# Patient Record
Sex: MALE | Race: WHITE | NOT HISPANIC OR LATINO | Employment: OTHER | URBAN - METROPOLITAN AREA
[De-identification: names, ages, dates, MRNs, and addresses within clinical notes are randomized per-mention and may not be internally consistent; named-entity substitution may affect disease eponyms.]

---

## 2017-03-13 ENCOUNTER — ANESTHESIA EVENT (OUTPATIENT)
Dept: SURGERY | Facility: CLINIC | Age: 67
End: 2017-03-13
Payer: COMMERCIAL

## 2017-03-14 ENCOUNTER — HOSPITAL ENCOUNTER (OUTPATIENT)
Facility: CLINIC | Age: 67
Discharge: HOME | End: 2017-03-14
Attending: THORACIC SURGERY (CARDIOTHORACIC VASCULAR SURGERY) | Admitting: THORACIC SURGERY (CARDIOTHORACIC VASCULAR SURGERY)
Payer: COMMERCIAL

## 2017-03-14 ENCOUNTER — ANESTHESIA (OUTPATIENT)
Dept: SURGERY | Facility: CLINIC | Age: 67
End: 2017-03-14
Payer: COMMERCIAL

## 2017-03-14 VITALS
DIASTOLIC BLOOD PRESSURE: 63 MMHG | WEIGHT: 251 LBS | TEMPERATURE: 98.2 F | RESPIRATION RATE: 16 BRPM | OXYGEN SATURATION: 96 % | HEIGHT: 68 IN | BODY MASS INDEX: 38.04 KG/M2 | SYSTOLIC BLOOD PRESSURE: 121 MMHG

## 2017-03-14 DIAGNOSIS — C85.80 OTHER SPECIFIED TYPE OF NON-HODGKIN LYMPHOMA: Primary | ICD-10-CM

## 2017-03-14 LAB — GLUCOSE BLDC GLUCOMTR-MCNC: 99 MG/DL (ref 70–99)

## 2017-03-14 PROCEDURE — 82962 GLUCOSE BLOOD TEST: CPT

## 2017-03-14 PROCEDURE — 25000125 ZZHC RX 250: Performed by: NURSE ANESTHETIST, CERTIFIED REGISTERED

## 2017-03-14 PROCEDURE — 25000128 H RX IP 250 OP 636: Performed by: NURSE ANESTHETIST, CERTIFIED REGISTERED

## 2017-03-14 PROCEDURE — 36000050 ZZH SURGERY LEVEL 2 1ST 30 MIN: Performed by: THORACIC SURGERY (CARDIOTHORACIC VASCULAR SURGERY)

## 2017-03-14 PROCEDURE — 25800025 ZZH RX 258: Performed by: NURSE ANESTHETIST, CERTIFIED REGISTERED

## 2017-03-14 PROCEDURE — 71000012 ZZH RECOVERY PHASE 1 LEVEL 1 FIRST HR: Performed by: THORACIC SURGERY (CARDIOTHORACIC VASCULAR SURGERY)

## 2017-03-14 PROCEDURE — 27210794 ZZH OR GENERAL SUPPLY STERILE: Performed by: THORACIC SURGERY (CARDIOTHORACIC VASCULAR SURGERY)

## 2017-03-14 PROCEDURE — 27210995 ZZH RX 272: Performed by: THORACIC SURGERY (CARDIOTHORACIC VASCULAR SURGERY)

## 2017-03-14 PROCEDURE — 37000008 ZZH ANESTHESIA TECHNICAL FEE, 1ST 30 MIN: Performed by: THORACIC SURGERY (CARDIOTHORACIC VASCULAR SURGERY)

## 2017-03-14 PROCEDURE — 71000027 ZZH RECOVERY PHASE 2 EACH 15 MINS: Performed by: THORACIC SURGERY (CARDIOTHORACIC VASCULAR SURGERY)

## 2017-03-14 PROCEDURE — 25000125 ZZHC RX 250: Performed by: THORACIC SURGERY (CARDIOTHORACIC VASCULAR SURGERY)

## 2017-03-14 PROCEDURE — 40000170 ZZH STATISTIC PRE-PROCEDURE ASSESSMENT II: Performed by: THORACIC SURGERY (CARDIOTHORACIC VASCULAR SURGERY)

## 2017-03-14 RX ORDER — FENTANYL CITRATE 50 UG/ML
25-50 INJECTION, SOLUTION INTRAMUSCULAR; INTRAVENOUS
Status: DISCONTINUED | OUTPATIENT
Start: 2017-03-14 | End: 2017-03-14 | Stop reason: HOSPADM

## 2017-03-14 RX ORDER — IBUPROFEN 600 MG/1
600 TABLET, FILM COATED ORAL
Status: DISCONTINUED | OUTPATIENT
Start: 2017-03-14 | End: 2017-03-14 | Stop reason: HOSPADM

## 2017-03-14 RX ORDER — ONDANSETRON 2 MG/ML
4 INJECTION INTRAMUSCULAR; INTRAVENOUS EVERY 30 MIN PRN
Status: DISCONTINUED | OUTPATIENT
Start: 2017-03-14 | End: 2017-03-14 | Stop reason: HOSPADM

## 2017-03-14 RX ORDER — ONDANSETRON 2 MG/ML
INJECTION INTRAMUSCULAR; INTRAVENOUS PRN
Status: DISCONTINUED | OUTPATIENT
Start: 2017-03-14 | End: 2017-03-14

## 2017-03-14 RX ORDER — MAGNESIUM HYDROXIDE 1200 MG/15ML
LIQUID ORAL PRN
Status: DISCONTINUED | OUTPATIENT
Start: 2017-03-14 | End: 2017-03-14 | Stop reason: HOSPADM

## 2017-03-14 RX ORDER — SODIUM CHLORIDE, SODIUM LACTATE, POTASSIUM CHLORIDE, CALCIUM CHLORIDE 600; 310; 30; 20 MG/100ML; MG/100ML; MG/100ML; MG/100ML
INJECTION, SOLUTION INTRAVENOUS CONTINUOUS
Status: DISCONTINUED | OUTPATIENT
Start: 2017-03-14 | End: 2017-03-14 | Stop reason: HOSPADM

## 2017-03-14 RX ORDER — HYDROCODONE BITARTRATE AND ACETAMINOPHEN 5; 325 MG/1; MG/1
1 TABLET ORAL EVERY 8 HOURS PRN
Qty: 8 TABLET | Refills: 0 | Status: ON HOLD | OUTPATIENT
Start: 2017-03-14 | End: 2017-11-14

## 2017-03-14 RX ORDER — HYDROCODONE BITARTRATE AND ACETAMINOPHEN 5; 325 MG/1; MG/1
1-2 TABLET ORAL
Status: DISCONTINUED | OUTPATIENT
Start: 2017-03-14 | End: 2017-03-14 | Stop reason: HOSPADM

## 2017-03-14 RX ORDER — LIDOCAINE HYDROCHLORIDE 20 MG/ML
INJECTION, SOLUTION INFILTRATION; PERINEURAL PRN
Status: DISCONTINUED | OUTPATIENT
Start: 2017-03-14 | End: 2017-03-14

## 2017-03-14 RX ORDER — SODIUM CHLORIDE, SODIUM LACTATE, POTASSIUM CHLORIDE, CALCIUM CHLORIDE 600; 310; 30; 20 MG/100ML; MG/100ML; MG/100ML; MG/100ML
INJECTION, SOLUTION INTRAVENOUS CONTINUOUS PRN
Status: DISCONTINUED | OUTPATIENT
Start: 2017-03-14 | End: 2017-03-14

## 2017-03-14 RX ORDER — NALOXONE HYDROCHLORIDE 0.4 MG/ML
.1-.4 INJECTION, SOLUTION INTRAMUSCULAR; INTRAVENOUS; SUBCUTANEOUS
Status: DISCONTINUED | OUTPATIENT
Start: 2017-03-14 | End: 2017-03-14 | Stop reason: HOSPADM

## 2017-03-14 RX ORDER — PROPOFOL 10 MG/ML
INJECTION, EMULSION INTRAVENOUS PRN
Status: DISCONTINUED | OUTPATIENT
Start: 2017-03-14 | End: 2017-03-14

## 2017-03-14 RX ORDER — HYDROMORPHONE HYDROCHLORIDE 1 MG/ML
.3-.5 INJECTION, SOLUTION INTRAMUSCULAR; INTRAVENOUS; SUBCUTANEOUS EVERY 10 MIN PRN
Status: DISCONTINUED | OUTPATIENT
Start: 2017-03-14 | End: 2017-03-14 | Stop reason: HOSPADM

## 2017-03-14 RX ORDER — ONDANSETRON 4 MG/1
4 TABLET, ORALLY DISINTEGRATING ORAL EVERY 30 MIN PRN
Status: DISCONTINUED | OUTPATIENT
Start: 2017-03-14 | End: 2017-03-14 | Stop reason: HOSPADM

## 2017-03-14 RX ORDER — MEPERIDINE HYDROCHLORIDE 25 MG/ML
12.5 INJECTION INTRAMUSCULAR; INTRAVENOUS; SUBCUTANEOUS
Status: DISCONTINUED | OUTPATIENT
Start: 2017-03-14 | End: 2017-03-14 | Stop reason: HOSPADM

## 2017-03-14 RX ADMIN — MIDAZOLAM HYDROCHLORIDE 2 MG: 1 INJECTION, SOLUTION INTRAMUSCULAR; INTRAVENOUS at 07:46

## 2017-03-14 RX ADMIN — PROPOFOL 20 MG: 10 INJECTION, EMULSION INTRAVENOUS at 07:48

## 2017-03-14 RX ADMIN — DEXMEDETOMIDINE 8 MCG: 100 INJECTION, SOLUTION, CONCENTRATE INTRAVENOUS at 07:50

## 2017-03-14 RX ADMIN — SODIUM CHLORIDE, POTASSIUM CHLORIDE, SODIUM LACTATE AND CALCIUM CHLORIDE: 600; 310; 30; 20 INJECTION, SOLUTION INTRAVENOUS at 07:45

## 2017-03-14 RX ADMIN — LIDOCAINE HYDROCHLORIDE 50 MG: 20 INJECTION, SOLUTION INFILTRATION; PERINEURAL at 07:46

## 2017-03-14 RX ADMIN — ONDANSETRON 4 MG: 2 INJECTION INTRAMUSCULAR; INTRAVENOUS at 07:50

## 2017-03-14 RX ADMIN — PROPOFOL 20 MG: 10 INJECTION, EMULSION INTRAVENOUS at 07:50

## 2017-03-14 RX ADMIN — DEXMEDETOMIDINE 4 MCG: 100 INJECTION, SOLUTION, CONCENTRATE INTRAVENOUS at 07:51

## 2017-03-14 ASSESSMENT — COPD QUESTIONNAIRES: COPD: 0

## 2017-03-14 ASSESSMENT — ENCOUNTER SYMPTOMS
SEIZURES: 0
DYSRHYTHMIAS: 0

## 2017-03-14 ASSESSMENT — LIFESTYLE VARIABLES: TOBACCO_USE: 0

## 2017-03-14 NOTE — ANESTHESIA POSTPROCEDURE EVALUATION
Patient: Pastor Garibay    Procedure(s):  PORT REMOVAL - Wound Class: I-Clean    Diagnosis:NON-HODGKIN LYMPHOMA    Diagnosis Additional Information: No value filed.    Anesthesia Type:  MAC    Note:  Anesthesia Post Evaluation    Patient location during evaluation: PACU  Patient participation: Able to fully participate in evaluation  Level of consciousness: awake  Pain management: adequate  Airway patency: patent  Cardiovascular status: acceptable  Respiratory status: acceptable  Hydration status: acceptable  PONV: none     Anesthetic complications: None          Last vitals:  Vitals:    03/14/17 0830 03/14/17 0845 03/14/17 0932   BP: 108/54 112/69 121/63   Resp: 12 19 16   Temp:  36.8  C (98.2  F)    SpO2: 94% 96% 96%         Electronically Signed By: James Hamlin MD  March 14, 2017  5:22 PM

## 2017-03-14 NOTE — BRIEF OP NOTE
[facility] Brief Operative Note    Pre-operative diagnosis: NON-HODGKIN LYMPHOMA     Post-operative diagnosis non hodgkins lymphoma     Procedure: Procedure(s):  PORT REMOVAL - Wound Class: I-Clean   Surgeon(s): Surgeon(s) and Role:     * Robbie Linda MD - Primary     * Ana Sun PA-C - Assisting   Estimated blood loss: 1 mL    Specimens: * No specimens in log *   Findings: NA

## 2017-03-14 NOTE — OP NOTE
DATE OF PROCEDURE:  2017       ASSISTANT:  Ana Sun PA-C      PREOPERATIVE DIAGNOSIS:  Non-Hodgkin's lymphoma, status post chemotherapy.      POSTOPERATIVE DIAGNOSIS:  Non-Hodgkin's lymphoma, status post chemotherapy.      PROCEDURE:  Removal of PowerPort implanted port, left subclavian vein.      ANESTHESIA:  Local with lidocaine 1% without epinephrine and sedation.      INDICATIONS:  Ezio Padilla is a  67-year-old gentleman who had a PowerPort placed for chemotherapy for non-Hodgkin's lymphoma.  He has completed his treatment.  The PowerPort is no longer necessary.      DESCRIPTION OF PROCEDURE:  The patient was brought to the OR and placed in supine position.  IV sedation was given.  The left infraclavicular area was prepared and draped in the usual fashion using ChloraPrep.  Local anesthesia was done with lidocaine 1% without epinephrine.  The infraclavicular incision was opened.  The port was dissected from the subcutaneous pocket.  The port and catheter were removed.  The catheter was intact.  The tract of the catheter was closed with figure-of-eight of 3-0 Vicryl.  Hemostasis was excellent.  Incision was closed in the usual fashion.  Estimated blood loss minimal.  Sponge and needle counts correct.         SHARRON CANNON MD             D: 2017 08:04   T: 2017 12:20   MT: [id number]      Name:     EZIO PADILLA   MRN:      [id number]        Account:        [id number]   :      1950           Procedure Date: 2017      Document: [id number]

## 2017-03-14 NOTE — IP AVS SNAPSHOT
MRN:[id number]                      After Visit Summary   3/14/2017    Pastor Garibay    MRN: [id number]           Thank you!     Thank you for choosing [facility] for your care. Our goal is always to provide you with excellent care. Hearing back from our patients is one way we can continue to improve our services. Please take a few minutes to complete the written survey that you may receive in the mail after you visit with us. Thank you!        Patient Information     Date Of Birth          1950        About your hospital stay     You were admitted on:  March 14, 2017 You last received care in the:  [facility] PACU    You were discharged on:  March 14, 2017       Who to Call     For medical emergencies, please call 911.  For non-urgent questions about your medical care, please call your primary care provider or clinic, [phone]  For questions related to your surgery, please call your surgery clinic        Attending Provider     Provider Specialty    Robbie Linda MD Thoracic Diseases       Primary Care Provider Office Phone # Fax #    Francisco Armijo [phone]       [facility] [address]  [place] [address]        After Care Instructions     Diet Instructions       Resume pre-procedure diet            Shower       No shower for 24 hours post procedure. May shower Postoperative Day (POD) 1. No bathing/ommersing incision in water for one week.  Leave clear Dermabond glue in place-- it will peel off on its own in about 7-10 days.                  Further instructions from your care team       **Because you had anesthesia today and your history of sleep apnea, it is extremely important that you use your CPAP machine for the next 24 hours while napping or sleeping.**          Same Day Surgery Discharge Instructions for  Sedation and General Anesthesia       It's not unusual to feel dizzy, light-headed or faint for up to 24  hours after surgery or while taking pain medication.  If you have these symptoms: sit for a few minutes before standing and have someone assist you when you get up to walk or use the bathroom.      You should rest and relax for the next 24 hours. We recommend you make arrangements to have an adult stay with you for at least 24 hours after your discharge.  Avoid hazardous and strenuous activity.      DO NOT DRIVE any vehicle or operate mechanical equipment for 24 hours following the end of your surgery.  Even though you may feel normal, your reactions may be affected by the medication you have received.      Do not drink alcoholic beverages for 24 hours following surgery.       Slowly progress to your regular diet as you feel able. It's not unusual to feel nauseated and/or vomit after receiving anesthesia.  If you develop these symptoms, drink clear liquids (apple juice, ginger ale, broth, 7-up, etc. ) until you feel better.  If your nausea and vomiting persists for 24 hours, please notify your surgeon.        All narcotic pain medications, along with inactivity and anesthesia, can cause constipation. Drinking plenty of liquids and increasing fiber intake will help.      For any questions of a medical nature, call your surgeon.      Do not make important decisions for 24 hours.      If you had general anesthesia, you may have a sore throat for a couple of days related to the breathing tube used during surgery.  You may use Cepacol lozenges to help with this discomfort.  If it worsens or if you develop a fever, contact your surgeon.       If you feel your pain is not well managed with the pain medications prescribed by your surgeon, please contact your surgeon's office to let them know so they can address your concerns.             Discharge Instructions for Port Removal        You may remove your bandage and shower in 24 hours.     You may resume normal activity as tolerated.      You may apply ice to the area for  "comfort.    Your incision was closed using a liquid adhesive.  Do not scratch, rub or pick at the film over your incision.  You may shower in 24 hours.  Do not soak in a tub for at least one week.  Do not apply lotions or cream to you incision.     Watch incision for signs of infection:  Redness  Swelling  Drainage  Temperature    Call your surgeon for questions and concerns.           **If you have questions or concerns about your procedure,  call Dr. Linda at [phone]**          Pending Results     No orders found from 3/12/2017 to 3/15/2017.            Admission Information     Date & Time Provider Department Dept. Phone    3/14/2017 Robbie Linda MD [facility] PACU [phone]      Your Vitals Were     Blood Pressure Temperature Respirations Height Weight Pulse Oximetry    113/63 97.8  F (36.6  C) (Temporal) 11 1.727 m (5' 8\") 113.9 kg (251 lb) 95%    BMI (Body Mass Index)                   38.16 kg/m2           [facility] Information     [facility] lets you send messages to your doctor, view your test results, renew your prescriptions, schedule appointments and more. To sign up, go to [email or web address] . Click on \"Log in\" on the left side of the screen, which will take you to the Welcome page. Then click on \"Sign up Now\" on the right side of the page.     You will be asked to enter the access code listed below, as well as some personal information. Please follow the directions to create your username and password.     Your access code is: [email or web address]  Expires: 2017  8:24 AM     Your access code will  in 90 days. If you need help or a new code, please call your [facility] clinic or [phone].        Care EveryWhere ID     This is your Care EveryWhere ID. This could be used by other organizations to access your [facility] medical records  [id number]           Review of your medicines      START taking        Dose / Directions    HYDROcodone-acetaminophen 5-325 MG per tablet "   Commonly known as:  NORCO   Used for:  Other specified type of non-Hodgkin lymphoma (H)        Dose:  1 tablet   Take 1 tablet by mouth every 8 hours as needed for other (Moderate to Severe Pain)   Quantity:  8 tablet   Refills:  0         CONTINUE these medicines which have NOT CHANGED        Dose / Directions    ADVIL PM PO        Dose:  2 tablet   Take 2 tablets by mouth nightly as needed   Refills:  0       AMLODIPINE BESYLATE PO        Dose:  5 mg   Take 5 mg by mouth At Bedtime   Refills:  0       ASPIRIN EC PO   Notes to Patient:  3/14/ 17 Pt plans to wait 2 months to resume this.        Dose:  81 mg   Take 81 mg by mouth daily   Refills:  0       COLACE PO        Dose:  100 mg   Take 100 mg by mouth daily   Refills:  0       CRESTOR PO        Dose:  5 mg   Take 5 mg by mouth daily   Refills:  0       FENOFIBRATE PO        Dose:  160 mg   Take 160 mg by mouth daily   Refills:  0       insulin glargine 100 UNIT/ML injection   Commonly known as:  LANTUS        Dose:  52 Units   Inject 52 Units Subcutaneous At Bedtime   Refills:  0       LASIX PO        Dose:  40 mg   Take 40 mg by mouth daily Is supposed to take it BID, but forgets   Refills:  0       LISINOPRIL PO        Dose:  40 mg   Take 40 mg by mouth daily   Refills:  0       * METFORMIN HCL PO        Dose:  1000 mg   Take 1,000 mg by mouth daily (with breakfast)   Refills:  0       * METFORMIN HCL PO        Dose:  500 mg   Take 500 mg by mouth At Bedtime   Refills:  0       NIACIN (ANTIHYPERLIPIDEMIC) PO        Dose:  500 mg   Take 500 mg by mouth daily   Refills:  0       NOVOLOG SC        Dose:  40 Units   Inject 40 Units Subcutaneous 3 times daily (with meals) He does this as sliding scale based on food intake and blood sugar.   Refills:  0       * potassium chloride 20 MEQ Packet   Commonly known as:  KLOR-CON        Dose:  40 mEq   Take 40 mEq by mouth daily Takes in AM   Refills:  0       * potassium chloride 20 MEQ Packet   Commonly known as:   KLOR-CON        Dose:  20 mEq   Take 20 mEq by mouth At Bedtime   Refills:  0       * Notice:  This list has 4 medication(s) that are the same as other medications prescribed for you. Read the directions carefully, and ask your doctor or other care provider to review them with you.         Where to get your medicines      Some of these will need a paper prescription and others can be bought over the counter. Ask your nurse if you have questions.     Bring a paper prescription for each of these medications     HYDROcodone-acetaminophen 5-325 MG per tablet                Protect others around you: Learn how to safely use, store and throw away your medicines at www.disposemymeds.org.             Medication List: This is a list of all your medications and when to take them. Check marks below indicate your daily home schedule. Keep this list as a reference.      Medications           Morning Afternoon Evening Bedtime As Needed    ADVIL PM PO   Take 2 tablets by mouth nightly as needed                                AMLODIPINE BESYLATE PO   Take 5 mg by mouth At Bedtime                                ASPIRIN EC PO   Take 81 mg by mouth daily   Notes to Patient:  3/14/ 17 Pt plans to wait 2 months to resume this.                                COLACE PO   Take 100 mg by mouth daily                                CRESTOR PO   Take 5 mg by mouth daily                                FENOFIBRATE PO   Take 160 mg by mouth daily                                HYDROcodone-acetaminophen 5-325 MG per tablet   Commonly known as:  NORCO   Take 1 tablet by mouth every 8 hours as needed for other (Moderate to Severe Pain)                                insulin glargine 100 UNIT/ML injection   Commonly known as:  LANTUS   Inject 52 Units Subcutaneous At Bedtime                                LASIX PO   Take 40 mg by mouth daily Is supposed to take it BID, but forgets                                LISINOPRIL PO   Take 40 mg by mouth  daily                                * METFORMIN HCL PO   Take 1,000 mg by mouth daily (with breakfast)                                * METFORMIN HCL PO   Take 500 mg by mouth At Bedtime                                NIACIN (ANTIHYPERLIPIDEMIC) PO   Take 500 mg by mouth daily                                NOVOLOG SC   Inject 40 Units Subcutaneous 3 times daily (with meals) He does this as sliding scale based on food intake and blood sugar.                                * potassium chloride 20 MEQ Packet   Commonly known as:  KLOR-CON   Take 40 mEq by mouth daily Takes in AM                                * potassium chloride 20 MEQ Packet   Commonly known as:  KLOR-CON   Take 20 mEq by mouth At Bedtime                                * Notice:  This list has 4 medication(s) that are the same as other medications prescribed for you. Read the directions carefully, and ask your doctor or other care provider to review them with you.

## 2017-03-14 NOTE — DISCHARGE INSTRUCTIONS
**Because you had anesthesia today and your history of sleep apnea, it is extremely important that you use your CPAP machine for the next 24 hours while napping or sleeping.**          Same Day Surgery Discharge Instructions for  Sedation and General Anesthesia       It's not unusual to feel dizzy, light-headed or faint for up to 24 hours after surgery or while taking pain medication.  If you have these symptoms: sit for a few minutes before standing and have someone assist you when you get up to walk or use the bathroom.      You should rest and relax for the next 24 hours. We recommend you make arrangements to have an adult stay with you for at least 24 hours after your discharge.  Avoid hazardous and strenuous activity.      DO NOT DRIVE any vehicle or operate mechanical equipment for 24 hours following the end of your surgery.  Even though you may feel normal, your reactions may be affected by the medication you have received.      Do not drink alcoholic beverages for 24 hours following surgery.       Slowly progress to your regular diet as you feel able. It's not unusual to feel nauseated and/or vomit after receiving anesthesia.  If you develop these symptoms, drink clear liquids (apple juice, ginger ale, broth, 7-up, etc. ) until you feel better.  If your nausea and vomiting persists for 24 hours, please notify your surgeon.        All narcotic pain medications, along with inactivity and anesthesia, can cause constipation. Drinking plenty of liquids and increasing fiber intake will help.      For any questions of a medical nature, call your surgeon.      Do not make important decisions for 24 hours.      If you had general anesthesia, you may have a sore throat for a couple of days related to the breathing tube used during surgery.  You may use Cepacol lozenges to help with this discomfort.  If it worsens or if you develop a fever, contact your surgeon.       If you feel your pain is not well managed with the  pain medications prescribed by your surgeon, please contact your surgeon's office to let them know so they can address your concerns.             Discharge Instructions for Port Removal        You may remove your bandage and shower in 24 hours.     You may resume normal activity as tolerated.      You may apply ice to the area for comfort.    Your incision was closed using a liquid adhesive.  Do not scratch, rub or pick at the film over your incision.  You may shower in 24 hours.  Do not soak in a tub for at least one week.  Do not apply lotions or cream to you incision.     Watch incision for signs of infection:  Redness  Swelling  Drainage  Temperature    Call your surgeon for questions and concerns.           **If you have questions or concerns about your procedure,  call Dr. Linda at [phone]**

## 2017-03-14 NOTE — ANESTHESIA CARE TRANSFER NOTE
Patient: Pastor Garibay    Procedure(s):  PORT REMOVAL - Wound Class: I-Clean    Diagnosis: NON-HODGKIN LYMPHOMA    Diagnosis Additional Information: No value filed.    Anesthesia Type:   MAC     Note:  Airway :Room Air  Patient transferred to:PACU  Comments: VSS      Vitals: (Last set prior to Anesthesia Care Transfer)    CRNA VITALS  3/14/2017 0735 - 3/14/2017 0812      3/14/2017             Resp Rate (set): 10                Electronically Signed By: VERNA Vasquez CRNA  March 14, 2017  8:12 AM

## 2017-03-14 NOTE — IP AVS SNAPSHOT
[address], [address]    [facility] [address]    Phone:  [phone]                                       After Visit Summary   3/14/2017    Pastor Garibay    MRN: [id number]           After Visit Summary Signature Page     I have received my discharge instructions, and my questions have been answered. I have discussed any challenges I see with this plan with the nurse or doctor.    ..........................................................................................................................................  Patient/Patient Representative Signature      ..........................................................................................................................................  Patient Representative Print Name and Relationship to Patient    ..................................................               ................................................  Date                                            Time    ..........................................................................................................................................  Reviewed by Signature/Title    ...................................................              ..............................................  Date                                                            Time

## 2017-10-19 ENCOUNTER — TRANSFERRED RECORDS (OUTPATIENT)
Dept: HEALTH INFORMATION MANAGEMENT | Facility: CLINIC | Age: 67
End: 2017-10-19

## 2017-10-23 ENCOUNTER — TRANSFERRED RECORDS (OUTPATIENT)
Dept: HEALTH INFORMATION MANAGEMENT | Facility: CLINIC | Age: 67
End: 2017-10-23

## 2017-10-24 ENCOUNTER — TELEPHONE (OUTPATIENT)
Dept: INTERVENTIONAL RADIOLOGY/VASCULAR | Facility: CLINIC | Age: 67
End: 2017-10-24

## 2017-10-24 NOTE — TELEPHONE ENCOUNTER
Interventional Radiology   Interventional Radiology at [facility] has been requested to perform a Right infrahilar mass in the superior segment of the RLL Dr Olson.  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    Reviewed imaging and clinical information with IR staff Dr Fong who approved the biopsy with CT guidance. There is a higher chance of a pneumothorax.    Central scheduling will contact the patient to schedule the biopsy.     Thanks [facility] Interventional Radiology CNP ([phone])

## 2017-10-26 ENCOUNTER — HOSPITAL ENCOUNTER (OUTPATIENT)
Facility: CLINIC | Age: 67
Discharge: HOME | End: 2017-10-26
Attending: INTERNAL MEDICINE | Admitting: INTERNAL MEDICINE
Payer: COMMERCIAL

## 2017-10-26 ENCOUNTER — APPOINTMENT (OUTPATIENT)
Dept: GENERAL RADIOLOGY | Facility: CLINIC | Age: 67
End: 2017-10-26
Attending: RADIOLOGY
Payer: COMMERCIAL

## 2017-10-26 ENCOUNTER — HOSPITAL ENCOUNTER (OUTPATIENT)
Dept: CT IMAGING | Facility: CLINIC | Age: 67
End: 2017-10-26
Attending: INTERNAL MEDICINE | Admitting: INTERNAL MEDICINE
Payer: COMMERCIAL

## 2017-10-26 VITALS
TEMPERATURE: 98.5 F | HEART RATE: 77 BPM | DIASTOLIC BLOOD PRESSURE: 78 MMHG | RESPIRATION RATE: 14 BRPM | OXYGEN SATURATION: 94 % | SYSTOLIC BLOOD PRESSURE: 120 MMHG

## 2017-10-26 DIAGNOSIS — Z85.72 HX OF LYMPHOMA: ICD-10-CM

## 2017-10-26 LAB
GLUCOSE BLDC GLUCOMTR-MCNC: 251 MG/DL (ref 70–99)
GLUCOSE BLDC GLUCOMTR-MCNC: 255 MG/DL (ref 70–99)
INR PPP: 0.98 (ref 0.86–1.14)
PLATELET # BLD AUTO: 167 10E9/L (ref 150–450)

## 2017-10-26 PROCEDURE — 88161 CYTOPATH SMEAR OTHER SOURCE: CPT | Mod: 26 | Performed by: RADIOLOGY

## 2017-10-26 PROCEDURE — 85049 AUTOMATED PLATELET COUNT: CPT | Performed by: INTERNAL MEDICINE

## 2017-10-26 PROCEDURE — 88185 FLOWCYTOMETRY/TC ADD-ON: CPT | Performed by: INTERNAL MEDICINE

## 2017-10-26 PROCEDURE — 40000863 ZZH STATISTIC RADIOLOGY XRAY, US, CT, MAR, NM

## 2017-10-26 PROCEDURE — 82962 GLUCOSE BLOOD TEST: CPT

## 2017-10-26 PROCEDURE — 88172 CYTP DX EVAL FNA 1ST EA SITE: CPT | Performed by: RADIOLOGY

## 2017-10-26 PROCEDURE — 88173 CYTOPATH EVAL FNA REPORT: CPT | Mod: 26 | Performed by: RADIOLOGY

## 2017-10-26 PROCEDURE — 32405 CT LUNG MEDIASTINUM BIOPSY: CPT

## 2017-10-26 PROCEDURE — 25000128 H RX IP 250 OP 636: Performed by: INTERNAL MEDICINE

## 2017-10-26 PROCEDURE — 88305 TISSUE EXAM BY PATHOLOGIST: CPT | Mod: 26 | Performed by: RADIOLOGY

## 2017-10-26 PROCEDURE — 88172 CYTP DX EVAL FNA 1ST EA SITE: CPT | Mod: 26 | Performed by: RADIOLOGY

## 2017-10-26 PROCEDURE — 25000125 ZZHC RX 250: Performed by: INTERNAL MEDICINE

## 2017-10-26 PROCEDURE — 88184 FLOWCYTOMETRY/ TC 1 MARKER: CPT | Performed by: INTERNAL MEDICINE

## 2017-10-26 PROCEDURE — 88161 CYTOPATH SMEAR OTHER SOURCE: CPT | Performed by: RADIOLOGY

## 2017-10-26 PROCEDURE — 40001004 ZZHCL STATISTIC FLOW INT 9-15 ABY TC 88188: Performed by: INTERNAL MEDICINE

## 2017-10-26 PROCEDURE — 99152 MOD SED SAME PHYS/QHP 5/>YRS: CPT

## 2017-10-26 PROCEDURE — 88305 TISSUE EXAM BY PATHOLOGIST: CPT | Performed by: RADIOLOGY

## 2017-10-26 PROCEDURE — 40000986 XR CHEST 1 VW

## 2017-10-26 PROCEDURE — 88173 CYTOPATH EVAL FNA REPORT: CPT | Performed by: RADIOLOGY

## 2017-10-26 PROCEDURE — 85610 PROTHROMBIN TIME: CPT | Performed by: INTERNAL MEDICINE

## 2017-10-26 RX ORDER — LIDOCAINE HYDROCHLORIDE 10 MG/ML
1-30 INJECTION, SOLUTION EPIDURAL; INFILTRATION; INTRACAUDAL; PERINEURAL
Status: COMPLETED | OUTPATIENT
Start: 2017-10-26 | End: 2017-10-26

## 2017-10-26 RX ORDER — NALOXONE HYDROCHLORIDE 0.4 MG/ML
.1-.4 INJECTION, SOLUTION INTRAMUSCULAR; INTRAVENOUS; SUBCUTANEOUS
Status: DISCONTINUED | OUTPATIENT
Start: 2017-10-26 | End: 2017-10-26 | Stop reason: HOSPADM

## 2017-10-26 RX ORDER — FENTANYL CITRATE 50 UG/ML
25-50 INJECTION, SOLUTION INTRAMUSCULAR; INTRAVENOUS EVERY 5 MIN PRN
Status: DISCONTINUED | OUTPATIENT
Start: 2017-10-26 | End: 2017-10-26 | Stop reason: HOSPADM

## 2017-10-26 RX ORDER — FLUMAZENIL 0.1 MG/ML
0.2 INJECTION, SOLUTION INTRAVENOUS
Status: DISCONTINUED | OUTPATIENT
Start: 2017-10-26 | End: 2017-10-26 | Stop reason: HOSPADM

## 2017-10-26 RX ADMIN — MIDAZOLAM HYDROCHLORIDE 1 MG: 1 INJECTION, SOLUTION INTRAMUSCULAR; INTRAVENOUS at 10:08

## 2017-10-26 RX ADMIN — LIDOCAINE HYDROCHLORIDE 9 ML: 10 INJECTION, SOLUTION EPIDURAL; INFILTRATION; INTRACAUDAL; PERINEURAL at 10:18

## 2017-10-26 RX ADMIN — FENTANYL CITRATE 50 MCG: 50 INJECTION, SOLUTION INTRAMUSCULAR; INTRAVENOUS at 10:08

## 2017-10-26 NOTE — DISCHARGE INSTRUCTIONS
Lung Biopsy Discharge Instructions     After you go home:      You may resume your normal diet    Have an adult stay with you for 6 hours if you received sedation       For 24 hours - due to the sedation you received:    Relax and take it easy    Do NOT make any important or legal decisions    Do NOT drive or operate machines at home or at work    Do NOT drink alcohol    Care of Puncture Site:      For the first 48 hrs, check your puncture site every couple hours while you are awake     You may remove/change the bandaid tomorrow    You may shower tomorrow    No tub baths, whirlpools or swimming until your puncture site has fully healed    Activity       You may go back to normal activity in 24 hours     Wait 48 hours before lifting, straining, exercise or other strenuous activity    Medicines:      You may resume all medications    For minor pain, you may take Acetaminophen (Tylenol) or Ibuprofen (Advil)            Call the provider who ordered this test if:      Increased pain or a large or growing hard lump around the site    Blood or fluid is draining from the site    The site is red, swollen, hot or tender    Chills or a fever greater than 101 F (38 C)    Pain that is getting worse    Shortness of breath    Any questions or concerns    Call  911 or go to the Emergency Room if:      Severe chest pain or trouble breathing    Increased blood in your sputum (phlegm)    Bleeding that you cannot control        If you have questions call:          [facility] Radiology Dept @ [phone]      The provider who performed your procedure was Dr Anderson.

## 2017-10-26 NOTE — IP AVS SNAPSHOT
[place] [place] [address]    [place] [address]    Phone:  [phone]                                       After Visit Summary   10/26/2017    Pastor Garibay    MRN: [id number]           After Visit Summary Signature Page     I have received my discharge instructions, and my questions have been answered. I have discussed any challenges I see with this plan with the nurse or doctor.    ..........................................................................................................................................  Patient/Patient Representative Signature      ..........................................................................................................................................  Patient Representative Print Name and Relationship to Patient    ..................................................               ................................................  Date                                            Time    ..........................................................................................................................................  Reviewed by Signature/Title    ...................................................              ..............................................  Date                                                            Time

## 2017-10-26 NOTE — PLAN OF CARE
VSS, pt denies pain or SOB. Bedrest complete. Adhesive strip on R upper back CDI. Tolerating PO, blood sugars elevated 255. Waiting for CXR at 1230.

## 2017-10-26 NOTE — PROGRESS NOTES
Patient for CT lung biopsy for possible recurrent lymphoma. Procedure explained, patient and wife voice understanding.  Await labs to ready patient.

## 2017-10-26 NOTE — PROGRESS NOTES
Lung biopsy under CT completed by Dr Anderson. Patient stable throughout. Patient very red and flushed while laying prone for procedure. States it is difficult to lay on his abdomen. Color improved when back on cart. Dressing dry post procedure (right upper back). Alert and oriented when returned to [facility]. Discharge instructions reviewed with patient and wife prior to procedure. Will observe and get post procedure CXR as ordered. Report to Diana MARTINEZ RN.

## 2017-10-26 NOTE — PROGRESS NOTES
RADIOLOGY PROCEDURE NOTE  Patient name: Pastor Garibay  MRN: [id number]  : 1950    Pre-procedure diagnosis: Right infrahilar lung lesion  Post-procedure diagnosis: Same    Procedure Date/Time: 2017  10:25 AM  Procedure: Biopsy.  Estimated blood loss: None  Specimen(s) collected with description: Core biopsy samples.  The patient tolerated the procedure well with no immediate complications.    See imaging dictation for procedural details and findings.    Provider name: Jamal Anderson  Assistant(s):None

## 2017-10-26 NOTE — PROGRESS NOTES
1230 CXR complete.  No pneumothorax per Dr Anderson.  OK to send pt home.  Band aid CDI, reports no pain or SOB.  Dc instructions in hand.  D/c to home with wife.

## 2017-10-26 NOTE — IP AVS SNAPSHOT
MRN:[id number]                      After Visit Summary   10/26/2017    Pastor Garibay    MRN: [id number]           Visit Information        Department      10/26/2017  7:28 AM [facility]          Review of your medicines      UNREVIEWED medicines. Ask your doctor about these medicines        Dose / Directions    ADVIL PM PO        Dose:  2 tablet   Take 2 tablets by mouth nightly as needed   Refills:  0       AMLODIPINE BESYLATE PO        Dose:  5 mg   Take 5 mg by mouth At Bedtime   Refills:  0       ASPIRIN EC PO        Dose:  81 mg   Take 81 mg by mouth daily   Refills:  0       COLACE PO        Dose:  100 mg   Take 100 mg by mouth daily   Refills:  0       CRESTOR PO        Dose:  5 mg   Take 5 mg by mouth daily   Refills:  0       FENOFIBRATE PO        Dose:  160 mg   Take 160 mg by mouth daily   Refills:  0       HYDROcodone-acetaminophen 5-325 MG per tablet   Commonly known as:  NORCO   Used for:  Other specified type of non-Hodgkin lymphoma        Dose:  1 tablet   Take 1 tablet by mouth every 8 hours as needed for other (Moderate to Severe Pain)   Quantity:  8 tablet   Refills:  0       insulin glargine 100 UNIT/ML injection   Commonly known as:  LANTUS        Dose:  52 Units   Inject 52 Units Subcutaneous At Bedtime   Refills:  0       LASIX PO        Dose:  40 mg   Take 40 mg by mouth daily Is supposed to take it BID, but forgets   Refills:  0       LISINOPRIL PO        Dose:  40 mg   Take 40 mg by mouth daily   Refills:  0       * METFORMIN HCL PO        Dose:  1000 mg   Take 1,000 mg by mouth daily (with breakfast)   Refills:  0       * METFORMIN HCL PO        Dose:  500 mg   Take 500 mg by mouth At Bedtime   Refills:  0       NIACIN (ANTIHYPERLIPIDEMIC) PO        Dose:  500 mg   Take 500 mg by mouth daily   Refills:  0       NOVOLOG SC        Dose:  40 Units   Inject 40 Units Subcutaneous 3 times daily (with meals) He does this as sliding scale based on food  intake and blood sugar.   Refills:  0       * potassium chloride 20 MEQ Packet   Commonly known as:  KLOR-CON        Dose:  40 mEq   Take 40 mEq by mouth daily Takes in AM   Refills:  0       * potassium chloride 20 MEQ Packet   Commonly known as:  KLOR-CON        Dose:  20 mEq   Take 20 mEq by mouth At Bedtime   Refills:  0       * Notice:  This list has 4 medication(s) that are the same as other medications prescribed for you. Read the directions carefully, and ask your doctor or other care provider to review them with you.             Protect others around you: Learn how to safely use, store and throw away your medicines at www.disposemymeds.org.         Follow-ups after your visit        Your next 10 appointments already scheduled     Oct 26, 2017  9:00 AM CDT   [facility]UR[facility],  BODY RAD   [facility] ([facility])    [address] [place] [id number] [phone]           Plan for an adult to drive you home and stay with you until morning.  Tell your doctor in advance:   If you have any allergies.   If you are breastfeeding or there s any chance you are pregnant.  Please bring any scans or X-rays taken at other hospitals, if they may be helpful. Also bring a list of your medicines, including vitamins, minerals and over-the-counter drugs. It is safest to leave valuables at home.  If you take blood thinners, you may need to stop taking them a few days before treatment. Talk to your doctor before stopping these medicines. You will need a blood test the morning of your exam.   Stop taking Coumadin (warfarin) 5 days before treatment. Restart the day after treatment.   If you take aspirin, you may need to stop taking it 3 days before treatment.   If you take Plavix, Ticlid, Pletal or Persantine, you may need to stop taking them 5 days before your scan.  If you have diabetes:   If you take insulin, call your diabetes care team. Ask if  you should adjust your insulin before this test.   If your kidney function is normal, continue taking your metformin (Avandamet, Glucophage, Glucovance, Metaglip) on the day of your exam.   If your kidney function is abnormal, wait 48 hours before restarting this medicine.  If you have any questions, please call the imaging department where you will have your exam.  The day before your exam: Drink extra fluids at least six 8-ounce glasses (unless your doctor tells you to restrict fluids). The day of your exam: No eating or drinking for 4 hours before your test. You may take medicine with small sips of water.               Care Instructions        Further instructions from your care team       Lung Biopsy Discharge Instructions     After you go home:      You may resume your normal diet    Have an adult stay with you for 6 hours if you received sedation       For 24 hours - due to the sedation you received:    Relax and take it easy    Do NOT make any important or legal decisions    Do NOT drive or operate machines at home or at work    Do NOT drink alcohol    Care of Puncture Site:      For the first 48 hrs, check your puncture site every couple hours while you are awake     You may remove/change the bandaid tomorrow    You may shower tomorrow    No tub baths, whirlpools or swimming until your puncture site has fully healed    Activity       You may go back to normal activity in 24 hours     Wait 48 hours before lifting, straining, exercise or other strenuous activity    Medicines:      You may resume all medications    For minor pain, you may take Acetaminophen (Tylenol) or Ibuprofen (Advil)            Call the provider who ordered this test if:      Increased pain or a large or growing hard lump around the site    Blood or fluid is draining from the site    The site is red, swollen, hot or tender    Chills or a fever greater than 101 F (38 C)    Pain that is getting worse    Shortness of breath    Any questions or  "concerns    Call  911 or go to the Emergency Room if:      Severe chest pain or trouble breathing    Increased blood in your sputum (phlegm)    Bleeding that you cannot control        If you have questions call:          [facility] Radiology Dept @ [phone]      The provider who performed your procedure was Dr Anderson.               Additional Information About Your Visit        MyChart Information     [employer] lets you send messages to your doctor, view your test results, renew your prescriptions, schedule appointments and more. To sign up, go to [email or web address] . Click on \"Log in\" on the left side of the screen, which will take you to the Welcome page. Then click on \"Sign up Now\" on the right side of the page.     You will be asked to enter the access code listed below, as well as some personal information. Please follow the directions to create your username and password.     Your access code is: [email or web address]  Expires: 2018  8:54 AM     Your access code will  in 90 days. If you need help or a new code, please call your [place] clinic or [phone].        Care EveryWhere ID     This is your Care EveryWhere ID. This could be used by other organizations to access your [facility] medical records  [id number]        Your Vitals Were     Blood Pressure Pulse Temperature Respirations Pulse Oximetry       138/63 (BP Location: Right arm) 75 98.5  F (36.9  C) (Oral) 18 96%        Primary Care Provider Office Phone # Fax #    Francisco Armijo [phone]      Equal Access to Services     [facility]: Hadii shalom david hadasho Soomaali, waaxda luqadaha, qaybta kaalmada adeegyada, juana sylvester . So [facility] [phone].    ATENCIÓN: Si habla español, tiene a [facility] disposición servicios gratuitos de asistencia lingüística. Llame al [phone].    We comply with applicable federal civil rights laws and [place] laws. We do not discriminate on the basis of " race, color, national origin, age, disability, sex, sexual orientation, or gender identity.            Thank you!     Thank you for choosing [facility] for your care. Our goal is always to provide you with excellent care. Hearing back from our patients is one way we can continue to improve our services. Please take a few minutes to complete the written survey that you may receive in the mail after you visit with us. Thank you!             Medication List: This is a list of all your medications and when to take them. Check marks below indicate your daily home schedule. Keep this list as a reference.      Medications           Morning Afternoon Evening Bedtime As Needed    ADVIL PM PO   Take 2 tablets by mouth nightly as needed                                AMLODIPINE BESYLATE PO   Take 5 mg by mouth At Bedtime                                ASPIRIN EC PO   Take 81 mg by mouth daily                                COLACE PO   Take 100 mg by mouth daily                                CRESTOR PO   Take 5 mg by mouth daily                                FENOFIBRATE PO   Take 160 mg by mouth daily                                HYDROcodone-acetaminophen 5-325 MG per tablet   Commonly known as:  NORCO   Take 1 tablet by mouth every 8 hours as needed for other (Moderate to Severe Pain)                                insulin glargine 100 UNIT/ML injection   Commonly known as:  LANTUS   Inject 52 Units Subcutaneous At Bedtime                                LASIX PO   Take 40 mg by mouth daily Is supposed to take it BID, but forgets                                LISINOPRIL PO   Take 40 mg by mouth daily                                * METFORMIN HCL PO   Take 1,000 mg by mouth daily (with breakfast)                                * METFORMIN HCL PO   Take 500 mg by mouth At Bedtime                                NIACIN (ANTIHYPERLIPIDEMIC) PO   Take 500 mg by mouth daily                                NOVOLOG SC   Inject 40  Units Subcutaneous 3 times daily (with meals) He does this as sliding scale based on food intake and blood sugar.                                * potassium chloride 20 MEQ Packet   Commonly known as:  KLOR-CON   Take 40 mEq by mouth daily Takes in AM                                * potassium chloride 20 MEQ Packet   Commonly known as:  KLOR-CON   Take 20 mEq by mouth At Bedtime                                * Notice:  This list has 4 medication(s) that are the same as other medications prescribed for you. Read the directions carefully, and ask your doctor or other care provider to review them with you.

## 2017-10-27 LAB
COPATH REPORT: NORMAL
COPATH REPORT: NORMAL

## 2017-11-03 ENCOUNTER — TRANSFERRED RECORDS (OUTPATIENT)
Dept: HEALTH INFORMATION MANAGEMENT | Facility: CLINIC | Age: 67
End: 2017-11-03

## 2017-11-06 ENCOUNTER — HOSPITAL ENCOUNTER (OUTPATIENT)
Dept: RESPIRATORY THERAPY | Facility: CLINIC | Age: 67
End: 2017-11-06
Attending: INTERNAL MEDICINE
Payer: COMMERCIAL

## 2017-11-06 ENCOUNTER — HOSPITAL ENCOUNTER (OUTPATIENT)
Dept: LAB | Facility: CLINIC | Age: 67
Discharge: HOME | End: 2017-11-06
Attending: INTERNAL MEDICINE | Admitting: INTERNAL MEDICINE
Payer: COMMERCIAL

## 2017-11-06 DIAGNOSIS — R91.8 HILAR MASS: ICD-10-CM

## 2017-11-06 LAB — HGB BLD-MCNC: 14.8 G/DL (ref 13.3–17.7)

## 2017-11-06 PROCEDURE — 85018 HEMOGLOBIN: CPT | Performed by: INTERNAL MEDICINE

## 2017-11-06 PROCEDURE — 36415 COLL VENOUS BLD VENIPUNCTURE: CPT | Performed by: INTERNAL MEDICINE

## 2017-11-06 RX ORDER — ALBUTEROL SULFATE 0.83 MG/ML
2.5 SOLUTION RESPIRATORY (INHALATION) ONCE
Status: COMPLETED | OUTPATIENT
Start: 2017-11-06 | End: 2017-11-06

## 2017-11-06 RX ADMIN — ALBUTEROL SULFATE 2.5 MG: 2.5 SOLUTION RESPIRATORY (INHALATION) at 09:25

## 2017-11-09 ENCOUNTER — HOSPITAL ENCOUNTER (OUTPATIENT)
Dept: LAB | Facility: CLINIC | Age: 67
Discharge: HOME | End: 2017-11-09
Attending: THORACIC SURGERY (CARDIOTHORACIC VASCULAR SURGERY) | Admitting: THORACIC SURGERY (CARDIOTHORACIC VASCULAR SURGERY)
Payer: COMMERCIAL

## 2017-11-09 DIAGNOSIS — Z01.83 ENCOUNTER FOR BLOOD TYPING: ICD-10-CM

## 2017-11-09 DIAGNOSIS — R91.8 LUNG MASS: Primary | ICD-10-CM

## 2017-11-09 LAB
DLCOCOR-%PRED-PRE: 65 %
DLCOCOR-PRE: 17.19 ML/MIN/MMHG
DLCOUNC-%PRED-PRE: 65 %
DLCOUNC-PRE: 17.29 ML/MIN/MMHG
DLCOUNC-PRED: 26.3 ML/MIN/MMHG
ERV-%PRED-PRE: 62 %
ERV-PRE: 0.31 L
ERV-PRED: 0.49 L
EXPTIME-PRE: 8.22 SEC
FEF2575-%PRED-POST: 80 %
FEF2575-%PRED-PRE: 47 %
FEF2575-POST: 2.05 L/SEC
FEF2575-PRE: 1.21 L/SEC
FEF2575-PRED: 2.54 L/SEC
FEFMAX-%PRED-PRE: 77 %
FEFMAX-PRE: 6.41 L/SEC
FEFMAX-PRED: 8.32 L/SEC
FEV1-%PRED-PRE: 62 %
FEV1-PRE: 1.97 L
FEV1FEV6-PRE: 70 %
FEV1FEV6-PRED: 78 %
FEV1FVC-PRE: 70 %
FEV1FVC-PRED: 77 %
FEV1SVC-PRE: 72 %
FEV1SVC-PRED: 70 %
FIFMAX-PRE: 1.95 L/SEC
FRCPLETH-%PRED-PRE: 82 %
FRCPLETH-PRE: 2.92 L
FRCPLETH-PRED: 3.54 L
FVC-%PRED-PRE: 68 %
FVC-PRE: 2.82 L
FVC-PRED: 4.12 L
IC-%PRED-PRE: 60 %
IC-PRE: 2.43 L
IC-PRED: 4.02 L
RVPLETH-%PRED-PRE: 105 %
RVPLETH-PRE: 2.61 L
RVPLETH-PRED: 2.48 L
TLCPLETH-%PRED-PRE: 79 %
TLCPLETH-PRE: 5.34 L
TLCPLETH-PRED: 6.72 L
VA-%PRED-PRE: 72 %
VA-PRE: 4.66 L
VC-%PRED-PRE: 60 %
VC-PRE: 2.73 L
VC-PRED: 4.51 L

## 2017-11-09 PROCEDURE — 86850 RBC ANTIBODY SCREEN: CPT | Performed by: THORACIC SURGERY (CARDIOTHORACIC VASCULAR SURGERY)

## 2017-11-09 PROCEDURE — 36415 COLL VENOUS BLD VENIPUNCTURE: CPT | Performed by: THORACIC SURGERY (CARDIOTHORACIC VASCULAR SURGERY)

## 2017-11-09 PROCEDURE — 86900 BLOOD TYPING SEROLOGIC ABO: CPT | Performed by: THORACIC SURGERY (CARDIOTHORACIC VASCULAR SURGERY)

## 2017-11-09 PROCEDURE — 86901 BLOOD TYPING SEROLOGIC RH(D): CPT | Performed by: THORACIC SURGERY (CARDIOTHORACIC VASCULAR SURGERY)

## 2017-11-14 ENCOUNTER — HOSPITAL ENCOUNTER (OUTPATIENT)
Facility: CLINIC | Age: 67
Discharge: HOME | End: 2017-11-14
Attending: THORACIC SURGERY (CARDIOTHORACIC VASCULAR SURGERY) | Admitting: THORACIC SURGERY (CARDIOTHORACIC VASCULAR SURGERY)
Payer: COMMERCIAL

## 2017-11-14 ENCOUNTER — ANESTHESIA EVENT (OUTPATIENT)
Dept: SURGERY | Facility: CLINIC | Age: 67
End: 2017-11-14
Payer: COMMERCIAL

## 2017-11-14 ENCOUNTER — APPOINTMENT (OUTPATIENT)
Dept: GENERAL RADIOLOGY | Facility: CLINIC | Age: 67
End: 2017-11-14
Attending: THORACIC SURGERY (CARDIOTHORACIC VASCULAR SURGERY)
Payer: COMMERCIAL

## 2017-11-14 ENCOUNTER — ANESTHESIA (OUTPATIENT)
Dept: SURGERY | Facility: CLINIC | Age: 67
End: 2017-11-14
Payer: COMMERCIAL

## 2017-11-14 VITALS
WEIGHT: 246.7 LBS | HEIGHT: 69 IN | OXYGEN SATURATION: 94 % | BODY MASS INDEX: 36.54 KG/M2 | SYSTOLIC BLOOD PRESSURE: 127 MMHG | TEMPERATURE: 98.6 F | HEART RATE: 84 BPM | DIASTOLIC BLOOD PRESSURE: 56 MMHG | RESPIRATION RATE: 26 BRPM

## 2017-11-14 LAB
ABO + RH BLD: NORMAL
ABO + RH BLD: NORMAL
ANION GAP SERPL CALCULATED.3IONS-SCNC: 9 MMOL/L (ref 3–14)
BLD GP AB SCN SERPL QL: NORMAL
BLOOD BANK CMNT PATIENT-IMP: NORMAL
BUN SERPL-MCNC: 15 MG/DL (ref 7–30)
CALCIUM SERPL-MCNC: 8.9 MG/DL (ref 8.5–10.1)
CHLORIDE SERPL-SCNC: 103 MMOL/L (ref 94–109)
CO2 SERPL-SCNC: 25 MMOL/L (ref 20–32)
CREAT SERPL-MCNC: 0.81 MG/DL (ref 0.66–1.25)
ERYTHROCYTE [DISTWIDTH] IN BLOOD BY AUTOMATED COUNT: 13.7 % (ref 10–15)
GFR SERPL CREATININE-BSD FRML MDRD: >90 ML/MIN/1.7M2
GLUCOSE SERPL-MCNC: 142 MG/DL (ref 70–99)
HCT VFR BLD AUTO: 41.7 % (ref 40–53)
HGB BLD-MCNC: 14.2 G/DL (ref 13.3–17.7)
INR PPP: 0.92 (ref 0.86–1.14)
MCH RBC QN AUTO: 29.8 PG (ref 26.5–33)
MCHC RBC AUTO-ENTMCNC: 34.1 G/DL (ref 31.5–36.5)
MCV RBC AUTO: 87 FL (ref 78–100)
PLATELET # BLD AUTO: 195 10E9/L (ref 150–450)
POTASSIUM SERPL-SCNC: 3.9 MMOL/L (ref 3.4–5.3)
RBC # BLD AUTO: 4.77 10E12/L (ref 4.4–5.9)
SODIUM SERPL-SCNC: 137 MMOL/L (ref 133–144)
SPECIMEN EXP DATE BLD: NORMAL
WBC # BLD AUTO: 6.5 10E9/L (ref 4–11)

## 2017-11-14 PROCEDURE — 40000170 ZZH STATISTIC PRE-PROCEDURE ASSESSMENT II: Performed by: THORACIC SURGERY (CARDIOTHORACIC VASCULAR SURGERY)

## 2017-11-14 PROCEDURE — 37000008 ZZH ANESTHESIA TECHNICAL FEE, 1ST 30 MIN: Performed by: THORACIC SURGERY (CARDIOTHORACIC VASCULAR SURGERY)

## 2017-11-14 PROCEDURE — 25000566 ZZH SEVOFLURANE, EA 15 MIN: Performed by: THORACIC SURGERY (CARDIOTHORACIC VASCULAR SURGERY)

## 2017-11-14 PROCEDURE — 80048 BASIC METABOLIC PNL TOTAL CA: CPT | Performed by: THORACIC SURGERY (CARDIOTHORACIC VASCULAR SURGERY)

## 2017-11-14 PROCEDURE — 36000058 ZZH SURGERY LEVEL 3 EA 15 ADDTL MIN: Performed by: THORACIC SURGERY (CARDIOTHORACIC VASCULAR SURGERY)

## 2017-11-14 PROCEDURE — 85610 PROTHROMBIN TIME: CPT | Performed by: THORACIC SURGERY (CARDIOTHORACIC VASCULAR SURGERY)

## 2017-11-14 PROCEDURE — 36000056 ZZH SURGERY LEVEL 3 1ST 30 MIN: Performed by: THORACIC SURGERY (CARDIOTHORACIC VASCULAR SURGERY)

## 2017-11-14 PROCEDURE — 85027 COMPLETE CBC AUTOMATED: CPT | Performed by: THORACIC SURGERY (CARDIOTHORACIC VASCULAR SURGERY)

## 2017-11-14 PROCEDURE — 25000125 ZZHC RX 250: Performed by: ANESTHESIOLOGY

## 2017-11-14 PROCEDURE — 88305 TISSUE EXAM BY PATHOLOGIST: CPT | Mod: 26 | Performed by: THORACIC SURGERY (CARDIOTHORACIC VASCULAR SURGERY)

## 2017-11-14 PROCEDURE — 25000128 H RX IP 250 OP 636: Performed by: NURSE ANESTHETIST, CERTIFIED REGISTERED

## 2017-11-14 PROCEDURE — 88305 TISSUE EXAM BY PATHOLOGIST: CPT | Performed by: THORACIC SURGERY (CARDIOTHORACIC VASCULAR SURGERY)

## 2017-11-14 PROCEDURE — 93005 ELECTROCARDIOGRAM TRACING: CPT

## 2017-11-14 PROCEDURE — 27210794 ZZH OR GENERAL SUPPLY STERILE: Performed by: THORACIC SURGERY (CARDIOTHORACIC VASCULAR SURGERY)

## 2017-11-14 PROCEDURE — 36415 COLL VENOUS BLD VENIPUNCTURE: CPT | Performed by: THORACIC SURGERY (CARDIOTHORACIC VASCULAR SURGERY)

## 2017-11-14 PROCEDURE — 71000027 ZZH RECOVERY PHASE 2 EACH 15 MINS: Performed by: THORACIC SURGERY (CARDIOTHORACIC VASCULAR SURGERY)

## 2017-11-14 PROCEDURE — 25000128 H RX IP 250 OP 636: Performed by: ANESTHESIOLOGY

## 2017-11-14 PROCEDURE — 40000986 XR CHEST PORT 1 VW

## 2017-11-14 PROCEDURE — 37000009 ZZH ANESTHESIA TECHNICAL FEE, EACH ADDTL 15 MIN: Performed by: THORACIC SURGERY (CARDIOTHORACIC VASCULAR SURGERY)

## 2017-11-14 PROCEDURE — 25000125 ZZHC RX 250: Performed by: NURSE ANESTHETIST, CERTIFIED REGISTERED

## 2017-11-14 PROCEDURE — 93010 ELECTROCARDIOGRAM REPORT: CPT | Performed by: INTERNAL MEDICINE

## 2017-11-14 PROCEDURE — 25000128 H RX IP 250 OP 636: Performed by: THORACIC SURGERY (CARDIOTHORACIC VASCULAR SURGERY)

## 2017-11-14 PROCEDURE — 71000012 ZZH RECOVERY PHASE 1 LEVEL 1 FIRST HR: Performed by: THORACIC SURGERY (CARDIOTHORACIC VASCULAR SURGERY)

## 2017-11-14 PROCEDURE — 40000065 ZZH STATISTIC EKG NON-CHARGEABLE

## 2017-11-14 RX ORDER — MEPERIDINE HYDROCHLORIDE 25 MG/ML
12.5 INJECTION INTRAMUSCULAR; INTRAVENOUS; SUBCUTANEOUS EVERY 5 MIN PRN
Status: DISCONTINUED | OUTPATIENT
Start: 2017-11-14 | End: 2017-11-14 | Stop reason: HOSPADM

## 2017-11-14 RX ORDER — INSULIN ASPART 100 [IU]/ML
INJECTION, SOLUTION INTRAVENOUS; SUBCUTANEOUS
Status: ON HOLD | COMMUNITY
End: 2020-10-08

## 2017-11-14 RX ORDER — DIMENHYDRINATE 50 MG/ML
12.5 INJECTION, SOLUTION INTRAMUSCULAR; INTRAVENOUS
Status: DISCONTINUED | OUTPATIENT
Start: 2017-11-14 | End: 2017-11-14 | Stop reason: HOSPADM

## 2017-11-14 RX ORDER — HYDROMORPHONE HYDROCHLORIDE 1 MG/ML
.3-.5 INJECTION, SOLUTION INTRAMUSCULAR; INTRAVENOUS; SUBCUTANEOUS EVERY 5 MIN PRN
Status: DISCONTINUED | OUTPATIENT
Start: 2017-11-14 | End: 2017-11-14 | Stop reason: HOSPADM

## 2017-11-14 RX ORDER — ONDANSETRON 2 MG/ML
4 INJECTION INTRAMUSCULAR; INTRAVENOUS EVERY 30 MIN PRN
Status: DISCONTINUED | OUTPATIENT
Start: 2017-11-14 | End: 2017-11-14 | Stop reason: HOSPADM

## 2017-11-14 RX ORDER — FENTANYL CITRATE 50 UG/ML
50-100 INJECTION, SOLUTION INTRAMUSCULAR; INTRAVENOUS
Status: DISCONTINUED | OUTPATIENT
Start: 2017-11-14 | End: 2017-11-14 | Stop reason: HOSPADM

## 2017-11-14 RX ORDER — FENTANYL CITRATE 50 UG/ML
25-50 INJECTION, SOLUTION INTRAMUSCULAR; INTRAVENOUS
Status: DISCONTINUED | OUTPATIENT
Start: 2017-11-14 | End: 2017-11-14 | Stop reason: HOSPADM

## 2017-11-14 RX ORDER — CEFAZOLIN SODIUM 1 G/3ML
1 INJECTION, POWDER, FOR SOLUTION INTRAMUSCULAR; INTRAVENOUS SEE ADMIN INSTRUCTIONS
Status: DISCONTINUED | OUTPATIENT
Start: 2017-11-14 | End: 2017-11-14 | Stop reason: HOSPADM

## 2017-11-14 RX ORDER — SODIUM CHLORIDE, SODIUM LACTATE, POTASSIUM CHLORIDE, CALCIUM CHLORIDE 600; 310; 30; 20 MG/100ML; MG/100ML; MG/100ML; MG/100ML
INJECTION, SOLUTION INTRAVENOUS CONTINUOUS
Status: DISCONTINUED | OUTPATIENT
Start: 2017-11-14 | End: 2017-11-14 | Stop reason: HOSPADM

## 2017-11-14 RX ORDER — IBUPROFEN 600 MG/1
600 TABLET, FILM COATED ORAL
Status: DISCONTINUED | OUTPATIENT
Start: 2017-11-14 | End: 2017-11-14 | Stop reason: HOSPADM

## 2017-11-14 RX ORDER — LIDOCAINE HYDROCHLORIDE 20 MG/ML
INJECTION, SOLUTION INFILTRATION; PERINEURAL PRN
Status: DISCONTINUED | OUTPATIENT
Start: 2017-11-14 | End: 2017-11-14

## 2017-11-14 RX ORDER — ONDANSETRON 4 MG/1
4 TABLET, ORALLY DISINTEGRATING ORAL EVERY 30 MIN PRN
Status: DISCONTINUED | OUTPATIENT
Start: 2017-11-14 | End: 2017-11-14 | Stop reason: HOSPADM

## 2017-11-14 RX ORDER — DEXAMETHASONE SODIUM PHOSPHATE 4 MG/ML
INJECTION, SOLUTION INTRA-ARTICULAR; INTRALESIONAL; INTRAMUSCULAR; INTRAVENOUS; SOFT TISSUE PRN
Status: DISCONTINUED | OUTPATIENT
Start: 2017-11-14 | End: 2017-11-14

## 2017-11-14 RX ORDER — FENTANYL CITRATE 50 UG/ML
INJECTION, SOLUTION INTRAMUSCULAR; INTRAVENOUS PRN
Status: DISCONTINUED | OUTPATIENT
Start: 2017-11-14 | End: 2017-11-14

## 2017-11-14 RX ORDER — EPINEPHRINE 1 MG/ML
INJECTION, SOLUTION, CONCENTRATE INTRAVENOUS PRN
Status: DISCONTINUED | OUTPATIENT
Start: 2017-11-14 | End: 2017-11-14 | Stop reason: HOSPADM

## 2017-11-14 RX ORDER — ONDANSETRON 2 MG/ML
INJECTION INTRAMUSCULAR; INTRAVENOUS PRN
Status: DISCONTINUED | OUTPATIENT
Start: 2017-11-14 | End: 2017-11-14

## 2017-11-14 RX ORDER — HYDROCODONE BITARTRATE AND ACETAMINOPHEN 5; 325 MG/1; MG/1
1-2 TABLET ORAL
Status: DISCONTINUED | OUTPATIENT
Start: 2017-11-14 | End: 2017-11-14 | Stop reason: HOSPADM

## 2017-11-14 RX ORDER — CEFAZOLIN SODIUM 2 G/100ML
2 INJECTION, SOLUTION INTRAVENOUS
Status: COMPLETED | OUTPATIENT
Start: 2017-11-14 | End: 2017-11-14

## 2017-11-14 RX ORDER — PROPOFOL 10 MG/ML
INJECTION, EMULSION INTRAVENOUS PRN
Status: DISCONTINUED | OUTPATIENT
Start: 2017-11-14 | End: 2017-11-14

## 2017-11-14 RX ADMIN — SUCCINYLCHOLINE CHLORIDE 140 MG: 20 INJECTION, SOLUTION INTRAMUSCULAR; INTRAVENOUS at 11:08

## 2017-11-14 RX ADMIN — DEXAMETHASONE SODIUM PHOSPHATE 4 MG: 4 INJECTION, SOLUTION INTRA-ARTICULAR; INTRALESIONAL; INTRAMUSCULAR; INTRAVENOUS; SOFT TISSUE at 11:03

## 2017-11-14 RX ADMIN — PROPOFOL 200 MG: 10 INJECTION, EMULSION INTRAVENOUS at 11:08

## 2017-11-14 RX ADMIN — FENTANYL CITRATE 100 MCG: 50 INJECTION, SOLUTION INTRAMUSCULAR; INTRAVENOUS at 11:08

## 2017-11-14 RX ADMIN — LIDOCAINE HYDROCHLORIDE 1 ML: 10 INJECTION, SOLUTION EPIDURAL; INFILTRATION; INTRACAUDAL; PERINEURAL at 10:30

## 2017-11-14 RX ADMIN — PHENYLEPHRINE HYDROCHLORIDE 150 MCG: 10 INJECTION INTRAVENOUS at 11:15

## 2017-11-14 RX ADMIN — LIDOCAINE HYDROCHLORIDE 100 MG: 20 INJECTION, SOLUTION INFILTRATION; PERINEURAL at 11:08

## 2017-11-14 RX ADMIN — SODIUM CHLORIDE, POTASSIUM CHLORIDE, SODIUM LACTATE AND CALCIUM CHLORIDE: 600; 310; 30; 20 INJECTION, SOLUTION INTRAVENOUS at 10:30

## 2017-11-14 RX ADMIN — CEFAZOLIN SODIUM 2 G: 2 INJECTION, SOLUTION INTRAVENOUS at 11:04

## 2017-11-14 RX ADMIN — ONDANSETRON 4 MG: 2 INJECTION INTRAMUSCULAR; INTRAVENOUS at 11:03

## 2017-11-14 RX ADMIN — MIDAZOLAM HYDROCHLORIDE 2 MG: 1 INJECTION, SOLUTION INTRAMUSCULAR; INTRAVENOUS at 10:59

## 2017-11-14 RX ADMIN — PROPOFOL 50 MG: 10 INJECTION, EMULSION INTRAVENOUS at 11:15

## 2017-11-14 ASSESSMENT — ENCOUNTER SYMPTOMS
DYSRHYTHMIAS: 0
SEIZURES: 0
ORTHOPNEA: 0

## 2017-11-14 ASSESSMENT — COPD QUESTIONNAIRES: COPD: 0

## 2017-11-14 ASSESSMENT — LIFESTYLE VARIABLES: TOBACCO_USE: 0

## 2017-11-14 NOTE — ANESTHESIA PREPROCEDURE EVALUATION
Procedure: Procedure(s):  BRONCHOSCOPY FLEXIBLE  THORACOTOMY  LOBECTOMY LUNG  Preop diagnosis: LUNG MASS    No Known Allergies  Past Medical History:   Diagnosis Date     Abnormal liver function test      CPAP (continuous positive airway pressure) dependence      Diabetes (H)      Gastro-oesophageal reflux disease      Hx of skin cancer, basal cell      Hyperlipidemia      Hypertension      Keratoderma      Lymphoma (H)      Non-Hodgkin lymphoma (H)      Pedal edema     CHRONIC     Pleural effusion      PONV (postoperative nausea and vomiting)      Seborrheic keratosis, inflamed      Sleep apnea      Past Surgical History:   Procedure Laterality Date     BIOPSY LYMPH NODE CERVICAL N/A 12/5/2014    Procedure: BIOPSY LYMPH NODE CERVICAL;  Surgeon: Robbie Linda MD;  Location:  OR     COLONOSCOPY       ENT SURGERY      tonsillectomy     EYE SURGERY       INSERT PORT VASCULAR ACCESS N/A 12/5/2014    Procedure: INSERT PORT VASCULAR ACCESS;  Surgeon: Robbie Linda MD;  Location:  OR     PHACOEMULSIFICATION CLEAR CORNEA WITH STANDARD INTRAOCULAR LENS IMPLANT Right 5/2/2016    Procedure: PHACOEMULSIFICATION CLEAR CORNEA WITH STANDARD INTRAOCULAR LENS IMPLANT;  Surgeon: Rasheed Finney MD;  Location:  EC     REMOVE PORT VASCULAR ACCESS N/A 3/14/2017    Procedure: REMOVE PORT VASCULAR ACCESS;  Surgeon: Robbie Linda MD;  Location:  OR     SOFT TISSUE SURGERY      BASAL CELL CA FOREHEAD     Social History   Substance Use Topics     Smoking status: Never Smoker     Smokeless tobacco: Never Used     Alcohol use No     Prior to Admission medications    Medication Sig Start Date End Date Taking? Authorizing Provider   Fenofibrate (TRICOR PO) Take 145 mg by mouth daily   Yes Reported, Patient   Furosemide (LASIX PO) Take 40 mg by mouth every evening as needed   Yes Reported, Patient   NIACIN ER PO Take 500 mg by mouth daily   Yes Reported, Patient   Potassium Chloride Vicki ER (K-DUR PO)  Take 40 mEq by mouth daily (2 x 20 mEq = 40 mEq dose)   Yes Reported, Patient   Potassium Chloride Vicki ER (K-DUR PO) Take 20 mEq by mouth At Bedtime   Yes Reported, Patient   insulin aspart (NOVOLOG FLEXPEN) 100 UNIT/ML injection Inject 40-50 Units Subcutaneous 3 times daily (with meals) Patient uses sliding scale based on Carbs and Blood Glucose   Yes Reported, Patient   Ibuprofen-Diphenhydramine Cit (ADVIL PM PO) Take 2 tablets by mouth At Bedtime    Yes Reported, Patient   Docusate Sodium (COLACE PO) Take 200 mg by mouth At Bedtime (2 x 100 mg tablet = 200 mg dose)   Yes Reported, Patient   Furosemide (LASIX PO) Take 40 mg by mouth daily    Yes Reported, Patient   METFORMIN HCL PO Take 1,000 mg by mouth daily (with breakfast) (2 x 500 mg = 1000 mg dose)   Yes Reported, Patient   METFORMIN HCL PO Take 500 mg by mouth At Bedtime   Yes Reported, Patient   LISINOPRIL PO Take 40 mg by mouth daily   Yes Reported, Patient   Rosuvastatin Calcium (CRESTOR PO) Take 5 mg by mouth daily (0.5 x 10 mg tablet = 5 mg dose)   Yes Reported, Patient   AMLODIPINE BESYLATE PO Take 5 mg by mouth At Bedtime   Yes Reported, Patient   insulin glargine (LANTUS VIAL) 100 UNIT/ML soln Inject 52 Units Subcutaneous At Bedtime    Yes Reported, Patient     Current Facility-Administered Medications Ordered in Epic   Medication Dose Route Frequency Last Rate Last Dose     ceFAZolin (ANCEF) intermittent infusion 2 g in 100 mL dextrose PRE-MIX  2 g Intravenous Pre-Op/Pre-procedure x 1 dose         ceFAZolin (ANCEF) 1 g vial to attach to  ml bag for ADULT or 50 ml bag for PEDS  1 g Intravenous See Admin Instructions         lidocaine 1 % 1 mL  1 mL Other Q1H PRN         lactated ringers infusion   Intravenous Continuous         No current [facility]-ordered outpatient prescriptions on file.       lactated ringers       Wt Readings from Last 1 Encounters:   11/14/17 111.9 kg (246 lb 11.2 oz)     Temp Readings from Last 1 Encounters:   11/14/17 36.1   C (97  F) (Temporal)     BP Readings from Last 6 Encounters:   11/14/17 122/63   10/26/17 120/78   03/14/17 121/63   05/02/16 123/68   12/05/14 105/64   11/26/14 143/73     Pulse Readings from Last 4 Encounters:   11/14/17 84   10/26/17 77   11/13/14 93     Resp Readings from Last 1 Encounters:   11/14/17 16     SpO2 Readings from Last 1 Encounters:   11/14/17 93%     Recent Labs   Lab Test  12/05/14   1200   POTASSIUM  4.2       Recent Labs   Lab Test  11/06/17   0938  10/26/17   0811  11/13/14   1240   HGB  14.8   --    --    PLT   --   167  191     Recent Labs   Lab Test  11/09/17   1102   ABO  O   RH  Neg     Recent Labs   Lab Test  10/26/17   0811  11/13/14   1240   INR  0.98  0.98      Recent Results (from the past 744 hour(s))   CT Lung Mediastinum Biopsy    Narrative    CT LUNG MEDIASTINUM BIOPSY 10/26/2017 10:28 AM    HISTORY: Right infrahilar mass on CT.  History of lymphoma. Personal  history of non-Hodgkin lymphomas.    COMPARISON: None.    TECHNIQUE: Volumetric helical acquisition of CT images without  contrast. Radiation dose for this scan was reduced using automated  exposure control, adjustment of the mA and/or kV according to patient  size, or iterative reconstruction technique.    MEDICATIONS AND DOSE: 9 mL Lidocaine 1%, 1 mg Versed, 50 mcg Fentanyl  given over 10 minutes. RN: Louise Estrada    FINDINGS: After written and oral informed consent was obtained and a  pause for the cause procedure verifying the correct procedure and the  correct patient, the area was prepped and draped in the usual sterile  fashion. The overlying soft tissues were anesthetized with 9 mL of 1%  subcutaneous lidocaine. Utilizing CT guidance 4 passes were made into  the lesion in the posterior right infrahilar lung with a 20 gauge  biopsy device and the tissue was submitted to pathology. There were no  immediate complications.     CONSCIOUS SEDATION NOTE: After obtaining consent for sedation,  conscious sedation was induced  using IV Versed and IV fentanyl.  Patient was monitored by nurse under my direct supervision throughout  the exam. There were no complications of the sedation.      15 minutes face-to-face time.      Impression    IMPRESSION:   1. Technically successful right lung lesion biopsy.   2. Conscious sedation.    ORACIO COLEMAN MD   XR Chest 1 View    Narrative    XR CHEST 1 VW 10/26/2017 12:28 PM    COMPARISON: 10/19/2017 CT    HISTORY: Right chest biopsy.      Impression    IMPRESSION: Mild bibasilar atelectasis. Right hilar fullness again  seen, not significantly changed. No pleural effusion or pneumothorax.    JAMAL MCGEE MD       RECENT LABS:   Creatinine 0.7    ECG: NSR, no significant abnormalities    ECHO: 12/2014  Left Ventricle  The left ventricle is normal in size.  There is normal left ventricular wall thickness.  Left ventricular systolic function is normal.  The visual ejection fraction is estimated at 65-70%.  No regional wall motion abnormalities noted.     Right Ventricle  The right ventricle is normal size.  The right ventricular systolic function is normal.     Atria  Normal left atrial size.  Right atrial size is normal.  There is no color Doppler evidence of an atrial shunt.     Mitral Valve  There is physiologic mitral regurgitation.     Tricuspid Valve  No tricuspid regurgitation.  Right ventricular systolic pressure could not be approximated due to   inadequate tricuspid regurgitation.     Aortic Valve  The aortic valve is trileaflet.  No aortic regurgitation is present.  No aortic stenosis is present.     Pulmonic Valve  The pulmonic valve is not well seen, but is grossly normal.     Vessels  The aortic root is normal size.     Pericardium  There is no pericardial effusion.  Small ascites.     Rhythm  The rhythm was normal sinus.    Anesthesia Evaluation     . Pt has had prior anesthetic. Type: General    History of anesthetic complications (Mac 3 = Grade 2 view previously; pt denies hx of PONV)    - PONV        ROS/MED HX    ENT/Pulmonary:     (+)sleep apnea, uses CPAP , . .   (-) tobacco use, asthma, COPD and recent URI   Neurologic:      (-) seizures and CVA   Cardiovascular:     (+) Dyslipidemia, hypertension----. : . . . :. .      (-) angina, CAD, orthopnea/PND, syncope, arrhythmias, irregular heartbeat/palpitations, valvular problems/murmurs and angina   METS/Exercise Tolerance:  3 - Able to walk 1-2 blocks without stopping   Hematologic:        (-) History of Transfusion   Musculoskeletal:         GI/Hepatic:     (+) GERD Asymptomatic on medication,      (-) liver disease   Renal/Genitourinary:      (-) renal disease   Endo:     (+) type II DM Using insulin .   (-) thyroid disease, chronic steroid usage and obesity   Psychiatric:         Infectious Disease:         Malignancy:   (+) Malignancy (Non-hodgkin's Lymphoma) History of Lymphoma/Leukemia and Skin          Other:                     Physical Exam      Airway   Mallampati: II  TM distance: >3 FB  Neck ROM: full    Dental   (+) caps    Cardiovascular   Rhythm and rate: regular and normal  (-) no murmur    Pulmonary    breath sounds clear to auscultation(-) no rhonchi, no decreased breath sounds and no wheezes                    Anesthesia Plan      History & Physical Review  History and physical reviewed and following examination; no interval change.    ASA Status:  3 .    NPO Status:  > 8 hours    Plan for General and ETT with Propofol and Intravenous induction. Maintenance will be Balanced.    PONV prophylaxis:  Ondansetron (or other 5HT-3) and Dexamethasone or Solumedrol  Additional equipment: Double Lumen ETT and Fiberoptic bronchoscope JOE 37 and 39 available  Start with 8.0 ETT for bronchoscopy - short-acting neuromuscular blockade in case of bronch/bx-only procedure (per Dr. Linda a possibility)   Limit IVF intake in case full pneumonectomy necessary  Zofran 8 mg (divided)   Type and Screen done recently and OK (no abs)         Postoperative Care  Postoperative pain management:  Multi-modal analgesia.      Consents  Anesthetic plan, risks, benefits and alternatives discussed with:  Patient..

## 2017-11-14 NOTE — IP AVS SNAPSHOT
[facility] [address]    [place] [address]    Phone:  [phone]                                       After Visit Summary   11/14/2017    Pastor Garibay    MRN: [id number]           After Visit Summary Signature Page     I have received my discharge instructions, and my questions have been answered. I have discussed any challenges I see with this plan with the nurse or doctor.    ..........................................................................................................................................  Patient/Patient Representative Signature      ..........................................................................................................................................  Patient Representative Print Name and Relationship to Patient    ..................................................               ................................................  Date                                            Time    ..........................................................................................................................................  Reviewed by Signature/Title    ...................................................              ..............................................  Date                                                            Time

## 2017-11-14 NOTE — DISCHARGE INSTRUCTIONS
Discharge Instructions for Bronchoscopy  Recovering at Home  Once home, follow any instructions you have been given. These include:  Take pain medication as directed.  Do not eat or drink until swallowing returns to normal. As soon as you can swallow comfortably, drink plenty of water.  Use throat lozenges as advised by your doctor to help ease throat soreness.  Rest your voice as instructed by your doctor  Activity as tolerated--frequent short walks followed by rest periods is recommended.  When to Call the Doctor  After you get home, call the doctor if you have any of the following:  Chest pain or trouble breathing (call 911 or other emergency service)  Fever of 100.4 F (38 C) or higher, or as directed by your healthcare provider  Trouble swallowing doesn t improve or gets worse  Pain that does not go away even after taking pain medication  Severely hoarse voice  Severe nausea or vomiting  Bloody vomit  Cough that brings up more than tiny specks of blood   Follow-Up       Follow up with surgeon as instructed.    Same Day Surgery Discharge Instructions for  Sedation and General Anesthesia       It's not unusual to feel dizzy, light-headed or faint for up to 24 hours after surgery or while taking pain medication.  If you have these symptoms: sit for a few minutes before standing and have someone assist you when you get up to walk or use the bathroom.      You should rest and relax for the next 24 hours. We recommend you make arrangements to have an adult stay with you for at least 24 hours after your discharge.  Avoid hazardous and strenuous activity.      DO NOT DRIVE any vehicle or operate mechanical equipment for 24 hours following the end of your surgery.  Even though you may feel normal, your reactions may be affected by the medication you have received.      Do not drink alcoholic beverages for 24 hours following surgery.       Slowly progress to your regular diet as you feel able. It's not unusual to feel  nauseated and/or vomit after receiving anesthesia.  If you develop these symptoms, drink clear liquids (apple juice, ginger ale, broth, 7-up, etc. ) until you feel better.  If your nausea and vomiting persists for 24 hours, please notify your surgeon.        All narcotic pain medications, along with inactivity and anesthesia, can cause constipation. Drinking plenty of liquids and increasing fiber intake will help.      For any questions of a medical nature, call your surgeon.      Do not make important decisions for 24 hours.      If you had general anesthesia, you may have a sore throat for a couple of days related to the breathing tube used during surgery.  You may use Cepacol lozenges to help with this discomfort.  If it worsens or if you develop a fever, contact your surgeon.       If you feel your pain is not well managed with the pain medications prescribed by your surgeon, please contact your surgeon's office to let them know so they can address your concerns.

## 2017-11-14 NOTE — OP NOTE
DATE OF PROCEDURE:  11/14/2017      SURGEON:  Robbie Linda MD      ASSISTANT:  Ana Sun PA-C      PREOPERATIVE DIAGNOSIS:  Right lower lobe lung mass.      POSTOPERATIVE DIAGNOSIS:  Right lower lobe lung mass.      PROCEDURE:  Diagnostic flexible bronchoscopy with biopsy of right lower lobe bronchus.      ANESTHESIA:  General.      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  He was found to have an enlarging hypermetabolic mass in the right lower lobe lung.  A CT-guided biopsy was nondiagnostic.  The findings are consistent with lung cancer, but lymphoma was not excluded.  Based on the findings, diagnostic flexible bronchoscopy with possible thoracotomy is indicated for diagnosis and treatment.      DESCRIPTION OF PROCEDURE:  The patient was brought to the OR and placed in supine position.  Under general anesthesia with endotracheal intubation, the flexible bronchoscope was introduced.  Careful examination of the entire bronchial tree was done down to the subsegmental bronchi of each bronchus.  Distal trachea and main cali were normal.  The left side is normal down to the subsegmental bronchi of each bronchus.  On the right side, right main stem bronchus upper lobe bronchus were normal.  The bronchus intermedius is normal.  In the lower lobe the mucosa was somewhat irregular.  There is no obvious endobronchial tumor down to the subsegmental bronchi.  Biopsies of the right lower lobe bronchial mucosa was done.  The specimen was submitted for permanent section.  Hemostasis was excellent.  Based on the intraoperative findings it was elected not to proceed any further at this time and wait for the pathology report on the biopsy.  The patient was returned to recovery room in satisfactory condition.         ROBBIE LINDA MD             D: 11/14/2017 11:57   T: 11/14/2017 12:31   MT: [id number]      Name:     PASTOR GARIBAY   MRN:      [id number]        Account:         [id number]   :      1950           Procedure Date: 2017      Document: [id number]

## 2017-11-14 NOTE — IP AVS SNAPSHOT
MRN:[id number]                      After Visit Summary   11/14/2017    Pastor Garibay    MRN: [id number]           Thank you!     Thank you for choosing [facility] for your care. Our goal is always to provide you with excellent care. Hearing back from our patients is one way we can continue to improve our services. Please take a few minutes to complete the written survey that you may receive in the mail after you visit with us. Thank you!        Patient Information     Date Of Birth          1950        Designated Caregiver       Most Recent Value    Caregiver    Will someone help with your care after discharge? yes    Name of designated caregiver Toshia Garibay (wife)    Phone number of caregiver [phone]    Caregiver address [place], [place]      About your hospital stay     You were admitted on:  November 14, 2017 You last received care in the:  [facility] PACU    You were discharged on:  November 14, 2017       Who to Call     For medical emergencies, please call 911.  For non-urgent questions about your medical care, please call your primary care provider or clinic, [phone]  For questions related to your surgery, please call your surgery clinic        Attending Provider     Provider Specialty    Robbie Linda MD Thoracic Diseases       Primary Care Provider Office Phone # Fax #    Francisco Armijo [phone]      After Care Instructions     Diet Instructions       Resume pre-procedure diet            Discharge Instructions       Follow up as per Dr. Linda' office-- we will call you with recommendations                  Your next 10 appointments already scheduled     Nov 17, 2017   Procedure with Robbie Linda MD   [facility] (--)    [address]  [facility] [address] [phone]              Further instructions from your care team       Discharge Instructions for Bronchoscopy  Recovering  at Home  Once home, follow any instructions you have been given. These include:  Take pain medication as directed.  Do not eat or drink until swallowing returns to normal. As soon as you can swallow comfortably, drink plenty of water.  Use throat lozenges as advised by your doctor to help ease throat soreness.  Rest your voice as instructed by your doctor  Activity as tolerated--frequent short walks followed by rest periods is recommended.  When to Call the Doctor  After you get home, call the doctor if you have any of the following:  Chest pain or trouble breathing (call 911 or other emergency service)  Fever of 100.4 F (38 C) or higher, or as directed by your healthcare provider  Trouble swallowing doesn t improve or gets worse  Pain that does not go away even after taking pain medication  Severely hoarse voice  Severe nausea or vomiting  Bloody vomit  Cough that brings up more than tiny specks of blood   Follow-Up       Follow up with surgeon as instructed.    Same Day Surgery Discharge Instructions for  Sedation and General Anesthesia       It's not unusual to feel dizzy, light-headed or faint for up to 24 hours after surgery or while taking pain medication.  If you have these symptoms: sit for a few minutes before standing and have someone assist you when you get up to walk or use the bathroom.      You should rest and relax for the next 24 hours. We recommend you make arrangements to have an adult stay with you for at least 24 hours after your discharge.  Avoid hazardous and strenuous activity.      DO NOT DRIVE any vehicle or operate mechanical equipment for 24 hours following the end of your surgery.  Even though you may feel normal, your reactions may be affected by the medication you have received.      Do not drink alcoholic beverages for 24 hours following surgery.       Slowly progress to your regular diet as you feel able. It's not unusual to feel nauseated and/or vomit after receiving anesthesia.  If  "you develop these symptoms, drink clear liquids (apple juice, ginger ale, broth, 7-up, etc. ) until you feel better.  If your nausea and vomiting persists for 24 hours, please notify your surgeon.        All narcotic pain medications, along with inactivity and anesthesia, can cause constipation. Drinking plenty of liquids and increasing fiber intake will help.      For any questions of a medical nature, call your surgeon.      Do not make important decisions for 24 hours.      If you had general anesthesia, you may have a sore throat for a couple of days related to the breathing tube used during surgery.  You may use Cepacol lozenges to help with this discomfort.  If it worsens or if you develop a fever, contact your surgeon.       If you feel your pain is not well managed with the pain medications prescribed by your surgeon, please contact your surgeon's office to let them know so they can address your concerns.                Pending Results     Date and Time Order Name Status Description    11/14/2017 1142 XR Chest Port 1 View In process     11/14/2017 1119 Surgical pathology exam In process     11/14/2017 0921 EKG 12-lead, tracing only Preliminary             Admission Information     Date & Time Provider Department Dept. Phone    11/14/2017 Robbie Linda MD [facility] PACU [phone]      Your Vitals Were     Blood Pressure Pulse Temperature Respirations Height Weight    121/60 84 98.6  F (37  C) (Temporal) 13 1.74 m (5' 8.5\") 111.9 kg (246 lb 11.2 oz)    Pulse Oximetry BMI (Body Mass Index)                96% 36.97 kg/m2          [employer] Information     [employer] lets you send messages to your doctor, view your test results, renew your prescriptions, schedule appointments and more. To sign up, go to [email or web address] . Click on \"Log in\" on the left side of the screen, which will take you to the Welcome page. Then click on \"Sign up Now\" on the right side of the page.     You will be " asked to enter the access code listed below, as well as some personal information. Please follow the directions to create your username and password.     Your access code is: [email or web address]  Expires: 2018  7:54 AM     Your access code will  in 90 days. If you need help or a new code, please call your [facility] clinic or [phone].        Care EveryWhere ID     This is your Care EveryWhere ID. This could be used by other organizations to access your [facility] medical records  [id number]        Equal Access to Services     [facility]: Hadii shalom david hadasho Soomaali, waaxda luqadaha, qaybta kaalmada mick, juana sylvester . [facility] [phone].    ATENCIÓN: Si habla español, tiene a bradford disposición servicios gratuitos de asistencia lingüística. Kaylene al [phone].    We comply with applicable federal civil rights laws and [place] laws. We do not discriminate on the basis of race, color, national origin, age, disability, sex, sexual orientation, or gender identity.               Review of your medicines      CONTINUE these medicines which have NOT CHANGED        Dose / Directions    ADVIL PM PO        Dose:  2 tablet   Take 2 tablets by mouth At Bedtime   Refills:  0       AMLODIPINE BESYLATE PO        Dose:  5 mg   Take 5 mg by mouth At Bedtime   Refills:  0       COLACE PO        Dose:  200 mg   Take 200 mg by mouth At Bedtime (2 x 100 mg tablet = 200 mg dose)   Refills:  0       CRESTOR PO        Dose:  5 mg   Take 5 mg by mouth daily (0.5 x 10 mg tablet = 5 mg dose)   Refills:  0       insulin glargine 100 UNIT/ML injection   Commonly known as:  LANTUS        Dose:  52 Units   Inject 52 Units Subcutaneous At Bedtime   Refills:  0       * K-DUR PO        Dose:  40 mEq   Take 40 mEq by mouth daily (2 x 20 mEq = 40 mEq dose)   Refills:  0       * K-DUR PO        Dose:  20 mEq   Take 20 mEq by mouth At Bedtime   Refills:  0       * LASIX PO        Dose:  40 mg   Take 40 mg  by mouth daily   Refills:  0       * LASIX PO        Dose:  40 mg   Take 40 mg by mouth every evening as needed   Refills:  0       LISINOPRIL PO        Dose:  40 mg   Take 40 mg by mouth daily   Refills:  0       * METFORMIN HCL PO        Dose:  1000 mg   Take 1,000 mg by mouth daily (with breakfast) (2 x 500 mg = 1000 mg dose)   Refills:  0       * METFORMIN HCL PO        Dose:  500 mg   Take 500 mg by mouth At Bedtime   Refills:  0       NIACIN ER PO        Dose:  500 mg   Take 500 mg by mouth daily   Refills:  0       NovoLOG FLEXPEN 100 UNIT/ML injection   Generic drug:  insulin aspart        Dose:  40-50 Units   Inject 40-50 Units Subcutaneous 3 times daily (with meals) Patient uses sliding scale based on Carbs and Blood Glucose   Refills:  0       TRICOR PO        Dose:  145 mg   Take 145 mg by mouth daily   Refills:  0       * Notice:  This list has 6 medication(s) that are the same as other medications prescribed for you. Read the directions carefully, and ask your doctor or other care provider to review them with you.             Protect others around you: Learn how to safely use, store and throw away your medicines at www.disposemymeds.org.             Medication List: This is a list of all your medications and when to take them. Check marks below indicate your daily home schedule. Keep this list as a reference.      Medications           Morning Afternoon Evening Bedtime As Needed    ADVIL PM PO   Take 2 tablets by mouth At Bedtime                                AMLODIPINE BESYLATE PO   Take 5 mg by mouth At Bedtime                                COLACE PO   Take 200 mg by mouth At Bedtime (2 x 100 mg tablet = 200 mg dose)                                CRESTOR PO   Take 5 mg by mouth daily (0.5 x 10 mg tablet = 5 mg dose)                                insulin glargine 100 UNIT/ML injection   Commonly known as:  LANTUS   Inject 52 Units Subcutaneous At Bedtime                                * K-DUR PO    Take 40 mEq by mouth daily (2 x 20 mEq = 40 mEq dose)                                * K-DUR PO   Take 20 mEq by mouth At Bedtime                                * LASIX PO   Take 40 mg by mouth daily                                * LASIX PO   Take 40 mg by mouth every evening as needed                                LISINOPRIL PO   Take 40 mg by mouth daily                                * METFORMIN HCL PO   Take 1,000 mg by mouth daily (with breakfast) (2 x 500 mg = 1000 mg dose)                                * METFORMIN HCL PO   Take 500 mg by mouth At Bedtime                                NIACIN ER PO   Take 500 mg by mouth daily                                NovoLOG FLEXPEN 100 UNIT/ML injection   Inject 40-50 Units Subcutaneous 3 times daily (with meals) Patient uses sliding scale based on Carbs and Blood Glucose   Generic drug:  insulin aspart                                TRICOR PO   Take 145 mg by mouth daily                                * Notice:  This list has 6 medication(s) that are the same as other medications prescribed for you. Read the directions carefully, and ask your doctor or other care provider to review them with you.

## 2017-11-14 NOTE — PROGRESS NOTES
Admission medication history interview status for the 11/14/2017  admission is complete. See EPIC admission navigator for prior to admission medications     Medication history source reliability:Good    Medication history interview source(s):Patient    Medication history resources (including written lists, pill bottles, clinic record):Patient brought in a list of medication list    Primary pharmacy.[facility]    Additional medication history information not noted on PTA med list :None    Time spent in this activity: 40 minutes    Prior to Admission medications    Medication Sig Last Dose Taking? Auth Provider   Fenofibrate (TRICOR PO) Take 145 mg by mouth daily 11/13/2017 at AM Yes Reported, Patient   Furosemide (LASIX PO) Take 40 mg by mouth every evening as needed Past Week at PRN Yes Reported, Patient   NIACIN ER PO Take 500 mg by mouth daily 11/13/2017 at AM Yes Reported, Patient   Potassium Chloride Vicki ER (K-DUR PO) Take 40 mEq by mouth daily (2 x 20 mEq = 40 mEq dose) 11/13/2017 at AM Yes Reported, Patient   Potassium Chloride Vicki ER (K-DUR PO) Take 20 mEq by mouth At Bedtime 11/13/2017 at 2230 Yes Reported, Patient   insulin aspart (NOVOLOG FLEXPEN) 100 UNIT/ML injection Inject 40-50 Units Subcutaneous 3 times daily (with meals) Patient uses sliding scale based on Carbs and Blood Glucose 11/13/2017 at PM Yes Reported, Patient   Ibuprofen-Diphenhydramine Cit (ADVIL PM PO) Take 2 tablets by mouth At Bedtime  11/13/2017 at 2230 Yes Reported, Patient   Docusate Sodium (COLACE PO) Take 200 mg by mouth At Bedtime (2 x 100 mg tablet = 200 mg dose) 11/13/2017 at 2230 Yes Reported, Patient   Furosemide (LASIX PO) Take 40 mg by mouth daily  11/13/2017 at AM Yes Reported, Patient   METFORMIN HCL PO Take 1,000 mg by mouth daily (with breakfast) (2 x 500 mg = 1000 mg dose) 11/13/2017 at AM Yes Reported, Patient   METFORMIN HCL PO Take 500 mg by mouth At Bedtime 11/13/2017 at HS Yes Reported, Patient   LISINOPRIL PO Take 40 mg  by mouth daily 11/14/2017 at 0715 Yes Reported, Patient   Rosuvastatin Calcium (CRESTOR PO) Take 5 mg by mouth daily (0.5 x 10 mg tablet = 5 mg dose) 11/13/2017 at AM Yes Reported, Patient   AMLODIPINE BESYLATE PO Take 5 mg by mouth At Bedtime 11/13/2017 at 2230 Yes Reported, Patient   insulin glargine (LANTUS VIAL) 100 UNIT/ML soln Inject 52 Units Subcutaneous At Bedtime  11/13/2017 at 2230 Yes Reported, Patient

## 2017-11-15 LAB — COPATH REPORT: NORMAL

## 2017-11-15 NOTE — DISCHARGE SUMMARY
THORACIC SURGERY HOSPITAL DISCHARGE SUMMARY  [facility] ONCOLOGY - THORACIC SURGERY  [address], [address]  [place] [address]  Phone [phone]  [email or web address]    11/15/2017     Francisco Armijo   [address] / [place] [address]  Phone: [phone]   Fax: [phone]        Re: Pastor Garibay             1950             [id number]              Dates of Hospitalization: 11/14/2017 - 11/14/2017   Date of Service (when I saw the patient): 11/14/17    Dear Dr. 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  He is a 67-year-old gentleman with lymphoma.  He was found to have an enlarging hypermetabolic mass in the right lower lobe lung.  A CT-guided biopsy was nondiagnostic.  The findings are consistent with lung cancer, but lymphoma was not excluded.  Based on the findings, diagnostic flexible bronchoscopy with possible thoracotomy is indicated for diagnosis and treatment.     On 11/14/2017, Dr. Robbie Linda performed the following:    Procedure/Surgery Information   Procedure: Procedure(s):  FLEXIBLE BRONCHOSCOPY WITH BIOPSIES. - Wound Class: II-Clean Contaminated   Surgeon(s): Surgeon(s) and Role:     * Robbie Linda MD - Primary     * Ana Sun PA-C - Assisting   Specimens:   ID Type Source Tests Collected by Time Destination   A : RIGHT LOWER LOBE BRONCHUS BIOPSY Tissue Bronchial SURGICAL PATHOLOGY EXAM Robbie Linda MD 11/14/2017 11:19 AM       Note:  Thoracotomy was not undertaken due to pending final pathology. Plan is to await final pathology and proceed as directed by the findings. If recurrent lymphoma, then systemic therapy indicated whereas if primary lung cancer, then thoracotomy and surgical resection indicated.    Final Surgical Pathology Revealed:  Right lower lobe bronchus biopsy showing benign bronchial  tissue with mild inflammatory change and reactive atypia-- no evidence of malignancy.    His post-operative course was unremarkable.  He recovered in the PACU and when he met DC criteria he was discharged hoome with family in stable condition. He was given instruction that once the final pathology was issued, Dr. Linda would contact him and schedule him as appropriate for either medical oncology or surgical resection.      Consultations This Hospital Stay   None    Pastor Garibay has otherwise recovered sufficiently to be discharged to home today, 11/14/2017, on post-operative day number 0 for further convalescence.  His incisions are healing well with no signs or symptoms of infection.  His bowels have moved sufficiently and he is tolerating diet and activity, ambulating and transferring independently.  He is currently afebrile with stable vital signs as below.     Below, you will find a full discharge medication list and instructions.  We have arranged for Pastor Garibay to follow-up with us in our [facility] in 7-10 days with a Chest X-ray prior to that appointment.  We thank you for allowing us to participate in the care of Pastor Garibay here at [facility].  Please feel free to contact our office at [phone] with any questions or concerns or if we can be of any further assistance in the care of this patient.    Sincerely,    Dr. Robbie Linda MD    D/C Summary Prepared by: Ana Sun PA-C    Discharge Medications:  None-- resume all hoe medications.      CC  Patient Care Team:  Francisco Armijo MD as PCP - General (Family Practice)

## 2017-11-15 NOTE — ANESTHESIA POSTPROCEDURE EVALUATION
Patient: Pastor Garibay    Procedure(s):  FLEXIBLE BRONCHOSCOPY WITH BIOPSIES. - Wound Class: II-Clean Contaminated    Diagnosis:LUNG MASS  Diagnosis Additional Information: No value filed.    Anesthesia Type:  General, ETT    Note:  Anesthesia Post Evaluation    Patient location during evaluation: PACU  Patient participation: Able to fully participate in evaluation  Level of consciousness: awake  Pain management: adequate  Airway patency: patent  Cardiovascular status: acceptable  Respiratory status: acceptable  Hydration status: acceptable  PONV: none     Anesthetic complications: None          Last vitals:  Vitals:    11/14/17 1210 11/14/17 1220 11/14/17 1228   BP: 118/62 112/62 127/56   Pulse:      Resp: 11 26 26   Temp:      SpO2: 94% 94% 94%         Electronically Signed By: Lobo Rubin MD  November 14, 2017  6:20 PM

## 2017-11-16 ENCOUNTER — ANESTHESIA EVENT (OUTPATIENT)
Dept: SURGERY | Facility: CLINIC | Age: 67
End: 2017-11-16
Payer: COMMERCIAL

## 2017-11-16 LAB — INTERPRETATION ECG - MUSE: NORMAL

## 2017-11-17 ENCOUNTER — HOSPITAL ENCOUNTER (INPATIENT)
Facility: CLINIC | Age: 67
LOS: 2 days | Discharge: HOME | End: 2017-11-19
Attending: THORACIC SURGERY (CARDIOTHORACIC VASCULAR SURGERY) | Admitting: THORACIC SURGERY (CARDIOTHORACIC VASCULAR SURGERY)
Payer: COMMERCIAL

## 2017-11-17 ENCOUNTER — ANESTHESIA (OUTPATIENT)
Dept: SURGERY | Facility: CLINIC | Age: 67
End: 2017-11-17
Payer: COMMERCIAL

## 2017-11-17 ENCOUNTER — APPOINTMENT (OUTPATIENT)
Dept: GENERAL RADIOLOGY | Facility: CLINIC | Age: 67
End: 2017-11-17
Attending: THORACIC SURGERY (CARDIOTHORACIC VASCULAR SURGERY)
Payer: COMMERCIAL

## 2017-11-17 DIAGNOSIS — R59.0 LYMPHADENOPATHY, MEDIASTINAL: Primary | ICD-10-CM

## 2017-11-17 LAB
ABO + RH BLD: NORMAL
ABO + RH BLD: NORMAL
BLD GP AB SCN SERPL QL: NORMAL
BLOOD BANK CMNT PATIENT-IMP: NORMAL
CREAT SERPL-MCNC: 0.75 MG/DL (ref 0.66–1.25)
GFR SERPL CREATININE-BSD FRML MDRD: >90 ML/MIN/1.7M2
GLUCOSE BLDC GLUCOMTR-MCNC: 161 MG/DL (ref 70–99)
GLUCOSE BLDC GLUCOMTR-MCNC: 173 MG/DL (ref 70–99)
GLUCOSE BLDC GLUCOMTR-MCNC: 209 MG/DL (ref 70–99)
GLUCOSE BLDC GLUCOMTR-MCNC: 218 MG/DL (ref 70–99)
GLUCOSE BLDC GLUCOMTR-MCNC: 330 MG/DL (ref 70–99)
GLUCOSE SERPL-MCNC: 182 MG/DL (ref 70–99)
PLATELET # BLD AUTO: 201 10E9/L (ref 150–450)
POTASSIUM SERPL-SCNC: 4.4 MMOL/L (ref 3.4–5.3)
SPECIMEN EXP DATE BLD: NORMAL

## 2017-11-17 PROCEDURE — 82565 ASSAY OF CREATININE: CPT | Performed by: ANESTHESIOLOGY

## 2017-11-17 PROCEDURE — 82947 ASSAY GLUCOSE BLOOD QUANT: CPT | Performed by: ANESTHESIOLOGY

## 2017-11-17 PROCEDURE — 88331 PATH CONSLTJ SURG 1 BLK 1SPC: CPT | Performed by: THORACIC SURGERY (CARDIOTHORACIC VASCULAR SURGERY)

## 2017-11-17 PROCEDURE — 88185 FLOWCYTOMETRY/TC ADD-ON: CPT | Performed by: THORACIC SURGERY (CARDIOTHORACIC VASCULAR SURGERY)

## 2017-11-17 PROCEDURE — 86850 RBC ANTIBODY SCREEN: CPT | Performed by: THORACIC SURGERY (CARDIOTHORACIC VASCULAR SURGERY)

## 2017-11-17 PROCEDURE — 88305 TISSUE EXAM BY PATHOLOGIST: CPT | Performed by: THORACIC SURGERY (CARDIOTHORACIC VASCULAR SURGERY)

## 2017-11-17 PROCEDURE — 25000125 ZZHC RX 250: Performed by: NURSE ANESTHETIST, CERTIFIED REGISTERED

## 2017-11-17 PROCEDURE — 40001004 ZZHCL STATISTIC FLOW INT 9-15 ABY TC 88188: Performed by: THORACIC SURGERY (CARDIOTHORACIC VASCULAR SURGERY)

## 2017-11-17 PROCEDURE — 40000885 ZZH STATISTIC STEP DOWN HRS EVENING

## 2017-11-17 PROCEDURE — 00000146 ZZHCL STATISTIC GLUCOSE BY METER IP

## 2017-11-17 PROCEDURE — 71000012 ZZH RECOVERY PHASE 1 LEVEL 1 FIRST HR: Performed by: THORACIC SURGERY (CARDIOTHORACIC VASCULAR SURGERY)

## 2017-11-17 PROCEDURE — 88342 IMHCHEM/IMCYTCHM 1ST ANTB: CPT | Mod: 26 | Performed by: THORACIC SURGERY (CARDIOTHORACIC VASCULAR SURGERY)

## 2017-11-17 PROCEDURE — 36000063 ZZH SURGERY LEVEL 4 EA 15 ADDTL MIN: Performed by: THORACIC SURGERY (CARDIOTHORACIC VASCULAR SURGERY)

## 2017-11-17 PROCEDURE — 88341 IMHCHEM/IMCYTCHM EA ADD ANTB: CPT | Performed by: THORACIC SURGERY (CARDIOTHORACIC VASCULAR SURGERY)

## 2017-11-17 PROCEDURE — 25000128 H RX IP 250 OP 636: Performed by: THORACIC SURGERY (CARDIOTHORACIC VASCULAR SURGERY)

## 2017-11-17 PROCEDURE — 71000013 ZZH RECOVERY PHASE 1 LEVEL 1 EA ADDTL HR: Performed by: THORACIC SURGERY (CARDIOTHORACIC VASCULAR SURGERY)

## 2017-11-17 PROCEDURE — 25000128 H RX IP 250 OP 636: Performed by: ANESTHESIOLOGY

## 2017-11-17 PROCEDURE — A9270 NON-COVERED ITEM OR SERVICE: HCPCS | Mod: GY | Performed by: PHYSICIAN ASSISTANT

## 2017-11-17 PROCEDURE — 86901 BLOOD TYPING SEROLOGIC RH(D): CPT | Performed by: THORACIC SURGERY (CARDIOTHORACIC VASCULAR SURGERY)

## 2017-11-17 PROCEDURE — 25000128 H RX IP 250 OP 636: Performed by: NURSE ANESTHETIST, CERTIFIED REGISTERED

## 2017-11-17 PROCEDURE — 88342 IMHCHEM/IMCYTCHM 1ST ANTB: CPT | Performed by: THORACIC SURGERY (CARDIOTHORACIC VASCULAR SURGERY)

## 2017-11-17 PROCEDURE — 25000131 ZZH RX MED GY IP 250 OP 636 PS 637: Mod: GY | Performed by: PHYSICIAN ASSISTANT

## 2017-11-17 PROCEDURE — 88184 FLOWCYTOMETRY/ TC 1 MARKER: CPT | Performed by: THORACIC SURGERY (CARDIOTHORACIC VASCULAR SURGERY)

## 2017-11-17 PROCEDURE — 88331 PATH CONSLTJ SURG 1 BLK 1SPC: CPT | Mod: 26 | Performed by: THORACIC SURGERY (CARDIOTHORACIC VASCULAR SURGERY)

## 2017-11-17 PROCEDURE — 99207 ZZC CONSULT E&M CHANGED TO INITIAL LEVEL: CPT | Performed by: INTERNAL MEDICINE

## 2017-11-17 PROCEDURE — 27210794 ZZH OR GENERAL SUPPLY STERILE: Performed by: THORACIC SURGERY (CARDIOTHORACIC VASCULAR SURGERY)

## 2017-11-17 PROCEDURE — 21400002 ZZH R&B CCU CICU CRITICAL

## 2017-11-17 PROCEDURE — 88307 TISSUE EXAM BY PATHOLOGIST: CPT | Mod: 26 | Performed by: THORACIC SURGERY (CARDIOTHORACIC VASCULAR SURGERY)

## 2017-11-17 PROCEDURE — 40000986 XR CHEST PORT 1 VW

## 2017-11-17 PROCEDURE — 99222 1ST HOSP IP/OBS MODERATE 55: CPT | Performed by: INTERNAL MEDICINE

## 2017-11-17 PROCEDURE — 07B70ZX EXCISION OF THORAX LYMPHATIC, OPEN APPROACH, DIAGNOSTIC: ICD-10-PCS | Performed by: THORACIC SURGERY (CARDIOTHORACIC VASCULAR SURGERY)

## 2017-11-17 PROCEDURE — 25000566 ZZH SEVOFLURANE, EA 15 MIN: Performed by: THORACIC SURGERY (CARDIOTHORACIC VASCULAR SURGERY)

## 2017-11-17 PROCEDURE — 25000131 ZZH RX MED GY IP 250 OP 636 PS 637: Mod: GY | Performed by: ANESTHESIOLOGY

## 2017-11-17 PROCEDURE — 85049 AUTOMATED PLATELET COUNT: CPT | Performed by: ANESTHESIOLOGY

## 2017-11-17 PROCEDURE — 27110038 ZZH RX 271: Performed by: THORACIC SURGERY (CARDIOTHORACIC VASCULAR SURGERY)

## 2017-11-17 PROCEDURE — 0BNK0ZZ RELEASE RIGHT LUNG, OPEN APPROACH: ICD-10-PCS | Performed by: THORACIC SURGERY (CARDIOTHORACIC VASCULAR SURGERY)

## 2017-11-17 PROCEDURE — 86900 BLOOD TYPING SEROLOGIC ABO: CPT | Performed by: THORACIC SURGERY (CARDIOTHORACIC VASCULAR SURGERY)

## 2017-11-17 PROCEDURE — 88305 TISSUE EXAM BY PATHOLOGIST: CPT | Mod: 26 | Performed by: THORACIC SURGERY (CARDIOTHORACIC VASCULAR SURGERY)

## 2017-11-17 PROCEDURE — 25000125 ZZHC RX 250: Performed by: ANESTHESIOLOGY

## 2017-11-17 PROCEDURE — 40000170 ZZH STATISTIC PRE-PROCEDURE ASSESSMENT II: Performed by: THORACIC SURGERY (CARDIOTHORACIC VASCULAR SURGERY)

## 2017-11-17 PROCEDURE — 25000125 ZZHC RX 250: Performed by: THORACIC SURGERY (CARDIOTHORACIC VASCULAR SURGERY)

## 2017-11-17 PROCEDURE — 36415 COLL VENOUS BLD VENIPUNCTURE: CPT | Performed by: THORACIC SURGERY (CARDIOTHORACIC VASCULAR SURGERY)

## 2017-11-17 PROCEDURE — 37000009 ZZH ANESTHESIA TECHNICAL FEE, EACH ADDTL 15 MIN: Performed by: THORACIC SURGERY (CARDIOTHORACIC VASCULAR SURGERY)

## 2017-11-17 PROCEDURE — S0020 INJECTION, BUPIVICAINE HYDRO: HCPCS | Performed by: THORACIC SURGERY (CARDIOTHORACIC VASCULAR SURGERY)

## 2017-11-17 PROCEDURE — 99207 ZZC APP CREDIT; MD BILLING SHARED VISIT: CPT | Performed by: PHYSICIAN ASSISTANT

## 2017-11-17 PROCEDURE — 25000128 H RX IP 250 OP 636: Performed by: PHYSICIAN ASSISTANT

## 2017-11-17 PROCEDURE — 36000093 ZZH SURGERY LEVEL 4 1ST 30 MIN: Performed by: THORACIC SURGERY (CARDIOTHORACIC VASCULAR SURGERY)

## 2017-11-17 PROCEDURE — 88307 TISSUE EXAM BY PATHOLOGIST: CPT | Performed by: THORACIC SURGERY (CARDIOTHORACIC VASCULAR SURGERY)

## 2017-11-17 PROCEDURE — 40000884 ZZH STATISTIC STEP DOWN HRS NIGHT

## 2017-11-17 PROCEDURE — 84132 ASSAY OF SERUM POTASSIUM: CPT | Performed by: ANESTHESIOLOGY

## 2017-11-17 PROCEDURE — 37000008 ZZH ANESTHESIA TECHNICAL FEE, 1ST 30 MIN: Performed by: THORACIC SURGERY (CARDIOTHORACIC VASCULAR SURGERY)

## 2017-11-17 PROCEDURE — 88341 IMHCHEM/IMCYTCHM EA ADD ANTB: CPT | Mod: 26 | Performed by: THORACIC SURGERY (CARDIOTHORACIC VASCULAR SURGERY)

## 2017-11-17 PROCEDURE — 25000132 ZZH RX MED GY IP 250 OP 250 PS 637: Mod: GY | Performed by: PHYSICIAN ASSISTANT

## 2017-11-17 RX ORDER — HYDROMORPHONE HYDROCHLORIDE 1 MG/ML
.3-.5 INJECTION, SOLUTION INTRAMUSCULAR; INTRAVENOUS; SUBCUTANEOUS
Status: DISCONTINUED | OUTPATIENT
Start: 2017-11-17 | End: 2017-11-19 | Stop reason: HOSPADM

## 2017-11-17 RX ORDER — NALOXONE HYDROCHLORIDE 0.4 MG/ML
.1-.4 INJECTION, SOLUTION INTRAMUSCULAR; INTRAVENOUS; SUBCUTANEOUS
Status: DISCONTINUED | OUTPATIENT
Start: 2017-11-17 | End: 2017-11-19 | Stop reason: HOSPADM

## 2017-11-17 RX ORDER — ONDANSETRON 4 MG/1
4 TABLET, ORALLY DISINTEGRATING ORAL EVERY 6 HOURS PRN
Status: DISCONTINUED | OUTPATIENT
Start: 2017-11-17 | End: 2017-11-19 | Stop reason: HOSPADM

## 2017-11-17 RX ORDER — FENTANYL CITRATE 50 UG/ML
25-50 INJECTION, SOLUTION INTRAMUSCULAR; INTRAVENOUS EVERY 5 MIN PRN
Status: DISCONTINUED | OUTPATIENT
Start: 2017-11-17 | End: 2017-11-17 | Stop reason: HOSPADM

## 2017-11-17 RX ORDER — GINSENG 100 MG
CAPSULE ORAL 3 TIMES DAILY
Status: DISCONTINUED | OUTPATIENT
Start: 2017-11-17 | End: 2017-11-19 | Stop reason: HOSPADM

## 2017-11-17 RX ORDER — POTASSIUM CL/LIDO/0.9 % NACL 10MEQ/0.1L
10 INTRAVENOUS SOLUTION, PIGGYBACK (ML) INTRAVENOUS
Status: DISCONTINUED | OUTPATIENT
Start: 2017-11-17 | End: 2017-11-19 | Stop reason: HOSPADM

## 2017-11-17 RX ORDER — POTASSIUM CHLORIDE 29.8 MG/ML
20 INJECTION INTRAVENOUS
Status: DISCONTINUED | OUTPATIENT
Start: 2017-11-17 | End: 2017-11-17 | Stop reason: CLARIF

## 2017-11-17 RX ORDER — HYDROMORPHONE HYDROCHLORIDE 1 MG/ML
.3-.5 INJECTION, SOLUTION INTRAMUSCULAR; INTRAVENOUS; SUBCUTANEOUS EVERY 5 MIN PRN
Status: DISCONTINUED | OUTPATIENT
Start: 2017-11-17 | End: 2017-11-17 | Stop reason: HOSPADM

## 2017-11-17 RX ORDER — LISINOPRIL 40 MG/1
40 TABLET ORAL DAILY
Status: DISCONTINUED | OUTPATIENT
Start: 2017-11-17 | End: 2017-11-17

## 2017-11-17 RX ORDER — VECURONIUM BROMIDE 1 MG/ML
INJECTION, POWDER, LYOPHILIZED, FOR SOLUTION INTRAVENOUS PRN
Status: DISCONTINUED | OUTPATIENT
Start: 2017-11-17 | End: 2017-11-17

## 2017-11-17 RX ORDER — DEXTROSE MONOHYDRATE 25 G/50ML
25-50 INJECTION, SOLUTION INTRAVENOUS
Status: DISCONTINUED | OUTPATIENT
Start: 2017-11-17 | End: 2017-11-19 | Stop reason: HOSPADM

## 2017-11-17 RX ORDER — LIDOCAINE 40 MG/G
CREAM TOPICAL
Status: DISCONTINUED | OUTPATIENT
Start: 2017-11-17 | End: 2017-11-19 | Stop reason: HOSPADM

## 2017-11-17 RX ORDER — SODIUM CHLORIDE 9 MG/ML
INJECTION, SOLUTION INTRAVENOUS CONTINUOUS
Status: DISCONTINUED | OUTPATIENT
Start: 2017-11-17 | End: 2017-11-18

## 2017-11-17 RX ORDER — ONDANSETRON 2 MG/ML
4 INJECTION INTRAMUSCULAR; INTRAVENOUS EVERY 6 HOURS PRN
Status: DISCONTINUED | OUTPATIENT
Start: 2017-11-17 | End: 2017-11-19 | Stop reason: HOSPADM

## 2017-11-17 RX ORDER — PROCHLORPERAZINE MALEATE 5 MG
5 TABLET ORAL EVERY 6 HOURS PRN
Status: DISCONTINUED | OUTPATIENT
Start: 2017-11-17 | End: 2017-11-19 | Stop reason: HOSPADM

## 2017-11-17 RX ORDER — AMLODIPINE BESYLATE 5 MG/1
5 TABLET ORAL AT BEDTIME
Status: DISCONTINUED | OUTPATIENT
Start: 2017-11-17 | End: 2017-11-19 | Stop reason: HOSPADM

## 2017-11-17 RX ORDER — ROSUVASTATIN CALCIUM 5 MG/1
5 TABLET, COATED ORAL AT BEDTIME
Status: DISCONTINUED | OUTPATIENT
Start: 2017-11-17 | End: 2017-11-19 | Stop reason: HOSPADM

## 2017-11-17 RX ORDER — POTASSIUM CHLORIDE 1500 MG/1
20-40 TABLET, EXTENDED RELEASE ORAL
Status: DISCONTINUED | OUTPATIENT
Start: 2017-11-17 | End: 2017-11-19 | Stop reason: HOSPADM

## 2017-11-17 RX ORDER — ACETAMINOPHEN 325 MG/1
650 TABLET ORAL 4 TIMES DAILY
Status: DISCONTINUED | OUTPATIENT
Start: 2017-11-17 | End: 2017-11-19 | Stop reason: HOSPADM

## 2017-11-17 RX ORDER — SODIUM CHLORIDE, SODIUM LACTATE, POTASSIUM CHLORIDE, CALCIUM CHLORIDE 600; 310; 30; 20 MG/100ML; MG/100ML; MG/100ML; MG/100ML
INJECTION, SOLUTION INTRAVENOUS CONTINUOUS
Status: DISCONTINUED | OUTPATIENT
Start: 2017-11-17 | End: 2017-11-17 | Stop reason: HOSPADM

## 2017-11-17 RX ORDER — IBUPROFEN 600 MG/1
600 TABLET, FILM COATED ORAL
Status: DISCONTINUED | OUTPATIENT
Start: 2017-11-17 | End: 2017-11-17

## 2017-11-17 RX ORDER — NICOTINE POLACRILEX 4 MG
15-30 LOZENGE BUCCAL
Status: DISCONTINUED | OUTPATIENT
Start: 2017-11-17 | End: 2017-11-19 | Stop reason: HOSPADM

## 2017-11-17 RX ORDER — GLYCOPYRROLATE 0.2 MG/ML
INJECTION, SOLUTION INTRAMUSCULAR; INTRAVENOUS PRN
Status: DISCONTINUED | OUTPATIENT
Start: 2017-11-17 | End: 2017-11-17

## 2017-11-17 RX ORDER — CEFAZOLIN SODIUM 1 G/3ML
1 INJECTION, POWDER, FOR SOLUTION INTRAMUSCULAR; INTRAVENOUS SEE ADMIN INSTRUCTIONS
Status: DISCONTINUED | OUTPATIENT
Start: 2017-11-17 | End: 2017-11-17 | Stop reason: HOSPADM

## 2017-11-17 RX ORDER — NEOSTIGMINE METHYLSULFATE 1 MG/ML
VIAL (ML) INJECTION PRN
Status: DISCONTINUED | OUTPATIENT
Start: 2017-11-17 | End: 2017-11-17

## 2017-11-17 RX ORDER — POTASSIUM CHLORIDE 1.5 G/1.58G
20-40 POWDER, FOR SOLUTION ORAL
Status: DISCONTINUED | OUTPATIENT
Start: 2017-11-17 | End: 2017-11-19 | Stop reason: HOSPADM

## 2017-11-17 RX ORDER — ONDANSETRON 2 MG/ML
INJECTION INTRAMUSCULAR; INTRAVENOUS PRN
Status: DISCONTINUED | OUTPATIENT
Start: 2017-11-17 | End: 2017-11-17

## 2017-11-17 RX ORDER — POTASSIUM CHLORIDE 7.45 MG/ML
10 INJECTION INTRAVENOUS
Status: DISCONTINUED | OUTPATIENT
Start: 2017-11-17 | End: 2017-11-19 | Stop reason: HOSPADM

## 2017-11-17 RX ORDER — ALUMINA, MAGNESIA, AND SIMETHICONE 2400; 2400; 240 MG/30ML; MG/30ML; MG/30ML
30 SUSPENSION ORAL EVERY 4 HOURS PRN
Status: DISCONTINUED | OUTPATIENT
Start: 2017-11-17 | End: 2017-11-19 | Stop reason: HOSPADM

## 2017-11-17 RX ORDER — LABETALOL HYDROCHLORIDE 5 MG/ML
10 INJECTION, SOLUTION INTRAVENOUS
Status: DISCONTINUED | OUTPATIENT
Start: 2017-11-17 | End: 2017-11-17 | Stop reason: HOSPADM

## 2017-11-17 RX ORDER — FUROSEMIDE 40 MG
40 TABLET ORAL DAILY
Status: DISCONTINUED | OUTPATIENT
Start: 2017-11-18 | End: 2017-11-17

## 2017-11-17 RX ORDER — FENTANYL CITRATE 50 UG/ML
INJECTION, SOLUTION INTRAMUSCULAR; INTRAVENOUS PRN
Status: DISCONTINUED | OUTPATIENT
Start: 2017-11-17 | End: 2017-11-17

## 2017-11-17 RX ORDER — AMOXICILLIN 250 MG
1-2 CAPSULE ORAL 2 TIMES DAILY
Status: DISCONTINUED | OUTPATIENT
Start: 2017-11-17 | End: 2017-11-19 | Stop reason: HOSPADM

## 2017-11-17 RX ORDER — MAGNESIUM SULFATE HEPTAHYDRATE 40 MG/ML
4 INJECTION, SOLUTION INTRAVENOUS EVERY 4 HOURS PRN
Status: DISCONTINUED | OUTPATIENT
Start: 2017-11-17 | End: 2017-11-19 | Stop reason: HOSPADM

## 2017-11-17 RX ORDER — DEXAMETHASONE SODIUM PHOSPHATE 4 MG/ML
INJECTION, SOLUTION INTRA-ARTICULAR; INTRALESIONAL; INTRAMUSCULAR; INTRAVENOUS; SOFT TISSUE PRN
Status: DISCONTINUED | OUTPATIENT
Start: 2017-11-17 | End: 2017-11-17

## 2017-11-17 RX ORDER — HYDROMORPHONE HYDROCHLORIDE 2 MG/1
2-4 TABLET ORAL
Status: DISCONTINUED | OUTPATIENT
Start: 2017-11-17 | End: 2017-11-19 | Stop reason: HOSPADM

## 2017-11-17 RX ORDER — CEFAZOLIN SODIUM 2 G/100ML
2 INJECTION, SOLUTION INTRAVENOUS
Status: COMPLETED | OUTPATIENT
Start: 2017-11-17 | End: 2017-11-17

## 2017-11-17 RX ORDER — ONDANSETRON 4 MG/1
4 TABLET, ORALLY DISINTEGRATING ORAL EVERY 30 MIN PRN
Status: DISCONTINUED | OUTPATIENT
Start: 2017-11-17 | End: 2017-11-17 | Stop reason: HOSPADM

## 2017-11-17 RX ORDER — ONDANSETRON 2 MG/ML
4 INJECTION INTRAMUSCULAR; INTRAVENOUS EVERY 30 MIN PRN
Status: DISCONTINUED | OUTPATIENT
Start: 2017-11-17 | End: 2017-11-17 | Stop reason: HOSPADM

## 2017-11-17 RX ORDER — BUPIVACAINE HYDROCHLORIDE 5 MG/ML
INJECTION, SOLUTION PERINEURAL PRN
Status: DISCONTINUED | OUTPATIENT
Start: 2017-11-17 | End: 2017-11-17 | Stop reason: HOSPADM

## 2017-11-17 RX ORDER — PROPOFOL 10 MG/ML
INJECTION, EMULSION INTRAVENOUS PRN
Status: DISCONTINUED | OUTPATIENT
Start: 2017-11-17 | End: 2017-11-17

## 2017-11-17 RX ORDER — FENOFIBRATE 160 MG/1
160 TABLET ORAL DAILY
Status: DISCONTINUED | OUTPATIENT
Start: 2017-11-18 | End: 2017-11-19 | Stop reason: HOSPADM

## 2017-11-17 RX ADMIN — ACETAMINOPHEN 650 MG: 325 TABLET, FILM COATED ORAL at 18:48

## 2017-11-17 RX ADMIN — PHENYLEPHRINE HYDROCHLORIDE 100 MCG: 10 INJECTION INTRAVENOUS at 12:30

## 2017-11-17 RX ADMIN — AMLODIPINE BESYLATE 5 MG: 5 TABLET ORAL at 21:59

## 2017-11-17 RX ADMIN — GLYCOPYRROLATE 0.6 MG: 0.2 INJECTION, SOLUTION INTRAMUSCULAR; INTRAVENOUS at 14:31

## 2017-11-17 RX ADMIN — VECURONIUM BROMIDE 1 MG: 1 INJECTION, POWDER, LYOPHILIZED, FOR SOLUTION INTRAVENOUS at 13:24

## 2017-11-17 RX ADMIN — HYDROMORPHONE HYDROCHLORIDE 2 MG: 2 TABLET ORAL at 22:02

## 2017-11-17 RX ADMIN — CEFAZOLIN 1 G: 1 INJECTION, POWDER, FOR SOLUTION INTRAMUSCULAR; INTRAVENOUS at 14:17

## 2017-11-17 RX ADMIN — SENNOSIDES AND DOCUSATE SODIUM 1 TABLET: 8.6; 5 TABLET ORAL at 20:59

## 2017-11-17 RX ADMIN — VECURONIUM BROMIDE 2 MG: 1 INJECTION, POWDER, LYOPHILIZED, FOR SOLUTION INTRAVENOUS at 12:38

## 2017-11-17 RX ADMIN — NEOSTIGMINE METHYLSULFATE 4 MG: 1 INJECTION INTRAMUSCULAR; INTRAVENOUS; SUBCUTANEOUS at 14:31

## 2017-11-17 RX ADMIN — PHENYLEPHRINE HYDROCHLORIDE 100 MCG: 10 INJECTION INTRAVENOUS at 12:14

## 2017-11-17 RX ADMIN — HYDROMORPHONE HYDROCHLORIDE 2 MG: 2 TABLET ORAL at 18:55

## 2017-11-17 RX ADMIN — ROSUVASTATIN CALCIUM 5 MG: 5 TABLET ORAL at 21:59

## 2017-11-17 RX ADMIN — VECURONIUM BROMIDE 2 MG: 1 INJECTION, POWDER, LYOPHILIZED, FOR SOLUTION INTRAVENOUS at 13:35

## 2017-11-17 RX ADMIN — PHENYLEPHRINE HYDROCHLORIDE 50 MCG: 10 INJECTION INTRAVENOUS at 13:43

## 2017-11-17 RX ADMIN — PHENYLEPHRINE HYDROCHLORIDE 100 MCG: 10 INJECTION INTRAVENOUS at 13:10

## 2017-11-17 RX ADMIN — MIDAZOLAM HYDROCHLORIDE 1 MG: 1 INJECTION, SOLUTION INTRAMUSCULAR; INTRAVENOUS at 12:02

## 2017-11-17 RX ADMIN — FENTANYL CITRATE 100 MCG: 50 INJECTION, SOLUTION INTRAMUSCULAR; INTRAVENOUS at 12:26

## 2017-11-17 RX ADMIN — FENTANYL CITRATE 50 MCG: 50 INJECTION, SOLUTION INTRAMUSCULAR; INTRAVENOUS at 15:27

## 2017-11-17 RX ADMIN — PHENYLEPHRINE HYDROCHLORIDE 50 MCG: 10 INJECTION INTRAVENOUS at 12:46

## 2017-11-17 RX ADMIN — ACETAMINOPHEN 650 MG: 325 TABLET, FILM COATED ORAL at 21:59

## 2017-11-17 RX ADMIN — SODIUM CHLORIDE 2 UNITS: 9 INJECTION, SOLUTION INTRAVENOUS at 15:18

## 2017-11-17 RX ADMIN — PHENYLEPHRINE HYDROCHLORIDE 100 MCG: 10 INJECTION INTRAVENOUS at 13:18

## 2017-11-17 RX ADMIN — SODIUM CHLORIDE: 9 INJECTION, SOLUTION INTRAVENOUS at 22:14

## 2017-11-17 RX ADMIN — MIDAZOLAM HYDROCHLORIDE 1 MG: 1 INJECTION, SOLUTION INTRAMUSCULAR; INTRAVENOUS at 11:59

## 2017-11-17 RX ADMIN — INSULIN GLARGINE 52 UNITS: 100 INJECTION, SOLUTION SUBCUTANEOUS at 22:00

## 2017-11-17 RX ADMIN — PHENYLEPHRINE HYDROCHLORIDE 50 MCG: 10 INJECTION INTRAVENOUS at 12:57

## 2017-11-17 RX ADMIN — SODIUM CHLORIDE, POTASSIUM CHLORIDE, SODIUM LACTATE AND CALCIUM CHLORIDE: 600; 310; 30; 20 INJECTION, SOLUTION INTRAVENOUS at 10:31

## 2017-11-17 RX ADMIN — HYDROMORPHONE HYDROCHLORIDE 0.5 MG: 1 INJECTION, SOLUTION INTRAMUSCULAR; INTRAVENOUS; SUBCUTANEOUS at 15:04

## 2017-11-17 RX ADMIN — PROPOFOL 200 MG: 10 INJECTION, EMULSION INTRAVENOUS at 12:08

## 2017-11-17 RX ADMIN — CEFAZOLIN SODIUM 2 G: 2 INJECTION, SOLUTION INTRAVENOUS at 12:15

## 2017-11-17 RX ADMIN — ONDANSETRON 4 MG: 2 INJECTION INTRAMUSCULAR; INTRAVENOUS at 13:38

## 2017-11-17 RX ADMIN — GLYCOPYRROLATE 0.2 MG: 0.2 INJECTION, SOLUTION INTRAMUSCULAR; INTRAVENOUS at 14:06

## 2017-11-17 RX ADMIN — INSULIN ASPART 2 UNITS: 100 INJECTION, SOLUTION INTRAVENOUS; SUBCUTANEOUS at 18:48

## 2017-11-17 RX ADMIN — RANITIDINE 150 MG: 150 TABLET ORAL at 20:59

## 2017-11-17 RX ADMIN — NEOSTIGMINE METHYLSULFATE 1 MG: 1 INJECTION INTRAMUSCULAR; INTRAVENOUS; SUBCUTANEOUS at 14:04

## 2017-11-17 RX ADMIN — LIDOCAINE HYDROCHLORIDE 1 ML: 10 INJECTION, SOLUTION EPIDURAL; INFILTRATION; INTRACAUDAL; PERINEURAL at 10:31

## 2017-11-17 RX ADMIN — FENTANYL CITRATE 50 MCG: 50 INJECTION, SOLUTION INTRAMUSCULAR; INTRAVENOUS at 12:08

## 2017-11-17 RX ADMIN — DEXAMETHASONE SODIUM PHOSPHATE 4 MG: 4 INJECTION, SOLUTION INTRA-ARTICULAR; INTRALESIONAL; INTRAMUSCULAR; INTRAVENOUS; SOFT TISSUE at 12:29

## 2017-11-17 RX ADMIN — VECURONIUM BROMIDE 2 MG: 1 INJECTION, POWDER, LYOPHILIZED, FOR SOLUTION INTRAVENOUS at 12:58

## 2017-11-17 RX ADMIN — PHENYLEPHRINE HYDROCHLORIDE 100 MCG: 10 INJECTION INTRAVENOUS at 13:34

## 2017-11-17 RX ADMIN — SODIUM CHLORIDE, POTASSIUM CHLORIDE, SODIUM LACTATE AND CALCIUM CHLORIDE: 600; 310; 30; 20 INJECTION, SOLUTION INTRAVENOUS at 13:50

## 2017-11-17 RX ADMIN — VECURONIUM BROMIDE 1 MG: 1 INJECTION, POWDER, LYOPHILIZED, FOR SOLUTION INTRAVENOUS at 13:18

## 2017-11-17 RX ADMIN — ROCURONIUM BROMIDE 50 MG: 10 INJECTION INTRAVENOUS at 12:08

## 2017-11-17 ASSESSMENT — ACTIVITIES OF DAILY LIVING (ADL)
TRANSFERRING: 0-->INDEPENDENT
BATHING: 0-->INDEPENDENT
RETIRED_EATING: 0-->INDEPENDENT
TOILETING: 0-->INDEPENDENT
DRESS: 0-->INDEPENDENT
SWALLOWING: 0-->SWALLOWS FOODS/LIQUIDS WITHOUT DIFFICULTY
COGNITION: 0 - NO COGNITION ISSUES REPORTED
AMBULATION: 0-->INDEPENDENT
RETIRED_COMMUNICATION: 0-->UNDERSTANDS/COMMUNICATES WITHOUT DIFFICULTY
FALL_HISTORY_WITHIN_LAST_SIX_MONTHS: NO

## 2017-11-17 NOTE — BRIEF OP NOTE
[facility] Brief Operative Note    Pre-operative diagnosis: LUNG MASS; history of Non Hodgkin's lymphoma s/p chemotherapy 2014   Post-operative diagnosis Suspected recurrent NHL   Procedure: Procedure(s):  RIGHT EXPLORATORY THORACOTOMY/ EXTENSIVE PNEUMOLYSIS/ BIOPSY OF INTERLOBAR LYMPH NODE - Wound Class: II-Clean Contaminated   - Wound Class: I-Clean   Surgeon(s): Surgeon(s) and Role:     * Robbie iLnda MD - Primary     * Ana Sun PA-C - Assisting     * Diana Temple PA-C - Assisting   Estimated blood loss: 50 mL    Specimens:   ID Type Source Tests Collected by Time Destination   A : PARIETAL PLEURA Tissue Pleural/Thoracic Cavity SURGICAL PATHOLOGY EXAM Robbie Linda MD 11/17/2017 12:41 PM    B : Right intralobar lymph node Tissue Lymph Node, Interlobar SURGICAL PATHOLOGY EXAM Robbie Linda MD 11/17/2017  1:33 PM       Findings: Extensive scarring requiring pneumolysis.  Parietal pleura biopsy sent for frozen and showed reactive tissue only.  Interlobar lymph node dissected with several samples sent for pathology.  Prelim (frozen) consistent with lymphoma.  Large thoracotomy incision closed.  28F straight chest tube in right pleural space.  OnQ placed.

## 2017-11-17 NOTE — IP AVS SNAPSHOT
MRN:[id number]                      After Visit Summary   11/17/2017    Pastor Garibay    MRN: [id number]           Thank you!     Thank you for choosing [facility] for your care. Our goal is always to provide you with excellent care. Hearing back from our patients is one way we can continue to improve our services. Please take a few minutes to complete the written survey that you may receive in the mail after you visit with us. Thank you!        Patient Information     Date Of Birth          1950        Designated Caregiver       Most Recent Value    Caregiver    Will someone help with your care after discharge? yes    Name of designated caregiver Toshia    Phone number of caregiver [phone]    Caregiver address [place], [place]      About your hospital stay     You were admitted on:  November 17, 2017 You last received care in the:  [facility] [facility]    You were discharged on:  November 19, 2017       Who to Call     For medical emergencies, please call 911.  For non-urgent questions about your medical care, please call your primary care provider or clinic, [phone]  For questions related to your surgery, please call your surgery clinic        Attending Provider     Provider Specialty    Robbie Linda MD Thoracic Diseases       Primary Care Provider Office Phone # Fax #    Francisco Armijo [phone]      Further instructions from your care team       [facility]  Discharge Orders & Follow-up Care  Video-Assisted: Thoracoscopy - Thoracotomy    Call Viviane at Dr. Linda  office at [phone] to make a return appointment with a chest x-ray on Monday 11-27.  Your appointment will be with either Ana Sun PA-C or Diana Temple PA-C, or Dr. Linda.      Our office is located at:  [facility], [address] [place] [address], [address], [place] [address].  Viviane will explain where to park when you call for an  "appointment.    A. Patient Care  Call Dr Linda  office @ [phone] if:  ? Severe chills or a fever or 100.4 F.  temperature on two occasions  ? Increased incisional pain and/or redness  ? Presence of unusual incisional or chest tube site drainage  ? Coughing up bright red blood or greenish-yellow secretions  ? Significant increase in shortness of breath    Pain Relief  You have been given a prescription for narcotic pain medicine.  You may also take ibuprofen and acetaminophen over the counter.  Recommended dosages are:  600 mg Ibuprofen every 6 hours as needed and 650 mg Acetaminophen every 4 hours as needed.  Many patients get good pain relief by \"staggering\" these medications.     No driving while on narcotics.     Activity  ___ No activity restrictions for a scope procedure/thoracoscopy  XX No heavy lifting greater than 8-10 pounds on the operative side for 4-6 weeks for a thoracotomy    Wound Care  Remove all dressings in 48 hours and then you may shower.  Wash the incision and chest tube site(s) daily with soap and water. No bathing or immersing incision underwater for approximately 2 weeks or until the chest tube sites are completely healed.     Place a dry gauze dressing over the chest tube site(s) because it(they) will drain a few days.  Do not be alarmed if there is drainage of a large amount of fluid (should be pink or yellow-- or somewhat bloody) either spontaneously or with coughing or exertion. If this happens, just place a large dry gauze dressing over the chest tube site-- it will stop and scab over in about a week or so. Once the drainage stops, then leave the chest tube site(s) open to air.     White steri strips will be present on the incision(s). They will peel off within about 10 days-- otherwise, they will be removed at your post-op appointment.     B. Respiratory  XX Utilize Incentive spirometer 10 times in a row every few hours while awake for a few weeks after discharging home from the " "hospital    Directions for On-Q Pain Pump Removal:  1. Remove the dressing covering the catheter site.  2. Grasp the catheter close to the skin and gently pull on the catheter. It should be easy to remove and not painful. If it becomes hard to remove or stretches, then stop and call   office.  3. Do not cut or pull hard to remove the catheter.  4. After you remove the catheter, check the catheter tip for a black marking to ensure the entire catheter was removed. Call our office if you don't see the black marking.  5. Place a band-aid over the catheter site if needed.  6. Call our office is you have redness, warmth or excessive bleeding from the catheter site or if there is a large bruise or swollen area around the site.      Please follow up with your Primary Care Physician within 1-2 weeks for hospital follow-up.     Pending Results     Date and Time Order Name Status Description    11/17/2017 1241 Surgical pathology exam In process     11/17/2017 1241 Leukemia Lymphoma Evaluation (Flow Cytometry) In process             Admission Information     Date & Time Provider Department Dept. Phone    11/17/2017 Robbie Linda MD [facility] [facility] [phone]      Your Vitals Were     Blood Pressure Pulse Temperature Respirations Weight Pulse Oximetry    123/64 (BP Location: Right arm) 88 99.1  F (37.3  C) (Oral) 16 111.1 kg (245 lb) 90%    BMI (Body Mass Index)                   36.71 kg/m2           [facility] Information     [employer] lets you send messages to your doctor, view your test results, renew your prescriptions, schedule appointments and more. To sign up, go to [email or web address] . Click on \"Log in\" on the left side of the screen, which will take you to the Welcome page. Then click on \"Sign up Now\" on the right side of the page.     You will be asked to enter the access code listed below, as well as some personal information. Please follow the directions to create " your username and password.     Your access code is: [email or web address]  Expires: 2018  7:54 AM     Your access code will  in 90 days. If you need help or a new code, please call your [facility] clinic or [phone].        Care EveryWhere ID     This is your Care EveryWhere ID. This could be used by other organizations to access your [facility] medical records  [id number]        Equal Access to Services     [facility] : Hadii aad ku hadasho Soomaali, waaxda luqadaha, qaybta kaalmada adeegyada, waxay idiin hayaan adeshannan murolakiahailee sylvester . So [facility] [phone].    ATENCIÓN: Si habla español, tiene a bradford disposición servicios gratuitos de asistencia lingüística. Llame al [phone].    We comply with applicable federal civil rights laws and [place] laws. We do not discriminate on the basis of race, color, national origin, age, disability, sex, sexual orientation, or gender identity.               Review of your medicines      START taking        Dose / Directions    HYDROmorphone 2 MG tablet   Commonly known as:  DILAUDID        Dose:  2-4 mg   Take 1-2 tablets (2-4 mg) by mouth every 3 hours as needed for moderate to severe pain   Quantity:  80 tablet   Refills:  0         CONTINUE these medicines which have NOT CHANGED        Dose / Directions    ADVIL PM PO   Notes to Patient:  Not given while in hospital. May resume home schedule.         Dose:  2 tablet   Take 2 tablets by mouth At Bedtime   Refills:  0       AMLODIPINE BESYLATE PO   Notes to Patient:  Not given while in hospital. May resume home schedule.         Dose:  5 mg   Take 5 mg by mouth At Bedtime   Refills:  0       COLACE PO   Notes to Patient:  Not given while in hospital. May resume home schedule.         Dose:  200 mg   Take 200 mg by mouth At Bedtime (2 x 100 mg tablet = 200 mg dose)   Refills:  0       CRESTOR PO        Dose:  5 mg   Take 5 mg by mouth daily (0.5 x 10 mg tablet = 5 mg dose)   Refills:  0       insulin glargine 100 UNIT/ML  injection   Commonly known as:  LANTUS        Dose:  52 Units   Inject 52 Units Subcutaneous At Bedtime   Refills:  0       * K-DUR PO   Notes to Patient:  Not given while in hospital. May resume home schedule.         Dose:  40 mEq   Take 40 mEq by mouth daily (2 x 20 mEq = 40 mEq dose)   Refills:  0       * K-DUR PO   Notes to Patient:  Not given while in hospital. May resume home schedule.         Dose:  20 mEq   Take 20 mEq by mouth At Bedtime   Refills:  0       * LASIX PO   Notes to Patient:  Not given while in hospital. May resume home schedule.         Dose:  40 mg   Take 40 mg by mouth daily   Refills:  0       * LASIX PO   Notes to Patient:  Duplicate order        Dose:  40 mg   Take 40 mg by mouth every evening as needed   Refills:  0       LISINOPRIL PO   Notes to Patient:  Not given while in hospital. May resume home schedule.         Dose:  40 mg   Take 40 mg by mouth daily   Refills:  0       * METFORMIN HCL PO   Notes to Patient:  Not given while in hospital. May resume home schedule.         Dose:  1000 mg   Take 1,000 mg by mouth daily (with breakfast) (2 x 500 mg = 1000 mg dose)   Refills:  0       * METFORMIN HCL PO   Notes to Patient:  Not given while in hospital. May resume home schedule.         Dose:  500 mg   Take 500 mg by mouth At Bedtime   Refills:  0       NIACIN ER PO   Notes to Patient:  Not given while in hospital. May resume home schedule.         Dose:  500 mg   Take 500 mg by mouth daily   Refills:  0       NovoLOG FLEXPEN 100 UNIT/ML injection   Generic drug:  insulin aspart        Dose:  0-50 Units   Inject 0-50 Units Subcutaneous 3 times daily (with meals) Patient uses sliding scale based on Carbs and Blood Glucose   Refills:  0       TRICOR PO   Notes to Patient:  Not given while in hospital. May resume home schedule.         Dose:  145 mg   Take 145 mg by mouth daily   Refills:  0       * Notice:  This list has 6 medication(s) that are the same as other medications prescribed  for you. Read the directions carefully, and ask your doctor or other care provider to review them with you.         Where to get your medicines      Some of these will need a paper prescription and others can be bought over the counter. Ask your nurse if you have questions.     Bring a paper prescription for each of these medications     HYDROmorphone 2 MG tablet                Protect others around you: Learn how to safely use, store and throw away your medicines at www.disposemymeds.org.             Medication List: This is a list of all your medications and when to take them. Check marks below indicate your daily home schedule. Keep this list as a reference.      Medications           Morning Afternoon Evening Bedtime As Needed    ADVIL PM PO   Take 2 tablets by mouth At Bedtime   Notes to Patient:  Not given while in hospital. May resume home schedule.                                 AMLODIPINE BESYLATE PO   Take 5 mg by mouth At Bedtime   Last time this was given:  5 mg on 11/18/2017  9:17 PM   Notes to Patient:  Not given while in hospital. May resume home schedule.                                 COLACE PO   Take 200 mg by mouth At Bedtime (2 x 100 mg tablet = 200 mg dose)   Notes to Patient:  Not given while in hospital. May resume home schedule.                                 CRESTOR PO   Take 5 mg by mouth daily (0.5 x 10 mg tablet = 5 mg dose)   Last time this was given:  5 mg on 11/18/2017  9:17 PM                                HYDROmorphone 2 MG tablet   Commonly known as:  DILAUDID   Take 1-2 tablets (2-4 mg) by mouth every 3 hours as needed for moderate to severe pain   Last time this was given:  4 mg on 11/19/2017  7:17 AM                                   insulin glargine 100 UNIT/ML injection   Commonly known as:  LANTUS   Inject 52 Units Subcutaneous At Bedtime   Last time this was given:  60 Units on 11/18/2017  9:19 PM                                * K-DUR PO   Take 40 mEq by mouth daily (2 x  20 mEq = 40 mEq dose)   Notes to Patient:  Not given while in hospital. May resume home schedule.                                 * K-DUR PO   Take 20 mEq by mouth At Bedtime   Notes to Patient:  Not given while in hospital. May resume home schedule.                                 * LASIX PO   Take 40 mg by mouth daily   Notes to Patient:  Not given while in hospital. May resume home schedule.                                 * LASIX PO   Take 40 mg by mouth every evening as needed   Notes to Patient:  Duplicate order                                LISINOPRIL PO   Take 40 mg by mouth daily   Notes to Patient:  Not given while in hospital. May resume home schedule.                                 * METFORMIN HCL PO   Take 1,000 mg by mouth daily (with breakfast) (2 x 500 mg = 1000 mg dose)   Notes to Patient:  Not given while in hospital. May resume home schedule.                                 * METFORMIN HCL PO   Take 500 mg by mouth At Bedtime   Notes to Patient:  Not given while in hospital. May resume home schedule.                                 NIACIN ER PO   Take 500 mg by mouth daily   Notes to Patient:  Not given while in hospital. May resume home schedule.                                 NovoLOG FLEXPEN 100 UNIT/ML injection   Inject 0-50 Units Subcutaneous 3 times daily (with meals) Patient uses sliding scale based on Carbs and Blood Glucose   Last time this was given:  18 Units on 11/19/2017  3:02 PM   Generic drug:  insulin aspart                                TRICOR PO   Take 145 mg by mouth daily   Notes to Patient:  Not given while in hospital. May resume home schedule.                                 * Notice:  This list has 6 medication(s) that are the same as other medications prescribed for you. Read the directions carefully, and ask your doctor or other care provider to review them with you.

## 2017-11-17 NOTE — PROGRESS NOTES
Admission medication history interview status for the 11/17/2017  admission is complete. See EPIC admission navigator for prior to admission medications     Medication history source reliability:Good    Medication history interview source(s):Patient    Medication history resources (including written lists, pill bottles, clinic record):None    Primary pharmacy.[facility]    Additional medication history information not noted on PTA med list :None    Time spent in this activity: 45 minutes    Prior to Admission medications    Medication Sig Last Dose Taking? Auth Provider   Fenofibrate (TRICOR PO) Take 145 mg by mouth daily 11/16/2017 at am Yes Reported, Patient   Furosemide (LASIX PO) Take 40 mg by mouth every evening as needed 11/10/2017 Yes Reported, Patient   NIACIN ER PO Take 500 mg by mouth daily 11/16/2017 at am Yes Reported, Patient   Potassium Chloride Vicki ER (K-DUR PO) Take 40 mEq by mouth daily (2 x 20 mEq = 40 mEq dose) 11/16/2017 at am Yes Reported, Patient   Potassium Chloride Vicki ER (K-DUR PO) Take 20 mEq by mouth At Bedtime 11/16/2017 at pm Yes Reported, Patient   insulin aspart (NOVOLOG FLEXPEN) 100 UNIT/ML injection Inject 0-50 Units Subcutaneous 3 times daily (with meals) Patient uses sliding scale based on Carbs and Blood Glucose  11/16/2017 at pm Yes Reported, Patient   Ibuprofen-Diphenhydramine Cit (ADVIL PM PO) Take 2 tablets by mouth At Bedtime  11/16/2017 at pm Yes Reported, Patient   Docusate Sodium (COLACE PO) Take 200 mg by mouth At Bedtime (2 x 100 mg tablet = 200 mg dose) 11/16/2017 at pm Yes Reported, Patient   Furosemide (LASIX PO) Take 40 mg by mouth daily  11/16/2017 at am Yes Reported, Patient   METFORMIN HCL PO Take 1,000 mg by mouth daily (with breakfast) (2 x 500 mg = 1000 mg dose) 11/16/2017 at am Yes Reported, Patient   METFORMIN HCL PO Take 500 mg by mouth At Bedtime 11/15/2017 Yes Reported, Patient   LISINOPRIL PO Take 40 mg by mouth daily 11/17/2017 at 0730 Yes Reported, Patient    Rosuvastatin Calcium (CRESTOR PO) Take 5 mg by mouth daily (0.5 x 10 mg tablet = 5 mg dose) 11/16/2017 at am Yes Reported, Patient   AMLODIPINE BESYLATE PO Take 5 mg by mouth At Bedtime 11/16/2017 at pm Yes Reported, Patient   insulin glargine (LANTUS VIAL) 100 UNIT/ML soln Inject 52 Units Subcutaneous At Bedtime  26 units 11/16/2017 at pm Yes Reported, Patient

## 2017-11-17 NOTE — ANESTHESIA CARE TRANSFER NOTE
Patient: Pastor Garibay    Procedure(s):  RIGHT EXPLORATORY THORACOTOMY/ EXTENSIVE PNEUMOLYSIS/ BIOPSY OF INTERLOBAR LYMPH NODE - Wound Class: II-Clean Contaminated   - Wound Class: I-Clean    Diagnosis: LUNG MASS  Diagnosis Additional Information: No value filed.    Anesthesia Type:   General, ETT     Note:  Airway :Face Mask  Patient transferred to:PACU  Comments: Neuromuscular blockade reversed after TOF 4/4, spontaneous respirations, adequate tidal volumes, followed commands to voice, extubated atraumatically, extubated with suction, airway patent after extubation.  Oxygen via facemask at 6 liters per minute to PACU. Oxygen tubing connected to wall O2 in PACU, SpO2, NiBP, and EKG monitors and alarms on and functioning, Caitlin Hugger warmer connected to patient gown, report on patient's clinical status given to PACU RN, RN questions answered. Handoff Report: Identifed the Patient, Identified the Reponsible Provider, Reviewed the pertinent medical history, Discussed the surgical course, Reviewed Intra-OP anesthesia mangement and issues during anesthesia, Set expectations for post-procedure period and Allowed opportunity for questions and acknowledgement of understanding      Vitals: (Last set prior to Anesthesia Care Transfer)    CRNA VITALS  11/17/2017 1414 - 11/17/2017 1455      11/17/2017             Resp Rate (set): 10                Electronically Signed By: VERNA Hampton CRNA  November 17, 2017  2:55 PM

## 2017-11-17 NOTE — IP AVS SNAPSHOT
[facility] [address] [facility]    [address] [place] [address]    Phone:  [phone]                                       After Visit Summary   11/17/2017    Pastor Garibay    MRN: [id number]           After Visit Summary Signature Page     I have received my discharge instructions, and my questions have been answered. I have discussed any challenges I see with this plan with the nurse or doctor.    ..........................................................................................................................................  Patient/Patient Representative Signature      ..........................................................................................................................................  Patient Representative Print Name and Relationship to Patient    ..................................................               ................................................  Date                                            Time    ..........................................................................................................................................  Reviewed by Signature/Title    ...................................................              ..............................................  Date                                                            Time

## 2017-11-17 NOTE — CONSULTS
Physician Attestation   I, Brody Quan, saw and evaluated Pastor Garibay as part of a shared visit.  I have reviewed and discussed with the advanced practice provider their history, physical and plan.    I personally reviewed the vital signs and medications.    My key history or physical exam findings: pt resting in bed, about to order dinner.  Took half dose of lantus last evening and held metformin since last evening.  Does not have any nausea and is planning on eating an omelette.  Blood sugar currently in the 200s, received steroids periop.     Key management decisions made by me: agree with plan to continue PTA lantus and hold prandial insulin and metformin for now.  Sliding scale will be available and consider adding back prandial insulin tomorrow.  Discussed with patient who is amenable to this plan.     Brody Quan  Date of Service (when I saw the patient): 11/17/17

## 2017-11-17 NOTE — ANESTHESIA POSTPROCEDURE EVALUATION
Patient: Pastor Garibay    Procedure(s):  RIGHT EXPLORATORY THORACOTOMY/ EXTENSIVE PNEUMOLYSIS/ BIOPSY OF INTERLOBAR LYMPH NODE - Wound Class: II-Clean Contaminated   - Wound Class: I-Clean    Diagnosis:LUNG MASS  Diagnosis Additional Information: No value filed.    Anesthesia Type:  General, ETT    Note:  Anesthesia Post Evaluation    Patient location during evaluation: PACU  Patient participation: Able to fully participate in evaluation  Level of consciousness: awake  Pain management: adequate  Airway patency: patent  Cardiovascular status: acceptable  Respiratory status: acceptable  Hydration status: acceptable  PONV: none     Anesthetic complications: None          Last vitals:  Vitals:    11/17/17 0953 11/17/17 1450 11/17/17 1500   BP: (!) 144/99 104/56 110/64   Resp: 16 15 12   Temp: 36.1  C (96.9  F) 37.3  C (99.1  F)    SpO2: 97%           Electronically Signed By: Raven Leroy MD, MD  November 17, 2017  3:18 PM

## 2017-11-17 NOTE — ANESTHESIA PREPROCEDURE EVALUATION
Anesthesia Evaluation     . Pt has had prior anesthetic.     History of anesthetic complications   - PONV        ROS/MED HX    ENT/Pulmonary:     (+)sleep apnea, uses CPAP , . .    Neurologic:       Cardiovascular:     (+) Dyslipidemia, hypertension----. : . . . :. .       METS/Exercise Tolerance:     Hematologic:         Musculoskeletal:         GI/Hepatic:     (+) GERD       Renal/Genitourinary:         Endo:     (+) type II DM Obesity, .      Psychiatric:         Infectious Disease:         Malignancy:   (+) Malignancy History of Lymphoma/Leukemia  Non-hodgkins lymphoma;        Other:                     Physical Exam  Normal systems: cardiovascular, pulmonary and dental    Airway   Mallampati: III  TM distance: >3 FB  Neck ROM: full    Dental     Cardiovascular   Rhythm and rate: regular      Pulmonary                     Anesthesia Plan      History & Physical Review  History and physical reviewed and following examination; no interval change.    ASA Status:  3 .    NPO Status:  > 8 hours    Plan for General and ETT with Intravenous induction. Maintenance will be Balanced.    PONV prophylaxis:  Ondansetron (or other 5HT-3) and Dexamethasone or Solumedrol  Additional equipment: Double Lumen ETT      Postoperative Care  Postoperative pain management:  IV analgesics.      Consents  Anesthetic plan, risks, benefits and alternatives discussed with:  Patient..                          .

## 2017-11-17 NOTE — PROGRESS NOTES
Hospitalist admission note dictated. Confirmation #: [id number].    JoAnna Barthell, PA-C  11/17/2017   5:56 PM

## 2017-11-18 ENCOUNTER — APPOINTMENT (OUTPATIENT)
Dept: GENERAL RADIOLOGY | Facility: CLINIC | Age: 67
End: 2017-11-18
Attending: PHYSICIAN ASSISTANT
Payer: COMMERCIAL

## 2017-11-18 LAB
ANION GAP SERPL CALCULATED.3IONS-SCNC: 9 MMOL/L (ref 3–14)
BUN SERPL-MCNC: 21 MG/DL (ref 7–30)
CALCIUM SERPL-MCNC: 8.3 MG/DL (ref 8.5–10.1)
CHLORIDE SERPL-SCNC: 99 MMOL/L (ref 94–109)
CO2 SERPL-SCNC: 25 MMOL/L (ref 20–32)
CREAT SERPL-MCNC: 0.86 MG/DL (ref 0.66–1.25)
GFR SERPL CREATININE-BSD FRML MDRD: 88 ML/MIN/1.7M2
GLUCOSE BLDC GLUCOMTR-MCNC: 265 MG/DL (ref 70–99)
GLUCOSE BLDC GLUCOMTR-MCNC: 287 MG/DL (ref 70–99)
GLUCOSE BLDC GLUCOMTR-MCNC: 293 MG/DL (ref 70–99)
GLUCOSE BLDC GLUCOMTR-MCNC: 335 MG/DL (ref 70–99)
GLUCOSE BLDC GLUCOMTR-MCNC: 341 MG/DL (ref 70–99)
GLUCOSE BLDC GLUCOMTR-MCNC: 387 MG/DL (ref 70–99)
GLUCOSE BLDC GLUCOMTR-MCNC: 417 MG/DL (ref 70–99)
GLUCOSE SERPL-MCNC: 276 MG/DL (ref 70–99)
POTASSIUM SERPL-SCNC: 4.6 MMOL/L (ref 3.4–5.3)
SODIUM SERPL-SCNC: 133 MMOL/L (ref 133–144)

## 2017-11-18 PROCEDURE — A9270 NON-COVERED ITEM OR SERVICE: HCPCS | Mod: GY | Performed by: PHYSICIAN ASSISTANT

## 2017-11-18 PROCEDURE — 25000131 ZZH RX MED GY IP 250 OP 636 PS 637: Mod: GY | Performed by: INTERNAL MEDICINE

## 2017-11-18 PROCEDURE — 36415 COLL VENOUS BLD VENIPUNCTURE: CPT | Performed by: PHYSICIAN ASSISTANT

## 2017-11-18 PROCEDURE — 71010 XR CHEST PORT 1 VW: CPT

## 2017-11-18 PROCEDURE — 80048 BASIC METABOLIC PNL TOTAL CA: CPT | Performed by: PHYSICIAN ASSISTANT

## 2017-11-18 PROCEDURE — 25000132 ZZH RX MED GY IP 250 OP 250 PS 637: Mod: GY | Performed by: PHYSICIAN ASSISTANT

## 2017-11-18 PROCEDURE — 25000128 H RX IP 250 OP 636: Performed by: PHYSICIAN ASSISTANT

## 2017-11-18 PROCEDURE — 25000125 ZZHC RX 250: Performed by: PHYSICIAN ASSISTANT

## 2017-11-18 PROCEDURE — 21400002 ZZH R&B CCU CICU CRITICAL

## 2017-11-18 PROCEDURE — 99232 SBSQ HOSP IP/OBS MODERATE 35: CPT | Performed by: INTERNAL MEDICINE

## 2017-11-18 PROCEDURE — 25000132 ZZH RX MED GY IP 250 OP 250 PS 637: Mod: GY | Performed by: INTERNAL MEDICINE

## 2017-11-18 PROCEDURE — 00000146 ZZHCL STATISTIC GLUCOSE BY METER IP

## 2017-11-18 RX ADMIN — FENOFIBRATE 160 MG: 160 TABLET ORAL at 09:48

## 2017-11-18 RX ADMIN — RANITIDINE 150 MG: 150 TABLET ORAL at 20:08

## 2017-11-18 RX ADMIN — INSULIN ASPART 4 UNITS: 100 INJECTION, SOLUTION INTRAVENOUS; SUBCUTANEOUS at 14:41

## 2017-11-18 RX ADMIN — ACETAMINOPHEN 650 MG: 325 TABLET, FILM COATED ORAL at 09:48

## 2017-11-18 RX ADMIN — Medication 1 LOZENGE: at 14:41

## 2017-11-18 RX ADMIN — HYDROMORPHONE HYDROCHLORIDE 4 MG: 2 TABLET ORAL at 13:22

## 2017-11-18 RX ADMIN — ENOXAPARIN SODIUM 40 MG: 40 INJECTION SUBCUTANEOUS at 09:47

## 2017-11-18 RX ADMIN — RANITIDINE 150 MG: 150 TABLET ORAL at 09:48

## 2017-11-18 RX ADMIN — AMLODIPINE BESYLATE 5 MG: 5 TABLET ORAL at 21:17

## 2017-11-18 RX ADMIN — INSULIN HUMAN 15 UNITS: 100 INJECTION, SUSPENSION SUBCUTANEOUS at 10:50

## 2017-11-18 RX ADMIN — HYDROMORPHONE HYDROCHLORIDE 4 MG: 2 TABLET ORAL at 16:31

## 2017-11-18 RX ADMIN — ACETAMINOPHEN 650 MG: 325 TABLET, FILM COATED ORAL at 13:22

## 2017-11-18 RX ADMIN — SENNOSIDES AND DOCUSATE SODIUM 2 TABLET: 8.6; 5 TABLET ORAL at 20:08

## 2017-11-18 RX ADMIN — ACETAMINOPHEN 650 MG: 325 TABLET, FILM COATED ORAL at 18:36

## 2017-11-18 RX ADMIN — ACETAMINOPHEN 650 MG: 325 TABLET, FILM COATED ORAL at 21:17

## 2017-11-18 RX ADMIN — INSULIN ASPART 3 UNITS: 100 INJECTION, SOLUTION INTRAVENOUS; SUBCUTANEOUS at 09:39

## 2017-11-18 RX ADMIN — ROSUVASTATIN CALCIUM 5 MG: 5 TABLET ORAL at 21:17

## 2017-11-18 RX ADMIN — HYDROMORPHONE HYDROCHLORIDE 4 MG: 2 TABLET ORAL at 04:13

## 2017-11-18 RX ADMIN — Medication 1 LOZENGE: at 09:41

## 2017-11-18 RX ADMIN — SENNOSIDES AND DOCUSATE SODIUM 1 TABLET: 8.6; 5 TABLET ORAL at 09:48

## 2017-11-18 RX ADMIN — BACITRACIN: 500 OINTMENT TOPICAL at 09:41

## 2017-11-18 RX ADMIN — INSULIN GLARGINE 60 UNITS: 100 INJECTION, SOLUTION SUBCUTANEOUS at 21:19

## 2017-11-18 RX ADMIN — INSULIN ASPART 15 UNITS: 100 INJECTION, SOLUTION INTRAVENOUS; SUBCUTANEOUS at 18:28

## 2017-11-18 RX ADMIN — HYDROMORPHONE HYDROCHLORIDE 4 MG: 2 TABLET ORAL at 09:47

## 2017-11-18 NOTE — PROVIDER NOTIFICATION
MD notified re: . Pt usually reports taking 40-50 units of Novolog with each meal. Pt has only been receiving sliding scale. Awaiting call back.     1340: Re-paged MD.     7045: Re-paged MD to clarify orders received.

## 2017-11-18 NOTE — PROGRESS NOTES
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  The Hospitalist Service was consulted for medical comanagement of diabetes and hypertension.       Follicular CD+ B-cell non-Hodgkin's lymphoma (diagnosed 2014) with suspected recurrence, s/p right exploratory thoracotomy, extensive pneumolysis, and biopsy of an intralobar lymph node (11/17/2017).  The patient completed 6 cycles RCHOP chemotherapy with Neulasta in 2014 and was on maintenance rituximab from 6/2015-2/2017.  Follows with oncologist Dr. Olson.  Preliminary pathology of the interlobar lymph node consistent with lymphoma.   -- Routine postoperative management deferred to the Thoracic Surgery Service including IV fluids, pain control, and DVT prophylaxis.   -- Chest tube in place on the right side.   Lozenges for sore throat prn     Hypertension, hyperlipidemia.   Postop blood pressures low-normal on amlodipine alone. Continue prior to admission Norvasc with hold parameters in place.  Can restart 40 mg furosemide and lisinopril 40 mg when blood pressure becomes significantly elevated.  -- Continue prior to admission rosuvastatin.      Type 2 diabetes with ophthalmologic complications.  A1C 7.2 (10/4/2017).   Received Decadron 4 mg for postop nausea/vomiting prophylaxis.      Continue his prior to admission Lantus 52 units at bedtime.    Medium sliding scale insulin is available.   Added NPH 15 units  11/18/17×1 dose due to the effects of Decadron.  Further doses pending patient's ongoing insulin needs.    Holding metformin b.i.d. while under inpatient and can likely resume at discharge.      Obstructive sleep apnea.  This is chronic and stable.    Continue prior to admission home CPAP machine.       DVT Prophylaxis: Pneumatic Compression Devices  Code Status: Full Code    Disposition: Expected discharge per Thor-surg    Antoni Garner MD    Interval History   Patient's pain was well-controlled, complaints of sore throat post surgery.  Denies significant nausea or abdominal pain chest discomfort or shortness of breath.    -Data reviewed today: I reviewed all new labs and imaging results over the last 24 hours.    Physical Exam   Temp: 98.7  F (37.1  C) Temp src: Oral BP: 114/50   Heart Rate: 81 Resp: 16 SpO2: 92 % O2 Device: None (Room air) Oxygen Delivery: 1 LPM  Vitals:    11/17/17 0953   Weight: 111.1 kg (245 lb)     Vital Signs with Ranges  Temp:  [96.9  F (36.1  C)-99.5  F (37.5  C)] 98.7  F (37.1  C)  Heart Rate:  [71-90] 81  Resp:  [12-22] 16  BP: ()/(50-99) 114/50  SpO2:  [91 %-99 %] 92 %  I/O last 3 completed shifts:  In: 3577 [P.O.:1120; I.V.:2457]  Out: 804 [Urine:450; Blood:50; Chest Tube:304]    Constitutional: alert and oriented, no acute respiratory distress  Respiratory: Lungs clear to auscultation anteriorly  Cardiovascular: Regular rate and rhythm, no murmur or rub  GI: Non distended, normal BS, no organomegaly,masses or tenderness  Skin/Integumen: No edema, normal pulse and color      Medications     NaCl Stopped (11/18/17 0636)       insulin isophane human  15 Units Subcutaneous Once     amLODIPine (NORVASC) tablet 5 mg  5 mg Oral At Bedtime     fenofibrate  160 mg Oral Daily     rosuvastatin (CRESTOR) tablet 5 mg  5 mg Oral At Bedtime     sodium chloride (PF)  3 mL Intracatheter Q8H     enoxaparin  40 mg Subcutaneous Q24H     ranitidine  150 mg Oral Q12H    Or     famotidine  20 mg  Intravenous Q12H     bacitracin   Topical TID     acetaminophen  650 mg Oral 4x Daily     senna-docusate  1-2 tablet Oral BID     insulin aspart  1-7 Units Subcutaneous TID AC     insulin aspart  1-5 Units Subcutaneous At Bedtime     insulin glargine  52 Units Subcutaneous At Bedtime       Data     Recent Labs  Lab 11/18/17  0435 11/17/17  1000 11/14/17  1005   WBC  --   --  6.5   HGB  --   --  14.2   PLT  --  201 195   INR  --   --  0.92     --  137   POTASSIUM 4.6 4.4 3.9   BUN 21  --  15   CR 0.86 0.75 0.81   JEFF 8.3*  --  8.9   * 182* 142*       Imaging:   Recent Results (from the past 24 hour(s))   XR Chest Port 1 View    Narrative    XR CHEST PORT 1 VW 11/17/2017 3:06 PM    COMPARISON: 11/14/2017    HISTORY: Postop.      Impression    IMPRESSION: Right-sided chest tube in place. Enlarged cardiac  silhouette again seen and unchanged. Likely mild pulmonary edema. No  pneumothorax on either side.    JAMAL MCGEE MD   XR Chest Port 1 View    Narrative    XR CHEST PORT 1 VW 11/18/2017 5:40 AM    COMPARISON: Chest x-ray on the previous day.    HISTORY: Post thoracotomy.      Impression    IMPRESSION: Right-sided chest tube remains in place. Mildly enlarged  cardiac silhouette again seen and unchanged. Surgical staple lines  project over the right mid and lower lung. Mild bibasilar atelectasis.  No pneumothorax seen on either side.    JAMAL MCGEE MD

## 2017-11-18 NOTE — PLAN OF CARE
Problem: Patient Care Overview  Goal: Plan of Care/Patient Progress Review  Outcome: Improving  A&O. VSS. C/o 3-5/10 pain, decreased w/ dilaudid. CT patent w/ 130 mL SS output; no airleak, no crepitus. Incision w/ scant dried drainage. Up SBA. Voiding adequately. Tolerating regular diet; denies flatus. , see MD notification, on recheck decreased to 335, SSI coverage given and MD aware. Will recheck at dinner time.

## 2017-11-18 NOTE — PLAN OF CARE
Problem: Patient Care Overview  Goal: Plan of Care/Patient Progress Review  Outcome: Improving  Pt is progressing per plan of care. Off IMC,  AVSS on room air. Crackles in right lung base. IS up to 1250. Chest patent no air leak. Pain control with oral dilaudid. Voided. Dressing with small drainage.

## 2017-11-18 NOTE — CONSULTS
HOSPITALIST CONSULTATION       PRIMARY CARE PROVIDER:  Francisco Armijo MD at [facility].      REQUESTING PROVIDER:  Diana Temple PA-C      REASON FOR CONSULTATION:  Medical comanagement of diabetes and hypertension.      History is provided by the patient.      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  Preliminary pathology is consistent with lymphoma.  The procedure was performed under general anesthesia by Dr. Robbie Linda.  Estimated blood loss was 50 mL.  The patient did receive Decadron 4 mg for postoperative nausea/ vomiting prophylaxis.      The patient manages his type 2 diabetes with Lantus 52 units each day at bedtime, sliding scale NovoLog, and metformin b.i.d.  His most recent blood sugar measured at 218.  He did receive regular insulin 2 units in the PACU.  His postop blood pressure was soft in the upper 90s to low 110s.  His most recent blood pressure was 122/68.  Currently his pain is well controlled.  He denies chest pain, difficulty breathing, nausea, vomiting and abdominal pain.     PAST MEDICAL HISTORY:   1.  Grade 3 versus 4 follicular CD positive B-cell non-Hodgkin's lymphoma (diagnosis 2014).  Follows with Dr. Olson.   2.  Type 2 diabetes with ophthalmologic complications.  Follows with Dr. Turner.   3.  Hypertension.   4.  Hyperlipidemia.   5.  Obstructive sleep apnea, compliant with CPAP.   6.  Chronic lower extremity edema.   7.  GERD.      PAST SURGICAL HISTORY:   1.  Left eye cataract and vitrectomy surgery.   2.  Left supraclavicular lymph node biopsy and port placement, 2014.   3.  Tonsillectomy.      FAMILY HISTORY:  Father:  Prostate  cancer and CABG.   Mother:  Diabetes.      SOCIAL HISTORY:  The patient is a never smoker.  He rarely drinks alcohol.  No illicit drug use.      PRIOR TO ADMISSION MEDICATIONS:  Prescriptions Prior to Admission   Medication Sig Dispense Refill Last Dose     Fenofibrate (TRICOR PO) Take 145 mg by mouth daily   11/16/2017 at am     Furosemide (LASIX PO) Take 40 mg by mouth every evening as needed   11/10/2017     NIACIN ER PO Take 500 mg by mouth daily   11/16/2017 at am     Potassium Chloride Vicki ER (K-DUR PO) Take 40 mEq by mouth daily (2 x 20 mEq = 40 mEq dose)   11/16/2017 at am     Potassium Chloride Vicki ER (K-DUR PO) Take 20 mEq by mouth At Bedtime   11/16/2017 at pm     insulin aspart (NOVOLOG FLEXPEN) 100 UNIT/ML injection Inject 0-50 Units Subcutaneous 3 times daily (with meals) Patient uses sliding scale based on Carbs and Blood Glucose    11/16/2017 at pm     Ibuprofen-Diphenhydramine Cit (ADVIL PM PO) Take 2 tablets by mouth At Bedtime    11/16/2017 at pm     Docusate Sodium (COLACE PO) Take 200 mg by mouth At Bedtime (2 x 100 mg tablet = 200 mg dose)   11/16/2017 at pm     Furosemide (LASIX PO) Take 40 mg by mouth daily    11/16/2017 at am     METFORMIN HCL PO Take 1,000 mg by mouth daily (with breakfast) (2 x 500 mg = 1000 mg dose)   11/16/2017 at am     METFORMIN HCL PO Take 500 mg by mouth At Bedtime   11/15/2017     LISINOPRIL PO Take 40 mg by mouth daily   11/17/2017 at 0730     Rosuvastatin Calcium (CRESTOR PO) Take 5 mg by mouth daily (0.5 x 10 mg tablet = 5 mg dose)   11/16/2017 at am     AMLODIPINE BESYLATE PO Take 5 mg by mouth At Bedtime   11/16/2017 at pm     insulin glargine (LANTUS VIAL) 100 UNIT/ML soln Inject 52 Units Subcutaneous At Bedtime    26 units 11/16/2017 at pm      ALLERGIES:  No known drug allergies.      REVIEW OF SYSTEMS:  A complete review of systems was performed and is negative except for that noted in the HPI.      PHYSICAL EXAMINATION:   VITAL SIGNS:  Blood pressure  122/68, temperature 97.9, respirations are 16, oxygen saturation is 97% and weight is 111.1 kilograms, giving him a BMI of 36.71.   GENERAL:  Pleasant  male who appears stated age.  He looks comfortable, semirecumbent in bed.   SKIN:  Warm, dry.  No rashes or lesions noted on exposed skin.   HEENT:  Normocephalic, atraumatic.  EOMs grossly intact.  PERRLA.  Moist mucous membranes.   NECK:  Supple.  No cervical lymphadenopathy.   CHEST:  Breath sounds clear to auscultation anteriorly and no increased work of breathing on 2 liters supplemental O2 via nasal cannula.  His surgical site is left dressed and there is blood oozing through the bandage.  Chest tube is in place with serous bloody output.   CARDIOVASCULAR:  Regular rate and rhythm.  No rub or murmur appreciated.  No peripheral edema.  DP pulses detected and symmetric.   ABDOMEN:  Soft, rotund, nontender, nondistended.   MUSCULOSKELETAL:  Moves all 4 extremities.   NEURO:  Cranial nerves II-XII grossly intact.      LABORATORY DATA:  Potassium 4.4, creatinine 0.75, GFR greater than 90, glucose 218.   platelets 201,000.      Chest:  1-view chest x-ray shows the right-sided chest tube in place and an enlarged cardiac silhouette that is unchanged, and mild pulmonary edema.  No pneumothorax is present.  I personally reviewed and agree with radiologist's interpretation.      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  The Hospitalist Service was consulted for medical comanagement of diabetes and hypertension.      Follicular CD+ B-cell non-Hodgkin's lymphoma (diagnosed 2014) with suspected  recurrence, s/p right exploratory thoracotomy, extensive pneumolysis, and biopsy of an intralobar lymph node (11/17/2017).  The patient completed 6 cycles RCHOP chemotherapy with Neulasta in 2014 and was on maintenance rituximab from 6/2015-2/2017.  Follows with oncologist Dr. Olson.  Preliminary pathology of the interlobar lymph node consistent with lymphoma.   -- Routine postoperative management deferred to the Thoracic Surgery Service including IV fluids, pain control, and DVT prophylaxis.   -- Chest tube in place on the right side.    Hypertension, hyperlipidemia.   -- Postop blood pressures soft in the upper 90s-low 110s.   -- Hold prior to admission Lasix while on IV fluids.   -- Hold prior to admission lisinopril while demonstrating soft blood pressures.   -- Check BMP in the a.m.   -- Continue prior to admission Norvasc with hold parameters in place.   -- Continue prior to admission rosuvastatin.     Type 2 diabetes with ophthalmologic complications.   A1C 7.2 (10/4/2017). Received Decadron 4 mg for postop nausea/vomiting prophylaxis.     -- Continue his prior to admission Lantus 52 units at bedtime.   -- Medium sliding scale insulin is available.   -- Hold metformin b.i.d. while under inpatient and can likely resume at discharge.     Obstructive sleep apnea.  This is chronic and stable.    Continue prior to admission home CPAP machine.     Deep venous thrombosis prophylaxis:  Enoxaparin per primary service.      CODE STATUS:  Full code.  Discussed with patient at time of consultation.      The Hospitalist Service thanks you for this consultation and we will continue to follow along with you.      This patient was discussed with Dr. Teena Steinberg of the Hospitalist Service, who is in agreement with my assessment and plan of care as noted above.         TEENA STEINBERG,        As dictated by JOANNA ULMEN BARTHELL, PA-C            D: 11/17/2017 17:56   T: 11/17/2017 18:52   MT: [id number]      Name:      EZIO PADILLA   MRN:      [id number]        Account:       [id number]   :      1950           Consult Date:  2017      Document: [id number]       cc: Brody SALGUERO

## 2017-11-18 NOTE — PROGRESS NOTES
THORACIC SURGERY POD # 1    Discussed findings and procedure    AVSS  On RA  No air leak, no bleeding    CXR ok    CT to water seal  Ambulate  resp care     Clamp CT tonight    SHARRON CANNON MD [facility] ONCOLOGY THORACIC SURGERY  CELL:  [phone]  OFFICE: [phone]

## 2017-11-18 NOTE — PROGRESS NOTES
Pt to room 313 via cart at 1630.  VSS.  Oxygen 2 L NC.  A&OX4.  CT to 20 cm suction.  No AL.  No crepitus.  Thoracotomy dressing with shadowing bloody drainage, marked. On Q pump intact with sensors taped/clamps open.  Instructed on use of IS, up to 1200 as well as right arm ROM.  Abd rounded, denies nausea, hypoactive BS.  Up to chair for dinner.  Pt due to void.

## 2017-11-18 NOTE — OP NOTE
DATE OF PROCEDURE:  11/17/2017      SURGEON:  Robbie Linda MD      1ST ASSISTANT:  Ana Sun PA-C   2ND ASSISTANT:   AUGUSTO BEAL      PREOPERATIVE DIAGNOSIS:  Right lower lobe lung mass.      POSTOPERATIVE DIAGNOSIS:  Right lower lobe lung mass.      PROCEDURE:     1.  Right exploratory thoracotomy with biopsy of anterior lobar lymphadenopathy.   2.  Extensive intrapleural pneumolysis.      ANESTHESIA:  General with double endotracheal tube.      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  On followup examination with imaging studies is enlarging right lower lobe lung mass.  This mass is hypermetabolic.  There is no evidence of other disease.  CT-guided biopsy and bronchoscopy were nondiagnostic.  Based on the findings are suspicious for malignancy and based on the presentation could be compatible with a recurrent lymphoma or primary lung cancer.  The patient has adequate pulmonary function tests.  Based on the findings, a thoracotomy is indicated for diagnosis and treatment.      DESCRIPTION OF PROCEDURE:  The patient was brought to the OR and placed in supine position.  Under general anesthesia with double-lumen endotracheal tube, the patient was placed in a left lateral decubitus position.  The right chest was prepared and draped in the usual fashion using ChloraPrep.  A right posterolateral thoracotomy was made sparing the serratus anterior muscle.  The pleural space was entered in the fifth intercostal space dividing sixth rib posteriorly.  On examination, there was significant thickening of the parietal pleura and some thickening on the surface of the diaphragm.  There is no evidence of nodularity.  Biopsies were obtained and showed fibrosis with no evidence of malignancy.  An extensive intrapleural pneumolysis was done, freeing up the entire lung down to the hilum.  This took approximately 45 minutes of the procedure.   After this was completed, the lung was examined.  In the lower lobe in the superior segment there was a firm, somewhat well contained mass and a smaller mass which appears to be interlobar lymph node.  The fissure was dissected and the lymph node was exposed.  Biopsies were obtained and submitted for frozen section.  This revealed some changes suspicious for lymphoma and is no evidence of bronchogenic carcinoma.  Based on this, it elected not to proceed any further.  Through a separate stab wound, a 28 straight chest tube was placed with tip directed toward the apex posteriorly.  On-Q catheters were placed.  30 mL Marcaine 0.5% without epinephrine was injected as intercostal blocks.  The thoracotomy incision was closed with pericostal suture of Vicryl #1, running Vicryl in muscular layer, running 2-0 Vicryl in the subcutaneous tissues, closed with Insorb staples.  Estimated blood loss 50 mL.  Needle and sponge count is correct.      Ana Sun PA-C, was the first assistant during the procedure.  Her role as first assistant was essential and necessary in accomplishing the step of the procedure as described above, providing exposure and retraction.         SHARRON CANNON MD             D: 2017 15:16   T: 2017 17:37   MT: [id number]      Name:     EZIO PADILLA   MRN:      [id number]        Account:        [id number]   :      1950           Procedure Date: 2017      Document: [id number]

## 2017-11-19 ENCOUNTER — APPOINTMENT (OUTPATIENT)
Dept: GENERAL RADIOLOGY | Facility: CLINIC | Age: 67
End: 2017-11-19
Attending: PHYSICIAN ASSISTANT
Payer: COMMERCIAL

## 2017-11-19 VITALS
HEART RATE: 88 BPM | BODY MASS INDEX: 36.71 KG/M2 | SYSTOLIC BLOOD PRESSURE: 123 MMHG | WEIGHT: 245 LBS | DIASTOLIC BLOOD PRESSURE: 64 MMHG | TEMPERATURE: 99.1 F | RESPIRATION RATE: 16 BRPM | OXYGEN SATURATION: 90 %

## 2017-11-19 LAB
GLUCOSE BLDC GLUCOMTR-MCNC: 225 MG/DL (ref 70–99)
GLUCOSE BLDC GLUCOMTR-MCNC: 251 MG/DL (ref 70–99)
GLUCOSE BLDC GLUCOMTR-MCNC: 267 MG/DL (ref 70–99)

## 2017-11-19 PROCEDURE — 25000132 ZZH RX MED GY IP 250 OP 250 PS 637: Mod: GY | Performed by: INTERNAL MEDICINE

## 2017-11-19 PROCEDURE — A9270 NON-COVERED ITEM OR SERVICE: HCPCS | Mod: GY | Performed by: PHYSICIAN ASSISTANT

## 2017-11-19 PROCEDURE — 99232 SBSQ HOSP IP/OBS MODERATE 35: CPT | Performed by: INTERNAL MEDICINE

## 2017-11-19 PROCEDURE — 25000128 H RX IP 250 OP 636: Performed by: PHYSICIAN ASSISTANT

## 2017-11-19 PROCEDURE — 71010 XR CHEST PORT 1 VW: CPT

## 2017-11-19 PROCEDURE — 25000132 ZZH RX MED GY IP 250 OP 250 PS 637: Mod: GY | Performed by: PHYSICIAN ASSISTANT

## 2017-11-19 PROCEDURE — 00000146 ZZHCL STATISTIC GLUCOSE BY METER IP

## 2017-11-19 PROCEDURE — A9270 NON-COVERED ITEM OR SERVICE: HCPCS | Mod: GY | Performed by: INTERNAL MEDICINE

## 2017-11-19 RX ORDER — GUAIFENESIN 600 MG/1
600 TABLET, EXTENDED RELEASE ORAL 2 TIMES DAILY
Status: DISCONTINUED | OUTPATIENT
Start: 2017-11-19 | End: 2017-11-19 | Stop reason: HOSPADM

## 2017-11-19 RX ORDER — HYDROMORPHONE HYDROCHLORIDE 2 MG/1
2-4 TABLET ORAL
Qty: 80 TABLET | Refills: 0 | Status: SHIPPED | OUTPATIENT
Start: 2017-11-19 | End: 2018-06-08

## 2017-11-19 RX ADMIN — ACETAMINOPHEN 650 MG: 325 TABLET, FILM COATED ORAL at 08:45

## 2017-11-19 RX ADMIN — FENOFIBRATE 160 MG: 160 TABLET ORAL at 08:45

## 2017-11-19 RX ADMIN — GUAIFENESIN 600 MG: 600 TABLET, EXTENDED RELEASE ORAL at 08:59

## 2017-11-19 RX ADMIN — BACITRACIN: 500 OINTMENT TOPICAL at 13:29

## 2017-11-19 RX ADMIN — Medication 1 LOZENGE: at 07:17

## 2017-11-19 RX ADMIN — RANITIDINE 150 MG: 150 TABLET ORAL at 08:46

## 2017-11-19 RX ADMIN — SENNOSIDES AND DOCUSATE SODIUM 2 TABLET: 8.6; 5 TABLET ORAL at 08:46

## 2017-11-19 RX ADMIN — Medication 1 LOZENGE: at 13:04

## 2017-11-19 RX ADMIN — ACETAMINOPHEN 650 MG: 325 TABLET, FILM COATED ORAL at 13:18

## 2017-11-19 RX ADMIN — HYDROMORPHONE HYDROCHLORIDE 4 MG: 2 TABLET ORAL at 07:17

## 2017-11-19 RX ADMIN — ENOXAPARIN SODIUM 40 MG: 40 INJECTION SUBCUTANEOUS at 08:46

## 2017-11-19 NOTE — PROGRESS NOTES
THORACIC SURGERY POD  #2    Doing well  AVSS on RA  No air leak, no bleeding    CXR no ptx with CT clamped overnight    CT out  D/C today    SHARRON CANNON MD [facility] ONCOLOGY THORACIC SURGERY  CELL:  [phone]  OFFICE: [phone]

## 2017-11-19 NOTE — PROGRESS NOTES
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  The Hospitalist Service was consulted for medical comanagement of diabetes and hypertension.       Follicular CD+ B-cell non-Hodgkin's lymphoma (diagnosed 2014) with suspected recurrence, s/p right exploratory thoracotomy, extensive pneumolysis, and biopsy of an intralobar lymph node (11/17/2017).  The patient completed 6 cycles RCHOP chemotherapy with Neulasta in 2014 and was on maintenance rituximab from 6/2015-2/2017.  Follows with oncologist Dr. Olson.  Preliminary pathology of the interlobar lymph node consistent with lymphoma. CXR 11/19 with small amount of subcutaneous emphysema in the right neck and chest wall.  - Defer routine postoperative management to the Thoracic Surgery Service including IV fluids, pain control, and DVT prophylaxis.   - Chest tube in place on the right side.  - Lozenges for sore throat prn      Hypertension, hyperlipidemia.   Postop blood pressures low-normal on amlodipine alone.   - PTA Norvasc with hold parameters in place.    - Holding PTA 40 mg furosemide and lisinopril 40 mg when blood pressure becomes significantly elevated.  - PTA rosuvastatin and Tricor      Type 2 diabetes with ophthalmologic complications.  A1C 7.2 (10/4/2017).   Received Decadron 4 mg for postop nausea/vomiting prophylaxis. Given NPH 15 units 11/18/17×1 dose due to the effects of Decadron. BS up to 417 11/18.  - PTA Lantus 52 units at bedtime.   - Medium SSI and high at night  -  Holding metformin b.i.d. while under inpatient and can likely resume at discharge.  - Mod carb diet, if tolerates adequate po intake will increase pre-meal scheduled Novolog (pt states takes 0-40 units depending on intake)  - start TID novolog 10 units w/ meals      Obstructive sleep apnea.  This is chronic and stable.      - PTA home CPAP machine.   - Mucinex per pt request for nasal congestion    DVT Prophylaxis: Defer to primary service  Code Status: Full Code    Disposition: Expected discharge per primary service.    Madison Hugo MD    Interval History   No new complaints other than nasal congestion from cpap last night. Pain controlled.     -Data reviewed today: I reviewed all new labs and imaging results over the last 24 hours. I personally reviewed the chest x-ray image(s) showing subcu air as above.    Physical Exam   Temp: 98.5  F (36.9  C) Temp src: Oral BP: 128/64 Pulse: 80 Heart Rate: 70 Resp: 18 SpO2: 92 % O2 Device: None (Room air)    Vitals:    11/17/17 0953   Weight: 111.1 kg (245 lb)     Vital Signs with Ranges  Temp:  [97.9  F (36.6  C)-100.2  F (37.9  C)] 98.5  F (36.9  C)  Pulse:  [70-80] 80  Heart Rate:  [70-88] 70  Resp:  [16-18] 18  BP: (103-139)/(56-64) 128/64  SpO2:  [90 %-93 %] 92 %  I/O last 3 completed shifts:  In: 990 [P.O.:990]  Out: 1980 [Urine:1680; Chest Tube:300]    Constitutional: Awake, alert, cooperative, no apparent distress  Respiratory: Clear to auscultation bilaterally except chest tube sounds on right, no crackles or wheezing  Cardiovascular: Regular rate and rhythm, normal S1 and S2, and no murmur noted  GI: Normal bowel sounds, soft, non-distended, non-tender  Skin/Integumen: No edema, right posterior chest wall surgical wounds with serious drainage, no blood.  Other: Oriented x 3, follows commands.    Medications        insulin glargine  52 Units Subcutaneous At Bedtime     guaiFENesin  600 mg Oral BID     insulin aspart  10 Units Subcutaneous TID w/meals      insulin aspart  1-20 Units Subcutaneous TID AC     insulin aspart  1-12 Units Subcutaneous At Bedtime     amLODIPine (NORVASC) tablet 5 mg  5 mg Oral At Bedtime     fenofibrate  160 mg Oral Daily     rosuvastatin (CRESTOR) tablet 5 mg  5 mg Oral At Bedtime     sodium chloride (PF)  3 mL Intracatheter Q8H     enoxaparin  40 mg Subcutaneous Q24H     ranitidine  150 mg Oral Q12H    Or     famotidine  20 mg Intravenous Q12H     bacitracin   Topical TID     acetaminophen  650 mg Oral 4x Daily     senna-docusate  1-2 tablet Oral BID       Data     Recent Labs  Lab 11/18/17  0435 11/17/17  1000 11/14/17  1005   WBC  --   --  6.5   HGB  --   --  14.2   MCV  --   --  87   PLT  --  201 195   INR  --   --  0.92     --  137   POTASSIUM 4.6 4.4 3.9   CHLORIDE 99  --  103   CO2 25  --  25   BUN 21  --  15   CR 0.86 0.75 0.81   ANIONGAP 9  --  9   JEFF 8.3*  --  8.9   * 182* 142*       Recent Results (from the past 24 hour(s))   XR Chest Port 1 View    Narrative    XR CHEST PORT 1 VW 11/19/2017 4:36 AM     HISTORY: post thoracotomy, exploration;     COMPARISON: 11/18/2017      Impression    IMPRESSION: Right chest tube in place with tip at the apex. No  pneumothorax is seen. Small amount of subcutaneous emphysema in the  right neck and chest wall. The cardiac silhouette is enlarged and  stable. Bibasilar opacities likely due to atelectasis. No new  pulmonary infiltrates are seen.    LAINA ANDRES MD

## 2017-11-19 NOTE — PROGRESS NOTES
VSS, denies pain.  Ambulated halls x2.  Daughters at bedside.  Used IS, but max is only 750/800.  No BM, yet.  Passing gas.  Chest tubes removed.  Discharge anticipated this afternoon.

## 2017-11-19 NOTE — DISCHARGE INSTRUCTIONS
"[facility]  Discharge Orders & Follow-up Care  Video-Assisted: Thoracoscopy - Thoracotomy    Call Viviane at Dr. Linda  office at [phone] to make a return appointment with a chest x-ray on Monday 11-27.  Your appointment will be with either Ana Sun PA-C or Diana Temple PA-C, or Dr. Linda.      Our office is located at:  [facility], [address] [place] [address], [address], [place] [address].  Viviane will explain where to park when you call for an appointment.    A. Patient Care  Call Dr Linda  office @ [phone] if:  ? Severe chills or a fever or 100.4 F.  temperature on two occasions  ? Increased incisional pain and/or redness  ? Presence of unusual incisional or chest tube site drainage  ? Coughing up bright red blood or greenish-yellow secretions  ? Significant increase in shortness of breath    Pain Relief  You have been given a prescription for narcotic pain medicine.  You may also take ibuprofen and acetaminophen over the counter.  Recommended dosages are:  600 mg Ibuprofen every 6 hours as needed and 650 mg Acetaminophen every 4 hours as needed.  Many patients get good pain relief by \"staggering\" these medications.     No driving while on narcotics.     Activity  ___ No activity restrictions for a scope procedure/thoracoscopy  XX No heavy lifting greater than 8-10 pounds on the operative side for 4-6 weeks for a thoracotomy    Wound Care  Remove all dressings in 48 hours and then you may shower.  Wash the incision and chest tube site(s) daily with soap and water. No bathing or immersing incision underwater for approximately 2 weeks or until the chest tube sites are completely healed.     Place a dry gauze dressing over the chest tube site(s) because it(they) will drain a few days.  Do not be alarmed if there is drainage of a large amount of fluid (should be pink or yellow-- or somewhat bloody) either spontaneously or with coughing or exertion. If this happens, just " place a large dry gauze dressing over the chest tube site-- it will stop and scab over in about a week or so. Once the drainage stops, then leave the chest tube site(s) open to air.     White steri strips will be present on the incision(s). They will peel off within about 10 days-- otherwise, they will be removed at your post-op appointment.     B. Respiratory  XX Utilize Incentive spirometer 10 times in a row every few hours while awake for a few weeks after discharging home from the hospital    Directions for On-Q Pain Pump Removal:  1. Remove the dressing covering the catheter site.  2. Grasp the catheter close to the skin and gently pull on the catheter. It should be easy to remove and not painful. If it becomes hard to remove or stretches, then stop and call   office.  3. Do not cut or pull hard to remove the catheter.  4. After you remove the catheter, check the catheter tip for a black marking to ensure the entire catheter was removed. Call our office if you don't see the black marking.  5. Place a band-aid over the catheter site if needed.  6. Call our office is you have redness, warmth or excessive bleeding from the catheter site or if there is a large bruise or swollen area around the site.      Please follow up with your Primary Care Physician within 1-2 weeks for hospital follow-up.

## 2017-11-19 NOTE — PLAN OF CARE
Problem: Patient Care Overview  Goal: Plan of Care/Patient Progress Review  Outcome: Improving  Chest tube was clamped at midnight, back to water after CXR. Pt slept well. Denied pain. Incision stable.

## 2017-11-19 NOTE — PLAN OF CARE
Problem: Patient Care Overview  Goal: Plan of Care/Patient Progress Review  Outcome: Improving  VSS on RA. Home CPAP at night when sleeping. Scheduled tylenol and PRN dilaudid for pain. CT to water seal, no AL, no crepitus. Plan to clamp tonight. R back chest incision steri strips scant dried drainage. OnQ pump infusing. Ambulating SBA. Up in chair for meals. Blood sugars high but trending down, insulin titrated.

## 2017-11-20 LAB — COPATH REPORT: NORMAL

## 2017-11-22 ENCOUNTER — CARE COORDINATION (OUTPATIENT)
Dept: CARE COORDINATION | Facility: CLINIC | Age: 67
End: 2017-11-22

## 2017-11-22 LAB — COPATH REPORT: NORMAL

## 2017-11-22 NOTE — PROGRESS NOTES
"Clinic Care Coordination Contact  OUTREACH    Referral Information:  Referral Source: IP/TCU Report  Reason for Contact: Initial. Post surgical lung biopsy.  Care Conference: No     Universal Utilization:   ED Visits in last year: 0  Hospital visits in last year: 3  Last PCP appointment: 11/23/17  Missed Appointments: 0     Multiple Providers or Specialists: Oncology    Clinical Concerns:  Current Medical Concerns:   Patient Active Problem List   Diagnosis     Pain in joint, ankle and foot     Lymphadenopathy, mediastinal      Current Behavioral Concerns: None    Education Provided to patient: To call clinic with all medical concerns and to make f/u appointment with PCP.      Clinical Pathway: None    Medication Management:  Pt is independent with medication management.  He is understanding and adherent to schedule.  Med reconciliation completed today. No side effects reported.  No issues obtaining medications.      Functional Status:  Mobility Status: Independent w/Device  Equipment Currently Used at Home: other (see comments) (Walking stick)  Transportation: Wife provides  ADL's: Independant     Psychosocial:  Current living arrangement:: I live in a private home with spouse  Financial/Insurance: BCBS.  Denies financial concerns.    Resources and Interventions:  Advanced Care Plans/Directives on file:: No  Patient/Caregiver understanding: Pt states he is doing pretty good since returning home.  He states he currently has a pain pump and is mostly effective for pain.  She states he does have some \"soreness on mostly the right side of his chest from the surgery and increases when he coughs\"  He states he is also taking oral dilaudid about twice daily (once in the morning and once at HS) and this has been effective. He is testing his blood sugars 3-4x/day and is taking SS insulin.  He also is taking Lantus 52 units daily.  He complains of a slight soreness in his throat but understands this could be d/t the tube that " "was in his throat.   States he feels he may be coming down with a cold as well.  He states \"eating is no problem and his appetite is fair. He states he has not had a bowel movement in the last 5 days since his surgery.  He is currently taking colace 2 tabs daily and this has not been effective.  Encouraged pt to obtain Dulcolax suppository from the drug store today and use. Pt to also increase water intake and try prune juice. May consider adding daily Miralax to regimen while using narcotics.   Pt states understanding. States he feels as though he needs to have a bowel movement but \"has just gas\"  He states the incision site looks good and will be taking bandage off today and washing soap and water.  He denies increased redness, foul smelling drainage, increased warmth or fevers.  He denies sob. States he feels as though he should be walking more. He plans to go walking at the [facility] in the near future as he is a member.  He states he is sleeping well and is using his [facility]Pap machine at [facility].  He will be seeing his oncologist next week and is interested in knowing \"what treatments are planned\"  States he is eager to return to work and will ask oncologist when he will be able to do this at next weeks visit. Pt feels he has adequate family support at this time.     Frequency of Care Coordination: Every 2-3 weeks  Upcoming appointment: 11/27/17 (Has appointment with surgeon.) To make f/u appointment with PCP     Plan: Pt to make f/u appointment with PCP. Pt to try Dulcolax supp to aid with constipation.  CC to reach out to pt in 2-3 weeks and assess for needs.    Alecia Todd RN  [facility]  Care Coordination  [phone]  "

## 2017-11-27 ENCOUNTER — TRANSFERRED RECORDS (OUTPATIENT)
Dept: HEALTH INFORMATION MANAGEMENT | Facility: CLINIC | Age: 67
End: 2017-11-27

## 2017-11-27 NOTE — DISCHARGE SUMMARY
THORACIC SURGERY HOSPITAL DISCHARGE SUMMARY  [facility] ONCOLOGY - THORACIC SURGERY  [address], [address]  [place] [address]  Phone [phone]  [email or web address]    11/27/2017     Francisco Armijo   [address] / [place] [address]  Phone: [phone]   Fax: [phone]        Re: Pastor Garibay             1950             [id number]              Dates of Hospitalization: 11/17/2017 - 11/19/2017   Date of Service (when I saw the patient): 11/19/17    Dear Dr. Armijo:     As you are aware, we had the pleasure of caring for your patient,  Pastro Garibay here at [facility].  He is a 67-year-old gentleman with a history of lymphoma diagnosed and treated in 2014.  On followup examination with imaging studies is enlarging right lower lobe lung mass.  This mass is hypermetabolic.  There is no evidence of other disease.  CT-guided biopsy and bronchoscopy were nondiagnostic.  Based on the findings are suspicious for malignancy and based on the presentation could be compatible with a recurrent lymphoma or primary lung cancer.  The patient has adequate pulmonary function tests.  Based on the findings, a thoracotomy is indicated for diagnosis and treatment.     On 11/17/2017, Dr. Robbie Linda performed the following:    Procedure/Surgery Information   Procedure: Procedure(s):  RIGHT EXPLORATORY THORACOTOMY/ EXTENSIVE PNEUMOLYSIS/ BIOPSY OF INTERLOBAR LYMPH NODE - Wound Class: II-Clean Contaminated   - Wound Class: I-Clean   Surgeon(s): Surgeon(s) and Role:     * Robbie Linda MD - Primary     * Ana Sun PA-C - Assisting   Specimens:   ID Type Source Tests Collected by Time Destination   A : PARIETAL PLEURA Tissue Pleural/Thoracic Cavity SURGICAL PATHOLOGY EXAM Robbie Linda MD 11/17/2017 12:41 PM    B : Right intralobar lymph node Tissue Lymph Node, Interlobar SURGICAL PATHOLOGY EXAM Alexei  Robbie Baptiste MD 11/17/2017  1:33 PM         Final Surgical Pathology Revealed:  Right interlobar lymph node positive for B-cell lymphoma of follicle center origin, no evidence of metastatic carcinoma. Parietal pleura with fibrosis but no evidence of malignancy.    His post-operative course was unremarkable.      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  His incisions are healing well with no signs or symptoms of infection.  His bowels have moved sufficiently and he is tolerating diet and activity, ambulating and transferring independently.  He is currently afebrile with stable vital signs as below.     Below, you will find a full discharge medication list and instructions.  We have arranged for Pastor Garibay to follow-up with us in our [facility] in 7-10 days with a Chest X-ray prior to that appointment.  We thank you for allowing us to participate in the care of Pastor Garibay here at [facility].  Please feel free to contact our office at [phone] with any questions or concerns or if we can be of any further assistance in the care of this patient.    Sincerely,    Dr. Robbie Linda MD    D/C Summary Prepared by: Ana Sun PA-C    Discharge Medications:  Discharge Medication List as of 11/19/2017  3:45 PM      START taking these medications    Details   HYDROmorphone (DILAUDID) 2 MG tablet Take 1-2 tablets (2-4 mg) by mouth every 3 hours as needed for moderate to severe pain, Disp-80 tablet, R-0, Local Print         CONTINUE these medications which have NOT CHANGED    Details   Fenofibrate (TRICOR PO) Take 145 mg by mouth daily, Historical      !! Furosemide (LASIX PO) Take 40 mg by mouth every evening as needed, Historical      NIACIN ER PO Take 500 mg by mouth daily, Historical      !! Potassium  Chloride Vicki ER (K-DUR PO) Take 40 mEq by mouth daily (2 x 20 mEq = 40 mEq dose), Historical      !! Potassium Chloride Vicki ER (K-DUR PO) Take 20 mEq by mouth At Bedtime, Historical      insulin aspart (NOVOLOG FLEXPEN) 100 UNIT/ML injection Inject 0-50 Units Subcutaneous 3 times daily (with meals) Patient uses sliding scale based on Carbs and Blood Glucose , Historical      Ibuprofen-Diphenhydramine Cit (ADVIL PM PO) Take 2 tablets by mouth At Bedtime , Historical      Docusate Sodium (COLACE PO) Take 200 mg by mouth At Bedtime (2 x 100 mg tablet = 200 mg dose), Historical      !! Furosemide (LASIX PO) Take 40 mg by mouth daily , Historical      !! METFORMIN HCL PO Take 1,000 mg by mouth daily (with breakfast) (2 x 500 mg = 1000 mg dose), Historical      !! METFORMIN HCL PO Take 500 mg by mouth At Bedtime, Historical      LISINOPRIL PO Take 40 mg by mouth daily, Historical      Rosuvastatin Calcium (CRESTOR PO) Take 5 mg by mouth daily (0.5 x 10 mg tablet = 5 mg dose), Historical      AMLODIPINE BESYLATE PO Take 5 mg by mouth At Bedtime, Historical      insulin glargine (LANTUS VIAL) 100 UNIT/ML soln Inject 52 Units Subcutaneous At Bedtime , Historical       !! - Potential duplicate medications found. Please discuss with provider.              Discharge Instructions:  1) Remove chest tube dressing on 11/21/17 and then it is Ok to shower.  Please wash both incision and chest tube site daily with soap and water.  You may cover the chest tube site daily with a clean band-aid or dry gauze if it continues to drain.  2) Steri-strips can be removed in 1 week or they will fall off when they are ready.  3) Continue daily use of your Incentive Spirometer, set of 10x in a row, every 1-2 hours while you are awake during the day.  4) No lifting, pushing or pulling >10-15 lbs for 4-6 weeks from the day of your surgery.  No driving while on narcotic pain medications.    Follow-Up Care:  1) Follow up with Ana Sun PA-C/  Alexei at the [place] [facility] in [place] ([address] [place] [address], [address], [place] [address]).  Call Viviane at [phone] to schedule the appointment.  2) Follow up with Primary Care Provider, Francisco Armijo within 1 month of discharge for routine post-surgical care, wound check and follow up.  Please call [phone] to arrange this appointment.       CC  Patient Care Team:  Francisco Armijo MD as PCP - General (Family Practice)

## 2017-12-04 NOTE — H&P (VIEW-ONLY)
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  The Hospitalist Service was consulted for medical comanagement of diabetes and hypertension.       Follicular CD+ B-cell non-Hodgkin's lymphoma (diagnosed 2014) with suspected recurrence, s/p right exploratory thoracotomy, extensive pneumolysis, and biopsy of an intralobar lymph node (11/17/2017).  The patient completed 6 cycles RCHOP chemotherapy with Neulasta in 2014 and was on maintenance rituximab from 6/2015-2/2017.  Follows with oncologist Dr. Olson.  Preliminary pathology of the interlobar lymph node consistent with lymphoma.   -- Routine postoperative management deferred to the Thoracic Surgery Service including IV fluids, pain control, and DVT prophylaxis.   -- Chest tube in place on the right side.   Lozenges for sore throat prn     Hypertension, hyperlipidemia.   Postop blood pressures low-normal on amlodipine alone. Continue prior to admission Norvasc with hold parameters in place.  Can restart 40 mg furosemide and lisinopril 40 mg when blood pressure becomes significantly elevated.  -- Continue prior to admission rosuvastatin.      Type 2 diabetes with ophthalmologic complications.  A1C 7.2 (10/4/2017).   Received Decadron 4 mg for postop nausea/vomiting prophylaxis.      Continue his prior to admission Lantus 52 units at bedtime.    Medium sliding scale insulin is available.   Added NPH 15 units  11/18/17×1 dose due to the effects of Decadron.  Further doses pending patient's ongoing insulin needs.    Holding metformin b.i.d. while under inpatient and can likely resume at discharge.      Obstructive sleep apnea.  This is chronic and stable.    Continue prior to admission home CPAP machine.       DVT Prophylaxis: Pneumatic Compression Devices  Code Status: Full Code    Disposition: Expected discharge per Thor-surg    Antoni Garner MD    Interval History   Patient's pain was well-controlled, complaints of sore throat post surgery.  Denies significant nausea or abdominal pain chest discomfort or shortness of breath.    -Data reviewed today: I reviewed all new labs and imaging results over the last 24 hours.    Physical Exam   Temp: 98.7  F (37.1  C) Temp src: Oral BP: 114/50   Heart Rate: 81 Resp: 16 SpO2: 92 % O2 Device: None (Room air) Oxygen Delivery: 1 LPM  Vitals:    11/17/17 0953   Weight: 111.1 kg (245 lb)     Vital Signs with Ranges  Temp:  [96.9  F (36.1  C)-99.5  F (37.5  C)] 98.7  F (37.1  C)  Heart Rate:  [71-90] 81  Resp:  [12-22] 16  BP: ()/(50-99) 114/50  SpO2:  [91 %-99 %] 92 %  I/O last 3 completed shifts:  In: 3577 [P.O.:1120; I.V.:2457]  Out: 804 [Urine:450; Blood:50; Chest Tube:304]    Constitutional: alert and oriented, no acute respiratory distress  Respiratory: Lungs clear to auscultation anteriorly  Cardiovascular: Regular rate and rhythm, no murmur or rub  GI: Non distended, normal BS, no organomegaly,masses or tenderness  Skin/Integumen: No edema, normal pulse and color      Medications     NaCl Stopped (11/18/17 0636)       insulin isophane human  15 Units Subcutaneous Once     amLODIPine (NORVASC) tablet 5 mg  5 mg Oral At Bedtime     fenofibrate  160 mg Oral Daily     rosuvastatin (CRESTOR) tablet 5 mg  5 mg Oral At Bedtime     sodium chloride (PF)  3 mL Intracatheter Q8H     enoxaparin  40 mg Subcutaneous Q24H     ranitidine  150 mg Oral Q12H    Or     famotidine  20 mg  Intravenous Q12H     bacitracin   Topical TID     acetaminophen  650 mg Oral 4x Daily     senna-docusate  1-2 tablet Oral BID     insulin aspart  1-7 Units Subcutaneous TID AC     insulin aspart  1-5 Units Subcutaneous At Bedtime     insulin glargine  52 Units Subcutaneous At Bedtime       Data     Recent Labs  Lab 11/18/17  0435 11/17/17  1000 11/14/17  1005   WBC  --   --  6.5   HGB  --   --  14.2   PLT  --  201 195   INR  --   --  0.92     --  137   POTASSIUM 4.6 4.4 3.9   BUN 21  --  15   CR 0.86 0.75 0.81   JEFF 8.3*  --  8.9   * 182* 142*       Imaging:   Recent Results (from the past 24 hour(s))   XR Chest Port 1 View    Narrative    XR CHEST PORT 1 VW 11/17/2017 3:06 PM    COMPARISON: 11/14/2017    HISTORY: Postop.      Impression    IMPRESSION: Right-sided chest tube in place. Enlarged cardiac  silhouette again seen and unchanged. Likely mild pulmonary edema. No  pneumothorax on either side.    JAMAL MCGEE MD   XR Chest Port 1 View    Narrative    XR CHEST PORT 1 VW 11/18/2017 5:40 AM    COMPARISON: Chest x-ray on the previous day.    HISTORY: Post thoracotomy.      Impression    IMPRESSION: Right-sided chest tube remains in place. Mildly enlarged  cardiac silhouette again seen and unchanged. Surgical staple lines  project over the right mid and lower lung. Mild bibasilar atelectasis.  No pneumothorax seen on either side.    JAMAL MCGEE MD

## 2017-12-05 ENCOUNTER — HOSPITAL ENCOUNTER (OUTPATIENT)
Facility: CLINIC | Age: 67
Discharge: HOME | End: 2017-12-05
Attending: THORACIC SURGERY (CARDIOTHORACIC VASCULAR SURGERY) | Admitting: THORACIC SURGERY (CARDIOTHORACIC VASCULAR SURGERY)
Payer: COMMERCIAL

## 2017-12-05 ENCOUNTER — ANESTHESIA EVENT (OUTPATIENT)
Dept: SURGERY | Facility: CLINIC | Age: 67
End: 2017-12-05
Payer: COMMERCIAL

## 2017-12-05 ENCOUNTER — APPOINTMENT (OUTPATIENT)
Dept: GENERAL RADIOLOGY | Facility: CLINIC | Age: 67
End: 2017-12-05
Attending: THORACIC SURGERY (CARDIOTHORACIC VASCULAR SURGERY)
Payer: COMMERCIAL

## 2017-12-05 ENCOUNTER — APPOINTMENT (OUTPATIENT)
Dept: INTERVENTIONAL RADIOLOGY/VASCULAR | Facility: CLINIC | Age: 67
End: 2017-12-05
Attending: THORACIC SURGERY (CARDIOTHORACIC VASCULAR SURGERY)
Payer: COMMERCIAL

## 2017-12-05 ENCOUNTER — SURGERY (OUTPATIENT)
Age: 67
End: 2017-12-05

## 2017-12-05 ENCOUNTER — APPOINTMENT (OUTPATIENT)
Dept: INTERVENTIONAL RADIOLOGY/VASCULAR | Facility: CLINIC | Age: 67
End: 2017-12-05
Attending: PHYSICIAN ASSISTANT
Payer: COMMERCIAL

## 2017-12-05 ENCOUNTER — ANESTHESIA (OUTPATIENT)
Dept: SURGERY | Facility: CLINIC | Age: 67
End: 2017-12-05
Payer: COMMERCIAL

## 2017-12-05 VITALS
DIASTOLIC BLOOD PRESSURE: 63 MMHG | RESPIRATION RATE: 16 BRPM | OXYGEN SATURATION: 94 % | HEIGHT: 69 IN | WEIGHT: 242.5 LBS | SYSTOLIC BLOOD PRESSURE: 107 MMHG | BODY MASS INDEX: 35.92 KG/M2 | TEMPERATURE: 97.5 F

## 2017-12-05 LAB
GLUCOSE BLDC GLUCOMTR-MCNC: 123 MG/DL (ref 70–99)
GLUCOSE BLDC GLUCOMTR-MCNC: 148 MG/DL (ref 70–99)

## 2017-12-05 PROCEDURE — 25000128 H RX IP 250 OP 636: Performed by: THORACIC SURGERY (CARDIOTHORACIC VASCULAR SURGERY)

## 2017-12-05 PROCEDURE — C1788 PORT, INDWELLING, IMP: HCPCS | Performed by: THORACIC SURGERY (CARDIOTHORACIC VASCULAR SURGERY)

## 2017-12-05 PROCEDURE — 37000009 ZZH ANESTHESIA TECHNICAL FEE, EACH ADDTL 15 MIN: Performed by: THORACIC SURGERY (CARDIOTHORACIC VASCULAR SURGERY)

## 2017-12-05 PROCEDURE — 25000125 ZZHC RX 250: Performed by: THORACIC SURGERY (CARDIOTHORACIC VASCULAR SURGERY)

## 2017-12-05 PROCEDURE — 27210794 ZZH OR GENERAL SUPPLY STERILE: Performed by: THORACIC SURGERY (CARDIOTHORACIC VASCULAR SURGERY)

## 2017-12-05 PROCEDURE — 37000008 ZZH ANESTHESIA TECHNICAL FEE, 1ST 30 MIN: Performed by: THORACIC SURGERY (CARDIOTHORACIC VASCULAR SURGERY)

## 2017-12-05 PROCEDURE — 82962 GLUCOSE BLOOD TEST: CPT | Mod: 91

## 2017-12-05 PROCEDURE — 27210905 ZZH KIT CR7

## 2017-12-05 PROCEDURE — 71000013 ZZH RECOVERY PHASE 1 LEVEL 1 EA ADDTL HR: Performed by: THORACIC SURGERY (CARDIOTHORACIC VASCULAR SURGERY)

## 2017-12-05 PROCEDURE — 25000125 ZZHC RX 250: Performed by: NURSE ANESTHETIST, CERTIFIED REGISTERED

## 2017-12-05 PROCEDURE — 25000125 ZZHC RX 250: Performed by: RADIOLOGY

## 2017-12-05 PROCEDURE — C1769 GUIDE WIRE: HCPCS

## 2017-12-05 PROCEDURE — 36561 INSERT TUNNELED CV CATH: CPT | Mod: 74

## 2017-12-05 PROCEDURE — 36000073 ZZH SURGERY LEVEL 6 1ST 30 MIN: Performed by: THORACIC SURGERY (CARDIOTHORACIC VASCULAR SURGERY)

## 2017-12-05 PROCEDURE — 25000128 H RX IP 250 OP 636: Performed by: ANESTHESIOLOGY

## 2017-12-05 PROCEDURE — 25000128 H RX IP 250 OP 636: Performed by: RADIOLOGY

## 2017-12-05 PROCEDURE — 40000854 ZZH STATISTIC SIMPLE TUBE INSERTION/CHARGE, PORT, CATH, FISTULOGRAM

## 2017-12-05 PROCEDURE — 36000075 ZZH SURGERY LEVEL 6 EA 15 ADDTL MIN: Performed by: THORACIC SURGERY (CARDIOTHORACIC VASCULAR SURGERY)

## 2017-12-05 PROCEDURE — 27211193 ZZ H WOUND GLUE CR1

## 2017-12-05 PROCEDURE — 40000003 IR MISCELLANEOUS PROCEDURE

## 2017-12-05 PROCEDURE — 71000012 ZZH RECOVERY PHASE 1 LEVEL 1 FIRST HR: Performed by: THORACIC SURGERY (CARDIOTHORACIC VASCULAR SURGERY)

## 2017-12-05 PROCEDURE — 27210886 ZZH ACCESSORY CR5

## 2017-12-05 PROCEDURE — 40000277 XR SURGERY CARM FLUORO LESS THAN 5 MIN W STILLS

## 2017-12-05 PROCEDURE — C1760 CLOSURE DEV, VASC: HCPCS

## 2017-12-05 PROCEDURE — 40000170 ZZH STATISTIC PRE-PROCEDURE ASSESSMENT II: Performed by: THORACIC SURGERY (CARDIOTHORACIC VASCULAR SURGERY)

## 2017-12-05 PROCEDURE — 25000128 H RX IP 250 OP 636: Performed by: NURSE ANESTHETIST, CERTIFIED REGISTERED

## 2017-12-05 PROCEDURE — C1788 PORT, INDWELLING, IMP: HCPCS

## 2017-12-05 DEVICE — IMPLANTABLE DEVICE: Type: IMPLANTABLE DEVICE | Site: ARTERIAL | Status: FUNCTIONAL

## 2017-12-05 RX ORDER — SODIUM CHLORIDE, SODIUM LACTATE, POTASSIUM CHLORIDE, CALCIUM CHLORIDE 600; 310; 30; 20 MG/100ML; MG/100ML; MG/100ML; MG/100ML
INJECTION, SOLUTION INTRAVENOUS CONTINUOUS PRN
Status: DISCONTINUED | OUTPATIENT
Start: 2017-12-05 | End: 2017-12-05

## 2017-12-05 RX ORDER — FENTANYL CITRATE 50 UG/ML
25-50 INJECTION, SOLUTION INTRAMUSCULAR; INTRAVENOUS EVERY 5 MIN PRN
Status: DISCONTINUED | OUTPATIENT
Start: 2017-12-05 | End: 2017-12-05 | Stop reason: HOSPADM

## 2017-12-05 RX ORDER — PROPOFOL 10 MG/ML
INJECTION, EMULSION INTRAVENOUS CONTINUOUS PRN
Status: DISCONTINUED | OUTPATIENT
Start: 2017-12-05 | End: 2017-12-05

## 2017-12-05 RX ORDER — NALOXONE HYDROCHLORIDE 0.4 MG/ML
.1-.4 INJECTION, SOLUTION INTRAMUSCULAR; INTRAVENOUS; SUBCUTANEOUS
Status: DISCONTINUED | OUTPATIENT
Start: 2017-12-05 | End: 2017-12-05 | Stop reason: HOSPADM

## 2017-12-05 RX ORDER — FENTANYL CITRATE 50 UG/ML
INJECTION, SOLUTION INTRAMUSCULAR; INTRAVENOUS
Status: DISCONTINUED
Start: 2017-12-05 | End: 2017-12-05 | Stop reason: HOSPADM

## 2017-12-05 RX ORDER — FENTANYL CITRATE 50 UG/ML
INJECTION, SOLUTION INTRAMUSCULAR; INTRAVENOUS PRN
Status: DISCONTINUED | OUTPATIENT
Start: 2017-12-05 | End: 2017-12-05

## 2017-12-05 RX ORDER — DEXAMETHASONE SODIUM PHOSPHATE 4 MG/ML
INJECTION, SOLUTION INTRA-ARTICULAR; INTRALESIONAL; INTRAMUSCULAR; INTRAVENOUS; SOFT TISSUE PRN
Status: DISCONTINUED | OUTPATIENT
Start: 2017-12-05 | End: 2017-12-05

## 2017-12-05 RX ORDER — LIDOCAINE HYDROCHLORIDE 10 MG/ML
INJECTION, SOLUTION INFILTRATION; PERINEURAL PRN
Status: DISCONTINUED | OUTPATIENT
Start: 2017-12-05 | End: 2017-12-05 | Stop reason: HOSPADM

## 2017-12-05 RX ORDER — LIDOCAINE HYDROCHLORIDE 10 MG/ML
INJECTION, SOLUTION INFILTRATION; PERINEURAL
Status: DISCONTINUED
Start: 2017-12-05 | End: 2017-12-05 | Stop reason: HOSPADM

## 2017-12-05 RX ORDER — HEPARIN SODIUM 1000 [USP'U]/ML
INJECTION, SOLUTION INTRAVENOUS; SUBCUTANEOUS
Status: DISCONTINUED
Start: 2017-12-05 | End: 2017-12-05 | Stop reason: HOSPADM

## 2017-12-05 RX ORDER — LIDOCAINE HYDROCHLORIDE 10 MG/ML
1-30 INJECTION, SOLUTION EPIDURAL; INFILTRATION; INTRACAUDAL; PERINEURAL
Status: COMPLETED | OUTPATIENT
Start: 2017-12-05 | End: 2017-12-05

## 2017-12-05 RX ORDER — HYDROMORPHONE HYDROCHLORIDE 1 MG/ML
0.2 INJECTION, SOLUTION INTRAMUSCULAR; INTRAVENOUS; SUBCUTANEOUS
Status: DISCONTINUED | OUTPATIENT
Start: 2017-12-05 | End: 2017-12-05 | Stop reason: HOSPADM

## 2017-12-05 RX ORDER — HEPARIN SODIUM (PORCINE) LOCK FLUSH IV SOLN 100 UNIT/ML 100 UNIT/ML
SOLUTION INTRAVENOUS PRN
Status: DISCONTINUED | OUTPATIENT
Start: 2017-12-05 | End: 2017-12-05 | Stop reason: HOSPADM

## 2017-12-05 RX ORDER — HEPARIN SODIUM (PORCINE) LOCK FLUSH IV SOLN 100 UNIT/ML 100 UNIT/ML
SOLUTION INTRAVENOUS
Status: DISCONTINUED
Start: 2017-12-05 | End: 2017-12-05 | Stop reason: HOSPADM

## 2017-12-05 RX ORDER — ONDANSETRON 4 MG/1
4 TABLET, ORALLY DISINTEGRATING ORAL EVERY 30 MIN PRN
Status: DISCONTINUED | OUTPATIENT
Start: 2017-12-05 | End: 2017-12-05 | Stop reason: HOSPADM

## 2017-12-05 RX ORDER — CEFAZOLIN SODIUM 1 G/3ML
INJECTION, POWDER, FOR SOLUTION INTRAMUSCULAR; INTRAVENOUS PRN
Status: DISCONTINUED | OUTPATIENT
Start: 2017-12-05 | End: 2017-12-05

## 2017-12-05 RX ORDER — SODIUM CHLORIDE, SODIUM LACTATE, POTASSIUM CHLORIDE, CALCIUM CHLORIDE 600; 310; 30; 20 MG/100ML; MG/100ML; MG/100ML; MG/100ML
INJECTION, SOLUTION INTRAVENOUS CONTINUOUS
Status: DISCONTINUED | OUTPATIENT
Start: 2017-12-05 | End: 2017-12-05 | Stop reason: HOSPADM

## 2017-12-05 RX ORDER — FENTANYL CITRATE 50 UG/ML
25-50 INJECTION, SOLUTION INTRAMUSCULAR; INTRAVENOUS
Status: DISCONTINUED | OUTPATIENT
Start: 2017-12-05 | End: 2017-12-05 | Stop reason: HOSPADM

## 2017-12-05 RX ORDER — HEPARIN SODIUM (PORCINE) LOCK FLUSH IV SOLN 100 UNIT/ML 100 UNIT/ML
500 SOLUTION INTRAVENOUS ONCE
Status: COMPLETED | OUTPATIENT
Start: 2017-12-05 | End: 2017-12-05

## 2017-12-05 RX ORDER — HEPARIN SODIUM (PORCINE) LOCK FLUSH IV SOLN 100 UNIT/ML 100 UNIT/ML
SOLUTION INTRAVENOUS
Status: DISCONTINUED
Start: 2017-12-05 | End: 2017-12-05 | Stop reason: WASHOUT

## 2017-12-05 RX ORDER — IOPAMIDOL 612 MG/ML
INJECTION, SOLUTION INTRAVASCULAR PRN
Status: DISCONTINUED | OUTPATIENT
Start: 2017-12-05 | End: 2017-12-05 | Stop reason: HOSPADM

## 2017-12-05 RX ORDER — CEFAZOLIN SODIUM 1 G/3ML
1 INJECTION, POWDER, FOR SOLUTION INTRAMUSCULAR; INTRAVENOUS ONCE
Status: DISCONTINUED | OUTPATIENT
Start: 2017-12-05 | End: 2017-12-05 | Stop reason: HOSPADM

## 2017-12-05 RX ORDER — CEFAZOLIN SODIUM 1 G/3ML
1 INJECTION, POWDER, FOR SOLUTION INTRAMUSCULAR; INTRAVENOUS
Status: DISCONTINUED | OUTPATIENT
Start: 2017-12-05 | End: 2017-12-05

## 2017-12-05 RX ORDER — HYDROMORPHONE HYDROCHLORIDE 1 MG/ML
.3-.5 INJECTION, SOLUTION INTRAMUSCULAR; INTRAVENOUS; SUBCUTANEOUS EVERY 10 MIN PRN
Status: DISCONTINUED | OUTPATIENT
Start: 2017-12-05 | End: 2017-12-05 | Stop reason: HOSPADM

## 2017-12-05 RX ORDER — PHYSOSTIGMINE SALICYLATE 1 MG/ML
1.2 INJECTION INTRAVENOUS
Status: DISCONTINUED | OUTPATIENT
Start: 2017-12-05 | End: 2017-12-05 | Stop reason: HOSPADM

## 2017-12-05 RX ORDER — FLUMAZENIL 0.1 MG/ML
0.2 INJECTION, SOLUTION INTRAVENOUS
Status: DISCONTINUED | OUTPATIENT
Start: 2017-12-05 | End: 2017-12-05 | Stop reason: HOSPADM

## 2017-12-05 RX ORDER — ONDANSETRON 2 MG/ML
INJECTION INTRAMUSCULAR; INTRAVENOUS PRN
Status: DISCONTINUED | OUTPATIENT
Start: 2017-12-05 | End: 2017-12-05

## 2017-12-05 RX ORDER — ONDANSETRON 2 MG/ML
4 INJECTION INTRAMUSCULAR; INTRAVENOUS EVERY 30 MIN PRN
Status: DISCONTINUED | OUTPATIENT
Start: 2017-12-05 | End: 2017-12-05 | Stop reason: HOSPADM

## 2017-12-05 RX ORDER — CEFAZOLIN SODIUM 2 G/100ML
2 INJECTION, SOLUTION INTRAVENOUS ONCE
Status: COMPLETED | OUTPATIENT
Start: 2017-12-05 | End: 2017-12-05

## 2017-12-05 RX ORDER — MEPERIDINE HYDROCHLORIDE 25 MG/ML
12.5 INJECTION INTRAMUSCULAR; INTRAVENOUS; SUBCUTANEOUS
Status: DISCONTINUED | OUTPATIENT
Start: 2017-12-05 | End: 2017-12-05 | Stop reason: HOSPADM

## 2017-12-05 RX ADMIN — FENTANYL CITRATE 50 MCG: 50 INJECTION, SOLUTION INTRAMUSCULAR; INTRAVENOUS at 14:56

## 2017-12-05 RX ADMIN — MIDAZOLAM HYDROCHLORIDE 1 MG: 1 INJECTION, SOLUTION INTRAMUSCULAR; INTRAVENOUS at 11:14

## 2017-12-05 RX ADMIN — SODIUM CHLORIDE, POTASSIUM CHLORIDE, SODIUM LACTATE AND CALCIUM CHLORIDE: 600; 310; 30; 20 INJECTION, SOLUTION INTRAVENOUS at 11:10

## 2017-12-05 RX ADMIN — DEXAMETHASONE SODIUM PHOSPHATE 4 MG: 4 INJECTION, SOLUTION INTRA-ARTICULAR; INTRALESIONAL; INTRAMUSCULAR; INTRAVENOUS; SOFT TISSUE at 11:12

## 2017-12-05 RX ADMIN — FENTANYL CITRATE 50 MCG: 50 INJECTION, SOLUTION INTRAMUSCULAR; INTRAVENOUS at 15:08

## 2017-12-05 RX ADMIN — PHENYLEPHRINE HYDROCHLORIDE 200 MCG: 10 INJECTION INTRAVENOUS at 12:13

## 2017-12-05 RX ADMIN — SODIUM CHLORIDE, POTASSIUM CHLORIDE, SODIUM LACTATE AND CALCIUM CHLORIDE: 600; 310; 30; 20 INJECTION, SOLUTION INTRAVENOUS at 12:53

## 2017-12-05 RX ADMIN — PHENYLEPHRINE HYDROCHLORIDE 100 MCG: 10 INJECTION INTRAVENOUS at 11:12

## 2017-12-05 RX ADMIN — PHENYLEPHRINE HYDROCHLORIDE 200 MCG: 10 INJECTION INTRAVENOUS at 11:55

## 2017-12-05 RX ADMIN — PHENYLEPHRINE HYDROCHLORIDE 200 MCG: 10 INJECTION INTRAVENOUS at 11:49

## 2017-12-05 RX ADMIN — PHENYLEPHRINE HYDROCHLORIDE 100 MCG: 10 INJECTION INTRAVENOUS at 11:25

## 2017-12-05 RX ADMIN — CEFAZOLIN 2 G: 1 INJECTION, POWDER, FOR SOLUTION INTRAMUSCULAR; INTRAVENOUS at 11:10

## 2017-12-05 RX ADMIN — MIDAZOLAM HYDROCHLORIDE 1 MG: 1 INJECTION, SOLUTION INTRAMUSCULAR; INTRAVENOUS at 11:12

## 2017-12-05 RX ADMIN — DEXMEDETOMIDINE HYDROCHLORIDE 8 MCG: 100 INJECTION, SOLUTION INTRAVENOUS at 11:13

## 2017-12-05 RX ADMIN — FENTANYL CITRATE 50 MCG: 50 INJECTION, SOLUTION INTRAMUSCULAR; INTRAVENOUS at 11:10

## 2017-12-05 RX ADMIN — PHENYLEPHRINE HYDROCHLORIDE 200 MCG: 10 INJECTION INTRAVENOUS at 12:04

## 2017-12-05 RX ADMIN — PHENYLEPHRINE HYDROCHLORIDE 200 MCG: 10 INJECTION INTRAVENOUS at 11:40

## 2017-12-05 RX ADMIN — SODIUM CHLORIDE, PRESERVATIVE FREE 500 UNITS: 5 INJECTION INTRAVENOUS at 15:20

## 2017-12-05 RX ADMIN — PHENYLEPHRINE HYDROCHLORIDE 100 MCG: 10 INJECTION INTRAVENOUS at 11:45

## 2017-12-05 RX ADMIN — FENTANYL CITRATE 50 MCG: 50 INJECTION, SOLUTION INTRAMUSCULAR; INTRAVENOUS at 11:13

## 2017-12-05 RX ADMIN — Medication 10 ML: at 11:41

## 2017-12-05 RX ADMIN — LIDOCAINE HYDROCHLORIDE 240 MG: 10 INJECTION, SOLUTION INFILTRATION; PERINEURAL at 15:23

## 2017-12-05 RX ADMIN — MIDAZOLAM HYDROCHLORIDE 2 MG: 1 INJECTION, SOLUTION INTRAMUSCULAR; INTRAVENOUS at 11:10

## 2017-12-05 RX ADMIN — PHENYLEPHRINE HYDROCHLORIDE 100 MCG: 10 INJECTION INTRAVENOUS at 11:34

## 2017-12-05 RX ADMIN — MIDAZOLAM HYDROCHLORIDE 1 MG: 1 INJECTION, SOLUTION INTRAMUSCULAR; INTRAVENOUS at 15:07

## 2017-12-05 RX ADMIN — PHENYLEPHRINE HYDROCHLORIDE 100 MCG: 10 INJECTION INTRAVENOUS at 11:19

## 2017-12-05 RX ADMIN — DEXMEDETOMIDINE HYDROCHLORIDE 4 MCG: 100 INJECTION, SOLUTION INTRAVENOUS at 11:58

## 2017-12-05 RX ADMIN — HEPARIN SODIUM 10 ML: 1000 INJECTION, SOLUTION INTRAVENOUS; SUBCUTANEOUS at 11:41

## 2017-12-05 RX ADMIN — ONDANSETRON 4 MG: 2 INJECTION INTRAMUSCULAR; INTRAVENOUS at 11:22

## 2017-12-05 RX ADMIN — IOPAMIDOL 20 ML: 612 INJECTION, SOLUTION INTRAVENOUS at 12:23

## 2017-12-05 RX ADMIN — LIDOCAINE HYDROCHLORIDE 10 ML: 10 INJECTION, SOLUTION INFILTRATION; PERINEURAL at 11:41

## 2017-12-05 RX ADMIN — MIDAZOLAM HYDROCHLORIDE 1 MG: 1 INJECTION, SOLUTION INTRAMUSCULAR; INTRAVENOUS at 14:56

## 2017-12-05 RX ADMIN — DEXMEDETOMIDINE HYDROCHLORIDE 8 MCG: 100 INJECTION, SOLUTION INTRAVENOUS at 11:10

## 2017-12-05 RX ADMIN — CEFAZOLIN SODIUM 2 G: 2 INJECTION, SOLUTION INTRAVENOUS at 15:03

## 2017-12-05 RX ADMIN — PROPOFOL 100 MCG/KG/MIN: 10 INJECTION, EMULSION INTRAVENOUS at 11:30

## 2017-12-05 NOTE — IP AVS SNAPSHOT
[place] [address]    [place] [address]    Phone:  [phone]                                       After Visit Summary   12/5/2017    Pastor Garibay    MRN: [id number]           After Visit Summary Signature Page     I have received my discharge instructions, and my questions have been answered. I have discussed any challenges I see with this plan with the nurse or doctor.    ..........................................................................................................................................  Patient/Patient Representative Signature      ..........................................................................................................................................  Patient Representative Print Name and Relationship to Patient    ..................................................               ................................................  Date                                            Time    ..........................................................................................................................................  Reviewed by Signature/Title    ...................................................              ..............................................  Date                                                            Time

## 2017-12-05 NOTE — IP AVS SNAPSHOT
MRN:[id number]                      After Visit Summary   12/5/2017    Pastor Garibay    MRN: [id number]           Visit Information        Department      12/5/2017 10:01 AM [facility]          Review of your medicines      CONTINUE these medicines which have NOT CHANGED        Dose / Directions    ADVIL PM PO        Dose:  2 tablet   Take 2 tablets by mouth At Bedtime   Refills:  0       AMLODIPINE BESYLATE PO        Dose:  5 mg   Take 5 mg by mouth At Bedtime   Refills:  0       COLACE PO        Dose:  200 mg   Take 200 mg by mouth At Bedtime (2 x 100 mg tablet = 200 mg dose)   Refills:  0       CRESTOR PO        Dose:  5 mg   Take 5 mg by mouth daily (0.5 x 10 mg tablet = 5 mg dose)   Refills:  0       HYDROmorphone 2 MG tablet   Commonly known as:  DILAUDID   Used for:  Lymphadenopathy, mediastinal        Dose:  2-4 mg   Take 1-2 tablets (2-4 mg) by mouth every 3 hours as needed for moderate to severe pain   Quantity:  80 tablet   Refills:  0       insulin glargine 100 UNIT/ML injection   Commonly known as:  LANTUS        Dose:  52 Units   Inject 52 Units Subcutaneous At Bedtime   Refills:  0       * K-DUR PO        Dose:  40 mEq   Take 40 mEq by mouth daily (2 x 20 mEq = 40 mEq dose)   Refills:  0       * K-DUR PO        Dose:  20 mEq   Take 20 mEq by mouth At Bedtime   Refills:  0       LASIX PO        Dose:  40 mg   Take 40 mg by mouth daily   Refills:  0       LISINOPRIL PO        Dose:  40 mg   Take 40 mg by mouth daily   Refills:  0       * METFORMIN HCL PO        Dose:  1000 mg   Take 1,000 mg by mouth daily (with breakfast) (2 x 500 mg = 1000 mg dose)   Refills:  0       * METFORMIN HCL PO        Dose:  500 mg   Take 500 mg by mouth At Bedtime   Refills:  0       NIACIN ER PO        Dose:  500 mg   Take 500 mg by mouth daily   Refills:  0       NovoLOG FLEXPEN 100 UNIT/ML injection   Generic drug:  insulin aspart        Dose:  0-50 Units   Inject 0-50 Units  Subcutaneous 3 times daily (with meals) Patient uses sliding scale based on Carbs and Blood Glucose   Refills:  0       TRICOR PO        Dose:  145 mg   Take 145 mg by mouth daily   Refills:  0       * Notice:  This list has 4 medication(s) that are the same as other medications prescribed for you. Read the directions carefully, and ask your doctor or other care provider to review them with you.             Protect others around you: Learn how to safely use, store and throw away your medicines at www.disposemymeds.org.         Follow-ups after your visit         Care Instructions        After Care Instructions     Diet Instructions       Resume pre-procedure diet            Discharge Instructions       Follow up appointment as instructed by Surgeon and or RN            Do not - immerse incision in water until sutures removed       Do not immerse incision in water/swim for one week.            Dressing       Keep clear Dermabond glue in place.  It will peel off on its own after about 7-10 days.            Shower       No shower for 24 hours post procedure. May shower Postoperative Day (POD)  one                  Further instructions from your care team           Same Day Surgery Discharge Instructions for  Sedation and General Anesthesia       It's not unusual to feel dizzy, light-headed or faint for up to 24 hours after surgery or while taking pain medication.  If you have these symptoms: sit for a few minutes before standing and have someone assist you when you get up to walk or use the bathroom.      You should rest and relax for the next 24 hours. We recommend you make arrangements to have an adult stay with you for at least 24 hours after your discharge.  Avoid hazardous and strenuous activity.      DO NOT DRIVE any vehicle or operate mechanical equipment for 24 hours following the end of your surgery.  Even though you may feel normal, your reactions may be affected by the medication you have received.      Do  not drink alcoholic beverages for 24 hours following surgery.       Slowly progress to your regular diet as you feel able. It's not unusual to feel nauseated and/or vomit after receiving anesthesia.  If you develop these symptoms, drink clear liquids (apple juice, ginger ale, broth, 7-up, etc. ) until you feel better.  If your nausea and vomiting persists for 24 hours, please notify your surgeon.        All narcotic pain medications, along with inactivity and anesthesia, can cause constipation. Drinking plenty of liquids and increasing fiber intake will help.      For any questions of a medical nature, call your surgeon.      Do not make important decisions for 24 hours.      If you had general anesthesia, you may have a sore throat for a couple of days related to the breathing tube used during surgery.  You may use Cepacol lozenges to help with this discomfort.  If it worsens or if you develop a fever, contact your surgeon.       If you feel your pain is not well managed with the pain medications prescribed by your surgeon, please contact your surgeon's office to let them know so they can address your concerns.         Discharge Instructions for Port Removal (discontinued Port placement)         You may resume light activity as tolerated tomorrow.  Avoid strenuous activity for 1 week.     You may apply ice to the area for comfort.    Your incision was closed using a liquid adhesive.  Do not scratch, rub or pick at the film over your incision.  You may shower in 24 hours.  Do not soak in a tub for at least one week.  Do not apply lotions or cream to you incision.     Watch incision for signs of infection:  Redness  Swelling  Drainage  Temperature  Numbness or Tingling in the left arm or hand; especially sudden onset.    Call your surgeon for questions and concerns.           **If you have questions or concerns about your procedure,  call Dr. Linda at [phone]**          Port Insertion Discharge Instructions      After you go home:      Have an adult stay with you for the first 6 hours    You may resume your normal diet       For 24 hours - due to the sedation you received:    Relax and take it easy    Do NOT make any important or legal decisions    Do NOT drive or operate machines at home or at work    Do NOT drink alcohol    Care of Puncture Sites:      Keep the dressings on your sites clean & dry for 3 days. Change it only if it gets wet or dirty.    You may shower after the dressing comes off in 3 days    Do not remove the small white strips of tape, if present. Allow them to fall off on their own.     You may cover the wound with a bandaid after the dressing is removed if needed for comfort      Activity       Avoid heavy lifting (greater than 10 pounds) or the overuse of your shoulder for 3 days    Bleeding:      If you start bleeding from the incision sites in your chest or neck - or have swelling in your neck, sit down and press on the site for 5-10 minutes.     If bleeding has not stopped after 10 minutes, call your provider.        Call 911 right away if you have heavy bleeding or bleeding that does not stop.      Medicines:      You may resume all medications    Resume your Warfarin/Coumadin at your regular dose today. Follow up with your provider to have your INR rechecked    Resume your Platelet Inhibitors and Aspirin tomorrow at your regular dose    For minor pain, you may take Acetaminophen (Tylenol) or Ibuprofen (Advil)    Call the provider who ordered this procedure if:      You have swelling in your neck or over your port site    The incision area is red, swollen, hot or tender    You have chills or a fever greater than 101 F (38 C)    Any questions or concerns    Call  911 or go to the Emergency Room if you have:      Severe chest pain or trouble breathing    Bleeding that you cannot control    Additional Information:      Your port may be accessed right away.     You will need to have your port  "flushed every 30 days or after each use.      If you have questions call:          [facility] Radiology Dept @ [phone]      The provider who performed your procedure was Dr. Rubin.         Additional Information About Your Visit        MyChart Information     [employer] lets you send messages to your doctor, view your test results, renew your prescriptions, schedule appointments and more. To sign up, go to [email or web address] . Click on \"Log in\" on the left side of the screen, which will take you to the Welcome page. Then click on \"Sign up Now\" on the right side of the page.     You will be asked to enter the access code listed below, as well as some personal information. Please follow the directions to create your username and password.     Your access code is: [email or web address]  Expires: 2018  7:54 AM     Your access code will  in 90 days. If you need help or a new code, please call your [place] clinic or [phone].        Care EveryWhere ID     This is your Care EveryWhere ID. This could be used by other organizations to access your [place] medical records  [id number]        Your Vitals Were     Blood Pressure Temperature Respirations Height Weight Pulse Oximetry    105/66 (BP Location: Right arm) 97.5  F (36.4  C) 18 1.74 m (5' 8.5\") 110 kg (242 lb 8 oz) 96%    BMI (Body Mass Index)                   36.34 kg/m2            Primary Care Provider Office Phone # Fax #    Francisco Armijo [phone]      Equal Access to Services     [facility]: Hadii shalom gutiérrezo Soelva, waaxda luqadaha, qaybta kaalmada juana barton . So [facility] [phone].    ATENCIÓN: Si habla español, tiene a bradford disposición servicios gratuitos de asistencia lingüística. Llame al [phone].    We comply with applicable federal civil rights laws and [place] laws. We do not discriminate on the basis of race, color, national origin, age, disability, sex, " sexual orientation, or gender identity.            Thank you!     Thank you for choosing [facility] for your care. Our goal is always to provide you with excellent care. Hearing back from our patients is one way we can continue to improve our services. Please take a few minutes to complete the written survey that you may receive in the mail after you visit with us. Thank you!             Medication List: This is a list of all your medications and when to take them. Check marks below indicate your daily home schedule. Keep this list as a reference.      Medications           Morning Afternoon Evening Bedtime As Needed    ADVIL PM PO   Take 2 tablets by mouth At Bedtime                                AMLODIPINE BESYLATE PO   Take 5 mg by mouth At Bedtime                                COLACE PO   Take 200 mg by mouth At Bedtime (2 x 100 mg tablet = 200 mg dose)                                CRESTOR PO   Take 5 mg by mouth daily (0.5 x 10 mg tablet = 5 mg dose)                                HYDROmorphone 2 MG tablet   Commonly known as:  DILAUDID   Take 1-2 tablets (2-4 mg) by mouth every 3 hours as needed for moderate to severe pain                                insulin glargine 100 UNIT/ML injection   Commonly known as:  LANTUS   Inject 52 Units Subcutaneous At Bedtime                                * K-DUR PO   Take 40 mEq by mouth daily (2 x 20 mEq = 40 mEq dose)                                * K-DUR PO   Take 20 mEq by mouth At Bedtime                                LASIX PO   Take 40 mg by mouth daily                                LISINOPRIL PO   Take 40 mg by mouth daily                                * METFORMIN HCL PO   Take 1,000 mg by mouth daily (with breakfast) (2 x 500 mg = 1000 mg dose)                                * METFORMIN HCL PO   Take 500 mg by mouth At Bedtime                                NIACIN ER PO   Take 500 mg by mouth daily                                NovoLOG FLEXPEN 100 UNIT/ML  injection   Inject 0-50 Units Subcutaneous 3 times daily (with meals) Patient uses sliding scale based on Carbs and Blood Glucose   Generic drug:  insulin aspart                                TRICOR PO   Take 145 mg by mouth daily                                * Notice:  This list has 4 medication(s) that are the same as other medications prescribed for you. Read the directions carefully, and ask your doctor or other care provider to review them with you.

## 2017-12-05 NOTE — IP AVS SNAPSHOT
MRN:[id number]                      After Visit Summary   12/5/2017    Pastor Garibay    MRN: [id number]           Thank you!     Thank you for choosing [facility] for your care. Our goal is always to provide you with excellent care. Hearing back from our patients is one way we can continue to improve our services. Please take a few minutes to complete the written survey that you may receive in the mail after you visit with us. Thank you!        Patient Information     Date Of Birth          1950        About your hospital stay     You were admitted on:  December 5, 2017 You last received care in the:  [facility] Same Day Surgery    You were discharged on:  December 5, 2017       Who to Call     For medical emergencies, please call 911.  For non-urgent questions about your medical care, please call your primary care provider or clinic, [phone]  For questions related to your surgery, please call your surgery clinic        Attending Provider     Provider Specialty    Robbie Linda MD Thoracic Diseases       Primary Care Provider Office Phone # Fax #    Francisco Armijo [phone]      After Care Instructions     Diet Instructions       Resume pre-procedure diet            Discharge Instructions       Follow up appointment as instructed by Surgeon and or RN            Do not - immerse incision in water until sutures removed       Do not immerse incision in water/swim for one week.            Dressing       Keep clear Dermabond glue in place.  It will peel off on its own after about 7-10 days.            Shower       No shower for 24 hours post procedure. May shower Postoperative Day (POD)  one                  Your next 10 appointments already scheduled     Dec 05, 2017  2:00 PM CST   (Arrive by 1:45 PM)   IR CHEST PORT PLACEMENT > 5 YRS OF AGE with SHIR1   [facility] Interventional Radiology ([facility])    [address]  [place] [address]   [phone]           1. Your doctor will need to do a history and physical within 7 days before this procedure. 2. Your doctor will which medications should not be taken the morning of the exam. 3. Laboratory tests are to be obtained by your doctor prior to the exam (Basic Metabolic Panel, CBCP, PTT and INR) (No labs needed if you are having a tunneled catheter exchange or removal) 4. If you have allergies to x-ray contrast or iodine, contact your doctor or a Radiology nurse prior to the exam day for instructions. 5. Someone will need to drive you to and from the hospital. 6. If you are or may be pregnant, contact your doctor or a Radiology nurse prior to the day of the exam. 7. If you have diabetes, check with your doctor or a Radiology nurse to see if your insulin needs to be adjusted for the exam. 8. If you are taking a medication called Glucophage or Glucovance; these medications need to be held the day of the exam and for approximately 48 hours following. A blood sample must be drawn so your creatinine level can be checked before resuming this medication. 9. If you are taking Coumadin (to thin you blood) please contact your doctor or a Radiology nurse at least 3 days before the exam for special instructions. 10. You should not have received contrast within 48 hours of this exam. 11. The day before your exam you may eat your regular diet and are encouraged to drink at least 2 quarts of clear liquids. Drink no alcoholic beverages for 24 hours prior to the exam. 12. If you have a colostomy you will need to irrigate it with tap water at 8PM the evening before and again at 6AM the morning of the exam. 13. Do not smoke for 24 hours prior to the procedure. 14. Birth to 4 years: - Breast feeding must be stopped 4 hours prior to exam - Solid food or formula must be stopped 6 hours prior to exam - Tube feedings must be stopped 6 hours prior to exam 15. 4-10 years old: - Nothing to eat or  drink 6 hours prior to exam 16. 10+ years old: - Nothing to eat or drink 8 hours prior to exam 17. The morning of the exam you may brush your teeth and take medications as directed with a sip of water. 18. When discharged, you cannot drive until morning, and an adult must be with you until then. You should stay in the [facility] overnight. 19. Bring a list of all drugs you are taking; include supplements and over-the-counter medications. Wear comfortable clothes and leave your valuables at home.              Further instructions from your care team         Same Day Surgery Discharge Instructions for  Sedation and General Anesthesia       It's not unusual to feel dizzy, light-headed or faint for up to 24 hours after surgery or while taking pain medication.  If you have these symptoms: sit for a few minutes before standing and have someone assist you when you get up to walk or use the bathroom.      You should rest and relax for the next 24 hours. We recommend you make arrangements to have an adult stay with you for at least 24 hours after your discharge.  Avoid hazardous and strenuous activity.      DO NOT DRIVE any vehicle or operate mechanical equipment for 24 hours following the end of your surgery.  Even though you may feel normal, your reactions may be affected by the medication you have received.      Do not drink alcoholic beverages for 24 hours following surgery.       Slowly progress to your regular diet as you feel able. It's not unusual to feel nauseated and/or vomit after receiving anesthesia.  If you develop these symptoms, drink clear liquids (apple juice, ginger ale, broth, 7-up, etc. ) until you feel better.  If your nausea and vomiting persists for 24 hours, please notify your surgeon.        All narcotic pain medications, along with inactivity and anesthesia, can cause constipation. Drinking plenty of liquids and increasing fiber intake will help.      For any questions of a medical nature,  "call your surgeon.      Do not make important decisions for 24 hours.      If you had general anesthesia, you may have a sore throat for a couple of days related to the breathing tube used during surgery.  You may use Cepacol lozenges to help with this discomfort.  If it worsens or if you develop a fever, contact your surgeon.       If you feel your pain is not well managed with the pain medications prescribed by your surgeon, please contact your surgeon's office to let them know so they can address your concerns.         Discharge Instructions for Port Removal (discontinued Port placement)         You may resume light activity as tolerated tomorrow.  Avoid strenuous activity for 1 week.     You may apply ice to the area for comfort.    Your incision was closed using a liquid adhesive.  Do not scratch, rub or pick at the film over your incision.  You may shower in 24 hours.  Do not soak in a tub for at least one week.  Do not apply lotions or cream to you incision.     Watch incision for signs of infection:  Redness  Swelling  Drainage  Temperature  Numbness or Tingling in the left arm or hand; especially sudden onset.    Call your surgeon for questions and concerns.           **If you have questions or concerns about your procedure,  call Dr. Linda at [phone]**          Pending Results     Date and Time Order Name Status Description    12/5/2017 1225 IR Miscellaneous Procedure In process     12/5/2017 1143 XR Surgery BARRIE L/T 5 Min Fluoro w Stills In process             Admission Information     Date & Time Provider Department Dept. Phone    12/5/2017 Robbie Linda MD [facility] Same Day Surgery [phone]      Your Vitals Were     Blood Pressure Temperature Respirations Height Weight Pulse Oximetry    104/75 97.5  F (36.4  C) 12 1.74 m (5' 8.5\") 110 kg (242 lb 8 oz) 93%    BMI (Body Mass Index)                   36.34 kg/m2           MyChart Information     [facility] lets you send " "messages to your doctor, view your test results, renew your prescriptions, schedule appointments and more. To sign up, go to [email or web address] . Click on \"Log in\" on the left side of the screen, which will take you to the Welcome page. Then click on \"Sign up Now\" on the right side of the page.     You will be asked to enter the access code listed below, as well as some personal information. Please follow the directions to create your username and password.     Your access code is: [email or web address]  Expires: 2018  7:54 AM     Your access code will  in 90 days. If you need help or a new code, please call your [facility] clinic or [phone].        Care EveryWhere ID     This is your Care EveryWhere ID. This could be used by other organizations to access your [facility] medical records  [id number]        Equal Access to Services     BOB BRANNON : Lillian Jones, waaziza carballo, qapravin kaalmada mick, juana sylvester . So [facility] [phone].    ATENCIÓN: Si habla español, tiene a bradford disposición servicios gratuitos de asistencia lingüística. Kaylene al [phone].    We comply with applicable federal civil rights laws and [place] laws. We do not discriminate on the basis of race, color, national origin, age, disability, sex, sexual orientation, or gender identity.               Review of your medicines      CONTINUE these medicines which have NOT CHANGED        Dose / Directions    ADVIL PM PO        Dose:  2 tablet   Take 2 tablets by mouth At Bedtime   Refills:  0       AMLODIPINE BESYLATE PO        Dose:  5 mg   Take 5 mg by mouth At Bedtime   Refills:  0       COLACE PO        Dose:  200 mg   Take 200 mg by mouth At Bedtime (2 x 100 mg tablet = 200 mg dose)   Refills:  0       CRESTOR PO        Dose:  5 mg   Take 5 mg by mouth daily (0.5 x 10 mg tablet = 5 mg dose)   Refills:  0       HYDROmorphone 2 MG tablet   Commonly known as:  DILAUDID   Used for:  " Lymphadenopathy, mediastinal        Dose:  2-4 mg   Take 1-2 tablets (2-4 mg) by mouth every 3 hours as needed for moderate to severe pain   Quantity:  80 tablet   Refills:  0       insulin glargine 100 UNIT/ML injection   Commonly known as:  LANTUS        Dose:  52 Units   Inject 52 Units Subcutaneous At Bedtime   Refills:  0       * K-DUR PO        Dose:  40 mEq   Take 40 mEq by mouth daily (2 x 20 mEq = 40 mEq dose)   Refills:  0       * K-DUR PO        Dose:  20 mEq   Take 20 mEq by mouth At Bedtime   Refills:  0       LASIX PO        Dose:  40 mg   Take 40 mg by mouth daily   Refills:  0       LISINOPRIL PO        Dose:  40 mg   Take 40 mg by mouth daily   Refills:  0       * METFORMIN HCL PO        Dose:  1000 mg   Take 1,000 mg by mouth daily (with breakfast) (2 x 500 mg = 1000 mg dose)   Refills:  0       * METFORMIN HCL PO        Dose:  500 mg   Take 500 mg by mouth At Bedtime   Refills:  0       NIACIN ER PO        Dose:  500 mg   Take 500 mg by mouth daily   Refills:  0       NovoLOG FLEXPEN 100 UNIT/ML injection   Generic drug:  insulin aspart        Dose:  0-50 Units   Inject 0-50 Units Subcutaneous 3 times daily (with meals) Patient uses sliding scale based on Carbs and Blood Glucose   Refills:  0       TRICOR PO        Dose:  145 mg   Take 145 mg by mouth daily   Refills:  0       * Notice:  This list has 4 medication(s) that are the same as other medications prescribed for you. Read the directions carefully, and ask your doctor or other care provider to review them with you.             Protect others around you: Learn how to safely use, store and throw away your medicines at www.disposemymeds.org.             Medication List: This is a list of all your medications and when to take them. Check marks below indicate your daily home schedule. Keep this list as a reference.      Medications           Morning Afternoon Evening Bedtime As Needed    ADVIL PM PO   Take 2 tablets by mouth At Bedtime                                 AMLODIPINE BESYLATE PO   Take 5 mg by mouth At Bedtime                                COLACE PO   Take 200 mg by mouth At Bedtime (2 x 100 mg tablet = 200 mg dose)                                CRESTOR PO   Take 5 mg by mouth daily (0.5 x 10 mg tablet = 5 mg dose)                                HYDROmorphone 2 MG tablet   Commonly known as:  DILAUDID   Take 1-2 tablets (2-4 mg) by mouth every 3 hours as needed for moderate to severe pain                                insulin glargine 100 UNIT/ML injection   Commonly known as:  LANTUS   Inject 52 Units Subcutaneous At Bedtime                                * K-DUR PO   Take 40 mEq by mouth daily (2 x 20 mEq = 40 mEq dose)                                * K-DUR PO   Take 20 mEq by mouth At Bedtime                                LASIX PO   Take 40 mg by mouth daily                                LISINOPRIL PO   Take 40 mg by mouth daily                                * METFORMIN HCL PO   Take 1,000 mg by mouth daily (with breakfast) (2 x 500 mg = 1000 mg dose)                                * METFORMIN HCL PO   Take 500 mg by mouth At Bedtime                                NIACIN ER PO   Take 500 mg by mouth daily                                NovoLOG FLEXPEN 100 UNIT/ML injection   Inject 0-50 Units Subcutaneous 3 times daily (with meals) Patient uses sliding scale based on Carbs and Blood Glucose   Generic drug:  insulin aspart                                TRICOR PO   Take 145 mg by mouth daily                                * Notice:  This list has 4 medication(s) that are the same as other medications prescribed for you. Read the directions carefully, and ask your doctor or other care provider to review them with you.

## 2017-12-05 NOTE — PROGRESS NOTES
1541  Patient arrived [facility], s/p R port placement.  Dressings CDI/no swelling or bleeding.  L chest site (failed port placement site) is also WDL with dermabond closure/no swelling or bleeding.  CMS WDL bilateral UE's,  Patient states understanding of discharge instructions.  AVS to patient.  He may return to work in 24 hours if it is light work and does not involve lifting, per Dr. Tran.  Patient ambulated without difficulty, voided.  Taking po food/fluids well.  Dr. Tran wants him to stay until 1700.  Angioseal booklet/ID (from first surgery) given to patient.  He also was given Port booklet/ID.      1714  R and L chest sites still WDL, and CMS intact.  Patient was discharged per W/C with .

## 2017-12-05 NOTE — ANESTHESIA PREPROCEDURE EVALUATION
Anesthesia Evaluation     . Pt has had prior anesthetic. Type: General    History of anesthetic complications   - PONV        ROS/MED HX    ENT/Pulmonary:     (+)sleep apnea, uses CPAP , . Other pulmonary disease pleural effusion.    Neurologic:       Cardiovascular:     (+) hypertension----. : . . . :. .       METS/Exercise Tolerance:     Hematologic:         Musculoskeletal:         GI/Hepatic:     (+) GERD       Renal/Genitourinary:         Endo:     (+) type II DM Obesity, .      Psychiatric:         Infectious Disease:         Malignancy:   (+) Malignancy History of Lymphoma/Leukemia and Skin          Other:                                    Anesthesia Plan      History & Physical Review  History and physical reviewed and following examination; no interval change.    ASA Status:  3 .        Plan for MAC with Intravenous induction.   PONV prophylaxis:  Ondansetron (or other 5HT-3) and Dexamethasone or Solumedrol       Postoperative Care  Postoperative pain management:  IV analgesics and Oral pain medications.      Consents  Anesthetic plan, risks, benefits and alternatives discussed with:  Patient..                          .

## 2017-12-05 NOTE — BRIEF OP NOTE
[facility] Brief Operative Note    Pre-operative diagnosis: NON-HODGKINS LYMPHOMA    Post-operative diagnosis Non-hodgkins lymphoma     Procedure: Procedure(s):  POWER PORT PLACEMENT ATTEMPTED, PLACEMENT OF ANGIO-SEAL VIP VASCULAR CLOSURE DEVICE - Wound Class: I-Clean   - Wound Class: I-Clean   Surgeon(s): Surgeon(s) and Role:     * Robbie Linda MD - Primary     * Temo Tran MD - Assisting  Ana Sun _PA-LA, assisting   Estimated blood loss: 50 cc    Specimens: * No specimens in log *   Findings: Subclavian artery accessed so Interventional Radiology placed an angio-seal vascular closure device and power port placement was NOT completed. No hematoma nor active bleeding at conclusion of case.

## 2017-12-05 NOTE — OR NURSING
Patient to Interventional Radiology for placement of right Power Port.  Hand-off report called to Tana CHUN.  To IR per cart; Mrs. Garibay is accompanying.

## 2017-12-05 NOTE — DISCHARGE INSTRUCTIONS
Same Day Surgery Discharge Instructions for  Sedation and General Anesthesia       It's not unusual to feel dizzy, light-headed or faint for up to 24 hours after surgery or while taking pain medication.  If you have these symptoms: sit for a few minutes before standing and have someone assist you when you get up to walk or use the bathroom.      You should rest and relax for the next 24 hours. We recommend you make arrangements to have an adult stay with you for at least 24 hours after your discharge.  Avoid hazardous and strenuous activity.      DO NOT DRIVE any vehicle or operate mechanical equipment for 24 hours following the end of your surgery.  Even though you may feel normal, your reactions may be affected by the medication you have received.      Do not drink alcoholic beverages for 24 hours following surgery.       Slowly progress to your regular diet as you feel able. It's not unusual to feel nauseated and/or vomit after receiving anesthesia.  If you develop these symptoms, drink clear liquids (apple juice, ginger ale, broth, 7-up, etc. ) until you feel better.  If your nausea and vomiting persists for 24 hours, please notify your surgeon.        All narcotic pain medications, along with inactivity and anesthesia, can cause constipation. Drinking plenty of liquids and increasing fiber intake will help.      For any questions of a medical nature, call your surgeon.      Do not make important decisions for 24 hours.      If you had general anesthesia, you may have a sore throat for a couple of days related to the breathing tube used during surgery.  You may use Cepacol lozenges to help with this discomfort.  If it worsens or if you develop a fever, contact your surgeon.       If you feel your pain is not well managed with the pain medications prescribed by your surgeon, please contact your surgeon's office to let them know so they can address your concerns.         Discharge Instructions for Port Removal  (discontinued Port placement)         You may resume light activity as tolerated tomorrow.  Avoid strenuous activity for 1 week.     You may apply ice to the area for comfort.    Your incision was closed using a liquid adhesive.  Do not scratch, rub or pick at the film over your incision.  You may shower in 24 hours.  Do not soak in a tub for at least one week.  Do not apply lotions or cream to you incision.     Watch incision for signs of infection:  Redness  Swelling  Drainage  Temperature  Numbness or Tingling in the left arm or hand; especially sudden onset.    Call your surgeon for questions and concerns.           **If you have questions or concerns about your procedure,  call Dr. Linda at [phone]**          Port Insertion Discharge Instructions     After you go home:      Have an adult stay with you for the first 6 hours    You may resume your normal diet       For 24 hours - due to the sedation you received:    Relax and take it easy    Do NOT make any important or legal decisions    Do NOT drive or operate machines at home or at work    Do NOT drink alcohol    Care of Puncture Sites:      Keep the dressings on your sites clean & dry for 3 days. Change it only if it gets wet or dirty.    You may shower after the dressing comes off in 3 days    Do not remove the small white strips of tape, if present. Allow them to fall off on their own.     You may cover the wound with a bandaid after the dressing is removed if needed for comfort      Activity       Avoid heavy lifting (greater than 10 pounds) or the overuse of your shoulder for 3 days    Bleeding:      If you start bleeding from the incision sites in your chest or neck - or have swelling in your neck, sit down and press on the site for 5-10 minutes.     If bleeding has not stopped after 10 minutes, call your provider.        Call 911 right away if you have heavy bleeding or bleeding that does not stop.      Medicines:      You may resume all  medications    Resume your Warfarin/Coumadin at your regular dose today. Follow up with your provider to have your INR rechecked    Resume your Platelet Inhibitors and Aspirin tomorrow at your regular dose    For minor pain, you may take Acetaminophen (Tylenol) or Ibuprofen (Advil)    Call the provider who ordered this procedure if:      You have swelling in your neck or over your port site    The incision area is red, swollen, hot or tender    You have chills or a fever greater than 101 F (38 C)    Any questions or concerns    Call  911 or go to the Emergency Room if you have:      Severe chest pain or trouble breathing    Bleeding that you cannot control    Additional Information:      Your port may be accessed right away.     You will need to have your port flushed every 30 days or after each use.      If you have questions call:          [facility] Radiology Dept @ [phone]      The provider who performed your procedure was Dr. Tran.

## 2017-12-05 NOTE — PROCEDURES
RADIOLOGY PROCEDURE NOTE  Patient name: Pastor Garibay  MRN: [id number]  : 1950    Pre-procedure diagnosis: IV access for chemotherapy  Post-procedure diagnosis: Same    Procedure Date/Time: 2017  3:58 PM  Procedure: Right IJ power port and catheter.  Tip at RA/SVC junction.  Heparinized.  No complications.  Ready for use.  Estimated blood loss: 10 ml  Specimen(s) collected with description: None  The patient tolerated the procedure well with no immediate complications.  Significant findings:  Please see above.    See imaging dictation for procedural details.    Provider name: Temo Tran  Assistant(s):None

## 2017-12-05 NOTE — IR NOTE
Interventional Radiology Intra-procedural Nursing Note    Patient Name: Pastor Garibay  Medical Record Number: [id number]  Today's Date: December 5, 2017    Start Time: 1455  End of procedure time: 1430  Procedure: port placement  Report given to: May Ortiz RN  Time pt departs:  1541      Other Notes: 6F port placement by dr heller. 2mg versed, 100mcg fentanyl, 500units heparin flush. 2g ancef given. Right chest and neck site CDI. No c/o pain. VSS.     YING MADRID

## 2017-12-05 NOTE — ANESTHESIA POSTPROCEDURE EVALUATION
Patient: Pastor Garibay    Procedure(s):  POWER PORT PLACEMENT ATTEMPTED, PLACEMENT OF ANGIO-SEAL VIP VASCULAR CLOSURE DEVICE - Wound Class: I-Clean   - Wound Class: I-Clean    Diagnosis:NON-HODGKINS LYMPHOMA   Diagnosis Additional Information: No value filed.    Anesthesia Type:  MAC    Note:  Anesthesia Post Evaluation    Patient location during evaluation: PACU  Patient participation: Able to fully participate in evaluation  Level of consciousness: awake  Pain management: adequate  Airway patency: patent  Cardiovascular status: acceptable  Respiratory status: acceptable  Hydration status: acceptable  PONV: controlled     Anesthetic complications: None          Last vitals:  Vitals:    12/05/17 1023   BP: 127/57   Resp: 16   Temp: 36.4  C (97.6  F)   SpO2: 95%         Electronically Signed By: Severo Leonard MD  December 5, 2017  12:37 PM

## 2017-12-06 NOTE — OP NOTE
DATE OF PROCEDURE:  12/05/2017      SURGEON:  Robbie Linda MD      ASSISTANT:  Ana Sun PA-C      PREOPERATIVE DIAGNOSIS:  Lymphoma.      POSTOPERATIVE DIAGNOSIS:  Lymphoma.      PROCEDURE:  Attempt at placement of [place] PowerPort implantable port, left subclavian vein.      ANESTHESIA:  Local with lidocaine 1% with epinephrine and sedation.      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  Placement of PowerPort is indicated for venous access.      DESCRIPTION OF PROCEDURE:  The patient was brought to the OR and placed in supine position.  Under IV sedation, the patient was placed into Trendelenburg.  Neck and upper chest were prepared and draped in the usual fashion using ChloraPrep.  Using a 16-gauge needle was thought to be the left subclavian vein was punctured.  There was adequate blood return.  A guidewire was advanced without any resistance.  The needle was removed.  The prior transverse incision was reopened.  The subcutaneous pocket was made inferior to the incision.  Then, an introducer with a peel-away sheath was introduced over the wire.  Guidewire sutures were removed.  Then the 8-Nepali catheter was advanced through the peel-away sheath without any resistance.  The peel-away sheath was removed.  At that time, it became obvious that the catheter was in the subclavian artery and not the subclavian vein.  The blood return was pulsatile.  The catheter was flushed with heparinized flush solution.      Dr. Temo Fulton from Interventional Radiology performed placement of an Angio-Seal successfully.  Please refer to his dictation.  Then the incision was closed in the usual fashion.      ESTIMATED BLOOD LOSS:  Minimal.        COUNTS:  Needle and sponge counts correct.         ROBBIE LINDA MD             D: 12/05/2017 17:47   T: 12/05/2017 20:47   MT: [id number]      Name:     PASTOR GARIBAY   MRN:      [id number]        Account:         [id number]   :      1950           Procedure Date: 2017      Document: [id number]

## 2018-01-10 ENCOUNTER — CARE COORDINATION (OUTPATIENT)
Dept: CARE COORDINATION | Facility: CLINIC | Age: 68
End: 2018-01-10

## 2018-01-10 NOTE — PROGRESS NOTES
Clinic Care Coordination Contact  [facility]/Voicemail    Referral Source: IP/TCU Report  Clinical Data: Care Coordinator Outreach  Outreach attempted x 1.  Left message on voicemail with call back information and requested return call.  Plan:  No further outreach by this CC.    Alecia Todd RN  [facility]  Care Coordination  [phone]

## 2018-05-09 ENCOUNTER — HOSPITAL ENCOUNTER (OUTPATIENT)
Facility: CLINIC | Age: 68
Discharge: HOME | End: 2018-05-09
Attending: COLON & RECTAL SURGERY | Admitting: COLON & RECTAL SURGERY
Payer: COMMERCIAL

## 2018-05-09 VITALS
BODY MASS INDEX: 34.8 KG/M2 | RESPIRATION RATE: 11 BRPM | DIASTOLIC BLOOD PRESSURE: 55 MMHG | HEIGHT: 69 IN | SYSTOLIC BLOOD PRESSURE: 112 MMHG | OXYGEN SATURATION: 96 % | WEIGHT: 235 LBS

## 2018-05-09 LAB — COLONOSCOPY: NORMAL

## 2018-05-09 PROCEDURE — 25000128 H RX IP 250 OP 636: Performed by: COLON & RECTAL SURGERY

## 2018-05-09 PROCEDURE — 45380 COLONOSCOPY AND BIOPSY: CPT | Performed by: COLON & RECTAL SURGERY

## 2018-05-09 PROCEDURE — 45385 COLONOSCOPY W/LESION REMOVAL: CPT | Performed by: COLON & RECTAL SURGERY

## 2018-05-09 PROCEDURE — 99153 MOD SED SAME PHYS/QHP EA: CPT

## 2018-05-09 PROCEDURE — 88305 TISSUE EXAM BY PATHOLOGIST: CPT | Performed by: COLON & RECTAL SURGERY

## 2018-05-09 PROCEDURE — G0500 MOD SEDAT ENDO SERVICE >5YRS: HCPCS | Performed by: COLON & RECTAL SURGERY

## 2018-05-09 PROCEDURE — 88305 TISSUE EXAM BY PATHOLOGIST: CPT | Mod: 26 | Performed by: COLON & RECTAL SURGERY

## 2018-05-09 RX ORDER — ONDANSETRON 2 MG/ML
4 INJECTION INTRAMUSCULAR; INTRAVENOUS EVERY 6 HOURS PRN
Status: DISCONTINUED | OUTPATIENT
Start: 2018-05-09 | End: 2018-05-09 | Stop reason: HOSPADM

## 2018-05-09 RX ORDER — DIPHENHYDRAMINE HYDROCHLORIDE 50 MG/ML
25 INJECTION INTRAMUSCULAR; INTRAVENOUS EVERY 4 HOURS PRN
Status: DISCONTINUED | OUTPATIENT
Start: 2018-05-09 | End: 2018-05-09 | Stop reason: HOSPADM

## 2018-05-09 RX ORDER — ENOXAPARIN SODIUM 100 MG/ML
150 INJECTION SUBCUTANEOUS EVERY 12 HOURS
Status: ON HOLD | COMMUNITY
End: 2018-12-10

## 2018-05-09 RX ORDER — DIPHENHYDRAMINE HCL 25 MG
25 CAPSULE ORAL EVERY 4 HOURS PRN
Status: DISCONTINUED | OUTPATIENT
Start: 2018-05-09 | End: 2018-05-09 | Stop reason: HOSPADM

## 2018-05-09 RX ORDER — FENTANYL CITRATE 50 UG/ML
INJECTION, SOLUTION INTRAMUSCULAR; INTRAVENOUS PRN
Status: DISCONTINUED | OUTPATIENT
Start: 2018-05-09 | End: 2018-05-09 | Stop reason: HOSPADM

## 2018-05-09 RX ORDER — ONDANSETRON 4 MG/1
4 TABLET, ORALLY DISINTEGRATING ORAL EVERY 6 HOURS PRN
Status: DISCONTINUED | OUTPATIENT
Start: 2018-05-09 | End: 2018-05-09 | Stop reason: HOSPADM

## 2018-05-09 RX ORDER — NALOXONE HYDROCHLORIDE 0.4 MG/ML
.1-.4 INJECTION, SOLUTION INTRAMUSCULAR; INTRAVENOUS; SUBCUTANEOUS
Status: DISCONTINUED | OUTPATIENT
Start: 2018-05-09 | End: 2018-05-09 | Stop reason: HOSPADM

## 2018-05-09 RX ORDER — LIDOCAINE 40 MG/G
CREAM TOPICAL
Status: DISCONTINUED | OUTPATIENT
Start: 2018-05-09 | End: 2018-05-09 | Stop reason: HOSPADM

## 2018-05-09 RX ORDER — PROCHLORPERAZINE MALEATE 5 MG
5 TABLET ORAL EVERY 6 HOURS PRN
Status: DISCONTINUED | OUTPATIENT
Start: 2018-05-09 | End: 2018-05-09 | Stop reason: HOSPADM

## 2018-05-09 RX ORDER — FLUMAZENIL 0.1 MG/ML
0.2 INJECTION, SOLUTION INTRAVENOUS
Status: DISCONTINUED | OUTPATIENT
Start: 2018-05-09 | End: 2018-05-09 | Stop reason: HOSPADM

## 2018-05-09 RX ORDER — ONDANSETRON 2 MG/ML
4 INJECTION INTRAMUSCULAR; INTRAVENOUS
Status: DISCONTINUED | OUTPATIENT
Start: 2018-05-09 | End: 2018-05-09 | Stop reason: HOSPADM

## 2018-05-10 LAB — COPATH REPORT: NORMAL

## 2018-06-04 ENCOUNTER — TRANSFERRED RECORDS (OUTPATIENT)
Dept: HEALTH INFORMATION MANAGEMENT | Facility: CLINIC | Age: 68
End: 2018-06-04

## 2018-06-07 ENCOUNTER — MEDICAL CORRESPONDENCE (OUTPATIENT)
Dept: HEALTH INFORMATION MANAGEMENT | Facility: CLINIC | Age: 68
End: 2018-06-07

## 2018-06-07 ENCOUNTER — TRANSFERRED RECORDS (OUTPATIENT)
Dept: HEALTH INFORMATION MANAGEMENT | Facility: CLINIC | Age: 68
End: 2018-06-07

## 2018-06-07 ENCOUNTER — TELEPHONE (OUTPATIENT)
Dept: OTHER | Facility: CLINIC | Age: 68
End: 2018-06-07

## 2018-06-07 NOTE — TELEPHONE ENCOUNTER
Med recs received from [facility] and in nurses office.     Per Dr. Flores, no further imaging needed.     Routing to  to coordinate OV with Dr. Flores for second opinon for persistent IVC thrombus as soon as possible.      Yancy Santiago, ANTONYN, RN

## 2018-06-07 NOTE — TELEPHONE ENCOUNTER
Karely from [facility] called, pt being referred to [facility] by Dr. Mike Olson, (requesting Dr. Flores) for second opinion of vena cava/IVC thrombus. Pt had recent CT scan at [facility].     Karely sending med recs and having imaging pushed for review.     Will discuss with Dr. Flores.     Yancy Santiago, ANTONYN, RN

## 2018-06-08 ENCOUNTER — OFFICE VISIT (OUTPATIENT)
Dept: OTHER | Facility: CLINIC | Age: 68
End: 2018-06-08
Attending: SURGERY
Payer: COMMERCIAL

## 2018-06-08 VITALS
SYSTOLIC BLOOD PRESSURE: 104 MMHG | BODY MASS INDEX: 35.55 KG/M2 | HEIGHT: 69 IN | DIASTOLIC BLOOD PRESSURE: 59 MMHG | WEIGHT: 240 LBS | HEART RATE: 79 BPM

## 2018-06-08 DIAGNOSIS — I82.90: Primary | ICD-10-CM

## 2018-06-08 PROCEDURE — 99243 OFF/OP CNSLTJ NEW/EST LOW 30: CPT | Mod: ZP | Performed by: SURGERY

## 2018-06-08 PROCEDURE — G0463 HOSPITAL OUTPT CLINIC VISIT: HCPCS

## 2018-06-08 NOTE — PROGRESS NOTES
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 Zafar.  This 68-year-old patient was diagnosed with his stage III-IV follicular non-Hodgkin's lymphoma in December 2014.  He received chemotherapy with a good response.  Follow-up scanning in October 2016 revealed no progression of the malignancy.  In October 2017 he was noted to have recurrence.  There was initial concern that this may have been related to a primary lung cancer he underwent thoracotomy on 11/17/2017 revealing this was consistent with recurrent follicular lymphoma.  He reinitiated chemotherapy on 12/14/2017.  Scan on 2/1/2018 revealed significant improvement of her lymphoma but a new inferior vena cava thrombus that extended into the right iliac vein.  He was placed on therapeutic Lovenox at that time and is continued with this.  Repeat scanning on 4/9/2018 revealed mild stable mediastinal nodes.  No change in the IV seat thrombus.  Repeat CT scan on 6/4/2018 confirms stable mild lymphadenopathy.  There is noted to be a slight increase in the IVC thrombus.    Because of the slight increase in the need for ongoing anticoagulation was recommended that the patient start Xarelto.  It was asked that a vascular opinion on this recommended therapy be done today.    Patient reports that he is feeling excellent this time with  return of his energy following his chemotherapy.  He has never had a history of any venous problems such as varicose veins-DVT-superficial thrombophlebitis.  He has never had any swelling in either leg including since he has been aware of the IVC thrombus.    PMH: Medications: Norvasc, TriCor, insulin-Lantus, metformin, Crestor.               He has been on Lovenox and plans on starting the Xarelto today            Medical: Non-Hodgkin's lymphoma                          Type 2 diabetes on insulin                           Hypertension                                Hyperlipidemia                           Sleep apnea                Surgical: Lymph node biopsy to diagnose lymphoma                           Right jugular Port-A-Cath still in place                            Thoracotomy for biopsy                            ENT surgery                He has never smoked.         FMH: Noncontributory with no known cancer.    Exam: Alert and appropriate.  Blood pressure 96/56 right arm and 104/59 left arm.  Pulse 79 regular.  Normal affect.  Chest = clear.  Right Port-A-Cath.  Cardiovascular = RR  Abdomen= obese, nontender  Extremities= no edema, no varicosities, normal sensation                +3 palpable dorsalis pedis and posterior tibial pulses bilaterally      I reviewed the CT scans with the patient including the scan from 6/4/2018.  This does reveal a filling defect on the right side of the inferior vena cava starting just beyond the right iliac vein extending to  2.5 cm below the renal veins.  This measures 1.4 cm in transverse diameter but is not affecting the luminal flow.  This is slightly increased from the previous CT scan on 4/9/2018.      Impression: Chronic right IVC nonobstructive thrombus.  Etiology was unclear since there was no obvious compression of the cava or extension from the legs associated with a DVT.  Certainly the patient was hypercoagulable with his cancer which puts him at higher risk.  This is now a chronic thrombus that has essentially no chance of embolization.  This is not causing any symptoms such as venous hypertension his lower extremities.  We do not think that any surgical or interventional treatment is indicated in his situation.  Lytic therapy,Possis treatment, or IVC stenting is unlikely to be successful due to the chronicity of the problem and also the luminal diameter is very adequate to prevent venous hypertension symptoms.                           I agree that anticoagulation is indicated.  We discussed the  risks and benefits of Xarelto.  I feel this is a good choice for the patient so we can stop the Lovenox.  Also he will be therapeutic at all times as long as he takes the medication.  He is aware of the potential risk of bleeding and difficulty reversing these newer anticoagulants.  He has a planned follow-up CT scan in December of this year.  There is a very high likelihood that the cable thrombus will not review resolve.  He will need to be decided at that time whether he should continue on chronic anticoagulation.  This will be more of a clinical decision since anticoagulation beyond 6 months to 1 year has never been studied whether he gives any additional benefit.                            We spent 40 minutes in the office today with over 50% in counseling discussing the situation.  He is very comfortable with conservative treatment of using the Xarelto.  He will follow-up with me as needed.      Ramon Flores MD     Please route or send letter to:  Primary Care Provider (PCP) and Dr Mike Rubalcava   [facility]

## 2018-06-08 NOTE — LETTER
[facility] 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 Zafar.  This 68-year-old patient was diagnosed with his stage III-IV follicular non-Hodgkin's lymphoma in 2014.  He received chemotherapy with a good response.  Follow-up scanning in 2016 revealed no progression of the malignancy.  In 2017 he was noted to have recurrence.  There was initial concern that this may have been related to a primary lung cancer he underwent thoracotomy on 2017 revealing this was consistent with recurrent follicular lymphoma.  He reinitiated chemotherapy on 2017. Scan on 2018 revealed significant improvement of her lymphoma but a new inferior vena cava thrombus that extended into the right iliac vein.  He was placed on therapeutic Lovenox at that time and is continued with this.  Repeat scanning on 2018 revealed mild stable mediastinal nodes.  No change in the IV seat thrombus.  Repeat CT scan on 2018 confirms stable mild lymphadenopathy.  There is noted to be a slight increase in the IVC thrombus.     Because of the slight increase in the need for ongoing anticoagulation was recommended that the patient start Xarelto.  It was asked that a vascular opinion on this recommended therapy be done today.     Patient reports that he is feeling excellent this time with  return of his energy following his chemotherapy.  He has never had a history of any venous problems such as varicose veins-DVT-superficial thrombophlebitis.  He has never had any swelling in either leg including since he has been aware of the IVC thrombus.     PMH: Medications: Norvasc, TriCor, insulin-Lantus, metformin, Crestor.            He has been on Lovenox and plans on starting the  Xarelto today            Medical: Non-Hodgkin's lymphoma                          Type 2 diabetes on insulin                           Hypertension                               Hyperlipidemia                           Sleep apnea                Surgical: Lymph node biopsy to diagnose lymphoma                           Right jugular Port-A-Cath still in place                            Thoracotomy for biopsy                            ENT surgery                 He has never smoked.          FMH: Noncontributory with no known cancer.     Exam: Alert and appropriate.  Blood pressure 96/56 right arm and 104/59 left arm.  Pulse 79 regular.  Normal affect.  Chest = clear.  Right Port-A-Cath.  Cardiovascular = RR  Abdomen= obese, nontender  Extremities= no edema, no varicosities, normal sensation  +3 palpable dorsalis pedis and posterior tibial pulses bilaterally     I reviewed the CT scans with the patient including the scan from 6/4/2018.  This does reveal a filling defect on the right side of the inferior vena cava starting just beyond the right iliac vein extending to  2.5 cm below the renal veins.  This measures 1.4 cm in transverse diameter but is not affecting the luminal flow.  This is slightly increased from the previous CT scan on 4/9/2018.      Impression: Chronic right IVC nonobstructive thrombus.  Etiology was unclear since there was no obvious compression of the cava or extension from the legs associated with a DVT. Certainly the patient was hypercoagulable with his cancer which puts him at higher risk.  This is now a chronic thrombus that has essentially no chance of embolization.  This is not causing any symptoms such as venous hypertension his lower extremities.  We do not think that any surgical or interventional treatment is indicated in his situation.  Lytic therapy,Possis treatment, or IVC stenting is unlikely to be successful due to the chronicity of the problem and also the luminal diameter is very  adequate to prevent venous hypertension symptoms.     I agree that anticoagulation is indicated.  We discussed the risks and benefits of Xarelto.  I feel this is a good choice for the patient so we can stop the Lovenox.  Also he will be therapeutic at all times as long as he takes the medication.  He is aware of the potential risk of bleeding and difficulty reversing these newer anticoagulants.  He has a planned follow-up CT scan in December of this year.  There is a very high likelihood that the cable thrombus will not review resolve.  He will need to be decided at that time whether he should continue on chronic anticoagulation.  This will be more of a clinical decision since anticoagulation beyond 6 months to 1 year has never been studied whether he gives any additional benefit.     We spent 40 minutes in the office today with over 50% in counseling discussing the situation. He is very comfortable with conservative treatment of using the Xarelto.  He will follow-up with me as needed.     Ramon

## 2018-06-08 NOTE — NURSING NOTE
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    Weight as of this encounter: 240 lb (108.9 kg).    Pain Score:  Data Unavailable        Yolis Springer      "

## 2018-06-08 NOTE — MR AVS SNAPSHOT
"              After Visit Summary   6/8/2018    Pastor Garibay    MRN: [id number]           Patient Information     Date Of Birth          1950        Visit Information        Provider Department      6/8/2018 8:45 AM Ramon Flores MD [facility] [place]      Today's Diagnoses     Propagating thrombus    -  1       Follow-ups after your visit        Follow-up notes from your care team     Return if symptoms worsen or fail to improve.      Who to contact     If you have questions or need follow up information about today's clinic visit or your schedule please contact [facility] directly at [phone].  Normal or non-critical lab and imaging results will be communicated to you by MyChart, letter or phone within 4 business days after the clinic has received the results. If you do not hear from us within 7 days, please contact the clinic through MyChart or phone. If you have a critical or abnormal lab result, we will notify you by phone as soon as possible.  Submit refill requests through [employer] or call your pharmacy and they will forward the refill request to us. Please allow 3 business days for your refill to be completed.          Additional Information About Your Visit        Care EveryWhere ID     This is your Care EveryWhere ID. This could be used by other organizations to access your [facility] medical records  [id number]        Your Vitals Were     Pulse Height BMI (Body Mass Index)             79 5' 9\" (1.753 m) 35.44 kg/m2          Blood Pressure from Last 3 Encounters:   06/08/18 104/59   05/09/18 112/55   12/05/17 107/63    Weight from Last 3 Encounters:   06/08/18 240 lb (108.9 kg)   05/09/18 235 lb (106.6 kg)   12/05/17 242 lb 8 oz (110 kg)              Today, you had the following     No orders found for display         Today's Medication Changes          These changes are accurate as of 6/8/18  9:42 AM. "  If you have any questions, ask your nurse or doctor.               Stop taking these medicines if you haven't already. Please contact your care team if you have questions.     HYDROmorphone 2 MG tablet   Commonly known as:  DILAUDID   Stopped by:  Ramon Flores MD                    Primary Care Provider Office Phone # Fax #    Francisco Armijo [phone]       [facility] [address]  [place] [address]        Equal Access to Services     [facility] : Hadii aad ku hadasho Soomaali, waaxda luqadaha, qaybta kaalmada adeegyada, waxay idiin hayaan adeeg kharash la'cristo . So [facility] [phone].    ATENCIÓN: Si habla español, tiene a bradford disposición servicios gratuitos de asistencia lingüística. Llame al [phone].    We comply with applicable federal civil rights laws and [place] laws. We do not discriminate on the basis of race, color, national origin, age, disability, sex, sexual orientation, or gender identity.            Thank you!     Thank you for choosing [facility] [place]  for your care. Our goal is always to provide you with excellent care. Hearing back from our patients is one way we can continue to improve our services. Please take a few minutes to complete the written survey that you may receive in the mail after your visit with us. Thank you!             Your Updated Medication List - Protect others around you: Learn how to safely use, store and throw away your medicines at www.disposemymeds.org.          This list is accurate as of 6/8/18  9:42 AM.  Always use your most recent med list.                   Brand Name Dispense Instructions for use Diagnosis    ADVIL PM PO      Take 2 tablets by mouth At Bedtime        AMLODIPINE BESYLATE PO      Take 5 mg by mouth At Bedtime        COLACE PO      Take 200 mg by mouth At Bedtime (2 x 100 mg tablet = 200 mg dose)        CRESTOR PO      Take 5 mg by mouth daily (0.5 x 10 mg tablet = 5 mg  dose)        enoxaparin 100 MG/ML Soln    LOVENOX     Inject 150 mg Subcutaneous every 12 hours        insulin glargine 100 UNIT/ML injection    LANTUS     Inject 52 Units Subcutaneous At Bedtime        * K-DUR PO      Take 40 mEq by mouth daily (2 x 20 mEq = 40 mEq dose)        * K-DUR PO      Take 20 mEq by mouth At Bedtime        LASIX PO      Take 40 mg by mouth daily        LISINOPRIL PO      Take 40 mg by mouth daily        * METFORMIN HCL PO      Take 1,000 mg by mouth daily (with breakfast) (2 x 500 mg = 1000 mg dose)        * METFORMIN HCL PO      Take 500 mg by mouth At Bedtime        NIACIN ER PO      Take 500 mg by mouth daily        NovoLOG FLEXPEN 100 UNIT/ML injection   Generic drug:  insulin aspart      Inject 0-50 Units Subcutaneous 3 times daily (with meals) Patient uses sliding scale based on Carbs and Blood Glucose        TRICOR PO      Take 145 mg by mouth daily        * Notice:  This list has 4 medication(s) that are the same as other medications prescribed for you. Read the directions carefully, and ask your doctor or other care provider to review them with you.

## 2018-12-05 ENCOUNTER — TRANSFERRED RECORDS (OUTPATIENT)
Dept: HEALTH INFORMATION MANAGEMENT | Facility: CLINIC | Age: 68
End: 2018-12-05

## 2018-12-10 ENCOUNTER — HOSPITAL ENCOUNTER (OUTPATIENT)
Facility: CLINIC | Age: 68
Discharge: HOME | End: 2018-12-10
Attending: RADIOLOGY | Admitting: RADIOLOGY
Payer: COMMERCIAL

## 2018-12-10 ENCOUNTER — APPOINTMENT (OUTPATIENT)
Dept: INTERVENTIONAL RADIOLOGY/VASCULAR | Facility: CLINIC | Age: 68
End: 2018-12-10
Attending: INTERNAL MEDICINE
Payer: COMMERCIAL

## 2018-12-10 VITALS
DIASTOLIC BLOOD PRESSURE: 54 MMHG | RESPIRATION RATE: 16 BRPM | SYSTOLIC BLOOD PRESSURE: 108 MMHG | OXYGEN SATURATION: 98 % | TEMPERATURE: 97.9 F

## 2018-12-10 DIAGNOSIS — Z51.89 TREATMENT: ICD-10-CM

## 2018-12-10 LAB
APTT PPP: 26 SEC (ref 22–37)
ERYTHROCYTE [DISTWIDTH] IN BLOOD BY AUTOMATED COUNT: 14.2 % (ref 10–15)
HCT VFR BLD AUTO: 36.1 % (ref 40–53)
HGB BLD-MCNC: 12.1 G/DL (ref 13.3–17.7)
INR PPP: 0.99 (ref 0.86–1.14)
MCH RBC QN AUTO: 30.1 PG (ref 26.5–33)
MCHC RBC AUTO-ENTMCNC: 33.5 G/DL (ref 31.5–36.5)
MCV RBC AUTO: 90 FL (ref 78–100)
PLATELET # BLD AUTO: 167 10E9/L (ref 150–450)
RBC # BLD AUTO: 4.02 10E12/L (ref 4.4–5.9)
WBC # BLD AUTO: 5.6 10E9/L (ref 4–11)

## 2018-12-10 PROCEDURE — 25000128 H RX IP 250 OP 636: Performed by: RADIOLOGY

## 2018-12-10 PROCEDURE — 36415 COLL VENOUS BLD VENIPUNCTURE: CPT

## 2018-12-10 PROCEDURE — 27211193 ZZ H WOUND GLUE CR1

## 2018-12-10 PROCEDURE — 40000854 ZZH STATISTIC SIMPLE TUBE INSERTION/CHARGE, PORT, CATH, FISTULOGRAM

## 2018-12-10 PROCEDURE — 77001 FLUOROGUIDE FOR VEIN DEVICE: CPT

## 2018-12-10 PROCEDURE — 36590 REMOVAL TUNNELED CV CATH: CPT

## 2018-12-10 PROCEDURE — 85610 PROTHROMBIN TIME: CPT | Performed by: RADIOLOGY

## 2018-12-10 PROCEDURE — 25000125 ZZHC RX 250

## 2018-12-10 PROCEDURE — 85730 THROMBOPLASTIN TIME PARTIAL: CPT | Performed by: RADIOLOGY

## 2018-12-10 PROCEDURE — 85027 COMPLETE CBC AUTOMATED: CPT | Performed by: RADIOLOGY

## 2018-12-10 PROCEDURE — 27210905 ZZH KIT CR7

## 2018-12-10 PROCEDURE — 25000128 H RX IP 250 OP 636

## 2018-12-10 PROCEDURE — 25000128 H RX IP 250 OP 636: Performed by: NURSE PRACTITIONER

## 2018-12-10 RX ORDER — FENTANYL CITRATE 50 UG/ML
25-50 INJECTION, SOLUTION INTRAMUSCULAR; INTRAVENOUS EVERY 5 MIN PRN
Status: DISCONTINUED | OUTPATIENT
Start: 2018-12-10 | End: 2018-12-10 | Stop reason: HOSPADM

## 2018-12-10 RX ORDER — LIDOCAINE HYDROCHLORIDE 10 MG/ML
INJECTION, SOLUTION INFILTRATION; PERINEURAL
Status: COMPLETED
Start: 2018-12-10 | End: 2018-12-10

## 2018-12-10 RX ORDER — DEXTROSE MONOHYDRATE 25 G/50ML
25-50 INJECTION, SOLUTION INTRAVENOUS
Status: DISCONTINUED | OUTPATIENT
Start: 2018-12-10 | End: 2018-12-10 | Stop reason: HOSPADM

## 2018-12-10 RX ORDER — FENTANYL CITRATE 50 UG/ML
INJECTION, SOLUTION INTRAMUSCULAR; INTRAVENOUS
Status: COMPLETED
Start: 2018-12-10 | End: 2018-12-10

## 2018-12-10 RX ORDER — LIDOCAINE 40 MG/G
CREAM TOPICAL
Status: DISCONTINUED | OUTPATIENT
Start: 2018-12-10 | End: 2018-12-10 | Stop reason: HOSPADM

## 2018-12-10 RX ORDER — FLUMAZENIL 0.1 MG/ML
0.2 INJECTION, SOLUTION INTRAVENOUS
Status: DISCONTINUED | OUTPATIENT
Start: 2018-12-10 | End: 2018-12-10 | Stop reason: HOSPADM

## 2018-12-10 RX ORDER — ACETAMINOPHEN 325 MG/1
650-975 TABLET ORAL
Status: CANCELLED | OUTPATIENT
Start: 2018-12-10

## 2018-12-10 RX ORDER — NICOTINE POLACRILEX 4 MG
15-30 LOZENGE BUCCAL
Status: DISCONTINUED | OUTPATIENT
Start: 2018-12-10 | End: 2018-12-10 | Stop reason: HOSPADM

## 2018-12-10 RX ORDER — CEFAZOLIN SODIUM 2 G/100ML
2 INJECTION, SOLUTION INTRAVENOUS
Status: COMPLETED | OUTPATIENT
Start: 2018-12-10 | End: 2018-12-10

## 2018-12-10 RX ORDER — NALOXONE HYDROCHLORIDE 0.4 MG/ML
.1-.4 INJECTION, SOLUTION INTRAMUSCULAR; INTRAVENOUS; SUBCUTANEOUS
Status: DISCONTINUED | OUTPATIENT
Start: 2018-12-10 | End: 2018-12-10 | Stop reason: HOSPADM

## 2018-12-10 RX ORDER — LIDOCAINE HYDROCHLORIDE 10 MG/ML
1-30 INJECTION, SOLUTION EPIDURAL; INFILTRATION; INTRACAUDAL; PERINEURAL
Status: COMPLETED | OUTPATIENT
Start: 2018-12-10 | End: 2018-12-10

## 2018-12-10 RX ADMIN — LIDOCAINE HYDROCHLORIDE 10 ML: 10 INJECTION, SOLUTION EPIDURAL; INFILTRATION; INTRACAUDAL; PERINEURAL at 11:36

## 2018-12-10 RX ADMIN — MIDAZOLAM HYDROCHLORIDE 1 MG: 1 INJECTION, SOLUTION INTRAMUSCULAR; INTRAVENOUS at 11:29

## 2018-12-10 RX ADMIN — FENTANYL CITRATE 50 MCG: 50 INJECTION, SOLUTION INTRAMUSCULAR; INTRAVENOUS at 11:29

## 2018-12-10 RX ADMIN — CEFAZOLIN SODIUM 2 G: 2 INJECTION, SOLUTION INTRAVENOUS at 10:58

## 2018-12-10 RX ADMIN — FENTANYL CITRATE 50 MCG: 50 INJECTION, SOLUTION INTRAMUSCULAR; INTRAVENOUS at 11:36

## 2018-12-10 RX ADMIN — LIDOCAINE HYDROCHLORIDE 10 ML: 10 INJECTION, SOLUTION INFILTRATION; PERINEURAL at 11:36

## 2018-12-10 RX ADMIN — MIDAZOLAM HYDROCHLORIDE 1 MG: 1 INJECTION, SOLUTION INTRAMUSCULAR; INTRAVENOUS at 11:36

## 2018-12-10 NOTE — DISCHARGE INSTRUCTIONS
Port Removal Discharge Instructions     After you go home:      Have an adult stay with you for the first 6 hours    You may resume your normal diet       For 24 hours - due to the sedation you received:    Relax and take it easy    Do NOT make any important or legal decisions    Do NOT drive or operate machines at home or at work    Do NOT drink alcohol    Care of Puncture Site:      Keep the dressings on your site clean & dry for 3 days. Change it only if it gets wet or dirty.    You may shower after the dressing comes off in 3 days    Do not remove the small white strips of tape, if present. Allow them to fall off on their own.     You may cover the wound with a bandaid after the dressing is removed if needed for comfort      Activity       Avoid heavy lifting (greater than 10 pounds) or the overuse of your shoulder for 3 days    Bleeding:      If you start bleeding from the incision site in your chest or have swelling in your neck, sit down and press on the site for 5-10 minutes.     If bleeding has not stopped after 10 minutes, call your provider.        Call 911 right away if you have heavy bleeding or bleeding that does not stop.      Medicines:      You may resume all medications    Resume your Warfarin/Coumadin at your regular dose today. Follow up with your provider to have your INR rechecked    Resume your Platelet Inhibitors and Aspirin tomorrow at your regular dose    For minor pain, you may take Acetaminophen (Tylenol) or Ibuprofen (Advil)          Call the provider who ordered this procedure if:      You have swelling in your neck or over your port site    The incision area is red, swollen, hot or tender    You have chills or a fever greater than 101 F (38 C)    Any questions or concerns    Call  911 or go to the Emergency Room if you have:      Severe chest pain or trouble breathing    Bleeding that you cannot control    If you have questions call:          [facility] @  [phone]    The provider who performed your procedure was Dr. Rubin.

## 2018-12-10 NOTE — PROGRESS NOTES
Patient and wife verbalize understanding of discharge instructions.  No further questions at this time.  Discharge to home.

## 2018-12-10 NOTE — PROCEDURES
RADIOLOGY POST PROCEDURE NOTE w/ SEDATION  Patient name: Pastor Garibay  MRN: [id number]  : 1950    Pre-procedure diagnosis: port no longer needed  Post-procedure diagnosis: Same    Procedure Date/Time: December 10, 2018  11:46 AM  Procedure: port removal.  Estimated blood loss: None  Specimen(s) collected with description: port removed    I determined this patient to be an appropriate candidate for the planned sedation and procedure and reassessed the patient IMMEDIATELY PRIOR to sedation and procedure.     The patient tolerated the procedure well with no immediate complications.  Significant findings:none    See imaging dictation for procedural details.    Provider name: Theresa Rubin  Assistant(s):None

## 2018-12-10 NOTE — PROGRESS NOTES
Interventional Radiology Intra-procedural Nursing Note    Patient Name: Pastor Garibay  Medical Record Number: [id number]  Today's Date: December 10, 2018    Start Time: 1133  End of procedure time: 1144  Procedure: Right Port Removal  Report given to: Rosanna CHUN, care suites  Time pt departs:  1150    Other Notes: Patient into IR suite 1 via cart with ancef infusing. Awake and alert to all spheres. VSS, normal sinus rhythm. Patient to table in supine position, prepped and draped with 2% chlorhexidine. Dr. Rubin in room, timeout and procedure started. Port removed without any issues. Patient tolerated procedure well. Versed 2 mg and fentanyl 100 mcg given. Debrief with Dr. Rubin, no complications. Incision clean, dry and intact. Patient back to care suites via cart. Phone report given.    Tammy Zavala RN

## 2018-12-10 NOTE — PROGRESS NOTES
Pt arrived ~1002.  VSS, afebrile.  IV started, labs sent and admission complete. Spouse at bedside and will be pt responsible adult.  Discharge instructions reviewed and questions answered.  Betty NP at bedside addressed questions and consent signed.  Waiting on lab results and pt will be ready for the procedure.

## 2018-12-10 NOTE — IP AVS SNAPSHOT
[place] [address] [place] [address]  Phone:  [phone]                                    After Visit Summary   12/10/2018    Pastor Gariaby    MRN: [id number]           After Visit Summary Signature Page    I have received my discharge instructions, and my questions have been answered. I have discussed any challenges I see with this plan with the nurse or doctor.    ..........................................................................................................................................  Patient/Patient Representative Signature      ..........................................................................................................................................  Patient Representative Print Name and Relationship to Patient    ..................................................               ................................................  Date                                   Time    ..........................................................................................................................................  Reviewed by Signature/Title    ...................................................              ..............................................  Date                                               Time          22EPIC Rev 08/18

## 2018-12-10 NOTE — PROGRESS NOTES
Interventional Radiology Pre-Procedure Sedation Assessment   Time of Assessment: 11:05 AM    Expected Level: Moderate Sedation    Indication: Sedation is required for the following type of Procedure: Right port a catheter removal with IV moderate sedation    Sedation and procedural consent: Risks, benefits and alternatives were discussed with Patient and Spouse    PO Intake: Appropriately NPO for procedure    ASA Class: Class 2 - MILD SYSTEMIC DISEASE, NO ACUTE PROBLEMS, NO FUNCTIONAL LIMITATIONS.    Mallampati: Grade 1:  Soft palate, uvula, tonsillar pillars, and posterior pharyngeal wall visible    Lungs: Lungs Clear with good breath sounds bilaterally    Heart: Normal heart sounds and rate    History and physical reviewed and no updates needed. I have reviewed the lab findings, diagnostic data, medications, and the plan for sedation. I have determined this patient to be an appropriate candidate for the planned sedation and procedure and have reassessed the patient IMMEDIATELY PRIOR to sedation and procedure.    Elin Lubin, APRN CNP

## 2019-06-03 ENCOUNTER — TRANSFERRED RECORDS (OUTPATIENT)
Dept: HEALTH INFORMATION MANAGEMENT | Facility: CLINIC | Age: 69
End: 2019-06-03

## 2019-06-13 ENCOUNTER — TRANSFERRED RECORDS (OUTPATIENT)
Dept: HEALTH INFORMATION MANAGEMENT | Facility: CLINIC | Age: 69
End: 2019-06-13

## 2019-06-21 ENCOUNTER — TRANSFERRED RECORDS (OUTPATIENT)
Dept: HEALTH INFORMATION MANAGEMENT | Facility: CLINIC | Age: 69
End: 2019-06-21

## 2019-07-18 ENCOUNTER — TRANSFERRED RECORDS (OUTPATIENT)
Dept: HEALTH INFORMATION MANAGEMENT | Facility: CLINIC | Age: 69
End: 2019-07-18

## 2020-05-01 ENCOUNTER — TELEPHONE (OUTPATIENT)
Dept: WOUND CARE | Facility: CLINIC | Age: 70
End: 2020-05-01

## 2020-05-01 NOTE — TELEPHONE ENCOUNTER
Attempted to call patient at work as requested by his spouse. Had been put on hold for too long and could not hold.

## 2020-05-01 NOTE — TELEPHONE ENCOUNTER
Spoke to the patient and he told me that he has had these wounds x 20 days and that he has been on 2 different antibiotics without any change in the wounds. Scheduled him on Friday as he has an MRI scheduled on Wednesday. Gave him the information for our location and phone number. He expressed understanding that his appointment is Friday at 7:15am.

## 2020-05-01 NOTE — TELEPHONE ENCOUNTER
New patient being referred to us from Dr. Francisco Armijo. Has 2 wounds on 2 different toes on left foot for about a month. Crusted over and not draining. Will send photo for review.

## 2020-05-06 ENCOUNTER — TRANSFERRED RECORDS (OUTPATIENT)
Dept: HEALTH INFORMATION MANAGEMENT | Facility: CLINIC | Age: 70
End: 2020-05-06

## 2020-05-08 ENCOUNTER — HOSPITAL ENCOUNTER (OUTPATIENT)
Dept: ULTRASOUND IMAGING | Facility: CLINIC | Age: 70
End: 2020-05-08
Attending: SURGERY
Payer: COMMERCIAL

## 2020-05-08 ENCOUNTER — TELEPHONE (OUTPATIENT)
Dept: OTHER | Facility: CLINIC | Age: 70
End: 2020-05-08

## 2020-05-08 ENCOUNTER — HOSPITAL ENCOUNTER (OUTPATIENT)
Dept: WOUND CARE | Facility: CLINIC | Age: 70
End: 2020-05-08
Payer: COMMERCIAL

## 2020-05-08 VITALS
RESPIRATION RATE: 18 BRPM | DIASTOLIC BLOOD PRESSURE: 52 MMHG | TEMPERATURE: 97.1 F | HEART RATE: 97 BPM | SYSTOLIC BLOOD PRESSURE: 103 MMHG

## 2020-05-08 DIAGNOSIS — I73.9 PAD (PERIPHERAL ARTERY DISEASE) (H): Primary | ICD-10-CM

## 2020-05-08 DIAGNOSIS — I73.9 PAD (PERIPHERAL ARTERY DISEASE) (H): ICD-10-CM

## 2020-05-08 DIAGNOSIS — L97.522 SKIN ULCER OF TOE OF LEFT FOOT WITH FAT LAYER EXPOSED (H): ICD-10-CM

## 2020-05-08 PROBLEM — D64.81 ANEMIA DUE TO ANTINEOPLASTIC CHEMOTHERAPY: Status: ACTIVE | Noted: 2018-06-07

## 2020-05-08 PROBLEM — R79.89 ABNORMAL LIVER FUNCTION TESTS: Status: ACTIVE | Noted: 2017-04-24

## 2020-05-08 PROBLEM — C85.90 LYMPHOMA, NON-HODGKIN'S (H): Status: ACTIVE | Noted: 2020-05-08

## 2020-05-08 PROBLEM — T45.1X5A ANEMIA DUE TO ANTINEOPLASTIC CHEMOTHERAPY: Status: ACTIVE | Noted: 2018-06-07

## 2020-05-08 PROBLEM — Z79.01 LONG TERM CURRENT USE OF ANTICOAGULANT THERAPY: Status: ACTIVE | Noted: 2018-02-08

## 2020-05-08 PROCEDURE — 93925 LOWER EXTREMITY STUDY: CPT

## 2020-05-08 PROCEDURE — 99214 OFFICE O/P EST MOD 30 MIN: CPT | Performed by: SURGERY

## 2020-05-08 PROCEDURE — 93922 UPR/L XTREMITY ART 2 LEVELS: CPT

## 2020-05-08 PROCEDURE — G0463 HOSPITAL OUTPT CLINIC VISIT: HCPCS | Mod: 25

## 2020-05-08 PROCEDURE — 97602 WOUND(S) CARE NON-SELECTIVE: CPT

## 2020-05-08 RX ORDER — RIVAROXABAN 20 MG/1
20 TABLET, FILM COATED ORAL DAILY
Status: ON HOLD | COMMUNITY
Start: 2020-04-29 | End: 2020-06-21

## 2020-05-08 NOTE — TELEPHONE ENCOUNTER
May 8, 2020    Anabelle from  [facility] called regarding a referral sent to [facility] for PAD.     Requesting that we get this patient seen today as he is currently at their office.     Routing to RN Triage.    Lucia Garcia    [facility]  Office: [phone]  Fax [phone]

## 2020-05-08 NOTE — TELEPHONE ENCOUNTER
I spoke with Anabelle, Dr. Carpenter ordered US lower extremity arterial duplex bilateral and LAURA no exercise, pt being referred for PAD.     Images to be done today and then  to coordinate appt with Dr. Flores, preferably this coming Thursday.     I will call pt with time of imaging today. Pt waiting in car in parking lot.     ANTONY BorjasN, RN  [facility] [place]

## 2020-05-08 NOTE — PROGRESS NOTES
Patient arrived for wound care visit. Certified Wound Care Nurse time spent evaluating patient record, completed a full evaluation and documented wound(s) & zhao-wound skin; provided recommendation based on treatment plan. Applied dressing, reviewed discharge instructions, patient education, and discussed plan of care with appropriate medical team staff members and patient and/or family members.

## 2020-05-08 NOTE — TELEPHONE ENCOUNTER
Late charting,     I called pt and advised him to come into clinic now for imaging.   I inquired about any current symptoms:  Fever: no  Cough: chronic cough since July due to radiation on lungs, no changes to cough since then (updated imaging tech of this).   SOB: no   Any other symptoms: no    Pts wife to wait in the car and not come into office. Pt notes understanding.    ANTONY BorjasN, RN  [facility]

## 2020-05-08 NOTE — TELEPHONE ENCOUNTER
Called pt to setup Video visit, left vm.     Follow up visit with Dr. Flores this next Thursday 5/14/20 at 1:00pm.     ANTONY BorjasN, RN  [facility] [place]

## 2020-05-08 NOTE — TELEPHONE ENCOUNTER
Routing to Dr. Flores to verify okay for Video visit versus in person visit:    Pt referred by Dr. Carpenter for PAD, Imaging in Epic (LAURA and bilateral LE arterial). 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   Pt hx of stage III-IV non hodkins lymphoma, chronic cough post radiation to lung in July 2019.     Yancy Santiago, ANTONYN, RN  [facility] [place]

## 2020-05-08 NOTE — DISCHARGE INSTRUCTIONS
[facility]  [address] [address]    Call us at [phone] if you have any questions about your wounds, have redness or swelling around your wound, have a fever of 101 or greater or if you have any other problems or concerns. We answer the phone Monday through Friday 8 am to 4 pm, please leave a message as we check the voicemail frequently throughout the day.     Pastor Garibay      1950    Add in 1 packet of Kyle Supplement into your favorite beverage TWICE a day. You may purchase at [facility] outpatient pharmacy located in [facility].  Vitamin D3 10,000 international unit(s) once a day    Wound Dressing Change:2 and 3 Toe  Cleanse wound and surrounding skin with: soap and water in shower  Paint each wound with betadine swab  Change dressing twice a day       MARY Carpenter M.D.. May 8, 2020    Follow up with Provider -   [place] [facility] [phone]

## 2020-05-13 NOTE — PROGRESS NOTES
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 Carpenter for non-healing toe ulcerations involving his left dorsal second and third toes.  Wound care was initiated on the visit on 5/8/2020.  Initiated on Kyle protein supplements along with vitamin D 10,000 units daily.      Significant past history of non-Hodgkin's lymphoma treated with chemotherapy in 2016.  Recurrence 2017 and underwent thoracotomy with removal of a lung metastasis.  July 2019 received radiation under care of Dr Alexander.      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      Also has a history of adult onset insulin-dependent diabetes 1991, hypertension, hyperlipidemia, sleep apnea.  Lymphoma as described above no known cardiac disease.      PMH: Medications: Norvasc, NovoLog/Lantus, lisinopril, metformin, Crestor,                                          Xarelto, TriCor         [facility].  At that time he had +3 palpable dorsalis pedis and posterior tibial pulses bilaterally.  We did notice the filling defect in the IVC starting just beyond the right iliac vein extending 2.5 cm below the renal vein measuring 1.4 cm in transverse diameter but not affecting luminal flow.      LAURA was performed on 5/8/2020.  This is 1.21 on the right posterior tibial and 1.26 in the left dorsalis pedis artery.  Left PT was noncompressible.  Toe index was 0.66 and the right and 0.67 on the left.  LAURA waveforms were multiphasic will somewhat dampened on the toe pressures but consistent with minimal arterial insufficiency.    Arterial duplex performed 5/8/2020 bilaterally revealed no obvious infrainguinal or tibial arterial occlusions of the peroneal artery was more difficult to visualize.  This did have normal velocities with no evidence of  stenosis appreciated.  Multiple calcifications were noted in the arteries bilaterally.    I spoke with Pastor and his wife on the phone today due to the COVID 19 pandemic trying avoid office visits.  I was able to read Dr. Carpenter's wound care notes and also the photographs of the ulcerations on the distal dorsal portions of his left second and third toes which have some erythema and swelling.  Also a scab on the tip of the second toe with some onychomycotic nail changes.  Rest of the toes appear to be normal.      Pastor reports that his diabetes overall has been in good control with most recent hemoglobin A1c is 7.1.  He denies any significant diabetic peripheral neuropathy.  These lesions developed spontaneously 5 or 6 weeks ago.  His shoes may have been somewhat tight and subsequently he has started wearing a larger  new balance shoe that has more space for his toe to avoid any pressure.    Originally Dr. Armijo treated him with antibiotics after the erythema developed.  He has had approximately 20-day course of treatment of 2 different types of antibiotics with no significant improvement.  No longer on antibiotics.      Has experienced some nausea and decreased appetite.  He has the lymphoma as described above.  Actually scheduled to get a repeat scan tomorrow to see if this is an issue explaining his more generalized symptoms.      He did undergo an MRI of his foot with contrast on 5/6/2020 at [facility].  This was reviewed.  There is tissue loss in the tips of the second and third toes on the left with high likelihood of osteomyelitis.  No bony destruction noted.  No other findings appreciated.      Impression: Distal diabetic PAD with adequate circulation to the forefoot by LAURA there is some evidence of calcification of the arteries as described.  He has the ulceration of the second and third toes on the left with high likelihood of osteomyelitis.  Very unlikely this will heal on its own and we discussed  this at length on the phone today.  I do feel the patient's best option would be to perform amputations of the left second and third toes with attempts at primary closure.  Should have enough blood supply for this to heal though we cannot be 100% confident and this is been discussed.            Patient would have to somewhat limit his walking to allow a better chance of the sites to heal.  Fortunately, there is no specific weightbearing on the second and third toes themselves which means that he would not have to be strictly nonweightbearing but limit his activity.  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                  Spent 25 minutes on the phone today discussing the situation.  He plans to get the scan for his lymphoma tomorrow and make decisions on timing for the toe amputation following this.  We would hold his Xarelto for 2 days and perform this as an outpatient surgery perhaps under an ankle block.      Ramon Flores MD     CC  Patient Care Team:  Francicso Armijo MD as PCP - General (Family Practice)

## 2020-05-14 ENCOUNTER — TRANSFERRED RECORDS (OUTPATIENT)
Dept: HEALTH INFORMATION MANAGEMENT | Facility: CLINIC | Age: 70
End: 2020-05-14

## 2020-05-14 ENCOUNTER — VIRTUAL VISIT (OUTPATIENT)
Dept: OTHER | Facility: CLINIC | Age: 70
End: 2020-05-14
Attending: SURGERY
Payer: COMMERCIAL

## 2020-05-14 VITALS — WEIGHT: 206 LBS | BODY MASS INDEX: 30.42 KG/M2

## 2020-05-14 DIAGNOSIS — M86.9 OSTEOMYELITIS OF SECOND TOE OF LEFT FOOT (H): ICD-10-CM

## 2020-05-14 DIAGNOSIS — I73.9 PAD (PERIPHERAL ARTERY DISEASE) (H): Primary | ICD-10-CM

## 2020-05-14 DIAGNOSIS — M86.9 OSTEOMYELITIS OF THIRD TOE OF LEFT FOOT (H): ICD-10-CM

## 2020-05-14 NOTE — PATIENT INSTRUCTIONS
[facility]    Dr. Flores is recommending the following surgery:  Left second and third toe amputations.    Please call us after you have completed your imaging for your lymphoma work up and decide you would like to proceed with scheduling the surgery.  We will review preoperative instructions at that time.    For questions or concerns regarding this visit, please call [phone].

## 2020-05-15 DIAGNOSIS — Z11.59 ENCOUNTER FOR SCREENING FOR OTHER VIRAL DISEASES: Primary | ICD-10-CM

## 2020-05-15 DIAGNOSIS — M86.9 OSTEOMYELITIS OF SECOND TOE OF LEFT FOOT (H): Primary | ICD-10-CM

## 2020-05-15 DIAGNOSIS — M86.9 OSTEOMYELITIS OF THIRD TOE OF LEFT FOOT (H): ICD-10-CM

## 2020-05-19 ENCOUNTER — OFFICE VISIT (OUTPATIENT)
Dept: URGENT CARE | Facility: URGENT CARE | Age: 70
End: 2020-05-19
Attending: SURGERY
Payer: COMMERCIAL

## 2020-05-19 DIAGNOSIS — Z11.59 ENCOUNTER FOR SCREENING FOR OTHER VIRAL DISEASES: ICD-10-CM

## 2020-05-19 PROCEDURE — U0003 INFECTIOUS AGENT DETECTION BY NUCLEIC ACID (DNA OR RNA); SEVERE ACUTE RESPIRATORY SYNDROME CORONAVIRUS 2 (SARS-COV-2) (CORONAVIRUS DISEASE [COVID-19]), AMPLIFIED PROBE TECHNIQUE, MAKING USE OF HIGH THROUGHPUT TECHNOLOGIES AS DESCRIBED BY CMS-2020-01-R: HCPCS | Mod: 90 | Performed by: SURGERY

## 2020-05-19 PROCEDURE — 99000 SPECIMEN HANDLING OFFICE-LAB: CPT | Performed by: SURGERY

## 2020-05-19 PROCEDURE — 99207 ZZC NO BILLABLE SERVICE THIS VISIT: CPT

## 2020-05-20 ENCOUNTER — TELEPHONE (OUTPATIENT)
Dept: INTERVENTIONAL RADIOLOGY/VASCULAR | Facility: CLINIC | Age: 70
End: 2020-05-20

## 2020-05-20 ENCOUNTER — TRANSFERRED RECORDS (OUTPATIENT)
Dept: HEALTH INFORMATION MANAGEMENT | Facility: CLINIC | Age: 70
End: 2020-05-20

## 2020-05-20 DIAGNOSIS — Z11.59 ENCOUNTER FOR SCREENING FOR OTHER VIRAL DISEASES: Primary | ICD-10-CM

## 2020-05-20 LAB
SARS-COV-2 RNA SPEC QL NAA+PROBE: NOT DETECTED
SPECIMEN SOURCE: NORMAL

## 2020-05-20 NOTE — TELEPHONE ENCOUNTER
We received a call from [facility] office stating that they would like the biopsy to be done sooner than the 27th of May (today is the 20th) When we contacted scheduling about this they said the patient had surgery scheduled for the 22nd and then Monday is a holiday, therefore the 27th was a choice decided on by the patient. This RN left a message for the office with this information.

## 2020-05-20 NOTE — TELEPHONE ENCOUNTER
Patient approved for adrenal gland mass biopsy under conscious sedation by Dr Dunaway on 5/20/2020.  Both right and left sides have masses radiologist to determine side.    Patient is on Xarelto and will need a two day hold (see orders).   Covid test negative.  Patient will need H&P by CAROLYN Montez RN, BSN

## 2020-05-21 ENCOUNTER — TELEPHONE (OUTPATIENT)
Dept: OTHER | Facility: CLINIC | Age: 70
End: 2020-05-21

## 2020-05-21 ENCOUNTER — ANESTHESIA EVENT (OUTPATIENT)
Dept: SURGERY | Facility: CLINIC | Age: 70
End: 2020-05-21
Payer: COMMERCIAL

## 2020-05-21 NOTE — TELEPHONE ENCOUNTER
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  He had no further questions.      Ramon Flores MD

## 2020-05-22 ENCOUNTER — ANESTHESIA (OUTPATIENT)
Dept: SURGERY | Facility: CLINIC | Age: 70
End: 2020-05-22
Payer: COMMERCIAL

## 2020-05-22 ENCOUNTER — TELEPHONE (OUTPATIENT)
Dept: MEDSURG UNIT | Facility: CLINIC | Age: 70
End: 2020-05-22

## 2020-05-22 ENCOUNTER — APPOINTMENT (OUTPATIENT)
Dept: SURGERY | Facility: PHYSICIAN GROUP | Age: 70
End: 2020-05-22
Payer: COMMERCIAL

## 2020-05-22 ENCOUNTER — HOSPITAL ENCOUNTER (OUTPATIENT)
Facility: CLINIC | Age: 70
Discharge: HOME | End: 2020-05-22
Attending: SURGERY | Admitting: SURGERY
Payer: COMMERCIAL

## 2020-05-22 VITALS
HEART RATE: 99 BPM | TEMPERATURE: 98.6 F | SYSTOLIC BLOOD PRESSURE: 88 MMHG | WEIGHT: 202 LBS | HEIGHT: 68 IN | DIASTOLIC BLOOD PRESSURE: 54 MMHG | BODY MASS INDEX: 30.62 KG/M2 | OXYGEN SATURATION: 94 % | RESPIRATION RATE: 14 BRPM

## 2020-05-22 DIAGNOSIS — M86.9 OSTEOMYELITIS OF SECOND TOE OF LEFT FOOT (H): ICD-10-CM

## 2020-05-22 DIAGNOSIS — M86.9 OSTEOMYELITIS OF THIRD TOE OF LEFT FOOT (H): ICD-10-CM

## 2020-05-22 LAB
ANION GAP SERPL CALCULATED.3IONS-SCNC: 9 MMOL/L (ref 3–14)
BUN SERPL-MCNC: 40 MG/DL (ref 7–30)
CALCIUM SERPL-MCNC: 9.1 MG/DL (ref 8.5–10.1)
CHLORIDE SERPL-SCNC: 102 MMOL/L (ref 94–109)
CO2 SERPL-SCNC: 24 MMOL/L (ref 20–32)
CREAT SERPL-MCNC: 1.81 MG/DL (ref 0.66–1.25)
GFR SERPL CREATININE-BSD FRML MDRD: 38 ML/MIN/{1.73_M2}
GLUCOSE BLDC GLUCOMTR-MCNC: 108 MG/DL (ref 70–99)
GLUCOSE BLDC GLUCOMTR-MCNC: 114 MG/DL (ref 70–99)
GLUCOSE BLDC GLUCOMTR-MCNC: 118 MG/DL (ref 70–99)
GLUCOSE BLDC GLUCOMTR-MCNC: 74 MG/DL (ref 70–99)
GLUCOSE SERPL-MCNC: 64 MG/DL (ref 70–99)
HBA1C MFR BLD: 7.3 % (ref 0–5.6)
POTASSIUM SERPL-SCNC: 5 MMOL/L (ref 3.4–5.3)
SODIUM SERPL-SCNC: 135 MMOL/L (ref 133–144)

## 2020-05-22 PROCEDURE — 27210794 ZZH OR GENERAL SUPPLY STERILE: Performed by: SURGERY

## 2020-05-22 PROCEDURE — 25000125 ZZHC RX 250: Performed by: NURSE ANESTHETIST, CERTIFIED REGISTERED

## 2020-05-22 PROCEDURE — 71000013 ZZH RECOVERY PHASE 1 LEVEL 1 EA ADDTL HR: Performed by: SURGERY

## 2020-05-22 PROCEDURE — 36415 COLL VENOUS BLD VENIPUNCTURE: CPT | Performed by: SURGERY

## 2020-05-22 PROCEDURE — 37000008 ZZH ANESTHESIA TECHNICAL FEE, 1ST 30 MIN: Performed by: SURGERY

## 2020-05-22 PROCEDURE — 25800030 ZZH RX IP 258 OP 636: Performed by: NURSE ANESTHETIST, CERTIFIED REGISTERED

## 2020-05-22 PROCEDURE — 25800030 ZZH RX IP 258 OP 636: Performed by: ANESTHESIOLOGY

## 2020-05-22 PROCEDURE — 36000052 ZZH SURGERY LEVEL 2 EA 15 ADDTL MIN: Performed by: SURGERY

## 2020-05-22 PROCEDURE — 82962 GLUCOSE BLOOD TEST: CPT

## 2020-05-22 PROCEDURE — 71000027 ZZH RECOVERY PHASE 2 EACH 15 MINS: Performed by: SURGERY

## 2020-05-22 PROCEDURE — 25000125 ZZHC RX 250: Performed by: SURGERY

## 2020-05-22 PROCEDURE — 71000012 ZZH RECOVERY PHASE 1 LEVEL 1 FIRST HR: Performed by: SURGERY

## 2020-05-22 PROCEDURE — 25000125 ZZHC RX 250: Performed by: ANESTHESIOLOGY

## 2020-05-22 PROCEDURE — 25000128 H RX IP 250 OP 636: Performed by: NURSE ANESTHETIST, CERTIFIED REGISTERED

## 2020-05-22 PROCEDURE — 25000128 H RX IP 250 OP 636: Performed by: SURGERY

## 2020-05-22 PROCEDURE — 40000170 ZZH STATISTIC PRE-PROCEDURE ASSESSMENT II: Performed by: SURGERY

## 2020-05-22 PROCEDURE — 37000009 ZZH ANESTHESIA TECHNICAL FEE, EACH ADDTL 15 MIN: Performed by: SURGERY

## 2020-05-22 PROCEDURE — 83036 HEMOGLOBIN GLYCOSYLATED A1C: CPT | Performed by: SURGERY

## 2020-05-22 PROCEDURE — 80048 BASIC METABOLIC PNL TOTAL CA: CPT | Performed by: SURGERY

## 2020-05-22 PROCEDURE — 25800025 ZZH RX 258: Performed by: NURSE ANESTHETIST, CERTIFIED REGISTERED

## 2020-05-22 PROCEDURE — 36000050 ZZH SURGERY LEVEL 2 1ST 30 MIN: Performed by: SURGERY

## 2020-05-22 PROCEDURE — 28825 PARTIAL AMPUTATION OF TOE: CPT | Mod: T2 | Performed by: SURGERY

## 2020-05-22 PROCEDURE — 25000128 H RX IP 250 OP 636: Performed by: ANESTHESIOLOGY

## 2020-05-22 RX ORDER — ONDANSETRON 2 MG/ML
4 INJECTION INTRAMUSCULAR; INTRAVENOUS EVERY 30 MIN PRN
Status: DISCONTINUED | OUTPATIENT
Start: 2020-05-22 | End: 2020-05-22 | Stop reason: HOSPADM

## 2020-05-22 RX ORDER — FENTANYL CITRATE 50 UG/ML
50-100 INJECTION, SOLUTION INTRAMUSCULAR; INTRAVENOUS
Status: COMPLETED | OUTPATIENT
Start: 2020-05-22 | End: 2020-05-22

## 2020-05-22 RX ORDER — FENTANYL CITRATE 0.05 MG/ML
25-50 INJECTION, SOLUTION INTRAMUSCULAR; INTRAVENOUS
Status: DISCONTINUED | OUTPATIENT
Start: 2020-05-22 | End: 2020-05-22 | Stop reason: HOSPADM

## 2020-05-22 RX ORDER — ONDANSETRON 2 MG/ML
INJECTION INTRAMUSCULAR; INTRAVENOUS PRN
Status: DISCONTINUED | OUTPATIENT
Start: 2020-05-22 | End: 2020-05-22

## 2020-05-22 RX ORDER — HYDROMORPHONE HYDROCHLORIDE 1 MG/ML
.3-.5 INJECTION, SOLUTION INTRAMUSCULAR; INTRAVENOUS; SUBCUTANEOUS EVERY 10 MIN PRN
Status: DISCONTINUED | OUTPATIENT
Start: 2020-05-22 | End: 2020-05-22 | Stop reason: HOSPADM

## 2020-05-22 RX ORDER — OXYCODONE HYDROCHLORIDE 5 MG/1
5 TABLET ORAL EVERY 6 HOURS PRN
Qty: 12 TABLET | Refills: 0 | Status: ON HOLD | OUTPATIENT
Start: 2020-05-22 | End: 2020-06-05

## 2020-05-22 RX ORDER — PROPOFOL 10 MG/ML
INJECTION, EMULSION INTRAVENOUS CONTINUOUS PRN
Status: DISCONTINUED | OUTPATIENT
Start: 2020-05-22 | End: 2020-05-22

## 2020-05-22 RX ORDER — OXYCODONE HYDROCHLORIDE 5 MG/1
5 TABLET ORAL EVERY 4 HOURS PRN
Status: DISCONTINUED | OUTPATIENT
Start: 2020-05-22 | End: 2020-05-22 | Stop reason: HOSPADM

## 2020-05-22 RX ORDER — NALOXONE HYDROCHLORIDE 0.4 MG/ML
.1-.4 INJECTION, SOLUTION INTRAMUSCULAR; INTRAVENOUS; SUBCUTANEOUS
Status: DISCONTINUED | OUTPATIENT
Start: 2020-05-22 | End: 2020-05-22 | Stop reason: HOSPADM

## 2020-05-22 RX ORDER — ONDANSETRON 4 MG/1
4 TABLET, ORALLY DISINTEGRATING ORAL EVERY 30 MIN PRN
Status: DISCONTINUED | OUTPATIENT
Start: 2020-05-22 | End: 2020-05-22 | Stop reason: HOSPADM

## 2020-05-22 RX ORDER — FENTANYL CITRATE 50 UG/ML
INJECTION, SOLUTION INTRAMUSCULAR; INTRAVENOUS PRN
Status: DISCONTINUED | OUTPATIENT
Start: 2020-05-22 | End: 2020-05-22

## 2020-05-22 RX ORDER — DEXTROSE MONOHYDRATE 25 G/50ML
INJECTION, SOLUTION INTRAVENOUS PRN
Status: DISCONTINUED | OUTPATIENT
Start: 2020-05-22 | End: 2020-05-22

## 2020-05-22 RX ORDER — FENTANYL CITRATE 50 UG/ML
25-50 INJECTION, SOLUTION INTRAMUSCULAR; INTRAVENOUS
Status: DISCONTINUED | OUTPATIENT
Start: 2020-05-22 | End: 2020-05-22 | Stop reason: HOSPADM

## 2020-05-22 RX ORDER — CEFAZOLIN SODIUM 2 G/100ML
2 INJECTION, SOLUTION INTRAVENOUS
Status: COMPLETED | OUTPATIENT
Start: 2020-05-22 | End: 2020-05-22

## 2020-05-22 RX ORDER — EPHEDRINE SULFATE 50 MG/ML
INJECTION, SOLUTION INTRAMUSCULAR; INTRAVENOUS; SUBCUTANEOUS PRN
Status: DISCONTINUED | OUTPATIENT
Start: 2020-05-22 | End: 2020-05-22

## 2020-05-22 RX ORDER — MEPERIDINE HYDROCHLORIDE 25 MG/ML
12.5 INJECTION INTRAMUSCULAR; INTRAVENOUS; SUBCUTANEOUS
Status: DISCONTINUED | OUTPATIENT
Start: 2020-05-22 | End: 2020-05-22 | Stop reason: HOSPADM

## 2020-05-22 RX ORDER — SODIUM CHLORIDE, SODIUM LACTATE, POTASSIUM CHLORIDE, CALCIUM CHLORIDE 600; 310; 30; 20 MG/100ML; MG/100ML; MG/100ML; MG/100ML
INJECTION, SOLUTION INTRAVENOUS CONTINUOUS
Status: DISCONTINUED | OUTPATIENT
Start: 2020-05-22 | End: 2020-05-22 | Stop reason: HOSPADM

## 2020-05-22 RX ORDER — BUPIVACAINE HYDROCHLORIDE 5 MG/ML
INJECTION, SOLUTION EPIDURAL; INTRACAUDAL PRN
Status: DISCONTINUED | OUTPATIENT
Start: 2020-05-22 | End: 2020-05-22

## 2020-05-22 RX ORDER — BUPIVACAINE HYDROCHLORIDE 5 MG/ML
INJECTION, SOLUTION PERINEURAL PRN
Status: DISCONTINUED | OUTPATIENT
Start: 2020-05-22 | End: 2020-05-22 | Stop reason: HOSPADM

## 2020-05-22 RX ADMIN — SODIUM CHLORIDE, POTASSIUM CHLORIDE, SODIUM LACTATE AND CALCIUM CHLORIDE: 600; 310; 30; 20 INJECTION, SOLUTION INTRAVENOUS at 10:12

## 2020-05-22 RX ADMIN — Medication 5 MG: at 10:43

## 2020-05-22 RX ADMIN — FENTANYL CITRATE 50 MCG: 50 INJECTION, SOLUTION INTRAMUSCULAR; INTRAVENOUS at 10:16

## 2020-05-22 RX ADMIN — PHENYLEPHRINE HYDROCHLORIDE 100 MCG: 10 INJECTION INTRAVENOUS at 10:37

## 2020-05-22 RX ADMIN — BUPIVACAINE HYDROCHLORIDE 20 ML: 5 INJECTION, SOLUTION EPIDURAL; INTRACAUDAL at 10:13

## 2020-05-22 RX ADMIN — Medication 5 MG: at 10:56

## 2020-05-22 RX ADMIN — Medication 5 MG: at 10:49

## 2020-05-22 RX ADMIN — ONDANSETRON 4 MG: 2 INJECTION INTRAMUSCULAR; INTRAVENOUS at 10:27

## 2020-05-22 RX ADMIN — CEFAZOLIN SODIUM 2 G: 2 INJECTION, SOLUTION INTRAVENOUS at 10:22

## 2020-05-22 RX ADMIN — MIDAZOLAM 1 MG: 1 INJECTION INTRAMUSCULAR; INTRAVENOUS at 10:13

## 2020-05-22 RX ADMIN — PHENYLEPHRINE HYDROCHLORIDE 100 MCG: 10 INJECTION INTRAVENOUS at 10:25

## 2020-05-22 RX ADMIN — PHENYLEPHRINE HYDROCHLORIDE 100 MCG: 10 INJECTION INTRAVENOUS at 10:22

## 2020-05-22 RX ADMIN — SODIUM CHLORIDE, POTASSIUM CHLORIDE, SODIUM LACTATE AND CALCIUM CHLORIDE: 600; 310; 30; 20 INJECTION, SOLUTION INTRAVENOUS at 12:08

## 2020-05-22 RX ADMIN — MIDAZOLAM 1 MG: 1 INJECTION INTRAMUSCULAR; INTRAVENOUS at 10:19

## 2020-05-22 RX ADMIN — Medication 5 MG: at 10:35

## 2020-05-22 RX ADMIN — PHENYLEPHRINE HYDROCHLORIDE 100 MCG: 10 INJECTION INTRAVENOUS at 10:43

## 2020-05-22 RX ADMIN — PROPOFOL 100 MCG/KG/MIN: 10 INJECTION, EMULSION INTRAVENOUS at 10:17

## 2020-05-22 RX ADMIN — PHENYLEPHRINE HYDROCHLORIDE 100 MCG: 10 INJECTION INTRAVENOUS at 10:56

## 2020-05-22 RX ADMIN — PHENYLEPHRINE HYDROCHLORIDE 200 MCG: 10 INJECTION INTRAVENOUS at 10:30

## 2020-05-22 RX ADMIN — FENTANYL CITRATE 50 MCG: 0.05 INJECTION, SOLUTION INTRAMUSCULAR; INTRAVENOUS at 09:45

## 2020-05-22 RX ADMIN — PHENYLEPHRINE HYDROCHLORIDE 100 MCG: 10 INJECTION INTRAVENOUS at 10:49

## 2020-05-22 RX ADMIN — DEXTROSE MONOHYDRATE 15 ML: 25 INJECTION, SOLUTION INTRAVENOUS at 10:30

## 2020-05-22 RX ADMIN — DEXMEDETOMIDINE HYDROCHLORIDE 4 MCG: 100 INJECTION, SOLUTION INTRAVENOUS at 10:49

## 2020-05-22 RX ADMIN — Medication 5 MG: at 10:37

## 2020-05-22 RX ADMIN — SODIUM CHLORIDE, POTASSIUM CHLORIDE, SODIUM LACTATE AND CALCIUM CHLORIDE: 600; 310; 30; 20 INJECTION, SOLUTION INTRAVENOUS at 10:42

## 2020-05-22 RX ADMIN — DEXMEDETOMIDINE HYDROCHLORIDE 8 MCG: 100 INJECTION, SOLUTION INTRAVENOUS at 10:19

## 2020-05-22 ASSESSMENT — MIFFLIN-ST. JEOR: SCORE: 1660.77

## 2020-05-22 ASSESSMENT — ENCOUNTER SYMPTOMS: SEIZURES: 0

## 2020-05-22 NOTE — ANESTHESIA PROCEDURE NOTES
Procedure note : Ankle  Staff -   Anesthesiologist:  Sukhwinder Hughes MD      Performed By: Anesthesiologist        Pre-Procedure  Performed by Sukhwinder Hughes MD  Location: pre-op      Pre-Anesthestic Checklist: patient identified, IV checked, site marked, risks and benefits discussed, informed consent, monitors and equipment checked, pre-op evaluation, at physician/surgeon's request and post-op pain management    Timeout  Correct Patient: Yes   Correct Procedure: Yes   Correct Site: Yes   Correct Laterality: Yes   Correct Position: Yes   Site Marked: Yes   .   Procedure Documentation    .    Procedure: Ankle, left.   Patient Position:sitting Local skin infiltrated with 1 mL of 1% lidocaine.    Ultrasound used to identify targeted nerve, plexus, or vascular marker and placed a needle adjacent to it., Ultrasound was used to visualize the spread of the anesthetic in close proximity to the above stated nerve. A permanent image is entered into the patient's record.  Patient Prep/Sterile Barriers; mask, sterile gloves, chlorhexidine gluconate and isopropyl alcohol.  .  Needle: insulated   Needle Gauge: 21.  Needle Length (millimeters) 90  Insertion Method: Single Shot.        Assessment/Narrative  Paresthesias: No.  .  The placement was negative for: blood aspirated, painful injection and site bleeding.  Bolus given via needle. No blood aspirated via catheter.   Secured via.   Complications:. Comments:  Bolus via needle, 20 ml of 0.5% bupivacaine without epinephrine.    Ultrasound Interpretation, Peripheral Nerve Block    1. Under ultrasound guidance, the needle was inserted and placed in close proximity to the target nerve(s).  2. Ultrasound was also used to visualize the spread of the anesthetic in close proximity to the nerve(s) being blocked.  Local anesthetic was administered in incremental doses, with intermittent negative aspiration.    3. The nerve(s) appeared anatomically normal.  4. There were no  apparent abnormal pathological findings.  5. A permanent ultrasound image was saved in the patient's record.    Patient tolerated well, was mildly sedated but communicative throughout the procedure.    No complications.      The surgeon has given a verbal order transferring care of this patient to me for the performance of a regional analgesia block for post-op pain control. It is requested of me because I am uniquely trained and qualified to perform this block and the surgeon is neither trained nor qualified to perform this procedure.    Sukhwinder Hughes MD   5:32 PM

## 2020-05-22 NOTE — OR NURSING
Dr. Sukhwinder Hughes informed glucose is 64 and that patient is on his way back into surgery.  Shannan Estrada CRNA aware.

## 2020-05-22 NOTE — ANESTHESIA POSTPROCEDURE EVALUATION
Patient: Pastor Garibay    Procedure(s):  LEFT SECOND AND THIRD DISTAL TOE AMPUTATIONS    Diagnosis:Osteomyelitis of second toe of left foot (H) [M86.9]  Osteomyelitis of third toe of left foot (H) [M86.9]  Diagnosis Additional Information: No value filed.    Anesthesia Type:  MAC, Peripheral Nerve Block for post-op pain at surgeon's request    Note:  Anesthesia Post Evaluation    Patient location during evaluation: PACU  Patient participation: Able to participate in evaluation but full recovery from regional anesthesia has not yet ocurrred but is anticipated to occur within 48 hours  Level of consciousness: awake and alert  Pain management: adequate  Airway patency: patent  Cardiovascular status: acceptable and hemodynamically stable  Respiratory status: acceptable  Hydration status: euvolemic  PONV: none     Anesthetic complications: None          Last vitals:  Vitals:    05/22/20 1340 05/22/20 1400 05/22/20 1416   BP:  96/75 (!) 88/54   Pulse:  104 99   Resp: 11 14 14   Temp:      SpO2: 94%           Electronically Signed By: Sukhwinder Hughes MD  May 22, 2020  5:34 PM

## 2020-05-22 NOTE — OP NOTE
Procedure Date: 05/22/2020      PREOPERATIVE DIAGNOSIS:  Nonhealing left dorsal second and third toe ulcerations with underlying osteomyelitis.      POSTOPERATIVE DIAGNOSIS:  Nonhealing left dorsal second and third toe ulcerations with underlying osteomyelitis.      PROCEDURE:   1. Distal amputation, left second toe.   2. Distal amputation, left third toe.      SURGEON:  Ramon Flores MD      :  Dr. Luz Maria Ramsey ([facility] resident).      ANESTHESIA:  Left ankle block with intravenous supplementation.      PREOPERATIVE MEDICATIONS:  Ancef 2 grams IV.      INDICATIONS:  A 70-year-old diabetic who had developed ulcerations over the distal dorsal aspect of his left second and third toes.  This was very suspicious by MRI for underlying osteomyelitis in the distal phalangeal bone, medially underlying the ulcers.  The patient has excellent arterial blood supply.  We felt he would be best treated with distal toe amputations with primary closure.      DESCRIPTION OF PROCEDURE:  The patient was brought to the operating room.  Ankle block had been placed in preinduction with very good results.  Left foot and toes were prepped and draped.  Timeout was called, and the sites were identified.      Left second toe distal amputation:  Elliptical incision was made with a #15 blade scalpel down to the distal phalangeal bone.  We then dissected with the aid of a #15 blade scalpel and an elevator the proximal phalangeal bone, which was completely normal with no evidence of osteomyelitis.  This was transected with a bone cutter and rongeur used to smooth the edges.      Left third toe amputation:  A similar elliptical incision, preserving the plantar skin and fat pad, was performed on the third toe.  Again, we dissected down to the proximal phalangeal bone, which was free of disease.  This was cut with a bone cutter and smoothed with a rongeur.      Wounds were irrigated with saline.  Adequate blood supply was  noted, but we did not need to use electrocautery.  We then brought our plantar flaps over the transected bone and approximated these very loosely with no tension with 3 interrupted 3-0 nylon sutures on each toe.  Several interrupted 5-0 chromic sutures were used to approximate the skin edges.      Digital block was performed with 5 mL of 0.5% Marcaine.  The foot was washed.  Xeroform was placed over the suture lines followed by fluffs, ABD pads, Kerlix roll and Ace bandage.      The patient tolerated the procedure well.      ESTIMATED BLOOD LOSS:  Less than 5 mL.      COMPLICATIONS:  None.      The patient will be discharged to home.  He will limit his weightbearing on his left foot but no specific restrictions.  Sutures will be removed in approximately 3 weeks in the office.            RABIA STANTON MD             D: 2020   T: 2020   MT:       Name:     EZIO PADILLA   MRN:      [id number]        Account:        [id number]   :      1950           Procedure Date: 2020      Document: [id number]       cc: Francisco Stanton MD

## 2020-05-22 NOTE — OR NURSING
Patient's blood pressure remains quite low.  BP 73/53 Patient states that his primary MD discontinued his Amlodipine this past Tuesday.  Pt still takes 40 mg of Lisinopril daily. Dr. Hughes at bedside.  Will continue fluid flush, offer patient some caffeine, and continue to assess. Dr. Hughes informed patient not to take his Lisinopril today.  Dr. Flores also at bedside and aware of patient's low blood pressure.

## 2020-05-22 NOTE — DISCHARGE INSTRUCTIONS
Please follow-up with your primary doctor regarding your blood pressure medications.  Monitor BP at home daily, record and bring to primary doctor.    Same Day Surgery Discharge Instructions for  Sedation and General Anesthesia       It's not unusual to feel dizzy, light-headed or faint for up to 24 hours after surgery or while taking pain medication.  If you have these symptoms: sit for a few minutes before standing and have someone assist you when you get up to walk or use the bathroom.      You should rest and relax for the next 24 hours. We recommend you make arrangements to have an adult stay with you for at least 24 hours after your discharge.  Avoid hazardous and strenuous activity.      DO NOT DRIVE any vehicle or operate mechanical equipment for 24 hours following the end of your surgery.  Even though you may feel normal, your reactions may be affected by the medication you have received.      Do not drink alcoholic beverages for 24 hours following surgery.       Slowly progress to your regular diet as you feel able. It's not unusual to feel nauseated and/or vomit after receiving anesthesia.  If you develop these symptoms, drink clear liquids (apple juice, ginger ale, broth, 7-up, etc. ) until you feel better.  If your nausea and vomiting persists for 24 hours, please notify your surgeon.        All narcotic pain medications, along with inactivity and anesthesia, can cause constipation. Drinking plenty of liquids and increasing fiber intake will help.      For any questions of a medical nature, call your surgeon.      Do not make important decisions for 24 hours.      If you had general anesthesia, you may have a sore throat for a couple of days related to the breathing tube used during surgery.  You may use Cepacol lozenges to help with this discomfort.  If it worsens or if you develop a fever, contact your surgeon.       If you feel your pain is not well managed with the pain medications prescribed by  your surgeon, please contact your surgeon's office to let them know so they can address your concerns.       CoVid 19 Information    We want to give you information regarding Covid. Please consult your primary care provider with any questions you might have.     Patient who have symptoms (cough, fever, or shortness of breath), need to isolate for 7 days from when symptoms started OR 72 hours after fever resolves (without fever reducing medications) AND improvement of respiratory symptoms (whichever is longer).      Isolate yourself at home (in own room/own bathroom if possible)    Do Not allow any visitors    Do Not go to work or school    Do Not go to [employer],  centers, shopping, or other public places.    Do Not shake hands.    Avoid close and intimate contact with others (hugging, kissing).    Follow CDC recommendations for household cleaning of frequently touched services.     After the initial 7 days, continue to isolate yourself from household members as much as possible. To continue decrease the risk of community spread and exposure, you and any members of your household should limit activities in public for 14 days after starting home isolation.     You can reference the following CDC link for helpful home isolation/care tips:  https://www.cdc.gov/coronavirus/2019-ncov/downloads/10Things.pdf    Protect Others:    Cover Your Mouth and Nose with a mask, disposable tissue or wash cloth to avoid spreading germs to others.    Wash your hands and face frequently with soap and water    Call Your Primary Doctor If: Breathing difficulty develops or you become worse.    For more information about COVID19 and options for caring for yourself at home, please visit the CDC website at https://www.cdc.gov/coronavirus/2019-ncov/about/steps-when-sick.html  For more options for care at [facility], please visit our website at [email or web address]      Reasons to contact your  surgeon:    1. Signs of possible infection: Check your incision daily for redness, swelling, warmth, red streaks or foul drainage.   2. Elevated temperature.  3. Pain not controlled with pain medication and/or rest.   4. Uncontrolled nausea or vomiting.  5. Any questions or concerns.   6.   7. **Because you had anesthesia today and your history of sleep apnea, it is extremely important that you use your CPAP machine for the next 24 hours while napping or sleeping.**

## 2020-05-22 NOTE — OR NURSING
Assumed cares while primary nurse went on lunch break from 1240 to 1310.    Dr Hughes recommends patient check BP at home, BP cuff received from pharmacy to send home with patient.

## 2020-05-22 NOTE — ANESTHESIA PREPROCEDURE EVALUATION
Anesthesia Pre-Procedure Evaluation    Patient: Pastor Garibay   MRN: [id number] : 1950          Preoperative Diagnosis: Osteomyelitis of second toe of left foot (H) [M86.9]  Osteomyelitis of third toe of left foot (H) [M86.9]    Procedure(s):  LEFT 2ND AND 3RD TOE AMPUTATIONS    Past Medical History:   Diagnosis Date     Abnormal liver function test      CPAP (continuous positive airway pressure) dependence      Diabetes (H)      Hx of skin cancer, basal cell      Hyperlipidemia      Hypertension      Keratoderma      Liver disease      Lymphoma (H)      Non-Hodgkin lymphoma (H)      Pedal edema     CHRONIC     Pleural effusion      Seborrheic keratosis, inflamed      Sleep apnea      Thrombosis Felbruary 2018     Past Surgical History:   Procedure Laterality Date     BIOPSY LYMPH NODE CERVICAL N/A 2014    Procedure: BIOPSY LYMPH NODE CERVICAL;  Surgeon: Robbie Linda MD;  Location:  OR     BRONCHOSCOPY FLEXIBLE N/A 2017    Procedure: BRONCHOSCOPY FLEXIBLE;  FLEXIBLE BRONCHOSCOPY WITH BIOPSIES.;  Surgeon: Robbie Linda MD;  Location:  OR     COLONOSCOPY       COLONOSCOPY N/A 2018    Procedure: COMBINED COLONOSCOPY, SINGLE OR MULTIPLE BIOPSY/POLYPECTOMY BY BIOPSY;;  Surgeon: Ramos Bates MD;  Location:  GI     ENDOVASCULAR PLACEMENT VASCULAR DEVICE Left 2017    Procedure: ENDOVASCULAR PLACEMENT VASCULAR DEVICE;;  Surgeon: Robbie Linda MD;  Location: [facility]     ENT SURGERY      tonsillectomy     EXCISE NODE MEDIASTINAL N/A 2017    Procedure: EXCISE NODE MEDIASTINAL;;  Surgeon: Robbie Linda MD;  Location:  OR     EYE SURGERY       EYE SURGERY Left     vitrectomy     INSERT PORT VASCULAR ACCESS N/A 2014    Procedure: INSERT PORT VASCULAR ACCESS;  Surgeon: Robbie Linda MD;  Location:  OR     INSERT PORT VASCULAR ACCESS N/A 2017    Procedure: INSERT PORT VASCULAR ACCESS;  POWER PORT PLACEMENT ATTEMPTED,  PLACEMENT OF ANGIO-SEAL VIP VASCULAR CLOSURE DEVICE;  Surgeon: Robbie Linda MD;  Location:  SD     IR PORT REMOVAL RIGHT  12/10/2018     PHACOEMULSIFICATION CLEAR CORNEA WITH STANDARD INTRAOCULAR LENS IMPLANT Right 5/2/2016    Procedure: PHACOEMULSIFICATION CLEAR CORNEA WITH STANDARD INTRAOCULAR LENS IMPLANT;  Surgeon: Rasheed Finney MD;  Location:  EC     REMOVE PORT VASCULAR ACCESS N/A 3/14/2017    Procedure: REMOVE PORT VASCULAR ACCESS;  Surgeon: Robbie Linda MD;  Location:  OR     SOFT TISSUE SURGERY      BASAL CELL CA FOREHEAD     THORACOTOMY Right 11/17/2017    Procedure: THORACOTOMY;  RIGHT EXPLORATORY THORACOTOMY/ EXTENSIVE PNEUMOLYSIS/ BIOPSY OF INTERLOBAR LYMPH NODE;  Surgeon: Robbie Linda MD;  Location:  OR       Anesthesia Evaluation     .             ROS/MED HX    ENT/Pulmonary:     (+)sleep apnea, uses CPAP , . .    Neurologic:      (-) seizures and CVA   Cardiovascular:     (+) Dyslipidemia, hypertension----. Taking blood thinners : . . . :. .       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      Psychiatric:         Infectious Disease:         Malignancy:         Other:                          Physical Exam  Normal systems: dental    Airway   Mallampati: II  TM distance: >3 FB  Neck ROM: full    Dental     Cardiovascular   Rhythm and rate: regular      Pulmonary             Lab Results   Component Value Date    WBC 5.6 12/10/2018    HGB 12.1 (L) 12/10/2018    HCT 36.1 (L) 12/10/2018     12/10/2018     11/18/2017    POTASSIUM 4.6 11/18/2017    CHLORIDE 99 11/18/2017    CO2 25 11/18/2017    BUN 21 11/18/2017    CR 0.86 11/18/2017     (H) 11/18/2017    JEFF  "8.3 (L) 11/18/2017    PTT 26 12/10/2018    INR 0.99 12/10/2018       Preop Vitals  BP Readings from Last 3 Encounters:   05/08/20 103/52   12/10/18 108/54   06/08/18 104/59    Pulse Readings from Last 3 Encounters:   05/08/20 97   06/08/18 79   11/19/17 88      Resp Readings from Last 3 Encounters:   05/08/20 18   12/10/18 16   05/09/18 11    SpO2 Readings from Last 3 Encounters:   12/10/18 98%   05/09/18 96%   12/05/17 94%      Temp Readings from Last 1 Encounters:   05/08/20 36.2  C (97.1  F) (Temporal)    Ht Readings from Last 1 Encounters:   06/08/18 1.753 m (5' 9\")      Wt Readings from Last 1 Encounters:   05/14/20 93.4 kg (206 lb)    Estimated body mass index is 30.42 kg/m  as calculated from the following:    Height as of 6/8/18: 1.753 m (5' 9\").    Weight as of 5/14/20: 93.4 kg (206 lb).       Anesthesia Plan      History & Physical Review  History and physical reviewed and following examination; no interval change.    ASA Status:  2 .    NPO Status:  > 8 hours    Plan for MAC with Propofol induction. Maintenance will be Inhalation.  Reason for MAC:  Deep or markedly invasive procedure (G8)  PONV prophylaxis:  Ondansetron (or other 5HT-3)         Postoperative Care  Postoperative pain management:  Multi-modal analgesia and Peripheral nerve block (Single Shot).      Consents  Anesthetic plan, risks, benefits and alternatives discussed with:  Patient..                 Sukhwinder Hughes MD  "

## 2020-05-22 NOTE — OR NURSING
Patient's BP stable in the high 80's and low 90's.  Patient's wife in contact with primary MD regarding patient's ongoing blood pressure medication.  Patient feeling good.  No dizziness.  Patient discharged to home.

## 2020-05-22 NOTE — ANESTHESIA CARE TRANSFER NOTE
Patient: Pastor Garibay    Procedure(s):  LEFT SECOND AND THIRD DISTAL TOE AMPUTATIONS    Diagnosis: Osteomyelitis of second toe of left foot (H) [M86.9]  Osteomyelitis of third toe of left foot (H) [M86.9]  Diagnosis Additional Information: No value filed.    Anesthesia Type:   General     Note:  Airway :Face Mask  Patient transferred to:PACU  Comments: To PACU with face mask, 8l/min O2, spontaneous ventilations. VSS. Report to RNHandoff Report: Identifed the Patient, Identified the Reponsible Provider, Reviewed the pertinent medical history, Discussed the surgical course, Reviewed Intra-OP anesthesia mangement and issues during anesthesia, Set expectations for post-procedure period and Allowed opportunity for questions and acknowledgement of understanding      Vitals: (Last set prior to Anesthesia Care Transfer)    CRNA VITALS  5/22/2020 1033 - 5/22/2020 1110      5/22/2020             Pulse:  80    SpO2:  --    Resp Rate (set):  10                Electronically Signed By: VERNA Monte CRNA  May 22, 2020  11:10 AM

## 2020-05-26 ENCOUNTER — TELEPHONE (OUTPATIENT)
Dept: MEDSURG UNIT | Facility: CLINIC | Age: 70
End: 2020-05-26

## 2020-05-26 RX ORDER — FLUMAZENIL 0.1 MG/ML
0.2 INJECTION, SOLUTION INTRAVENOUS
Status: CANCELLED | OUTPATIENT
Start: 2020-05-26

## 2020-05-26 RX ORDER — LIDOCAINE 40 MG/G
CREAM TOPICAL
Status: CANCELLED | OUTPATIENT
Start: 2020-05-26

## 2020-05-26 RX ORDER — DEXTROSE MONOHYDRATE 25 G/50ML
25-50 INJECTION, SOLUTION INTRAVENOUS
Status: CANCELLED | OUTPATIENT
Start: 2020-05-26

## 2020-05-26 RX ORDER — FENTANYL CITRATE 0.05 MG/ML
25-50 INJECTION, SOLUTION INTRAMUSCULAR; INTRAVENOUS EVERY 5 MIN PRN
Status: CANCELLED | OUTPATIENT
Start: 2020-05-26

## 2020-05-26 RX ORDER — NICOTINE POLACRILEX 4 MG
15-30 LOZENGE BUCCAL
Status: CANCELLED | OUTPATIENT
Start: 2020-05-26

## 2020-05-26 RX ORDER — NALOXONE HYDROCHLORIDE 0.4 MG/ML
.1-.4 INJECTION, SOLUTION INTRAMUSCULAR; INTRAVENOUS; SUBCUTANEOUS
Status: CANCELLED | OUTPATIENT
Start: 2020-05-26

## 2020-05-26 RX ORDER — ACETAMINOPHEN 325 MG/1
650 TABLET ORAL EVERY 4 HOURS PRN
Status: CANCELLED | OUTPATIENT
Start: 2020-05-26

## 2020-05-26 NOTE — TELEPHONE ENCOUNTER
Pre-Procedure Negative COVID Test     Patient Notification  Patient notified of the negative COVID test result    Patient Information  Patient informed of the no visitor policy  Patient instructed to continue to self-quarantine prior to procedure  Patient informed to contact the ordering provider if the following symptoms develop prior to procedure:   Fever  Cough  Shortness of Breath  Sore throat   Runny or stuffy nose  Muscle or body aches  Headaches  Fatigue  Vomiting or diarrhea   Rash    Narda Castaneda RN

## 2020-05-27 ENCOUNTER — HOSPITAL ENCOUNTER (OUTPATIENT)
Dept: CT IMAGING | Facility: CLINIC | Age: 70
End: 2020-05-27
Attending: INTERNAL MEDICINE
Payer: COMMERCIAL

## 2020-05-27 ENCOUNTER — TRANSFERRED RECORDS (OUTPATIENT)
Dept: HEALTH INFORMATION MANAGEMENT | Facility: CLINIC | Age: 70
End: 2020-05-27

## 2020-05-27 ENCOUNTER — HOSPITAL ENCOUNTER (OUTPATIENT)
Facility: CLINIC | Age: 70
Discharge: HOME | End: 2020-05-27
Admitting: INTERNAL MEDICINE
Payer: COMMERCIAL

## 2020-05-27 VITALS
OXYGEN SATURATION: 96 % | TEMPERATURE: 96.2 F | SYSTOLIC BLOOD PRESSURE: 101 MMHG | BODY MASS INDEX: 30.31 KG/M2 | HEIGHT: 68 IN | WEIGHT: 200 LBS | RESPIRATION RATE: 16 BRPM | HEART RATE: 96 BPM | DIASTOLIC BLOOD PRESSURE: 55 MMHG

## 2020-05-27 DIAGNOSIS — C82.02 FOLLICULAR LYMPHOMA GRADE I OF INTRATHORACIC LYMPH NODES (H): ICD-10-CM

## 2020-05-27 LAB
GLUCOSE BLDC GLUCOMTR-MCNC: 103 MG/DL (ref 70–99)
INR PPP: 1.22 (ref 0.86–1.14)
PLATELET # BLD AUTO: 251 10E9/L (ref 150–450)

## 2020-05-27 PROCEDURE — 00000159 ZZHCL STATISTIC H-SEND OUTS PREP

## 2020-05-27 PROCEDURE — 88342 IMHCHEM/IMCYTCHM 1ST ANTB: CPT

## 2020-05-27 PROCEDURE — 36415 COLL VENOUS BLD VENIPUNCTURE: CPT

## 2020-05-27 PROCEDURE — 85610 PROTHROMBIN TIME: CPT | Performed by: RADIOLOGY

## 2020-05-27 PROCEDURE — 88173 CYTOPATH EVAL FNA REPORT: CPT | Mod: 26

## 2020-05-27 PROCEDURE — 25000128 H RX IP 250 OP 636: Performed by: RADIOLOGY

## 2020-05-27 PROCEDURE — 88184 FLOWCYTOMETRY/ TC 1 MARKER: CPT

## 2020-05-27 PROCEDURE — 40001005 ZZHCL STATISTIC FLOW >15 ABY TC 88189

## 2020-05-27 PROCEDURE — 88161 CYTOPATH SMEAR OTHER SOURCE: CPT | Mod: 26,XU

## 2020-05-27 PROCEDURE — 88185 FLOWCYTOMETRY/TC ADD-ON: CPT

## 2020-05-27 PROCEDURE — 88305 TISSUE EXAM BY PATHOLOGIST: CPT

## 2020-05-27 PROCEDURE — 85049 AUTOMATED PLATELET COUNT: CPT | Performed by: RADIOLOGY

## 2020-05-27 PROCEDURE — 27211116 CT ADRENAL GLANDS BIOPSY

## 2020-05-27 PROCEDURE — 88271 CYTOGENETICS DNA PROBE: CPT | Performed by: PATHOLOGY

## 2020-05-27 PROCEDURE — 88341 IMHCHEM/IMCYTCHM EA ADD ANTB: CPT | Mod: 26,76

## 2020-05-27 PROCEDURE — 25000125 ZZHC RX 250: Performed by: INTERNAL MEDICINE

## 2020-05-27 PROCEDURE — 88275 CYTOGENETICS 100-300: CPT | Performed by: PATHOLOGY

## 2020-05-27 PROCEDURE — 99152 MOD SED SAME PHYS/QHP 5/>YRS: CPT

## 2020-05-27 PROCEDURE — 25800030 ZZH RX IP 258 OP 636: Performed by: NURSE PRACTITIONER

## 2020-05-27 PROCEDURE — 88161 CYTOPATH SMEAR OTHER SOURCE: CPT

## 2020-05-27 PROCEDURE — 88305 TISSUE EXAM BY PATHOLOGIST: CPT | Mod: 26

## 2020-05-27 PROCEDURE — 88342 IMHCHEM/IMCYTCHM 1ST ANTB: CPT | Mod: 26

## 2020-05-27 PROCEDURE — 40000863 ZZH STATISTIC RADIOLOGY XRAY, US, CT, MAR, NM

## 2020-05-27 PROCEDURE — 82962 GLUCOSE BLOOD TEST: CPT

## 2020-05-27 PROCEDURE — 88341 IMHCHEM/IMCYTCHM EA ADD ANTB: CPT

## 2020-05-27 PROCEDURE — 88173 CYTOPATH EVAL FNA REPORT: CPT

## 2020-05-27 RX ORDER — DEXTROSE MONOHYDRATE 25 G/50ML
25-50 INJECTION, SOLUTION INTRAVENOUS
Status: DISCONTINUED | OUTPATIENT
Start: 2020-05-27 | End: 2020-05-27

## 2020-05-27 RX ORDER — NICOTINE POLACRILEX 4 MG
15-30 LOZENGE BUCCAL
Status: DISCONTINUED | OUTPATIENT
Start: 2020-05-27 | End: 2020-05-27

## 2020-05-27 RX ORDER — NICOTINE POLACRILEX 4 MG
15-30 LOZENGE BUCCAL
Status: DISCONTINUED | OUTPATIENT
Start: 2020-05-27 | End: 2020-05-27 | Stop reason: HOSPADM

## 2020-05-27 RX ORDER — FENTANYL CITRATE 50 UG/ML
25-50 INJECTION, SOLUTION INTRAMUSCULAR; INTRAVENOUS EVERY 5 MIN PRN
Status: DISCONTINUED | OUTPATIENT
Start: 2020-05-27 | End: 2020-05-27 | Stop reason: HOSPADM

## 2020-05-27 RX ORDER — DEXTROSE MONOHYDRATE 25 G/50ML
25-50 INJECTION, SOLUTION INTRAVENOUS
Status: DISCONTINUED | OUTPATIENT
Start: 2020-05-27 | End: 2020-05-27 | Stop reason: HOSPADM

## 2020-05-27 RX ORDER — NALOXONE HYDROCHLORIDE 0.4 MG/ML
.1-.4 INJECTION, SOLUTION INTRAMUSCULAR; INTRAVENOUS; SUBCUTANEOUS
Status: DISCONTINUED | OUTPATIENT
Start: 2020-05-27 | End: 2020-05-27 | Stop reason: HOSPADM

## 2020-05-27 RX ORDER — FLUMAZENIL 0.1 MG/ML
0.2 INJECTION, SOLUTION INTRAVENOUS
Status: DISCONTINUED | OUTPATIENT
Start: 2020-05-27 | End: 2020-05-27 | Stop reason: HOSPADM

## 2020-05-27 RX ORDER — ACETAMINOPHEN 325 MG/1
650 TABLET ORAL EVERY 4 HOURS PRN
Status: DISCONTINUED | OUTPATIENT
Start: 2020-05-27 | End: 2020-05-27 | Stop reason: HOSPADM

## 2020-05-27 RX ORDER — LIDOCAINE 40 MG/G
CREAM TOPICAL
Status: DISCONTINUED | OUTPATIENT
Start: 2020-05-27 | End: 2020-05-27 | Stop reason: HOSPADM

## 2020-05-27 RX ORDER — SODIUM CHLORIDE 9 MG/ML
INJECTION, SOLUTION INTRAVENOUS CONTINUOUS
Status: DISCONTINUED | OUTPATIENT
Start: 2020-05-27 | End: 2020-05-27 | Stop reason: HOSPADM

## 2020-05-27 RX ORDER — LIDOCAINE HYDROCHLORIDE 10 MG/ML
10 INJECTION, SOLUTION EPIDURAL; INFILTRATION; INTRACAUDAL; PERINEURAL ONCE
Status: COMPLETED | OUTPATIENT
Start: 2020-05-27 | End: 2020-05-27

## 2020-05-27 RX ADMIN — FENTANYL CITRATE 50 MCG: 50 INJECTION, SOLUTION INTRAMUSCULAR; INTRAVENOUS at 09:24

## 2020-05-27 RX ADMIN — MIDAZOLAM HYDROCHLORIDE 1 MG: 1 INJECTION, SOLUTION INTRAMUSCULAR; INTRAVENOUS at 09:24

## 2020-05-27 RX ADMIN — SODIUM CHLORIDE: 9 INJECTION, SOLUTION INTRAVENOUS at 08:52

## 2020-05-27 RX ADMIN — LIDOCAINE HYDROCHLORIDE 10 ML: 10 INJECTION, SOLUTION EPIDURAL; INFILTRATION; INTRACAUDAL; PERINEURAL at 10:06

## 2020-05-27 ASSESSMENT — MIFFLIN-ST. JEOR: SCORE: 1651.69

## 2020-05-27 NOTE — PROGRESS NOTES
Care Suites Discharge Nursing Note    Patient Information  Name: Pastor Garibay  Age: 70 year old    Discharge Education:  Discharge instructions reviewed: Yes  Additional education/resources provided: na  Patient/patient representative verbalizes understanding: Yes  Patient discharging on new medications: No  Medication education completed: N/A    Discharge Plans:   Discharge location: home  Discharge ride contacted: Yes  Approximate discharge time: 1045    Discharge Criteria:  Discharge criteria met and vital signs stable: Yes    Patient Belongs:  Patient belongings returned to patient: Yes    Rosanna Vincent RN

## 2020-05-27 NOTE — PROGRESS NOTES
Care Suites Admission Nursing Note    Patient Information  Name: Pastor Garibay  Age: 70 year old  Reason for admission: adrenal gland biopsy  Care Suites arrival time: 0800    Patient Admission/Assessment   Pre-procedure assessment complete: Yes  If abnormal assessment/labs, provider notified: N/A  NPO: Yes  Medications held per instructions/orders: Yes Xarelto   Consent: obtained  If applicable, pregnancy test status: deferred  Patient oriented to room: Yes  Education/questions answered: Yes  Plan/other: proceed with biopsy    Discharge Planning  Accompanied by: wife  Discharge name/phone number:[phone] post sedation caregiver: sakshi Long  Discharge location: home    Rosanna Vincent RN

## 2020-05-27 NOTE — PROGRESS NOTES
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 VSS Denies pain. Eating crackers and drinking juice. Site D/I.  If abnormal assessment, provider notified: N/A  Plan/Other: Monitor until 1030 and then discharge to wife if stable    Rosanna Vincent RN

## 2020-05-27 NOTE — PROGRESS NOTES
Care Suites Procedure Nursing Note    Patient Information  Name: Pastor Garibay  Age: 70 year old    Procedure  Procedure: CT guided adrenal biopsy  Procedure start time: 0925  Procedure complete time: 0945  Concerns/abnormal assessment: none. VSS. Denies pain. Mid back incision D/I.  If abnormal assessment, provider notified: N/A  Plan/Other: Back to care suites to monitor.    Rosanna Vincent RN

## 2020-05-27 NOTE — DISCHARGE INSTRUCTIONS
Adrenal Gland Biopsy Discharge Instructions     After you go home:      You may resume your normal diet    Have an adult stay with you for 6 hours if you received sedation       For 24 hours - due to the sedation you received:    Relax and take it easy    Do NOT make any important or legal decisions    Do NOT drive or operate machines at home or at work    Do NOT drink alcohol    Care of Puncture Site:      For the first 48 hrs, check your puncture site every couple hours while you are awake     You may remove/change the bandaid tomorrow    You may shower tomorrow    No tub baths, whirlpools or swimming until your puncture site has fully healed    Activity       You may go back to normal activity in 24 hours     Wait 48 hours before lifting, straining, exercise or other strenuous activity    Medicines:      You may resume all medications    Resume your Warfarin/Coumadin at your regular dose today. Follow up with your provider to have your INR rechecked    Resume your Platelet Inhibitors and Aspirin tomorrow at your regular dose    For minor pain, you may take Acetaminophen (Tylenol) or Ibuprofen (Advil)            Call the provider who ordered this test if:      Increased pain or a large or growing hard lump around the site    Blood or fluid is draining from the site    The site is red, swollen, hot or tender    Chills or a fever greater than 101 F (38 C)    Pain that is getting worse    Any questions or concerns    Call  911 or go to the Emergency Room if:      Severe pain or trouble breathing    Bleeding that you cannot control        If you have questions call:          [facility] @ [phone]

## 2020-05-27 NOTE — PROGRESS NOTES
Surgery Progress Note    Patient seen I care suite prior to IR procedure. Patient has been gently washing toes daily, no issues with drainage. He is minimizing weight bearing and elevating his leg intermittently. He is placing bandaids over the suture line for comfort. He denies fevers, chills, nausea, or vomiting.         Well-appearing male, pleasant  Breathing non-labored on room air  Suture lines with minor scabbing, well-approximated without drainage  Minor erythema of left middle toe reminant. No warmth or induration.     POD#5 s/p partial/distal left second and third toe amputations. Doing well. Amputation sites look okay some mild erythema, potentially reactive. Do not feel that he requires antibiotics at this time. Gave patient infectious return precautions.    -Continue to wash gently daily  -Bandaids for comfort  -Monitoring for worsening redness of left second toe  -Plan to remove sutures in another 1-2 weeks pending clinical picture    Discussed with Dr. Flores via telephone    Luz Maria Ramsey MD  Surgery resident, PGY-4      STAFF:  As per Dr Ramsey note.  Mild erythema #rd toe --doubt cellulitis.       Ramon Flores MD

## 2020-05-28 LAB — COPATH REPORT: NORMAL

## 2020-05-29 LAB — COPATH REPORT: NORMAL

## 2020-06-01 ENCOUNTER — TRANSFERRED RECORDS (OUTPATIENT)
Dept: HEALTH INFORMATION MANAGEMENT | Facility: CLINIC | Age: 70
End: 2020-06-01

## 2020-06-01 ENCOUNTER — MEDICAL CORRESPONDENCE (OUTPATIENT)
Dept: HEALTH INFORMATION MANAGEMENT | Facility: CLINIC | Age: 70
End: 2020-06-01

## 2020-06-01 ENCOUNTER — DOCUMENTATION ONLY (OUTPATIENT)
Dept: ONCOLOGY | Facility: CLINIC | Age: 70
End: 2020-06-01

## 2020-06-01 LAB — COPATH REPORT: NORMAL

## 2020-06-01 NOTE — PROGRESS NOTES
Pastor Garibay's medical conditions were reviewed at the BMT Protocol Review Committee meeting on June 1, 2020    Considerations from the Committee included the following:  Relapse lymphoma, prior anthracycline treatment, aggressive.      BMT Primary Protocol: TRANSFORM study (JCAR vs auto)      Patient Active Problem List   Diagnosis     Pain in joint, ankle and foot     Lymphadenopathy, mediastinal     Abnormal liver function tests     Anemia due to antineoplastic chemotherapy     Long term current use of anticoagulant therapy     Lymphoma, non-Hodgkin's (H)     Skin ulcer of toe of left foot with fat layer exposed (H)     Osteomyelitis of second toe of left foot (H)     Osteomyelitis of third toe of left foot (H)       Patient Care Team       Relationship Specialty Notifications Start End    Francisco Armijo MD PCP - [facility]  1/23/12     Phone: [phone] Fax: [phone]         [facility] [address] [place] [address] [facility] [id number]

## 2020-06-03 ENCOUNTER — VIRTUAL VISIT (OUTPATIENT)
Dept: TRANSPLANT | Facility: CLINIC | Age: 70
End: 2020-06-03
Payer: COMMERCIAL

## 2020-06-03 ENCOUNTER — OFFICE VISIT (OUTPATIENT)
Dept: TRANSPLANT | Facility: CLINIC | Age: 70
End: 2020-06-03
Payer: COMMERCIAL

## 2020-06-03 DIAGNOSIS — C85.90 LYMPHOMA, NON-HODGKIN'S (H): Primary | ICD-10-CM

## 2020-06-03 DIAGNOSIS — C83.398 DIFFUSE LARGE B-CELL LYMPHOMA OF EXTRANODAL SITE: Primary | ICD-10-CM

## 2020-06-03 DIAGNOSIS — Z71.9 ENCOUNTER FOR COUNSELING: Primary | ICD-10-CM

## 2020-06-03 PROCEDURE — G0463 HOSPITAL OUTPT CLINIC VISIT: HCPCS

## 2020-06-03 RX ORDER — HYDROCORTISONE 10 MG/1
20 TABLET ORAL
COMMUNITY
End: 2021-09-29

## 2020-06-03 NOTE — LETTER
6/3/2020         RE: Pastor Garibay  [address]  [facility] [address]        Dear Colleague,    Thank you for referring your patient, Pastor Garibay, to the [facility]. Please see a copy of my visit note below.    Met with patient to discuss follow up plan. He will return to [facility] for chemo. He verbalized understanding of plan and was provided with bmt office phone number to call if he has questions.     Again, thank you for allowing me to participate in the care of your patient.        Sincerely,        BMT Nurse Coordinator

## 2020-06-03 NOTE — LETTER
6/3/2020         RE: Pastor Garibay  [address]  [facility] [address]        Dear Colleague,    Thank you for referring your patient, Pastor Garibay, to the [facility] BLOOD AND MARROW TRANSPLANT. Please see a copy of my visit note below.    BMT CLINIC VISIT NOTE.  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    This is in person visit.    Patient is pleasant 71 yo male with several comorbidities and history of lymphoma dating back to 12/2014.   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  He was treated with RCHOP x 6 and had a positive response and near CR.     Maintenance rituximab was started in June 2015 and completed in February 2017.    On 11/17/17 the patient underwent a thoracotomy for an enlarging right infrahilar mass. 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 The tumor was handled as a carcinoma but the pathology is most consistent with recurrent grade 2-3 follicular lymphoma    On 12/14/17 he began bendamustine with rituximab chemotherapy x  6 cycles and on 6/4/18 a CT revealed stable mild lymphadenopathy with mild splenomegaly and a slight increase in the IVC thrombus    -A CT 6/3/19 revealed an increase in the right hilar and pretracheal adenopathy with stable nodular scarring of the right lung base, without other new disease. A PET/CT 6/13/19 confirmed the hypermetabolic right infrahilar mass and revealed moderate metabolic activity within a few small nodules just deep to tire pleura in the posterior aspect of tire mid right hemithorax, suspicious for neoplasm, without other disease.    This was treated with 3000 cGy of radiotherapy to the right thoracic region 6/27/19 - 7/18/19.    The most  recent recurrence was diagnosed by CT C/A/P 5/14/20 -  marked increase in the right lower lobe pulmonary mass with new subcarinal lymphadenopathy, bilateral new adrenal masses, and soft tissue tumor deposits in the abdomen    -CT guided adrenal biopsy 5/27/20 revealed transformation to a diffuse large B cell lymphoma, germinal center type, 100% Ki-67 positive,  with positive immunohistochemical staining for BCL-2, BCL-6, and C-MYC suggestive of a possible triple-hit lymphoma.    So far Pastor did not start any therapy  Subj: he is tired but not unwell, he lost few pounds, no night sweats, no fevers, no recent complications.    PMHX:  Possible Adrenal Insufficiency  --Large bilateral adrenal masses with mild hypotension, nausea, vomiting, and hyperkalemia--Normal cortisol level and ACTH elevated at 140       history of adult onset insulin-dependent diabetes 1991, hypertension, hyperlipidemia, sleep apnea.          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 On Lovenox;  6/2018 he was switched to Xarelto   --A vascular surgery consultation for the persistent IVC thrombus concurred with anticoagulation for 6-12 months or longer without other intervention.    Chronic Kidney Disease         Past Medical History:   Diagnosis Date     Abnormal liver function test       CPAP (continuous positive airway pressure) dependence       Diabetes (H)       Hx of skin cancer, basal cell       Hyperlipidemia       Hypertension       Keratoderma       Liver disease       Lymphoma (H)       Non-Hodgkin lymphoma (H)       Pedal edema       CHRONIC     Pleural effusion       Seborrheic keratosis, inflamed       Sleep apnea       Thrombosis Felbruary 2018           Past Surgical History      I have reviewed this patient's surgical history and updated it with pertinent information if needed.        Past Surgical History:   Procedure Laterality Date     AMPUTATE TOE(S) Left 5/22/2020     Procedure: LEFT  SECOND AND THIRD DISTAL TOE AMPUTATIONS;  Surgeon: Ramon Flores MD;  Location:  OR     BIOPSY LYMPH NODE CERVICAL N/A 12/5/2014     Procedure: BIOPSY LYMPH NODE CERVICAL;  Surgeon: Robbie Linda MD;  Location:  OR     BRONCHOSCOPY FLEXIBLE N/A 11/14/2017     Procedure: BRONCHOSCOPY FLEXIBLE;  FLEXIBLE BRONCHOSCOPY WITH BIOPSIES.;  Surgeon: Robbie Linda MD;  Location:  OR     COLONOSCOPY         COLONOSCOPY N/A 5/9/2018     Procedure: COMBINED COLONOSCOPY, SINGLE OR MULTIPLE BIOPSY/POLYPECTOMY BY BIOPSY;;  Surgeon: Ramos Bates MD;  Location:  GI     ENDOVASCULAR PLACEMENT VASCULAR DEVICE Left 12/5/2017     Procedure: ENDOVASCULAR PLACEMENT VASCULAR DEVICE;;  Surgeon: Robbie Linda MD;  Location: [facility]     ENT SURGERY         tonsillectomy     EXCISE NODE MEDIASTINAL N/A 11/17/2017     Procedure: EXCISE NODE MEDIASTINAL;;  Surgeon: Robbie Linda MD;  Location:  OR     EYE SURGERY         EYE SURGERY Left       vitrectomy     INSERT PORT VASCULAR ACCESS N/A 12/5/2014     Procedure: INSERT PORT VASCULAR ACCESS;  Surgeon: Robbie Linda MD;  Location:  OR     INSERT PORT VASCULAR ACCESS N/A 12/5/2017     Procedure: INSERT PORT VASCULAR ACCESS;  POWER PORT PLACEMENT ATTEMPTED, PLACEMENT OF ANGIO-SEAL VIP VASCULAR CLOSURE DEVICE;  Surgeon: Robbie Linda MD;  Location: [facility]     IR PORT REMOVAL RIGHT   12/10/2018     PHACOEMULSIFICATION CLEAR CORNEA WITH STANDARD INTRAOCULAR LENS IMPLANT Right 5/2/2016     Procedure: PHACOEMULSIFICATION CLEAR CORNEA WITH STANDARD INTRAOCULAR LENS IMPLANT;  Surgeon: Rasheed Finney MD;  Location:  [facility]     REMOVE PORT VASCULAR ACCESS N/A 3/14/2017     Procedure: REMOVE PORT VASCULAR ACCESS;  Surgeon: Robbie Linda MD;  Location:  OR     SOFT TISSUE SURGERY         BASAL CELL CA FOREHEAD     THORACOTOMY Right 11/17/2017     Procedure: THORACOTOMY;  RIGHT EXPLORATORY THORACOTOMY/  EXTENSIVE PNEUMOLYSIS/ BIOPSY OF INTERLOBAR LYMPH NODE;  Surgeon: Robbie Linda MD;  Location: [facility] OR       PMH: Medications: Norvasc, NovoLog/Lantus, lisinopril, metformin, Crestor,                                          Xarelto, TriCor      Allergies      No Known Allergies        Social History    He has never smoked or used tobacco. He reports that he does not drink alcohol or use drugs.        Family History      Not pertinent.       Review of Systems      The 10 point Review of Systems is negative other than noted in the HPI or here.         Physical Exam  The patient is in no immediate distress. AOx3. OP clear, mm clear, no cervical LAD.  The sclerae are nonicteric.  There is no thyromegaly.  The supraclavicular or low cervical lymph node is no longer palpable and he is without other cervical, axillary, epitrochlear, or supraclavicular lymphadenopathy.  PMI nondisplaced with negative heart and lung exam  The abdomen is distended, but soft and nontender.   There is no pedal edema.   Gait is normal.    ASSESSMENT AND PLAN:     is a delightful 70 years old male with a history of a transformed lymphoma with about 3 years time between antracycline therapy and DLBCL diagnosis.  He now has experienced histologic transformation with extranodal involvement and aggressive presentation.  The patient is here to be evaluated for eligibility to randomized phase 3 TRANSFORM study.      Unfortunately due to long time from R-CHOP and and need for anticoagulation, he is not eligible.  This study has 2 arms J- CAR19 versus autologous stem cell transplantation.    My recommendation is to proceed with a standard approach using salvage chemotherapy RICE x2 cycles followed by the restaging with PET scan.      If the patient has poor response, he definitely will be a candidate for the autologous genetically modified T-cell approach using CAR-T19 or cell therapy clinical trial.  I discussed these options with  Pastor and would hope to see him back in 2 months.      Today, we reviewed the autologous HSCT transplant process including the need for  work-up, autologous peripheral blood stem cell collection, high-dose chemotherapy using the BEAM, the stem cell infusion and outpatient recovery for about 4 weeks.      The patient has few co morbidities but overall is a good candidate for both and decision will be determined on response to salvage chemotherapy.  The mortality of both approaches is low, only about 2% and the curability rate approaches 60% with transplant and 40% with CAR-T19.      Pastor had asked few questions he is eager to be treated in a consolidation fashion with a curative intent.  And happy to see him back after 2 cycles  to reassess the best approach.     Thank you very much for referring this very pleasant patient to us.    Total visit time was 90 minutes     Rosey Bunch MD  Professor of Medicine  [phone]

## 2020-06-03 NOTE — NURSING NOTE
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 Estimated body mass index is 30.41 kg/m  as calculated from the following:    Height as of 5/27/20: 1.727 m (5' 8\").    Weight as of 5/27/20: 90.7 kg (200 lb). There is no height or weight on file to calculate BSA.  Data Unavailable Comment: Data Unavailable   No LMP for male patient.  Allergies reviewed: Yes  Medications reviewed: Yes    Medications: Medication refills not needed today.  Pharmacy name entered into [facility]: [id number] [place], [place] - [facility] [address]    Clinical concerns: none       Shreya Weller MA            "

## 2020-06-03 NOTE — LETTER
6/3/2020         RE: Pastor Garibay  [address]  [facility] [address]        Dear Colleague,    Thank you for referring your patient, Pastor Garibay, to the [facility] BLOOD AND MARROW TRANSPLANT. Please see a copy of my visit note below.    Blood and Marrow Transplant   New Transplant Visit with   Clinical     Assessment completed on 6/3/2020 via phone as part of the COVID 19 protocol. Assessment of living situation, support system, financial status, functional status, coping, stressors, need for resources and social work intervention provided as needed. Information for this assessment was provided by pt and pt's wife report in addition to medical chart review and consultation with medical team.     Present:  Patient: Pastor Garibay  : ALEXA Kam, [facility]    *Pt asked SW to call his wife-Toshia after him; SW spoke with wife on 6/4/2020.    Medical Team   Nurse Coordinator: Nguyen Candelaria RN  BMT Physician: Rosey Bunch MD  Referring Physician: Mike Olson MD    Presenting Information:  Pt is a 70 year old male diagnosed with NHL. Pt was diagnosed on 2014. Pt presents for autologous stem cell transplant discussion.    Contact Information:  Cell Phone: [phone]  Wife-Toshia Garibay's Phone: [phone]    Special Needs: No needs identified at this time.     Relocation Requirement: Pt lives in [place], [place] which is within the required distance of the hospital. Pt does not need to relocate.     Family Information:   Spouse: Toshia Garibay  Siblings: 2 Brothers and 1 Sister  Children: 4 Children; 1 Son and 2 Daughters (live in the [place]) and 1 Son (Lives in [place], [place])    Education/Employment:  Currently employed: Yes  Employer: [employer]    Spouse/Partner Employed: Yes  Employer: [employer]    Insurance: [employer] Pass[employer] & Medicare Part A. No insurance concerns identified at this time. SW provided information regarding the  insurance authorization process and the role of the [employer] . SW provided contact info for the [employer]  and referred pt to them for future insurance questions.     Finances: Pt and pt's wife's income. Pt's hours have been cut due to COVID. Pt does not have Short Term Disability available to him; pt does have Long Term Disability but not until 90 days after last day of work. Pt and pt's wife may have financial concerns identified at this time. SW discussed debbi options. SW discussed the [employer] debbi for patients effected by COVID. SW mailed pt the application.      Caregiver:   SW discussed with the pt and pt's wife-Toshia the caregiver role and expectation at length. Pt is agreeable to having a full time caregiver for the minimum of 30 days until cleared by the BMT Physician. Pt's identified caregivers are his wife-Toshia and adult children; pt's wife said she works from home and is there 24/7. Caregiver education and information provided. No caregiver concerns identified.     Healthcare Directive: Yes. Pt has a health care directive and will bring it when they return to the BMT program.     Resources Provided:  BMT Information Book  BMT Resources Packet  [employer] Debbi Application  Transplant Unit Description and Information     Identified Concerns:  No concerns identified at this time.     Summary:  Pt presents to [facility] regarding an autologous stem cell transplant. Pt and pt's wife-Toshia asked good/appropriate questions regarding psychosocial factors related to BMT; all questions were addressed. Pt presented as calm and pleasant. Pt does not need to relocate. No caregiver concerns.    Plan:   SW provided contact information and encouraged pt to contact SW with any additional questions, concerns, resources and/or for support. SW will continue to follow pt to provide support and guidance with resources as needed.     Shantel  ALEXA Lin, [facility]  Phone: [phone]  Pager: [phone]        Again, thank you for allowing me to participate in the care of your patient.        Sincerely,        ALEXA Kam

## 2020-06-04 ENCOUNTER — MEDICAL CORRESPONDENCE (OUTPATIENT)
Dept: TRANSPLANT | Facility: CLINIC | Age: 70
End: 2020-06-04

## 2020-06-04 RX ORDER — SODIUM CHLORIDE 9 MG/ML
INJECTION, SOLUTION INTRAVENOUS CONTINUOUS
Status: CANCELLED | OUTPATIENT
Start: 2020-06-04

## 2020-06-04 RX ORDER — LIDOCAINE 40 MG/G
CREAM TOPICAL
Status: CANCELLED | OUTPATIENT
Start: 2020-06-04

## 2020-06-04 RX ORDER — DEXTROSE MONOHYDRATE 25 G/50ML
25-50 INJECTION, SOLUTION INTRAVENOUS
Status: CANCELLED | OUTPATIENT
Start: 2020-06-04

## 2020-06-04 RX ORDER — NICOTINE POLACRILEX 4 MG
15-30 LOZENGE BUCCAL
Status: CANCELLED | OUTPATIENT
Start: 2020-06-04

## 2020-06-04 NOTE — H&P
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  --s/p RCHOP   -- He responded well to the chemotherapy with a near complete remission after six cycles. Maintenance rituximab was started in June 2015 and completed in February 2017.   --On 11/17/17 the patient underwent a thoracotomy for an enlarging right infrahilar mass. 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 The tumor was handled as a carcinoma but the pathology is most consistent with recurrent grade 2-3 follicular lymphoma.   --On 12/14/17 he began bendamustine with rituximab chemotherapy.   --He completed 6 cycles of BR and on 6/4/18 a CT revealed stable mild lymphadenopathy with mild splenomegaly and a slight increase in the IVC thrombus.   --A CT 6/3/19 revealed an increase in the right hilar and pretracheal adenopathy with stable nodular scarring of the right lung base, without other new disease. A PET/CT 6/13/19 confirmed the hypermetabolic right infrahilar mass and revealed moderate metabolic activity within a few small nodules just deep to tire pleura in the posterior aspect of tire mid right  hemithorax, suspicious for neoplasm, without other disease.   --As all disease could be encompassed in one radiation field, he completed 3000 cGy of radiotherapy to the right thoracic region 6/27/19 - 7/18/19.  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 Bunch at the [facility] and will plan R ICE chemotherapy then followed by either stem cell transplant or CART therapy  --6/5/20 begin cycle 1 of RICE  --place port, allopurinol, antiemetics, IV fluids as needed, and plan Neulasta after discharge    Diabetes  --per endocrinology and hospitalist    Possible Adrenal Insufficiency  --Large bilateral adrenal masses with mild hypotension, nausea, vomiting, and hyperkalemia  --Normal cortisol level and ACTH elevated at 140  --Began empiric hydrocortisone 20 mg q 8 AM and 10 mg every 4 p.m.  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 He is tolerating anticoagulation with Lovenox well. We discussed alternative anticoagulants such as Coumadin (less effective) or DOAC and as he has completed chemotherapy with a good response of his malignancy in 6/2018 he was switched to Xarelto.   --A vascular surgery consultation for the persistent IVC thrombus concurred with anticoagulation for 6-12 months or longer without other intervention.   --We plan to continue the anticoagulation for now, if not indefinitely with the underlying malignancy and persistent thrombus.   --The anticoagulation will be re-evaluated on return.   --Anticoagulation will be held if thrombocytopenia develops and the platelets drop below 50,000.-     Anemia  --Mild chemotherapy and neoplasm induced anemia  --5/20/20 iron levels consistent with chronic disease  --will be monitored.     -Chronic Kidney Disease  -Mild renal insufficiency will be monitored.     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    Past Medical History    I have reviewed this patient's medical history and updated it with pertinent information if needed.   Past Medical History:   Diagnosis Date     Abnormal liver function test      CPAP (continuous positive airway pressure) dependence      Diabetes (H)      Hx of skin cancer, basal cell      Hyperlipidemia      Hypertension      Keratoderma      Liver disease      Lymphoma (H)      Non-Hodgkin lymphoma (H)      Pedal edema     CHRONIC     Pleural effusion      Seborrheic keratosis, inflamed      Sleep apnea      Thrombosis Felbruary 2018       Past Surgical History   I have reviewed this patient's surgical history and updated it with pertinent information if needed.  Past Surgical History:   Procedure Laterality Date     AMPUTATE TOE(S) Left 5/22/2020    Procedure: LEFT SECOND AND THIRD DISTAL TOE AMPUTATIONS;  Surgeon: Ramon Flores MD;  Location:  OR     BIOPSY LYMPH NODE CERVICAL N/A 12/5/2014    Procedure: BIOPSY LYMPH NODE CERVICAL;  Surgeon: Robbie Linda MD;  Location:  OR     BRONCHOSCOPY FLEXIBLE N/A 11/14/2017    Procedure: BRONCHOSCOPY FLEXIBLE;  FLEXIBLE BRONCHOSCOPY WITH BIOPSIES.;  Surgeon: Robbie Linda MD;  Location:  OR     COLONOSCOPY       COLONOSCOPY N/A 5/9/2018    Procedure: COMBINED COLONOSCOPY, SINGLE OR MULTIPLE BIOPSY/POLYPECTOMY BY BIOPSY;;  Surgeon: Ramos Bates MD;  Location:  GI     ENDOVASCULAR PLACEMENT VASCULAR DEVICE Left 12/5/2017    Procedure: ENDOVASCULAR PLACEMENT VASCULAR DEVICE;;  Surgeon: Robbie Linda MD;  Location: [facility]     ENT SURGERY      tonsillectomy     EXCISE NODE MEDIASTINAL N/A 11/17/2017     Procedure: EXCISE NODE MEDIASTINAL;;  Surgeon: Robbie Linda MD;  Location:  OR     EYE SURGERY       EYE SURGERY Left     vitrectomy     INSERT PORT VASCULAR ACCESS N/A 12/5/2014    Procedure: INSERT PORT VASCULAR ACCESS;  Surgeon: Robbie Linda MD;  Location:  OR     INSERT PORT VASCULAR ACCESS N/A 12/5/2017    Procedure: INSERT PORT VASCULAR ACCESS;  POWER PORT PLACEMENT ATTEMPTED, PLACEMENT OF ANGIO-SEAL VIP VASCULAR CLOSURE DEVICE;  Surgeon: Robbie Linda MD;  Location:  [facility]     IR PORT REMOVAL RIGHT  12/10/2018     PHACOEMULSIFICATION CLEAR CORNEA WITH STANDARD INTRAOCULAR LENS IMPLANT Right 5/2/2016    Procedure: PHACOEMULSIFICATION CLEAR CORNEA WITH STANDARD INTRAOCULAR LENS IMPLANT;  Surgeon: Rasheed Finney MD;  Location:  [facility]     REMOVE PORT VASCULAR ACCESS N/A 3/14/2017    Procedure: REMOVE PORT VASCULAR ACCESS;  Surgeon: Robbie Linda MD;  Location:  OR     SOFT TISSUE SURGERY      BASAL CELL CA FOREHEAD     THORACOTOMY Right 11/17/2017    Procedure: THORACOTOMY;  RIGHT EXPLORATORY THORACOTOMY/ EXTENSIVE PNEUMOLYSIS/ BIOPSY OF INTERLOBAR LYMPH NODE;  Surgeon: Robbie Linda MD;  Location:  OR       Prior to Admission Medications   Cannot display prior to admission medications because the patient has not been admitted in this contact.     Allergies   No Known Allergies    Social History   I have reviewed this patient's social history and updated it with pertinent information if needed. 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 He has never used smokeless tobacco. He reports that he does not drink alcohol or use drugs.    Family History   I have reviewed this patient's family history and updated it with pertinent information if needed.   No family history on file.    Review of Systems   The 10 point Review of Systems is negative other than noted in the HPI or here.     Physical Exam                      Vital Signs with  Ranges  BP: ()/()   Arterial Line BP: ()/()   0 lbs 0 oz    The patient is in no immediate distress.   The sclerae are nonicteric.  There is no thyromegaly.  The supraclavicular or low cervical lymph node is no longer palpable and he is without other cervical, axillary, epitrochlear, or supraclavicular lymphadenopathy.  PMI nondisplaced with negative heart and lung exam  The abdomen is distended, but soft and nontender.   There is no pedal edema.   Gait is normal.    Data   Data reviewed today:  No lab results found in last 7 days.    No results found for this or any previous visit (from the past 24 hour(s)).

## 2020-06-04 NOTE — PROGRESS NOTES
Blood and Marrow Transplant   New Transplant Visit with   Clinical     Assessment completed on 6/3/2020 via phone as part of the COVID 19 protocol. Assessment of living situation, support system, financial status, functional status, coping, stressors, need for resources and social work intervention provided as needed. Information for this assessment was provided by pt and pt's wife report in addition to medical chart review and consultation with medical team.     Present:  Patient: Pastor Garibay  : ALEXA Kam, [facility]    *Pt asked SW to call his wife-Toshia after him; SW spoke with wife on 6/4/2020.    Medical Team   Nurse Coordinator: Nguyen Candelaria RN  BMT Physician: Rosey Bunch MD  Referring Physician: Mike Olson MD    Presenting Information:  Pt is a 70 year old male diagnosed with NHL. Pt was diagnosed on 2014. Pt presents for autologous stem cell transplant discussion.    Contact Information:  Cell Phone: [phone]  Wife-Toshia Garibay's Phone: [phone]    Special Needs: No needs identified at this time.     Relocation Requirement: Pt lives in [place], [place] which is within the required distance of the hospital. Pt does not need to relocate.     Family Information:   Spouse: Toshia Garibay  Siblings: 2 Brothers and 1 Sister  Children: 4 Children; 1 Son and 2 Daughters (live in the [place]) and 1 Son (Lives in [place], [place])    Education/Employment:  Currently employed: Yes  Employer: [employer]    Spouse/Partner Employed: Yes  Employer: [employer]    Insurance: [employer] Pass[employer] & Medicare Part A. No insurance concerns identified at this time. ANNA provided information regarding the insurance authorization process and the role of the [employer] . ANNA provided contact info for the [employer]  and referred pt to them for future insurance questions.     Finances: Pt and pt's wife's income. Pt's hours have been cut  due to COVID. Pt does not have Short Term Disability available to him; pt does have Long Term Disability but not until 90 days after last day of work. Pt and pt's wife may have financial concerns identified at this time. SW discussed debbi options. SW discussed the [employer] debbi for patients effected by COVID. SW mailed pt the application.      Caregiver:   SW discussed with the pt and pt's wife-Toshia the caregiver role and expectation at length. Pt is agreeable to having a full time caregiver for the minimum of 30 days until cleared by the BMT Physician. Pt's identified caregivers are his wife-Toshia and adult children; pt's wife said she works from home and is there 24/7. Caregiver education and information provided. No caregiver concerns identified.     Healthcare Directive: Yes. Pt has a health care directive and will bring it when they return to the BMT program.     Resources Provided:  BMT Information Book  BMT Resources Packet  [employer] Debbi Application  Transplant Unit Description and Information     Identified Concerns:  No concerns identified at this time.     Summary:  Pt presents to [facility] regarding an autologous stem cell transplant. Pt and pt's wife-Toshia asked good/appropriate questions regarding psychosocial factors related to BMT; all questions were addressed. Pt presented as calm and pleasant. Pt does not need to relocate. No caregiver concerns.    Plan:   SW provided contact information and encouraged pt to contact SW with any additional questions, concerns, resources and/or for support. SW will continue to follow pt to provide support and guidance with resources as needed.     ALEXA Kam, [facility]  [facility]  Phone: [phone]  Pager: [phone]

## 2020-06-05 ENCOUNTER — APPOINTMENT (OUTPATIENT)
Dept: INTERVENTIONAL RADIOLOGY/VASCULAR | Facility: CLINIC | Age: 70
DRG: 829 | End: 2020-06-05
Attending: NURSE PRACTITIONER
Payer: COMMERCIAL

## 2020-06-05 ENCOUNTER — HOSPITAL ENCOUNTER (INPATIENT)
Facility: CLINIC | Age: 70
LOS: 7 days | Discharge: HOME | DRG: 829 | End: 2020-06-12
Attending: INTERNAL MEDICINE | Admitting: INTERNAL MEDICINE
Payer: COMMERCIAL

## 2020-06-05 DIAGNOSIS — C83.398 DIFFUSE LARGE B-CELL LYMPHOMA OF SOLID ORGAN EXCLUDING SPLEEN: Primary | ICD-10-CM

## 2020-06-05 DIAGNOSIS — C83.3A DIFFUSE LARGE CELL LYMPHOMA IN REMISSION: ICD-10-CM

## 2020-06-05 LAB
ALBUMIN SERPL-MCNC: 2.9 G/DL (ref 3.4–5)
ALP SERPL-CCNC: 57 U/L (ref 40–150)
ALT SERPL W P-5'-P-CCNC: 25 U/L (ref 0–70)
ANION GAP SERPL CALCULATED.3IONS-SCNC: 9 MMOL/L (ref 3–14)
AST SERPL W P-5'-P-CCNC: 39 U/L (ref 0–45)
BASOPHILS # BLD AUTO: 0 10E9/L (ref 0–0.2)
BASOPHILS NFR BLD AUTO: 0.2 %
BILIRUB SERPL-MCNC: 0.4 MG/DL (ref 0.2–1.3)
BUN SERPL-MCNC: 24 MG/DL (ref 7–30)
CALCIUM SERPL-MCNC: 9.5 MG/DL (ref 8.5–10.1)
CHLORIDE SERPL-SCNC: 104 MMOL/L (ref 94–109)
CO2 SERPL-SCNC: 22 MMOL/L (ref 20–32)
CREAT SERPL-MCNC: 1.13 MG/DL (ref 0.66–1.25)
DIFFERENTIAL METHOD BLD: ABNORMAL
EOSINOPHIL # BLD AUTO: 0.3 10E9/L (ref 0–0.7)
EOSINOPHIL NFR BLD AUTO: 5.8 %
ERYTHROCYTE [DISTWIDTH] IN BLOOD BY AUTOMATED COUNT: 18.5 % (ref 10–15)
GFR SERPL CREATININE-BSD FRML MDRD: 66 ML/MIN/{1.73_M2}
GLUCOSE BLDC GLUCOMTR-MCNC: 186 MG/DL (ref 70–99)
GLUCOSE BLDC GLUCOMTR-MCNC: 48 MG/DL (ref 70–99)
GLUCOSE BLDC GLUCOMTR-MCNC: 54 MG/DL (ref 70–99)
GLUCOSE BLDC GLUCOMTR-MCNC: 71 MG/DL (ref 70–99)
GLUCOSE BLDC GLUCOMTR-MCNC: 95 MG/DL (ref 70–99)
GLUCOSE SERPL-MCNC: 71 MG/DL (ref 70–99)
HBV CORE AB SERPL QL IA: NONREACTIVE
HBV SURFACE AG SERPL QL IA: NONREACTIVE
HCT VFR BLD AUTO: 33.8 % (ref 40–53)
HGB BLD-MCNC: 10.6 G/DL (ref 13.3–17.7)
IMM GRANULOCYTES # BLD: 0.1 10E9/L (ref 0–0.4)
IMM GRANULOCYTES NFR BLD: 1.4 %
LDH SERPL L TO P-CCNC: 534 U/L (ref 85–227)
LYMPHOCYTES # BLD AUTO: 0.7 10E9/L (ref 0.8–5.3)
LYMPHOCYTES NFR BLD AUTO: 15 %
MCH RBC QN AUTO: 24.5 PG (ref 26.5–33)
MCHC RBC AUTO-ENTMCNC: 31.4 G/DL (ref 31.5–36.5)
MCV RBC AUTO: 78 FL (ref 78–100)
MONOCYTES # BLD AUTO: 0.6 10E9/L (ref 0–1.3)
MONOCYTES NFR BLD AUTO: 13 %
NEUTROPHILS # BLD AUTO: 2.8 10E9/L (ref 1.6–8.3)
NEUTROPHILS NFR BLD AUTO: 64.6 %
NRBC # BLD AUTO: 0 10*3/UL
NRBC BLD AUTO-RTO: 0 /100
PHOSPHATE SERPL-MCNC: 4.1 MG/DL (ref 2.5–4.5)
PLATELET # BLD AUTO: 215 10E9/L (ref 150–450)
POTASSIUM SERPL-SCNC: 4.6 MMOL/L (ref 3.4–5.3)
PROT SERPL-MCNC: 7 G/DL (ref 6.8–8.8)
RBC # BLD AUTO: 4.33 10E12/L (ref 4.4–5.9)
SODIUM SERPL-SCNC: 135 MMOL/L (ref 133–144)
URATE SERPL-MCNC: 5.6 MG/DL (ref 3.5–7.2)
WBC # BLD AUTO: 4.3 10E9/L (ref 4–11)

## 2020-06-05 PROCEDURE — 25000132 ZZH RX MED GY IP 250 OP 250 PS 637: Performed by: HOSPITALIST

## 2020-06-05 PROCEDURE — 25800030 ZZH RX IP 258 OP 636: Performed by: INTERNAL MEDICINE

## 2020-06-05 PROCEDURE — 25000131 ZZH RX MED GY IP 250 OP 636 PS 637: Performed by: INTERNAL MEDICINE

## 2020-06-05 PROCEDURE — 84100 ASSAY OF PHOSPHORUS: CPT | Performed by: INTERNAL MEDICINE

## 2020-06-05 PROCEDURE — 25000128 H RX IP 250 OP 636: Performed by: INTERNAL MEDICINE

## 2020-06-05 PROCEDURE — 25000125 ZZHC RX 250: Performed by: NURSE PRACTITIONER

## 2020-06-05 PROCEDURE — 00000146 ZZHCL STATISTIC GLUCOSE BY METER IP

## 2020-06-05 PROCEDURE — 85025 COMPLETE CBC W/AUTO DIFF WBC: CPT | Performed by: INTERNAL MEDICINE

## 2020-06-05 PROCEDURE — 02HV33Z INSERTION OF INFUSION DEVICE INTO SUPERIOR VENA CAVA, PERCUTANEOUS APPROACH: ICD-10-PCS | Performed by: RADIOLOGY

## 2020-06-05 PROCEDURE — 77001 FLUOROGUIDE FOR VEIN DEVICE: CPT

## 2020-06-05 PROCEDURE — 27210886 ZZH ACCESSORY CR5

## 2020-06-05 PROCEDURE — 27211193 ZZ H WOUND GLUE CR1

## 2020-06-05 PROCEDURE — 25000128 H RX IP 250 OP 636: Performed by: NURSE PRACTITIONER

## 2020-06-05 PROCEDURE — 12000000 ZZH R&B MED SURG/OB

## 2020-06-05 PROCEDURE — C1769 GUIDE WIRE: HCPCS

## 2020-06-05 PROCEDURE — 83615 LACTATE (LD) (LDH) ENZYME: CPT | Performed by: INTERNAL MEDICINE

## 2020-06-05 PROCEDURE — 25000132 ZZH RX MED GY IP 250 OP 250 PS 637: Performed by: PHYSICIAN ASSISTANT

## 2020-06-05 PROCEDURE — 86704 HEP B CORE ANTIBODY TOTAL: CPT | Performed by: INTERNAL MEDICINE

## 2020-06-05 PROCEDURE — 80053 COMPREHEN METABOLIC PANEL: CPT | Performed by: INTERNAL MEDICINE

## 2020-06-05 PROCEDURE — 3E0W305 INTRODUCTION OF OTHER ANTINEOPLASTIC INTO LYMPHATICS, PERCUTANEOUS APPROACH: ICD-10-PCS | Performed by: HOSPITALIST

## 2020-06-05 PROCEDURE — B5131ZA FLUOROSCOPY OF RIGHT JUGULAR VEINS USING LOW OSMOLAR CONTRAST, GUIDANCE: ICD-10-PCS | Performed by: RADIOLOGY

## 2020-06-05 PROCEDURE — C1788 PORT, INDWELLING, IMP: HCPCS

## 2020-06-05 PROCEDURE — 25000128 H RX IP 250 OP 636: Performed by: RADIOLOGY

## 2020-06-05 PROCEDURE — 36415 COLL VENOUS BLD VENIPUNCTURE: CPT | Performed by: INTERNAL MEDICINE

## 2020-06-05 PROCEDURE — 25000132 ZZH RX MED GY IP 250 OP 250 PS 637: Performed by: INTERNAL MEDICINE

## 2020-06-05 PROCEDURE — 87340 HEPATITIS B SURFACE AG IA: CPT | Performed by: INTERNAL MEDICINE

## 2020-06-05 PROCEDURE — 99207 ZZC CONSULT E&M CHANGED TO INITIAL LEVEL: CPT | Performed by: PHYSICIAN ASSISTANT

## 2020-06-05 PROCEDURE — 99222 1ST HOSP IP/OBS MODERATE 55: CPT | Performed by: PHYSICIAN ASSISTANT

## 2020-06-05 PROCEDURE — 0JH60WZ INSERTION OF TOTALLY IMPLANTABLE VASCULAR ACCESS DEVICE INTO CHEST SUBCUTANEOUS TISSUE AND FASCIA, OPEN APPROACH: ICD-10-PCS | Performed by: RADIOLOGY

## 2020-06-05 PROCEDURE — 84550 ASSAY OF BLOOD/URIC ACID: CPT | Performed by: INTERNAL MEDICINE

## 2020-06-05 PROCEDURE — 25000125 ZZHC RX 250: Performed by: RADIOLOGY

## 2020-06-05 RX ORDER — SODIUM CHLORIDE 9 MG/ML
1000 INJECTION, SOLUTION INTRAVENOUS CONTINUOUS PRN
Status: DISCONTINUED | OUTPATIENT
Start: 2020-06-05 | End: 2020-06-12 | Stop reason: HOSPADM

## 2020-06-05 RX ORDER — DIPHENHYDRAMINE HYDROCHLORIDE 50 MG/ML
50 INJECTION INTRAMUSCULAR; INTRAVENOUS
Status: DISCONTINUED | OUTPATIENT
Start: 2020-06-05 | End: 2020-06-12 | Stop reason: HOSPADM

## 2020-06-05 RX ORDER — ALBUTEROL SULFATE 90 UG/1
1-2 AEROSOL, METERED RESPIRATORY (INHALATION)
Status: DISCONTINUED | OUTPATIENT
Start: 2020-06-05 | End: 2020-06-12 | Stop reason: HOSPADM

## 2020-06-05 RX ORDER — CEFAZOLIN SODIUM 2 G/100ML
2 INJECTION, SOLUTION INTRAVENOUS
Status: CANCELLED | OUTPATIENT
Start: 2020-06-05

## 2020-06-05 RX ORDER — CEFAZOLIN SODIUM 2 G/100ML
2 INJECTION, SOLUTION INTRAVENOUS ONCE
Status: COMPLETED | OUTPATIENT
Start: 2020-06-05 | End: 2020-06-05

## 2020-06-05 RX ORDER — ALLOPURINOL 300 MG/1
300 TABLET ORAL DAILY
Status: DISCONTINUED | OUTPATIENT
Start: 2020-06-05 | End: 2020-06-05

## 2020-06-05 RX ORDER — ALBUTEROL SULFATE 0.83 MG/ML
2.5 SOLUTION RESPIRATORY (INHALATION)
Status: DISCONTINUED | OUTPATIENT
Start: 2020-06-05 | End: 2020-06-12 | Stop reason: HOSPADM

## 2020-06-05 RX ORDER — ROSUVASTATIN CALCIUM 5 MG/1
5 TABLET, COATED ORAL DAILY
Status: DISCONTINUED | OUTPATIENT
Start: 2020-06-06 | End: 2020-06-12 | Stop reason: HOSPADM

## 2020-06-05 RX ORDER — METHYLPREDNISOLONE SODIUM SUCCINATE 125 MG/2ML
125 INJECTION, POWDER, LYOPHILIZED, FOR SOLUTION INTRAMUSCULAR; INTRAVENOUS
Status: DISCONTINUED | OUTPATIENT
Start: 2020-06-05 | End: 2020-06-12 | Stop reason: HOSPADM

## 2020-06-05 RX ORDER — LORAZEPAM 0.5 MG/1
.5-1 TABLET ORAL EVERY 6 HOURS PRN
Status: DISCONTINUED | OUTPATIENT
Start: 2020-06-05 | End: 2020-06-12 | Stop reason: HOSPADM

## 2020-06-05 RX ORDER — NALOXONE HYDROCHLORIDE 0.4 MG/ML
.1-.4 INJECTION, SOLUTION INTRAMUSCULAR; INTRAVENOUS; SUBCUTANEOUS
Status: DISCONTINUED | OUTPATIENT
Start: 2020-06-05 | End: 2020-06-05

## 2020-06-05 RX ORDER — HYDROCORTISONE 10 MG/1
10 TABLET ORAL EVERY EVENING
Status: DISCONTINUED | OUTPATIENT
Start: 2020-06-05 | End: 2020-06-07

## 2020-06-05 RX ORDER — DEXAMETHASONE 4 MG/1
8 TABLET ORAL DAILY
Status: DISCONTINUED | OUTPATIENT
Start: 2020-06-07 | End: 2020-06-08

## 2020-06-05 RX ORDER — DIPHENHYDRAMINE HCL 50 MG
50 CAPSULE ORAL ONCE
Status: COMPLETED | OUTPATIENT
Start: 2020-06-05 | End: 2020-06-05

## 2020-06-05 RX ORDER — DEXTROSE MONOHYDRATE 50 MG/ML
10-20 INJECTION, SOLUTION INTRAVENOUS
Status: DISCONTINUED | OUTPATIENT
Start: 2020-06-09 | End: 2020-06-12

## 2020-06-05 RX ORDER — DEXTROSE MONOHYDRATE 25 G/50ML
25-50 INJECTION, SOLUTION INTRAVENOUS
Status: DISCONTINUED | OUTPATIENT
Start: 2020-06-05 | End: 2020-06-12 | Stop reason: HOSPADM

## 2020-06-05 RX ORDER — DIPHENHYDRAMINE HCL 50 MG
50 CAPSULE ORAL
Status: DISCONTINUED | OUTPATIENT
Start: 2020-06-05 | End: 2020-06-12 | Stop reason: HOSPADM

## 2020-06-05 RX ORDER — DEXAMETHASONE 4 MG/1
12 TABLET ORAL ONCE
Status: COMPLETED | OUTPATIENT
Start: 2020-06-06 | End: 2020-06-06

## 2020-06-05 RX ORDER — FENTANYL CITRATE 50 UG/ML
25-50 INJECTION, SOLUTION INTRAMUSCULAR; INTRAVENOUS EVERY 5 MIN PRN
Status: DISCONTINUED | OUTPATIENT
Start: 2020-06-05 | End: 2020-06-05

## 2020-06-05 RX ORDER — PROCHLORPERAZINE MALEATE 5 MG
5-10 TABLET ORAL EVERY 6 HOURS PRN
Status: DISCONTINUED | OUTPATIENT
Start: 2020-06-05 | End: 2020-06-12 | Stop reason: HOSPADM

## 2020-06-05 RX ORDER — ACETAMINOPHEN 325 MG/1
650 TABLET ORAL ONCE
Status: COMPLETED | OUTPATIENT
Start: 2020-06-05 | End: 2020-06-05

## 2020-06-05 RX ORDER — ALLOPURINOL 300 MG/1
300 TABLET ORAL DAILY
Status: DISCONTINUED | OUTPATIENT
Start: 2020-06-05 | End: 2020-06-08

## 2020-06-05 RX ORDER — LORAZEPAM 2 MG/ML
.5-1 INJECTION INTRAMUSCULAR EVERY 6 HOURS PRN
Status: DISCONTINUED | OUTPATIENT
Start: 2020-06-05 | End: 2020-06-12 | Stop reason: HOSPADM

## 2020-06-05 RX ORDER — HEPARIN SODIUM (PORCINE) LOCK FLUSH IV SOLN 100 UNIT/ML 100 UNIT/ML
5 SOLUTION INTRAVENOUS
Status: DISCONTINUED | OUTPATIENT
Start: 2020-06-05 | End: 2020-06-12 | Stop reason: HOSPADM

## 2020-06-05 RX ORDER — HEPARIN SODIUM,PORCINE 10 UNIT/ML
5 VIAL (ML) INTRAVENOUS EVERY 24 HOURS
Status: DISCONTINUED | OUTPATIENT
Start: 2020-06-05 | End: 2020-06-12 | Stop reason: HOSPADM

## 2020-06-05 RX ORDER — POTASSIUM CHLORIDE 1500 MG/1
40 TABLET, EXTENDED RELEASE ORAL EVERY MORNING
COMMUNITY
End: 2020-09-23

## 2020-06-05 RX ORDER — DEXAMETHASONE 4 MG/1
8 TABLET ORAL ONCE
Status: COMPLETED | OUTPATIENT
Start: 2020-06-05 | End: 2020-06-05

## 2020-06-05 RX ORDER — HYDROCORTISONE 10 MG/1
10 TABLET ORAL EVERY EVENING
COMMUNITY
End: 2020-08-20

## 2020-06-05 RX ORDER — NICOTINE POLACRILEX 4 MG
15-30 LOZENGE BUCCAL
Status: DISCONTINUED | OUTPATIENT
Start: 2020-06-05 | End: 2020-06-12 | Stop reason: HOSPADM

## 2020-06-05 RX ORDER — ACETAMINOPHEN 325 MG/1
650 TABLET ORAL
Status: DISCONTINUED | OUTPATIENT
Start: 2020-06-05 | End: 2020-06-12 | Stop reason: HOSPADM

## 2020-06-05 RX ORDER — FENOFIBRATE 145 MG/1
145 TABLET, COATED ORAL DAILY
Status: ON HOLD | COMMUNITY
End: 2020-10-07

## 2020-06-05 RX ORDER — NALOXONE HYDROCHLORIDE 0.4 MG/ML
.1-.4 INJECTION, SOLUTION INTRAMUSCULAR; INTRAVENOUS; SUBCUTANEOUS
Status: DISCONTINUED | OUTPATIENT
Start: 2020-06-05 | End: 2020-06-12 | Stop reason: HOSPADM

## 2020-06-05 RX ORDER — DEXTROSE MONOHYDRATE 50 MG/ML
10-20 INJECTION, SOLUTION INTRAVENOUS
Status: DISCONTINUED | OUTPATIENT
Start: 2020-06-09 | End: 2020-06-12 | Stop reason: HOSPADM

## 2020-06-05 RX ORDER — HYDROCORTISONE 20 MG/1
20 TABLET ORAL
Status: DISCONTINUED | OUTPATIENT
Start: 2020-06-05 | End: 2020-06-07

## 2020-06-05 RX ORDER — ROSUVASTATIN CALCIUM 10 MG/1
5 TABLET, COATED ORAL DAILY
COMMUNITY
End: 2020-09-23

## 2020-06-05 RX ORDER — MEPERIDINE HYDROCHLORIDE 25 MG/ML
25 INJECTION INTRAMUSCULAR; INTRAVENOUS; SUBCUTANEOUS EVERY 30 MIN PRN
Status: DISCONTINUED | OUTPATIENT
Start: 2020-06-05 | End: 2020-06-12 | Stop reason: HOSPADM

## 2020-06-05 RX ORDER — ONDANSETRON 8 MG/1
16 TABLET, FILM COATED ORAL ONCE
Status: COMPLETED | OUTPATIENT
Start: 2020-06-06 | End: 2020-06-06

## 2020-06-05 RX ORDER — FLUMAZENIL 0.1 MG/ML
0.2 INJECTION, SOLUTION INTRAVENOUS
Status: DISCONTINUED | OUTPATIENT
Start: 2020-06-05 | End: 2020-06-12 | Stop reason: HOSPADM

## 2020-06-05 RX ORDER — HEPARIN SODIUM (PORCINE) LOCK FLUSH IV SOLN 100 UNIT/ML 100 UNIT/ML
500 SOLUTION INTRAVENOUS ONCE
Status: COMPLETED | OUTPATIENT
Start: 2020-06-05 | End: 2020-06-05

## 2020-06-05 RX ORDER — ACETAMINOPHEN 325 MG/1
650-975 TABLET ORAL EVERY 12 HOURS PRN
Status: ON HOLD | COMMUNITY
End: 2020-07-01

## 2020-06-05 RX ORDER — ALLOPURINOL 300 MG/1
300 TABLET ORAL DAILY
COMMUNITY
End: 2020-10-30

## 2020-06-05 RX ORDER — EPINEPHRINE 1 MG/ML
0.3 INJECTION, SOLUTION, CONCENTRATE INTRAVENOUS EVERY 5 MIN PRN
Status: DISCONTINUED | OUTPATIENT
Start: 2020-06-05 | End: 2020-06-12 | Stop reason: HOSPADM

## 2020-06-05 RX ORDER — SODIUM CHLORIDE 9 MG/ML
INJECTION, SOLUTION INTRAVENOUS CONTINUOUS
Status: DISCONTINUED | OUTPATIENT
Start: 2020-06-05 | End: 2020-06-10

## 2020-06-05 RX ADMIN — Medication 1 LOZENGE: at 22:29

## 2020-06-05 RX ADMIN — DIPHENHYDRAMINE HYDROCHLORIDE 50 MG: 50 CAPSULE ORAL at 22:56

## 2020-06-05 RX ADMIN — ACETAMINOPHEN 650 MG: 325 TABLET, FILM COATED ORAL at 22:56

## 2020-06-05 RX ADMIN — LIDOCAINE HYDROCHLORIDE 10 ML: 10; .005 INJECTION, SOLUTION EPIDURAL; INFILTRATION; INTRACAUDAL; PERINEURAL at 14:42

## 2020-06-05 RX ADMIN — CEFAZOLIN SODIUM 2 G: 2 INJECTION, SOLUTION INTRAVENOUS at 14:35

## 2020-06-05 RX ADMIN — ETOPOSIDE 205 MG: 20 INJECTION, SOLUTION, CONCENTRATE INTRAVENOUS at 18:38

## 2020-06-05 RX ADMIN — HYDROCORTISONE 20 MG: 20 TABLET ORAL at 11:15

## 2020-06-05 RX ADMIN — LIDOCAINE HYDROCHLORIDE 10 ML: 10 INJECTION, SOLUTION INFILTRATION; PERINEURAL at 14:41

## 2020-06-05 RX ADMIN — SODIUM CHLORIDE: 9 INJECTION, SOLUTION INTRAVENOUS at 10:46

## 2020-06-05 RX ADMIN — DEXAMETHASONE 8 MG: 4 TABLET ORAL at 17:49

## 2020-06-05 RX ADMIN — SODIUM CHLORIDE: 9 INJECTION, SOLUTION INTRAVENOUS at 20:43

## 2020-06-05 RX ADMIN — RITUXIMAB 800 MG: 10 INJECTION, SOLUTION INTRAVENOUS at 11:32

## 2020-06-05 RX ADMIN — ALLOPURINOL 300 MG: 300 TABLET ORAL at 10:47

## 2020-06-05 RX ADMIN — FENTANYL CITRATE 25 MCG: 50 INJECTION, SOLUTION INTRAMUSCULAR; INTRAVENOUS at 14:25

## 2020-06-05 RX ADMIN — MIDAZOLAM HYDROCHLORIDE 0.5 MG: 1 INJECTION, SOLUTION INTRAMUSCULAR; INTRAVENOUS at 14:25

## 2020-06-05 RX ADMIN — MIDAZOLAM HYDROCHLORIDE 0.5 MG: 1 INJECTION, SOLUTION INTRAMUSCULAR; INTRAVENOUS at 14:35

## 2020-06-05 RX ADMIN — MIDAZOLAM HYDROCHLORIDE 0.5 MG: 1 INJECTION, SOLUTION INTRAMUSCULAR; INTRAVENOUS at 14:41

## 2020-06-05 RX ADMIN — FENTANYL CITRATE 25 MCG: 50 INJECTION, SOLUTION INTRAMUSCULAR; INTRAVENOUS at 14:41

## 2020-06-05 RX ADMIN — HYDROCORTISONE 10 MG: 10 TABLET ORAL at 17:28

## 2020-06-05 RX ADMIN — DIPHENHYDRAMINE HYDROCHLORIDE 50 MG: 50 CAPSULE ORAL at 10:47

## 2020-06-05 RX ADMIN — FENTANYL CITRATE 25 MCG: 50 INJECTION, SOLUTION INTRAMUSCULAR; INTRAVENOUS at 14:20

## 2020-06-05 RX ADMIN — FENTANYL CITRATE 25 MCG: 50 INJECTION, SOLUTION INTRAMUSCULAR; INTRAVENOUS at 14:35

## 2020-06-05 RX ADMIN — ACETAMINOPHEN 650 MG: 325 TABLET, FILM COATED ORAL at 10:47

## 2020-06-05 RX ADMIN — HEPARIN SODIUM (PORCINE) LOCK FLUSH IV SOLN 100 UNIT/ML 500 UNITS: 100 SOLUTION at 15:02

## 2020-06-05 RX ADMIN — MIDAZOLAM HYDROCHLORIDE 0.5 MG: 1 INJECTION, SOLUTION INTRAMUSCULAR; INTRAVENOUS at 14:20

## 2020-06-05 ASSESSMENT — ACTIVITIES OF DAILY LIVING (ADL)
ADLS_ACUITY_SCORE: 12
ADLS_ACUITY_SCORE: 14

## 2020-06-05 ASSESSMENT — MIFFLIN-ST. JEOR: SCORE: 1599.98

## 2020-06-05 NOTE — PROVIDER NOTIFICATION
Prescriber Notification Note    The pharmacist has communicated with this patient's provider regarding a concern or therapy recommendation.    Notified Person: ETHEL Zuluaga  Date/Time of Notification: 6/5/20 0920  Interaction: text page  Concern/Recommendation:patient getting a port placed this pm. Does he still want xarelto given this am?     Comments/Additional Details:xarelto discontinued    6/5 0952 xarelto changed to start tomorrow am.

## 2020-06-05 NOTE — PROCEDURES
RADIOLOGY POST PROCEDURE NOTE WITH SEDATION  Patient name: Pastor Garibay  MRN: [id number]  : 1950    Pre-procedure diagnosis: Recurrent B-cell lymphoma  Post-procedure diagnosis: Same    Procedure Date/Time: 2020  3:27 PM    Procedure: Port-a-cath placement  Estimated blood loss: Minimal  Sedation: Moderate sedation was employed. The patient was monitored by a nurse at all times during the procedure under my direct supervision.    Specimen(s) collected with description: None    I determined this patient to be an appropriate candidate for the planned sedation and procedure and reassessed the patient IMMEDIATELY PRIOR to sedation and procedure.     The patient tolerated the procedure well with no immediate complications.    Significant findings: Successful placement of port-a-cath. Ready for immediate use.    See imaging dictation for procedural details.    Provider name: Brody Estevez M.D.  Assistant(s):None

## 2020-06-05 NOTE — CONSULTS
[facility]  Consult Note - Hospitalist Service     Date of Admission:  6/5/2020  Consult Requested by: Dr. 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     Past medical history significant for B-cell Non-Hodgkin's lymphoma, DM2, Suspected adrenal insufficiency, Hypercoagulable state, chemotherapy induced anemia, CKD stage II-III, JESSICA complaint with CPAP, HLP, History of HTN (no longer on medications), History of GERD, History of lower extremity edema who was admitted for induction of chemotherapy for which the Hospitalist service has been consulted to assist with medical management.      B-cell Non-Hodgkin's lymphoma:  LDH elevated at 534.    Management per Oncology:   --RICE chemotherapy then followed by either stem cell transplant or CART therapy.     --6/5 begin cycle 1 of RICE.     --Plan for Neulasta after discharge.     --Allopurinol 300 mg daily.    --PRN anti-emetics.      Insulin dependent DM2:  Complaint with metformin 1000 mg every morning and 500 mg with supper, Lantus 30 units at bedtime and sliding scale NovoLog with meals.  A1c of 7.3%.   Recent Labs   Lab 06/05/20  0830   GLC 71   --Hold PTA metformin.    --Lantus resumed at reduced dose of 20 Units at bedtime.    --Medium intensity sliding scale.    --AccuChecks QID.    --Hypoglycemic protocol.      Suspected adrenal insufficiency, in setting of adrenal masses:  Noted large bilateral adrenal masses with new hypotension, nausea, vomiting and hyperkalemia.  Noted normal cortisol level and ACTH elevated at 140.    --Resume PTA empiric hydrocortisone 20 mg every 8 am and 10 mg every 4 pm.    --Endocrinology follow-up as an outpatient.       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    --Continue Xarelto 20 mg daily (patient takes in the morning) will hold this morning  and restart tomorrow.  Hold if thrombocytopenia develops (PLTs drop below 50,000).      Anemia of chronic disease:  Believe secondary to malignancy and previous chemotherapy.  Lab studies from 5/2020 consistent with chronic disease.    --Monitor.      CKD stage II-III:  Creatinine at 1.13 with GFR of 66 upon admit.    --Monitor.    --IVF at 75 ml/hr.      JESSICA:  Complaint with CPAP.  --Home CPAP with home settings.      HLP:  --Resume PTA rosuvastatin 5 mg daily and Tricor 145 mg daily.      History of HTN (no longer on medications):  Patient with hypotension and has stopped all blood pressure medications.  Suspected adrenal insufficiency.  --Orthostatic blood pressures.    --IVF at 75 ml/hr.      History of GERD:  No longer on medications.      History of lower extremity edema:  Patient indicated this is no longer an issue and is no longer on diuretic therapy.    --Monitor.      Diet: NPO for Medical/Clinical Reasons Except for: Other, Meds, Ice Chips; Specify: Patient may have breakfast and then NPO for solids after, may have some clear liquids sparingly     DVT Prophylaxis: Xarelto   Champion Catheter: not present  Code Status: Full Code     Disposition Plan    Expected discharge: Per Oncology  Entered: Hayden Zuluaga PA-C 06/05/2020, 8:50 AM        The patient's care was discussed with the Patient.    The patient has been discussed with Dr. Alex, who agrees with the assessment and plan at this time. Dr. Alex will evaluate the patient independently.     Hayden Zuluaga PA-C  [facility]    ______________________________________________________________________    Chief Complaint   Induction of chemotherapy for which the Hospitalist service has been consulted to assist with medical management.      History is obtained from the patient and EMR.      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    Patient was seen in his hospital room.  He was resting comfortably in bed upon arrival.  He stated he has been dealing with fatigue for some time.  He occasionally has chest pain which he said is secondary to his cancer.  At times, it is an ache and other times sharp.  It is not present all the time and is infrequent.  It does not seem to occur with activity or change with activity.  He does develop shortness of breath if he walks for prolonged periods of time.  He occasionally has abdominal pain.  Patient has had ongoing nausea dn vomiting and overall poor oral intake.  He mentioned in the last 2 months he has lost 30 pounds.  He has also developed diarrhea.  Patient stated his blood pressure is low now and he has slight lightheadedness upon standing or when standing for prolonged periods of time.  He has some slight numbness on the top of his left foot.      Review of Systems   The 10 point Review of Systems is negative other than noted in the HPI.    Past Medical History    I have reviewed this patient's medical history and updated it with pertinent information if needed.   Past Medical History:   Diagnosis Date     Abnormal liver function test      CPAP (continuous positive airway pressure) dependence      Diabetes (H)      Hx of skin cancer, basal cell      Hyperlipidemia      Hypertension      Keratoderma      Liver disease      Lymphoma (H)      Non-Hodgkin lymphoma (H)      Pedal edema     CHRONIC     Pleural effusion      Seborrheic keratosis, inflamed      Sleep apnea      Thrombosis Felbruary 2018   B-cell Non-Hodgkin's  lymphoma  DM2  Suspected adrenal insufficiency  Hypercoagulable state  Chemotherapy induced anemia  CKD stage II-III  JESSICA complaint with CPAP  HLP  History of HTN (no longer on medications)  History of GERD  History of lower extremity edema    Past Surgical History   I have reviewed this patient's surgical history and updated it with pertinent information if needed.  Past Surgical History:   Procedure Laterality Date     AMPUTATE TOE(S) Left 5/22/2020    Procedure: LEFT SECOND AND THIRD DISTAL TOE AMPUTATIONS;  Surgeon: Ramon Flores MD;  Location:  OR     BIOPSY LYMPH NODE CERVICAL N/A 12/5/2014    Procedure: BIOPSY LYMPH NODE CERVICAL;  Surgeon: Robbie Linda MD;  Location:  OR     BRONCHOSCOPY FLEXIBLE N/A 11/14/2017    Procedure: BRONCHOSCOPY FLEXIBLE;  FLEXIBLE BRONCHOSCOPY WITH BIOPSIES.;  Surgeon: Robbie Linda MD;  Location:  OR     COLONOSCOPY       COLONOSCOPY N/A 5/9/2018    Procedure: COMBINED COLONOSCOPY, SINGLE OR MULTIPLE BIOPSY/POLYPECTOMY BY BIOPSY;;  Surgeon: Ramos Bates MD;  Location:  GI     ENDOVASCULAR PLACEMENT VASCULAR DEVICE Left 12/5/2017    Procedure: ENDOVASCULAR PLACEMENT VASCULAR DEVICE;;  Surgeon: Robbie Linda MD;  Location: [facility]     ENT SURGERY      tonsillectomy     EXCISE NODE MEDIASTINAL N/A 11/17/2017    Procedure: EXCISE NODE MEDIASTINAL;;  Surgeon: Robbie Linda MD;  Location:  OR     EYE SURGERY       EYE SURGERY Left     vitrectomy     INSERT PORT VASCULAR ACCESS N/A 12/5/2014    Procedure: INSERT PORT VASCULAR ACCESS;  Surgeon: Robbie Linda MD;  Location:  OR     INSERT PORT VASCULAR ACCESS N/A 12/5/2017    Procedure: INSERT PORT VASCULAR ACCESS;  POWER PORT PLACEMENT ATTEMPTED, PLACEMENT OF ANGIO-SEAL VIP VASCULAR CLOSURE DEVICE;  Surgeon: Robbie Linda MD;  Location: [facility]     IR PORT REMOVAL RIGHT  12/10/2018     PHACOEMULSIFICATION CLEAR CORNEA WITH STANDARD INTRAOCULAR LENS  IMPLANT Right 5/2/2016    Procedure: PHACOEMULSIFICATION CLEAR CORNEA WITH STANDARD INTRAOCULAR LENS IMPLANT;  Surgeon: Rasheed Finney MD;  Location:  EC     REMOVE PORT VASCULAR ACCESS N/A 3/14/2017    Procedure: REMOVE PORT VASCULAR ACCESS;  Surgeon: Robbie Linda MD;  Location:  OR     SOFT TISSUE SURGERY      BASAL CELL CA FOREHEAD     THORACOTOMY Right 11/17/2017    Procedure: THORACOTOMY;  RIGHT EXPLORATORY THORACOTOMY/ EXTENSIVE PNEUMOLYSIS/ BIOPSY OF INTERLOBAR LYMPH NODE;  Surgeon: Robbie Linda MD;  Location:  OR   Adrenal biopsy    Social History   I have reviewed this patient's social history and updated it with pertinent information if needed.  Social History     Tobacco Use     Smoking status: Never Smoker     Smokeless tobacco: Never Used   Substance Use Topics     Alcohol use: No     Drug use: No   Patient resides in a house in [place] with his wife.  He is a life-long non-smoker.  He consumes alcohol very rarely, which he indicated he drinks 2-3 beers per year.  He denied illicit drug use.  He does not use a cane or walker (recently).      Family History   I have reviewed this patient's family history and updated it with pertinent information if needed.   No family history on file.   Father:  CAD with CABG, prostate CA.  Mother: DM2.    Medications   Prior to Admission Medications   Prescriptions Last Dose Informant Patient Reported? Taking?   XARELTO ANTICOAGULANT 20 MG TABS tablet 6/4/2020 at 0900 Self Yes Yes   Sig: Take 20 mg by mouth daily Takes in the am   acetaminophen (TYLENOL) 325 MG tablet  Self Yes Yes   Sig: Take 650-975 mg by mouth every 12 hours as needed for mild pain   allopurinol (ZYLOPRIM) 300 MG tablet 6/4/2020 at Unknown time Self Yes Yes   Sig: Take 300 mg by mouth daily   diphenhydrAMINE-acetaminophen (TYLENOL PM)  MG tablet  Self Yes Yes   Sig: Take 2-3 tablets by mouth At Bedtime   fenofibrate (TRICOR) 145 MG tablet 6/5/2020 at am  Self Yes Yes   Sig: Take 145 mg by mouth daily   hydrocortisone (CORTEF) 10 MG tablet Past Week at Unknown time Self Yes Yes   Sig: Take 20 mg by mouth daily before breakfast    hydrocortisone (CORTEF) 10 MG tablet Past Week at Unknown time Self Yes Yes   Sig: Take 10 mg by mouth every evening   insulin aspart (NOVOLOG FLEXPEN) 100 UNIT/ML pen Past Month at Unknown time Self Yes Yes   Sig: Inject Subcutaneous 3 times daily (with meals) Patient uses sliding scale based on Carbs and Blood Glucose. Has been using very little lately due to low readings and low appetite.   insulin glargine (LANTUS VIAL) 100 UNIT/ML soln 6/4/2020 at Unknown time Self Yes Yes   Sig: Inject 30 Units Subcutaneous At Bedtime    metFORMIN (GLUCOPHAGE) 500 MG tablet 6/5/2020 at am Self Yes Yes   Sig: Take 1,000 mg by mouth daily (with breakfast)   metFORMIN (GLUCOPHAGE) 500 MG tablet 6/4/2020 at Unknown time Self Yes Yes   Sig: Take 500 mg by mouth daily (with dinner)   order for DME  Self No No   Sig: Kyle 1 packet twice a day for the next month   potassium chloride ER (KLOR-CON M) 20 MEQ CR tablet 6/5/2020 at am Self Yes Yes   Sig: Take 20 mEq by mouth daily   rosuvastatin (CRESTOR) 10 MG tablet 6/5/2020 at am Self Yes Yes   Sig: Take 5 mg by mouth daily      Facility-Administered Medications: None       Allergies   No Known Allergies    Physical Exam   Vital Signs: Temp: 98.2  F (36.8  C) Temp src: Oral BP: 91/61 Pulse: 93   Resp: 16 SpO2: 90 % O2 Device: None (Room air)    Weight: 188 lbs 9.6 oz      Constitutional: Awake, alert, cooperative, no apparent distress.    ENT: Normocephalic, without obvious abnormality, atraumatic, oral pharynx with moist mucus membranes, tonsils without erythema or exudates.  Eyes pupils are equal, round and reactive to light; extra occular movements intact.  Normal sclera.    Neck: Supple, symmetrical, trachea midline, no adenopathy.  Pulmonary: No increased work of breathing, good air exchange, clear to  auscultation bilaterally, no crackles or wheezing.  Cardiovascular: Regular rate and rhythm, normal S1 and S2, no S3 or S4, and no murmur noted.  GI: Normal bowel sounds, soft, non-distended, non-tender.   Skin/Integumen: Clear.  Neuro: CN II-XII grossly intact.  Upper and lower extremities strength, coordination and sensation intact bilaterally.    Psych:  Alert and oriented x 3. Normal affect.  Extremities: No lower extremity edema noted, and calves are non-tender to palpation bilaterally.       Data   Results for orders placed or performed during the hospital encounter of 06/05/20 (from the past 24 hour(s))   CBC with platelets differential   Result Value Ref Range    WBC 4.3 4.0 - 11.0 10e9/L    RBC Count 4.33 (L) 4.4 - 5.9 10e12/L    Hemoglobin 10.6 (L) 13.3 - 17.7 g/dL    Hematocrit 33.8 (L) 40.0 - 53.0 %    MCV 78 78 - 100 fl    MCH 24.5 (L) 26.5 - 33.0 pg    MCHC 31.4 (L) 31.5 - 36.5 g/dL    RDW 18.5 (H) 10.0 - 15.0 %    Platelet Count 215 150 - 450 10e9/L    Diff Method Automated Method     % Neutrophils 64.6 %    % Lymphocytes 15.0 %    % Monocytes 13.0 %    % Eosinophils 5.8 %    % Basophils 0.2 %    % Immature Granulocytes 1.4 %    Nucleated RBCs 0 0 /100    Absolute Neutrophil 2.8 1.6 - 8.3 10e9/L    Absolute Lymphocytes 0.7 (L) 0.8 - 5.3 10e9/L    Absolute Monocytes 0.6 0.0 - 1.3 10e9/L    Absolute Eosinophils 0.3 0.0 - 0.7 10e9/L    Absolute Basophils 0.0 0.0 - 0.2 10e9/L    Abs Immature Granulocytes 0.1 0 - 0.4 10e9/L    Absolute Nucleated RBC 0.0    Comprehensive metabolic panel   Result Value Ref Range    Sodium 135 133 - 144 mmol/L    Potassium 4.6 3.4 - 5.3 mmol/L    Chloride 104 94 - 109 mmol/L    Carbon Dioxide 22 20 - 32 mmol/L    Anion Gap 9 3 - 14 mmol/L    Glucose 71 70 - 99 mg/dL    Urea Nitrogen 24 7 - 30 mg/dL    Creatinine 1.13 0.66 - 1.25 mg/dL    GFR Estimate 66 >60 mL/min/[1.73_m2]    GFR Estimate If Black 77 >60 mL/min/[1.73_m2]    Calcium 9.5 8.5 - 10.1 mg/dL    Bilirubin Total 0.4  0.2 - 1.3 mg/dL    Albumin 2.9 (L) 3.4 - 5.0 g/dL    Protein Total 7.0 6.8 - 8.8 g/dL    Alkaline Phosphatase 57 40 - 150 U/L    ALT 25 0 - 70 U/L    AST 39 0 - 45 U/L   Phosphorus   Result Value Ref Range    Phosphorus 4.1 2.5 - 4.5 mg/dL   Uric acid   Result Value Ref Range    Uric Acid 5.6 3.5 - 7.2 mg/dL   Lactate Dehydrogenase   Result Value Ref Range    Lactate Dehydrogenase 534 (H) 85 - 227 U/L

## 2020-06-05 NOTE — IR NOTE
Interventional Radiology Intra-procedural Nursing Note    Patient Name: Pastor Garibay  Medical Record Number: [id number]  Today's Date: June 5, 2020    Start Time: 1420  End of procedure time: 1500  Procedure: Port a Catheter Placement  Report given to: ADIEL Diehl station [id number]    Other Notes: Pt. transferred to IR table. Prepped and draped appropriately. Monitoring equipment applied. NC applied. No complaints of pain at this time. Timeout recorded.    VSS. Pt remains on RA. No c/o pain at this time. Chest site sutured in multiple layers, dermabond, steristrips, gauze and tegaderm. Neck site has dermabond and steristrips. Both sites are CDI, soft.    Medication administration:    Fentanyl 100 mcg IVP  Versed 2 mg IVP  Ancef 2g IV  Heparin 500 units Intraport Flush (Dr. Estevez)

## 2020-06-05 NOTE — PLAN OF CARE
Problem: Adult Inpatient Plan of Care  Goal: Plan of Care Review  Outcome: No Change   Soft BPs, MD aware, thought to be due to adrenal insufficiency.  All other VS WDL.  A/O, ambulating independently.  Anticoagulation with xarelto to resume after port placement today.  Right foot with wounds, Left foot partial toe amputations which require daily dressing changes, dressing changes this jayesh.  R PIV, port placement in IR today.  Tolerated Rituxan well.  Discharge probably Monday - 3 days chemotherapy - will have neulasta in office after discharge.

## 2020-06-05 NOTE — PHARMACY-ADMISSION MEDICATION HISTORY
Pharmacy Medication History  Admission medication history interview status for the 6/5/2020  admission is complete. See EPIC admission navigator for prior to admission medications     Medication history sources: patient, chart review, surescripts  Medication history source reliability: Moderate  Adherence assessment: Good    Significant changes made to the medication list:  Removed colace, hydrochlorothiazide, advil pm, lisinopril. Added allopurinol, tylenol, tylenol pm. Changed hydrocortisone, novolog, lantus, KCL.       Additional medication history information:   Not on [facility]    Medication reconciliation completed by provider prior to medication history? No    Time spent in this activity: 30 minutes      Prior to Admission medications    Medication Sig Last Dose Taking? Auth Provider   acetaminophen (TYLENOL) 325 MG tablet Take 650-975 mg by mouth every 12 hours as needed for mild pain  Yes Unknown, Entered By History   allopurinol (ZYLOPRIM) 300 MG tablet Take 300 mg by mouth daily 6/4/2020 at Unknown time Yes Unknown, Entered By History   diphenhydrAMINE-acetaminophen (TYLENOL PM)  MG tablet Take 2-3 tablets by mouth At Bedtime  Yes Unknown, Entered By History   fenofibrate (TRICOR) 145 MG tablet Take 145 mg by mouth daily 6/5/2020 at am Yes Unknown, Entered By History   hydrocortisone (CORTEF) 10 MG tablet Take 10 mg by mouth every evening Past Week at Unknown time Yes Unknown, Entered By History   hydrocortisone (CORTEF) 10 MG tablet Take 20 mg by mouth daily before breakfast  Past Week at Unknown time Yes Reported, Patient   insulin aspart (NOVOLOG FLEXPEN) 100 UNIT/ML pen Inject Subcutaneous 3 times daily (with meals) Patient uses sliding scale based on Carbs and Blood Glucose. Has been using very little lately due to low readings and low appetite. Past Month at Unknown time Yes Reported, Patient   insulin glargine (LANTUS VIAL) 100 UNIT/ML soln Inject 30 Units Subcutaneous At Bedtime  6/4/2020 at  Unknown time Yes Reported, Patient   metFORMIN (GLUCOPHAGE) 500 MG tablet Take 1,000 mg by mouth daily (with breakfast) 6/5/2020 at am Yes Unknown, Entered By History   metFORMIN (GLUCOPHAGE) 500 MG tablet Take 500 mg by mouth daily (with dinner) 6/4/2020 at Unknown time Yes Unknown, Entered By History   potassium chloride ER (KLOR-CON M) 20 MEQ CR tablet Take 20 mEq by mouth daily 6/5/2020 at am Yes Unknown, Entered By History   rosuvastatin (CRESTOR) 10 MG tablet Take 5 mg by mouth daily 6/5/2020 at am Yes Unknown, Entered By History   XARELTO ANTICOAGULANT 20 MG TABS tablet Take 20 mg by mouth daily Takes in the am 6/4/2020 at 0900 Yes Reported, Patient   order for DME Kyle 1 packet twice a day for the next month   Hubert Carpenter MD

## 2020-06-05 NOTE — PRE-PROCEDURE
GENERAL PRE-PROCEDURE:   Procedure:  Port a catheter placement with intravenous moderate sedation   Date/Time:  6/5/2020 10:30 AM    Written consent obtained?: Yes    Risks and benefits: Risks, benefits and alternatives were discussed    Consent given by:  Patient  Patient states understanding of procedure being performed: Yes    Patient's understanding of procedure matches consent: Yes    Procedure consent matches procedure scheduled: Yes    Expected level of sedation:  Moderate  Appropriately NPO:  Yes  ASA Class:  Class 1- healthy patient  Mallampati  :  Grade 2- soft palate, base of uvula, tonsillar pillars, and portion of posterior pharyngeal wall visible  Lungs:  Lungs clear with good breath sounds bilaterally  Heart:  Normal heart sounds and rate  History & Physical reviewed:  History and physical reviewed and no updates needed  Statement of review:  I have reviewed the lab findings, diagnostic data, medications, and the plan for sedation

## 2020-06-05 NOTE — PROGRESS NOTES
Patient is on IR schedule today Friday 6/5/2020 for a Port a catheter placement.   -Labs WNL for procedure.    -Orders for NPO and antibiotics have been entered. Patient can have breakfast and then   NPO for solids after. He may have some clear liquids sparingly.   -Consent will be done prior to procedure.     Please contact the IR charge RN at [phone] for estimated time of procedure. The procedure will be done after 1300.      Discussed with ADIEL Valenzuela today.    Betty Harveychuh CNP Interventional Radiology ([phone])

## 2020-06-06 LAB
ANION GAP SERPL CALCULATED.3IONS-SCNC: 7 MMOL/L (ref 3–14)
BASOPHILS # BLD AUTO: 0 10E9/L (ref 0–0.2)
BASOPHILS NFR BLD AUTO: 0 %
BUN SERPL-MCNC: 18 MG/DL (ref 7–30)
CALCIUM SERPL-MCNC: 8.2 MG/DL (ref 8.5–10.1)
CHLORIDE SERPL-SCNC: 105 MMOL/L (ref 94–109)
CO2 SERPL-SCNC: 21 MMOL/L (ref 20–32)
CREAT SERPL-MCNC: 0.74 MG/DL (ref 0.66–1.25)
DIFFERENTIAL METHOD BLD: ABNORMAL
EOSINOPHIL # BLD AUTO: 0 10E9/L (ref 0–0.7)
EOSINOPHIL NFR BLD AUTO: 0.4 %
ERYTHROCYTE [DISTWIDTH] IN BLOOD BY AUTOMATED COUNT: 18.8 % (ref 10–15)
GFR SERPL CREATININE-BSD FRML MDRD: >90 ML/MIN/{1.73_M2}
GLUCOSE BLDC GLUCOMTR-MCNC: 123 MG/DL (ref 70–99)
GLUCOSE BLDC GLUCOMTR-MCNC: 149 MG/DL (ref 70–99)
GLUCOSE BLDC GLUCOMTR-MCNC: 251 MG/DL (ref 70–99)
GLUCOSE BLDC GLUCOMTR-MCNC: 271 MG/DL (ref 70–99)
GLUCOSE BLDC GLUCOMTR-MCNC: 292 MG/DL (ref 70–99)
GLUCOSE BLDC GLUCOMTR-MCNC: 297 MG/DL (ref 70–99)
GLUCOSE BLDC GLUCOMTR-MCNC: 344 MG/DL (ref 70–99)
GLUCOSE SERPL-MCNC: 181 MG/DL (ref 70–99)
HCT VFR BLD AUTO: 30.1 % (ref 40–53)
HGB BLD-MCNC: 9.4 G/DL (ref 13.3–17.7)
IMM GRANULOCYTES # BLD: 0 10E9/L (ref 0–0.4)
IMM GRANULOCYTES NFR BLD: 1.2 %
LYMPHOCYTES # BLD AUTO: 0.2 10E9/L (ref 0.8–5.3)
LYMPHOCYTES NFR BLD AUTO: 9.5 %
MCH RBC QN AUTO: 24.4 PG (ref 26.5–33)
MCHC RBC AUTO-ENTMCNC: 31.2 G/DL (ref 31.5–36.5)
MCV RBC AUTO: 78 FL (ref 78–100)
MONOCYTES # BLD AUTO: 0.1 10E9/L (ref 0–1.3)
MONOCYTES NFR BLD AUTO: 4 %
NEUTROPHILS # BLD AUTO: 2.2 10E9/L (ref 1.6–8.3)
NEUTROPHILS NFR BLD AUTO: 84.9 %
NRBC # BLD AUTO: 0 10*3/UL
NRBC BLD AUTO-RTO: 0 /100
PHOSPHATE SERPL-MCNC: 4 MG/DL (ref 2.5–4.5)
PLATELET # BLD AUTO: 208 10E9/L (ref 150–450)
POTASSIUM SERPL-SCNC: 4.9 MMOL/L (ref 3.4–5.3)
RBC # BLD AUTO: 3.85 10E12/L (ref 4.4–5.9)
SODIUM SERPL-SCNC: 133 MMOL/L (ref 133–144)
URATE SERPL-MCNC: 4 MG/DL (ref 3.5–7.2)
WBC # BLD AUTO: 2.5 10E9/L (ref 4–11)

## 2020-06-06 PROCEDURE — 25000132 ZZH RX MED GY IP 250 OP 250 PS 637: Performed by: PHYSICIAN ASSISTANT

## 2020-06-06 PROCEDURE — 99233 SBSQ HOSP IP/OBS HIGH 50: CPT | Performed by: PHYSICIAN ASSISTANT

## 2020-06-06 PROCEDURE — 25000131 ZZH RX MED GY IP 250 OP 636 PS 637: Performed by: PHYSICIAN ASSISTANT

## 2020-06-06 PROCEDURE — 25000128 H RX IP 250 OP 636: Performed by: INTERNAL MEDICINE

## 2020-06-06 PROCEDURE — 84550 ASSAY OF BLOOD/URIC ACID: CPT | Performed by: INTERNAL MEDICINE

## 2020-06-06 PROCEDURE — 85025 COMPLETE CBC W/AUTO DIFF WBC: CPT | Performed by: INTERNAL MEDICINE

## 2020-06-06 PROCEDURE — 84100 ASSAY OF PHOSPHORUS: CPT | Performed by: INTERNAL MEDICINE

## 2020-06-06 PROCEDURE — 25000131 ZZH RX MED GY IP 250 OP 636 PS 637: Performed by: INTERNAL MEDICINE

## 2020-06-06 PROCEDURE — 25800030 ZZH RX IP 258 OP 636: Performed by: INTERNAL MEDICINE

## 2020-06-06 PROCEDURE — 12000000 ZZH R&B MED SURG/OB

## 2020-06-06 PROCEDURE — 00000146 ZZHCL STATISTIC GLUCOSE BY METER IP

## 2020-06-06 PROCEDURE — 25000132 ZZH RX MED GY IP 250 OP 250 PS 637: Performed by: INTERNAL MEDICINE

## 2020-06-06 PROCEDURE — 99207 ZZC CDG-MDM COMPONENT: MEETS MODERATE - UP CODED: CPT | Performed by: PHYSICIAN ASSISTANT

## 2020-06-06 PROCEDURE — 80048 BASIC METABOLIC PNL TOTAL CA: CPT | Performed by: INTERNAL MEDICINE

## 2020-06-06 RX ORDER — LANOLIN ALCOHOL/MO/W.PET/CERES
3 CREAM (GRAM) TOPICAL AT BEDTIME
Status: DISCONTINUED | OUTPATIENT
Start: 2020-06-06 | End: 2020-06-12 | Stop reason: HOSPADM

## 2020-06-06 RX ADMIN — INSULIN ASPART 1 UNITS: 100 INJECTION, SOLUTION INTRAVENOUS; SUBCUTANEOUS at 09:12

## 2020-06-06 RX ADMIN — ROSUVASTATIN CALCIUM 5 MG: 5 TABLET, FILM COATED ORAL at 08:02

## 2020-06-06 RX ADMIN — MESNA 10000 MG: 100 INJECTION, SOLUTION INTRAVENOUS at 11:55

## 2020-06-06 RX ADMIN — ALLOPURINOL 300 MG: 300 TABLET ORAL at 08:02

## 2020-06-06 RX ADMIN — HYDROCORTISONE 20 MG: 20 TABLET ORAL at 08:02

## 2020-06-06 RX ADMIN — SODIUM CHLORIDE: 9 INJECTION, SOLUTION INTRAVENOUS at 10:14

## 2020-06-06 RX ADMIN — MELATONIN 3 MG: 3 TAB ORAL at 23:35

## 2020-06-06 RX ADMIN — ONDANSETRON HYDROCHLORIDE 16 MG: 8 TABLET, FILM COATED ORAL at 09:10

## 2020-06-06 RX ADMIN — FENOFIBRATE 135 MG: 54 TABLET ORAL at 08:02

## 2020-06-06 RX ADMIN — ETOPOSIDE 205 MG: 20 INJECTION, SOLUTION, CONCENTRATE INTRAVENOUS at 15:34

## 2020-06-06 RX ADMIN — CARBOPLATIN 460 MG: 10 INJECTION, SOLUTION INTRAVENOUS at 09:59

## 2020-06-06 RX ADMIN — INSULIN ASPART 8 UNITS: 100 INJECTION, SOLUTION INTRAVENOUS; SUBCUTANEOUS at 14:25

## 2020-06-06 RX ADMIN — SODIUM CHLORIDE 150 MG: 9 INJECTION, SOLUTION INTRAVENOUS at 09:23

## 2020-06-06 RX ADMIN — HYDROCORTISONE 10 MG: 10 TABLET ORAL at 16:07

## 2020-06-06 RX ADMIN — INSULIN ASPART 4 UNITS: 100 INJECTION, SOLUTION INTRAVENOUS; SUBCUTANEOUS at 13:55

## 2020-06-06 RX ADMIN — DEXAMETHASONE 12 MG: 4 TABLET ORAL at 09:10

## 2020-06-06 RX ADMIN — RIVAROXABAN 20 MG: 20 TABLET, FILM COATED ORAL at 08:02

## 2020-06-06 RX ADMIN — INSULIN GLARGINE 30 UNITS: 100 INJECTION, SOLUTION SUBCUTANEOUS at 21:14

## 2020-06-06 ASSESSMENT — ACTIVITIES OF DAILY LIVING (ADL)
ADLS_ACUITY_SCORE: 12

## 2020-06-06 NOTE — PROGRESS NOTES
INTERNAL MEDICINE UPDATE    Called re: glucose    Pt with low glucose levels earlier; ordered Lantus 20U at bedtime (PTA on 30U at bedtime). Pt with low glucose levels earlier today.    PLAN:  - Hold Lantus tonight and restart at 15U tomorrow at bedtime.    Inocencio Mcgraw Jr., MD  [phone] (p)  Text Page

## 2020-06-06 NOTE — PROVIDER NOTIFICATION
MD Notification    Notified Person: MD    Notified Person Name: Dr. Dumontglenny    Notification Date/Time: 6/6 1145    Notification Interaction: Page    Purpose of Notification: Etoposide infusion is ordered to be given every 24 hours, was administered at 1838 on 6/5, and scheduled for 1230 today 6/6. What time should we administer etoposide?     Orders Received: May give dose today at 1530, and move up dose tomorrow to 1230.     Comments:

## 2020-06-06 NOTE — PROGRESS NOTES
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  --s/p RCHOP   -- He responded well to the chemotherapy with a near complete remission after six cycles. Maintenance rituximab was started in June 2015 and completed in February 2017.   --On 11/17/17 the patient underwent a thoracotomy for an enlarging right infrahilar mass. 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 The tumor was handled as a carcinoma but the pathology is most consistent with recurrent grade 2-3 follicular lymphoma.   --On 12/14/17 he began bendamustine with rituximab chemotherapy.   --He completed 6 cycles of BR and on 6/4/18 a CT revealed stable mild lymphadenopathy with mild splenomegaly and a slight increase in the IVC thrombus.   --A CT 6/3/19 revealed an increase in the right hilar and pretracheal adenopathy with stable nodular scarring of the right lung base, without other new disease. A PET/CT 6/13/19 confirmed the hypermetabolic right infrahilar mass and revealed moderate metabolic activity within a few small nodules just deep to tire pleura in the posterior aspect of tire mid right  hemithorax, suspicious for neoplasm, without other disease.   --As all disease could be encompassed in one radiation field, he completed 3000 cGy of radiotherapy to the right thoracic region 6/27/19 - 7/18/19.  --CT C/A/P 5/14/20 revealed marked increase in the right lower lobe pulmonary mass with new subcarinal lymphadenopathy, bilateral new adrenal masses, and soft tissue tumor deposits in the abdomen  --CT guided adrenal biopsy 5/27/20 revealed transformation to a diffuse large B cell lymphoma, germinal center type, 100% Ki-67  positive,  with positive immunohistochemical staining for BCL-2, BCL-6, and C-MYC suggestive of a possible triple-hit lymphoma.    Started on R ICE 6/5/2020, tolerating without NV  No BM for 24 hour but low po intake and had diarrhea before admission  Mo SOB, no pain         Medications:       allopurinol  300 mg Oral Daily     CARBOplatin (PARAPLATIN) infusion (by AUC)  460 mg Intravenous Once     Chemotherapy Infusing-Continuous Infusion   Does not apply Q8H     dexamethasone  12 mg Oral Once     [START ON 6/7/2020] dexamethasone  8 mg Oral Daily     [START ON 6/9/2020] dextrose 5% water  10-20 mL Intracatheter Daily at 8 pm     [START ON 6/9/2020] dextrose 5% water  10-20 mL Intracatheter Daily at 8 pm     etoposide (TOPOSAR) infusion  100 mg/m2 (Order-Specific) Intravenous Q24H     fenofibrate  135 mg Oral Daily     [START ON 6/9/2020] filgrastim (NEUPOGEN/GRANIX) intravenous  5 mcg/kg (Order-Specific) Intravenous Daily at 8 pm     fosaprepitant (EMEND) 150 mg intermittent infusion  150 mg Intravenous Once     heparin  5 mL Intravenous Q28 Days     heparin lock flush  5 mL Intravenous Q24H     hydrocortisone  10 mg Oral QPM     hydrocortisone  20 mg Oral QAM AC     ifosfamide (IFEX) infusion  10,000 mg Intravenous Once     insulin aspart  1-7 Units Subcutaneous TID AC     insulin aspart  1-5 Units Subcutaneous At Bedtime     insulin glargine  15 Units Subcutaneous At Bedtime     melatonin  3 mg Oral At Bedtime     ondansetron  16 mg Oral Once     rivaroxaban ANTICOAGULANT  20 mg Oral QAM     rosuvastatin  5 mg Oral Daily                ROS:   RESP, CV, GI,, MUSCULOSKELETAL, HEME/ALLERGY/IMMUNE,  Skin: reviewed and negative except the positives mentioned in this note            Physical Exam:       Vital Sign Ranges  Temp:  [97.6  F (36.4  C)-98.2  F (36.8  C)] 97.6  F (36.4  C)  Pulse:  [88-93] 91  Heart Rate:  [81-99] 81  Resp:  [12-20] 16  BP: ()/(53-63) 110/63  SpO2:  [92 %-99 %] 96 %      I/O last 3  completed shifts:  In: 2470 [I.V.:1154; IV Piggyback:1316]  Out: 1475 [Urine:1475]    Constitutional: Awake, alert, cooperative, no apparent distress  HEENT: unremarkable  Neck: Supple, no adenopathy, thyroid symmetric, not enlarged and no tenderness  Lungs: No increased work of breathing, good air exchange.  Neuro: grossly intact  Abdomen: No scars, normal bowel sounds, soft, non-distended, non-tender, no masses palpated, no hepatosplenomegally.  Ext: no edema, cyanosis     SKIN no rash or ecchymosis.        Data:       ROUTINE LABS (Last four results)  CMP  Recent Labs   Lab 06/06/20  0600 06/05/20  0830    135   POTASSIUM 4.9 4.6   CHLORIDE 105 104   CO2 21 22   ANIONGAP 7 9   * 71   BUN 18 24   CR 0.74 1.13   GFRESTIMATED >90 66   GFRESTBLACK >90 77   JEFF 8.2* 9.5   PHOS 4.0 4.1   PROTTOTAL  --  7.0   ALBUMIN  --  2.9*   BILITOTAL  --  0.4   ALKPHOS  --  57   AST  --  39   ALT  --  25     CBC  Recent Labs   Lab 06/06/20  0600 06/05/20  0830   WBC 2.5* 4.3   RBC 3.85* 4.33*   HGB 9.4* 10.6*   HCT 30.1* 33.8*   MCV 78 78   MCH 24.4* 24.5*   MCHC 31.2* 31.4*   RDW 18.8* 18.5*    215     INRNo lab results found in last 7 days.         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 Bunch at the [facility] and will plan R ICE chemotherapy then followed by either stem cell transplant or CART therapy  --6/5/20 begin cycle 1 of RICE  --allopurinol, antiemetics, IV fluids as needed, and plan Neulasta after discharge     Diabetes  --per  endocrinology and hospitalist     Possible Adrenal Insufficiency  --Large bilateral adrenal masses with mild hypotension, nausea, vomiting, and hyperkalemia  --Normal cortisol level and ACTH elevated at 140  --Began empiric hydrocortisone 20 mg q 8 AM and 10 mg every 4 p.m.  --Endocrinology follow-up        Hypercoagulability  --On 2/1/18 he was diagnosed with an IVC thrombus, 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 He is tolerating anticoagulation with Lovenox well. We discussed alternative anticoagulants such as Coumadin (less effective) or DOAC and as he has completed chemotherapy with a good response of his malignancy in 6/2018 he was switched to Xarelto.   --A vascular surgery consultation for the persistent IVC thrombus concurred with anticoagulation for 6-12 months or longer without other intervention.   --We plan to continue the anticoagulation for now, if not indefinitely with the underlying malignancy and persistent thrombus.   --The anticoagulation will be re-evaluated on return.   --Anticoagulation will be held if thrombocytopenia develops and the platelets drop below 50,000.-     Anemia  --Mild chemotherapy and neoplasm induced anemia  --5/20/20 iron levels consistent with chronic disease  --will be monitored.     -Chronic Kidney Disease  -Mild renal insufficiency will be monitored.     LFT  --Mild liver function and alkaline phosphatase elevations, thought to be related to Crestor or a fatty liver  --resolved  --monitor     Depression  --Reactive, supportive care encouraged     Leukopenia due to chemotherapy: will get neulasta upon completion.               James Koch M.D.

## 2020-06-06 NOTE — PROGRESS NOTES
UPDATE:    Paged regarding blood glucose of 344.  Patient received 12 units of Novolog earlier this afternoon prior to eating lunch.    --15 units Novolog now.    --Recheck prior to dinner (likely in 1-2 hours) with continued high intensity sliding scale.    --Increase Lantus to 30 units to be given tonight.      Alexei Zuluaga PA-C

## 2020-06-06 NOTE — PROGRESS NOTES
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     Past medical history significant for B-cell Non-Hodgkin's lymphoma, DM2, Suspected adrenal insufficiency, Hypercoagulable state, chemotherapy induced anemia, CKD stage II-III, JESSICA complaint with CPAP, HLP, History of HTN (no longer on medications), History of GERD, History of lower extremity edema who was admitted for induction of chemotherapy for which the Hospitalist service has been consulted to assist with medical management.       B-cell Non-Hodgkin's lymphoma:  LDH elevated at 534.    Management per Oncology:               --RICE chemotherapy then followed by either stem cell transplant or CART therapy.                --6/5 begin cycle 1 of RICE.                 --Plan for Neulasta after discharge.     --Allopurinol 300 mg daily.    --PRN anti-emetics.       Insulin dependent DM2:  Complaint with metformin 1000 mg every morning and 500 mg with supper, Lantus 30 units at bedtime and sliding scale NovoLog with meals.  A1c of 7.3%.   -Patient received 8 mg PO dexamethasone 6/5 and 12 mg today.    Recent Labs   Lab 06/06/20  1325 06/06/20  0812 06/06/20  0600 06/06/20  0142 06/05/20  2131 06/05/20  1855 06/05/20  1614  06/05/20  0830   GLC  --   --  181*  --   --   --   --   --  71   * 149*  --  123* 95 186* 71   < >  --     < > = values in this interval not displayed.   --Hold PTA metformin.    --Lantus held (6/5 evening due to blood glucose of 95); with recent steroids blood glucose increasing will order for Lantus 20 units at bedtime.    --Medium intensity sliding scale.    --AccuChecks QID.    --Hypoglycemic protocol.       Anemia of chronic disease:  Believe secondary to malignancy and previous chemotherapy.  Lab studies from 5/2020 consistent with chronic disease.    Recent Labs   Lab 06/06/20  0600 06/05/20  0830   HGB 9.4* 10.6*   --Monitor.      Leukopenia:  WBC decreased to 2.5 today.    Recent Labs   Lab 06/06/20  0600 06/05/20  0830   WBC 2.5* 4.3   --Monitor.      Suspected adrenal insufficiency, in setting of adrenal masses:  Noted large bilateral adrenal masses with new hypotension, nausea, vomiting and hyperkalemia.  Noted normal cortisol level and ACTH elevated at 140.    --Resume PTA empiric hydrocortisone 20 mg every 8 am and 10 mg every 4 pm.    --Endocrinology follow-up as an outpatient.        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    --Resume Xarelto 20 mg every morning.    Hold if thrombocytopenia develops (PLTs drop below 50,000).        CKD stage II-III:  Creatinine at 1.13 with GFR of 66 upon admit.    Recent Labs   Lab 06/06/20  0600 06/05/20  0830   CR 0.74 1.13   --Monitor.    --IVF at 75 ml/hr.       JESSICA:  Complaint with CPAP.  --Home CPAP with home settings.       HLP:  --Resume PTA rosuvastatin 5 mg daily and Tricor 145 mg daily.       Right foot wound and previous left toes partial amputation:  Known right foot wounds, that were present prior to admission.    --Daily dressing changes.      History of HTN (no longer on medications):  Patient with hypotension and has stopped all blood pressure medications.  Suspected adrenal insufficiency.  --Orthostatic blood pressures.    --IVF at 75 ml/hr.       History of GERD:  No longer on medications.       History of lower extremity edema:  Patient indicated this is no longer an issue and is no longer on diuretic therapy.    --Monitor.      Diet: Combination Diet 9704-1163 Calories: Moderate Consistent CHO (4-6 CHO units/meal)     DVT Prophylaxis: Xarelto   Champion Catheter: not present  Code Status: Full Code     Disposition Plan    Expected discharge: Per Oncology  Entered: Hayden Zuluaga PA-C 06/06/2020, 7:56 AM          The patient has been discussed with Dr. Schmitz, who agrees with the assessment and plan at this time.   Dr. Schmitz will evaluate the patient independently.      Alexei Zuluaga PA-C   [phone]    Interval History   Patient was relaxing in bed upon arrival.  He had just received his lunch tray.  He denied fever, chills, shortness of breath or abdominal pain.  He had some slight chest pain but said it was due to his cancer and not present.      Reviewed blood sugars and that Lantus was held last night.  Discussed the steroids he received yesterday and today and will monitor and adjust insulin.      -Data reviewed today: I reviewed all new labs and imaging results over the last 24 hours. I personally reviewed no images or EKG's today.    Physical Exam   Temp: 97.6  F (36.4  C) Temp src: Oral BP: 110/63 Pulse: 91 Heart Rate: 81 Resp: 16 SpO2: 96 % O2 Device: None (Room air) Oxygen Delivery: 2 LPM  Vitals:    06/05/20 0811   Weight: 85.5 kg (188 lb 9.6 oz)     Vital Signs with Ranges  Temp:  [97.6  F (36.4  C)-98.2  F (36.8  C)] 97.6  F (36.4  C)  Pulse:  [88-93] 91  Heart Rate:  [81-99] 81  Resp:  [12-20] 16  BP: ()/(53-63) 110/63  SpO2:  [90 %-99 %] 96 %  I/O last 3 completed shifts:  In: 2470 [I.V.:1154; IV Piggyback:1316]  Out: 1475 [Urine:1475]      Constitutional: Awake, alert, cooperative, no apparent distress.    ENT: Normocephalic, without obvious abnormality, atraumatic, oral pharynx with moist mucus membranes, tonsils without erythema or exudates.  Neck: Supple, symmetrical, trachea midline, no adenopathy.  Pulmonary: No increased work of breathing, good air exchange, clear to auscultation bilaterally, no crackles or wheezing.  Cardiovascular: Regular rate and rhythm, normal S1 and S2, no S3 or S4, and no murmur noted.  Chest port in place on the right chest wall.    GI: Normal bowel sounds, soft, non-distended, non-tender.  Skin/Integumen: Clear.  Neuro: CN II-XII grossly intact.  Psych:  Alert and oriented x 3. Normal affect.  Extremities: No lower extremity edema noted, and calves are non-TTP  bilaterally.       Medications     - MEDICATION INSTRUCTIONS -       - MEDICATION INSTRUCTIONS -       - MEDICATION INSTRUCTIONS -       sodium chloride 0.9%       sodium chloride 75 mL/hr at 06/05/20 2043     sodium chloride         allopurinol  300 mg Oral Daily     CARBOplatin (PARAPLATIN) infusion (by AUC)  460 mg Intravenous Once     Chemotherapy Infusing-Continuous Infusion   Does not apply Q8H     dexamethasone  12 mg Oral Once     [START ON 6/7/2020] dexamethasone  8 mg Oral Daily     [START ON 6/9/2020] dextrose 5% water  10-20 mL Intracatheter Daily at 8 pm     [START ON 6/9/2020] dextrose 5% water  10-20 mL Intracatheter Daily at 8 pm     etoposide (TOPOSAR) infusion  100 mg/m2 (Order-Specific) Intravenous Q24H     fenofibrate  135 mg Oral Daily     [START ON 6/9/2020] filgrastim (NEUPOGEN/GRANIX) intravenous  5 mcg/kg (Order-Specific) Intravenous Daily at 8 pm     fosaprepitant (EMEND) 150 mg intermittent infusion  150 mg Intravenous Once     heparin  5 mL Intravenous Q28 Days     heparin lock flush  5 mL Intravenous Q24H     hydrocortisone  10 mg Oral QPM     hydrocortisone  20 mg Oral QAM AC     ifosfamide (IFEX) infusion  10,000 mg Intravenous Once     insulin aspart  1-7 Units Subcutaneous TID AC     insulin aspart  1-5 Units Subcutaneous At Bedtime     insulin glargine  15 Units Subcutaneous At Bedtime     ondansetron  16 mg Oral Once     rivaroxaban ANTICOAGULANT  20 mg Oral QAM     rosuvastatin  5 mg Oral Daily       Data   Recent Labs   Lab 06/06/20  0600 06/05/20  0830   WBC 2.5* 4.3   HGB 9.4* 10.6*   MCV 78 78    215    135   POTASSIUM 4.9 4.6   CHLORIDE 105 104   CO2 21 22   BUN 18 24   CR 0.74 1.13   ANIONGAP 7 9   JEFF 8.2* 9.5   * 71   ALBUMIN  --  2.9*   PROTTOTAL  --  7.0   BILITOTAL  --  0.4   ALKPHOS  --  57   ALT  --  25   AST  --  39       Recent Results (from the past 24 hour(s))   IR Chest Port Placement > 5 Yrs of Age    Narrative    [facility]  LOCATION:  DATE: 6/5/2020    PROCEDURE: IMPLANTABLE VENOUS CHEST PORT PLACEMENT    INTERVENTIONAL RADIOLOGIST: Brody Estevez MD.    INDICATION: The patient is a 70-year-old male with a history of  recurrent B-cell lymphoma who presents to interventional radiology for  Port-A-Cath placement.    CONSENT: The risks, benefits and alternatives of implantable venous  chest port placement were discussed with the patient  in detail. All  questions were answered. Informed consent was given to proceed with  the procedure.    MODERATE SEDATION: Versed 2 mg IV; Fentanyl 100 mcg IV.  Under  physician supervision, Versed and fentanyl were administered for  moderate sedation. Pulse oximetry, heart rate and blood pressure were  continuously monitored by an independent trained observer. The  physician spent 40 minutes of face-to-face sedation time with the  patient.    CONTRAST: None.  ANTIBIOTICS: Ancef 2 g IV.  ADDITIONAL MEDICATIONS: None.    FLUOROSCOPIC TIME: 0.9 minutes.  RADIATION DOSE: Air Kerma: 4 mGy.    COMPLICATIONS: No immediate complications.    STERILE BARRIER TECHNIQUE: Maximum sterile barrier technique was used.  Cutaneous antisepsis was performed at the operative site with  application of 2% chlorhexidine and large sterile drape. Prior to the  procedure, the  and assistant performed hand hygiene and wore  hat, mask, sterile gown, and sterile gloves during the entire  procedure.    PROCEDURE:    Using real-time ultrasound guidance the right internal jugular vein  was accessed.    A subcutaneous pocket was created and irrigated with sterile normal  saline. The catheter tubing was tunneled in an antegrade fashion from  the port pocket to the dermatotomy site. Over a guidewire, and under  direct fluoroscopic visualization a peel-away sheath was advanced over  the wire. Through the peel-away sheath, the catheter tubing was  advanced until the tip was at the cavoatrial junction. The catheter  tubing  was cut to length and attached firmly to the port. The port was  placed within the subcutaneous pocket and tested. The port pocket  incision was closed with layered absorbable suture and surgical glue.  The dermatotomy site was closed with surgical glue.     FINDINGS:  Ultrasound demonstrates an anechoic and compressible jugular vein. A  permanent image was stored.    At the completion of the study the port tip lies near the cavoatrial  junction.      Impression    IMPRESSION:    1.  Successful implantable venous chest port placement as detailed  above.  2.  The implantable venous chest port was placed under fluoroscopic  guidance and is ready for use immediately.    ____________________________________________________________________    CPT codes for physician reference only:  80250  11813  [id number]  [id number]  [id number]  [id number]    TEENA ARMENDARIZ MD

## 2020-06-06 NOTE — PLAN OF CARE
Problem: Adult Inpatient Plan of Care  Goal: Plan of Care Review  Outcome: No Change   Continues with soft BPs intermittently, all other VS WDL.  A/O, ambulating independently.  Additional coverage for increased BG of 297, 344, 271 today, Novolog increased to high resistance today and Lantus regular dosing of 30 units added back to evening dose.  Carboplatin, Ifex w/[place] and etoposide given today, tolerated well.  Regular diet, good appetite.  Good UOP.  Discharge possibly Monday - 3 days chemotherapy - will have neulasta in office after discharge.

## 2020-06-06 NOTE — PROGRESS NOTES
UPDATE/ADDENDUM:    Paged regarding blood glucose of 297.  Patient received 4 units of Novolog per medium intensity sliding scale.  Patient stated he would normally give himself 15 units if he was at home.  Again, patient did not receive Lantus last night and received oral Dexamethasone (20 mg total in the last 24 hours).      --Give 8 units of Novolog now.    --Change to high intensity sliding scale.    --Continue with Lantus as ordered for 20 unit tonight, will need to monitor and adjust daily.      Alexei Zuluaga PA-C

## 2020-06-06 NOTE — PLAN OF CARE
A & O x 4, VSS. No c/o pain, did want something for sleep, takes tylenol PM, gave pt prn. Blood glucose was 95 @ HS and due to lower BG's earlier in day, HS lantus was held and decreased to 15 units tomorrow at HS. Up ad ede, using urinal, good UOP. R port w/NS infusing @ 75 mL/hr, good blood return noted. Plan to discharge once round of chemo completed and will have neulasta outpt.

## 2020-06-06 NOTE — PROVIDER NOTIFICATION
Paged on call re: pt requesting tylenol PM, also discussed current low bgm's and getting lantus this evening. Spoke to Dr. Mcgraw, will put in for tylenol PM and said to hold lantus tonight and will modify order for tomorrow evening at 15 units.

## 2020-06-07 ENCOUNTER — APPOINTMENT (OUTPATIENT)
Dept: CT IMAGING | Facility: CLINIC | Age: 70
DRG: 829 | End: 2020-06-07
Attending: INTERNAL MEDICINE
Payer: COMMERCIAL

## 2020-06-07 ENCOUNTER — APPOINTMENT (OUTPATIENT)
Dept: MRI IMAGING | Facility: CLINIC | Age: 70
DRG: 829 | End: 2020-06-07
Attending: INTERNAL MEDICINE
Payer: COMMERCIAL

## 2020-06-07 LAB
ALBUMIN SERPL-MCNC: 2.3 G/DL (ref 3.4–5)
ALP SERPL-CCNC: 44 U/L (ref 40–150)
ALT SERPL W P-5'-P-CCNC: 20 U/L (ref 0–70)
AMMONIA PLAS-SCNC: 38 UMOL/L (ref 10–50)
ANION GAP SERPL CALCULATED.3IONS-SCNC: 6 MMOL/L (ref 3–14)
ANION GAP SERPL CALCULATED.3IONS-SCNC: 7 MMOL/L (ref 3–14)
AST SERPL W P-5'-P-CCNC: 29 U/L (ref 0–45)
BASE EXCESS BLDV CALC-SCNC: 1.7 MMOL/L
BASOPHILS # BLD AUTO: 0 10E9/L (ref 0–0.2)
BASOPHILS NFR BLD AUTO: 0 %
BILIRUB SERPL-MCNC: 0.3 MG/DL (ref 0.2–1.3)
BUN SERPL-MCNC: 16 MG/DL (ref 7–30)
BUN SERPL-MCNC: 19 MG/DL (ref 7–30)
CALCIUM SERPL-MCNC: 8.1 MG/DL (ref 8.5–10.1)
CALCIUM SERPL-MCNC: 8.2 MG/DL (ref 8.5–10.1)
CHLORIDE SERPL-SCNC: 106 MMOL/L (ref 94–109)
CHLORIDE SERPL-SCNC: 106 MMOL/L (ref 94–109)
CO2 SERPL-SCNC: 23 MMOL/L (ref 20–32)
CO2 SERPL-SCNC: 25 MMOL/L (ref 20–32)
CREAT SERPL-MCNC: 0.67 MG/DL (ref 0.66–1.25)
CREAT SERPL-MCNC: 0.68 MG/DL (ref 0.66–1.25)
DIFFERENTIAL METHOD BLD: ABNORMAL
EOSINOPHIL # BLD AUTO: 0 10E9/L (ref 0–0.7)
EOSINOPHIL NFR BLD AUTO: 0 %
ERYTHROCYTE [DISTWIDTH] IN BLOOD BY AUTOMATED COUNT: 18.9 % (ref 10–15)
ERYTHROCYTE [DISTWIDTH] IN BLOOD BY AUTOMATED COUNT: 19.1 % (ref 10–15)
GFR SERPL CREATININE-BSD FRML MDRD: >90 ML/MIN/{1.73_M2}
GFR SERPL CREATININE-BSD FRML MDRD: >90 ML/MIN/{1.73_M2}
GLUCOSE BLDC GLUCOMTR-MCNC: 201 MG/DL (ref 70–99)
GLUCOSE BLDC GLUCOMTR-MCNC: 208 MG/DL (ref 70–99)
GLUCOSE BLDC GLUCOMTR-MCNC: 216 MG/DL (ref 70–99)
GLUCOSE BLDC GLUCOMTR-MCNC: 221 MG/DL (ref 70–99)
GLUCOSE BLDC GLUCOMTR-MCNC: 232 MG/DL (ref 70–99)
GLUCOSE SERPL-MCNC: 211 MG/DL (ref 70–99)
GLUCOSE SERPL-MCNC: 230 MG/DL (ref 70–99)
HCO3 BLDV-SCNC: 26 MMOL/L (ref 21–28)
HCT VFR BLD AUTO: 27.9 % (ref 40–53)
HCT VFR BLD AUTO: 28.7 % (ref 40–53)
HGB BLD-MCNC: 8.9 G/DL (ref 13.3–17.7)
HGB BLD-MCNC: 9.2 G/DL (ref 13.3–17.7)
IMM GRANULOCYTES # BLD: 0 10E9/L (ref 0–0.4)
IMM GRANULOCYTES NFR BLD: 0.4 %
LACTATE BLD-SCNC: 1.3 MMOL/L (ref 0.7–2)
LYMPHOCYTES # BLD AUTO: 0.2 10E9/L (ref 0.8–5.3)
LYMPHOCYTES NFR BLD AUTO: 4.6 %
MCH RBC QN AUTO: 24.7 PG (ref 26.5–33)
MCH RBC QN AUTO: 24.9 PG (ref 26.5–33)
MCHC RBC AUTO-ENTMCNC: 31.9 G/DL (ref 31.5–36.5)
MCHC RBC AUTO-ENTMCNC: 32.1 G/DL (ref 31.5–36.5)
MCV RBC AUTO: 78 FL (ref 78–100)
MCV RBC AUTO: 78 FL (ref 78–100)
MONOCYTES # BLD AUTO: 0.2 10E9/L (ref 0–1.3)
MONOCYTES NFR BLD AUTO: 4.1 %
NEUTROPHILS # BLD AUTO: 4.2 10E9/L (ref 1.6–8.3)
NEUTROPHILS NFR BLD AUTO: 90.9 %
NRBC # BLD AUTO: 0 10*3/UL
NRBC BLD AUTO-RTO: 0 /100
OXYHGB MFR BLDV: 67 %
PCO2 BLDV: 39 MM HG (ref 40–50)
PH BLDV: 7.43 PH (ref 7.32–7.43)
PHOSPHATE SERPL-MCNC: 3 MG/DL (ref 2.5–4.5)
PLATELET # BLD AUTO: 181 10E9/L (ref 150–450)
PLATELET # BLD AUTO: 184 10E9/L (ref 150–450)
PO2 BLDV: 36 MM HG (ref 25–47)
POTASSIUM SERPL-SCNC: 3.6 MMOL/L (ref 3.4–5.3)
POTASSIUM SERPL-SCNC: 4.1 MMOL/L (ref 3.4–5.3)
PROCALCITONIN SERPL-MCNC: 0.1 NG/ML
PROT SERPL-MCNC: 5.7 G/DL (ref 6.8–8.8)
RBC # BLD AUTO: 3.6 10E12/L (ref 4.4–5.9)
RBC # BLD AUTO: 3.7 10E12/L (ref 4.4–5.9)
SODIUM SERPL-SCNC: 136 MMOL/L (ref 133–144)
SODIUM SERPL-SCNC: 137 MMOL/L (ref 133–144)
URATE SERPL-MCNC: 3.2 MG/DL (ref 3.5–7.2)
WBC # BLD AUTO: 4.6 10E9/L (ref 4–11)
WBC # BLD AUTO: 5.5 10E9/L (ref 4–11)

## 2020-06-07 PROCEDURE — 25000128 H RX IP 250 OP 636: Performed by: INTERNAL MEDICINE

## 2020-06-07 PROCEDURE — 36415 COLL VENOUS BLD VENIPUNCTURE: CPT | Performed by: PHYSICIAN ASSISTANT

## 2020-06-07 PROCEDURE — 84550 ASSAY OF BLOOD/URIC ACID: CPT | Performed by: INTERNAL MEDICINE

## 2020-06-07 PROCEDURE — 25500064 ZZH RX 255 OP 636: Performed by: INTERNAL MEDICINE

## 2020-06-07 PROCEDURE — 85027 COMPLETE CBC AUTOMATED: CPT | Performed by: NURSE PRACTITIONER

## 2020-06-07 PROCEDURE — 25000132 ZZH RX MED GY IP 250 OP 250 PS 637: Performed by: PHYSICIAN ASSISTANT

## 2020-06-07 PROCEDURE — 85025 COMPLETE CBC W/AUTO DIFF WBC: CPT | Performed by: INTERNAL MEDICINE

## 2020-06-07 PROCEDURE — 70498 CT ANGIOGRAPHY NECK: CPT

## 2020-06-07 PROCEDURE — 25000125 ZZHC RX 250: Performed by: INTERNAL MEDICINE

## 2020-06-07 PROCEDURE — 80053 COMPREHEN METABOLIC PANEL: CPT | Performed by: NURSE PRACTITIONER

## 2020-06-07 PROCEDURE — 83605 ASSAY OF LACTIC ACID: CPT | Performed by: NURSE PRACTITIONER

## 2020-06-07 PROCEDURE — 70450 CT HEAD/BRAIN W/O DYE: CPT

## 2020-06-07 PROCEDURE — 25000131 ZZH RX MED GY IP 250 OP 636 PS 637: Performed by: PHYSICIAN ASSISTANT

## 2020-06-07 PROCEDURE — 82805 BLOOD GASES W/O2 SATURATION: CPT | Performed by: PHYSICIAN ASSISTANT

## 2020-06-07 PROCEDURE — 70553 MRI BRAIN STEM W/O & W/DYE: CPT

## 2020-06-07 PROCEDURE — 82140 ASSAY OF AMMONIA: CPT | Performed by: NURSE PRACTITIONER

## 2020-06-07 PROCEDURE — 84100 ASSAY OF PHOSPHORUS: CPT | Performed by: INTERNAL MEDICINE

## 2020-06-07 PROCEDURE — 99233 SBSQ HOSP IP/OBS HIGH 50: CPT | Performed by: PHYSICIAN ASSISTANT

## 2020-06-07 PROCEDURE — 25000128 H RX IP 250 OP 636: Performed by: NURSE PRACTITIONER

## 2020-06-07 PROCEDURE — 99291 CRITICAL CARE FIRST HOUR: CPT | Performed by: NURSE PRACTITIONER

## 2020-06-07 PROCEDURE — A9585 GADOBUTROL INJECTION: HCPCS | Performed by: INTERNAL MEDICINE

## 2020-06-07 PROCEDURE — 00000146 ZZHCL STATISTIC GLUCOSE BY METER IP

## 2020-06-07 PROCEDURE — 25800030 ZZH RX IP 258 OP 636: Performed by: NURSE PRACTITIONER

## 2020-06-07 PROCEDURE — 84145 PROCALCITONIN (PCT): CPT | Performed by: NURSE PRACTITIONER

## 2020-06-07 PROCEDURE — 25000131 ZZH RX MED GY IP 250 OP 636 PS 637: Performed by: INTERNAL MEDICINE

## 2020-06-07 PROCEDURE — 80048 BASIC METABOLIC PNL TOTAL CA: CPT | Performed by: INTERNAL MEDICINE

## 2020-06-07 PROCEDURE — 25800030 ZZH RX IP 258 OP 636: Performed by: INTERNAL MEDICINE

## 2020-06-07 PROCEDURE — 99291 CRITICAL CARE FIRST HOUR: CPT | Mod: G0 | Performed by: PSYCHIATRY & NEUROLOGY

## 2020-06-07 PROCEDURE — 25000128 H RX IP 250 OP 636: Performed by: PHYSICIAN ASSISTANT

## 2020-06-07 PROCEDURE — 12000000 ZZH R&B MED SURG/OB

## 2020-06-07 PROCEDURE — 25000132 ZZH RX MED GY IP 250 OP 250 PS 637: Performed by: INTERNAL MEDICINE

## 2020-06-07 PROCEDURE — 0042T CT HEAD PERFUSION WITH CONTRAST: CPT

## 2020-06-07 RX ORDER — BISACODYL 10 MG
10 SUPPOSITORY, RECTAL RECTAL DAILY PRN
Status: DISCONTINUED | OUTPATIENT
Start: 2020-06-07 | End: 2020-06-12 | Stop reason: HOSPADM

## 2020-06-07 RX ORDER — SENNOSIDES 8.6 MG
1-2 TABLET ORAL 2 TIMES DAILY PRN
Status: DISCONTINUED | OUTPATIENT
Start: 2020-06-07 | End: 2020-06-12 | Stop reason: HOSPADM

## 2020-06-07 RX ORDER — GADOBUTROL 604.72 MG/ML
9 INJECTION INTRAVENOUS ONCE
Status: COMPLETED | OUTPATIENT
Start: 2020-06-07 | End: 2020-06-07

## 2020-06-07 RX ORDER — IOPAMIDOL 755 MG/ML
120 INJECTION, SOLUTION INTRAVASCULAR ONCE
Status: COMPLETED | OUTPATIENT
Start: 2020-06-07 | End: 2020-06-07

## 2020-06-07 RX ORDER — POLYETHYLENE GLYCOL 3350 17 G/17G
17 POWDER, FOR SOLUTION ORAL DAILY PRN
Status: DISCONTINUED | OUTPATIENT
Start: 2020-06-07 | End: 2020-06-12 | Stop reason: HOSPADM

## 2020-06-07 RX ADMIN — METHYLENE BLUE 50 MG: 5 INJECTION INTRAVENOUS at 23:29

## 2020-06-07 RX ADMIN — FENOFIBRATE 135 MG: 54 TABLET ORAL at 08:41

## 2020-06-07 RX ADMIN — SODIUM CHLORIDE: 9 INJECTION, SOLUTION INTRAVENOUS at 00:55

## 2020-06-07 RX ADMIN — RIVAROXABAN 20 MG: 20 TABLET, FILM COATED ORAL at 08:41

## 2020-06-07 RX ADMIN — IOPAMIDOL 120 ML: 755 INJECTION, SOLUTION INTRAVENOUS at 13:16

## 2020-06-07 RX ADMIN — HYDROCORTISONE 20 MG: 20 TABLET ORAL at 08:40

## 2020-06-07 RX ADMIN — ALLOPURINOL 300 MG: 300 TABLET ORAL at 08:41

## 2020-06-07 RX ADMIN — GADOBUTROL 9 ML: 604.72 INJECTION INTRAVENOUS at 14:35

## 2020-06-07 RX ADMIN — SODIUM CHLORIDE 100 ML: 9 INJECTION, SOLUTION INTRAVENOUS at 13:16

## 2020-06-07 RX ADMIN — HYDROCORTISONE SODIUM SUCCINATE 10 MG: 100 INJECTION, POWDER, FOR SOLUTION INTRAMUSCULAR; INTRAVENOUS at 22:07

## 2020-06-07 RX ADMIN — ROSUVASTATIN CALCIUM 5 MG: 5 TABLET, FILM COATED ORAL at 08:41

## 2020-06-07 RX ADMIN — DEXAMETHASONE 8 MG: 4 TABLET ORAL at 08:40

## 2020-06-07 RX ADMIN — METHYLENE BLUE 50 MG: 5 INJECTION INTRAVENOUS at 19:50

## 2020-06-07 RX ADMIN — SODIUM CHLORIDE: 9 INJECTION, SOLUTION INTRAVENOUS at 17:28

## 2020-06-07 RX ADMIN — THIAMINE HYDROCHLORIDE 500 MG: 100 INJECTION, SOLUTION INTRAMUSCULAR; INTRAVENOUS at 18:27

## 2020-06-07 RX ADMIN — INSULIN GLARGINE 20 UNITS: 100 INJECTION, SOLUTION SUBCUTANEOUS at 21:29

## 2020-06-07 ASSESSMENT — ACTIVITIES OF DAILY LIVING (ADL)
ADLS_ACUITY_SCORE: 12

## 2020-06-07 NOTE — CONSULTS
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  Deferred phone call visit today.  Pt was admitted for induction chemo due to recurrent B-cell lymphoma   He has h/o DM, CKD-3, GERD, HTN, HLD.  No food allergies/intolerances.      CURRENT NUTRITION ORDERS  Diet Order:     NPO   Previous diet was Moderate Consistent CHO (4138-5796 kcals per day)    Current Intake/Tolerance:  Per flow sheets, pt consume 100% evening meal and took 240 mL oral yesterday.    6/7:  RRT called for: stroke- like symptoms at 12:37 PM   6/7:  MRI --> no evidence of stroke     Dysphagia Screen  Passed screening, no dysarthria - Regular Diet with thin liquids  06/07/2020 1600      NUTRITION FOCUSED PHYSICAL ASSESSMENT FOR DIAGNOSING MALNUTRITION)  No:  Unable to visualize pt due to distancing precautions with COVID-19 pandemic         Observed:    N/a    Obtained from Chart/Interdisciplinary Team:  None noted    ANTHROPOMETRICS  Height: 5' 8\"  Admit Wt:  85.5 kg (188 lbs 9.6 oz)  Body mass index is 28.68 kg/m .  Weight Status:  Overweight BMI 25-29.9  IBW:  70 kg  % IBW:  122%  Weight History:  Weight loss 7.9 kg (8%) over the past ~ 1 month    Wt Readings from Last 20 Encounters:   06/05/20 85.5 kg (188 lb 9.6 oz) "   05/27/20 90.7 kg (200 lb)   05/22/20 91.6 kg (202 lb)   05/14/20 93.4 kg (206 lb)   06/08/18 108.9 kg (240 lb)   05/09/18 106.6 kg (235 lb)   12/05/17 110 kg (242 lb 8 oz)   11/17/17 111.1 kg (245 lb)   11/14/17 111.9 kg (246 lb 11.2 oz)   03/14/17 113.9 kg (251 lb)   05/02/16 115.8 kg (255 lb 6.4 oz)   12/05/14 101.6 kg (224 lb)   11/26/14 103.9 kg (229 lb)   11/13/14 105.7 kg (233 lb)       LABS  Labs reviewed  BGM:  292, 251, 221, 201, 232, 216 - Pt with DM-2 and on steroids    MEDICATIONS  Medications reviewed  High Res sliding scale insulin + Lantus 20 Units at HS - for glycemic control  NaCl at 75 mL/hr - for fluid delivery      ASSESSED NUTRITION NEEDS PER APPROVED PRACTICE GUIDELINES:    Dosing Weight:  73.9 kg (adjusted for overwt)  Estimated Energy Needs:  6688-7561 kcals (25-30 Kcal/Kg)  Justification: overweight  Estimated Protein Needs:   grams protein (1.2-1.4 g pro/Kg)  Justification: hypercatabolism with acute illness and CKD  Estimated Fluid Needs:  4777-4407 mL (1 mL/Kcal)  Justification: maintenance and per provider pending fluid status    MALNUTRITION:  % Weight Loss:  > 5% in 1 month (severe malnutrition)  % Intake:  </= 75% for >/= 1 month (severe malnutrition) - Suspect  Subcutaneous Fat Loss:  Unable to determine  Muscle Loss:  Unable to determine  Fluid Retention:  None noted    Malnutrition Diagnosis: Severe malnutrition  In Context of:  Acute illness or injury    NUTRITION DIAGNOSIS:  Predicted inadequate nutrient intake related to recent 8% weight loss, recurrence of lymphoma.      NUTRITION INTERVENTIONS  Recommendations / Nutrition Prescription  Will be available for oral nutritional supplements as appropriate      Implementation  Nutrition education: Not appropriate at this time due to patient condition    Nutrition Goals  Diet will be advanced and pt will consume > 75% meals in 3-5 days.      MONITORING AND EVALUATION:  Progress towards goals will be monitored and evaluated  per protocol and Practice Guidelines    Armida Ross RD, LD, CNSC

## 2020-06-07 NOTE — PLAN OF CARE
"  Problem: Neurotoxicity (Chemotherapy Effects)  Goal: Neurotoxicity Symptom Control  6/7/2020 1853 by Shanita Chambers, RN  Outcome: Declining   VSS.  Lethargic in beginning of shift, pt stated he was \"tired\" but answered questions appropriately and took medications.  Nursing staff checked in frequently while patient slept and was able to rouse patient and illicit verbal responses.  Nursing became concerned when patient was still sleeping at noon and reassessed vitally and neurologically and was found to be altered, unable to fully wake and repeating answers to questions, at times answering incorrectly.  Further, the floor in patient's room was wet with what appeared to be water.  Nursing donned gloves and used paper towels to  clean up most of floor when it was determined that floor was wet with urine. At this time Ifex/[place] was mostly complete and infusion was turned off and disconnected.  Nursing contacted Charge Lilly Gonsalez by leona and appropriate chemotherapy PPE and spill precautions were taken as RRT was called.  Code stroke called by NP.  Nursing communicated closely with Alexei Koch and Maria L Dacosta regarding treatment plan and change of medications after continued physical and cognitive decline.  Pt is currently mostly non-verbal, has difficultly or inability to follow directions and is having difficulty with ambulation beyond standing with an assist of 2 to use urinal.  Pt is also experiencing facial flushing.  Chemotherapy is being held at this time and will reassess tomorrow.    "

## 2020-06-07 NOTE — PLAN OF CARE
A & O x 4, VSS. Blood sugars still high, MD aware and coverage per orders. Up ad ede in room. 24 hour infusing, blood return noted, tolerating. Good urine output. Plan to discharge once chemo cycle complete, possibly Monday.

## 2020-06-07 NOTE — PROVIDER NOTIFICATION
Jayda oncall hospitalist re: continuing elevated blood sugars after HS correction and lantus. Spoke to Dr. Campos who said to give 6 more units of novolog and rechk at 0200.

## 2020-06-07 NOTE — CONSULTS
[facility]    Stroke Consult Note    Reason for Consult: encephalopathy    Chief Complaint: No chief complaint on file.      HPI  70M hx of B cell lymphoma, CKD, hypercoagulable state with IVC filter on xarelto, DM who is admitted for chemotherapy with RICE who had stroke code called for onset of muteness. The patient was admitted for RICE chemotherapy, which was begun on 6/5. He was doing well until today around 0840 when he was noted to be encephalopathic, not speaking but attending. CT/CTA/CTP done which were all unremarkable for an acute stroke cause. The patient was then sent to MRI which showed no acute stroke. He was previously on lovenox injections but was switched to xarelto after a chemotherapy regimen.     TPA Treatment   Not given due to stroke mimic: encephalopathy.    Endovascular Treatment  Not initiated due to absence of proximal vessel occlusion  ______________________________________________________    Past Medical History   Past Medical History:   Diagnosis Date     Abnormal liver function test      CPAP (continuous positive airway pressure) dependence      Diabetes (H)      Hx of skin cancer, basal cell      Hyperlipidemia      Hypertension      Keratoderma      Liver disease      Lymphoma (H)      Non-Hodgkin lymphoma (H)      Pedal edema     CHRONIC     Pleural effusion      Seborrheic keratosis, inflamed      Sleep apnea      Thrombosis Felbruary 2018     Past Surgical History   Past Surgical History:   Procedure Laterality Date     AMPUTATE TOE(S) Left 5/22/2020    Procedure: LEFT SECOND AND THIRD DISTAL TOE AMPUTATIONS;  Surgeon: Ramon Flores MD;  Location:  OR     BIOPSY LYMPH NODE CERVICAL N/A 12/5/2014    Procedure: BIOPSY LYMPH NODE CERVICAL;  Surgeon: Robbie Linda MD;  Location:  OR     BRONCHOSCOPY FLEXIBLE N/A 11/14/2017    Procedure: BRONCHOSCOPY FLEXIBLE;  FLEXIBLE BRONCHOSCOPY WITH BIOPSIES.;  Surgeon: Robbie Linda MD;   Location:  OR     COLONOSCOPY       COLONOSCOPY N/A 5/9/2018    Procedure: COMBINED COLONOSCOPY, SINGLE OR MULTIPLE BIOPSY/POLYPECTOMY BY BIOPSY;;  Surgeon: Ramos Bates MD;  Location:  GI     ENDOVASCULAR PLACEMENT VASCULAR DEVICE Left 12/5/2017    Procedure: ENDOVASCULAR PLACEMENT VASCULAR DEVICE;;  Surgeon: Robbie Linda MD;  Location: [facility]     ENT SURGERY      tonsillectomy     EXCISE NODE MEDIASTINAL N/A 11/17/2017    Procedure: EXCISE NODE MEDIASTINAL;;  Surgeon: Robbie Linda MD;  Location:  OR     EYE SURGERY       EYE SURGERY Left     vitrectomy     INSERT PORT VASCULAR ACCESS N/A 12/5/2014    Procedure: INSERT PORT VASCULAR ACCESS;  Surgeon: Robbie Linda MD;  Location:  OR     INSERT PORT VASCULAR ACCESS N/A 12/5/2017    Procedure: INSERT PORT VASCULAR ACCESS;  POWER PORT PLACEMENT ATTEMPTED, PLACEMENT OF ANGIO-SEAL VIP VASCULAR CLOSURE DEVICE;  Surgeon: Robbie Linda MD;  Location: [facility]     IR CHEST PORT PLACEMENT > 5 YRS OF AGE  6/5/2020     IR PORT REMOVAL RIGHT  12/10/2018     PHACOEMULSIFICATION CLEAR CORNEA WITH STANDARD INTRAOCULAR LENS IMPLANT Right 5/2/2016    Procedure: PHACOEMULSIFICATION CLEAR CORNEA WITH STANDARD INTRAOCULAR LENS IMPLANT;  Surgeon: Rasheed Finney MD;  Location:  EC     REMOVE PORT VASCULAR ACCESS N/A 3/14/2017    Procedure: REMOVE PORT VASCULAR ACCESS;  Surgeon: Robbie Linda MD;  Location:  OR     SOFT TISSUE SURGERY      BASAL CELL CA FOREHEAD     THORACOTOMY Right 11/17/2017    Procedure: THORACOTOMY;  RIGHT EXPLORATORY THORACOTOMY/ EXTENSIVE PNEUMOLYSIS/ BIOPSY OF INTERLOBAR LYMPH NODE;  Surgeon: Robbie Linda MD;  Location:  OR     Medications   Home Meds  Prior to Admission medications    Medication Sig Start Date End Date Taking? Authorizing Provider   acetaminophen (TYLENOL) 325 MG tablet Take 650-975 mg by mouth every 12 hours as needed for mild pain   Yes Unknown, Entered By  History   allopurinol (ZYLOPRIM) 300 MG tablet Take 300 mg by mouth daily   Yes Unknown, Entered By History   diphenhydrAMINE-acetaminophen (TYLENOL PM)  MG tablet Take 2-3 tablets by mouth At Bedtime   Yes Unknown, Entered By History   fenofibrate (TRICOR) 145 MG tablet Take 145 mg by mouth daily   Yes Unknown, Entered By History   hydrocortisone (CORTEF) 10 MG tablet Take 10 mg by mouth every evening   Yes Unknown, Entered By History   hydrocortisone (CORTEF) 10 MG tablet Take 20 mg by mouth daily before breakfast    Yes Reported, Patient   insulin aspart (NOVOLOG FLEXPEN) 100 UNIT/ML pen Inject Subcutaneous 3 times daily (with meals) Patient uses sliding scale based on Carbs and Blood Glucose. Has been using very little lately due to low readings and low appetite.   Yes Reported, Patient   insulin glargine (LANTUS VIAL) 100 UNIT/ML soln Inject 30 Units Subcutaneous At Bedtime    Yes Reported, Patient   metFORMIN (GLUCOPHAGE) 500 MG tablet Take 1,000 mg by mouth daily (with breakfast)   Yes Unknown, Entered By History   metFORMIN (GLUCOPHAGE) 500 MG tablet Take 500 mg by mouth daily (with dinner)   Yes Unknown, Entered By History   potassium chloride ER (KLOR-CON M) 20 MEQ CR tablet Take 20 mEq by mouth daily   Yes Unknown, Entered By History   rosuvastatin (CRESTOR) 10 MG tablet Take 5 mg by mouth daily   Yes Unknown, Entered By History   XARELTO ANTICOAGULANT 20 MG TABS tablet Take 20 mg by mouth daily Takes in the am 4/29/20  Yes Reported, Patient   order for DME Kyle 1 packet twice a day for the next month 5/8/20   Hubert Carpenter MD       Scheduled Meds    allopurinol  300 mg Oral Daily     dexamethasone  8 mg Oral Daily     [START ON 6/9/2020] dextrose 5% water  10-20 mL Intracatheter Daily at 8 pm     [START ON 6/9/2020] dextrose 5% water  10-20 mL Intracatheter Daily at 8 pm     etoposide (TOPOSAR) infusion  100 mg/m2 (Order-Specific) Intravenous Q24H     fenofibrate  135 mg Oral Daily     [START  ON 6/9/2020] filgrastim (NEUPOGEN/GRANIX) intravenous  5 mcg/kg (Order-Specific) Intravenous Daily at 8 pm     heparin  5 mL Intravenous Q28 Days     heparin lock flush  5 mL Intravenous Q24H     hydrocortisone  10 mg Oral QPM     hydrocortisone  20 mg Oral QAM AC     insulin aspart  1-12 Units Subcutaneous Q4H     insulin glargine  20 Units Subcutaneous At Bedtime     melatonin  3 mg Oral At Bedtime     rivaroxaban ANTICOAGULANT  20 mg Oral QAM     rosuvastatin  5 mg Oral Daily       Infusion Meds    - MEDICATION INSTRUCTIONS -       - MEDICATION INSTRUCTIONS -       - MEDICATION INSTRUCTIONS -       sodium chloride 0.9%       sodium chloride 75 mL/hr at 06/07/20 0055     sodium chloride         PRN Meds  acetaminophen, albuterol, albuterol, sore throat lozenge, bisacodyl, glucose **OR** dextrose **OR** glucagon, diphenhydrAMINE, diphenhydrAMINE, EPINEPHrine, flumazenil, LORazepam, LORazepam, - MEDICATION INSTRUCTIONS -, - MEDICATION INSTRUCTIONS -, - MEDICATION INSTRUCTIONS -, meperidine, methylPREDNISolone, naloxone, polyethylene glycol, prochlorperazine, prochlorperazine, sennosides, sodium chloride (PF), sodium chloride 0.9%, sodium chloride    Allergies   No Known Allergies  Family History   No family history on file.  Social History   Social History     Tobacco Use     Smoking status: Never Smoker     Smokeless tobacco: Never Used   Substance Use Topics     Alcohol use: No     Drug use: No       Review of Systems   The 10 point Review of Systems is negative other than noted in the HPI or here.        PHYSICAL EXAMINATION  Temp:  [96  F (35.6  C)-98.3  F (36.8  C)] 96  F (35.6  C)  Heart Rate:  [70-83] 82  Resp:  [16] 16  BP: (102-131)/(53-69) 131/65  SpO2:  [95 %-97 %] 97 %       Mental status: eyes open and looks around, makes eye contact, but does not speak or follow commands.   Cranial nerves: pupils equal, eyes look around well. No participation in exam further  Motor: all 4x limbs drift equally to the  bed.  Sensory: unable to assess  Coordination: defer  Gait: defer      Dysphagia Screen  Passed screening, no dysarthria - Regular Diet with thin liquids  06/07/2020 1600      Stroke Scales    NIHSS  Interval baseline (06/07/20 1606)   Interval Comments     1a. Level of Consciousness 2-->Not alert, requires repeated stimulation to attend, or is obtunded and requires strong or painful stimulation to make movements (not stereotyped)   1b. LOC Questions 2-->Answers neither question correctly   1c. LOC Commands 2-->Performs neither task correctly   2.   Best Gaze 0-->Normal   3.   Visual 0-->No visual loss   4.   Facial Palsy 3-->Complete paralysis of one or both sides (absence of facial movement in the upper and lower face)   5a. Motor Arm, Left 2-->Some effort against gravity, limb cannot get to or maintain (if cued) 90 (or 45) degrees, drifts down to bed, but has some effort against gravity   5b. Motor Arm, Right 2-->Some effort against gravity, limb cannot get to or maintain (if cued) 90 (or 45) degrees, drifts down to bed, but has some effort against gravity   6a. Motor Leg, Left 2-->Some effort against gravity, leg falls to bed by 5 secs, but has some effort against gravity   6b. Motor Leg, right 2-->Some effort against gravity, leg falls to bed by 5 secs, but has some effort against gravity   7.   Limb Ataxia 0-->Absent   8.   Sensory 0-->Normal, no sensory loss   9.   Best Language 3-->Mute, global aphasia, no usable speech or auditory comprehension   10. Dysarthria 0-->Normal   11. Extinction and Inattention  0-->No abnormality   Total 20 (06/07/20 1606)       Imaging  I personally reviewed all imaging; relevant findings per HPI.     Lab Results CBC  Recent Labs   Lab 06/07/20 0600 06/06/20 0600 06/05/20  0830   WBC 4.6 2.5* 4.3   RBC 3.60* 3.85* 4.33*   HGB 8.9* 9.4* 10.6*   HCT 27.9* 30.1* 33.8*    208 215     Basic Metabolic Panel    Recent Labs   Lab 06/07/20 0600 06/06/20 0600 06/05/20  0830  "   133 135   POTASSIUM 4.1 4.9 4.6   CHLORIDE 106 105 104   CO2 23 21 22   BUN 19 18 24   CR 0.67 0.74 1.13   * 181* 71   JEFF 8.2* 8.2* 9.5     Liver Panel  Recent Labs   Lab Test 06/05/20  0830   PROTTOTAL 7.0   ALBUMIN 2.9*   BILITOTAL 0.4   ALKPHOS 57   AST 39   ALT 25     INR  Recent Labs   Lab Test 05/27/20  0800 12/10/18  1015 11/14/17  1005   INR 1.22* 0.99 0.92      Lipid ProfileNo lab results found.  A1C  Recent Labs   Lab Test 05/22/20  0911   A1C 7.3*     Troponin Shandra results for input(s): TROPI in the last 168 hours.    =================================================================    ASSESSMENT/PLAN: 70M hx of B cell lymphoma, CKD, hypercoagulable state with IVC filter on xarelto, DM who is admitted for chemotherapy with RICE who had stroke code called for onset of muteness.    ## acute onset of encephalopathy: seen as complete absence of speech production and mostly lacking in comprehension while awake and otherwise alert. MRI was done showing no stroke or other acute concerning findings. His symptoms are most likely due to encephalopathy triggered by RICE chemotherapy begun on 6/5, namely Ifosfamide. Typically, neurotoxicity with Ifosfamide improves over 2-3 days. Will defer to primary oncology team to weigh risk/benefit of continuing therapy. Stroke team to sign off, no further workup recommended. If further neurologic workup needed, consult general neurology. Please call if further questions.    =================================================================    Tommie Crowder  Vascular Neurology Fellow    06/07/2020 4:15 PM  Text Page (8am-7pm)  To page stroke neurology after hours or on a subsequent day, click here: [facility]  Choose \"On Call\" tab at top, then search dropdown box for \"Neurology Adult\" & press Enter, look for Neuro ICU/Stroke      Stroke Code / Stroke Consult Data Data   Stroke Code Data  (for stroke code with tele)  Stroke code activated 06/07/20   1251   First stroke " provider response         Video start time 06/07/20   1600   Video end time 06/07/20   1610   Last known normal         Time of discovery  (or onset of symptoms)          Head CT read by me 06/07/20   1300   Was stroke code de-escalated? Yes 06/07/20 1315  symptoms not likely caused by stroke        Telestroke Service Details  Type of service telemedicine diagnostic assessment of acute neurological changes   Reason telemedicine is appropriate patient requires assessment with a specialist for diagnosis and treatment of neurological symptoms   Mode of transmission secure interactive audio and video communication per [employer]   Originating site (patient location) [facility]    Distant site (provider location) [facility]

## 2020-06-07 NOTE — CODE/RAPID RESPONSE
[facility]    CODE STROKE NOTE  6/7/2020   Time Called: 12: 48 PM    RRT called for: stroke- like symptoms at 12: 37 PM    Neuro:  Aphasia, global  Time:  8: 40 AM 6/7/2020  TPA: No, outside window   Glucose level: 232    Physical Exam   Vital Signs with Ranges:  Temp:  [97.5  F (36.4  C)-98.3  F (36.8  C)] 98.2  F (36.8  C)  Heart Rate:  [70-87] 70  Resp:  [16] 16  BP: (102-127)/(53-66) 127/66  SpO2:  [93 %-97 %] 97 %  I/O last 3 completed shifts:  In: 1228 [P.O.:240; I.V.:988]  Out: 3175 [Urine:3175]    Assessment & Plan   Aphasia, concern for encephalopathy versus delirium versus leukoencephalopathy secondary to RICE   RRT called for new aphasia. By report Mr. Garibay was awake, alert, oriented x 4 at 8: 40 AM this morning. He was sleeping through the morning and this afternoon noted to have global aphasia. Code Stroke called with initial CT imaging negative for acute stroke.     INTERVENTIONS:  - consult to Neurology, appreciate their input   - MRI per Neurology  - hold today's dose of chemotherapy until neuro evaluation complete, please contact Dr. Koch or on-call oncologist for further direction     ADDENDUM: Evaluated at 5: 00 PM at the request of nursing. Mr. Garibay opens eyes with physical touch, is able to state his name, but no answer other questions. No obvious seizure activity or obvious focal deficits. No obvious airway compromise. Discussed with Dr. Pike, ICU attending. We reviewed chart and at this time there is not an obvious metabolic or physiologic cause for his encephalopathy. Per Neurology note and literature review ifosfamide can cause encephalopathy which is typically limited to 2- to 3- days and seizures. Discussed further evaluate with Neurology Fellow and Pharmacist. Discussed ifosfamide encephalopathy with Dr. Lomeli, on-call Oncology.     Plan:  - consult to General Neurology for concern of non-convulsant seizures   - EEG now  - will try high- dose IV  thiamine  - will consider methylene blue after consulting with on-call oncology   - added CMP, ammonia, VBG, CBC, lactate, and procalcitonin   - methylene blue 50- mg IV Q 4 hours x 2 days    Discussed with and defer further cares to Alexei Zuluaga, Hospitalist PA-C, and Dr. Koch, Oncology.     Code Status: Full Code    Diana Dacosta-Cande, APRN, CNP  Hospitalist Service, House Officer  [facility]     Text Page  Pager: [phone]    Allergies   No Known Allergies    Neurologic: Opens eyes with interaction. Appears to have receptive and global aphasia which limits exam. Face appears slightly asymmetric but at baseline for patient per nursing. Extremities equal and strong on movements tested. No obvious vision deficit. Fine motor unable to be evaluate due to aphasia.   Constitutional: 70- year old male laying in bed without obvious acute distress.   Pulmonary: He is not hypoxic. No obvious acute respiratory distress.   Cardiovascular: Appears well perfused. Warm.   GI: Soft, non-tender.   Skin/Integumen: No obvious concerning rashes or lesions.   Psych:  Calm, cooperative.   Extremities: Moves all extremities.     IMAGING: (X-ray/CT/MRI)   Recent Results (from the past 24 hour(s))   CT Head w/o Contrast    Narrative    CT OF THE HEAD WITHOUT CONTRAST 6/7/2020 1:13 PM     COMPARISON: None.    HISTORY: Code Stroke, aphasia.    TECHNIQUE: 5 mm thick axial CT images of the head were acquired  without IV contrast material.    FINDINGS:  There is moderate diffuse cerebral volume loss. There are  subtle patchy areas of decreased density in the cerebral white matter  bilaterally that are consistent with sequela of chronic small vessel  ischemic disease.     The ventricles and basal cisterns are within normal limits in  configuration given the degree of cerebral volume loss.  There is no  midline shift. There are no extra-axial fluid collections.     No intracranial hemorrhage, mass or recent  infarct.    The visualized paranasal sinuses are well-aerated. There is no  mastoiditis. There are no fractures of the visualized bones.       Impression    IMPRESSION:  Diffuse cerebral volume loss and cerebral white matter  changes consistent with chronic small vessel ischemic disease. No  evidence for acute intracranial pathology.        Radiation dose for this scan was reduced using automated exposure  control, adjustment of the mA and/or kV according to patient size, or  iterative reconstruction technique.    TYLOR PADILLA MD   CTA Head Neck with Contrast    Narrative    CT ANGIOGRAM OF THE HEAD AND NECK WITHOUT AND WITH CONTRAST  6/7/2020  1:17 PM     COMPARISON: None    HISTORY: Aphasia.    TECHNIQUE:  Precontrast localizing scans were followed by CT  angiography with an injection of 70mL Isovue-370 nonionic intravenous  contrast material with scans through the head and neck.  Images were  transferred to a separate 3-D workstation where multiplanar  reformations and 3-D images were created.  Estimates of carotid  stenoses are made relative to the distal internal carotid artery  diameters except as noted.      FINDINGS: Incidental note is made of airspace opacity in the  visualized portion of the right lower lung, possibly representing  pneumonia.    Neck CTA: The bilateral common carotid, bilateral cervical internal  carotid and bilateral vertebral arteries are patent without vascular  narrowing.    Head CTA: The basilar, bilateral intracranial distal internal carotid,  bilateral anterior cerebral, bilateral middle cerebral and bilateral  posterior cerebral arteries are patent and unremarkable. The anterior  communicating and right posterior communicating arteries are patent.      Impression    IMPRESSION: Normal neck and head CTA.      Radiation dose for this scan was reduced using automated exposure  control, adjustment of the mA and/or kV according to patient size, or  iterative reconstruction  technique.    TYLOR PAIDLLA MD   CT Head Perfusion w Contrast    Narrative    CT BRAIN PERFUSION 6/7/2020 1:27 PM    COMPARISON: None    HISTORY: Aphasia.    TECHNIQUE: Time sequential axial CT images of the head were acquired  during the administration of intravenous contrast (50 mL Isovue-370).  CTA images of the [place] as well as color perfusion maps of  the brain were created from this time sequential axial source data.    FINDINGS: There are no focal or regional perfusion defects in the  brain.      Impression    IMPRESSION: Normal CT perfusion of the brain.      Radiation dose for this scan was reduced using automated exposure  control, adjustment of the mA and/or kV according to patient size, or  iterative reconstruction technique.       CBC with Diff:  Recent Labs   Lab Test 06/07/20  0600 05/27/20  0800   WBC 4.6   < >  --    HGB 8.9*   < >  --    MCV 78   < >  --       < > 251   INR  --   --  1.22*    < > = values in this interval not displayed.      Comprehensive Metabolic Panel:  Recent Labs   Lab 06/07/20  0600  06/05/20  0830      < > 135   POTASSIUM 4.1   < > 4.6   CHLORIDE 106   < > 104   CO2 23   < > 22   ANIONGAP 7   < > 9   *   < > 71   BUN 19   < > 24   CR 0.67   < > 1.13   GFRESTIMATED >90   < > 66   GFRESTBLACK >90   < > 77   JEFF 8.2*   < > 9.5   PHOS 3.0   < > 4.1   PROTTOTAL  --   --  7.0   ALBUMIN  --   --  2.9*   BILITOTAL  --   --  0.4   ALKPHOS  --   --  57   AST  --   --  39   ALT  --   --  25    < > = values in this interval not displayed.       INR:    Recent Labs   Lab Test 05/27/20  0800   INR 1.22*       Time Spent on this Encounter   I spent 60 minutes on the unit/floor managing the care of Pastor Garibay. Over 50% of my time was spent counseling the patient and/or coordinating care regarding services listed in this note.

## 2020-06-07 NOTE — PROGRESS NOTES
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     Past medical history significant for B-cell Non-Hodgkin's lymphoma, DM2, Suspected adrenal insufficiency, Hypercoagulable state, chemotherapy induced anemia, CKD stage II-III, JESSICA complaint with CPAP, HLP, History of HTN (no longer on medications), History of GERD, History of lower extremity edema who was admitted for induction of chemotherapy for which the Hospitalist service has been consulted to assist with medical management.       Aphasia concerning for encephalopathy versus delirium versus leukoencephalopathy secondary to RICE chemotherapy:  This morning was awake, alert, orientated x4.  He slept throughout the morning and this afternoon was noted to have global aphasia.  RRT was called and CODE Stroke started.  -Head CT and Head/Neck CTA negative.  Brain MRI negative.    --Neurology consulted.    --Hold chemotherapy until neuro evaluation completed.    --Discussed/reviewed with Armando BECK, Diana Dacosta regarding patient.    --STAT CMP, CBC with differential, Ammonia level, Lactic acid, Procalcitonin, VBG.    --NPO, due to continued aphasia (expressive and receptive).      Diffuse large B-cell Non-Hodgkin's lymphoma:  LDH elevated at 534.    Management per Oncology:               --RICE chemotherapy then followed by either stem cell transplant or CART therapy. Cycle 1 of RICE started on 6/5.  Due to acute neuro changes will hold chemotherapy until neuro evaluation completed.                 --Plan for Neulasta after discharge.     --Allopurinol 300 mg daily.    --PRN anti-emetics.       Insulin dependent DM2:  Complaint with metformin 1000 mg every morning and 500 mg with supper, Lantus 30 units at bedtime and sliding scale NovoLog with meals.  A1c of 7.3%.   -Patient received 8 mg PO dexamethasone on 6/5 and 12 mg on 6/6 and 8 mg on 6/7.    Recent Labs    Lab 06/07/20  1542 06/07/20  1218 06/07/20  0849 06/07/20  0600 06/07/20  0229 06/06/20  2312 06/06/20  1958  06/06/20  0600 06/05/20  0830   GLC  --   --   --  211*  --   --   --   --  181*  --  71   * 232* 201*  --  221* 251* 292*   < >  --    < >  --     < > = values in this interval not displayed.   --Hold PTA metformin.     --Hypoglycemic protocol.    --Lantus 30 units administered at bedtime 6/6 and had received 48 units of Novolog.  Due to neuro change patient will be made NPO and will decrease Lantus to 20 units at bedtime.    --High intensity sliding scale every 4 hours with blood glucose checks every 4 hours.       Anemia of chronic disease:  Believe secondary to malignancy and previous chemotherapy.  Lab studies from 5/2020 consistent with chronic disease.    Recent Labs   Lab 06/07/20  0600 06/06/20 0600 06/05/20  0830   HGB 8.9* 9.4* 10.6*   --Monitor.     Leukopenia:  WBC decreased to 2.5 today.    Recent Labs   Lab 06/07/20 0600 06/06/20  0600 06/05/20  0830   WBC 4.6 2.5* 4.3   --Monitor.      Suspected adrenal insufficiency, in setting of adrenal masses:  Noted large bilateral adrenal masses with new hypotension, nausea, vomiting and hyperkalemia.  Noted normal cortisol level and ACTH elevated at 140.    --Resume PTA empiric hydrocortisone 20 mg every 8 am and 10 mg every 4 pm.    --Endocrinology follow-up as an outpatient.        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    --Resume Xarelto 20 mg every morning.    Hold if thrombocytopenia develops (PLTs drop below 50,000).        CKD stage II-III:  Creatinine at 1.13 with GFR of 66 upon admit.    Recent Labs   Lab 06/07/20  0600 06/06/20 0600 06/05/20  0830   CR 0.67 0.74 1.13   --Monitor.    --IVF at 75 ml/hr.       JESSICA:  Complaint with CPAP.  --Home CPAP with home settings.       HLP:  --Resume PTA rosuvastatin 5 mg daily and Tricor 145 mg daily.       Right foot wound and  "previous left toes partial amputation:  Known right foot wounds, that were present prior to admission.    --Daily dressing changes.      History of HTN (no longer on medications):  Patient with hypotension and has stopped all blood pressure medications.  Suspected adrenal insufficiency.  --Orthostatic blood pressures.    --IVF at 75 ml/hr.       History of GERD:  No longer on medications.       History of lower extremity edema:  Patient indicated this is no longer an issue and is no longer on diuretic therapy.    --Monitor.      Diet: Combination Diet 1182-7052 Calories: Moderate Consistent CHO (4-6 CHO units/meal)     DVT Prophylaxis: Xarelto   Champion Catheter: not present  Code Status: Full Code     Disposition Plan    Expected discharge: Per Oncology  Entered: Hayden Zuluaga PA-C 06/07/2020, 9:12 AM        The patient has been discussed with Dr. Schmitz, who agrees with the assessment and plan at this time.  Dr. Schmitz will evaluate the patient independently.      Alexei Zuluaga PA-C   [phone]    Interval History   Patient had an RRT this afternoon due to global aphasia.  A CODE Stroke was called and patient was seen once he returned from MRI.      Patient was laying in bed upon arrival.  He responded to one question answering \"ok\" when asked how he was.  He was unable to answer any further questions or follow any commands.  Nursing staff was present in the room and noted that he seemed worse than when the code stroke was called.  Vital signs were checked as well as blood glucose which were not alarming.      Spoke and reviewed with Armando Argueta CNP.  Lab studies ordered.      -Data reviewed today: I reviewed all new labs and imaging results over the last 24 hours. I personally reviewed no images or EKG's today.    Physical Exam   Temp: 97.5  F (36.4  C) Temp src: Oral BP: 108/66   Heart Rate: 83 Resp: 16 SpO2: 96 % O2 Device: None (Room air)    Vitals:    06/05/20 0811   Weight: 85.5 kg (188 " lb 9.6 oz)     Vital Signs with Ranges  Temp:  [97.5  F (36.4  C)-98.3  F (36.8  C)] 97.5  F (36.4  C)  Heart Rate:  [80-95] 83  Resp:  [16] 16  BP: ()/(50-66) 108/66  SpO2:  [93 %-97 %] 96 %  I/O last 3 completed shifts:  In: 1228 [P.O.:240; I.V.:988]  Out: 3175 [Urine:3175]      Constitutional: Awake and alert.  Only able to respond to one question and unable to follow commands.  NAD.    ENT: Atraumatic, slightly asymmetric appearance due to patient not fully opening right eye.  Appears flushed.  Oral pharynx with mucus membranes, tonsils without erythema or exudates.  Neck: Supple, symmetrical, trachea midline, no adenopathy.  Pulmonary: No increased work of breathing, good air exchange, CTA bilaterally, no crackles or wheezing.  Patient unable to follow request to take deep breath.    Cardiovascular: R/R/R, normal S1 and S2, no S3 or S4, and no murmur noted.  Chest port in place on the right chest wall.    GI: Active bowel sounds, soft, non-distended, non-tender.  Skin/Integumen: Clear.  Neuro: Difficult to assess due to aphasia and patient not following commands or interacting.    Psych:  Alert.  Calm, flat affect.    Extremities: No lower extremity edema noted, and calves are non-TTP bilaterally.       Medications     - MEDICATION INSTRUCTIONS -       - MEDICATION INSTRUCTIONS -       - MEDICATION INSTRUCTIONS -       sodium chloride 0.9%       sodium chloride 75 mL/hr at 06/07/20 0055     sodium chloride         allopurinol  300 mg Oral Daily     dexamethasone  8 mg Oral Daily     [START ON 6/9/2020] dextrose 5% water  10-20 mL Intracatheter Daily at 8 pm     [START ON 6/9/2020] dextrose 5% water  10-20 mL Intracatheter Daily at 8 pm     etoposide (TOPOSAR) infusion  100 mg/m2 (Order-Specific) Intravenous Q24H     fenofibrate  135 mg Oral Daily     [START ON 6/9/2020] filgrastim (NEUPOGEN/GRANIX) intravenous  5 mcg/kg (Order-Specific) Intravenous Daily at 8 pm     heparin  5 mL Intravenous Q28 Days      heparin lock flush  5 mL Intravenous Q24H     hydrocortisone  10 mg Oral QPM     hydrocortisone  20 mg Oral QAM AC     ifosfamide (IFEX) infusion  10,000 mg Intravenous Once     insulin aspart  1-10 Units Subcutaneous TID AC     insulin aspart  1-7 Units Subcutaneous At Bedtime     insulin glargine  30 Units Subcutaneous At Bedtime     melatonin  3 mg Oral At Bedtime     rivaroxaban ANTICOAGULANT  20 mg Oral QAM     rosuvastatin  5 mg Oral Daily       Data   Recent Labs   Lab 06/07/20  0600 06/06/20  0600 06/05/20  0830   WBC 4.6 2.5* 4.3   HGB 8.9* 9.4* 10.6*   MCV 78 78 78    208 215    133 135   POTASSIUM 4.1 4.9 4.6   CHLORIDE 106 105 104   CO2 23 21 22   BUN 19 18 24   CR 0.67 0.74 1.13   ANIONGAP 7 7 9   JEFF 8.2* 8.2* 9.5   * 181* 71   ALBUMIN  --   --  2.9*   PROTTOTAL  --   --  7.0   BILITOTAL  --   --  0.4   ALKPHOS  --   --  57   ALT  --   --  25   AST  --   --  39       No results found for this or any previous visit (from the past 24 hour(s)).

## 2020-06-07 NOTE — PROVIDER NOTIFICATION
MD Notification    Notified Person: MD    Notified Person Name: Diana Dacosta    Notification Date/Time: 6/7 1738    Notification Interaction: Phone    Purpose of Notification: FYI received call of critical Ammonia 38, looks like reference range is 10-50?     Orders Received:    Comments:

## 2020-06-07 NOTE — PROVIDER NOTIFICATION
Paged on call hospitalist re: elevated BGM after correction of 292. Spoke to Dr Ward, said give HS lantus now, and correct w/HS sliding scale and recheck blood glucose in 2 hours.

## 2020-06-08 ENCOUNTER — APPOINTMENT (OUTPATIENT)
Dept: SPEECH THERAPY | Facility: CLINIC | Age: 70
DRG: 829 | End: 2020-06-08
Attending: INTERNAL MEDICINE
Payer: COMMERCIAL

## 2020-06-08 ENCOUNTER — HOSPITAL ENCOUNTER (OUTPATIENT)
Dept: NEUROLOGY | Facility: CLINIC | Age: 70
DRG: 829 | End: 2020-06-08
Attending: NURSE PRACTITIONER | Admitting: INTERNAL MEDICINE
Payer: COMMERCIAL

## 2020-06-08 LAB
ANION GAP SERPL CALCULATED.3IONS-SCNC: 5 MMOL/L (ref 3–14)
BASOPHILS # BLD AUTO: 0 10E9/L (ref 0–0.2)
BASOPHILS NFR BLD AUTO: 0 %
BUN SERPL-MCNC: 17 MG/DL (ref 7–30)
CALCIUM SERPL-MCNC: 8.5 MG/DL (ref 8.5–10.1)
CHLORIDE SERPL-SCNC: 105 MMOL/L (ref 94–109)
CO2 SERPL-SCNC: 28 MMOL/L (ref 20–32)
CREAT SERPL-MCNC: 0.62 MG/DL (ref 0.66–1.25)
DIFFERENTIAL METHOD BLD: ABNORMAL
EOSINOPHIL # BLD AUTO: 0 10E9/L (ref 0–0.7)
EOSINOPHIL NFR BLD AUTO: 0 %
ERYTHROCYTE [DISTWIDTH] IN BLOOD BY AUTOMATED COUNT: 19.2 % (ref 10–15)
GFR SERPL CREATININE-BSD FRML MDRD: >90 ML/MIN/{1.73_M2}
GLUCOSE BLDC GLUCOMTR-MCNC: 105 MG/DL (ref 70–99)
GLUCOSE BLDC GLUCOMTR-MCNC: 114 MG/DL (ref 70–99)
GLUCOSE BLDC GLUCOMTR-MCNC: 127 MG/DL (ref 70–99)
GLUCOSE BLDC GLUCOMTR-MCNC: 131 MG/DL (ref 70–99)
GLUCOSE BLDC GLUCOMTR-MCNC: 145 MG/DL (ref 70–99)
GLUCOSE BLDC GLUCOMTR-MCNC: 163 MG/DL (ref 70–99)
GLUCOSE SERPL-MCNC: 147 MG/DL (ref 70–99)
HCT VFR BLD AUTO: 28.3 % (ref 40–53)
HGB BLD-MCNC: 9.1 G/DL (ref 13.3–17.7)
IMM GRANULOCYTES # BLD: 0 10E9/L (ref 0–0.4)
IMM GRANULOCYTES NFR BLD: 0.4 %
LYMPHOCYTES # BLD AUTO: 0.1 10E9/L (ref 0.8–5.3)
LYMPHOCYTES NFR BLD AUTO: 2.6 %
MCH RBC QN AUTO: 24.9 PG (ref 26.5–33)
MCHC RBC AUTO-ENTMCNC: 32.2 G/DL (ref 31.5–36.5)
MCV RBC AUTO: 78 FL (ref 78–100)
MONOCYTES # BLD AUTO: 0.1 10E9/L (ref 0–1.3)
MONOCYTES NFR BLD AUTO: 0.9 %
NEUTROPHILS # BLD AUTO: 5.1 10E9/L (ref 1.6–8.3)
NEUTROPHILS NFR BLD AUTO: 96.1 %
NRBC # BLD AUTO: 0 10*3/UL
NRBC BLD AUTO-RTO: 0 /100
PHOSPHATE SERPL-MCNC: 2.6 MG/DL (ref 2.5–4.5)
PLATELET # BLD AUTO: 193 10E9/L (ref 150–450)
POTASSIUM SERPL-SCNC: 3.5 MMOL/L (ref 3.4–5.3)
RBC # BLD AUTO: 3.65 10E12/L (ref 4.4–5.9)
SODIUM SERPL-SCNC: 138 MMOL/L (ref 133–144)
URATE SERPL-MCNC: 3.1 MG/DL (ref 3.5–7.2)
WBC # BLD AUTO: 5.3 10E9/L (ref 4–11)

## 2020-06-08 PROCEDURE — 25800030 ZZH RX IP 258 OP 636: Performed by: NURSE PRACTITIONER

## 2020-06-08 PROCEDURE — 40000061 EEG ROUTINE

## 2020-06-08 PROCEDURE — 84550 ASSAY OF BLOOD/URIC ACID: CPT | Performed by: INTERNAL MEDICINE

## 2020-06-08 PROCEDURE — 25800030 ZZH RX IP 258 OP 636: Performed by: INTERNAL MEDICINE

## 2020-06-08 PROCEDURE — 25000128 H RX IP 250 OP 636: Performed by: NURSE PRACTITIONER

## 2020-06-08 PROCEDURE — 99233 SBSQ HOSP IP/OBS HIGH 50: CPT | Performed by: INTERNAL MEDICINE

## 2020-06-08 PROCEDURE — 80048 BASIC METABOLIC PNL TOTAL CA: CPT | Performed by: INTERNAL MEDICINE

## 2020-06-08 PROCEDURE — 84100 ASSAY OF PHOSPHORUS: CPT | Performed by: INTERNAL MEDICINE

## 2020-06-08 PROCEDURE — 00000146 ZZHCL STATISTIC GLUCOSE BY METER IP

## 2020-06-08 PROCEDURE — 85025 COMPLETE CBC W/AUTO DIFF WBC: CPT | Performed by: INTERNAL MEDICINE

## 2020-06-08 PROCEDURE — 12000000 ZZH R&B MED SURG/OB

## 2020-06-08 PROCEDURE — 92526 ORAL FUNCTION THERAPY: CPT | Mod: GN | Performed by: SPEECH-LANGUAGE PATHOLOGIST

## 2020-06-08 PROCEDURE — 25000128 H RX IP 250 OP 636: Performed by: INTERNAL MEDICINE

## 2020-06-08 PROCEDURE — 92610 EVALUATE SWALLOWING FUNCTION: CPT | Mod: GN | Performed by: SPEECH-LANGUAGE PATHOLOGIST

## 2020-06-08 PROCEDURE — 25000131 ZZH RX MED GY IP 250 OP 636 PS 637: Performed by: PHYSICIAN ASSISTANT

## 2020-06-08 PROCEDURE — 25000128 H RX IP 250 OP 636: Performed by: PHYSICIAN ASSISTANT

## 2020-06-08 RX ADMIN — METHYLENE BLUE 50 MG: 5 INJECTION INTRAVENOUS at 20:27

## 2020-06-08 RX ADMIN — THIAMINE HYDROCHLORIDE 500 MG: 100 INJECTION, SOLUTION INTRAMUSCULAR; INTRAVENOUS at 01:27

## 2020-06-08 RX ADMIN — HYDROCORTISONE SODIUM SUCCINATE 50 MG: 100 INJECTION, POWDER, FOR SOLUTION INTRAMUSCULAR; INTRAVENOUS at 14:26

## 2020-06-08 RX ADMIN — METHYLENE BLUE 50 MG: 5 INJECTION INTRAVENOUS at 11:40

## 2020-06-08 RX ADMIN — METHYLENE BLUE 50 MG: 5 INJECTION INTRAVENOUS at 04:08

## 2020-06-08 RX ADMIN — HYDROCORTISONE SODIUM SUCCINATE 50 MG: 100 INJECTION, POWDER, FOR SOLUTION INTRAMUSCULAR; INTRAVENOUS at 21:16

## 2020-06-08 RX ADMIN — ENOXAPARIN SODIUM 90 MG: 100 INJECTION SUBCUTANEOUS at 20:26

## 2020-06-08 RX ADMIN — SODIUM CHLORIDE: 9 INJECTION, SOLUTION INTRAVENOUS at 08:37

## 2020-06-08 RX ADMIN — THIAMINE HYDROCHLORIDE 500 MG: 100 INJECTION, SOLUTION INTRAMUSCULAR; INTRAVENOUS at 10:52

## 2020-06-08 RX ADMIN — PROCHLORPERAZINE EDISYLATE 5 MG: 5 INJECTION INTRAMUSCULAR; INTRAVENOUS at 21:12

## 2020-06-08 RX ADMIN — INSULIN GLARGINE 20 UNITS: 100 INJECTION, SOLUTION SUBCUTANEOUS at 21:16

## 2020-06-08 RX ADMIN — HYDROCORTISONE SODIUM SUCCINATE 10 MG: 100 INJECTION, POWDER, FOR SOLUTION INTRAMUSCULAR; INTRAVENOUS at 05:55

## 2020-06-08 RX ADMIN — METHYLENE BLUE 50 MG: 5 INJECTION INTRAVENOUS at 08:41

## 2020-06-08 RX ADMIN — THIAMINE HYDROCHLORIDE 500 MG: 100 INJECTION, SOLUTION INTRAMUSCULAR; INTRAVENOUS at 18:29

## 2020-06-08 RX ADMIN — METHYLENE BLUE 50 MG: 5 INJECTION INTRAVENOUS at 16:31

## 2020-06-08 ASSESSMENT — ACTIVITIES OF DAILY LIVING (ADL)
ADLS_ACUITY_SCORE: 16
ADLS_ACUITY_SCORE: 16
ADLS_ACUITY_SCORE: 20
ADLS_ACUITY_SCORE: 16

## 2020-06-08 ASSESSMENT — MIFFLIN-ST. JEOR: SCORE: 1622.66

## 2020-06-08 NOTE — PLAN OF CARE
Discharge Planner SLP   Patient plan for discharge: Pt unable to state  Current status: A bedside swallow evaluation was completed this pm.  Pt presents with moderate-severe silent aspiration risk at bedside.  Risk factors include decreased attention, decreased ability to follow commands/cues, grimacing with all po trials, delayed swallows, need for multiple swallows, gagging/coughing after ice chips, and increased work of breathing as trials continued.  Pt's right eye was noted to be twitching and right arm was contracted on chest for most of the session.  Discussed with RN.  Note EEG results are pending per RN report.    Recommend continued NPO status and frequent oral cares.    Plan to continue Tx with ongoing assessment to determine safety for po trials and/or instrumental exam.      Pt was only able to state a few automatic speech sounds/words with mod-max cues in repetition.  Pt presents with significant aphasia and decreased ability to respond to questions.  Yes/no response is not reliable.  Will formally evaluate speech-language function if deficits persist.  Barriers to return to prior living situation: Level of assist, communication deficits  Recommendations for discharge: TCU  Rationale for recommendations: TCU if other needs are present; SLP Tx to maximize swallow safety for a least restrictive diet and communication for daily needs.       Entered by: Alicia Khan 06/08/2020 3:05 PM

## 2020-06-08 NOTE — PROVIDER NOTIFICATION
MD Notification    Notified Person: MD    Notified Person Name: Dylan    Notification Date/Time: 6/8/2020; 0902    Notification Interaction: pager    Purpose of Notification:  Pt NPO. Scheduled Xarelto 20mg, Decadron 8 mg and allopurinol 300mg. Please advise for alternatives.    Orders Received: no new orders recieved    Comments:

## 2020-06-08 NOTE — PROVIDER NOTIFICATION
MD Notification    Notified Person: MD    Notified Person Name: Dr. Rcihardson    Notification Date/Time: 6/8/2020 at 1650    Notification Interaction: pager    Purpose of Notification: Pt's R eye was noted to be twitching and right arm noted to be contracted on chest this evening.  EEG completed, results pending. Please let me know if new orders/interventions are needed.     Orders Received: Per Dr. Richardson, Order to call Oncology and request Neurology consult.    Comments:

## 2020-06-08 NOTE — PROGRESS NOTES
[facility] Progress Note     Primary Oncologist/Hematologist:  Whitney          Assessment and Plan:      Underlined comments represent new interventions today.      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  --s/p RCHOP   -- He responded well to the chemotherapy with a near complete remission after six cycles. Maintenance rituximab was started in June 2015 and completed in February 2017.   --On 11/17/17 the patient underwent a thoracotomy for an enlarging right infrahilar mass. 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 The tumor was handled as a carcinoma but the pathology is most consistent with recurrent grade 2-3 follicular lymphoma.   --On 12/14/17 he began bendamustine with rituximab chemotherapy.   --He completed 6 cycles of BR and on 6/4/18 a CT revealed stable mild lymphadenopathy with mild splenomegaly and a slight increase in the IVC thrombus.   --A CT 6/3/19 revealed an increase in the right hilar and pretracheal adenopathy with stable nodular scarring of the right lung base, without other new disease. A PET/CT 6/13/19 confirmed the hypermetabolic right infrahilar mass and revealed moderate metabolic activity within a few small nodules just deep to tire pleura in the posterior aspect of tire mid right  hemithorax, suspicious for neoplasm, without other disease.   --As all disease could be encompassed in one radiation field, he completed 3000 cGy of radiotherapy to the right thoracic region 6/27/19 - 7/18/19.  --CT C/A/P 5/14/20 revealed marked increase in the right lower lobe pulmonary mass with new subcarinal lymphadenopathy, bilateral new adrenal masses, and soft tissue tumor deposits in the abdomen  --CT guided adrenal biopsy 5/27/20  revealed transformation to a diffuse large B cell lymphoma, germinal center type, 100% Ki-67 positive,  with positive immunohistochemical staining for BCL-2, BCL-6, and C-MYC suggestive of a possible triple-hit lymphoma.  - Started on R ICE 6/5/2020  - chemo now on hold for acute global aphasia beginning yesterday (see below)  - hold allopurinol and dex (chemo premed) due to NPO.  - Continue to monitor TLS labs.       Acute onset aphasia  - began on 6/7/20. Had been awake and conversant.  Awakened from nap with global aphasia  - code stroke and RRT called.   - CT and head/neck CTA negative. Brain MRI negative.  - Neuro consulted and following  - EEG this morn. Interpretation pending.   - encephalopathy vs delirium vs leukoencephalopathy from IFEX.  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 He is tolerating anticoagulation with Lovenox well. We discussed alternative anticoagulants such as Coumadin (less effective) or DOAC and as he has completed chemotherapy with a good response of his malignancy in 6/2018 he was switched to Xarelto.   --A vascular surgery consultation for the persistent IVC thrombus concurred with anticoagulation for 6-12 months or longer without other intervention.   --We plan to continue the anticoagulation for now, if not indefinitely with the underlying malignancy and persistent thrombus.   --Anticoagulation will be held if thrombocytopenia develops and the platelets drop below 50,000.-  - will switch back to lovenox given NPO status.     Diabetes  - managed by hospitalist team    Possible adrenal insufficiency  --Large bilateral adrenal masses with mild hypotension, nausea, vomiting, and hyperkalemia  --Normal cortisol level and ACTH elevated at 140  --Began empiric hydrocortisone 20 mg q 8 AM and 10 mg every 4 p.m.  --Endocrinology follow-up       Anemia  --Mild  "chemotherapy and neoplasm induced anemia  --5/20/20 iron levels consistent with chronic disease  --will be monitored.     -Chronic Kidney Disease  -Mild renal insufficiency will be monitored.     LFT  --Mild liver function and alkaline phosphatase elevations, thought to be related to Crestor or a fatty liver  --resolved  --monitor     Depression  --Reactive, supportive care encouraged    DVT Prophylaxis: full dose anticoagulation  Code Status: Full Code            Interval History:   Answers yes or no questions. Says \"no\" to pain. Squeezes my hands but follows no other commands.  Champion placed toady with 1500 ml UOP              Review of Systems:   The 5 point Review of Systems is negative other than noted in the HPI             Medications:   Scheduled Medications    allopurinol  300 mg Oral Daily     dexamethasone  8 mg Oral Daily     [START ON 6/9/2020] dextrose 5% water  10-20 mL Intracatheter Daily at 8 pm     [START ON 6/9/2020] dextrose 5% water  10-20 mL Intracatheter Daily at 8 pm     etoposide (TOPOSAR) infusion  100 mg/m2 (Order-Specific) Intravenous Q24H     fenofibrate  135 mg Oral Daily     [START ON 6/9/2020] filgrastim (NEUPOGEN/GRANIX) intravenous  5 mcg/kg (Order-Specific) Intravenous Daily at 8 pm     heparin  5 mL Intravenous Q28 Days     heparin lock flush  5 mL Intravenous Q24H     hydrocortisone sodium succinate PF  10 mg Intravenous Q8H     insulin aspart  1-12 Units Subcutaneous Q4H     insulin glargine  20 Units Subcutaneous At Bedtime     melatonin  3 mg Oral At Bedtime     zz extemporaneous template  50 mg Intravenous Q4H     rivaroxaban ANTICOAGULANT  20 mg Oral QAM     rosuvastatin  5 mg Oral Daily     thiamine  500 mg Intravenous Q8H     PRN Medications  acetaminophen, albuterol, albuterol, sore throat lozenge, bisacodyl, glucose **OR** dextrose **OR** glucagon, diphenhydrAMINE, diphenhydrAMINE, EPINEPHrine, flumazenil, LORazepam, LORazepam, - MEDICATION INSTRUCTIONS -, - MEDICATION " "INSTRUCTIONS -, - MEDICATION INSTRUCTIONS -, meperidine, methylPREDNISolone, naloxone, polyethylene glycol, prochlorperazine, prochlorperazine, sennosides, sodium chloride (PF), sodium chloride 0.9%, sodium chloride               Physical Exam:   Vitals were reviewed  Blood pressure 123/66, pulse 91, temperature 96.6  F (35.9  C), temperature source Oral, resp. rate 16, height 1.727 m (5' 8\"), weight 87.8 kg (193 lb 9.6 oz), SpO2 95 %.  Wt Readings from Last 4 Encounters:   06/08/20 87.8 kg (193 lb 9.6 oz)   05/27/20 90.7 kg (200 lb)   05/22/20 91.6 kg (202 lb)   05/14/20 93.4 kg (206 lb)       I/O last 3 completed shifts:  In: 1602 [I.V.:1602]  Out: 2875 [Urine:2875]      Constitutional: Awake, alert, cooperative, no apparent distress   Lungs: Clear to auscultation bilaterally, no crackles or wheezing   Cardiovascular: Regular rate and rhythm, normal S1 and S2, and no murmur noted   Abdomen: Normal bowel sounds, soft, non-distended, non-tender   Skin: No rashes, no cyanosis, no edema   Other:               Data:   All laboratory data and imaging studies reviewed.    CMP  Recent Labs   Lab 06/08/20  0600 06/07/20  1650 06/07/20  0600 06/06/20  0600 06/05/20  0830    137 136 133 135   POTASSIUM 3.5 3.6 4.1 4.9 4.6   CHLORIDE 105 106 106 105 104   CO2 28 25 23 21 22   ANIONGAP 5 6 7 7 9   * 230* 211* 181* 71   BUN 17 16 19 18 24   CR 0.62* 0.68 0.67 0.74 1.13   GFRESTIMATED >90 >90 >90 >90 66   GFRESTBLACK >90 >90 >90 >90 77   JEFF 8.5 8.1* 8.2* 8.2* 9.5   PHOS 2.6  --  3.0 4.0 4.1   PROTTOTAL  --  5.7*  --   --  7.0   ALBUMIN  --  2.3*  --   --  2.9*   BILITOTAL  --  0.3  --   --  0.4   ALKPHOS  --  44  --   --  57   AST  --  29  --   --  39   ALT  --  20  --   --  25     CBC  Recent Labs   Lab 06/08/20  0600 06/07/20  1650 06/07/20  0600 06/06/20  0600   WBC 5.3 5.5 4.6 2.5*   RBC 3.65* 3.70* 3.60* 3.85*   HGB 9.1* 9.2* 8.9* 9.4*   HCT 28.3* 28.7* 27.9* 30.1*   MCV 78 78 78 78   MCH 24.9* 24.9* 24.7* " 24.4*   MCHC 32.2 32.1 31.9 31.2*   RDW 19.2* 19.1* 18.9* 18.8*    184 181 208     INRNo lab results found in last 7 days.        Antwon Rosales CNP  Nurse Practitioner  [facility]  [phone]

## 2020-06-08 NOTE — PLAN OF CARE
SATURNINO orientation, opens eyes to voice, able to respond to yes or ok to some questions, otherwise does not answer and is unable to follow commands. Does not show any signs or symptoms of pain. Due to pt's inability to make needs known and not able to stand, got an order for ramirez since pt is under chemo precautions. Movements were stiff, but appeared much more relaxed once ramirez placed, had 1500 mL out w/in 20 minutes of placement. VSS are stable, on room air and cont pulse ox on. Is NPO. Getting IVF through port, good BR noted, and intermittent IV meds through PIV on R. Q 4 hour blood glucose check, coverage per sliding scale insulin. Urine is blue/green tinged 2nd to IV med. Home cpap on overnight. Chemo currently on hold until improvement in mental status.

## 2020-06-08 NOTE — PROGRESS NOTES
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  --s/p RCHOP   -- He responded well to the chemotherapy with a near complete remission after six cycles. Maintenance rituximab was started in June 2015 and completed in February 2017.   --On 11/17/17 the patient underwent a thoracotomy for an enlarging right infrahilar mass. 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 The tumor was handled as a carcinoma but the pathology is most consistent with recurrent grade 2-3 follicular lymphoma.   --On 12/14/17 he began bendamustine with rituximab chemotherapy.   --He completed 6 cycles of BR and on 6/4/18 a CT revealed stable mild lymphadenopathy with mild splenomegaly and a slight increase in the IVC thrombus.   --A CT 6/3/19 revealed an increase in the right hilar and pretracheal adenopathy with stable nodular scarring of the right lung base, without other new disease. A PET/CT 6/13/19 confirmed the hypermetabolic right infrahilar mass and revealed moderate metabolic activity within a few small nodules just deep to tire pleura in the posterior aspect of tire mid right  hemithorax, suspicious for neoplasm, without other disease.   --As all disease could be encompassed in one radiation field, he completed 3000 cGy of radiotherapy to the right thoracic region 6/27/19 - 7/18/19.  --CT C/A/P 5/14/20 revealed marked increase in the right lower lobe pulmonary mass with new subcarinal lymphadenopathy, bilateral new adrenal masses, and soft tissue tumor deposits in the abdomen  --CT guided adrenal biopsy 5/27/20 revealed transformation to a diffuse large B cell lymphoma, germinal  center type, 100% Ki-67 positive,  with positive immunohistochemical staining for BCL-2, BCL-6, and C-MYC suggestive of a possible triple-hit lymphoma.  - Started on R ICE 6/5/2020  - chemo now helld for encephalopathy 6/7/20  -begin Neupogen 6/9/20  - hold allopurinol and dex (chemo premed) due to NPO.  - Continue to monitor TLS labs.         Encephalopathy/Delerium  - began on 6/7/20. Had been awake and conversant.  Awakened from nap with global aphasia  - code stroke and RRT called.   - 6/7/20 CT and head/neck CTA negative. Brain MRI negative.  - Neuro consulted and following  --EEG 6/8/20 pending  - encephalopathy vs delirium vs leukoencephalopathy from chemotherapy, most likely due to ifosfamide   --Has received high-dose IV thiamine   --continue methylene blue     Hypercoagulable state  - On 2/1/18 he was diagnosed with an IVC thrombus, 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 He is tolerating anticoagulation with Lovenox well. We discussed alternative anticoagulants such as Coumadin (less effective) or DOAC and as he has completed chemotherapy with a good response of his malignancy in 6/2018 he was switched to Xarelto.   --A vascular surgery consultation for the persistent IVC thrombus concurred with anticoagulation for 6-12 months or longer without other intervention.   --We plan to continue the anticoagulation for now, if not indefinitely with the underlying malignancy and persistent thrombus.   --Anticoagulation will be held if thrombocytopenia develops and the platelets drop below 50,000.-  - will switch back to lovenox given NPO status.      Diabetes  - managed by hospitalist team     Possible adrenal insufficiency  --Large bilateral adrenal masses with mild hypotension, nausea, vomiting, and hyperkalemia  --Normal cortisol level and ACTH elevated at 140  --Began empiric PO hydrocortisone 20 mg q 8 AM and 10 mg every 4 p.m.  --IV steroids per hospitalist until able to take PO        Anemia  --Mild chemotherapy and  neoplasm induced anemia  --5/20/20 iron levels consistent with chronic disease  --will be monitored.     -Chronic Kidney Disease  -Mild renal insufficiency will be monitored.     LFT  --Mild liver function and alkaline phosphatase elevations, thought to be related to Crestor or a fatty liver  --resolved  --monitor     Depression  --Reactive, supportive care encouraged    Right Foot Wound  --s/p toe amputation  --daily dressing changes    Urology  --urinary catheter in place with incontinence    I discussed with his wife Toshia by phone this PM  Appreciate hospitalist care!!    DVT Prophylaxis: Enoxaparin (Lovenox) SQ  Code Status: Full Code    Mike Olson    Interval History   Confusion stable    Physical Exam   Temp: 96.6  F (35.9  C) Temp src: Oral BP: 123/66   Heart Rate: 83 Resp: 16 SpO2: 95 % O2 Device: None (Room air)    Vitals:    06/05/20 0811 06/08/20 0557   Weight: 85.5 kg (188 lb 9.6 oz) 87.8 kg (193 lb 9.6 oz)     Vital Signs with Ranges  Temp:  [95.7  F (35.4  C)-98  F (36.7  C)] 96.6  F (35.9  C)  Heart Rate:  [76-88] 83  Resp:  [16] 16  BP: (123-139)/(64-69) 123/66  SpO2:  [95 %-98 %] 95 %  I/O last 3 completed shifts:  In: 1602 [I.V.:1602]  Out: 2875 [Urine:2875]    Constitutional: awake, no acute apparent distress, confused  Neuro: not oriented, moves all extremities  Hematologic / Lymphatic: no cervical lymphadenopathy  GI: soft, non-distended, non-tender, no masses palpated, no hepatosplenomegally  Skin: no bruising or bleeding  Musculoskeletal: no pedal edema    Medications     - MEDICATION INSTRUCTIONS -       - MEDICATION INSTRUCTIONS -       - MEDICATION INSTRUCTIONS -       sodium chloride 0.9%       sodium chloride 75 mL/hr at 06/08/20 0837     sodium chloride         [START ON 6/9/2020] dextrose 5% water  10-20 mL Intracatheter Daily at 8 pm     [START ON 6/9/2020] dextrose 5% water  10-20 mL Intracatheter Daily at 8 pm     enoxaparin ANTICOAGULANT  1 mg/kg Subcutaneous Q12H      fenofibrate  135 mg Oral Daily     [START ON 6/9/2020] filgrastim (NEUPOGEN/GRANIX) intravenous  5 mcg/kg (Order-Specific) Intravenous Daily at 8 pm     heparin  5 mL Intravenous Q28 Days     heparin lock flush  5 mL Intravenous Q24H     hydrocortisone sodium succinate PF  50 mg Intravenous Q8H     insulin aspart  1-12 Units Subcutaneous Q4H     insulin glargine  20 Units Subcutaneous At Bedtime     melatonin  3 mg Oral At Bedtime     zz extemporaneous template  50 mg Intravenous Q4H     rosuvastatin  5 mg Oral Daily     thiamine  500 mg Intravenous Q8H       Data   Results for orders placed or performed during the hospital encounter of 06/05/20 (from the past 24 hour(s))   CT Head w/o Contrast    Narrative    CT OF THE HEAD WITHOUT CONTRAST 6/7/2020 1:13 PM     COMPARISON: None.    HISTORY: Code Stroke, aphasia.    TECHNIQUE: 5 mm thick axial CT images of the head were acquired  without IV contrast material.    FINDINGS:  There is moderate diffuse cerebral volume loss. There are  subtle patchy areas of decreased density in the cerebral white matter  bilaterally that are consistent with sequela of chronic small vessel  ischemic disease.     The ventricles and basal cisterns are within normal limits in  configuration given the degree of cerebral volume loss.  There is no  midline shift. There are no extra-axial fluid collections.     No intracranial hemorrhage, mass or recent infarct.    The visualized paranasal sinuses are well-aerated. There is no  mastoiditis. There are no fractures of the visualized bones.       Impression    IMPRESSION:  Diffuse cerebral volume loss and cerebral white matter  changes consistent with chronic small vessel ischemic disease. No  evidence for acute intracranial pathology.        Radiation dose for this scan was reduced using automated exposure  control, adjustment of the mA and/or kV according to patient size, or  iterative reconstruction technique.    TYLOR PADILLA MD   CTA Head  Neck with Contrast    Narrative    CT ANGIOGRAM OF THE HEAD AND NECK WITHOUT AND WITH CONTRAST  6/7/2020  1:17 PM     COMPARISON: None    HISTORY: Aphasia.    TECHNIQUE:  Precontrast localizing scans were followed by CT  angiography with an injection of 70mL Isovue-370 nonionic intravenous  contrast material with scans through the head and neck.  Images were  transferred to a separate 3-D workstation where multiplanar  reformations and 3-D images were created.  Estimates of carotid  stenoses are made relative to the distal internal carotid artery  diameters except as noted.      FINDINGS: Incidental note is made of airspace opacity in the  visualized portion of the right lower lung, possibly representing  pneumonia.    Neck CTA: The bilateral common carotid, bilateral cervical internal  carotid and bilateral vertebral arteries are patent without vascular  narrowing.    Head CTA: The basilar, bilateral intracranial distal internal carotid,  bilateral anterior cerebral, bilateral middle cerebral and bilateral  posterior cerebral arteries are patent and unremarkable. The anterior  communicating and right posterior communicating arteries are patent.      Impression    IMPRESSION: Normal neck and head CTA.      Radiation dose for this scan was reduced using automated exposure  control, adjustment of the mA and/or kV according to patient size, or  iterative reconstruction technique.    TYLOR PADILLA MD   CT Head Perfusion w Contrast    Narrative    CT BRAIN PERFUSION 6/7/2020 1:27 PM    COMPARISON: None    HISTORY: Aphasia.    TECHNIQUE: Time sequential axial CT images of the head were acquired  during the administration of intravenous contrast (50 mL Isovue-370).  CTA images of the [place] as well as color perfusion maps of  the brain were created from this time sequential axial source data.    FINDINGS: There are no focal or regional perfusion defects in the  brain.      Impression    IMPRESSION: Normal CT  perfusion of the brain.      Radiation dose for this scan was reduced using automated exposure  control, adjustment of the mA and/or kV according to patient size, or  iterative reconstruction technique.    TYLOR PADILLA MD   Neurology IP Consult: Stroke (potential or actual); Patient to be seen: Routine - within 24 hours; aphasia, code stroke; Consultant may enter orders: Yes; Requesting provider? Hospitalist (if different from attending physician); Name: Diana YSABEL [id number]...    Narrative    Hubert Crowder MD     6/7/2020  4:18 PM    [facility]    Stroke Consult Note    Reason for Consult: encephalopathy    Chief Complaint: No chief complaint on file.      HPI  70M hx of B cell lymphoma, CKD, hypercoagulable state with IVC   filter on xarelto, DM who is admitted for chemotherapy with RICE   who had stroke code called for onset of muteness. The patient was   admitted for RICE chemotherapy, which was begun on 6/5. He was   doing well until today around 0840 when he was noted to be   encephalopathic, not speaking but attending. CT/CTA/CTP done   which were all unremarkable for an acute stroke cause. The   patient was then sent to MRI which showed no acute stroke. He was   previously on lovenox injections but was switched to xarelto   after a chemotherapy regimen.     TPA Treatment   Not given due to stroke mimic: encephalopathy.    Endovascular Treatment  Not initiated due to absence of proximal vessel occlusion  ______________________________________________________    Past Medical History   Past Medical History:   Diagnosis Date     Abnormal liver function test      CPAP (continuous positive airway pressure) dependence      Diabetes (H)      Hx of skin cancer, basal cell      Hyperlipidemia      Hypertension      Keratoderma      Liver disease      Lymphoma (H)      Non-Hodgkin lymphoma (H)      Pedal edema     CHRONIC     Pleural effusion      Seborrheic keratosis, inflamed      Sleep apnea       Thrombosis Felbruary 2018     Past Surgical History   Past Surgical History:   Procedure Laterality Date     AMPUTATE TOE(S) Left 5/22/2020    Procedure: LEFT SECOND AND THIRD DISTAL TOE AMPUTATIONS;    Surgeon: Ramon Flores MD;  Location:  OR     BIOPSY LYMPH NODE CERVICAL N/A 12/5/2014    Procedure: BIOPSY LYMPH NODE CERVICAL;  Surgeon: Robbie Linda MD;  Location:  OR     BRONCHOSCOPY FLEXIBLE N/A 11/14/2017    Procedure: BRONCHOSCOPY FLEXIBLE;  FLEXIBLE BRONCHOSCOPY WITH   BIOPSIES.;  Surgeon: Robbie Linda MD;  Location:    OR     COLONOSCOPY       COLONOSCOPY N/A 5/9/2018    Procedure: COMBINED COLONOSCOPY, SINGLE OR MULTIPLE   BIOPSY/POLYPECTOMY BY BIOPSY;;  Surgeon: Ramos Bates MD;    Location:  GI     ENDOVASCULAR PLACEMENT VASCULAR DEVICE Left 12/5/2017    Procedure: ENDOVASCULAR PLACEMENT VASCULAR DEVICE;;  Surgeon:   Robbie Linda MD;  Location: [facility]     ENT SURGERY      tonsillectomy     EXCISE NODE MEDIASTINAL N/A 11/17/2017    Procedure: EXCISE NODE MEDIASTINAL;;  Surgeon: Robbie Linda MD;  Location:  OR     EYE SURGERY       EYE SURGERY Left     vitrectomy     INSERT PORT VASCULAR ACCESS N/A 12/5/2014    Procedure: INSERT PORT VASCULAR ACCESS;  Surgeon: Robbie Linda MD;  Location:  OR     INSERT PORT VASCULAR ACCESS N/A 12/5/2017    Procedure: INSERT PORT VASCULAR ACCESS;  POWER PORT PLACEMENT   ATTEMPTED, PLACEMENT OF ANGIO-SEAL VIP VASCULAR CLOSURE DEVICE;    Surgeon: Robbie Linda MD;  Location: [facility]     IR CHEST PORT PLACEMENT > 5 YRS OF AGE  6/5/2020     IR PORT REMOVAL RIGHT  12/10/2018     PHACOEMULSIFICATION CLEAR CORNEA WITH STANDARD INTRAOCULAR LENS   IMPLANT Right 5/2/2016    Procedure: PHACOEMULSIFICATION CLEAR CORNEA WITH STANDARD   INTRAOCULAR LENS IMPLANT;  Surgeon: Rasheed Finney MD;    Location:  [facility]     REMOVE PORT VASCULAR ACCESS N/A 3/14/2017    Procedure: REMOVE PORT  VASCULAR ACCESS;  Surgeon: Robbie Linda MD;  Location:  OR     SOFT TISSUE SURGERY      BASAL CELL CA FOREHEAD     THORACOTOMY Right 11/17/2017    Procedure: THORACOTOMY;  RIGHT EXPLORATORY THORACOTOMY/   EXTENSIVE PNEUMOLYSIS/ BIOPSY OF INTERLOBAR LYMPH NODE;  Surgeon:   Robbie Linda MD;  Location:  OR     Medications   Home Meds  Prior to Admission medications    Medication Sig Start Date End Date Taking? Authorizing Provider   acetaminophen (TYLENOL) 325 MG tablet Take 650-975 mg by mouth   every 12 hours as needed for mild pain   Yes Unknown, Entered By   History   allopurinol (ZYLOPRIM) 300 MG tablet Take 300 mg by mouth daily     Yes Unknown, Entered By History   diphenhydrAMINE-acetaminophen (TYLENOL PM)  MG tablet Take   2-3 tablets by mouth At Bedtime   Yes Unknown, Entered By History     fenofibrate (TRICOR) 145 MG tablet Take 145 mg by mouth daily     Yes Unknown, Entered By History   hydrocortisone (CORTEF) 10 MG tablet Take 10 mg by mouth every   evening   Yes Unknown, Entered By History   hydrocortisone (CORTEF) 10 MG tablet Take 20 mg by mouth daily   before breakfast    Yes Reported, Patient   insulin aspart (NOVOLOG FLEXPEN) 100 UNIT/ML pen Inject   Subcutaneous 3 times daily (with meals) Patient uses sliding   scale based on Carbs and Blood Glucose. Has been using very   little lately due to low readings and low appetite.   Yes   Reported, Patient   insulin glargine (LANTUS VIAL) 100 UNIT/ML soln Inject 30 Units   Subcutaneous At Bedtime    Yes Reported, Patient   metFORMIN (GLUCOPHAGE) 500 MG tablet Take 1,000 mg by mouth daily   (with breakfast)   Yes Unknown, Entered By History   metFORMIN (GLUCOPHAGE) 500 MG tablet Take 500 mg by mouth daily   (with dinner)   Yes Unknown, Entered By History   potassium chloride ER (KLOR-CON M) 20 MEQ CR tablet Take 20 mEq   by mouth daily   Yes Unknown, Entered By History   rosuvastatin (CRESTOR) 10 MG tablet Take 5 mg by  mouth daily     Yes Unknown, Entered By History   XARELTO ANTICOAGULANT 20 MG TABS tablet Take 20 mg by mouth daily   Takes in the am 4/29/20  Yes Reported, Patient   order for DME Kyle 1 packet twice a day for the next month   5/8/20   Hubert Carpenter MD       Scheduled Meds    allopurinol  300 mg Oral Daily     dexamethasone  8 mg Oral Daily     [START ON 6/9/2020] dextrose 5% water  10-20 mL Intracatheter   Daily at 8 pm     [START ON 6/9/2020] dextrose 5% water  10-20 mL Intracatheter   Daily at 8 pm     etoposide (TOPOSAR) infusion  100 mg/m2 (Order-Specific)   Intravenous Q24H     fenofibrate  135 mg Oral Daily     [START ON 6/9/2020] filgrastim (NEUPOGEN/GRANIX) intravenous  5   mcg/kg (Order-Specific) Intravenous Daily at 8 pm     heparin  5 mL Intravenous Q28 Days     heparin lock flush  5 mL Intravenous Q24H     hydrocortisone  10 mg Oral QPM     hydrocortisone  20 mg Oral QAM AC     insulin aspart  1-12 Units Subcutaneous Q4H     insulin glargine  20 Units Subcutaneous At Bedtime     melatonin  3 mg Oral At Bedtime     rivaroxaban ANTICOAGULANT  20 mg Oral QAM     rosuvastatin  5 mg Oral Daily       Infusion Meds    - MEDICATION INSTRUCTIONS -       - MEDICATION INSTRUCTIONS -       - MEDICATION INSTRUCTIONS -       sodium chloride 0.9%       sodium chloride 75 mL/hr at 06/07/20 0055     sodium chloride         PRN Meds  acetaminophen, albuterol, albuterol, sore throat lozenge,   bisacodyl, glucose **OR** dextrose **OR** glucagon,   diphenhydrAMINE, diphenhydrAMINE, EPINEPHrine, flumazenil,   LORazepam, LORazepam, - MEDICATION INSTRUCTIONS -, - MEDICATION   INSTRUCTIONS -, - MEDICATION INSTRUCTIONS -, meperidine,   methylPREDNISolone, naloxone, polyethylene glycol,   prochlorperazine, prochlorperazine, sennosides, sodium chloride   (PF), sodium chloride 0.9%, sodium chloride    Allergies   No Known Allergies  Family History   No family history on file.  Social History   Social History     Tobacco Use      Smoking status: Never Smoker     Smokeless tobacco: Never Used   Substance Use Topics     Alcohol use: No     Drug use: No       Review of Systems   The 10 point Review of Systems is negative other than noted in   the HPI or here.        PHYSICAL EXAMINATION  Temp:  [96  F (35.6  C)-98.3  F (36.8  C)] 96  F (35.6  C)  Heart Rate:  [70-83] 82  Resp:  [16] 16  BP: (102-131)/(53-69) 131/65  SpO2:  [95 %-97 %] 97 %       Mental status: eyes open and looks around, makes eye contact, but   does not speak or follow commands.   Cranial nerves: pupils equal, eyes look around well. No   participation in exam further  Motor: all 4x limbs drift equally to the bed.  Sensory: unable to assess  Coordination: defer  Gait: defer      Dysphagia Screen  Passed screening, no dysarthria - Regular Diet with thin liquids    06/07/2020 1600      Stroke Scales    NIHSS  Interval baseline (06/07/20 1606)   Interval Comments     1a. Level of Consciousness 2-->Not alert, requires repeated   stimulation to attend, or is obtunded and requires strong or   painful stimulation to make movements (not stereotyped)   1b. LOC Questions 2-->Answers neither question correctly   1c. LOC Commands 2-->Performs neither task correctly   2.   Best Gaze 0-->Normal   3.   Visual 0-->No visual loss   4.   Facial Palsy 3-->Complete paralysis of one or both sides   (absence of facial movement in the upper and lower face)   5a. Motor Arm, Left 2-->Some effort against gravity, limb cannot   get to or maintain (if cued) 90 (or 45) degrees, drifts down to   bed, but has some effort against gravity   5b. Motor Arm, Right 2-->Some effort against gravity, limb cannot   get to or maintain (if cued) 90 (or 45) degrees, drifts down to   bed, but has some effort against gravity   6a. Motor Leg, Left 2-->Some effort against gravity, leg falls to   bed by 5 secs, but has some effort against gravity   6b. Motor Leg, right 2-->Some effort against gravity, leg falls   to bed  by 5 secs, but has some effort against gravity   7.   Limb Ataxia 0-->Absent   8.   Sensory 0-->Normal, no sensory loss   9.   Best Language 3-->Mute, global aphasia, no usable speech or   auditory comprehension   10. Dysarthria 0-->Normal   11. Extinction and Inattention  0-->No abnormality   Total 20 (06/07/20 1606)       Imaging  I personally reviewed all imaging; relevant findings per HPI.     Lab Results CBC  Recent Labs   Lab 06/07/20  0600 06/06/20  0600 06/05/20  0830   WBC 4.6 2.5* 4.3   RBC 3.60* 3.85* 4.33*   HGB 8.9* 9.4* 10.6*   HCT 27.9* 30.1* 33.8*    208 215     Basic Metabolic Panel    Recent Labs   Lab 06/07/20  0600 06/06/20  0600 06/05/20  0830    133 135   POTASSIUM 4.1 4.9 4.6   CHLORIDE 106 105 104   CO2 23 21 22   BUN 19 18 24   CR 0.67 0.74 1.13   * 181* 71   JEFF 8.2* 8.2* 9.5     Liver Panel  Recent Labs   Lab Test 06/05/20  0830   PROTTOTAL 7.0   ALBUMIN 2.9*   BILITOTAL 0.4   ALKPHOS 57   AST 39   ALT 25     INR  Recent Labs   Lab Test 05/27/20  0800 12/10/18  1015 11/14/17  1005   INR 1.22* 0.99 0.92      Lipid ProfileNo lab results found.  A1C  Recent Labs   Lab Test 05/22/20  0911   A1C 7.3*     Troponin Shandra results for input(s): TROPI in the last 168 hours.    =================================================================    ASSESSMENT/PLAN: 70M hx of B cell lymphoma, CKD, hypercoagulable   state with IVC filter on xarelto, DM who is admitted for   chemotherapy with RICE who had stroke code called for onset of   muteness.    ## acute onset of encephalopathy: seen as complete absence of   speech production and mostly lacking in comprehension while awake   and otherwise alert. MRI was done showing no stroke or other   acute concerning findings. His symptoms are most likely due to   encephalopathy triggered by RICE chemotherapy begun on 6/5,   namely Ifosfamide. Typically, neurotoxicity with Ifosfamide   improves over 2-3 days. Will defer to primary oncology team  "to   weigh risk/benefit of continuing therapy. Stroke team to sign   off, no further workup recommended. If further neurologic workup   needed, consult general neurology. Please call if further   questions.    =================================================================    Tommie Crowder  Vascular Neurology Fellow    06/07/2020 4:15 PM  Text Page (8am-7pm)  To page stroke neurology after hours or on a subsequent day,   click here: [facility]  Choose \"On Call\" tab at top, then search dropdown box for   \"Neurology Adult\" & press Enter, look for Neuro ICU/Stroke      Stroke Code / Stroke Consult Data Data   Stroke Code Data  (for stroke code with tele)  Stroke code activated 06/07/20   1251   First stroke provider response         Video start time 06/07/20   1600   Video end time 06/07/20   1610   Last known normal         Time of discovery  (or onset of symptoms)          Head CT read by me 06/07/20   1300   Was stroke code de-escalated? Yes 06/07/20 1315  symptoms not likely caused by stroke        Telestroke Service Details  Type of service telemedicine diagnostic assessment of acute   neurological changes   Reason telemedicine is appropriate patient requires assessment   with a specialist for diagnosis and treatment of neurological   symptoms   Mode of transmission secure interactive audio and video   communication swapnil Rodriguez   Originating site (patient location) [facility]    Distant site (provider location) [facility]          MR Brain w/o & w Contrast    Narrative    MRI BRAIN WITHOUT AND WITH CONTRAST  6/7/2020 3:20 PM    HISTORY: Assess for leukoencephalopathy. Has aphasia.     TECHNIQUE: Multiplanar, multisequence MRI of the brain without and  with    COMPARISON: None.    FINDINGS: Diffusion-weighted images are normal. There is no evidence  for intracranial hemorrhage or acute infarct. Exam is mildly limited  due to motion artifact. There is some mild cerebral atrophy. " Brain  parenchyma is otherwise normal. Postcontrast images do not show any  abnormal areas of enhancement or any focal mass lesions. Vascular  structures are patent at the skull base.      Impression    IMPRESSION:  1. Mild cerebral atrophy.  2. No evidence for intracranial hemorrhage, acute infarct, or any  focal mass lesions.  3. Exam mildly limited by motion artifact.    LOUIE PORRAS MD   Glucose by meter   Result Value Ref Range    Glucose 216 (H) 70 - 99 mg/dL   Comprehensive metabolic panel   Result Value Ref Range    Sodium 137 133 - 144 mmol/L    Potassium 3.6 3.4 - 5.3 mmol/L    Chloride 106 94 - 109 mmol/L    Carbon Dioxide 25 20 - 32 mmol/L    Anion Gap 6 3 - 14 mmol/L    Glucose 230 (H) 70 - 99 mg/dL    Urea Nitrogen 16 7 - 30 mg/dL    Creatinine 0.68 0.66 - 1.25 mg/dL    GFR Estimate >90 >60 mL/min/[1.73_m2]    GFR Estimate If Black >90 >60 mL/min/[1.73_m2]    Calcium 8.1 (L) 8.5 - 10.1 mg/dL    Bilirubin Total 0.3 0.2 - 1.3 mg/dL    Albumin 2.3 (L) 3.4 - 5.0 g/dL    Protein Total 5.7 (L) 6.8 - 8.8 g/dL    Alkaline Phosphatase 44 40 - 150 U/L    ALT 20 0 - 70 U/L    AST 29 0 - 45 U/L   Lactic acid whole blood   Result Value Ref Range    Lactic Acid 1.3 0.7 - 2.0 mmol/L   Procalcitonin   Result Value Ref Range    Procalcitonin 0.10 ng/ml   CBC with platelets   Result Value Ref Range    WBC 5.5 4.0 - 11.0 10e9/L    RBC Count 3.70 (L) 4.4 - 5.9 10e12/L    Hemoglobin 9.2 (L) 13.3 - 17.7 g/dL    Hematocrit 28.7 (L) 40.0 - 53.0 %    MCV 78 78 - 100 fl    MCH 24.9 (L) 26.5 - 33.0 pg    MCHC 32.1 31.5 - 36.5 g/dL    RDW 19.1 (H) 10.0 - 15.0 %    Platelet Count 184 150 - 450 10e9/L   Ammonia   Result Value Ref Range    Ammonia 38 10 - 50 umol/L   Blood gas venous with oxyhemoglobin   Result Value Ref Range    Ph Venous 7.43 7.32 - 7.43 pH    PCO2 Venous 39 (L) 40 - 50 mm Hg    PO2 Venous 36 25 - 47 mm Hg    Bicarbonate Venous 26 21 - 28 mmol/L    Oxyhemoglobin Venous 67 %    Base Excess Venous 1.7 mmol/L    Glucose by meter   Result Value Ref Range    Glucose 208 (H) 70 - 99 mg/dL   Glucose by meter   Result Value Ref Range    Glucose 163 (H) 70 - 99 mg/dL   Glucose by meter   Result Value Ref Range    Glucose 145 (H) 70 - 99 mg/dL   CBC with platelets differential   Result Value Ref Range    WBC 5.3 4.0 - 11.0 10e9/L    RBC Count 3.65 (L) 4.4 - 5.9 10e12/L    Hemoglobin 9.1 (L) 13.3 - 17.7 g/dL    Hematocrit 28.3 (L) 40.0 - 53.0 %    MCV 78 78 - 100 fl    MCH 24.9 (L) 26.5 - 33.0 pg    MCHC 32.2 31.5 - 36.5 g/dL    RDW 19.2 (H) 10.0 - 15.0 %    Platelet Count 193 150 - 450 10e9/L    Diff Method Automated Method     % Neutrophils 96.1 %    % Lymphocytes 2.6 %    % Monocytes 0.9 %    % Eosinophils 0.0 %    % Basophils 0.0 %    % Immature Granulocytes 0.4 %    Nucleated RBCs 0 0 /100    Absolute Neutrophil 5.1 1.6 - 8.3 10e9/L    Absolute Lymphocytes 0.1 (L) 0.8 - 5.3 10e9/L    Absolute Monocytes 0.1 0.0 - 1.3 10e9/L    Absolute Eosinophils 0.0 0.0 - 0.7 10e9/L    Absolute Basophils 0.0 0.0 - 0.2 10e9/L    Abs Immature Granulocytes 0.0 0 - 0.4 10e9/L    Absolute Nucleated RBC 0.0    Basic metabolic panel   Result Value Ref Range    Sodium 138 133 - 144 mmol/L    Potassium 3.5 3.4 - 5.3 mmol/L    Chloride 105 94 - 109 mmol/L    Carbon Dioxide 28 20 - 32 mmol/L    Anion Gap 5 3 - 14 mmol/L    Glucose 147 (H) 70 - 99 mg/dL    Urea Nitrogen 17 7 - 30 mg/dL    Creatinine 0.62 (L) 0.66 - 1.25 mg/dL    GFR Estimate >90 >60 mL/min/[1.73_m2]    GFR Estimate If Black >90 >60 mL/min/[1.73_m2]    Calcium 8.5 8.5 - 10.1 mg/dL   Phosphorus   Result Value Ref Range    Phosphorus 2.6 2.5 - 4.5 mg/dL   Uric acid   Result Value Ref Range    Uric Acid 3.1 (L) 3.5 - 7.2 mg/dL

## 2020-06-08 NOTE — PROGRESS NOTES
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     Past medical history significant for B-cell Non-Hodgkin's lymphoma, DM2, Suspected adrenal insufficiency, Hypercoagulable state, chemotherapy induced anemia, CKD stage II-III, JESSICA complaint with CPAP, HLP, History of HTN (no longer on medications), History of GERD, History of lower extremity edema who was admitted for induction of chemotherapy for which the Hospitalist service has been consulted to assist with medical management.       Aphasia concerning for encephalopathy versus delirium versus leukoencephalopathy secondary to RICE chemotherapy:  This morning was awake, alert, orientated x4.  He slept throughout the morning and this afternoon was noted to have global aphasia.  RRT was called and CODE Stroke started.  -Head CT and Head/Neck CTA negative.  Brain MRI negative.    --Patient was evaluated by the neurology team, currently they have signed off, his encephalopathy possibly secondary to chemotherapy related.  Oncology aware of this and they have started patient on high-dose thiamine and methylene blue.  --Patient continues to have a selective speech, there is just concerned about swallow issues, speech consult requested for swallow evaluation.  Patient rarely to follow commands.    Diffuse large B-cell Non-Hodgkin's lymphoma:  LDH elevated at 534.    Management per Oncology:               --RICE chemotherapy then followed by either stem cell transplant or CART therapy. Cycle 1 of RICE started on 6/5.  Chemo plans as per oncology team, currently allopurinol and dexamethasone on hold.  -Plan for Neulasta upon discharge.     Insulin dependent DM2:  Complaint with metformin 1000 mg every morning and 500 mg with supper, Lantus 30 units at bedtime and sliding scale NovoLog with meals.  A1c of 7.3%.   -Patient received 8 mg PO dexamethasone on 6/5 and 12 mg on  6/6 and 8 mg on 6/7.    Recent Labs   Lab 06/08/20  0600 06/08/20  0526 06/08/20  0044 06/07/20  2055 06/07/20  1650 06/07/20  1542 06/07/20  1218 06/07/20  0849 06/07/20  0600  06/06/20  0600  06/05/20  0830   *  --   --   --  230*  --   --   --  211*  --  181*  --  71   BGM  --  145* 163* 208*  --  216* 232* 201*  --    < >  --    < >  --     < > = values in this interval not displayed.   --Hold PTA metformin.     --Hypoglycemic protocol.    --Lantus 30 units administered at bedtime 6/6 and had received 48 units of Novolog.  Due to neuro change patient will be made NPO and will decrease Lantus to 20 units at bedtime.    --High intensity sliding scale every 4 hours with blood glucose checks every 4 hours.  His blood sugar seems to be stable with the 20 units of Lantus, considering his n.p.o. status will continue with 20 units of Lantus.     Anemia of chronic disease:  Believe secondary to malignancy and previous chemotherapy.  Lab studies from 5/2020 consistent with chronic disease.    Recent Labs   Lab 06/08/20 0600 06/07/20  1650 06/07/20  0600 06/06/20  0600 06/05/20  0830   HGB 9.1* 9.2* 8.9* 9.4* 10.6*   --Monitor.     Leukopenia:    Recent Labs   Lab 06/08/20 0600 06/07/20  1650 06/07/20  0600 06/06/20  0600 06/05/20  0830   WBC 5.3 5.5 4.6 2.5* 4.3   --Monitor.      Suspected adrenal insufficiency, in setting of adrenal masses:  Noted large bilateral adrenal masses with new hypotension, nausea, vomiting and hyperkalemia.  Noted normal cortisol level and ACTH elevated at 140.    --Resume PTA empiric hydrocortisone 20 mg every 8 am and 10 mg every 4 pm.    When oral intake resumes, until then he is started on hydrocortisone 10 mg 3 times daily, considering prednisone being 4 times stronger than hydrocortisone 10 mg of hydrocortisone 3 times a day would be insufficient for his coverage, will increase hydrocortisone dosage to 50 mg 3 times daily.  --Endocrinology follow-up as an outpatient.         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    --Patient continues to be n.p.o., will hold off on Xarelto for now and oncology had started him on Lovenox..    Hold if thrombocytopenia develops (PLTs drop below 50,000).        CKD stage II-III:  Creatinine at 1.13 with GFR of 66 upon admit.    Recent Labs   Lab 06/08/20  0600 06/07/20  1650 06/07/20  0600 06/06/20  0600 06/05/20  0830   CR 0.62* 0.68 0.67 0.74 1.13   --Monitor.    --IVF at 75 ml/hr.       JESSICA:  Complaint with CPAP.  --Home CPAP with home settings.       HLP:  --Resume PTA rosuvastatin 5 mg daily and Tricor 145 mg daily.       Right foot wound and previous left toes partial amputation:  Known right foot wounds, that were present prior to admission.    --Daily dressing changes.      History of HTN (no longer on medications):  Patient with hypotension and has stopped all blood pressure medications.  Suspected adrenal insufficiency.  --Orthostatic blood pressures.    --IVF at 75 ml/hr.       History of GERD:  No longer on medications.       History of lower extremity edema:  Patient indicated this is no longer an issue and is no longer on diuretic therapy.    --Monitor.      Diet: NPO for Medical/Clinical Reasons Except for: Meds     DVT Prophylaxis: Xarelto   Champion Catheter: in place, indication: Chemotherapy with Incontinence  Code Status: Full Code     Disposition Plan    Expected discharge: Per Oncology  Entered: Karmen Richardson MD 06/08/2020, 8:18 AM      Karmen Richardson MD,FACP    Interval History   Overnight events noted, patient currently resting comfortably in bed, he appears to have selective aphasia, he does answer few questions but sometimes is is found to be struggling to answer a few of the questions and would not speak, swallow evaluation have not been completed and that he have not had anything by mouth yet.  Discussed the patient with the nursing to start him on speech evaluation,  possibly we can start some of his oral medications then, oncology had reviewed his medications and adjusted.    -Data reviewed today: I reviewed all new labs and imaging results over the last 24 hours. I personally reviewed no images or EKG's today.    Physical Exam   Temp: 96.6  F (35.9  C) Temp src: Oral BP: 123/66   Heart Rate: 83 Resp: 16 SpO2: 95 % O2 Device: None (Room air)    Vitals:    06/05/20 0811 06/08/20 0557   Weight: 85.5 kg (188 lb 9.6 oz) 87.8 kg (193 lb 9.6 oz)     Vital Signs with Ranges  Temp:  [95.7  F (35.4  C)-98.2  F (36.8  C)] 96.6  F (35.9  C)  Heart Rate:  [70-88] 83  Resp:  [16] 16  BP: (123-139)/(64-69) 123/66  SpO2:  [95 %-98 %] 95 %  I/O last 3 completed shifts:  In: 1602 [I.V.:1602]  Out: 2875 [Urine:2875]      Constitutional: Awake, alert, cooperative, no apparent distress, he answers only very few questions respiratory: Clear to auscultation bilaterally, no crackles or wheezing  Cardiovascular: Regular rate and rhythm, normal S1 and S2, and no murmur noted  GI: Normal bowel sounds, soft, non-distended, non-tender  Skin/Integumen: No rashes, no cyanosis, no edema  Neuro : moving all 4 extremities, masklike facies, does not answer many of the questions but to answer a few of them.         Medications     - MEDICATION INSTRUCTIONS -       - MEDICATION INSTRUCTIONS -       - MEDICATION INSTRUCTIONS -       sodium chloride 0.9%       sodium chloride 75 mL/hr at 06/07/20 1728     sodium chloride         allopurinol  300 mg Oral Daily     dexamethasone  8 mg Oral Daily     [START ON 6/9/2020] dextrose 5% water  10-20 mL Intracatheter Daily at 8 pm     [START ON 6/9/2020] dextrose 5% water  10-20 mL Intracatheter Daily at 8 pm     etoposide (TOPOSAR) infusion  100 mg/m2 (Order-Specific) Intravenous Q24H     fenofibrate  135 mg Oral Daily     [START ON 6/9/2020] filgrastim (NEUPOGEN/GRANIX) intravenous  5 mcg/kg (Order-Specific) Intravenous Daily at 8 pm     heparin  5 mL Intravenous Q28 Days      heparin lock flush  5 mL Intravenous Q24H     hydrocortisone sodium succinate PF  10 mg Intravenous Q8H     insulin aspart  1-12 Units Subcutaneous Q4H     insulin glargine  20 Units Subcutaneous At Bedtime     melatonin  3 mg Oral At Bedtime     zz extemporaneous template  50 mg Intravenous Q4H     rivaroxaban ANTICOAGULANT  20 mg Oral QAM     rosuvastatin  5 mg Oral Daily     thiamine  500 mg Intravenous Q8H       Data   Recent Labs   Lab 06/08/20  0600 06/07/20  1650 06/07/20  0600  06/05/20  0830   WBC 5.3 5.5 4.6   < > 4.3   HGB 9.1* 9.2* 8.9*   < > 10.6*   MCV 78 78 78   < > 78    184 181   < > 215    137 136   < > 135   POTASSIUM 3.5 3.6 4.1   < > 4.6   CHLORIDE 105 106 106   < > 104   CO2 28 25 23   < > 22   BUN 17 16 19   < > 24   CR 0.62* 0.68 0.67   < > 1.13   ANIONGAP 5 6 7   < > 9   JEFF 8.5 8.1* 8.2*   < > 9.5   * 230* 211*   < > 71   ALBUMIN  --  2.3*  --   --  2.9*   PROTTOTAL  --  5.7*  --   --  7.0   BILITOTAL  --  0.3  --   --  0.4   ALKPHOS  --  44  --   --  57   ALT  --  20  --   --  25   AST  --  29  --   --  39    < > = values in this interval not displayed.       Recent Results (from the past 24 hour(s))   CT Head w/o Contrast    Narrative    CT OF THE HEAD WITHOUT CONTRAST 6/7/2020 1:13 PM     COMPARISON: None.    HISTORY: Code Stroke, aphasia.    TECHNIQUE: 5 mm thick axial CT images of the head were acquired  without IV contrast material.    FINDINGS:  There is moderate diffuse cerebral volume loss. There are  subtle patchy areas of decreased density in the cerebral white matter  bilaterally that are consistent with sequela of chronic small vessel  ischemic disease.     The ventricles and basal cisterns are within normal limits in  configuration given the degree of cerebral volume loss.  There is no  midline shift. There are no extra-axial fluid collections.     No intracranial hemorrhage, mass or recent infarct.    The visualized paranasal sinuses are well-aerated.  There is no  mastoiditis. There are no fractures of the visualized bones.       Impression    IMPRESSION:  Diffuse cerebral volume loss and cerebral white matter  changes consistent with chronic small vessel ischemic disease. No  evidence for acute intracranial pathology.        Radiation dose for this scan was reduced using automated exposure  control, adjustment of the mA and/or kV according to patient size, or  iterative reconstruction technique.    TYLOR PADILLA MD   CTA Head Neck with Contrast    Narrative    CT ANGIOGRAM OF THE HEAD AND NECK WITHOUT AND WITH CONTRAST  6/7/2020  1:17 PM     COMPARISON: None    HISTORY: Aphasia.    TECHNIQUE:  Precontrast localizing scans were followed by CT  angiography with an injection of 70mL Isovue-370 nonionic intravenous  contrast material with scans through the head and neck.  Images were  transferred to a separate 3-D workstation where multiplanar  reformations and 3-D images were created.  Estimates of carotid  stenoses are made relative to the distal internal carotid artery  diameters except as noted.      FINDINGS: Incidental note is made of airspace opacity in the  visualized portion of the right lower lung, possibly representing  pneumonia.    Neck CTA: The bilateral common carotid, bilateral cervical internal  carotid and bilateral vertebral arteries are patent without vascular  narrowing.    Head CTA: The basilar, bilateral intracranial distal internal carotid,  bilateral anterior cerebral, bilateral middle cerebral and bilateral  posterior cerebral arteries are patent and unremarkable. The anterior  communicating and right posterior communicating arteries are patent.      Impression    IMPRESSION: Normal neck and head CTA.      Radiation dose for this scan was reduced using automated exposure  control, adjustment of the mA and/or kV according to patient size, or  iterative reconstruction technique.    TYLOR PADILLA MD   CT Head Perfusion w Contrast     Narrative    CT BRAIN PERFUSION 6/7/2020 1:27 PM    COMPARISON: None    HISTORY: Aphasia.    TECHNIQUE: Time sequential axial CT images of the head were acquired  during the administration of intravenous contrast (50 mL Isovue-370).  CTA images of the [place] as well as color perfusion maps of  the brain were created from this time sequential axial source data.    FINDINGS: There are no focal or regional perfusion defects in the  brain.      Impression    IMPRESSION: Normal CT perfusion of the brain.      Radiation dose for this scan was reduced using automated exposure  control, adjustment of the mA and/or kV according to patient size, or  iterative reconstruction technique.    TYLOR PADILLA MD   MR Brain w/o & w Contrast    Narrative    MRI BRAIN WITHOUT AND WITH CONTRAST  6/7/2020 3:20 PM    HISTORY: Assess for leukoencephalopathy. Has aphasia.     TECHNIQUE: Multiplanar, multisequence MRI of the brain without and  with    COMPARISON: None.    FINDINGS: Diffusion-weighted images are normal. There is no evidence  for intracranial hemorrhage or acute infarct. Exam is mildly limited  due to motion artifact. There is some mild cerebral atrophy. Brain  parenchyma is otherwise normal. Postcontrast images do not show any  abnormal areas of enhancement or any focal mass lesions. Vascular  structures are patent at the skull base.      Impression    IMPRESSION:  1. Mild cerebral atrophy.  2. No evidence for intracranial hemorrhage, acute infarct, or any  focal mass lesions.  3. Exam mildly limited by motion artifact.    LOUIE PORRAS MD

## 2020-06-08 NOTE — PROGRESS NOTES
06/08/20 1513   General Information   Onset Date 06/05/20   Start of Care Date 06/08/20   Referring Physician Dr. Richardson   Patient Profile Review/OT: Additional Occupational Profile Info See Profile for full history and prior level of function   Patient/Family Goals Statement Pt unable to state.   Swallowing Evaluation Bedside swallow evaluation   Behaviorial Observations Distractible;Initiation problems  (Eyes open, right eye twitching, pt staring ahead at times)   Mode of current nutrition NPO   Respiratory Status Room air   Comments Per MD note: Past medical history significant for B-cell Non-Hodgkin's lymphoma, DM2, Suspected adrenal insufficiency, Hypercoagulable state, chemotherapy induced anemia, CKD stage II-III, JESSICA complaint with CPAP, HLP, History of HTN (no longer on medications), History of GERD, History of lower extremity edema who was admitted for induction of chemotherapy for which the Hospitalist service has been consulted to assist with medical management.  Aphasia concerning for encephalopathy versus delirium versus leukoencephalopathy secondary to RICE chemotherapy:     Clinical Swallow Evaluation   Oral Musculature unable to assess due to poor participation/comprehension  (Pt only followed 3 commands with mod-max cues.)   Laryngeal Function   (Pt unable to cough/throat clear on command)   Clinical Swallow Eval: Thin Liquid Texture Trial   Mode of Presentation, Thin Liquids spoon;fed by clinician   Volume of Liquid or Food Presented ice chips x 5, tsps of thin x 3   Oral Phase of Swallow Premature pharyngeal entry   Pharyngeal Phase of Swallow repeated swallows  (delay)   Diagnostic Statement decreased attention/awareness, grimacing with swallows, gagging/coughing after last ice chip, increased WOB as trials continued   Clinical Swallow Eval: Nectar Thick Liquid Texture Trial   Mode of Presentation, Nectar spoon   Volume of Nectar Presented tsps x 5   Oral Phase, Nectar Premature pharyngeal  entry   Pharyngeal Phase, Nectar repeated swallows  (delay)   Diagnostic Statement decreased attention/awareness, grimacing with swallows, increased WOB as trials continued   Clinical Swallow Eval: Puree Solid Texture Trial   Mode of Presentation, Puree spoon   Volume of Puree Presented tsp x 1   Oral Phase, Puree Premature pharyngeal entry   Pharyngeal Phase, Puree repeated swallows  (delay)   Diagnostic Statement decreased attention/awareness, grimacing with swallow, increased WOB as trials continued   Swallow Compensations   Swallow Compensations Pacing;Reduce amounts;Multiple swallow   Esophageal Phase of Swallow   Patient reports or presents with symptoms of esophageal dysphagia Yes   Esophageal comments Hx of GERD   Swallow Eval: Clinical Impressions   Skilled Criteria for Therapy Intervention Skilled criteria met.  Treatment indicated.   Functional Assessment Scale (FAS) 2   Treatment Diagnosis mod-severe silent aspiration risk at present   Diet texture recommendations NPO   Therapy Frequency Daily   Predicted Duration of Therapy Intervention (days/wks) 1 week   Anticipated Discharge Disposition inpatient rehabilitation facility   Risks and Benefits of Treatment have been explained. Yes   Patient, family and/or staff in agreement with Plan of Care Yes   Clinical Impression Comments  A bedside swallow evaluation was completed this pm.  Pt presents with moderate-severe silent aspiration risk at bedside.  Risk factors include decreased attention, decreased ability to follow commands/cues, grimacing with all po trials, delayed swallows, need for multiple swallows, gagging/coughing after ice chips, and increased work of breathing as trials continued.  Pt's right eye was noted to be twitching and right arm was contracted on chest for most of the session.  Discussed with RN.  Note EEG results are pending per RN report.  Recommend continued NPO status and frequent oral cares.  Plan to continue Tx with ongoing  assessment to determine safety for po trials and/or instrumental exam.   Total Evaluation Time   Total Evaluation Time (Minutes) 20

## 2020-06-08 NOTE — PROVIDER NOTIFICATION
MD Notification    Notified Person: MD Oncology    Notified Person Name:  Antwon Rosales NP    Notification Date/Time: 6/8/2020 at 1015    Notification Interaction: face to face conversation.     Purpose of Notification: Pt NPO. Scheduled Xarelto 20mg, Decadron 8 mg and allopurinol 300mg. Asked for advice    Orders Received: Order to hold decadron and allopurinol. Awaiting order on new blood thinner at this time    Comments: Per NP, she is looking into the blood thinners and determining what to be given for Xarelto replacement at this time. She will place orders soon per her report.

## 2020-06-08 NOTE — PLAN OF CARE
SATURNINO orientation. VSS: cont ox. NPO. Able to follow certain commands. Minimal verbal responses. Speech illogical, opens eyes to auditory and physical stimulation. Inspiratory and expiratory wheeze noted anterior RL. BS active without BM. Urinary ramirez in place with adequate green/blue output. IVF continuous NS 75 ml/hr in port. R PIV. BG q 4 hours. Insulin per sliding scale. 2 assist for repositioning in bed. Chemo medications on hold. Order for SLP: assessment in progress. EEG completed. Continue monitoring. Discharge pending.

## 2020-06-08 NOTE — PLAN OF CARE
"SATURNINO orientation. Kept eyes open most of the shift. Minimal ability to follow commands. Answers \"yes\" and \"no\" to questions, answers unrealizable. Speech illogical.    Pt's VSS on RA. No obvious signs or symptoms of pain noted.  Wheezing noted to improve with coughing.Bowels active. NPO. BG checks q 4 hrs. Insulin per sliding scale. [place] patent, adequate amount of  blue/green urine output. NS at 75 ml/hr to port. A2  with repositioning q 2 hrs. Wound cares to R middle toes completed. Order to start Lovenox injections due to pt's inability to swallow oral pills. Speech consulted. Continue with NPO. EEG results abnormal.  Hospitality and Oncology notified. Neurology consult placed. No other orders received. Chemo on hold for now, pending mental status improvement. Chemo precautions. Continue to monitor.   "

## 2020-06-08 NOTE — PROVIDER NOTIFICATION
MD Notification     Notified Person: MD     Notified Person Name: Oncology clinic, Dr. Olson     Notification Date/Time: 6/8/2020 at 17:13     Notification Interaction: conversation with      Purpose of Notification: Pt's R eye was noted to be twitching and right arm noted to be contracted on chest this evening.  EEG results abnormal. Please let me know if new orders/interventions are needed. Asked if Neurology consult will be beneficial.   Orders Received:  Verbal order for Neurology consult placed.  No additional orders needed at this time per providers.     Comments:

## 2020-06-08 NOTE — PROGRESS NOTES
Routine EEG  completed at patient's bedside. Procedure explained to patient prior to testing.   Ordered by Andria

## 2020-06-08 NOTE — PROVIDER NOTIFICATION
Prescriber Notification Note    The pharmacist has communicated with this patient's provider regarding a concern or therapy recommendation.    Notified Person: QUIN Connie  Date/Time of Notification: 6/8/20 2395  Interaction: phone  Concern/Recommendation:left message with Whitney's nurse that his last dose of xarelto was yesterday am. Lovenox could be given now instead of waiting until this evening.     Comments/Additional Details:Whitney will change the lovenox start time early this afternoon if a change is needed.

## 2020-06-09 ENCOUNTER — TELEPHONE (OUTPATIENT)
Dept: OTHER | Facility: CLINIC | Age: 70
End: 2020-06-09

## 2020-06-09 ENCOUNTER — APPOINTMENT (OUTPATIENT)
Dept: SPEECH THERAPY | Facility: CLINIC | Age: 70
DRG: 829 | End: 2020-06-09
Attending: INTERNAL MEDICINE
Payer: COMMERCIAL

## 2020-06-09 LAB
ANION GAP SERPL CALCULATED.3IONS-SCNC: 5 MMOL/L (ref 3–14)
BASOPHILS # BLD AUTO: 0 10E9/L (ref 0–0.2)
BASOPHILS NFR BLD AUTO: 0 %
BUN SERPL-MCNC: 17 MG/DL (ref 7–30)
CALCIUM SERPL-MCNC: 8.6 MG/DL (ref 8.5–10.1)
CHLORIDE SERPL-SCNC: 103 MMOL/L (ref 94–109)
CO2 SERPL-SCNC: 28 MMOL/L (ref 20–32)
CREAT SERPL-MCNC: 0.82 MG/DL (ref 0.66–1.25)
DIFFERENTIAL METHOD BLD: ABNORMAL
EOSINOPHIL # BLD AUTO: 0 10E9/L (ref 0–0.7)
EOSINOPHIL NFR BLD AUTO: 0.3 %
ERYTHROCYTE [DISTWIDTH] IN BLOOD BY AUTOMATED COUNT: 18.8 % (ref 10–15)
GFR SERPL CREATININE-BSD FRML MDRD: >90 ML/MIN/{1.73_M2}
GLUCOSE BLDC GLUCOMTR-MCNC: 100 MG/DL (ref 70–99)
GLUCOSE BLDC GLUCOMTR-MCNC: 119 MG/DL (ref 70–99)
GLUCOSE BLDC GLUCOMTR-MCNC: 125 MG/DL (ref 70–99)
GLUCOSE BLDC GLUCOMTR-MCNC: 130 MG/DL (ref 70–99)
GLUCOSE BLDC GLUCOMTR-MCNC: 170 MG/DL (ref 70–99)
GLUCOSE BLDC GLUCOMTR-MCNC: 93 MG/DL (ref 70–99)
GLUCOSE SERPL-MCNC: 94 MG/DL (ref 70–99)
HCT VFR BLD AUTO: 29.4 % (ref 40–53)
HGB BLD-MCNC: 9.4 G/DL (ref 13.3–17.7)
IMM GRANULOCYTES # BLD: 0 10E9/L (ref 0–0.4)
IMM GRANULOCYTES NFR BLD: 0.3 %
LYMPHOCYTES # BLD AUTO: 0.1 10E9/L (ref 0.8–5.3)
LYMPHOCYTES NFR BLD AUTO: 3.4 %
MCH RBC QN AUTO: 24.7 PG (ref 26.5–33)
MCHC RBC AUTO-ENTMCNC: 32 G/DL (ref 31.5–36.5)
MCV RBC AUTO: 77 FL (ref 78–100)
MONOCYTES # BLD AUTO: 0 10E9/L (ref 0–1.3)
MONOCYTES NFR BLD AUTO: 0.3 %
NEUTROPHILS # BLD AUTO: 3.1 10E9/L (ref 1.6–8.3)
NEUTROPHILS NFR BLD AUTO: 95.7 %
NRBC # BLD AUTO: 0 10*3/UL
NRBC BLD AUTO-RTO: 0 /100
PLATELET # BLD AUTO: 167 10E9/L (ref 150–450)
POTASSIUM SERPL-SCNC: 2.8 MMOL/L (ref 3.4–5.3)
POTASSIUM SERPL-SCNC: 3.5 MMOL/L (ref 3.4–5.3)
RBC # BLD AUTO: 3.81 10E12/L (ref 4.4–5.9)
SODIUM SERPL-SCNC: 136 MMOL/L (ref 133–144)
WBC # BLD AUTO: 3.3 10E9/L (ref 4–11)

## 2020-06-09 PROCEDURE — 25800030 ZZH RX IP 258 OP 636: Performed by: INTERNAL MEDICINE

## 2020-06-09 PROCEDURE — 25000132 ZZH RX MED GY IP 250 OP 250 PS 637: Performed by: PHYSICIAN ASSISTANT

## 2020-06-09 PROCEDURE — 84132 ASSAY OF SERUM POTASSIUM: CPT | Performed by: INTERNAL MEDICINE

## 2020-06-09 PROCEDURE — 85025 COMPLETE CBC W/AUTO DIFF WBC: CPT | Performed by: INTERNAL MEDICINE

## 2020-06-09 PROCEDURE — 25000128 H RX IP 250 OP 636: Performed by: INTERNAL MEDICINE

## 2020-06-09 PROCEDURE — 99233 SBSQ HOSP IP/OBS HIGH 50: CPT | Performed by: INTERNAL MEDICINE

## 2020-06-09 PROCEDURE — 12000000 ZZH R&B MED SURG/OB

## 2020-06-09 PROCEDURE — 80048 BASIC METABOLIC PNL TOTAL CA: CPT | Performed by: INTERNAL MEDICINE

## 2020-06-09 PROCEDURE — 25000131 ZZH RX MED GY IP 250 OP 636 PS 637: Performed by: PHYSICIAN ASSISTANT

## 2020-06-09 PROCEDURE — 00000146 ZZHCL STATISTIC GLUCOSE BY METER IP

## 2020-06-09 PROCEDURE — 92526 ORAL FUNCTION THERAPY: CPT | Mod: GN | Performed by: SPEECH-LANGUAGE PATHOLOGIST

## 2020-06-09 PROCEDURE — 25800030 ZZH RX IP 258 OP 636: Performed by: NURSE PRACTITIONER

## 2020-06-09 PROCEDURE — 25000128 H RX IP 250 OP 636: Performed by: NURSE PRACTITIONER

## 2020-06-09 RX ORDER — POTASSIUM CHLORIDE 7.45 MG/ML
10 INJECTION INTRAVENOUS
Status: DISCONTINUED | OUTPATIENT
Start: 2020-06-09 | End: 2020-06-09

## 2020-06-09 RX ORDER — POTASSIUM CHLORIDE 1500 MG/1
20-40 TABLET, EXTENDED RELEASE ORAL
Status: DISCONTINUED | OUTPATIENT
Start: 2020-06-09 | End: 2020-06-12 | Stop reason: HOSPADM

## 2020-06-09 RX ORDER — POTASSIUM CHLORIDE 1.5 G/1.58G
20-40 POWDER, FOR SOLUTION ORAL
Status: DISCONTINUED | OUTPATIENT
Start: 2020-06-09 | End: 2020-06-09

## 2020-06-09 RX ORDER — POTASSIUM CHLORIDE 7.45 MG/ML
10 INJECTION INTRAVENOUS
Status: DISCONTINUED | OUTPATIENT
Start: 2020-06-09 | End: 2020-06-12 | Stop reason: HOSPADM

## 2020-06-09 RX ORDER — POTASSIUM CHLORIDE 1500 MG/1
20-40 TABLET, EXTENDED RELEASE ORAL
Status: DISCONTINUED | OUTPATIENT
Start: 2020-06-09 | End: 2020-06-09

## 2020-06-09 RX ORDER — POTASSIUM CHLORIDE 1.5 G/1.58G
20-40 POWDER, FOR SOLUTION ORAL
Status: DISCONTINUED | OUTPATIENT
Start: 2020-06-09 | End: 2020-06-12 | Stop reason: HOSPADM

## 2020-06-09 RX ORDER — POTASSIUM CHLORIDE 29.8 MG/ML
20 INJECTION INTRAVENOUS
Status: DISCONTINUED | OUTPATIENT
Start: 2020-06-09 | End: 2020-06-12 | Stop reason: HOSPADM

## 2020-06-09 RX ORDER — POTASSIUM CL/LIDO/0.9 % NACL 10MEQ/0.1L
10 INTRAVENOUS SOLUTION, PIGGYBACK (ML) INTRAVENOUS
Status: DISCONTINUED | OUTPATIENT
Start: 2020-06-09 | End: 2020-06-09

## 2020-06-09 RX ORDER — POTASSIUM CHLORIDE 29.8 MG/ML
20 INJECTION INTRAVENOUS
Status: DISCONTINUED | OUTPATIENT
Start: 2020-06-09 | End: 2020-06-09

## 2020-06-09 RX ORDER — POTASSIUM CL/LIDO/0.9 % NACL 10MEQ/0.1L
10 INTRAVENOUS SOLUTION, PIGGYBACK (ML) INTRAVENOUS
Status: DISCONTINUED | OUTPATIENT
Start: 2020-06-09 | End: 2020-06-12 | Stop reason: HOSPADM

## 2020-06-09 RX ADMIN — POTASSIUM CHLORIDE 20 MEQ: 29.8 INJECTION, SOLUTION INTRAVENOUS at 14:37

## 2020-06-09 RX ADMIN — ENOXAPARIN SODIUM 90 MG: 100 INJECTION SUBCUTANEOUS at 07:43

## 2020-06-09 RX ADMIN — HYDROCORTISONE SODIUM SUCCINATE 50 MG: 100 INJECTION, POWDER, FOR SOLUTION INTRAMUSCULAR; INTRAVENOUS at 22:12

## 2020-06-09 RX ADMIN — HYDROCORTISONE SODIUM SUCCINATE 50 MG: 100 INJECTION, POWDER, FOR SOLUTION INTRAMUSCULAR; INTRAVENOUS at 13:59

## 2020-06-09 RX ADMIN — METHYLENE BLUE 50 MG: 5 INJECTION INTRAVENOUS at 04:27

## 2020-06-09 RX ADMIN — METHYLENE BLUE 50 MG: 5 INJECTION INTRAVENOUS at 00:01

## 2020-06-09 RX ADMIN — INSULIN GLARGINE 20 UNITS: 100 INJECTION, SOLUTION SUBCUTANEOUS at 21:06

## 2020-06-09 RX ADMIN — BISACODYL 10 MG: 10 SUPPOSITORY RECTAL at 14:03

## 2020-06-09 RX ADMIN — THIAMINE HYDROCHLORIDE 500 MG: 100 INJECTION, SOLUTION INTRAMUSCULAR; INTRAVENOUS at 02:12

## 2020-06-09 RX ADMIN — THIAMINE HYDROCHLORIDE 500 MG: 100 INJECTION, SOLUTION INTRAMUSCULAR; INTRAVENOUS at 10:03

## 2020-06-09 RX ADMIN — PROCHLORPERAZINE EDISYLATE 5 MG: 5 INJECTION INTRAMUSCULAR; INTRAVENOUS at 20:56

## 2020-06-09 RX ADMIN — METHYLENE BLUE 50 MG: 5 INJECTION INTRAVENOUS at 11:54

## 2020-06-09 RX ADMIN — METHYLENE BLUE 50 MG: 5 INJECTION INTRAVENOUS at 07:42

## 2020-06-09 RX ADMIN — METHYLENE BLUE 50 MG: 5 INJECTION INTRAVENOUS at 16:51

## 2020-06-09 RX ADMIN — POTASSIUM CHLORIDE 20 MEQ: 29.8 INJECTION, SOLUTION INTRAVENOUS at 10:48

## 2020-06-09 RX ADMIN — FILGRASTIM 480 MCG: 480 INJECTION, SOLUTION INTRAVENOUS; SUBCUTANEOUS at 20:56

## 2020-06-09 RX ADMIN — DEXTROSE MONOHYDRATE 10 ML: 50 INJECTION, SOLUTION INTRAVENOUS at 20:57

## 2020-06-09 RX ADMIN — HYDROCORTISONE SODIUM SUCCINATE 50 MG: 100 INJECTION, POWDER, FOR SOLUTION INTRAMUSCULAR; INTRAVENOUS at 06:12

## 2020-06-09 RX ADMIN — POTASSIUM CHLORIDE 20 MEQ: 29.8 INJECTION, SOLUTION INTRAVENOUS at 13:03

## 2020-06-09 RX ADMIN — SODIUM CHLORIDE: 9 INJECTION, SOLUTION INTRAVENOUS at 00:03

## 2020-06-09 RX ADMIN — THIAMINE HYDROCHLORIDE 500 MG: 100 INJECTION, SOLUTION INTRAMUSCULAR; INTRAVENOUS at 17:44

## 2020-06-09 RX ADMIN — ENOXAPARIN SODIUM 90 MG: 100 INJECTION SUBCUTANEOUS at 21:03

## 2020-06-09 RX ADMIN — DEXTROSE MONOHYDRATE 10 ML: 50 INJECTION, SOLUTION INTRAVENOUS at 22:11

## 2020-06-09 ASSESSMENT — ACTIVITIES OF DAILY LIVING (ADL)
ADLS_ACUITY_SCORE: 16
ADLS_ACUITY_SCORE: 15
ADLS_ACUITY_SCORE: 16
ADLS_ACUITY_SCORE: 16
ADLS_ACUITY_SCORE: 15
ADLS_ACUITY_SCORE: 16

## 2020-06-09 NOTE — PROGRESS NOTES
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     Past medical history significant for B-cell Non-Hodgkin's lymphoma, DM2, Suspected adrenal insufficiency, Hypercoagulable state, chemotherapy induced anemia, CKD stage II-III, JESSICA complaint with CPAP, HLP, History of HTN (no longer on medications), History of GERD, History of lower extremity edema who was admitted for induction of chemotherapy for which the Hospitalist service has been consulted to assist with medical management.       Aphasia concerning for encephalopathy versus delirium versus leukoencephalopathy secondary to RICE chemotherapy:  This morning was awake, alert, orientated x4.  He slept throughout the morning and this afternoon was noted to have global aphasia.  RRT was called and CODE Stroke started.  -Head CT and Head/Neck CTA negative.  Brain MRI negative.    --Patient was evaluated by the neurology team, currently they have signed off, his encephalopathy possibly secondary to chemotherapy .  Oncology aware of this and they have started patient on high-dose thiamine and methylene blue.  --Since 6/7 his symptoms have significantly improved, possibly can advance his diet depending on speech evaluation today.  Patient is more communicative today.  He is on high-dose thiamine and methylene blue, current encephalopathy related to his chemo therapy drugs.  --- Nursing had some concerns about stiff upper extremity, his EEG was reviewed which shows generalized slowing which could be secondary to the encephalopathy, no seizure focus noted, neurology has been notified as per oncology request.    Diffuse large B-cell Non-Hodgkin's lymphoma:  LDH elevated at 534.    Management per Oncology:               --RICE chemotherapy then followed by either stem cell transplant or CART therapy. Cycle 1 of RICE started on 6/5.  Chemo plans as per oncology team,  currently allopurinol and dexamethasone on hold.  -Plan for Neulasta upon discharge.     Insulin dependent DM2:  Complaint with metformin 1000 mg every morning and 500 mg with supper, Lantus 30 units at bedtime and sliding scale NovoLog with meals.  A1c of 7.3%.   -Patient received 8 mg PO dexamethasone on 6/5 and 12 mg on 6/6 and 8 mg on 6/7.    Recent Labs   Lab 06/09/20  1308 06/09/20  0817 06/09/20  0610 06/09/20  0521 06/09/20  0142 06/08/20  2100 06/08/20  1705  06/08/20  0600  06/07/20  1650  06/07/20  0600  06/06/20  0600  06/05/20  0830   GLC  --   --  94  --   --   --   --   --  147*  --  230*  --  211*  --  181*  --  71   * 93  --  100* 119* 131* 114*   < >  --    < >  --    < >  --    < >  --    < >  --     < > = values in this interval not displayed.   --Hold PTA metformin.     --Hypoglycemic protocol.    --Lantus 30 units administered at bedtime 6/6 and had received 48 units of Novolog.  Due to neuro change patient will be made NPO and will decrease Lantus to 20 units at bedtime.    --High intensity sliding scale every 4 hours with blood glucose checks every 4 hours.  His blood sugar seems to be stable with the 20 units of Lantus, considering his n.p.o. status will continue with 20 units of Lantus.  --- Blood sugars appear to be stable   Anemia of chronic disease:  Believe secondary to malignancy and previous chemotherapy.  Lab studies from 5/2020 consistent with chronic disease.    Recent Labs   Lab 06/09/20  0610 06/08/20  0600 06/07/20  1650 06/07/20  0600 06/06/20  0600 06/05/20  0830   HGB 9.4* 9.1* 9.2* 8.9* 9.4* 10.6*   --Monitor.     Leukopenia:    Recent Labs   Lab 06/09/20  0610 06/08/20  0600 06/07/20  1650 06/07/20  0600 06/06/20  0600 06/05/20  0830   WBC 3.3* 5.3 5.5 4.6 2.5* 4.3   --Monitor.      Suspected adrenal insufficiency, in setting of adrenal masses:  Noted large bilateral adrenal masses with new hypotension, nausea, vomiting and hyperkalemia.  Noted normal cortisol level  and ACTH elevated at 140.    --Resume PTA empiric hydrocortisone 20 mg every 8 am and 10 mg every 4 pm.    When oral intake resumes, until then he is started on hydrocortisone 10 mg 3 times daily, considering prednisone being 4 times stronger than hydrocortisone 10 mg of hydrocortisone 3 times a day would be insufficient for his coverage, will increase hydrocortisone dosage to 50 mg 3 times daily.  --Endocrinology follow-up as an outpatient.        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    --Patient continues to be n.p.o., will hold off on Xarelto for now and oncology had started him on Lovenox..    Hold if thrombocytopenia develops (PLTs drop below 50,000).        CKD stage II-III:  Creatinine at 1.13 with GFR of 66 upon admit.    Recent Labs   Lab 06/09/20  0610 06/08/20  0600 06/07/20  1650 06/07/20  0600 06/06/20  0600 06/05/20  0830   CR 0.82 0.62* 0.68 0.67 0.74 1.13   --Monitor.    --IVF at 75 ml/hr.       JESSICA:  Complaint with CPAP.  --Home CPAP with home settings.    Hypokalemia  ---Replace electrolytes as needed.  HLP:  --Resume PTA rosuvastatin 5 mg daily and Tricor 145 mg daily.       Right foot wound and previous left toes partial amputation:  Known right foot wounds, that were present prior to admission.    --Daily dressing changes.      History of HTN (no longer on medications):  Patient with hypotension and has stopped all blood pressure medications.  Suspected adrenal insufficiency.  --Orthostatic blood pressures.    --IVF at 75 ml/hr.       History of GERD:  No longer on medications.       History of lower extremity edema:  Patient indicated this is no longer an issue and is no longer on diuretic therapy.    --Monitor.      Diet: Combination Diet Dysphagia Diet Level 1: Pureed; Thin Liquids (water, ice chips, juice, milk gelatin, ice cream, etc) (No straws)     DVT Prophylaxis: Xarelto   Champion Catheter: in place, indication: Chemotherapy with  Incontinence  Code Status: Full Code     Disposition Plan    Expected discharge: Per Oncology  Entered: Karmen Richardson MD 06/09/2020, 1:29 PM      Karmen Richardson MD,FACP    Interval History   Overnight events noted, patient currently resting comfortably in bed, he appears to have selective aphasia, he does answer few questions but sometimes is is found to be struggling to answer a few of the questions and would not speak, swallow evaluation have not been completed and that he have not had anything by mouth yet.  Discussed the patient with the nursing to start him on speech evaluation, possibly we can start some of his oral medications then, oncology had reviewed his medications and adjusted.    -Data reviewed today: I reviewed all new labs and imaging results over the last 24 hours. I personally reviewed no images or EKG's today.    Physical Exam   Temp: 98.5  F (36.9  C) Temp src: Oral BP: 131/61 Pulse: 81 Heart Rate: 83 Resp: 18 SpO2: 96 % O2 Device: None (Room air)    Vitals:    06/05/20 0811 06/08/20 0557   Weight: 85.5 kg (188 lb 9.6 oz) 87.8 kg (193 lb 9.6 oz)     Vital Signs with Ranges  Temp:  [97.1  F (36.2  C)-98.7  F (37.1  C)] 98.5  F (36.9  C)  Pulse:  [81-86] 81  Heart Rate:  [80-91] 83  Resp:  [16-18] 18  BP: (116-138)/(55-67) 131/61  SpO2:  [96 %-97 %] 96 %  I/O last 3 completed shifts:  In: 4725 [I.V.:4725]  Out: 3400 [Urine:3400]      Constitutional: Awake, alert, cooperative, no apparent distress,  s respiratory: Clear to auscultation bilaterally, no crackles or wheezing  Cardiovascular: Regular rate and rhythm, normal S1 and S2, and no murmur noted  GI: Normal bowel sounds, soft, non-distended, non-tender  Skin/Integumen: No rashes, no cyanosis, no edema  Neuro : moving all 4 extremities, masklike facies, more communicative today, is able to answer questions appropriately and obeys commands.      Medications     - MEDICATION INSTRUCTIONS -       - MEDICATION INSTRUCTIONS -       - MEDICATION  INSTRUCTIONS -       sodium chloride 0.9%       sodium chloride 75 mL/hr at 06/09/20 0003     sodium chloride         dextrose 5% water  10-20 mL Intracatheter Daily at 8 pm     dextrose 5% water  10-20 mL Intracatheter Daily at 8 pm     enoxaparin ANTICOAGULANT  1 mg/kg Subcutaneous Q12H     fenofibrate  135 mg Oral Daily     filgrastim (NEUPOGEN/GRANIX) intravenous  5 mcg/kg (Order-Specific) Intravenous Daily at 8 pm     heparin  5 mL Intravenous Q28 Days     heparin lock flush  5 mL Intravenous Q24H     hydrocortisone sodium succinate PF  50 mg Intravenous Q8H     insulin aspart  1-12 Units Subcutaneous Q4H     insulin glargine  20 Units Subcutaneous At Bedtime     melatonin  3 mg Oral At Bedtime     zz extemporaneous template  50 mg Intravenous Q4H     rosuvastatin  5 mg Oral Daily     thiamine  500 mg Intravenous Q8H       Data   Recent Labs   Lab 06/09/20  0610 06/08/20  0600 06/07/20  1650  06/05/20  0830   WBC 3.3* 5.3 5.5   < > 4.3   HGB 9.4* 9.1* 9.2*   < > 10.6*   MCV 77* 78 78   < > 78    193 184   < > 215    138 137   < > 135   POTASSIUM 2.8* 3.5 3.6   < > 4.6   CHLORIDE 103 105 106   < > 104   CO2 28 28 25   < > 22   BUN 17 17 16   < > 24   CR 0.82 0.62* 0.68   < > 1.13   ANIONGAP 5 5 6   < > 9   JEFF 8.6 8.5 8.1*   < > 9.5   GLC 94 147* 230*   < > 71   ALBUMIN  --   --  2.3*  --  2.9*   PROTTOTAL  --   --  5.7*  --  7.0   BILITOTAL  --   --  0.3  --  0.4   ALKPHOS  --   --  44  --  57   ALT  --   --  20  --  25   AST  --   --  29  --  39    < > = values in this interval not displayed.       No results found for this or any previous visit (from the past 24 hour(s)).

## 2020-06-09 NOTE — PLAN OF CARE
VSS on RA. Denies pain, no s/s pain. Unable to assess orientation, able to answer yes/OK. Visibly has word finding difficulty. Not OOB this shift. Able to follow simple commands. Repositioned every 2 hours, also able to reposition independently onto side. Champion patent with clear/ green output. Continue on methylene blue & HD thiamine for Ifex toxicity. BGM stable, no sliding scale insulin needed.

## 2020-06-09 NOTE — PROGRESS NOTES
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  --s/p RCHOP   -- He responded well to the chemotherapy with a near complete remission after six cycles. Maintenance rituximab was started in June 2015 and completed in February 2017.   --On 11/17/17 the patient underwent a thoracotomy for an enlarging right infrahilar mass. 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 The tumor was handled as a carcinoma but the pathology is most consistent with recurrent grade 2-3 follicular lymphoma.   --On 12/14/17 he began bendamustine with rituximab chemotherapy.   --He completed 6 cycles of BR and on 6/4/18 a CT revealed stable mild lymphadenopathy with mild splenomegaly and a slight increase in the IVC thrombus.   --A CT 6/3/19 revealed an increase in the right hilar and pretracheal adenopathy with stable nodular scarring of the right lung base, without other new disease. A PET/CT 6/13/19 confirmed the hypermetabolic right infrahilar mass and revealed moderate metabolic activity within a few small nodules just deep to tire pleura in the posterior aspect of tire mid right  hemithorax, suspicious for neoplasm, without other disease.   --As all disease could be encompassed in one radiation field, he completed 3000 cGy of radiotherapy to the right thoracic region 6/27/19 - 7/18/19.  --CT C/A/P 5/14/20 revealed marked increase in the right lower lobe pulmonary mass with new subcarinal lymphadenopathy, bilateral new adrenal masses, and soft tissue tumor deposits in the abdomen  --CT guided adrenal biopsy 5/27/20 revealed transformation to a diffuse large B cell lymphoma, germinal center type,  100% Ki-67 positive,  with positive immunohistochemical staining for BCL-2, BCL-6, and C-MYC suggestive of a possible triple-hit lymphoma.  - Started on R ICE 6/5/2020  - chemo now held for encephalopathy 6/7/20  -begin Neupogen 6/9/20  - hold dex (chemo premed) and allopurinol due to NPO status.  - Continue to monitor TLS labs.         Encephalopathy/Delerium  - began on 6/7/20. Had been awake and conversant.  Awakened from nap with global aphasia  - code stroke and RRT called.   - 6/7/20 CT and head/neck CTA negative. Brain MRI negative.  - Neuro consulted and following  --EEG 6/8/20 shows no obvious seizure  - encephalopathy vs delirium vs leukoencephalopathy from chemotherapy, most likely due to ifosfamide   --receiving high-dose IV thiamine   --continue methylene blue     Hypercoagulable state  - On 2/1/18 he was diagnosed with an IVC thrombus, 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 He is tolerating anticoagulation with Lovenox well. We discussed alternative anticoagulants such as Coumadin (less effective) or DOAC and as he has completed chemotherapy with a good response of his malignancy in 6/2018 he was switched to Xarelto.   --A vascular surgery consultation for the persistent IVC thrombus concurred with anticoagulation for 6-12 months or longer without other intervention.   --We plan to continue the anticoagulation for now, if not indefinitely with the underlying malignancy and persistent thrombus.   --Anticoagulation will be held if thrombocytopenia develops and the platelets drop below 50,000.-  - Switched  back to lovenox given NPO status.      Diabetes  - managed by hospitalist team     Possible adrenal insufficiency  --Large bilateral adrenal masses with mild hypotension, nausea, vomiting, and hyperkalemia  --Normal cortisol level and ACTH elevated at 140  --Began empiric PO hydrocortisone 20 mg q 8 AM and 10 mg every 4 p.m.  --IV steroids per hospitalist until able to take PO      Anemia  --Mild chemotherapy  "and neoplasm induced anemia  --5/20/20 iron levels consistent with chronic disease  --will be monitored.     -Chronic Kidney Disease  -Mild renal insufficiency will be monitored.     LFT  --Mild liver function and alkaline phosphatase elevations, thought to be related to Crestor or a fatty liver  --resolved  --monitor     Depression  --Reactive, supportive care encouraged     Right Foot Wound  --s/p toe amputation  --daily dressing changes     Urology  --urinary catheter in place with incontinence    Hypokalemia  - IV replacement     Appreciate hospitalist care!!     DVT Prophylaxis: Enoxaparin (Lovenox) SQ  Code Status: Full Code    Discussed with wife Toshia by phone.              Interval History:   Seems a bit more alert this morning.  When asked \"how are you?\" he responds \"pretty good\". This is improved yesterday when he only said yes or no.   It is difficult to assess orientation.  Obeys some simple commands but not consistently.               Review of Systems:   The 5 point Review of Systems is negative other than noted in the HPI             Medications:   Scheduled Medications    dextrose 5% water  10-20 mL Intracatheter Daily at 8 pm     dextrose 5% water  10-20 mL Intracatheter Daily at 8 pm     enoxaparin ANTICOAGULANT  1 mg/kg Subcutaneous Q12H     fenofibrate  135 mg Oral Daily     filgrastim (NEUPOGEN/GRANIX) intravenous  5 mcg/kg (Order-Specific) Intravenous Daily at 8 pm     heparin  5 mL Intravenous Q28 Days     heparin lock flush  5 mL Intravenous Q24H     hydrocortisone sodium succinate PF  50 mg Intravenous Q8H     insulin aspart  1-12 Units Subcutaneous Q4H     insulin glargine  20 Units Subcutaneous At Bedtime     melatonin  3 mg Oral At Bedtime     zz extemporaneous template  50 mg Intravenous Q4H     rosuvastatin  5 mg Oral Daily     thiamine  500 mg Intravenous Q8H     PRN Medications  acetaminophen, albuterol, albuterol, sore throat lozenge, bisacodyl, glucose **OR** dextrose **OR** " "glucagon, diphenhydrAMINE, diphenhydrAMINE, EPINEPHrine, flumazenil, LORazepam, LORazepam, - MEDICATION INSTRUCTIONS -, - MEDICATION INSTRUCTIONS -, - MEDICATION INSTRUCTIONS -, meperidine, methylPREDNISolone, naloxone, polyethylene glycol, prochlorperazine, prochlorperazine, sennosides, sodium chloride (PF), sodium chloride 0.9%, sodium chloride               Physical Exam:   Vitals were reviewed  Blood pressure 138/67, pulse 81, temperature 98.7  F (37.1  C), temperature source Oral, resp. rate 16, height 1.727 m (5' 8\"), weight 87.8 kg (193 lb 9.6 oz), SpO2 97 %.  Wt Readings from Last 4 Encounters:   06/08/20 87.8 kg (193 lb 9.6 oz)   05/27/20 90.7 kg (200 lb)   05/22/20 91.6 kg (202 lb)   05/14/20 93.4 kg (206 lb)       I/O last 3 completed shifts:  In: 4725 [I.V.:4725]  Out: 3400 [Urine:3400]      Constitutional: Awake, alert    Lungs: Clear to auscultation bilaterally, no crackles or wheezing   Cardiovascular: Regular rate and rhythm, normal S1 and S2, and no murmur noted   Abdomen: Normal bowel sounds, soft, non-distended, non-tender   Skin: No rashes, no cyanosis, no edema   Other:               Data:   All laboratory data and imaging studies reviewed.    CMP  Recent Labs   Lab 06/09/20  0610 06/08/20  0600 06/07/20  1650 06/07/20  0600 06/06/20  0600 06/05/20  0830    138 137 136 133 135   POTASSIUM 2.8* 3.5 3.6 4.1 4.9 4.6   CHLORIDE 103 105 106 106 105 104   CO2 28 28 25 23 21 22   ANIONGAP 5 5 6 7 7 9   GLC 94 147* 230* 211* 181* 71   BUN 17 17 16 19 18 24   CR 0.82 0.62* 0.68 0.67 0.74 1.13   GFRESTIMATED >90 >90 >90 >90 >90 66   GFRESTBLACK >90 >90 >90 >90 >90 77   JEFF 8.6 8.5 8.1* 8.2* 8.2* 9.5   PHOS  --  2.6  --  3.0 4.0 4.1   PROTTOTAL  --   --  5.7*  --   --  7.0   ALBUMIN  --   --  2.3*  --   --  2.9*   BILITOTAL  --   --  0.3  --   --  0.4   ALKPHOS  --   --  44  --   --  57   AST  --   --  29  --   --  39   ALT  --   --  20  --   --  25     CBC  Recent Labs   Lab 06/09/20  0610 " 06/08/20  0600 06/07/20  1650 06/07/20  0600   WBC 3.3* 5.3 5.5 4.6   RBC 3.81* 3.65* 3.70* 3.60*   HGB 9.4* 9.1* 9.2* 8.9*   HCT 29.4* 28.3* 28.7* 27.9*   MCV 77* 78 78 78   MCH 24.7* 24.9* 24.9* 24.7*   MCHC 32.0 32.2 32.1 31.9   RDW 18.8* 19.2* 19.1* 18.9*    193 184 181     INRNo lab results found in last 7 days.        Antwon Rosales CNP  Nurse Practitioner  [facility]  [phone]

## 2020-06-09 NOTE — PLAN OF CARE
Discharge Planner SLP   Patient plan for discharge: Not addressed.   Current status: Patient seen for swallow treatment this am. He was able to follow some 1 step verbal directions, but not consistently and with perseverations. Verbal language has improved he was able to state his name not BOD and that he lived in [place]. He was able to tolerate thin liquids via the spoon and cup with small single swallows with premature entry and slight delay. No coughing or gaging that was reported from yesterday. Nectar thick liquids via the cup seemed similiar to nectar thick. Mildly decreased AP movement of a pureed texture with good bolus clearing. Mastication was prolonged for a solid with multiple swallows and moderate oral residue. He was able to clear it was a liquid rinse x3. No coughing or vocal changes with PO trials.   Recommend: 1. Trial a DDL 1 with thin liquids. 2. Feed only when fully alert with supervision, no straws, small bites/sips and alternate liquids/solids.   Barriers to return to prior living situation: Cognition and dysphagia  Recommendations for discharge: TCU  Rationale for recommendations: Patient may need IP rehab prior to discharging to home due to below his baseline communication and swallow function.        Entered by: Demetria Tony 06/09/2020 11:38 AM

## 2020-06-09 NOTE — CONSULTS
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 I was asked to see the patient for encephalopathy, speech disorder, ? Seizure.  The patient has transformed B cell lymphoma, he was admitted initially for initiation of RICE protocol (Rituximab, Ifosfamide, Carboplatin, Etoposide), he developed significant encephalopathy with mutism 2 days after initiation of the chemotherapy.  The patient seems to be improving after discontinuation of ifosfamide, which is very likely the culprit of his symptoms, this drug is known for CNS toxicity.  The chemo is also associated with seizures, however so far not clear-cut symptoms consistent with seizures.  The EEG did not show epileptiform activity but was more consistent with diffuse theta and delta slowing suggestive of an encephalopathy.    I would recommend continuing supportive therapy, discontinuation of ifosfamide.     We will repeat an EEG to look for improvement and to recheck for epileptiform activity.    We will follow-up with you    Gaby Shah MD    Code Status    Full Code    Reason for Consult   Reason for consult: I was asked by Dr Olson to evaluate this patient for  Encephalopathy, ? seizure    Primary Care Physician   Francisco Armijo    Chief Complaint    encephalopathy,    History is obtained from reading the chart.    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  The patient received the chemotherapy as per protocol and on 6/7/2020 suddenly became mute.  At that time the patient was following some commands but was not talking at all.  Initially the stroke code was called and the patient has had a CT CTA which did not show any acute stroke.  Subsequently he has had an MRI of the head  which was negative for acute stroke.  The possibility of an encephalopathy was raised, and EEG shows diffuse slowing theta and delta.  The etiology of the encephalopathy was thought to be toxicity from ifosfamide, the drug was discontinued the patient was started on methylene blue and high dose of vitamin B1.    The patient cannot give me much history.  He states that he knows what happened yesterday, when asked details he was not able to provide.  Patient's nurse reports that the patient is much better today.  He is able to answer some questions appropriately.  Yesterday it was noticed right eyelid twitching.  Today while talking to the patient he has had mild eyelid twitching on the left.  No other abnormal movements, no episodes of loss of consciousness.    The patient has also history of chronic mild kidney disease, hypercoagulable state status post IVC filter on Xarelto.  He does have large bilateral adrenal masses with mild hypotension nausea and hyperkalemia, endocrinology is following the patient.    Past Medical History   I have reviewed this patient's medical history and updated it with pertinent information if needed.   Past Medical History:   Diagnosis Date     Abnormal liver function test      CPAP (continuous positive airway pressure) dependence      Diabetes (H)      Hx of skin cancer, basal cell      Hyperlipidemia      Hypertension      Keratoderma      Liver disease      Lymphoma (H)      Non-Hodgkin lymphoma (H)      Pedal edema     CHRONIC     Pleural effusion      Seborrheic keratosis, inflamed      Sleep apnea      Thrombosis Felbruary 2018       Past Surgical History   I have reviewed this patient's surgical history and updated it with pertinent information if needed.  Past Surgical History:   Procedure Laterality Date     AMPUTATE TOE(S) Left 5/22/2020    Procedure: LEFT SECOND AND THIRD DISTAL TOE AMPUTATIONS;  Surgeon: Ramon Flores MD;  Location: [facility] OR     BIOPSY LYMPH NODE  CERVICAL N/A 12/5/2014    Procedure: BIOPSY LYMPH NODE CERVICAL;  Surgeon: Robbie Linda MD;  Location:  OR     BRONCHOSCOPY FLEXIBLE N/A 11/14/2017    Procedure: BRONCHOSCOPY FLEXIBLE;  FLEXIBLE BRONCHOSCOPY WITH BIOPSIES.;  Surgeon: Robbie Linda MD;  Location:  OR     COLONOSCOPY       COLONOSCOPY N/A 5/9/2018    Procedure: COMBINED COLONOSCOPY, SINGLE OR MULTIPLE BIOPSY/POLYPECTOMY BY BIOPSY;;  Surgeon: Ramos Bates MD;  Location:  GI     ENDOVASCULAR PLACEMENT VASCULAR DEVICE Left 12/5/2017    Procedure: ENDOVASCULAR PLACEMENT VASCULAR DEVICE;;  Surgeon: Robbie Linda MD;  Location: [facility]     ENT SURGERY      tonsillectomy     EXCISE NODE MEDIASTINAL N/A 11/17/2017    Procedure: EXCISE NODE MEDIASTINAL;;  Surgeon: Robbie Linda MD;  Location:  OR     EYE SURGERY       EYE SURGERY Left     vitrectomy     INSERT PORT VASCULAR ACCESS N/A 12/5/2014    Procedure: INSERT PORT VASCULAR ACCESS;  Surgeon: Robbie Linda MD;  Location:  OR     INSERT PORT VASCULAR ACCESS N/A 12/5/2017    Procedure: INSERT PORT VASCULAR ACCESS;  POWER PORT PLACEMENT ATTEMPTED, PLACEMENT OF ANGIO-SEAL VIP VASCULAR CLOSURE DEVICE;  Surgeon: Robbie Linda MD;  Location: [facility]     IR CHEST PORT PLACEMENT > 5 YRS OF AGE  6/5/2020     IR PORT REMOVAL RIGHT  12/10/2018     PHACOEMULSIFICATION CLEAR CORNEA WITH STANDARD INTRAOCULAR LENS IMPLANT Right 5/2/2016    Procedure: PHACOEMULSIFICATION CLEAR CORNEA WITH STANDARD INTRAOCULAR LENS IMPLANT;  Surgeon: Rasheed Finney MD;  Location:  [facility]     REMOVE PORT VASCULAR ACCESS N/A 3/14/2017    Procedure: REMOVE PORT VASCULAR ACCESS;  Surgeon: Robbie Linda MD;  Location:  OR     SOFT TISSUE SURGERY      BASAL CELL CA FOREHEAD     THORACOTOMY Right 11/17/2017    Procedure: THORACOTOMY;  RIGHT EXPLORATORY THORACOTOMY/ EXTENSIVE PNEUMOLYSIS/ BIOPSY OF INTERLOBAR LYMPH NODE;  Surgeon: Robbie Linda  MD;  Location:  OR       Prior to Admission Medications   Prior to Admission Medications   Prescriptions Last Dose Informant Patient Reported? Taking?   XARELTO ANTICOAGULANT 20 MG TABS tablet 6/4/2020 at 0900 Self Yes Yes   Sig: Take 20 mg by mouth daily Takes in the am   acetaminophen (TYLENOL) 325 MG tablet  Self Yes Yes   Sig: Take 650-975 mg by mouth every 12 hours as needed for mild pain   allopurinol (ZYLOPRIM) 300 MG tablet 6/4/2020 at Unknown time Self Yes Yes   Sig: Take 300 mg by mouth daily   diphenhydrAMINE-acetaminophen (TYLENOL PM)  MG tablet  Self Yes Yes   Sig: Take 2-3 tablets by mouth At Bedtime   fenofibrate (TRICOR) 145 MG tablet 6/5/2020 at am Self Yes Yes   Sig: Take 145 mg by mouth daily   hydrocortisone (CORTEF) 10 MG tablet Past Week at Unknown time Self Yes Yes   Sig: Take 20 mg by mouth daily before breakfast    hydrocortisone (CORTEF) 10 MG tablet Past Week at Unknown time Self Yes Yes   Sig: Take 10 mg by mouth every evening   insulin aspart (NOVOLOG FLEXPEN) 100 UNIT/ML pen Past Month at Unknown time Self Yes Yes   Sig: Inject Subcutaneous 3 times daily (with meals) Patient uses sliding scale based on Carbs and Blood Glucose. Has been using very little lately due to low readings and low appetite.   insulin glargine (LANTUS VIAL) 100 UNIT/ML soln 6/4/2020 at Unknown time Self Yes Yes   Sig: Inject 30 Units Subcutaneous At Bedtime    metFORMIN (GLUCOPHAGE) 500 MG tablet 6/5/2020 at am Self Yes Yes   Sig: Take 1,000 mg by mouth daily (with breakfast)   metFORMIN (GLUCOPHAGE) 500 MG tablet 6/4/2020 at Unknown time Self Yes Yes   Sig: Take 500 mg by mouth daily (with dinner)   order for DME  Self No No   Sig: Kyle 1 packet twice a day for the next month   potassium chloride ER (KLOR-CON M) 20 MEQ CR tablet 6/5/2020 at am Self Yes Yes   Sig: Take 20 mEq by mouth daily   rosuvastatin (CRESTOR) 10 MG tablet 6/5/2020 at am Self Yes Yes   Sig: Take 5 mg by mouth daily       Facility-Administered Medications: None     Allergies   No Known Allergies    Social History   I have reviewed this patient's social history and updated it with pertinent information if needed. 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 He has never used smokeless tobacco. He reports that he does not drink alcohol or use drugs.    Family History   Patient not able to provide    Review of Systems   The 10 point Review of Systems unable to do, patient still encephalopathic    Physical Exam   Temp: 98.5  F (36.9  C) Temp src: Oral BP: 131/61 Pulse: 81 Heart Rate: 83 Resp: 18 SpO2: 96 % O2 Device: None (Room air)    Vital Signs with Ranges  Temp:  [97.1  F (36.2  C)-98.7  F (37.1  C)] 98.5  F (36.9  C)  Pulse:  [81-86] 81  Heart Rate:  [80-91] 83  Resp:  [16-18] 18  BP: (116-138)/(55-67) 131/61  SpO2:  [96 %-97 %] 96 %  193 lbs 9.6 oz    Constitutional: normal  Eyes: normal, no erythema  ENT: supple  Respiratory: Mild shortness of breath during my exam, no wheezing noticed  Skin: Pale with no obvious lesions  Musculoskeletal: normal  Neurologic: The patient is alert oriented to person but not to place or date.  He does not know the name of the president.  He follows simple commands appropriately.  He answer questions appropriately.  Repetition was normal.  Eyes are conjugate, extraocular movements are intact there are mild twitching of the corner of the left eye.  Face is slightly asymmetric by but facial muscles are equal bilaterally.  There is no pronator drift.  There is mild asterixis noticed on both sides.  The patient can move equally both lower extremities.  Fine movements are rhythmic bilaterally.  Finger-nose-finger without dysmetria.  Gait was deferred, the patient did not walk today because of confusion.  Neuropsychiatric: Looks depressed    Data   Results for orders placed or performed during the hospital encounter of 06/05/20 (from the past 24 hour(s))   Glucose by meter   Result Value Ref Range     Glucose 119 (H) 70 - 99 mg/dL   Glucose by meter   Result Value Ref Range    Glucose 100 (H) 70 - 99 mg/dL   CBC with platelets differential   Result Value Ref Range    WBC 3.3 (L) 4.0 - 11.0 10e9/L    RBC Count 3.81 (L) 4.4 - 5.9 10e12/L    Hemoglobin 9.4 (L) 13.3 - 17.7 g/dL    Hematocrit 29.4 (L) 40.0 - 53.0 %    MCV 77 (L) 78 - 100 fl    MCH 24.7 (L) 26.5 - 33.0 pg    MCHC 32.0 31.5 - 36.5 g/dL    RDW 18.8 (H) 10.0 - 15.0 %    Platelet Count 167 150 - 450 10e9/L    Diff Method Automated Method     % Neutrophils 95.7 %    % Lymphocytes 3.4 %    % Monocytes 0.3 %    % Eosinophils 0.3 %    % Basophils 0.0 %    % Immature Granulocytes 0.3 %    Nucleated RBCs 0 0 /100    Absolute Neutrophil 3.1 1.6 - 8.3 10e9/L    Absolute Lymphocytes 0.1 (L) 0.8 - 5.3 10e9/L    Absolute Monocytes 0.0 0.0 - 1.3 10e9/L    Absolute Eosinophils 0.0 0.0 - 0.7 10e9/L    Absolute Basophils 0.0 0.0 - 0.2 10e9/L    Abs Immature Granulocytes 0.0 0 - 0.4 10e9/L    Absolute Nucleated RBC 0.0    Basic metabolic panel   Result Value Ref Range    Sodium 136 133 - 144 mmol/L    Potassium 2.8 (L) 3.4 - 5.3 mmol/L    Chloride 103 94 - 109 mmol/L    Carbon Dioxide 28 20 - 32 mmol/L    Anion Gap 5 3 - 14 mmol/L    Glucose 94 70 - 99 mg/dL    Urea Nitrogen 17 7 - 30 mg/dL    Creatinine 0.82 0.66 - 1.25 mg/dL    GFR Estimate >90 >60 mL/min/[1.73_m2]    GFR Estimate If Black >90 >60 mL/min/[1.73_m2]    Calcium 8.6 8.5 - 10.1 mg/dL   Glucose by meter   Result Value Ref Range    Glucose 93 70 - 99 mg/dL   Glucose by meter   Result Value Ref Range    Glucose 130 (H) 70 - 99 mg/dL     MRI of the head shows mild atrophy with no acute changes, there is significant motion artifact.  The images were reviewed by me.    The patient has had an EEG which showed continuous generalized there.theta slowing with no interictal epileptiform activity.  No seizures recorded.    his is a telemedicine visit that was performed with the originating site at [facility]  bed and the distant side at [facility] of neurology, in [place].  Verbal consent to participate in video visit was obtained.  This visit occurred during the coronavirus (COVID-19)  public health emergency.  I discussed with the patient the nature of our telemedicine visits, that:  - I would evaluate the patient and recommend diagnostics and treatments based on my assessment.   - Our sessions are not being recorded and that personal health information is protected.    - Our team would provide follow-up care in person if and when the patient need it.  Patient identification was verified before the start of the encounter.

## 2020-06-09 NOTE — PLAN OF CARE
Patient alert to self. VSS on RA. No c/o pain. Pt intermittently can provide verbal responses to simple questions. A2 with repositioning in bed. Intermittent, dry cough: diminished lung sounds in RLL. BS active: pt denies flatus. Administered suppository for bowel regimen (last BM recorded on  6/4/20): monitoring for effectiveness. Potassium 2.8. Supplemental potassium administered per protocol: R port infusing. Continuous IV NS. No insulin required during shift. Champion patent with adequate green output. Continuing on methylene blue and thiamin for ifex toxicity. Neuro following. Repeat EEG planned for tomorrow. Pt advanced to Dd1 and thin liquids. Continue monitoring.

## 2020-06-09 NOTE — PLAN OF CARE
Pt is A and O to self.  Improved ability to follow commands today. Pt's VSS on RA. No signs or symptoms of pain noted. Bowels active. SATURNINO if pt passing flautus d/t orientation. Last Bm recorded 6/4/2020. Prn suppository given. No results. Monitoring effectiveness.  BG q 4 hrs. Champion patent, adequate amount of  blue/green urine output. NS at 75 ml/hr to port. Potassium replaced, recheck completed at 1800, results pending. Wound cares to R middle toes completed. On Lovenox injections. Speech advanced diet to DD1+ clear liquids, tolerating diet well.  Neurology following. Plan for repeat EEG tomorrow.  Chemo on hold for now.  On Methylene blue and thiamin for Ifex toxicity. Chemo precautions. Continue to monitor.

## 2020-06-09 NOTE — PROGRESS NOTES
[facility]  88    [facility] attempted to contact Pt per length of stay. Pt was unavailable during each attempt.     SH will contact Pt tomorrow.     Lisseth Beck  Chaplain Resident

## 2020-06-09 NOTE — TELEPHONE ENCOUNTER
"Received a VM from pt's wife, Toshia, who stated [facility] a \"rare reaction he had to his chemo drugs\".      Pt is s/p left second and third toe amputations on 5/22/20 with Dr. Flroes.  Pt still has stitches at surgical sites.    Spoke with Toshia who is requesting Dr. Flores to round on pt at some point to remove sutures.  Due to COVID-19, Toshia is unable to be with patient at hospital.  It is unlikely patient will be discharged by end of week.  I told Toshia I would update Dr. Flores and she appreciated the return call.    Noemi Paul, ANTONYN, RN-[facility]    "

## 2020-06-10 ENCOUNTER — APPOINTMENT (OUTPATIENT)
Dept: SPEECH THERAPY | Facility: CLINIC | Age: 70
DRG: 829 | End: 2020-06-10
Attending: INTERNAL MEDICINE
Payer: COMMERCIAL

## 2020-06-10 ENCOUNTER — HOSPITAL ENCOUNTER (OUTPATIENT)
Dept: NEUROLOGY | Facility: CLINIC | Age: 70
DRG: 829 | End: 2020-06-10
Attending: PSYCHIATRY & NEUROLOGY | Admitting: INTERNAL MEDICINE
Payer: COMMERCIAL

## 2020-06-10 LAB
ANION GAP SERPL CALCULATED.3IONS-SCNC: 8 MMOL/L (ref 3–14)
BASOPHILS # BLD AUTO: 0 10E9/L (ref 0–0.2)
BASOPHILS NFR BLD AUTO: 0 %
BUN SERPL-MCNC: 18 MG/DL (ref 7–30)
CALCIUM SERPL-MCNC: 8.5 MG/DL (ref 8.5–10.1)
CHLORIDE SERPL-SCNC: 103 MMOL/L (ref 94–109)
CO2 SERPL-SCNC: 23 MMOL/L (ref 20–32)
CREAT SERPL-MCNC: 0.92 MG/DL (ref 0.66–1.25)
DIFFERENTIAL METHOD BLD: ABNORMAL
EOSINOPHIL # BLD AUTO: 0 10E9/L (ref 0–0.7)
EOSINOPHIL NFR BLD AUTO: 0 %
ERYTHROCYTE [DISTWIDTH] IN BLOOD BY AUTOMATED COUNT: 18.2 % (ref 10–15)
GFR SERPL CREATININE-BSD FRML MDRD: 84 ML/MIN/{1.73_M2}
GLUCOSE BLDC GLUCOMTR-MCNC: 105 MG/DL (ref 70–99)
GLUCOSE BLDC GLUCOMTR-MCNC: 128 MG/DL (ref 70–99)
GLUCOSE BLDC GLUCOMTR-MCNC: 129 MG/DL (ref 70–99)
GLUCOSE BLDC GLUCOMTR-MCNC: 268 MG/DL (ref 70–99)
GLUCOSE BLDC GLUCOMTR-MCNC: 94 MG/DL (ref 70–99)
GLUCOSE SERPL-MCNC: 98 MG/DL (ref 70–99)
HCT VFR BLD AUTO: 27.5 % (ref 40–53)
HGB BLD-MCNC: 8.9 G/DL (ref 13.3–17.7)
IMM GRANULOCYTES # BLD: 0.1 10E9/L (ref 0–0.4)
IMM GRANULOCYTES NFR BLD: 1 %
LYMPHOCYTES # BLD AUTO: 0.2 10E9/L (ref 0.8–5.3)
LYMPHOCYTES NFR BLD AUTO: 2.7 %
MCH RBC QN AUTO: 24.6 PG (ref 26.5–33)
MCHC RBC AUTO-ENTMCNC: 32.4 G/DL (ref 31.5–36.5)
MCV RBC AUTO: 76 FL (ref 78–100)
MONOCYTES # BLD AUTO: 0 10E9/L (ref 0–1.3)
MONOCYTES NFR BLD AUTO: 0.5 %
NEUTROPHILS # BLD AUTO: 7.5 10E9/L (ref 1.6–8.3)
NEUTROPHILS NFR BLD AUTO: 95.8 %
NRBC # BLD AUTO: 0 10*3/UL
NRBC BLD AUTO-RTO: 0 /100
PLATELET # BLD AUTO: 120 10E9/L (ref 150–450)
POTASSIUM SERPL-SCNC: 2.8 MMOL/L (ref 3.4–5.3)
POTASSIUM SERPL-SCNC: 3.6 MMOL/L (ref 3.4–5.3)
RBC # BLD AUTO: 3.62 10E12/L (ref 4.4–5.9)
SODIUM SERPL-SCNC: 134 MMOL/L (ref 133–144)
WBC # BLD AUTO: 7.8 10E9/L (ref 4–11)

## 2020-06-10 PROCEDURE — 25800030 ZZH RX IP 258 OP 636: Performed by: NURSE PRACTITIONER

## 2020-06-10 PROCEDURE — 95816 EEG AWAKE AND DROWSY: CPT

## 2020-06-10 PROCEDURE — 85025 COMPLETE CBC W/AUTO DIFF WBC: CPT | Performed by: INTERNAL MEDICINE

## 2020-06-10 PROCEDURE — 25000128 H RX IP 250 OP 636: Performed by: NURSE PRACTITIONER

## 2020-06-10 PROCEDURE — 25800030 ZZH RX IP 258 OP 636: Performed by: INTERNAL MEDICINE

## 2020-06-10 PROCEDURE — 00000146 ZZHCL STATISTIC GLUCOSE BY METER IP

## 2020-06-10 PROCEDURE — 80048 BASIC METABOLIC PNL TOTAL CA: CPT | Performed by: INTERNAL MEDICINE

## 2020-06-10 PROCEDURE — 25000128 H RX IP 250 OP 636: Performed by: INTERNAL MEDICINE

## 2020-06-10 PROCEDURE — 25000131 ZZH RX MED GY IP 250 OP 636 PS 637: Performed by: HOSPITALIST

## 2020-06-10 PROCEDURE — 12000000 ZZH R&B MED SURG/OB

## 2020-06-10 PROCEDURE — 92526 ORAL FUNCTION THERAPY: CPT | Mod: GN | Performed by: SPEECH-LANGUAGE PATHOLOGIST

## 2020-06-10 PROCEDURE — 25000132 ZZH RX MED GY IP 250 OP 250 PS 637: Performed by: PHYSICIAN ASSISTANT

## 2020-06-10 PROCEDURE — 84132 ASSAY OF SERUM POTASSIUM: CPT | Performed by: HOSPITALIST

## 2020-06-10 PROCEDURE — 99232 SBSQ HOSP IP/OBS MODERATE 35: CPT | Performed by: HOSPITALIST

## 2020-06-10 RX ADMIN — ENOXAPARIN SODIUM 90 MG: 100 INJECTION SUBCUTANEOUS at 21:34

## 2020-06-10 RX ADMIN — INSULIN GLARGINE 30 UNITS: 100 INJECTION, SOLUTION SUBCUTANEOUS at 23:05

## 2020-06-10 RX ADMIN — POTASSIUM CHLORIDE 20 MEQ: 29.8 INJECTION, SOLUTION INTRAVENOUS at 13:57

## 2020-06-10 RX ADMIN — HYDROCORTISONE SODIUM SUCCINATE 50 MG: 100 INJECTION, POWDER, FOR SOLUTION INTRAMUSCULAR; INTRAVENOUS at 06:33

## 2020-06-10 RX ADMIN — DEXTROSE MONOHYDRATE 20 ML: 50 INJECTION, SOLUTION INTRAVENOUS at 21:34

## 2020-06-10 RX ADMIN — POTASSIUM CHLORIDE 20 MEQ: 29.8 INJECTION, SOLUTION INTRAVENOUS at 10:40

## 2020-06-10 RX ADMIN — MELATONIN 3 MG: 3 TAB ORAL at 21:35

## 2020-06-10 RX ADMIN — THIAMINE HYDROCHLORIDE 500 MG: 100 INJECTION, SOLUTION INTRAMUSCULAR; INTRAVENOUS at 04:10

## 2020-06-10 RX ADMIN — FENOFIBRATE 135 MG: 54 TABLET ORAL at 08:39

## 2020-06-10 RX ADMIN — HYDROCORTISONE SODIUM SUCCINATE 50 MG: 100 INJECTION, POWDER, FOR SOLUTION INTRAMUSCULAR; INTRAVENOUS at 15:13

## 2020-06-10 RX ADMIN — DEXTROSE MONOHYDRATE 20 ML: 50 INJECTION, SOLUTION INTRAVENOUS at 20:37

## 2020-06-10 RX ADMIN — Medication 5 ML: at 21:39

## 2020-06-10 RX ADMIN — HYDROCORTISONE SODIUM SUCCINATE 50 MG: 100 INJECTION, POWDER, FOR SOLUTION INTRAMUSCULAR; INTRAVENOUS at 21:34

## 2020-06-10 RX ADMIN — Medication 5 ML: at 17:39

## 2020-06-10 RX ADMIN — FILGRASTIM 480 MCG: 480 INJECTION, SOLUTION INTRAVENOUS; SUBCUTANEOUS at 20:31

## 2020-06-10 RX ADMIN — PROCHLORPERAZINE EDISYLATE 10 MG: 5 INJECTION INTRAMUSCULAR; INTRAVENOUS at 10:45

## 2020-06-10 RX ADMIN — ENOXAPARIN SODIUM 90 MG: 100 INJECTION SUBCUTANEOUS at 08:39

## 2020-06-10 RX ADMIN — POTASSIUM CHLORIDE 20 MEQ: 29.8 INJECTION, SOLUTION INTRAVENOUS at 12:27

## 2020-06-10 RX ADMIN — THIAMINE HYDROCHLORIDE 500 MG: 100 INJECTION, SOLUTION INTRAMUSCULAR; INTRAVENOUS at 12:27

## 2020-06-10 RX ADMIN — ROSUVASTATIN CALCIUM 5 MG: 5 TABLET, FILM COATED ORAL at 08:39

## 2020-06-10 ASSESSMENT — ACTIVITIES OF DAILY LIVING (ADL)
ADLS_ACUITY_SCORE: 15
ADLS_ACUITY_SCORE: 15
ADLS_ACUITY_SCORE: 17
ADLS_ACUITY_SCORE: 15
ADLS_ACUITY_SCORE: 17
ADLS_ACUITY_SCORE: 17

## 2020-06-10 NOTE — PROGRESS NOTES
CLINICAL NUTRITION SERVICES - REASSESSMENT NOTE      Recommendations Ordered by Registered Dietitian (RD):   Boost GC TID w/ meals    Malnutrition: ()  % Weight Loss:  > 5% in 1 month (severe malnutrition)  % Intake:  </= 75% for >/= 1 month (severe malnutrition) - Suspect  Subcutaneous Fat Loss:  Unable to determine  Muscle Loss:  Unable to determine  Fluid Retention:  None noted     Malnutrition Diagnosis: Severe malnutrition  In Context of:  Acute illness or injury     EVALUATION OF PROGRESS TOWARD GOALS   Diet: DD1 + Thins (started )  SLP following for ongoing swallowing assessments.     Intake/Tolerance:   - Patient had been eating regular meals from dinner on -dinner on . Since then he has not ordered a meal (due to decline in status, and NPO diet order).   - Since diet advancement yesterday pt has had just one meal, and RN documentation notes that he had only bites of applesauce.   - Over 5-6 day admission pt has only ordered 5 meals (<50% intakes on average).     - Decreased intakes attributed to altered mental status, and difficulty swallowing.     ASSESSED NUTRITION NEEDS:  Dosing Weight:  73.9 kg (adjusted for overwt)  Estimated Energy Needs:  6921-8879 kcals (25-30 Kcal/Kg)  Justification: overweight  Estimated Protein Needs:   grams protein (1.2-1.4 g pro/Kg)  Justification: hypercatabolism with acute illness and CKD  Estimated Fluid Needs:  1819-2998 mL (1 mL/Kcal)  Justification: maintenance and per provider pending fluid status    NEW FINDINGS:   - Weight trendin.8 kg  (up from admission)   - Stooling: last BM  (PTA)  - Labs reviewed:   K2.8 (L)  - Meds  HSSI + 20 units lantus HS    Previous Goals:   Diet will be advanced and pt will consume > 75% meals in 3-5 days.  Evaluation: Not met    Previous Nutrition Diagnosis:   Predicted inadequate nutrient intake related to recent 8% weight loss, recurrence of lymphoma.  Evaluation: Declining    CURRENT NUTRITION  DIAGNOSIS  Inadequate oral intake related to difficulty swallowing and altered mental status as evidenced by intakes over admission <25-50% of meals on average, with 8% weight loss in the month leading to admission, DD1 diet ordered.     INTERVENTIONS  Recommendations / Nutrition Prescription  Diet per SLP  Add diet appropriate supplement w/ each meal (Boost glucose control)    Implementation  Medical Food Supplement: see above    Goals  Intakes to improve to at least 50% meals in the next 2-3 days.       MONITORING AND EVALUATION:  Progress towards goals will be monitored and evaluated per protocol and Practice Guidelines    Silvia Grover RD, LD  Pager: [phone]

## 2020-06-10 NOTE — PROGRESS NOTES
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  --s/p RCHOP   -- He responded well to the chemotherapy with a near complete remission after six cycles. Maintenance rituximab was started in June 2015 and completed in February 2017.   --On 11/17/17 the patient underwent a thoracotomy for an enlarging right infrahilar mass. 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 The tumor was handled as a carcinoma but the pathology is most consistent with recurrent grade 2-3 follicular lymphoma.   --On 12/14/17 he began bendamustine with rituximab chemotherapy.   --He completed 6 cycles of BR and on 6/4/18 a CT revealed stable mild lymphadenopathy with mild splenomegaly and a slight increase in the IVC thrombus.   --A CT 6/3/19 revealed an increase in the right hilar and pretracheal adenopathy with stable nodular scarring of the right lung base, without other new disease. A PET/CT 6/13/19 confirmed the hypermetabolic right infrahilar mass and revealed moderate metabolic activity within a few small nodules just deep to tire pleura in the posterior aspect of tire mid right  hemithorax, suspicious for neoplasm, without other disease.   --As all disease could be encompassed in one radiation field, he completed 3000 cGy of radiotherapy to the right thoracic region 6/27/19 - 7/18/19.  --CT C/A/P 5/14/20 revealed marked increase in the right lower lobe pulmonary mass with new subcarinal lymphadenopathy, bilateral new adrenal masses, and soft tissue tumor deposits in the abdomen  --CT guided adrenal biopsy 5/27/20 revealed transformation to a diffuse large B cell lymphoma, germinal  center type, 100% Ki-67 positive,  with positive immunohistochemical staining for BCL-2, BCL-6, and C-MYC suggestive of a possible triple-hit lymphoma.  - Started on R ICE 6/5/2020  - chemo now held for encephalopathy 6/7/20  - began Neupogen 6/9/20  - hold allopurinol and dex (chemo premed) due to NPO.  - No evidence of tumor lysis but continue to monitor labs.         Encephalopathy/Delerium  - began on 6/7/20. Had been awake and conversant.  Awakened from nap with global aphasia  - code stroke and RRT called.   - 6/7/20 CT and head/neck CTA negative. Brain MRI negative.  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 He is tolerating anticoagulation with Lovenox well. We discussed alternative anticoagulants such as Coumadin (less effective) or DOAC and as he has completed chemotherapy with a good response of his malignancy in 6/2018 he was switched to Xarelto.   --A vascular surgery consultation for the persistent IVC thrombus concurred with anticoagulation for 6-12 months or longer without other intervention.   --We plan to continue the anticoagulation for now, if not indefinitely with the underlying malignancy and persistent thrombus.   --Anticoagulation will be held if thrombocytopenia develops and the platelets drop below 50,000  --switched back to Lovenox until able to take PO      Diabetes  - managed by hospitalist team    Hypokalemia  --potassium protocol     Possible adrenal insufficiency  --Large bilateral adrenal masses with mild hypotension, nausea, vomiting, and hyperkalemia  --Normal cortisol level and ACTH elevated at 140  --Began empiric PO hydrocortisone 20 mg q 8 AM and 10 mg every 4 p.m.  --IV steroids per hospitalist until able to take  PO     Anemia  --Mild chemotherapy and neoplasm induced anemia  --5/20/20 iron levels consistent with chronic disease  --will be monitored.     -Chronic Kidney Disease  -Mild renal insufficiency will be monitored.     LFT  --Mild liver function and alkaline phosphatase elevations, thought to be related to Crestor or a fatty liver  --resolved  --monitor    Right Foot Wound  --s/p toe amputation  --daily dressing changes    Urology  --urinary catheter in place with incontinence    I discussed with his wife Toshia by phone this AM, if not improved by end of week would plan transfer to rehab. If he doesn't improve in the next few weeks will transition to comfort cares    Appreciate hospitalist care!!    DVT Prophylaxis: Enoxaparin (Lovenox) SQ  Code Status: Full Code    Mike Olson    Interval History   Confusion stable    Physical Exam   Temp: 98.3  F (36.8  C) Temp src: Axillary BP: 90/48 Pulse: 89 Heart Rate: 83 Resp: 16 SpO2: 94 % O2 Device: None (Room air)    Vitals:    06/05/20 0811 06/08/20 0557   Weight: 85.5 kg (188 lb 9.6 oz) 87.8 kg (193 lb 9.6 oz)     Vital Signs with Ranges  Temp:  [96.9  F (36.1  C)-98.5  F (36.9  C)] 98.3  F (36.8  C)  Pulse:  [89] 89  Heart Rate:  [83] 83  Resp:  [16-18] 16  BP: ()/(48-61) 90/48  SpO2:  [94 %-96 %] 94 %  I/O last 3 completed shifts:  In: 1846 [P.O.:10; I.V.:1836]  Out: 2700 [Urine:2700]    Constitutional: awake, no acute apparent distress, confused  Neuro: not oriented, moves all extremities  Hematologic / Lymphatic: no cervical lymphadenopathy  GI: soft, non-distended, non-tender, no masses palpated, no hepatosplenomegally  Skin: no bruising or bleeding  Musculoskeletal: no pedal edema    Medications     - MEDICATION INSTRUCTIONS -       - MEDICATION INSTRUCTIONS -       - MEDICATION INSTRUCTIONS -       sodium chloride 0.9%       sodium chloride 75 mL/hr at 06/09/20 0003     sodium chloride         dextrose 5% water  10-20 mL Intracatheter Daily at 8 pm      dextrose 5% water  10-20 mL Intracatheter Daily at 8 pm     enoxaparin ANTICOAGULANT  1 mg/kg Subcutaneous Q12H     fenofibrate  135 mg Oral Daily     filgrastim (NEUPOGEN/GRANIX) intravenous  5 mcg/kg (Order-Specific) Intravenous Daily at 8 pm     heparin  5 mL Intravenous Q28 Days     heparin lock flush  5 mL Intravenous Q24H     hydrocortisone sodium succinate PF  50 mg Intravenous Q8H     insulin aspart  1-12 Units Subcutaneous Q4H     insulin glargine  20 Units Subcutaneous At Bedtime     melatonin  3 mg Oral At Bedtime     rosuvastatin  5 mg Oral Daily     thiamine  500 mg Intravenous Q8H       Data   Results for orders placed or performed during the hospital encounter of 06/05/20 (from the past 24 hour(s))   Glucose by meter   Result Value Ref Range    Glucose 93 70 - 99 mg/dL   Glucose by meter   Result Value Ref Range    Glucose 130 (H) 70 - 99 mg/dL   Glucose by meter   Result Value Ref Range    Glucose 125 (H) 70 - 99 mg/dL   Potassium   Result Value Ref Range    Potassium 3.5 3.4 - 5.3 mmol/L   Glucose by meter   Result Value Ref Range    Glucose 170 (H) 70 - 99 mg/dL   Glucose by meter   Result Value Ref Range    Glucose 128 (H) 70 - 99 mg/dL   Glucose by meter   Result Value Ref Range    Glucose 105 (H) 70 - 99 mg/dL   Basic metabolic panel   Result Value Ref Range    Sodium 134 133 - 144 mmol/L    Potassium 2.8 (L) 3.4 - 5.3 mmol/L    Chloride 103 94 - 109 mmol/L    Carbon Dioxide 23 20 - 32 mmol/L    Anion Gap 8 3 - 14 mmol/L    Glucose 98 70 - 99 mg/dL    Urea Nitrogen 18 7 - 30 mg/dL    Creatinine 0.92 0.66 - 1.25 mg/dL    GFR Estimate 84 >60 mL/min/[1.73_m2]    GFR Estimate If Black >90 >60 mL/min/[1.73_m2]    Calcium 8.5 8.5 - 10.1 mg/dL   CBC with platelets differential   Result Value Ref Range    WBC 7.8 4.0 - 11.0 10e9/L    RBC Count 3.62 (L) 4.4 - 5.9 10e12/L    Hemoglobin 8.9 (L) 13.3 - 17.7 g/dL    Hematocrit 27.5 (L) 40.0 - 53.0 %    MCV 76 (L) 78 - 100 fl    MCH 24.6 (L) 26.5 - 33.0 pg     MCHC 32.4 31.5 - 36.5 g/dL    RDW 18.2 (H) 10.0 - 15.0 %    Platelet Count 120 (L) 150 - 450 10e9/L    Diff Method Automated Method     % Neutrophils 95.8 %    % Lymphocytes 2.7 %    % Monocytes 0.5 %    % Eosinophils 0.0 %    % Basophils 0.0 %    % Immature Granulocytes 1.0 %    Nucleated RBCs 0 0 /100    Absolute Neutrophil 7.5 1.6 - 8.3 10e9/L    Absolute Lymphocytes 0.2 (L) 0.8 - 5.3 10e9/L    Absolute Monocytes 0.0 0.0 - 1.3 10e9/L    Absolute Eosinophils 0.0 0.0 - 0.7 10e9/L    Absolute Basophils 0.0 0.0 - 0.2 10e9/L    Abs Immature Granulocytes 0.1 0 - 0.4 10e9/L    Absolute Nucleated RBC 0.0

## 2020-06-10 NOTE — PLAN OF CARE
Alert, orientation unknown d/t word finding difficulty. Able to answer simple questions appropriately and follow commands well. Denies pain. LS clear, diminished in bases. Dry, nonproductive cough noted. Repositioning self in bed, not OOB. Champion in place with good UO. Port with IVF infusing, blood return noted. Neupogen started this shift, lab rechecks pending. Plan for repeat EEG today per neuro.

## 2020-06-10 NOTE — TELEPHONE ENCOUNTER
Lizzeth 10, 2020    Patient was scheduled by javon for New Patient Consult TELE E-Visit appointment on 5/14/2020 with HILLARY.     Lucia Garcia    [facility]  Office: [phone]  Fax [phone]

## 2020-06-10 NOTE — PLAN OF CARE
Discharge Planner SLP   Patient plan for discharge: Not addressed.   Current status: Patient seen for swallow treatment his speech and language function has significnatly improved from yesterday, requesting a regular diet. He was given trials of zee crackers and water. Mastication continued to be prolonged with mild to moderate amount of oral residue. He was able to clear this with a liquid rinse. Oral phase improved from yesterday. No overt Sx of aspiration. Sill has some cognitive linguistic deficits and may benefit from and speech/language cognitive evaluation.  Barriers to return to prior living situation: dysphagia and cognitive linguistic deficits.  Recommendations for discharge: Pending further work up.   Rationale for recommendations: Anticipate patient will meet swallow goals prior to discharge. If he continues to have deficits with speech/language cognitive deficits will complete formal evaluation.        Entered by: Demetria Tony 06/10/2020 1:33 PM

## 2020-06-10 NOTE — PROGRESS NOTES
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      Past medical history significant for B-cell Non-Hodgkin's lymphoma, DM2, Suspected adrenal insufficiency, Hypercoagulable state, chemotherapy induced anemia, CKD stage II-III, JESSICA complaint with CPAP, HLP, History of HTN (no longer on medications), History of GERD, History of lower extremity edema who was admitted for induction of chemotherapy for which the Hospitalist service has been consulted to assist with medical management.       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  He slept throughout the morning and this afternoon was noted to have global aphasia.  RRT was called and CODE Stroke started.  - Head CT and Head/Neck CTA negative.  Brain MRI negative.    - Neurology was consulted-now signed off.  Felt likely secondary to chemotherapy.    - Oncology started patient on high-dose thiamine and methylene blue.  - Since 6/7 his symptoms have significantly improved, and he is much clearer 6/10 - near baseline possibly.   - 6/10 - SLP to re-evaluate today - ADAT.    - Nursing had some concerns about stiff upper extremity, his EEG was reviewed which shows generalized slowing which could be secondary to the encephalopathy, no seizure focus noted, neurology has been notified as per oncology request.     Diffuse large B-cell Non-Hodgkin's lymphoma  LDH elevated at 534.    Management per Oncology:  - RICE chemotherapy then followed by either stem cell transplant or CART therapy. Cycle 1 of RICE started on 6/5.  Chemo plans as per oncology team, currently allopurinol and dexamethasone on hold.  - Plan for Neulasta upon discharge.     Insulin dependent DM2  Complaint with metformin 1000 mg every morning and 500 mg with supper, Lantus 30 units at  bedtime and sliding scale NovoLog with meals.  A1c of 7.3%.   - Patient received 8 mg PO dexamethasone on 6/5 and 12 mg on 6/6 and 8 mg on 6/7.    - Hold PTA metformin.     - Hypoglycemic protocol.    - 6/10 resumed pta lantus 30 nightly as he is eating, Sliding scale now with meals and nightly  - Blood sugars appear to be stable    Anemia of chronic disease  Believe secondary to malignancy and previous chemotherapy.  Lab studies from 5/2020 consistent with chronic disease.    - fairly stable, monitor     Leukopenia, variable  Mild thrombocytopenia  - Monitor.    - plt 120 6/10     Suspected adrenal insufficiency, in setting of adrenal masses  Noted large bilateral adrenal masses with new hypotension, nausea, vomiting and hyperkalemia. Noted normal cortisol level and ACTH elevated at 140.    - Resume PTA empiric hydrocortisone 20 mg every 8 am and 10 mg every 4 pm.    When oral intake resumes, until then he is started on hydrocortisone 10 mg 3 times daily, considering prednisone being 4 times stronger than hydrocortisone 10 mg of hydrocortisone 3 times a day would be insufficient for his coverage, will increase hydrocortisone dosage to 50 mg 3 times daily.  - Endocrinology follow-up as an outpatient.        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    - Patient continues to be n.p.o., will hold off on Xarelto for now and oncology had started him on Lovenox..    - Hold if thrombocytopenia develops (PLTs drop below 50,000).   6/10 120     CKD stage II-III  Creatinine at 1.13 with GFR of 66 upon admit.    - Creat 0.92 6/10 - stable  - 6/10 IVF stopped     JESSICA  Complaint with CPAP.  - Home CPAP with home settings.      Hypokalemia  - Replace electrolytes as needed. Intermittently low - likely in part due to decreased intake.  - Likely to improve now that he seems to be clearing.and can eat.    HLP  - Resume PTA rosuvastatin 5 mg daily and Tricor 145 mg daily.        Right foot wound and previous left toes partial amputation  Known right foot wounds, that were present prior to admission.    - Daily dressing changes.    - 6/10 Vascular surgery consulted for follow up (was to be in clinic) and likely suture removal.      History of HTN (no longer on medications)  Patient with hypotension and has stopped all blood pressure medications.  Suspected adrenal insufficiency.  - Orthostatic blood pressures.    - IVF at 75 ml/hr.       History of GERD  No longer on medications.       History of lower extremity edema  Patient indicated this is no longer an issue and is no longer on diuretic therapy.    - Monitor.      Severe Malnutrition in the context of acute illness or injury    % Weight Loss:   > 5% in 1 month (weight loss 7.9 kg, 8%, over the past ~ 1 month)   - nutrition following, appreciated  - supplements as tolerated      Diet: Combination Diet Dysphagia Diet Level 1: Pureed; Thin Liquids (water, ice chips, juice, milk gelatin, ice cream, etc) (No straws)  Snacks/Supplements Adult: Boost Glucose Control; With Meals    DVT Prophylaxis: per primary  Champion Catheter: in place, indication: Chemotherapy with Incontinence  Code Status: Full Code           Disposition Plan   Expected discharge: per Oncology rec (primary)  Entered: Brody Pena MD 06/10/2020, 8:57 AM       The patient's care was discussed with the Bedside Nurse and Patient.    Brody Pena MD  Hospitalist Service  [facility]    ______________________________________________________________________    Interval History   Seen and examined. Appears to have been due to chemo. AAOx3 today, wants to eat. Denies pain, sob, f, or c.      Data reviewed today: I reviewed all medications, new labs and imaging results over the last 24 hours. I personally reviewed no images or EKG's today.    Physical Exam   Vital Signs: Temp: 97.4  F (36.3  C) Temp src: Oral BP: 102/59 Pulse: 85 Heart Rate: 83 Resp: 16  SpO2: 94 % O2 Device: None (Room air)    Weight: 193 lbs 9.6 oz    Gen: NAD, pleasant  HEENT: Normocephalic, EOMI, MMM  Resp: no crackles,  no wheezes, no increased work of resp  CV: S1S2 heard, reg rhythm, reg rate, no pedal edema  Abdo: soft, nontender, nondistended, bowel sounds present  Ext: calves nontender, well perfused  Neuro: AAOx3, CN grossly intact, no facial asymmetry      Data   Recent Labs   Lab 06/10/20  0630 06/09/20  1755 06/09/20  0610 06/08/20  0600 06/07/20  1650  06/05/20  0830   WBC 7.8  --  3.3* 5.3 5.5   < > 4.3   HGB 8.9*  --  9.4* 9.1* 9.2*   < > 10.6*   MCV 76*  --  77* 78 78   < > 78   *  --  167 193 184   < > 215     --  136 138 137   < > 135   POTASSIUM 2.8* 3.5 2.8* 3.5 3.6   < > 4.6   CHLORIDE 103  --  103 105 106   < > 104   CO2 23  --  28 28 25   < > 22   BUN 18  --  17 17 16   < > 24   CR 0.92  --  0.82 0.62* 0.68   < > 1.13   ANIONGAP 8  --  5 5 6   < > 9   JEFF 8.5  --  8.6 8.5 8.1*   < > 9.5   GLC 98  --  94 147* 230*   < > 71   ALBUMIN  --   --   --   --  2.3*  --  2.9*   PROTTOTAL  --   --   --   --  5.7*  --  7.0   BILITOTAL  --   --   --   --  0.3  --  0.4   ALKPHOS  --   --   --   --  44  --  57   ALT  --   --   --   --  20  --  25   AST  --   --   --   --  29  --  39    < > = values in this interval not displayed.     No results found for this or any previous visit (from the past 24 hour(s)).

## 2020-06-10 NOTE — PLAN OF CARE
SLP: Attempted to see patient for swallow treatment , but was just starting set up for EEG. His speech/language is improved from yesterday.

## 2020-06-10 NOTE — PROGRESS NOTES
Surgery Progress Note    Patient seen as inpatient today with Dr. Flores. Pastor is approximately 2.5 weeks s/p partial left 2nd and 3rd toe amputations. Redness of the third toe has resolved, no purulence or other sign of infection. He is ambulating without issue. He has not had any drainage from his toes. He is using bandaids over the suture lines, RNs are cleaning toes daily. Sutures were removed at bedside. Second toe has healed nicely. Some scab/fibrinous exudate along the suture line of the third toe.         Plan:  No dressing needed. Continue to clean toes daily gently.      Please contact the vascular surgery team with any questions or concerns.     Luz Maria Ramsey MD  Surgery Resident, PGY-4  Pager [phone]    STAFF:  As above.  Toe amputation sites look fine with minor edge healing issues on 3rd toe.  Local wound care only.  Ambulating with no difficulty.    Ramon Flores MD

## 2020-06-10 NOTE — PROGRESS NOTES
BMT CLINIC VISIT NOTE.  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    This is in person visit.    Patient is pleasant 69 yo male with several comorbidities and history of lymphoma dating back to 12/2014.   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  He was treated with RCHOP x 6 and had a positive response and near CR.     Maintenance rituximab was started in June 2015 and completed in February 2017.    On 11/17/17 the patient underwent a thoracotomy for an enlarging right infrahilar mass. 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 The tumor was handled as a carcinoma but the pathology is most consistent with recurrent grade 2-3 follicular lymphoma    On 12/14/17 he began bendamustine with rituximab chemotherapy x  6 cycles and on 6/4/18 a CT revealed stable mild lymphadenopathy with mild splenomegaly and a slight increase in the IVC thrombus    -A CT 6/3/19 revealed an increase in the right hilar and pretracheal adenopathy with stable nodular scarring of the right lung base, without other new disease. A PET/CT 6/13/19 confirmed the hypermetabolic right infrahilar mass and revealed moderate metabolic activity within a few small nodules just deep to tire pleura in the posterior aspect of tire mid right hemithorax, suspicious for neoplasm, without other disease.    This was treated with 3000 cGy of radiotherapy to the right thoracic region 6/27/19 - 7/18/19.    The most recent recurrence was diagnosed by CT C/A/P 5/14/20 -  marked increase in the right lower lobe pulmonary mass with new subcarinal lymphadenopathy, bilateral new adrenal masses, and soft tissue tumor deposits in the abdomen    -CT guided adrenal biopsy 5/27/20  revealed transformation to a diffuse large B cell lymphoma, germinal center type, 100% Ki-67 positive,  with positive immunohistochemical staining for BCL-2, BCL-6, and C-MYC suggestive of a possible triple-hit lymphoma.    So far Pastor did not start any therapy  Subj: he is tired but not unwell, he lost few pounds, no night sweats, no fevers, no recent complications.    PMHX:  Possible Adrenal Insufficiency  --Large bilateral adrenal masses with mild hypotension, nausea, vomiting, and hyperkalemia--Normal cortisol level and ACTH elevated at 140       history of adult onset insulin-dependent diabetes 1991, hypertension, hyperlipidemia, sleep apnea.          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 On Lovenox;  6/2018 he was switched to Xarelto   --A vascular surgery consultation for the persistent IVC thrombus concurred with anticoagulation for 6-12 months or longer without other intervention.    Chronic Kidney Disease         Past Medical History:   Diagnosis Date     Abnormal liver function test       CPAP (continuous positive airway pressure) dependence       Diabetes (H)       Hx of skin cancer, basal cell       Hyperlipidemia       Hypertension       Keratoderma       Liver disease       Lymphoma (H)       Non-Hodgkin lymphoma (H)       Pedal edema       CHRONIC     Pleural effusion       Seborrheic keratosis, inflamed       Sleep apnea       Thrombosis Felbruary 2018           Past Surgical History      I have reviewed this patient's surgical history and updated it with pertinent information if needed.        Past Surgical History:   Procedure Laterality Date     AMPUTATE TOE(S) Left 5/22/2020     Procedure: LEFT SECOND AND THIRD DISTAL TOE AMPUTATIONS;  Surgeon: Ramon Flores MD;  Location:  OR     BIOPSY LYMPH NODE CERVICAL N/A 12/5/2014     Procedure: BIOPSY LYMPH NODE CERVICAL;  Surgeon: Robbie Linda MD;  Location:  OR     BRONCHOSCOPY  FLEXIBLE N/A 11/14/2017     Procedure: BRONCHOSCOPY FLEXIBLE;  FLEXIBLE BRONCHOSCOPY WITH BIOPSIES.;  Surgeon: Robbie Linda MD;  Location:  OR     COLONOSCOPY         COLONOSCOPY N/A 5/9/2018     Procedure: COMBINED COLONOSCOPY, SINGLE OR MULTIPLE BIOPSY/POLYPECTOMY BY BIOPSY;;  Surgeon: Ramos Batse MD;  Location:  GI     ENDOVASCULAR PLACEMENT VASCULAR DEVICE Left 12/5/2017     Procedure: ENDOVASCULAR PLACEMENT VASCULAR DEVICE;;  Surgeon: Robbie Linda MD;  Location: [facility]     ENT SURGERY         tonsillectomy     EXCISE NODE MEDIASTINAL N/A 11/17/2017     Procedure: EXCISE NODE MEDIASTINAL;;  Surgeon: Robbie Linda MD;  Location:  OR     EYE SURGERY         EYE SURGERY Left       vitrectomy     INSERT PORT VASCULAR ACCESS N/A 12/5/2014     Procedure: INSERT PORT VASCULAR ACCESS;  Surgeon: Robbie Linda MD;  Location:  OR     INSERT PORT VASCULAR ACCESS N/A 12/5/2017     Procedure: INSERT PORT VASCULAR ACCESS;  POWER PORT PLACEMENT ATTEMPTED, PLACEMENT OF ANGIO-SEAL VIP VASCULAR CLOSURE DEVICE;  Surgeon: Robbie Linda MD;  Location:  [facility]     IR PORT REMOVAL RIGHT   12/10/2018     PHACOEMULSIFICATION CLEAR CORNEA WITH STANDARD INTRAOCULAR LENS IMPLANT Right 5/2/2016     Procedure: PHACOEMULSIFICATION CLEAR CORNEA WITH STANDARD INTRAOCULAR LENS IMPLANT;  Surgeon: Rasheed Finney MD;  Location:  [facility]     REMOVE PORT VASCULAR ACCESS N/A 3/14/2017     Procedure: REMOVE PORT VASCULAR ACCESS;  Surgeon: Robbie Linda MD;  Location:  OR     SOFT TISSUE SURGERY         BASAL CELL CA FOREHEAD     THORACOTOMY Right 11/17/2017     Procedure: THORACOTOMY;  RIGHT EXPLORATORY THORACOTOMY/ EXTENSIVE PNEUMOLYSIS/ BIOPSY OF INTERLOBAR LYMPH NODE;  Surgeon: Robbie Linda MD;  Location:  OR       PMH: Medications: Norvasc, NovoLog/Lantus, lisinopril, metformin, Crestor,                                          Xarelto,  TriCor      Allergies      No Known Allergies        Social History    He has never smoked or used tobacco. He reports that he does not drink alcohol or use drugs.        Family History      Not pertinent.       Review of Systems      The 10 point Review of Systems is negative other than noted in the HPI or here.         Physical Exam  The patient is in no immediate distress. AOx3. OP clear, mm clear, no cervical LAD.  The sclerae are nonicteric.  There is no thyromegaly.  The supraclavicular or low cervical lymph node is no longer palpable and he is without other cervical, axillary, epitrochlear, or supraclavicular lymphadenopathy.  PMI nondisplaced with negative heart and lung exam  The abdomen is distended, but soft and nontender.   There is no pedal edema.   Gait is normal.    ASSESSMENT AND PLAN:     is a delightful 70 years old male with a history of a transformed lymphoma with about 3 years time between antracycline therapy and DLBCL diagnosis.  He now has experienced histologic transformation with extranodal involvement and aggressive presentation.  The patient is here to be evaluated for eligibility to randomized phase 3 TRANSFORM study.      Unfortunately due to long time from R-CHOP and and need for anticoagulation, he is not eligible.  This study has 2 arms J- CAR19 versus autologous stem cell transplantation.    My recommendation is to proceed with a standard approach using salvage chemotherapy RICE x2 cycles followed by the restaging with PET scan.      If the patient has poor response, he definitely will be a candidate for the autologous genetically modified T-cell approach using CAR-T19 or cell therapy clinical trial.  I discussed these options with Pastor and would hope to see him back in 2 months.      Today, we reviewed the autologous HSCT transplant process including the need for  work-up, autologous peripheral blood stem cell collection, high-dose chemotherapy using the BEAM, the stem  cell infusion and outpatient recovery for about 4 weeks.      The patient has few co morbidities but overall is a good candidate for both and decision will be determined on response to salvage chemotherapy.  The mortality of both approaches is low, only about 2% and the curability rate approaches 60% with transplant and 40% with CAR-T19.      Pastor had asked few questions he is eager to be treated in a consolidation fashion with a curative intent.  And happy to see him back after 2 cycles  to reassess the best approach.     Thank you very much for referring this very pleasant patient to us.    Total visit time was 90 minutes     Rosey Bunch MD  Professor of Medicine  [phone]

## 2020-06-10 NOTE — PROGRESS NOTES
Spiritual Health  89     visited Pt per length of stay. Pt spoke about the confusion he has been experiencing the last few days. He says he couldn t remember when he came in or remember the doctor very well. He is a Sabianist man and identifies as Episcopal, attending Jainism most weeks. He says his wife is a devout Episcopal who prays for him when he is struggling, but he tends not to pray and does not enjoy prayer as much right now. He has five children, four of whom live in the area and he also has two grandchildren who he hopes to see again soon. One of his main concerns is that his family is deeply concerned about his current health condition and he worries about their being concerned. He says he is primarily confused by his health condition because he has spent so much of his life being healthy.     SH listened and provided support.     Pt is confused by his recent health condition but finds support in his family.      will follow up with Pt on Friday or next week.     Lisseth Beck  Chaplain Resident

## 2020-06-11 ENCOUNTER — APPOINTMENT (OUTPATIENT)
Dept: SPEECH THERAPY | Facility: CLINIC | Age: 70
DRG: 829 | End: 2020-06-11
Attending: INTERNAL MEDICINE
Payer: COMMERCIAL

## 2020-06-11 ENCOUNTER — APPOINTMENT (OUTPATIENT)
Dept: PHYSICAL THERAPY | Facility: CLINIC | Age: 70
DRG: 829 | End: 2020-06-11
Attending: HOSPITALIST
Payer: COMMERCIAL

## 2020-06-11 LAB
ANION GAP SERPL CALCULATED.3IONS-SCNC: 8 MMOL/L (ref 3–14)
BASOPHILS # BLD AUTO: 0 10E9/L (ref 0–0.2)
BASOPHILS NFR BLD AUTO: 0 %
BUN SERPL-MCNC: 19 MG/DL (ref 7–30)
BURR CELLS BLD QL SMEAR: SLIGHT
CALCIUM SERPL-MCNC: 8.7 MG/DL (ref 8.5–10.1)
CHLORIDE SERPL-SCNC: 106 MMOL/L (ref 94–109)
CO2 SERPL-SCNC: 24 MMOL/L (ref 20–32)
CREAT SERPL-MCNC: 0.9 MG/DL (ref 0.66–1.25)
DACRYOCYTES BLD QL SMEAR: SLIGHT
DIFFERENTIAL METHOD BLD: ABNORMAL
ELLIPTOCYTES BLD QL SMEAR: SLIGHT
EOSINOPHIL # BLD AUTO: 0 10E9/L (ref 0–0.7)
EOSINOPHIL NFR BLD AUTO: 0 %
ERYTHROCYTE [DISTWIDTH] IN BLOOD BY AUTOMATED COUNT: 18.3 % (ref 10–15)
GFR SERPL CREATININE-BSD FRML MDRD: 87 ML/MIN/{1.73_M2}
GLUCOSE BLDC GLUCOMTR-MCNC: 172 MG/DL (ref 70–99)
GLUCOSE BLDC GLUCOMTR-MCNC: 187 MG/DL (ref 70–99)
GLUCOSE BLDC GLUCOMTR-MCNC: 196 MG/DL (ref 70–99)
GLUCOSE SERPL-MCNC: 182 MG/DL (ref 70–99)
HCT VFR BLD AUTO: 26.6 % (ref 40–53)
HGB BLD-MCNC: 8.6 G/DL (ref 13.3–17.7)
LYMPHOCYTES # BLD AUTO: 0.1 10E9/L (ref 0.8–5.3)
LYMPHOCYTES NFR BLD AUTO: 1 %
MCH RBC QN AUTO: 24.6 PG (ref 26.5–33)
MCHC RBC AUTO-ENTMCNC: 32.3 G/DL (ref 31.5–36.5)
MCV RBC AUTO: 76 FL (ref 78–100)
MONOCYTES # BLD AUTO: 0 10E9/L (ref 0–1.3)
MONOCYTES NFR BLD AUTO: 0 %
NEUTROPHILS # BLD AUTO: 5.9 10E9/L (ref 1.6–8.3)
NEUTROPHILS NFR BLD AUTO: 99 %
PLATELET # BLD AUTO: 113 10E9/L (ref 150–450)
PLATELET # BLD EST: ABNORMAL 10*3/UL
POTASSIUM SERPL-SCNC: 2.6 MMOL/L (ref 3.4–5.3)
POTASSIUM SERPL-SCNC: 3.2 MMOL/L (ref 3.4–5.3)
POTASSIUM SERPL-SCNC: 3.6 MMOL/L (ref 3.4–5.3)
RBC # BLD AUTO: 3.49 10E12/L (ref 4.4–5.9)
SODIUM SERPL-SCNC: 138 MMOL/L (ref 133–144)
WBC # BLD AUTO: 6 10E9/L (ref 4–11)

## 2020-06-11 PROCEDURE — 92523 SPEECH SOUND LANG COMPREHEN: CPT | Mod: GN

## 2020-06-11 PROCEDURE — 99232 SBSQ HOSP IP/OBS MODERATE 35: CPT | Performed by: INTERNAL MEDICINE

## 2020-06-11 PROCEDURE — 25000132 ZZH RX MED GY IP 250 OP 250 PS 637: Performed by: PHYSICIAN ASSISTANT

## 2020-06-11 PROCEDURE — 92526 ORAL FUNCTION THERAPY: CPT | Mod: GN

## 2020-06-11 PROCEDURE — 25000128 H RX IP 250 OP 636: Performed by: NURSE PRACTITIONER

## 2020-06-11 PROCEDURE — 97110 THERAPEUTIC EXERCISES: CPT | Mod: GP | Performed by: PHYSICAL THERAPIST

## 2020-06-11 PROCEDURE — 85025 COMPLETE CBC W/AUTO DIFF WBC: CPT | Performed by: INTERNAL MEDICINE

## 2020-06-11 PROCEDURE — 25000128 H RX IP 250 OP 636: Performed by: INTERNAL MEDICINE

## 2020-06-11 PROCEDURE — 25000131 ZZH RX MED GY IP 250 OP 636 PS 637: Performed by: HOSPITALIST

## 2020-06-11 PROCEDURE — 84132 ASSAY OF SERUM POTASSIUM: CPT | Performed by: INTERNAL MEDICINE

## 2020-06-11 PROCEDURE — 80048 BASIC METABOLIC PNL TOTAL CA: CPT | Performed by: INTERNAL MEDICINE

## 2020-06-11 PROCEDURE — 25800030 ZZH RX IP 258 OP 636: Performed by: INTERNAL MEDICINE

## 2020-06-11 PROCEDURE — 25000132 ZZH RX MED GY IP 250 OP 250 PS 637: Performed by: INTERNAL MEDICINE

## 2020-06-11 PROCEDURE — 97161 PT EVAL LOW COMPLEX 20 MIN: CPT | Mod: GP | Performed by: PHYSICAL THERAPIST

## 2020-06-11 PROCEDURE — 00000146 ZZHCL STATISTIC GLUCOSE BY METER IP

## 2020-06-11 PROCEDURE — 97116 GAIT TRAINING THERAPY: CPT | Mod: GP | Performed by: PHYSICAL THERAPIST

## 2020-06-11 PROCEDURE — 12000000 ZZH R&B MED SURG/OB

## 2020-06-11 RX ORDER — HYDROCORTISONE 10 MG/1
10 TABLET ORAL DAILY
Status: DISCONTINUED | OUTPATIENT
Start: 2020-06-11 | End: 2020-06-12 | Stop reason: HOSPADM

## 2020-06-11 RX ORDER — HYDROCORTISONE 20 MG/1
20 TABLET ORAL DAILY
Status: DISCONTINUED | OUTPATIENT
Start: 2020-06-12 | End: 2020-06-12 | Stop reason: HOSPADM

## 2020-06-11 RX ORDER — HEPARIN SODIUM,PORCINE 10 UNIT/ML
5-10 VIAL (ML) INTRAVENOUS
Status: DISCONTINUED | OUTPATIENT
Start: 2020-06-11 | End: 2020-06-12 | Stop reason: HOSPADM

## 2020-06-11 RX ADMIN — INSULIN GLARGINE 30 UNITS: 100 INJECTION, SOLUTION SUBCUTANEOUS at 22:36

## 2020-06-11 RX ADMIN — FILGRASTIM 480 MCG: 480 INJECTION, SOLUTION INTRAVENOUS; SUBCUTANEOUS at 20:45

## 2020-06-11 RX ADMIN — HYDROCORTISONE SODIUM SUCCINATE 50 MG: 100 INJECTION, POWDER, FOR SOLUTION INTRAMUSCULAR; INTRAVENOUS at 06:02

## 2020-06-11 RX ADMIN — MELATONIN 3 MG: 3 TAB ORAL at 22:36

## 2020-06-11 RX ADMIN — Medication 5 ML: at 15:04

## 2020-06-11 RX ADMIN — PROCHLORPERAZINE EDISYLATE 5 MG: 5 INJECTION INTRAMUSCULAR; INTRAVENOUS at 07:04

## 2020-06-11 RX ADMIN — HYDROCORTISONE 10 MG: 10 TABLET ORAL at 15:07

## 2020-06-11 RX ADMIN — POTASSIUM CHLORIDE 20 MEQ: 29.8 INJECTION, SOLUTION INTRAVENOUS at 09:04

## 2020-06-11 RX ADMIN — POTASSIUM CHLORIDE 20 MEQ: 29.8 INJECTION, SOLUTION INTRAVENOUS at 10:30

## 2020-06-11 RX ADMIN — PROCHLORPERAZINE EDISYLATE 10 MG: 5 INJECTION INTRAMUSCULAR; INTRAVENOUS at 15:42

## 2020-06-11 RX ADMIN — DEXTROSE MONOHYDRATE 20 ML: 50 INJECTION, SOLUTION INTRAVENOUS at 21:33

## 2020-06-11 RX ADMIN — POTASSIUM CHLORIDE 20 MEQ: 29.8 INJECTION, SOLUTION INTRAVENOUS at 17:14

## 2020-06-11 RX ADMIN — DEXTROSE MONOHYDRATE 20 ML: 50 INJECTION, SOLUTION INTRAVENOUS at 20:45

## 2020-06-11 RX ADMIN — ROSUVASTATIN CALCIUM 5 MG: 5 TABLET, FILM COATED ORAL at 08:30

## 2020-06-11 RX ADMIN — FENOFIBRATE 135 MG: 54 TABLET ORAL at 08:30

## 2020-06-11 RX ADMIN — Medication 5 ML: at 07:05

## 2020-06-11 RX ADMIN — Medication 5 ML: at 22:36

## 2020-06-11 RX ADMIN — Medication 5 ML: at 15:42

## 2020-06-11 RX ADMIN — POTASSIUM CHLORIDE 20 MEQ: 29.8 INJECTION, SOLUTION INTRAVENOUS at 11:45

## 2020-06-11 RX ADMIN — Medication 5 ML: at 06:03

## 2020-06-11 RX ADMIN — POTASSIUM CHLORIDE 20 MEQ: 29.8 INJECTION, SOLUTION INTRAVENOUS at 19:26

## 2020-06-11 RX ADMIN — RIVAROXABAN 20 MG: 20 TABLET, FILM COATED ORAL at 18:06

## 2020-06-11 ASSESSMENT — ACTIVITIES OF DAILY LIVING (ADL)
ADLS_ACUITY_SCORE: 15

## 2020-06-11 NOTE — PLAN OF CARE
Discharge Planner PT   Patient plan for discharge: Would like to discharge home with assistance  Current status: The pt completed bed mobility with independence, transfers with SBA, and gait with walker x 150' with supervision, then without walker x 75' with SBA. Encouraged pt to continue practicing without walker with nursing.   Barriers to return to prior living situation: stairs, has been using walker  Recommendations for discharge: home with assist  Rationale for recommendations: The pt has demonstrated improvements in strength, balance. Is able to ambulate without walker without significant physical assistance.        Entered by: Kassie Saez 06/11/2020 9:18 AM

## 2020-06-11 NOTE — PLAN OF CARE
SLP: Attempted session, pt napping after PT session and pleasantly declining PO or ordering any breakfast at this time.

## 2020-06-11 NOTE — PLAN OF CARE
Discharge Planner SLP   Patient plan for discharge: home with OP therapy  Current status: Pt participated in both speech/language and cognitive linguistic evaluations. Completed the Western Aphasia Battery-Bedside, scoring 93.33 correlating to mild aphasia notable for mild word finding/circumlocutions and mild impaired auditory comprehension for yes/no questions and commands. Informal cognitive linguistic assessment with mild-moderate deficits in attention, short term memory, long term memory, reasoning and organization.   Barriers to return to prior living situation: no barriers to return home  Recommendations for discharge: home with OP SLP  Rationale for recommendations: He would benefit from OP SLP evaluation with further comprehensive cognitive assessment to improve functional skills and overall safety for home, community environments and possible return to work pending ongoing medical/CA treatment.        Entered by: Allison Milligan 06/11/2020 3:25 PM

## 2020-06-11 NOTE — PROGRESS NOTES
"Speech/Language and Cognitive-Linguistic Evaluations:      06/11/20 1512   General Information, SLP   Type of Evaluation Speech and Language;Cognitive-Linguistic   Type of Visit Initial   General Info Comments \"Per MD note: Past medical history significant for B-cell Non-Hodgkin's lymphoma, DM2, Suspected adrenal insufficiency, Hypercoagulable state, chemotherapy induced anemia, CKD stage II-III, JESSICA complaint with CPAP, HLP, History of HTN (no longer on medications), History of GERD, History of lower extremity edema who was admitted for induction of chemotherapy for which the Hospitalist service has been consulted to assist with medical management.  Aphasia concerning for encephalopathy versus delirium versus leukoencephalopathy secondary to RICE chemotherapy\" Pt has made improvements in communication but still reports he is below baseline and therefore agreeable to evaluation.    Oral Motor Sensory Function   Completed on Swallow Evaluation Completed Clinical Bedside Swallow Evaluation   Western Aphasia Battery- Revised Bedside Record From   Spontaneous Speech Content Score (out of 10) 10   Spontaneous Speech Fluency Score (out of 10) 9   Auditory Verbal Comprehension Score (out of 10) 9   Sequential Commands Score (out of 10) 8   Repetition Score (out of 10) 10   Object Naming Score (out of 10) 10   Bedside Aphasia Sum 56   WAB-R Bedside Aphasia Score 93.33   Aphasia Severity Level Mild Aphasia   Cognitive Status Examination   Attention impaired  (mild, tangential)   Behavioral Observations WFL;alert   Orientation self, place, situation, full date with x1 self-correction   Visual Impairments Include   (reports bad glasses, needs them fixed)   Short Term Memory impaired  (2/3 for short term 3 word recall)   Long Term Memory impaired  (7/12 for paragraph recall, 2/3 for long-term recall)   Reasoning impaired  (mild deficits in prob solving, 1/3 for what does not belong)   Organization 100% simple, <65% complex " (worked went backwards)   Executive Function Deficits Noted mental flexibility;organization   Standardized cognitive-linguistic assessment completed no;to be completed during future session  (would benefit as OP)   General Therapy Interventions   Planned Therapy Interventions Cognitive Treatment;Language   Language Auditory comprehension;Verbal expression   Clinical Impression, SLP Eval   Criteria for Skilled Therapeutic Interventions Met Yes;Treatment indicated   SLP Diagnosis mild aphasia, mild-moderate cognitive linguistic impairment   Rehab Potential Good, to achieve stated therapy goals   Rehab potential affected by active chemotherapy   Therapy Frequency Daily   Predicted Duration of Therapy Intervention (days/wks) 1 week   Anticipated Discharge Disposition Home with Outpatient Therapy   Risks and Benefits of Treatment have been explained. Yes   Patient, Family & other staff in agreement with plan of care Yes   Clinical Impression Comments Pt participated in both speech/language and cognitive linguistic evaluations. Completed the Western Aphasia Battery-Bedside, scoring 93.33 correlating to mild aphasia notable for mild word finding/circumlocutions and mild impaired auditory comprehension for yes/no questions and commands. Informal cognitive linguistic assessment with mild-moderate deficits in attention, short term memory, long term memory, reasoning and organization. He would benefit from OP SLP evaluation with further comprehensive cognitive assessment to improve functional skills and overall safety for home, community environments and possible return to work pending ongoing medical/CA treatment.    Total Evaluation Time      Total Evaluation Time (Minutes) 41

## 2020-06-11 NOTE — PROGRESS NOTES
06/11/20 0901   Quick Adds   Type of Visit Initial PT Evaluation   Living Environment   Lives With spouse   Living Arrangements house   Home Accessibility stairs to enter home;stairs within home  (pt planning to stay on main level)   Number of Stairs, Main Entrance 2   Stair Railings, Main Entrance railing on right side (ascending)   Transportation Anticipated family or friend will provide   Functional Level Prior   Ambulation 0-->independent   Transferring 0-->independent   General Information   Onset of Illness/Injury or Date of Surgery - Date 06/05/20   Referring Physician Dr. Pena   Patient/Family Goals Statement Return home   Pertinent History of Current Problem (include personal factors and/or comorbidities that impact the POC) The pt was admtted for induction of chemotherapy for B cell non Hodgkins lymphoma. Developed aphasia on 6/7, though to be chemo induced. Has sinced resolved. PMH significant for DM II, anemia, CKD, HTn, LE edema, L partial toe amputations.   Cognitive Status Examination   Orientation orientation to person, place and time   Pain Assessment   Patient Currently in Pain No   Posture    Posture Forward head position   Range of Motion (ROM)   ROM Comment B LEs WFLs   Strength   Strength Comments B LEs grossly 4/5   Bed Mobility   Bed Mobility Comments Independent   Transfer Skills   Transfer Comments SBA sit to stand   Gait   Gait Comments Ambulates with 2ww with SBA-supervision   Balance   Balance Comments Able to stand without UE support   Sensory Examination   Sensory Perception no deficits were identified   Coordination   Coordination no deficits were identified   General Therapy Interventions   Planned Therapy Interventions balance training;gait training;neuromuscular re-education;strengthening;transfer training   Clinical Impression   Criteria for Skilled Therapeutic Intervention yes, treatment indicated   PT Diagnosis Deconditioning and impaired mobility   Influenced by the  "following impairments weakness, fatigue   Functional limitations due to impairments deconditioning, limited gait distances, use of AD   Clinical Presentation Stable/Uncomplicated   Clinical Presentation Rationale improving condition, clinical judgement   Clinical Decision Making (Complexity) Low complexity   Therapy Frequency Daily   Predicted Duration of Therapy Intervention (days/wks) 3 days   Anticipated Discharge Disposition Home with Assist   Risk & Benefits of therapy have been explained Yes   Patient, Family & other staff in agreement with plan of care Yes   [facility] TM \"6 Clicks\"   2016, Trustees of [facility], under license to [employer].  All rights reserved.   6 Clicks Short Forms Basic Mobility Inpatient Short Form   [facility] AM-PAC  \"6 Clicks\" V.2 Basic Mobility Inpatient Short Form   1. Turning from your back to your side while in a flat bed without using bedrails? 4 - None   2. Moving from lying on your back to sitting on the side of a flat bed without using bedrails? 4 - None   3. Moving to and from a bed to a chair (including a wheelchair)? 4 - None   4. Standing up from a chair using your arms (e.g., wheelchair, or bedside chair)? 4 - None   5. To walk in hospital room? 4 - None   6. Climbing 3-5 steps with a railing? 3 - A Little   Basic Mobility Raw Score (Score out of 24.Lower scores equate to lower levels of function) 23   Total Evaluation Time   Total Evaluation Time (Minutes) 10     "

## 2020-06-11 NOTE — PLAN OF CARE
A/Ox4 this AM, now speaking in full sentences and returning to baseline. VSS on RA. Denies pain. SBA with gb, ambulated in room today - little weak/unsteady. Up in chair. Diet advanced, speech following. Champion removed today, voiding adequately. Blue/green urine from methylene blue. BGM before meals. Good appetite. R port with good blood return, IVF discontinued. L foot partial toe amputations with stitches, Vascular surgery consulted and removed stitches today. PT consulted for weakness.

## 2020-06-11 NOTE — PLAN OF CARE
VSS on RA. A&O x4. Up with SBA. Ambulated in halls x2. Denies pain. LS clear. BS present, passing gas. Voiding well in BR, continues to be green/blue in color. PT pending. Discharge pending continued improvement.

## 2020-06-11 NOTE — PLAN OF CARE
A/Ox4. VSS on RA. Denies pain. BGM before meals. DD3 diet with thin liquids, speech following. Intermittent nausea/dry heaving - pt states this has been happening recently, compazine effective. Voiding adequately, blue/green urine (from medication). L partial toe amputations, wound cares completed. SBA, ambulating well in halls/room. PT evaluated pt today - see note. Plan for discharge home tomorrow AM on neupogen.

## 2020-06-11 NOTE — PLAN OF CARE
[id number]: A&Ox4. VSS on RA. Denies pain. Had an emesis x1, compazine given-effective. DD3 diet w/ thin liquids tolerating well. Voiding adequately blue/green urine (from medication) L partial toe dressings CDI. SBA. Probably discharge tomorrow 6/12.

## 2020-06-11 NOTE — PROGRESS NOTES
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  --s/p RCHOP   -- He responded well to the chemotherapy with a near complete remission after six cycles. Maintenance rituximab was started in June 2015 and completed in February 2017.   --On 11/17/17 the patient underwent a thoracotomy for an enlarging right infrahilar mass. 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 The tumor was handled as a carcinoma but the pathology is most consistent with recurrent grade 2-3 follicular lymphoma.   --On 12/14/17 he began bendamustine with rituximab chemotherapy.   --He completed 6 cycles of BR and on 6/4/18 a CT revealed stable mild lymphadenopathy with mild splenomegaly and a slight increase in the IVC thrombus.   --A CT 6/3/19 revealed an increase in the right hilar and pretracheal adenopathy with stable nodular scarring of the right lung base, without other new disease. A PET/CT 6/13/19 confirmed the hypermetabolic right infrahilar mass and revealed moderate metabolic activity within a few small nodules just deep to tire pleura in the posterior aspect of tire mid right  hemithorax, suspicious for neoplasm, without other disease.   --As all disease could be encompassed in one radiation field, he completed 3000 cGy of radiotherapy to the right thoracic region 6/27/19 - 7/18/19.  --CT C/A/P 5/14/20 revealed marked increase in the right lower lobe pulmonary mass with new subcarinal lymphadenopathy, bilateral new adrenal masses, and soft tissue tumor deposits in the abdomen  --CT guided adrenal biopsy 5/27/20 revealed transformation to a diffuse large B cell lymphoma, germinal  center type, 100% Ki-67 positive,  with positive immunohistochemical staining for BCL-2, BCL-6, and C-MYC suggestive of a possible triple-hit lymphoma.  - Started on R ICE 6/5/2020  - chemo discontinued for encephalopathy 6/7/20  - began Neupogen 6/9/20  - contnue supportive care with plans for alternative chemotherapy with cycle 2          Encephalopathy/Delerium  - began on 6/7/20. Had been awake and conversant.  Awakened from nap with global aphasia  - code stroke and RRT called.   - 6/7/20 CT and head/neck CTA negative. Brain MRI negative.  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 He is tolerating anticoagulation with Lovenox well. We discussed alternative anticoagulants such as Coumadin (less effective) or DOAC and as he has completed chemotherapy with a good response of his malignancy in 6/2018 he was switched to Xarelto.   --A vascular surgery consultation for the persistent IVC thrombus concurred with anticoagulation for 6-12 months or longer without other intervention.   --We plan to continue the anticoagulation for now, if not indefinitely with the underlying malignancy and persistent thrombus.   --Anticoagulation will be held if thrombocytopenia develops and the platelets drop below 50,000  --now with PO intake switch Lovenox back to Xarelto        Diabetes  - managed by hospitalist team    Hypokalemia  --potassium protocol     Possible adrenal insufficiency  --Large bilateral adrenal masses with mild hypotension, nausea, vomiting, and hyperkalemia  --Normal cortisol level and ACTH elevated at 140  --Began empiric PO hydrocortisone 20 mg q 8 AM and 10 mg every 4 p.m.  --With  improvement convert IV steroids back to baseline hydrocortisone     Anemia  --Mild chemotherapy and neoplasm induced anemia  --5/20/20 iron levels consistent with chronic disease  --will be monitored.     -Chronic Kidney Disease  -Mild renal insufficiency will be monitored.     LFT  --Mild liver function and alkaline phosphatase elevations, thought to be related to Crestor or a fatty liver  --resolved  --monitor    Right Foot Wound  --s/p toe amputation  --daily dressing changes    Urology  --urinary catheter in place with incontinence    Ifosfamide encephalopathy much improved. Convert meds back to preadmission meds. PT, speech therapy. If does well today home tomorrow AM on continued Neupogen    Appreciate hospitalist care!!    DVT Prophylaxis: Enoxaparin (Lovenox) SQ  Code Status: Full Code    Mike Hesterjanay    Interval History   Confusion stable    Physical Exam   Temp: 97.2  F (36.2  C) Temp src: Oral BP: 110/61 Pulse: 87 Heart Rate: 91 Resp: 16 SpO2: 97 % O2 Device: None (Room air)    Vitals:    06/05/20 0811 06/08/20 0557   Weight: 85.5 kg (188 lb 9.6 oz) 87.8 kg (193 lb 9.6 oz)     Vital Signs with Ranges  Temp:  [95.6  F (35.3  C)-97.4  F (36.3  C)] 97.2  F (36.2  C)  Pulse:  [85-87] 87  Heart Rate:  [91] 91  Resp:  [16] 16  BP: (101-121)/(51-65) 110/61  SpO2:  [94 %-97 %] 97 %  I/O last 3 completed shifts:  In: -   Out: 2475 [Urine:2475]    Constitutional: awake, no acute apparent distress, confused  Neuro: not oriented, moves all extremities  Hematologic / Lymphatic: no cervical lymphadenopathy  GI: soft, non-distended, non-tender, no masses palpated, no hepatosplenomegally  Skin: no bruising or bleeding  Musculoskeletal: no pedal edema    Medications     - MEDICATION INSTRUCTIONS -       - MEDICATION INSTRUCTIONS -       - MEDICATION INSTRUCTIONS -       sodium chloride 0.9%       sodium chloride         dextrose 5% water  10-20 mL Intracatheter Daily at 8 pm     dextrose 5% water  10-20 mL Intracatheter  Daily at 8 pm     enoxaparin ANTICOAGULANT  1 mg/kg Subcutaneous Q12H     fenofibrate  135 mg Oral Daily     filgrastim (NEUPOGEN/GRANIX) intravenous  5 mcg/kg (Order-Specific) Intravenous Daily at 8 pm     heparin  5 mL Intravenous Q28 Days     heparin lock flush  5 mL Intravenous Q24H     hydrocortisone sodium succinate PF  50 mg Intravenous Q8H     insulin aspart  1-12 Units Subcutaneous TID w/meals     insulin glargine  30 Units Subcutaneous At Bedtime     melatonin  3 mg Oral At Bedtime     rosuvastatin  5 mg Oral Daily       Data   Results for orders placed or performed during the hospital encounter of 06/05/20 (from the past 24 hour(s))   Glucose by meter   Result Value Ref Range    Glucose 94 70 - 99 mg/dL   Potassium   Result Value Ref Range    Potassium 3.6 3.4 - 5.3 mmol/L   CBC with platelets differential   Result Value Ref Range    WBC 6.0 4.0 - 11.0 10e9/L    RBC Count 3.49 (L) 4.4 - 5.9 10e12/L    Hemoglobin 8.6 (L) 13.3 - 17.7 g/dL    Hematocrit 26.6 (L) 40.0 - 53.0 %    MCV 76 (L) 78 - 100 fl    MCH 24.6 (L) 26.5 - 33.0 pg    MCHC 32.3 31.5 - 36.5 g/dL    RDW 18.3 (H) 10.0 - 15.0 %    Platelet Count 113 (L) 150 - 450 10e9/L    Diff Method Manual Differential     % Neutrophils 99.0 %    % Lymphocytes 1.0 %    % Monocytes 0.0 %    % Eosinophils 0.0 %    % Basophils 0.0 %    Absolute Neutrophil 5.9 1.6 - 8.3 10e9/L    Absolute Lymphocytes 0.1 (L) 0.8 - 5.3 10e9/L    Absolute Monocytes 0.0 0.0 - 1.3 10e9/L    Absolute Eosinophils 0.0 0.0 - 0.7 10e9/L    Absolute Basophils 0.0 0.0 - 0.2 10e9/L    Teardrop Cells Slight     Elliptocytes Slight     Shakira Cells Slight     Platelet Estimate       Automated count confirmed.  Platelet morphology is normal.   Basic metabolic panel   Result Value Ref Range    Sodium 138 133 - 144 mmol/L    Potassium 2.6 (LL) 3.4 - 5.3 mmol/L    Chloride 106 94 - 109 mmol/L    Carbon Dioxide 24 20 - 32 mmol/L    Anion Gap 8 3 - 14 mmol/L    Glucose 182 (H) 70 - 99 mg/dL    Urea  Nitrogen 19 7 - 30 mg/dL    Creatinine 0.90 0.66 - 1.25 mg/dL    GFR Estimate 87 >60 mL/min/[1.73_m2]    GFR Estimate If Black >90 >60 mL/min/[1.73_m2]    Calcium 8.7 8.5 - 10.1 mg/dL

## 2020-06-11 NOTE — PLAN OF CARE
Discharge Planner SLP   Patient plan for discharge: home  Current status: Swallow tx completed with meal of DD2/DD3 solids and thin liquids. Mild oral residuals and pt needing to take a liquid wash to elicit swallow with dryer items still. No overt signs/sx aspiration. Softer/moister DD3 solids continue to be most appropriate diet, will trial for upgrades to regular as appropriate. Pt in agreement.   Barriers to return to prior living situation: dysphagia and cognitive linguistic deficits.  Recommendations for discharge: home with OP SLP services  Rationale for recommendations: Anticipate patient will meet swallow goals prior to discharge. If he continues to have deficits with speech/language cognitive deficits will complete formal evaluation IP vs OP.         Entered by: Allison Milligan 06/11/2020 12:13 PM

## 2020-06-11 NOTE — PROGRESS NOTES
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      Past medical history significant for B-cell Non-Hodgkin's lymphoma, DM2, Suspected adrenal insufficiency, Hypercoagulable state, chemotherapy induced anemia, CKD stage II-III, JESSICA complaint with CPAP, HLP, History of HTN (no longer on medications), History of GERD, History of lower extremity edema who was admitted for induction of chemotherapy for which the Hospitalist service has been consulted to assist with medical management.       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  He slept throughout the morning and this afternoon was noted to have global aphasia.  RRT was called and CODE Stroke started.  - Head CT and Head/Neck CTA negative.  Brain MRI negative.    - Neurology was consulted-now signed off.  Felt likely secondary to chemotherapy.    -Patient received high-dose thiamine and methylene blue.  - Since 6/7 his symptoms have significantly improved, and he is much clearer 6/10 - near baseline possibly.   -When patient had mental status change he was n.p.o. for a short while, evaluated by speech and currently diet to be advanced as tolerated.  - Nursing had some concerns about stiff upper extremity, his EEG was reviewed which shows generalized slowing which could be secondary to the encephalopathy, no seizure focus noted, neurology has been notified as per oncology request.  -Patient is showing significant improvement, he finished his thiamine and methylene blue dosage, currently is able to hold a reasonable conversation.  And he is oriented x3.     Diffuse large B-cell Non-Hodgkin's lymphoma  LDH elevated at 534.    Management per Oncology:  - RICE chemotherapy then followed by either stem cell transplant or CART therapy. Cycle 1 of  RICE started on 6/5.  Chemo plans as per oncology team, currently allopurinol and dexamethasone on hold.  -He did receive Neulasta while here.     Insulin dependent DM2  Complaint with metformin 1000 mg every morning and 500 mg with supper, Lantus 30 units at bedtime and sliding scale NovoLog with meals.  A1c of 7.3%.   - Patient received 8 mg PO dexamethasone on 6/5 and 12 mg on 6/6 and 8 mg on 6/7.    - Hold PTA metformin.     - Hypoglycemic protocol.    - 6/10 resumed pta lantus 30 nightly as he is eating, Sliding scale now with meals and nightly  - Blood sugars appear to be stable    Anemia of chronic disease  Believe secondary to malignancy and previous chemotherapy.  Lab studies from 5/2020 consistent with chronic disease.    - fairly stable, monitor     Leukopenia, variable  Mild thrombocytopenia  - Monitor.    - plt 120 6/10     Suspected adrenal insufficiency, in setting of adrenal masses  Noted large bilateral adrenal masses with new hypotension, nausea, vomiting and hyperkalemia. Noted normal cortisol level and ACTH elevated at 140.    - Resume PTA empiric hydrocortisone 20 mg every 8 am and 10 mg every 4 pm.    When oral intake resumes, until then he is started on hydrocortisone 10 mg 3 times daily, considering prednisone being 4 times stronger than hydrocortisone 10 mg of hydrocortisone 3 times a day would be insufficient for his coverage, will increase hydrocortisone dosage to 50 mg 3 times daily.  -He is back on his p.o. prednisone starting 6/11  - Endocrinology follow-up as an outpatient.        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    - Patient continues to be n.p.o., will hold off on Xarelto for now and oncology had started him on Lovenox..    - Hold if thrombocytopenia develops (PLTs drop below 50,000).   6/10 120     CKD stage II-III  Creatinine at 1.13 with GFR of 66 upon admit.    - Creat 0.92 6/10 - stable  - 6/10 IVF  stopped     JESSICA  Complaint with CPAP.  - Home CPAP with home settings.      Hypokalemia  - Replace electrolytes as needed. Intermittently low - likely in part due to decreased intake.  - Likely to improve now that he seems to be clearing.and can eat.    HLP  - Resume PTA rosuvastatin 5 mg daily and Tricor 145 mg daily.       Right foot wound and previous left toes partial amputation  Known right foot wounds, that were present prior to admission.    - Daily dressing changes.    - 6/10 Vascular surgery consulted for follow up (was to be in clinic) and likely suture removal.      History of HTN (no longer on medications)  Patient with hypotension and has stopped all blood pressure medications.  Suspected adrenal insufficiency.  - Orthostatic blood pressures.    - IVF at 75 ml/hr.       History of GERD  No longer on medications.       History of lower extremity edema  Patient indicated this is no longer an issue and is no longer on diuretic therapy.    - Monitor.      Severe Malnutrition in the context of acute illness or injury    % Weight Loss:   > 5% in 1 month (weight loss 7.9 kg, 8%, over the past ~ 1 month)   - nutrition following, appreciated  - supplements as tolerated  Hypokalemia  --Replace per protocol.    Diet: Snacks/Supplements Adult: Boost Glucose Control; With Meals  Combination Diet Dysphagia Diet Level 3: Advanced; Thin Liquids (water, ice chips, juice, milk gelatin, ice cream, etc) (No straws)    DVT Prophylaxis: per primary  Champion Catheter: not present  Code Status: Full Code           Disposition Plan   Expected discharge: per Oncology rec (primary)  Entered: Karmen Richardson MD 06/11/2020, 8:49 AM       The patient's care was discussed with the Bedside Nurse and Patient.    Karmen Richardson MD  Hospitalist Service  [facility]    ______________________________________________________________________    Interval History   Patient seen and examined, he is tolerating diet well, denies  any new concerns, he is alert oriented x3, blood sugars moderately controlled.    Data reviewed today: I reviewed all medications, new labs and imaging results over the last 24 hours. I personally reviewed no images or EKG's today.    Physical Exam   Vital Signs: Temp: 97.2  F (36.2  C) Temp src: Oral BP: 110/61 Pulse: 87 Heart Rate: 91 Resp: 16 SpO2: 97 % O2 Device: None (Room air)    Weight: 193 lbs 9.6 oz    Constitutional: Awake, alert, cooperative, no apparent distress  Respiratory: Clear to auscultation bilaterally, no crackles or wheezing  Cardiovascular: Regular rate and rhythm, normal S1 and S2, and no murmur noted  GI: Normal bowel sounds, soft, non-distended, non-tender  Skin/Integumen: No rashes, no cyanosis, no edema  Neuro : moving all 4 extremities, no focal deficit noted         Data   Recent Labs   Lab 06/11/20  0600 06/10/20  1735 06/10/20  0630  06/09/20  0610  06/07/20  1650  06/05/20  0830   WBC 6.0  --  7.8  --  3.3*   < > 5.5   < > 4.3   HGB 8.6*  --  8.9*  --  9.4*   < > 9.2*   < > 10.6*   MCV 76*  --  76*  --  77*   < > 78   < > 78   *  --  120*  --  167   < > 184   < > 215     --  134  --  136   < > 137   < > 135   POTASSIUM 2.6* 3.6 2.8*   < > 2.8*   < > 3.6   < > 4.6   CHLORIDE 106  --  103  --  103   < > 106   < > 104   CO2 24  --  23  --  28   < > 25   < > 22   BUN 19  --  18  --  17   < > 16   < > 24   CR 0.90  --  0.92  --  0.82   < > 0.68   < > 1.13   ANIONGAP 8  --  8  --  5   < > 6   < > 9   JEFF 8.7  --  8.5  --  8.6   < > 8.1*   < > 9.5   *  --  98  --  94   < > 230*   < > 71   ALBUMIN  --   --   --   --   --   --  2.3*  --  2.9*   PROTTOTAL  --   --   --   --   --   --  5.7*  --  7.0   BILITOTAL  --   --   --   --   --   --  0.3  --  0.4   ALKPHOS  --   --   --   --   --   --  44  --  57   ALT  --   --   --   --   --   --  20  --  25   AST  --   --   --   --   --   --  29  --  39    < > = values in this interval not displayed.     No results found for this or  any previous visit (from the past 24 hour(s)).

## 2020-06-12 ENCOUNTER — APPOINTMENT (OUTPATIENT)
Dept: PHYSICAL THERAPY | Facility: CLINIC | Age: 70
DRG: 829 | End: 2020-06-12
Attending: INTERNAL MEDICINE
Payer: COMMERCIAL

## 2020-06-12 VITALS
BODY MASS INDEX: 29.34 KG/M2 | RESPIRATION RATE: 16 BRPM | HEART RATE: 82 BPM | OXYGEN SATURATION: 96 % | TEMPERATURE: 97.6 F | HEIGHT: 68 IN | DIASTOLIC BLOOD PRESSURE: 59 MMHG | WEIGHT: 193.6 LBS | SYSTOLIC BLOOD PRESSURE: 96 MMHG

## 2020-06-12 LAB
ANION GAP SERPL CALCULATED.3IONS-SCNC: 7 MMOL/L (ref 3–14)
BASOPHILS # BLD AUTO: 0 10E9/L (ref 0–0.2)
BASOPHILS NFR BLD AUTO: 0 %
BUN SERPL-MCNC: 16 MG/DL (ref 7–30)
CALCIUM SERPL-MCNC: 8.5 MG/DL (ref 8.5–10.1)
CHLORIDE SERPL-SCNC: 107 MMOL/L (ref 94–109)
CO2 SERPL-SCNC: 24 MMOL/L (ref 20–32)
CREAT SERPL-MCNC: 0.78 MG/DL (ref 0.66–1.25)
DIFFERENTIAL METHOD BLD: ABNORMAL
EOSINOPHIL # BLD AUTO: 0.1 10E9/L (ref 0–0.7)
EOSINOPHIL NFR BLD AUTO: 3.3 %
ERYTHROCYTE [DISTWIDTH] IN BLOOD BY AUTOMATED COUNT: 18.3 % (ref 10–15)
GFR SERPL CREATININE-BSD FRML MDRD: >90 ML/MIN/{1.73_M2}
GLUCOSE BLDC GLUCOMTR-MCNC: 133 MG/DL (ref 70–99)
GLUCOSE BLDC GLUCOMTR-MCNC: 170 MG/DL (ref 70–99)
GLUCOSE SERPL-MCNC: 104 MG/DL (ref 70–99)
HCT VFR BLD AUTO: 26.2 % (ref 40–53)
HGB BLD-MCNC: 8.4 G/DL (ref 13.3–17.7)
IMM GRANULOCYTES # BLD: 0 10E9/L (ref 0–0.4)
IMM GRANULOCYTES NFR BLD: 1.1 %
LYMPHOCYTES # BLD AUTO: 0.1 10E9/L (ref 0.8–5.3)
LYMPHOCYTES NFR BLD AUTO: 7.8 %
MCH RBC QN AUTO: 24.6 PG (ref 26.5–33)
MCHC RBC AUTO-ENTMCNC: 32.1 G/DL (ref 31.5–36.5)
MCV RBC AUTO: 77 FL (ref 78–100)
MONOCYTES # BLD AUTO: 0 10E9/L (ref 0–1.3)
MONOCYTES NFR BLD AUTO: 0 %
NEUTROPHILS # BLD AUTO: 1.6 10E9/L (ref 1.6–8.3)
NEUTROPHILS NFR BLD AUTO: 87.8 %
NRBC # BLD AUTO: 0 10*3/UL
NRBC BLD AUTO-RTO: 0 /100
PLATELET # BLD AUTO: 83 10E9/L (ref 150–450)
POTASSIUM SERPL-SCNC: 2.9 MMOL/L (ref 3.4–5.3)
RBC # BLD AUTO: 3.41 10E12/L (ref 4.4–5.9)
SODIUM SERPL-SCNC: 138 MMOL/L (ref 133–144)
WBC # BLD AUTO: 1.8 10E9/L (ref 4–11)

## 2020-06-12 PROCEDURE — 80048 BASIC METABOLIC PNL TOTAL CA: CPT | Performed by: INTERNAL MEDICINE

## 2020-06-12 PROCEDURE — 97116 GAIT TRAINING THERAPY: CPT | Mod: GP | Performed by: PHYSICAL THERAPIST

## 2020-06-12 PROCEDURE — 25000132 ZZH RX MED GY IP 250 OP 250 PS 637: Performed by: INTERNAL MEDICINE

## 2020-06-12 PROCEDURE — 25000128 H RX IP 250 OP 636: Performed by: NURSE PRACTITIONER

## 2020-06-12 PROCEDURE — 85025 COMPLETE CBC W/AUTO DIFF WBC: CPT | Performed by: INTERNAL MEDICINE

## 2020-06-12 PROCEDURE — 25000128 H RX IP 250 OP 636: Performed by: INTERNAL MEDICINE

## 2020-06-12 PROCEDURE — 00000146 ZZHCL STATISTIC GLUCOSE BY METER IP

## 2020-06-12 PROCEDURE — 97110 THERAPEUTIC EXERCISES: CPT | Mod: GP | Performed by: PHYSICAL THERAPIST

## 2020-06-12 PROCEDURE — 99232 SBSQ HOSP IP/OBS MODERATE 35: CPT | Performed by: INTERNAL MEDICINE

## 2020-06-12 PROCEDURE — 25000132 ZZH RX MED GY IP 250 OP 250 PS 637: Performed by: PHYSICIAN ASSISTANT

## 2020-06-12 RX ORDER — POTASSIUM CHLORIDE 1.5 G/1.58G
20 POWDER, FOR SOLUTION ORAL DAILY
Status: DISCONTINUED | OUTPATIENT
Start: 2020-06-12 | End: 2020-06-12 | Stop reason: HOSPADM

## 2020-06-12 RX ORDER — FILGRASTIM-AAFI 480 UG/.8ML
480 INJECTION, SOLUTION SUBCUTANEOUS DAILY
Qty: 7 SYRINGE | Refills: 3 | Status: SHIPPED | OUTPATIENT
Start: 2020-06-12 | End: 2020-06-12

## 2020-06-12 RX ORDER — PROCHLORPERAZINE MALEATE 5 MG
5-10 TABLET ORAL EVERY 6 HOURS PRN
Qty: 30 TABLET | Refills: 0 | Status: SHIPPED | OUTPATIENT
Start: 2020-06-12 | End: 2022-04-04

## 2020-06-12 RX ADMIN — HYDROCORTISONE 20 MG: 20 TABLET ORAL at 09:03

## 2020-06-12 RX ADMIN — FILGRASTIM 480 MCG: 480 INJECTION, SOLUTION INTRAVENOUS; SUBCUTANEOUS at 12:01

## 2020-06-12 RX ADMIN — HEPARIN SODIUM (PORCINE) LOCK FLUSH IV SOLN 100 UNIT/ML 5 ML: 100 SOLUTION at 10:16

## 2020-06-12 RX ADMIN — Medication 5 ML: at 06:12

## 2020-06-12 RX ADMIN — POTASSIUM CHLORIDE 20 MEQ: 29.8 INJECTION, SOLUTION INTRAVENOUS at 08:18

## 2020-06-12 RX ADMIN — DIPHENHYDRAMINE HYDROCHLORIDE 50 MG: 50 CAPSULE ORAL at 02:41

## 2020-06-12 RX ADMIN — ROSUVASTATIN CALCIUM 5 MG: 5 TABLET, FILM COATED ORAL at 09:03

## 2020-06-12 RX ADMIN — FENOFIBRATE 135 MG: 54 TABLET ORAL at 09:03

## 2020-06-12 RX ADMIN — POTASSIUM CHLORIDE 20 MEQ: 29.8 INJECTION, SOLUTION INTRAVENOUS at 07:14

## 2020-06-12 RX ADMIN — POTASSIUM CHLORIDE 20 MEQ: 29.8 INJECTION, SOLUTION INTRAVENOUS at 09:08

## 2020-06-12 ASSESSMENT — ACTIVITIES OF DAILY LIVING (ADL)
ADLS_ACUITY_SCORE: 15
ADLS_ACUITY_SCORE: 13
ADLS_ACUITY_SCORE: 15
ADLS_ACUITY_SCORE: 15
ADLS_ACUITY_SCORE: 13

## 2020-06-12 NOTE — PLAN OF CARE
Discharge home today once discharge medication issues resolved.  Port HL'd.  Monitoring blood sugars.  Potassium replaced this am.  Toes cleansed this am.  Will have OP therapies at discharge.

## 2020-06-12 NOTE — PLAN OF CARE
A&Ox4. VSS on RA. Denies pain. LS clear. BS active. Voiding adequately via urinal, urine blue/green color. DD3 with thin liquids, intermittent nausea and emesis, no PRNs needed overnight. K+ replaced x2, recheck 3.6. L foot dressing CDI. Port HL, blood return noted. Up with SBA. Plan to discharge home today.

## 2020-06-12 NOTE — PLAN OF CARE
"Speech Language Therapy Discharge Summary    Reason for therapy discharge:    Discharged to home with outpatient therapy.    Progress towards therapy goal(s). See goals on Care Plan in [facility] electronic health record for goal details.  Goals not met.  Barriers to achieving goals:   discharge from facility.    Therapy recommendation(s):    Continued therapy is recommended.  Rationale/Recommendations:  \"He would benefit from OP SLP evaluation with further comprehensive cognitive assessment to improve functional skills and overall safety for home, community environments and possible return to work pending ongoing medical/CA treatment.\".      "

## 2020-06-12 NOTE — DISCHARGE SUMMARY
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  --s/p RCHOP   -- He responded well to the chemotherapy with a near complete remission after six cycles. Maintenance rituximab was started in June 2015 and completed in February 2017.   --On 11/17/17 the patient underwent a thoracotomy for an enlarging right infrahilar mass. 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 The tumor was handled as a carcinoma but the pathology is most consistent with recurrent grade 2-3 follicular lymphoma.   --On 12/14/17 he began bendamustine with rituximab chemotherapy.   --He completed 6 cycles of BR and on 6/4/18 a CT revealed stable mild lymphadenopathy with mild splenomegaly and a slight increase in the IVC thrombus.   --A CT 6/3/19 revealed an increase in the right hilar and pretracheal adenopathy with stable nodular scarring of the right lung base, without other new disease. A PET/CT 6/13/19 confirmed the hypermetabolic right infrahilar mass and revealed moderate metabolic activity within a few small nodules just deep to tire pleura in the posterior aspect of tire mid right  hemithorax, suspicious for neoplasm, without other disease.   --As all disease could be encompassed in one radiation  field, he completed 3000 cGy of radiotherapy to the right thoracic region 6/27/19 - 7/18/19.  --CT C/A/P 5/14/20 revealed marked increase in the right lower lobe pulmonary mass with new subcarinal lymphadenopathy, bilateral new adrenal masses, and soft tissue tumor deposits in the abdomen  --CT guided adrenal biopsy 5/27/20 revealed transformation to a diffuse large B cell lymphoma, germinal center type, 100% Ki-67 positive,  with positive immunohistochemical staining for BCL-2, BCL-6, and C-MYC suggestive of a possible triple-hit lymphoma.  - Started on R ICE 6/5/2020  - chemo discontinued for encephalopathy 6/7/20  - began Neupogen 6/9/20  - contnue supportive care with plans for alternative chemotherapy with cycle 2           Encephalopathy/Delerium  - began on 6/7/20. Had been awake and conversant.  Awakened from nap with global aphasia  - code stroke and RRT called.   - 6/7/20 CT and head/neck CTA negative. Brain MRI negative.  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 He is tolerating anticoagulation with Lovenox well. We discussed alternative anticoagulants such as Coumadin (less effective) or DOAC and as he has completed chemotherapy with a good response of his malignancy in 6/2018 he was switched to Xarelto.   --A vascular surgery consultation for the persistent IVC thrombus concurred with anticoagulation for 6-12 months or longer without other intervention.   --We plan to continue the anticoagulation for now, if not indefinitely with the underlying malignancy and persistent thrombus.   --Anticoagulation will be held if thrombocytopenia develops and the platelets drop below 50,000  --now  with PO intake switched Lovenox back to Xarelto           Diabetes  - managed by hospitalist team     Hypokalemia  --potassium replacement     Possible adrenal insufficiency  --Large bilateral adrenal masses with mild hypotension, nausea, vomiting, and hyperkalemia  --Normal cortisol level and ACTH elevated at 140  --Began empiric PO hydrocortisone 20 mg q 8 AM and 10 mg every 4 p.m.  --With improvement convert IV steroids back to baseline hydrocortisone     Pancytopenia  --chemotherapy and neoplasm induced anemia  --5/20/20 iron levels consistent with chronic disease  --neupogen  --will be monitored.     -Chronic Kidney Disease  -Mild renal insufficiency will be monitored.     LFT  --Mild liver function and alkaline phosphatase elevations, thought to be related to Crestor or a fatty liver  --resolved  --monitor     Right Foot Wound  --s/p toe amputation  --daily dressing changes    Follow Up;  --Continue neupogen 480 mcg daily  --CBC every Mon, Wed, Fri  --see Dr Olson 6/19/20    Mike Olson MD    Significant Results and Procedures   Received RICE chemotherapy complicated by Ifosfamide induced Encephalopathy    Pending Results   Unresulted Labs Ordered in the Past 30 Days of this Admission     No orders found from 5/6/2020 to 6/6/2020.          Code Status   Full Code       Primary Care Physician   Francisco Armijo    Discharge Disposition   Discharged to home    Consultations This Hospital Stay   HOSPITALIST IP CONSULT  HOSPITALIST IP CONSULT  NEUROLOGY IP CONSULT  NEUROLOGY IP CONSULT  PHARMACY IP CONSULT  SPEECH LANGUAGE PATH ADULT IP CONSULT  NEUROLOGY IP CONSULT  NEUROLOGY IP CONSULT  VASCULAR SURGERY IP CONSULT  PHYSICAL THERAPY ADULT IP CONSULT    Discharge Orders   No discharge procedures on file.  Discharge Medications   Current Discharge Medication List      CONTINUE these medications which have NOT CHANGED    Details   acetaminophen (TYLENOL) 325 MG tablet Take 650-975 mg by mouth every 12 hours as  needed for mild pain      allopurinol (ZYLOPRIM) 300 MG tablet Take 300 mg by mouth daily      diphenhydrAMINE-acetaminophen (TYLENOL PM)  MG tablet Take 2-3 tablets by mouth At Bedtime      fenofibrate (TRICOR) 145 MG tablet Take 145 mg by mouth daily      !! hydrocortisone (CORTEF) 10 MG tablet Take 10 mg by mouth every evening      !! hydrocortisone (CORTEF) 10 MG tablet Take 20 mg by mouth daily before breakfast       insulin aspart (NOVOLOG FLEXPEN) 100 UNIT/ML pen Inject Subcutaneous 3 times daily (with meals) Patient uses sliding scale based on Carbs and Blood Glucose. Has been using very little lately due to low readings and low appetite.      insulin glargine (LANTUS VIAL) 100 UNIT/ML soln Inject 30 Units Subcutaneous At Bedtime       !! metFORMIN (GLUCOPHAGE) 500 MG tablet Take 1,000 mg by mouth daily (with breakfast)      !! metFORMIN (GLUCOPHAGE) 500 MG tablet Take 500 mg by mouth daily (with dinner)      potassium chloride ER (KLOR-CON M) 20 MEQ CR tablet Take 20 mEq by mouth daily      rosuvastatin (CRESTOR) 10 MG tablet Take 5 mg by mouth daily      XARELTO ANTICOAGULANT 20 MG TABS tablet Take 20 mg by mouth daily Takes in the am      order for DME Kyle 1 packet twice a day for the next month  Qty: 30 days, Refills: 0    Associated Diagnoses: Skin ulcer of toe of left foot with fat layer exposed (H)       !! - Potential duplicate medications found. Please discuss with provider.        Allergies   No Known Allergies  Data   Most Recent 3 CBC's:  Recent Labs   Lab Test 06/12/20  0600 06/11/20  0600 06/10/20  0630   WBC 1.8* 6.0 7.8   HGB 8.4* 8.6* 8.9*   MCV 77* 76* 76*   PLT 83* 113* 120*      Most Recent 3 BMP's:  Recent Labs   Lab Test 06/12/20  0600 06/11/20  2245 06/11/20  1530 06/11/20 0600  06/10/20  0630     --   --  138  --  134   POTASSIUM 2.9* 3.6 3.2* 2.6*   < > 2.8*   CHLORIDE 107  --   --  106  --  103   CO2 24  --   --  24  --  23   BUN 16  --   --  19  --  18   CR 0.78  --    --  0.90  --  0.92   ANIONGAP 7  --   --  8  --  8   JEFF 8.5  --   --  8.7  --  8.5   *  --   --  182*  --  98    < > = values in this interval not displayed.     Most Recent 2 LFT's:  Recent Labs   Lab Test 06/07/20  1650 06/05/20  0830   AST 29 39   ALT 20 25   ALKPHOS 44 57   BILITOTAL 0.3 0.4     Most Recent INR's and Anticoagulation Dosing History:  Anticoagulation Dose History     Recent Dosing and Labs Latest Ref Rng & Units 11/13/2014 10/26/2017 11/14/2017 12/10/2018 5/27/2020    INR 0.86 - 1.14 0.98 0.98 0.92 0.99 1.22(H)        Most Recent 3 Troponin's:No lab results found.  Most Recent Cholesterol Panel:No lab results found.  Most Recent 6 Bacteria Isolates From Any Culture (See EPIC Reports for Culture Details):  Recent Labs   Lab Test 11/13/14  1330   CULT No growth     Most Recent TSH, T4 and A1c Labs:  Recent Labs   Lab Test 05/22/20  0911   A1C 7.3*     Results for orders placed or performed during the hospital encounter of 06/05/20   IR Chest Port Placement > 5 Yrs of Age    Narrative    [facility]  LOCATION:  DATE: 6/5/2020    PROCEDURE: IMPLANTABLE VENOUS CHEST PORT PLACEMENT    INTERVENTIONAL RADIOLOGIST: Brody Estevez MD.    INDICATION: The patient is a 70-year-old male with a history of  recurrent B-cell lymphoma who presents to interventional radiology for  Port-A-Cath placement.    CONSENT: The risks, benefits and alternatives of implantable venous  chest port placement were discussed with the patient  in detail. All  questions were answered. Informed consent was given to proceed with  the procedure.    MODERATE SEDATION: Versed 2 mg IV; Fentanyl 100 mcg IV.  Under  physician supervision, Versed and fentanyl were administered for  moderate sedation. Pulse oximetry, heart rate and blood pressure were  continuously monitored by an independent trained observer. The  physician spent 40 minutes of face-to-face sedation time with the  patient.    CONTRAST: None.  ANTIBIOTICS: Ancef  2 g IV.  ADDITIONAL MEDICATIONS: None.    FLUOROSCOPIC TIME: 0.9 minutes.  RADIATION DOSE: Air Kerma: 4 mGy.    COMPLICATIONS: No immediate complications.    STERILE BARRIER TECHNIQUE: Maximum sterile barrier technique was used.  Cutaneous antisepsis was performed at the operative site with  application of 2% chlorhexidine and large sterile drape. Prior to the  procedure, the  and assistant performed hand hygiene and wore  hat, mask, sterile gown, and sterile gloves during the entire  procedure.    PROCEDURE:    Using real-time ultrasound guidance the right internal jugular vein  was accessed.    A subcutaneous pocket was created and irrigated with sterile normal  saline. The catheter tubing was tunneled in an antegrade fashion from  the port pocket to the dermatotomy site. Over a guidewire, and under  direct fluoroscopic visualization a peel-away sheath was advanced over  the wire. Through the peel-away sheath, the catheter tubing was  advanced until the tip was at the cavoatrial junction. The catheter  tubing was cut to length and attached firmly to the port. The port was  placed within the subcutaneous pocket and tested. The port pocket  incision was closed with layered absorbable suture and surgical glue.  The dermatotomy site was closed with surgical glue.     FINDINGS:  Ultrasound demonstrates an anechoic and compressible jugular vein. A  permanent image was stored.    At the completion of the study the port tip lies near the cavoatrial  junction.      Impression    IMPRESSION:    1.  Successful implantable venous chest port placement as detailed  above.  2.  The implantable venous chest port was placed under fluoroscopic  guidance and is ready for use immediately.    ____________________________________________________________________    CPT codes for physician reference only:  61773  [id number]  [id number]  [id number]  [id number]  [id number]    TEENA ARMENDARIZ MD   CT Head Perfusion w Contrast    Narrative    CT BRAIN  PERFUSION 6/7/2020 1:27 PM    COMPARISON: None    HISTORY: Aphasia.    TECHNIQUE: Time sequential axial CT images of the head were acquired  during the administration of intravenous contrast (50 mL Isovue-370).  CTA images of the [place] as well as color perfusion maps of  the brain were created from this time sequential axial source data.    FINDINGS: There are no focal or regional perfusion defects in the  brain.      Impression    IMPRESSION: Normal CT perfusion of the brain.      Radiation dose for this scan was reduced using automated exposure  control, adjustment of the mA and/or kV according to patient size, or  iterative reconstruction technique.    TYLOR PADILLA MD   CT Head w/o Contrast    Narrative    CT OF THE HEAD WITHOUT CONTRAST 6/7/2020 1:13 PM     COMPARISON: None.    HISTORY: Code Stroke, aphasia.    TECHNIQUE: 5 mm thick axial CT images of the head were acquired  without IV contrast material.    FINDINGS:  There is moderate diffuse cerebral volume loss. There are  subtle patchy areas of decreased density in the cerebral white matter  bilaterally that are consistent with sequela of chronic small vessel  ischemic disease.     The ventricles and basal cisterns are within normal limits in  configuration given the degree of cerebral volume loss.  There is no  midline shift. There are no extra-axial fluid collections.     No intracranial hemorrhage, mass or recent infarct.    The visualized paranasal sinuses are well-aerated. There is no  mastoiditis. There are no fractures of the visualized bones.       Impression    IMPRESSION:  Diffuse cerebral volume loss and cerebral white matter  changes consistent with chronic small vessel ischemic disease. No  evidence for acute intracranial pathology.        Radiation dose for this scan was reduced using automated exposure  control, adjustment of the mA and/or kV according to patient size, or  iterative reconstruction technique.    TYLOR PADILLA MD    CTA Head Neck with Contrast    Narrative    CT ANGIOGRAM OF THE HEAD AND NECK WITHOUT AND WITH CONTRAST  6/7/2020  1:17 PM     COMPARISON: None    HISTORY: Aphasia.    TECHNIQUE:  Precontrast localizing scans were followed by CT  angiography with an injection of 70mL Isovue-370 nonionic intravenous  contrast material with scans through the head and neck.  Images were  transferred to a separate 3-D workstation where multiplanar  reformations and 3-D images were created.  Estimates of carotid  stenoses are made relative to the distal internal carotid artery  diameters except as noted.      FINDINGS: Incidental note is made of airspace opacity in the  visualized portion of the right lower lung, possibly representing  pneumonia.    Neck CTA: The bilateral common carotid, bilateral cervical internal  carotid and bilateral vertebral arteries are patent without vascular  narrowing.    Head CTA: The basilar, bilateral intracranial distal internal carotid,  bilateral anterior cerebral, bilateral middle cerebral and bilateral  posterior cerebral arteries are patent and unremarkable. The anterior  communicating and right posterior communicating arteries are patent.      Impression    IMPRESSION: Normal neck and head CTA.      Radiation dose for this scan was reduced using automated exposure  control, adjustment of the mA and/or kV according to patient size, or  iterative reconstruction technique.    TYLOR PADILLA MD   MR Brain w/o & w Contrast    Narrative    MRI BRAIN WITHOUT AND WITH CONTRAST  6/7/2020 3:20 PM    HISTORY: Assess for leukoencephalopathy. Has aphasia.     TECHNIQUE: Multiplanar, multisequence MRI of the brain without and  with    COMPARISON: None.    FINDINGS: Diffusion-weighted images are normal. There is no evidence  for intracranial hemorrhage or acute infarct. Exam is mildly limited  due to motion artifact. There is some mild cerebral atrophy. Brain  parenchyma is otherwise normal. Postcontrast images  do not show any  abnormal areas of enhancement or any focal mass lesions. Vascular  structures are patent at the skull base.      Impression    IMPRESSION:  1. Mild cerebral atrophy.  2. No evidence for intracranial hemorrhage, acute infarct, or any  focal mass lesions.  3. Exam mildly limited by motion artifact.    LOUIE PORRAS MD

## 2020-06-12 NOTE — PLAN OF CARE
Discharge Planner PT   Patient plan for discharge: Home, interested in OP PT  Current status: Independent with ambulation in room and hallway, PT only assisting with line management. Able to complete standing exercises in his room. The pt is interested in OP PT, will request order.  Barriers to return to prior living situation: stairs, weakness  Recommendations for discharge: Home with OP PT  Rationale for recommendations: [place] with gait, has assistance at home.       Entered by: Kassie Saez 06/12/2020 8:45 AM

## 2020-06-12 NOTE — PROGRESS NOTES
Patient discharged at 4:59 PM to home. Port was deaccessed and hep locked. Pain at time of discharge was 0. Belongings returned to patient.  Discharge instructions and medications reviewed with patient. Compazine was given to patient and instructions were reviewed. Filgastim will be filled at  specialty pharmacy and will be ready for  6/13. Patient verbalized understanding and all questions were answered.  Prescriptions given to patient.  At time of discharge, patient condition was stable and left the unit at 1659 escorted by 88 staff in wheelchair.

## 2020-06-12 NOTE — PROGRESS NOTES
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      Past medical history significant for B-cell Non-Hodgkin's lymphoma, DM2, Suspected adrenal insufficiency, Hypercoagulable state, chemotherapy induced anemia, CKD stage II-III, JESSICA complaint with CPAP, HLP, History of HTN (no longer on medications), History of GERD, History of lower extremity edema who was admitted for induction of chemotherapy for which the Hospitalist service has been consulted to assist with medical management.       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  He slept throughout the morning and this afternoon was noted to have global aphasia.  RRT was called and CODE Stroke started.  - Head CT and Head/Neck CTA negative.  Brain MRI negative.    - Neurology was consulted-now signed off.  Felt likely secondary to chemotherapy.    -Patient received high-dose thiamine and methylene blue.  - Since 6/7 his symptoms have significantly improved, and he is much clearer 6/10 - near baseline possibly.   -When patient had mental status change he was n.p.o. for a short while, evaluated by speech and currently diet to be advanced as tolerated.  - Nursing had some concerns about stiff upper extremity, his EEG was reviewed which shows generalized slowing which could be secondary to the encephalopathy, no seizure focus noted, neurology has been notified as per oncology request.  -Patient is showing significant improvement, he finished his thiamine and methylene blue dosage, currently is able to hold a reasonable conversation.  And he is oriented x3.     Diffuse large B-cell Non-Hodgkin's lymphoma  LDH elevated at 534.    Management per Oncology:  - RICE chemotherapy then followed by either stem cell transplant or CART therapy. Cycle 1 of  RICE started on 6/5.  Chemo plans as per oncology team, currently allopurinol and dexamethasone on hold.  -He did receive Neulasta while here.     Insulin dependent DM2  Complaint with metformin 1000 mg every morning and 500 mg with supper, Lantus 30 units at bedtime and sliding scale NovoLog with meals.  A1c of 7.3%.   - Patient received 8 mg PO dexamethasone on 6/5 and 12 mg on 6/6 and 8 mg on 6/7.    - Hold PTA metformin.     - Hypoglycemic protocol.    - 6/10 resumed pta lantus 30 nightly as he is eating, Sliding scale now with meals and nightly  - Blood sugars appear to be stable    Anemia of chronic disease  Believe secondary to malignancy and previous chemotherapy.  Lab studies from 5/2020 consistent with chronic disease.    - fairly stable, monitor     Leukopenia, variable  Mild thrombocytopenia  - Monitor.    - plt 120 6/10     Suspected adrenal insufficiency, in setting of adrenal masses  Noted large bilateral adrenal masses with new hypotension, nausea, vomiting and hyperkalemia. Noted normal cortisol level and ACTH elevated at 140.    - Resume PTA empiric hydrocortisone 20 mg every 8 am and 10 mg every 4 pm.    When oral intake resumes, until then he is started on hydrocortisone 10 mg 3 times daily, considering prednisone being 4 times stronger than hydrocortisone 10 mg of hydrocortisone 3 times a day would be insufficient for his coverage, will increase hydrocortisone dosage to 50 mg 3 times daily.  -He is back on his p.o. prednisone starting 6/11  - Endocrinology follow-up as an outpatient.        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    - Patient continues to be n.p.o., will hold off on Xarelto for now and oncology had started him on Lovenox..    - Hold if thrombocytopenia develops (PLTs drop below 50,000).   6/10 120     CKD stage II-III  Creatinine at 1.13 with GFR of 66 upon admit.    - Creat 0.92 6/10 - stable  - 6/10 IVF  stopped     JESSICA  Complaint with CPAP.  - Home CPAP with home settings.      Hypokalemia  - Replace electrolytes as needed. Intermittently low - likely in part due to decreased intake.  - Likely to improve now that he seems to be clearing.and can eat.  Replace per protocol.    HLP  - Resume PTA rosuvastatin 5 mg daily and Tricor 145 mg daily.       Right foot wound and previous left toes partial amputation  Known right foot wounds, that were present prior to admission.    - Daily dressing changes.    - 6/10 Vascular surgery consulted for follow up (was to be in clinic) and likely suture removal.      History of HTN (no longer on medications)  Patient with hypotension and has stopped all blood pressure medications.  Suspected adrenal insufficiency.  - Orthostatic blood pressures.    - IVF at 75 ml/hr.       History of GERD  No longer on medications.       History of lower extremity edema  Patient indicated this is no longer an issue and is no longer on diuretic therapy.    - Monitor.      Severe Malnutrition in the context of acute illness or injury    % Weight Loss:   > 5% in 1 month (weight loss 7.9 kg, 8%, over the past ~ 1 month)   - nutrition following, appreciated  - supplements as tolerated  Hypokalemia  --Replace per protocol.    Diet: Snacks/Supplements Adult: Boost Glucose Control; With Meals  Combination Diet Dysphagia Diet Level 3: Advanced; Thin Liquids (water, ice chips, juice, milk gelatin, ice cream, etc) (No straws)  Diet    DVT Prophylaxis: per primary  Champion Catheter: not present  Code Status: Full Code           Disposition Plan   Expected discharge: per Oncology rec (primary)  Entered: Karmen Richardson MD 06/12/2020, 9:29 AM       The patient's care was discussed with the Bedside Nurse and Patient.    Karmen Richardson MD  Hospitalist Service  [facility]    ______________________________________________________________________    Interval History   Patient is resting comfortably, he  denies any new complaints, he is happy that he will be discharged today, no new issues overnight.  He will be going home with home with outpatient PT/OT and speech.    Data reviewed today: I reviewed all medications, new labs and imaging results over the last 24 hours. I personally reviewed no images or EKG's today.    Physical Exam   Vital Signs: Temp: 97.6  F (36.4  C) Temp src: Oral BP: 96/59 Pulse: 82 Heart Rate: 79 Resp: 16 SpO2: 96 % O2 Device: None (Room air)    Weight: 193 lbs 9.6 oz    Constitutional: Awake, alert, cooperative, no apparent distress  Respiratory: Clear to auscultation bilaterally, no crackles or wheezing  Cardiovascular: Regular rate and rhythm, normal S1 and S2, and no murmur noted  GI: Normal bowel sounds, soft, non-distended, non-tender  Skin/Integumen: No rashes, no cyanosis, no edema  Neuro : moving all 4 extremities, no focal deficit noted         Data   Recent Labs   Lab 06/12/20  0600 06/11/20  2245 06/11/20  1530 06/11/20  0600  06/10/20  0630  06/07/20  1650   WBC 1.8*  --   --  6.0  --  7.8   < > 5.5   HGB 8.4*  --   --  8.6*  --  8.9*   < > 9.2*   MCV 77*  --   --  76*  --  76*   < > 78   PLT 83*  --   --  113*  --  120*   < > 184     --   --  138  --  134   < > 137   POTASSIUM 2.9* 3.6 3.2* 2.6*   < > 2.8*   < > 3.6   CHLORIDE 107  --   --  106  --  103   < > 106   CO2 24  --   --  24  --  23   < > 25   BUN 16  --   --  19  --  18   < > 16   CR 0.78  --   --  0.90  --  0.92   < > 0.68   ANIONGAP 7  --   --  8  --  8   < > 6   JEFF 8.5  --   --  8.7  --  8.5   < > 8.1*   *  --   --  182*  --  98   < > 230*   ALBUMIN  --   --   --   --   --   --   --  2.3*   PROTTOTAL  --   --   --   --   --   --   --  5.7*   BILITOTAL  --   --   --   --   --   --   --  0.3   ALKPHOS  --   --   --   --   --   --   --  44   ALT  --   --   --   --   --   --   --  20   AST  --   --   --   --   --   --   --  29    < > = values in this interval not displayed.     No results found for this  or any previous visit (from the past 24 hour(s)).

## 2020-06-13 NOTE — ADDENDUM NOTE
Addendum  created 06/13/20 0749 by Sukhwinder Hughes MD    Clinical Note Signed, SmartForm saved

## 2020-06-15 ENCOUNTER — TELEPHONE (OUTPATIENT)
Dept: OTHER | Facility: CLINIC | Age: 70
End: 2020-06-15

## 2020-06-15 NOTE — TELEPHONE ENCOUNTER
I called pt back, pt denies fever/odor.   Had moderate amount bleeding x1 through gauze dressing and droplets of blood on the floor.   No bleeding today.   Pt will send pictures.   Will then update Dr. Flores, pt notes understanding.   ANTONY BorjasN, RN  [facility] [place]

## 2020-06-15 NOTE — TELEPHONE ENCOUNTER
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  We saw him in the hospital during his chemotherapy on 6/10/2020.  Sutures removed.  There is still a raw or surface on the suture line of the third toe but no signs of swelling or infection.    He has been at home and did more walking over the weekend and noticed a small amount of bleeding from that area that was slightly open on the third toe.  He spoke with the on-call physician Dr. Alicia Mendoza and I was asked to call back today.    Patient reports that he is doing well.  No signs of infection.  No further bleeding.    I have recommended the apply a small amount of bacitracin ointment over the raw surface and a protective Band-Aid.  He will do so and call me if there is any further concerns.      Ramon Flores MD

## 2020-06-15 NOTE — TELEPHONE ENCOUNTER
"Pt s/p 5/22/20 distal amputation of left second toe and left third toe with Dr. Flores.     Pt called noting surgical site \"bleeding yesterday, no bleeding today\" and requesting Dr. Flores look at toes.     Yancy Santiago, ANTONYN, RN  [facility]    "

## 2020-06-17 ENCOUNTER — APPOINTMENT (OUTPATIENT)
Dept: GENERAL RADIOLOGY | Facility: CLINIC | Age: 70
DRG: 602 | End: 2020-06-17
Attending: PHYSICIAN ASSISTANT
Payer: COMMERCIAL

## 2020-06-17 ENCOUNTER — TELEPHONE (OUTPATIENT)
Dept: OTHER | Facility: CLINIC | Age: 70
End: 2020-06-17

## 2020-06-17 ENCOUNTER — APPOINTMENT (OUTPATIENT)
Dept: MRI IMAGING | Facility: CLINIC | Age: 70
DRG: 602 | End: 2020-06-17
Attending: INTERNAL MEDICINE
Payer: COMMERCIAL

## 2020-06-17 ENCOUNTER — HOSPITAL ENCOUNTER (INPATIENT)
Facility: CLINIC | Age: 70
LOS: 4 days | Discharge: HOME | DRG: 602 | End: 2020-06-21
Attending: PHYSICIAN ASSISTANT | Admitting: INTERNAL MEDICINE
Payer: COMMERCIAL

## 2020-06-17 DIAGNOSIS — C83.398 DIFFUSE LARGE B-CELL LYMPHOMA OF SOLID ORGAN EXCLUDING SPLEEN: ICD-10-CM

## 2020-06-17 DIAGNOSIS — D61.810 ANTINEOPLASTIC CHEMOTHERAPY INDUCED PANCYTOPENIA (H): ICD-10-CM

## 2020-06-17 DIAGNOSIS — T45.1X5A ANTINEOPLASTIC CHEMOTHERAPY INDUCED PANCYTOPENIA (H): ICD-10-CM

## 2020-06-17 DIAGNOSIS — C83.30 DIFFUSE LARGE B-CELL LYMPHOMA, UNSPECIFIED BODY REGION (H): ICD-10-CM

## 2020-06-17 DIAGNOSIS — T81.89XA NON-HEALING SURGICAL WOUND, INITIAL ENCOUNTER: ICD-10-CM

## 2020-06-17 DIAGNOSIS — L08.9 TOE INFECTION: ICD-10-CM

## 2020-06-17 DIAGNOSIS — E11.69 TYPE 2 DIABETES MELLITUS WITH OTHER SPECIFIED COMPLICATION, WITH LONG-TERM CURRENT USE OF INSULIN (H): Primary | ICD-10-CM

## 2020-06-17 DIAGNOSIS — Z79.4 TYPE 2 DIABETES MELLITUS WITH OTHER SPECIFIED COMPLICATION, WITH LONG-TERM CURRENT USE OF INSULIN (H): Primary | ICD-10-CM

## 2020-06-17 LAB
ANION GAP SERPL CALCULATED.3IONS-SCNC: 6 MMOL/L (ref 3–14)
ANISOCYTOSIS BLD QL SMEAR: SLIGHT
BLD PROD TYP BPU: NORMAL
BLD PROD TYP BPU: NORMAL
BLD UNIT ID BPU: 0
BLD UNIT ID BPU: 0
BLOOD PRODUCT CODE: NORMAL
BLOOD PRODUCT CODE: NORMAL
BPU ID: NORMAL
BPU ID: NORMAL
BUN SERPL-MCNC: 12 MG/DL (ref 7–30)
CALCIUM SERPL-MCNC: 8.8 MG/DL (ref 8.5–10.1)
CHLORIDE SERPL-SCNC: 104 MMOL/L (ref 94–109)
CO2 SERPL-SCNC: 24 MMOL/L (ref 20–32)
CREAT SERPL-MCNC: 0.91 MG/DL (ref 0.66–1.25)
DIFFERENTIAL METHOD BLD: ABNORMAL
ELLIPTOCYTES BLD QL SMEAR: SLIGHT
ERYTHROCYTE [DISTWIDTH] IN BLOOD BY AUTOMATED COUNT: 16.8 % (ref 10–15)
ERYTHROCYTE [DISTWIDTH] IN BLOOD BY AUTOMATED COUNT: 16.8 % (ref 10–15)
GFR SERPL CREATININE-BSD FRML MDRD: 87 ML/MIN/{1.73_M2}
GLUCOSE BLDC GLUCOMTR-MCNC: 101 MG/DL (ref 70–99)
GLUCOSE BLDC GLUCOMTR-MCNC: 171 MG/DL (ref 70–99)
GLUCOSE BLDC GLUCOMTR-MCNC: 94 MG/DL (ref 70–99)
GLUCOSE SERPL-MCNC: 219 MG/DL (ref 70–99)
HCT VFR BLD AUTO: 21.6 % (ref 40–53)
HCT VFR BLD AUTO: 24.3 % (ref 40–53)
HGB BLD-MCNC: 7 G/DL (ref 13.3–17.7)
HGB BLD-MCNC: 7.9 G/DL (ref 13.3–17.7)
LACTATE BLD-SCNC: 1.7 MMOL/L (ref 0.7–2)
MCH RBC QN AUTO: 24.6 PG (ref 26.5–33)
MCH RBC QN AUTO: 24.8 PG (ref 26.5–33)
MCHC RBC AUTO-ENTMCNC: 32.4 G/DL (ref 31.5–36.5)
MCHC RBC AUTO-ENTMCNC: 32.5 G/DL (ref 31.5–36.5)
MCV RBC AUTO: 76 FL (ref 78–100)
MCV RBC AUTO: 76 FL (ref 78–100)
OVALOCYTES BLD QL SMEAR: SLIGHT
PLATELET # BLD AUTO: 18 10E9/L (ref 150–450)
PLATELET # BLD AUTO: 4 10E9/L (ref 150–450)
PLATELET # BLD EST: ABNORMAL 10*3/UL
POTASSIUM SERPL-SCNC: 3.7 MMOL/L (ref 3.4–5.3)
RBC # BLD AUTO: 2.84 10E12/L (ref 4.4–5.9)
RBC # BLD AUTO: 3.18 10E12/L (ref 4.4–5.9)
SODIUM SERPL-SCNC: 134 MMOL/L (ref 133–144)
TRANSFUSION STATUS PATIENT QL: NORMAL
WBC # BLD AUTO: 0.4 10E9/L (ref 4–11)
WBC # BLD AUTO: 0.6 10E9/L (ref 4–11)

## 2020-06-17 PROCEDURE — 85027 COMPLETE CBC AUTOMATED: CPT | Performed by: INTERNAL MEDICINE

## 2020-06-17 PROCEDURE — 12000000 ZZH R&B MED SURG/OB

## 2020-06-17 PROCEDURE — 25800030 ZZH RX IP 258 OP 636: Performed by: INTERNAL MEDICINE

## 2020-06-17 PROCEDURE — 99285 EMERGENCY DEPT VISIT HI MDM: CPT | Mod: 25

## 2020-06-17 PROCEDURE — A9585 GADOBUTROL INJECTION: HCPCS | Performed by: INTERNAL MEDICINE

## 2020-06-17 PROCEDURE — 36415 COLL VENOUS BLD VENIPUNCTURE: CPT

## 2020-06-17 PROCEDURE — C9803 HOPD COVID-19 SPEC COLLECT: HCPCS

## 2020-06-17 PROCEDURE — 25000132 ZZH RX MED GY IP 250 OP 250 PS 637: Performed by: INTERNAL MEDICINE

## 2020-06-17 PROCEDURE — 96368 THER/DIAG CONCURRENT INF: CPT

## 2020-06-17 PROCEDURE — 85025 COMPLETE CBC W/AUTO DIFF WBC: CPT | Performed by: EMERGENCY MEDICINE

## 2020-06-17 PROCEDURE — 83605 ASSAY OF LACTIC ACID: CPT | Performed by: PHYSICIAN ASSISTANT

## 2020-06-17 PROCEDURE — U0003 INFECTIOUS AGENT DETECTION BY NUCLEIC ACID (DNA OR RNA); SEVERE ACUTE RESPIRATORY SYNDROME CORONAVIRUS 2 (SARS-COV-2) (CORONAVIRUS DISEASE [COVID-19]), AMPLIFIED PROBE TECHNIQUE, MAKING USE OF HIGH THROUGHPUT TECHNOLOGIES AS DESCRIBED BY CMS-2020-01-R: HCPCS | Performed by: PHYSICIAN ASSISTANT

## 2020-06-17 PROCEDURE — 25000128 H RX IP 250 OP 636: Performed by: INTERNAL MEDICINE

## 2020-06-17 PROCEDURE — 25500064 ZZH RX 255 OP 636: Performed by: INTERNAL MEDICINE

## 2020-06-17 PROCEDURE — 25000131 ZZH RX MED GY IP 250 OP 636 PS 637: Performed by: HOSPITALIST

## 2020-06-17 PROCEDURE — 25000128 H RX IP 250 OP 636: Performed by: PHYSICIAN ASSISTANT

## 2020-06-17 PROCEDURE — 87040 BLOOD CULTURE FOR BACTERIA: CPT | Performed by: PHYSICIAN ASSISTANT

## 2020-06-17 PROCEDURE — 73720 MRI LWR EXTREMITY W/O&W/DYE: CPT | Mod: LT

## 2020-06-17 PROCEDURE — 00000146 ZZHCL STATISTIC GLUCOSE BY METER IP

## 2020-06-17 PROCEDURE — 86900 BLOOD TYPING SEROLOGIC ABO: CPT | Performed by: INTERNAL MEDICINE

## 2020-06-17 PROCEDURE — 25800030 ZZH RX IP 258 OP 636: Performed by: PHYSICIAN ASSISTANT

## 2020-06-17 PROCEDURE — 86923 COMPATIBILITY TEST ELECTRIC: CPT | Performed by: INTERNAL MEDICINE

## 2020-06-17 PROCEDURE — 86850 RBC ANTIBODY SCREEN: CPT | Performed by: INTERNAL MEDICINE

## 2020-06-17 PROCEDURE — 99223 1ST HOSP IP/OBS HIGH 75: CPT | Mod: AI | Performed by: INTERNAL MEDICINE

## 2020-06-17 PROCEDURE — 86901 BLOOD TYPING SEROLOGIC RH(D): CPT | Performed by: INTERNAL MEDICINE

## 2020-06-17 PROCEDURE — 80048 BASIC METABOLIC PNL TOTAL CA: CPT | Performed by: EMERGENCY MEDICINE

## 2020-06-17 PROCEDURE — P9037 PLATE PHERES LEUKOREDU IRRAD: HCPCS | Performed by: PHYSICIAN ASSISTANT

## 2020-06-17 PROCEDURE — 73630 X-RAY EXAM OF FOOT: CPT | Mod: LT

## 2020-06-17 PROCEDURE — 96365 THER/PROPH/DIAG IV INF INIT: CPT

## 2020-06-17 RX ORDER — ACETAMINOPHEN 325 MG/1
650 TABLET ORAL
Status: DISCONTINUED | OUTPATIENT
Start: 2020-06-17 | End: 2020-06-21 | Stop reason: HOSPADM

## 2020-06-17 RX ORDER — LIDOCAINE 40 MG/G
CREAM TOPICAL
Status: DISCONTINUED | OUTPATIENT
Start: 2020-06-17 | End: 2020-06-21 | Stop reason: HOSPADM

## 2020-06-17 RX ORDER — BISACODYL 10 MG
10 SUPPOSITORY, RECTAL RECTAL DAILY PRN
Status: DISCONTINUED | OUTPATIENT
Start: 2020-06-17 | End: 2020-06-21 | Stop reason: HOSPADM

## 2020-06-17 RX ORDER — CALCIUM CARBONATE 500 MG/1
1000 TABLET, CHEWABLE ORAL 4 TIMES DAILY PRN
Status: DISCONTINUED | OUTPATIENT
Start: 2020-06-17 | End: 2020-06-21 | Stop reason: HOSPADM

## 2020-06-17 RX ORDER — NICOTINE POLACRILEX 4 MG
15-30 LOZENGE BUCCAL
Status: DISCONTINUED | OUTPATIENT
Start: 2020-06-17 | End: 2020-06-21 | Stop reason: HOSPADM

## 2020-06-17 RX ORDER — ACETAMINOPHEN 650 MG/1
650 SUPPOSITORY RECTAL EVERY 4 HOURS PRN
Status: DISCONTINUED | OUTPATIENT
Start: 2020-06-17 | End: 2020-06-21 | Stop reason: HOSPADM

## 2020-06-17 RX ORDER — PROCHLORPERAZINE MALEATE 5 MG
5 TABLET ORAL EVERY 6 HOURS PRN
Status: DISCONTINUED | OUTPATIENT
Start: 2020-06-17 | End: 2020-06-21 | Stop reason: HOSPADM

## 2020-06-17 RX ORDER — HEPARIN SODIUM (PORCINE) LOCK FLUSH IV SOLN 100 UNIT/ML 100 UNIT/ML
5 SOLUTION INTRAVENOUS
Status: DISCONTINUED | OUTPATIENT
Start: 2020-06-17 | End: 2020-06-21 | Stop reason: HOSPADM

## 2020-06-17 RX ORDER — PROCHLORPERAZINE 25 MG
12.5 SUPPOSITORY, RECTAL RECTAL EVERY 12 HOURS PRN
Status: DISCONTINUED | OUTPATIENT
Start: 2020-06-17 | End: 2020-06-21 | Stop reason: HOSPADM

## 2020-06-17 RX ORDER — ROSUVASTATIN CALCIUM 5 MG/1
5 TABLET, COATED ORAL DAILY
Status: DISCONTINUED | OUTPATIENT
Start: 2020-06-17 | End: 2020-06-21 | Stop reason: HOSPADM

## 2020-06-17 RX ORDER — PIPERACILLIN SODIUM, TAZOBACTAM SODIUM 3; .375 G/15ML; G/15ML
3.38 INJECTION, POWDER, LYOPHILIZED, FOR SOLUTION INTRAVENOUS EVERY 6 HOURS
Status: DISCONTINUED | OUTPATIENT
Start: 2020-06-17 | End: 2020-06-21 | Stop reason: HOSPADM

## 2020-06-17 RX ORDER — ONDANSETRON 4 MG/1
4 TABLET, ORALLY DISINTEGRATING ORAL EVERY 6 HOURS PRN
Status: DISCONTINUED | OUTPATIENT
Start: 2020-06-17 | End: 2020-06-21 | Stop reason: HOSPADM

## 2020-06-17 RX ORDER — LANOLIN ALCOHOL/MO/W.PET/CERES
3 CREAM (GRAM) TOPICAL
Status: DISCONTINUED | OUTPATIENT
Start: 2020-06-17 | End: 2020-06-21 | Stop reason: HOSPADM

## 2020-06-17 RX ORDER — OXYCODONE HYDROCHLORIDE 5 MG/1
5-10 TABLET ORAL
Status: DISCONTINUED | OUTPATIENT
Start: 2020-06-17 | End: 2020-06-21 | Stop reason: HOSPADM

## 2020-06-17 RX ORDER — DIPHENHYDRAMINE HCL 50 MG
50 CAPSULE ORAL
Status: DISCONTINUED | OUTPATIENT
Start: 2020-06-17 | End: 2020-06-21 | Stop reason: HOSPADM

## 2020-06-17 RX ORDER — POTASSIUM CHLORIDE 1500 MG/1
20-40 TABLET, EXTENDED RELEASE ORAL
Status: DISCONTINUED | OUTPATIENT
Start: 2020-06-17 | End: 2020-06-21 | Stop reason: HOSPADM

## 2020-06-17 RX ORDER — MAGNESIUM SULFATE HEPTAHYDRATE 40 MG/ML
4 INJECTION, SOLUTION INTRAVENOUS EVERY 4 HOURS PRN
Status: DISCONTINUED | OUTPATIENT
Start: 2020-06-17 | End: 2020-06-21 | Stop reason: HOSPADM

## 2020-06-17 RX ORDER — POTASSIUM CHLORIDE 29.8 MG/ML
20 INJECTION INTRAVENOUS
Status: DISCONTINUED | OUTPATIENT
Start: 2020-06-17 | End: 2020-06-21 | Stop reason: HOSPADM

## 2020-06-17 RX ORDER — HEPARIN SODIUM,PORCINE 10 UNIT/ML
5-10 VIAL (ML) INTRAVENOUS EVERY 24 HOURS
Status: DISCONTINUED | OUTPATIENT
Start: 2020-06-17 | End: 2020-06-21 | Stop reason: HOSPADM

## 2020-06-17 RX ORDER — AMOXICILLIN 250 MG
2 CAPSULE ORAL 2 TIMES DAILY PRN
Status: DISCONTINUED | OUTPATIENT
Start: 2020-06-17 | End: 2020-06-21 | Stop reason: HOSPADM

## 2020-06-17 RX ORDER — GADOBUTROL 604.72 MG/ML
0.1 INJECTION INTRAVENOUS ONCE
Status: COMPLETED | OUTPATIENT
Start: 2020-06-17 | End: 2020-06-17

## 2020-06-17 RX ORDER — HYDROCORTISONE 10 MG/1
20 TABLET ORAL
Status: DISCONTINUED | OUTPATIENT
Start: 2020-06-18 | End: 2020-06-21 | Stop reason: HOSPADM

## 2020-06-17 RX ORDER — POTASSIUM CL/LIDO/0.9 % NACL 10MEQ/0.1L
10 INTRAVENOUS SOLUTION, PIGGYBACK (ML) INTRAVENOUS
Status: DISCONTINUED | OUTPATIENT
Start: 2020-06-17 | End: 2020-06-21 | Stop reason: HOSPADM

## 2020-06-17 RX ORDER — HYDROCORTISONE 10 MG/1
10 TABLET ORAL EVERY EVENING
Status: DISCONTINUED | OUTPATIENT
Start: 2020-06-17 | End: 2020-06-21 | Stop reason: HOSPADM

## 2020-06-17 RX ORDER — ACETAMINOPHEN 325 MG/1
650 TABLET ORAL EVERY 4 HOURS PRN
Status: DISCONTINUED | OUTPATIENT
Start: 2020-06-17 | End: 2020-06-21 | Stop reason: HOSPADM

## 2020-06-17 RX ORDER — PIPERACILLIN SODIUM, TAZOBACTAM SODIUM 4; .5 G/20ML; G/20ML
4.5 INJECTION, POWDER, LYOPHILIZED, FOR SOLUTION INTRAVENOUS ONCE
Status: COMPLETED | OUTPATIENT
Start: 2020-06-17 | End: 2020-06-17

## 2020-06-17 RX ORDER — POLYETHYLENE GLYCOL 3350 17 G/17G
17 POWDER, FOR SOLUTION ORAL DAILY PRN
Status: DISCONTINUED | OUTPATIENT
Start: 2020-06-17 | End: 2020-06-21 | Stop reason: HOSPADM

## 2020-06-17 RX ORDER — LEVOFLOXACIN 500 MG/1
500 TABLET, FILM COATED ORAL DAILY
Status: ON HOLD | COMMUNITY
End: 2020-06-21

## 2020-06-17 RX ORDER — HEPARIN SODIUM,PORCINE 10 UNIT/ML
5-10 VIAL (ML) INTRAVENOUS
Status: DISCONTINUED | OUTPATIENT
Start: 2020-06-17 | End: 2020-06-21 | Stop reason: HOSPADM

## 2020-06-17 RX ORDER — POTASSIUM CHLORIDE 1.5 G/1.58G
20-40 POWDER, FOR SOLUTION ORAL
Status: DISCONTINUED | OUTPATIENT
Start: 2020-06-17 | End: 2020-06-21 | Stop reason: HOSPADM

## 2020-06-17 RX ORDER — POTASSIUM CHLORIDE 7.45 MG/ML
10 INJECTION INTRAVENOUS
Status: DISCONTINUED | OUTPATIENT
Start: 2020-06-17 | End: 2020-06-21 | Stop reason: HOSPADM

## 2020-06-17 RX ORDER — FENOFIBRATE 160 MG/1
160 TABLET ORAL DAILY
Status: DISCONTINUED | OUTPATIENT
Start: 2020-06-17 | End: 2020-06-21 | Stop reason: HOSPADM

## 2020-06-17 RX ORDER — ALLOPURINOL 300 MG/1
300 TABLET ORAL DAILY
Status: DISCONTINUED | OUTPATIENT
Start: 2020-06-17 | End: 2020-06-21 | Stop reason: HOSPADM

## 2020-06-17 RX ORDER — AMOXICILLIN 250 MG
1 CAPSULE ORAL 2 TIMES DAILY PRN
Status: DISCONTINUED | OUTPATIENT
Start: 2020-06-17 | End: 2020-06-21 | Stop reason: HOSPADM

## 2020-06-17 RX ORDER — ONDANSETRON 2 MG/ML
4 INJECTION INTRAMUSCULAR; INTRAVENOUS EVERY 6 HOURS PRN
Status: DISCONTINUED | OUTPATIENT
Start: 2020-06-17 | End: 2020-06-21 | Stop reason: HOSPADM

## 2020-06-17 RX ORDER — NALOXONE HYDROCHLORIDE 0.4 MG/ML
.1-.4 INJECTION, SOLUTION INTRAMUSCULAR; INTRAVENOUS; SUBCUTANEOUS
Status: DISCONTINUED | OUTPATIENT
Start: 2020-06-17 | End: 2020-06-21 | Stop reason: HOSPADM

## 2020-06-17 RX ORDER — DEXTROSE MONOHYDRATE 25 G/50ML
25-50 INJECTION, SOLUTION INTRAVENOUS
Status: DISCONTINUED | OUTPATIENT
Start: 2020-06-17 | End: 2020-06-21 | Stop reason: HOSPADM

## 2020-06-17 RX ADMIN — ACETAMINOPHEN 650 MG: 325 TABLET, FILM COATED ORAL at 23:56

## 2020-06-17 RX ADMIN — Medication 5 ML: at 20:43

## 2020-06-17 RX ADMIN — PIPERACILLIN SODIUM AND TAZOBACTAM SODIUM 4.5 G: 4; .5 INJECTION, POWDER, LYOPHILIZED, FOR SOLUTION INTRAVENOUS at 11:48

## 2020-06-17 RX ADMIN — FENOFIBRATE 160 MG: 160 TABLET ORAL at 18:04

## 2020-06-17 RX ADMIN — PIPERACILLIN SODIUM AND TAZOBACTAM SODIUM 3.38 G: 3; .375 INJECTION, POWDER, LYOPHILIZED, FOR SOLUTION INTRAVENOUS at 23:56

## 2020-06-17 RX ADMIN — HYDROCORTISONE 10 MG: 10 TABLET ORAL at 20:42

## 2020-06-17 RX ADMIN — Medication 5 ML: at 14:45

## 2020-06-17 RX ADMIN — INSULIN GLARGINE 15 UNITS: 100 INJECTION, SOLUTION SUBCUTANEOUS at 23:56

## 2020-06-17 RX ADMIN — DAPTOMYCIN 505 MG: 500 INJECTION, POWDER, LYOPHILIZED, FOR SOLUTION INTRAVENOUS at 17:55

## 2020-06-17 RX ADMIN — PIPERACILLIN SODIUM AND TAZOBACTAM SODIUM 3.38 G: 3; .375 INJECTION, POWDER, LYOPHILIZED, FOR SOLUTION INTRAVENOUS at 18:04

## 2020-06-17 RX ADMIN — ALLOPURINOL 300 MG: 300 TABLET ORAL at 14:45

## 2020-06-17 RX ADMIN — FILGRASTIM 480 MCG: 480 INJECTION, SOLUTION INTRAVENOUS; SUBCUTANEOUS at 20:48

## 2020-06-17 RX ADMIN — ROSUVASTATIN CALCIUM 5 MG: 5 TABLET, FILM COATED ORAL at 14:45

## 2020-06-17 RX ADMIN — Medication 5 ML: at 22:29

## 2020-06-17 RX ADMIN — VANCOMYCIN HYDROCHLORIDE 1500 MG: 5 INJECTION, POWDER, LYOPHILIZED, FOR SOLUTION INTRAVENOUS at 12:19

## 2020-06-17 RX ADMIN — DIPHENHYDRAMINE HYDROCHLORIDE 50 MG: 50 CAPSULE ORAL at 23:56

## 2020-06-17 RX ADMIN — GADOBUTROL 8.4 ML: 604.72 INJECTION INTRAVENOUS at 17:28

## 2020-06-17 ASSESSMENT — ACTIVITIES OF DAILY LIVING (ADL)
DRESS: 0-->INDEPENDENT
COGNITION: 0 - NO COGNITION ISSUES REPORTED
SWALLOWING: 0-->SWALLOWS FOODS/LIQUIDS WITHOUT DIFFICULTY
AMBULATION: 0-->INDEPENDENT
FALL_HISTORY_WITHIN_LAST_SIX_MONTHS: NO
RETIRED_EATING: 0-->INDEPENDENT
BATHING: 0-->INDEPENDENT
ADLS_ACUITY_SCORE: 12
RETIRED_COMMUNICATION: 0-->UNDERSTANDS/COMMUNICATES WITHOUT DIFFICULTY
TRANSFERRING: 0-->INDEPENDENT
ADLS_ACUITY_SCORE: 12
TOILETING: 0-->INDEPENDENT

## 2020-06-17 NOTE — ED NOTES
[facility]  ED Nurse Handoff Report    ED Chief complaint: Post-op Problem (Pt reports he had  his 2nd and 3rd toes amputated 3 weeks prior, pt reports drainage, bleeding, and redness at the surgical site that started over the weekend.  Pt currently undergoing chemo for non-hodgkins lymphoma.)      ED Diagnosis:   Final diagnoses:   Toe infection   Diffuse large B-cell lymphoma, unspecified body region (H)   Antineoplastic chemotherapy induced pancytopenia (H)       Code Status: Full Code    Allergies: No Known Allergies    Patient Story: post op   Focused Assessment:  Pt reports he had  his 2nd and 3rd toes amputated 3 weeks prior, pt reports drainage, bleeding, and redness at the surgical site that started over the weekend.  Pt currently undergoing chemo for non-hodgkins lymphoma. Dr. Flores office aware    Treatments and/or interventions provided: Abx  Patient's response to treatments and/or interventions: good    To be done/followed up on inpatient unit:  platelet infusion    Does this patient have any cognitive concerns?: none    Activity level - Baseline/Home:  Independent  Activity Level - Current:   Independent    Patient's Preferred language: English   Needed?: No    Isolation: None  Infection: Not Applicable  Bariatric?: No    Vital Signs:   Vitals:    06/17/20 0940   BP: 106/53   Pulse: 109   Resp: 18   Temp: 96.6  F (35.9  C)   TempSrc: Temporal   SpO2: 97%   Weight: 83.9 kg (185 lb)       Cardiac Rhythm:     Was the PSS-3 completed:   Yes  What interventions are required if any?               Family Comments: none  OBS brochure/video discussed/provided to patient/family: N/A              Name of person given brochure if not patient: Na              Relationship to patient: NA    For the majority of the shift this patient's behavior was Green.   Behavioral interventions performed were none.    ED NURSE PHONE NUMBER: *[phone]

## 2020-06-17 NOTE — H&P
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 Low suspicion      Non-healing left 2nd and 3rd toe wounds with cellulitis and possible osteomyelitis  History of osteomyelitis  * Underwent uncomplicated distal amputations of left second and third toes by Dr. Flores on 5/22. Sutures removed on 6/15  * Presents on this admission with worsening redness, swelling, and purulent drainage from his 2nd and 3rd toe wounds. XR left foot on arrival showed no obvious osteo. In the ED, he was empirically initiated on IV vancomycin and pip-tazo  - Left foot MRI ordered  - Continues on IV vancomycin and pip-tazo  - ID and Vascular Surgery have been consulted    Pancytopenia with neutropenia due to chemotherapy  Diffuse large B-cell Non-Hodgkin's Lymphoma  * Follows with Dr. Olson at [facility]. Initially diagnosed with B-cell lymphoma in 2014 and underwent R-CHOP. Diagnosed with recurrent follicular lymphoma in 2017 and completed chemo/XRT. Found to have new masses and lymphadenopathy in May 2020, and was found to have diffuse large B-cell lymphoma. Initiated on R ICE on 6/5/2020, but chemo was discontinued on 6/7 due to Ifosfamide encephalopathy  * Now on alternative chemotherapy, last dose: 6/10  * CBC on arrival notable for , platelets 4000. In the ED, he was give a unit of platelets  - Follow platelets, q6h for now. Transfuse for platelets 10k or less  - [facility] has been  consulted    History of suspected adrenal insufficiency  * PTA: hydrocortisone 20 mg qAM, 10 mg qHS  - Continues on PTA hydrocortisone    History of IVC thrombus  * PTA on rivaroxaban  - Hold rivaroxaban due to thrombocytopenia    DM2 with peripheral vascular disease, long-term insulin use  * PTA: metformin 1000 mg qac, 500 mg qhs. Lantus 30 units qhs, aspart per sliding scale.  - Holding PTA metformin  - Continues on Lantus 30 units qhs  - High SSI available    JESSICA  Chronic and stable on CPAP    Dyslipidemia  Chronic and stable on rosuvastatin and fenofibrate    FEN: Regular diet  DVT Prophylaxis: Pneumatic Compression Devices  Code Status: Full Code    Disposition: Expected discharge once transitioned to oral antibiotics and cleared by Vascular Surgery and ID, and CBC stable, at least 2-3 days    Roxanne Rubin    Primary Care Physician   Francisco Armijo    Chief Complaint   Non-healing surgical wounds    History is obtained from the patient and medical records    History of Present Illness   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    The patient was admitted about a week ago for chemotherapy-related encephalopathy. He was improving at the time of discharge on 6/12, but noticed slight bleeding from his surgical wounds. Since then, he has noticed increased redness, swelling, and purulent drainage from his wound. He was evaluated by Dr. Flores 2 days ago, and was initiated on bacitracin as well as levofloxacin. Due to lack of improvement and ongoing worsening of his wounds, he presented to the ED. He otherwise offers no complaints. He denies fevers/chills or cough. He denies cp/sob, dizziness/lightheadedness, or pain elsewhere. His last cycle of chemotherapy was on 6/10.     In the ED, left foot  XR was obtained and did not show obvious osteo. He was initiated on IV vancomycin and pip-tazo. He is pancytopenic with  and platelet count of 4k. A unit of platelets has been ordered    Past Medical History    I have reviewed this patient's medical history and updated the medical record  Past Medical History:   Diagnosis Date     Abnormal liver function test      CPAP (continuous positive airway pressure) dependence      Diabetes (H)      Hx of skin cancer, basal cell      Hyperlipidemia      Hypertension      Keratoderma      Liver disease      Lymphoma (H)      Non-Hodgkin lymphoma (H)      Pedal edema     CHRONIC     Pleural effusion      Seborrheic keratosis, inflamed      Sleep apnea      Thrombosis Felbruary 2018       Past Surgical History   I have reviewed this patient's surgical history and updated the medical record  Past Surgical History:   Procedure Laterality Date     AMPUTATE TOE(S) Left 5/22/2020    Procedure: LEFT SECOND AND THIRD DISTAL TOE AMPUTATIONS;  Surgeon: Ramon Flores MD;  Location:  OR     BIOPSY LYMPH NODE CERVICAL N/A 12/5/2014    Procedure: BIOPSY LYMPH NODE CERVICAL;  Surgeon: Robbie Linda MD;  Location:  OR     BRONCHOSCOPY FLEXIBLE N/A 11/14/2017    Procedure: BRONCHOSCOPY FLEXIBLE;  FLEXIBLE BRONCHOSCOPY WITH BIOPSIES.;  Surgeon: Robbie Linda MD;  Location:  OR     COLONOSCOPY       COLONOSCOPY N/A 5/9/2018    Procedure: COMBINED COLONOSCOPY, SINGLE OR MULTIPLE BIOPSY/POLYPECTOMY BY BIOPSY;;  Surgeon: Ramos Bates MD;  Location:  GI     ENDOVASCULAR PLACEMENT VASCULAR DEVICE Left 12/5/2017    Procedure: ENDOVASCULAR PLACEMENT VASCULAR DEVICE;;  Surgeon: Robbie Linda MD;  Location: [facility]     ENT SURGERY      tonsillectomy     EXCISE NODE MEDIASTINAL N/A 11/17/2017    Procedure: EXCISE NODE MEDIASTINAL;;  Surgeon: Robbie Linda MD;  Location:  OR     EYE SURGERY       EYE SURGERY Left     vitrectomy      INSERT PORT VASCULAR ACCESS N/A 12/5/2014    Procedure: INSERT PORT VASCULAR ACCESS;  Surgeon: Robbie Linda MD;  Location:  OR     INSERT PORT VASCULAR ACCESS N/A 12/5/2017    Procedure: INSERT PORT VASCULAR ACCESS;  POWER PORT PLACEMENT ATTEMPTED, PLACEMENT OF ANGIO-SEAL VIP VASCULAR CLOSURE DEVICE;  Surgeon: Robbie Linda MD;  Location:  SD     IR CHEST PORT PLACEMENT > 5 YRS OF AGE  6/5/2020     IR PORT REMOVAL RIGHT  12/10/2018     PHACOEMULSIFICATION CLEAR CORNEA WITH STANDARD INTRAOCULAR LENS IMPLANT Right 5/2/2016    Procedure: PHACOEMULSIFICATION CLEAR CORNEA WITH STANDARD INTRAOCULAR LENS IMPLANT;  Surgeon: Rasheed Finney MD;  Location:  EC     REMOVE PORT VASCULAR ACCESS N/A 3/14/2017    Procedure: REMOVE PORT VASCULAR ACCESS;  Surgeon: Robbie Linda MD;  Location:  OR     SOFT TISSUE SURGERY      BASAL CELL CA FOREHEAD     THORACOTOMY Right 11/17/2017    Procedure: THORACOTOMY;  RIGHT EXPLORATORY THORACOTOMY/ EXTENSIVE PNEUMOLYSIS/ BIOPSY OF INTERLOBAR LYMPH NODE;  Surgeon: Robbie Linda MD;  Location:  OR       Prior to Admission Medications   Prior to Admission Medications   Prescriptions Last Dose Informant Patient Reported? Taking?   XARELTO ANTICOAGULANT 20 MG TABS tablet  Self Yes No   Sig: Take 20 mg by mouth daily Takes in the am   acetaminophen (TYLENOL) 325 MG tablet  Self Yes No   Sig: Take 650-975 mg by mouth every 12 hours as needed for mild pain   allopurinol (ZYLOPRIM) 300 MG tablet  Self Yes No   Sig: Take 300 mg by mouth daily   diphenhydrAMINE-acetaminophen (TYLENOL PM)  MG tablet  Self Yes No   Sig: Take 2-3 tablets by mouth At Bedtime   fenofibrate (TRICOR) 145 MG tablet  Self Yes No   Sig: Take 145 mg by mouth daily   filgrastim-sndz (ZARXIO) 480 MCG/0.8ML syringe   No No   Sig: Inject 0.8 mLs (480 mcg) Subcutaneous daily   hydrocortisone (CORTEF) 10 MG tablet  Self Yes No   Sig: Take 20 mg by mouth daily before  breakfast    hydrocortisone (CORTEF) 10 MG tablet  Self Yes No   Sig: Take 10 mg by mouth every evening   insulin aspart (NOVOLOG FLEXPEN) 100 UNIT/ML pen  Self Yes No   Sig: Inject Subcutaneous 3 times daily (with meals) Patient uses sliding scale based on Carbs and Blood Glucose. Has been using very little lately due to low readings and low appetite.   insulin glargine (LANTUS VIAL) 100 UNIT/ML soln  Self Yes No   Sig: Inject 30 Units Subcutaneous At Bedtime    metFORMIN (GLUCOPHAGE) 500 MG tablet  Self Yes No   Sig: Take 1,000 mg by mouth daily (with breakfast)   metFORMIN (GLUCOPHAGE) 500 MG tablet  Self Yes No   Sig: Take 500 mg by mouth daily (with dinner)   order for DME  Self No No   Sig: Kyle 1 packet twice a day for the next month   potassium chloride ER (KLOR-CON M) 20 MEQ CR tablet  Self Yes No   Sig: Take 20 mEq by mouth daily   prochlorperazine (COMPAZINE) 5 MG tablet   No No   Sig: Take 1-2 tablets (5-10 mg) by mouth every 6 hours as needed (Breakthrough Nausea / Vomiting)   rosuvastatin (CRESTOR) 10 MG tablet  Self Yes No   Sig: Take 5 mg by mouth daily      Facility-Administered Medications: None     Allergies   No Known Allergies    Social History   He denies history of smoking or alcohol use    Family History   I have reviewed this patient's family history  History reviewed. No pertinent family history.    Review of Systems   The 10 point Review of Systems is negative other than noted in the HPI    Physical Exam   Temp: 96.6  F (35.9  C) Temp src: Temporal BP: 106/53 Pulse: 109 Heart Rate: 109 Resp: 18 SpO2: 97 %      Vital Signs with Ranges  Temp:  [96.6  F (35.9  C)] 96.6  F (35.9  C)  Pulse:  [109] 109  Heart Rate:  [109] 109  Resp:  [18] 18  BP: (106)/(53) 106/53  SpO2:  [97 %] 97 %  185 lbs 0 oz    Constitutional: Resting comfortably, NAD  HEENT: NC/AT, sclera white, conjunctiva clear, EOMI, MMM  Respiratory: Breathing non-labored. Lungs CTAB - no wheezes/crackles/rhonchi  Cardiovascular:  Heart RRR, no m/r/g. No pedal edema.   GI: +BS. Abd soft/NT  Lymph/Hematologic: No cervical LAD  Genitourinary: Not examined  Skin: Distal ulcerations of left 2nd and 3rd toes with prominent swelling and redness   Musculoskeletal: Normal muscle bulk and tone  Neurologic: Alert and appropriate. DIALLO  Psychiatric: Calm and cooperative    Data   Data reviewed today:  I personally reviewed the left foot XR image(s) showing no acute findings.  Recent Labs   Lab 06/17/20  0950 06/12/20  0600 06/11/20  2245  06/11/20  0600   WBC 0.4* 1.8*  --   --  6.0   HGB 7.9* 8.4*  --   --  8.6*   MCV 76* 77*  --   --  76*   PLT 4* 83*  --   --  113*    138  --   --  138   POTASSIUM 3.7 2.9* 3.6   < > 2.6*   CHLORIDE 104 107  --   --  106   CO2 24 24  --   --  24   BUN 12 16  --   --  19   CR 0.91 0.78  --   --  0.90   ANIONGAP 6 7  --   --  8   JEFF 8.8 8.5  --   --  8.7   * 104*  --   --  182*    < > = values in this interval not displayed.       Recent Results (from the past 24 hour(s))   Foot XR, G/E 3 views, left    Narrative    FOOT LEFT THREE OR MORE VIEWS June 17, 2020 11:48 AM     HISTORY: Second/third toe infection. History of second and third toe  amputation.      Impression    IMPRESSION: Amputation of the second and third toes at the middle  phalanx level. Calcaneal spurring. Otherwise unremarkable.

## 2020-06-17 NOTE — CONSULTS
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     Impression:  1. 70 y.o male with non hodgkin`s Lymphoma on chemo.   2. Diabetes.   3. Recent history of partial amputation on the 2/3 toe left foot.   4. Recent hospitalization for chemo related encephalopathy.   5. Who is now presenting with left foot 2/3 toe infection with drainage and wound dehiscence.   6. Started on vanco and zosyn.   7. Currently neutropenic.   8. BMT patient.     Recommendations:   Continue zosyn, switch from vanco to daptomycin.   Follow up on the vascular/ podiatry consults.       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     Past Medical History   I have reviewed this patient's medical history and updated it with pertinent information if needed.   Past Medical History:   Diagnosis Date     Abnormal liver function test      CPAP (continuous positive airway pressure) dependence      Diabetes (H)      Hx of skin cancer, basal cell      Hyperlipidemia      Hypertension      Keratoderma      Liver disease      Lymphoma (H)      Non-Hodgkin lymphoma (H)      Pedal edema     CHRONIC     Pleural effusion      Seborrheic keratosis,  inflamed      Sleep apnea      Thrombosis Felbruary 2018       Past Surgical History   I have reviewed this patient's surgical history and updated it with pertinent information if needed.  Past Surgical History:   Procedure Laterality Date     AMPUTATE TOE(S) Left 5/22/2020    Procedure: LEFT SECOND AND THIRD DISTAL TOE AMPUTATIONS;  Surgeon: Ramon Flores MD;  Location:  OR     BIOPSY LYMPH NODE CERVICAL N/A 12/5/2014    Procedure: BIOPSY LYMPH NODE CERVICAL;  Surgeon: Robbie Linda MD;  Location:  OR     BRONCHOSCOPY FLEXIBLE N/A 11/14/2017    Procedure: BRONCHOSCOPY FLEXIBLE;  FLEXIBLE BRONCHOSCOPY WITH BIOPSIES.;  Surgeon: Robbie Linda MD;  Location:  OR     COLONOSCOPY       COLONOSCOPY N/A 5/9/2018    Procedure: COMBINED COLONOSCOPY, SINGLE OR MULTIPLE BIOPSY/POLYPECTOMY BY BIOPSY;;  Surgeon: Ramos Bates MD;  Location:  GI     ENDOVASCULAR PLACEMENT VASCULAR DEVICE Left 12/5/2017    Procedure: ENDOVASCULAR PLACEMENT VASCULAR DEVICE;;  Surgeon: Robbie Linda MD;  Location: [facility]     ENT SURGERY      tonsillectomy     EXCISE NODE MEDIASTINAL N/A 11/17/2017    Procedure: EXCISE NODE MEDIASTINAL;;  Surgeon: Robbie Linda MD;  Location:  OR     EYE SURGERY       EYE SURGERY Left     vitrectomy     INSERT PORT VASCULAR ACCESS N/A 12/5/2014    Procedure: INSERT PORT VASCULAR ACCESS;  Surgeon: Robbie Linda MD;  Location:  OR     INSERT PORT VASCULAR ACCESS N/A 12/5/2017    Procedure: INSERT PORT VASCULAR ACCESS;  POWER PORT PLACEMENT ATTEMPTED, PLACEMENT OF ANGIO-SEAL VIP VASCULAR CLOSURE DEVICE;  Surgeon: Robbie Linda MD;  Location: [facility]     IR CHEST PORT PLACEMENT > 5 YRS OF AGE  6/5/2020     IR PORT REMOVAL RIGHT  12/10/2018     PHACOEMULSIFICATION CLEAR CORNEA WITH STANDARD INTRAOCULAR LENS IMPLANT Right 5/2/2016    Procedure: PHACOEMULSIFICATION CLEAR CORNEA WITH STANDARD INTRAOCULAR LENS IMPLANT;  Surgeon: Reg  Rasheed CRAVEN MD;  Location:  EC     REMOVE PORT VASCULAR ACCESS N/A 3/14/2017    Procedure: REMOVE PORT VASCULAR ACCESS;  Surgeon: Robbie Linda MD;  Location:  OR     SOFT TISSUE SURGERY      BASAL CELL CA FOREHEAD     THORACOTOMY Right 11/17/2017    Procedure: THORACOTOMY;  RIGHT EXPLORATORY THORACOTOMY/ EXTENSIVE PNEUMOLYSIS/ BIOPSY OF INTERLOBAR LYMPH NODE;  Surgeon: Robbie Linda MD;  Location:  OR       Prior to Admission Medications   Prior to Admission Medications   Prescriptions Last Dose Informant Patient Reported? Taking?   XARELTO ANTICOAGULANT 20 MG TABS tablet ON HOLD at ON HOLD Self Yes No   Sig: Take 20 mg by mouth daily Takes in the am   acetaminophen (TYLENOL) 325 MG tablet PRN at PRN Self Yes Yes   Sig: Take 650-975 mg by mouth every 12 hours as needed for mild pain   allopurinol (ZYLOPRIM) 300 MG tablet 6/16/2020 at AM Self Yes Yes   Sig: Take 300 mg by mouth daily   diphenhydrAMINE-acetaminophen (TYLENOL PM)  MG tablet 6/16/2020 at PM Self Yes Yes   Sig: Take 2-3 tablets by mouth At Bedtime   fenofibrate (TRICOR) 145 MG tablet 6/16/2020 at AM Self Yes Yes   Sig: Take 145 mg by mouth daily   filgrastim-sndz (ZARXIO) 480 MCG/0.8ML syringe 6/16/2020 at PM Self No Yes   Sig: Inject 0.8 mLs (480 mcg) Subcutaneous daily   hydrocortisone (CORTEF) 10 MG tablet 6/16/2020 at AM Self Yes Yes   Sig: Take 20 mg by mouth daily before breakfast    hydrocortisone (CORTEF) 10 MG tablet 6/16/2020 at PM Self Yes Yes   Sig: Take 10 mg by mouth every evening   insulin aspart (NOVOLOG FLEXPEN) 100 UNIT/ML pen 6/16/2020 at Unknown time Self Yes Yes   Sig: Inject Subcutaneous 3 times daily (with meals) Patient uses sliding scale based on Carbs and Blood Glucose. Has been using very little lately due to low readings and low appetite.   insulin glargine (LANTUS VIAL) 100 UNIT/ML soln Past Week at Unknown time Self Yes Yes   Sig: Inject 30 Units Subcutaneous At Bedtime    levofloxacin  (LEVAQUIN) 500 MG tablet 6/16/2020 at AM Self Yes Yes   Sig: Take 500 mg by mouth daily   metFORMIN (GLUCOPHAGE) 500 MG tablet 6/16/2020 at AM Self Yes Yes   Sig: Take 1,000 mg by mouth daily (with breakfast)   metFORMIN (GLUCOPHAGE) 500 MG tablet 6/16/2020 at PM Self Yes Yes   Sig: Take 500 mg by mouth daily (with dinner)   order for DME  Self No No   Sig: Kyle 1 packet twice a day for the next month   potassium chloride ER (KLOR-CON M) 20 MEQ CR tablet 6/16/2020 at AM Self Yes Yes   Sig: Take 20 mEq by mouth daily   prochlorperazine (COMPAZINE) 5 MG tablet PRN at PRN Self No No   Sig: Take 1-2 tablets (5-10 mg) by mouth every 6 hours as needed (Breakthrough Nausea / Vomiting)   rosuvastatin (CRESTOR) 10 MG tablet 6/16/2020 at AM Self Yes Yes   Sig: Take 5 mg by mouth daily      Facility-Administered Medications: None     Allergies   No Known Allergies    Immunization History     There is no immunization history on file for this patient.    Social History   I have reviewed this patient's social history and updated it with pertinent information if needed. 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 He has never used smokeless tobacco. He reports that he does not drink alcohol or use drugs.    Family History   I have reviewed this patient's family history and updated it with pertinent information if needed.   History reviewed. No pertinent family history.    Review of Systems   The 10 point Review of Systems is negative other than noted in the HPI or here.     Physical Exam   Temp: 98  F (36.7  C) Temp src: Oral BP: 129/67 Pulse: 109 Heart Rate: 96 Resp: 18 SpO2: 98 % O2 Device: None (Room air)    Vital Signs with Ranges  Temp:  [96.6  F (35.9  C)-98  F (36.7  C)] 98  F (36.7  C)  Pulse:  [109] 109  Heart Rate:  [] 96  Resp:  [18] 18  BP: (106-129)/(53-67) 129/67  SpO2:  [97 %-98 %] 98 %  185 lbs 0 oz  Body mass index is 28.13 kg/m .    GENERAL APPEARANCE:  alert and no distress  EYES: Eyes grossly  normal to inspection, PERRL and conjunctivae and sclerae normal  HENT: ear canals and TM's normal and nose and mouth without ulcers or lesions  NECK: no adenopathy, no asymmetry, masses, or scars and thyroid normal to palpation  RESP: lungs clear to auscultation - no rales, rhonchi or wheezes  CV: regular rates and rhythm, normal S1 S2, no S3 or S4 and no murmur, click or rub  LYMPHATICS: normal ant/post cervical and supraclavicular nodes  ABDOMEN: soft, nontender, without hepatosplenomegaly or masses and bowel sounds normal  MS: the the left foot redness and swelling with drainage from the toe   SKIN: no suspicious lesions or rashes      Data   Lab Results   Component Value Date    WBC 0.4 (LL) 06/17/2020    HGB 7.9 (L) 06/17/2020    HCT 24.3 (L) 06/17/2020    PLT 4 (LL) 06/17/2020     06/17/2020    POTASSIUM 3.7 06/17/2020    CHLORIDE 104 06/17/2020    CO2 24 06/17/2020    BUN 12 06/17/2020    CR 0.91 06/17/2020     (H) 06/17/2020    AST 29 06/07/2020    ALT 20 06/07/2020    ALKPHOS 44 06/07/2020    BILITOTAL 0.3 06/07/2020    ENEIDA 38 06/07/2020    INR 1.22 (H) 05/27/2020     No results for input(s): CULT in the last 168 hours.  Recent Labs   Lab Test 11/13/14  1330   CULT No growth       Amount of time performed on this consult: 45 minutes. This includes face to face assessment and care coordination with the primary team.

## 2020-06-17 NOTE — PLAN OF CARE
AOx4. VSS, on RA. Denies pain. LLE 2nd & 3rd toes-reddened, skin marked, CASPER. Nausea intermittent. Regular diet, good appetite. Blood sugar checks, no correction needed. Up SBA/independently. R Port HL, abx. Plt 4000, received platelets. Hgb: 7.9 Neupogen starting tonight. MRI completed. Vascular surgery, ID and hem/onc consulted. Plan pending consults. Continue to monitor.

## 2020-06-17 NOTE — ED TRIAGE NOTES
Pt reports he had  his 2nd and 3rd toes amputated 3 weeks prior, pt reports drainage, bleeding, and redness at the surgical site that started over the weekend.

## 2020-06-17 NOTE — ED PROVIDER NOTES
History     Chief Complaint:  Post-op Problem pt reports drainage, bleeding, and redness at the surgical site that started over the weekend.      JOSH Garibay is a 70 year old male with a history of non-Hodgkin's lymphoma, finished chemotherapy infusions 1 week ago, diabetes, and a partial left 2nd and 3rd toe amputation performed May 22nd by Dr. Flores who presents for evaluation of inflammation and bleeding at the amputation site. The stitches were removed a week ago and the site was healing well at that time. 4 day ago he initially began to notice bleeding from the site and sent pictures to his surgical team. The following day he reports redness of the toes, swelling, blood and pus drainage, and red streaking up the top of his foot. He denies any chest pain, shortness of breath, fever, chills, or abdominal pain. He denies any bloody stools or frequent bleeding from his nose or gums. Patient notes he was taken off his Xarelto prior to beginning this round of chemotherapy.     Allergies:  No known drug allergies    Medications:    Allopurinol   Tylenol   Fenofibrate   Filgrastim-sndz   Hydrocortisone   Insulin aspart   Insulin glargine   Metformin   Potassium chloride   Compazine  Crestor    Past Medical History:    Lymphadenopathy, mediastinal  Anemia due to antineoplastic chemotherapy  Lymphoma, non-Hodgkin's   Skin ulcer of toe of left foot with fat layer exposed   Osteomyelitis of second toe of left foot   Osteomyelitis of third toe of left foot   Diffuse large cell lymphoma in remission  Diabetes  Basal cell cancer  HLD  HTN  Keratoderma  Liver disease  Pedal edema  Pleural effusion  Seborrheic keratosis, inflamed  Sleep apnea  Thrombosis     Past Surgical History:    Biopsy lymph node cervical  Insert port vascular access x2  Soft tissue surgery  Remove port vascular access   ENT surgery  Thoracotomy right  Excise Node Mediastinal  Endovascular placement vascular device left  IR Port Removal  Right  Phacoemulsification clear cornea with standard intraocular lens implant right  Eye surgery left  IR Chest Port Placement   Amputate toe(s) (Left, 5/22/2020).    Family History:    History reviewed. No pertinent family history.     Social History:  The patient arrives alone  Smoking: never  Alcohol use: no  PCP: Francisco Armijo  Marital Status:  [2]    Review of Systems  Ten review of systems negative, apart from what is noted above in HPI.     Physical Exam     Patient Vitals for the past 24 hrs:   BP Temp Temp src Pulse Heart Rate Resp SpO2 Weight   06/17/20 0940 106/53 96.6  F (35.9  C) Temporal 109 109 18 97 % 83.9 kg (185 lb)     Physical Exam  General: Alert and interactive. Appears well. Cooperative and pleasant.   Eyes: The pupils are equal and round. EOMs intact. No scleral icterus.  ENT: No abnormalities to the external nose or ears. Mucous membranes moist. Posterior oropharynx is non-erythematous.    Neck: Trachea is in the midline. No nuchal rigidity.     CV: Regular rate and rhythm. S1 and S2 normal without murmur, click, gallop or rub. Left DP pulses are bounding.   Resp: Breath sounds are clear bilaterally, without rhonchi, wheezes, rales. Non-labored, no retractions or accessory muscle use.     GI: Abdomen is soft without distension. No tenderness to palpation. No peritoneal signs.    MS: Moving all extremities well. Good muscle tone. Able to move toes appropriately. Left 2nd/3rd metatarsals are tender to palpation.   Skin: Diffuse erythema to 2/3 phalanges of left foot with streaking up the foot, as pictured below. There is purulent drainage at the tips of the toes.           Neuro: Alert and oriented x 3. No focal neurologic deficits. Good strength and sensation in upper and lower extremities. Psych: Awake. Alert.  Normal affect. Appropriate interactions.  Lymph: No anterior or posterior cervical lymphadenopathy noted.    Emergency Department Course     Imaging:  Radiology findings  were communicated with the patient who voiced understanding of the findings.    XR Foot 3 views left  IMPRESSION: Amputation of the second and third toes at the middle   phalanx level. Calcaneal spurring. Otherwise unremarkable.  Reading per radiology    Laboratory:  Laboratory findings were communicated with the patient who voiced understanding of the findings.    CBC: (WBC 0.4 (L!), HGB 7.9 (L), PLT 4 (L!))   BMP: Glucose 219 (H), o/w WNL (Creatinine: 0.91)    Lactic Acid (Resulted: 1134): 1.7  Blood cultures x2 in progress    COVID-19 PCR Nasopharyngeal swab in progress    Interventions:  1148 Zosyn infusion 4.5 g IV  1219 vancomycin 1500 mg IV    Emergency Department Course:  Past medical records, nursing notes, and vitals reviewed.  1103: I performed an exam of the patient and obtained history, as documented above.     IV was inserted and blood was drawn for laboratory testing, results above.    The patient was sent for an XR while in the emergency department, findings above    1227: I rechecked the patient. Explained findings to patient.  Staffed with Dr. Metzger.     I personally reviewed the laboratory and imaging results with the Patient and answered all related questions prior to admission.     Findings and plan explained to the Patient who consents to admission. Discussed the patient with Dr. Rubin, who will admit the patient to a med/surg bed for further monitoring, evaluation, and treatment.  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 The  patient had laboratory work-up drawn at his oncology office today, which showed pancytopenia, including a hemoglobin of 7.9 and platelets of 4. On examination here, the patient has erythema to the left second and third digits with purulent drainage, consistent with likely osteomyelitis with secondary cellulitis. X-ray obtained that shows no evidence of osteomyelitis. He is neutropenic, and given his immunosuppression, he will need IV antibiotics. He was started on IV Zosyn and vancomycin here and was given a unit of platelets. Discussed the case with Dr. Rubin who will admit to an inpatient medical bed for further evaluation and treatment. He will need oncology and vascular surgery consultation. The patient is in agreement to admission at this time. Lactic acid is normal and there is no current sign of severe sepsis. His blood cultures are drawn and pending at this point. Patient hemodynamically stable in the emergency department prior to admission.    Diagnosis:    ICD-10-CM   1. Toe infection  L08.9   2. Diffuse large B-cell lymphoma, unspecified body region (H)  C83.30   3. Antineoplastic chemotherapy induced pancytopenia (H)  D61.810    T45.1X5A       Disposition:   Admitted to med/surg      Scribe Disclosure:  Lisbet MCCALL, am serving as a scribe at 11:03 AM on 6/17/2020 to document services personally performed by Елена Torres PA based on my observations and the provider's statements to me.     EMERGENCY DEPARTMENT     Елена Torres PA-C  06/17/20 [id number]

## 2020-06-17 NOTE — TELEPHONE ENCOUNTER
Received new pictures of patient's left foot, s/p distal left second and third toe amputations with Dr. Flores on 5/22/20.    Spoke with pt, he denies any fever, bleeding, odor.  Reviewed previous pictures sent on 6/15/20.  Due to the streaking of the erythema and pt's current chemotherapy regimen/immunosuppresion, RN recommended he proceed to the ED to be evaluated further.  Pt verbalized understanding.  Dr. Flores notified.            Noemi Paul, ANTONYN, RN-[facility] [place]

## 2020-06-17 NOTE — PROGRESS NOTES
Pt off the floor for MRI.  Chart check:  Pancytopenia due to chemotherapy, did not receive GCSF after chemo recently.  On broad spec ATBx , transfuse plt for 10 or lower (or sig bleeding), RBC for hgb 7 or lower.  Will add GCSF.  Dr Olson/our team will follow.

## 2020-06-17 NOTE — PHARMACY-ADMISSION MEDICATION HISTORY
Pharmacy Medication History  Admission medication history interview status for the 6/17/2020  admission is complete. See EPIC admission navigator for prior to admission medications     Medication history sources: Patient  Medication history source reliability: Good  Adherence assessment: Good    Significant changes made to the medication list:  1) Medications added:    Levofloxacin     Additional medication history information:   1) Levofloxacin (500 mg by mouth daily) - prescribed 6/15/20 for infection prevention, patient unclear on duration and was suppose to follow-up with outside provider on 6/19/20 for more clarification    2) Insulin regimen    Insulin aspart (sliding scale) - patient does not have consistent sliding scale that he utilizes.     Insulin Lantus (30 units at bedtime) - patient has been holding doses, using half the dose, or reducing the dose due to an increasing amount of hypoglycemic episodes.     3) Xarelto is on hold due to low platelet counts per oncology provider.    Medication reconciliation completed by provider prior to medication history? No    Time spent in this activity: 20 minutes      Prior to Admission medications    Medication Sig Last Dose Taking? Auth Provider   acetaminophen (TYLENOL) 325 MG tablet Take 650-975 mg by mouth every 12 hours as needed for mild pain PRN at PRN Yes Unknown, Entered By History   allopurinol (ZYLOPRIM) 300 MG tablet Take 300 mg by mouth daily 6/16/2020 at AM Yes Unknown, Entered By History   diphenhydrAMINE-acetaminophen (TYLENOL PM)  MG tablet Take 2-3 tablets by mouth At Bedtime 6/16/2020 at PM Yes Unknown, Entered By History   fenofibrate (TRICOR) 145 MG tablet Take 145 mg by mouth daily 6/16/2020 at AM Yes Unknown, Entered By History   filgrastim-sndz (ZARXIO) 480 MCG/0.8ML syringe Inject 0.8 mLs (480 mcg) Subcutaneous daily 6/16/2020 at PM Yes Temo Eng, APRN CNP   hydrocortisone (CORTEF) 10 MG tablet Take 10 mg by mouth every  evening 6/16/2020 at PM Yes Unknown, Entered By History   hydrocortisone (CORTEF) 10 MG tablet Take 20 mg by mouth daily before breakfast  6/16/2020 at AM Yes Reported, Patient   insulin aspart (NOVOLOG FLEXPEN) 100 UNIT/ML pen Inject Subcutaneous 3 times daily (with meals) Patient uses sliding scale based on Carbs and Blood Glucose. Has been using very little lately due to low readings and low appetite. 6/16/2020 at Unknown time Yes Reported, Patient   insulin glargine (LANTUS VIAL) 100 UNIT/ML soln Inject 30 Units Subcutaneous At Bedtime  Past Week at Unknown time Yes Reported, Patient   levofloxacin (LEVAQUIN) 500 MG tablet Take 500 mg by mouth daily 6/16/2020 at AM Yes Unknown, Entered By History   metFORMIN (GLUCOPHAGE) 500 MG tablet Take 1,000 mg by mouth daily (with breakfast) 6/16/2020 at AM Yes Unknown, Entered By History   metFORMIN (GLUCOPHAGE) 500 MG tablet Take 500 mg by mouth daily (with dinner) 6/16/2020 at PM Yes Unknown, Entered By History   potassium chloride ER (KLOR-CON M) 20 MEQ CR tablet Take 20 mEq by mouth daily 6/16/2020 at AM Yes Unknown, Entered By History   rosuvastatin (CRESTOR) 10 MG tablet Take 5 mg by mouth daily 6/16/2020 at AM Yes Unknown, Entered By History   order for DME Kyle 1 packet twice a day for the next month   Hubert Carpenter MD   prochlorperazine (COMPAZINE) 5 MG tablet Take 1-2 tablets (5-10 mg) by mouth every 6 hours as needed (Breakthrough Nausea / Vomiting) PRN at PRN  Temo Eng APRN CNP   XARELTO ANTICOAGULANT 20 MG TABS tablet Take 20 mg by mouth daily Takes in the am ON HOLD at ON HOLD  Reported, Patient       Sheba Dawn PharmMelissaD.

## 2020-06-17 NOTE — PROGRESS NOTES
Pt transferred from ED at 1340hrs. A/Ox4. VSS, on RA. Uses CPAP at bedtime-wife will drop off later. Denies pain. LLE 2nd & 3rd toes-reddened w/ trace amt of edema, pulse WNL, CASPER; vascular surgery consulted. MRI of foot ordered for this PM. Plt 4000, will receive platelets this evening, MD to come up and do consent w/ pt. R port HL w/ abx. Regular diet, up SBA. Voiding blue/green urine-baseline for the past 3wks due to chemo drug. Hem/onc & ID consulted. Discharge pending clinical progression.

## 2020-06-17 NOTE — PROGRESS NOTES
RECEIVING UNIT ED HANDOFF REVIEW    ED Nurse Handoff Report was reviewed by: Demetria Iyer RN on June 17, 2020 at 12:54 PM

## 2020-06-18 LAB
ABO + RH BLD: NORMAL
ABO + RH BLD: NORMAL
ALBUMIN SERPL-MCNC: 2.4 G/DL (ref 3.4–5)
ALP SERPL-CCNC: 44 U/L (ref 40–150)
ALT SERPL W P-5'-P-CCNC: 18 U/L (ref 0–70)
ANION GAP SERPL CALCULATED.3IONS-SCNC: 5 MMOL/L (ref 3–14)
ANISOCYTOSIS BLD QL SMEAR: SLIGHT
AST SERPL W P-5'-P-CCNC: 11 U/L (ref 0–45)
BASOPHILS # BLD AUTO: 0 10E9/L (ref 0–0.2)
BASOPHILS NFR BLD AUTO: 0 %
BILIRUB DIRECT SERPL-MCNC: 0.4 MG/DL (ref 0–0.2)
BILIRUB SERPL-MCNC: 1 MG/DL (ref 0.2–1.3)
BLD GP AB SCN SERPL QL: NORMAL
BLD PROD TYP BPU: NORMAL
BLD UNIT ID BPU: 0
BLOOD BANK CMNT PATIENT-IMP: NORMAL
BLOOD PRODUCT CODE: NORMAL
BPU ID: NORMAL
BUN SERPL-MCNC: 7 MG/DL (ref 7–30)
CALCIUM SERPL-MCNC: 8.1 MG/DL (ref 8.5–10.1)
CHLORIDE SERPL-SCNC: 105 MMOL/L (ref 94–109)
CK SERPL-CCNC: 14 U/L (ref 30–300)
CO2 SERPL-SCNC: 26 MMOL/L (ref 20–32)
CREAT SERPL-MCNC: 0.68 MG/DL (ref 0.66–1.25)
DACRYOCYTES BLD QL SMEAR: SLIGHT
DIFFERENTIAL METHOD BLD: ABNORMAL
DOHLE BOD BLD QL SMEAR: PRESENT
ELLIPTOCYTES BLD QL SMEAR: SLIGHT
EOSINOPHIL # BLD AUTO: 0 10E9/L (ref 0–0.7)
EOSINOPHIL NFR BLD AUTO: 0 %
ERYTHROCYTE [DISTWIDTH] IN BLOOD BY AUTOMATED COUNT: 16.6 % (ref 10–15)
GFR SERPL CREATININE-BSD FRML MDRD: >90 ML/MIN/{1.73_M2}
GLUCOSE BLDC GLUCOMTR-MCNC: 102 MG/DL (ref 70–99)
GLUCOSE BLDC GLUCOMTR-MCNC: 127 MG/DL (ref 70–99)
GLUCOSE BLDC GLUCOMTR-MCNC: 145 MG/DL (ref 70–99)
GLUCOSE BLDC GLUCOMTR-MCNC: 164 MG/DL (ref 70–99)
GLUCOSE BLDC GLUCOMTR-MCNC: 213 MG/DL (ref 70–99)
GLUCOSE SERPL-MCNC: 116 MG/DL (ref 70–99)
HCT VFR BLD AUTO: 21.5 % (ref 40–53)
HGB BLD-MCNC: 7 G/DL (ref 13.3–17.7)
HGB BLD-MCNC: 7.5 G/DL (ref 13.3–17.7)
LYMPHOCYTES # BLD AUTO: 0.2 10E9/L (ref 0.8–5.3)
LYMPHOCYTES NFR BLD AUTO: 27 %
MAGNESIUM SERPL-MCNC: 1.3 MG/DL (ref 1.6–2.3)
MAGNESIUM SERPL-MCNC: 2.3 MG/DL (ref 1.6–2.3)
MCH RBC QN AUTO: 25.1 PG (ref 26.5–33)
MCHC RBC AUTO-ENTMCNC: 32.6 G/DL (ref 31.5–36.5)
MCV RBC AUTO: 77 FL (ref 78–100)
MICROCYTES BLD QL SMEAR: PRESENT
MONOCYTES # BLD AUTO: 0 10E9/L (ref 0–1.3)
MONOCYTES NFR BLD AUTO: 2 %
MYELOCYTES # BLD: 0 10E9/L
MYELOCYTES NFR BLD MANUAL: 1 %
NEUTROPHILS # BLD AUTO: 0.6 10E9/L (ref 1.6–8.3)
NEUTROPHILS NFR BLD AUTO: 70 %
NUM BPU REQUESTED: 1
NUM BPU REQUESTED: 2
OVALOCYTES BLD QL SMEAR: SLIGHT
PLATELET # BLD AUTO: 11 10E9/L (ref 150–450)
PLATELET # BLD AUTO: 22 10E9/L (ref 150–450)
PLATELET # BLD EST: ABNORMAL 10*3/UL
POTASSIUM SERPL-SCNC: 3.2 MMOL/L (ref 3.4–5.3)
POTASSIUM SERPL-SCNC: 3.7 MMOL/L (ref 3.4–5.3)
PROT SERPL-MCNC: 5.5 G/DL (ref 6.8–8.8)
RBC # BLD AUTO: 2.79 10E12/L (ref 4.4–5.9)
SARS-COV-2 RNA SPEC QL NAA+PROBE: NOT DETECTED
SODIUM SERPL-SCNC: 136 MMOL/L (ref 133–144)
SPECIMEN EXP DATE BLD: NORMAL
SPECIMEN SOURCE: NORMAL
TRANSFUSION STATUS PATIENT QL: NORMAL
WBC # BLD AUTO: 0.8 10E9/L (ref 4–11)

## 2020-06-18 PROCEDURE — 82550 ASSAY OF CK (CPK): CPT | Performed by: INTERNAL MEDICINE

## 2020-06-18 PROCEDURE — 83735 ASSAY OF MAGNESIUM: CPT | Performed by: HOSPITALIST

## 2020-06-18 PROCEDURE — 80076 HEPATIC FUNCTION PANEL: CPT | Performed by: INTERNAL MEDICINE

## 2020-06-18 PROCEDURE — 80048 BASIC METABOLIC PNL TOTAL CA: CPT | Performed by: INTERNAL MEDICINE

## 2020-06-18 PROCEDURE — 84132 ASSAY OF SERUM POTASSIUM: CPT | Performed by: HOSPITALIST

## 2020-06-18 PROCEDURE — 85025 COMPLETE CBC W/AUTO DIFF WBC: CPT | Performed by: INTERNAL MEDICINE

## 2020-06-18 PROCEDURE — 83735 ASSAY OF MAGNESIUM: CPT | Performed by: INTERNAL MEDICINE

## 2020-06-18 PROCEDURE — 25000132 ZZH RX MED GY IP 250 OP 250 PS 637: Performed by: INTERNAL MEDICINE

## 2020-06-18 PROCEDURE — 85049 AUTOMATED PLATELET COUNT: CPT | Performed by: HOSPITALIST

## 2020-06-18 PROCEDURE — 25000128 H RX IP 250 OP 636: Performed by: INTERNAL MEDICINE

## 2020-06-18 PROCEDURE — 00000146 ZZHCL STATISTIC GLUCOSE BY METER IP

## 2020-06-18 PROCEDURE — 85018 HEMOGLOBIN: CPT | Performed by: HOSPITALIST

## 2020-06-18 PROCEDURE — 12000000 ZZH R&B MED SURG/OB

## 2020-06-18 PROCEDURE — 25000131 ZZH RX MED GY IP 250 OP 636 PS 637: Performed by: INTERNAL MEDICINE

## 2020-06-18 PROCEDURE — 25000131 ZZH RX MED GY IP 250 OP 636 PS 637: Performed by: HOSPITALIST

## 2020-06-18 PROCEDURE — P9040 RBC LEUKOREDUCED IRRADIATED: HCPCS | Performed by: INTERNAL MEDICINE

## 2020-06-18 PROCEDURE — P9016 RBC LEUKOCYTES REDUCED: HCPCS | Performed by: INTERNAL MEDICINE

## 2020-06-18 PROCEDURE — P9037 PLATE PHERES LEUKOREDU IRRAD: HCPCS | Performed by: INTERNAL MEDICINE

## 2020-06-18 PROCEDURE — 25800030 ZZH RX IP 258 OP 636: Performed by: INTERNAL MEDICINE

## 2020-06-18 PROCEDURE — 99233 SBSQ HOSP IP/OBS HIGH 50: CPT | Performed by: HOSPITALIST

## 2020-06-18 RX ADMIN — FILGRASTIM 480 MCG: 480 INJECTION, SOLUTION INTRAVENOUS; SUBCUTANEOUS at 21:09

## 2020-06-18 RX ADMIN — INSULIN ASPART 1 UNITS: 100 INJECTION, SOLUTION INTRAVENOUS; SUBCUTANEOUS at 18:06

## 2020-06-18 RX ADMIN — ACETAMINOPHEN 650 MG: 325 TABLET, FILM COATED ORAL at 08:17

## 2020-06-18 RX ADMIN — FENOFIBRATE 160 MG: 160 TABLET ORAL at 08:16

## 2020-06-18 RX ADMIN — Medication 5 ML: at 07:13

## 2020-06-18 RX ADMIN — POTASSIUM CHLORIDE 40 MEQ: 1500 TABLET, EXTENDED RELEASE ORAL at 08:15

## 2020-06-18 RX ADMIN — ALLOPURINOL 300 MG: 300 TABLET ORAL at 08:16

## 2020-06-18 RX ADMIN — MAGNESIUM SULFATE HEPTAHYDRATE 4 G: 40 INJECTION, SOLUTION INTRAVENOUS at 16:47

## 2020-06-18 RX ADMIN — PIPERACILLIN SODIUM AND TAZOBACTAM SODIUM 3.38 G: 3; .375 INJECTION, POWDER, LYOPHILIZED, FOR SOLUTION INTRAVENOUS at 17:48

## 2020-06-18 RX ADMIN — ROSUVASTATIN CALCIUM 5 MG: 5 TABLET, FILM COATED ORAL at 08:16

## 2020-06-18 RX ADMIN — HYDROCORTISONE 10 MG: 10 TABLET ORAL at 21:08

## 2020-06-18 RX ADMIN — POTASSIUM CHLORIDE 20 MEQ: 1500 TABLET, EXTENDED RELEASE ORAL at 10:57

## 2020-06-18 RX ADMIN — ACETAMINOPHEN 650 MG: 325 TABLET, FILM COATED ORAL at 22:48

## 2020-06-18 RX ADMIN — PIPERACILLIN SODIUM AND TAZOBACTAM SODIUM 3.38 G: 3; .375 INJECTION, POWDER, LYOPHILIZED, FOR SOLUTION INTRAVENOUS at 12:36

## 2020-06-18 RX ADMIN — Medication 5 ML: at 13:02

## 2020-06-18 RX ADMIN — DIPHENHYDRAMINE HYDROCHLORIDE 50 MG: 50 CAPSULE ORAL at 22:48

## 2020-06-18 RX ADMIN — Medication 5 ML: at 04:36

## 2020-06-18 RX ADMIN — DAPTOMYCIN 505 MG: 500 INJECTION, POWDER, LYOPHILIZED, FOR SOLUTION INTRAVENOUS at 13:49

## 2020-06-18 RX ADMIN — PIPERACILLIN SODIUM AND TAZOBACTAM SODIUM 3.38 G: 3; .375 INJECTION, POWDER, LYOPHILIZED, FOR SOLUTION INTRAVENOUS at 06:35

## 2020-06-18 RX ADMIN — Medication 5 ML: at 18:38

## 2020-06-18 RX ADMIN — ACETAMINOPHEN 650 MG: 325 TABLET, FILM COATED ORAL at 13:48

## 2020-06-18 RX ADMIN — PIPERACILLIN SODIUM AND TAZOBACTAM SODIUM 3.38 G: 3; .375 INJECTION, POWDER, LYOPHILIZED, FOR SOLUTION INTRAVENOUS at 23:27

## 2020-06-18 RX ADMIN — Medication 5 ML: at 21:19

## 2020-06-18 RX ADMIN — Medication 5 ML: at 14:37

## 2020-06-18 RX ADMIN — HYDROCORTISONE 20 MG: 10 TABLET ORAL at 07:13

## 2020-06-18 RX ADMIN — ACETAMINOPHEN 650 MG: 325 TABLET, FILM COATED ORAL at 18:38

## 2020-06-18 RX ADMIN — INSULIN GLARGINE 15 UNITS: 100 INJECTION, SOLUTION SUBCUTANEOUS at 22:47

## 2020-06-18 ASSESSMENT — ACTIVITIES OF DAILY LIVING (ADL)
ADLS_ACUITY_SCORE: 12

## 2020-06-18 NOTE — PROVIDER NOTIFICATION
MD Notification    Notified Person: MD    Notified Person Name: Dr. Cordova    Notification Date/Time: 2236 6/17/2020    Notification Interaction: Page    Purpose of Notification: Patient not comfortable taking the scheduled 30 units of Lantus @ bedtime. Wants to take half. Patient stated a few days ago he didn't take any insulin @ bedtime and dropped down to 57 in the AM.     Orders Received: 15 units    Comments:

## 2020-06-18 NOTE — PLAN OF CARE
A&Ox4, pleasant. VSS. No c/o pain. ROBERT 2nd and 3rd toes reddened, skin marked, has not extended on shift. CASPER. No drainage seen. Mild ABD discomfort/nausea @ times, but did not request anything for it. BG checks. Up independently. Voiding well in urinal, urine ss blue/green in color d/t recent chemo. Right port hep locked; intermittent antibiotics. HGB down to 7.0 so did receive one unit of blood overnight; tolerated infusion well. Platelets up to 18. WBC increased to 0.6. Neupogen started 6/17.Vascular surgery, ID and hem/onc consulted. Plan pending consults.

## 2020-06-18 NOTE — PROGRESS NOTES
Called by nursing staff regarding patient concern with high insulin dosing compared to home, half dose requested and acquiesced for this evening.

## 2020-06-18 NOTE — PROGRESS NOTES
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 (with the pleural effusion but negative cytology) follicular lymphoma, s/p RCHOP with maintenance rituximab, s/p BR 12/2017, s/p thoracic radiation 7/2019  --CT C/A/P 5/14/20 revealed marked increase in the right lower lobe pulmonary mass with new subcarinal lymphadenopathy, bilateral new adrenal masses, and soft tissue tumor deposits in the abdomen  --CT guided adrenal biopsy 5/27/20 revealed transformation to a diffuse large B cell lymphoma, germinal center type, 100% Ki-67 positive,  with positive immunohistochemical staining for BCL-2, BCL-6, and C-MYC suggestive of a possible triple-hit lymphoma.  - Started on R ICE 6/5/2020  - chemo discontinued for ifosfamide induced encephalopathy 6/7/20  - contnue supportive care with plans for alternative chemotherapy with R-DHAP  with cycle 2 once counts recover    Pancytopenia  --chemotherapy and neoplasm induced   --continue neupogen until ANC > 1.0  --transfuse red cells today (irradiated) to keep hemoglobin > 7  --transfuse platelets today with thrombocytopenia and bleeding  --will be monitored.    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 He is tolerating anticoagulation with Lovenox well. We discussed alternative anticoagulants such as Coumadin (less effective) or DOAC and as he has completed chemotherapy with a good response of his malignancy in 6/2018 he was switched to Xarelto.   --A vascular surgery consultation for the persistent IVC thrombus concurred with anticoagulation for 6-12 months or longer without other intervention.   --Anticoagulation will  be HELD with ongoing thrombocytopenia       Diabetes  - managed by hospitalist team     Possible adrenal insufficiency  --Large bilateral adrenal masses with mild hypotension, nausea, vomiting, and hyperkalemia  --Normal cortisol level and ACTH elevated at 140  --continue empiric PO hydrocortisone 20 mg q 8 AM and 10 mg every 4 p.m.    -Chronic Kidney Disease  -Mild renal insufficiency will be monitored.     Right Foot Wound  --s/p toe amputation  --continue antibiotics and wound care per vascular surgery, Id, and hospitallst                 Code Status: Full Code    Mike Olson    Interval History   Feels ok, no new confusion, no nausea    Physical Exam   Temp: 97.7  F (36.5  C) Temp src: Oral BP: 101/63 Pulse: 109 Heart Rate: 88 Resp: 16 SpO2: 98 % O2 Device: None (Room air)    Vitals:    06/17/20 0940   Weight: 83.9 kg (185 lb)     Vital Signs with Ranges  Temp:  [95.4  F (35.2  C)-98.8  F (37.1  C)] 97.7  F (36.5  C)  Pulse:  [109] 109  Heart Rate:  [] 88  Resp:  [16-19] 16  BP: ()/(47-91) 101/63  SpO2:  [95 %-100 %] 98 %  I/O last 3 completed shifts:  In: 749 [P.O.:120]  Out: 1250 [Urine:1250]    Constitutional: awake, cooperative, no acute distress  Hematologic / Lymphatic: no cervical lymphadenopathy  GI: soft, non-distended, non-tender, no masses palpated  Musculoskeletal: no pedal edema, redness noted over toe amputation site     Medications       allopurinol  300 mg Oral Daily     DAPTOmycin  6 mg/kg Intravenous Q24H     fenofibrate  160 mg Oral Daily     filgrastim (NEUPOGEN/GRANIX) subcutaneous  480 mcg Subcutaneous Daily at 8 pm     heparin  5 mL Intracatheter Q28 Days     heparin lock flush  5-10 mL Intracatheter Q24H     hydrocortisone  10 mg Oral QPM     hydrocortisone  20 mg Oral QAM AC     insulin aspart  1-10 Units Subcutaneous TID AC     insulin aspart  1-7 Units Subcutaneous At Bedtime     insulin glargine  15 Units Subcutaneous At Bedtime     piperacillin-tazobactam  3.375 g  Intravenous Q6H     rosuvastatin  5 mg Oral Daily     sodium chloride (PF)  3 mL Intracatheter Q8H       Data   Results for orders placed or performed during the hospital encounter of 06/17/20 (from the past 24 hour(s))   CBC with platelets differential   Result Value Ref Range    WBC 0.4 (LL) 4.0 - 11.0 10e9/L    RBC Count 3.18 (L) 4.4 - 5.9 10e12/L    Hemoglobin 7.9 (L) 13.3 - 17.7 g/dL    Hematocrit 24.3 (L) 40.0 - 53.0 %    MCV 76 (L) 78 - 100 fl    MCH 24.8 (L) 26.5 - 33.0 pg    MCHC 32.5 31.5 - 36.5 g/dL    RDW 16.8 (H) 10.0 - 15.0 %    Platelet Count 4 (LL) 150 - 450 10e9/L    Diff Method Automated Method     Anisocytosis Slight     Ovalocytes Slight     Elliptocytes Slight     Platelet Estimate       Automated count confirmed.  Platelet morphology is normal.   Basic metabolic panel   Result Value Ref Range    Sodium 134 133 - 144 mmol/L    Potassium 3.7 3.4 - 5.3 mmol/L    Chloride 104 94 - 109 mmol/L    Carbon Dioxide 24 20 - 32 mmol/L    Anion Gap 6 3 - 14 mmol/L    Glucose 219 (H) 70 - 99 mg/dL    Urea Nitrogen 12 7 - 30 mg/dL    Creatinine 0.91 0.66 - 1.25 mg/dL    GFR Estimate 87 >60 mL/min/[1.73_m2]    GFR Estimate If Black >90 >60 mL/min/[1.73_m2]    Calcium 8.8 8.5 - 10.1 mg/dL   Blood culture    Specimen: Blood    Right Arm   Result Value Ref Range    Specimen Description Blood Right Arm     Culture Micro No growth after 12 hours    Blood component   Result Value Ref Range    Unit Number [id number]     Blood Component Type PlateletPheresis,LeukoRed Irrad (Part 2)     Division Number 00     Status of Unit No longer available 06/17/2020 1516     Blood Product Code [id number]     Unit Status RET    Blood component   Result Value Ref Range    Unit Number [id number]     Blood Component Type PlateletPheresis,LeukoRed Irrad (Part 2)     Division Number 00     Status of Unit Released to care unit     Blood Product Code [id number]     Unit Status ISS    Lactic acid   Result Value Ref Range    Lactic Acid  1.7 0.7 - 2.0 mmol/L   Blood culture    Specimen: Blood    Right Arm   Result Value Ref Range    Specimen Description Blood Right Arm     Culture Micro No growth after 12 hours    Foot XR, G/E 3 views, left    Narrative    FOOT LEFT THREE OR MORE VIEWS June 17, 2020 11:48 AM     HISTORY: Second/third toe infection. History of second and third toe  amputation.      Impression    IMPRESSION: Amputation of the second and third toes at the middle  phalanx level. Calcaneal spurring. Otherwise unremarkable.    TAMMY ERWIN MD   Infectious Diseases IP Consult: Patient to be seen: Routine - within 24 hours; cellulitis, recent osteo; Consultant may enter orders: Yes; Requesting provider? 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     Impression:  1. 70 y.o male with non hodgkin`s Lymphoma on chemo.   2. Diabetes.   3. Recent history of partial amputation on the 2/3 toe left foot.     4. Recent hospitalization for chemo related encephalopathy.   5. Who is now presenting with left foot 2/3 toe infection with   drainage and wound dehiscence.   6. Started on vanco and zosyn.   7. Currently neutropenic.   8. BMT patient.     Recommendations:   Continue zosyn, switch from vanco to daptomycin.   Follow up on the vascular/ podiatry consults.       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     Past Medical History   I have reviewed this patient's medical history and updated it   with pertinent information if needed.   Past Medical History:   Diagnosis Date     Abnormal liver function test      CPAP (continuous positive airway pressure) dependence      Diabetes (H)      Hx of skin cancer, basal cell      Hyperlipidemia      Hypertension      Keratoderma      Liver disease      Lymphoma (H)      Non-Hodgkin lymphoma (H)      Pedal edema     CHRONIC     Pleural effusion      Seborrheic keratosis, inflamed      Sleep apnea      Thrombosis Felbruary 2018       Past Surgical History   I have reviewed this patient's surgical history and updated it   with pertinent information if needed.  Past Surgical History:   Procedure Laterality Date     AMPUTATE TOE(S) Left 5/22/2020    Procedure: LEFT SECOND AND THIRD DISTAL TOE AMPUTATIONS;    Surgeon: Ramon Flores MD;  Location:  OR     BIOPSY LYMPH NODE CERVICAL N/A 12/5/2014    Procedure: BIOPSY LYMPH NODE CERVICAL;  Surgeon: Robbie Linda MD;  Location:  OR     BRONCHOSCOPY FLEXIBLE N/A 11/14/2017    Procedure: BRONCHOSCOPY FLEXIBLE;  FLEXIBLE BRONCHOSCOPY WITH   BIOPSIES.;  Surgeon: Robbie Linda MD;  Location:    OR     COLONOSCOPY       COLONOSCOPY N/A 5/9/2018    Procedure: COMBINED COLONOSCOPY, SINGLE OR MULTIPLE   BIOPSY/POLYPECTOMY BY BIOPSY;;  Surgeon: Ramos Bates MD;    Location:  GI     ENDOVASCULAR PLACEMENT VASCULAR DEVICE Left 12/5/2017    Procedure: ENDOVASCULAR PLACEMENT VASCULAR DEVICE;;  Surgeon:   Robbie Linda MD;  Location: [facility]     ENT SURGERY      tonsillectomy     EXCISE NODE  MEDIASTINAL N/A 11/17/2017    Procedure: EXCISE NODE MEDIASTINAL;;  Surgeon: Robbie Linda MD;  Location:  OR     EYE SURGERY       EYE SURGERY Left     vitrectomy     INSERT PORT VASCULAR ACCESS N/A 12/5/2014    Procedure: INSERT PORT VASCULAR ACCESS;  Surgeon: Robbie Linda MD;  Location:  OR     INSERT PORT VASCULAR ACCESS N/A 12/5/2017    Procedure: INSERT PORT VASCULAR ACCESS;  POWER PORT PLACEMENT   ATTEMPTED, PLACEMENT OF ANGIO-SEAL VIP VASCULAR CLOSURE DEVICE;    Surgeon: Robbie Linda MD;  Location:  SD     IR CHEST PORT PLACEMENT > 5 YRS OF AGE  6/5/2020     IR PORT REMOVAL RIGHT  12/10/2018     PHACOEMULSIFICATION CLEAR CORNEA WITH STANDARD INTRAOCULAR LENS   IMPLANT Right 5/2/2016    Procedure: PHACOEMULSIFICATION CLEAR CORNEA WITH STANDARD   INTRAOCULAR LENS IMPLANT;  Surgeon: Rasheed Finney MD;    Location:  [facility]     REMOVE PORT VASCULAR ACCESS N/A 3/14/2017    Procedure: REMOVE PORT VASCULAR ACCESS;  Surgeon: Robbie Linda MD;  Location:  OR     SOFT TISSUE SURGERY      BASAL CELL CA FOREHEAD     THORACOTOMY Right 11/17/2017    Procedure: THORACOTOMY;  RIGHT EXPLORATORY THORACOTOMY/   EXTENSIVE PNEUMOLYSIS/ BIOPSY OF INTERLOBAR LYMPH NODE;  Surgeon:   Robbie Linda MD;  Location:  OR       Prior to Admission Medications   Prior to Admission Medications   Prescriptions Last Dose Informant Patient Reported? Taking?   XARELTO ANTICOAGULANT 20 MG TABS tablet ON HOLD at ON HOLD Self   Yes No   Sig: Take 20 mg by mouth daily Takes in the am   acetaminophen (TYLENOL) 325 MG tablet PRN at PRN Self Yes Yes   Sig: Take 650-975 mg by mouth every 12 hours as needed for mild   pain   allopurinol (ZYLOPRIM) 300 MG tablet 6/16/2020 at AM Self Yes Yes     Sig: Take 300 mg by mouth daily   diphenhydrAMINE-acetaminophen (TYLENOL PM)  MG tablet   6/16/2020 at PM Self Yes Yes   Sig: Take 2-3 tablets by mouth At Bedtime   fenofibrate (TRICOR)  145 MG tablet 6/16/2020 at AM Self Yes Yes   Sig: Take 145 mg by mouth daily   filgrastim-sndz (ZARXIO) 480 MCG/0.8ML syringe 6/16/2020 at PM   Self No Yes   Sig: Inject 0.8 mLs (480 mcg) Subcutaneous daily   hydrocortisone (CORTEF) 10 MG tablet 6/16/2020 at AM Self Yes Yes     Sig: Take 20 mg by mouth daily before breakfast    hydrocortisone (CORTEF) 10 MG tablet 6/16/2020 at PM Self Yes Yes     Sig: Take 10 mg by mouth every evening   insulin aspart (NOVOLOG FLEXPEN) 100 UNIT/ML pen 6/16/2020 at   Unknown time Self Yes Yes   Sig: Inject Subcutaneous 3 times daily (with meals) Patient uses   sliding scale based on Carbs and Blood Glucose. Has been using   very little lately due to low readings and low appetite.   insulin glargine (LANTUS VIAL) 100 UNIT/ML soln Past Week at   Unknown time Self Yes Yes   Sig: Inject 30 Units Subcutaneous At Bedtime    levofloxacin (LEVAQUIN) 500 MG tablet 6/16/2020 at AM Self Yes   Yes   Sig: Take 500 mg by mouth daily   metFORMIN (GLUCOPHAGE) 500 MG tablet 6/16/2020 at AM Self Yes Yes     Sig: Take 1,000 mg by mouth daily (with breakfast)   metFORMIN (GLUCOPHAGE) 500 MG tablet 6/16/2020 at PM Self Yes Yes     Sig: Take 500 mg by mouth daily (with dinner)   order for DME  Self No No   Sig: Kyle 1 packet twice a day for the next month   potassium chloride ER (KLOR-CON M) 20 MEQ CR tablet 6/16/2020 at   AM Self Yes Yes   Sig: Take 20 mEq by mouth daily   prochlorperazine (COMPAZINE) 5 MG tablet PRN at PRN Self No No   Sig: Take 1-2 tablets (5-10 mg) by mouth every 6 hours as needed   (Breakthrough Nausea / Vomiting)   rosuvastatin (CRESTOR) 10 MG tablet 6/16/2020 at AM Self Yes Yes   Sig: Take 5 mg by mouth daily      Facility-Administered Medications: None     Allergies   No Known Allergies    Immunization History     There is no immunization history on file for this patient.    Social History   I have reviewed this patient's social history and updated it with   pertinent information  if needed. 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 He has never used smokeless tobacco. He   reports that he does not drink alcohol or use drugs.    Family History   I have reviewed this patient's family history and updated it with   pertinent information if needed.   History reviewed. No pertinent family history.    Review of Systems   The 10 point Review of Systems is negative other than noted in   the HPI or here.     Physical Exam   Temp: 98  F (36.7  C) Temp src: Oral BP: 129/67 Pulse: 109 Heart   Rate: 96 Resp: 18 SpO2: 98 % O2 Device: None (Room air)    Vital Signs with Ranges  Temp:  [96.6  F (35.9  C)-98  F (36.7  C)] 98  F (36.7  C)  Pulse:  [109] 109  Heart Rate:  [] 96  Resp:  [18] 18  BP: (106-129)/(53-67) 129/67  SpO2:  [97 %-98 %] 98 %  185 lbs 0 oz  Body mass index is 28.13 kg/m .    GENERAL APPEARANCE:  alert and no distress  EYES: Eyes grossly normal to inspection, PERRL and conjunctivae   and sclerae normal  HENT: ear canals and TM's normal and nose and mouth without   ulcers or lesions  NECK: no adenopathy, no asymmetry, masses, or scars and thyroid   normal to palpation  RESP: lungs clear to auscultation - no rales, rhonchi or wheezes  CV: regular rates and rhythm, normal S1 S2, no S3 or S4 and no   murmur, click or rub  LYMPHATICS: normal ant/post cervical and supraclavicular nodes  ABDOMEN: soft, nontender, without hepatosplenomegaly or masses   and bowel sounds normal  MS: the the left foot redness and swelling with drainage from the   toe   SKIN: no suspicious lesions or rashes      Data   Lab Results   Component Value Date    WBC 0.4 (LL) 06/17/2020    HGB 7.9 (L) 06/17/2020    HCT 24.3 (L) 06/17/2020    PLT 4 (LL) 06/17/2020     06/17/2020    POTASSIUM 3.7 06/17/2020    CHLORIDE 104 06/17/2020    CO2 24 06/17/2020    BUN 12 06/17/2020    CR 0.91 06/17/2020     (H) 06/17/2020    AST 29 06/07/2020    ALT 20 06/07/2020    ALKPHOS 44 06/07/2020    BILITOTAL  0.3 06/07/2020    ENEIDA 38 06/07/2020    INR 1.22 (H) 05/27/2020     No results for input(s): CULT in the last 168 hours.  Recent Labs   Lab Test 11/13/14  1330   CULT No growth       Amount of time performed on this consult: 45 minutes. This   includes face to face assessment and care coordination with the   primary team.         Glucose by meter   Result Value Ref Range    Glucose 101 (H) 70 - 99 mg/dL   MR Foot Left w/o & w Contrast    Narrative    EXAM: MR FOOT LEFT W/O and W CONTRAST  LOCATION: [facility]  DATE/TIME: 6/17/2020 4:00 PM    INDICATION: Osteomyelitis suspected, foot swelling, diabetic  COMPARISON: None.  TECHNIQUE: Routine. Additional postgadolinium T1 sequences were obtained.  IV CONTRAST: 8.4ml Gadavist    FINDINGS:     PRELIM MSK TO READ. Recent partial amputation distal phalanges of the second third toes.      Impression    IMPRESSION:  1.  MSK TO READ             Glucose by meter   Result Value Ref Range    Glucose 94 70 - 99 mg/dL   Glucose by meter   Result Value Ref Range    Glucose 171 (H) 70 - 99 mg/dL   CBC with platelets   Result Value Ref Range    WBC 0.6 (LL) 4.0 - 11.0 10e9/L    RBC Count 2.84 (L) 4.4 - 5.9 10e12/L    Hemoglobin 7.0 (L) 13.3 - 17.7 g/dL    Hematocrit 21.6 (L) 40.0 - 53.0 %    MCV 76 (L) 78 - 100 fl    MCH 24.6 (L) 26.5 - 33.0 pg    MCHC 32.4 31.5 - 36.5 g/dL    RDW 16.8 (H) 10.0 - 15.0 %    Platelet Count 18 (LL) 150 - 450 10e9/L   ABO/Rh type and screen   Result Value Ref Range    Units Ordered 1     ABO O     RH(D) Neg     Antibody Screen Neg     Test Valid Only At [facility]        Specimen Expires 06/20/2020     Crossmatch Red Blood Cells    Blood component   Result Value Ref Range    Unit Number [id number]     Blood Component Type Red Blood Cells LeukoReduced (Part 2)     Division Number 00     Status of Unit Released to care unit 06/18/2020 0138     Blood Product Code [id number]     Unit Status ISS    Glucose by meter   Result  Value Ref Range    Glucose 127 (H) 70 - 99 mg/dL   Hepatic panel   Result Value Ref Range    Bilirubin Direct 0.4 (H) 0.0 - 0.2 mg/dL    Bilirubin Total 1.0 0.2 - 1.3 mg/dL    Albumin 2.4 (L) 3.4 - 5.0 g/dL    Protein Total 5.5 (L) 6.8 - 8.8 g/dL    Alkaline Phosphatase 44 40 - 150 U/L    ALT 18 0 - 70 U/L    AST 11 0 - 45 U/L   Basic metabolic panel   Result Value Ref Range    Sodium 136 133 - 144 mmol/L    Potassium 3.2 (L) 3.4 - 5.3 mmol/L    Chloride 105 94 - 109 mmol/L    Carbon Dioxide 26 20 - 32 mmol/L    Anion Gap 5 3 - 14 mmol/L    Glucose 116 (H) 70 - 99 mg/dL    Urea Nitrogen 7 7 - 30 mg/dL    Creatinine 0.68 0.66 - 1.25 mg/dL    GFR Estimate >90 >60 mL/min/[1.73_m2]    GFR Estimate If Black >90 >60 mL/min/[1.73_m2]    Calcium 8.1 (L) 8.5 - 10.1 mg/dL   CBC with platelets differential   Result Value Ref Range    WBC 0.8 (LL) 4.0 - 11.0 10e9/L    RBC Count 2.79 (L) 4.4 - 5.9 10e12/L    Hemoglobin 7.0 (L) 13.3 - 17.7 g/dL    Hematocrit 21.5 (L) 40.0 - 53.0 %    MCV 77 (L) 78 - 100 fl    MCH 25.1 (L) 26.5 - 33.0 pg    MCHC 32.6 31.5 - 36.5 g/dL    RDW 16.6 (H) 10.0 - 15.0 %    Platelet Count 11 (LL) 150 - 450 10e9/L    Diff Method Manual Differential     % Neutrophils 70.0 %    % Lymphocytes 27.0 %    % Monocytes 2.0 %    % Eosinophils 0.0 %    % Basophils 0.0 %    % Myelocytes 1.0 %    Absolute Neutrophil 0.6 (L) 1.6 - 8.3 10e9/L    Absolute Lymphocytes 0.2 (L) 0.8 - 5.3 10e9/L    Absolute Monocytes 0.0 0.0 - 1.3 10e9/L    Absolute Eosinophils 0.0 0.0 - 0.7 10e9/L    Absolute Basophils 0.0 0.0 - 0.2 10e9/L    Absolute Myelocytes 0.0 0 10e9/L    Anisocytosis Slight     Teardrop Cells Slight     Ovalocytes Slight     Elliptocytes Slight     Microcytes Present     Dohle Bodies Present     Platelet Estimate Confirming automated cell count    CK total   Result Value Ref Range    CK Total 14 (L) 30 - 300 U/L

## 2020-06-18 NOTE — PROGRESS NOTES
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 Flores on 5/22. Sutures removed on 6/15  Presents on this admission with worsening redness, swelling, and purulent drainage from his 2nd and 3rd toe wounds. XR left foot on arrival showed no obvious osteo. In the ED, he was empirically initiated on IV vancomycin and pip-tazo  Left foot MRI  1. Findings suspicious for osteomyelitis of the residual second middle  phalanx. Adjacent 0.8 cm nonspecific fluid collection.  2. Nonspecific edema of the residual third middle phalanx, which could  simply be reactive.  3. Cellulitis.      Now on daptomycin and Zosyn per infectious disease     Vascular surgeon is following     Continue wound care     Pancytopenia with neutropenia due to chemotherapy  Diffuse large B-cell non-Hodgkin's lymphoma (CD20 B-cell grade III stage III versus IV)  * Follows with Dr. Olson at [facility]. Initially diagnosed with B-cell lymphoma in 2014 and underwent R-CHOP. Diagnosed with recurrent follicular lymphoma in 2017 and completed chemo/XRT. Found to have new masses and lymphadenopathy in May 2020, and was found to have diffuse large B-cell lymphoma. Initiated on R ICE on 6/5/2020, but chemo was discontinued on 6/7 due to Ifosfamide  encephalopathy  Now on alternative chemotherapy, last dose: 6/10  CBC on arrival notable for , platelets 4000. In the ED, he was give a unit of platelets and received I unit of packed red blood cells.  Hemoglobin is still 7g this morning. Platelets 11K.    Transfuse 1 unit of irradiated blood this morning     Defer need for platelet transfusion to oncologist    Monitor H3    [facility] has been consulted    History of suspected adrenal insufficiency  PTA: hydrocortisone 20 mg qAM, 10 mg qHS    Continue PTA hydrocortisone    History of IVC thrombus  Hypercoagulable state   PTA on rivaroxaban    Holding rivaroxaban due to thrombocytopenia    DM2 with peripheral vascular disease, long-term insulin use  PTA: metformin 1000 mg qac, 500 mg qhs. Lantus 30 units qhs, aspart per sliding scale.    Holding PTA metformin    Continues on Lantus 30 units qhs    High SSI available    JESSICA    Chronic and stable on CPAP    Dyslipidemia    Chronic and stable on rosuvastatin and fenofibrate     Diet: Combination Diet Regular Diet Adult    DVT Prophylaxis: Pneumatic Compression Devices  Champion Catheter: not present  Code Status: Full Code        Disposition Plan   Expected discharge: 2 - 3 days, recommended to prior living arrangement once antibiotic plan established and H3 stable.  Entered: Renato Alex MD 06/18/2020, 10:27 AM       The patient's care was discussed with the Bedside Nurse and Patient.    Renato Alex MD  Hospitalist Service  [facility]    ______________________________________________________________________    Interval History   No pain or complaints.    Data reviewed today: I reviewed all medications, new labs and imaging results over the last 24 hours. I personally reviewed no images or EKG's today.    Physical Exam   Vital Signs: Temp: 96.9  F (36.1  C) Temp src: Oral BP: 111/53   Heart Rate: 96 Resp: 16 SpO2: 100 % O2 Device: None (Room air)    Weight: 185 lbs 0  oz  Constitutional: awake, alert, cooperative, no apparent distress- non toxic  Respiratory: No increased work of breathing, good air exchange, clear to auscultation bilaterally, no crackles or wheezing  Cardiovascular: regular rate and rhythm, normal S1 and S2, no S3 or S4, and no murmur noted  Musculoskeletal: left foot- wounds noted-area of non blanchable erythema is demarcated  Neuropsychiatric: General: normal, calm and normal eye contact    Data   Recent Labs   Lab 06/18/20  0635 06/17/20  2228 06/17/20  0950 06/12/20  0600   WBC 0.8* 0.6* 0.4* 1.8*   HGB 7.0* 7.0* 7.9* 8.4*   MCV 77* 76* 76* 77*   PLT 11* 18* 4* 83*     --  134 138   POTASSIUM 3.2*  --  3.7 2.9*   CHLORIDE 105  --  104 107   CO2 26  --  24 24   BUN 7  --  12 16   CR 0.68  --  0.91 0.78   ANIONGAP 5  --  6 7   JEFF 8.1*  --  8.8 8.5   *  --  219* 104*   ALBUMIN 2.4*  --   --   --    PROTTOTAL 5.5*  --   --   --    BILITOTAL 1.0  --   --   --    ALKPHOS 44  --   --   --    ALT 18  --   --   --    AST 11  --   --   --      Recent Results (from the past 24 hour(s))   Foot XR, G/E 3 views, left    Narrative    FOOT LEFT THREE OR MORE VIEWS June 17, 2020 11:48 AM     HISTORY: Second/third toe infection. History of second and third toe  amputation.      Impression    IMPRESSION: Amputation of the second and third toes at the middle  phalanx level. Calcaneal spurring. Otherwise unremarkable.    TAMMY ERWIN MD   MR Foot Left w/o & w Contrast    Addendum: 6/18/2020    FINAL REPORT:    EZIO PADILLA  Accession # [id number]    TECHNIQUE:  Multiplanar, multisequence without and with contrast. 8.4  mL Gadavist.    COMPARISON:  5/6/2020    FINDINGS: Changes of recent second and third toe amputation at the  middle phalanx level. There is edema and contrast enhancement  involving the residual portion of the second middle phalanx. This  includes some low T1-weighted signal distally. This is most consistent  with osteomyelitis. There is  nonspecific edema involving the residual  third middle phalanx. This does not have contrast enhancement and  therefore could simply represent reactive edema related to the recent  surgery. There is nonspecific soft tissue edema, which may relate to  surgery, reactive edema, or cellulitis. At least some of this is felt  to represent cellulitis. At the distal aspect of the residual second  middle phalanx, there is a nonspecific fluid collection measuring 0.6  x 0.4 x 0.8 cm. This could represent seroma, chronic hematoma, or  microabscess.    IMPRESSION:  1. Findings suspicious for osteomyelitis of the residual second middle  phalanx. Adjacent 0.8 cm nonspecific fluid collection.  2. Nonspecific edema of the residual third middle phalanx, which could  simply be reactive.  3. Cellulitis.    TAMMY ERWIN MD (Date of Addendum: 6/18/2020 7:44 AM)    TAMMY ERWIN MD      Narrative    EXAM: MR FOOT LEFT W/O and W CONTRAST  LOCATION: [facility]  DATE/TIME: 6/17/2020 4:00 PM    INDICATION: Osteomyelitis suspected, foot swelling, diabetic  COMPARISON: None.  TECHNIQUE: Routine. Additional postgadolinium T1 sequences were obtained.  IV CONTRAST: 8.4ml Gadavist    FINDINGS:     PRELIM MSK TO READ. Recent partial amputation distal phalanges of the second third toes.      Impression    IMPRESSION:  1.  MSK TO READ               Medications       allopurinol  300 mg Oral Daily     DAPTOmycin  6 mg/kg Intravenous Q24H     fenofibrate  160 mg Oral Daily     filgrastim (NEUPOGEN/GRANIX) subcutaneous  480 mcg Subcutaneous Daily at 8 pm     heparin  5 mL Intracatheter Q28 Days     heparin lock flush  5-10 mL Intracatheter Q24H     hydrocortisone  10 mg Oral QPM     hydrocortisone  20 mg Oral QAM AC     insulin aspart  1-10 Units Subcutaneous TID AC     insulin aspart  1-7 Units Subcutaneous At Bedtime     insulin glargine  15 Units Subcutaneous At Bedtime     piperacillin-tazobactam  3.375 g Intravenous Q6H     rosuvastatin   5 mg Oral Daily     sodium chloride (PF)  3 mL Intracatheter Q8H

## 2020-06-18 NOTE — PROGRESS NOTES
[id number] shift     Pt is A/Ox4. VSS, on RA. C/o back ache-tylenol and heat packs effective. Hgb 7 this AM>received 1 unit PRBCs>recheck 7.5. Platelets 11,000>replaced per onc>2100 recheck. Daily neupogen. K 3.2>replaced>recheck 3.7. Mg 1.3>currently being replaced>2100 recheck. LLE 2nd + 3rd toes reddened-skin marked, has not extended, MRI suggesting potential osteo. Cleansed w/ antiseptic spray, CASPER. Regular diet, blood glucose monitoring w/ sliding scale insulin. Up independently in room. Voiding well in urinal, urine is blue/green in color d/t recent chemo. R port hep locked. Vascular surgery, ID, hem/onc following. Protective isolation maintained. Discharge pending clinical improvement.

## 2020-06-18 NOTE — PROGRESS NOTES
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     Impression:  1. 70 y.o male with non hodgkin`s Lymphoma on chemo.   2. Diabetes.   3. Recent history of partial amputation on the 2/3 toe left foot.   4. Recent hospitalization for chemo related encephalopathy.   5. Who is now presenting with left foot 2/3 toe infection with drainage and wound dehiscence.   6. Started on vanco and zosyn. Switched to daptomycin and zosyn  7. Currently neutropenic.   8. BMT patient.      Recommendations:   MRI concerning for osteo, in the second middle phalanx.   Continue on the current antibiotics, follow up on podiatry and vascular plans            Chidi Ramos MD    Interval History   Afebrile   MRI as stated   Neutropenic     Physical Exam   Temp: 97.3  F (36.3  C) Temp src: Oral BP: 106/53   Heart Rate: 88 Resp: 16 SpO2: 100 % O2 Device: None (Room air)    Vitals:    06/17/20 0940   Weight: 83.9 kg (185 lb)     Vital Signs with Ranges  Temp:  [95.4  F (35.2  C)-98.8  F (37.1  C)] 97.3  F (36.3  C)  Heart Rate:  [83-99] 88  Resp:  [16-19] 16  BP: ()/(47-91) 106/53  SpO2:  [95 %-100 %] 100 %    Constitutional: Awake, alert, cooperative, no apparent distress  Lungs: Clear to auscultation bilaterally, no crackles or wheezing  Cardiovascular: Regular rate and rhythm, normal S1 and S2, and no murmur noted  Abdomen: Normal bowel sounds, soft, non-distended, non-tender  Skin: No rashes, no cyanosis, no edema  Other:    Medications       allopurinol  300 mg Oral Daily     DAPTOmycin  6 mg/kg Intravenous Q24H     fenofibrate  160 mg Oral Daily     filgrastim (NEUPOGEN/GRANIX) subcutaneous  480 mcg Subcutaneous Daily at 8 pm     heparin  5 mL Intracatheter Q28 Days     heparin lock flush  5-10 mL Intracatheter Q24H     hydrocortisone  10 mg Oral QPM     hydrocortisone   20 mg Oral QAM AC     insulin aspart  1-10 Units Subcutaneous TID AC     insulin aspart  1-7 Units Subcutaneous At Bedtime     insulin glargine  15 Units Subcutaneous At Bedtime     piperacillin-tazobactam  3.375 g Intravenous Q6H     rosuvastatin  5 mg Oral Daily     sodium chloride (PF)  3 mL Intracatheter Q8H       Data   All microbiology laboratory data reviewed.  Recent Labs   Lab Test 06/18/20  0635 06/17/20  2228 06/17/20  0950   WBC 0.8* 0.6* 0.4*   HGB 7.0* 7.0* 7.9*   HCT 21.5* 21.6* 24.3*   MCV 77* 76* 76*   PLT 11* 18* 4*     Recent Labs   Lab Test 06/18/20  0635 06/17/20  0950 06/12/20  0600   CR 0.68 0.91 0.78     No lab results found.  Recent Labs   Lab Test 06/17/20  1134 06/17/20  0950 11/13/14  1330   CULT No growth after 12 hours No growth after 12 hours No growth       Attestation:  Total time on the floor involved in the patient's care: 35 minutes. Total time spent in counseling/care coordination: >50%

## 2020-06-18 NOTE — PROGRESS NOTES
VASCULAR  SURGERY    Comfortable today.  Patient does have redness over the dorsal second third toe amputation site but not the plantar and the metatarsal area.  Also with 2 similar area that are streaks over the dorsal prominent foot bones.  None of these are blanchable implying that they may actually be ecchymosis related to his thrombocytopenia.  Overall distal amputation wound looks okay though there is a slight scab over the third.    Platelet count= 11,000 this morning and 18,000 yesterday.  This certainly could explain the ecchymosis of his toes causing the issue and not cellulitis though certainly appropriate to cover him for potential cellulitis with his marked leukopenia with a white count of only 800 this morning.  Did require transfusion last evening but hemoglobin still only 7.0.    No restrictions to ambulation and this was discussed with patient..      Ramon Flores MD

## 2020-06-18 NOTE — PROVIDER NOTIFICATION
MD Notification    Notified Person: MD    Notified Person Name: Isai    Notification Date/Time: 06/18/20 8:39 AM     Notification Interaction: text page     Purpose of Notification: Please enter orders for irradiated blood. Pt is a potential transplant.     Orders Received:    Comments:

## 2020-06-18 NOTE — PROVIDER NOTIFICATION
MD Notification    Notified Person: MD    Notified Person Name: Isai     Notification Date/Time: 06/18/20 7:31 AM     Notification Interaction: text page     Purpose of Notification: Hgb still at 7 this AM. Its been 2hrs since transfusion was stopped. Would you like me to transfuse another unit or recheck Hgb? Thanks.     Orders Received: None     Comments:

## 2020-06-18 NOTE — PROVIDER NOTIFICATION
MD Notification    Notified Person: MD    Notified Person Name: Isai     Notification Date/Time: 06/18/20 7:59 AM     Notification Interaction: text page     Purpose of Notification: Hgb still at 7 this AM. Transfusion was stopped at 0440. Would you like me to transfuse another unit or recheck Hgb? Thanks.     Orders Received: transfuse another unit of blood. Await oncology to round regarding platelets.     Comments:

## 2020-06-19 LAB
ANION GAP SERPL CALCULATED.3IONS-SCNC: 2 MMOL/L (ref 3–14)
BASOPHILS # BLD AUTO: 0 10E9/L (ref 0–0.2)
BASOPHILS NFR BLD AUTO: 0 %
BUN SERPL-MCNC: 6 MG/DL (ref 7–30)
CALCIUM SERPL-MCNC: 8.2 MG/DL (ref 8.5–10.1)
CHLORIDE SERPL-SCNC: 107 MMOL/L (ref 94–109)
CO2 SERPL-SCNC: 29 MMOL/L (ref 20–32)
CREAT SERPL-MCNC: 0.72 MG/DL (ref 0.66–1.25)
DIFFERENTIAL METHOD BLD: ABNORMAL
ELLIPTOCYTES BLD QL SMEAR: SLIGHT
EOSINOPHIL # BLD AUTO: 0 10E9/L (ref 0–0.7)
EOSINOPHIL NFR BLD AUTO: 1 %
ERYTHROCYTE [DISTWIDTH] IN BLOOD BY AUTOMATED COUNT: 16.5 % (ref 10–15)
GFR SERPL CREATININE-BSD FRML MDRD: >90 ML/MIN/{1.73_M2}
GLUCOSE BLDC GLUCOMTR-MCNC: 119 MG/DL (ref 70–99)
GLUCOSE BLDC GLUCOMTR-MCNC: 164 MG/DL (ref 70–99)
GLUCOSE BLDC GLUCOMTR-MCNC: 173 MG/DL (ref 70–99)
GLUCOSE BLDC GLUCOMTR-MCNC: 173 MG/DL (ref 70–99)
GLUCOSE BLDC GLUCOMTR-MCNC: 83 MG/DL (ref 70–99)
GLUCOSE SERPL-MCNC: 95 MG/DL (ref 70–99)
HCT VFR BLD AUTO: 24.2 % (ref 40–53)
HGB BLD-MCNC: 8.1 G/DL (ref 13.3–17.7)
LYMPHOCYTES # BLD AUTO: 0.3 10E9/L (ref 0.8–5.3)
LYMPHOCYTES NFR BLD AUTO: 9 %
MAGNESIUM SERPL-MCNC: 2.1 MG/DL (ref 1.6–2.3)
MCH RBC QN AUTO: 26 PG (ref 26.5–33)
MCHC RBC AUTO-ENTMCNC: 33.5 G/DL (ref 31.5–36.5)
MCV RBC AUTO: 78 FL (ref 78–100)
METAMYELOCYTES # BLD: 0.1 10E9/L
METAMYELOCYTES NFR BLD MANUAL: 2 %
MONOCYTES # BLD AUTO: 0.2 10E9/L (ref 0–1.3)
MONOCYTES NFR BLD AUTO: 5 %
MYELOCYTES # BLD: 0 10E9/L
MYELOCYTES NFR BLD MANUAL: 1 %
NEUTROPHILS # BLD AUTO: 2.5 10E9/L (ref 1.6–8.3)
NEUTROPHILS NFR BLD AUTO: 82 %
PLATELET # BLD AUTO: 23 10E9/L (ref 150–450)
PLATELET # BLD EST: ABNORMAL 10*3/UL
POTASSIUM SERPL-SCNC: 3.3 MMOL/L (ref 3.4–5.3)
RBC # BLD AUTO: 3.11 10E12/L (ref 4.4–5.9)
SODIUM SERPL-SCNC: 138 MMOL/L (ref 133–144)
WBC # BLD AUTO: 3 10E9/L (ref 4–11)

## 2020-06-19 PROCEDURE — 25800030 ZZH RX IP 258 OP 636: Performed by: INTERNAL MEDICINE

## 2020-06-19 PROCEDURE — 25000132 ZZH RX MED GY IP 250 OP 250 PS 637: Performed by: INTERNAL MEDICINE

## 2020-06-19 PROCEDURE — 25000128 H RX IP 250 OP 636: Performed by: INTERNAL MEDICINE

## 2020-06-19 PROCEDURE — 00000146 ZZHCL STATISTIC GLUCOSE BY METER IP

## 2020-06-19 PROCEDURE — 99232 SBSQ HOSP IP/OBS MODERATE 35: CPT | Performed by: HOSPITALIST

## 2020-06-19 PROCEDURE — 25000131 ZZH RX MED GY IP 250 OP 636 PS 637: Performed by: HOSPITALIST

## 2020-06-19 PROCEDURE — 12000000 ZZH R&B MED SURG/OB

## 2020-06-19 PROCEDURE — 85025 COMPLETE CBC W/AUTO DIFF WBC: CPT | Performed by: HOSPITALIST

## 2020-06-19 PROCEDURE — 80048 BASIC METABOLIC PNL TOTAL CA: CPT | Performed by: HOSPITALIST

## 2020-06-19 PROCEDURE — 40000275 ZZH STATISTIC RCP TIME EA 10 MIN

## 2020-06-19 PROCEDURE — 25000125 ZZHC RX 250: Performed by: PODIATRIST

## 2020-06-19 PROCEDURE — 99222 1ST HOSP IP/OBS MODERATE 55: CPT | Performed by: PODIATRIST

## 2020-06-19 PROCEDURE — 83735 ASSAY OF MAGNESIUM: CPT | Performed by: HOSPITALIST

## 2020-06-19 RX ORDER — GINSENG 100 MG
CAPSULE ORAL DAILY
Status: DISCONTINUED | OUTPATIENT
Start: 2020-06-19 | End: 2020-06-21 | Stop reason: HOSPADM

## 2020-06-19 RX ADMIN — Medication 5 ML: at 11:53

## 2020-06-19 RX ADMIN — ACETAMINOPHEN 650 MG: 325 TABLET, FILM COATED ORAL at 21:35

## 2020-06-19 RX ADMIN — ACETAMINOPHEN 650 MG: 325 TABLET, FILM COATED ORAL at 03:07

## 2020-06-19 RX ADMIN — HYDROCORTISONE 10 MG: 10 TABLET ORAL at 20:37

## 2020-06-19 RX ADMIN — POTASSIUM CHLORIDE 20 MEQ: 1500 TABLET, EXTENDED RELEASE ORAL at 11:50

## 2020-06-19 RX ADMIN — PIPERACILLIN SODIUM AND TAZOBACTAM SODIUM 3.38 G: 3; .375 INJECTION, POWDER, LYOPHILIZED, FOR SOLUTION INTRAVENOUS at 11:50

## 2020-06-19 RX ADMIN — PIPERACILLIN SODIUM AND TAZOBACTAM SODIUM 3.38 G: 3; .375 INJECTION, POWDER, LYOPHILIZED, FOR SOLUTION INTRAVENOUS at 06:28

## 2020-06-19 RX ADMIN — INSULIN ASPART 2 UNITS: 100 INJECTION, SOLUTION INTRAVENOUS; SUBCUTANEOUS at 18:45

## 2020-06-19 RX ADMIN — Medication 5 ML: at 07:06

## 2020-06-19 RX ADMIN — DIPHENHYDRAMINE HYDROCHLORIDE 50 MG: 50 CAPSULE ORAL at 21:35

## 2020-06-19 RX ADMIN — INSULIN GLARGINE 15 UNITS: 100 INJECTION, SOLUTION SUBCUTANEOUS at 21:35

## 2020-06-19 RX ADMIN — FENOFIBRATE 160 MG: 160 TABLET ORAL at 08:22

## 2020-06-19 RX ADMIN — ACETAMINOPHEN 650 MG: 325 TABLET, FILM COATED ORAL at 08:38

## 2020-06-19 RX ADMIN — POTASSIUM CHLORIDE 40 MEQ: 1500 TABLET, EXTENDED RELEASE ORAL at 09:58

## 2020-06-19 RX ADMIN — INSULIN ASPART 1 UNITS: 100 INJECTION, SOLUTION INTRAVENOUS; SUBCUTANEOUS at 14:00

## 2020-06-19 RX ADMIN — DAPTOMYCIN 500 MG: 500 INJECTION, POWDER, LYOPHILIZED, FOR SOLUTION INTRAVENOUS at 14:51

## 2020-06-19 RX ADMIN — Medication 5 ML: at 14:54

## 2020-06-19 RX ADMIN — ROSUVASTATIN CALCIUM 5 MG: 5 TABLET, FILM COATED ORAL at 08:22

## 2020-06-19 RX ADMIN — ALLOPURINOL 300 MG: 300 TABLET ORAL at 08:22

## 2020-06-19 RX ADMIN — Medication 5 ML: at 00:03

## 2020-06-19 RX ADMIN — HYDROCORTISONE 20 MG: 10 TABLET ORAL at 07:08

## 2020-06-19 RX ADMIN — Medication 5 ML: at 18:51

## 2020-06-19 RX ADMIN — PIPERACILLIN SODIUM AND TAZOBACTAM SODIUM 3.38 G: 3; .375 INJECTION, POWDER, LYOPHILIZED, FOR SOLUTION INTRAVENOUS at 18:44

## 2020-06-19 RX ADMIN — BACITRACIN: 500 OINTMENT TOPICAL at 20:38

## 2020-06-19 ASSESSMENT — ACTIVITIES OF DAILY LIVING (ADL)
ADLS_ACUITY_SCORE: 12

## 2020-06-19 NOTE — PROGRESS NOTES
Vascular Surgery Progress Note    S: Very comfortable.  Anxious to go home.      O:   Vitals:  BP  Min: 97/52  Max: 115/51  Temp  Av.9  F (36.6  C)  Min: 97.3  F (36.3  C)  Max: 98.3  F (36.8  C)  I/O last 3 completed shifts:  In: 640 [P.O.:360]  Out: 2575 [Urine:2575]    Physical Exam: Alert and appropriate.                            Cellulitis has essentially resolved and is right distal dorsal foot extending onto the dorsal partially amputated second and third toes.  Small amount of scabs over the suture line but no clinical edema, tenderness, fluctuance of these 2 toes.  +3 palpable dorsalis pedis pulse with excellent blood supply.    X-ray of his foot yesterday revealed no obvious osteomyelitis.  MRI was performed which is suspicious for osteomyelitis of the distal second middle toe phalanx.      Assessment/Plan: #1.  Improved cellulitis that is essentially resolved with antibiotics.  He does have some petechiae most likely due to his thrombocytopenia.                                     #2.  Questionable by MRI osteomyelitis of the distal second toe phalangeal bone at the amputation site where half was removed.  Clinically I am not sure that this is really the situation.  He has improved significantly and with his chemotherapy and pancytopenia I do not feel that any surgical treatment is indicated this time and I have discussed this with the patient and his wife on the phone and also with Dr. Rubalcava.    I feel the patient may be discharged on antibiotics (hopefully orally) due to his very low white blood count due to the chemo for Hodgkin's lymphoma.  I do feel that they should continue with his chemotherapy.  A phone video consult with the patient so we can assess his toes will be performed next Thursday.    No specific limitations to his ambulation.  He may shower but not soak his foot.          Wm. Sandra MD

## 2020-06-19 NOTE — CONSULTS
Foot & Ankle Surgery  June 19, 2020    CC: non-healing left 2nd and 3rd toes    I was asked to see Pastor Garibay regarding the chief complaint by:  Dr. JOHN Murillo    HPI:  Pt is a 70 year old male who presents with above complaint.  Admission for worsening infection left foot, diabetic male with lymphoma underwent left 2nd and 3rd toe amputations by Dr Flores 5/22/20.  Uneventful suture removal approx 3 weeks later, but the wounds started to dehisce and he developed an infection.  He was seen by Dr Flores approx 4 days ago and started on Bactrim and Levaquin, but failed to show improvement, and was admitted.  Admission films show amputation through middle phalanx of both toes without concerning findings, but an MRI showed marrow changes L 2nd middle phalanx concerning for osteo, and marrow changes L 3rd middle phalanx felt to be reactive changes from previous surgery.  Covered by Daptomycin and Zosyn by Dr Ramos.    ROS:   Pos for CC.  The patient denies current nausea, vomiting, chills, fevers, belly pain, calf pain, chest pain or SOB.  Complete remainder of ROS is otherwise neg.    VITALS:    Vitals:    06/18/20 1922 06/18/20 2331 06/19/20 0740 06/19/20 1515   BP: 115/51 104/51 105/55 105/61   BP Location: Left arm Left arm Left arm Left arm   Pulse:       Resp: 16 18 18 18   Temp: 97.3  F (36.3  C) 98.1  F (36.7  C) 97.3  F (36.3  C) 98.4  F (36.9  C)   TempSrc: Oral Oral Oral Oral   SpO2: 97% 95% 97% 97%   Weight:           PMH:    Past Medical History:   Diagnosis Date     Abnormal liver function test      CPAP (continuous positive airway pressure) dependence      Diabetes (H)      Hx of skin cancer, basal cell      Hyperlipidemia      Hypertension      Keratoderma      Liver disease      Lymphoma (H)      Non-Hodgkin lymphoma (H)      Pedal edema     CHRONIC     Pleural effusion      Seborrheic keratosis, inflamed      Sleep apnea      Thrombosis Felbruary 2018       SXHX:    Past Surgical History:   Procedure  Laterality Date     AMPUTATE TOE(S) Left 5/22/2020    Procedure: LEFT SECOND AND THIRD DISTAL TOE AMPUTATIONS;  Surgeon: Ramon Flores MD;  Location:  OR     BIOPSY LYMPH NODE CERVICAL N/A 12/5/2014    Procedure: BIOPSY LYMPH NODE CERVICAL;  Surgeon: Robbie Linda MD;  Location:  OR     BRONCHOSCOPY FLEXIBLE N/A 11/14/2017    Procedure: BRONCHOSCOPY FLEXIBLE;  FLEXIBLE BRONCHOSCOPY WITH BIOPSIES.;  Surgeon: Robbie Linda MD;  Location:  OR     COLONOSCOPY       COLONOSCOPY N/A 5/9/2018    Procedure: COMBINED COLONOSCOPY, SINGLE OR MULTIPLE BIOPSY/POLYPECTOMY BY BIOPSY;;  Surgeon: Ramos Bates MD;  Location:  GI     ENDOVASCULAR PLACEMENT VASCULAR DEVICE Left 12/5/2017    Procedure: ENDOVASCULAR PLACEMENT VASCULAR DEVICE;;  Surgeon: Robbie Linda MD;  Location: [facility]     ENT SURGERY      tonsillectomy     EXCISE NODE MEDIASTINAL N/A 11/17/2017    Procedure: EXCISE NODE MEDIASTINAL;;  Surgeon: Robbie Linda MD;  Location:  OR     EYE SURGERY       EYE SURGERY Left     vitrectomy     INSERT PORT VASCULAR ACCESS N/A 12/5/2014    Procedure: INSERT PORT VASCULAR ACCESS;  Surgeon: Robbie Linda MD;  Location:  OR     INSERT PORT VASCULAR ACCESS N/A 12/5/2017    Procedure: INSERT PORT VASCULAR ACCESS;  POWER PORT PLACEMENT ATTEMPTED, PLACEMENT OF ANGIO-SEAL VIP VASCULAR CLOSURE DEVICE;  Surgeon: Robbie Linda MD;  Location: [facility]     IR CHEST PORT PLACEMENT > 5 YRS OF AGE  6/5/2020     IR PORT REMOVAL RIGHT  12/10/2018     PHACOEMULSIFICATION CLEAR CORNEA WITH STANDARD INTRAOCULAR LENS IMPLANT Right 5/2/2016    Procedure: PHACOEMULSIFICATION CLEAR CORNEA WITH STANDARD INTRAOCULAR LENS IMPLANT;  Surgeon: Rasheed Finney MD;  Location:  EC     REMOVE PORT VASCULAR ACCESS N/A 3/14/2017    Procedure: REMOVE PORT VASCULAR ACCESS;  Surgeon: Robbie Linda MD;  Location:  OR     SOFT TISSUE SURGERY      BASAL CELL CA  FOREHEAD     THORACOTOMY Right 11/17/2017    Procedure: THORACOTOMY;  RIGHT EXPLORATORY THORACOTOMY/ EXTENSIVE PNEUMOLYSIS/ BIOPSY OF INTERLOBAR LYMPH NODE;  Surgeon: Robbie Linda MD;  Location:  OR        MEDS:    Current Facility-Administered Medications   Medication     acetaminophen (TYLENOL) Suppository 650 mg     acetaminophen (TYLENOL) tablet 650 mg     diphenhydrAMINE (BENADRYL) capsule 50 mg    And     acetaminophen (TYLENOL) tablet 650 mg     allopurinol (ZYLOPRIM) tablet 300 mg     bacitracin ointment     bisacodyl (DULCOLAX) Suppository 10 mg     calcium carbonate (TUMS) chewable tablet 1,000 mg     DAPTOmycin (CUBICIN) 500 mg in sodium chloride 0.9 % 100 mL intermittent infusion     glucose gel 15-30 g    Or     dextrose 50 % injection 25-50 mL    Or     glucagon injection 1 mg     fenofibrate (TRIGLIDE/LOFIBRA) tablet 160 mg     heparin 100 UNIT/ML injection 5 mL     heparin lock flush 10 UNIT/ML injection 5-10 mL     heparin lock flush 10 UNIT/ML injection 5-10 mL     hydrocortisone (CORTEF) tablet 10 mg     hydrocortisone (CORTEF) tablet 20 mg     insulin aspart (NovoLOG) injection (RAPID ACTING)     insulin aspart (NovoLOG) injection (RAPID ACTING)     insulin glargine (LANTUS PEN) injection 15 Units     lidocaine (LMX4) cream     lidocaine 1 % 1 mL     magnesium sulfate 4 g in 100 mL sterile water (premade)     melatonin tablet 3 mg     naloxone (NARCAN) injection 0.1-0.4 mg     ondansetron (ZOFRAN-ODT) ODT tab 4 mg    Or     ondansetron (ZOFRAN) injection 4 mg     oxyCODONE (ROXICODONE) tablet 5-10 mg     piperacillin-tazobactam (ZOSYN) 3.375 g vial to attach to  mL bag     polyethylene glycol (MIRALAX) Packet 17 g     potassium chloride (KLOR-CON) Packet 20-40 mEq     potassium chloride 10 mEq in 100 mL intermittent infusion with 10 mg lidocaine     potassium chloride 10 mEq in 100 mL sterile water intermittent infusion (premix)     potassium chloride 20 mEq in 50 mL  intermittent infusion     potassium chloride ER (KLOR-CON M) CR tablet 20-40 mEq     prochlorperazine (COMPAZINE) injection 5 mg    Or     prochlorperazine (COMPAZINE) tablet 5 mg    Or     prochlorperazine (COMPAZINE) Suppository 12.5 mg     rosuvastatin (CRESTOR) tablet 5 mg     senna-docusate (SENOKOT-S/PERICOLACE) 8.6-50 MG per tablet 1 tablet    Or     senna-docusate (SENOKOT-S/PERICOLACE) 8.6-50 MG per tablet 2 tablet     sodium chloride (PF) 0.9% PF flush 10-20 mL     sodium chloride (PF) 0.9% PF flush 10-20 mL     sodium chloride (PF) 0.9% PF flush 3 mL     sodium chloride (PF) 0.9% PF flush 3 mL       ALL:   No Known Allergies    FMH:  History reviewed. No pertinent family history.    SocHx:    Social History     Socioeconomic History     Marital status:      Spouse name: Not on file     Number of children: Not on file     Years of education: Not on file     Highest education level: Not on file   Occupational History     Not on file   Social Needs     Financial resource strain: Not on file     Food insecurity     Worry: Not on file     Inability: Not on file     Transportation needs     Medical: Not on file     Non-medical: Not on file   Tobacco Use     Smoking status: Never Smoker     Smokeless tobacco: Never Used   Substance and Sexual Activity     Alcohol use: No     Drug use: No     Sexual activity: Not on file   Lifestyle     Physical activity     Days per week: Not on file     Minutes per session: Not on file     Stress: Not on file   Relationships     Social connections     Talks on phone: Not on file     Gets together: Not on file     Attends Sikhism service: Not on file     Active member of club or organization: Not on file     Attends meetings of clubs or organizations: Not on file     Relationship status: Not on file     Intimate partner violence     Fear of current or ex partner: Not on file     Emotionally abused: Not on file     Physically abused: Not on file     Forced sexual  activity: Not on file   Other Topics Concern     Parent/sibling w/ CABG, MI or angioplasty before 65F 55M? Not Asked   Social History Narrative     Not on file           EXAMINATION:  Gen:   No apparent distress  Neuro:   A&Ox3, no deficits  Psych:    Answering questions appropriately for age and situation with normal affect  Head:    NCAT  Eye:    Visual scanning without deficit  Ear:    Response to auditory stimuli wnl  Lung:    Non-labored breathing on RA noted  Abd:    NTND per patient report  Lymph:    Neg for pitting/non-pitting edema BLE  Vasc:    Pulses weakly palpable, CFT minimally delayed  Neuro:    Light touch sensation diminished distally  Derm:    Eschar at L 2nd and 3rd toe amp sites.  Probing of 2nd toe eschar shows exposed bone on my exam, while 3rd toe probes but not to a hard end-feel.  Mild surrounding erythema.  Minimal serosanguinous drainage from 2nd toe.    MSK:    L 2nd and 3rd toe partial amputations.  Calf:    Neg for redness, swelling or tenderness      Imaging:  MRI L foot - IMPRESSION:  1. Findings suspicious for osteomyelitis of the residual second middle  phalanx. Adjacent 0.8 cm nonspecific fluid collection.  2. Nonspecific edema of the residual third middle phalanx, which could  simply be reactive.  3. Cellulitis.    Labs:    Hemoglobin A1C  0 - 5.6 %  7.3High         Assessment:  70 year old male with diabetes with peripheral neuropathy, PAD, lymphoma and non-healing L 2nd and 3rd toe amps with concerns for underlying osteomyelitis 2nd      Plan:  Discussed etiologies, anatomy and options  1.  Non-healing L 2nd and 3rd toe amp sites with concerns for 2nd toe middle phalanx osteomyelitis in setting of DMII with peripheral neuropathy, PAD, and lymphoma  -personally reviewed xray/MRI results with patient  -typically, exam/imaging/labs indicate extent of infection.  The 2nd toe wound probes to bone with MRI findings concerning for osteomyelitis  -advised revision amputation of 2nd toe,  as well as 3rd toe.  Re: the 2nd toe, the wound has not healed and there are early marrow changes on MRI.    -he is going to contact his Oncologist regarding chemo and possible revision surgery.  -he would like to consider his options regarding surg vs non-surg management of the toe wounds  -I will follow up tomorrow for further discussion.      Patient's medical history was reviewed today        Ismael Humphrey DPM FACFAS FACFAOM  Podiatric Foot & Ankle Surgeon  [facility]  [phone]

## 2020-06-19 NOTE — PROGRESS NOTES
[facility]  88    SH contacted Pt per follow up. Pt has no SH needs at this time.     SH will remain available as needed.     Lisseth Beck  Chaplain Resident

## 2020-06-19 NOTE — PLAN OF CARE
A&Ox4, pleasant. VSS. C/o some lower back pain. PRN Tylenol and heat pack being used; effective. Platelets back @ 22 around 2130 6/18. To be rechecked this AM. Magnesium back @ 2.3 around 2130 6/18. To be rechecked this AM. LLE 2nd and 3rd toes reddened, skin marked. Has not extended past marking. CASPER. BG checks on shift. Up independently. Ambulating in halls and in room overnight. Good UOP. Urine turning more yellow and not blue/green anymore. Right port hep locked; intermittent antibiotics.  Vascular surgery, ID, hem/onc following. Protective isolation maintained. Discharge pending clinical improvement.

## 2020-06-19 NOTE — PROGRESS NOTES
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 (with the pleural effusion but negative cytology) follicular lymphoma, s/p RCHOP with maintenance rituximab, s/p BR 12/2017, s/p thoracic radiation 7/2019  --CT C/A/P 5/14/20 revealed marked increase in the right lower lobe pulmonary mass with new subcarinal lymphadenopathy, bilateral new adrenal masses, and soft tissue tumor deposits in the abdomen  --CT guided adrenal biopsy 5/27/20 revealed transformation to a diffuse large B cell lymphoma, germinal center type, 100% Ki-67 positive,  with positive immunohistochemical staining for BCL-2, BCL-6, and C-MYC suggestive of a possible triple-hit lymphoma.  - Started on R ICE 6/5/2020  - chemo discontinued for ifosfamide induced encephalopathy 6/7/20  - contnue supportive care with plans for alternative chemotherapy with R-DHAP  with cycle 2 once counts recover and infection resolved    Pancytopenia  --chemotherapy and neoplasm induced   --discontinue neupogen now that ANC > 1.0  --transfuse red cells prn (irradiated) to keep hemoglobin > 7  --transfuse platelets prn to keep platelet > 10,000g      Encephalopathy/Delerium  - began on 6/7/20 and due to ifosfamide  --resolved     Hypercoagulable state  - On 2/1/18 he was diagnosed with an IVC thrombus, 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 He is tolerating anticoagulation with Lovenox well. We discussed alternative anticoagulants such as Coumadin (less effective) or DOAC and as he has completed chemotherapy with a good response of his malignancy in 6/2018 he was switched to Xarelto.   --A vascular surgery consultation for the persistent IVC thrombus concurred with anticoagulation for 6-12 months or longer without other intervention.   --Anticoagulation will be  HELD with ongoing thrombocytopenia       Diabetes  - managed by hospitalist team     Possible adrenal insufficiency  --Large bilateral adrenal masses with mild hypotension, nausea, vomiting, and hyperkalemia  --Normal cortisol level and ACTH elevated at 140  --continue empiric PO hydrocortisone 20 mg q 8 AM and 10 mg every 4 p.m.    -Chronic Kidney Disease  -Mild renal insufficiency will be monitored.     Right Foot Wound  --s/p toe amputation  --possible osteomyelitis  --continue antibiotics and wound care per vascular surgery, ID, and hospitallst           Disposition: Continue antibiotics per hospitalist, surgery, ID. Home once stable on oral antibiotics. Follow up to see me/NP Monday 6/22 with repeat CBC      Code Status: Full Code    Mike Olson    Interval History   Feels ok, no new confusion, no nausea    Physical Exam   Temp: 97.3  F (36.3  C) Temp src: Oral BP: 105/55   Heart Rate: 82 Resp: 18 SpO2: 97 % O2 Device: None (Room air)    Vitals:    06/17/20 0940   Weight: 83.9 kg (185 lb)     Vital Signs with Ranges  Temp:  [96.9  F (36.1  C)-98.3  F (36.8  C)] 97.3  F (36.3  C)  Heart Rate:  [82-96] 82  Resp:  [16-18] 18  BP: ()/(51-56) 105/55  SpO2:  [95 %-100 %] 97 %  I/O last 3 completed shifts:  In: 640 [P.O.:360]  Out: 2575 [Urine:2575]    Constitutional: awake, cooperative, no acute distress  Hematologic / Lymphatic: no cervical lymphadenopathy  GI: soft, non-distended, non-tender, no masses palpated  Musculoskeletal: no pedal edema, redness noted over toe amputation site     Medications       allopurinol  300 mg Oral Daily     DAPTOmycin  6 mg/kg Intravenous Q24H     fenofibrate  160 mg Oral Daily     heparin  5 mL Intracatheter Q28 Days     heparin lock flush  5-10 mL Intracatheter Q24H     hydrocortisone  10 mg Oral QPM     hydrocortisone  20 mg Oral QAM AC     insulin aspart  1-10 Units Subcutaneous TID AC     insulin aspart  1-7 Units Subcutaneous At Bedtime     insulin glargine  15 Units  Subcutaneous At Bedtime     piperacillin-tazobactam  3.375 g Intravenous Q6H     rosuvastatin  5 mg Oral Daily     sodium chloride (PF)  3 mL Intracatheter Q8H       Data   Results for orders placed or performed during the hospital encounter of 06/17/20 (from the past 24 hour(s))   Platelets prepare order unit   Result Value Ref Range    Blood Component Type PLT Pheresis     Units Ordered 1    Blood component   Result Value Ref Range    Unit Number [id number]     Blood Component Type PlateletPheresis,LeukoRed Irrad (Part 2)     Division Number 00     Status of Unit Released to care unit     Blood Product Code [id number]     Unit Status ISS    Glucose by meter   Result Value Ref Range    Glucose 213 (H) 70 - 99 mg/dL   Potassium   Result Value Ref Range    Potassium 3.7 3.4 - 5.3 mmol/L   Hemoglobin   Result Value Ref Range    Hemoglobin 7.5 (L) 13.3 - 17.7 g/dL   Glucose by meter   Result Value Ref Range    Glucose 145 (H) 70 - 99 mg/dL   Magnesium   Result Value Ref Range    Magnesium 2.3 1.6 - 2.3 mg/dL   Platelet count   Result Value Ref Range    Platelet Count 22 (LL) 150 - 450 10e9/L   Glucose by meter   Result Value Ref Range    Glucose 164 (H) 70 - 99 mg/dL   Glucose by meter   Result Value Ref Range    Glucose 119 (H) 70 - 99 mg/dL   CBC with platelets differential   Result Value Ref Range    WBC 3.0 (L) 4.0 - 11.0 10e9/L    RBC Count 3.11 (L) 4.4 - 5.9 10e12/L    Hemoglobin 8.1 (L) 13.3 - 17.7 g/dL    Hematocrit 24.2 (L) 40.0 - 53.0 %    MCV 78 78 - 100 fl    MCH 26.0 (L) 26.5 - 33.0 pg    MCHC 33.5 31.5 - 36.5 g/dL    RDW 16.5 (H) 10.0 - 15.0 %    Platelet Count 23 (LL) 150 - 450 10e9/L    Diff Method Manual Differential     % Neutrophils 82.0 %    % Lymphocytes 9.0 %    % Monocytes 5.0 %    % Eosinophils 1.0 %    % Basophils 0.0 %    % Metamyelocytes 2.0 %    % Myelocytes 1.0 %    Absolute Neutrophil 2.5 1.6 - 8.3 10e9/L    Absolute Lymphocytes 0.3 (L) 0.8 - 5.3 10e9/L    Absolute Monocytes 0.2 0.0 - 1.3  10e9/L    Absolute Eosinophils 0.0 0.0 - 0.7 10e9/L    Absolute Basophils 0.0 0.0 - 0.2 10e9/L    Absolute Metamyelocytes 0.1 (H) 0 10e9/L    Absolute Myelocytes 0.0 0 10e9/L    Elliptocytes Slight     Platelet Estimate       Automated count confirmed.  Platelet morphology is normal.   Basic metabolic panel   Result Value Ref Range    Sodium 138 133 - 144 mmol/L    Potassium 3.3 (L) 3.4 - 5.3 mmol/L    Chloride 107 94 - 109 mmol/L    Carbon Dioxide 29 20 - 32 mmol/L    Anion Gap 2 (L) 3 - 14 mmol/L    Glucose 95 70 - 99 mg/dL    Urea Nitrogen 6 (L) 7 - 30 mg/dL    Creatinine 0.72 0.66 - 1.25 mg/dL    GFR Estimate >90 >60 mL/min/[1.73_m2]    GFR Estimate If Black >90 >60 mL/min/[1.73_m2]    Calcium 8.2 (L) 8.5 - 10.1 mg/dL   Magnesium   Result Value Ref Range    Magnesium 2.1 1.6 - 2.3 mg/dL

## 2020-06-19 NOTE — PROGRESS NOTES
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 Flores on 5/22. Sutures removed on 6/15  Presents on this admission with worsening redness, swelling, and purulent drainage from his 2nd and 3rd toe wounds. XR left foot on arrival showed no obvious osteo. In the ED, he was empirically initiated on IV vancomycin and pip-tazo  Left foot MRI  1. Findings suspicious for osteomyelitis of the residual second middle  phalanx. Adjacent 0.8 cm nonspecific fluid collection.  2. Nonspecific edema of the residual third middle phalanx, which could  simply be reactive.  3. Cellulitis.      Now on daptomycin and Zosyn per infectious disease     Vascular surgeon is following     Continue wound care     Pancytopenia with neutropenia due to chemotherapy  Diffuse large B-cell non-Hodgkin's lymphoma (CD20 B-cell grade III stage III versus IV)  * Follows with Dr. Olson at [facility]. Initially diagnosed with B-cell lymphoma in 2014 and underwent R-CHOP. Diagnosed with recurrent follicular lymphoma in 2017 and completed chemo/XRT. Found to have new masses and lymphadenopathy in May 2020, and was found to have diffuse large B-cell lymphoma. Initiated on R ICE on 6/5/2020, but chemo was discontinued on 6/7 due to Ifosfamide  encephalopathy  Now on alternative chemotherapy, last dose: 6/10  CBC on arrival notable for , platelets 4000. In the ED, he was give a unit of platelets and received I unit of packed red blood cells.  On 6/18 he received another unit of irradiated packed red blood cells and platelets.  Today his counts are better.      Monitor H3    [facility] following     History of suspected adrenal insufficiency  PTA: hydrocortisone 20 mg qAM, 10 mg qHS    Continue PTA hydrocortisone    History of IVC thrombus  Hypercoagulable state   PTA on rivaroxaban    Holding rivaroxaban due to thrombocytopenia    DM2 with peripheral vascular disease, long-term insulin use  PTA: metformin 1000 mg qac, 500 mg qhs. Lantus 30 units qhs, aspart per sliding scale.    Holding PTA metformin    Continues on Lantus 30 units qhs    High SSI available    JESSICA    Chronic and stable on CPAP    Dyslipidemia    Chronic and stable on rosuvastatin and fenofibrate     Diet: Combination Diet Regular Diet Adult    DVT Prophylaxis: Pneumatic Compression Devices  Champion Catheter: not present  Code Status: Full Code        Disposition Plan   Expected discharge: 2 - 3 days, recommended to prior living arrangement once antibiotic plan established and H3 stable.  Entered: Renato Alex MD 06/19/2020, 1:02 PM       The patient's care was discussed with the Bedside Nurse and Patient.    Renato Alex MD  Hospitalist Service  [facility]    ______________________________________________________________________    Interval History   No pain or complaints.    Data reviewed today: I reviewed all medications, new labs and imaging results over the last 24 hours. I personally reviewed no images or EKG's today.    Physical Exam   Vital Signs: Temp: 97.3  F (36.3  C) Temp src: Oral BP: 105/55   Heart Rate: 82 Resp: 18 SpO2: 97 % O2 Device: None (Room air)    Weight: 185 lbs 0 oz  Constitutional: awake, alert, cooperative, no  apparent distress- non toxic  Respiratory: No increased work of breathing, good air exchange, clear to auscultation bilaterally, no crackles or wheezing  Cardiovascular: regular rate and rhythm, normal S1 and S2, no S3 or S4, and no murmur noted  Musculoskeletal: left foot- not examined today  Neuropsychiatric: General: normal, calm and normal eye contact    Data   Recent Labs   Lab 06/19/20  0627 06/18/20  2120 06/18/20  1655 06/18/20  0635 06/17/20  2228 06/17/20  0950   WBC 3.0*  --   --  0.8* 0.6* 0.4*   HGB 8.1*  --  7.5* 7.0* 7.0* 7.9*   MCV 78  --   --  77* 76* 76*   PLT 23* 22*  --  11* 18* 4*     --   --  136  --  134   POTASSIUM 3.3*  --  3.7 3.2*  --  3.7   CHLORIDE 107  --   --  105  --  104   CO2 29  --   --  26  --  24   BUN 6*  --   --  7  --  12   CR 0.72  --   --  0.68  --  0.91   ANIONGAP 2*  --   --  5  --  6   JEFF 8.2*  --   --  8.1*  --  8.8   GLC 95  --   --  116*  --  219*   ALBUMIN  --   --   --  2.4*  --   --    PROTTOTAL  --   --   --  5.5*  --   --    BILITOTAL  --   --   --  1.0  --   --    ALKPHOS  --   --   --  44  --   --    ALT  --   --   --  18  --   --    AST  --   --   --  11  --   --      No results found for this or any previous visit (from the past 24 hour(s)).  Medications       allopurinol  300 mg Oral Daily     DAPTOmycin  500 mg Intravenous Q24H     fenofibrate  160 mg Oral Daily     heparin  5 mL Intracatheter Q28 Days     heparin lock flush  5-10 mL Intracatheter Q24H     hydrocortisone  10 mg Oral QPM     hydrocortisone  20 mg Oral QAM AC     insulin aspart  1-10 Units Subcutaneous TID AC     insulin aspart  1-7 Units Subcutaneous At Bedtime     insulin glargine  15 Units Subcutaneous At Bedtime     piperacillin-tazobactam  3.375 g Intravenous Q6H     rosuvastatin  5 mg Oral Daily     sodium chloride (PF)  3 mL Intracatheter Q8H

## 2020-06-19 NOTE — PLAN OF CARE
Pt A/O x4, VSS afebrile, c/o mild back pain-managed with Tylenol. Noted rash to lower lumbar region-patchy red in color/flat. Pt thinks its r/t Neupogen shots, RN suggest also possible d/t use of heat packs for comfort. Noted RLL to have coarse crackles and a dry non-productive cough. ABD soft non tender, BS present last BM 6/17. UOP is green/yellow  color. Right chest port H.L. Left foot toes redness marked-receeding. Tips of toes are black/dark red in color- pt able to feel touch. Remain on ABX. Wound care performed. Up IND ambulating hallway. Regular diet with BG mgmt and sliding scale insulin. K+ replaced, recheck tomorrow AM. Plan: IV ABX, monitor blood counts.

## 2020-06-19 NOTE — PROGRESS NOTES
Care Coordination:    Scheduled     Follow up to see Dr. Olson/NP Monday 6/22 with repeat CBC  SCHEDULED: 2:40PM labs, then see  Temo GRADY at 3:00 PM Monday 6/22   [facility]   [phone] [address] [facility] [address] [facility] [address]     Aliya Abdalla RN, BSN, PHN  [facility]  [place] [facility]   Mobile: [phone]

## 2020-06-19 NOTE — PROGRESS NOTES
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     Impression:  1. 70 y.o male with non hodgkin`s Lymphoma on chemo.   2. Diabetes.   3. Recent history of partial amputation on the 2/3 toe left foot.   4. Recent hospitalization for chemo related encephalopathy.   5. Who is now presenting with left foot 2/3 toe infection with drainage and wound dehiscence.   6. Started on vanco and zosyn. Switched to daptomycin and zosyn  7. Currently neutropenic.   8. BMT patient.      Recommendations:   MRI concerning for osteo, in the second middle phalanx.   Continue on the current antibiotics, follow up on podiatry and vascular plans            Chidi Ramos MD    Interval History   Afebrile   MRI as stated   Counts improved     Physical Exam   Temp: 97.3  F (36.3  C) Temp src: Oral BP: 105/55   Heart Rate: 82 Resp: 18 SpO2: 97 % O2 Device: None (Room air)    Vitals:    06/17/20 0940   Weight: 83.9 kg (185 lb)     Vital Signs with Ranges  Temp:  [96.9  F (36.1  C)-98.3  F (36.8  C)] 97.3  F (36.3  C)  Heart Rate:  [82-96] 82  Resp:  [16-18] 18  BP: ()/(51-56) 105/55  SpO2:  [95 %-100 %] 97 %    Constitutional: Awake, alert, cooperative, no apparent distress  Lungs: Clear to auscultation bilaterally, no crackles or wheezing  Cardiovascular: Regular rate and rhythm, normal S1 and S2, and no murmur noted  Abdomen: Normal bowel sounds, soft, non-distended, non-tender  Skin: No rashes, no cyanosis, no edema  Other:    Medications       allopurinol  300 mg Oral Daily     DAPTOmycin  6 mg/kg Intravenous Q24H     fenofibrate  160 mg Oral Daily     heparin  5 mL Intracatheter Q28 Days     heparin lock flush  5-10 mL Intracatheter Q24H     hydrocortisone  10 mg Oral QPM     hydrocortisone  20 mg Oral QAM AC     insulin aspart  1-10 Units Subcutaneous TID AC     insulin  aspart  1-7 Units Subcutaneous At Bedtime     insulin glargine  15 Units Subcutaneous At Bedtime     piperacillin-tazobactam  3.375 g Intravenous Q6H     rosuvastatin  5 mg Oral Daily     sodium chloride (PF)  3 mL Intracatheter Q8H       Data   All microbiology laboratory data reviewed.  Recent Labs   Lab Test 06/19/20  0627 06/18/20  2120 06/18/20  1655 06/18/20  0635 06/17/20  2228   WBC 3.0*  --   --  0.8* 0.6*   HGB 8.1*  --  7.5* 7.0* 7.0*   HCT 24.2*  --   --  21.5* 21.6*   MCV 78  --   --  77* 76*   PLT 23* 22*  --  11* 18*     Recent Labs   Lab Test 06/19/20  0627 06/18/20  0635 06/17/20  0950   CR 0.72 0.68 0.91     No lab results found.  Recent Labs   Lab Test 06/17/20  1134 06/17/20  0950 11/13/14  1330   CULT No growth after 2 days No growth after 2 days No growth       Attestation:  Total time on the floor involved in the patient's care: 35 minutes. Total time spent in counseling/care coordination: >50%

## 2020-06-20 LAB
BASOPHILS # BLD AUTO: 0 10E9/L (ref 0–0.2)
BASOPHILS NFR BLD AUTO: 0 %
DACRYOCYTES BLD QL SMEAR: SLIGHT
DIFFERENTIAL METHOD BLD: ABNORMAL
ELLIPTOCYTES BLD QL SMEAR: SLIGHT
EOSINOPHIL # BLD AUTO: 0 10E9/L (ref 0–0.7)
EOSINOPHIL NFR BLD AUTO: 0 %
ERYTHROCYTE [DISTWIDTH] IN BLOOD BY AUTOMATED COUNT: 17 % (ref 10–15)
GLUCOSE BLDC GLUCOMTR-MCNC: 108 MG/DL (ref 70–99)
GLUCOSE BLDC GLUCOMTR-MCNC: 132 MG/DL (ref 70–99)
GLUCOSE BLDC GLUCOMTR-MCNC: 199 MG/DL (ref 70–99)
GLUCOSE BLDC GLUCOMTR-MCNC: 212 MG/DL (ref 70–99)
GLUCOSE BLDC GLUCOMTR-MCNC: 229 MG/DL (ref 70–99)
GLUCOSE BLDC GLUCOMTR-MCNC: 254 MG/DL (ref 70–99)
HCT VFR BLD AUTO: 26.9 % (ref 40–53)
HGB BLD-MCNC: 8.8 G/DL (ref 13.3–17.7)
LYMPHOCYTES # BLD AUTO: 0.3 10E9/L (ref 0.8–5.3)
LYMPHOCYTES NFR BLD AUTO: 6 %
MCH RBC QN AUTO: 25.7 PG (ref 26.5–33)
MCHC RBC AUTO-ENTMCNC: 32.7 G/DL (ref 31.5–36.5)
MCV RBC AUTO: 79 FL (ref 78–100)
METAMYELOCYTES # BLD: 0.2 10E9/L
METAMYELOCYTES NFR BLD MANUAL: 4 %
MONOCYTES # BLD AUTO: 0.8 10E9/L (ref 0–1.3)
MONOCYTES NFR BLD AUTO: 15 %
MYELOCYTES # BLD: 0.1 10E9/L
MYELOCYTES NFR BLD MANUAL: 1 %
NEUTROPHILS # BLD AUTO: 4 10E9/L (ref 1.6–8.3)
NEUTROPHILS NFR BLD AUTO: 74 %
PLATELET # BLD AUTO: 28 10E9/L (ref 150–450)
PLATELET # BLD EST: ABNORMAL 10*3/UL
POTASSIUM SERPL-SCNC: 3.9 MMOL/L (ref 3.4–5.3)
RBC # BLD AUTO: 3.42 10E12/L (ref 4.4–5.9)
WBC # BLD AUTO: 5.4 10E9/L (ref 4–11)

## 2020-06-20 PROCEDURE — 85025 COMPLETE CBC W/AUTO DIFF WBC: CPT | Performed by: HOSPITALIST

## 2020-06-20 PROCEDURE — 84132 ASSAY OF SERUM POTASSIUM: CPT | Performed by: HOSPITALIST

## 2020-06-20 PROCEDURE — 12000000 ZZH R&B MED SURG/OB

## 2020-06-20 PROCEDURE — 99231 SBSQ HOSP IP/OBS SF/LOW 25: CPT | Performed by: PODIATRIST

## 2020-06-20 PROCEDURE — 25000128 H RX IP 250 OP 636: Performed by: INTERNAL MEDICINE

## 2020-06-20 PROCEDURE — 25000132 ZZH RX MED GY IP 250 OP 250 PS 637: Performed by: INTERNAL MEDICINE

## 2020-06-20 PROCEDURE — 25000131 ZZH RX MED GY IP 250 OP 636 PS 637: Performed by: HOSPITALIST

## 2020-06-20 PROCEDURE — 25800030 ZZH RX IP 258 OP 636: Performed by: INTERNAL MEDICINE

## 2020-06-20 PROCEDURE — 99233 SBSQ HOSP IP/OBS HIGH 50: CPT | Performed by: INTERNAL MEDICINE

## 2020-06-20 PROCEDURE — 00000146 ZZHCL STATISTIC GLUCOSE BY METER IP

## 2020-06-20 RX ADMIN — BACITRACIN: 500 OINTMENT TOPICAL at 10:08

## 2020-06-20 RX ADMIN — INSULIN GLARGINE 15 UNITS: 100 INJECTION, SOLUTION SUBCUTANEOUS at 21:44

## 2020-06-20 RX ADMIN — Medication 5 ML: at 00:55

## 2020-06-20 RX ADMIN — PIPERACILLIN SODIUM AND TAZOBACTAM SODIUM 3.38 G: 3; .375 INJECTION, POWDER, LYOPHILIZED, FOR SOLUTION INTRAVENOUS at 12:25

## 2020-06-20 RX ADMIN — HYDROCORTISONE 20 MG: 10 TABLET ORAL at 06:47

## 2020-06-20 RX ADMIN — ACETAMINOPHEN 650 MG: 325 TABLET, FILM COATED ORAL at 22:01

## 2020-06-20 RX ADMIN — ALLOPURINOL 300 MG: 300 TABLET ORAL at 10:06

## 2020-06-20 RX ADMIN — HYDROCORTISONE 10 MG: 10 TABLET ORAL at 19:43

## 2020-06-20 RX ADMIN — PIPERACILLIN SODIUM AND TAZOBACTAM SODIUM 3.38 G: 3; .375 INJECTION, POWDER, LYOPHILIZED, FOR SOLUTION INTRAVENOUS at 18:25

## 2020-06-20 RX ADMIN — Medication 5 ML: at 19:02

## 2020-06-20 RX ADMIN — DAPTOMYCIN 500 MG: 500 INJECTION, POWDER, LYOPHILIZED, FOR SOLUTION INTRAVENOUS at 13:08

## 2020-06-20 RX ADMIN — Medication 5 ML: at 13:45

## 2020-06-20 RX ADMIN — FENOFIBRATE 160 MG: 160 TABLET ORAL at 10:06

## 2020-06-20 RX ADMIN — MELATONIN 3 MG: 3 TAB ORAL at 22:01

## 2020-06-20 RX ADMIN — Medication 5 ML: at 06:48

## 2020-06-20 RX ADMIN — PIPERACILLIN SODIUM AND TAZOBACTAM SODIUM 3.38 G: 3; .375 INJECTION, POWDER, LYOPHILIZED, FOR SOLUTION INTRAVENOUS at 00:23

## 2020-06-20 RX ADMIN — PIPERACILLIN SODIUM AND TAZOBACTAM SODIUM 3.38 G: 3; .375 INJECTION, POWDER, LYOPHILIZED, FOR SOLUTION INTRAVENOUS at 06:13

## 2020-06-20 RX ADMIN — INSULIN ASPART 3 UNITS: 100 INJECTION, SOLUTION INTRAVENOUS; SUBCUTANEOUS at 18:21

## 2020-06-20 RX ADMIN — ROSUVASTATIN CALCIUM 5 MG: 5 TABLET, FILM COATED ORAL at 10:06

## 2020-06-20 RX ADMIN — INSULIN ASPART 3 UNITS: 100 INJECTION, SOLUTION INTRAVENOUS; SUBCUTANEOUS at 13:46

## 2020-06-20 ASSESSMENT — ACTIVITIES OF DAILY LIVING (ADL)
ADLS_ACUITY_SCORE: 12

## 2020-06-20 NOTE — PLAN OF CARE
Pt. A/Ox4. VSS on RA. Uses home CPAP at night. Denies pain. Up Ind. R port HL. 2/3 toes on L foot amputated, erythema receding from marked borders. K 3.3, replaced and recheck 3.9. BG checks. Discharge pending consults' plans, surgical vs non-surgical, abx regimen and blood counts.

## 2020-06-20 NOTE — PROGRESS NOTES
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 (with the pleural effusion but negative cytology) follicular lymphoma, s/p RCHOP with maintenance rituximab, s/p BR 12/2017, s/p thoracic radiation 7/2019  --CT C/A/P 5/14/20 revealed marked increase in the right lower lobe pulmonary mass with new subcarinal lymphadenopathy, bilateral new adrenal masses, and soft tissue tumor deposits in the abdomen  --CT guided adrenal biopsy 5/27/20 revealed transformation to a diffuse large B cell lymphoma, germinal center type, 100% Ki-67 positive,  with positive immunohistochemical staining for BCL-2, BCL-6, and C-MYC suggestive of a possible triple-hit lymphoma.  - Started on R ICE 6/5/2020  - chemo discontinued for ifosfamide induced encephalopathy 6/7/20  - continue supportive care with plans for alternative chemotherapy with R-DHAP  with cycle 2 once counts recover and infection resolved  - we have tentatively scheduled a follow up appointment in clinic on 6/22/2020 should he get discharged over the weekend.  Plan for outpatient monitoring of counts Monday, Wednesday, Friday until improvement and to assess need for transfusions.    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 He is tolerating anticoagulation with Lovenox well. We discussed alternative anticoagulants such as Coumadin (less effective) or DOAC and as he has completed chemotherapy with a good  response of his malignancy in 6/2018 he was switched to Xarelto.   --A vascular surgery consultation for the persistent IVC thrombus concurred with anticoagulation for 6-12 months or longer without other intervention.   --Anticoagulation will be HELD with ongoing thrombocytopenia       Diabetes  - managed by hospitalist team     Possible adrenal insufficiency  --Large bilateral adrenal masses with mild hypotension, nausea, vomiting, and hyperkalemia  --Normal cortisol level and ACTH elevated at 140  --continue empiric PO hydrocortisone 20 mg q 8 AM and 10 mg every 4 p.m.    -Chronic Kidney Disease  -Mild renal insufficiency will be monitored.     Right Foot Wound  --s/p toe amputation  --possible osteomyelitis  --noted input from podiatry and vascular surgery and awaiting further plans   --continue antibiotics and wound care per vascular surgery/podiatry, ID, and hospitallst    Disposition: Continue antibiotics per hospitalist, surgery, ID. Home once stable on oral antibiotics. Follow up to see NP Monday 6/22 in clinic with repeat CBC    Code Status: Full Code    Lucas Smith    Interval History   Feels ok, no new confusion, no nausea.  Denied fever.      Physical Exam   Temp: 97.3  F (36.3  C) Temp src: Oral BP: 100/56 Pulse: 88 Heart Rate: 83 Resp: 20 SpO2: 94 % O2 Device: None (Room air)    Vitals:    06/17/20 0940   Weight: 83.9 kg (185 lb)     Vital Signs with Ranges  Temp:  [97.3  F (36.3  C)-98.5  F (36.9  C)] 97.3  F (36.3  C)  Pulse:  [88] 88  Heart Rate:  [83-92] 83  Resp:  [18-20] 20  BP: (100-111)/(56-61) 100/56  SpO2:  [94 %-97 %] 94 %  I/O last 3 completed shifts:  In: -   Out: 1095 [Urine:1095]    Constitutional: awake, cooperative, no acute distress  Hematologic / Lymphatic: no cervical lymphadenopathy  GI: soft, non-distended, non-tender, no masses palpated  Musculoskeletal: no pedal edema, redness noted over toe amputation site     Medications       allopurinol  300 mg Oral Daily     bacitracin    Topical Daily     DAPTOmycin  500 mg Intravenous Q24H     fenofibrate  160 mg Oral Daily     heparin  5 mL Intracatheter Q28 Days     heparin lock flush  5-10 mL Intracatheter Q24H     hydrocortisone  10 mg Oral QPM     hydrocortisone  20 mg Oral QAM AC     insulin aspart  1-10 Units Subcutaneous TID AC     insulin aspart  1-7 Units Subcutaneous At Bedtime     insulin glargine  15 Units Subcutaneous At Bedtime     piperacillin-tazobactam  3.375 g Intravenous Q6H     rosuvastatin  5 mg Oral Daily     sodium chloride (PF)  3 mL Intracatheter Q8H       Data   Results for orders placed or performed during the hospital encounter of 06/17/20 (from the past 24 hour(s))   Glucose by meter   Result Value Ref Range    Glucose 164 (H) 70 - 99 mg/dL   Glucose by meter   Result Value Ref Range    Glucose 173 (H) 70 - 99 mg/dL   Glucose by meter   Result Value Ref Range    Glucose 173 (H) 70 - 99 mg/dL   Glucose by meter   Result Value Ref Range    Glucose 132 (H) 70 - 99 mg/dL   CBC with platelets differential   Result Value Ref Range    WBC 5.4 4.0 - 11.0 10e9/L    RBC Count 3.42 (L) 4.4 - 5.9 10e12/L    Hemoglobin 8.8 (L) 13.3 - 17.7 g/dL    Hematocrit 26.9 (L) 40.0 - 53.0 %    MCV 79 78 - 100 fl    MCH 25.7 (L) 26.5 - 33.0 pg    MCHC 32.7 31.5 - 36.5 g/dL    RDW 17.0 (H) 10.0 - 15.0 %    Platelet Count 28 (LL) 150 - 450 10e9/L    Diff Method Manual Differential     % Neutrophils 74.0 %    % Lymphocytes 6.0 %    % Monocytes 15.0 %    % Eosinophils 0.0 %    % Basophils 0.0 %    % Metamyelocytes 4.0 %    % Myelocytes 1.0 %    Absolute Neutrophil 4.0 1.6 - 8.3 10e9/L    Absolute Lymphocytes 0.3 (L) 0.8 - 5.3 10e9/L    Absolute Monocytes 0.8 0.0 - 1.3 10e9/L    Absolute Eosinophils 0.0 0.0 - 0.7 10e9/L    Absolute Basophils 0.0 0.0 - 0.2 10e9/L    Absolute Metamyelocytes 0.2 (H) 0 10e9/L    Absolute Myelocytes 0.1 (H) 0 10e9/L    Teardrop Cells Slight     Elliptocytes Slight     Platelet Estimate       Automated count confirmed.   Platelet morphology is normal.   Potassium   Result Value Ref Range    Potassium 3.9 3.4 - 5.3 mmol/L   Glucose by meter   Result Value Ref Range    Glucose 108 (H) 70 - 99 mg/dL

## 2020-06-20 NOTE — PROGRESS NOTES
Foot & Ankle Surgery  June 20, 2020    Patient seen this AM for follow up on wounds L 2nd and 3rd toe amp sites.      /56 (BP Location: Left arm)   Pulse 88   Temp 97.3  F (36.3  C) (Oral)   Resp 20   Wt 83.9 kg (185 lb)   SpO2 94%   BMI 28.13 kg/m      PE - wounds not evaluated today    A/P - 71 yo neuropathic diabetic male with Lymphoma and non-healing L 2nd and 3rd toe amp sites with concerns for L 2nd middle phalanx osteomyelitis and L 3rd middle phalanx marrow changes  -patient is leaning towards revision surgery currently. However, platelet count 28.  Also, he has questions for his oncologist about complications associated with pending chemo regarding revision toe surgery and wound healing.   -will await consultation with his primary oncologist.  Will place clinic follow up orders for myself, and patient is to follow up after discharge for further discussion.  Will also discuss platelet count with Oncology.  Question feasibility of getting platelet to 150 for surgery.    Ismael Humphrey DPM FACFAS FACFAOM  Podiatric Foot & Ankle Surgeon  [facility]  [phone]

## 2020-06-20 NOTE — PROGRESS NOTES
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 Flores on 5/22. Sutures removed on 6/15  Presents on this admission with worsening redness, swelling, and purulent drainage from his 2nd and 3rd toe wounds. XR left foot on arrival showed no obvious osteo. In the ED, he was empirically initiated on IV vancomycin and pip-tazo  Left foot MRI  1. Findings suspicious for osteomyelitis of the residual second middle  phalanx. Adjacent 0.8 cm nonspecific fluid collection.  2. Nonspecific edema of the residual third middle phalanx, which could  simply be reactive.  3. Cellulitis.      Now on daptomycin and Zosyn per infectious disease     See comments and Dr. Ramos's note, \"no longer neutropenic, no positive micro, so if getting discharge can be switched to oral Augmentin for 7 days till seen by podiatry for conclusive surgery, then adjust based on surgical intervention/cultures.\"    Vascular surgeon is following, see discussion in Dr. Flores's note    Podiatry also following, please see Dr. Humphrey's comments, \"advised revision amputation of 2nd toe, as well as 3rd toe.  Re: the 2nd toe, the wound has not healed and there are early marrow changes on MRI. He is going to contact his Oncologist regarding chemo and possible revision " "surgery.  He would like to consider his options regarding surg vs non-surg management of the toe wounds.\"  I discussed with Dr. Humphrey and appreciate his evaluation and recommendations.    Continue wound care     Pancytopenia with neutropenia due to chemotherapy  Diffuse large B-cell non-Hodgkin's lymphoma (CD20 B-cell grade III stage III versus IV)  * Follows with Dr. Olson at [facility]. Initially diagnosed with B-cell lymphoma in 2014 and underwent R-CHOP. Diagnosed with recurrent follicular lymphoma in 2017 and completed chemo/XRT. Found to have new masses and lymphadenopathy in May 2020, and was found to have diffuse large B-cell lymphoma. Initiated on R ICE on 6/5/2020, but chemo was discontinued on 6/7 due to Ifosfamide encephalopathy  Now on alternative chemotherapy, last dose: 6/10  CBC on arrival notable for , platelets 4000. In the ED, he was give a unit of platelets and received I unit of packed red blood cells.  On 6/18 he received another unit of irradiated packed red blood cells and platelets.  Today his counts are better.      Monitor H3    [facility] following     History of suspected adrenal insufficiency  PTA: hydrocortisone 20 mg qAM, 10 mg qHS    Continue PTA hydrocortisone    History of IVC thrombus  Hypercoagulable state   PTA on rivaroxaban    Holding rivaroxaban due to thrombocytopenia    DM2 with peripheral vascular disease, long-term insulin use  PTA: metformin 1000 mg qac, 500 mg qhs. Lantus 30 units qhs, aspart per sliding scale.    Holding PTA metformin    Continues on Lantus 30 units qhs    High SSI available    JESSICA    Chronic and stable on CPAP    Dyslipidemia    Chronic and stable on rosuvastatin and fenofibrate     Diet: Combination Diet Regular Diet Adult    DVT Prophylaxis: Pneumatic Compression Devices  Champion Catheter: not present  Code Status: Full Code        Disposition Plan   Expected discharge: 2 - 3 days, recommended to prior living " "arrangement once antibiotic plan established and H3 stable.       The patient's care was discussed with the Bedside Nurse and Patient and wife    Total time spent:  > 35 minutes  Time spent counseling, coordination of care: 25 minutes including discussion with care team and Dr. Humphrey and patient's wife, Toshia, also personal review and interpretation of labs and studies as noted above     Deanna Richards MD  Hospitalist  [facility]  Text Page (7am - 6pm, M-F)    ______________________________________________________________________    Interval History   \"My first priority is finishing my chemo.\"  Patient asks about plan for care.  He called his wife, Toshia, and we discussed via video conference.  We discussed the conflicting recommendations from vascular surgery and podiatry (surgery versus no surgery), the need to definitively treat any infection before embarking on more chemotherapy, his desire to discuss with his usual oncologist, Dr. Rubalcava.  Discussed that he would need to be in the hospital for toe amputation so it may be wise to remain in the hospital over the weekend, discuss with oncology on Monday and get a plan in place to treat thrombocytopenia, then proceed with surgery.    Data reviewed today: I reviewed all medications, new labs and imaging results over the last 24 hours. I personally reviewed no images or EKG's today.    Physical Exam   Vital Signs: Temp: 97.3  F (36.3  C) Temp src: Oral BP: 100/56 Pulse: 88 Heart Rate: 83 Resp: 20 SpO2: 94 % O2 Device: None (Room air)    Weight: 185 lbs 0 oz  Constitutional: awake, alert, cooperative, no apparent distress- non toxic  Respiratory: No increased work of breathing, good air exchange, clear to auscultation bilaterally, no crackles or wheezing  Cardiovascular: regular rate and rhythm, normal S1 and S2, no S3 or S4, and no murmur noted  Musculoskeletal: Second and third toes of left foot have eschar  Neuropsychiatric: General: " normal, calm and normal eye contact    Data   Recent Labs   Lab 06/20/20  0605 06/19/20  0627 06/18/20  2120 06/18/20  1655 06/18/20  0635  06/17/20  0950   WBC 5.4 3.0*  --   --  0.8*   < > 0.4*   HGB 8.8* 8.1*  --  7.5* 7.0*   < > 7.9*   MCV 79 78  --   --  77*   < > 76*   PLT 28* 23* 22*  --  11*   < > 4*   NA  --  138  --   --  136  --  134   POTASSIUM 3.9 3.3*  --  3.7 3.2*  --  3.7   CHLORIDE  --  107  --   --  105  --  104   CO2  --  29  --   --  26  --  24   BUN  --  6*  --   --  7  --  12   CR  --  0.72  --   --  0.68  --  0.91   ANIONGAP  --  2*  --   --  5  --  6   JEFF  --  8.2*  --   --  8.1*  --  8.8   GLC  --  95  --   --  116*  --  219*   ALBUMIN  --   --   --   --  2.4*  --   --    PROTTOTAL  --   --   --   --  5.5*  --   --    BILITOTAL  --   --   --   --  1.0  --   --    ALKPHOS  --   --   --   --  44  --   --    ALT  --   --   --   --  18  --   --    AST  --   --   --   --  11  --   --     < > = values in this interval not displayed.     No results found for this or any previous visit (from the past 24 hour(s)).  Medications       allopurinol  300 mg Oral Daily     bacitracin   Topical Daily     DAPTOmycin  500 mg Intravenous Q24H     fenofibrate  160 mg Oral Daily     heparin  5 mL Intracatheter Q28 Days     heparin lock flush  5-10 mL Intracatheter Q24H     hydrocortisone  10 mg Oral QPM     hydrocortisone  20 mg Oral QAM AC     insulin aspart  1-10 Units Subcutaneous TID AC     insulin aspart  1-7 Units Subcutaneous At Bedtime     insulin glargine  15 Units Subcutaneous At Bedtime     piperacillin-tazobactam  3.375 g Intravenous Q6H     rosuvastatin  5 mg Oral Daily     sodium chloride (PF)  3 mL Intracatheter Q8H

## 2020-06-20 NOTE — PROGRESS NOTES
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     Impression:  1. 70 y.o male with non hodgkin`s Lymphoma on chemo.   2. Diabetes.   3. Recent history of partial amputation on the 2/3 toe left foot.   4. Recent hospitalization for chemo related encephalopathy.   5. Who is now presenting with left foot 2/3 toe infection with drainage and wound dehiscence.   6. Started on vanco and zosyn. Switched to daptomycin and zosyn  7. Currently neutropenic.   8. BMT patient.      Recommendations:   MRI concerning for osteo, in the second middle phalanx.   Noted tentative podiatry plan.   No longer neutropenic, no positive micro so if getting discharged can be switched to oral Augmentin for 7 days/ till seen by podiatry for conclusive surgery and then adjust based on surgical intervention/ cultures.          Chidi Ramos MD    Interval History   Afebrile   MRI as stated   Counts improved     Physical Exam   Temp: 97.3  F (36.3  C) Temp src: Oral BP: 100/56 Pulse: 88 Heart Rate: 83 Resp: 20 SpO2: 94 % O2 Device: None (Room air)    Vitals:    06/17/20 0940   Weight: 83.9 kg (185 lb)     Vital Signs with Ranges  Temp:  [97.3  F (36.3  C)-98.5  F (36.9  C)] 97.3  F (36.3  C)  Pulse:  [88] 88  Heart Rate:  [83-92] 83  Resp:  [18-20] 20  BP: (100-111)/(56-61) 100/56  SpO2:  [94 %-97 %] 94 %    Constitutional: Awake, alert, cooperative, no apparent distress  Lungs: Clear to auscultation bilaterally, no crackles or wheezing  Cardiovascular: Regular rate and rhythm, normal S1 and S2, and no murmur noted  Abdomen: Normal bowel sounds, soft, non-distended, non-tender  Skin: No rashes, no cyanosis, no edema  Other:    Medications       allopurinol  300 mg Oral Daily     bacitracin   Topical Daily     DAPTOmycin  500 mg Intravenous Q24H     fenofibrate  160 mg Oral Daily      heparin  5 mL Intracatheter Q28 Days     heparin lock flush  5-10 mL Intracatheter Q24H     hydrocortisone  10 mg Oral QPM     hydrocortisone  20 mg Oral QAM AC     insulin aspart  1-10 Units Subcutaneous TID AC     insulin aspart  1-7 Units Subcutaneous At Bedtime     insulin glargine  15 Units Subcutaneous At Bedtime     piperacillin-tazobactam  3.375 g Intravenous Q6H     rosuvastatin  5 mg Oral Daily     sodium chloride (PF)  3 mL Intracatheter Q8H       Data   All microbiology laboratory data reviewed.  Recent Labs   Lab Test 06/20/20  0605 06/19/20  0627 06/18/20  2120 06/18/20  1655 06/18/20  0635   WBC 5.4 3.0*  --   --  0.8*   HGB 8.8* 8.1*  --  7.5* 7.0*   HCT 26.9* 24.2*  --   --  21.5*   MCV 79 78  --   --  77*   PLT 28* 23* 22*  --  11*     Recent Labs   Lab Test 06/19/20  0627 06/18/20  0635 06/17/20  0950   CR 0.72 0.68 0.91     No lab results found.  Recent Labs   Lab Test 06/17/20  1134 06/17/20  0950 11/13/14  1330   CULT No growth after 3 days No growth after 3 days No growth       Attestation:  Total time on the floor involved in the patient's care: 35 minutes. Total time spent in counseling/care coordination: >50%

## 2020-06-21 VITALS
WEIGHT: 185 LBS | OXYGEN SATURATION: 98 % | TEMPERATURE: 98 F | HEART RATE: 88 BPM | DIASTOLIC BLOOD PRESSURE: 53 MMHG | SYSTOLIC BLOOD PRESSURE: 99 MMHG | RESPIRATION RATE: 18 BRPM | BODY MASS INDEX: 28.13 KG/M2

## 2020-06-21 LAB
BASOPHILS # BLD AUTO: 0 10E9/L (ref 0–0.2)
BASOPHILS NFR BLD AUTO: 0 %
DIFFERENTIAL METHOD BLD: ABNORMAL
ELLIPTOCYTES BLD QL SMEAR: SLIGHT
EOSINOPHIL # BLD AUTO: 0.1 10E9/L (ref 0–0.7)
EOSINOPHIL NFR BLD AUTO: 1 %
ERYTHROCYTE [DISTWIDTH] IN BLOOD BY AUTOMATED COUNT: 17.3 % (ref 10–15)
GLUCOSE BLDC GLUCOMTR-MCNC: 166 MG/DL (ref 70–99)
GLUCOSE BLDC GLUCOMTR-MCNC: 219 MG/DL (ref 70–99)
HCT VFR BLD AUTO: 26.2 % (ref 40–53)
HGB BLD-MCNC: 8.7 G/DL (ref 13.3–17.7)
LYMPHOCYTES # BLD AUTO: 0.3 10E9/L (ref 0.8–5.3)
LYMPHOCYTES NFR BLD AUTO: 5 %
MACROCYTES BLD QL SMEAR: PRESENT
MCH RBC QN AUTO: 26.3 PG (ref 26.5–33)
MCHC RBC AUTO-ENTMCNC: 33.2 G/DL (ref 31.5–36.5)
MCV RBC AUTO: 79 FL (ref 78–100)
METAMYELOCYTES # BLD: 0.4 10E9/L
METAMYELOCYTES NFR BLD MANUAL: 6 %
MONOCYTES # BLD AUTO: 0.9 10E9/L (ref 0–1.3)
MONOCYTES NFR BLD AUTO: 14 %
MYELOCYTES # BLD: 0.3 10E9/L
MYELOCYTES NFR BLD MANUAL: 5 %
NEUTROPHILS # BLD AUTO: 4.3 10E9/L (ref 1.6–8.3)
NEUTROPHILS NFR BLD AUTO: 67 %
PLATELET # BLD AUTO: 35 10E9/L (ref 150–450)
PLATELET # BLD EST: ABNORMAL 10*3/UL
PROMYELOCYTES # BLD MANUAL: 0.1 10E9/L
PROMYELOCYTES NFR BLD MANUAL: 2 %
RBC # BLD AUTO: 3.31 10E12/L (ref 4.4–5.9)
WBC # BLD AUTO: 6.4 10E9/L (ref 4–11)

## 2020-06-21 PROCEDURE — 99239 HOSP IP/OBS DSCHRG MGMT >30: CPT | Performed by: INTERNAL MEDICINE

## 2020-06-21 PROCEDURE — 00000146 ZZHCL STATISTIC GLUCOSE BY METER IP

## 2020-06-21 PROCEDURE — 85025 COMPLETE CBC W/AUTO DIFF WBC: CPT | Performed by: HOSPITALIST

## 2020-06-21 PROCEDURE — 25000132 ZZH RX MED GY IP 250 OP 250 PS 637: Performed by: INTERNAL MEDICINE

## 2020-06-21 PROCEDURE — 25000128 H RX IP 250 OP 636: Performed by: INTERNAL MEDICINE

## 2020-06-21 RX ORDER — INSULIN GLARGINE 100 [IU]/ML
15 INJECTION, SOLUTION SUBCUTANEOUS AT BEDTIME
Status: ON HOLD
Start: 2020-06-21 | End: 2020-10-08

## 2020-06-21 RX ADMIN — HEPARIN SODIUM (PORCINE) LOCK FLUSH IV SOLN 100 UNIT/ML 5 ML: 100 SOLUTION at 11:30

## 2020-06-21 RX ADMIN — FENOFIBRATE 160 MG: 160 TABLET ORAL at 09:29

## 2020-06-21 RX ADMIN — Medication 5 ML: at 06:05

## 2020-06-21 RX ADMIN — Medication 5 ML: at 00:52

## 2020-06-21 RX ADMIN — PIPERACILLIN SODIUM AND TAZOBACTAM SODIUM 3.38 G: 3; .375 INJECTION, POWDER, LYOPHILIZED, FOR SOLUTION INTRAVENOUS at 00:52

## 2020-06-21 RX ADMIN — INSULIN ASPART 2 UNITS: 100 INJECTION, SOLUTION INTRAVENOUS; SUBCUTANEOUS at 09:58

## 2020-06-21 RX ADMIN — PIPERACILLIN SODIUM AND TAZOBACTAM SODIUM 3.38 G: 3; .375 INJECTION, POWDER, LYOPHILIZED, FOR SOLUTION INTRAVENOUS at 05:30

## 2020-06-21 RX ADMIN — HYDROCORTISONE 20 MG: 10 TABLET ORAL at 06:05

## 2020-06-21 RX ADMIN — ROSUVASTATIN CALCIUM 5 MG: 5 TABLET, FILM COATED ORAL at 09:29

## 2020-06-21 RX ADMIN — ALLOPURINOL 300 MG: 300 TABLET ORAL at 09:29

## 2020-06-21 RX ADMIN — BACITRACIN: 500 OINTMENT TOPICAL at 09:31

## 2020-06-21 ASSESSMENT — ACTIVITIES OF DAILY LIVING (ADL)
ADLS_ACUITY_SCORE: 12

## 2020-06-21 NOTE — DISCHARGE INSTRUCTIONS
Follow-up with Dr. Flores, Vascular Surgery, at [phone].      Wound Care:  To left toe amputation sites (2nd/3rd left toe digits), wash daily and can place bacitracin over the suture line/incision if not healed.

## 2020-06-21 NOTE — PROGRESS NOTES
Patient discharged to home at 1215.  Writer went over discharge AVS with patient, he is aware of follow up appointments and numbers to reach his doctors/specialists.  Patient received filled prescription, per Dr. Richards he should should still take 2 doses today (afternoon and before bed).  Patient aware of medication dose change and which meds to stop (listed on AVS).  Patient aware of wound care instructions and when to seek medical help/signs of worsening infection.  Port de-accessed.  Patient denied pain at time of discharge.

## 2020-06-21 NOTE — PLAN OF CARE
VSS, soft BPs at times.  A&Ox4.  Independent in room.  IV abx, port hep locked.  Erythema improving on left foot amp, 2/3 toes.  Blood sugars monitored.  Voiding adequately, continues to have green/yellow colored urine.  Discharge pending.  Will continue to monitor.

## 2020-06-21 NOTE — PLAN OF CARE
Patient is A&Ox4. VSS. Denies pain. Up independently to bathroom. Gave tylenol and melatonin for sleep. Right port hep locked. Left foot cellulitis, blanchable pink and receeding from boarders. Rash on low back. Discharge pending. Calls appropriately.

## 2020-06-21 NOTE — PROGRESS NOTES
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 (with the pleural effusion but negative cytology) follicular lymphoma, s/p RCHOP with maintenance rituximab, s/p BR 12/2017, s/p thoracic radiation 7/2019  --CT C/A/P 5/14/20 revealed marked increase in the right lower lobe pulmonary mass with new subcarinal lymphadenopathy, bilateral new adrenal masses, and soft tissue tumor deposits in the abdomen  --CT guided adrenal biopsy 5/27/20 revealed transformation to a diffuse large B cell lymphoma, germinal center type, 100% Ki-67 positive,  with positive immunohistochemical staining for BCL-2, BCL-6, and C-MYC suggestive of a possible triple-hit lymphoma.  - Started on R ICE 6/5/2020  - chemo discontinued for ifosfamide induced encephalopathy 6/7/20  - continue supportive care with plans for alternative chemotherapy with R-DHAP  with cycle 2 once counts recover and infection resolved  - follow up in clinic on 6/22/2020 with CBC.  Plan for outpatient monitoring of counts Monday, Wednesday, Friday.  Anticipate resuming chemotherapy likely after repeat surgery for osteomyelitis.      Anemia/thrombocytopenia  --chemotherapy and neoplasm induced   --neutropenia has resolved.  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 He is tolerating anticoagulation with Lovenox well. We discussed alternative anticoagulants such as Coumadin (less effective) or DOAC and as he has completed chemotherapy with a good response of his malignancy in 6/2018 he was switched to Xarelto.   --A vascular surgery consultation for the persistent IVC  thrombus concurred with anticoagulation for 6-12 months or longer without other intervention.   --Anticoagulation will be HELD with ongoing thrombocytopenia       Diabetes  - managed by hospitalist team     Possible adrenal insufficiency  --Large bilateral adrenal masses with mild hypotension, nausea, vomiting, and hyperkalemia  --Normal cortisol level and ACTH elevated at 140  --continue empiric PO hydrocortisone 20 mg q 8 AM and 10 mg every 4 p.m.    -Chronic Kidney Disease  -Mild renal insufficiency will be monitored.     Right Foot Wound  --s/p toe amputation  --possible osteomyelitis  --noted input from podiatry and vascular surgery and awaiting further plans   --noted plan to discharge home today on oral Augmentin    Disposition: Okay to discharge home today from hematology/oncology stand point.  Antibiotics per hospitalist, surgery, ID. Follow up to see NP Monday 6/22 in clinic with repeat CBC    Code Status: Full Code    Lucas Smith    Interval History   Denied fevers.  No new issues.       Physical Exam   Temp: 98  F (36.7  C) Temp src: Oral BP: 99/53   Heart Rate: 87 Resp: 18 SpO2: 98 % O2 Device: None (Room air)    Vitals:    06/17/20 0940   Weight: 83.9 kg (185 lb)     Vital Signs with Ranges  Temp:  [97.7  F (36.5  C)-98.2  F (36.8  C)] 98  F (36.7  C)  Heart Rate:  [87-95] 87  Resp:  [18-20] 18  BP: ()/(53-62) 99/53  SpO2:  [98 %-99 %] 98 %  I/O last 3 completed shifts:  In: 240 [P.O.:240]  Out: 2500 [Urine:2500]    Constitutional: awake, cooperative, no acute distress  Hematologic / Lymphatic: no cervical lymphadenopathy  GI: soft, non-distended, non-tender, no masses palpated  Musculoskeletal: no pedal edema, redness noted over toe amputation site     Medications       allopurinol  300 mg Oral Daily     bacitracin   Topical Daily     DAPTOmycin  500 mg Intravenous Q24H     fenofibrate  160 mg Oral Daily     heparin  5 mL Intracatheter Q28 Days     heparin lock flush  5-10 mL Intracatheter  Q24H     hydrocortisone  10 mg Oral QPM     hydrocortisone  20 mg Oral QAM AC     insulin aspart  1-10 Units Subcutaneous TID AC     insulin aspart  1-7 Units Subcutaneous At Bedtime     insulin glargine  15 Units Subcutaneous At Bedtime     piperacillin-tazobactam  3.375 g Intravenous Q6H     rosuvastatin  5 mg Oral Daily       Data   Results for orders placed or performed during the hospital encounter of 06/17/20 (from the past 24 hour(s))   Glucose by meter   Result Value Ref Range    Glucose 229 (H) 70 - 99 mg/dL   Glucose by meter   Result Value Ref Range    Glucose 212 (H) 70 - 99 mg/dL   Glucose by meter   Result Value Ref Range    Glucose 199 (H) 70 - 99 mg/dL   Glucose by meter   Result Value Ref Range    Glucose 254 (H) 70 - 99 mg/dL   Glucose by meter   Result Value Ref Range    Glucose 219 (H) 70 - 99 mg/dL   CBC with platelets differential   Result Value Ref Range    WBC 6.4 4.0 - 11.0 10e9/L    RBC Count 3.31 (L) 4.4 - 5.9 10e12/L    Hemoglobin 8.7 (L) 13.3 - 17.7 g/dL    Hematocrit 26.2 (L) 40.0 - 53.0 %    MCV 79 78 - 100 fl    MCH 26.3 (L) 26.5 - 33.0 pg    MCHC 33.2 31.5 - 36.5 g/dL    RDW 17.3 (H) 10.0 - 15.0 %    Platelet Count 35 (LL) 150 - 450 10e9/L    Diff Method Manual Differential     % Neutrophils 67.0 %    % Lymphocytes 5.0 %    % Monocytes 14.0 %    % Eosinophils 1.0 %    % Basophils 0.0 %    % Metamyelocytes 6.0 %    % Myelocytes 5.0 %    % Promyelocytes 2.0 %    Absolute Neutrophil 4.3 1.6 - 8.3 10e9/L    Absolute Lymphocytes 0.3 (L) 0.8 - 5.3 10e9/L    Absolute Monocytes 0.9 0.0 - 1.3 10e9/L    Absolute Eosinophils 0.1 0.0 - 0.7 10e9/L    Absolute Basophils 0.0 0.0 - 0.2 10e9/L    Absolute Metamyelocytes 0.4 (H) 0 10e9/L    Absolute Myelocytes 0.3 (H) 0 10e9/L    Absolute Promyeloctyes 0.1 (H) 0 10e9/L    Elliptocytes Slight     Macrocytes Present     Platelet Estimate       Automated count confirmed.  Platelet morphology is normal.   Glucose by meter   Result Value Ref Range     Glucose 166 (H) 70 - 99 mg/dL

## 2020-06-21 NOTE — PROGRESS NOTES
VASCULAR SURGERY.    Patient clinically doing better.  Decreased erythema over the second and third toes and dorsal foot.  Appreciate 's consultation.  Probability of osteomyelitis protecting on the second toe.  However, surgical revision at this time due to his pancytopenia from his chemotherapy raises the issue of healing, etc.    Ramon Flores MD

## 2020-06-21 NOTE — DISCHARGE SUMMARY
[facility]  Hospitalist Discharge Summary      Date of Admission:  6/17/2020  Date of Discharge:  6/21/2020  Discharging Provider: Deanna Richards MD      Discharge Diagnoses   Non-healing left 2nd and 3rd toe wounds with cellulitis and possible osteomyelitis  History of osteomyelitis  Pancytopenia with neutropenia due to chemotherapy  Diffuse large B-cell non-Hodgkin's lymphoma (CD20 B-cell grade III stage III versus IV)  History of suspected adrenal insufficiency  History of IVC thrombus  Hypercoagulable state  Type 2 diabetes mellitus with peripheral vascular disease, long-term insulin use  Obstructive sleep apnea  Dyslipidemia    Follow-ups Needed After Discharge   Follow-up Appointments     Follow-up and recommended labs and tests       See Dr. Olson/NP Monday 6/22 with repeat CBC  SCHEDULED: Labs appointment at 2:40PM, then see  Temo GRADY at 3:00 PM   [facility]   [phone] [address] [facility] [address]         Follow-up and recommended labs and tests       Thank you for choosing [facility]!    Follow up with Dr Varma at one of the clinic locations within 7-10   days of discharge.    DR. VARMA'S CLINIC LOCATIONS:    [address]   [place] [address] [phone]    THURSDAY - [place]  [address] [facility] [address]  [place] [address] [phone]         Follow-up and recommended labs and tests       Follow up with primary care provider, Francisco Armijo, within 7 days for   hospital follow- up.  The following labs/tests are recommended: CBC with   platelet count, unless already done by Oncologist.         Please remind patient to set up dermatology appointment to evaluate lesion on right earlobe.    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    Covid-19 pending- low suspicion  negative    Non-healing left 2nd and 3rd toe wounds with cellulitis and possible osteomyelitis  History of osteomyelitis  Underwent uncomplicated distal amputations of left second and third toes by Dr. Flores on 5/22. Sutures removed on 6/15  Presents on this admission with worsening redness, swelling, and purulent drainage from his 2nd and 3rd toe wounds. XR left foot on arrival showed no obvious osteo. In the ED, he was empirically initiated on IV vancomycin and pip-tazo  Left foot MRI  1. Findings suspicious for osteomyelitis of the residual second middle  phalanx. Adjacent 0.8 cm nonspecific fluid collection.  2. Nonspecific edema of the residual third middle phalanx, which could  simply be reactive.  3. Cellulitis.      MRI of foot showed marrow changes involving the left second middle phalanx concerning for osteomyelitis.  Marrow changes involving the left third middle phalanx were felt to be reactive from previous surgery    Per Dr. Humphrey's exam, the second toe wound probes to bone.  He advised revision amputation of second and third toes    Patient wants to discuss optimal timing for surgery with his usual oncologist, Dr. Rubalcava,    Podiatry would like patient to have platelet count of greater than or equal to 100,000 before proceeding with surgery    Treated with daptomycin and Zosyn per infectious disease     See comments and   "Richard's note, \"no longer neutropenic, no positive micro, so if getting discharge can be switched to oral Augmentin for 7 days till seen by podiatry for conclusive surgery, then adjust based on surgical intervention/cultures.\"    Vascular surgery is following, see discussion in Dr. Flores's note    Continue wound care     Outpatient follow-up with podiatry    Pancytopenia with neutropenia due to chemotherapy  Diffuse large B-cell non-Hodgkin's lymphoma (CD20 B-cell grade III stage III versus IV)  * Follows with Dr. Olson at [facility]. Initially diagnosed with B-cell lymphoma in 2014 and underwent R-CHOP. Diagnosed with recurrent follicular lymphoma in 2017 and completed chemo/XRT. Found to have new masses and lymphadenopathy in May 2020, and was found to have diffuse large B-cell lymphoma. Initiated on R ICE on 6/5/2020, but chemo was discontinued on 6/7 due to Ifosfamide encephalopathy  Now on alternative chemotherapy, last dose: 6/10  CBC on arrival notable for , platelets 4000. In the ED, he was give a unit of platelets and received I unit of packed red blood cells.  On 6/18 he received another unit of irradiated packed red blood cells and platelets.  Today his counts are better.      [facility] following     History of suspected adrenal insufficiency  PTA: hydrocortisone 20 mg qAM, 10 mg qHS    Continue PTA hydrocortisone    History of IVC thrombus  Hypercoagulable state   PTA on rivaroxaban    Holding rivaroxaban due to thrombocytopenia    DM2 with peripheral vascular disease, long-term insulin use  PTA: metformin 1000 mg qac, 500 mg qhs. Lantus 30 units qhs, aspart per sliding scale.    Holding PTA metformin    Continues on Lantus 30 units qhs    High SSI available    JESSICA    Chronic and stable on CPAP    Dyslipidemia    Chronic and stable on rosuvastatin and fenofibrate    Consultations This Hospital Stay   PHARMACY TO DOSE VANCO  PHARMACY TO DOSE VANCO  INFECTIOUS DISEASES IP " CONSULT  VASCULAR SURGERY IP CONSULT  HEMATOLOGY & ONCOLOGY IP CONSULT  PODIATRY IP CONSULT    Code Status   Full Code    Time Spent on this Encounter   I, Deanna Richards MD, personally saw the patient today and spent greater than 30 minutes discharging this patient.       Deanna Richards MD  [facility]  ______________________________________________________________________    Physical Exam   Vital Signs: Temp: 98  F (36.7  C) Temp src: Oral BP: 99/53   Heart Rate: 87 Resp: 18 SpO2: 98 % O2 Device: None (Room air)    Weight: 185 lbs 0 oz  I saw and examined the patient on the date of discharge.       Primary Care Physician   Francisco Armijo    Discharge Orders      Follow-up and recommended labs and tests     See Dr. Olson/NP Monday 6/22 with repeat CBC  SCHEDULED: Labs appointment at 2:40PM, then see  Temo NP at 3:00 PM   [facility]   [phone] [address] [facility] [address]     Follow-up and recommended labs and tests     Thank you for choosing [facility]!    Follow up with Dr Varma at one of the clinic locations within 7-10 days of discharge.    DR. VARMA'S CLINIC LOCATIONS:    [address]   [place] [address] [phone]    THURSDAY - [place]  [address] [facility] [address]  [place] [address] [phone]     Reason for your hospital stay    You have surgical wounds of the second and third toes of the left foot that have not been healing.  You may have infection in the bone.     Follow-up and recommended labs and tests     Follow up with primary care provider, Francisco Armijo, within 7 days for hospital follow- up.  The following labs/tests are recommended: CBC with platelet count, unless already done by Oncologist.     Activity    Your activity upon discharge: activity as tolerated     Full Code     Diet    Follow this diet upon discharge: Orders Regular Diet Adult       Significant Results and  Procedures   Most Recent 3 CBC's:  Recent Labs   Lab Test 06/21/20  0600 06/20/20  0605 06/19/20  0627   WBC 6.4 5.4 3.0*   HGB 8.7* 8.8* 8.1*   MCV 79 79 78   PLT 35* 28* 23*     Most Recent 3 BMP's:  Recent Labs   Lab Test 06/20/20  0605 06/19/20  0627 06/18/20  1655 06/18/20  0635 06/17/20  0950   NA  --  138  --  136 134   POTASSIUM 3.9 3.3* 3.7 3.2* 3.7   CHLORIDE  --  107  --  105 104   CO2  --  29  --  26 24   BUN  --  6*  --  7 12   CR  --  0.72  --  0.68 0.91   ANIONGAP  --  2*  --  5 6   JEFF  --  8.2*  --  8.1* 8.8   GLC  --  95  --  116* 219*   ,   Results for orders placed or performed during the hospital encounter of 06/17/20   Foot XR, G/E 3 views, left    Narrative    FOOT LEFT THREE OR MORE VIEWS June 17, 2020 11:48 AM     HISTORY: Second/third toe infection. History of second and third toe  amputation.      Impression    IMPRESSION: Amputation of the second and third toes at the middle  phalanx level. Calcaneal spurring. Otherwise unremarkable.    TAMMY ERWIN MD   MR Foot Left w/o & w Contrast    Addendum: 6/18/2020    FINAL REPORT:    EZIO PADILLA  Accession # [id number]    TECHNIQUE:  Multiplanar, multisequence without and with contrast. 8.4  mL Gadavist.    COMPARISON:  5/6/2020    FINDINGS: Changes of recent second and third toe amputation at the  middle phalanx level. There is edema and contrast enhancement  involving the residual portion of the second middle phalanx. This  includes some low T1-weighted signal distally. This is most consistent  with osteomyelitis. There is nonspecific edema involving the residual  third middle phalanx. This does not have contrast enhancement and  therefore could simply represent reactive edema related to the recent  surgery. There is nonspecific soft tissue edema, which may relate to  surgery, reactive edema, or cellulitis. At least some of this is felt  to represent cellulitis. At the distal aspect of the residual second  middle phalanx, there is a  nonspecific fluid collection measuring 0.6  x 0.4 x 0.8 cm. This could represent seroma, chronic hematoma, or  microabscess.    IMPRESSION:  1. Findings suspicious for osteomyelitis of the residual second middle  phalanx. Adjacent 0.8 cm nonspecific fluid collection.  2. Nonspecific edema of the residual third middle phalanx, which could  simply be reactive.  3. Cellulitis.    TAMMY ERWIN MD (Date of Addendum: 6/18/2020 7:44 AM)    TAMMY ERWIN MD      Narrative    EXAM: MR FOOT LEFT W/O and W CONTRAST  LOCATION: [facility]  DATE/TIME: 6/17/2020 4:00 PM    INDICATION: Osteomyelitis suspected, foot swelling, diabetic  COMPARISON: None.  TECHNIQUE: Routine. Additional postgadolinium T1 sequences were obtained.  IV CONTRAST: 8.4ml Gadavist    FINDINGS:     PRELIM MSK TO READ. Recent partial amputation distal phalanges of the second third toes.      Impression    IMPRESSION:  1.  MSK TO READ                   Discharge Medications   Current Discharge Medication List      START taking these medications    Details   amoxicillin-clavulanate (AUGMENTIN) 875-125 MG tablet Take 1 tablet by mouth 2 times daily for 10 days  Qty: 20 tablet, Refills: 0    Associated Diagnoses: Non-healing surgical wound, initial encounter         CONTINUE these medications which have CHANGED    Details   insulin glargine (LANTUS VIAL) 100 UNIT/ML vial Inject 15 Units Subcutaneous At Bedtime    Associated Diagnoses: Type 2 diabetes mellitus with other specified complication, with long-term current use of insulin (H)         CONTINUE these medications which have NOT CHANGED    Details   acetaminophen (TYLENOL) 325 MG tablet Take 650-975 mg by mouth every 12 hours as needed for mild pain      allopurinol (ZYLOPRIM) 300 MG tablet Take 300 mg by mouth daily      diphenhydrAMINE-acetaminophen (TYLENOL PM)  MG tablet Take 2-3 tablets by mouth At Bedtime      fenofibrate (TRICOR) 145 MG tablet Take 145 mg by mouth daily      !!  hydrocortisone (CORTEF) 10 MG tablet Take 10 mg by mouth every evening      !! hydrocortisone (CORTEF) 10 MG tablet Take 20 mg by mouth daily before breakfast       insulin aspart (NOVOLOG FLEXPEN) 100 UNIT/ML pen Inject Subcutaneous 3 times daily (with meals) Patient uses sliding scale based on Carbs and Blood Glucose. Has been using very little lately due to low readings and low appetite.      !! metFORMIN (GLUCOPHAGE) 500 MG tablet Take 1,000 mg by mouth daily (with breakfast)      !! metFORMIN (GLUCOPHAGE) 500 MG tablet Take 500 mg by mouth daily (with dinner)      potassium chloride ER (KLOR-CON M) 20 MEQ CR tablet Take 20 mEq by mouth daily      rosuvastatin (CRESTOR) 10 MG tablet Take 5 mg by mouth daily      order for DME Kyle 1 packet twice a day for the next month  Qty: 30 days, Refills: 0    Associated Diagnoses: Skin ulcer of toe of left foot with fat layer exposed (H)      prochlorperazine (COMPAZINE) 5 MG tablet Take 1-2 tablets (5-10 mg) by mouth every 6 hours as needed (Breakthrough Nausea / Vomiting)  Qty: 30 tablet, Refills: 0    Associated Diagnoses: Diffuse large B-cell lymphoma of solid organ excluding spleen (H)       !! - Potential duplicate medications found. Please discuss with provider.      STOP taking these medications       filgrastim-sndz (ZARXIO) 480 MCG/0.8ML syringe Comments:   Reason for Stopping:         levofloxacin (LEVAQUIN) 500 MG tablet Comments:   Reason for Stopping:         XARELTO ANTICOAGULANT 20 MG TABS tablet Comments:   Reason for Stopping:             Allergies   No Known Allergies

## 2020-06-22 ENCOUNTER — PREP FOR PROCEDURE (OUTPATIENT)
Dept: PODIATRY | Facility: CLINIC | Age: 70
End: 2020-06-22

## 2020-06-22 ENCOUNTER — TELEPHONE (OUTPATIENT)
Dept: PODIATRY | Facility: CLINIC | Age: 70
End: 2020-06-22

## 2020-06-22 ENCOUNTER — TRANSFERRED RECORDS (OUTPATIENT)
Dept: HEALTH INFORMATION MANAGEMENT | Facility: CLINIC | Age: 70
End: 2020-06-22

## 2020-06-22 ENCOUNTER — PATIENT OUTREACH (OUTPATIENT)
Dept: CARE COORDINATION | Facility: CLINIC | Age: 70
End: 2020-06-22

## 2020-06-22 DIAGNOSIS — E11.9 TYPE 2 DIABETES MELLITUS (H): Primary | ICD-10-CM

## 2020-06-22 DIAGNOSIS — M86.9 OSTEOMYELITIS OF SECOND TOE OF LEFT FOOT (H): Primary | ICD-10-CM

## 2020-06-22 NOTE — PROGRESS NOTES
"Orders signed for \"amputation left 2nd and 3rd toes\"    MAC    Supine    No vendor    Ismael Humphrey, DIAMONDM FACFAS FACFAOM  Podiatric Foot & Ankle Surgeon  [facility]  [phone]      "

## 2020-06-22 NOTE — TELEPHONE ENCOUNTER
Foot & Ankle Surgery  June 22, 2020    I received a call from Pastor's Oncologist, Dr Olson, regarding timing for the amputation.  Based on the discussion, we will aim for revision amputation of toes 2,3 next Wednesday, same-day procedure.  This should allow time for platelet regeneration without substantially delaying chemo.    Ismael Humphrey, DIAMONDM FACFAS FACFAOM  Podiatric Foot & Ankle Surgeon  [facility]  [phone]

## 2020-06-23 ENCOUNTER — PATIENT OUTREACH (OUTPATIENT)
Dept: CARE COORDINATION | Facility: CLINIC | Age: 70
End: 2020-06-23

## 2020-06-23 LAB
BACTERIA SPEC CULT: NO GROWTH
BACTERIA SPEC CULT: NO GROWTH
SPECIMEN SOURCE: NORMAL
SPECIMEN SOURCE: NORMAL

## 2020-06-25 ENCOUNTER — TELEPHONE (OUTPATIENT)
Dept: PODIATRY | Facility: CLINIC | Age: 70
End: 2020-06-25

## 2020-06-25 DIAGNOSIS — Z11.59 ENCOUNTER FOR SCREENING FOR OTHER VIRAL DISEASES: Primary | ICD-10-CM

## 2020-06-25 NOTE — TELEPHONE ENCOUNTER
Scheduled surgery.     Type of surgery: left 2nd and 3rd toe amputation  Location of surgery: [facility]  Date and time of surgery: 7/1/20 @ 1:15pm   Surgeon: Milagro  Pre-Op Appt Date: patient will schedule   Post-Op Appt Date: 7/14/20   Packet sent out: Yes  Pre-cert/Authorization completed:  No  Date: 6/25/20      Mohsen Dave, Surgery Scheduler

## 2020-06-29 DIAGNOSIS — Z11.59 ENCOUNTER FOR SCREENING FOR OTHER VIRAL DISEASES: ICD-10-CM

## 2020-06-29 PROCEDURE — U0003 INFECTIOUS AGENT DETECTION BY NUCLEIC ACID (DNA OR RNA); SEVERE ACUTE RESPIRATORY SYNDROME CORONAVIRUS 2 (SARS-COV-2) (CORONAVIRUS DISEASE [COVID-19]), AMPLIFIED PROBE TECHNIQUE, MAKING USE OF HIGH THROUGHPUT TECHNOLOGIES AS DESCRIBED BY CMS-2020-01-R: HCPCS | Performed by: PODIATRIST

## 2020-06-29 ASSESSMENT — MIFFLIN-ST. JEOR: SCORE: 1583.65

## 2020-06-30 ENCOUNTER — TELEPHONE (OUTPATIENT)
Dept: PODIATRY | Facility: CLINIC | Age: 70
End: 2020-06-30

## 2020-06-30 LAB
SARS-COV-2 RNA SPEC QL NAA+PROBE: NOT DETECTED
SPECIMEN SOURCE: NORMAL

## 2020-06-30 NOTE — TELEPHONE ENCOUNTER
I spoke with Eli.  Will get platelet count in morning.    Ismael Humphrey, YASMEEN [facility]  Podiatric Foot & Ankle Surgeon  [facility]  [phone]

## 2020-06-30 NOTE — TELEPHONE ENCOUNTER
Reason for call:  [facility]/1 at 1:00 for toe amputations.  Want Dr Yap to know he had bloodwork done 9 days ago and Hemoglobin is 8.7 and Platelette is 35. Has Non Hodgkins Lymphoma.  Please call back to confirm getting the message and discuss.    Phone number to reach [facility]:  Other phone number:  [phone]*    Best Time:  any    Can we leave a detailed message on this number?  YES

## 2020-06-30 NOTE — PLAN OF CARE
Dr. Humphrey is aware of Hgb and platelet count, will repeat platelet count day of surgery, not necessary to type and screen per Dr. Humphrey.

## 2020-07-01 ENCOUNTER — HOSPITAL ENCOUNTER (OUTPATIENT)
Facility: CLINIC | Age: 70
Discharge: HOME | End: 2020-07-01
Attending: PODIATRIST | Admitting: PODIATRIST
Payer: COMMERCIAL

## 2020-07-01 ENCOUNTER — APPOINTMENT (OUTPATIENT)
Dept: GENERAL RADIOLOGY | Facility: CLINIC | Age: 70
End: 2020-07-01
Attending: PODIATRIST
Payer: COMMERCIAL

## 2020-07-01 ENCOUNTER — ANESTHESIA EVENT (OUTPATIENT)
Dept: SURGERY | Facility: CLINIC | Age: 70
End: 2020-07-01
Payer: COMMERCIAL

## 2020-07-01 ENCOUNTER — ANESTHESIA (OUTPATIENT)
Dept: SURGERY | Facility: CLINIC | Age: 70
End: 2020-07-01
Payer: COMMERCIAL

## 2020-07-01 VITALS
HEIGHT: 68 IN | DIASTOLIC BLOOD PRESSURE: 65 MMHG | SYSTOLIC BLOOD PRESSURE: 120 MMHG | TEMPERATURE: 98 F | WEIGHT: 187 LBS | RESPIRATION RATE: 16 BRPM | BODY MASS INDEX: 28.34 KG/M2 | OXYGEN SATURATION: 100 %

## 2020-07-01 DIAGNOSIS — M86.9 OSTEOMYELITIS OF SECOND TOE OF LEFT FOOT (H): ICD-10-CM

## 2020-07-01 DIAGNOSIS — Z98.890 S/P FOOT SURGERY, LEFT: Primary | ICD-10-CM

## 2020-07-01 LAB
GLUCOSE BLDC GLUCOMTR-MCNC: 157 MG/DL (ref 70–99)
PLATELET # BLD AUTO: 225 10E9/L (ref 150–450)

## 2020-07-01 PROCEDURE — 87075 CULTR BACTERIA EXCEPT BLOOD: CPT | Performed by: PODIATRIST

## 2020-07-01 PROCEDURE — 36415 COLL VENOUS BLD VENIPUNCTURE: CPT | Performed by: PODIATRIST

## 2020-07-01 PROCEDURE — 71000027 ZZH RECOVERY PHASE 2 EACH 15 MINS: Performed by: PODIATRIST

## 2020-07-01 PROCEDURE — 87106 FUNGI IDENTIFICATION YEAST: CPT | Performed by: PODIATRIST

## 2020-07-01 PROCEDURE — 87070 CULTURE OTHR SPECIMN AEROBIC: CPT | Performed by: PODIATRIST

## 2020-07-01 PROCEDURE — 88311 DECALCIFY TISSUE: CPT | Performed by: PODIATRIST

## 2020-07-01 PROCEDURE — 82962 GLUCOSE BLOOD TEST: CPT

## 2020-07-01 PROCEDURE — 27210794 ZZH OR GENERAL SUPPLY STERILE: Performed by: PODIATRIST

## 2020-07-01 PROCEDURE — 88305 TISSUE EXAM BY PATHOLOGIST: CPT | Performed by: PODIATRIST

## 2020-07-01 PROCEDURE — 25000128 H RX IP 250 OP 636: Performed by: PODIATRIST

## 2020-07-01 PROCEDURE — 40000986 XR FOOT PORT LT 2 VW: Mod: LT

## 2020-07-01 PROCEDURE — 85049 AUTOMATED PLATELET COUNT: CPT | Performed by: PODIATRIST

## 2020-07-01 PROCEDURE — 87077 CULTURE AEROBIC IDENTIFY: CPT | Performed by: PODIATRIST

## 2020-07-01 PROCEDURE — 37000009 ZZH ANESTHESIA TECHNICAL FEE, EACH ADDTL 15 MIN: Performed by: PODIATRIST

## 2020-07-01 PROCEDURE — 88311 DECALCIFY TISSUE: CPT | Mod: 26 | Performed by: PODIATRIST

## 2020-07-01 PROCEDURE — 25000128 H RX IP 250 OP 636: Performed by: NURSE ANESTHETIST, CERTIFIED REGISTERED

## 2020-07-01 PROCEDURE — 88305 TISSUE EXAM BY PATHOLOGIST: CPT | Mod: 26 | Performed by: PODIATRIST

## 2020-07-01 PROCEDURE — 37000008 ZZH ANESTHESIA TECHNICAL FEE, 1ST 30 MIN: Performed by: PODIATRIST

## 2020-07-01 PROCEDURE — 87186 SC STD MICRODIL/AGAR DIL: CPT | Performed by: PODIATRIST

## 2020-07-01 PROCEDURE — 36000052 ZZH SURGERY LEVEL 2 EA 15 ADDTL MIN: Performed by: PODIATRIST

## 2020-07-01 PROCEDURE — 40000306 ZZH STATISTIC PRE PROC ASSESS II: Performed by: PODIATRIST

## 2020-07-01 PROCEDURE — 25800030 ZZH RX IP 258 OP 636: Performed by: ANESTHESIOLOGY

## 2020-07-01 PROCEDURE — 25000125 ZZHC RX 250: Performed by: NURSE ANESTHETIST, CERTIFIED REGISTERED

## 2020-07-01 PROCEDURE — 28160 PARTIAL REMOVAL OF TOE: CPT | Mod: T1 | Performed by: PODIATRIST

## 2020-07-01 PROCEDURE — 25000125 ZZHC RX 250: Performed by: PODIATRIST

## 2020-07-01 PROCEDURE — 36000050 ZZH SURGERY LEVEL 2 1ST 30 MIN: Performed by: PODIATRIST

## 2020-07-01 RX ORDER — FENTANYL CITRATE 50 UG/ML
INJECTION, SOLUTION INTRAMUSCULAR; INTRAVENOUS PRN
Status: DISCONTINUED | OUTPATIENT
Start: 2020-07-01 | End: 2020-07-01

## 2020-07-01 RX ORDER — KETAMINE HYDROCHLORIDE 10 MG/ML
INJECTION, SOLUTION INTRAMUSCULAR; INTRAVENOUS PRN
Status: DISCONTINUED | OUTPATIENT
Start: 2020-07-01 | End: 2020-07-01

## 2020-07-01 RX ORDER — HYDROCODONE BITARTRATE AND ACETAMINOPHEN 5; 325 MG/1; MG/1
TABLET ORAL
Qty: 15 TABLET | Refills: 0 | Status: ON HOLD | OUTPATIENT
Start: 2020-07-01 | End: 2020-07-08

## 2020-07-01 RX ORDER — FENTANYL CITRATE 50 UG/ML
25-50 INJECTION, SOLUTION INTRAMUSCULAR; INTRAVENOUS
Status: DISCONTINUED | OUTPATIENT
Start: 2020-07-01 | End: 2020-07-01 | Stop reason: HOSPADM

## 2020-07-01 RX ORDER — ALBUTEROL SULFATE 0.83 MG/ML
2.5 SOLUTION RESPIRATORY (INHALATION) EVERY 4 HOURS PRN
Status: DISCONTINUED | OUTPATIENT
Start: 2020-07-01 | End: 2020-07-01 | Stop reason: HOSPADM

## 2020-07-01 RX ORDER — ONDANSETRON 4 MG/1
4 TABLET, ORALLY DISINTEGRATING ORAL EVERY 30 MIN PRN
Status: DISCONTINUED | OUTPATIENT
Start: 2020-07-01 | End: 2020-07-01 | Stop reason: HOSPADM

## 2020-07-01 RX ORDER — SODIUM CHLORIDE, SODIUM LACTATE, POTASSIUM CHLORIDE, CALCIUM CHLORIDE 600; 310; 30; 20 MG/100ML; MG/100ML; MG/100ML; MG/100ML
INJECTION, SOLUTION INTRAVENOUS CONTINUOUS
Status: DISCONTINUED | OUTPATIENT
Start: 2020-07-01 | End: 2020-07-01 | Stop reason: HOSPADM

## 2020-07-01 RX ORDER — OXYCODONE HYDROCHLORIDE 5 MG/1
5 TABLET ORAL EVERY 4 HOURS PRN
Status: DISCONTINUED | OUTPATIENT
Start: 2020-07-01 | End: 2020-07-01 | Stop reason: HOSPADM

## 2020-07-01 RX ORDER — MEPERIDINE HYDROCHLORIDE 25 MG/ML
12.5 INJECTION INTRAMUSCULAR; INTRAVENOUS; SUBCUTANEOUS
Status: DISCONTINUED | OUTPATIENT
Start: 2020-07-01 | End: 2020-07-01 | Stop reason: HOSPADM

## 2020-07-01 RX ORDER — MAGNESIUM HYDROXIDE 1200 MG/15ML
LIQUID ORAL PRN
Status: DISCONTINUED | OUTPATIENT
Start: 2020-07-01 | End: 2020-07-01 | Stop reason: HOSPADM

## 2020-07-01 RX ORDER — LIDOCAINE 40 MG/G
CREAM TOPICAL
Status: DISCONTINUED | OUTPATIENT
Start: 2020-07-01 | End: 2020-07-01 | Stop reason: HOSPADM

## 2020-07-01 RX ORDER — CEFAZOLIN SODIUM 2 G/100ML
2 INJECTION, SOLUTION INTRAVENOUS
Status: COMPLETED | OUTPATIENT
Start: 2020-07-01 | End: 2020-07-01

## 2020-07-01 RX ORDER — FENTANYL CITRATE 50 UG/ML
25-50 INJECTION, SOLUTION INTRAMUSCULAR; INTRAVENOUS EVERY 5 MIN PRN
Status: DISCONTINUED | OUTPATIENT
Start: 2020-07-01 | End: 2020-07-01 | Stop reason: HOSPADM

## 2020-07-01 RX ORDER — NALOXONE HYDROCHLORIDE 0.4 MG/ML
.1-.4 INJECTION, SOLUTION INTRAMUSCULAR; INTRAVENOUS; SUBCUTANEOUS
Status: DISCONTINUED | OUTPATIENT
Start: 2020-07-01 | End: 2020-07-01 | Stop reason: HOSPADM

## 2020-07-01 RX ORDER — LIDOCAINE HYDROCHLORIDE 10 MG/ML
INJECTION, SOLUTION INFILTRATION; PERINEURAL PRN
Status: DISCONTINUED | OUTPATIENT
Start: 2020-07-01 | End: 2020-07-01

## 2020-07-01 RX ORDER — OXYCODONE HYDROCHLORIDE 5 MG/1
5 TABLET ORAL
Status: DISCONTINUED | OUTPATIENT
Start: 2020-07-01 | End: 2020-07-01 | Stop reason: HOSPADM

## 2020-07-01 RX ORDER — HEPARIN SODIUM,PORCINE 10 UNIT/ML
5-10 VIAL (ML) INTRAVENOUS EVERY 24 HOURS
Status: DISCONTINUED | OUTPATIENT
Start: 2020-07-01 | End: 2020-07-01 | Stop reason: HOSPADM

## 2020-07-01 RX ORDER — HEPARIN SODIUM,PORCINE 10 UNIT/ML
5-10 VIAL (ML) INTRAVENOUS
Status: DISCONTINUED | OUTPATIENT
Start: 2020-07-01 | End: 2020-07-01 | Stop reason: HOSPADM

## 2020-07-01 RX ORDER — HYDROXYZINE HYDROCHLORIDE 10 MG/1
TABLET, FILM COATED ORAL
Qty: 15 TABLET | Refills: 0 | Status: ON HOLD | OUTPATIENT
Start: 2020-07-01 | End: 2020-07-08

## 2020-07-01 RX ORDER — HEPARIN SODIUM (PORCINE) LOCK FLUSH IV SOLN 100 UNIT/ML 100 UNIT/ML
5 SOLUTION INTRAVENOUS
Status: DISCONTINUED | OUTPATIENT
Start: 2020-07-01 | End: 2020-07-01 | Stop reason: HOSPADM

## 2020-07-01 RX ORDER — PROPOFOL 10 MG/ML
INJECTION, EMULSION INTRAVENOUS CONTINUOUS PRN
Status: DISCONTINUED | OUTPATIENT
Start: 2020-07-01 | End: 2020-07-01

## 2020-07-01 RX ORDER — BUPIVACAINE HYDROCHLORIDE 2.5 MG/ML
INJECTION, SOLUTION EPIDURAL; INFILTRATION; INTRACAUDAL PRN
Status: DISCONTINUED | OUTPATIENT
Start: 2020-07-01 | End: 2020-07-01 | Stop reason: HOSPADM

## 2020-07-01 RX ORDER — PROPOFOL 10 MG/ML
INJECTION, EMULSION INTRAVENOUS PRN
Status: DISCONTINUED | OUTPATIENT
Start: 2020-07-01 | End: 2020-07-01

## 2020-07-01 RX ORDER — CEFAZOLIN SODIUM 1 G/3ML
1 INJECTION, POWDER, FOR SOLUTION INTRAMUSCULAR; INTRAVENOUS SEE ADMIN INSTRUCTIONS
Status: DISCONTINUED | OUTPATIENT
Start: 2020-07-01 | End: 2020-07-01 | Stop reason: HOSPADM

## 2020-07-01 RX ORDER — HYDROMORPHONE HYDROCHLORIDE 1 MG/ML
.3-.5 INJECTION, SOLUTION INTRAMUSCULAR; INTRAVENOUS; SUBCUTANEOUS EVERY 10 MIN PRN
Status: DISCONTINUED | OUTPATIENT
Start: 2020-07-01 | End: 2020-07-01 | Stop reason: HOSPADM

## 2020-07-01 RX ORDER — HYDRALAZINE HYDROCHLORIDE 20 MG/ML
2.5-5 INJECTION INTRAMUSCULAR; INTRAVENOUS EVERY 10 MIN PRN
Status: DISCONTINUED | OUTPATIENT
Start: 2020-07-01 | End: 2020-07-01 | Stop reason: HOSPADM

## 2020-07-01 RX ORDER — METOPROLOL TARTRATE 1 MG/ML
1-2 INJECTION, SOLUTION INTRAVENOUS EVERY 5 MIN PRN
Status: DISCONTINUED | OUTPATIENT
Start: 2020-07-01 | End: 2020-07-01 | Stop reason: HOSPADM

## 2020-07-01 RX ORDER — ONDANSETRON 2 MG/ML
4 INJECTION INTRAMUSCULAR; INTRAVENOUS EVERY 30 MIN PRN
Status: DISCONTINUED | OUTPATIENT
Start: 2020-07-01 | End: 2020-07-01 | Stop reason: HOSPADM

## 2020-07-01 RX ORDER — ONDANSETRON 2 MG/ML
INJECTION INTRAMUSCULAR; INTRAVENOUS PRN
Status: DISCONTINUED | OUTPATIENT
Start: 2020-07-01 | End: 2020-07-01

## 2020-07-01 RX ORDER — SULFAMETHOXAZOLE/TRIMETHOPRIM 800-160 MG
1 TABLET ORAL 2 TIMES DAILY
Qty: 14 TABLET | Refills: 0 | Status: ON HOLD | OUTPATIENT
Start: 2020-07-01 | End: 2020-07-10

## 2020-07-01 RX ADMIN — CEFAZOLIN SODIUM 2 G: 2 INJECTION, SOLUTION INTRAVENOUS at 12:31

## 2020-07-01 RX ADMIN — SODIUM CHLORIDE, POTASSIUM CHLORIDE, SODIUM LACTATE AND CALCIUM CHLORIDE: 600; 310; 30; 20 INJECTION, SOLUTION INTRAVENOUS at 12:31

## 2020-07-01 RX ADMIN — KETAMINE HYDROCHLORIDE 20 MG: 10 INJECTION, SOLUTION INTRAMUSCULAR; INTRAVENOUS at 12:47

## 2020-07-01 RX ADMIN — PROPOFOL 30 MG: 10 INJECTION, EMULSION INTRAVENOUS at 12:39

## 2020-07-01 RX ADMIN — MIDAZOLAM 2 MG: 1 INJECTION INTRAMUSCULAR; INTRAVENOUS at 12:32

## 2020-07-01 RX ADMIN — LIDOCAINE HYDROCHLORIDE 20 MG: 10 INJECTION, SOLUTION INFILTRATION; PERINEURAL at 12:39

## 2020-07-01 RX ADMIN — PROPOFOL 50 MCG/KG/MIN: 10 INJECTION, EMULSION INTRAVENOUS at 12:44

## 2020-07-01 RX ADMIN — FENTANYL CITRATE 100 MCG: 50 INJECTION, SOLUTION INTRAMUSCULAR; INTRAVENOUS at 12:36

## 2020-07-01 RX ADMIN — ONDANSETRON HYDROCHLORIDE 4 MG: 2 INJECTION, SOLUTION INTRAVENOUS at 12:44

## 2020-07-01 ASSESSMENT — MIFFLIN-ST. JEOR: SCORE: 1592.73

## 2020-07-01 NOTE — ANESTHESIA POSTPROCEDURE EVALUATION
Patient: Pastor Garibay    Procedure(s):  AMPUTATION LEFT 2ND AND 3RD TOES    Diagnosis:Osteomyelitis of second toe of left foot (H) [M86.9]  Diagnosis Additional Information: No value filed.    Anesthesia Type:  General, MAC    Note:  Anesthesia Post Evaluation    Patient location during evaluation: Phase 2  Patient participation: Able to fully participate in evaluation  Level of consciousness: awake  Pain management: adequate  Airway patency: patent  Cardiovascular status: acceptable  Respiratory status: acceptable  Hydration status: acceptable  PONV: controlled     Anesthetic complications: None          Last vitals:  Vitals:    07/01/20 1120 07/01/20 1436   BP: 115/67 120/65   Resp: 14 16   Temp:  98  F (36.7  C)   SpO2: 98% 100%         Electronically Signed By: Crhistiano Amos MD  July 1, 2020  4:13 PM

## 2020-07-01 NOTE — ANESTHESIA CARE TRANSFER NOTE
Patient: Pastor Garibay    Procedure(s):  AMPUTATION LEFT 2ND AND 3RD TOES    Diagnosis: Osteomyelitis of second toe of left foot (H) [M86.9]  Diagnosis Additional Information: No value filed.    Anesthesia Type:   General, MAC     Note:  Airway :Room Air  Patient transferred to:Phase II  Comments: [email or web address] Report: Identifed the Patient, Identified the Reponsible Provider, Reviewed the pertinent medical history, Discussed the surgical course, Reviewed Intra-OP anesthesia mangement and issues during anesthesia, Set expectations for post-procedure period and Allowed opportunity for questions and acknowledgement of understanding      Vitals: (Last set prior to Anesthesia Care Transfer)    CRNA VITALS  7/1/2020 1246 - 7/1/2020 1322      7/1/2020             NIBP:  101/67    NIBP Mean:  78                Electronically Signed By: VERNA Pardo CRNA  July 1, 2020  1:22 PM

## 2020-07-01 NOTE — DISCHARGE INSTRUCTIONS
Dr. Ismael Humphrey, [facility]  Clinic phone number:  [phone]      SEDATION ADULT DISCHARGE INSTRUCTIONS   SPECIAL PRECAUTIONS FOR 24 HOURS AFTER SURGERY    IT IS NOT UNUSUAL TO FEEL LIGHT-HEADED OR FAINT, UP TO 24 HOURS AFTER SURGERY OR WHILE TAKING PAIN MEDICATION.  IF YOU HAVE THESE SYMPTOMS; SIT FOR A FEW MINUTES BEFORE STANDING AND HAVE SOMEONE ASSIST YOU WHEN YOU GET UP TO WALK OR USE THE BATHROOM.    YOU SHOULD REST AND RELAX FOR THE NEXT 24 HOURS AND YOU MUST MAKE ARRANGEMENTS TO HAVE SOMEONE STAY WITH YOU FOR AT LEAST 24 HOURS AFTER YOUR DISCHARGE.  AVOID HAZARDOUS AND STRENUOUS ACTIVITIES.  DO NOT MAKE IMPORTANT DECISIONS FOR 24 HOURS.    DO NOT DRIVE ANY VEHICLE OR OPERATE MECHANICAL EQUIPMENT FOR 24 HOURS FOLLOWING THE END OF YOUR SURGERY.  EVEN THOUGH YOU MAY FEEL NORMAL, YOUR REACTIONS MAY BE AFFECTED BY THE MEDICATION YOU HAVE RECEIVED.    DO NOT DRINK ALCOHOLIC BEVERAGES FOR 24 HOURS FOLLOWING YOUR SURGERY.    DRINK CLEAR LIQUIDS (APPLE JUICE, GINGER ALE, 7-UP, BROTH, ETC.).  PROGRESS TO YOUR REGULAR DIET AS YOU FEEL ABLE.    YOU MAY HAVE A DRY MOUTH, A SORE THROAT, MUSCLES ACHES OR TROUBLE SLEEPING.  THESE SHOULD GO AWAY AFTER 24 HOURS.    CALL YOUR DOCTOR FOR ANY OF THE FOLLOWING:  SIGNS OF INFECTION (FEVER, GROWING TENDERNESS AT THE SURGERY SITE, A LARGE AMOUNT OF DRAINAGE OR BLEEDING, SEVERE PAIN, FOUL-SMELLING DRAINAGE, REDNESS OR SWELLING.    IT HAS BEEN OVER 8 TO 10 HOURS SINCE SURGERY AND YOU ARE STILL NOT ABLE TO URINATE (PASS WATER).

## 2020-07-01 NOTE — BRIEF OP NOTE
[facility]    Brief Operative Note    Pre-operative diagnosis: Osteomyelitis of second toe of left foot (H) [M86.9]  Post-operative diagnosis sp partial left 2nd and 3rd toe amputations.    Procedure: Procedure(s):  AMPUTATION LEFT 2ND AND 3RD TOES  Surgeon: Surgeon(s) and Role:     * Ismael Humphrey DPM - Primary  Anesthesia: Monitor Anesthesia Care   Estimated blood loss: Minimal  Drains: None  Specimens:   ID Type Source Tests Collected by Time Destination   1 : BONE LEFT TOES Bone Foot, Left ANAEROBIC BACTERIAL CULTURE, BONE CULTURE AEROBIC BACTERIAL Ismael Humphrey DPM 7/1/2020  1:02 PM    A : LEFT FOOT SECOND AND THIRD TOES Tissue Foot, Left SURGICAL PATHOLOGY EXAM Ismael Humphrey DPM 7/1/2020  1:02 PM      Findings:   healthy bleeding tissue at amputation site..  Complications: None.  Implants: * No implants in log *

## 2020-07-01 NOTE — ANESTHESIA PREPROCEDURE EVALUATION
Anesthesia Pre-Procedure Evaluation    Patient: Pastor Garibay   MRN: [id number] : 1950          Preoperative Diagnosis: Osteomyelitis of second toe of left foot (H) [M86.9]    Procedure(s):  AMPUTATION LEFT 2ND AND 3RD TOES    Past Medical History:   Diagnosis Date     Abnormal liver function test      Anemia      CPAP (continuous positive airway pressure) dependence      Diabetes (H)      Hx of skin cancer, basal cell      Hyperlipidemia      Hypertension      Keratoderma      Liver disease      Lymphoma (H)      Non-Hodgkin lymphoma (H)      Pedal edema     CHRONIC     Pleural effusion      Seborrheic keratosis, inflamed      Sleep apnea     Uses a CPAP     Thrombosis Felbruary      Past Surgical History:   Procedure Laterality Date     AMPUTATE TOE(S) Left 2020    Procedure: LEFT SECOND AND THIRD DISTAL TOE AMPUTATIONS;  Surgeon: Ramon Flores MD;  Location:  OR     BIOPSY LYMPH NODE CERVICAL N/A 2014    Procedure: BIOPSY LYMPH NODE CERVICAL;  Surgeon: Robbie Linda MD;  Location:  OR     BRONCHOSCOPY FLEXIBLE N/A 2017    Procedure: BRONCHOSCOPY FLEXIBLE;  FLEXIBLE BRONCHOSCOPY WITH BIOPSIES.;  Surgeon: Robbie Linda MD;  Location:  OR     COLONOSCOPY       COLONOSCOPY N/A 2018    Procedure: COMBINED COLONOSCOPY, SINGLE OR MULTIPLE BIOPSY/POLYPECTOMY BY BIOPSY;;  Surgeon: Ramos Bates MD;  Location:  GI     ENDOVASCULAR PLACEMENT VASCULAR DEVICE Left 2017    Procedure: ENDOVASCULAR PLACEMENT VASCULAR DEVICE;;  Surgeon: Robbie Linda MD;  Location: [facility]     ENT SURGERY      tonsillectomy     EXCISE NODE MEDIASTINAL N/A 2017    Procedure: EXCISE NODE MEDIASTINAL;;  Surgeon: Robbie Linda MD;  Location:  OR     EYE SURGERY       EYE SURGERY Left     vitrectomy     INSERT PORT VASCULAR ACCESS N/A 2014    Procedure: INSERT PORT VASCULAR ACCESS;  Surgeon: Robbie Linda MD;  Location: [facility]      INSERT PORT VASCULAR ACCESS N/A 12/5/2017    Procedure: INSERT PORT VASCULAR ACCESS;  POWER PORT PLACEMENT ATTEMPTED, PLACEMENT OF ANGIO-SEAL VIP VASCULAR CLOSURE DEVICE;  Surgeon: Robbie Linda MD;  Location:  SD     IR CHEST PORT PLACEMENT > 5 YRS OF AGE  6/5/2020     IR PORT REMOVAL RIGHT  12/10/2018     PHACOEMULSIFICATION CLEAR CORNEA WITH STANDARD INTRAOCULAR LENS IMPLANT Right 5/2/2016    Procedure: PHACOEMULSIFICATION CLEAR CORNEA WITH STANDARD INTRAOCULAR LENS IMPLANT;  Surgeon: Rasheed Finney MD;  Location:  [facility]     REMOVE PORT VASCULAR ACCESS N/A 3/14/2017    Procedure: REMOVE PORT VASCULAR ACCESS;  Surgeon: Robbie Linda MD;  Location:  OR     SOFT TISSUE SURGERY      BASAL CELL CA FOREHEAD     THORACOTOMY Right 11/17/2017    Procedure: THORACOTOMY;  RIGHT EXPLORATORY THORACOTOMY/ EXTENSIVE PNEUMOLYSIS/ BIOPSY OF INTERLOBAR LYMPH NODE;  Surgeon: Robbie Linda MD;  Location:  OR     Anesthesia Evaluation     . Pt has had prior anesthetic.            ROS/MED HX    ENT/Pulmonary:     (+)sleep apnea, uses CPAP , . .    Neurologic:       Cardiovascular:     (+) Dyslipidemia, hypertension----. Taking blood thinners : . . . :. .       METS/Exercise Tolerance:     Hematologic:     (+) History of blood clots -      Musculoskeletal:         GI/Hepatic:         Renal/Genitourinary:         Endo:     (+) type II DM Diabetic complications: retinopathy, .      Psychiatric:         Infectious Disease:   (+) Other Infectious Disease (foot ulcers)       Malignancy:   (+) Malignancy History of Lymphoma/Leukemia          Other:                          Physical Exam      Airway   Mallampati: II  TM distance: >3 FB  Neck ROM: full    Dental     Cardiovascular   Rhythm and rate: regular and normal      Pulmonary    breath sounds clear to auscultation            Lab Results   Component Value Date    WBC 6.4 06/21/2020    HGB 8.7 (L) 06/21/2020    HCT 26.2 (L) 06/21/2020    PLT  "35 (LL) 06/21/2020     06/19/2020    POTASSIUM 3.9 06/20/2020    CHLORIDE 107 06/19/2020    CO2 29 06/19/2020    BUN 6 (L) 06/19/2020    CR 0.72 06/19/2020    GLC 95 06/19/2020    JEFF 8.2 (L) 06/19/2020    PHOS 2.6 06/08/2020    MAG 2.1 06/19/2020    ALBUMIN 2.4 (L) 06/18/2020    PROTTOTAL 5.5 (L) 06/18/2020    ALT 18 06/18/2020    AST 11 06/18/2020    ALKPHOS 44 06/18/2020    BILITOTAL 1.0 06/18/2020    ENEIDA 38 06/07/2020    PTT 26 12/10/2018    INR 1.22 (H) 05/27/2020       Preop Vitals  BP Readings from Last 3 Encounters:   06/21/20 99/53   06/12/20 96/59   05/27/20 101/55    Pulse Readings from Last 3 Encounters:   06/20/20 88   06/11/20 82   05/27/20 96      Resp Readings from Last 3 Encounters:   06/21/20 18   06/12/20 16   05/27/20 16    SpO2 Readings from Last 3 Encounters:   06/21/20 98%   06/12/20 96%   05/27/20 96%      Temp Readings from Last 1 Encounters:   06/21/20 98  F (36.7  C) (Oral)    Ht Readings from Last 1 Encounters:   06/29/20 1.727 m (5' 8\")      Wt Readings from Last 1 Encounters:   06/29/20 83.9 kg (185 lb)    Estimated body mass index is 28.13 kg/m  as calculated from the following:    Height as of this encounter: 1.727 m (5' 8\").    Weight as of this encounter: 83.9 kg (185 lb).       Anesthesia Plan      History & Physical Review  History and physical reviewed and following examination; no interval change.    ASA Status:  3 .    NPO Status:  > 8 hours    Plan for General and MAC with Intravenous and Propofol induction. Maintenance will be TIVA.    PONV prophylaxis:  Ondansetron (or other 5HT-3)         Postoperative Care  Postoperative pain management:  IV analgesics, Oral pain medications and Multi-modal analgesia.      Consents  Anesthetic plan, risks, benefits and alternatives discussed with:  Patient..                 Christiano Amos MD                    .  "

## 2020-07-01 NOTE — OR NURSING
Discharge instructions reviewed with the patient and Toshia Garibay, via telephone. Questions answered and discharge paperwork given.

## 2020-07-01 NOTE — OP NOTE
Procedure Date: 07/01/2020      SURGEON:  Ismael Humphrey DPM      PREOPERATIVE DIAGNOSES:   1.  Osteomyelitis, left second and possibly left third toe, status post previous partial left second and third toe amputation with nonhealing wounds.   2.  Diabetes mellitus.   3.  Non-Hodgkin lymphoma.      POSTOPERATIVE DIAGNOSIS:   1.  Osteomyelitis, left second and possibly left third toe, status post previous partial left second and third toe amputation with nonhealing wounds.   2.  Diabetes mellitus.   3.  Non-Hodgkin lymphoma.      PROCEDURE:     1.  Partial left second toe amputation with osteotomy through proximal phalanx.   2.  Partial left third toe amputation with osteotomy through proximal phalanx.      PATHOLOGY:  Bone from the middle phalanges of both toes was sent off for aerobic and anaerobic cultures.      ANESTHESIA:  MAC with local.      HEMOSTASIS:  None.      ESTIMATED BLOOD LOSS:  2 mL      MATERIALS:  None.      INJECTABLES:  8 mL of 0.25% bupivacaine plain preoperatively.      COMPLICATIONS:  None apparent.      INDICATIONS FOR PROCEDURE:  The patient is a pleasant 70-year-old neuropathic diabetic male with non-Hodgkin lymphoma who underwent left second and third toe amputations with Dr. Flores on 05/22/2020.  Unfortunately, the wounds failed to fully heal.  He was readmitted to [facility] where we were consulted.  Clinically, the second toe probed into the middle phalanx.  There was some soft tissue coverage over the middle phalanx of the third toe.  An MRI showed marrow edema in the middle phalanges of both toes and was concerning for osteomyelitis in the second toe.  I had recommended revision partial amputation.  His oncologist, who is waiting to start chemotherapy, requested that this be done sooner rather than later.  The patient initially had a very low platelet count, most recently 35,000.  Fortunately, on an order from this morning, his platelet count is 225,000.         DESCRIPTION OF PROCEDURE:  After obtaining written consent, the patient was transferred to the operating room and placed in supine position on the operating table.  IV sedation was initiated.  The second and third toes were anesthetized with preoperative local.  They was then prepped and draped in normal aseptic fashion.  No tourniquet was utilized.  The patient, procedure, and site were correctly identified by OR staff.      PROCEDURE #1:  Attention was directed to the left second toe.  A fishmouth incision with dorsal with apices dorsal and plantar were carried down to underlying bone at the level of the proximal interphalangeal joint.  The soft tissue was reflected off of the head of the proximal phalanx, and a through-and-through osteotomy was performed.  The distal aspect of the toe was handed off.  The middle phalanx was procured for cultures.  The wound was flushed with copious amounts of normal saline.  After adequate hemostasis was achieved, a single layer closure was performed with 4-0 nylon with minimal tension on the skin.      PROCEDURE #2:  The exact same procedure was performed to the left third toe.      A dry sterile dressing was applied to the patient's left foot.  He appeared to tolerate the procedure and anesthesia well and was transferred to the PACU with vital signs stable and vascular status intact to remaining portions of the foot.  The patient will be discharged to home and will follow-up with me in approximately 1 week or sooner with any acute issues.         BRIANNE VARMA DPM             D: 2020   T: 2020   MT:       Name:     EZIO PADILLA   MRN:      [id number]        Account:        [id number]   :      1950           Procedure Date: 2020      Document: [id number]

## 2020-07-02 ENCOUNTER — TELEPHONE (OUTPATIENT)
Dept: PODIATRY | Facility: CLINIC | Age: 70
End: 2020-07-02

## 2020-07-02 NOTE — TELEPHONE ENCOUNTER
Antwon (Nurse Practioner) Requesting a call back to discuss Patient who just had surgery 7/1/20,  Please call her at [phone]  [facility] Unit Coordinator

## 2020-07-02 NOTE — TELEPHONE ENCOUNTER
I called and spoke with Antwon.  Admission to [facility] Wednesday for chemo.  While it's not ideal for wound healing, certainly there are more pressing matters that need addressing with the chemo.    Ismael Humphrey DPM FACFAS FACFAOM  Podiatric Foot & Ankle Surgeon  [facility]  [phone]

## 2020-07-03 LAB — COPATH REPORT: NORMAL

## 2020-07-05 LAB
BACTERIA SPEC CULT: ABNORMAL
SPECIMEN SOURCE: ABNORMAL

## 2020-07-08 ENCOUNTER — HOSPITAL ENCOUNTER (INPATIENT)
Facility: CLINIC | Age: 70
LOS: 2 days | Discharge: HOME | DRG: 846 | End: 2020-07-10
Attending: INTERNAL MEDICINE | Admitting: INTERNAL MEDICINE
Payer: COMMERCIAL

## 2020-07-08 DIAGNOSIS — C83.398 DIFFUSE LARGE B-CELL LYMPHOMA OF SOLID ORGAN EXCLUDING SPLEEN: ICD-10-CM

## 2020-07-08 DIAGNOSIS — C83.3A DIFFUSE LARGE CELL LYMPHOMA IN REMISSION: Primary | ICD-10-CM

## 2020-07-08 PROBLEM — C85.90 LYMPHOMA (H): Status: ACTIVE | Noted: 2020-07-08

## 2020-07-08 LAB
ALBUMIN SERPL-MCNC: 3 G/DL (ref 3.4–5)
ALP SERPL-CCNC: 54 U/L (ref 40–150)
ALT SERPL W P-5'-P-CCNC: 21 U/L (ref 0–70)
ANION GAP SERPL CALCULATED.3IONS-SCNC: 5 MMOL/L (ref 3–14)
AST SERPL W P-5'-P-CCNC: 14 U/L (ref 0–45)
BASOPHILS # BLD AUTO: 0 10E9/L (ref 0–0.2)
BASOPHILS NFR BLD AUTO: 0.4 %
BILIRUB SERPL-MCNC: 0.3 MG/DL (ref 0.2–1.3)
BUN SERPL-MCNC: 16 MG/DL (ref 7–30)
CALCIUM SERPL-MCNC: 8.9 MG/DL (ref 8.5–10.1)
CHLORIDE SERPL-SCNC: 107 MMOL/L (ref 94–109)
CO2 SERPL-SCNC: 25 MMOL/L (ref 20–32)
CREAT SERPL-MCNC: 0.84 MG/DL (ref 0.66–1.25)
DIFFERENTIAL METHOD BLD: ABNORMAL
EOSINOPHIL # BLD AUTO: 0.1 10E9/L (ref 0–0.7)
EOSINOPHIL NFR BLD AUTO: 0.9 %
ERYTHROCYTE [DISTWIDTH] IN BLOOD BY AUTOMATED COUNT: 24.6 % (ref 10–15)
GFR SERPL CREATININE-BSD FRML MDRD: 90 ML/MIN/{1.73_M2}
GLUCOSE BLDC GLUCOMTR-MCNC: 179 MG/DL (ref 70–99)
GLUCOSE BLDC GLUCOMTR-MCNC: 204 MG/DL (ref 70–99)
GLUCOSE BLDC GLUCOMTR-MCNC: 327 MG/DL (ref 70–99)
GLUCOSE SERPL-MCNC: 201 MG/DL (ref 70–99)
HCT VFR BLD AUTO: 32.3 % (ref 40–53)
HGB BLD-MCNC: 10.2 G/DL (ref 13.3–17.7)
IMM GRANULOCYTES # BLD: 0.1 10E9/L (ref 0–0.4)
IMM GRANULOCYTES NFR BLD: 1.1 %
LDH SERPL L TO P-CCNC: 136 U/L (ref 85–227)
LYMPHOCYTES # BLD AUTO: 0.6 10E9/L (ref 0.8–5.3)
LYMPHOCYTES NFR BLD AUTO: 11.3 %
MAGNESIUM SERPL-MCNC: 1.8 MG/DL (ref 1.6–2.3)
MCH RBC QN AUTO: 28.3 PG (ref 26.5–33)
MCHC RBC AUTO-ENTMCNC: 31.6 G/DL (ref 31.5–36.5)
MCV RBC AUTO: 90 FL (ref 78–100)
MONOCYTES # BLD AUTO: 0.4 10E9/L (ref 0–1.3)
MONOCYTES NFR BLD AUTO: 7.5 %
NEUTROPHILS # BLD AUTO: 4.2 10E9/L (ref 1.6–8.3)
NEUTROPHILS NFR BLD AUTO: 78.8 %
NRBC # BLD AUTO: 0 10*3/UL
NRBC BLD AUTO-RTO: 0 /100
PHOSPHATE SERPL-MCNC: 2.6 MG/DL (ref 2.5–4.5)
PLATELET # BLD AUTO: 196 10E9/L (ref 150–450)
POTASSIUM SERPL-SCNC: 4.4 MMOL/L (ref 3.4–5.3)
PROT SERPL-MCNC: 5.8 G/DL (ref 6.8–8.8)
RBC # BLD AUTO: 3.61 10E12/L (ref 4.4–5.9)
SODIUM SERPL-SCNC: 137 MMOL/L (ref 133–144)
URATE SERPL-MCNC: 2.5 MG/DL (ref 3.5–7.2)
WBC # BLD AUTO: 5.3 10E9/L (ref 4–11)

## 2020-07-08 PROCEDURE — 99207 ZZC CONSULT E&M CHANGED TO SUBSEQUENT LEVEL: CPT | Performed by: PHYSICIAN ASSISTANT

## 2020-07-08 PROCEDURE — 80053 COMPREHEN METABOLIC PANEL: CPT | Performed by: INTERNAL MEDICINE

## 2020-07-08 PROCEDURE — 84550 ASSAY OF BLOOD/URIC ACID: CPT | Performed by: INTERNAL MEDICINE

## 2020-07-08 PROCEDURE — 25000131 ZZH RX MED GY IP 250 OP 636 PS 637: Performed by: NURSE PRACTITIONER

## 2020-07-08 PROCEDURE — 85025 COMPLETE CBC W/AUTO DIFF WBC: CPT | Performed by: INTERNAL MEDICINE

## 2020-07-08 PROCEDURE — 25000128 H RX IP 250 OP 636: Performed by: NURSE PRACTITIONER

## 2020-07-08 PROCEDURE — 83735 ASSAY OF MAGNESIUM: CPT | Performed by: INTERNAL MEDICINE

## 2020-07-08 PROCEDURE — 12000000 ZZH R&B MED SURG/OB

## 2020-07-08 PROCEDURE — 25800030 ZZH RX IP 258 OP 636: Performed by: INTERNAL MEDICINE

## 2020-07-08 PROCEDURE — 84100 ASSAY OF PHOSPHORUS: CPT | Performed by: INTERNAL MEDICINE

## 2020-07-08 PROCEDURE — U0003 INFECTIOUS AGENT DETECTION BY NUCLEIC ACID (DNA OR RNA); SEVERE ACUTE RESPIRATORY SYNDROME CORONAVIRUS 2 (SARS-COV-2) (CORONAVIRUS DISEASE [COVID-19]), AMPLIFIED PROBE TECHNIQUE, MAKING USE OF HIGH THROUGHPUT TECHNOLOGIES AS DESCRIBED BY CMS-2020-01-R: HCPCS | Performed by: NURSE PRACTITIONER

## 2020-07-08 PROCEDURE — 00000146 ZZHCL STATISTIC GLUCOSE BY METER IP

## 2020-07-08 PROCEDURE — 25000132 ZZH RX MED GY IP 250 OP 250 PS 637: Performed by: HOSPITALIST

## 2020-07-08 PROCEDURE — 25000128 H RX IP 250 OP 636: Performed by: PHYSICIAN ASSISTANT

## 2020-07-08 PROCEDURE — 25000131 ZZH RX MED GY IP 250 OP 636 PS 637: Performed by: PHYSICIAN ASSISTANT

## 2020-07-08 PROCEDURE — 83615 LACTATE (LD) (LDH) ENZYME: CPT | Performed by: INTERNAL MEDICINE

## 2020-07-08 PROCEDURE — 25000128 H RX IP 250 OP 636: Performed by: INTERNAL MEDICINE

## 2020-07-08 PROCEDURE — 99233 SBSQ HOSP IP/OBS HIGH 50: CPT | Performed by: PHYSICIAN ASSISTANT

## 2020-07-08 PROCEDURE — 25000132 ZZH RX MED GY IP 250 OP 250 PS 637: Performed by: NURSE PRACTITIONER

## 2020-07-08 PROCEDURE — 25000132 ZZH RX MED GY IP 250 OP 250 PS 637: Performed by: PHYSICIAN ASSISTANT

## 2020-07-08 PROCEDURE — 3E04305 INTRODUCTION OF OTHER ANTINEOPLASTIC INTO CENTRAL VEIN, PERCUTANEOUS APPROACH: ICD-10-PCS | Performed by: NURSE PRACTITIONER

## 2020-07-08 PROCEDURE — 25800030 ZZH RX IP 258 OP 636: Performed by: NURSE PRACTITIONER

## 2020-07-08 RX ORDER — ALLOPURINOL 300 MG/1
300 TABLET ORAL DAILY
Status: DISCONTINUED | OUTPATIENT
Start: 2020-07-09 | End: 2020-07-10 | Stop reason: HOSPADM

## 2020-07-08 RX ORDER — PREDNISOLONE ACETATE 10 MG/ML
2 SUSPENSION/ DROPS OPHTHALMIC 4 TIMES DAILY
Status: DISCONTINUED | OUTPATIENT
Start: 2020-07-09 | End: 2020-07-10 | Stop reason: HOSPADM

## 2020-07-08 RX ORDER — HEPARIN SODIUM,PORCINE 10 UNIT/ML
5-10 VIAL (ML) INTRAVENOUS EVERY 24 HOURS
Status: DISCONTINUED | OUTPATIENT
Start: 2020-07-08 | End: 2020-07-10 | Stop reason: HOSPADM

## 2020-07-08 RX ORDER — SODIUM CHLORIDE 9 MG/ML
1000 INJECTION, SOLUTION INTRAVENOUS CONTINUOUS
Status: ACTIVE | OUTPATIENT
Start: 2020-07-08 | End: 2020-07-08

## 2020-07-08 RX ORDER — FENOFIBRATE 160 MG/1
160 TABLET ORAL DAILY
Status: DISCONTINUED | OUTPATIENT
Start: 2020-07-09 | End: 2020-07-10 | Stop reason: HOSPADM

## 2020-07-08 RX ORDER — LORAZEPAM 0.5 MG/1
.5-1 TABLET ORAL EVERY 6 HOURS PRN
Status: DISCONTINUED | OUTPATIENT
Start: 2020-07-08 | End: 2020-07-10 | Stop reason: HOSPADM

## 2020-07-08 RX ORDER — POTASSIUM CHLORIDE 1.5 G/1.58G
20-40 POWDER, FOR SOLUTION ORAL
Status: DISCONTINUED | OUTPATIENT
Start: 2020-07-08 | End: 2020-07-10 | Stop reason: HOSPADM

## 2020-07-08 RX ORDER — ROSUVASTATIN CALCIUM 5 MG/1
5 TABLET, COATED ORAL DAILY
Status: DISCONTINUED | OUTPATIENT
Start: 2020-07-09 | End: 2020-07-10 | Stop reason: HOSPADM

## 2020-07-08 RX ORDER — HEPARIN SODIUM (PORCINE) LOCK FLUSH IV SOLN 100 UNIT/ML 100 UNIT/ML
5 SOLUTION INTRAVENOUS
Status: DISCONTINUED | OUTPATIENT
Start: 2020-07-08 | End: 2020-07-10 | Stop reason: HOSPADM

## 2020-07-08 RX ORDER — DEXTROSE MONOHYDRATE 50 MG/ML
10-20 INJECTION, SOLUTION INTRAVENOUS
Status: DISCONTINUED | OUTPATIENT
Start: 2020-07-10 | End: 2020-07-10 | Stop reason: HOSPADM

## 2020-07-08 RX ORDER — PROCHLORPERAZINE MALEATE 5 MG
5 TABLET ORAL EVERY 6 HOURS PRN
Status: DISCONTINUED | OUTPATIENT
Start: 2020-07-08 | End: 2020-07-10 | Stop reason: HOSPADM

## 2020-07-08 RX ORDER — HYDROCORTISONE 10 MG/1
10 TABLET ORAL EVERY EVENING
Status: DISCONTINUED | OUTPATIENT
Start: 2020-07-08 | End: 2020-07-10 | Stop reason: HOSPADM

## 2020-07-08 RX ORDER — ONDANSETRON 8 MG/1
8 TABLET, FILM COATED ORAL EVERY 12 HOURS
Status: COMPLETED | OUTPATIENT
Start: 2020-07-09 | End: 2020-07-10

## 2020-07-08 RX ORDER — POTASSIUM CHLORIDE 29.8 MG/ML
20 INJECTION INTRAVENOUS
Status: DISCONTINUED | OUTPATIENT
Start: 2020-07-08 | End: 2020-07-10 | Stop reason: HOSPADM

## 2020-07-08 RX ORDER — NICOTINE POLACRILEX 4 MG
15-30 LOZENGE BUCCAL
Status: DISCONTINUED | OUTPATIENT
Start: 2020-07-08 | End: 2020-07-10 | Stop reason: HOSPADM

## 2020-07-08 RX ORDER — ACETAMINOPHEN 650 MG/1
650 SUPPOSITORY RECTAL EVERY 4 HOURS PRN
Status: DISCONTINUED | OUTPATIENT
Start: 2020-07-08 | End: 2020-07-10 | Stop reason: HOSPADM

## 2020-07-08 RX ORDER — ONDANSETRON 4 MG/1
4 TABLET, ORALLY DISINTEGRATING ORAL EVERY 6 HOURS PRN
Status: DISCONTINUED | OUTPATIENT
Start: 2020-07-08 | End: 2020-07-10 | Stop reason: HOSPADM

## 2020-07-08 RX ORDER — ALBUTEROL SULFATE 0.83 MG/ML
2.5 SOLUTION RESPIRATORY (INHALATION)
Status: DISCONTINUED | OUTPATIENT
Start: 2020-07-08 | End: 2020-07-10 | Stop reason: HOSPADM

## 2020-07-08 RX ORDER — SULFAMETHOXAZOLE/TRIMETHOPRIM 800-160 MG
1 TABLET ORAL 2 TIMES DAILY
Status: DISCONTINUED | OUTPATIENT
Start: 2020-07-08 | End: 2020-07-08

## 2020-07-08 RX ORDER — MAGNESIUM SULFATE HEPTAHYDRATE 40 MG/ML
4 INJECTION, SOLUTION INTRAVENOUS EVERY 4 HOURS PRN
Status: DISCONTINUED | OUTPATIENT
Start: 2020-07-08 | End: 2020-07-10 | Stop reason: HOSPADM

## 2020-07-08 RX ORDER — HYDROCORTISONE 20 MG/1
20 TABLET ORAL
Status: DISCONTINUED | OUTPATIENT
Start: 2020-07-09 | End: 2020-07-10 | Stop reason: HOSPADM

## 2020-07-08 RX ORDER — ONDANSETRON 8 MG/1
16 TABLET, FILM COATED ORAL ONCE
Status: COMPLETED | OUTPATIENT
Start: 2020-07-08 | End: 2020-07-08

## 2020-07-08 RX ORDER — ACETAMINOPHEN 325 MG/1
650 TABLET ORAL ONCE
Status: COMPLETED | OUTPATIENT
Start: 2020-07-08 | End: 2020-07-08

## 2020-07-08 RX ORDER — PROCHLORPERAZINE 25 MG
12.5 SUPPOSITORY, RECTAL RECTAL EVERY 12 HOURS PRN
Status: DISCONTINUED | OUTPATIENT
Start: 2020-07-08 | End: 2020-07-10 | Stop reason: HOSPADM

## 2020-07-08 RX ORDER — DIPHENHYDRAMINE HCL 50 MG
50 CAPSULE ORAL ONCE
Status: COMPLETED | OUTPATIENT
Start: 2020-07-08 | End: 2020-07-08

## 2020-07-08 RX ORDER — POTASSIUM CL/LIDO/0.9 % NACL 10MEQ/0.1L
10 INTRAVENOUS SOLUTION, PIGGYBACK (ML) INTRAVENOUS
Status: DISCONTINUED | OUTPATIENT
Start: 2020-07-08 | End: 2020-07-10 | Stop reason: HOSPADM

## 2020-07-08 RX ORDER — POTASSIUM CHLORIDE 1500 MG/1
20-40 TABLET, EXTENDED RELEASE ORAL
Status: DISCONTINUED | OUTPATIENT
Start: 2020-07-08 | End: 2020-07-10 | Stop reason: HOSPADM

## 2020-07-08 RX ORDER — DEXAMETHASONE 4 MG/1
40 TABLET ORAL EVERY 24 HOURS
Status: DISCONTINUED | OUTPATIENT
Start: 2020-07-08 | End: 2020-07-10 | Stop reason: HOSPADM

## 2020-07-08 RX ORDER — EPINEPHRINE 1 MG/ML
0.3 INJECTION, SOLUTION, CONCENTRATE INTRAVENOUS EVERY 5 MIN PRN
Status: DISCONTINUED | OUTPATIENT
Start: 2020-07-08 | End: 2020-07-10 | Stop reason: HOSPADM

## 2020-07-08 RX ORDER — SODIUM CHLORIDE 9 MG/ML
1000 INJECTION, SOLUTION INTRAVENOUS CONTINUOUS PRN
Status: DISCONTINUED | OUTPATIENT
Start: 2020-07-08 | End: 2020-07-10 | Stop reason: HOSPADM

## 2020-07-08 RX ORDER — SODIUM CHLORIDE 9 MG/ML
1000 INJECTION, SOLUTION INTRAVENOUS CONTINUOUS
Status: DISCONTINUED | OUTPATIENT
Start: 2020-07-08 | End: 2020-07-08

## 2020-07-08 RX ORDER — DIPHENHYDRAMINE HYDROCHLORIDE 50 MG/ML
50 INJECTION INTRAMUSCULAR; INTRAVENOUS
Status: DISCONTINUED | OUTPATIENT
Start: 2020-07-08 | End: 2020-07-10 | Stop reason: HOSPADM

## 2020-07-08 RX ORDER — HEPARIN SODIUM,PORCINE 10 UNIT/ML
5-10 VIAL (ML) INTRAVENOUS
Status: DISCONTINUED | OUTPATIENT
Start: 2020-07-08 | End: 2020-07-10 | Stop reason: HOSPADM

## 2020-07-08 RX ORDER — LIDOCAINE 40 MG/G
CREAM TOPICAL
Status: DISCONTINUED | OUTPATIENT
Start: 2020-07-08 | End: 2020-07-10 | Stop reason: HOSPADM

## 2020-07-08 RX ORDER — ALLOPURINOL 300 MG/1
300 TABLET ORAL DAILY
Status: DISCONTINUED | OUTPATIENT
Start: 2020-07-09 | End: 2020-07-08

## 2020-07-08 RX ORDER — ALBUTEROL SULFATE 90 UG/1
1-2 AEROSOL, METERED RESPIRATORY (INHALATION)
Status: DISCONTINUED | OUTPATIENT
Start: 2020-07-08 | End: 2020-07-10 | Stop reason: HOSPADM

## 2020-07-08 RX ORDER — POTASSIUM CHLORIDE 1500 MG/1
20 TABLET, EXTENDED RELEASE ORAL EVERY EVENING
COMMUNITY
End: 2020-09-23

## 2020-07-08 RX ORDER — METHYLPREDNISOLONE SODIUM SUCCINATE 125 MG/2ML
125 INJECTION, POWDER, LYOPHILIZED, FOR SOLUTION INTRAMUSCULAR; INTRAVENOUS
Status: DISCONTINUED | OUTPATIENT
Start: 2020-07-08 | End: 2020-07-10 | Stop reason: HOSPADM

## 2020-07-08 RX ORDER — LORAZEPAM 2 MG/ML
.5-1 INJECTION INTRAMUSCULAR EVERY 6 HOURS PRN
Status: DISCONTINUED | OUTPATIENT
Start: 2020-07-08 | End: 2020-07-10 | Stop reason: HOSPADM

## 2020-07-08 RX ORDER — POTASSIUM CHLORIDE 7.45 MG/ML
10 INJECTION INTRAVENOUS
Status: DISCONTINUED | OUTPATIENT
Start: 2020-07-08 | End: 2020-07-10 | Stop reason: HOSPADM

## 2020-07-08 RX ORDER — NALOXONE HYDROCHLORIDE 0.4 MG/ML
.1-.4 INJECTION, SOLUTION INTRAMUSCULAR; INTRAVENOUS; SUBCUTANEOUS
Status: DISCONTINUED | OUTPATIENT
Start: 2020-07-08 | End: 2020-07-10 | Stop reason: HOSPADM

## 2020-07-08 RX ORDER — ACETAMINOPHEN 325 MG/1
650 TABLET ORAL EVERY 4 HOURS PRN
Status: DISCONTINUED | OUTPATIENT
Start: 2020-07-08 | End: 2020-07-10 | Stop reason: HOSPADM

## 2020-07-08 RX ORDER — ONDANSETRON 2 MG/ML
4 INJECTION INTRAMUSCULAR; INTRAVENOUS EVERY 6 HOURS PRN
Status: DISCONTINUED | OUTPATIENT
Start: 2020-07-08 | End: 2020-07-10 | Stop reason: HOSPADM

## 2020-07-08 RX ORDER — DEXTROSE MONOHYDRATE 25 G/50ML
25-50 INJECTION, SOLUTION INTRAVENOUS
Status: DISCONTINUED | OUTPATIENT
Start: 2020-07-08 | End: 2020-07-10 | Stop reason: HOSPADM

## 2020-07-08 RX ORDER — MEPERIDINE HYDROCHLORIDE 25 MG/ML
25 INJECTION INTRAMUSCULAR; INTRAVENOUS; SUBCUTANEOUS EVERY 30 MIN PRN
Status: DISCONTINUED | OUTPATIENT
Start: 2020-07-08 | End: 2020-07-10 | Stop reason: HOSPADM

## 2020-07-08 RX ADMIN — INSULIN ASPART 1 UNITS: 100 INJECTION, SOLUTION INTRAVENOUS; SUBCUTANEOUS at 13:16

## 2020-07-08 RX ADMIN — INSULIN ASPART 2 UNITS: 100 INJECTION, SOLUTION INTRAVENOUS; SUBCUTANEOUS at 19:26

## 2020-07-08 RX ADMIN — ACETAMINOPHEN 650 MG: 325 TABLET, FILM COATED ORAL at 13:44

## 2020-07-08 RX ADMIN — INSULIN GLARGINE 15 UNITS: 100 INJECTION, SOLUTION SUBCUTANEOUS at 21:23

## 2020-07-08 RX ADMIN — ENOXAPARIN SODIUM 90 MG: 100 INJECTION SUBCUTANEOUS at 21:24

## 2020-07-08 RX ADMIN — SODIUM CHLORIDE 1000 ML: 9 INJECTION, SOLUTION INTRAVENOUS at 14:19

## 2020-07-08 RX ADMIN — ONDANSETRON HYDROCHLORIDE 16 MG: 8 TABLET, FILM COATED ORAL at 17:53

## 2020-07-08 RX ADMIN — HYDROCORTISONE 10 MG: 10 TABLET ORAL at 21:23

## 2020-07-08 RX ADMIN — SODIUM CHLORIDE 150 MG: 9 INJECTION, SOLUTION INTRAVENOUS at 17:55

## 2020-07-08 RX ADMIN — RITUXIMAB 800 MG: 10 INJECTION, SOLUTION INTRAVENOUS at 14:13

## 2020-07-08 RX ADMIN — DIPHENHYDRAMINE HYDROCHLORIDE 50 MG: 50 CAPSULE ORAL at 13:44

## 2020-07-08 RX ADMIN — DEXAMETHASONE 40 MG: 4 TABLET ORAL at 17:53

## 2020-07-08 RX ADMIN — SODIUM CHLORIDE, PRESERVATIVE FREE 5 ML: 5 INJECTION INTRAVENOUS at 10:33

## 2020-07-08 RX ADMIN — CISPLATIN 200 MG: 1 INJECTION INTRAVENOUS at 18:31

## 2020-07-08 RX ADMIN — INSULIN ASPART 7 UNITS: 100 INJECTION, SOLUTION INTRAVENOUS; SUBCUTANEOUS at 13:25

## 2020-07-08 RX ADMIN — MAGNESIUM SULFATE HEPTAHYDRATE: 500 INJECTION, SOLUTION INTRAMUSCULAR; INTRAVENOUS at 18:31

## 2020-07-08 RX ADMIN — Medication 1 MG: at 22:06

## 2020-07-08 ASSESSMENT — ACTIVITIES OF DAILY LIVING (ADL)
ADLS_ACUITY_SCORE: 13

## 2020-07-08 ASSESSMENT — MIFFLIN-ST. JEOR: SCORE: 1606.33

## 2020-07-08 NOTE — PROVIDER NOTIFICATION
MD Notification    Notified Person: LIAN santana    Notified Person Name:     Notification Date/Time: 7/8/2020 1:20 PM      Notification Interaction:    Purpose of Notification:  pt would like to add carb coverage to insulin regime. please advise. food here.  Orders Received:    Comments:

## 2020-07-08 NOTE — H&P
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  --s/p RCHOP   -- He responded well to the chemotherapy with a near complete remission after six cycles. Maintenance rituximab was started in June 2015 and completed in February 2017.   --On 11/17/17 the patient underwent a thoracotomy for an enlarging right infrahilar mass. 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 The tumor was handled as a carcinoma but the pathology is most consistent with recurrent grade 2-3 follicular lymphoma.   --On 12/14/17 he began bendamustine with rituximab chemotherapy.   --He completed 6 cycles of BR and on 6/4/18 a CT revealed stable mild lymphadenopathy with mild splenomegaly and a slight increase in the IVC thrombus.   --A CT 6/3/19 revealed an increase in the right hilar and pretracheal adenopathy with stable nodular scarring of the right lung base, without other new disease. A PET/CT 6/13/19 confirmed the hypermetabolic right infrahilar mass and revealed moderate metabolic activity within a few small nodules just deep to tire pleura in the posterior aspect of tire mid right  hemithorax, suspicious for neoplasm, without other disease.   --As all disease could be encompassed in one radiation field, he completed 3000 cGy of radiotherapy to the right thoracic region 6/27/19 - 7/18/19.  --CT C/A/P 5/14/20 revealed marked increase in the right lower lobe pulmonary mass with new subcarinal lymphadenopathy, bilateral new adrenal masses, and soft tissue tumor deposits in the abdomen  --CT guided adrenal biopsy 5/27/20 revealed transformation to a diffuse large B cell lymphoma, germinal center type, 100%  Ki-67 positive,  with positive immunohistochemical staining for BCL-2, BCL-6, and C-MYC suggestive of a possible triple-hit lymphoma  --R ICE chemotherapy 6/5/2020 but complications of ifosphamide neurotoxicity discontinued 6/7  --filgrastim 6/9  --plan for new chemotherapy with R-DHAP 7/8. We reviewed this regimen and potential side effects such as low blood counts, infection, bleeding, nausea/vomiting, diarrhea/constipation, hearing loss, neuropathy, neurotoxicity  --start filgrastim after chemotherapy. Pastor has a 10 day supply of filgrastim.  --CBC Monday, Wednesday and Friday after chemotherapy completed  --Transfusional support with irradiated blood products  --1 week post-hospital follow up    Delirium/encephalopathy  - MRI brain, CT head and neck as part of Code Stroke  --Likely from ifosfamide chemotherapy, which has been discontinued  --Treated with methylene blue  --Neuro consult and EEG inpatient   --Symptoms resolved.    Recent finding of right earlobe melanoma 9mm per shave bx. (7/7/20)  -- consult with Dr. Reza planned.    Osteomyelitis  --On amoxicillin/clavulanic acid after recent hospital discharge 6/21 x 7 days  --Debridement with Dr. Humphrey on 7/1.  --Cultures pending. There was some light growth of candida.  --Now on a course of trimethoprim/sulfamethoxazole.   --Dr. Humphrey is aware of our plan to resume chemo today.    Surgical dressing is still in place. Will have podiatry follow up.    Possible Adrenal Insufficiency  --Large bilateral adrenal masses with mild hypotension, nausea, vomiting, and hyperkalemia  --Continue empiric hydrocortisone 20 mg q 8 AM and 10 mg every 4 p.m.  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 He is tolerating anticoagulation with Lovenox well. We discussed alternative anticoagulants such as Coumadin (less effective) or DOAC and as he has completed  chemotherapy with a good response of his malignancy in 6/2018 he was switched to Xarelto.  --A vascular surgery consultation for the persistent IVC thrombus concurred with anticoagulation for 6-12 months or longer without other intervention.  --Discussed with hospitalist team.  Cont holding Rivaroxaban due to anticipated drop/fluctuations in platelet counts and more difficult to reverse.  - Lovenox 1mg/kg Q12H for anticoag. Monitor plt daily and hold for plt <50K.     Rectal bleeding  --His chronic intermittent rectal bleeding was likely due to his anticoagulation.  A colonoscopy 5/9/18 revealed the hemorrhoids (cauterized) and 2 small resected polyps. As the polyp pathology is benign a colonoscopy can be repeated after 5 years.     Radiation Pneumonitis  --The pulmonary infiltrate on CT may be related to the radiation, but underlying mass cannot be excluded.  --elected against steroids given his mild symptoms and underlying diabetes  --He is not symptomatic and will call if dyspnea, fever, or worsening cough develops.     Anemia  --Mild chemotherapy and neoplasm induced anemia  --5/20/20 iron levels consistent with chronic disease  --will be monitored.     -Chronic Kidney Disease  -Mild renal insufficiency will be monitored.     LFT  --Mild liver function and alkaline phosphatase elevations, thought to be related to Crestor or a fatty liver  --resolved  --monitor     Depression  --Reactive, supportive care encouraged      Antwon GILLESPIE, CNP  [facility]  [phone]; [phone] (cell)             Chief Complaint:     B cell NHL here for chemo         History of Present Illness:   This patient is a 70 year old male with a four- to eight-week history of increasing dyspnea, mild anorexia, and weight loss.  On evaluation, a large right pleural effusion was detected, thoracentesis was performed with only slight improvement of his dyspnea and the cytology from the pleural fluid was benign.     A CT  scan of the chest revealed extensive bulky 9-cm right paratracheal lymphadenopathy with additional mediastinal, bilateral pulmonary hilar, and upper abdominal, retroperitoneal lymphadenopathy.  A CT-guided biopsy of the retroperitoneal lymph node revealed a CD20-positive, B-cell non-Hodgkin's lymphoma, though not enough material was available for subtyping.     An excisional left supraclavicular lymph node biopsy confirmed grade III follicular lymphoma.     PET/CT scanning confirmed extensive lymphadenopathy in the neck, chest, abdomen, and pelvis. A bone marrow biopsy was normal. An echocardiogram was normal as was hepatitis and HIV testing.     His first cycle of chemotherapy with RCHOP was administered in December 2014. A CT scan in February 2015 revealed an excellent partial remission after three cycles of chemotherapy, and a follow up CT scan in April 2015 revealed a continued response.     He completed six cycles of RCHOP chemotherapy with an excellent response confirmed by CT scanning in April 2015 and has completed two years of every two month maintenance rituximab.     A CT scan in October 2015 confirmed a continued response without any evidence of progressive disease.     A follow up CT scan in October 2016 remained unchanged without any evidence of progressive malignancy.     A CT scan in October 2017 revealed an enlarging 3 cm infrahilar mass. This is in a location different than his original nodes from 2014.     A CT guided biopsy of the hilar mass was nondiagnostic.     A PET/CT scan did not reveal any additional disease.     On 11/17/17 the patient underwent a thoracotomy. 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 The tumor was handled as a carcinoma, but the pathology is most consistent with recurrent grade 2-3 follicular lymphoma.     On 12/14/17 he began bendamustine with rituximab chemotherapy.     On 2/1/18 a CT scan revealed a  significant improvement of lymphoma, but a new IVC thrombus extending to the right iliac vein.     He has been anticoagulated with Lovenox without complication.     A CT on 4/9/18 revealed an improved but not resolved IVC thrombus, with mild stable mediastinal nodes. An inflammatory pulmonary infiltrate was noted.     On 6/4/18 after 6 cycles of BR chemotherapy a CT revealed stable mild lymphadenopathy with mild splenomegaly and a slight increase in the IVC thrombus.     Lovenox was converted to Xarelto in 6/2018.     A CT 11/28/18 revealed stable mild lymphadenopathy with one new 2 x1.5 cm right hilar node and improved IVC thrombus.     A CT 6/3/19 revealed an increase in the right hilar and pretracheal adenopathy with stable nodular scarring of the right lung base, without other new disease.     A PET/CT 6/13/19 confirmed the hypermetabolic right infrahilar mass and revealed moderate metabolic activity within a few small nodules just deep to tire pleura in the posterior aspect of tire mid right  hemithorax, suspicious for neoplasm, without other disease.     As all disease could be encompassed in one radiation field, he completed 3000 cGy of radiotherapy to the right thoracic region 6/27/19 - 7/18/19.     A CT C/A/P 9/23/19 revealed improvement of disease with mild right lung radiation changes.     CT C/A/P 3/9/20 revealed increased  consolidation in the right lower lobe extending to the right hilum and the pleural surfaces without other new concern.    CT C/A/P 5/14/20 revealed marked increase in the right lower lobe pulmonary mass with new subcarinal lymphadenopathy, bilateral new adrenal masses, and soft tissue tumor deposits in the abdomen.    CT guided adrenal biopsy 5/27/20 revealed transformation to a diffuse large B cell lymphoma, germinal center type, 100 % KI-67 +, with positive immunohistochemical staining for BCL-2, BCL-6, and C-MYC are suggestive of a possible triple-hit lymphoma    --R ICE  "chemotherapy 2020 but complications of ifosphamide neurotoxicity discontinued   --filgrastim     --plan for new chemotherapy with R-DHAP .    --diagnosed with right earlobe melanoma on 20, Surgery pending             Physical Exam:   Vitals were reviewed  Blood pressure 129/65, pulse 92, temperature 95.6  F (35.3  C), temperature source Oral, resp. rate 16, height 1.727 m (5' 8\"), weight 86.2 kg (190 lb), SpO2 97 %.  Temperatures:  Current - Temp: 95.6  F (35.3  C); Max - Temp  Av.6  F (35.3  C)  Min: 95.6  F (35.3  C)  Max: 95.6  F (35.3  C)  Respiration range: Resp  Av  Min: 16  Max: 16  Pulse range: Pulse  Av  Min: 92  Max: 92  Blood pressure range: Systolic (24hrs), Av , Min:129 , Max:129   ; Diastolic (24hrs), Av, Min:65, Max:65    Pulse oximetry range: SpO2  Av %  Min: 97 %  Max: 97 %  No intake or output data in the 24 hours ending 20 1020    GENERAL: No acute distress.  SKIN: No rashes or jaundice.  HEENT: Normocephalic, atraumatic. Eyes anicteric. Oropharynx is clear.  LYMPH: No palpable lymphadenopathy in the cervical, supraclavicular, axillary, or inguinal regions.  HEART: Regular rate and rhythm with no murmurs.  LUNGS: Clear bilaterally.  ABDOMEN: Soft, nontender, nondistended with no palpable hepatosplenomegaly.  EXTREMITIES: left foot wrapped  MUSCULOSKELETAL: No pain to percussion over entire spine.  MENTAL: Alert and oriented to person, place, and time.  NEURO: Cranial nerves II through XII grossly intact with no focal motor or sensory deficits.              Past Medical History:   I have reviewed this patient's past medical history  Past Medical History:   Diagnosis Date     Abnormal liver function test      Anemia      CPAP (continuous positive airway pressure) dependence      Diabetes (H)      Hx of skin cancer, basal cell      Hyperlipidemia      Hypertension      Keratoderma      Liver disease      Lymphoma (H)      Non-Hodgkin lymphoma (H)  "     Pedal edema     CHRONIC     Pleural effusion      Seborrheic keratosis, inflamed      Sleep apnea     Uses a CPAP     Thrombosis Felbruary 2018             Past Surgical History:   I have reviewed this patient's past surgical history  Past Surgical History:   Procedure Laterality Date     AMPUTATE TOE(S) Left 5/22/2020    Procedure: LEFT SECOND AND THIRD DISTAL TOE AMPUTATIONS;  Surgeon: Ramon Flores MD;  Location:  OR     AMPUTATE TOE(S) Left 7/1/2020    Procedure: 1.  Partial left second toe amputation with osteotomy through proximal phalanx.  2.  Partial left third toe amputation with osteotomy through proximal phalanx.;  Surgeon: Ismael Humphrey DPM;  Location:  OR     BIOPSY LYMPH NODE CERVICAL N/A 12/5/2014    Procedure: BIOPSY LYMPH NODE CERVICAL;  Surgeon: Robbie Linda MD;  Location:  OR     BRONCHOSCOPY FLEXIBLE N/A 11/14/2017    Procedure: BRONCHOSCOPY FLEXIBLE;  FLEXIBLE BRONCHOSCOPY WITH BIOPSIES.;  Surgeon: Robbie Linda MD;  Location:  OR     COLONOSCOPY       COLONOSCOPY N/A 5/9/2018    Procedure: COMBINED COLONOSCOPY, SINGLE OR MULTIPLE BIOPSY/POLYPECTOMY BY BIOPSY;;  Surgeon: Ramos Bates MD;  Location:  GI     ENDOVASCULAR PLACEMENT VASCULAR DEVICE Left 12/5/2017    Procedure: ENDOVASCULAR PLACEMENT VASCULAR DEVICE;;  Surgeon: Robbie Linda MD;  Location: [facility]     ENT SURGERY      tonsillectomy     EXCISE NODE MEDIASTINAL N/A 11/17/2017    Procedure: EXCISE NODE MEDIASTINAL;;  Surgeon: Robbie Linda MD;  Location:  OR     EYE SURGERY       EYE SURGERY Left     vitrectomy     INSERT PORT VASCULAR ACCESS N/A 12/5/2014    Procedure: INSERT PORT VASCULAR ACCESS;  Surgeon: Robbie Linda MD;  Location:  OR     INSERT PORT VASCULAR ACCESS N/A 12/5/2017    Procedure: INSERT PORT VASCULAR ACCESS;  POWER PORT PLACEMENT ATTEMPTED, PLACEMENT OF ANGIO-SEAL VIP VASCULAR CLOSURE DEVICE;  Surgeon: Robbie Linda  MD Emile;  Location: [facility]     IR CHEST PORT PLACEMENT > 5 YRS OF AGE  6/5/2020     IR PORT REMOVAL RIGHT  12/10/2018     PHACOEMULSIFICATION CLEAR CORNEA WITH STANDARD INTRAOCULAR LENS IMPLANT Right 5/2/2016    Procedure: PHACOEMULSIFICATION CLEAR CORNEA WITH STANDARD INTRAOCULAR LENS IMPLANT;  Surgeon: Rasheed Finney MD;  Location:  [facility]     REMOVE PORT VASCULAR ACCESS N/A 3/14/2017    Procedure: REMOVE PORT VASCULAR ACCESS;  Surgeon: Robbie Linda MD;  Location:  OR     SOFT TISSUE SURGERY      BASAL CELL CA FOREHEAD     THORACOTOMY Right 11/17/2017    Procedure: THORACOTOMY;  RIGHT EXPLORATORY THORACOTOMY/ EXTENSIVE PNEUMOLYSIS/ BIOPSY OF INTERLOBAR LYMPH NODE;  Surgeon: Robbie Linda MD;  Location:  OR               Social History:   I have reviewed this patient's social history  Social History     Tobacco Use     Smoking status: Never Smoker     Smokeless tobacco: Never Used   Substance Use Topics     Alcohol use: No             Family History:   I have reviewed this patient's family history  No family history on file.          Allergies:   No Known Allergies          Medications:   I have reviewed this patient's current medications  Medications Prior to Admission   Medication Sig Dispense Refill Last Dose     allopurinol (ZYLOPRIM) 300 MG tablet Take 300 mg by mouth daily   7/8/2020 at am     fenofibrate (TRICOR) 145 MG tablet Take 145 mg by mouth daily   7/8/2020 at am     hydrocortisone (CORTEF) 10 MG tablet Take 10 mg by mouth every evening   7/7/2020 at pm     hydrocortisone (CORTEF) 10 MG tablet Take 20 mg by mouth daily before breakfast    7/8/2020 at am     insulin aspart (NOVOLOG FLEXPEN) 100 UNIT/ML pen Inject Subcutaneous 3 times daily (with meals) Patient uses sliding scale based on Carbs and Blood Glucose. Has been using very little lately due to low readings and low appetite.   7/7/2020 at 20 units w/ dinner     insulin glargine (LANTUS VIAL) 100 UNIT/ML vial  Inject 15 Units Subcutaneous At Bedtime   7/7/2020 at hs     metFORMIN (GLUCOPHAGE) 500 MG tablet Take 1,000 mg by mouth daily (with breakfast)   7/8/2020 at am     metFORMIN (GLUCOPHAGE) 500 MG tablet Take 500 mg by mouth daily (with dinner)   7/7/2020 at pm     potassium chloride ER (KLOR-CON M) 20 MEQ CR tablet Take 20 mEq by mouth every evening   7/7/2020 at pm     potassium chloride ER (KLOR-CON M) 20 MEQ CR tablet Take 40 mEq by mouth every morning    7/8/2020 at am     prochlorperazine (COMPAZINE) 5 MG tablet Take 1-2 tablets (5-10 mg) by mouth every 6 hours as needed (Breakthrough Nausea / Vomiting) 30 tablet 0 Past Month at prn     rosuvastatin (CRESTOR) 10 MG tablet Take 5 mg by mouth daily   7/8/2020 at am     sulfamethoxazole-trimethoprim (BACTRIM DS) 800-160 MG tablet Take 1 tablet by mouth 2 times daily for 7 days 14 tablet 0 7/8/2020 at am     order for DME Kyle 1 packet twice a day for the next month 30 days 0      Current Facility-Administered Medications Ordered in Epic   Medication Dose Route Frequency Last Rate Last Dose     allopurinol (ZYLOPRIM) tablet 300 mg  300 mg Oral Daily         glucose gel 15-30 g  15-30 g Oral Q15 Min PRN        Or     dextrose 50 % injection 25-50 mL  25-50 mL Intravenous Q15 Min PRN        Or     glucagon injection 1 mg  1 mg Subcutaneous Q15 Min PRN         fenofibrate half-tab 135 mg  135 mg Oral Daily         heparin 100 UNIT/ML injection 5 mL  5 mL Intracatheter Q28 Days         heparin lock flush 10 UNIT/ML injection 5-10 mL  5-10 mL Intracatheter Q24H         heparin lock flush 10 UNIT/ML injection 5-10 mL  5-10 mL Intracatheter Q1H PRN         hydrocortisone (CORTEF) tablet 10 mg  10 mg Oral QPM         [START ON 7/9/2020] hydrocortisone (CORTEF) tablet 20 mg  20 mg Oral QAM AC         insulin aspart (NovoLOG) injection (RAPID ACTING)  1-7 Units Subcutaneous TID AC         insulin aspart (NovoLOG) injection (RAPID ACTING)  1-5 Units Subcutaneous At Bedtime          insulin glargine (LANTUS PEN) injection 15 Units  15 Units Subcutaneous At Bedtime         lidocaine (LMX4) cream   Topical Q1H PRN         lidocaine 1 % 0.1-1 mL  0.1-1 mL Other Q1H PRN         Medication Instruction   Does not apply Continuous PRN         rosuvastatin (CRESTOR) tablet 5 mg  5 mg Oral Daily         sodium chloride (PF) 0.9% PF flush 10-20 mL  10-20 mL Intracatheter q1 min prn         sodium chloride (PF) 0.9% PF flush 10-20 mL  10-20 mL Intracatheter Q1H PRN         sulfamethoxazole-trimethoprim (BACTRIM DS) 800-160 MG per tablet 1 tablet  1 tablet Oral BID         No current Epic-ordered outpatient medications on file.             Review of Systems:     The 10 point Review of Systems is negative other than noted in the HPI.            Data:   Data   Results for orders placed or performed during the hospital encounter of 07/08/20 (from the past 24 hour(s))   Hospitalist IP Consult: Patient to be seen: Routine - within 24 hours; Comanagement- diabetes, CKD, adrenal insufficiency; Consultant may enter orders: Yes; Requesting provider? Other supervising provider; Name: Temo GILLESPIE, CNP Narrative Barthell, Joanna Kersten Ulmen, PA-C     7/8/2020 10:18 AM  [facility]  Hospitalist Service Consultation  JoAnna K. 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   Hospitalist service consulted for medical co-mngt.    1. Diffuse large B cell non-Hodgkin's lymphoma.  2. Pancytopenia.  Follows with Dr. Olson at [facility]. Initially   diagnosed with B-cell lymphoma in 2014 and underwent R-CHOP.   Diagnosed with recurrent follicular lymphoma in 2017 and   completed chemo/XRT. Found to have new masses and  lymphadenopathy   in May 2020, and was found to have diffuse large B-cell lymphoma.   Initiated on R ICE on 6/5/2020, but chemo was discontinued on 6/7   due to Ifosfamide encephalopathy.  - Metabolic panel, CBC, uric acid pending.  - Cares per [facility].  - Cont PTA allopurinol.    Osteomyelitis of left second and third toes s/p distal amputation   (5/22/2020) and s/p partial amputation through proximal phalanx   (7/1/2020). Bone ctx from 7.1 with light growth Candida   parapsilosis.  - Podiatry consulted.  - Cont PTA Bactrim; appears to be completed evening 7/8.    Suspected adrenal insufficiency.  - Continues on PTA hydrocortisone 20 mg each morning and 10 mg   nightly.    History of IVC thrombus (2018).  - Cont PTA rivaroxaban.  - Follow hgb and plt.    IDDM2 with peripheral vascular disease.  [PTA: Lantus 15u QHS, Novolog TID AC based off sliding scale,   metformin 1000mg QAM and 500mg with dinner]  - Cont PTA Lantus.  - QID AC HS glucose monitoring and medium SSI.  - Hold metformin.    Melanoma, right ear lobe. Biopsy proven.  - Follow up scheduled on Monday, 7/13/2020.    JESSICA.  Chronic, stable on CPAP.    Dyslipidemia. Chronic, stable on rosuvastatin and fenofibrate.    Asymptomatic COVID19 adm screening. Obtained 7/8/2020.    FEN: Mod carb diet.  DVT Prophylaxis: Xarelto  Code Status: Full Code; confirmed at consultation.    This patient was discussed with Dr. Pena of the Hospitalist   Service who agrees with current plans as outlined above.    Disposition: Expected discharge in 3-5 days pending clinical   course and counts with ctx.    JoAnna K. Barthell, PA-C    Pastor Garibay MRN# [id number]   YOB: 1950 Age: 70 year old   Primary Care Physician: Francisco Armijo [phone]    Requesting Physician: Temo Eng.  Reason for Consult: Medical co-mngt.    History provided by patient and chart review.    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    Recent [facility] adm 6/5-6/12/2020 for R-ICE chemotherapy induction for   treatment of B-cell non-Hodgkins lymphoma. 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 Now on alternative therapy and this will be first   round with new regimen.    Underwent partial amputation left 2nd and 3rd toes 5/2020.   Admitted 6/17-6/21/2020 for treatment of non-healing toe wounds   on left 2 and 3rd digits concerning for osteomyelitis. Treated   with daptomycin and zosyn per ID; discharged on Augmentin.   Underwent another partial amputation through proximal phalanx of   both digits on 7/1. Has been taking Bactrim since. Has not taken   off wound dressings since surgery as instructed.    Unfortunately recently diagnosed with biopsy proven melanoma on   right ear lobe. Aside from recent melanoma finding, has been   doing well. Has some mild diarrhea at baseline. No blood in   stool. No fevers.     PAST MEDICAL HISTORY  Past Medical History:   Diagnosis Date     Abnormal liver function test      Anemia      CPAP (continuous positive airway pressure) dependence      Diabetes (H)      Hx of skin cancer, basal cell      Hyperlipidemia      Hypertension      Keratoderma      Liver disease      Lymphoma (H)      Non-Hodgkin lymphoma (H)      Pedal edema     CHRONIC     Pleural effusion      Seborrheic keratosis, inflamed      Sleep apnea     Uses a CPAP     Thrombosis Felbruary 2018       PAST SURGICAL HISTORY  Past Surgical History:   Procedure Laterality Date     AMPUTATE TOE(S) Left 5/22/2020    Procedure: LEFT SECOND AND THIRD DISTAL TOE AMPUTATIONS;    Surgeon: Ramon Flores MD;  Location: [facility] OR     AMPUTATE TOE(S) Left 7/1/2020    Procedure: 1.  Partial left second toe amputation with osteotomy   through proximal phalanx.  2.  Partial left  third toe amputation   with osteotomy through proximal phalanx.;  Surgeon: Ismael Humphrey DPM;  Location: [facility] OR     BIOPSY LYMPH NODE CERVICAL N/A 12/5/2014    Procedure: BIOPSY LYMPH NODE CERVICAL;  Surgeon: Robbie Linda MD;  Location:  OR     BRONCHOSCOPY FLEXIBLE N/A 11/14/2017    Procedure: BRONCHOSCOPY FLEXIBLE;  FLEXIBLE BRONCHOSCOPY WITH   BIOPSIES.;  Surgeon: Robbie Linda MD;  Location:    OR     COLONOSCOPY       COLONOSCOPY N/A 5/9/2018    Procedure: COMBINED COLONOSCOPY, SINGLE OR MULTIPLE   BIOPSY/POLYPECTOMY BY BIOPSY;;  Surgeon: Ramos Bates MD;    Location:  GI     ENDOVASCULAR PLACEMENT VASCULAR DEVICE Left 12/5/2017    Procedure: ENDOVASCULAR PLACEMENT VASCULAR DEVICE;;  Surgeon:   Robbie Linda MD;  Location: [facility]     ENT SURGERY      tonsillectomy     EXCISE NODE MEDIASTINAL N/A 11/17/2017    Procedure: EXCISE NODE MEDIASTINAL;;  Surgeon: Robbie Linda MD;  Location:  OR     EYE SURGERY       EYE SURGERY Left     vitrectomy     INSERT PORT VASCULAR ACCESS N/A 12/5/2014    Procedure: INSERT PORT VASCULAR ACCESS;  Surgeon: Robbie Linda MD;  Location:  OR     INSERT PORT VASCULAR ACCESS N/A 12/5/2017    Procedure: INSERT PORT VASCULAR ACCESS;  POWER PORT PLACEMENT   ATTEMPTED, PLACEMENT OF ANGIO-SEAL VIP VASCULAR CLOSURE DEVICE;    Surgeon: Robbie Linda MD;  Location: [facility]     IR CHEST PORT PLACEMENT > 5 YRS OF AGE  6/5/2020     IR PORT REMOVAL RIGHT  12/10/2018     PHACOEMULSIFICATION CLEAR CORNEA WITH STANDARD INTRAOCULAR LENS   IMPLANT Right 5/2/2016    Procedure: PHACOEMULSIFICATION CLEAR CORNEA WITH STANDARD   INTRAOCULAR LENS IMPLANT;  Surgeon: Rasheed Finney MD;    Location:  [facility]     REMOVE PORT VASCULAR ACCESS N/A 3/14/2017    Procedure: REMOVE PORT VASCULAR ACCESS;  Surgeon: Robbie Linda MD;  Location:  OR     SOFT TISSUE SURGERY      BASAL CELL CA FOREHEAD     THORACOTOMY  Right 11/17/2017    Procedure: THORACOTOMY;  RIGHT EXPLORATORY THORACOTOMY/   EXTENSIVE PNEUMOLYSIS/ BIOPSY OF INTERLOBAR LYMPH NODE;  Surgeon:   Robbie Linda MD;  Location:  OR       HOME MEDICATIONS  Prior to Admission medications    Medication Sig Last Dose Taking? Auth Provider   allopurinol (ZYLOPRIM) 300 MG tablet Take 300 mg by mouth daily     Unknown, Entered By History   fenofibrate (TRICOR) 145 MG tablet Take 145 mg by mouth daily     Unknown, Entered By History   HYDROcodone-acetaminophen (NORCO) 5-325 MG tablet Take 1-2   tablets every 4-6 hours as needed for pain.  Do not take other   tylenol products with this medication, as too much tylenol can be   damaging to the liver.   Ismael Humphrey DPM   hydrocortisone (CORTEF) 10 MG tablet Take 10 mg by mouth every   evening   Unknown, Entered By History   hydrocortisone (CORTEF) 10 MG tablet Take 20 mg by mouth daily   before breakfast    Reported, Patient   hydrOXYzine (ATARAX) 10 MG tablet Take 1-2 tablets every 4-6   hours as needed for pain control.   Ismael Humphrey DPM   insulin aspart (NOVOLOG FLEXPEN) 100 UNIT/ML pen Inject   Subcutaneous 3 times daily (with meals) Patient uses sliding   scale based on Carbs and Blood Glucose. Has been using very   little lately due to low readings and low appetite.   Reported,   Patient   insulin glargine (LANTUS VIAL) 100 UNIT/ML vial Inject 15 Units   Subcutaneous At Bedtime   Deanna Richards MD   metFORMIN (GLUCOPHAGE) 500 MG tablet Take 1,000 mg by mouth daily   (with breakfast)   Unknown, Entered By History   metFORMIN (GLUCOPHAGE) 500 MG tablet Take 500 mg by mouth daily   (with dinner)   Unknown, Entered By History   order for DME Kyle 1 packet twice a day for the next month     Hubert Carpenter MD   potassium chloride ER (KLOR-CON M) 20 MEQ CR tablet Take 20 mEq   by mouth daily   Unknown, Entered By History   prochlorperazine (COMPAZINE) 5 MG tablet Take 1-2 tablets (5-10  "  mg) by mouth every 6 hours as needed (Breakthrough Nausea /   Vomiting)   Temo Eng APRN CNP   rosuvastatin (CRESTOR) 10 MG tablet Take 5 mg by mouth daily     Unknown, Entered By History   sulfamethoxazole-trimethoprim (BACTRIM DS) 800-160 MG tablet Take   1 tablet by mouth 2 times daily for 7 days   Ismael Humphrey,   YAMSEEN       ALLERGIES  No Known Allergies    SOCIAL HISTORY   reports that he has never smoked. He has never used smokeless   tobacco. He reports that he does not drink alcohol or use drugs.  Patient resides in a house in [place] with his wife.  He is a   life-long non-smoker.  He consumes alcohol very rarely, which he   indicated he drinks 2-3 beers per year.  He denied illicit drug   use.    FAMILY HISTORY  Father:  CAD with CABG, prostate CA.  Mother: DM2.     REVIEW OF SYSTEMS  A 10 point ROS was negative other than the symptoms noted above   in the HPI.    PHYSICAL EXAM  Nursing Notes Reviewed.  /65   Pulse 92   Temp 95.6  F (35.3  C) (Oral)   Resp 16     Ht 1.727 m (5' 8\")   Wt 86.2 kg (190 lb)   SpO2 97%   BMI   28.89 kg/m     General:  Pleasant male appears stated age in no acute distress.  Skin:  Warm, dry. No rashes or lesions on exposed skin.  HEENT:  Normocephalic, atraumatic. EOMs grossly intact and PERRL.   MMM.  Neck:  Supple. No palpable lymphadenopathy.  Chest:  Breath sounds CTA. No increased work of breathing.  Cardiovascular:  RRR, no rub or murmur. No peripheral edema.  Abdomen:  Soft, non-tender, non-distended.  Musculoskeletal:  Moves all four extremities. Surgical dressing   over left foot left in place.  Neurological:  CN 2-12 grossly intact.    Data   Data reviewed today:  I personally reviewed no images or EKG's   today.  Recent Labs   Lab 07/01/20  1151        CMP, CBC, uric acid pending.    Imaging:  No results found for this or any previous visit (from the past 24   hour(s)).             "

## 2020-07-08 NOTE — CONSULTS
[facility]OD[facility]ATRY [facility]SULTATION  July 8, 2020      ASSESSMENT:   71 y/o male s/p L 2nd and 3rd toe partial amputations by Dr. Humphrey on 7/1/20  Lymphoma     PLAN:  Reviewed patient's chart in epic.  Discussed condition and treatment options including pros and cons.    L foot dressing changed.  Keep c/d/i until clinic f/u with Dr. Richardson next week.  RN may change if dressing gets soiled (4x4s, kerlix, ACE).  Heel wt bearing in post op shoe.    I don't see a need for further antibiotics for pt's foot.    Will sign off.    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  Pt with hx of L 2nd and 3rd toe partial amputations for osteomyelitis by Dr. Humphrey on 7/1/20.  Pt states foot is doing well.  He has no concerns.  Denies foot pain.    I was requested to see this patient for this issue by Dr Eng.    Review of Systems:  Rest of 10 pt ROS neg except for HPI.     PAST MEDICAL HISTORY:   Past Medical History:   Diagnosis Date     Abnormal liver function test      Anemia      CPAP (continuous positive airway pressure) dependence      Diabetes (H)      Hx of skin cancer, basal cell      Hyperlipidemia      Hypertension      Keratoderma      Liver disease      Lymphoma (H)      Non-Hodgkin lymphoma (H)      Pedal edema     CHRONIC     Pleural effusion      Seborrheic keratosis, inflamed      Sleep apnea     Uses a CPAP     Thrombosis Felbruary 2018        PAST SURGICAL HISTORY:   Past Surgical History:   Procedure Laterality Date     AMPUTATE TOE(S) Left 5/22/2020    Procedure: LEFT SECOND AND THIRD DISTAL TOE AMPUTATIONS;  Surgeon: Ramon Flores MD;  Location: [facility]     AMPUTATE TOE(S) Left 7/1/2020    Procedure: 1.  Partial left second toe amputation with osteotomy through proximal phalanx.  2.  Partial left third toe amputation with osteotomy through proximal phalanx.;   Surgeon: Ismael Humphrey DPM;  Location: [facility] OR     BIOPSY LYMPH NODE CERVICAL N/A 12/5/2014    Procedure: BIOPSY LYMPH NODE CERVICAL;  Surgeon: Robbie Linda MD;  Location:  OR     BRONCHOSCOPY FLEXIBLE N/A 11/14/2017    Procedure: BRONCHOSCOPY FLEXIBLE;  FLEXIBLE BRONCHOSCOPY WITH BIOPSIES.;  Surgeon: Robbie Linda MD;  Location:  OR     COLONOSCOPY       COLONOSCOPY N/A 5/9/2018    Procedure: COMBINED COLONOSCOPY, SINGLE OR MULTIPLE BIOPSY/POLYPECTOMY BY BIOPSY;;  Surgeon: Ramos Bates MD;  Location:  GI     ENDOVASCULAR PLACEMENT VASCULAR DEVICE Left 12/5/2017    Procedure: ENDOVASCULAR PLACEMENT VASCULAR DEVICE;;  Surgeon: Robbie Linda MD;  Location: [facility]     ENT SURGERY      tonsillectomy     EXCISE NODE MEDIASTINAL N/A 11/17/2017    Procedure: EXCISE NODE MEDIASTINAL;;  Surgeon: Robbie Linda MD;  Location:  OR     EYE SURGERY       EYE SURGERY Left     vitrectomy     INSERT PORT VASCULAR ACCESS N/A 12/5/2014    Procedure: INSERT PORT VASCULAR ACCESS;  Surgeon: Robbie Linda MD;  Location:  OR     INSERT PORT VASCULAR ACCESS N/A 12/5/2017    Procedure: INSERT PORT VASCULAR ACCESS;  POWER PORT PLACEMENT ATTEMPTED, PLACEMENT OF ANGIO-SEAL VIP VASCULAR CLOSURE DEVICE;  Surgeon: Robbie Linda MD;  Location: [facility]     IR CHEST PORT PLACEMENT > 5 YRS OF AGE  6/5/2020     IR PORT REMOVAL RIGHT  12/10/2018     PHACOEMULSIFICATION CLEAR CORNEA WITH STANDARD INTRAOCULAR LENS IMPLANT Right 5/2/2016    Procedure: PHACOEMULSIFICATION CLEAR CORNEA WITH STANDARD INTRAOCULAR LENS IMPLANT;  Surgeon: Rasheed Finney MD;  Location:  [facility]     REMOVE PORT VASCULAR ACCESS N/A 3/14/2017    Procedure: REMOVE PORT VASCULAR ACCESS;  Surgeon: Robbie Linda MD;  Location:  OR     SOFT TISSUE SURGERY      BASAL CELL CA FOREHEAD     THORACOTOMY Right 11/17/2017    Procedure: THORACOTOMY;  RIGHT EXPLORATORY THORACOTOMY/ EXTENSIVE  PNEUMOLYSIS/ BIOPSY OF INTERLOBAR LYMPH NODE;  Surgeon: Robbie Linda MD;  Location:  OR        MEDICATIONS:   Current Facility-Administered Medications:      acetaminophen (TYLENOL) tablet 650 mg, 650 mg, Oral, Q4H PRN **OR** acetaminophen (TYLENOL) Suppository 650 mg, 650 mg, Rectal, Q4H PRN, Barthell, Joanna Kersten Ulmen, PA-C     [START ON 7/9/2020] allopurinol (ZYLOPRIM) tablet 300 mg, 300 mg, Oral, Daily, Barthell, Joanna Kersten Ulmen, PA-C     glucose gel 15-30 g, 15-30 g, Oral, Q15 Min PRN **OR** dextrose 50 % injection 25-50 mL, 25-50 mL, Intravenous, Q15 Min PRN **OR** glucagon injection 1 mg, 1 mg, Subcutaneous, Q15 Min PRN, Barthell, Joanna Kersten Ulmen, PA-C     [START ON 7/9/2020] fenofibrate (TRIGLIDE/LOFIBRA) tablet 160 mg, 160 mg, Oral, Daily, Barthell, Joanna Kersten Ulmen, PA-C     heparin 100 UNIT/ML injection 5 mL, 5 mL, Intracatheter, Q28 Days, Mike Olson MD     heparin lock flush 10 UNIT/ML injection 5-10 mL, 5-10 mL, Intracatheter, Q24H, Mike Olson MD, 5 mL at 07/08/20 1033     heparin lock flush 10 UNIT/ML injection 5-10 mL, 5-10 mL, Intracatheter, Q1H PRN, Mike Olson MD     hydrocortisone (CORTEF) tablet 10 mg, 10 mg, Oral, QPM, Barthell, Joanna Kersten Ulmen, PA-C     [START ON 7/9/2020] hydrocortisone (CORTEF) tablet 20 mg, 20 mg, Oral, QAM AC, Barthell, Joanna Kersten Ulmen, PA-C     insulin aspart (NovoLOG) injection (RAPID ACTING), 1-7 Units, Subcutaneous, TID AC, Barthell, Joanna Kersten Ulmen, PA-C     insulin aspart (NovoLOG) injection (RAPID ACTING), 1-5 Units, Subcutaneous, At Bedtime, Barthell, Joanna Kersten Ulmen, PA-C     insulin glargine (LANTUS PEN) injection 15 Units, 15 Units, Subcutaneous, At Bedtime, Barthell, Joanna Kersten Ulmen, PA-C     lidocaine (LMX4) cream, , Topical, Q1H PRN, Mike Olson MD     lidocaine 1 % 0.1-1 mL, 0.1-1 mL, Other, Q1H PRN, Mike Olson MD     magnesium sulfate 4 g in 100 mL sterile water (premade), 4 g,  Intravenous, Q4H PRN, Barthell, Joanna Kersten Ulmen, PA-C     Medication Instruction, , Does not apply, Continuous PRN, Temo Eng APRN CNP     ondansetron (ZOFRAN-ODT) ODT tab 4 mg, 4 mg, Oral, Q6H PRN **OR** ondansetron (ZOFRAN) injection 4 mg, 4 mg, Intravenous, Q6H PRN, Barthell, Joanna Kersten Ulmen, PA-C     potassium chloride (KLOR-CON) Packet 20-40 mEq, 20-40 mEq, Oral or Feeding Tube, Q2H PRN, Barthell, Joanna Kersten Ulmen, PA-C     potassium chloride 10 mEq in 100 mL intermittent infusion with 10 mg lidocaine, 10 mEq, Intravenous, Q1H PRN, Barthell, Joanna Kersten Ulmen, PA-C     potassium chloride 10 mEq in 100 mL sterile water intermittent infusion (premix), 10 mEq, Intravenous, Q1H PRN, Barthell, Joanna Kersten Ulmen, PA-C     potassium chloride 20 mEq in 50 mL intermittent infusion, 20 mEq, Intravenous, Q1H PRN, Barthell, Joanna Kersten Ulmen, PA-C     potassium chloride ER (KLOR-CON M) CR tablet 20-40 mEq, 20-40 mEq, Oral, Q2H PRN, Barthell, Joanna Kersten Ulmen, PA-C     prochlorperazine (COMPAZINE) injection 5 mg, 5 mg, Intravenous, Q6H PRN **OR** prochlorperazine (COMPAZINE) tablet 5 mg, 5 mg, Oral, Q6H PRN **OR** prochlorperazine (COMPAZINE) Suppository 12.5 mg, 12.5 mg, Rectal, Q12H PRN, Barthell, Joanna Kersten Ulmen, PA-C     [START ON 7/9/2020] rosuvastatin (CRESTOR) tablet 5 mg, 5 mg, Oral, Daily, Barthell, Joanna Kersten Ulmen, PA-C     sodium chloride (PF) 0.9% PF flush 10-20 mL, 10-20 mL, Intracatheter, q1 min prn, Mike Olson MD     sodium chloride (PF) 0.9% PF flush 10-20 mL, 10-20 mL, Intracatheter, Q1H PRN, Mike Olson MD     sulfamethoxazole-trimethoprim (BACTRIM DS) 800-160 MG per tablet 1 tablet, 1 tablet, Oral, BID, Barthell, Joanna Kersten Ulmen, PA-C     ALLERGIES:  No Known Allergies     SOCIAL HISTORY:   Social History     Socioeconomic History     Marital status:      Spouse name: Not on file     Number of children: Not on file     Years of  "education: Not on file     Highest education level: Not on file   Occupational History     Not on file   Social Needs     Financial resource strain: Not on file     Food insecurity     Worry: Not on file     Inability: Not on file     Transportation needs     Medical: Not on file     Non-medical: Not on file   Tobacco Use     Smoking status: Never Smoker     Smokeless tobacco: Never Used   Substance and Sexual Activity     Alcohol use: No     Drug use: No     Sexual activity: Not on file   Lifestyle     Physical activity     Days per week: Not on file     Minutes per session: Not on file     Stress: Not on file   Relationships     Social connections     Talks on phone: Not on file     Gets together: Not on file     Attends Mandaeism service: Not on file     Active member of club or organization: Not on file     Attends meetings of clubs or organizations: Not on file     Relationship status: Not on file     Intimate partner violence     Fear of current or ex partner: Not on file     Emotionally abused: Not on file     Physically abused: Not on file     Forced sexual activity: Not on file   Other Topics Concern     Parent/sibling w/ CABG, MI or angioplasty before 65F 55M? Not Asked   Social History Narrative     Not on file        FAMILY HISTORY: No pertinent family history.     EXAM:Vitals: /65   Pulse 92   Temp 95.6  F (35.3  C) (Oral)   Resp 16   Ht 1.727 m (5' 8\")   Wt 86.2 kg (190 lb)   SpO2 97%   BMI 28.89 kg/m    BMI= Body mass index is 28.89 kg/m .    General appearance: Patient is alert and fully cooperative with history & exam.  No sign of distress is noted during the visit.     Psychiatric: Affect is pleasant & appropriate.  Patient appears motivated to improve health.     Respiratory: Breathing is regular & unlabored while sitting.     HEENT: Hearing is intact to spoken word.  Speech is clear.  No gross evidence of visual impairment that would impact ambulation.     Dermatologic: L foot 2nd " and 3rd toe amputations sites coapted, dry, no signs of infection.  Healing well.     Vascular: DP & PT pulses are intact & regular on the L.  No significant edema or varicosities noted.       Neurologic: Lower extremity sensation is intact to light touch.      Musculoskeletal:  No gross ankle deformity noted.  No foot or ankle joint effusion is noted.

## 2020-07-08 NOTE — PLAN OF CARE
covid pending. initiated rituxana5 1420, 1.5 hour infusion d/t previous tolerance.  L foot wound evaluated by podiatry and dressed.  ACE bandage ordered from [facility] and applied, ortho boot for ambulation.  PORT w/ good blood return.  , carb cov added to sliding scale per pt request.  Winnie PO well.  NS pre-cisplat hydration running simultaneously with rituxan. Cisplat premeds and infusion this evening.

## 2020-07-08 NOTE — PHARMACY-ADMISSION MEDICATION HISTORY
Pharmacy Medication History  Admission medication history interview status for the 7/8/2020  admission is complete. See EPIC admission navigator for prior to admission medications     Medication history sources: Care Everywhere & verified with the patient over the phone.  Medication history source reliability: Good  Adherence assessment: Good    Significant changes made to the medication list:  Removed post-op pain medications that patient is no longer using.      Additional medication history information:   Pt has 4 tablets (2days) left to complete 7 day course of post-op Septra DS.    Medication reconciliation completed by provider prior to medication history? No    Time spent in this activity: 10 minutes      Prior to Admission medications    Medication Sig Last Dose Taking? Auth Provider   allopurinol (ZYLOPRIM) 300 MG tablet Take 300 mg by mouth daily 7/8/2020 at am Yes Unknown, Entered By History   fenofibrate (TRICOR) 145 MG tablet Take 145 mg by mouth daily 7/8/2020 at am Yes Unknown, Entered By History   hydrocortisone (CORTEF) 10 MG tablet Take 10 mg by mouth every evening 7/7/2020 at pm Yes Unknown, Entered By History   hydrocortisone (CORTEF) 10 MG tablet Take 20 mg by mouth daily before breakfast  7/8/2020 at am Yes Reported, Patient   insulin aspart (NOVOLOG FLEXPEN) 100 UNIT/ML pen Inject Subcutaneous 3 times daily (with meals) Patient uses sliding scale based on Carbs and Blood Glucose. Has been using very little lately due to low readings and low appetite. 7/7/2020 at 20 units w/ dinner Yes Reported, Patient   insulin glargine (LANTUS VIAL) 100 UNIT/ML vial Inject 15 Units Subcutaneous At Bedtime 7/7/2020 at hs Yes Deanna Richards MD   metFORMIN (GLUCOPHAGE) 500 MG tablet Take 1,000 mg by mouth daily (with breakfast) 7/8/2020 at am Yes Unknown, Entered By History   metFORMIN (GLUCOPHAGE) 500 MG tablet Take 500 mg by mouth daily (with dinner) 7/7/2020 at pm Yes Unknown, Entered By History    potassium chloride ER (KLOR-CON M) 20 MEQ CR tablet Take 20 mEq by mouth every evening 7/7/2020 at pm Yes Unknown, Entered By History   potassium chloride ER (KLOR-CON M) 20 MEQ CR tablet Take 40 mEq by mouth every morning  7/8/2020 at am Yes Unknown, Entered By History   prochlorperazine (COMPAZINE) 5 MG tablet Take 1-2 tablets (5-10 mg) by mouth every 6 hours as needed (Breakthrough Nausea / Vomiting) Past Month at prn Yes Temo Eng APRN CNP   rosuvastatin (CRESTOR) 10 MG tablet Take 5 mg by mouth daily 7/8/2020 at am Yes Unknown, Entered By History   sulfamethoxazole-trimethoprim (BACTRIM DS) 800-160 MG tablet Take 1 tablet by mouth 2 times daily for 7 days 7/8/2020 at am Yes Ismael Humphrey DPM   order for DME Kyle 1 packet twice a day for the next month   Hubert Carpenter MD

## 2020-07-09 LAB
ALBUMIN SERPL-MCNC: 3.1 G/DL (ref 3.4–5)
ALP SERPL-CCNC: 56 U/L (ref 40–150)
ALT SERPL W P-5'-P-CCNC: 21 U/L (ref 0–70)
ANION GAP SERPL CALCULATED.3IONS-SCNC: 9 MMOL/L (ref 3–14)
AST SERPL W P-5'-P-CCNC: 12 U/L (ref 0–45)
BASOPHILS # BLD AUTO: 0 10E9/L (ref 0–0.2)
BASOPHILS NFR BLD AUTO: 0.1 %
BILIRUB SERPL-MCNC: 0.4 MG/DL (ref 0.2–1.3)
BUN SERPL-MCNC: 15 MG/DL (ref 7–30)
CALCIUM SERPL-MCNC: 9 MG/DL (ref 8.5–10.1)
CHLORIDE SERPL-SCNC: 104 MMOL/L (ref 94–109)
CO2 SERPL-SCNC: 21 MMOL/L (ref 20–32)
CREAT SERPL-MCNC: 0.75 MG/DL (ref 0.66–1.25)
DIFFERENTIAL METHOD BLD: ABNORMAL
EOSINOPHIL # BLD AUTO: 0 10E9/L (ref 0–0.7)
EOSINOPHIL NFR BLD AUTO: 0.1 %
ERYTHROCYTE [DISTWIDTH] IN BLOOD BY AUTOMATED COUNT: 24 % (ref 10–15)
GFR SERPL CREATININE-BSD FRML MDRD: >90 ML/MIN/{1.73_M2}
GLUCOSE BLDC GLUCOMTR-MCNC: 283 MG/DL (ref 70–99)
GLUCOSE BLDC GLUCOMTR-MCNC: 326 MG/DL (ref 70–99)
GLUCOSE BLDC GLUCOMTR-MCNC: 340 MG/DL (ref 70–99)
GLUCOSE BLDC GLUCOMTR-MCNC: 343 MG/DL (ref 70–99)
GLUCOSE BLDC GLUCOMTR-MCNC: 432 MG/DL (ref 70–99)
GLUCOSE BLDC GLUCOMTR-MCNC: 461 MG/DL (ref 70–99)
GLUCOSE SERPL-MCNC: 318 MG/DL (ref 70–99)
HCT VFR BLD AUTO: 33.1 % (ref 40–53)
HGB BLD-MCNC: 10.4 G/DL (ref 13.3–17.7)
IMM GRANULOCYTES # BLD: 0.1 10E9/L (ref 0–0.4)
IMM GRANULOCYTES NFR BLD: 0.8 %
LYMPHOCYTES # BLD AUTO: 0.3 10E9/L (ref 0.8–5.3)
LYMPHOCYTES NFR BLD AUTO: 3.8 %
MCH RBC QN AUTO: 28 PG (ref 26.5–33)
MCHC RBC AUTO-ENTMCNC: 31.4 G/DL (ref 31.5–36.5)
MCV RBC AUTO: 89 FL (ref 78–100)
MONOCYTES # BLD AUTO: 0.1 10E9/L (ref 0–1.3)
MONOCYTES NFR BLD AUTO: 0.9 %
NEUTROPHILS # BLD AUTO: 7.2 10E9/L (ref 1.6–8.3)
NEUTROPHILS NFR BLD AUTO: 94.3 %
NRBC # BLD AUTO: 0 10*3/UL
NRBC BLD AUTO-RTO: 0 /100
PHOSPHATE SERPL-MCNC: 3.2 MG/DL (ref 2.5–4.5)
PLATELET # BLD AUTO: 210 10E9/L (ref 150–450)
POTASSIUM SERPL-SCNC: 4.4 MMOL/L (ref 3.4–5.3)
PROT SERPL-MCNC: 6.2 G/DL (ref 6.8–8.8)
RBC # BLD AUTO: 3.71 10E12/L (ref 4.4–5.9)
SARS-COV-2 RNA SPEC QL NAA+PROBE: NOT DETECTED
SODIUM SERPL-SCNC: 134 MMOL/L (ref 133–144)
SPECIMEN SOURCE: NORMAL
WBC # BLD AUTO: 7.6 10E9/L (ref 4–11)

## 2020-07-09 PROCEDURE — 12000000 ZZH R&B MED SURG/OB

## 2020-07-09 PROCEDURE — 25000131 ZZH RX MED GY IP 250 OP 636 PS 637: Performed by: NURSE PRACTITIONER

## 2020-07-09 PROCEDURE — 25000131 ZZH RX MED GY IP 250 OP 636 PS 637: Performed by: HOSPITALIST

## 2020-07-09 PROCEDURE — 25000132 ZZH RX MED GY IP 250 OP 250 PS 637: Performed by: NURSE PRACTITIONER

## 2020-07-09 PROCEDURE — 99232 SBSQ HOSP IP/OBS MODERATE 35: CPT | Performed by: HOSPITALIST

## 2020-07-09 PROCEDURE — 25000128 H RX IP 250 OP 636: Performed by: PHYSICIAN ASSISTANT

## 2020-07-09 PROCEDURE — 00000146 ZZHCL STATISTIC GLUCOSE BY METER IP

## 2020-07-09 PROCEDURE — 25000128 H RX IP 250 OP 636: Performed by: NURSE PRACTITIONER

## 2020-07-09 PROCEDURE — 25800030 ZZH RX IP 258 OP 636: Performed by: NURSE PRACTITIONER

## 2020-07-09 PROCEDURE — 80053 COMPREHEN METABOLIC PANEL: CPT | Performed by: PHYSICIAN ASSISTANT

## 2020-07-09 PROCEDURE — 84100 ASSAY OF PHOSPHORUS: CPT | Performed by: PHYSICIAN ASSISTANT

## 2020-07-09 PROCEDURE — 85025 COMPLETE CBC W/AUTO DIFF WBC: CPT | Performed by: PHYSICIAN ASSISTANT

## 2020-07-09 PROCEDURE — 25000132 ZZH RX MED GY IP 250 OP 250 PS 637: Performed by: PHYSICIAN ASSISTANT

## 2020-07-09 PROCEDURE — 25000125 ZZHC RX 250: Performed by: NURSE PRACTITIONER

## 2020-07-09 RX ADMIN — INSULIN ASPART 9 UNITS: 100 INJECTION, SOLUTION INTRAVENOUS; SUBCUTANEOUS at 18:09

## 2020-07-09 RX ADMIN — CYTARABINE 4100 MG: 2 INJECTION INTRATHECAL; INTRAVENOUS; SUBCUTANEOUS at 19:10

## 2020-07-09 RX ADMIN — INSULIN ASPART 6 UNITS: 100 INJECTION, SOLUTION INTRAVENOUS; SUBCUTANEOUS at 10:27

## 2020-07-09 RX ADMIN — PREDNISOLONE ACETATE 2 DROP: 10 SUSPENSION/ DROPS OPHTHALMIC at 18:39

## 2020-07-09 RX ADMIN — ONDANSETRON HYDROCHLORIDE 8 MG: 8 TABLET, FILM COATED ORAL at 18:38

## 2020-07-09 RX ADMIN — INSULIN GLARGINE 20 UNITS: 100 INJECTION, SOLUTION SUBCUTANEOUS at 21:27

## 2020-07-09 RX ADMIN — DEXAMETHASONE 40 MG: 4 TABLET ORAL at 18:38

## 2020-07-09 RX ADMIN — MAGNESIUM SULFATE HEPTAHYDRATE: 500 INJECTION, SOLUTION INTRAMUSCULAR; INTRAVENOUS at 14:23

## 2020-07-09 RX ADMIN — MAGNESIUM SULFATE HEPTAHYDRATE: 500 INJECTION, SOLUTION INTRAMUSCULAR; INTRAVENOUS at 08:08

## 2020-07-09 RX ADMIN — ROSUVASTATIN CALCIUM 5 MG: 5 TABLET, FILM COATED ORAL at 08:16

## 2020-07-09 RX ADMIN — ENOXAPARIN SODIUM 90 MG: 100 INJECTION SUBCUTANEOUS at 19:49

## 2020-07-09 RX ADMIN — HYDROCORTISONE 10 MG: 10 TABLET ORAL at 19:49

## 2020-07-09 RX ADMIN — FENOFIBRATE 160 MG: 160 TABLET ORAL at 08:16

## 2020-07-09 RX ADMIN — ENOXAPARIN SODIUM 90 MG: 100 INJECTION SUBCUTANEOUS at 08:17

## 2020-07-09 RX ADMIN — INSULIN ASPART 7 UNITS: 100 INJECTION, SOLUTION INTRAVENOUS; SUBCUTANEOUS at 13:10

## 2020-07-09 RX ADMIN — ALLOPURINOL 300 MG: 300 TABLET ORAL at 08:16

## 2020-07-09 RX ADMIN — MAGNESIUM SULFATE HEPTAHYDRATE: 500 INJECTION, SOLUTION INTRAMUSCULAR; INTRAVENOUS at 21:12

## 2020-07-09 RX ADMIN — MAGNESIUM SULFATE HEPTAHYDRATE: 500 INJECTION, SOLUTION INTRAMUSCULAR; INTRAVENOUS at 00:49

## 2020-07-09 RX ADMIN — HYDROCORTISONE 20 MG: 20 TABLET ORAL at 06:58

## 2020-07-09 RX ADMIN — PREDNISOLONE ACETATE 2 DROP: 10 SUSPENSION/ DROPS OPHTHALMIC at 21:29

## 2020-07-09 RX ADMIN — INSULIN ASPART 10 UNITS: 100 INJECTION, SOLUTION INTRAVENOUS; SUBCUTANEOUS at 12:07

## 2020-07-09 ASSESSMENT — ACTIVITIES OF DAILY LIVING (ADL)
ADLS_ACUITY_SCORE: 13

## 2020-07-09 NOTE — PROGRESS NOTES
[facility]    Medicine Progress Note - Hospitalist Service       Date of Admission:  7/8/2020  Assessment & Plan   medical hx as below who is admitted under care of [facility] for 2nd round of chemotherapy. Hospitalist service consulted for medical co-mngt.     Diffuse large B cell non-Hodgkin's lymphoma  Pancytopenia  Follows with Dr. Olson at [facility]. Initially diagnosed with B-cell lymphoma in 2014 and underwent R-CHOP. Diagnosed with recurrent follicular lymphoma in 2017 and completed chemo/XRT. Found to have new masses and lymphadenopathy in May 2020, and was found to have diffuse large B-cell lymphoma. Initiated on R ICE on 6/5/2020, but chemo was discontinued on 6/7 due to Ifosfamide encephalopathy.  - Metabolic panel, CBC, uric acid as below  - Cares per [facility].  - Monitor for tumor lysis syndrome.  - Cont PTA allopurinol.     Osteomyelitis of left second and third toes s/p distal amputation (5/22/2020) and s/p partial amputation through proximal phalanx (7/1/2020)  Bone ctx from 7.1 with light growth Candida parapsilosis.  - Podiatry consulted.  - Cont PTA Bactrim; appears to be completed evening 7/9.    IDDM2 with peripheral vascular disease  [PTA: Lantus 15u QHS, Novolog TID AC based off sliding scale, metformin 1000mg QAM and 500mg with dinner]  - PTA lantus actually higher than med rec, per patient - will order lantus 20 units for tonight and he can decide to adjust that based on his numbers and what he would usually do at home  - QID AC HS glucose monitoring and increased to high res SSI.  - Hold metformin.  - 7/9 - sugars quite over goal today 300s and then 400s. Appears he was put on regular diet initially and was enjoying the wide variety that was available. Switched to mod cho diet.      Suspected adrenal insufficiency  Diagnosed during adm 6/5 and in setting large bilateral adrenal masses with new hypotension, nausea, vomiting and hyperkalemia. Noted normal cortisol  level and ACTH elevated at 140.    - Continues on PTA hydrocortisone 20 mg each morning and 10 mg nightly.     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 Vasc surg did not rec intervention. Recommended life long anticoagulation. Presumed hypercoag related to malignancy. Rivaroxaban has been on hold since at least 7/1 for toe amputation surgery.  - Cont holding Rivaroxaban due to anticipated drop/fluctuations in platelet counts and more difficult to reverse.  - Lovenox 1mg/kg Q12H for anticoag. Monitor plt daily and hold for plt <50K.     Melanoma, right ear lobe  Biopsy proven.  - Follow up scheduled on Monday, 7/13/2020.     Hx hypokalemia  - Hold PTA KLOR 40meq QAM and 20meq QHS for now.  - Follow kidney function and replace per protocol.     JESSICA    Chronic, stable on CPAP.     Dyslipidemia   Chronic, stable on rosuvastatin and fenofibrate.     Asymptomatic COVID19 adm screening   Obtained 7/8/2020.      Diet: Moderate Consistent CHO Diet    DVT Prophylaxis: Enoxaparin (Lovenox) SQ  Champion Catheter: not present  Code Status: Full Code           Disposition Plan   Expected discharge: defer to primary - Oncology  Entered: Brody Pena MD 07/09/2020, 3:28 PM       The patient's care was discussed with the Bedside Nurse and Patient.    Brody Pena MD  Hospitalist Service  [facility]    ______________________________________________________________________    Interval History   Patient seen and examined earlier today.  No new complaints and is feeling quite well actually.  Denies fevers, chills, shortness of breath or pain.  Sugars unfortunately as high as 300s for this.  Apparently patient was actually on a regular diet which have not changed her moderate carb diet.  Also increased nightly glargine and boosted to high resistance sliding scale insulin.  Majority of cares as per primary team-oncology.    Data  reviewed today: I reviewed all medications, new labs and imaging results over the last 24 hours. I personally reviewed no images or EKG's today.    Physical Exam   Vital Signs: Temp: 95.6  F (35.3  C) Temp src: Oral BP: 110/56 Pulse: 86 Heart Rate: 99 Resp: 18 SpO2: 97 % O2 Device: None (Room air)    Weight: 190 lbs 0 oz    Gen: NAD, pleasant  HEENT: Normocephalic, EOMI, MMM  Resp: no crackles,  no wheezes, no increased work of resp  CV: S1S2 heard, reg rhythm, reg rate, no pedal edema  Abdo: soft, nontender, nondistended, bowel sounds present  Ext: calves nontender, well perfused  Neuro: AAOx3, CN grossly intact, no facial asymmetry      Data   Recent Labs   Lab 07/09/20  0600 07/08/20  1005   WBC 7.6 5.3   HGB 10.4* 10.2*   MCV 89 90    196    137   POTASSIUM 4.4 4.4   CHLORIDE 104 107   CO2 21 25   BUN 15 16   CR 0.75 0.84   ANIONGAP 9 5   JEFF 9.0 8.9   * 201*   ALBUMIN 3.1* 3.0*   PROTTOTAL 6.2* 5.8*   BILITOTAL 0.4 0.3   ALKPHOS 56 54   ALT 21 21   AST 12 14     No results found for this or any previous visit (from the past 24 hour(s)).

## 2020-07-09 NOTE — PLAN OF CARE
Patient is A/ox4. VSS on RA except soft BP (baseline). Denied pain. Regular diet, good appetite. Blood sugars elevated 204 and 327, sliding scale insulin given. Patient tolerated Rixtuan at rapid infusion rate ealier today. Cisplatin currently infusing through R port at 95.8mL/hr with speciality fluids at 150mL/hr, patient tolerating. L foot in ACE wrap CDI. Continent of bowel and bladder. Up independently. Melatonin given at HS. Plan for Cytarabine infusion tomorrow evening. Nursing to continue to monitor.

## 2020-07-09 NOTE — PLAN OF CARE
A & O x 4, VSS, no c/o pain. Up ind. Good urine output. Cisplatin infusing via port, good blood return noted and tolerating well. L foot bandaged in ACE wrap and CDI. Plan for Cytarabine today. Discharge once chemo cycle complete.

## 2020-07-09 NOTE — PROGRESS NOTES
MD Notification    Notified Person: MD    Notified Person Name: Daquan    Notification Date/Time: 2130 7/8    Notification Interaction: On call    Purpose of Notification: Pt requesting Tylenol PM for sleep, usually takes 2 tablets 500mg each (1,000mg total)    Orders Received: Melatonin 1mg HS PRN    Comments:

## 2020-07-09 NOTE — PROGRESS NOTES
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  --s/p RCHOP   -- He responded well to the chemotherapy with a near complete remission after six cycles. Maintenance rituximab was started in June 2015 and completed in February 2017.   --On 11/17/17 the patient underwent a thoracotomy for an enlarging right infrahilar mass. 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 The tumor was handled as a carcinoma but the pathology is most consistent with recurrent grade 2-3 follicular lymphoma.   --On 12/14/17 he began bendamustine with rituximab chemotherapy.   --He completed 6 cycles of BR and on 6/4/18 a CT revealed stable mild lymphadenopathy with mild splenomegaly and a slight increase in the IVC thrombus.   --A CT 6/3/19 revealed an increase in the right hilar and pretracheal adenopathy with stable nodular scarring of the right lung base, without other new disease. A PET/CT 6/13/19 confirmed the hypermetabolic right infrahilar mass and revealed moderate metabolic activity within a few small nodules just deep to tire pleura in the posterior aspect of tire mid right  hemithorax, suspicious for neoplasm, without other disease.   --As all disease could be encompassed in one radiation field, he completed 3000 cGy of radiotherapy to the right thoracic region 6/27/19 - 7/18/19.  --CT C/A/P 5/14/20 revealed marked increase in the right lower lobe pulmonary mass with new subcarinal lymphadenopathy, bilateral new adrenal masses, and soft tissue tumor deposits in the abdomen  --CT guided adrenal biopsy 5/27/20 revealed transformation to a diffuse large B cell lymphoma, germinal  center type, 100% Ki-67 positive,  with positive immunohistochemical staining for BCL-2, BCL-6, and C-MYC suggestive of a possible triple-hit lymphoma  --R ICE chemotherapy 6/5/2020 but complications of ifosphamide neurotoxicity discontinued 6/7  --began chemotherapy with R-DHAP 7/8.   --plan on body Neulasta to be placed day of discharge  --CBC Monday, Wednesday and Friday after chemotherapy completed  --Transfusional support with irradiated blood products  --1 week post-hospital follow up     Recent finding of right earlobe melanoma 9mm per shave bx. (7/7/20)  -- consult with Dr. Reza planned.     Osteomyelitis  --Received amoxicillin/clavulanic acid after recent hospital discharge 6/21 x 7 days  --Debridement with Dr. Humphrey on 7/1.  --Completed antibiotic course.   --Outpatient podiatry follow up.     Possible Adrenal Insufficiency  --Large bilateral adrenal masses with mild hypotension, nausea, vomiting, and hyperkalemia  --Continue empiric hydrocortisone 20 mg q 8 AM and 10 mg every 4 p.m.  --Endocrinology follow-up as outpatient     Hypercoagulability  --On 2/1/18 he was diagnosed with an IVC thrombus, 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 He is tolerating anticoagulation with Lovenox well. We discussed alternative anticoagulants such as Coumadin (less effective) or DOAC and as he has completed chemotherapy with a good response of his malignancy in 6/2018 he was switched to Xarelto.  --A vascular surgery consultation for the persistent IVC thrombus concurred with anticoagulation for 6-12 months or longer without other intervention.  --Discussed with hospitalist team.  Cont holding Rivaroxaban due to anticipated drop/fluctuations in platelet counts and more difficult to reverse.  - Lovenox 1mg/kg Q12H for anticoag. Monitor plt daily and hold for plt <50K.     Anemia  --Mild chemotherapy and neoplasm induced anemia  --5/20/20 iron levels consistent with chronic disease  --will be monitored.      Diabetes  --appreciate hospitalist care     Depression  --Reactive, supportive care encouraged              DVT Prophylaxis: Enoxaparin (Lovenox) SQ  Code Status: Full Code    Mike Olson    Interval History   Tolerating chemotherapy well    Physical Exam   Temp: 96.5  F (35.8  C) Temp src: Oral BP: 107/58 Pulse: 86 Heart Rate: 87 Resp: 18 SpO2: 94 % O2 Device: None (Room air)    Vitals:    07/08/20 0833   Weight: 86.2 kg (190 lb)     Vital Signs with Ranges  Temp:  [95.6  F (35.3  C)-98.9  F (37.2  C)] 96.5  F (35.8  C)  Pulse:  [84-98] 86  Heart Rate:  [87-93] 87  Resp:  [16-18] 18  BP: (101-129)/(54-65) 107/58  SpO2:  [94 %-100 %] 94 %  I/O last 3 completed shifts:  In: 494 [P.O.:240; I.V.:138; IV Piggyback:116]  Out: 4000 [Urine:4000]    Constitutional: awake, cooperative, no acute distress  GI: soft, non-distended, non-tender, no masses palpated  Musculoskeletal: no pedal edema    Medications     - MEDICATION INSTRUCTIONS -       - MEDICATION INSTRUCTIONS -       IV infusion builder WITH LARGE additive list 150 mL/hr at 07/09/20 0808     sodium chloride         allopurinol  300 mg Oral Daily     CISplatin (PLATINOL) infusion  200 mg Intravenous Once     cytarabine (CYTOSAR) infusion  2 g/m2 (Treatment Plan Recorded) Intravenous Q12H     dexamethasone  40 mg Oral Q24H     [START ON 7/10/2020] dextrose 5% water  10-20 mL Intracatheter Daily at 8 pm     [START ON 7/10/2020] dextrose 5% water  10-20 mL Intracatheter Daily at 8 pm     enoxaparin ANTICOAGULANT  1 mg/kg Subcutaneous Q12H     fenofibrate  160 mg Oral Daily     [START ON 7/10/2020] filgrastim (NEUPOGEN/GRANIX) intravenous  5 mcg/kg (Treatment Plan Recorded) Intravenous Daily at 8 pm     heparin  5 mL Intracatheter Q28 Days     heparin lock flush  5-10 mL Intracatheter Q24H     hydrocortisone  10 mg Oral QPM     hydrocortisone  20 mg Oral QAM AC     insulin aspart  1-7 Units Subcutaneous TID AC     insulin aspart  1-5 Units Subcutaneous At Bedtime      insulin aspart   Subcutaneous QAM AC     insulin aspart   Subcutaneous Daily with lunch     insulin aspart   Subcutaneous Daily with supper     insulin glargine  15 Units Subcutaneous At Bedtime     ondansetron  8 mg Oral Q12H     prednisoLONE acetate  2 drop Both Eyes 4x Daily     rosuvastatin  5 mg Oral Daily       Data   Results for orders placed or performed during the hospital encounter of 07/08/20 (from the past 24 hour(s))   CBC with platelets differential   Result Value Ref Range    WBC 5.3 4.0 - 11.0 10e9/L    RBC Count 3.61 (L) 4.4 - 5.9 10e12/L    Hemoglobin 10.2 (L) 13.3 - 17.7 g/dL    Hematocrit 32.3 (L) 40.0 - 53.0 %    MCV 90 78 - 100 fl    MCH 28.3 26.5 - 33.0 pg    MCHC 31.6 31.5 - 36.5 g/dL    RDW 24.6 (H) 10.0 - 15.0 %    Platelet Count 196 150 - 450 10e9/L    Diff Method Automated Method     % Neutrophils 78.8 %    % Lymphocytes 11.3 %    % Monocytes 7.5 %    % Eosinophils 0.9 %    % Basophils 0.4 %    % Immature Granulocytes 1.1 %    Nucleated RBCs 0 0 /100    Absolute Neutrophil 4.2 1.6 - 8.3 10e9/L    Absolute Lymphocytes 0.6 (L) 0.8 - 5.3 10e9/L    Absolute Monocytes 0.4 0.0 - 1.3 10e9/L    Absolute Eosinophils 0.1 0.0 - 0.7 10e9/L    Absolute Basophils 0.0 0.0 - 0.2 10e9/L    Abs Immature Granulocytes 0.1 0 - 0.4 10e9/L    Absolute Nucleated RBC 0.0    Comprehensive metabolic panel   Result Value Ref Range    Sodium 137 133 - 144 mmol/L    Potassium 4.4 3.4 - 5.3 mmol/L    Chloride 107 94 - 109 mmol/L    Carbon Dioxide 25 20 - 32 mmol/L    Anion Gap 5 3 - 14 mmol/L    Glucose 201 (H) 70 - 99 mg/dL    Urea Nitrogen 16 7 - 30 mg/dL    Creatinine 0.84 0.66 - 1.25 mg/dL    GFR Estimate 90 >60 mL/min/[1.73_m2]    GFR Estimate If Black >90 >60 mL/min/[1.73_m2]    Calcium 8.9 8.5 - 10.1 mg/dL    Bilirubin Total 0.3 0.2 - 1.3 mg/dL    Albumin 3.0 (L) 3.4 - 5.0 g/dL    Protein Total 5.8 (L) 6.8 - 8.8 g/dL    Alkaline Phosphatase 54 40 - 150 U/L    ALT 21 0 - 70 U/L    AST 14 0 - 45 U/L   Lactate  Dehydrogenase   Result Value Ref Range    Lactate Dehydrogenase 136 85 - 227 U/L   Magnesium   Result Value Ref Range    Magnesium 1.8 1.6 - 2.3 mg/dL   Phosphorus   Result Value Ref Range    Phosphorus 2.6 2.5 - 4.5 mg/dL   Uric acid   Result Value Ref Range    Uric Acid 2.5 (L) 3.5 - 7.2 mg/dL   Glucose by meter   Result Value Ref Range    Glucose 179 (H) 70 - 99 mg/dL   Glucose by meter   Result Value Ref Range    Glucose 204 (H) 70 - 99 mg/dL   Glucose by meter   Result Value Ref Range    Glucose 327 (H) 70 - 99 mg/dL   Glucose by meter   Result Value Ref Range    Glucose 340 (H) 70 - 99 mg/dL   CBC with platelets differential   Result Value Ref Range    WBC 7.6 4.0 - 11.0 10e9/L    RBC Count 3.71 (L) 4.4 - 5.9 10e12/L    Hemoglobin 10.4 (L) 13.3 - 17.7 g/dL    Hematocrit 33.1 (L) 40.0 - 53.0 %    MCV 89 78 - 100 fl    MCH 28.0 26.5 - 33.0 pg    MCHC 31.4 (L) 31.5 - 36.5 g/dL    RDW 24.0 (H) 10.0 - 15.0 %    Platelet Count 210 150 - 450 10e9/L    Diff Method Automated Method     % Neutrophils 94.3 %    % Lymphocytes 3.8 %    % Monocytes 0.9 %    % Eosinophils 0.1 %    % Basophils 0.1 %    % Immature Granulocytes 0.8 %    Nucleated RBCs 0 0 /100    Absolute Neutrophil 7.2 1.6 - 8.3 10e9/L    Absolute Lymphocytes 0.3 (L) 0.8 - 5.3 10e9/L    Absolute Monocytes 0.1 0.0 - 1.3 10e9/L    Absolute Eosinophils 0.0 0.0 - 0.7 10e9/L    Absolute Basophils 0.0 0.0 - 0.2 10e9/L    Abs Immature Granulocytes 0.1 0 - 0.4 10e9/L    Absolute Nucleated RBC 0.0    Comprehensive metabolic panel   Result Value Ref Range    Sodium 134 133 - 144 mmol/L    Potassium 4.4 3.4 - 5.3 mmol/L    Chloride 104 94 - 109 mmol/L    Carbon Dioxide 21 20 - 32 mmol/L    Anion Gap 9 3 - 14 mmol/L    Glucose 318 (H) 70 - 99 mg/dL    Urea Nitrogen 15 7 - 30 mg/dL    Creatinine 0.75 0.66 - 1.25 mg/dL    GFR Estimate >90 >60 mL/min/[1.73_m2]    GFR Estimate If Black >90 >60 mL/min/[1.73_m2]    Calcium 9.0 8.5 - 10.1 mg/dL    Bilirubin Total 0.4 0.2 - 1.3  mg/dL    Albumin 3.1 (L) 3.4 - 5.0 g/dL    Protein Total 6.2 (L) 6.8 - 8.8 g/dL    Alkaline Phosphatase 56 40 - 150 U/L    ALT 21 0 - 70 U/L    AST 12 0 - 45 U/L   Phosphorus   Result Value Ref Range    Phosphorus 3.2 2.5 - 4.5 mg/dL

## 2020-07-10 VITALS
TEMPERATURE: 96.5 F | HEIGHT: 68 IN | RESPIRATION RATE: 18 BRPM | WEIGHT: 190 LBS | SYSTOLIC BLOOD PRESSURE: 119 MMHG | OXYGEN SATURATION: 96 % | DIASTOLIC BLOOD PRESSURE: 62 MMHG | BODY MASS INDEX: 28.79 KG/M2 | HEART RATE: 78 BPM

## 2020-07-10 LAB
BACTERIA SPEC CULT: ABNORMAL
BACTERIA SPEC CULT: ABNORMAL
BASOPHILS # BLD AUTO: 0 10E9/L (ref 0–0.2)
BASOPHILS NFR BLD AUTO: 0 %
DIFFERENTIAL METHOD BLD: ABNORMAL
EOSINOPHIL # BLD AUTO: 0 10E9/L (ref 0–0.7)
EOSINOPHIL NFR BLD AUTO: 0 %
ERYTHROCYTE [DISTWIDTH] IN BLOOD BY AUTOMATED COUNT: 24.1 % (ref 10–15)
GLUCOSE BLDC GLUCOMTR-MCNC: 221 MG/DL (ref 70–99)
GLUCOSE BLDC GLUCOMTR-MCNC: 283 MG/DL (ref 70–99)
HCT VFR BLD AUTO: 29.7 % (ref 40–53)
HGB BLD-MCNC: 9.6 G/DL (ref 13.3–17.7)
IMM GRANULOCYTES # BLD: 0 10E9/L (ref 0–0.4)
IMM GRANULOCYTES NFR BLD: 0.4 %
LYMPHOCYTES # BLD AUTO: 0.2 10E9/L (ref 0.8–5.3)
LYMPHOCYTES NFR BLD AUTO: 2.5 %
Lab: ABNORMAL
MCH RBC QN AUTO: 28.9 PG (ref 26.5–33)
MCHC RBC AUTO-ENTMCNC: 32.3 G/DL (ref 31.5–36.5)
MCV RBC AUTO: 90 FL (ref 78–100)
MONOCYTES # BLD AUTO: 0.1 10E9/L (ref 0–1.3)
MONOCYTES NFR BLD AUTO: 1.3 %
NEUTROPHILS # BLD AUTO: 7.3 10E9/L (ref 1.6–8.3)
NEUTROPHILS NFR BLD AUTO: 95.8 %
NRBC # BLD AUTO: 0 10*3/UL
NRBC BLD AUTO-RTO: 0 /100
PHOSPHATE SERPL-MCNC: 4.1 MG/DL (ref 2.5–4.5)
PLATELET # BLD AUTO: 198 10E9/L (ref 150–450)
RBC # BLD AUTO: 3.32 10E12/L (ref 4.4–5.9)
SPECIMEN SOURCE: ABNORMAL
WBC # BLD AUTO: 7.7 10E9/L (ref 4–11)

## 2020-07-10 PROCEDURE — 25000132 ZZH RX MED GY IP 250 OP 250 PS 637: Performed by: PHYSICIAN ASSISTANT

## 2020-07-10 PROCEDURE — 25000132 ZZH RX MED GY IP 250 OP 250 PS 637: Performed by: HOSPITALIST

## 2020-07-10 PROCEDURE — 25000128 H RX IP 250 OP 636: Performed by: INTERNAL MEDICINE

## 2020-07-10 PROCEDURE — 84100 ASSAY OF PHOSPHORUS: CPT | Performed by: PHYSICIAN ASSISTANT

## 2020-07-10 PROCEDURE — 25000128 H RX IP 250 OP 636: Performed by: NURSE PRACTITIONER

## 2020-07-10 PROCEDURE — 25000128 H RX IP 250 OP 636: Performed by: PHYSICIAN ASSISTANT

## 2020-07-10 PROCEDURE — 99232 SBSQ HOSP IP/OBS MODERATE 35: CPT | Performed by: HOSPITALIST

## 2020-07-10 PROCEDURE — 25800030 ZZH RX IP 258 OP 636: Performed by: NURSE PRACTITIONER

## 2020-07-10 PROCEDURE — 85025 COMPLETE CBC W/AUTO DIFF WBC: CPT | Performed by: PHYSICIAN ASSISTANT

## 2020-07-10 PROCEDURE — 25000132 ZZH RX MED GY IP 250 OP 250 PS 637: Performed by: NURSE PRACTITIONER

## 2020-07-10 PROCEDURE — 00000146 ZZHCL STATISTIC GLUCOSE BY METER IP

## 2020-07-10 RX ORDER — PREDNISOLONE ACETATE 10 MG/ML
2 SUSPENSION/ DROPS OPHTHALMIC 4 TIMES DAILY
Qty: 1 BOTTLE | Refills: 0 | Status: SHIPPED | OUTPATIENT
Start: 2020-07-10 | End: 2020-07-14

## 2020-07-10 RX ORDER — DEXAMETHASONE 4 MG/1
40 TABLET ORAL EVERY 24 HOURS
Qty: 10 TABLET | Refills: 0 | Status: SHIPPED | OUTPATIENT
Start: 2020-07-11 | End: 2020-09-15

## 2020-07-10 RX ADMIN — ONDANSETRON HYDROCHLORIDE 8 MG: 8 TABLET, FILM COATED ORAL at 05:36

## 2020-07-10 RX ADMIN — ENOXAPARIN SODIUM 90 MG: 100 INJECTION SUBCUTANEOUS at 09:54

## 2020-07-10 RX ADMIN — MAGNESIUM SULFATE HEPTAHYDRATE: 500 INJECTION, SOLUTION INTRAMUSCULAR; INTRAVENOUS at 10:06

## 2020-07-10 RX ADMIN — Medication 1 MG: at 00:00

## 2020-07-10 RX ADMIN — CYTARABINE 4100 MG: 2 INJECTION INTRATHECAL; INTRAVENOUS; SUBCUTANEOUS at 06:31

## 2020-07-10 RX ADMIN — ALLOPURINOL 300 MG: 300 TABLET ORAL at 09:54

## 2020-07-10 RX ADMIN — MAGNESIUM SULFATE HEPTAHYDRATE: 500 INJECTION, SOLUTION INTRAMUSCULAR; INTRAVENOUS at 03:45

## 2020-07-10 RX ADMIN — FENOFIBRATE 160 MG: 160 TABLET ORAL at 09:54

## 2020-07-10 RX ADMIN — INSULIN ASPART 5 UNITS: 100 INJECTION, SOLUTION INTRAVENOUS; SUBCUTANEOUS at 09:55

## 2020-07-10 RX ADMIN — HEPARIN SODIUM (PORCINE) LOCK FLUSH IV SOLN 100 UNIT/ML 5 ML: 100 SOLUTION at 12:44

## 2020-07-10 RX ADMIN — ROSUVASTATIN CALCIUM 5 MG: 5 TABLET, FILM COATED ORAL at 09:54

## 2020-07-10 RX ADMIN — HYDROCORTISONE 20 MG: 20 TABLET ORAL at 06:48

## 2020-07-10 RX ADMIN — PREDNISOLONE ACETATE 2 DROP: 10 SUSPENSION/ DROPS OPHTHALMIC at 10:01

## 2020-07-10 ASSESSMENT — ACTIVITIES OF DAILY LIVING (ADL)
ADLS_ACUITY_SCORE: 13

## 2020-07-10 NOTE — PROGRESS NOTES
[facility]    Medicine Progress Note - Hospitalist Service       Date of Admission:  7/8/2020  Assessment & Plan   medical hx as below who is admitted under care of [facility] for 2nd round of chemotherapy. Hospitalist service consulted for medical co-mngt.     Diffuse large B cell non-Hodgkin's lymphoma  Pancytopenia  Follows with Dr. Olson at [facility]. Initially diagnosed with B-cell lymphoma in 2014 and underwent R-CHOP. Diagnosed with recurrent follicular lymphoma in 2017 and completed chemo/XRT. Found to have new masses and lymphadenopathy in May 2020, and was found to have diffuse large B-cell lymphoma. Initiated on R ICE on 6/5/2020, but chemo was discontinued on 6/7 due to Ifosfamide encephalopathy.  - Metabolic panel, CBC, uric acid as below  - Cares per [facility].  - Monitor for tumor lysis syndrome.  - Cont PTA allopurinol.  - IVF with chemo as per Oncology discretion     Osteomyelitis of left second and third toes s/p distal amputation (5/22/2020) and s/p partial amputation through proximal phalanx (7/1/2020)  Bone ctx from 7.1 with light growth Candida parapsilosis.  - Podiatry consulted.  - Cont PTA Bactrim; appears to be completed evening 7/9.    IDDM2 with peripheral vascular disease  [PTA: Lantus 15u QHS, Novolog TID AC based off sliding scale, metformin 1000mg QAM and 500mg with dinner]  - PTA lantus actually higher than med rec, per patient - will order lantus 22 units for tonight and he can decide to adjust that based on his numbers and what he would usually do at home  - QID AC HS glucose monitoring and increased to high res SSI.  - Hold metformin.  - 7/9 - sugars quite over goal today 300s and then 400s. Appears he was put on regular diet initially and was enjoying the wide variety that was available. Switched to mod cho diet.   - 7/10 - sugars improved, still on high side at low 200s, increase glargine to 22 tonight  - If discharging today, can resume prior regimen  for diabetic management and follow up with PCP/Oncology as scheduled.      Suspected adrenal insufficiency  Diagnosed during adm 6/5 and in setting large bilateral adrenal masses with new hypotension, nausea, vomiting and hyperkalemia. Noted normal cortisol level and ACTH elevated at 140.    - Continues on PTA hydrocortisone 20 mg each morning and 10 mg nightly.     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 Vasc surg did not rec intervention. Recommended life long anticoagulation. Presumed hypercoag related to malignancy. Rivaroxaban has been on hold since at least 7/1 for toe amputation surgery.  - Cont holding Rivaroxaban due to anticipated drop/fluctuations in platelet counts and more difficult to reverse.  - Lovenox 1mg/kg Q12H for anticoag. Monitor plt daily and hold for plt <50K.  - Defer to Hematology/Oncology regarding anticoagulation plan at discharge.     Melanoma, right ear lobe  Biopsy proven.  - Follow up scheduled on Monday, 7/13/2020.     Hx hypokalemia  - Hold PTA KLOR 40meq QAM and 20meq QHS for now.  - Follow kidney function and replace per protocol.     JESSICA    Chronic, stable on CPAP.     Dyslipidemia   Chronic, stable on rosuvastatin and fenofibrate.     Asymptomatic COVID19 adm screening   Obtained 7/8/2020 - NEGATIVE      Diet: Moderate Consistent CHO Diet    DVT Prophylaxis: Enoxaparin (Lovenox) SQ  Champion Catheter: not present  Code Status: Full Code           Disposition Plan   Expected discharge: per primary (onc)  Entered: Brody Pena MD 07/10/2020, 8:31 AM       The patient's care was discussed with the Bedside Nurse and Patient.    Brody Pena MD  Hospitalist Service  [facility]    ______________________________________________________________________    Interval History   Seen and examined. He was resting comfortably upon my arrival.  He denies any new issues - no fevers, chills,  sob, or pain reported. Hoping to discharge today. Sugars a bit better.     Data reviewed today: I reviewed all medications, new labs and imaging results over the last 24 hours. I personally reviewed no images or EKG's today.    Physical Exam   Vital Signs: Temp: 96.5  F (35.8  C) Temp src: Oral BP: 119/62 Pulse: 78 Heart Rate: 80 Resp: 18 SpO2: 96 % O2 Device: None (Room air)    Weight: 190 lbs 0 oz    Gen: NAD, pleasant  HEENT: Normocephalic, EOMI, MMM  Resp: no crackles,  no wheezes, no increased work of resp  CV: S1S2 heard, reg rhythm, reg rate, no pedal edema  Abdo: soft, nontender, nondistended, bowel sounds present  Ext: calves nontender, well perfused  Neuro: AAOx3, CN grossly intact, no facial asymmetry      Data   Recent Labs   Lab 07/10/20  0540 07/09/20  0600 07/08/20  1005   WBC 7.7 7.6 5.3   HGB 9.6* 10.4* 10.2*   MCV 90 89 90    210 196   NA  --  134 137   POTASSIUM  --  4.4 4.4   CHLORIDE  --  104 107   CO2  --  21 25   BUN  --  15 16   CR  --  0.75 0.84   ANIONGAP  --  9 5   JEFF  --  9.0 8.9   GLC  --  318* 201*   ALBUMIN  --  3.1* 3.0*   PROTTOTAL  --  6.2* 5.8*   BILITOTAL  --  0.4 0.3   ALKPHOS  --  56 54   ALT  --  21 21   AST  --  12 14     No results found for this or any previous visit (from the past 24 hour(s)).

## 2020-07-10 NOTE — DISCHARGE SUMMARY
DISCHARGE SUMMARY    ADMISSION DATE:   7/8/2020    DISCHARGE DATE:   7/10/2020    ADMISSION DIAGNOSIS:   Lymphoma, chemotherapy  Hypertension  Hyperlipidemia  Adrenal insufficiency  Diabetes  Foot wound  Hypokalemia  Melanoma  Hypercoagulability  CKD  Elevated LFT    DISCHARGE DIAGNOSIS:   Lymphoma, chemotherapy  Hypertension  Hyperlipidemia  Adrenal insufficiency  Diabetes  Foot wound  Hypokalemia  Melanoma  Hypercoagulability  CKD  Elevated LFT    HISTORY OF PRESENT ILLNESS (taken from admission H&P):     This patient is a 70 year old male with a four- to eight-week history of increasing dyspnea, mild anorexia, and weight loss.  On evaluation, a large right pleural effusion was detected, thoracentesis was performed with only slight improvement of his dyspnea and the cytology from the pleural fluid was benign.     A CT scan of the chest revealed extensive bulky 9-cm right paratracheal lymphadenopathy with additional mediastinal, bilateral pulmonary hilar, and upper abdominal, retroperitoneal lymphadenopathy.  A CT-guided biopsy of the retroperitoneal lymph node revealed a CD20-positive, B-cell non-Hodgkin's lymphoma, though not enough material was available for subtyping.     An excisional left supraclavicular lymph node biopsy confirmed grade III follicular lymphoma.     PET/CT scanning confirmed extensive lymphadenopathy in the neck, chest, abdomen, and pelvis. A bone marrow biopsy was normal. An echocardiogram was normal as was hepatitis and HIV testing.     His first cycle of chemotherapy with RCHOP was administered in December 2014. A CT scan in February 2015 revealed an excellent partial remission after three cycles of chemotherapy, and a follow up CT scan in April 2015 revealed a continued response.     He completed six cycles of RCHOP chemotherapy with an excellent response confirmed by CT scanning in April 2015 and has completed two years of every two month maintenance rituximab.     A CT scan in October  2015 confirmed a continued response without any evidence of progressive disease.     A follow up CT scan in October 2016 remained unchanged without any evidence of progressive malignancy.     A CT scan in October 2017 revealed an enlarging 3 cm infrahilar mass. This is in a location different than his original nodes from 2014.     A CT guided biopsy of the hilar mass was nondiagnostic.     A PET/CT scan did not reveal any additional disease.     On 11/17/17 the patient underwent a thoracotomy. 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 The tumor was handled as a carcinoma, but the pathology is most consistent with recurrent grade 2-3 follicular lymphoma.     On 12/14/17 he began bendamustine with rituximab chemotherapy.     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     He has been anticoagulated with Lovenox without complication.     A CT on 4/9/18 revealed an improved but not resolved IVC thrombus, with mild stable mediastinal nodes. An inflammatory pulmonary infiltrate was noted.     On 6/4/18 after 6 cycles of BR chemotherapy a CT revealed stable mild lymphadenopathy with mild splenomegaly and a slight increase in the IVC thrombus.     Lovenox was converted to Xarelto in 6/2018.     A CT 11/28/18 revealed stable mild lymphadenopathy with one new 2 x1.5 cm right hilar node and improved IVC thrombus.     A CT 6/3/19 revealed an increase in the right hilar and pretracheal adenopathy with stable nodular scarring of the right lung base, without other new disease.     A PET/CT 6/13/19 confirmed the hypermetabolic right infrahilar mass and revealed moderate metabolic activity within a few small nodules just deep to tire pleura in the posterior aspect of tire mid right  hemithorax, suspicious for neoplasm, without other disease.     As all disease could be encompassed in  one radiation field, he completed 3000 cGy of radiotherapy to the right thoracic region 6/27/19 - 7/18/19.     A CT C/A/P 9/23/19 revealed improvement of disease with mild right lung radiation changes.     CT C/A/P 3/9/20 revealed increased  consolidation in the right lower lobe extending to the right hilum and the pleural surfaces without other new concern.     CT C/A/P 5/14/20 revealed marked increase in the right lower lobe pulmonary mass with new subcarinal lymphadenopathy, bilateral new adrenal masses, and soft tissue tumor deposits in the abdomen.     CT guided adrenal biopsy 5/27/20 revealed transformation to a diffuse large B cell lymphoma, germinal center type, 100 % KI-67 +, with positive immunohistochemical staining for BCL-2, BCL-6, and C-MYC are suggestive of a possible triple-hit lymphoma     --R ICE chemotherapy 6/5/2020 but complications of ifosphamide neurotoxicity discontinued 6/7  --filgrastim 6/9     --plan for new chemotherapy with R-DHAP 7/8.     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  Chemotherapy was initiated and well-tolerated.  He did not have trouble with nausea or vomiting.  No neurologic toxicities.    He received rituximab 375 mg per metered squared (800 mg) on 7/8/2020.  Dexamethasone was initiated 40 mg days 1 through 4.  Infusional cisplatin 100 mg per metered squared (200 mg) was given over 24 hours on 7/8/2020.  Cytarabine 2 g per metered squared (4100 mg) was given every 12 hours for 2 doses beginning on 7/9/2020.    He was seen in consultation by the hospitalist service for help managing blood pressure and diabetes.  He did require some adjustments in his insulin therapy due to corticosteroid use.    He was seen by podiatry who looked at his foot post surgery.  They did not feel he needed further antibiotics.  Cultures were negative.  He has ongoing follow-up  in the podiatry clinic.    On the day of discharge Pastor is feeling well.  He is going to go to the clinic today to have pegfilgrastim.  He will have labs 3 times weekly.  He will be seen next week.  Likely admission to the hospital again in 3 weeks.    CONSULTATIONS:   Consultation during this admission received from internal medicine and podiatry    LABS/PROCEDURES/IMAGING:   Most Recent 3 CBC's:  Recent Labs   Lab Test 07/10/20  0540 07/09/20  0600 07/08/20  1005   WBC 7.7 7.6 5.3   HGB 9.6* 10.4* 10.2*   MCV 90 89 90    210 196      Most Recent 3 BMP's:  Recent Labs   Lab Test 07/09/20  0600 07/08/20  1005 06/20/20  0605 06/19/20  0627    137  --  138   POTASSIUM 4.4 4.4 3.9 3.3*   CHLORIDE 104 107  --  107   CO2 21 25  --  29   BUN 15 16  --  6*   CR 0.75 0.84  --  0.72   ANIONGAP 9 5  --  2*   JEFF 9.0 8.9  --  8.2*   * 201*  --  95     Most Recent 3 INR's:  Recent Labs   Lab Test 05/27/20  0800 12/10/18  1015 11/14/17  1005   INR 1.22* 0.99 0.92     Most Recent 2 LFT's:  Recent Labs   Lab Test 07/09/20  0600 07/08/20  1005   AST 12 14   ALT 21 21   ALKPHOS 56 54   BILITOTAL 0.4 0.3     Most Recent 6 Bacteria Isolates From Any Culture (See EPIC Reports for Culture Details):  Recent Labs   Lab Test 07/01/20  1302 06/17/20  1134 06/17/20  0950 11/13/14  1330   CULT On day 7, isolated in broth only:  Staphylococcus lugdunensis  not isolated or reported on routine culture  *  Culture in progress  Light growth  Candida parapsilosis complex  Susceptibility testing not routinely done  * No growth No growth No growth       Results for orders placed or performed during the hospital encounter of 07/01/20   XR Foot Port Left 2 Views    Narrative    LEFT FOOT PORTABLE TWO VIEWS  7/1/2020 1:57 PM     HISTORY: Status post revision second and third toe amputation.    COMPARISON: None.      Impression    IMPRESSION: There has been revision amputation of the second and third  toes; now the amputation is  "just proximal to the PIP joint. No  evidence of complication. Large posterior and small plantar calcaneal  spur.    DANG MORAN MD       PHYSICAL EXAM:   VITALS: /62 (BP Location: Left arm)   Pulse 78   Temp 96.5  F (35.8  C) (Oral)   Resp 18   Ht 1.727 m (5' 7.99\")   Wt 86.2 kg (190 lb)   SpO2 96%   BMI 28.90 kg/m    GENERAL: Pleasant, in no acute distress.  HEENT: Normocephalic, atraumatic. PERRL. Sclera are white.  Conjunctivae are clear.  Oropharynx is clear without stomatitis or sores.    NECK: Supple. No adenopathy.  Trachea is midline.  CARDIAC: Regular rate and rhythm, S1, S2.  No murmurs.    RESPIRATORY: Nonlabored. Lungs are clear to auscultation bilaterally.  ABDOMEN: Soft and nontender.  Bowel sounds are present.  No hepatosplenomegaly or appreciable masses.  EXTREMITIES: No edema. Moves extremities without difficulty.  DERM: No petechiae, rashes, or bruising.  NEURO: The patient is alert and oriented.     MEDICATIONS:   Prior to admission  Medications Prior to Admission   Medication Sig Dispense Refill Last Dose     allopurinol (ZYLOPRIM) 300 MG tablet Take 300 mg by mouth daily   7/8/2020 at am     fenofibrate (TRICOR) 145 MG tablet Take 145 mg by mouth daily   7/8/2020 at am     hydrocortisone (CORTEF) 10 MG tablet Take 10 mg by mouth every evening   7/7/2020 at pm     hydrocortisone (CORTEF) 10 MG tablet Take 20 mg by mouth daily before breakfast    7/8/2020 at am     insulin aspart (NOVOLOG FLEXPEN) 100 UNIT/ML pen Inject Subcutaneous 3 times daily (with meals) Patient uses sliding scale based on Carbs and Blood Glucose. Has been using very little lately due to low readings and low appetite.   7/7/2020 at 20 units w/ dinner     insulin glargine (LANTUS VIAL) 100 UNIT/ML vial Inject 15 Units Subcutaneous At Bedtime   7/7/2020 at hs     metFORMIN (GLUCOPHAGE) 500 MG tablet Take 1,000 mg by mouth daily (with breakfast)   7/8/2020 at am     metFORMIN (GLUCOPHAGE) 500 MG tablet Take 500 " mg by mouth daily (with dinner)   7/7/2020 at pm     potassium chloride ER (KLOR-CON M) 20 MEQ CR tablet Take 20 mEq by mouth every evening   7/7/2020 at pm     potassium chloride ER (KLOR-CON M) 20 MEQ CR tablet Take 40 mEq by mouth every morning    7/8/2020 at am     prochlorperazine (COMPAZINE) 5 MG tablet Take 1-2 tablets (5-10 mg) by mouth every 6 hours as needed (Breakthrough Nausea / Vomiting) 30 tablet 0 Past Month at prn     rosuvastatin (CRESTOR) 10 MG tablet Take 5 mg by mouth daily   7/8/2020 at am     order for DME Kyle 1 packet twice a day for the next month 30 days 0      [DISCONTINUED] sulfamethoxazole-trimethoprim (BACTRIM DS) 800-160 MG tablet Take 1 tablet by mouth 2 times daily for 7 days 14 tablet 0 7/8/2020 at am       Current medications  Current Facility-Administered Medications   Medication     acetaminophen (TYLENOL) tablet 650 mg    Or     acetaminophen (TYLENOL) Suppository 650 mg     albuterol (PROAIR HFA/PROVENTIL HFA/VENTOLIN HFA) 108 (90 Base) MCG/ACT inhaler 1-2 puff     albuterol (PROVENTIL) neb solution 2.5 mg     allopurinol (ZYLOPRIM) tablet 300 mg     cytarabine (PF) (CYTOSAR) 4,100 mg in D5W 291 mL infusion     dexamethasone (DECADRON) tablet 40 mg     dextrose 5 % flush PRE/POST medication     dextrose 5 % flush PRE/POST medication     glucose gel 15-30 g    Or     dextrose 50 % injection 25-50 mL    Or     glucagon injection 1 mg     diphenhydrAMINE (BENADRYL) injection 50 mg     enoxaparin ANTICOAGULANT (LOVENOX) injection 90 mg     EPINEPHrine PF (ADRENALIN) injection 0.3 mg     fenofibrate (TRIGLIDE/LOFIBRA) tablet 160 mg     Filgrastim (NEUPOGEN) 420 mcg in D5W 25 mL infusion     heparin 100 UNIT/ML injection 5 mL     heparin lock flush 10 UNIT/ML injection 5-10 mL     heparin lock flush 10 UNIT/ML injection 5-10 mL     hydrocortisone (CORTEF) tablet 10 mg     hydrocortisone (CORTEF) tablet 20 mg     insulin aspart (NovoLOG) injection (RAPID ACTING)     insulin aspart  (NovoLOG) injection (RAPID ACTING)     insulin aspart (NovoLOG) injection (RAPID ACTING)     insulin aspart (NovoLOG) injection (RAPID ACTING)     insulin aspart (NovoLOG) injection (RAPID ACTING)     insulin aspart (NovoLOG) injection (RAPID ACTING)     insulin glargine (LANTUS PEN) injection 22 Units     lidocaine (LMX4) cream     lidocaine 1 % 0.1-1 mL     LORazepam (ATIVAN) injection 0.5-1 mg     LORazepam (ATIVAN) tablet 0.5-1 mg     magnesium sulfate 4 g in 100 mL sterile water (premade)     Medication Instruction     MEDICATION INSTRUCTION     melatonin tablet 1 mg     meperidine (DEMEROL) injection 25 mg     methylPREDNISolone sodium succinate (solu-MEDROL) injection 125 mg     naloxone (NARCAN) injection 0.1-0.4 mg     ondansetron (ZOFRAN-ODT) ODT tab 4 mg    Or     ondansetron (ZOFRAN) injection 4 mg     potassium chloride (KLOR-CON) Packet 20-40 mEq     potassium chloride 10 mEq in 100 mL intermittent infusion with 10 mg lidocaine     potassium chloride 10 mEq in 100 mL sterile water intermittent infusion (premix)     potassium chloride 20 mEq in 50 mL intermittent infusion     potassium chloride ER (KLOR-CON M) CR tablet 20-40 mEq     prednisoLONE acetate (PRED FORTE) 1 % ophthalmic susp 2 drop     prochlorperazine (COMPAZINE) injection 5 mg     prochlorperazine (COMPAZINE) injection 5 mg    Or     prochlorperazine (COMPAZINE) tablet 5 mg    Or     prochlorperazine (COMPAZINE) Suppository 12.5 mg     prochlorperazine (COMPAZINE) tablet 5 mg     rosuvastatin (CRESTOR) tablet 5 mg     sodium chloride (PF) 0.9% PF flush 10-20 mL     sodium chloride (PF) 0.9% PF flush 10-20 mL     sodium chloride 0.9 % 1,000 mL with potassium chloride 20 mEq/L, magnesium sulfate 8 mEq/L infusion     sodium chloride 0.9% infusion       INSTRUCTIONS:   DIET: regular  ACTIVITY: as tolerated  CALL WITH: fevers, chills, cough, shortness of breath, chest pain, nausea/vomiting, progressive pain or any other concerning  symptoms    PENDING RESULTS:     Unresulted Labs Ordered in the Past 30 Days of this Admission     Date and Time Order Name Status Description    7/1/2020 1305 Anaerobic bacterial culture Preliminary     6/17/2020 1122 Platelets prepare order unit In process           FOLLOW UP:   Neulasta 7/10  CBC Monday, Wednesday and Friday  Follow up with Dr. Olson next week  Plan for readmission in about 3 weeks for cycle 2  He will need follow up in Plastic Surgery regarding the new melanoma  He will have ongoing follow up in Podiatry.    Temo GILLESPIE, CNP  [facility]  [phone] (office), [phone] (cell)    Time spent: >30 minutes

## 2020-07-10 NOTE — PLAN OF CARE
Cytarabine infusing. Cerebellar toxicity assessment completed and WDL. Good blood return noted from port. Denies pain. No new complaints. Up independently to bathroom. Good urine output. L foot bandaged, CDI. Hyperglycemic. Insulin and diet adjusted. Plan for discharge home tomorrow after chemo is finished.

## 2020-07-14 ENCOUNTER — OFFICE VISIT (OUTPATIENT)
Dept: PODIATRY | Facility: CLINIC | Age: 70
End: 2020-07-14
Payer: COMMERCIAL

## 2020-07-14 ENCOUNTER — PATIENT OUTREACH (OUTPATIENT)
Dept: CARE COORDINATION | Facility: CLINIC | Age: 70
End: 2020-07-14

## 2020-07-14 VITALS
BODY MASS INDEX: 28.79 KG/M2 | DIASTOLIC BLOOD PRESSURE: 58 MMHG | WEIGHT: 190 LBS | SYSTOLIC BLOOD PRESSURE: 102 MMHG | HEIGHT: 68 IN

## 2020-07-14 DIAGNOSIS — Z98.890 POST-OPERATIVE STATE: Primary | ICD-10-CM

## 2020-07-14 DIAGNOSIS — E11.42 DIABETIC POLYNEUROPATHY ASSOCIATED WITH TYPE 2 DIABETES MELLITUS (H): ICD-10-CM

## 2020-07-14 DIAGNOSIS — Z89.422 H/O AMPUTATION OF LESSER TOE, LEFT (H): ICD-10-CM

## 2020-07-14 PROBLEM — E11.9 DIABETES MELLITUS, TYPE 2 (H): Status: ACTIVE | Noted: 2020-07-14

## 2020-07-14 PROCEDURE — 99024 POSTOP FOLLOW-UP VISIT: CPT | Performed by: PODIATRIST

## 2020-07-14 ASSESSMENT — MIFFLIN-ST. JEOR: SCORE: 1606.33

## 2020-07-14 NOTE — LETTER
"    7/14/2020         RE: Pastor Garibay  [address]  [facility] [id number]        Dear Colleague,    Thank you for referring your patient, Pastor Garibay, to the [facility]. Please see a copy of my visit note below.    Podiatry / Foot and Ankle Surgery Progress Note    July 14, 2020    Subject: Patient was seen for follow up on 2 weeks s/p left partial 3rd and 2nd toe amputations with Dr. Humphrey.  Denies fever, nausea, vomiting.  No concerns today.  No pain.  He does have neuropathy.    Objective:  Vitals: /58 (BP Location: Left arm, Patient Position: Chair, Cuff Size: Adult Regular)   Ht 1.727 m (5' 8\")   Wt 86.2 kg (190 lb)   BMI 28.89 kg/m       A1C: 7.3 (5/2020)    General:  Patient is alert and orientated.  NAD  Dressing is clean dry and intact.  Sutures are intact.  No redness, dehiscence, signs of acute infection noted.  CFT's are less than 3 seconds.  Now has partial left second and third toes after distal amputations.    Imaging: left foot xray - There has been revision amputation of the second and third toes; now the amputation is just proximal to the PIP joint. No evidence of complication. Large posterior and small plantar calcaneal spur.    Assessment:    Post-operative state  Diabetic polyneuropathy associated with type 2 diabetes mellitus (H)  H/O amputation of lesser toe, left (H)    Plan: At this time sutures were removed.  He can get the foot wet and wear regular socks and shoes.  He can lotion the feet.  He was given an order for diabetic shoes and inserts.  At this time he will follow-up as needed.  Was told to call with further questions or concerns.    Adrienne Richardson DPM, Podiatry/Foot and Ankle Surgery        Recommended to Pastor Garibay to follow up with Primary Care provider regarding elevated blood pressure.      Again, thank you for allowing me to participate in the care of your patient.        Sincerely,        Adrienne Richardson DPM, " Podiatry/Foot and Ankle Surgery

## 2020-07-14 NOTE — PATIENT INSTRUCTIONS
Thank you for choosing [facility] Podiatry / Foot & Ankle Surgery!    DR. ROBERTS'S CURRENT CLINIC SCHEDULE  TUESDAY - WEDNESDAY - FRIDAY   [id number]   [place] [address]   [phone] / [phone]      SCHEDULE SURGERY: [phone]   BILLING QUESTIONS: [phone]   AFTER HOURS: [phone]   APPOINTMENTS: [phone]   CONSUMER PRICE LINE: [phone]      Follow up: as needed    Please read through the following handouts and if you have any questions, please feel free to call us or send a [employer] message!    [place] [facility] LOCATIONS  [facility]  [address]  [place] [address]  Phone: [phone]  Fax: [phone] [facility]  [address]  [place] [address]  Phone: [phone]   Fax: [phone]   [facility]  [address]  [place] [address]  Phone: [phone]  Fax: [phone] [facility]  [address]  [place] [address]  Phone: [phone]   Fax: [phone]   [facility]   [address]  [place] [address]  Phone: [phone]  Fax: [phone] * Please call any location listed to make an appointment for a casting/fitting. Your referral was sent to their central office and they will all have the order on file.           WEARING YOUR CUSTOM FOOT ORTHOTICS   Most insurance plans cover one pair of orthotics per year. You must check with your   insurance plan to see what your payment responsibility will be. Please call your   insurance company by calling the number on the back of your insurance card.   Orthotic's are non-refundable and non-returnable.   Orthotics are made of various designs. Some orthotics are covered with material that extends beyond your toes. If your orthotic is of this design, you will likely need to trim the toe end to get a proper fit. The insole from your  shoe can be used as a template. Simply overlay the shoe insert on top of the custom orthotic. Align the heel end while tracing the length of the insert onto the custom orthotic. Use a large scissor to trim the toe end until you get a proper fit in the shoe.   The orthotic needs to be pushed as far back in the shoe as possible. The heel portion should not ride forward so as not to irritate your heel.   Orthotics are designed to work with socks. Excessive perspiration will shorten the life span of the orthotics. Remove the orthotic from the shoe frequently for proper drying.   The break-in period lasts for weeks. People new to orthotics will likely experience new aches and pains. The orthotic is forcing your foot into a new position. Arch, foot and leg muscle aches and fatigue are common during these weeks. Minor discomfort can be considered normal break in phenomenon. Start wearing your orthotic around your home your first day. Limited activity for one to two hours is recommended. You can increase one or two additional hours each day provided the aches and pains are subsiding. The degree of discomfort, fatigue and problems will dictate the speed of break in. You may require multiple weeks to work up to full time use.   Do not continue wearing your orthotics if they are creating problems such as blisters or sores. Do not hesitate to call the clinic to speak with a nurse regarding orthotic   break in, fit, trimming, etc. You may also need to see the doctor if the orthotics are   simply not working out. Adjustments are sometimes made to improve orthotic   function.     Orthotics will only work in certain styles and types of shoes. Orthotics rarely work in dress shoes. Slip-ons, clogs, sandals and heels are particularly troublesome. Specially designed orthotics may be necessary for these types of shoes. Your custom orthotic was designed for activities that require appropriate walking or running shoes. Lace up athletic  "shoes, walking shoes or work boots should work appropriately. You may need a wider or longer shoe. Shoes with a removable  or insert work best. In general, you want to remove an insert from the shoe before placing the orthotic into the shoe. Shoes without a removable liner may not work as well.     When purchasing new shoes, bring your orthotics along to get a proper fit. Shop at stores that are familiar with orthotics.   Frequent washing of the orthotic may shorten the life span of the top cover. The top cover can be replaced but will generally last one to five years depending on use and foot perspiration.     DIABETES AND YOUR FEET  Diabetes can result in several problems in the feet including ulcers (open sores) and amputations. Two of the most important reasons why people develop foot problems when they have diabetes is : 1. Neuropathy (loss of feeling)  2. Vascular disease (loss or decrease of blood flow).    Neuropathy is a term used to describe a loss of nerve function.  Patients with diabetes are at risk of developing neuropathy if their sugars continue to run high and are above the normal value. One theory for neuropathy is that the \"extra\" sugar in the body enters the nerves and is broken down. These by-products build up in the nerve causing it to swell and impairing nerve function. Often times, this can be prevented by controlling your sugars, dieting and exercise.    When a person develops neuropathy, they usually begin to feel numbness or tingling in their feet and sometime in their legs.  Other symptoms may include painful burning or hot feet, tingling or feeling like insects or ants are crawling on your feet or legs.  If the diabetes is sever and the sugars run high for long periods of time, neuropathy can also occur in the hands.    Vascular disease  is a term used to describe a loss or decrease in circulation (blood flow). There is a problem in getting blood and oxygen to areas that need " it. Similar to neuropathy, sugars can build up in the walls of the arteries (blood vessels) and cause them to become swollen, thickened and hardened. This decreases the amount of blood that can go to an area that needs it. Though this is common in the legs of diabetic patients, it can also affect other arteries (blood vessels) in the body such as in the heart and eyes.    In the legs, vascular disease usually results in cramping. Patients who develop leg cramps after walking the same distance every time (i.e. One block, half a mile, ect.) need to let their doctors know so that their circulation may be checked. Cramps causing severe pain in the feet and/or legs while sleeping and the cramps go away when you stand or hang your legs off the side of the bed, may also be a sign of poor blood circulation.  Occasional cramping in cold weather or on rare occasions with activity may not be due to poor circulation, but you should inform your doctor.    PREVENTION OF THESE DISEASES  The key to prevention is good blood sugar control. Poor blood sugar control is a big reason many of these problems start. Physical activity (exercise) is a very good way to help decrease your blood sugars. Exercise can lower your blood sugar, blood pressure, and cholesterol. It also reduces your risk for heart disease and stroke, relieves stress, and strengthens your heart, muscles and bones.  In addition, regular activity helps insulin work better, improves your blood circulation, and keeps your joints flexible. If you're trying to lose weight, a combination of exercise and wise food choices can help you reach your target weight and maintain it.      PAIN MANAGEMENT  1.Blood Sugar Control - Most important  2. Medications such as:  Amytriptylline, duloxetine, gabapentin, lyrica, tramadol  3. Nutritional therapy:  Vitamin B6 (100mg daily), Vitamin B12 (75mcg daily), Vitamin D 2000 IU daily), Alpha-Lipoic Acid (600-1800mg daily), Acetyl-L-Carnitine  (500-1000mg TID, L-methyl folate (1500mcg daily)    ** Metformin can block Vitamin B6 and B12 so it is important to supplement**    FOOT CARE RECOMMENDATIONS   1. Wash your feet with lukewarm water and a mild soap and then dry them thoroughly, especially between the toes.     2. Examine your feet daily looking for cuts, corns, blisters, cracks, ect, especially after wearing new shoes. Make sure to look between your toes. If you cannot see the bottom of your feet, set a mirror on the floor and hold your foot over it, or ask a spouse, friend or family member to examine your feet for you. Contact your doctor immediately if new problems are noted or if sores are not healing.     3. Immediately apply moisturizer to the tops and bottoms of your feet, avoiding areas between the toes. Hand lotion (Intesive Care, Ella, Eucerin, Neutrogena, Curel, ect) is sufficient unless your doctor prescribes a medicated lotion. Apply sunscreen to your feet when going swimming outside.     4. Use clean comfortable shoes, wear white socks (if you have any bleeding or drainage, you will see it on white socks). Socks should not have thick seams or cut off the circulation around the leg. Break in new shoes slowly and rotate with older shoes until broken in. Check the inside of your shoes with your hand to look for areas of irritation or objects that may have fallen into your shoes.       5. Keep slippers by the side of your bed for use during the night.     6.  Shoes should be fitted by a professional and should not cause areas of irritation.  Check your feet regularly when wearing a new pair of shoes and replace them as needed.     7.  Talk to your doctor about proper exercise. Exercise and stretching stimulate blood flow to your feet and maintain proper glucose levels.     8.  Monitor your blood glucose level as instructed by your doctor. Notify your doctor immediately if your blood sugar is abnormally high or low.    9. Cut your nails  straight across, but then gently round any sharp edges with a cardboard nail file. If you have neuropathy, peripheral vascular disease or cannot see that well to trim your own toenails contact [employer] ([phone]) or [employer] ([phone]).      THINGS TO AVOID DOING   1.  Do not soak your feet if you have an open sore. Use only lukewarm water and always check the temperature with your hand as hot water can easily burn your feet.       2.  Never use a hot water bottle or heating pad on your feet. Also do not apply cold compresses to your feet. With decreased sensation, you could burn or freeze your feet.       3.  Do not apply any of these to your feet:    -  Over the counter medicine for corns or warts    -  Harsh chemicals like boric acid    -  Do not self-treat corns, cuts, blisters or infections. Always consult your doctor.       4.  Do not wear sandals, slippers or walk barefoot, especially on hot sand or concrete or other harsh surfaces.     5.  If you smoke, stop!!!        SCAR CARE PROTOCOL  Scarring is an unfortunate but unavoidable part of surgery.  Every person scars differently and there is no way to predict how an individual's final scar will look.  Now that the sutures have been removed one can begin taking some steps to help minimize the appearance of scarring.    WOUND HEALING  As soon as the skin is incised during surgery, the body is taking steps to prepare for healing. After about 3 days, the body has sent cells to the incision to begin the healing process. These cells, called fibroblasts, make collagen, a protein in the skin that helps provide strength. Once the skin has been sufficiently strengthened, the sutures are removed. Over the next year, the body synthesizes new collagen and breaks down old collagen to help achieve a strong scar that allows the foot/ankle to function appropriately. This is where patients can help the appearance of the scar, as it will change over the next  year.    STEPS  1. Do not expose the scar to the sun for 1 year.  2. Any sun exposure may permanently darken the appearance of the scar.  3. Wear shoes/socks or cover your scar with zinc oxide.  4. Massage the scar 2-3 times per day.  -Massage the entire length of the scar with gentle to moderate pressure.  -Pressure can help flatten the scar.  5. Lotion/Vitamin E helps keep the tissue soft.  6. Try over the counter scar products such as Mederma or Scar Zone.  -These are available at any pharmacy without a prescription.  -Patients must use these for extended periods of time (6-12 months) to see a difference.          Pastor to follow up with Primary Care provider regarding elevated blood pressure. (if equal or greater than 140/90)    BODY WEIGHT AND YOUR FEET  The following information is included in the after visit summary for all patients. Body weight can be a sensitive issue to discuss in clinic, but we think the following information is very important. Although we focus on the feet and ankles, we do support the overall health of our patients.     Many things can cause foot and ankle problems. Foot structure, activity level, foot mechanics and injuries are common causes of pain. One very important issue that often goes unmentioned, is body weight. Extra weight can cause increased stress on muscles, ligaments, bones and tendons. Sometimes just a few extra pounds is all it takes to put one over her/his threshold. Without reducing that stress, it can be difficult to alleviate pain. As Foot & Ankle specialists, our job is addressing the lower extremity problem and possible causes. Regarding extra body weight, we encourage patients to discuss diet and weight management plans with their primary care doctors. It is this team approach that gives you the best opportunity for pain relief and getting you back on your feet.      [facility] has a Comprehensive Weight Management Program. This program includes counseling,  education, non-surgical and surgical approaches to weight loss. If you are interested in learning more either talk to you primary care provider or call [phone].

## 2020-07-14 NOTE — PROGRESS NOTES
"Podiatry / Foot and Ankle Surgery Progress Note    July 14, 2020    Subject: Patient was seen for follow up on 2 weeks s/p left partial 3rd and 2nd toe amputations with Dr. Humphrey.  Denies fever, nausea, vomiting.  No concerns today.  No pain.  He does have neuropathy.    Objective:  Vitals: /58 (BP Location: Left arm, Patient Position: Chair, Cuff Size: Adult Regular)   Ht 1.727 m (5' 8\")   Wt 86.2 kg (190 lb)   BMI 28.89 kg/m       A1C: 7.3 (5/2020)    General:  Patient is alert and orientated.  NAD  Dressing is clean dry and intact.  Sutures are intact.  No redness, dehiscence, signs of acute infection noted.  CFT's are less than 3 seconds.  Now has partial left second and third toes after distal amputations.    Imaging: left foot xray - There has been revision amputation of the second and third toes; now the amputation is just proximal to the PIP joint. No evidence of complication. Large posterior and small plantar calcaneal spur.    Assessment:    Post-operative state  Diabetic polyneuropathy associated with type 2 diabetes mellitus (H)  H/O amputation of lesser toe, left (H)    Plan: At this time sutures were removed.  He can get the foot wet and wear regular socks and shoes.  He can lotion the feet.  He was given an order for diabetic shoes and inserts.  At this time he will follow-up as needed.  Was told to call with further questions or concerns.    Adrienne Richardson DPM, Podiatry/Foot and Ankle Surgery        Recommended to Pastor Garibay to follow up with Primary Care provider regarding elevated blood pressure.    "

## 2020-07-17 ENCOUNTER — TRANSFERRED RECORDS (OUTPATIENT)
Dept: HEALTH INFORMATION MANAGEMENT | Facility: CLINIC | Age: 70
End: 2020-07-17

## 2020-07-20 ENCOUNTER — MEDICAL CORRESPONDENCE (OUTPATIENT)
Dept: HEALTH INFORMATION MANAGEMENT | Facility: CLINIC | Age: 70
End: 2020-07-20

## 2020-07-20 ENCOUNTER — TRANSFERRED RECORDS (OUTPATIENT)
Dept: HEALTH INFORMATION MANAGEMENT | Facility: CLINIC | Age: 70
End: 2020-07-20

## 2020-07-20 PROBLEM — C85.80 LARGE CELL LYMPHOMA (H): Status: ACTIVE | Noted: 2020-07-20

## 2020-07-20 PROBLEM — D69.6 THROMBOCYTOPENIA (H): Status: ACTIVE | Noted: 2020-07-20

## 2020-07-21 ENCOUNTER — HOSPITAL ENCOUNTER (OUTPATIENT)
Facility: CLINIC | Age: 70
Setting detail: SPECIMEN
End: 2020-07-21
Attending: INTERNAL MEDICINE
Payer: COMMERCIAL

## 2020-07-21 ENCOUNTER — INFUSION THERAPY VISIT (OUTPATIENT)
Dept: INFUSION THERAPY | Facility: CLINIC | Age: 70
End: 2020-07-21
Attending: INTERNAL MEDICINE
Payer: COMMERCIAL

## 2020-07-21 VITALS
HEART RATE: 99 BPM | OXYGEN SATURATION: 97 % | TEMPERATURE: 98.6 F | RESPIRATION RATE: 18 BRPM | DIASTOLIC BLOOD PRESSURE: 60 MMHG | SYSTOLIC BLOOD PRESSURE: 96 MMHG

## 2020-07-21 DIAGNOSIS — D69.6 THROMBOCYTOPENIA (H): ICD-10-CM

## 2020-07-21 DIAGNOSIS — C85.80 LARGE CELL LYMPHOMA (H): Primary | ICD-10-CM

## 2020-07-21 LAB
BLD PROD TYP BPU: NORMAL
BLD PROD TYP BPU: NORMAL
BLD UNIT ID BPU: 0
BLOOD PRODUCT CODE: NORMAL
BPU ID: NORMAL
NUM BPU REQUESTED: 1
TRANSFUSION STATUS PATIENT QL: NORMAL
TRANSFUSION STATUS PATIENT QL: NORMAL

## 2020-07-21 PROCEDURE — 36430 TRANSFUSION BLD/BLD COMPNT: CPT

## 2020-07-21 PROCEDURE — 25000128 H RX IP 250 OP 636: Performed by: INTERNAL MEDICINE

## 2020-07-21 PROCEDURE — P9037 PLATE PHERES LEUKOREDU IRRAD: HCPCS

## 2020-07-21 RX ORDER — HEPARIN SODIUM (PORCINE) LOCK FLUSH IV SOLN 100 UNIT/ML 100 UNIT/ML
5 SOLUTION INTRAVENOUS
Status: CANCELLED | OUTPATIENT
Start: 2020-07-21

## 2020-07-21 RX ORDER — HEPARIN SODIUM (PORCINE) LOCK FLUSH IV SOLN 100 UNIT/ML 100 UNIT/ML
5 SOLUTION INTRAVENOUS
Status: DISCONTINUED | OUTPATIENT
Start: 2020-07-21 | End: 2020-07-21

## 2020-07-21 RX ADMIN — Medication 5 ML: at 13:50

## 2020-07-21 ASSESSMENT — PAIN SCALES - GENERAL: PAINLEVEL: NO PAIN (0)

## 2020-07-21 NOTE — PROGRESS NOTES
Infusion Nursing Note:  Pastor Garibay presents today for 1 Unit of platelets transfusion.    Patient seen by provider today: No   present during visit today: Not Applicable.    Note: blood consent obtained on 7/21/20.    Intravenous Access:  Implanted Port.    Treatment Conditions:  Platelet 10k on 7/20/20   Results reviewed, labs MET treatment parameters, ok to proceed with treatment.      Post Infusion Assessment:  Patient tolerated infusion without incident.  Blood return noted pre and post infusion.  Site patent and intact, free from redness, edema or discomfort.  No evidence of extravasations.  Access discontinued per protocol.       Discharge Plan:   Discharge instructions reviewed with: Patient.  Patient and/or family verbalized understanding of discharge instructions and all questions answered.  Copy of AVS reviewed with patient and/or family.    Patient discharged in stable condition accompanied by: self.  Departure Mode: Ambulatory.    Simba Howard RN

## 2020-07-27 ENCOUNTER — TRANSFERRED RECORDS (OUTPATIENT)
Dept: HEALTH INFORMATION MANAGEMENT | Facility: CLINIC | Age: 70
End: 2020-07-27

## 2020-07-28 ENCOUNTER — PATIENT OUTREACH (OUTPATIENT)
Dept: CARE COORDINATION | Facility: CLINIC | Age: 70
End: 2020-07-28

## 2020-07-30 ENCOUNTER — OFFICE VISIT (OUTPATIENT)
Dept: TRANSPLANT | Facility: CLINIC | Age: 70
End: 2020-07-30
Payer: COMMERCIAL

## 2020-07-30 ENCOUNTER — MEDICAL CORRESPONDENCE (OUTPATIENT)
Dept: TRANSPLANT | Facility: CLINIC | Age: 70
End: 2020-07-30

## 2020-07-30 ENCOUNTER — ALLIED HEALTH/NURSE VISIT (OUTPATIENT)
Dept: TRANSPLANT | Facility: CLINIC | Age: 70
End: 2020-07-30
Payer: COMMERCIAL

## 2020-07-30 VITALS
HEIGHT: 68 IN | WEIGHT: 187.7 LBS | OXYGEN SATURATION: 100 % | SYSTOLIC BLOOD PRESSURE: 115 MMHG | DIASTOLIC BLOOD PRESSURE: 72 MMHG | BODY MASS INDEX: 28.45 KG/M2 | HEART RATE: 104 BPM | TEMPERATURE: 97.7 F

## 2020-07-30 DIAGNOSIS — C83.398 DIFFUSE LARGE B-CELL LYMPHOMA OF EXTRANODAL SITE: Primary | ICD-10-CM

## 2020-07-30 PROCEDURE — G0463 HOSPITAL OUTPT CLINIC VISIT: HCPCS

## 2020-07-30 ASSESSMENT — MIFFLIN-ST. JEOR: SCORE: 1595.9

## 2020-07-30 ASSESSMENT — PAIN SCALES - GENERAL: PAINLEVEL: NO PAIN (0)

## 2020-07-30 NOTE — PROGRESS NOTES
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    Weight as of this encounter: 85.1 kg (187 lb 11.2 oz). Body surface area is 2.02 meters squared.  No Pain (0) Comment: Data Unavailable   No LMP for male patient.  Allergies reviewed: Yes  Medications reviewed: Yes    Medications: Medication refills not needed today.  Pharmacy name entered into [facility]: [id number] [place] [address]     Clinical concerns: None       Marina Rutledge, GILMA     Cell Therapy Note.    I I have seen Mr. Garibay via video visit today for repeated consult  For the consideration of CAR-T cell therapy for his relapsed transformed lymphoma.    The patient has now completed the second cycle of salvage chemotherapy with our rehab with a end of treatment PET CT scan showing persistent disease although significantly smaller it still substantial bulk of the disease.    Lymphoma type: transformed DLBCL  Date of d2020  Subtype: GCB  c-myc status: overexpression   bcl-2 status: overexpression   bcl-6 status:  Stage: IV  Extranodal disease: Yes  Extranodal site: lungs  LDH elevated at dg: Yes  KPS: 80%  IPI: 4  Front-line therapy: R-ICE followed by D-HAP   Response to front-line therapy: stable disease         HPI:  This patient is a 70 year old male with history of follicular lymphoma when he presented with BETANCOURT.   2014: extensive bulky 9-cm right paratracheal lymphadenopathy with additional mediastinal, bilateral pulmonary hilar, and upper abdominal, retroperitoneal lymphadenopathy. BMBx neg.    Excisional left " supraclavicular lymph node biopsy confirmed grade III follicular lymphoma.     12/2014- 2/2015 -  RCHOP x 6    CR confirmed by CT scanning in April 2015   2/15- 2/2017 completed two years of every two month maintenance rituximab.     October 2017:   enlarging 3 cm infrahilar mass. This is in a location different than his original nodes from 2014.   11/17/17 the patient underwent a thoracotomy. The pathology revealed recurrent grade 2-3  follicular center cell lymphoma, with no evidence of a primary lung cancer.    12/14/17 he began bendamustine with rituximab chemotherapy.     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  He has been anticoagulated with Lovenox without complication.      4/9/18 revealed an improved but not resolved IVC thrombus, with mild stable mediastinal nodes. An inflammatory pulmonary infiltrate was noted.    6/4/18 completed 6 cycles of BR chemotherapy. EOT CT revealed stable mild lymphadenopathy with mild splenomegaly and a slight increase in the IVC thrombus.     Lovenox was converted to Xarelto in 6/2018.     11/28/18  new 2 x1.5 cm right hilar node and improved IVC thrombus.  6/13/19 confirmed the hypermetabolic right infrahilar mass and revealed moderate metabolic activity within a few small nodules without other disease.     As all disease could be encompassed in one radiation field, he completed 3000 cGy of radiotherapy to the right thoracic region 6/27/19 - 7/18/19.     5/14/20 - RELAPSE -  revealed marked increase in the right lower lobe pulmonary mass with new subcarinal lymphadenopathy, bilateral new adrenal masses, and soft tissue tumor deposits in the abdomen.     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  His overall health seems better; he had a very eventful June and July. He received 1 cycle of R-DHAP without major chemotherapy toxicity; had done well and had no neutropenic fevers.    However the course was complicated by worsening of osteomyelitis of the left third toe and he eventually underwent distal phalanx amputation in mid July.  This is healing very well now. The wound completely closed off.  He did not receive any antibiotics.  Additionally he was diagnosed with a melanoma of the right ear lobe underwent complete resection and node dissection; the pathology results are unavailable at this time.    The main concern is lack of complete response after DHAP. The lymphoma mass is improved but is still persistent involving a portion of the right lung.    PAST MEDICAL HISTORY: is  negative  for surgeries or significant illness. There is no medical history of migraine, seizure, mood disorder, chemical dependency, cardiovascular, lung or gastrointestinal disease, back or joint problems or skin problems.      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     COMPARISON: None.        Impression     IMPRESSION: There has been revision amputation of the second and third  toes; now the amputation is just proximal to the PIP joint. No  evidence of complication. Large posterior and small plantar calcaneal  spur.     DANG MORAN MD      Fhx:  Neg  SHX: lives with his wife, good support.    Current Outpatient Medications   Medication     allopurinol (ZYLOPRIM) 300 MG tablet     fenofibrate (TRICOR) 145 MG tablet     hydrocortisone (CORTEF) 10 MG tablet     hydrocortisone (CORTEF) 10 MG tablet     insulin aspart (NOVOLOG FLEXPEN) 100 UNIT/ML pen     insulin glargine (LANTUS VIAL) 100 UNIT/ML vial     metFORMIN (GLUCOPHAGE) 500 MG tablet     metFORMIN (GLUCOPHAGE) 500 MG tablet     order for DME     potassium chloride ER (KLOR-CON M) 20 MEQ CR tablet     potassium chloride ER (KLOR-CON M) 20 MEQ CR tablet     rosuvastatin (CRESTOR) 10 MG tablet     dexamethasone (DECADRON) 4 MG tablet     prochlorperazine (COMPAZINE) 5 MG tablet     No current facility-administered medications for this visit.             PHYSICAL EXAM:   VITALS:/72   Pulse 104   Temp 97.7  F (36.5  C) (Oral)   Ht 1.727 m (5' 8\")   Wt 85.1 kg (187 lb 11.2 oz)   SpO2 100%   BMI 28.54 kg/m      GENERAL: Pleasant, in no acute distress. In good spirits  HEENT: Normocephalic, atraumatic. PERRL. Sclera are white.  Conjunctivae are clear.  Oropharynx is clear without stomatitis or sores.    NECK: Supple. No adenopathy.  Trachea is midline.  CARDIAC: Regular rate and rhythm, S1, S2.  No murmurs.    RESPIRATORY: Nonlabored. Lungs are clear to auscultation bilaterally.  ABDOMEN: Soft and nontender.  Bowel sounds are present.  No hepatosplenomegaly or appreciable " masses.  EXTREMITIES: No edema. Moves extremities without difficulty.  DERM: No petechiae, rashes, or bruising.  NEURO: The patient is alert and oriented.      Other results:  Lab Results   Component Value Date    WBC 7.7 07/10/2020    ANEU 7.3 07/10/2020    HGB 9.6 (L) 07/10/2020    HCT 29.7 (L) 07/10/2020     07/10/2020     07/09/2020    POTASSIUM 4.4 07/09/2020    CHLORIDE 104 07/09/2020    CO2 21 07/09/2020     (H) 07/09/2020    BUN 15 07/09/2020    CR 0.75 07/09/2020    MAG 1.8 07/08/2020    INR 1.22 (H) 05/27/2020    AST 12 07/09/2020    ALT 21 07/09/2020     CT chest 7/20/2020 - I reviewed the images - notable for persist RLL mass c/w lymphoma.       PROCEDURES PERFORMED:  1. Wide excision melanoma, right ear lobule, greater than 3 cm with intermediate closure greater than 5 cm.  2. [place] lymph node biopsy, right cervical.    POSTOPERATIVE DIAGNOSIS:  Malignant melanoma, right ear lobule.     ASSESSMEN AND PLAN:    In summary Mr. Garibay is a 70 years old male with a longstanding history of follicular lymphoma post multiple lines of therapy and radiation therapy. Now with a transformed diffuse large B-cell lymphoma GCB subtype with a MYC overexpression and refractory to chemotherapy.  He only had stable disease after 2 cycles of salvage therapy.    He has multiple comorbidities including diabetes, recent melanoma resection awaiting histology and recent osteomyelitis.  However is overall health is improving; he is functional and currently free of active infections.    I do think he is a candidate for CAR-a therapy as the only potential curative treatment option for his lymphoma.       We spent 90 minutes in consultation reviewing the prognosis of chemotherapy refractory DLBCL and. treatment options.  The therapeutic options are limited and given the failure of prior therapy, CAR-T is indicated; I recommended therapy  using commercial CAR-T product [employer].     We did not sign the  screening consent and [employer] manufacturing consent today.      I reviewed the protocol with the patient. The CAR19 provides the opportunity to control lymphoma with reported overall response around 55-75% and complete response rate at 40-45%.     The process consists of non-mobilized apheresis followed by 3 weeks period of waiting, autologous CAR19-T cells are reprogrammed and manufactured ex vivo.  The treatment phase includes lympho-depleting chemotherapy with fludarabine and Cytoxan x3 days.  The patient will receive this outpatient.    CAR-T therapy [employer]a will be administered in inpatient setting.      I recommend the following bridging therapy after T-cell apheresis to control the disease and debulk lymphoma: polatuzumab/Bendamustine (at 60% dose).     We reviewed the side effects associated with LD chemotherapy and CAR-T infusion such as low blood counts, risk of infection and bleeding. We focused on expected side effects of cytokine release syndrome and the neurotoxicity. Symptoms may vary from mild to severe, and potentially life-threatening and can include the need for ICU care and intubation.     Patient had encephalopathy with Ifosfamide and maybe at higher risk of CNS adverse effects. Therefore I recommend he enrolls to CNS prophylaxis study with I/T. DXM and simvastatin.      Some patients can get very ill, but the usual course is transient and reversible.  The patient will have to stay within 30 minutes from clinic for 28 days and are advised to participate in 15 years follow-up.      CAR-T cell products available commercially are standard of care therapy and our center is certified to administer CAR-Ts, the research aspects are limited to collection of data to research database. Patient will be co-enrolled to immune reconstitiution protocol [id number] which includes collection of blood, urine and stool.    Patient will have to wear the CAR-T therapy wallet card which needs to presented to ER at  the time of visit.     Patient and the family asked many questions and had a good understanding of CAR-T treatment and its potential complications. Patient verbalized the wish to proceed with CAR-T therapy as recommended. I discussed these recommendations with referring physician  and request the BMT office to pursuit the following plan:    1. pursuit insurance approval for CAR-T therapy with [employer]    2. schedule a work-up for apheresis   3. arrange virology testing prior to apheresis  4.schedule bridging chemotherapy with  - the primary oncologist  5. Await melanoma biopsy results.     Patient had a good understanding of the plan and is interested to prpoceed.     I spent 90 minutes face to face with the patient, 50% was spent to coordinate the care and counseling.        Rosey Bunch MD

## 2020-07-30 NOTE — LETTER
"    2020         RE: Pastor Garibay  [address] 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 Please see a copy of my visit note below.    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    Weight as of this encounter: 85.1 kg (187 lb 11.2 oz). Body surface area is 2.02 meters squared.  No Pain (0) Comment: Data Unavailable   No LMP for male patient.  Allergies reviewed: Yes  Medications reviewed: Yes    Medications: Medication refills not needed today.  Pharmacy name entered into [facility]: [id number] [place] [facility] [address]    Clinical concerns: None       Marina Rutledge, GILMA     Cell Therapy Note.    I I have seen Mr. Garibay via video visit today for repeated consult  For the consideration of CAR-T cell therapy for his relapsed transformed lymphoma.    The patient has now completed the second cycle of salvage chemotherapy with our rehab with a end of treatment PET CT scan showing persistent disease although significantly smaller it still substantial bulk of the disease.    Lymphoma type: transformed DLBCL  Date of d2020  Subtype: GCB  c-myc status: overexpression   bcl-2 status: overexpression   bcl-6 status:  Stage: IV  Extranodal disease: Yes  Extranodal site: lungs  LDH elevated at dg: Yes  KPS: 80%  IPI: 4  Front-line therapy: R-ICE followed by D-HAP   Response to front-line therapy: stable disease         HPI:  This patient is a 70 year old male with history " of follicular lymphoma when he presented with BETANCOURT.   2014: extensive bulky 9-cm right paratracheal lymphadenopathy with additional mediastinal, bilateral pulmonary hilar, and upper abdominal, retroperitoneal lymphadenopathy. BMBx neg.    Excisional left supraclavicular lymph node biopsy confirmed grade III follicular lymphoma.     12/2014- 2/2015 -  RCHOP x 6    CR confirmed by CT scanning in April 2015   2/15- 2/2017 completed two years of every two month maintenance rituximab.     October 2017:   enlarging 3 cm infrahilar mass. This is in a location different than his original nodes from 2014.   11/17/17 the patient underwent a thoracotomy. The pathology revealed recurrent grade 2-3  follicular center cell lymphoma, with no evidence of a primary lung cancer.    12/14/17 he began bendamustine with rituximab chemotherapy.     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  He has been anticoagulated with Lovenox without complication.      4/9/18 revealed an improved but not resolved IVC thrombus, with mild stable mediastinal nodes. An inflammatory pulmonary infiltrate was noted.    6/4/18 completed 6 cycles of BR chemotherapy. EOT CT revealed stable mild lymphadenopathy with mild splenomegaly and a slight increase in the IVC thrombus.     Lovenox was converted to Xarelto in 6/2018.     11/28/18  new 2 x1.5 cm right hilar node and improved IVC thrombus.  6/13/19 confirmed the hypermetabolic right infrahilar mass and revealed moderate metabolic activity within a few small nodules without other disease.     As all disease could be encompassed in one radiation field, he completed 3000 cGy of radiotherapy to the right thoracic region 6/27/19 - 7/18/19.     5/14/20 - RELAPSE -  revealed marked increase in the right lower lobe pulmonary mass with new subcarinal lymphadenopathy, bilateral new adrenal masses, and soft tissue tumor deposits in the  abdomen.     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  His overall health seems better; he had a very eventful June and July. He received 1 cycle of R-DHAP without major chemotherapy toxicity; had done well and had no neutropenic fevers.    However the course was complicated by worsening of osteomyelitis of the left third toe and he eventually underwent distal phalanx amputation in mid July.  This is healing very well now. The wound completely closed off.  He did not receive any antibiotics.  Additionally he was diagnosed with a melanoma of the right ear lobe underwent complete resection and node dissection; the pathology results are unavailable at this time.    The main concern is lack of complete response after DHAP. The lymphoma mass is improved but is still persistent involving a portion of the right lung.    PAST MEDICAL HISTORY: is  negative  for surgeries or significant illness. There is no medical history of migraine, seizure, mood disorder, chemical dependency, cardiovascular, lung or gastrointestinal disease, back or joint problems or skin problems.      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     COMPARISON: None.        Impression     IMPRESSION: There has been revision amputation of the second and third  toes; now the amputation is just proximal to the PIP joint. No  evidence of complication. Large posterior and small plantar calcaneal  spur.     DANG MORAN MD      Fhx:  Neg  SHX: lives with his wife, good support.    Current Outpatient Medications   Medication     allopurinol (ZYLOPRIM) 300 MG tablet     fenofibrate (TRICOR) 145 MG tablet     hydrocortisone (CORTEF) 10 MG tablet     hydrocortisone (CORTEF) 10 MG tablet     insulin aspart (NOVOLOG FLEXPEN) 100 UNIT/ML pen     insulin glargine (LANTUS VIAL) 100 UNIT/ML vial     metFORMIN (GLUCOPHAGE) 500 MG tablet     metFORMIN (GLUCOPHAGE) 500 MG tablet     order for DME     potassium chloride ER (KLOR-CON M) 20 MEQ CR tablet     potassium chloride ER (KLOR-CON M) 20 MEQ CR tablet     rosuvastatin (CRESTOR) 10 MG tablet     dexamethasone (DECADRON) 4 MG tablet     prochlorperazine (COMPAZINE) 5 MG tablet     No current facility-administered medications for this visit.             PHYSICAL EXAM:   VITALS:/72   Pulse 104   Temp 97.7  F (36.5  C) (Oral)   Ht 1.727 m (5' 8\")   Wt 85.1 kg (187 lb 11.2 oz)   SpO2 100%   BMI 28.54 kg/m      GENERAL: Pleasant, in no acute distress. In good spirits  HEENT: Normocephalic, atraumatic. PERRL. Sclera are white.  Conjunctivae are clear.  Oropharynx is clear without stomatitis or sores.    NECK: Supple. No " adenopathy.  Trachea is midline.  CARDIAC: Regular rate and rhythm, S1, S2.  No murmurs.    RESPIRATORY: Nonlabored. Lungs are clear to auscultation bilaterally.  ABDOMEN: Soft and nontender.  Bowel sounds are present.  No hepatosplenomegaly or appreciable masses.  EXTREMITIES: No edema. Moves extremities without difficulty.  DERM: No petechiae, rashes, or bruising.  NEURO: The patient is alert and oriented.      Other results:  Lab Results   Component Value Date    WBC 7.7 07/10/2020    ANEU 7.3 07/10/2020    HGB 9.6 (L) 07/10/2020    HCT 29.7 (L) 07/10/2020     07/10/2020     07/09/2020    POTASSIUM 4.4 07/09/2020    CHLORIDE 104 07/09/2020    CO2 21 07/09/2020     (H) 07/09/2020    BUN 15 07/09/2020    CR 0.75 07/09/2020    MAG 1.8 07/08/2020    INR 1.22 (H) 05/27/2020    AST 12 07/09/2020    ALT 21 07/09/2020     CT chest 7/20/2020 - I reviewed the images - notable for persist RLL mass c/w lymphoma.       PROCEDURES PERFORMED:  1. Wide excision melanoma, right ear lobule, greater than 3 cm with intermediate closure greater than 5 cm.  2. [place] lymph node biopsy, right cervical.    POSTOPERATIVE DIAGNOSIS:  Malignant melanoma, right ear lobule.     ASSESSMEN AND PLAN:    In summary Mr. Garibay is a 70 years old male with a longstanding history of follicular lymphoma post multiple lines of therapy and radiation therapy. Now with a transformed diffuse large B-cell lymphoma GCB subtype with a MYC overexpression and refractory to chemotherapy.  He only had stable disease after 2 cycles of salvage therapy.    He has multiple comorbidities including diabetes, recent melanoma resection awaiting histology and recent osteomyelitis.  However is overall health is improving; he is functional and currently free of active infections.    I do think he is a candidate for CAR-a therapy as the only potential curative treatment option for his lymphoma.       We spent 90 minutes in consultation reviewing the  prognosis of chemotherapy refractory DLBCL and. treatment options.  The therapeutic options are limited and given the failure of prior therapy, CAR-T is indicated; I recommended therapy  using commercial CAR-T product Yescarta.     We did not sign the screening consent and Yescarta manufacturing consent today.      I reviewed the protocol with the patient. The CAR19 provides the opportunity to control lymphoma with reported overall response around 55-75% and complete response rate at 40-45%.     The process consists of non-mobilized apheresis followed by 3 weeks period of waiting, autologous CAR19-T cells are reprogrammed and manufactured ex vivo.  The treatment phase includes lympho-depleting chemotherapy with fludarabine and Cytoxan x3 days.  The patient will receive this outpatient.    CAR-T therapy Yescarta will be administered in inpatient setting.      I recommend the following bridging therapy after T-cell apheresis to control the disease and debulk lymphoma: polatuzumab/Bendamustine (at 60% dose).     We reviewed the side effects associated with LD chemotherapy and CAR-T infusion such as low blood counts, risk of infection and bleeding. We focused on expected side effects of cytokine release syndrome and the neurotoxicity. Symptoms may vary from mild to severe, and potentially life-threatening and can include the need for ICU care and intubation.     Patient had encephalopathy with Ifosfamide and maybe at higher risk of CNS adverse effects. Therefore I recommend he enrolls to CNS prophylaxis study with I/T. DXM and simvastatin.      Some patients can get very ill, but the usual course is transient and reversible.  The patient will have to stay within 30 minutes from clinic for 28 days and are advised to participate in 15 years follow-up.      CAR-T cell products available commercially are standard of care therapy and our center is certified to administer CAR-Ts, the research aspects are limited to collection  of data to research database. Patient will be co-enrolled to immune reconstitiution protocol [id number] which includes collection of blood, urine and stool.    Patient will have to wear the CAR-T therapy wallet card which needs to presented to ER at the time of visit.     Patient and the family asked many questions and had a good understanding of CAR-T treatment and its potential complications. Patient verbalized the wish to proceed with CAR-T therapy as recommended. I discussed these recommendations with referring physician  and request the BMT office to pursuit the following plan:    1. pursuit insurance approval for CAR-T therapy with Yescarta    2. schedule a work-up for apheresis   3. arrange virology testing prior to apheresis  4.schedule bridging chemotherapy with  - the primary oncologist  5. Await melanoma biopsy results.     Patient had a good understanding of the plan and is interested to prpoceed.     I spent 90 minutes face to face with the patient, 50% was spent to coordinate the care and counseling.        Rosey Bunch MD                             Again, thank you for allowing me to participate in the care of your patient.        Sincerely,        Rosey Bunch MD

## 2020-07-31 ENCOUNTER — APPOINTMENT (OUTPATIENT)
Dept: TRANSPLANT | Facility: CLINIC | Age: 70
End: 2020-07-31
Payer: COMMERCIAL

## 2020-08-03 NOTE — PROGRESS NOTES
Met with emeterio to discuss follow up plan for getting insurance approval for CART. He verbalized understanding of plan and will call if he has questions. He was provided with BMT office phone number to call.

## 2020-08-07 ENCOUNTER — TELEPHONE (OUTPATIENT)
Dept: TRANSPLANT | Facility: CLINIC | Age: 70
End: 2020-08-07

## 2020-08-07 DIAGNOSIS — C83.398 DIFFUSE LARGE B-CELL LYMPHOMA OF EXTRANODAL SITE: Primary | ICD-10-CM

## 2020-08-07 DIAGNOSIS — C83.398 DIFFUSE LARGE B-CELL LYMPHOMA OF EXTRANODAL SITE: ICD-10-CM

## 2020-08-07 PROCEDURE — 87340 HEPATITIS B SURFACE AG IA: CPT

## 2020-08-07 PROCEDURE — 87389 HIV-1 AG W/HIV-1&-2 AB AG IA: CPT

## 2020-08-07 PROCEDURE — 86704 HEP B CORE ANTIBODY TOTAL: CPT

## 2020-08-07 PROCEDURE — 25000128 H RX IP 250 OP 636

## 2020-08-07 PROCEDURE — 86803 HEPATITIS C AB TEST: CPT

## 2020-08-07 RX ORDER — HEPARIN SODIUM (PORCINE) LOCK FLUSH IV SOLN 100 UNIT/ML 100 UNIT/ML
5 SOLUTION INTRAVENOUS DAILY PRN
Status: DISCONTINUED | OUTPATIENT
Start: 2020-08-07 | End: 2020-08-15 | Stop reason: HOSPADM

## 2020-08-07 RX ADMIN — Medication 5 ML: at 13:12

## 2020-08-07 NOTE — TELEPHONE ENCOUNTER
Contacted Pastor to explain need for lab appt today to draw viral labs for Yescarta approval. Also discussed Dr Bunch's recommendation for next cycle of RDHAP while we await insurance approval. He verbalized understanding and would like to go to [facility] out patient lab for lab draw but appt is not available until next week. Pt will come to the [facility] today for labs and is agreeable to plan.

## 2020-08-07 NOTE — NURSING NOTE
Chief Complaint   Patient presents with     Port Draw     Labs drawn via port by RN in lab VS taken.      Labs drawn via Port accessed using 20g gripper needle. Line flushed and Heparin locked.    Aliya Zapata RN

## 2020-08-09 LAB
HBV CORE AB SERPL QL IA: NONREACTIVE
HBV SURFACE AG SERPL QL IA: NONREACTIVE
HCV AB SERPL QL IA: NONREACTIVE
HIV 1+2 AB+HIV1 P24 AG SERPL QL IA: NONREACTIVE

## 2020-08-13 ENCOUNTER — TRANSFERRED RECORDS (OUTPATIENT)
Dept: HEALTH INFORMATION MANAGEMENT | Facility: CLINIC | Age: 70
End: 2020-08-13

## 2020-08-17 DIAGNOSIS — C83.398 DIFFUSE LARGE B-CELL LYMPHOMA OF EXTRANODAL SITE: Primary | ICD-10-CM

## 2020-08-17 DIAGNOSIS — C85.90 NH LYMPHOMA (H): ICD-10-CM

## 2020-08-20 ENCOUNTER — OFFICE VISIT (OUTPATIENT)
Dept: TRANSPLANT | Facility: CLINIC | Age: 70
End: 2020-08-20
Payer: COMMERCIAL

## 2020-08-20 ENCOUNTER — APPOINTMENT (OUTPATIENT)
Dept: LAB | Facility: CLINIC | Age: 70
End: 2020-08-20
Payer: COMMERCIAL

## 2020-08-20 ENCOUNTER — MEDICAL CORRESPONDENCE (OUTPATIENT)
Dept: TRANSPLANT | Facility: CLINIC | Age: 70
End: 2020-08-20

## 2020-08-20 ENCOUNTER — HOSPITAL ENCOUNTER (OUTPATIENT)
Dept: LAB | Facility: CLINIC | Age: 70
End: 2020-08-20
Payer: COMMERCIAL

## 2020-08-20 ENCOUNTER — ONCOLOGY VISIT (OUTPATIENT)
Dept: TRANSPLANT | Facility: CLINIC | Age: 70
End: 2020-08-20
Payer: COMMERCIAL

## 2020-08-20 VITALS
RESPIRATION RATE: 18 BRPM | WEIGHT: 196.65 LBS | SYSTOLIC BLOOD PRESSURE: 103 MMHG | HEIGHT: 70 IN | OXYGEN SATURATION: 98 % | BODY MASS INDEX: 28.15 KG/M2 | HEART RATE: 100 BPM | DIASTOLIC BLOOD PRESSURE: 63 MMHG | TEMPERATURE: 97.6 F

## 2020-08-20 VITALS
BODY MASS INDEX: 28.15 KG/M2 | DIASTOLIC BLOOD PRESSURE: 63 MMHG | SYSTOLIC BLOOD PRESSURE: 103 MMHG | HEART RATE: 100 BPM | TEMPERATURE: 97.6 F | WEIGHT: 196.65 LBS | HEIGHT: 70 IN | RESPIRATION RATE: 18 BRPM

## 2020-08-20 DIAGNOSIS — C83.398 DIFFUSE LARGE B-CELL LYMPHOMA OF EXTRANODAL SITE: Primary | ICD-10-CM

## 2020-08-20 DIAGNOSIS — C83.398 DIFFUSE LARGE B-CELL LYMPHOMA OF EXTRANODAL SITE: ICD-10-CM

## 2020-08-20 DIAGNOSIS — C85.80 LARGE CELL LYMPHOMA (H): ICD-10-CM

## 2020-08-20 DIAGNOSIS — C83.3A DIFFUSE LARGE CELL LYMPHOMA IN REMISSION: Primary | ICD-10-CM

## 2020-08-20 DIAGNOSIS — C85.90 NH LYMPHOMA (H): ICD-10-CM

## 2020-08-20 LAB
ABO + RH BLD: NORMAL
ABO + RH BLD: NORMAL
ALBUMIN SERPL-MCNC: 3.7 G/DL (ref 3.4–5)
ALP SERPL-CCNC: 81 U/L (ref 40–150)
ALT SERPL W P-5'-P-CCNC: 20 U/L (ref 0–70)
ANION GAP SERPL CALCULATED.3IONS-SCNC: 7 MMOL/L (ref 3–14)
APTT PPP: 35 SEC (ref 22–37)
AST SERPL W P-5'-P-CCNC: 19 U/L (ref 0–45)
BASOPHILS # BLD AUTO: 0 10E9/L (ref 0–0.2)
BASOPHILS NFR BLD AUTO: 0.5 %
BILIRUB SERPL-MCNC: 0.6 MG/DL (ref 0.2–1.3)
BLD GP AB SCN SERPL QL: NORMAL
BLOOD BANK CMNT PATIENT-IMP: NORMAL
BUN SERPL-MCNC: 12 MG/DL (ref 7–30)
CALCIUM SERPL-MCNC: 9.2 MG/DL (ref 8.5–10.1)
CD19 CELLS # BLD: <1 CELLS/UL (ref 107–698)
CD19 CELLS NFR BLD: <1 % (ref 6–27)
CD3 CELLS # BLD: 1403 CELLS/UL (ref 603–2990)
CD3 CELLS NFR BLD: 92 % (ref 49–84)
CD3+CD4+ CELLS # BLD: 183 CELLS/UL (ref 441–2156)
CD3+CD4+ CELLS NFR BLD: 12 % (ref 28–63)
CD3+CD4+ CELLS/CD3+CD8+ CLL BLD: 0.15 % (ref 1.4–2.6)
CD3+CD8+ CELLS # BLD: 1218 CELLS/UL (ref 125–1312)
CD3+CD8+ CELLS NFR BLD: 80 % (ref 10–40)
CD3-CD16+CD56+ CELLS # BLD: 114 CELLS/UL (ref 95–640)
CD3-CD16+CD56+ CELLS NFR BLD: 8 % (ref 4–25)
CHLORIDE SERPL-SCNC: 103 MMOL/L (ref 94–109)
CO2 SERPL-SCNC: 25 MMOL/L (ref 20–32)
CREAT SERPL-MCNC: 0.71 MG/DL (ref 0.66–1.25)
DIFFERENTIAL METHOD BLD: ABNORMAL
EOSINOPHIL # BLD AUTO: 0.1 10E9/L (ref 0–0.7)
EOSINOPHIL NFR BLD AUTO: 1.1 %
ERYTHROCYTE [DISTWIDTH] IN BLOOD BY AUTOMATED COUNT: 17 % (ref 10–15)
GFR SERPL CREATININE-BSD FRML MDRD: >90 ML/MIN/{1.73_M2}
GLUCOSE SERPL-MCNC: 212 MG/DL (ref 70–99)
HCT VFR BLD AUTO: 37.6 % (ref 40–53)
HGB BLD-MCNC: 11.6 G/DL (ref 13.3–17.7)
IFC SPECIMEN: ABNORMAL
IMM GRANULOCYTES # BLD: 0 10E9/L (ref 0–0.4)
IMM GRANULOCYTES NFR BLD: 0.5 %
INR PPP: 1.11 (ref 0.86–1.14)
LYMPHOCYTES # BLD AUTO: 1.5 10E9/L (ref 0.8–5.3)
LYMPHOCYTES NFR BLD AUTO: 20.3 %
MCH RBC QN AUTO: 30.8 PG (ref 26.5–33)
MCHC RBC AUTO-ENTMCNC: 30.9 G/DL (ref 31.5–36.5)
MCV RBC AUTO: 100 FL (ref 78–100)
MONOCYTES # BLD AUTO: 0.6 10E9/L (ref 0–1.3)
MONOCYTES NFR BLD AUTO: 7.5 %
NEUTROPHILS # BLD AUTO: 5.1 10E9/L (ref 1.6–8.3)
NEUTROPHILS NFR BLD AUTO: 70.1 %
NRBC # BLD AUTO: 0 10*3/UL
NRBC BLD AUTO-RTO: 0 /100
PLATELET # BLD AUTO: 111 10E9/L (ref 150–450)
POTASSIUM SERPL-SCNC: 4.5 MMOL/L (ref 3.4–5.3)
PROT SERPL-MCNC: 6.7 G/DL (ref 6.8–8.8)
RBC # BLD AUTO: 3.77 10E12/L (ref 4.4–5.9)
SODIUM SERPL-SCNC: 134 MMOL/L (ref 133–144)
SPECIMEN EXP DATE BLD: NORMAL
WBC # BLD AUTO: 7.3 10E9/L (ref 4–11)

## 2020-08-20 PROCEDURE — 86850 RBC ANTIBODY SCREEN: CPT

## 2020-08-20 PROCEDURE — 93005 ELECTROCARDIOGRAM TRACING: CPT | Mod: ZF

## 2020-08-20 PROCEDURE — 86357 NK CELLS TOTAL COUNT: CPT

## 2020-08-20 PROCEDURE — 86360 T CELL ABSOLUTE COUNT/RATIO: CPT

## 2020-08-20 PROCEDURE — 86696 HERPES SIMPLEX TYPE 2 TEST: CPT

## 2020-08-20 PROCEDURE — 85610 PROTHROMBIN TIME: CPT

## 2020-08-20 PROCEDURE — 93010 ELECTROCARDIOGRAM REPORT: CPT | Performed by: INTERNAL MEDICINE

## 2020-08-20 PROCEDURE — 86695 HERPES SIMPLEX TYPE 1 TEST: CPT

## 2020-08-20 PROCEDURE — 85730 THROMBOPLASTIN TIME PARTIAL: CPT

## 2020-08-20 PROCEDURE — 82784 ASSAY IGA/IGD/IGG/IGM EACH: CPT

## 2020-08-20 PROCEDURE — 87798 DETECT AGENT NOS DNA AMP: CPT

## 2020-08-20 PROCEDURE — 86703 HIV-1/HIV-2 1 RESULT ANTBDY: CPT

## 2020-08-20 PROCEDURE — 85025 COMPLETE CBC W/AUTO DIFF WBC: CPT

## 2020-08-20 PROCEDURE — 86901 BLOOD TYPING SEROLOGIC RH(D): CPT

## 2020-08-20 PROCEDURE — 86359 T CELLS TOTAL COUNT: CPT

## 2020-08-20 PROCEDURE — 86355 B CELLS TOTAL COUNT: CPT

## 2020-08-20 PROCEDURE — 40000268 ZZH STATISTIC NO CHARGES: Mod: ZF

## 2020-08-20 PROCEDURE — 87535 HIV-1 PROBE&REVERSE TRNSCRPJ: CPT

## 2020-08-20 PROCEDURE — 80053 COMPREHEN METABOLIC PANEL: CPT

## 2020-08-20 PROCEDURE — 87521 HEPATITIS C PROBE&RVRS TRNSC: CPT

## 2020-08-20 PROCEDURE — 86803 HEPATITIS C AB TEST: CPT

## 2020-08-20 PROCEDURE — 86900 BLOOD TYPING SEROLOGIC ABO: CPT

## 2020-08-20 PROCEDURE — 86687 HTLV-I ANTIBODY: CPT

## 2020-08-20 PROCEDURE — G0463 HOSPITAL OUTPT CLINIC VISIT: HCPCS | Mod: 27

## 2020-08-20 PROCEDURE — 86753 PROTOZOA ANTIBODY NOS: CPT

## 2020-08-20 PROCEDURE — 86665 EPSTEIN-BARR CAPSID VCA: CPT

## 2020-08-20 PROCEDURE — 86704 HEP B CORE ANTIBODY TOTAL: CPT

## 2020-08-20 PROCEDURE — 86780 TREPONEMA PALLIDUM: CPT

## 2020-08-20 PROCEDURE — 86644 CMV ANTIBODY: CPT

## 2020-08-20 PROCEDURE — 87340 HEPATITIS B SURFACE AG IA: CPT

## 2020-08-20 PROCEDURE — 87516 HEPATITIS B DNA AMP PROBE: CPT

## 2020-08-20 PROCEDURE — G0463 HOSPITAL OUTPT CLINIC VISIT: HCPCS | Mod: ZF

## 2020-08-20 ASSESSMENT — PAIN SCALES - GENERAL: PAINLEVEL: NO PAIN (0)

## 2020-08-20 ASSESSMENT — MIFFLIN-ST. JEOR
SCORE: 1663.25
SCORE: 1663.25

## 2020-08-20 NOTE — PROGRESS NOTES
Chief Complaint   Patient presents with     RECHECK     DLBCL~ here for work up     BMT Teaching Flowsheet      Teaching Topic: work up    Person(s) involved in teaching: Patient  Motivation Level  Asks Questions: Yes  Eager to Learn: Yes  Cooperative: Yes  Receptive (willing/able to accept information): Yes  Any cultural factors/Caodaism beliefs that may influence understanding or compliance? No    Patient had vitals, height and weight done.  Consents explained and signed.  Calendar reviewed and questions were answered.  Labs drawn.      Instructional Materials Used/Given: Consents, calendar    Time spent with patient: 60 minutes.    Specific Concerns: NA

## 2020-08-20 NOTE — LETTER
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 Please see a copy of my visit note below.    Chief Complaint   Patient presents with     RECHECK     DLBCL~ here for work up     BMT Teaching Flowsheet      Teaching Topic: work up    Person(s) involved in teaching: Patient  Motivation Level  Asks Questions: Yes  Eager to Learn: Yes  Cooperative: Yes  Receptive (willing/able to accept information): Yes  Any cultural factors/Latter day beliefs that may influence understanding or compliance? No    Patient had vitals, height and weight done.  Consents explained and signed.  Calendar reviewed and questions were answered.  Labs drawn.      Instructional Materials Used/Given: Consents, calendar    Time spent with patient: 60 minutes.    Specific Concerns: NA      Again, thank you for allowing me to participate in the care of your patient.        Sincerely,        BMT Nurse

## 2020-08-20 NOTE — PROGRESS NOTES
"APHERESIS INITIAL CONSULT CHECKLIST    Current Encounter Information  Current Encounter Information: Reason for Visit, Allergies and Current Meds  Procedure Requested: MNC/PBSC Collection  History of: (Reason for Apheresis): Auto MNC Yescarta CAR-T    Access Assessment  Access Assessment  Vein Assessment:  Veins are adequate: Yes  Needs a catheter placed for Apheresis?: No    Vital Signs  Vital Signs  BP: 103/63  Pulse: 100  Temp: 97.6  F (36.4  C)  Temp src: Oral  Resp: 18  Height: 177 cm (5' 9.69\")  Weight: 89.2 kg (196 lb 10.4 oz)    Reviewed   Review With Patient  Have you read the brochure Getting ready for Apheresis?: Yes  Have you had any invasive procedures, surgery, biopsy, bleeding in the last month?: No  Patient did have a mole removed from his ear recently, and has a history of a blood clot, but not currently on a blood thinner.   Review medications and allergies: Yes  Have you ever been transfused?: Yes  Patient given tour of the unit: Yes    Additional Information  Notes, needs and time spent with patient  Explain procedure, side effects or reactions, instructions: Yes, I advised the patient to hydrate well starting 4 days before the collection, and to follow a low fat diet the day prior to, and the day of collection.   Patient has special need?: No  Face to face time spent: 30 minutes for vein assessment, and medical history review.  Patient met with Dr. Shantel Whitmore for the procedure consent.          "

## 2020-08-20 NOTE — LETTER
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 Please see a copy of my visit note below.    See nursing note.  Kasandra MATHEWS      Again, thank you for allowing me to participate in the care of your patient.        Sincerely,        BMT Nurse

## 2020-08-20 NOTE — CONSULTS
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 The procedure of mononuclear collection, including the potential risks and benefits, was reviewed with patient. All of his questions were answered. Consent was obtained.  Collection pending completion of evaluation for donation and scheduling with company.  His veins appear adequate and he does not need a central line placed prior to collection.         Chief Complaint:   DLBCL         History of Present Illness:   The patient is a 70 year old male. He was initially diagnosed with follicular lymphoma in 12/2014.  He received 6 cycles of RCHOP. Maintained on rituximab from 2445-2411. Under went thoracotomy on 11/2017 which showed relapse. He was treated with bendamustine and rituximab, He relapsed again with a hilar mass in 2018 and received radiation therapy .In 5/2020 found to have lymphadenopathy, and multiple masses and biopsy demonstrated transformation for DLBCL. He has been treated with RICE but ten switched to RDHAP.  His course has also been complicated by a DVT.  He was on rivaroxaban but being held as he has had multiple recent surgical procedures.  He  had melanoma resection on his right ear and sentinel node biopsy.  He needs another surgical procedure as the excision margin was not wide enough on the ear lesion.  He also had left foot osteomyelitis which ultimately resulted in toe amputation. He has been transfused 4 times in the past. He reports generally feeling well today.          Past Medical History:     Past Medical History:   Diagnosis Date     Abnormal liver  function test      Anemia      CPAP (continuous positive airway pressure) dependence      Diabetes (H)      Hx of skin cancer, basal cell      Hyperlipidemia      Hypertension      Keratoderma      Large cell lymphoma (H) 7/20/2020     Liver disease      Lymphoma (H)      Non-Hodgkin lymphoma (H)      Pedal edema     CHRONIC     Pleural effusion      Seborrheic keratosis, inflamed      Sleep apnea     Uses a CPAP     Thrombocytopenia (H) 7/20/2020     Thrombosis Felbruary 2018   Melanoma  Osteomyelitis          Past Surgical History:     Past Surgical History:   Procedure Laterality Date     AMPUTATE TOE(S) Left 5/22/2020    Procedure: LEFT SECOND AND THIRD DISTAL TOE AMPUTATIONS;  Surgeon: Ramon Flores MD;  Location:  OR     AMPUTATE TOE(S) Left 7/1/2020    Procedure: 1.  Partial left second toe amputation with osteotomy through proximal phalanx.  2.  Partial left third toe amputation with osteotomy through proximal phalanx.;  Surgeon: Ismael Humphrey DPM;  Location:  OR     BIOPSY LYMPH NODE CERVICAL N/A 12/5/2014    Procedure: BIOPSY LYMPH NODE CERVICAL;  Surgeon: Robbie Linda MD;  Location:  OR     BRONCHOSCOPY FLEXIBLE N/A 11/14/2017    Procedure: BRONCHOSCOPY FLEXIBLE;  FLEXIBLE BRONCHOSCOPY WITH BIOPSIES.;  Surgeon: Robbie Linda MD;  Location:  OR     COLONOSCOPY       COLONOSCOPY N/A 5/9/2018    Procedure: COMBINED COLONOSCOPY, SINGLE OR MULTIPLE BIOPSY/POLYPECTOMY BY BIOPSY;;  Surgeon: Ramos Bates MD;  Location:  GI     ENDOVASCULAR PLACEMENT VASCULAR DEVICE Left 12/5/2017    Procedure: ENDOVASCULAR PLACEMENT VASCULAR DEVICE;;  Surgeon: Robbie Linda MD;  Location: [facility]     ENT SURGERY      tonsillectomy     EXCISE NODE MEDIASTINAL N/A 11/17/2017    Procedure: EXCISE NODE MEDIASTINAL;;  Surgeon: Robbie Linda MD;  Location:  OR     EYE SURGERY       EYE SURGERY Left     vitrectomy     INSERT PORT VASCULAR ACCESS N/A 12/5/2014     Procedure: INSERT PORT VASCULAR ACCESS;  Surgeon: Robbie Linda MD;  Location:  OR     INSERT PORT VASCULAR ACCESS N/A 12/5/2017    Procedure: INSERT PORT VASCULAR ACCESS;  POWER PORT PLACEMENT ATTEMPTED, PLACEMENT OF ANGIO-SEAL VIP VASCULAR CLOSURE DEVICE;  Surgeon: Robbie Linda MD;  Location: [facility]     IR CHEST PORT PLACEMENT > 5 YRS OF AGE  6/5/2020     IR PORT REMOVAL RIGHT  12/10/2018     PHACOEMULSIFICATION CLEAR CORNEA WITH STANDARD INTRAOCULAR LENS IMPLANT Right 5/2/2016    Procedure: PHACOEMULSIFICATION CLEAR CORNEA WITH STANDARD INTRAOCULAR LENS IMPLANT;  Surgeon: Rasheed Finney MD;  Location:  EC     REMOVE PORT VASCULAR ACCESS N/A 3/14/2017    Procedure: REMOVE PORT VASCULAR ACCESS;  Surgeon: Robbie Linda MD;  Location:  OR     SOFT TISSUE SURGERY      BASAL CELL CA FOREHEAD     THORACOTOMY Right 11/17/2017    Procedure: THORACOTOMY;  RIGHT EXPLORATORY THORACOTOMY/ EXTENSIVE PNEUMOLYSIS/ BIOPSY OF INTERLOBAR LYMPH NODE;  Surgeon: Robbie Linda MD;  Location:  OR          Social History:    with 5 children and grandchildren, 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           Allergies:   No Known Allergies          Medications:   Med list reviewed by nursing staff, though he thinks he may not be taking the decadron or compazine anymore.    Current Outpatient Medications   Medication Sig     allopurinol (ZYLOPRIM) 300 MG tablet Take 300 mg by mouth daily     dexamethasone (DECADRON) 4 MG tablet Take 10 tablets (40 mg) by mouth every 24 hours for 1 day     fenofibrate (TRICOR) 145 MG tablet Take 145 mg by mouth daily     hydrocortisone (CORTEF) 10 MG tablet Take 20 mg by mouth daily before breakfast      insulin aspart (NOVOLOG FLEXPEN) 100 UNIT/ML pen Inject Subcutaneous 3 times daily (with meals) Patient uses sliding scale based on Carbs and Blood Glucose. Has been using very little lately due to low  readings and low appetite.     insulin glargine (LANTUS VIAL) 100 UNIT/ML vial Inject 15 Units Subcutaneous At Bedtime     metFORMIN (GLUCOPHAGE) 500 MG tablet Take 1,000 mg by mouth daily (with breakfast)     metFORMIN (GLUCOPHAGE) 500 MG tablet Take 500 mg by mouth daily (with dinner)     potassium chloride ER (KLOR-CON M) 20 MEQ CR tablet Take 20 mEq by mouth every evening     potassium chloride ER (KLOR-CON M) 20 MEQ CR tablet Take 40 mEq by mouth every morning      prochlorperazine (COMPAZINE) 5 MG tablet Take 1-2 tablets (5-10 mg) by mouth every 6 hours as needed (Breakthrough Nausea / Vomiting)     rosuvastatin (CRESTOR) 10 MG tablet Take 5 mg by mouth daily     No current facility-administered medications for this encounter.           Review of Systems:   Denied fever, chills, cough, shortness of breath, nausea, vomiting, diarrhea.  Appetite okay         Exam:   /63 P 100 T 97/6 RR 18  Weight 89.2 kg Height 177 cm  Alert, no apparent distress  Breathing appears comfortable on room air  No jaundice or scleral icterus, healing area on right ear from surgery  Moves all extremities  Answers/asks questions appropriately          Data:      Ref. Range 8/20/2020 12:09   Sodium Latest Ref Range: 133 - 144 mmol/L 134   Potassium Latest Ref Range: 3.4 - 5.3 mmol/L 4.5   Chloride Latest Ref Range: 94 - 109 mmol/L 103   Carbon Dioxide Latest Ref Range: 20 - 32 mmol/L 25   Urea Nitrogen Latest Ref Range: 7 - 30 mg/dL 12   Creatinine Latest Ref Range: 0.66 - 1.25 mg/dL 0.71   GFR Estimate Latest Ref Range: >60 mL/min/1.73_m2 >90   GFR Estimate If Black Latest Ref Range: >60 mL/min/1.73_m2 >90   Calcium Latest Ref Range: 8.5 - 10.1 mg/dL 9.2   Anion Gap Latest Ref Range: 3 - 14 mmol/L 7   Albumin Latest Ref Range: 3.4 - 5.0 g/dL 3.7   Protein Total Latest Ref Range: 6.8 - 8.8 g/dL 6.7 (L)   Bilirubin Total Latest Ref Range: 0.2 - 1.3 mg/dL 0.6   Alkaline Phosphatase Latest Ref Range: 40 - 150 U/L 81   ALT Latest Ref  Range: 0 - 70 U/L 20   AST Latest Ref Range: 0 - 45 U/L 19   Glucose Latest Ref Range: 70 - 99 mg/dL 212 (H)   WBC Latest Ref Range: 4.0 - 11.0 10e9/L 7.3   Hemoglobin Latest Ref Range: 13.3 - 17.7 g/dL 11.6 (L)   Hematocrit Latest Ref Range: 40.0 - 53.0 % 37.6 (L)   Platelet Count Latest Ref Range: 150 - 450 10e9/L 111 (L)   RBC Count Latest Ref Range: 4.4 - 5.9 10e12/L 3.77 (L)   MCV Latest Ref Range: 78 - 100 fl 100   MCH Latest Ref Range: 26.5 - 33.0 pg 30.8   MCHC Latest Ref Range: 31.5 - 36.5 g/dL 30.9 (L)   RDW Latest Ref Range: 10.0 - 15.0 % 17.0 (H)   Diff Method Unknown Automated Method   % Neutrophils Latest Units: % 70.1   % Lymphocytes Latest Units: % 20.3   % Monocytes Latest Units: % 7.5   % Eosinophils Latest Units: % 1.1   % Basophils Latest Units: % 0.5   % Immature Granulocytes Latest Units: % 0.5   Nucleated RBCs Latest Ref Range: 0 /100 0   Absolute Neutrophil Latest Ref Range: 1.6 - 8.3 10e9/L 5.1   Absolute Lymphocytes Latest Ref Range: 0.8 - 5.3 10e9/L 1.5   Absolute Monocytes Latest Ref Range: 0.0 - 1.3 10e9/L 0.6   Absolute Eosinophils Latest Ref Range: 0.0 - 0.7 10e9/L 0.1   Absolute Basophils Latest Ref Range: 0.0 - 0.2 10e9/L 0.0   Abs Immature Granulocytes Latest Ref Range: 0 - 0.4 10e9/L 0.0   Absolute Nucleated RBC Unknown 0.0   INR Latest Ref Range: 0.86 - 1.14  1.11   PTT Latest Ref Range: 22 - 37 sec 35      Ref. Range 8/20/2020 12:10   ABO Unknown O   RH(D) Unknown Neg   Antibody Screen Unknown Neg   Test Valid Only At Latest Units:     [facility]   Specimen Expires Unknown 08/23/2020       ATTESTATION STATEMENT:  This patient has been seen and evaluated by me directly, Shantel Whitmore MD, PhD.    Shantel Whitmore M.D., Ph.D.  Attending Physician  Division of Transfusion Medicine  Department of Laboratory [place] [address]  Pager [phone]

## 2020-08-20 NOTE — LETTER
"    2020         RE: Pastor Garibay  [address]  [facility] [address]        Dear Colleague,    Thank you for referring your patient, Pastor Garibay, to the [facility]. Please see a copy of my visit note below.    BMT CELL THERAPY CLINIC NOTE.    2020.    I saw  in the cell therapy clinic in person today to enroll him into CAR-T associated CNS toxicity  prevention study [id number].  He has a transformed DLBCL and is currently in work-up prior to apheresis to proceed with Yescarta therapy.    His complex medical history is summarized in my note from 2020  Overview:  Lymphoma type: transformed DLBCL  Date of d2020  Subtype: GCB  c-myc status: overexpression   bcl-2 status: overexpression   bcl-6 status:neg  Stage: IVB  Extranodal disease: Yes  Extranodal site: lungs  LDH elevated at dg: Yes  KPS: 80%  IPI: 4  Front-line therapy: R-CHOP  Salvage therapy:  R-ICE followed by D-HAP   Response to   therapy: stable disease inadequate to AHCT    INTERIM HISTORY:  Today, Pastor reports feeling very well, he denies new complaints, his energy is improving. The R temple surgery wound post melanoma resection is healing well and there is a plan to go with another outpatient surgical procedure on Friday.    He was diagnosed with a localized disease without metastases and sentinental node was not involved by melanoma.    Currently, patient is undergoing work-up in anticipation of YESCARTA on [id number] protocol. The apheresis is planned     On physical exam today he is in good spirits and appears well normal vital signs,/63   Pulse 100   Temp 97.6  F (36.4  C) (Oral)   Resp 18   Ht 1.77 m (5' 9.69\")   Wt 89.2 kg (196 lb 10.4 oz)   SpO2 98%   BMI 28.47 kg/m     HEENT unremarkable, mouth without lesions, neck supple without peripheral adenopathy, chest clear to auscultation, heart tones regular S1-S2 no gallops or murmurs, abdomen soft " nontender, extremities without swelling.  Status post amputation of his third toe.    Neurological exam normal cranial nerves.  His affect is normal cognition appears normal he has nonfocal neurological exam with a normal strength in his upper and lower extremities.  Normal gait.     Lab Results   Component Value Date    WBC 7.3 08/20/2020    ANEU 5.1 08/20/2020    HGB 11.6 (L) 08/20/2020    HCT 37.6 (L) 08/20/2020     (L) 08/20/2020     08/20/2020    POTASSIUM 4.5 08/20/2020    CHLORIDE 103 08/20/2020    CO2 25 08/20/2020     (H) 08/20/2020    BUN 12 08/20/2020    CR 0.71 08/20/2020    MAG 1.8 07/08/2020    INR 1.11 08/20/2020    AST 19 08/20/2020    ALT 20 08/20/2020     Current Outpatient Medications   Medication     allopurinol (ZYLOPRIM) 300 MG tablet     fenofibrate (TRICOR) 145 MG tablet     hydrocortisone (CORTEF) 10 MG tablet     insulin aspart (NOVOLOG FLEXPEN) 100 UNIT/ML pen     insulin glargine (LANTUS VIAL) 100 UNIT/ML vial     metFORMIN (GLUCOPHAGE) 500 MG tablet     metFORMIN (GLUCOPHAGE) 500 MG tablet     potassium chloride ER (KLOR-CON M) 20 MEQ CR tablet     potassium chloride ER (KLOR-CON M) 20 MEQ CR tablet     prochlorperazine (COMPAZINE) 5 MG tablet     rosuvastatin (CRESTOR) 10 MG tablet     dexamethasone (DECADRON) 4 MG tablet     No current facility-administered medications for this visit.          Assessment and plan  Mr. aGribay is 70 years old male with transformed DLBCL now with the suboptimal response to R-DHAP chemotherapy.      Given multiple comorbidities, the CAR-T strategy was recommended.    He is a good candidate for Yescarta and will proceed with apheresis.  Treatment course and toxicities are again reviewed.      Given the risk for JUAQUIN neurotoxicity ( prior encephalopathy with IFO) we recommended to enroll into clinical trial testing the combination of simvastatin and intrathecal dexamethasone.  I reviewed the protocol with him. He understands that this  is experimental therapy and there is no proven benefit.  We talked about pros and cons and potential risks of the LP.  We also talked about potential risk of uncontrolled neurotoxicity related to Yescarta.   He is a good candidate and is interested to proceed.  He never had LP.  He is willing to try it at bedside.      He is currently on Crestor which he will stop and will restart simvastatin 5 days prior to his apheresis on 8/25. Research team will provide him with the calendar.    The patient asked good questions and he has a good understanding of the protocol.  He agreed to contraception; in fact disclosed that he has erectile dysfunction for last 20 years.    He signed the consent voluntarily.   He was given a copy of can the consent today.    Plan:  Apheresis 8/31  Start Simvastatin 5 days prior   F.u BMT clinic prior to apheresis.  Complete PET scan this week.  Hold Lovenox 12 hours prior to each LP - important to remember and keep track.    Rosey Bunch MD  Professor of Medicine  [phone]    Addendum:  PET scan revealed low level of disease and low SUV. The bridging therapy may not be useful and could cause adverse effects - therefore  I recommended to cancel the next cycle of R-DHAP.    Rosey Bunch MD

## 2020-08-20 NOTE — NURSING NOTE
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 Pt is Pre-CAR-T for Non-Hodgkin's Lymphoma.      Initial Vitals: /63   Pulse 100   Temp 97.6  F (36.4  C) (Oral)   Resp 18   Ht 1.77 m (5' 9.69\")   Wt 89.2 kg (196 lb 10.4 oz)   SpO2 98%   BMI 28.47 kg/m   Estimated body mass index is 28.47 kg/m  as calculated from the following:    Height as of this encounter: 1.77 m (5' 9.69\").    Weight as of this encounter: 89.2 kg (196 lb 10.4 oz). Body surface area is 2.09 meters squared.  No Pain (0) Comment: Data Unavailable   No LMP for male patient.  Allergies reviewed: Yes  Medications reviewed: Yes    Medications: Medication refills not needed today.  Pharmacy name entered into [facility] [employer] [facility]: [id number] [place] [id number]     Clinical concerns: Pt here for Yescarta/CAR-T Workup. Pt feeling well. Pt states he's been Constipated but otherwise doing well.  Dr. Bunch was NOT notified.      hTeresa Tracy RN              "

## 2020-08-21 ENCOUNTER — HOSPITAL ENCOUNTER (OUTPATIENT)
Dept: PET IMAGING | Facility: CLINIC | Age: 70
Discharge: HOME | End: 2020-08-21
Payer: COMMERCIAL

## 2020-08-21 DIAGNOSIS — C83.398 DIFFUSE LARGE B-CELL LYMPHOMA OF EXTRANODAL SITE: ICD-10-CM

## 2020-08-21 DIAGNOSIS — C85.90 NH LYMPHOMA (H): ICD-10-CM

## 2020-08-21 LAB
EBV VCA IGG SER QL IA: 0.4 AI (ref 0–0.8)
HSV1 IGG SERPL QL IA: 0.2 AI (ref 0–0.8)
HSV2 IGG SERPL QL IA: <0.2 AI (ref 0–0.8)
IGG SERPL-MCNC: 534 MG/DL (ref 610–1616)

## 2020-08-21 PROCEDURE — 74177 CT ABD & PELVIS W/CONTRAST: CPT

## 2020-08-21 PROCEDURE — 25000128 H RX IP 250 OP 636

## 2020-08-21 PROCEDURE — 34300033 ZZH RX 343

## 2020-08-21 PROCEDURE — A9552 F18 FDG: HCPCS

## 2020-08-21 RX ORDER — IOPAMIDOL 755 MG/ML
10-140 INJECTION, SOLUTION INTRAVASCULAR ONCE
Status: COMPLETED | OUTPATIENT
Start: 2020-08-21 | End: 2020-08-21

## 2020-08-21 RX ADMIN — IOPAMIDOL 120 ML: 755 INJECTION, SOLUTION INTRAVENOUS at 10:13

## 2020-08-21 RX ADMIN — FLUDEOXYGLUCOSE F-18 11.78 MCI.: 500 INJECTION, SOLUTION INTRAVENOUS at 09:07

## 2020-08-21 NOTE — PROGRESS NOTES
"BMT CELL THERAPY CLINIC NOTE.    2020.    I saw  in the cell therapy clinic in person today to enroll him into CAR-T associated CNS toxicity  prevention study [id number].  He has a transformed DLBCL and is currently in work-up prior to apheresis to proceed with Yescarta therapy.    His complex medical history is summarized in my note from 2020  Overview:  Lymphoma type: transformed DLBCL  Date of d2020  Subtype: GCB  c-myc status: overexpression   bcl-2 status: overexpression   bcl-6 status:neg  Stage: IVB  Extranodal disease: Yes  Extranodal site: lungs  LDH elevated at dg: Yes  KPS: 80%  IPI: 4  Front-line therapy: R-CHOP  Salvage therapy:  R-ICE followed by D-HAP   Response to   therapy: stable disease inadequate to AHCT    INTERIM HISTORY:  Today, Pastor reports feeling very well, he denies new complaints, his energy is improving. The R temple surgery wound post melanoma resection is healing well and there is a plan to go with another outpatient surgical procedure on Friday.    He was diagnosed with a localized disease without metastases and sentinental node was not involved by melanoma.    Currently, patient is undergoing work-up in anticipation of YESCARTA on [id number] protocol. The apheresis is planned     On physical exam today he is in good spirits and appears well normal vital signs,/63   Pulse 100   Temp 97.6  F (36.4  C) (Oral)   Resp 18   Ht 1.77 m (5' 9.69\")   Wt 89.2 kg (196 lb 10.4 oz)   SpO2 98%   BMI 28.47 kg/m     HEENT unremarkable, mouth without lesions, neck supple without peripheral adenopathy, chest clear to auscultation, heart tones regular S1-S2 no gallops or murmurs, abdomen soft nontender, extremities without swelling.  Status post amputation of his third toe.    Neurological exam normal cranial nerves.  His affect is normal cognition appears normal he has nonfocal neurological exam with a normal strength in his upper and lower " extremities.  Normal gait.     Lab Results   Component Value Date    WBC 7.3 08/20/2020    ANEU 5.1 08/20/2020    HGB 11.6 (L) 08/20/2020    HCT 37.6 (L) 08/20/2020     (L) 08/20/2020     08/20/2020    POTASSIUM 4.5 08/20/2020    CHLORIDE 103 08/20/2020    CO2 25 08/20/2020     (H) 08/20/2020    BUN 12 08/20/2020    CR 0.71 08/20/2020    MAG 1.8 07/08/2020    INR 1.11 08/20/2020    AST 19 08/20/2020    ALT 20 08/20/2020     Current Outpatient Medications   Medication     allopurinol (ZYLOPRIM) 300 MG tablet     fenofibrate (TRICOR) 145 MG tablet     hydrocortisone (CORTEF) 10 MG tablet     insulin aspart (NOVOLOG FLEXPEN) 100 UNIT/ML pen     insulin glargine (LANTUS VIAL) 100 UNIT/ML vial     metFORMIN (GLUCOPHAGE) 500 MG tablet     metFORMIN (GLUCOPHAGE) 500 MG tablet     potassium chloride ER (KLOR-CON M) 20 MEQ CR tablet     potassium chloride ER (KLOR-CON M) 20 MEQ CR tablet     prochlorperazine (COMPAZINE) 5 MG tablet     rosuvastatin (CRESTOR) 10 MG tablet     dexamethasone (DECADRON) 4 MG tablet     No current facility-administered medications for this visit.          Assessment and plan  Mr. Garibay is 70 years old male with transformed DLBCL now with the suboptimal response to R-DHAP chemotherapy.      Given multiple comorbidities, the CAR-T strategy was recommended.    He is a good candidate for Yescarta and will proceed with apheresis.  Treatment course and toxicities are again reviewed.      Given the risk for JUAQUIN neurotoxicity ( prior encephalopathy with IFO) we recommended to enroll into clinical trial testing the combination of simvastatin and intrathecal dexamethasone.  I reviewed the protocol with him. He understands that this is experimental therapy and there is no proven benefit.  We talked about pros and cons and potential risks of the LP.  We also talked about potential risk of uncontrolled neurotoxicity related to Yescarta.   He is a good candidate and is interested to  proceed.  He never had LP.  He is willing to try it at bedside.      He is currently on Crestor which he will stop and will restart simvastatin 5 days prior to his apheresis on 8/25. Research team will provide him with the calendar.    The patient asked good questions and he has a good understanding of the protocol.  He agreed to contraception; in fact disclosed that he has erectile dysfunction for last 20 years.    He signed the consent voluntarily.   He was given a copy of can the consent today.    Plan:  Apheresis 8/31  Start Simvastatin 5 days prior   F.u BMT clinic prior to apheresis.  Complete PET scan this week.  Hold Lovenox 12 hours prior to each LP - important to remember and keep track.    Rosey Bunch MD  Professor of Medicine  [phone]    Addendum:  PET scan revealed low level of disease and low SUV. The bridging therapy may not be useful and could cause adverse effects - therefore  I recommended to cancel the next cycle of R-DHAP.    Rosey Bunch MD

## 2020-08-23 LAB — INTERPRETATION ECG - MUSE: NORMAL

## 2020-08-24 ENCOUNTER — HOSPITAL ENCOUNTER (OUTPATIENT)
Dept: CARDIOLOGY | Facility: CLINIC | Age: 70
Discharge: HOME | End: 2020-08-24
Payer: COMMERCIAL

## 2020-08-24 DIAGNOSIS — C83.398 DIFFUSE LARGE B-CELL LYMPHOMA OF EXTRANODAL SITE: ICD-10-CM

## 2020-08-24 DIAGNOSIS — C83.398 DIFFUSE LARGE B-CELL LYMPHOMA OF EXTRANODAL SITE: Primary | ICD-10-CM

## 2020-08-24 DIAGNOSIS — C85.90 NH LYMPHOMA (H): ICD-10-CM

## 2020-08-24 LAB
DONOR CYTOMEGALOVIRUS ABY: POSITIVE
DONOR HEP B CORE ABY: NONREACTIVE
DONOR HEP B SURF AGN: NONREACTIVE
DONOR HEPATITIS C ABY: NONREACTIVE
DONOR HTLV 1&2 ANTIBODY: NONREACTIVE
DONOR TREPONEMA PAL ABY: NONREACTIVE
HIV1+2 AB SERPL QL IA: NONREACTIVE
MPX SERIES: NONREACTIVE
T CRUZI AB SER DONR QL: NONREACTIVE
WNV RNA SPEC QL NAA+PROBE: NONREACTIVE

## 2020-08-24 PROCEDURE — 93306 TTE W/DOPPLER COMPLETE: CPT

## 2020-08-24 PROCEDURE — 93306 TTE W/DOPPLER COMPLETE: CPT | Mod: 26 | Performed by: INTERNAL MEDICINE

## 2020-08-24 RX ORDER — DEXAMETHASONE SODIUM PHOSPHATE 10 MG/ML
8 INJECTION, SOLUTION INTRAMUSCULAR; INTRAVENOUS ONCE
Status: CANCELLED | OUTPATIENT
Start: 2020-10-05

## 2020-08-24 RX ORDER — DEXAMETHASONE SODIUM PHOSPHATE 10 MG/ML
8 INJECTION, SOLUTION INTRAMUSCULAR; INTRAVENOUS ONCE
Status: CANCELLED | OUTPATIENT
Start: 2020-10-12

## 2020-08-24 RX ORDER — DEXAMETHASONE SODIUM PHOSPHATE 10 MG/ML
8 INJECTION, SOLUTION INTRAMUSCULAR; INTRAVENOUS ONCE
Status: CANCELLED | OUTPATIENT
Start: 2020-09-28

## 2020-08-25 ENCOUNTER — VIRTUAL VISIT (OUTPATIENT)
Dept: TRANSPLANT | Facility: CLINIC | Age: 70
End: 2020-08-25
Payer: COMMERCIAL

## 2020-08-25 DIAGNOSIS — C83.398 DIFFUSE LARGE B-CELL LYMPHOMA OF EXTRANODAL SITE: ICD-10-CM

## 2020-08-25 DIAGNOSIS — C85.90 NH LYMPHOMA (H): ICD-10-CM

## 2020-08-25 PROCEDURE — 40001009 ZZH VIDEO/TELEPHONE VISIT; NO CHARGE

## 2020-08-25 RX ORDER — SIMVASTATIN 20 MG
40 TABLET ORAL DAILY
Qty: 65 TABLET | Refills: 0 | Status: CANCELLED | OUTPATIENT
Start: 2020-08-27

## 2020-08-25 RX ORDER — SIMVASTATIN 20 MG
40 TABLET ORAL DAILY
Qty: 65 TABLET | Refills: 0 | Status: SHIPPED | OUTPATIENT
Start: 2020-08-25 | End: 2020-11-06

## 2020-08-25 NOTE — PROGRESS NOTES
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    Teaching Topic: yescarta apheresis    Person(s) involved in teaching: Patient  Motivation Level  Asks Questions: Yes  Eager to Learn: Yes  Cooperative: Yes  Receptive (willing/able to accept information): Yes  Any cultural factors/Amish beliefs that may influence understanding or compliance? No    Patient demonstrates understanding of the following:  - Reason for the appointment, diagnosis and treatment plan: Yes  - Knowledge of proper use of medications and conditions for which they are ordered (with special attention to potential side effects or drug interactions): Yes  - Which situations necessitate calling provider and whom to contact: Yes    Teaching concerns addressed: apheresis for yescarta    Proper use and care of (medical equipment, care aids, etc.) NA  Pain management techniques: Yes  Patient instructed on hand hygiene: Yes  How and/when to access community resources: Yes    Infection Control:  Patient demonstrates understanding of the following:  Surgical procedure site care taught NA  Signs and symptoms of infection taught Yes  Wound care taught NA  Central venous catheter care taught NA    Instructional Materials Used/Given:   For  Kymriah/Yescarta patient wallet card given. Patient instructed to remain within close proximity (at least two hours) for at least four weeks post infusion.  COVID 19 info sheet given and Pastor acknowledged receipt.     Time spent with patient: 30 minutes.    Specific Concerns: NA

## 2020-08-25 NOTE — LETTER
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 Please see a copy of my visit note below.    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    Teaching Topic: yescarta apheresis    Person(s) involved in teaching: Patient  Motivation Level  Asks Questions: Yes  Eager to Learn: Yes  Cooperative: Yes  Receptive (willing/able to accept information): Yes  Any cultural factors/Hindu beliefs that may influence understanding or compliance? No    Patient demonstrates understanding of the following:  - Reason for the appointment, diagnosis and treatment plan: Yes  - Knowledge of proper use of medications and conditions for which they are ordered (with special attention to potential side effects or drug interactions): Yes  - Which situations necessitate calling provider and whom to contact: Yes    Teaching concerns addressed: apheresis for yescarta    Proper use and care of (medical equipment, care aids, etc.) NA  Pain management techniques: Yes  Patient instructed on hand hygiene: Yes  How and/when to access community resources: Yes    Infection Control:  Patient demonstrates understanding of the following:  Surgical procedure site care taught NA  Signs and symptoms of infection taught Yes  Wound care taught NA  Central venous catheter care taught NA    Instructional Materials Used/Given:   For  Kymriah/Yescarta patient wallet card given. Patient instructed to remain within close proximity (at least two hours) for at least four weeks post infusion.  COVID 19 info sheet given and Pastor acknowledged receipt.     Time spent with patient: 30 minutes.    Specific Concerns: NA    Again, thank you for allowing me to participate in  the care of your patient.        Sincerely,        BMT Nurse Coordinator

## 2020-08-27 ENCOUNTER — APPOINTMENT (OUTPATIENT)
Dept: LAB | Facility: CLINIC | Age: 70
End: 2020-08-27
Payer: COMMERCIAL

## 2020-08-27 ENCOUNTER — OFFICE VISIT (OUTPATIENT)
Dept: TRANSPLANT | Facility: CLINIC | Age: 70
End: 2020-08-27
Payer: COMMERCIAL

## 2020-08-27 VITALS
HEART RATE: 97 BPM | OXYGEN SATURATION: 97 % | BODY MASS INDEX: 28.9 KG/M2 | RESPIRATION RATE: 16 BRPM | SYSTOLIC BLOOD PRESSURE: 104 MMHG | DIASTOLIC BLOOD PRESSURE: 64 MMHG | TEMPERATURE: 98.1 F | WEIGHT: 199.6 LBS

## 2020-08-27 DIAGNOSIS — C83.398 DIFFUSE LARGE B-CELL LYMPHOMA OF EXTRANODAL SITE: Primary | ICD-10-CM

## 2020-08-27 PROCEDURE — U0003 INFECTIOUS AGENT DETECTION BY NUCLEIC ACID (DNA OR RNA); SEVERE ACUTE RESPIRATORY SYNDROME CORONAVIRUS 2 (SARS-COV-2) (CORONAVIRUS DISEASE [COVID-19]), AMPLIFIED PROBE TECHNIQUE, MAKING USE OF HIGH THROUGHPUT TECHNOLOGIES AS DESCRIBED BY CMS-2020-01-R: HCPCS

## 2020-08-27 ASSESSMENT — PAIN SCALES - GENERAL: PAINLEVEL: NO PAIN (0)

## 2020-08-27 NOTE — LETTER
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 Please see a copy of my visit note below.    BMT CELL THERAPY CLOSE NOTE.  08/27/20      I have a pleasure to see  today to review the work-up studies in anticipation of Yescarta and neurotoxicity prophy study.    Pastor continues to feel quite well had no new cough chest pain his energy is improved he gained 5 pounds over the last month.  He continues to walk daily but still spends quite a bit of time in chair and sedentary.  His hands are weaker and he cannot open the jar but otherwise does all his daily routine activities.    Per his wife his cognition is stable but he is not really doing complex tasks and intermittently he is forgetful.    On physical exam the patient appears stated age he is alert oriented x3 he is in no distress he has anicteric sclera clear oropharynx good dentition no peripheral adenopathy chest is clear to auscultation with a right basal dullness to percussion and decreased breath sounds abdomen is soft with a umbilical hernia but otherwise no masses no tenderness or distention.  Extremities without edema or deformities rash without skin without rashes.    Labs:  Lab Results   Component Value Date    WBC 7.3 08/20/2020    ANEU 5.1 08/20/2020    HGB 11.6 (L) 08/20/2020    HCT 37.6 (L) 08/20/2020     (L) 08/20/2020     08/20/2020    POTASSIUM 4.5 08/20/2020    CHLORIDE 103 08/20/2020    CO2 25 08/20/2020     (H) 08/20/2020    BUN 12 08/20/2020    CR 0.71 08/20/2020    MAG 1.8 07/08/2020    INR 1.11 08/20/2020    AST 19 08/20/2020    ALT 20 08/20/2020     PET/CT  8/21/2020  . Masslike consolidation in the right lower lobe with central  necrosis, unchanged in size, however diffusely hypoenhancing compared  to previously seen enhancement on 5/14/2020.  Combination of findings  favor treated lesion, with residual uptake suggesting inflammation  (such as related to radiotherapy). Lack of significant enhancement  lowers suspicion for residual disease but not fully excluded. Consider  further follow-up with CT chest with contrast.      2. Mildly prominent mediastinal and right hilar lymph nodes  demonstrate uptake below background levels, however appear minimally  larger since 7/20/2020.  By PET criteria (used for DLBCL) this would  be complete response, however by CT criteria (used for follicular  lymphoma) this would technically be progressive disease. Overall these  are indeterminant. Attention on follow-up.    Echo: 60%  Ekg: normal SR, no abn.  Virology: CMV ps. HSV neg.   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  He is an excellent candidate to proceed with Yescarta.  He fulfills eligibility criteria for the parent protocol and for the neurotoxicity trial using simvastatin combined with a intrathecal dexamethasone.  There are no contraindication or exclusions.    We will start simvastatin today.  He will pick it up in in the pharmacy.  It is provided by the research.  He is apheresis planned for 831.  Further plan is as follows #1 follow-up in 10 days in clinic #2 add frailty testing which has not been done as a pretransplant evaluation next number plan LD chemotherapy next number no bridging treatment needed next number plan intrathecal at the bedside the patient have not had LP before but seem to be a good candidate for bedside procedure.    Pastor understood the plan and he is in agreement he previously signed the consent for both studies.      Patient is eligible to proceed on MT2017-45 protocol Yescarta arm and also on MT2019-35 trial. All eligibility criteria are met as of today 8/28/2020.      BMT and Cell Therapy  Informed Consent Discussion  In today's visit, we discussed in detail the research for which Pastor Garibay is eligible. We discussed the potential risks and potential benefits of each protocol individually. We explained potential alternatives to the protocols discussed. We explained to the patient that participation is voluntary and that consent may be withdrawn at any time.     The patient completed the last round of treatment on June 2020.    HCT-CI score: 6 We counseled the patient about the impact of this on the risk of treatment related and overall mortality. The score fit within treatment protocol eligibility criteria     ECOG (required for CAR-T, see KPS to ECOG conversion chart): ECOG 1       Active infections: recent osteomyelitis - resolved now.  Prior infections that require additional special prophylaxis considerations: none.  I reviewed and discussed infectious disease evaluation with the patient and the management plan during treatment.    Reproductive status: What methods of birth control does the patient plan to use during the treatment period beginning with conditioning and ending with the discontinuation of immune suppression (indicate with an X all that apply):  __ The patient is confirmed to be sterile or post-menopausal  __ Sexual abstinence  __ Condoms  __ Implants  __ Injectables  __ Oral contraceptives  __ Intrauterine devices (IUD)  __ Other (describe)    The patient received appropriate reproductive counseling and agreed with the need for effective contraception during the treatment procedures.      Dental health suitable to proceed: Yes       The patient will receive a signed copy of the consents. The patient had opportunity to ask questions that were answered to the best of my ability and to the patient's apparent satisfaction.    Rosey Bunch MD

## 2020-08-27 NOTE — PROGRESS NOTES
BMT CELL THERAPY CLOSE NOTE.  08/27/20      I have a pleasure to see  today to review the work-up studies in anticipation of Yescarta and neurotoxicity prophy study.    Pastor continues to feel quite well, had no new cough, chest pain, his energy is improved.   he gained 5 pounds over the last month.  He continues to walk daily, but still spends quite a bit of time in chair and sedentary.  His hands are weaker and he cannot open the jar but otherwise does all his daily routine activities.    Per his wife his cognition is stable, but he is not really doing complex tasks and intermittently he is forgetful.    On physical exam:  the patient appears stated age he is alert oriented x3, he is in no distress, he has anicteric sclera,, clear oropharynx good dentition, no peripheral adenopathy, chest is clear to auscultation with a right basal dullness to percussion and decreased breath sounds. abdomen is soft with a umbilical hernia but otherwise no masses no tenderness or distention.  Extremities without edema or deformities rash without skin without rashes. Neuro exam is normal, non-focal. CN II-XII symmetrical,     Labs:  Lab Results   Component Value Date    WBC 7.3 08/20/2020    ANEU 5.1 08/20/2020    HGB 11.6 (L) 08/20/2020    HCT 37.6 (L) 08/20/2020     (L) 08/20/2020     08/20/2020    POTASSIUM 4.5 08/20/2020    CHLORIDE 103 08/20/2020    CO2 25 08/20/2020     (H) 08/20/2020    BUN 12 08/20/2020    CR 0.71 08/20/2020    MAG 1.8 07/08/2020    INR 1.11 08/20/2020    AST 19 08/20/2020    ALT 20 08/20/2020     PET/CT  8/21/2020  . Masslike consolidation in the right lower lobe with central  necrosis, unchanged in size, however diffusely hypoenhancing compared  to previously seen enhancement on 5/14/2020. Combination of findings  favor treated lesion, with residual uptake suggesting inflammation  (such as related to radiotherapy). Lack of significant enhancement  lowers suspicion for  residual disease but not fully excluded. Consider  further follow-up with CT chest with contrast.      2. Mildly prominent mediastinal and right hilar lymph nodes  demonstrate uptake below background levels, however appear minimally  larger since 7/20/2020.  By PET criteria (used for DLBCL) this would  be complete response, however by CT criteria (used for follicular  lymphoma) this would technically be progressive disease. Overall these  are indeterminant. Attention on follow-up.    Echo: 60%  Ekg: normal SR, no abn.  Virology: CMV ps. HSV neg.       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      He is an excellent candidate to proceed with Yescarta.  He fulfills eligibility criteria for the parent protocol and for the neurotoxicity trial using simvastatin combined with a intrathecal dexamethasone.  There are no contraindication or exclusions.    We will start simvastatin today.  He will pick it up in in the pharmacy.  It is provided by the research team.   Apheresis is planned for 8/31.    Further plan:   #1 follow-up in 10 days in clinic   #2 add frailty testing to pretransplant evaluation and repeat at day 100 and 1 year  #3. plan LD chemotherapy  #4   no bridging treatment needed   #5  plan intrathecal at the bedside; the patient have not had LP before but seem to be a good candidate for bedside procedure.    Pastor understood the plan and he is in agreement he previously signed the consent for both studies.      Patient is eligible to proceed on [id number] protocol Yescarta arm and also on MT2019-35 trial. All eligibility criteria are met as of today 8/28/2020.      BMT and Cell Therapy Informed Consent Discussion  In today's visit, we discussed in detail the research for which Pastor Garibay is eligible. We discussed the potential risks and potential benefits of  each protocol individually. We explained potential alternatives to the protocols discussed. We explained to the patient that participation is voluntary and that consent may be withdrawn at any time.     The patient completed the last round of treatment on June 2020.    HCT-CI score: 6 We counseled the patient about the impact of this on the risk of treatment related and overall mortality. The score fit within treatment protocol eligibility criteria     ECOG (required for CAR-T, see KPS to ECOG conversion chart): ECOG 1       Active infections: recent osteomyelitis - resolved now.  Prior infections that require additional special prophylaxis considerations: none.  I reviewed and discussed infectious disease evaluation with the patient and the management plan during treatment.    Reproductive status: What methods of birth control does the patient plan to use during the treatment period beginning with conditioning and ending with the discontinuation of immune suppression (indicate with an X all that apply):  __ The patient is confirmed to be sterile or post-menopausal  __ Sexual abstinence  __ Condoms  __ Implants  __ Injectables  __ Oral contraceptives  __ Intrauterine devices (IUD)  __ Other (describe)    The patient received appropriate reproductive counseling and agreed with the need for effective contraception during the treatment procedures.      Dental health suitable to proceed: Yes       The patient will receive a signed copy of the consents. The patient had opportunity to ask questions that were answered to the best of my ability and to the patient's apparent satisfaction.    Rosey Bunch MD

## 2020-08-27 NOTE — NURSING NOTE
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    Weight as of this encounter: 90.5 kg (199 lb 9.6 oz). Body surface area is 2.11 meters squared.  No Pain (0) Comment: Data Unavailable   No LMP for male patient.  Allergies reviewed: Yes  Medications reviewed: Yes    Medications: Medication refills not needed today.  Pharmacy name entered into [facility]: [id number] [place] [id number]    Clinical concerns: none       Shreya Weller MA            "

## 2020-08-28 LAB
SARS-COV-2 RNA SPEC QL NAA+PROBE: NOT DETECTED
SPECIMEN SOURCE: NORMAL

## 2020-08-31 ENCOUNTER — HOSPITAL ENCOUNTER (OUTPATIENT)
Dept: LAB | Facility: CLINIC | Age: 70
End: 2020-08-31
Payer: COMMERCIAL

## 2020-08-31 ENCOUNTER — ONCOLOGY VISIT (OUTPATIENT)
Dept: TRANSPLANT | Facility: CLINIC | Age: 70
End: 2020-08-31
Attending: PHYSICIAN ASSISTANT
Payer: COMMERCIAL

## 2020-08-31 VITALS
HEART RATE: 92 BPM | HEIGHT: 70 IN | DIASTOLIC BLOOD PRESSURE: 52 MMHG | BODY MASS INDEX: 28.12 KG/M2 | WEIGHT: 196.43 LBS | TEMPERATURE: 98.8 F | RESPIRATION RATE: 16 BRPM | SYSTOLIC BLOOD PRESSURE: 109 MMHG

## 2020-08-31 DIAGNOSIS — C83.398 DIFFUSE LARGE B-CELL LYMPHOMA OF EXTRANODAL SITE: Primary | ICD-10-CM

## 2020-08-31 LAB
BASOPHILS # BLD AUTO: 0 10E9/L (ref 0–0.2)
BASOPHILS NFR BLD AUTO: 0.7 %
DIFFERENTIAL METHOD BLD: ABNORMAL
EOSINOPHIL # BLD AUTO: 0.2 10E9/L (ref 0–0.7)
EOSINOPHIL NFR BLD AUTO: 3.2 %
ERYTHROCYTE [DISTWIDTH] IN BLOOD BY AUTOMATED COUNT: 15.1 % (ref 10–15)
HCT VFR BLD AUTO: 39.2 % (ref 40–53)
HGB BLD-MCNC: 12 G/DL (ref 13.3–17.7)
IMM GRANULOCYTES # BLD: 0 10E9/L (ref 0–0.4)
IMM GRANULOCYTES NFR BLD: 0.5 %
LYMPHOCYTES # BLD AUTO: 1.3 10E9/L (ref 0.8–5.3)
LYMPHOCYTES NFR BLD AUTO: 22.6 %
MCH RBC QN AUTO: 29.7 PG (ref 26.5–33)
MCHC RBC AUTO-ENTMCNC: 30.6 G/DL (ref 31.5–36.5)
MCV RBC AUTO: 97 FL (ref 78–100)
MONOCYTES # BLD AUTO: 0.6 10E9/L (ref 0–1.3)
MONOCYTES NFR BLD AUTO: 11.1 %
NEUTROPHILS # BLD AUTO: 3.5 10E9/L (ref 1.6–8.3)
NEUTROPHILS NFR BLD AUTO: 61.9 %
NRBC # BLD AUTO: 0 10*3/UL
NRBC BLD AUTO-RTO: 0 /100
PLATELET # BLD AUTO: 113 10E9/L (ref 150–450)
RBC # BLD AUTO: 4.04 10E12/L (ref 4.4–5.9)
WBC # BLD AUTO: 5.6 10E9/L (ref 4–11)

## 2020-08-31 PROCEDURE — 85025 COMPLETE CBC W/AUTO DIFF WBC: CPT | Performed by: PATHOLOGY

## 2020-08-31 PROCEDURE — 87516 HEPATITIS B DNA AMP PROBE: CPT | Performed by: PATHOLOGY

## 2020-08-31 PROCEDURE — 25000128 H RX IP 250 OP 636: Mod: ZF | Performed by: PATHOLOGY

## 2020-08-31 PROCEDURE — 86703 HIV-1/HIV-2 1 RESULT ANTBDY: CPT | Performed by: PATHOLOGY

## 2020-08-31 PROCEDURE — 87521 HEPATITIS C PROBE&RVRS TRNSC: CPT | Performed by: PATHOLOGY

## 2020-08-31 PROCEDURE — 86803 HEPATITIS C AB TEST: CPT | Performed by: PATHOLOGY

## 2020-08-31 PROCEDURE — 40000556 ZZH STATISTIC PERIPHERAL IV START W US GUIDANCE: Mod: ZF

## 2020-08-31 PROCEDURE — 86687 HTLV-I ANTIBODY: CPT | Performed by: PATHOLOGY

## 2020-08-31 PROCEDURE — 38214 VOLUME DEPLETE OF HARVEST: CPT

## 2020-08-31 PROCEDURE — 86780 TREPONEMA PALLIDUM: CPT | Performed by: PATHOLOGY

## 2020-08-31 PROCEDURE — 86704 HEP B CORE ANTIBODY TOTAL: CPT | Performed by: PATHOLOGY

## 2020-08-31 PROCEDURE — 25000125 ZZHC RX 250: Mod: ZF | Performed by: PATHOLOGY

## 2020-08-31 PROCEDURE — 86644 CMV ANTIBODY: CPT | Performed by: PATHOLOGY

## 2020-08-31 PROCEDURE — 87798 DETECT AGENT NOS DNA AMP: CPT | Performed by: PATHOLOGY

## 2020-08-31 PROCEDURE — 86753 PROTOZOA ANTIBODY NOS: CPT | Performed by: PATHOLOGY

## 2020-08-31 PROCEDURE — 87535 HIV-1 PROBE&REVERSE TRNSCRPJ: CPT | Performed by: PATHOLOGY

## 2020-08-31 PROCEDURE — 87340 HEPATITIS B SURFACE AG IA: CPT | Performed by: PATHOLOGY

## 2020-08-31 PROCEDURE — 0537T ZZHL CAR-T THERAPY, HARVEST BLD DRV T LYMPHCYT, PER DAY, ADULT: CPT

## 2020-08-31 RX ADMIN — ANTICOAGULANT CITRATE DEXTROSE SOLUTION FORMULA A 1172 ML: 12.25; 11; 3.65 SOLUTION INTRAVENOUS at 08:53

## 2020-08-31 RX ADMIN — CALCIUM GLUCONATE 1810 MG/HR: 94 INJECTION, SOLUTION INTRAVENOUS at 08:53

## 2020-08-31 ASSESSMENT — MIFFLIN-ST. JEOR: SCORE: 1662.25

## 2020-08-31 NOTE — LETTER
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 Please see a copy of my visit note below.    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  He had some brief abdominal pain earlier today that resolved after eating 2 cookies.  His right arm feels stiff from not moving it (due to his IV).  He otherwise has no complaints.    ROS:  No chest pain, SOB, n/v/d/c, rash, ankle swelling, swollen lymph nodes, cough, bleeding, numbness/tingling nor cramping.    Physical Exam  Constitutional:       Appearance: Normal appearance.   HENT:      Head: Normocephalic.      Nose: No congestion or rhinorrhea.   Eyes:      Pupils: Pupils are equal, round, and reactive to light.   Neck:      Musculoskeletal: Neck supple.   Cardiovascular:      Rate and Rhythm: Normal rate and regular rhythm.   Pulmonary:      Effort: Pulmonary effort is normal.   Musculoskeletal:         General: No swelling.   Lymphadenopathy:      Cervical: No cervical adenopathy.   Skin:     General: Skin is warm and dry.   Neurological:      General: No focal deficit present.      Mental Status: He is alert.   Psychiatric:         Mood and Affect: Mood normal.     Assessment/Plan  70 year old male with DLBCL.    Complete t cell collections today.  Counts reviewed, no transfusion needs anticipated.    RTC 9/8 and weekly for follow up while awaiting Yescarta therapy.    Shawanda Barajas PA-C  8/31/2020      Again, thank you for allowing me to participate in the care of your patient.        Sincerely,        BMT Advanced Practice Provider

## 2020-08-31 NOTE — PROGRESS NOTES
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  He had some brief abdominal pain earlier today that resolved after eating 2 cookies.  His right arm feels stiff from not moving it (due to his IV).  He otherwise has no complaints.    ROS:  No chest pain, SOB, n/v/d/c, rash, ankle swelling, swollen lymph nodes, cough, bleeding, numbness/tingling nor cramping.    Physical Exam  Constitutional:       Appearance: Normal appearance.   HENT:      Head: Normocephalic.      Nose: No congestion or rhinorrhea.   Eyes:      Pupils: Pupils are equal, round, and reactive to light.   Neck:      Musculoskeletal: Neck supple.   Cardiovascular:      Rate and Rhythm: Normal rate and regular rhythm.   Pulmonary:      Effort: Pulmonary effort is normal.   Musculoskeletal:         General: No swelling.   Lymphadenopathy:      Cervical: No cervical adenopathy.   Skin:     General: Skin is warm and dry.   Neurological:      General: No focal deficit present.      Mental Status: He is alert.   Psychiatric:         Mood and Affect: Mood normal.     Assessment/Plan  70 year old male with DLBCL.    Complete t cell collections today.  Counts reviewed, no transfusion needs anticipated.    RTC 9/8 and weekly for follow up while awaiting Yescarta therapy.    Shawanda Barajas PA-C  8/31/2020

## 2020-08-31 NOTE — PROCEDURES
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  Peripheral veins were used for access for the procedure.  He tolerated the procedure well without complication.            History of Present Illness:   The patient is a 70 year old male. He was initially diagnosed with follicular lymphoma in 12/2014.  He received 6 cycles of RCHOP. Maintained on rituximab from [id number]. Under went thoracotomy on 11/2017 which showed relapse. He was treated with bendamustine and rituximab, He relapsed again with a hilar mass in 2018 and received radiation therapy .In 5/2020 found to have lymphadenopathy, and multiple masses and biopsy demonstrated transformation for DLBCL. He has been treated with RICE but ten switched to RDHAP.  His course has also been complicated by a DVT.  He was on rivaroxaban but being held as he has had multiple recent surgical procedures.  He  had melanoma resection on his right ear and sentinel node biopsy.  He needs another surgical procedure as the excision margin was not wide enough on the ear lesion.  He also had left foot osteomyelitis which ultimately resulted in toe amputation. He has been transfused 4 times in the past. He reports generally feeling well today.  Denies nausea, vomiting, fevers, chills, diarrhea, cough, cold symptoms.          Past Medical History:     Past Medical History:   Diagnosis Date     Abnormal liver function test      Anemia      CPAP (continuous positive airway pressure) dependence      Diabetes (H)      Hx of skin cancer, basal cell      Hyperlipidemia      Hypertension       Keratoderma      Large cell lymphoma (H) 7/20/2020     Liver disease      Lymphoma (H)      Non-Hodgkin lymphoma (H)      Pedal edema     CHRONIC     Pleural effusion      Seborrheic keratosis, inflamed      Sleep apnea     Uses a CPAP     Thrombocytopenia (H) 7/20/2020     Thrombosis Felbruary 2018   Melanoma  Osteomyelitis          Past Surgical History:     Past Surgical History:   Procedure Laterality Date     AMPUTATE TOE(S) Left 5/22/2020    Procedure: LEFT SECOND AND THIRD DISTAL TOE AMPUTATIONS;  Surgeon: Ramon Flores MD;  Location:  OR     AMPUTATE TOE(S) Left 7/1/2020    Procedure: 1.  Partial left second toe amputation with osteotomy through proximal phalanx.  2.  Partial left third toe amputation with osteotomy through proximal phalanx.;  Surgeon: Ismael Humphrey DPM;  Location:  OR     BIOPSY LYMPH NODE CERVICAL N/A 12/5/2014    Procedure: BIOPSY LYMPH NODE CERVICAL;  Surgeon: Robbie Linda MD;  Location:  OR     BRONCHOSCOPY FLEXIBLE N/A 11/14/2017    Procedure: BRONCHOSCOPY FLEXIBLE;  FLEXIBLE BRONCHOSCOPY WITH BIOPSIES.;  Surgeon: Robbie Linda MD;  Location:  OR     COLONOSCOPY       COLONOSCOPY N/A 5/9/2018    Procedure: COMBINED COLONOSCOPY, SINGLE OR MULTIPLE BIOPSY/POLYPECTOMY BY BIOPSY;;  Surgeon: Ramos Bates MD;  Location:  GI     ENDOVASCULAR PLACEMENT VASCULAR DEVICE Left 12/5/2017    Procedure: ENDOVASCULAR PLACEMENT VASCULAR DEVICE;;  Surgeon: Robbie Linda MD;  Location: [facility]     ENT SURGERY      tonsillectomy     EXCISE NODE MEDIASTINAL N/A 11/17/2017    Procedure: EXCISE NODE MEDIASTINAL;;  Surgeon: Robbie Linda MD;  Location:  OR     EYE SURGERY       EYE SURGERY Left     vitrectomy     INSERT PORT VASCULAR ACCESS N/A 12/5/2014    Procedure: INSERT PORT VASCULAR ACCESS;  Surgeon: Robbie Linda MD;  Location:  OR     INSERT PORT VASCULAR ACCESS N/A 12/5/2017    Procedure: INSERT PORT VASCULAR  ACCESS;  POWER PORT PLACEMENT ATTEMPTED, PLACEMENT OF ANGIO-SEAL VIP VASCULAR CLOSURE DEVICE;  Surgeon: Robbie Linda MD;  Location:  SD     IR CHEST PORT PLACEMENT > 5 YRS OF AGE  6/5/2020     IR PORT REMOVAL RIGHT  12/10/2018     PHACOEMULSIFICATION CLEAR CORNEA WITH STANDARD INTRAOCULAR LENS IMPLANT Right 5/2/2016    Procedure: PHACOEMULSIFICATION CLEAR CORNEA WITH STANDARD INTRAOCULAR LENS IMPLANT;  Surgeon: Rasheed Finney MD;  Location:  EC     REMOVE PORT VASCULAR ACCESS N/A 3/14/2017    Procedure: REMOVE PORT VASCULAR ACCESS;  Surgeon: Robbie Linda MD;  Location:  OR     SOFT TISSUE SURGERY      BASAL CELL CA FOREHEAD     THORACOTOMY Right 11/17/2017    Procedure: THORACOTOMY;  RIGHT EXPLORATORY THORACOTOMY/ EXTENSIVE PNEUMOLYSIS/ BIOPSY OF INTERLOBAR LYMPH NODE;  Surgeon: Robbie Linda MD;  Location:  OR              Allergies:   No Known Allergies          Medications:       Current Outpatient Medications   Medication Sig     allopurinol (ZYLOPRIM) 300 MG tablet Take 300 mg by mouth daily     fenofibrate (TRICOR) 145 MG tablet Take 145 mg by mouth daily     hydrocortisone (CORTEF) 10 MG tablet Take 20 mg by mouth daily before breakfast      insulin aspart (NOVOLOG FLEXPEN) 100 UNIT/ML pen Inject Subcutaneous 3 times daily (with meals) Patient uses sliding scale based on Carbs and Blood Glucose. Has been using very little lately due to low readings and low appetite.     metFORMIN (GLUCOPHAGE) 500 MG tablet Take 1,000 mg by mouth daily (with breakfast)     potassium chloride ER (KLOR-CON M) 20 MEQ CR tablet Take 40 mEq by mouth every morning      study - simvastatin, IDS# [id number], 40 MG tablet Take 1 tablet (40 mg) by mouth daily Start at least 5 days prior to apheresis and continue until Day +30 after CAR-T infusion.     dexamethasone (DECADRON) 4 MG tablet Take 10 tablets (40 mg) by mouth every 24 hours for 1 day     insulin glargine (LANTUS VIAL) 100 UNIT/ML vial  "Inject 15 Units Subcutaneous At Bedtime     metFORMIN (GLUCOPHAGE) 500 MG tablet Take 500 mg by mouth daily (with dinner)     potassium chloride ER (KLOR-CON M) 20 MEQ CR tablet Take 20 mEq by mouth every evening     prochlorperazine (COMPAZINE) 5 MG tablet Take 1-2 tablets (5-10 mg) by mouth every 6 hours as needed (Breakthrough Nausea / Vomiting)     rosuvastatin (CRESTOR) 10 MG tablet Take 5 mg by mouth daily     No current facility-administered medications for this encounter.           Review of Systems:   See above         Exam:   /54   Pulse 92   Temp 97.9  F (36.6  C) (Oral)   Resp 16   Ht 1.77 m (5' 9.69\")   Wt 89.1 kg (196 lb 6.9 oz)   BMI 28.44 kg/m      Alert, no apparent distress  Breathing appears comfortable on room air  Peripheral IV access for the procedure          Data:     Results for orders placed or performed during the hospital encounter of 08/31/20 (from the past 24 hour(s))   CBC with platelets differential   Result Value Ref Range    WBC 5.6 4.0 - 11.0 10e9/L    RBC Count 4.04 (L) 4.4 - 5.9 10e12/L    Hemoglobin 12.0 (L) 13.3 - 17.7 g/dL    Hematocrit 39.2 (L) 40.0 - 53.0 %    MCV 97 78 - 100 fl    MCH 29.7 26.5 - 33.0 pg    MCHC 30.6 (L) 31.5 - 36.5 g/dL    RDW 15.1 (H) 10.0 - 15.0 %    Platelet Count 113 (L) 150 - 450 10e9/L    Diff Method Automated Method     % Neutrophils 61.9 %    % Lymphocytes 22.6 %    % Monocytes 11.1 %    % Eosinophils 3.2 %    % Basophils 0.7 %    % Immature Granulocytes 0.5 %    Nucleated RBCs 0 0 /100    Absolute Neutrophil 3.5 1.6 - 8.3 10e9/L    Absolute Lymphocytes 1.3 0.8 - 5.3 10e9/L    Absolute Monocytes 0.6 0.0 - 1.3 10e9/L    Absolute Eosinophils 0.2 0.0 - 0.7 10e9/L    Absolute Basophils 0.0 0.0 - 0.2 10e9/L    Abs Immature Granulocytes 0.0 0 - 0.4 10e9/L    Absolute Nucleated RBC 0.0                    Procedure Summary:   Mononuclear cell collection was performed on the Amicus device. Peripheral veins were used for access and allowed for " appropriate flow during the procedure.  ACD-A was used for anticoagulation. Calcium gluconate was given in the return line to offset the effects of the citrate.   The patient's vital signs were stable throughout.  The patient tolerated the procedure well without complication.  See apheresis flowsheet for additional details.        Attestation: During the procedure the patient was directly seen and evaluated by me, Brody Temple MD.  When seeing apheresis patients, I am wearing masks and face shields and maximizing the distance between the patient and myself while in the room.    Brody Temple MD  Transfusion Medicine Attending  Laboratory Medicine & Pathology  Pager: [phone]

## 2020-09-04 ENCOUNTER — TELEPHONE (OUTPATIENT)
Dept: TRANSPLANT | Facility: CLINIC | Age: 70
End: 2020-09-04

## 2020-09-04 NOTE — TELEPHONE ENCOUNTER
Pastor called to report that his daughter was in contact with someone who then developed COVID. He has had socially distanced contact with his daughter while outside, no direct contact with COVID + individual. He will monitor sx and notify hcp if he has sx. Will also avoid contact with covid + people.

## 2020-09-07 NOTE — PROGRESS NOTES
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 He tripped on stairs on Saturday and has abrasion over bridge of his nose, forehead and right arm (covered by bandaid). He didn't faint or lose consciousness, simply tripped. No subsequent headache. Sites are scabbed over and healing well. No recent fevers or infectious concerns. Has ongoing cough due to lung scarring. No n/v. Losoe stools after taking a bowel motility agent for constipation. No swelling or rash.    ROS:  No chest pain, SOB, n/v/d/c, rash, ankle swelling, swollen lymph nodes, cough, bleeding, numbness/tingling nor cramping.    Physical Exam  Constitutional:       Appearance: Normal appearance.   HENT:      Head: Normocephalic.      Nose: No congestion or rhinorrhea.   Eyes:      Pupils: Pupils are equal, round, and reactive to light.   Neck:      Musculoskeletal: Neck supple.   Cardiovascular:      Rate and Rhythm: Normal rate and regular rhythm.   Pulmonary:      Effort: Pulmonary effort is normal.   Musculoskeletal:         General: No swelling.   Lymphadenopathy:      Cervical: No cervical adenopathy.   Skin:     General: Skin is warm and dry.   Neurological:      General: No focal deficit present.      Mental Status: He is alert.   Psychiatric:         Mood and Affect: Mood normal.     Assessment/Plan  70 year old male with DLBCL.    Completed t cell collections.  Counts reviewed, no transfusion needs anticipated.    RTC weekly for follow up while awaiting Yescarta therapy  - advised he wait to get his flu shot until after CART therapy    Diana Ortiz PA-C  [phone]

## 2020-09-08 ENCOUNTER — TELEPHONE (OUTPATIENT)
Dept: TRANSPLANT | Facility: CLINIC | Age: 70
End: 2020-09-08

## 2020-09-08 ENCOUNTER — APPOINTMENT (OUTPATIENT)
Dept: LAB | Facility: CLINIC | Age: 70
End: 2020-09-08
Attending: PHYSICIAN ASSISTANT
Payer: COMMERCIAL

## 2020-09-08 ENCOUNTER — ONCOLOGY VISIT (OUTPATIENT)
Dept: TRANSPLANT | Facility: CLINIC | Age: 70
End: 2020-09-08
Attending: PHYSICIAN ASSISTANT
Payer: COMMERCIAL

## 2020-09-08 VITALS
BODY MASS INDEX: 29.25 KG/M2 | OXYGEN SATURATION: 96 % | SYSTOLIC BLOOD PRESSURE: 109 MMHG | RESPIRATION RATE: 18 BRPM | TEMPERATURE: 98.1 F | WEIGHT: 202 LBS | DIASTOLIC BLOOD PRESSURE: 68 MMHG | HEART RATE: 88 BPM

## 2020-09-08 DIAGNOSIS — C83.398 DIFFUSE LARGE B-CELL LYMPHOMA OF EXTRANODAL SITE: ICD-10-CM

## 2020-09-08 DIAGNOSIS — C83.38 DIFFUSE LARGE B-CELL LYMPHOMA OF LYMPH NODES OF MULTIPLE REGIONS (H): ICD-10-CM

## 2020-09-08 DIAGNOSIS — Z86.2 PERSONAL HISTORY OF DISEASES OF BLOOD AND BLOOD-FORMING ORGANS: ICD-10-CM

## 2020-09-08 LAB
ALBUMIN SERPL-MCNC: 3.7 G/DL (ref 3.4–5)
ALP SERPL-CCNC: 76 U/L (ref 40–150)
ALT SERPL W P-5'-P-CCNC: 19 U/L (ref 0–70)
ANION GAP SERPL CALCULATED.3IONS-SCNC: 7 MMOL/L (ref 3–14)
AST SERPL W P-5'-P-CCNC: 17 U/L (ref 0–45)
BASOPHILS # BLD AUTO: 0 10E9/L (ref 0–0.2)
BASOPHILS NFR BLD AUTO: 0.7 %
BILIRUB SERPL-MCNC: 0.4 MG/DL (ref 0.2–1.3)
BUN SERPL-MCNC: 14 MG/DL (ref 7–30)
CALCIUM SERPL-MCNC: 9.1 MG/DL (ref 8.5–10.1)
CHLORIDE SERPL-SCNC: 102 MMOL/L (ref 94–109)
CO2 SERPL-SCNC: 26 MMOL/L (ref 20–32)
CREAT SERPL-MCNC: 0.76 MG/DL (ref 0.66–1.25)
DIFFERENTIAL METHOD BLD: ABNORMAL
EOSINOPHIL # BLD AUTO: 0.2 10E9/L (ref 0–0.7)
EOSINOPHIL NFR BLD AUTO: 3.8 %
ERYTHROCYTE [DISTWIDTH] IN BLOOD BY AUTOMATED COUNT: 14.7 % (ref 10–15)
GFR SERPL CREATININE-BSD FRML MDRD: >90 ML/MIN/{1.73_M2}
GLUCOSE SERPL-MCNC: 292 MG/DL (ref 70–99)
HCT VFR BLD AUTO: 40.2 % (ref 40–53)
HGB BLD-MCNC: 12.6 G/DL (ref 13.3–17.7)
IMM GRANULOCYTES # BLD: 0 10E9/L (ref 0–0.4)
IMM GRANULOCYTES NFR BLD: 0.2 %
LYMPHOCYTES # BLD AUTO: 1.1 10E9/L (ref 0.8–5.3)
LYMPHOCYTES NFR BLD AUTO: 25.3 %
MCH RBC QN AUTO: 29.6 PG (ref 26.5–33)
MCHC RBC AUTO-ENTMCNC: 31.3 G/DL (ref 31.5–36.5)
MCV RBC AUTO: 95 FL (ref 78–100)
MONOCYTES # BLD AUTO: 0.6 10E9/L (ref 0–1.3)
MONOCYTES NFR BLD AUTO: 13.3 %
NEUTROPHILS # BLD AUTO: 2.6 10E9/L (ref 1.6–8.3)
NEUTROPHILS NFR BLD AUTO: 56.7 %
NRBC # BLD AUTO: 0 10*3/UL
NRBC BLD AUTO-RTO: 0 /100
PLATELET # BLD AUTO: 104 10E9/L (ref 150–450)
POTASSIUM SERPL-SCNC: 4.3 MMOL/L (ref 3.4–5.3)
PROT SERPL-MCNC: 6.7 G/DL (ref 6.8–8.8)
RBC # BLD AUTO: 4.25 10E12/L (ref 4.4–5.9)
SODIUM SERPL-SCNC: 135 MMOL/L (ref 133–144)
WBC # BLD AUTO: 4.5 10E9/L (ref 4–11)

## 2020-09-08 PROCEDURE — 25000128 H RX IP 250 OP 636: Mod: ZF

## 2020-09-08 PROCEDURE — 80053 COMPREHEN METABOLIC PANEL: CPT

## 2020-09-08 PROCEDURE — G0463 HOSPITAL OUTPT CLINIC VISIT: HCPCS

## 2020-09-08 PROCEDURE — 85025 COMPLETE CBC W/AUTO DIFF WBC: CPT

## 2020-09-08 PROCEDURE — 36591 DRAW BLOOD OFF VENOUS DEVICE: CPT

## 2020-09-08 RX ORDER — HEPARIN SODIUM (PORCINE) LOCK FLUSH IV SOLN 100 UNIT/ML 100 UNIT/ML
5 SOLUTION INTRAVENOUS ONCE
Status: COMPLETED | OUTPATIENT
Start: 2020-09-08 | End: 2020-09-08

## 2020-09-08 RX ADMIN — Medication 5 ML: at 09:49

## 2020-09-08 ASSESSMENT — PAIN SCALES - GENERAL: PAINLEVEL: NO PAIN (0)

## 2020-09-08 NOTE — NURSING NOTE
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    Weight as of this encounter: 91.6 kg (202 lb). Body surface area is 2.12 meters squared.  No Pain (0) Comment: Data Unavailable   No LMP for male patient.  Allergies reviewed: Yes  Medications reviewed: Yes    Medications: Medication refills not needed today.  Pharmacy name entered into 6APT: [id number] [place] [address]    Clinical concerns: none       Shreya Weller MA            "

## 2020-09-08 NOTE — LETTER
9/8/2020     RE: Pastor Garibay  [address]  [facility] [address]    Dear Colleague,    Thank you for referring your patient, Pastor Garibay, to the [facility]. Please see a copy of my visit note below.    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 He tripped on stairs on Saturday and has abrasion over bridge of his nose, forehead and right arm (covered by bandaid). He didn't faint or lose consciousness, simply tripped. No subsequent headache. Sites are scabbed over and healing well. No recent fevers or infectious concerns. Has ongoing cough due to lung scarring. No n/v. Losoe stools after taking a bowel motility agent for constipation. No swelling or rash.    ROS:  No chest pain, SOB, n/v/d/c, rash, ankle swelling, swollen lymph nodes, cough, bleeding, numbness/tingling nor cramping.    Physical Exam  Constitutional:       Appearance: Normal appearance.   HENT:      Head: Normocephalic.      Nose: No congestion or rhinorrhea.   Eyes:      Pupils: Pupils are equal, round, and reactive to light.   Neck:      Musculoskeletal: Neck supple.   Cardiovascular:      Rate and Rhythm: Normal rate and regular rhythm.   Pulmonary:      Effort: Pulmonary effort is normal.   Musculoskeletal:         General: No swelling.   Lymphadenopathy:      Cervical: No cervical adenopathy.   Skin:     General: Skin is warm and dry.   Neurological:      General: No focal deficit present.      Mental Status: He is alert.   Psychiatric:         Mood and Affect: Mood normal.     Assessment/Plan  70 year old male with DLBCL.    Completed t cell collections.  Counts reviewed, no transfusion needs anticipated.    RTC weekly for follow up while awaiting Yescarta therapy  - advised he wait to get his flu shot until after CART therapy    Diana Ortiz PA-C  [phone]

## 2020-09-08 NOTE — NURSING NOTE
Chief Complaint   Patient presents with     Port Draw     labs drawn via port by RN in lab     /68 (BP Location: Left arm, Patient Position: Chair, Cuff Size: Adult Large)   Pulse 88   Temp 98.1  F (36.7  C) (Oral)   Resp 18   Wt 91.6 kg (202 lb)   SpO2 96%   BMI 29.25 kg/m      Port accessed by RN in lab. Labs collected and sent. Pt tolerated well. Line flushed with Normal Saline & Heparin.   Pt checked in for next appointment.    Carina De Anda, RN

## 2020-09-14 DIAGNOSIS — C83.38 DIFFUSE LARGE B-CELL LYMPHOMA OF LYMPH NODES OF MULTIPLE REGIONS (H): Primary | ICD-10-CM

## 2020-09-14 NOTE — TELEPHONE ENCOUNTER
DIAGNOSIS: JACINTO [phone] - (YESCARTA) RAC CENTRAL LINE OLSON - TELEPHONE VISIT   DATE RECEIVED: 9.16.20   NOTES STATUS DETAILS   OFFICE NOTE from referring provider Internal 9.8.20, 8.27.20, 8.20.20, 7.30.20  Dr. Rosey Bunch  [facility] BMT   OFFICE NOTE from other specialist N/A    DISCHARGE SUMMARY from hospital N/A    DISCHARGE REPORT from the ER N/A    OPERATIVE REPORT N/A    MEDICATION LIST Internal    XRAYS (IMAGES & REPORTS) Internal 8.21.20  PET   PATHOLOGY  Slides & report N/A

## 2020-09-15 ENCOUNTER — MEDICAL CORRESPONDENCE (OUTPATIENT)
Dept: TRANSPLANT | Facility: CLINIC | Age: 70
End: 2020-09-15

## 2020-09-15 ENCOUNTER — APPOINTMENT (OUTPATIENT)
Dept: LAB | Facility: CLINIC | Age: 70
End: 2020-09-15
Payer: COMMERCIAL

## 2020-09-15 ENCOUNTER — ONCOLOGY VISIT (OUTPATIENT)
Dept: TRANSPLANT | Facility: CLINIC | Age: 70
End: 2020-09-15
Attending: PHYSICIAN ASSISTANT
Payer: COMMERCIAL

## 2020-09-15 ENCOUNTER — OFFICE VISIT (OUTPATIENT)
Dept: TRANSPLANT | Facility: CLINIC | Age: 70
End: 2020-09-15
Payer: COMMERCIAL

## 2020-09-15 ENCOUNTER — APPOINTMENT (OUTPATIENT)
Dept: EDUCATION SERVICES | Facility: CLINIC | Age: 70
End: 2020-09-15
Payer: COMMERCIAL

## 2020-09-15 DIAGNOSIS — C83.38 DIFFUSE LARGE B-CELL LYMPHOMA OF LYMPH NODES OF MULTIPLE REGIONS (H): ICD-10-CM

## 2020-09-15 DIAGNOSIS — C83.3A DIFFUSE LARGE CELL LYMPHOMA IN REMISSION: ICD-10-CM

## 2020-09-15 DIAGNOSIS — Z86.2 PERSONAL HISTORY OF DISEASES OF BLOOD AND BLOOD-FORMING ORGANS: ICD-10-CM

## 2020-09-15 DIAGNOSIS — C83.38 DIFFUSE LARGE B-CELL LYMPHOMA OF LYMPH NODES OF MULTIPLE REGIONS (H): Primary | ICD-10-CM

## 2020-09-15 LAB
ABO + RH BLD: NORMAL
ABO + RH BLD: NORMAL
ALBUMIN SERPL-MCNC: 3.8 G/DL (ref 3.4–5)
ALBUMIN UR-MCNC: NEGATIVE MG/DL
ALP SERPL-CCNC: 88 U/L (ref 40–150)
ALT SERPL W P-5'-P-CCNC: 22 U/L (ref 0–70)
ANION GAP SERPL CALCULATED.3IONS-SCNC: 6 MMOL/L (ref 3–14)
APPEARANCE UR: CLEAR
APTT PPP: 63 SEC (ref 22–37)
AST SERPL W P-5'-P-CCNC: 17 U/L (ref 0–45)
BASOPHILS # BLD AUTO: 0 10E9/L (ref 0–0.2)
BASOPHILS NFR BLD AUTO: 0.6 %
BILIRUB SERPL-MCNC: 0.4 MG/DL (ref 0.2–1.3)
BILIRUB UR QL STRIP: NEGATIVE
BLD GP AB SCN SERPL QL: NORMAL
BLOOD BANK CMNT PATIENT-IMP: NORMAL
BUN SERPL-MCNC: 12 MG/DL (ref 7–30)
CALCIUM SERPL-MCNC: 9.4 MG/DL (ref 8.5–10.1)
CHLORIDE SERPL-SCNC: 103 MMOL/L (ref 94–109)
CO2 SERPL-SCNC: 27 MMOL/L (ref 20–32)
COLOR UR AUTO: YELLOW
CREAT SERPL-MCNC: 0.8 MG/DL (ref 0.66–1.25)
DIFFERENTIAL METHOD BLD: ABNORMAL
EBV VCA IGG SER QL IA: 0.2 AI (ref 0–0.8)
EOSINOPHIL # BLD AUTO: 0.2 10E9/L (ref 0–0.7)
EOSINOPHIL NFR BLD AUTO: 3.1 %
ERYTHROCYTE [DISTWIDTH] IN BLOOD BY AUTOMATED COUNT: 14.6 % (ref 10–15)
FERRITIN SERPL-MCNC: 53 NG/ML (ref 26–388)
FIBRINOGEN PPP-MCNC: 399 MG/DL (ref 200–420)
GFR SERPL CREATININE-BSD FRML MDRD: >90 ML/MIN/{1.73_M2}
GLUCOSE SERPL-MCNC: 245 MG/DL (ref 70–99)
GLUCOSE UR STRIP-MCNC: >499 MG/DL
HCT VFR BLD AUTO: 42.7 % (ref 40–53)
HGB BLD-MCNC: 13.4 G/DL (ref 13.3–17.7)
HGB UR QL STRIP: NEGATIVE
HSV1 IGG SERPL QL IA: <0.2 AI (ref 0–0.8)
HSV2 IGG SERPL QL IA: <0.2 AI (ref 0–0.8)
IGG SERPL-MCNC: 595 MG/DL (ref 610–1616)
IMM GRANULOCYTES # BLD: 0 10E9/L (ref 0–0.4)
IMM GRANULOCYTES NFR BLD: 0.2 %
INR PPP: 1.09 (ref 0.86–1.14)
KETONES UR STRIP-MCNC: NEGATIVE MG/DL
LDH SERPL L TO P-CCNC: 158 U/L (ref 85–227)
LEUKOCYTE ESTERASE UR QL STRIP: NEGATIVE
LYMPHOCYTES # BLD AUTO: 1.1 10E9/L (ref 0.8–5.3)
LYMPHOCYTES NFR BLD AUTO: 20.7 %
MAGNESIUM SERPL-MCNC: 1.8 MG/DL (ref 1.6–2.3)
MCH RBC QN AUTO: 29.1 PG (ref 26.5–33)
MCHC RBC AUTO-ENTMCNC: 31.4 G/DL (ref 31.5–36.5)
MCV RBC AUTO: 93 FL (ref 78–100)
MONOCYTES # BLD AUTO: 0.6 10E9/L (ref 0–1.3)
MONOCYTES NFR BLD AUTO: 11.6 %
MUCOUS THREADS #/AREA URNS LPF: PRESENT /LPF
NEUTROPHILS # BLD AUTO: 3.3 10E9/L (ref 1.6–8.3)
NEUTROPHILS NFR BLD AUTO: 63.8 %
NITRATE UR QL: NEGATIVE
NRBC # BLD AUTO: 0 10*3/UL
NRBC BLD AUTO-RTO: 0 /100
PH UR STRIP: 5 PH (ref 5–7)
PHOSPHATE SERPL-MCNC: 3.4 MG/DL (ref 2.5–4.5)
PLATELET # BLD AUTO: 140 10E9/L (ref 150–450)
POTASSIUM SERPL-SCNC: 4.2 MMOL/L (ref 3.4–5.3)
PROT SERPL-MCNC: 7 G/DL (ref 6.8–8.8)
RBC # BLD AUTO: 4.6 10E12/L (ref 4.4–5.9)
RBC #/AREA URNS AUTO: <1 /HPF (ref 0–2)
SODIUM SERPL-SCNC: 137 MMOL/L (ref 133–144)
SOURCE: ABNORMAL
SP GR UR STRIP: 1.01 (ref 1–1.03)
SPECIMEN EXP DATE BLD: NORMAL
SQUAMOUS #/AREA URNS AUTO: <1 /HPF (ref 0–1)
URATE SERPL-MCNC: 3.3 MG/DL (ref 3.5–7.2)
UROBILINOGEN UR STRIP-MCNC: 0 MG/DL (ref 0–2)
WBC # BLD AUTO: 5.2 10E9/L (ref 4–11)
WBC #/AREA URNS AUTO: <1 /HPF (ref 0–5)

## 2020-09-15 PROCEDURE — 86665 EPSTEIN-BARR CAPSID VCA: CPT

## 2020-09-15 PROCEDURE — 85610 PROTHROMBIN TIME: CPT

## 2020-09-15 PROCEDURE — 87340 HEPATITIS B SURFACE AG IA: CPT

## 2020-09-15 PROCEDURE — 82784 ASSAY IGA/IGD/IGG/IGM EACH: CPT

## 2020-09-15 PROCEDURE — 84100 ASSAY OF PHOSPHORUS: CPT

## 2020-09-15 PROCEDURE — 82728 ASSAY OF FERRITIN: CPT

## 2020-09-15 PROCEDURE — 84550 ASSAY OF BLOOD/URIC ACID: CPT

## 2020-09-15 PROCEDURE — 87521 HEPATITIS C PROBE&RVRS TRNSC: CPT

## 2020-09-15 PROCEDURE — 80053 COMPREHEN METABOLIC PANEL: CPT

## 2020-09-15 PROCEDURE — 85730 THROMBOPLASTIN TIME PARTIAL: CPT

## 2020-09-15 PROCEDURE — 86900 BLOOD TYPING SEROLOGIC ABO: CPT

## 2020-09-15 PROCEDURE — 83735 ASSAY OF MAGNESIUM: CPT

## 2020-09-15 PROCEDURE — 86850 RBC ANTIBODY SCREEN: CPT

## 2020-09-15 PROCEDURE — 86753 PROTOZOA ANTIBODY NOS: CPT

## 2020-09-15 PROCEDURE — 81001 URINALYSIS AUTO W/SCOPE: CPT

## 2020-09-15 PROCEDURE — 25000128 H RX IP 250 OP 636: Mod: ZF

## 2020-09-15 PROCEDURE — 85384 FIBRINOGEN ACTIVITY: CPT

## 2020-09-15 PROCEDURE — 86703 HIV-1/HIV-2 1 RESULT ANTBDY: CPT

## 2020-09-15 PROCEDURE — 86695 HERPES SIMPLEX TYPE 1 TEST: CPT

## 2020-09-15 PROCEDURE — 86704 HEP B CORE ANTIBODY TOTAL: CPT

## 2020-09-15 PROCEDURE — 83615 LACTATE (LD) (LDH) ENZYME: CPT

## 2020-09-15 PROCEDURE — 36415 COLL VENOUS BLD VENIPUNCTURE: CPT

## 2020-09-15 PROCEDURE — 86901 BLOOD TYPING SEROLOGIC RH(D): CPT

## 2020-09-15 PROCEDURE — 86696 HERPES SIMPLEX TYPE 2 TEST: CPT

## 2020-09-15 PROCEDURE — 87516 HEPATITIS B DNA AMP PROBE: CPT

## 2020-09-15 PROCEDURE — 87798 DETECT AGENT NOS DNA AMP: CPT

## 2020-09-15 PROCEDURE — 86803 HEPATITIS C AB TEST: CPT

## 2020-09-15 PROCEDURE — 86780 TREPONEMA PALLIDUM: CPT

## 2020-09-15 PROCEDURE — 86644 CMV ANTIBODY: CPT

## 2020-09-15 PROCEDURE — 86687 HTLV-I ANTIBODY: CPT

## 2020-09-15 PROCEDURE — 87535 HIV-1 PROBE&REVERSE TRNSCRPJ: CPT

## 2020-09-15 PROCEDURE — 85025 COMPLETE CBC W/AUTO DIFF WBC: CPT

## 2020-09-15 RX ORDER — HEPARIN SODIUM (PORCINE) LOCK FLUSH IV SOLN 100 UNIT/ML 100 UNIT/ML
5 SOLUTION INTRAVENOUS
Status: DISCONTINUED | OUTPATIENT
Start: 2020-09-15 | End: 2020-09-15 | Stop reason: HOSPADM

## 2020-09-15 RX ORDER — HEPARIN SODIUM (PORCINE) LOCK FLUSH IV SOLN 100 UNIT/ML 100 UNIT/ML
5 SOLUTION INTRAVENOUS
Status: CANCELLED | OUTPATIENT
Start: 2020-09-15

## 2020-09-15 RX ORDER — HEPARIN SODIUM,PORCINE 10 UNIT/ML
5 VIAL (ML) INTRAVENOUS
Status: CANCELLED | OUTPATIENT
Start: 2020-09-15

## 2020-09-15 RX ADMIN — HEPARIN SODIUM (PORCINE) LOCK FLUSH IV SOLN 100 UNIT/ML 5 ML: 100 SOLUTION at 10:52

## 2020-09-15 NOTE — PROGRESS NOTES
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 T cells collected 2020. Consented for  [facility], : IT dex + simvastatin for JUAQUIN prevention.    PMHx:  1. 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 Melanoma  3. DM    Overview:  Lymphoma type: transformed DLBCL  Date of d2020  Subtype: GCB  c-myc status: overexpression   bcl-2 status: overexpression   bcl-6 status:neg  Stage: IVB  Extranodal disease: Yes  Extranodal site: lungs  LDH elevated at dg: Yes  KPS: 80%  IPI: 4  Front-line therapy: R-CHOP  Salvage therapy:  R-ICE followed by D-HAP   Response to   therapy: stable disease inadequate to AHCT    Bone Marrow Workup Results:  Blood Counts Recent Labs   Lab Test 09/15/20  0946   WBC 5.2   ANEU 3.3   ALYM 1.1   CRICKET 0.6   AEOS 0.2   HGB 13.4   HCT 42.7   *      Blood Type Recent Labs   Lab Test 09/15/20  0945   ABO O      Chemistries Recent Labs   Lab Test 09/15/20  0946      POTASSIUM 4.2   CHLORIDE 103   CO2 27   BUN 12   CR 0.80      Liver Tests Recent Labs   Lab Test 09/15/20  0946   BILITOTAL 0.4   ALKPHOS 88   AST 17   ALT 22      PET/CT: IMPRESSION: In this patient with history of follicular lymphoma  transformed to diffuse large B-cell lymphoma currently undergoing  chemotherapy, there is partial response per Lug[place] criteria:     1. Masslike consolidation in the right lower lobe with central  necrosis, unchanged in size, however diffusely hypoenhancing compared  to previously seen enhancement on 2020. Combination of findings  favor treated lesion, with residual uptake suggesting inflammation  (such as related to radiotherapy). Lack of significant enhancement  lowers suspicion for residual disease but not fully excluded. Consider  further follow-up with CT  chest with contrast.      2. Mildly prominent mediastinal and right hilar lymph nodes  demonstrate uptake below background levels, however appear minimally  larger since 7/20/2020.  By PET criteria (used for DLBCL) this would  be complete response, however by CT criteria (used for follicular  lymphoma) this would technically be progressive disease. Overall these  are indeterminant. Attention on follow-up.     3. Continued decreased size of the bilateral adrenal nodules compared  to 7/20/2020 and 5/14/2020, with no significant FDG uptake, consistent  with treated lesions.     4. Asymmetric skin thickening and mild FDG uptake to the right ear  lobe, in keeping with history of right ear melanoma. Unclear to what  extent these findings represent tumor versus postsurgical changes.   PFTs FVC%  Recent Labs   Lab Test 11/06/17 0900 20003 68       FEV1%  Recent Labs   Lab Test 11/06/17 0900 [id number] 62       DLCO%  Recent Labs   Lab Test 11/06/17 0900 [id number] 65      ECHO or MUGA: Left Ventricle  Global and regional left ventricular function is normal with an EF of 60-65%.  Left ventricular wall thickness cannot evaluate. Left ventricular size is  normal. Left ventricular diastolic function is indeterminate. No regional wall  motion abnormalities are seen.   EKG NSR   Serologies:   Donor Hep B Surf Agn  NR^Nonreactive  Nonreactive        Donor Hep B Core Merry  NR^Nonreactive  Nonreactive       Donor Hepatitis C Merry  NR^Nonreactive  Nonreactive       Donor HIV 1&2 Antibody  NR^Nonreactive  Nonreactive       Donor HTLV 1&2 Antibody  NR^Nonreactive  Nonreactive       Donor Cytomegalovirus Merry  NR^Nonreactive  PositiveAbnormal         Donor Treponema PAL MERRY   Nonreactive       Trypanosoma Cruzi  NR^Nonreactive  Nonreactive         Family History: No family history on file.    Social History:   Social History     Socioeconomic History     Marital status:      Spouse name: Not on file     Number of children: Not on  file     Years of education: Not on file     Highest education level: Not on file   Occupational History     Not on file   Social Needs     Financial resource strain: Not on file     Food insecurity     Worry: Not on file     Inability: Not on file     Transportation needs     Medical: Not on file     Non-medical: Not on file   Tobacco Use     Smoking status: Never Smoker     Smokeless tobacco: Never Used   Substance and Sexual Activity     Alcohol use: No     Drug use: No     Sexual activity: Not on file   Lifestyle     Physical activity     Days per week: Not on file     Minutes per session: Not on file     Stress: Not on file   Relationships     Social connections     Talks on phone: Not on file     Gets together: Not on file     Attends Congregational service: Not on file     Active member of club or organization: Not on file     Attends meetings of clubs or organizations: Not on file     Relationship status: Not on file     Intimate partner violence     Fear of current or ex partner: Not on file     Emotionally abused: Not on file     Physically abused: Not on file     Forced sexual activity: Not on file   Other Topics Concern     Parent/sibling w/ CABG, MI or angioplasty before 65F 55M? Not Asked   Social History Narrative     Not on file       Past Medical History:   Past Medical History:   Diagnosis Date     Abnormal liver function test      Anemia      CPAP (continuous positive airway pressure) dependence      Diabetes (H)      Hx of skin cancer, basal cell      Hyperlipidemia      Hypertension      Keratoderma      Large cell lymphoma (H) 7/20/2020     Liver disease      Lymphoma (H)      Non-Hodgkin lymphoma (H)      Pedal edema     CHRONIC     Pleural effusion      Seborrheic keratosis, inflamed      Sleep apnea     Uses a CPAP     Thrombocytopenia (H) 7/20/2020     Thrombosis Felbruary 2018        Past Surgical History:   Past Surgical History:   Procedure Laterality Date     AMPUTATE TOE(S) Left 5/22/2020     Procedure: LEFT SECOND AND THIRD DISTAL TOE AMPUTATIONS;  Surgeon: Ramon Flores MD;  Location:  OR     AMPUTATE TOE(S) Left 7/1/2020    Procedure: 1.  Partial left second toe amputation with osteotomy through proximal phalanx.  2.  Partial left third toe amputation with osteotomy through proximal phalanx.;  Surgeon: Ismael Humphrey DPM;  Location: [facility]     BIOPSY LYMPH NODE CERVICAL N/A 12/5/2014    Procedure: BIOPSY LYMPH NODE CERVICAL;  Surgeon: Robbie Linda MD;  Location:  OR     BRONCHOSCOPY FLEXIBLE N/A 11/14/2017    Procedure: BRONCHOSCOPY FLEXIBLE;  FLEXIBLE BRONCHOSCOPY WITH BIOPSIES.;  Surgeon: Robbie Linda MD;  Location:  OR     COLONOSCOPY       COLONOSCOPY N/A 5/9/2018    Procedure: COMBINED COLONOSCOPY, SINGLE OR MULTIPLE BIOPSY/POLYPECTOMY BY BIOPSY;;  Surgeon: Ramos Bates MD;  Location:  GI     ENDOVASCULAR PLACEMENT VASCULAR DEVICE Left 12/5/2017    Procedure: ENDOVASCULAR PLACEMENT VASCULAR DEVICE;;  Surgeon: Robbie Linda MD;  Location: [facility]     ENT SURGERY      tonsillectomy     EXCISE NODE MEDIASTINAL N/A 11/17/2017    Procedure: EXCISE NODE MEDIASTINAL;;  Surgeon: Robbie Linda MD;  Location:  OR     EYE SURGERY       EYE SURGERY Left     vitrectomy     INSERT PORT VASCULAR ACCESS N/A 12/5/2014    Procedure: INSERT PORT VASCULAR ACCESS;  Surgeon: Robbie Linda MD;  Location:  OR     INSERT PORT VASCULAR ACCESS N/A 12/5/2017    Procedure: INSERT PORT VASCULAR ACCESS;  POWER PORT PLACEMENT ATTEMPTED, PLACEMENT OF ANGIO-SEAL VIP VASCULAR CLOSURE DEVICE;  Surgeon: Robbie Linda MD;  Location: [facility]     IR CHEST PORT PLACEMENT > 5 YRS OF AGE  6/5/2020     IR PORT REMOVAL RIGHT  12/10/2018     PHACOEMULSIFICATION CLEAR CORNEA WITH STANDARD INTRAOCULAR LENS IMPLANT Right 5/2/2016    Procedure: PHACOEMULSIFICATION CLEAR CORNEA WITH STANDARD INTRAOCULAR LENS IMPLANT;  Surgeon: Rasheed Finney MD;   Location: [facility]     REMOVE PORT VASCULAR ACCESS N/A 3/14/2017    Procedure: REMOVE PORT VASCULAR ACCESS;  Surgeon: Robbie Linda MD;  Location: [facility] OR     SOFT TISSUE SURGERY      BASAL CELL CA FOREHEAD     THORACOTOMY Right 11/17/2017    Procedure: THORACOTOMY;  RIGHT EXPLORATORY THORACOTOMY/ EXTENSIVE PNEUMOLYSIS/ BIOPSY OF INTERLOBAR LYMPH NODE;  Surgeon: Robbie Linda MD;  Location: [facility] OR       Allergies: No Known Allergies    Home Medications      Prior to Admission medications    Medication Sig Start Date End Date Taking? Authorizing Provider   allopurinol (ZYLOPRIM) 300 MG tablet Take 300 mg by mouth daily    Unknown, Entered By History   fenofibrate (TRICOR) 145 MG tablet Take 145 mg by mouth daily    Unknown, Entered By History   hydrocortisone (CORTEF) 10 MG tablet Take 20 mg by mouth daily before breakfast     Reported, Patient   insulin aspart (NOVOLOG FLEXPEN) 100 UNIT/ML pen Inject Subcutaneous 3 times daily (with meals) Patient uses sliding scale based on Carbs and Blood Glucose. Has been using very little lately due to low readings and low appetite.    Reported, Patient   insulin glargine (LANTUS VIAL) 100 UNIT/ML vial Inject 15 Units Subcutaneous At Bedtime 6/21/20   Deanna Richards MD   metFORMIN (GLUCOPHAGE) 500 MG tablet Take 1,000 mg by mouth daily (with breakfast)    Unknown, Entered By History   metFORMIN (GLUCOPHAGE) 500 MG tablet Take 500 mg by mouth daily (with dinner)    Unknown, Entered By History   potassium chloride ER (KLOR-CON M) 20 MEQ CR tablet Take 20 mEq by mouth every evening    Unknown, Entered By History   potassium chloride ER (KLOR-CON M) 20 MEQ CR tablet Take 40 mEq by mouth every morning     Unknown, Entered By History   prochlorperazine (COMPAZINE) 5 MG tablet Take 1-2 tablets (5-10 mg) by mouth every 6 hours as needed (Breakthrough Nausea / Vomiting) 6/12/20   Temo Eng APRN CNP   rosuvastatin (CRESTOR) 10 MG tablet Take 5 mg by mouth  daily    Unknown, Entered By History   study - simvastatin, IDS# [id number], 40 MG tablet Take 1 tablet (40 mg) by mouth daily Start at least 5 days prior to apheresis and continue until Day +30 after CAR-T infusion. 8/25/20   Rosey Bunch MD       Review of Systems    Review of Systems:  CONSTITUTIONAL: NEGATIVE for fever, chills, change in weight  INTEGUMENTARY/SKIN: NEGATIVE for worrisome rashes, moles or lesions  EYES: NEGATIVE for vision changes or irritation  ENT/MOUTH: NEGATIVE for ear, mouth and throat problems  RESP: NEGATIVE for significant cough or SOB  BREAST: NEGATIVE for masses, tenderness or discharge  CV: NEGATIVE for chest pain, palpitations or peripheral edema  GI: NEGATIVE for nausea, abdominal pain, heartburn, or change in bowel habits  : NEGATIVE for frequency, dysuria, or hematuria  MUSCULOSKELETAL: NEGATIVE for significant arthralgias or myalgia  NEURO: NEGATIVE for weakness, dizziness or paresthesias  ENDOCRINE: NEGATIVE for temperature intolerance, skin/hair changes  HEME/ALLERGY: NEGATIVE for bleeding problems  PSYCHIATRIC: NEGATIVE for changes in mood or affect    PHYSICAL EXAM      Weight     Wt Readings from Last 3 Encounters:   09/15/20 90.7 kg (200 lb)   09/08/20 91.6 kg (202 lb)   08/31/20 89.1 kg (196 lb 6.9 oz)          There were no vitals taken for this visit.     General: NAD   Eyes: VANGIE, sclera anicteric   Nose/Mouth/Throat: OP clear, buccal mucosa moist, no ulcerations   Lungs: CTA bilaterally  Cardiovascular: RRR, no M/R/G   Abdominal/Rectal: +BS, soft, NT, ND, No HSM   Lymphatics: No edema  Skin: No rashes or petechaie  Neuro: A&O   Additional Findings: Byrd site NT, no drainage.    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 Due for lymphodepleting chemotherapy 9/23-25/2020, IT dex (MT 2019-35) on 9/28/2020,  and admission for [facility] on 9/29/2020.     BMT and Cell Therapy Informed Consent Discussion  In today's visit, we discussed in detail the research for which Pastor Garibay is eligible. We discussed the potential risks and potential benefits of each protocol individually. We explained potential alternatives to the protocols discussed. We explained to the patient that participation is voluntary and that consent may be withdrawn at any time.     The patient completed the last round of treatment on >1 mo.    HCT-CI score: 4. We counseled the patient about the impact of this on the risk of treatment related and overall mortality. The score fit within treatment protocol eligibility criteria.    Karnofsky performance score: 100     ECOG (required for CAR-T, see KPS to ECOG conversion chart): 0    Active infections:  0 (prior/resolved osteomyelitis).      Reproductive status: What methods of birth control does the patient plan to use during the treatment period beginning with conditioning and ending with the discontinuation of immune suppression (indicate with an X all that apply):  __The patient is confirmed to be sterile or post-menopausal  _X_ Sexual abstinence  __ Condoms  __ Implants  __ Injectables  __ Oral contraceptives  __ Intrauterine devices (IUD)  __ Other (describe)    The patient received appropriate reproductive counseling and agreed with the need for effective contraception during the treatment procedures.      Dental health suitable to proceed: Yes     The patient will receive a signed copy of the consents. The patient had opportunity to ask questions that were answered to the best of my ability and to the patient's apparent satisfaction.    Temo Pena MD

## 2020-09-15 NOTE — LETTER
9/15/2020         RE: Pastor Garibay  [address]  [place] [id number]        Dear Colleague,    Thank you for referring your patient, Pastor Garibay, to the [facility]. Please see a copy of my visit note below.    Chief Complaint   Patient presents with     RECHECK     DLBCL~ here for work up      BMT Teaching Flowsheet      Teaching Topic: work up    Person(s) involved in teaching: Patient  Motivation Level  Asks Questions: Yes  Eager to Learn: Yes  Cooperative: Yes  Receptive (willing/able to accept information): Yes  Any cultural factors/Confucianist beliefs that may influence understanding or compliance? No    Patient had vitals, height and weight done.  Consents explained and signed.  Calendar reviewed and questions were answered.  Labs drawn.  Urine collection device given to patient.    Instructional Materials Used/Given: Consents, calendar    Time spent with patient: 60 minutes.    Specific Concerns: NA      Again, thank you for allowing me to participate in the care of your patient.        Sincerely,        BMT Nurse

## 2020-09-15 NOTE — PROGRESS NOTES
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    Reason for BMTCT: [facility] CAR-T Cell Therapy     Recent chemotherapy: No--July 2020    Recent infections: No    Blood thinner use? If yes, why? No    Treatment for diabetes? Yes      Today, the patient notes the following symptoms:    R Ear: melanoma surgery x 2 recently. Stitches removed 9/14. No current ear complaints.     Remainder of ROS negative.       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 No other therapy needed other then excision.   - 5/2020 to 7/2020 Diabetic ulcer that let to L foot osteomyelitis and partial toe removal. No current issue.    Past Surgical History:   Procedure Laterality Date     AMPUTATE TOE(S) Left 5/22/2020    Procedure: LEFT SECOND AND THIRD DISTAL TOE AMPUTATIONS;  Surgeon: Ramon Flores MD;  Location:  OR     AMPUTATE TOE(S) Left 7/1/2020    Procedure: 1.  Partial left second toe amputation with osteotomy through proximal phalanx.  2.  Partial left third toe amputation with osteotomy through proximal phalanx.;  Surgeon: Ismael Humphrey DPM;  Location:  OR     BIOPSY LYMPH NODE CERVICAL N/A 12/5/2014    Procedure: BIOPSY LYMPH NODE CERVICAL;  Surgeon: Robbie Linda MD;  Location:  OR     BRONCHOSCOPY  FLEXIBLE N/A 11/14/2017    Procedure: BRONCHOSCOPY FLEXIBLE;  FLEXIBLE BRONCHOSCOPY WITH BIOPSIES.;  Surgeon: Robbie Linda MD;  Location:  OR     COLONOSCOPY       COLONOSCOPY N/A 5/9/2018    Procedure: COMBINED COLONOSCOPY, SINGLE OR MULTIPLE BIOPSY/POLYPECTOMY BY BIOPSY;;  Surgeon: Ramos Bates MD;  Location:  GI     ENDOVASCULAR PLACEMENT VASCULAR DEVICE Left 12/5/2017    Procedure: ENDOVASCULAR PLACEMENT VASCULAR DEVICE;;  Surgeon: Robbie Linda MD;  Location: [facility]     ENT SURGERY      tonsillectomy     EXCISE NODE MEDIASTINAL N/A 11/17/2017    Procedure: EXCISE NODE MEDIASTINAL;;  Surgeon: Robbie Linda MD;  Location:  OR     EYE SURGERY       EYE SURGERY Left     vitrectomy     INSERT PORT VASCULAR ACCESS N/A 12/5/2014    Procedure: INSERT PORT VASCULAR ACCESS;  Surgeon: Robbie Linda MD;  Location:  OR     INSERT PORT VASCULAR ACCESS N/A 12/5/2017    Procedure: INSERT PORT VASCULAR ACCESS;  POWER PORT PLACEMENT ATTEMPTED, PLACEMENT OF ANGIO-SEAL VIP VASCULAR CLOSURE DEVICE;  Surgeon: Robbie Linda MD;  Location: [facility]     IR CHEST PORT PLACEMENT > 5 YRS OF AGE  6/5/2020     IR PORT REMOVAL RIGHT  12/10/2018     PHACOEMULSIFICATION CLEAR CORNEA WITH STANDARD INTRAOCULAR LENS IMPLANT Right 5/2/2016    Procedure: PHACOEMULSIFICATION CLEAR CORNEA WITH STANDARD INTRAOCULAR LENS IMPLANT;  Surgeon: Rasheed Finney MD;  [facility]     REMOVE PORT VASCULAR ACCESS N/A 3/14/2017    Procedure: REMOVE PORT VASCULAR ACCESS;  Surgeon: Robbie Linda MD;  Location:  OR     SOFT TISSUE SURGERY      BASAL CELL CA FOREHEAD     THORACOTOMY Right 11/17/2017    Procedure: THORACOTOMY;  RIGHT EXPLORATORY THORACOTOMY/ EXTENSIVE PNEUMOLYSIS/ BIOPSY OF INTERLOBAR LYMPH NODE;  Surgeon: Robbie Linda MD;  Location:  OR       No family history on file.    Social History     Tobacco Use     Smoking status: Never Smoker     Smokeless  tobacco: Never Used   Substance Use Topics     Alcohol use: No       Pastor ELVIS Garibay's Medications and Allergies    Current Outpatient Medications   Medication     allopurinol (ZYLOPRIM) 300 MG tablet     fenofibrate (TRICOR) 145 MG tablet     hydrocortisone (CORTEF) 10 MG tablet     insulin aspart (NOVOLOG FLEXPEN) 100 UNIT/ML pen     insulin glargine (LANTUS VIAL) 100 UNIT/ML vial     metFORMIN (GLUCOPHAGE) 500 MG tablet     metFORMIN (GLUCOPHAGE) 500 MG tablet     potassium chloride ER (KLOR-CON M) 20 MEQ CR tablet     potassium chloride ER (KLOR-CON M) 20 MEQ CR tablet     prochlorperazine (COMPAZINE) 5 MG tablet     rosuvastatin (CRESTOR) 10 MG tablet     study - simvastatin, IDS# [id number], 40 MG tablet     No current facility-administered medications for this visit.         No Known Allergies    Physical Examination    Temp: 97 RR 18. HR 90. /66    Exam:  Constitutional: alert and no distress  Head: Normocephalic. No masses, lesions, tenderness or abnormalities  ENT: ENT exam normal, no neck nodes or sinus tenderness  Cardiovascular: RRR. No G/R/M.  Respiratory: BCTA.  Gastrointestinal: Abdomen soft, non-tender. BS normal.  Musculoskeletal: extremities normal- no gross deformities noted, gait normal and normal muscle tone  Skin: no suspicious lesions or rashes  Lymph: No LE edema  Neurologic: Gait normal. No focal deficits.  Psychiatric: mentation appears normal and affect normal/bright  Hematologic/Lymphatic/Immunologic: Normal cervical lymph nodes    Frailty Screening    1. Weight loss: Have you lost >10 pounds (or >5% body weight) unintentionally over the last year? Yes      2. Exhaustion: How often in the past week did you feel that:  o I feel that everything I do is an effort : .Exhaustion: 1 = some of the time (1-2 days)  o I feel I cannot get going: Exhaustion: 0 = rarely or none of the time (<1 day)    3. Weakness:  Hand  strength (measured by MA; calculate average): 21.3     Male BMI Frailty  Criteria for  Male  Strength Female BMI Frailty Criteria for  Female  Strength   ?24 ?29 ?23 ?17   24.1 - 26 ?30 23.1 - 26 ?17.3   26.1 - 28 ?30 26.1 - 29 ?18   >28 ?32 >29 ?21     4. Slowness:  15 foot walk time (measured by MA):  5    Height Frailty Criteria for  15 Foot Walk Time   Men   ?173 cm ? 7 seconds    >173 cm  ? 6 seconds   Women   ?159 cm ? 7 seconds   >159 cm  ? 6 seconds     5. Physical activity:     *Please complete 2 calculations for kcal (see frailty worksheet for equations)     Energy expenditure for frailty: 997 kcal expended per week     Gender Frailty Criteria for Low Physical Activity   Male <383 kcal/week   Female <270 kcal/weel     IPAQ score: 660 MET minutes per week       aPstor Garibay met the following criteria for prefrailty (score 1-2) or frailty (score 3+): Frailty: Weight Loss and Weakness  Frailty Score is: 2      Additional assessments not to be used in frailty calculation:   What types of physical activity can you tolerate? Walking, mowing, occasional stairs    Sit to stand test (time to complete 5 reps): 20 seconds    Standing balance in 10 seconds:  Record the Total number of seconds(0-30)--add a+b+c ( take best attempt for each)    First attempt: 30 seconds    Overall Assessment    Consents Signed:    Blood transfusion consent form    Ethnicity form    C[facility] database    [facility] biorepository    [facility] BMTCT Database    Present during the discussion was patient and myself. Copies of the signed consent forms will be provided to the patient on admission. No procedures specific to any studies were performed prior to the patient signing the consent form.    Pastor Garibay had the opportunity to ask questions, and I answered all of the questions to the best of my ability.      Lisseth Hauser PA-C

## 2020-09-15 NOTE — PROGRESS NOTES
Chief Complaint   Patient presents with     RECHECK     DLBCL~ here for work up      BMT Teaching Flowsheet      Teaching Topic: work up    Person(s) involved in teaching: Patient  Motivation Level  Asks Questions: Yes  Eager to Learn: Yes  Cooperative: Yes  Receptive (willing/able to accept information): Yes  Any cultural factors/Anabaptist beliefs that may influence understanding or compliance? No    Patient had vitals, height and weight done.  Consents explained and signed.  Calendar reviewed and questions were answered.  Labs drawn.  Urine collection device given to patient.    Instructional Materials Used/Given: Consents, calendar    Time spent with patient: 60 minutes.    Specific Concerns: NA

## 2020-09-15 NOTE — LETTER
9/15/2020         RE: Pastor Garibay  [address]  [facility] [address]        Dear Colleague,    Thank you for referring your patient, Pastor Garibay, to the [facility] BLOOD AND MARROW TRANSPLANT. Please see a copy of my visit note below.        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    Reason for BMTCT: Yescarta CAR-T Cell Therapy     Recent chemotherapy: No--July 2020    Recent infections: No    Blood thinner use? If yes, why? No    Treatment for diabetes? Yes      Today, the patient notes the following symptoms:    R Ear: melanoma surgery x 2 recently. Stitches removed 9/14. No current ear complaints.     Remainder of ROS negative.       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 No other therapy needed other then excision.   - 5/2020 to 7/2020 Diabetic ulcer that let to L foot osteomyelitis and partial toe removal. No current issue.    Past Surgical History:   Procedure Laterality Date     AMPUTATE TOE(S) Left 5/22/2020    Procedure: LEFT SECOND AND THIRD DISTAL TOE AMPUTATIONS;  Surgeon: Ramon Flores MD;  Location: [facility] OR     AMPUTATE TOE(S) Left 7/1/2020    Procedure: 1.  Partial left second toe amputation with osteotomy through proximal phalanx.  2.  Partial left third toe amputation  with osteotomy through proximal phalanx.;  Surgeon: Ismael Humphrey DPM;  Location: [facility] OR     BIOPSY LYMPH NODE CERVICAL N/A 12/5/2014    Procedure: BIOPSY LYMPH NODE CERVICAL;  Surgeon: Robbie Linda MD;  Location:  OR     BRONCHOSCOPY FLEXIBLE N/A 11/14/2017    Procedure: BRONCHOSCOPY FLEXIBLE;  FLEXIBLE BRONCHOSCOPY WITH BIOPSIES.;  Surgeon: Robbie Linda MD;  Location:  OR     COLONOSCOPY       COLONOSCOPY N/A 5/9/2018    Procedure: COMBINED COLONOSCOPY, SINGLE OR MULTIPLE BIOPSY/POLYPECTOMY BY BIOPSY;;  Surgeon: Ramos Bates MD;  Location:  GI     ENDOVASCULAR PLACEMENT VASCULAR DEVICE Left 12/5/2017    Procedure: ENDOVASCULAR PLACEMENT VASCULAR DEVICE;;  Surgeon: Robbie Linda MD;  Location: [facility]     ENT SURGERY      tonsillectomy     EXCISE NODE MEDIASTINAL N/A 11/17/2017    Procedure: EXCISE NODE MEDIASTINAL;;  Surgeon: Robbie Linda MD;  Location:  OR     EYE SURGERY       EYE SURGERY Left     vitrectomy     INSERT PORT VASCULAR ACCESS N/A 12/5/2014    Procedure: INSERT PORT VASCULAR ACCESS;  Surgeon: Robbie Linda MD;  Location:  OR     INSERT PORT VASCULAR ACCESS N/A 12/5/2017    Procedure: INSERT PORT VASCULAR ACCESS;  POWER PORT PLACEMENT ATTEMPTED, PLACEMENT OF ANGIO-SEAL VIP VASCULAR CLOSURE DEVICE;  Surgeon: Robbie Linda MD;  Location: [facility]     IR CHEST PORT PLACEMENT > 5 YRS OF AGE  6/5/2020     IR PORT REMOVAL RIGHT  12/10/2018     PHACOEMULSIFICATION CLEAR CORNEA WITH STANDARD INTRAOCULAR LENS IMPLANT Right 5/2/2016    Procedure: PHACOEMULSIFICATION CLEAR CORNEA WITH STANDARD INTRAOCULAR LENS IMPLANT;  Surgeon: Rasheed Finney MD;  Location:  [facility]     REMOVE PORT VASCULAR ACCESS N/A 3/14/2017    Procedure: REMOVE PORT VASCULAR ACCESS;  Surgeon: Robbie Linda MD;  Location:  OR     SOFT TISSUE SURGERY      BASAL CELL CA FOREHEAD     THORACOTOMY Right 11/17/2017    Procedure: THORACOTOMY;  RIGHT  EXPLORATORY THORACOTOMY/ EXTENSIVE PNEUMOLYSIS/ BIOPSY OF INTERLOBAR LYMPH NODE;  Surgeon: Robbie Linda MD;  Location: [facility] OR       No family history on file.    Social History     Tobacco Use     Smoking status: Never Smoker     Smokeless tobacco: Never Used   Substance Use Topics     Alcohol use: No       Pastor ELVIS Garibay's Medications and Allergies    Current Outpatient Medications   Medication     allopurinol (ZYLOPRIM) 300 MG tablet     fenofibrate (TRICOR) 145 MG tablet     hydrocortisone (CORTEF) 10 MG tablet     insulin aspart (NOVOLOG FLEXPEN) 100 UNIT/ML pen     insulin glargine (LANTUS VIAL) 100 UNIT/ML vial     metFORMIN (GLUCOPHAGE) 500 MG tablet     metFORMIN (GLUCOPHAGE) 500 MG tablet     potassium chloride ER (KLOR-CON M) 20 MEQ CR tablet     potassium chloride ER (KLOR-CON M) 20 MEQ CR tablet     prochlorperazine (COMPAZINE) 5 MG tablet     rosuvastatin (CRESTOR) 10 MG tablet     study - simvastatin, IDS# [id number], 40 MG tablet     No current facility-administered medications for this visit.         No Known Allergies    Physical Examination    Temp: 97 RR 18. HR 90. /66    Exam:  Constitutional: alert and no distress  Head: Normocephalic. No masses, lesions, tenderness or abnormalities  ENT: ENT exam normal, no neck nodes or sinus tenderness  Cardiovascular: RRR. No G/R/M.  Respiratory: BCTA.  Gastrointestinal: Abdomen soft, non-tender. BS normal.  Musculoskeletal: extremities normal- no gross deformities noted, gait normal and normal muscle tone  Skin: no suspicious lesions or rashes  Lymph: No LE edema  Neurologic: Gait normal. No focal deficits.  Psychiatric: mentation appears normal and affect normal/bright  Hematologic/Lymphatic/Immunologic: Normal cervical lymph nodes    Frailty Screening    1. Weight loss: Have you lost >10 pounds (or >5% body weight) unintentionally over the last year? Yes      2. Exhaustion: How often in the past week did you feel that:  o I feel that  everything I do is an effort : .Exhaustion: 1 = some of the time (1-2 days)  o I feel I cannot get going: Exhaustion: 0 = rarely or none of the time (<1 day)    3. Weakness:  Hand  strength (measured by MA; calculate average): 21.3     Male BMI Frailty Criteria for  Male  Strength Female BMI Frailty Criteria for  Female  Strength   ?24 ?29 ?23 ?17   24.1 - 26 ?30 23.1 - 26 ?17.3   26.1 - 28 ?30 26.1 - 29 ?18   >28 ?32 >29 ?21     4. Slowness:  15 foot walk time (measured by MA):  5    Height Frailty Criteria for  15 Foot Walk Time   Men   ?173 cm ? 7 seconds    >173 cm  ? 6 seconds   Women   ?159 cm ? 7 seconds   >159 cm  ? 6 seconds     5. Physical activity:     *Please complete 2 calculations for kcal (see frailty worksheet for equations)     Energy expenditure for frailty: 997 kcal expended per week     Gender Frailty Criteria for Low Physical Activity   Male <383 kcal/week   Female <270 kcal/weel     IPAQ score: 660 MET minutes per week       Pastor Garibay met the following criteria for prefrailty (score 1-2) or frailty (score 3+): Frailty: Weight Loss and Weakness  Frailty Score is: 2      Additional assessments not to be used in frailty calculation:   What types of physical activity can you tolerate? Walking, mowing, occasional stairs    Sit to stand test (time to complete 5 reps): 20 seconds    Standing balance in 10 seconds:  Record the Total number of seconds(0-30)--add a+b+c ( take best attempt for each)    First attempt: 30 seconds    Overall Assessment    Consents Signed:    Blood transfusion consent form    Ethnicity form    C[facility]TR database    [facility] biorepository    [facility] BMTCT Database    Present during the discussion was patient and myself. Copies of the signed consent forms will be provided to the patient on admission. No procedures specific to any studies were performed prior to the patient signing the consent form.    Pastor Garibay had the opportunity to ask questions, and I answered  all of the questions to the best of my ability.    Lisseth Hauser PA-C      Again, thank you for allowing me to participate in the care of your patient.        Sincerely,        BMT Advanced Practice Provider

## 2020-09-16 ENCOUNTER — VIRTUAL VISIT (OUTPATIENT)
Dept: INTERVENTIONAL RADIOLOGY/VASCULAR | Facility: CLINIC | Age: 70
End: 2020-09-16
Payer: COMMERCIAL

## 2020-09-16 ENCOUNTER — PRE VISIT (OUTPATIENT)
Dept: INTERVENTIONAL RADIOLOGY/VASCULAR | Facility: CLINIC | Age: 70
End: 2020-09-16

## 2020-09-16 ENCOUNTER — VIRTUAL VISIT (OUTPATIENT)
Dept: TRANSPLANT | Facility: CLINIC | Age: 70
End: 2020-09-16
Payer: COMMERCIAL

## 2020-09-16 ENCOUNTER — HOSPITAL ENCOUNTER (OUTPATIENT)
Facility: CLINIC | Age: 70
End: 2020-09-16
Payer: COMMERCIAL

## 2020-09-16 DIAGNOSIS — Z11.59 ENCOUNTER FOR SCREENING FOR OTHER VIRAL DISEASES: Primary | ICD-10-CM

## 2020-09-16 DIAGNOSIS — Z86.2 PERSONAL HISTORY OF DISEASES OF BLOOD AND BLOOD-FORMING ORGANS: ICD-10-CM

## 2020-09-16 DIAGNOSIS — C83.38 DIFFUSE LARGE B-CELL LYMPHOMA OF LYMPH NODES OF MULTIPLE REGIONS (H): ICD-10-CM

## 2020-09-16 DIAGNOSIS — Z71.9 VISIT FOR COUNSELING: Primary | ICD-10-CM

## 2020-09-16 DIAGNOSIS — C83.38 DIFFUSE LARGE B-CELL LYMPHOMA OF LYMPH NODES OF MULTIPLE REGIONS (H): Primary | ICD-10-CM

## 2020-09-16 ASSESSMENT — ANXIETY QUESTIONNAIRES
7. FEELING AFRAID AS IF SOMETHING AWFUL MIGHT HAPPEN: NOT AT ALL
1. FEELING NERVOUS, ANXIOUS, OR ON EDGE: NOT AT ALL
2. NOT BEING ABLE TO STOP OR CONTROL WORRYING: NOT AT ALL
6. BECOMING EASILY ANNOYED OR IRRITABLE: SEVERAL DAYS
GAD7 TOTAL SCORE: 2
3. WORRYING TOO MUCH ABOUT DIFFERENT THINGS: NOT AT ALL
5. BEING SO RESTLESS THAT IT IS HARD TO SIT STILL: SEVERAL DAYS

## 2020-09-16 ASSESSMENT — PATIENT HEALTH QUESTIONNAIRE - PHQ9
SUM OF ALL RESPONSES TO PHQ QUESTIONS 1-9: 1
5. POOR APPETITE OR OVEREATING: NOT AT ALL

## 2020-09-16 NOTE — PROGRESS NOTES
CLINICAL SOCIAL WORK   PSYCHOSOCIAL ASSESSMENT  BLOOD AND MARROW TRANSPLANT SERVICE      Assessment completed on 2020 of living situation, support system, financial status, functional status, coping, stressors, need for resources and social work intervention provided as needed.  Information for this assessment was provided by Pt and spouse report in addition to medical chart review and consultation with medical team.     Present at assessment: Patient, Pastor Garibay  and Toshia Garibay were present for this assessment conducted by Sugey Roa, BMT .     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  Pt was accompanied to today's visit by his wife Toshia. Today's visit was completed via telephone due to [employer] restrictions.     Decision Making:   Self     Health Care Directive:   Copy in Chart     Relationship Status:    to his wife Toshia. They have been together for 45 years.    Special Needs: None identified at this time.     Family/Support System: Pt endorsed a good support system including family and close friends who will be available to support Pt throughout transplant process.     Spouse: Toshia Garibay    Children: 2 daughters Karen, Shaniqua; 3 son's Mike, Tommie, Evan (Lives in [place])    Grandchildren: 2 - Zander (6) and Roger (4)    Parents:     Siblings: 3  brother (2 living)  and 1 sister    Caregiver: SW discussed with pt the caregiver role and expectation at length. Pt is agreeable to having a full time caregiver for a minimum of 30 days until cleared by the BMT physician. Pt's identified caregivers are his wife and children. Pt signed the caregiver contract which will be scanned into the EMR. Caregiver education and resources provided. No caregiver concerns identified. Pt and Pt's Toshia confirmed understanding caregiving requirement, including driving restrictions, as discussed during psychosocial assessment.     Name & Numbers  Toshia Garibay [phone]    Transportation Mode:  Private Car . Pt is aware of driving restrictions post-BMT and the need for the caregiver is to drive until cleared to drive by the  BMT physician. SW provided information on parking info and monthly parking pass options. Pt will utilize the [employer] security shuttle for transportation to and from the [facility] and BMT Clinic/Hospital.    Insurance:  No Insurance issues identified.  Pt denied specific insurance concerns at this time. SW reiterated information about the BMT Financial  should specific insurance questions arise as Pt moves through transplant process.     Sources of Income:  No income concerns identified  The pt is still working 2 hrs a day and collecting unemployment as well as Toshia collecting a paycheck. Pt denied anticipation of financial hardship related to BMT at this time.  SW encouraged Pt to contact this SW for additional potential resources should financial situation change.     Employment:   Employer: [employer]  Last Day of Work: Currently working 2 hrs a day     Spouse's Employment:  Employer:  [employer]  Position:  - currently working from home    Mental Health: No mental health issues identified       PHQ-9 assessment, score was 1 ,which indicates no current signs of depression.  GAD7 assessment, score was 2, which  "indicates no current signs of anxiety.    Pastor notes that he focuses on keeping a positive mindset to help him get through the tough moments. Pastor does feel that he experienced some depression after his father , but did not need any interventions for this. Otherwise Pastor self reports no other times of anxiety or depression.     We talked about how some patients may see an increase in feelings of anxiety or depression while hospitalized for extended periods along with isolation. Encouraged Pastor to let us know if they are noticing an increase in symptoms. We talked about the variety of modalities available to use as coping mechanisms (including but not limited to guided imagery, relaxation techniques, progressive muscle relaxation, counseling/talk therapy and medication).    Chemical Use: No issues identified. The pt denies the use of tobacco, alcohol, marijuana and other drugs. Based on the information provided, there appear to be no specific risks or concerns identified at this time.     Trauma/Loss/Abuse History: Multiple losses associated with cancer diagnosis and treatment, including health, employment, changes to physical appearance, etc.     Spirituality:  Patient identifies with bulmaro community. Not practicing, does identify as Muslim. Pt's wife Toshia is also Muslim and attends Hinduism virtually with her mother each week.    Coping: Pt noted that he is currently feeling \"ready to begin and excited\".  Pt shared that his main coping mechanisms are talking with his family.  Pt noted that he also manuel by being outdoors and swimming with his grandkids. SW and Pt discussed additional positive coping mechanisms that Pt can utilize while in the hospital.     Caregiver Coping: Pt's wife noted that she is feeling \"anxious, but ready for this to start\" at this time.  Toshia noted that she manuel by medication (started on a \"blue pill\"), running, yoga, going on bike rides and Hinduism.     Toshia did share that she " was brought to the ER one day because she was having lots of chest pain. She was diagnosed with anxiety and although she feels much better now and has found ways to cope better, she is very worried and nervous at times.     Education Provided: Transplant process expectations, Caregiver requirements, Caregiver self-care, Financial issues related to transplant, Financial resources/grants available, Common psychosocial stressors pre/post transplant, Support group(s) available, Tour/layout of the inpatient unit/non-use of cell phones, Hospital resources available, Web site information, Resources for transplant patients and their families as well as the Clinical Social Work role.     Interventions Provided: Supportive counseling and education     Recreation/Leisure Activities:  Golf, swimming, and hang with family    Plans for Hospital Stay Leisure:  Book, cribbage, tablet, and walk the halls    Assessment and Recommendations for Team:  Pt is a 70 year old male diagnosed with NHL who is here undergoing preparation for a planned Yescarta infusion.    Pt is a pleasant and well articulate male who feels comfortable communicating with the medical team. Pt has a good support team who are involved.     Pt may benefit from ongoing psychosocial support in regards to coping with the adjustment to the BMT process. Pt's family may benefit from ongoing psychosocial support in regards to coping with the adjustment to the BMT process and may also benefit from attending the BMT Caregiver Support Group that meets weekly on the inpatient unit.     Pt has a good support system and a good caregiver plan. Pt verbalizes understanding of the transplant process and wanting to proceed. SW provided contact information and encouraged Pt to contact SW with questions, concerns, resources and for support. Per this assessment, I did not identify any barriers to this patient moving forward with transplant      Important Information:   - Pt may be  interested in exercise equipment  -Pt's daughter will be his visitor for this stay.      Follow up Planned:   Psychosocial support  Resources for children  Support group information    ALEXA Hobson, LANEY  [facility]  Pager: [phone]  Phone: [phone]

## 2020-09-16 NOTE — LETTER
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 Please see a copy of my visit note below.    Pharmacy Assessment - Pre-Stem Cell Transplant    Assessments & Recommendations:  1) Interaction between levofloxacin and metformin may increase the hypoglycemia effects of metformin. Consider changing prophylaxis to cefpodoxime   2) Hold metformin while IP to avoid Lactic acidosis and increased GI toxicity post LD chemotherapy.     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  He has been treated with RCHOP x 6 with 2 yrs maintenance Rituxan, then Bendamustine/rituxan x 6, then R-ICE , R-DHAP.  He is now being work up for Car-T cellular therapy (Yescarta)  on protocol LV6367-99, which utilizes Cyclophosphamide/Fludarabine as a conditioning lymphodepletion regimen.    Pertinent labs/tests:  Viral Testing:  CMV(+) / HSV(-/-) / EBV(-) / VZV (+)  Ejection Fraction: 60-65% (8/24)  QTc: 437msec (8/17)    Weights:   Wt Readings from Last 3 Encounters:   09/15/20 90.7 kg (200 lb)   09/08/20 91.6 kg (202 lb)   08/31/20 89.1 kg (196 lb 6.9 oz)   Ideal body weight: 70.1 kg (154 lb 9.6 oz)  Adjusted ideal body weight: 78.4 kg (172 lb 12.1 oz)  % IBW:  129  There is no height or weight on file to calculate BMI.    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     Liver disease      Lymphoma (H)      Non-Hodgkin lymphoma (H)      Pedal edema     CHRONIC     Pleural effusion       Seborrheic keratosis, inflamed      Sleep apnea     Uses a CPAP     Thrombocytopenia (H) 7/20/2020     Thrombosis Felbruary 2018       Medication Allergies:  No Known Allergies    Current Medications (pre-admit):  Current Outpatient Medications   Medication Sig Dispense Refill     allopurinol (ZYLOPRIM) 300 MG tablet Take 300 mg by mouth daily       fenofibrate (TRICOR) 145 MG tablet Take 145 mg by mouth daily       hydrocortisone (CORTEF) 10 MG tablet Take 20 mg by mouth daily before breakfast        insulin aspart (NOVOLOG FLEXPEN) 100 UNIT/ML pen Inject Subcutaneous 3 times daily (with meals) Patient uses sliding scale based on Carbs and Blood Glucose. Has been using very little lately due to low readings and low appetite.       insulin glargine (LANTUS VIAL) 100 UNIT/ML vial Inject 15 Units Subcutaneous At Bedtime       metFORMIN (GLUCOPHAGE) 500 MG tablet Take 1,000 mg by mouth daily (with breakfast)       metFORMIN (GLUCOPHAGE) 500 MG tablet Take 500 mg by mouth daily (with dinner)       potassium chloride ER (KLOR-CON M) 20 MEQ CR tablet Take 20 mEq by mouth every evening       potassium chloride ER (KLOR-CON M) 20 MEQ CR tablet Take 40 mEq by mouth every morning        prochlorperazine (COMPAZINE) 5 MG tablet Take 1-2 tablets (5-10 mg) by mouth every 6 hours as needed (Breakthrough Nausea / Vomiting) 30 tablet 0     rosuvastatin (CRESTOR) 10 MG tablet Take 5 mg by mouth daily       study - simvastatin, IDS# 5641, 40 MG tablet Take 1 tablet (40 mg) by mouth daily Start at least 5 days prior to apheresis and continue until Day +30 after CAR-T infusion. 65 tablet 0       Medication Reconciliation: Not taking crestor as on the simvastatin study.     Herbal Medication/Nutritional Supplements:  No issues    Smoking/Past Drug Use:  No issues     Nausea/Vomiting, Pain, or other issues:  No issues     Summary:  I met with Pastor Garibay via teleconferencing for approximately 30 minutes.  We discussed his  current and Car-T cellular therapy medications including the lymphodepletion chemotherapy, antiemetics, prophylactic antibiotics and miscellaneous medications.    Again, thank you for allowing me to participate in the care of your patient.        Sincerely,        BMT Pharm D, SRINIVASA

## 2020-09-16 NOTE — LETTER
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 Please see a copy of my visit note below.  ______________________________________________  Interventional Radiology Consult    First Name: Pastor  Age: 70 year old   Referring Physician: Dr. 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  T cells collected 8/31/2020.  Due for lymphodepleting chemotherapy 9/23-25/2020, IT dex (MT 2019-35) on 9/28/2020, and admission for Yescarta on 9/29/2020.     LINE HX:  1. 12/5/14-3/14/17:  Right internal jugular vein single lumen port a cath  2. 12/5/17-12/10/18:  Left internal jugular vein single lumen port a cath  3. 6/5/20-present:  Right internal jugular vein single lumen port a cath.  Right chest wall Port-A-Cath catheter terminates at the mid right atrium.    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  Partial left second toe amputation with osteotomy through proximal phalanx.  2.  Partial left third toe amputation with osteotomy through proximal phalanx.;  Surgeon: Ismael Humphrey DPM;  Location: [facility] OR     BIOPSY LYMPH NODE CERVICAL N/A 12/5/2014    Procedure: BIOPSY LYMPH NODE CERVICAL;  Surgeon: Rbobie Linda MD;  Location:  OR     BRONCHOSCOPY FLEXIBLE N/A 11/14/2017    Procedure: BRONCHOSCOPY FLEXIBLE;  FLEXIBLE BRONCHOSCOPY WITH BIOPSIES.;  Surgeon: Robbie Linda MD;  Location:  OR     COLONOSCOPY       COLONOSCOPY N/A 5/9/2018    Procedure: COMBINED COLONOSCOPY, SINGLE OR MULTIPLE BIOPSY/POLYPECTOMY BY BIOPSY;;  Surgeon: Ramos Bates MD;  Location:  GI     ENDOVASCULAR PLACEMENT VASCULAR DEVICE Left 12/5/2017    Procedure: ENDOVASCULAR PLACEMENT VASCULAR DEVICE;;  Surgeon: Robbie Linda MD;  Location: [facility]     ENT SURGERY      tonsillectomy     EXCISE NODE MEDIASTINAL N/A 11/17/2017    Procedure: EXCISE NODE MEDIASTINAL;;  Surgeon: Robbie Linda MD;  Location:  OR     EYE SURGERY       EYE SURGERY Left     vitrectomy     INSERT PORT VASCULAR ACCESS N/A 12/5/2014    Procedure: INSERT PORT VASCULAR ACCESS;  Surgeon: Robbie Linda MD;  Location:  OR     INSERT PORT VASCULAR ACCESS N/A 12/5/2017    Procedure: INSERT PORT VASCULAR ACCESS;  POWER PORT PLACEMENT ATTEMPTED, PLACEMENT OF ANGIO-SEAL VIP VASCULAR CLOSURE DEVICE;  Surgeon: Robbie Linda MD;  Location: [facility]     IR CHEST PORT PLACEMENT > 5 YRS OF AGE  6/5/2020     IR PORT REMOVAL RIGHT  12/10/2018     PHACOEMULSIFICATION CLEAR CORNEA WITH STANDARD INTRAOCULAR LENS IMPLANT Right 5/2/2016    Procedure: PHACOEMULSIFICATION CLEAR CORNEA WITH STANDARD INTRAOCULAR LENS IMPLANT;  Surgeon: Rasheed Finney MD;  Location:  [facility]     REMOVE PORT VASCULAR  ACCESS N/A 3/14/2017    Procedure: REMOVE PORT VASCULAR ACCESS;  Surgeon: Robbie Linda MD;  Location:  OR     SOFT TISSUE SURGERY      BASAL CELL CA FOREHEAD     THORACOTOMY Right 2017    Procedure: THORACOTOMY;  RIGHT EXPLORATORY THORACOTOMY/ EXTENSIVE PNEUMOLYSIS/ BIOPSY OF INTERLOBAR LYMPH NODE;  Surgeon: Robbie Linda MD;  Location:  OR     FAMILY HISTORY: No family history on file.  SOCIAL HISTORY:   Social History     Tobacco Use     Smoking status: Never Smoker     Smokeless tobacco: Never Used   Substance Use Topics     Alcohol use: No     PROBLEM LIST:   Patient Active Problem List    Diagnosis Date Noted     Large cell lymphoma (H) 2020     Priority: Medium     Thrombocytopenia (H) 2020     Priority: Medium     Diabetes mellitus, type 2 (H) 2020     Priority: Medium     Lymphoma (H) 2020     Priority: Medium     Nonhealing surgical wound 2020     Priority: Medium     Diffuse large cell lymphoma in remission (H) 2020     Priority: Medium     Lymphoma type: transformed DLBCL  Date of d2020  Subtype: GCB  c-myc status: overexpression   bcl-2 status: overexpression   bcl-6 status:  Stage: IV  Extranodal disease: Yes  Extranodal site: lungs  LDH elevated at dg: Yes  KPS: 80%  IPI: 4  Front-line therapy: R-ICE followed by D-HAP   Response to front-line therapy: stable disease  Salvage therapy: Yescarta        Osteomyelitis of second toe of left foot (H) 05/15/2020     Priority: Medium     Added automatically from request for surgery [id number]       Osteomyelitis of third toe of left foot (H) 05/15/2020     Priority: Medium     Added automatically from request for surgery [id number]       Lymphoma, non-Hodgkin's (H) 2020     Priority: Medium     Skin ulcer of toe of left foot with fat layer exposed (H) 2020     Priority: Medium     Anemia due to antineoplastic chemotherapy 2018     Priority: Medium     Long term current use  of anticoagulant therapy 02/08/2018     Priority: Medium     Lymphadenopathy, mediastinal 11/17/2017     Priority: Medium     Abnormal liver function tests 04/24/2017     Priority: Medium     Pain in joint, ankle and foot 04/30/2008     Priority: Medium     MEDICATIONS:   Prescription Medications as of 9/17/2020       Rx Number Disp Refills Start End Last Dispensed Date Next Fill Date Owning Pharmacy    allopurinol (ZYLOPRIM) 300 MG tablet        [facility] [place] [facility] [address]    Sig: Take 300 mg by mouth daily    Class: Historical    Route: Oral    fenofibrate (TRICOR) 145 MG tablet        [facility] [place] [facility] [address]    Sig: Take 145 mg by mouth daily    Class: Historical    Route: Oral    hydrocortisone (CORTEF) 10 MG tablet            Sig: Take 20 mg by mouth daily before breakfast     Class: Historical    Route: Oral    insulin aspart (NOVOLOG FLEXPEN) 100 UNIT/ML pen            Sig: Inject Subcutaneous 3 times daily (with meals) Patient uses sliding scale based on Carbs and Blood Glucose. Has been using very little lately due to low readings and low appetite.    Class: Historical    Route: Subcutaneous    insulin glargine (LANTUS VIAL) 100 UNIT/ML vial    6/21/2020    [facility] [address] [address]    Sig: Inject 15 Units Subcutaneous At Bedtime    Class: No Print Out    Route: Subcutaneous    Renewals     Renewal requests to authorizing provider (Deanna Richards MD) <b>prohibited</b>          metFORMIN (GLUCOPHAGE) 500 MG tablet        [id number] [place] [facility] [address]    Sig: Take 1,000 mg by mouth daily (with breakfast)    Class: Historical    Route: Oral    metFORMIN (GLUCOPHAGE) 500 MG tablet        [facility] [place] [facility] [address]    Sig: Take 500 mg by mouth daily (with dinner)    Class: Historical    Route: Oral    potassium chloride ER (KLOR-CON M) 20 MEQ CR tablet         [facility] [place] [facility] [address]    Sig: Take 20 mEq by mouth every evening    Class: Historical    Route: Oral    potassium chloride ER (KLOR-CON M) 20 MEQ CR tablet        [facility] [place] [facility] [address]    Sig: Take 40 mEq by mouth every morning     Class: Historical    Route: Oral    prochlorperazine (COMPAZINE) 5 MG tablet  30 tablet 0 6/12/2020    [facility] [address] [place] [address]    Sig: Take 1-2 tablets (5-10 mg) by mouth every 6 hours as needed (Breakthrough Nausea / Vomiting)    Class: E-Prescribe    Route: Oral    rosuvastatin (CRESTOR) 10 MG tablet        [facility] [place] [facility] [address]    Sig: Take 5 mg by mouth daily    Class: Historical    Route: Oral    study - simvastatin, IDS# [id number], 40 MG tablet  65 tablet 0 8/25/2020    [address] 2[address]252    Sig: Take 1 tablet (40 mg) by mouth daily Start at least 5 days prior to apheresis and continue until Day +30 after CAR-T infusion.    Class: E-Prescribe    Route: Oral    Non-formulary Exception Code: Specific indication for non-formulary alternative        ALLERGIES:  No Known Allergies  VITALS: There were no vitals taken for this visit.    ROS:  Skin: negative  Musculoskeletal: negative  Neurologic: negative  Psychiatric: negative    Physical Examination:  Virtual visit  Pleasant, oriented    RESULTS:  BMP RESULTS:  Lab Results   Component Value Date     09/15/2020    POTASSIUM 4.2 09/15/2020    CHLORIDE 103 09/15/2020    CO2 27 09/15/2020    ANIONGAP 6 09/15/2020     (H) 09/15/2020    BUN 12 09/15/2020    CR 0.80 09/15/2020    GFRESTIMATED >90 09/15/2020    GFRESTBLACK >90 09/15/2020    JEFF 9.4 09/15/2020      CBC RESULTS:  Lab Results   Component Value Date    WBC 5.2 09/15/2020    RBC 4.60 09/15/2020    HGB 13.4 09/15/2020    HCT 42.7 09/15/2020    MCV 93 09/15/2020    MCH 29.1  09/15/2020    MCHC 31.4 (L) 09/15/2020    RDW 14.6 09/15/2020     (L) 09/15/2020     INR/PTT:  Lab Results   Component Value Date    INR 1.09 09/15/2020    PTT 63 (H) 09/15/2020     NOTE:  Take only 24 units of Lantus the evening before the procedure  Hold the metformin the morning of the procedure  Bring the novolog alone with you to the procedure  You may take your other medications the morning of the procedure    ASSESSMENT/PLAN:  Type of catheter: 9.5 Fr. Double lumen tunneled Byrd catheter    Preferred Location: Left  Internal Jugular Vein     Platelet count is   Lab Results   Component Value Date     09/15/2020    , and coagulation factors are   Lab Results   Component Value Date    INR 1.09 09/15/2020    ,  Lab Results   Component Value Date    PTT 63 09/15/2020   . The patient is at low risk for bleeding during the procedure.    PROVIDER NOTE:  The tunneled catheter placement procedure and its risks including but not limited to bleeding, infection, fibrin sheath formation and blood clots was explained to Pastor and his wife, Toshia.    CONSENT: Affirmation of informed written consent was not obtained.     INSTRUCTIONS/GUIDELINES:   Eating and drinking restriction guidelines were reviewed., Anti-bacterial scrub was given and instructions for its use were reviewed to decrease the risk of infection on the day of the procedure., I explained the expected length of time the tunneled catheter could remain in place., I explained that when they went to the [facility] they would be taught about exit site dressing care, flushing of the lumens and how to care for the catheter when bathing., I explained that when the port a cath is now in use that it will need to be flushed once a month., The role of Interventional Radiology was reviewed. and The principles of infection control were reviewed.     Again, thank you for allowing me to participate in the care of your patient.  Sincerely,    Nita  Shavonne MS, APRN, CNS, CRN  Clinical Nurse Specialist  Interventional Radiology  [phone] (voice mail)  [phone] (pager)    CC  Patient Care Team:  Francisco Armijo MD as PCP - General (Family Practice)  Mike Olson MD as Referring Physician (Oncology)  [facility] as Kailey Gatica, RN as BMT Nurse Coordinator (BMT - Adult)  Rosey Bunch MD as BMT Physician (Internal Medicine)  Yolis Win, ADIEL as Registered Nurse (BMT - Adult)

## 2020-09-16 NOTE — LETTER
9/16/2020         RE: Pastor Garibay  [address]  [place] [address]        Dear Colleague,    Thank you for referring your patient, Pastor Garibay, to the [facility] BLOOD AND MARROW TRANSPLANT. Please see a copy of my visit note below.    CLINICAL SOCIAL WORK   PSYCHOSOCIAL ASSESSMENT  BLOOD AND MARROW TRANSPLANT SERVICE      Assessment completed on September 16, 2020 of living situation, support system, financial status, functional status, coping, stressors, need for resources and social work intervention provided as needed.  Information for this assessment was provided by Pt and spouse report in addition to medical chart review and consultation with medical team.     Present at assessment: Patient, Pastor Garibay  and Toshia Garibay were present for this assessment conducted by Sugey Roa, BMT .     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  Pt was accompanied to today's visit by his wife Toshia. Today's visit was completed via telephone due to COVID-19 restrictions.     Decision Making:   Self     Health Care Directive:   Copy in Chart     Relationship Status:    to his wife Toshia. They have been together for 45 years.    Special Needs: None identified at this time.     Family/Support System: Pt endorsed a good support system including family and close friends  who will be available to support Pt throughout transplant process.     Spouse: Toshia Garibay    Children: 2 daughters Karen, Shaniqua; 3 son's Mike, Tommie, Evan (Lives in [place])    Grandchildren: 2 - Gaurav (6) and Roger (4)    Parents:     Siblings: 3 brother (2 living)  and 1 sister    Caregiver: SW discussed with pt the caregiver role and expectation at length. Pt is agreeable to having a full time caregiver for a minimum of 30 days until cleared by the BMT physician. Pt's identified caregivers are his wife and children. Pt signed the caregiver contract which will be scanned into the EMR. Caregiver education and resources provided. No caregiver concerns identified. Pt and Pt's Toshia confirmed understanding caregiving requirement, including driving restrictions, as discussed during psychosocial assessment.     Name & Numbers  Toshia Garibay [phone]    Transportation Mode:  Private Car . Pt is aware of driving restrictions post-BMT and the need for the caregiver is to drive until cleared to drive by the  BMT physician. SW provided information on parking info and monthly parking pass options. Pt will utilize the [facility] for transportation to and from the [facility] and BMT Clinic/Hospital.    Insurance:  No Insurance issues identified.  Pt denied specific insurance concerns at this time. SW reiterated information about the BMT Financial  should specific insurance questions arise as Pt moves through transplant process.     Sources of Income:  No income concerns identified  The pt is still working 2 hrs a day and collecting unemployment as well as Toshia collecting a paycheck. Pt denied anticipation of financial hardship related to BMT at this time.  SW encouraged Pt to contact this SW for additional potential resources should financial situation change.     Employment:   Employer: [employer]  Last Day of Work: Currently working 2 hrs a day     Spouse's  "Employment:  Employer:  [employer]  Position: [employer] - currently working from home    Mental Health: No mental health issues identified       PHQ-9 assessment, score was 1 ,which indicates no current signs of depression.  GAD7 assessment, score was 2, which indicates no current signs of anxiety.    Pastor notes that he focuses on keeping a positive mindset to help him get through the tough moments. Pastor does feel that he experienced some depression after his father , but did not need any interventions for this. Otherwise Pastor self reports no other times of anxiety or depression.     We talked about how some patients may see an increase in feelings of anxiety or depression while hospitalized for extended periods along with isolation. Encouraged Pastor to let us know if they are noticing an increase in symptoms. We talked about the variety of modalities available to use as coping mechanisms (including but not limited to guided imagery, relaxation techniques, progressive muscle relaxation, counseling/talk therapy and medication).    Chemical Use: No issues identified. The pt denies the use of tobacco, alcohol, marijuana and other drugs. Based on the information provided, there appear to be no specific risks or concerns identified at this time.     Trauma/Loss/Abuse History: Multiple losses associated with cancer diagnosis and treatment, including health, employment, changes to physical appearance, etc.     Spirituality:  Patient identifies with bulmaro community. Not practicing, does identify as Taoist. Pt's wife Toshia is also Taoist and attends Religion virtually with her mother each week.    Coping: Pt noted that he is currently feeling \"ready to begin and excited\".  Pt shared that his main coping mechanisms are talking with his family.  Pt noted that he also manuel by being outdoors and swimming with his grandkids. SW and Pt discussed additional positive coping mechanisms that Pt can utilize while " "in the hospital.     Caregiver Coping: Pt's wife noted that she is feeling \"anxious, but ready for this to start\" at this time.  Toshia noted that she manuel by medication (started on a \"blue pill\"), running, yoga, going on bike rides and Muslim.     Toshia did share that she was brought to the ER one day because she was having lots of chest pain. She was diagnosed with anxiety and although she feels much better now and has found ways to cope better, she is very worried and nervous at times.     Education Provided: Transplant process expectations, Caregiver requirements, Caregiver self-care, Financial issues related to transplant, Financial resources/grants available, Common psychosocial stressors pre/post transplant, Support group(s) available, Tour/layout of the inpatient unit/non-use of cell phones, Hospital resources available, Web site information, Resources for transplant patients and their families as well as the Clinical Social Work role.     Interventions Provided: Supportive counseling and education     Recreation/Leisure Activities:  Golf, swimming, and hang with family    Plans for Hospital Stay Leisure:  Book, cribbage, tablet, and walk the halls    Assessment and Recommendations for Team:  Pt is a 70 year old male diagnosed with NHL who is here undergoing preparation for a planned Yescarta infusion.    Pt is a pleasant and well articulate male who feels comfortable communicating with the medical team. Pt has a good support team who are involved.     Pt may benefit from ongoing psychosocial support in regards to coping with the adjustment to the BMT process. Pt's family may benefit from ongoing psychosocial support in regards to coping with the adjustment to the BMT process and may also benefit from attending the BMT Caregiver Support Group that meets weekly on the inpatient unit.     Pt has a good support system and a good caregiver plan. Pt verbalizes understanding of the transplant process and wanting " to proceed. SW provided contact information and encouraged Pt to contact SW with questions, concerns, resources and for support. Per this assessment, I did not identify any barriers to this patient moving forward with transplant      Important Information:   - Pt may be interested in exercise equipment  -Pt's daughter will be his visitor for this stay.      Follow up Planned:   Psychosocial support  Resources for children  Support group information    ALEXA Hobson, LNAEY  [facility]  Pager: [phone]  Phone: [phone]                Again, thank you for allowing me to participate in the care of your patient.        Sincerely,        LANEY Marmolejo

## 2020-09-16 NOTE — PROGRESS NOTES
Pharmacy Assessment - Pre-Stem Cell Transplant    Assessments & Recommendations:  1) Interaction between levofloxacin and metformin may increase the hypoglycemia effects of metformin. Consider changing prophylaxis to cefpodoxime   2) Hold metformin while IP to avoid Lactic acidosis and increased GI toxicity post LD chemotherapy.     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  He has been treated with RCHOP x 6 with 2 yrs maintenance Rituxan, then Bendamustine/rituxan x 6, then R-ICE , R-DHAP.  He is now being work up for Car-T cellular therapy (Yescarta)  on protocol JT5696-45, which utilizes Cyclophosphamide/Fludarabine as a conditioning lymphodepletion regimen.    Pertinent labs/tests:  Viral Testing:  CMV(+) / HSV(-/-) / EBV(-) / VZV (+)  Ejection Fraction: 60-65% (8/24)  QTc: 437msec (8/17)    Weights:   Wt Readings from Last 3 Encounters:   09/15/20 90.7 kg (200 lb)   09/08/20 91.6 kg (202 lb)   08/31/20 89.1 kg (196 lb 6.9 oz)   Ideal body weight: 70.1 kg (154 lb 9.6 oz)  Adjusted ideal body weight: 78.4 kg (172 lb 12.1 oz)  % IBW:  129  There is no height or weight on file to calculate BMI.    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     Liver disease      Lymphoma (H)      Non-Hodgkin lymphoma (H)      Pedal edema     CHRONIC     Pleural effusion      Seborrheic keratosis, inflamed      Sleep apnea     Uses a CPAP     Thrombocytopenia (H) 7/20/2020     Thrombosis Felbruary 2018       Medication Allergies:  No Known Allergies    Current Medications (pre-admit):  Current Outpatient Medications   Medication  Sig Dispense Refill     allopurinol (ZYLOPRIM) 300 MG tablet Take 300 mg by mouth daily       fenofibrate (TRICOR) 145 MG tablet Take 145 mg by mouth daily       hydrocortisone (CORTEF) 10 MG tablet Take 20 mg by mouth daily before breakfast        insulin aspart (NOVOLOG FLEXPEN) 100 UNIT/ML pen Inject Subcutaneous 3 times daily (with meals) Patient uses sliding scale based on Carbs and Blood Glucose. Has been using very little lately due to low readings and low appetite.       insulin glargine (LANTUS VIAL) 100 UNIT/ML vial Inject 15 Units Subcutaneous At Bedtime       metFORMIN (GLUCOPHAGE) 500 MG tablet Take 1,000 mg by mouth daily (with breakfast)       metFORMIN (GLUCOPHAGE) 500 MG tablet Take 500 mg by mouth daily (with dinner)       potassium chloride ER (KLOR-CON M) 20 MEQ CR tablet Take 20 mEq by mouth every evening       potassium chloride ER (KLOR-CON M) 20 MEQ CR tablet Take 40 mEq by mouth every morning        prochlorperazine (COMPAZINE) 5 MG tablet Take 1-2 tablets (5-10 mg) by mouth every 6 hours as needed (Breakthrough Nausea / Vomiting) 30 tablet 0     rosuvastatin (CRESTOR) 10 MG tablet Take 5 mg by mouth daily       study - simvastatin, IDS# 5641, 40 MG tablet Take 1 tablet (40 mg) by mouth daily Start at least 5 days prior to apheresis and continue until Day +30 after CAR-T infusion. 65 tablet 0       Medication Reconciliation: Not taking crestor as on the simvastatin study.     Herbal Medication/Nutritional Supplements:  No issues    Smoking/Past Drug Use:  No issues     Nausea/Vomiting, Pain, or other issues:  No issues     Summary:  I met with Pastor Garibay via teleconferencing for approximately 30 minutes.  We discussed his current and Car-T cellular therapy medications including the lymphodepletion chemotherapy, antiemetics, prophylactic antibiotics and miscellaneous medications.

## 2020-09-16 NOTE — PROGRESS NOTES
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    Teaching Topic: yescarta     Person(s) involved in teaching: Patient  Motivation Level  Asks Questions: Yes  Eager to Learn: Yes  Cooperative: Yes  Receptive (willing/able to accept information): Yes  Any cultural factors/Yarsanism beliefs that may influence understanding or compliance? No    Patient demonstrates understanding of the following:  - Reason for the appointment, diagnosis and treatment plan: Yes  - Knowledge of proper use of medications and conditions for which they are ordered (with special attention to potential side effects or drug interactions): Yes  - Which situations necessitate calling provider and whom to contact: Yes, Reviewed phone tree and list of sx that should be reported to healthcare provider, both verbalized understanding of instructions.     Teaching concerns addressed: discussed LD chemo and Yescarta infusion schedule. Reviewed possible side effects of both including yescarta neurotoxicity and CRS symptoms. Discussed recovery period and how to protect himself from infections.     Proper use and care of (medical equipment, care aids, etc.) NA  Pain management techniques: Yes  Patient instructed on hand hygiene: Yes  How and/when to access community resources: Yes    Infection Control:  Patient and Family demonstrates understanding of the following:  Surgical procedure site care taught NA  Signs and symptoms of infection taught Yes  Wound care taught NA  Central venous catheter care taught NA    Instructional Materials Used/Given:   CART discharge teaching sheet, chemo med sheets.Yescarta patient wallet card given. Patient instructed to remain within close proximity (at least two hours) for at least four weeks post infusion.  Reviewed COVID  info sheet.     Time spent with patient: 60 minutes.    Specific Concerns: NA

## 2020-09-16 NOTE — LETTER
9/16/2020         RE: Pastor Garibay  [address]  [facility] [address]        Dear Colleague,    Thank you for referring your patient, Pastor Garibay, to the [facility]. Please see a copy of my visit note below.    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    Teaching Topic: yescarta     Person(s) involved in teaching: Patient  Motivation Level  Asks Questions: Yes  Eager to Learn: Yes  Cooperative: Yes  Receptive (willing/able to accept information): Yes  Any cultural factors/Pentecostalism beliefs that may influence understanding or compliance? No    Patient demonstrates understanding of the following:  - Reason for the appointment, diagnosis and treatment plan: Yes  - Knowledge of proper use of medications and conditions for which they are ordered (with special attention to potential side effects or drug interactions): Yes  - Which situations necessitate calling provider and whom to contact: Yes, Reviewed phone tree and list of sx that should be reported to healthcare provider, both verbalized understanding of instructions.     Teaching concerns addressed: discussed LD chemo and Yescarta infusion schedule. Reviewed possible side effects of both including yescarta neurotoxicity and CRS symptoms. Discussed recovery period and how to protect himself from infections.     Proper use and care of (medical equipment, care aids, etc.) NA  Pain management techniques: Yes  Patient instructed on hand hygiene: Yes  How and/when to access community resources: Yes    Infection Control:  Patient and Family demonstrates understanding of the following:  Surgical procedure site care taught NA  Signs and symptoms of infection taught Yes  Wound care taught NA  Central venous catheter care  taught NA    Instructional Materials Used/Given:   CART discharge teaching sheet, chemo med sheets.Yescarta patient wallet card given. Patient instructed to remain within close proximity (at least two hours) for at least four weeks post infusion.  Reviewed COVID info sheet.     Time spent with patient: 60 minutes.    Specific Concerns: NA    Again, thank you for allowing me to participate in the care of your patient.        Sincerely,        BMT Nurse Coordinator

## 2020-09-17 ASSESSMENT — ANXIETY QUESTIONNAIRES: GAD7 TOTAL SCORE: 2

## 2020-09-17 NOTE — PATIENT INSTRUCTIONS
Tunneled Catheter Placement Instructions    1. The BMT clinic will let you know the day of the procedure, where to report to and the time to arrive.    2. No solid foods or milk products for 6 hours prior to the procedure    3. You may have clear liquids up to 2 hours prior to the procedure (water, apple juice, broth, coffee or tea without milk or sugar, jell-o, white grape juice)    4. Anti-bacterial scrub  -Two times the day before the procedure, and once the day of the  procedure  -Think of a big square:  mid chest to ear lobes, both sides of the chest and neck area  -Use a clean washcloth each time.  Wet the washcloth.  Place a capful of the scrub on the wet washcloth and rub it all in, wash the area and leave it on for 5 minutes    -After 5 minutes, rinse off the washcloth, then rinse off the skin and pat the skin dry with a clean towel  -Or if you prefer, you may wash the scrub off in the shower  -No lotion or powders on the neck or chest area the day before or the day of the procedure.  But you may use deodorant.    5. Take only 24 units of Lantus the evening before the procedure  Hold the metformin the morning of the procedure  Bring the novolog alone with you to the procedure  You may take your other medications the morning of the procedure    Nita Marvin, CNS  Interventional Radiology

## 2020-09-17 NOTE — PROGRESS NOTES
Pastor is a 69 yo male who is being evaluated via a billable telephone visit.       Please call patient on Home phone.      The patient has been notified of following:      This telephone visit will be conducted via a call between you and your physician/provider. We have found that certain health care needs can be provided without the need for a physical exam.  This service lets us provide the care you need with a short phone conversation.  If a prescription is necessary we can send it directly to your pharmacy.  If lab work is needed we can place an order for that and you can then stop by our lab to have the test done at a later time.     Telephone visits are billed at different rates depending on your insurance coverage. During this emergency period, for some insurers they may be billed the same as an in-person visit.  Please reach out to your insurance provider with any questions.     If during the course of the call the physician/provider feels a telephone visit is not appropriate, you will not be charged for this service.     Physician has received verbal consent for a Telephone Visit from the patient? Yes     How would you like to obtain your AVS?  E-mail    Call started at  9:00 AM  Call ended at  9:20 AM   ______________________________________________  Interventional Radiology Consult    First Name: Pastor  Age: 70 year old   Referring Physician: Dr. Olsno   REASON FOR REFERRAL: Education and evaluation for tunneled catheter placement  Patient is undergoing w/u for Yescarta treatment    HPI:  This is a patient with relapsed DLBCL (c-MYC and bcl-2+) now in a partial remission following R-DAHP chemotherapy but with a persistent disease in his lung.  T cells collected 8/31/2020.  Due for lymphodepleting chemotherapy 9/23-25/2020, IT dex (MT 2019-35) on 9/28/2020, and admission for Yescarta on 9/29/2020.     LINE HX:  1. 12/5/14-3/14/17:  Right internal jugular vein single lumen port a cath  2.  12/5/17-12/10/18:  Left internal jugular vein single lumen port a cath  3. 6/5/20-present:  Right internal jugular vein single lumen port a cath.  Right chest wall Port-A-Cath catheter terminates at the mid right atrium.    PAST MEDICAL HISTORY:   Past Medical History:   Diagnosis Date     Abnormal liver function test      Anemia      CPAP (continuous positive airway pressure) dependence      Diabetes (H)      Hx of skin cancer, basal cell      Hyperlipidemia      Hypertension      Keratoderma      Large cell lymphoma (H) 7/20/2020     Liver disease      Lymphoma (H)      Non-Hodgkin lymphoma (H)      Pedal edema     CHRONIC     Pleural effusion      Seborrheic keratosis, inflamed      Sleep apnea     Uses a CPAP     Thrombocytopenia (H) 7/20/2020     Thrombosis Felbruary 2018     PAST SURGICAL HISTORY:   Past Surgical History:   Procedure Laterality Date     AMPUTATE TOE(S) Left 5/22/2020    Procedure: LEFT SECOND AND THIRD DISTAL TOE AMPUTATIONS;  Surgeon: Ramon Flores MD;  Location:  OR     AMPUTATE TOE(S) Left 7/1/2020    Procedure: 1.  Partial left second toe amputation with osteotomy through proximal phalanx.  2.  Partial left third toe amputation with osteotomy through proximal phalanx.;  Surgeon: Ismael Humphrey DPM;  Location:  OR     BIOPSY LYMPH NODE CERVICAL N/A 12/5/2014    Procedure: BIOPSY LYMPH NODE CERVICAL;  Surgeon: Robbie Linda MD;  Location:  OR     BRONCHOSCOPY FLEXIBLE N/A 11/14/2017    Procedure: BRONCHOSCOPY FLEXIBLE;  FLEXIBLE BRONCHOSCOPY WITH BIOPSIES.;  Surgeon: Robbie Linda MD;  Location:  OR     COLONOSCOPY       COLONOSCOPY N/A 5/9/2018    Procedure: COMBINED COLONOSCOPY, SINGLE OR MULTIPLE BIOPSY/POLYPECTOMY BY BIOPSY;;  Surgeon: Ramos Bates MD;  Location:  GI     ENDOVASCULAR PLACEMENT VASCULAR DEVICE Left 12/5/2017    Procedure: ENDOVASCULAR PLACEMENT VASCULAR DEVICE;;  Surgeon: Robbie Linda MD;  Location:  SD      ENT SURGERY      tonsillectomy     EXCISE NODE MEDIASTINAL N/A 11/17/2017    Procedure: EXCISE NODE MEDIASTINAL;;  Surgeon: Robbie Linda MD;  Location:  OR     EYE SURGERY       EYE SURGERY Left     vitrectomy     INSERT PORT VASCULAR ACCESS N/A 12/5/2014    Procedure: INSERT PORT VASCULAR ACCESS;  Surgeon: Robbie Linda MD;  Location:  OR     INSERT PORT VASCULAR ACCESS N/A 12/5/2017    Procedure: INSERT PORT VASCULAR ACCESS;  POWER PORT PLACEMENT ATTEMPTED, PLACEMENT OF ANGIO-SEAL VIP VASCULAR CLOSURE DEVICE;  Surgeon: Robbie Linda MD;  Location:  SD     IR CHEST PORT PLACEMENT > 5 YRS OF AGE  6/5/2020     IR PORT REMOVAL RIGHT  12/10/2018     PHACOEMULSIFICATION CLEAR CORNEA WITH STANDARD INTRAOCULAR LENS IMPLANT Right 5/2/2016    Procedure: PHACOEMULSIFICATION CLEAR CORNEA WITH STANDARD INTRAOCULAR LENS IMPLANT;  Surgeon: Rasheed Finney MD;  Location:  EC     REMOVE PORT VASCULAR ACCESS N/A 3/14/2017    Procedure: REMOVE PORT VASCULAR ACCESS;  Surgeon: Robbie Linda MD;  Location:  OR     SOFT TISSUE SURGERY      BASAL CELL CA FOREHEAD     THORACOTOMY Right 11/17/2017    Procedure: THORACOTOMY;  RIGHT EXPLORATORY THORACOTOMY/ EXTENSIVE PNEUMOLYSIS/ BIOPSY OF INTERLOBAR LYMPH NODE;  Surgeon: Robbie Linda MD;  Location:  OR     FAMILY HISTORY: No family history on file.  SOCIAL HISTORY:   Social History     Tobacco Use     Smoking status: Never Smoker     Smokeless tobacco: Never Used   Substance Use Topics     Alcohol use: No     PROBLEM LIST:   Patient Active Problem List    Diagnosis Date Noted     Large cell lymphoma (H) 07/20/2020     Priority: Medium     Thrombocytopenia (H) 07/20/2020     Priority: Medium     Diabetes mellitus, type 2 (H) 07/14/2020     Priority: Medium     Lymphoma (H) 07/08/2020     Priority: Medium     Nonhealing surgical wound 06/17/2020     Priority: Medium     Diffuse large cell lymphoma in remission (H)  2020     Priority: Medium     Lymphoma type: transformed DLBCL  Date of d2020  Subtype: GCB  c-myc status: overexpression   bcl-2 status: overexpression   bcl-6 status:  Stage: IV  Extranodal disease: Yes  Extranodal site: lungs  LDH elevated at dg: Yes  KPS: 80%  IPI: 4  Front-line therapy: R-ICE followed by D-HAP   Response to front-line therapy: stable disease  Salvage therapy: Yescarta        Osteomyelitis of second toe of left foot (H) 05/15/2020     Priority: Medium     Added automatically from request for surgery [id number]       Osteomyelitis of third toe of left foot (H) 05/15/2020     Priority: Medium     Added automatically from request for surgery [id number]       Lymphoma, non-Hodgkin's (H) 2020     Priority: Medium     Skin ulcer of toe of left foot with fat layer exposed (H) 2020     Priority: Medium     Anemia due to antineoplastic chemotherapy 2018     Priority: Medium     Long term current use of anticoagulant therapy 2018     Priority: Medium     Lymphadenopathy, mediastinal 2017     Priority: Medium     Abnormal liver function tests 2017     Priority: Medium     Pain in joint, ankle and foot 2008     Priority: Medium     MEDICATIONS:   Prescription Medications as of 2020       Rx Number Disp Refills Start End Last Dispensed Date Next Fill Date Owning Pharmacy    allopurinol (ZYLOPRIM) 300 MG tablet        [facility] [place] [address]    Sig: Take 300 mg by mouth daily    Class: Historical    Route: Oral    fenofibrate (TRICOR) 145 MG tablet        [facility] [place] [id number]    Sig: Take 145 mg by mouth daily    Class: Historical    Route: Oral    hydrocortisone (CORTEF) 10 MG tablet            Sig: Take 20 mg by mouth daily before breakfast     Class: Historical    Route: Oral    insulin aspart (NOVOLOG FLEXPEN) 100 UNIT/ML pen            Sig: Inject Subcutaneous 3 times daily (with meals)  Patient uses sliding scale based on Carbs and Blood Glucose. Has been using very little lately due to low readings and low appetite.    Class: Historical    Route: Subcutaneous    insulin glargine (LANTUS VIAL) 100 UNIT/ML vial    6/21/2020    [place] [address]    Sig: Inject 15 Units Subcutaneous At Bedtime    Class: No Print Out    Route: Subcutaneous    Renewals     Renewal requests to authorizing provider (Deanna Richards MD) <b>prohibited</b>          metFORMIN (GLUCOPHAGE) 500 MG tablet        [facility] [place] [address]    Sig: Take 1,000 mg by mouth daily (with breakfast)    Class: Historical    Route: Oral    metFORMIN (GLUCOPHAGE) 500 MG tablet        [facility] [place] [facility] [address]    Sig: Take 500 mg by mouth daily (with dinner)    Class: Historical    Route: Oral    potassium chloride ER (KLOR-CON M) 20 MEQ CR tablet        [facility] [place] [address]    Sig: Take 20 mEq by mouth every evening    Class: Historical    Route: Oral    potassium chloride ER (KLOR-CON M) 20 MEQ CR tablet        [facility] [place] [facility] [address] [address]    Sig: Take 40 mEq by mouth every morning     Class: Historical    Route: Oral    prochlorperazine (COMPAZINE) 5 MG tablet  30 tablet 0 6/12/2020    [place] [address]    Sig: Take 1-2 tablets (5-10 mg) by mouth every 6 hours as needed (Breakthrough Nausea / Vomiting)    Class: E-Prescribe    Route: Oral    rosuvastatin (CRESTOR) 10 MG tablet        [facility] [place] [address]    Sig: Take 5 mg by mouth daily    Class: Historical    Route: Oral    study - simvastatin, IDS# 5641, 40 MG tablet  65 tablet 0 8/25/2020    [facility] [address]    Sig: Take 1 tablet (40 mg) by mouth daily Start at least 5 days  prior to apheresis and continue until Day +30 after CAR-T infusion.    Class: E-Prescribe    Route: Oral    Non-formulary Exception Code: Specific indication for non-formulary alternative        ALLERGIES:  No Known Allergies  VITALS: There were no vitals taken for this visit.    ROS:  Skin: negative  Musculoskeletal: negative  Neurologic: negative  Psychiatric: negative    Physical Examination:  Virtual visit  Pleasant, oriented    RESULTS:  BMP RESULTS:  Lab Results   Component Value Date     09/15/2020    POTASSIUM 4.2 09/15/2020    CHLORIDE 103 09/15/2020    CO2 27 09/15/2020    ANIONGAP 6 09/15/2020     (H) 09/15/2020    BUN 12 09/15/2020    CR 0.80 09/15/2020    GFRESTIMATED >90 09/15/2020    GFRESTBLACK >90 09/15/2020    JEFF 9.4 09/15/2020        CBC RESULTS:  Lab Results   Component Value Date    WBC 5.2 09/15/2020    RBC 4.60 09/15/2020    HGB 13.4 09/15/2020    HCT 42.7 09/15/2020    MCV 93 09/15/2020    MCH 29.1 09/15/2020    MCHC 31.4 (L) 09/15/2020    RDW 14.6 09/15/2020     (L) 09/15/2020       INR/PTT:  Lab Results   Component Value Date    INR 1.09 09/15/2020    PTT 63 (H) 09/15/2020     NOTE:  Take only 24 units of Lantus the evening before the procedure  Hold the metformin the morning of the procedure  Bring the novolog alone with you to the procedure  You may take your other medications the morning of the procedure      ASSESSMENT/PLAN:  Type of catheter: 9.5 Fr. Double lumen tunneled Byrd catheter    Preferred Location: Left  Internal Jugular Vein     Platelet count is   Lab Results   Component Value Date     09/15/2020    , and coagulation factors are   Lab Results   Component Value Date    INR 1.09 09/15/2020    ,  Lab Results   Component Value Date    PTT 63 09/15/2020   . The patient is at low risk for bleeding during the procedure.    PROVIDER NOTE:  The tunneled catheter placement procedure and its risks including but not limited to bleeding, infection, fibrin  sheath formation and blood clots was explained to Pastor and his wife, Toshia.    CONSENT: Affirmation of informed written consent was not obtained.     INSTRUCTIONS/GUIDELINES:   Eating and drinking restriction guidelines were reviewed., Anti-bacterial scrub was given and instructions for its use were reviewed to decrease the risk of infection on the day of the procedure., I explained the expected length of time the tunneled catheter could remain in place., I explained that [facility] they [facility] they would be taught about exit site dressing care, flushing of the lumens and how to care for the catheter when bathing., I explained that when the port a cath is now in use that it will need to be flushed once a month., The role of Interventional Radiology was reviewed. and The principles of infection control were reviewed.     Nita Marvin MS, APRN, CNS, CRN  Clinical Nurse Specialist  Interventional Radiology  [phone] (voice mail)  [phone] (pager)    CC  Patient Care Team:  Francisco Armijo MD as PCP - General (Family Practice)  Art Castro MD as Referring Physician (Oncology)  [facility] [address] as Kailey Gatica, RN as BMT Nurse Coordinator (BMT - Adult)  Rosey Bunch MD as BMT Physician (Internal Medicine)  Yolis Win, ADIEL as Registered Nurse (BMT - Adult)  ART CASTRO

## 2020-09-18 ENCOUNTER — MEDICAL CORRESPONDENCE (OUTPATIENT)
Dept: TRANSPLANT | Facility: CLINIC | Age: 70
End: 2020-09-18

## 2020-09-18 ENCOUNTER — OFFICE VISIT (OUTPATIENT)
Dept: TRANSPLANT | Facility: CLINIC | Age: 70
End: 2020-09-18
Payer: COMMERCIAL

## 2020-09-18 VITALS
RESPIRATION RATE: 16 BRPM | WEIGHT: 201.8 LBS | HEART RATE: 94 BPM | SYSTOLIC BLOOD PRESSURE: 101 MMHG | OXYGEN SATURATION: 96 % | BODY MASS INDEX: 29.89 KG/M2 | DIASTOLIC BLOOD PRESSURE: 63 MMHG | HEIGHT: 69 IN

## 2020-09-18 DIAGNOSIS — C83.38 DIFFUSE LARGE B-CELL LYMPHOMA OF LYMPH NODES OF MULTIPLE REGIONS (H): Primary | ICD-10-CM

## 2020-09-18 DIAGNOSIS — C85.80 LARGE CELL LYMPHOMA (H): ICD-10-CM

## 2020-09-18 DIAGNOSIS — C83.398 DIFFUSE LARGE B-CELL LYMPHOMA OF SOLID ORGAN EXCLUDING SPLEEN: ICD-10-CM

## 2020-09-18 DIAGNOSIS — C83.398 DIFFUSE LARGE B-CELL LYMPHOMA OF EXTRANODAL SITE: ICD-10-CM

## 2020-09-18 DIAGNOSIS — C83.3A DIFFUSE LARGE CELL LYMPHOMA IN REMISSION: ICD-10-CM

## 2020-09-18 DIAGNOSIS — Z86.2 PERSONAL HISTORY OF DISEASES OF BLOOD AND BLOOD-FORMING ORGANS: ICD-10-CM

## 2020-09-18 LAB
ALBUMIN SERPL-MCNC: 3.8 G/DL (ref 3.4–5)
ALP SERPL-CCNC: 86 U/L (ref 40–150)
ALT SERPL W P-5'-P-CCNC: 21 U/L (ref 0–70)
ANION GAP SERPL CALCULATED.3IONS-SCNC: 7 MMOL/L (ref 3–14)
AST SERPL W P-5'-P-CCNC: 16 U/L (ref 0–45)
BASOPHILS # BLD AUTO: 0 10E9/L (ref 0–0.2)
BASOPHILS NFR BLD AUTO: 0.4 %
BILIRUB SERPL-MCNC: 0.4 MG/DL (ref 0.2–1.3)
BUN SERPL-MCNC: 10 MG/DL (ref 7–30)
CALCIUM SERPL-MCNC: 9.2 MG/DL (ref 8.5–10.1)
CHLORIDE SERPL-SCNC: 100 MMOL/L (ref 94–109)
CO2 SERPL-SCNC: 30 MMOL/L (ref 20–32)
CREAT SERPL-MCNC: 0.92 MG/DL (ref 0.66–1.25)
DIFFERENTIAL METHOD BLD: NORMAL
EOSINOPHIL # BLD AUTO: 0.1 10E9/L (ref 0–0.7)
EOSINOPHIL NFR BLD AUTO: 1.8 %
ERYTHROCYTE [DISTWIDTH] IN BLOOD BY AUTOMATED COUNT: 14.5 % (ref 10–15)
GFR SERPL CREATININE-BSD FRML MDRD: 85 ML/MIN/{1.73_M2}
GLUCOSE SERPL-MCNC: 256 MG/DL (ref 70–99)
HCT VFR BLD AUTO: 42.5 % (ref 40–53)
HGB BLD-MCNC: 13.4 G/DL (ref 13.3–17.7)
IMM GRANULOCYTES # BLD: 0 10E9/L (ref 0–0.4)
IMM GRANULOCYTES NFR BLD: 0.4 %
LABORATORY COMMENT REPORT: NORMAL
LYMPHOCYTES # BLD AUTO: 1.4 10E9/L (ref 0.8–5.3)
LYMPHOCYTES NFR BLD AUTO: 24.8 %
MCH RBC QN AUTO: 29.1 PG (ref 26.5–33)
MCHC RBC AUTO-ENTMCNC: 31.5 G/DL (ref 31.5–36.5)
MCV RBC AUTO: 92 FL (ref 78–100)
MONOCYTES # BLD AUTO: 0.6 10E9/L (ref 0–1.3)
MONOCYTES NFR BLD AUTO: 10.7 %
NEUTROPHILS # BLD AUTO: 3.4 10E9/L (ref 1.6–8.3)
NEUTROPHILS NFR BLD AUTO: 61.9 %
NRBC # BLD AUTO: 0 10*3/UL
NRBC BLD AUTO-RTO: 0 /100
PLATELET # BLD AUTO: 152 10E9/L (ref 150–450)
POTASSIUM SERPL-SCNC: 5.1 MMOL/L (ref 3.4–5.3)
PROT SERPL-MCNC: 6.8 G/DL (ref 6.8–8.8)
RBC # BLD AUTO: 4.61 10E12/L (ref 4.4–5.9)
SARS-COV-2 RNA SPEC QL NAA+PROBE: NEGATIVE
SARS-COV-2 RNA SPEC QL NAA+PROBE: NORMAL
SODIUM SERPL-SCNC: 137 MMOL/L (ref 133–144)
SPECIMEN SOURCE: NORMAL
SPECIMEN SOURCE: NORMAL
WBC # BLD AUTO: 5.5 10E9/L (ref 4–11)

## 2020-09-18 PROCEDURE — G0463 HOSPITAL OUTPT CLINIC VISIT: HCPCS | Mod: ZF

## 2020-09-18 PROCEDURE — 85025 COMPLETE CBC W/AUTO DIFF WBC: CPT | Performed by: PHYSICIAN ASSISTANT

## 2020-09-18 PROCEDURE — 36415 COLL VENOUS BLD VENIPUNCTURE: CPT | Performed by: PHYSICIAN ASSISTANT

## 2020-09-18 PROCEDURE — 80053 COMPREHEN METABOLIC PANEL: CPT | Performed by: PHYSICIAN ASSISTANT

## 2020-09-18 PROCEDURE — U0003 INFECTIOUS AGENT DETECTION BY NUCLEIC ACID (DNA OR RNA); SEVERE ACUTE RESPIRATORY SYNDROME CORONAVIRUS 2 (SARS-COV-2) (CORONAVIRUS DISEASE [COVID-19]), AMPLIFIED PROBE TECHNIQUE, MAKING USE OF HIGH THROUGHPUT TECHNOLOGIES AS DESCRIBED BY CMS-2020-01-R: HCPCS

## 2020-09-18 RX ORDER — LORAZEPAM 0.5 MG/1
.5-1 TABLET ORAL EVERY 6 HOURS PRN
Status: CANCELLED
Start: 2020-09-23

## 2020-09-18 RX ORDER — ALLOPURINOL 300 MG/1
300 TABLET ORAL DAILY
Status: CANCELLED
Start: 2020-09-23

## 2020-09-18 RX ORDER — DIPHENHYDRAMINE HYDROCHLORIDE 50 MG/ML
50 INJECTION INTRAMUSCULAR; INTRAVENOUS
Status: CANCELLED
Start: 2020-09-23

## 2020-09-18 RX ORDER — SODIUM CHLORIDE 450 MG/100ML
INJECTION, SOLUTION INTRAVENOUS CONTINUOUS
Status: CANCELLED
Start: 2020-09-23

## 2020-09-18 RX ORDER — SODIUM CHLORIDE 9 MG/ML
1000 INJECTION, SOLUTION INTRAVENOUS CONTINUOUS PRN
Status: CANCELLED
Start: 2020-09-23

## 2020-09-18 RX ORDER — MEPERIDINE HYDROCHLORIDE 25 MG/ML
25 INJECTION INTRAMUSCULAR; INTRAVENOUS; SUBCUTANEOUS EVERY 30 MIN PRN
Status: CANCELLED | OUTPATIENT
Start: 2020-09-23

## 2020-09-18 RX ORDER — SODIUM CHLORIDE 450 MG/100ML
INJECTION, SOLUTION INTRAVENOUS CONTINUOUS
Status: CANCELLED
Start: 2020-09-25

## 2020-09-18 RX ORDER — LORAZEPAM 2 MG/ML
.5-1 INJECTION INTRAMUSCULAR EVERY 6 HOURS PRN
Status: CANCELLED
Start: 2020-09-23

## 2020-09-18 RX ORDER — ALBUTEROL SULFATE 0.83 MG/ML
2.5 SOLUTION RESPIRATORY (INHALATION)
Status: CANCELLED | OUTPATIENT
Start: 2020-09-23

## 2020-09-18 RX ORDER — PROCHLORPERAZINE MALEATE 5 MG
5 TABLET ORAL EVERY 6 HOURS PRN
Status: CANCELLED
Start: 2020-09-23

## 2020-09-18 RX ORDER — ALBUTEROL SULFATE 90 UG/1
1-2 AEROSOL, METERED RESPIRATORY (INHALATION)
Status: CANCELLED
Start: 2020-09-23

## 2020-09-18 RX ORDER — ACYCLOVIR 200 MG/1
800 CAPSULE ORAL 2 TIMES DAILY
Status: CANCELLED
Start: 2020-09-23

## 2020-09-18 RX ORDER — DEXAMETHASONE 4 MG/1
12 TABLET ORAL EVERY 24 HOURS
Status: CANCELLED
Start: 2020-09-23

## 2020-09-18 RX ORDER — NALOXONE HYDROCHLORIDE 0.4 MG/ML
.1-.4 INJECTION, SOLUTION INTRAMUSCULAR; INTRAVENOUS; SUBCUTANEOUS
Status: CANCELLED | OUTPATIENT
Start: 2020-09-23

## 2020-09-18 RX ORDER — ALBUTEROL SULFATE 0.83 MG/ML
2.5 SOLUTION RESPIRATORY (INHALATION) ONCE
Status: CANCELLED | OUTPATIENT
Start: 2020-09-23

## 2020-09-18 RX ORDER — EPINEPHRINE 1 MG/ML
0.3 INJECTION, SOLUTION INTRAMUSCULAR; SUBCUTANEOUS EVERY 5 MIN PRN
Status: CANCELLED | OUTPATIENT
Start: 2020-09-23

## 2020-09-18 RX ORDER — LEVOFLOXACIN 250 MG/1
250 TABLET, FILM COATED ORAL
Status: CANCELLED
Start: 2020-09-23

## 2020-09-18 RX ORDER — ONDANSETRON 8 MG/1
16 TABLET, FILM COATED ORAL EVERY 24 HOURS
Status: CANCELLED
Start: 2020-09-23

## 2020-09-18 RX ORDER — FLUCONAZOLE 100 MG/1
100 TABLET ORAL DAILY
Status: CANCELLED
Start: 2020-09-23

## 2020-09-18 RX ORDER — PENTAMIDINE ISETHIONATE 300 MG/300MG
300 INHALANT RESPIRATORY (INHALATION) ONCE
Status: CANCELLED | OUTPATIENT
Start: 2020-09-23

## 2020-09-18 RX ORDER — SODIUM CHLORIDE 450 MG/100ML
INJECTION, SOLUTION INTRAVENOUS CONTINUOUS
Status: CANCELLED
Start: 2020-09-24

## 2020-09-18 RX ORDER — METHYLPREDNISOLONE SODIUM SUCCINATE 125 MG/2ML
125 INJECTION, POWDER, LYOPHILIZED, FOR SOLUTION INTRAMUSCULAR; INTRAVENOUS
Status: CANCELLED
Start: 2020-09-23

## 2020-09-18 ASSESSMENT — PAIN SCALES - GENERAL: PAINLEVEL: NO PAIN (0)

## 2020-09-18 ASSESSMENT — MIFFLIN-ST. JEOR: SCORE: 1671.77

## 2020-09-18 NOTE — LETTER
2020         RE: Pastor Garibay  [address] [facility] [address]        Dear Colleague,    Thank you for referring your patient, Pastor Garibay, to the [facility] BLOOD AND MARROW TRANSPLANT. Please see a copy of my visit note below.      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 T cells collected 2020. Consented for  Yescarta, : IT dex + simvastatin for JUAQUIN prevention.    PMHx:  1. 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 Melanoma  3. DM    Overview:  Lymphoma type: transformed DLBCL  Date of d2020  Subtype: GCB  c-myc status: overexpression   bcl-2 status: overexpression   bcl-6 status:neg  Stage: IVB  Extranodal disease: Yes  Extranodal site: lungs  LDH elevated at dg: Yes  KPS: 80%  IPI: 4  Front-line therapy: R-CHOP  Salvage therapy:  R-ICE followed by D-HAP   Response to   therapy: stable disease inadequate to AHCT    Bone Marrow Workup Results:  Blood Counts Recent Labs   Lab Test 09/15/20  0946   WBC 5.2   ANEU 3.3   ALYM 1.1   CRICKET 0.6   AEOS 0.2   HGB 13.4   HCT 42.7   *      Blood Type Recent Labs   Lab Test 09/15/20  0945   ABO O      Chemistries Recent Labs   Lab Test 09/15/20  0946      POTASSIUM 4.2   CHLORIDE 103   CO2 27   BUN 12   CR 0.80      Liver Tests Recent Labs   Lab Test 09/15/20  0946   BILITOTAL 0.4   ALKPHOS 88   AST 17   ALT 22      PET/CT: IMPRESSION: In this patient with history of follicular lymphoma  transformed to diffuse large B-cell lymphoma currently undergoing  chemotherapy, there is partial response per Lugano criteria:     1. Masslike consolidation in the right lower lobe with central  necrosis, unchanged in size, however diffusely hypoenhancing compared  to previously seen enhancement on 2020.  Combination of findings  favor treated lesion, with residual uptake suggesting inflammation  (such as related to radiotherapy). Lack of significant enhancement  lowers suspicion for residual disease but not fully excluded. Consider  further follow-up with CT chest with contrast.      2. Mildly prominent mediastinal and right hilar lymph nodes  demonstrate uptake below background levels, however appear minimally  larger since 7/20/2020.  By PET criteria (used for DLBCL) this would  be complete response, however by CT criteria (used for follicular  lymphoma) this would technically be progressive disease. Overall these  are indeterminant. Attention on follow-up.     3. Continued decreased size of the bilateral adrenal nodules compared  to 7/20/2020 and 5/14/2020, with no significant FDG uptake, consistent  with treated lesions.     4. Asymmetric skin thickening and mild FDG uptake to the right ear  lobe, in keeping with history of right ear melanoma. Unclear to what  extent these findings represent tumor versus postsurgical changes.   PFTs FVC%  Recent Labs   Lab Test 11/06/17 0900 20003 68       FEV1%  Recent Labs   Lab Test 11/06/17 0900 [id number] 62       DLCO%  Recent Labs   Lab Test 11/06/17 0900 [id number] 65      ECHO or MUGA: Left Ventricle  Global and regional left ventricular function is normal with an EF of 60-65%.  Left ventricular wall thickness cannot evaluate. Left ventricular size is  normal. Left ventricular diastolic function is indeterminate. No regional wall  motion abnormalities are seen.   EKG NSR   Serologies:   Donor Hep B Surf Agn  NR^Nonreactive  Nonreactive        Donor Hep B Core Merry  NR^Nonreactive  Nonreactive       Donor Hepatitis C Merry  NR^Nonreactive  Nonreactive       Donor HIV 1&2 Antibody  NR^Nonreactive  Nonreactive       Donor HTLV 1&2 Antibody  NR^Nonreactive  Nonreactive       Donor Cytomegalovirus Merry  NR^Nonreactive  PositiveAbnormal         Donor Treponema PAL MERRY    Nonreactive       Trypanosoma Cruzi  NR^Nonreactive  Nonreactive         Family History: No family history on file.    Social History:   Social History     Socioeconomic History     Marital status:      Spouse name: Not on file     Number of children: Not on file     Years of education: Not on file     Highest education level: Not on file   Occupational History     Not on file   Social Needs     Financial resource strain: Not on file     Food insecurity     Worry: Not on file     Inability: Not on file     Transportation needs     Medical: Not on file     Non-medical: Not on file   Tobacco Use     Smoking status: Never Smoker     Smokeless tobacco: Never Used   Substance and Sexual Activity     Alcohol use: No     Drug use: No     Sexual activity: Not on file   Lifestyle     Physical activity     Days per week: Not on file     Minutes per session: Not on file     Stress: Not on file   Relationships     Social connections     Talks on phone: Not on file     Gets together: Not on file     Attends Holiness service: Not on file     Active member of club or organization: Not on file     Attends meetings of clubs or organizations: Not on file     Relationship status: Not on file     Intimate partner violence     Fear of current or ex partner: Not on file     Emotionally abused: Not on file     Physically abused: Not on file     Forced sexual activity: Not on file   Other Topics Concern     Parent/sibling w/ CABG, MI or angioplasty before 65F 55M? Not Asked   Social History Narrative     Not on file       Past Medical History:   Past Medical History:   Diagnosis Date     Abnormal liver function test      Anemia      CPAP (continuous positive airway pressure) dependence      Diabetes (H)      Hx of skin cancer, basal cell      Hyperlipidemia      Hypertension      Keratoderma      Large cell lymphoma (H) 7/20/2020     Liver disease      Lymphoma (H)      Non-Hodgkin lymphoma (H)      Pedal edema     CHRONIC      Pleural effusion      Seborrheic keratosis, inflamed      Sleep apnea     Uses a CPAP     Thrombocytopenia (H) 7/20/2020     Thrombosis Felbruary 2018        Past Surgical History:   Past Surgical History:   Procedure Laterality Date     AMPUTATE TOE(S) Left 5/22/2020    Procedure: LEFT SECOND AND THIRD DISTAL TOE AMPUTATIONS;  Surgeon: Ramon Flores MD;  Location:  OR     AMPUTATE TOE(S) Left 7/1/2020    Procedure: 1.  Partial left second toe amputation with osteotomy through proximal phalanx.  2.  Partial left third toe amputation with osteotomy through proximal phalanx.;  Surgeon: Ismael Humphrey DPM;  Location:  OR     BIOPSY LYMPH NODE CERVICAL N/A 12/5/2014    Procedure: BIOPSY LYMPH NODE CERVICAL;  Surgeon: Robbie Linda MD;  Location:  OR     BRONCHOSCOPY FLEXIBLE N/A 11/14/2017    Procedure: BRONCHOSCOPY FLEXIBLE;  FLEXIBLE BRONCHOSCOPY WITH BIOPSIES.;  Surgeon: Robbie Linda MD;  Location:  OR     COLONOSCOPY       COLONOSCOPY N/A 5/9/2018    Procedure: COMBINED COLONOSCOPY, SINGLE OR MULTIPLE BIOPSY/POLYPECTOMY BY BIOPSY;;  Surgeon: Ramos Bates MD;  Location:  GI     ENDOVASCULAR PLACEMENT VASCULAR DEVICE Left 12/5/2017    Procedure: ENDOVASCULAR PLACEMENT VASCULAR DEVICE;;  Surgeon: Robbie Linda MD;  Location: [facility]     ENT SURGERY      tonsillectomy     EXCISE NODE MEDIASTINAL N/A 11/17/2017    Procedure: EXCISE NODE MEDIASTINAL;;  Surgeon: Robbie Linda MD;  Location:  OR     EYE SURGERY       EYE SURGERY Left     vitrectomy     INSERT PORT VASCULAR ACCESS N/A 12/5/2014    Procedure: INSERT PORT VASCULAR ACCESS;  Surgeon: Robbie Linda MD;  Location:  OR     INSERT PORT VASCULAR ACCESS N/A 12/5/2017    Procedure: INSERT PORT VASCULAR ACCESS;  POWER PORT PLACEMENT ATTEMPTED, PLACEMENT OF ANGIO-SEAL VIP VASCULAR CLOSURE DEVICE;  Surgeon: Robbie Linda MD;  Location: [facility]     IR CHEST PORT PLACEMENT > 5  YRS OF AGE  6/5/2020     IR PORT REMOVAL RIGHT  12/10/2018     PHACOEMULSIFICATION CLEAR CORNEA WITH STANDARD INTRAOCULAR LENS IMPLANT Right 5/2/2016    Procedure: PHACOEMULSIFICATION CLEAR CORNEA WITH STANDARD INTRAOCULAR LENS IMPLANT;  Surgeon: Rasheed Finney MD;  Location:  EC     REMOVE PORT VASCULAR ACCESS N/A 3/14/2017    Procedure: REMOVE PORT VASCULAR ACCESS;  Surgeon: Robbie Linda MD;  Location:  OR     SOFT TISSUE SURGERY      BASAL CELL CA FOREHEAD     THORACOTOMY Right 11/17/2017    Procedure: THORACOTOMY;  RIGHT EXPLORATORY THORACOTOMY/ EXTENSIVE PNEUMOLYSIS/ BIOPSY OF INTERLOBAR LYMPH NODE;  Surgeon: Robbie Linda MD;  Location:  OR       Allergies: No Known Allergies    Home Medications      Prior to Admission medications    Medication Sig Start Date End Date Taking? Authorizing Provider   allopurinol (ZYLOPRIM) 300 MG tablet Take 300 mg by mouth daily    Unknown, Entered By History   fenofibrate (TRICOR) 145 MG tablet Take 145 mg by mouth daily    Unknown, Entered By History   hydrocortisone (CORTEF) 10 MG tablet Take 20 mg by mouth daily before breakfast     Reported, Patient   insulin aspart (NOVOLOG FLEXPEN) 100 UNIT/ML pen Inject Subcutaneous 3 times daily (with meals) Patient uses sliding scale based on Carbs and Blood Glucose. Has been using very little lately due to low readings and low appetite.    Reported, Patient   insulin glargine (LANTUS VIAL) 100 UNIT/ML vial Inject 15 Units Subcutaneous At Bedtime 6/21/20   Deanna Richards MD   metFORMIN (GLUCOPHAGE) 500 MG tablet Take 1,000 mg by mouth daily (with breakfast)    Unknown, Entered By History   metFORMIN (GLUCOPHAGE) 500 MG tablet Take 500 mg by mouth daily (with dinner)    Unknown, Entered By History   potassium chloride ER (KLOR-CON M) 20 MEQ CR tablet Take 20 mEq by mouth every evening    Unknown, Entered By History   potassium chloride ER (KLOR-CON M) 20 MEQ CR tablet Take 40 mEq by mouth every  morning     Unknown, Entered By History   prochlorperazine (COMPAZINE) 5 MG tablet Take 1-2 tablets (5-10 mg) by mouth every 6 hours as needed (Breakthrough Nausea / Vomiting) 6/12/20   Temo Eng APRN CNP   rosuvastatin (CRESTOR) 10 MG tablet Take 5 mg by mouth daily    Unknown, Entered By History   study - simvastatin, IDS# [id number], 40 MG tablet Take 1 tablet (40 mg) by mouth daily Start at least 5 days prior to apheresis and continue until Day +30 after CAR-T infusion. 8/25/20   Rosey Bunch MD       Review of Systems    Review of Systems:  CONSTITUTIONAL: NEGATIVE for fever, chills, change in weight  INTEGUMENTARY/SKIN: NEGATIVE for worrisome rashes, moles or lesions  EYES: NEGATIVE for vision changes or irritation  ENT/MOUTH: NEGATIVE for ear, mouth and throat problems  RESP: NEGATIVE for significant cough or SOB  BREAST: NEGATIVE for masses, tenderness or discharge  CV: NEGATIVE for chest pain, palpitations or peripheral edema  GI: NEGATIVE for nausea, abdominal pain, heartburn, or change in bowel habits  : NEGATIVE for frequency, dysuria, or hematuria  MUSCULOSKELETAL: NEGATIVE for significant arthralgias or myalgia  NEURO: NEGATIVE for weakness, dizziness or paresthesias  ENDOCRINE: NEGATIVE for temperature intolerance, skin/hair changes  HEME/ALLERGY: NEGATIVE for bleeding problems  PSYCHIATRIC: NEGATIVE for changes in mood or affect    PHYSICAL EXAM      Weight     Wt Readings from Last 3 Encounters:   09/15/20 90.7 kg (200 lb)   09/08/20 91.6 kg (202 lb)   08/31/20 89.1 kg (196 lb 6.9 oz)          There were no vitals taken for this visit.     General: NAD   Eyes: VANGIE, sclera anicteric   Nose/Mouth/Throat: OP clear, buccal mucosa moist, no ulcerations   Lungs: CTA bilaterally  Cardiovascular: RRR, no M/R/G   Abdominal/Rectal: +BS, soft, NT, ND, No HSM   Lymphatics: No edema  Skin: No rashes or petechaie  Neuro: A&O   Additional Findings: Byrd site NT, no drainage.    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 Due for lymphodepleting chemotherapy 9/23-25/2020, IT dex (MT 2019-35) on 9/28/2020, and admission for Yescarta on 9/29/2020.     BMT and Cell Therapy Informed Consent Discussion  In today's visit, we discussed in detail the research for which Pastor Garibay is eligible. We discussed the potential risks and potential benefits of each protocol individually. We explained potential alternatives to the protocols discussed. We explained to the patient that participation is voluntary and that consent may be withdrawn at any time.     The patient completed the last round of treatment on >1 mo.    HCT-CI score: 4. We counseled the patient about the impact of this on the risk of treatment related and overall mortality. The score fit within treatment protocol eligibility criteria.    Karnofsky performance score: 100     ECOG (required for CAR-T, see KPS to ECOG conversion chart): 0    Active infections:  0 (prior/resolved osteomyelitis).      Reproductive status: What methods of birth control does the patient plan to use during the treatment period beginning with conditioning and ending with the discontinuation of immune suppression (indicate with an X all that apply):  __The patient is confirmed to be sterile or post-menopausal  _X_ Sexual abstinence  __ Condoms  __ Implants  __ Injectables  __ Oral contraceptives  __ Intrauterine devices (IUD)  __ Other (describe)    The patient received appropriate reproductive counseling and agreed with the need for effective contraception during the treatment procedures.      Dental health suitable to proceed: Yes     The patient will receive a signed copy of the consents. The patient had opportunity to ask questions that were answered to the best of my ability and to the patient's apparent  satisfaction.    Temo Pena MD       Again, thank you for allowing me to participate in the care of your patient.        Sincerely,        BMT DOM

## 2020-09-18 NOTE — NURSING NOTE
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    Weight as of this encounter: 91.5 kg (201 lb 12.8 oz). Body surface area is 2.11 meters squared.  No Pain (0) Comment: Data Unavailable   No LMP for male patient.  Allergies reviewed: Yes  Medications reviewed: Yes    Medications: Medication refills not needed today.  Pharmacy name entered into [place]: [id number] [place] [id number]    Clinical concerns: No new concerns today  Dr Paige  was notified.      Demetria Hardy            "

## 2020-09-22 ENCOUNTER — TELEPHONE (OUTPATIENT)
Dept: TRANSPLANT | Facility: CLINIC | Age: 70
End: 2020-09-22

## 2020-09-22 RX ORDER — LORAZEPAM 2 MG/ML
0.5 INJECTION INTRAMUSCULAR EVERY 4 HOURS PRN
Status: CANCELLED
Start: 2020-09-24

## 2020-09-22 RX ORDER — MEPERIDINE HYDROCHLORIDE 25 MG/ML
25 INJECTION INTRAMUSCULAR; INTRAVENOUS; SUBCUTANEOUS EVERY 30 MIN PRN
Status: CANCELLED | OUTPATIENT
Start: 2020-09-24

## 2020-09-22 RX ORDER — LORAZEPAM 2 MG/ML
0.5 INJECTION INTRAMUSCULAR EVERY 4 HOURS PRN
Status: CANCELLED
Start: 2020-09-25

## 2020-09-22 RX ORDER — METHYLPREDNISOLONE SODIUM SUCCINATE 125 MG/2ML
125 INJECTION, POWDER, LYOPHILIZED, FOR SOLUTION INTRAMUSCULAR; INTRAVENOUS
Status: CANCELLED
Start: 2020-09-25

## 2020-09-22 RX ORDER — HEPARIN SODIUM,PORCINE 10 UNIT/ML
5 VIAL (ML) INTRAVENOUS
Status: CANCELLED | OUTPATIENT
Start: 2020-09-25

## 2020-09-22 RX ORDER — HEPARIN SODIUM (PORCINE) LOCK FLUSH IV SOLN 100 UNIT/ML 100 UNIT/ML
5 SOLUTION INTRAVENOUS
Status: CANCELLED | OUTPATIENT
Start: 2020-09-25

## 2020-09-22 RX ORDER — EPINEPHRINE 1 MG/ML
0.3 INJECTION, SOLUTION INTRAMUSCULAR; SUBCUTANEOUS EVERY 5 MIN PRN
Status: CANCELLED | OUTPATIENT
Start: 2020-09-25

## 2020-09-22 RX ORDER — ALBUTEROL SULFATE 0.83 MG/ML
2.5 SOLUTION RESPIRATORY (INHALATION)
Status: CANCELLED | OUTPATIENT
Start: 2020-09-24

## 2020-09-22 RX ORDER — SODIUM CHLORIDE 450 MG/100ML
INJECTION, SOLUTION INTRAVENOUS CONTINUOUS
Status: CANCELLED
Start: 2020-09-24

## 2020-09-22 RX ORDER — METHYLPREDNISOLONE SODIUM SUCCINATE 125 MG/2ML
125 INJECTION, POWDER, LYOPHILIZED, FOR SOLUTION INTRAMUSCULAR; INTRAVENOUS
Status: CANCELLED
Start: 2020-09-24

## 2020-09-22 RX ORDER — SODIUM CHLORIDE 9 MG/ML
1000 INJECTION, SOLUTION INTRAVENOUS CONTINUOUS PRN
Status: CANCELLED
Start: 2020-09-23

## 2020-09-22 RX ORDER — EPINEPHRINE 1 MG/ML
0.3 INJECTION, SOLUTION INTRAMUSCULAR; SUBCUTANEOUS EVERY 5 MIN PRN
Status: CANCELLED | OUTPATIENT
Start: 2020-09-23

## 2020-09-22 RX ORDER — NALOXONE HYDROCHLORIDE 0.4 MG/ML
.1-.4 INJECTION, SOLUTION INTRAMUSCULAR; INTRAVENOUS; SUBCUTANEOUS
Status: CANCELLED | OUTPATIENT
Start: 2020-09-23

## 2020-09-22 RX ORDER — SODIUM CHLORIDE 450 MG/100ML
INJECTION, SOLUTION INTRAVENOUS CONTINUOUS
Status: CANCELLED
Start: 2020-09-23

## 2020-09-22 RX ORDER — NALOXONE HYDROCHLORIDE 0.4 MG/ML
.1-.4 INJECTION, SOLUTION INTRAMUSCULAR; INTRAVENOUS; SUBCUTANEOUS
Status: CANCELLED | OUTPATIENT
Start: 2020-09-24

## 2020-09-22 RX ORDER — SODIUM CHLORIDE 9 MG/ML
1000 INJECTION, SOLUTION INTRAVENOUS CONTINUOUS PRN
Status: CANCELLED
Start: 2020-09-25

## 2020-09-22 RX ORDER — ALBUTEROL SULFATE 90 UG/1
1-2 AEROSOL, METERED RESPIRATORY (INHALATION)
Status: CANCELLED
Start: 2020-09-23

## 2020-09-22 RX ORDER — DIPHENHYDRAMINE HYDROCHLORIDE 50 MG/ML
50 INJECTION INTRAMUSCULAR; INTRAVENOUS
Status: CANCELLED
Start: 2020-09-23

## 2020-09-22 RX ORDER — ALBUTEROL SULFATE 0.83 MG/ML
2.5 SOLUTION RESPIRATORY (INHALATION)
Status: CANCELLED | OUTPATIENT
Start: 2020-09-23

## 2020-09-22 RX ORDER — HEPARIN SODIUM (PORCINE) LOCK FLUSH IV SOLN 100 UNIT/ML 100 UNIT/ML
5 SOLUTION INTRAVENOUS
Status: CANCELLED | OUTPATIENT
Start: 2020-09-24

## 2020-09-22 RX ORDER — MEPERIDINE HYDROCHLORIDE 25 MG/ML
25 INJECTION INTRAMUSCULAR; INTRAVENOUS; SUBCUTANEOUS EVERY 30 MIN PRN
Status: CANCELLED | OUTPATIENT
Start: 2020-09-25

## 2020-09-22 RX ORDER — ALBUTEROL SULFATE 90 UG/1
1-2 AEROSOL, METERED RESPIRATORY (INHALATION)
Status: CANCELLED
Start: 2020-09-24

## 2020-09-22 RX ORDER — HEPARIN SODIUM,PORCINE 10 UNIT/ML
5 VIAL (ML) INTRAVENOUS
Status: CANCELLED | OUTPATIENT
Start: 2020-09-23

## 2020-09-22 RX ORDER — MEPERIDINE HYDROCHLORIDE 25 MG/ML
25 INJECTION INTRAMUSCULAR; INTRAVENOUS; SUBCUTANEOUS EVERY 30 MIN PRN
Status: CANCELLED | OUTPATIENT
Start: 2020-09-23

## 2020-09-22 RX ORDER — METHYLPREDNISOLONE SODIUM SUCCINATE 125 MG/2ML
125 INJECTION, POWDER, LYOPHILIZED, FOR SOLUTION INTRAMUSCULAR; INTRAVENOUS
Status: CANCELLED
Start: 2020-09-23

## 2020-09-22 RX ORDER — DIPHENHYDRAMINE HYDROCHLORIDE 50 MG/ML
50 INJECTION INTRAMUSCULAR; INTRAVENOUS
Status: CANCELLED
Start: 2020-09-24

## 2020-09-22 RX ORDER — SODIUM CHLORIDE 9 MG/ML
1000 INJECTION, SOLUTION INTRAVENOUS CONTINUOUS PRN
Status: CANCELLED
Start: 2020-09-24

## 2020-09-22 RX ORDER — DIPHENHYDRAMINE HYDROCHLORIDE 50 MG/ML
50 INJECTION INTRAMUSCULAR; INTRAVENOUS
Status: CANCELLED
Start: 2020-09-25

## 2020-09-22 RX ORDER — HEPARIN SODIUM (PORCINE) LOCK FLUSH IV SOLN 100 UNIT/ML 100 UNIT/ML
5 SOLUTION INTRAVENOUS
Status: CANCELLED | OUTPATIENT
Start: 2020-09-23

## 2020-09-22 RX ORDER — ALBUTEROL SULFATE 0.83 MG/ML
2.5 SOLUTION RESPIRATORY (INHALATION)
Status: CANCELLED | OUTPATIENT
Start: 2020-09-25

## 2020-09-22 RX ORDER — LORAZEPAM 2 MG/ML
0.5 INJECTION INTRAMUSCULAR EVERY 4 HOURS PRN
Status: CANCELLED
Start: 2020-09-23

## 2020-09-22 RX ORDER — SODIUM CHLORIDE 450 MG/100ML
INJECTION, SOLUTION INTRAVENOUS CONTINUOUS
Status: CANCELLED
Start: 2020-09-25

## 2020-09-22 RX ORDER — HEPARIN SODIUM,PORCINE 10 UNIT/ML
5 VIAL (ML) INTRAVENOUS
Status: CANCELLED | OUTPATIENT
Start: 2020-09-24

## 2020-09-22 RX ORDER — EPINEPHRINE 1 MG/ML
0.3 INJECTION, SOLUTION INTRAMUSCULAR; SUBCUTANEOUS EVERY 5 MIN PRN
Status: CANCELLED | OUTPATIENT
Start: 2020-09-24

## 2020-09-22 RX ORDER — NALOXONE HYDROCHLORIDE 0.4 MG/ML
.1-.4 INJECTION, SOLUTION INTRAMUSCULAR; INTRAVENOUS; SUBCUTANEOUS
Status: CANCELLED | OUTPATIENT
Start: 2020-09-25

## 2020-09-22 RX ORDER — ALBUTEROL SULFATE 90 UG/1
1-2 AEROSOL, METERED RESPIRATORY (INHALATION)
Status: CANCELLED
Start: 2020-09-25

## 2020-09-22 NOTE — PROGRESS NOTES
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    CAR-T cell product:  Yescarta (axicabtagene ciloleucel)  Anticipated date of infusion:  9/28/20    CAR-T cell therapy is associated with a high incidence of cytokine release syndrome (CRS), which can be very severe.  Tocilizumab is an IL-6 receptor antagonist that is commonly utilized to treat CRS.  I have verified that a minimum of 2 patient specific doses are on site in the inpatient pharmacy and available for use for Pastor Garibay for the treatment of CAR-T cell associated CRS.  If a dose of tocilizumab is needed in the outpatient clinic, it is available as a standard stock item.    Patient Weight: 91.5kg  Tocilizumab dose (8 mg/kg): 732mg , round up to 800mg based on vial sizes  Tocilizumab supply:  use commercially available supply    Tocilizumab should be ordered by a provider that is familiar with the treatment of CAR-T cell therapy associated CRS.  Appropriate treatment guidelines should be followed to ensure appropriate treatment of CRS.    Pharmacy will continue to monitor the use of tocilizumab for this patient and procure additional supply as needed.    Thank you,  Curtis León, [facility], PharmD

## 2020-09-22 NOTE — NURSING NOTE
Chris León, PharmD, notified of need for Toci availability for this pt who is starting Flu/Cy 09/23.  Chris verbalized acknowledgement of this.

## 2020-09-23 ENCOUNTER — APPOINTMENT (OUTPATIENT)
Dept: LAB | Facility: CLINIC | Age: 70
End: 2020-09-23
Payer: COMMERCIAL

## 2020-09-23 ENCOUNTER — ONCOLOGY VISIT (OUTPATIENT)
Dept: TRANSPLANT | Facility: CLINIC | Age: 70
End: 2020-09-23
Attending: PHYSICIAN ASSISTANT
Payer: COMMERCIAL

## 2020-09-23 ENCOUNTER — INFUSION THERAPY VISIT (OUTPATIENT)
Dept: TRANSPLANT | Facility: CLINIC | Age: 70
End: 2020-09-23
Payer: COMMERCIAL

## 2020-09-23 VITALS
TEMPERATURE: 97.7 F | HEART RATE: 89 BPM | SYSTOLIC BLOOD PRESSURE: 114 MMHG | RESPIRATION RATE: 16 BRPM | DIASTOLIC BLOOD PRESSURE: 61 MMHG | OXYGEN SATURATION: 94 %

## 2020-09-23 VITALS
SYSTOLIC BLOOD PRESSURE: 116 MMHG | WEIGHT: 202.2 LBS | DIASTOLIC BLOOD PRESSURE: 67 MMHG | BODY MASS INDEX: 30.08 KG/M2 | RESPIRATION RATE: 18 BRPM | TEMPERATURE: 97.7 F | OXYGEN SATURATION: 93 % | HEART RATE: 84 BPM

## 2020-09-23 DIAGNOSIS — C85.80 LARGE CELL LYMPHOMA (H): Primary | ICD-10-CM

## 2020-09-23 DIAGNOSIS — C83.3A DIFFUSE LARGE CELL LYMPHOMA IN REMISSION: ICD-10-CM

## 2020-09-23 DIAGNOSIS — C83.398 DIFFUSE LARGE B-CELL LYMPHOMA OF SOLID ORGAN EXCLUDING SPLEEN: ICD-10-CM

## 2020-09-23 LAB
ALBUMIN SERPL-MCNC: 3.5 G/DL (ref 3.4–5)
ALP SERPL-CCNC: 82 U/L (ref 40–150)
ALT SERPL W P-5'-P-CCNC: 17 U/L (ref 0–70)
ANION GAP SERPL CALCULATED.3IONS-SCNC: 6 MMOL/L (ref 3–14)
AST SERPL W P-5'-P-CCNC: 12 U/L (ref 0–45)
BASOPHILS # BLD AUTO: 0 10E9/L (ref 0–0.2)
BASOPHILS NFR BLD AUTO: 0.5 %
BILIRUB SERPL-MCNC: 0.4 MG/DL (ref 0.2–1.3)
BUN SERPL-MCNC: 10 MG/DL (ref 7–30)
CALCIUM SERPL-MCNC: 9 MG/DL (ref 8.5–10.1)
CHLORIDE SERPL-SCNC: 103 MMOL/L (ref 94–109)
CO2 SERPL-SCNC: 27 MMOL/L (ref 20–32)
CREAT SERPL-MCNC: 0.77 MG/DL (ref 0.66–1.25)
DIFFERENTIAL METHOD BLD: ABNORMAL
EOSINOPHIL # BLD AUTO: 0.1 10E9/L (ref 0–0.7)
EOSINOPHIL NFR BLD AUTO: 3.2 %
ERYTHROCYTE [DISTWIDTH] IN BLOOD BY AUTOMATED COUNT: 14.6 % (ref 10–15)
GFR SERPL CREATININE-BSD FRML MDRD: >90 ML/MIN/{1.73_M2}
GLUCOSE SERPL-MCNC: 304 MG/DL (ref 70–99)
HCT VFR BLD AUTO: 39.9 % (ref 40–53)
HGB BLD-MCNC: 12.6 G/DL (ref 13.3–17.7)
IMM GRANULOCYTES # BLD: 0 10E9/L (ref 0–0.4)
IMM GRANULOCYTES NFR BLD: 0.5 %
LYMPHOCYTES # BLD AUTO: 0.8 10E9/L (ref 0.8–5.3)
LYMPHOCYTES NFR BLD AUTO: 21.8 %
MAGNESIUM SERPL-MCNC: 1.7 MG/DL (ref 1.6–2.3)
MCH RBC QN AUTO: 28.5 PG (ref 26.5–33)
MCHC RBC AUTO-ENTMCNC: 31.6 G/DL (ref 31.5–36.5)
MCV RBC AUTO: 90 FL (ref 78–100)
MONOCYTES # BLD AUTO: 0.5 10E9/L (ref 0–1.3)
MONOCYTES NFR BLD AUTO: 14 %
NEUTROPHILS # BLD AUTO: 2.2 10E9/L (ref 1.6–8.3)
NEUTROPHILS NFR BLD AUTO: 60 %
NRBC # BLD AUTO: 0 10*3/UL
NRBC BLD AUTO-RTO: 0 /100
PHOSPHATE SERPL-MCNC: 3 MG/DL (ref 2.5–4.5)
PLATELET # BLD AUTO: 127 10E9/L (ref 150–450)
POTASSIUM SERPL-SCNC: 4.1 MMOL/L (ref 3.4–5.3)
PROT SERPL-MCNC: 6.4 G/DL (ref 6.8–8.8)
RBC # BLD AUTO: 4.42 10E12/L (ref 4.4–5.9)
SODIUM SERPL-SCNC: 136 MMOL/L (ref 133–144)
URATE SERPL-MCNC: 3.2 MG/DL (ref 3.5–7.2)
WBC # BLD AUTO: 3.7 10E9/L (ref 4–11)

## 2020-09-23 PROCEDURE — 40000268 ZZH STATISTIC NO CHARGES: Mod: ZF

## 2020-09-23 PROCEDURE — G0463 HOSPITAL OUTPT CLINIC VISIT: HCPCS | Mod: 25

## 2020-09-23 PROCEDURE — 83735 ASSAY OF MAGNESIUM: CPT | Performed by: INTERNAL MEDICINE

## 2020-09-23 PROCEDURE — 80053 COMPREHEN METABOLIC PANEL: CPT | Performed by: INTERNAL MEDICINE

## 2020-09-23 PROCEDURE — 84100 ASSAY OF PHOSPHORUS: CPT | Performed by: INTERNAL MEDICINE

## 2020-09-23 PROCEDURE — 96375 TX/PRO/DX INJ NEW DRUG ADDON: CPT

## 2020-09-23 PROCEDURE — 84550 ASSAY OF BLOOD/URIC ACID: CPT | Performed by: INTERNAL MEDICINE

## 2020-09-23 PROCEDURE — 25800030 ZZH RX IP 258 OP 636: Mod: ZF | Performed by: PHYSICIAN ASSISTANT

## 2020-09-23 PROCEDURE — 25000128 H RX IP 250 OP 636: Mod: ZF | Performed by: INTERNAL MEDICINE

## 2020-09-23 PROCEDURE — 25000128 H RX IP 250 OP 636: Mod: ZF | Performed by: PHYSICIAN ASSISTANT

## 2020-09-23 PROCEDURE — 96367 TX/PROPH/DG ADDL SEQ IV INF: CPT

## 2020-09-23 PROCEDURE — 85025 COMPLETE CBC W/AUTO DIFF WBC: CPT | Performed by: INTERNAL MEDICINE

## 2020-09-23 PROCEDURE — 25800029 ZZH RX IP 258 OP 250: Mod: ZF | Performed by: INTERNAL MEDICINE

## 2020-09-23 PROCEDURE — 25000125 ZZHC RX 250: Mod: ZF | Performed by: INTERNAL MEDICINE

## 2020-09-23 PROCEDURE — 96413 CHEMO IV INFUSION 1 HR: CPT

## 2020-09-23 PROCEDURE — 25800030 ZZH RX IP 258 OP 636: Mod: ZF | Performed by: INTERNAL MEDICINE

## 2020-09-23 RX ORDER — HEPARIN SODIUM (PORCINE) LOCK FLUSH IV SOLN 100 UNIT/ML 100 UNIT/ML
5 SOLUTION INTRAVENOUS ONCE
Status: COMPLETED | OUTPATIENT
Start: 2020-09-23 | End: 2020-09-23

## 2020-09-23 RX ORDER — LEVOFLOXACIN 250 MG/1
250 TABLET, FILM COATED ORAL DAILY
Qty: 30 TABLET | Refills: 0 | Status: ON HOLD | OUTPATIENT
Start: 2020-09-23 | End: 2020-09-29

## 2020-09-23 RX ORDER — SODIUM CHLORIDE 450 MG/100ML
INJECTION, SOLUTION INTRAVENOUS CONTINUOUS
Status: DISCONTINUED | OUTPATIENT
Start: 2020-09-23 | End: 2020-09-23 | Stop reason: HOSPADM

## 2020-09-23 RX ORDER — BISACODYL 5 MG/1
5 TABLET, DELAYED RELEASE ORAL DAILY PRN
COMMUNITY
End: 2023-05-18

## 2020-09-23 RX ORDER — FLUCONAZOLE 100 MG/1
100 TABLET ORAL DAILY
Qty: 30 TABLET | Refills: 0 | Status: ON HOLD | OUTPATIENT
Start: 2020-09-23 | End: 2020-09-29

## 2020-09-23 RX ORDER — POTASSIUM CHLORIDE 1500 MG/1
20 TABLET, EXTENDED RELEASE ORAL 2 TIMES DAILY
Status: ON HOLD | COMMUNITY
Start: 2020-09-23 | End: 2020-10-07

## 2020-09-23 RX ORDER — ACYCLOVIR 400 MG/1
800 TABLET ORAL EVERY 12 HOURS
Qty: 60 TABLET | Refills: 0 | Status: ON HOLD | OUTPATIENT
Start: 2020-09-23 | End: 2020-09-29

## 2020-09-23 RX ADMIN — SODIUM CHLORIDE: 4.5 INJECTION, SOLUTION INTRAVENOUS at 09:49

## 2020-09-23 RX ADMIN — DEXAMETHASONE SODIUM PHOSPHATE: 10 INJECTION, SOLUTION INTRAMUSCULAR; INTRAVENOUS at 09:49

## 2020-09-23 RX ADMIN — Medication 5 ML: at 08:17

## 2020-09-23 RX ADMIN — FLUDARABINE PHOSPHATE 63 MG: 25 INJECTION, SOLUTION INTRAVENOUS at 10:32

## 2020-09-23 RX ADMIN — CYCLOPHOSPHAMIDE 1055 MG: 500 INJECTION, POWDER, FOR SOLUTION INTRAVENOUS; ORAL at 11:37

## 2020-09-23 RX ADMIN — PENTAMIDINE ISETHIONATE 300 MG: 300 INJECTION, POWDER, LYOPHILIZED, FOR SOLUTION INTRAMUSCULAR; INTRAVENOUS at 13:34

## 2020-09-23 ASSESSMENT — PAIN SCALES - GENERAL: PAINLEVEL: NO PAIN (0)

## 2020-09-23 NOTE — LETTER
9/23/2020         RE: Pastor Garibay  [address]  [facility] [address]        Dear Colleague,    Thank you for referring your patient, Pastor Garibay, to the [facility] BLOOD AND MARROW TRANSPLANT. Please see a copy of my visit note below.    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    Patient seen by provider today: Yes: Lisseth Hauser   present during visit today: Not Applicable.    Note: Labs were monitored.  No lab parameters for today's treatment.  Patient assessment was completed and unremarkable except: Patient has mild fatigue.  He reports being constipated and is taking Dulcolax.  His last bowel movement was this morning.  Patient has diabetes and adrenal insufficiency.   He states he has had numbness/tingling in bilateral feet in the past, but denies any symptoms today.    Intravenous Access:  Implanted Port.    Treatment Conditions:  Patient received a one hour IV fluid flush and 8 mg IV Zofran and 4 mg Decadron prior to receiving scheduled Fludarabine.  He received scheduled Cytoxan and received a one hour post IV fluid flush.    Patient received scheduled IV pentamidine.  Patient did report nausea with the pentamidine infusion.  He tool a home dose of Zofran.      Post Infusion Assessment:  Patient tolerated infusions without incident.       Discharge Plan:   Patient discharged in stable condition accompanied by: wife.    ANURAG RAUSCH RN                          Again, thank you for allowing me to participate in the care of your patient.        Sincerely,        [facility]

## 2020-09-23 NOTE — NURSING NOTE
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  Hx DLBCL s/p transplant.     Initial Vitals: /67 (BP Location: Right arm, Patient Position: Sitting, Cuff Size: Adult Regular)   Pulse 84   Temp 97.7  F (36.5  C) (Oral)   Resp 18   Wt 91.7 kg (202 lb 3.2 oz)   SpO2 93%   BMI 30.08 kg/m   Estimated body mass index is 30.08 kg/m  as calculated from the following:    Height as of 9/18/20: 1.746 m (5' 8.75\").    Weight as of this encounter: 91.7 kg (202 lb 3.2 oz). Body surface area is 2.11 meters squared.  No Pain (0) Comment: Data Unavailable   No LMP for male patient.  Allergies reviewed: Yes  Medications reviewed: Yes      Pharmacy name entered into [employer]: [facility] [id number] [place] [id number]    Clinical concerns: Patient denies any fevers/N/V/D/respiratory symptoms.  He has been having constipation and has been taking Dulcolax tablets (last bowel movement this morning.)      ANURAG RAUSCH, RN            2  "

## 2020-09-23 NOTE — LETTER
9/23/2020         RE: Pastor Garibay  [address] [facility] [address]        Dear Colleague,    Thank you for referring your patient, Pastor Garibay, to the [facility] BLOOD AND MARROW TRANSPLANT. Please see a copy of my visit note below.    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 T cells collected 8/31/2020. Consented for 2017-45 Yescarta, 2019-35: IT dex + simvastatin for JUAQUIN prevention.  Starting LD chemo today.   INTERVAL  HISTORY   No medical complaints. Ready to start LD chemo. No fevers. No new infectious issues.     Review of Systems: 10 point ROS negative except as noted above.    PHYSICAL EXAM     Weight In/Out     Wt Readings from Last 3 Encounters:   09/18/20 91.5 kg (201 lb 12.8 oz)   09/15/20 90.7 kg (200 lb)   09/08/20 91.6 kg (202 lb)      [unfilled]     /67 (BP Location: Right arm, Patient Position: Sitting, Cuff Size: Adult Regular)   Pulse 84   Temp 97.7  F (36.5  C) (Oral)   Resp 18   Wt 91.7 kg (202 lb 3.2 oz)   SpO2 93%   BMI 30.08 kg/m       General: NAD   Eyes: VANGIE, sclera anicteric   Nose/Mouth/Throat: OP clear, buccal mucosa moist, no ulcerations   Lungs: CTA bilaterally  Cardiovascular: RRR, no M/R/G   Abdominal/Rectal: +obese, soft, NT  Lymphatics: no edema  Skin: no rashes or petechaie  Neuro: A&O   Additional Findings: Port-a-cath  I have assessed all abnormal lab values for their clinical significance and any values considered clinically significant have been addressed in the assessment and plan  OVERALL PLAN    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 T cells collected 8/31/2020. Consented for 2017-45 Yescarta, 2019-35: IT dex +  simvastatin for JUAQUIN prevention.  Starting LD chemo today.     1. DLBCL: Day 1 LD chemo.   - Prep: Cytoxan and Fludarabine 9/23-9/25.   - Covid swab 9/25  - LP with IT dex on 9/28. He is also on simvastatin. I confirmed today he stopped crestor. He can continue tricor.   - 9/29 Yescarta infusion after CVC placement.   - Already on allopurinol.     2. ID:  - Prophy: start lev, fluc, acy, given IV pentamidine 9/23.     3. Endo/DM:   - Stop Metformin. Increased risk of lactic acidosis with chemo and he is starting levaquin.   - Continue sliding scale insulin and lantus 30 in the evening. He is checking BG QID  - He will be getting dex 9/23 and 9/24 (anti-emetic). I decreased it from 12mg to 4mg for poorly controlled DM.     - Adrenal Insufficiency: continue 20mg hydrocortisone. I don't think 2 days of dex will cause an issue once it stops.     4. FEN:  - On 40meq KCL QAM and 20meq QPM. Last visit K at 5.1 and always above 4? He hasn't taken am dose yet today and K 4.1. Will have him cut to 20meq BID and re-eval tomorrow. Will continue to cut if able.      RTC: daily with LD chemo.    Lisseth Hauser

## 2020-09-23 NOTE — PROGRESS NOTES
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 T cells collected 8/31/2020. Consented for 2017-45 Yescarta, 2019-35: IT dex + simvastatin for JUAQUIN prevention.  Starting LD chemo today.   INTERVAL  HISTORY   No medical complaints. Ready to start LD chemo. No fevers. No new infectious issues.     Review of Systems: 10 point ROS negative except as noted above.    PHYSICAL EXAM     Weight In/Out     Wt Readings from Last 3 Encounters:   09/18/20 91.5 kg (201 lb 12.8 oz)   09/15/20 90.7 kg (200 lb)   09/08/20 91.6 kg (202 lb)      [unfilled]     /67 (BP Location: Right arm, Patient Position: Sitting, Cuff Size: Adult Regular)   Pulse 84   Temp 97.7  F (36.5  C) (Oral)   Resp 18   Wt 91.7 kg (202 lb 3.2 oz)   SpO2 93%   BMI 30.08 kg/m       General: NAD   Eyes: VANGIE, sclera anicteric   Nose/Mouth/Throat: OP clear, buccal mucosa moist, no ulcerations   Lungs: CTA bilaterally  Cardiovascular: RRR, no M/R/G   Abdominal/Rectal: +obese, soft, NT  Lymphatics: no edema  Skin: no rashes or petechaie  Neuro: A&O   Additional Findings: Port-a-cath  I have assessed all abnormal lab values for their clinical significance and any values considered clinically significant have been addressed in the assessment and plan  OVERALL PLAN    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 T cells collected 8/31/2020. Consented for 2017-45 Yescarta, 2019-35: IT dex + simvastatin for JUAQUIN prevention.  Starting LD chemo today.     1. DLBCL: Day 1 LD chemo.   - Prep: Cytoxan and Fludarabine 9/23-9/25.   - Covid swab 9/25  - LP with IT dex on 9/28. He is also on simvastatin. I confirmed today he stopped crestor. He can  continue tricor.   - 9/29 Yescarta infusion after CVC placement.   - Already on allopurinol.     2. ID:  - Prophy: start lev, fluc, acy, given IV pentamidine 9/23.     3. Endo/DM:   - Stop Metformin. Increased risk of lactic acidosis with chemo and he is starting levaquin.   - Continue sliding scale insulin and lantus 30 in the evening. He is checking BG QID  - He will be getting dex 9/23 and 9/24 (anti-emetic). I decreased it from 12mg to 4mg for poorly controlled DM.     - Adrenal Insufficiency: continue 20mg hydrocortisone. I don't think 2 days of dex will cause an issue once it stops.     4. FEN:  - On 40meq KCL QAM and 20meq QPM. Last visit K at 5.1 and always above 4? He hasn't taken am dose yet today and K 4.1. Will have him cut to 20meq BID and re-eval tomorrow. Will continue to cut if able.      RTC: daily with LD chemo.    Lisseth Hauser

## 2020-09-23 NOTE — PROGRESS NOTES
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    Patient seen by provider today: Yes: Lisseth Hauser   present during visit today: Not Applicable.    Note: Labs were monitored.  No lab parameters for today's treatment.  Patient assessment was completed and unremarkable except: Patient has mild fatigue.  He reports being constipated and is taking Dulcolax.  His last bowel movement was this morning.  Patient has diabetes and adrenal insufficiency.   He states he has had numbness/tingling in bilateral feet in the past, but denies any symptoms today.    Intravenous Access:  Implanted Port.    Treatment Conditions:  Patient received a one hour IV fluid flush and 8 mg IV Zofran and 4 mg Decadron prior to receiving scheduled Fludarabine.  He received scheduled Cytoxan and received a one hour post IV fluid flush.    Patient received scheduled IV pentamidine.  Patient did report nausea with the pentamidine infusion.  He tool a home dose of Zofran.      Post Infusion Assessment:  Patient tolerated infusions without incident.       Discharge Plan:   Patient discharged in stable condition accompanied by: wife.    ANURAG RAUSCH RN

## 2020-09-24 ENCOUNTER — TELEPHONE (OUTPATIENT)
Dept: TRANSPLANT | Facility: CLINIC | Age: 70
End: 2020-09-24

## 2020-09-24 ENCOUNTER — ONCOLOGY VISIT (OUTPATIENT)
Dept: TRANSPLANT | Facility: CLINIC | Age: 70
End: 2020-09-24
Attending: PHYSICIAN ASSISTANT
Payer: COMMERCIAL

## 2020-09-24 ENCOUNTER — INFUSION THERAPY VISIT (OUTPATIENT)
Dept: TRANSPLANT | Facility: CLINIC | Age: 70
End: 2020-09-24
Payer: COMMERCIAL

## 2020-09-24 VITALS
TEMPERATURE: 98.1 F | OXYGEN SATURATION: 98 % | RESPIRATION RATE: 16 BRPM | BODY MASS INDEX: 29.9 KG/M2 | WEIGHT: 201 LBS | SYSTOLIC BLOOD PRESSURE: 117 MMHG | DIASTOLIC BLOOD PRESSURE: 67 MMHG | HEART RATE: 87 BPM

## 2020-09-24 DIAGNOSIS — C83.3A DIFFUSE LARGE CELL LYMPHOMA IN REMISSION: ICD-10-CM

## 2020-09-24 DIAGNOSIS — C85.80 LARGE CELL LYMPHOMA (H): Primary | ICD-10-CM

## 2020-09-24 DIAGNOSIS — C83.3A DIFFUSE LARGE CELL LYMPHOMA IN REMISSION: Primary | ICD-10-CM

## 2020-09-24 DIAGNOSIS — C83.38 DIFFUSE LARGE B-CELL LYMPHOMA OF LYMPH NODES OF MULTIPLE REGIONS (H): ICD-10-CM

## 2020-09-24 DIAGNOSIS — C83.398 DIFFUSE LARGE B-CELL LYMPHOMA OF SOLID ORGAN EXCLUDING SPLEEN: ICD-10-CM

## 2020-09-24 DIAGNOSIS — C83.38 DIFFUSE LARGE B-CELL LYMPHOMA OF LYMPH NODES OF MULTIPLE REGIONS (H): Primary | ICD-10-CM

## 2020-09-24 LAB
ALBUMIN SERPL-MCNC: 3.6 G/DL (ref 3.4–5)
ALP SERPL-CCNC: 81 U/L (ref 40–150)
ALT SERPL W P-5'-P-CCNC: 18 U/L (ref 0–70)
ANION GAP SERPL CALCULATED.3IONS-SCNC: 6 MMOL/L (ref 3–14)
AST SERPL W P-5'-P-CCNC: 13 U/L (ref 0–45)
BASOPHILS # BLD AUTO: 0 10E9/L (ref 0–0.2)
BASOPHILS NFR BLD AUTO: 0.5 %
BILIRUB SERPL-MCNC: 0.4 MG/DL (ref 0.2–1.3)
BUN SERPL-MCNC: 13 MG/DL (ref 7–30)
CALCIUM SERPL-MCNC: 9.1 MG/DL (ref 8.5–10.1)
CHLORIDE SERPL-SCNC: 101 MMOL/L (ref 94–109)
CO2 SERPL-SCNC: 27 MMOL/L (ref 20–32)
CREAT SERPL-MCNC: 0.89 MG/DL (ref 0.66–1.25)
DIFFERENTIAL METHOD BLD: ABNORMAL
EOSINOPHIL # BLD AUTO: 0.1 10E9/L (ref 0–0.7)
EOSINOPHIL NFR BLD AUTO: 1.6 %
ERYTHROCYTE [DISTWIDTH] IN BLOOD BY AUTOMATED COUNT: 14.4 % (ref 10–15)
GFR SERPL CREATININE-BSD FRML MDRD: 87 ML/MIN/{1.73_M2}
GLUCOSE SERPL-MCNC: 350 MG/DL (ref 70–99)
HCT VFR BLD AUTO: 39.5 % (ref 40–53)
HGB BLD-MCNC: 12.6 G/DL (ref 13.3–17.7)
IMM GRANULOCYTES # BLD: 0 10E9/L (ref 0–0.4)
IMM GRANULOCYTES NFR BLD: 0.2 %
LYMPHOCYTES # BLD AUTO: 0.5 10E9/L (ref 0.8–5.3)
LYMPHOCYTES NFR BLD AUTO: 12.2 %
MAGNESIUM SERPL-MCNC: 1.8 MG/DL (ref 1.6–2.3)
MCH RBC QN AUTO: 28.8 PG (ref 26.5–33)
MCHC RBC AUTO-ENTMCNC: 31.9 G/DL (ref 31.5–36.5)
MCV RBC AUTO: 90 FL (ref 78–100)
MONOCYTES # BLD AUTO: 0.4 10E9/L (ref 0–1.3)
MONOCYTES NFR BLD AUTO: 9.3 %
NEUTROPHILS # BLD AUTO: 3.4 10E9/L (ref 1.6–8.3)
NEUTROPHILS NFR BLD AUTO: 76.2 %
NRBC # BLD AUTO: 0 10*3/UL
NRBC BLD AUTO-RTO: 0 /100
PHOSPHATE SERPL-MCNC: 2.8 MG/DL (ref 2.5–4.5)
PLATELET # BLD AUTO: 124 10E9/L (ref 150–450)
POTASSIUM SERPL-SCNC: 4.1 MMOL/L (ref 3.4–5.3)
PROT SERPL-MCNC: 6.7 G/DL (ref 6.8–8.8)
RBC # BLD AUTO: 4.37 10E12/L (ref 4.4–5.9)
SODIUM SERPL-SCNC: 134 MMOL/L (ref 133–144)
URATE SERPL-MCNC: 3.3 MG/DL (ref 3.5–7.2)
WBC # BLD AUTO: 4.4 10E9/L (ref 4–11)

## 2020-09-24 PROCEDURE — 85025 COMPLETE CBC W/AUTO DIFF WBC: CPT | Performed by: INTERNAL MEDICINE

## 2020-09-24 PROCEDURE — 25000128 H RX IP 250 OP 636: Mod: ZF | Performed by: PHYSICIAN ASSISTANT

## 2020-09-24 PROCEDURE — 83735 ASSAY OF MAGNESIUM: CPT | Performed by: INTERNAL MEDICINE

## 2020-09-24 PROCEDURE — 25800029 ZZH RX IP 258 OP 250: Performed by: INTERNAL MEDICINE

## 2020-09-24 PROCEDURE — 80053 COMPREHEN METABOLIC PANEL: CPT | Performed by: INTERNAL MEDICINE

## 2020-09-24 PROCEDURE — 25800030 ZZH RX IP 258 OP 636: Performed by: PHYSICIAN ASSISTANT

## 2020-09-24 PROCEDURE — 96417 CHEMO IV INFUS EACH ADDL SEQ: CPT

## 2020-09-24 PROCEDURE — 25000128 H RX IP 250 OP 636: Performed by: INTERNAL MEDICINE

## 2020-09-24 PROCEDURE — 84550 ASSAY OF BLOOD/URIC ACID: CPT | Performed by: INTERNAL MEDICINE

## 2020-09-24 PROCEDURE — 84100 ASSAY OF PHOSPHORUS: CPT | Performed by: INTERNAL MEDICINE

## 2020-09-24 PROCEDURE — 25800030 ZZH RX IP 258 OP 636: Performed by: INTERNAL MEDICINE

## 2020-09-24 PROCEDURE — 96413 CHEMO IV INFUSION 1 HR: CPT

## 2020-09-24 PROCEDURE — 96367 TX/PROPH/DG ADDL SEQ IV INF: CPT

## 2020-09-24 RX ORDER — HEPARIN SODIUM (PORCINE) LOCK FLUSH IV SOLN 100 UNIT/ML 100 UNIT/ML
5 SOLUTION INTRAVENOUS EVERY 8 HOURS PRN
Status: DISCONTINUED | OUTPATIENT
Start: 2020-09-24 | End: 2020-09-24 | Stop reason: HOSPADM

## 2020-09-24 RX ORDER — SODIUM CHLORIDE 450 MG/100ML
INJECTION, SOLUTION INTRAVENOUS CONTINUOUS
Status: DISCONTINUED | OUTPATIENT
Start: 2020-09-24 | End: 2020-09-29

## 2020-09-24 RX ADMIN — Medication 5 ML: at 08:21

## 2020-09-24 RX ADMIN — Medication 5 ML: at 13:27

## 2020-09-24 RX ADMIN — DEXAMETHASONE SODIUM PHOSPHATE: 10 INJECTION, SOLUTION INTRAMUSCULAR; INTRAVENOUS at 09:03

## 2020-09-24 RX ADMIN — FLUDARABINE PHOSPHATE 63 MG: 25 INJECTION, SOLUTION INTRAVENOUS at 09:42

## 2020-09-24 RX ADMIN — SODIUM CHLORIDE: 4.5 INJECTION, SOLUTION INTRAVENOUS at 08:52

## 2020-09-24 RX ADMIN — CYCLOPHOSPHAMIDE 1055 MG: 500 INJECTION, POWDER, FOR SOLUTION INTRAVENOUS; ORAL at 11:05

## 2020-09-24 ASSESSMENT — PAIN SCALES - GENERAL: PAINLEVEL: NO PAIN (0)

## 2020-09-24 NOTE — LETTER
9/24/2020         RE: Pastor Garibay  [address]  [facility] [address]        Dear Colleague,    Thank you for referring your patient, Pastor Garibay, to the [facility] BLOOD [facility]ARROW TRANSPLANT. Please see a copy of my visit note below.    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    Patient seen by provider today: Yes: Lisseth Hauser   present during visit today: Not Applicable.    Note: Patient arrives for LD chemo in preparation for Yescarta next week.  Premedicated with 8mg IV zofran and 4mg IV dex.  0.45NS running at 250ml/hr starting one hour pre chemo and completing one hour post cytoxan.  Fludarabine and cytoxan administered each over 1 hour with positive blood return noted before and after.  No adverse reactions noted today.    Intravenous Access:  Implanted Port.    Treatment Conditions:  Lab Results   Component Value Date    HGB 12.6 09/24/2020     Lab Results   Component Value Date    WBC 4.4 09/24/2020      Lab Results   Component Value Date    ANEU 3.4 09/24/2020     Lab Results   Component Value Date     09/24/2020      Results reviewed, labs MET treatment parameters, ok to proceed with treatment.      Post Infusion Assessment:  Patient tolerated infusion without incident.  Blood return noted pre and post infusion.  No evidence of extravasations.  Access discontinued per protocol.       Discharge Plan:   Patient and/or family verbalized understanding of discharge instructions and all questions answered.  Patient discharged in stable condition accompanied by: self.  Departure Mode: Ambulatory.    Vonnie Hernandez RN                        Again, thank you for allowing me to participate in the care of your patient.        Sincerely,        [facility]

## 2020-09-24 NOTE — LETTER
9/24/2020         RE: Pastor Garibay  [address] [place] [facility] [address]        Dear Colleague,    Thank you for referring your patient, Pastor Garibay, to the [facility] BLOOD AND MARROW TRANSPLANT. Please see a copy of my visit note below.    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 T cells collected 8/31/2020. Consented for 2017-45 Yescarta, 2019-35: IT dex + simvastatin for JUAQUIN prevention.  LD chemo today.    INTERVAL  HISTORY   No medical complaints. No fevers. No new infectious issues. He brought insulin and meter as rec today. Took all new meds.      Review of Systems: 10 point ROS negative except as noted above.    PHYSICAL EXAM     Weight In/Out     Wt Readings from Last 3 Encounters:   09/24/20 91.2 kg (201 lb)   09/23/20 91.7 kg (202 lb 3.2 oz)   09/18/20 91.5 kg (201 lb 12.8 oz)      [unfilled]     /67 (BP Location: Right arm, Patient Position: Sitting, Cuff Size: Adult Regular)   Pulse 87   Temp 98.1  F (36.7  C) (Oral)   Resp 16   Wt 91.2 kg (201 lb)   SpO2 98%   BMI 29.90 kg/m       General: NAD   Eyes: VANGIE, sclera anicteric   Nose/Mouth/Throat: OP clear, buccal mucosa moist, no ulcerations   Lungs: CTA bilaterally  Cardiovascular: RRR, no M/R/G   Abdominal/Rectal: +obese, soft, NT  Lymphatics: no edema  Skin: no rashes or petechaie  Neuro: A&O   Additional Findings: Port-a-cath  I have assessed all abnormal lab values for their clinical significance and any values considered clinically significant have been addressed in the assessment and plan  OVERALL PLAN    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 T cells collected 8/31/2020. Consented for  2017-45 Yescarta, 2019-35: IT dex + simvastatin for JUAQUIN prevention.     1. DLBCL: Day 2 LD chemo.   - Prep: Cytoxan and Fludarabine 9/23-9/25.   - Covid swab 9/25  - LP with IT dex on 9/28. He is also on simvastatin. He can continue tricor. Crestor stopped.   - 9/29 Yescarta infusion after CVC placement.   - Already on allopurinol.     2. ID:  - Prophy: started lev, fluc, acy, given IV pentamidine 9/23.     3. Endo/DM: Discussed with CAR-T team in future need to have either endocrine consult or a plan for insulin with their primary endocrinologist for dex days. Need to adjust treatment plan as well for dex dose.   - Stop Metformin. Increased risk of lactic acidosis with chemo and he is starting levaquin.   - Continue sliding scale insulin and lantus 30 in the evening. He is checking BG QID. He has his regular insulin with him today and meter so we can administer as I told him I have no way of getting insulin in clinic so he brought it. He will bring it tomorrow as well. He is administering based off sliding scale and carb counting while here.  - He will be getting dex 9/23 and 9/24 (anti-emetic). I decreased it from 12mg to 4mg for poorly controlled DM. He is holding hydrocort 9/23 and 9/24. He will resume 9/25.   - Adrenal Insufficiency: On 20mg hydrocortisone--holding 9/23 and 9/24. I don't think 2 days of dex will cause an issue once it stops.     4. FEN:  - On KCL 20meq BID. Just cut from 40/20. Continue to cut as able.      RTC: daily with LD chemo. Covid test tomorrow.     Lisseth Hauser      Again, thank you for allowing me to participate in the care of your patient.        Sincerely,        BMT Advanced Practice Provider

## 2020-09-24 NOTE — PROGRESS NOTES
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 T cells collected 8/31/2020. Consented for 2017-45 Yescarta, 2019-35: IT dex + simvastatin for JUAQUIN prevention.  LD chemo today.    INTERVAL  HISTORY   No medical complaints. No fevers. No new infectious issues. He brought insulin and meter as rec today. Took all new meds.      Review of Systems: 10 point ROS negative except as noted above.    PHYSICAL EXAM     Weight In/Out     Wt Readings from Last 3 Encounters:   09/24/20 91.2 kg (201 lb)   09/23/20 91.7 kg (202 lb 3.2 oz)   09/18/20 91.5 kg (201 lb 12.8 oz)      [unfilled]     /67 (BP Location: Right arm, Patient Position: Sitting, Cuff Size: Adult Regular)   Pulse 87   Temp 98.1  F (36.7  C) (Oral)   Resp 16   Wt 91.2 kg (201 lb)   SpO2 98%   BMI 29.90 kg/m       General: NAD   Eyes: VANGIE, sclera anicteric   Nose/Mouth/Throat: OP clear, buccal mucosa moist, no ulcerations   Lungs: CTA bilaterally  Cardiovascular: RRR, no M/R/G   Abdominal/Rectal: +obese, soft, NT  Lymphatics: no edema  Skin: no rashes or petechaie  Neuro: A&O   Additional Findings: Port-a-cath  I have assessed all abnormal lab values for their clinical significance and any values considered clinically significant have been addressed in the assessment and plan  OVERALL PLAN    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 T cells collected 8/31/2020. Consented for 2017-45 Yescarta, 2019-35: IT dex + simvastatin for JUAQUIN prevention.     1. DLBCL: Day 2 LD chemo.   - Prep: Cytoxan and Fludarabine 9/23-9/25.   - Covid swab 9/25  - LP with IT dex on 9/28. He is also on simvastatin. He can continue tricor. Crestor stopped.   -  9/29 Yescarta infusion after CVC placement.   - Already on allopurinol.     2. ID:  - Prophy: started lev, fluc, acy, given IV pentamidine 9/23.     3. Endo/DM: Discussed with CAR-T team in future need to have either endocrine consult or a plan for insulin with their primary endocrinologist for dex days. Need to adjust treatment plan as well for dex dose.   - Stop Metformin. Increased risk of lactic acidosis with chemo and he is starting levaquin.   - Continue sliding scale insulin and lantus 30 in the evening. He is checking BG QID. He has his regular insulin with him today and meter so we can administer as I told him I have no way of getting insulin in clinic so he brought it. He will bring it tomorrow as well. He is administering based off sliding scale and carb counting while here.  - He will be getting dex 9/23 and 9/24 (anti-emetic). I decreased it from 12mg to 4mg for poorly controlled DM. He is holding hydrocort 9/23 and 9/24. He will resume 9/25.   - Adrenal Insufficiency: On 20mg hydrocortisone--holding 9/23 and 9/24. I don't think 2 days of dex will cause an issue once it stops.     4. FEN:  - On KCL 20meq BID. Just cut from 40/20. Continue to cut as able.      RTC: daily with LD chemo. Covid test tomorrow.     Lisseth Hauser

## 2020-09-24 NOTE — TELEPHONE ENCOUNTER
Contacted Pastor to let him know that dr Bunch would like PICC line placed instead of RAC. He verbalized understanding of this plan and will await check in time for line on 9/29.   Addendum: pt will check in to the gold wr for picc line placement followed by admit to BMT after for CART infusinon. He verbalized understanding of plan.

## 2020-09-24 NOTE — PROGRESS NOTES
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    Patient seen by provider today: Yes: Lisseth Hauser   present during visit today: Not Applicable.    Note: Patient arrives for LD chemo in preparation for Yescarta next week.  Premedicated with 8mg IV zofran and 4mg IV dex.  0.45NS running at 250ml/hr starting one hour pre chemo and completing one hour post cytoxan.  Fludarabine and cytoxan administered each over 1 hour with positive blood return noted before and after.  No adverse reactions noted today.    Intravenous Access:  Implanted Port.    Treatment Conditions:  Lab Results   Component Value Date    HGB 12.6 09/24/2020     Lab Results   Component Value Date    WBC 4.4 09/24/2020      Lab Results   Component Value Date    ANEU 3.4 09/24/2020     Lab Results   Component Value Date     09/24/2020      Results reviewed, labs MET treatment parameters, ok to proceed with treatment.      Post Infusion Assessment:  Patient tolerated infusion without incident.  Blood return noted pre and post infusion.  No evidence of extravasations.  Access discontinued per protocol.       Discharge Plan:   Patient and/or family verbalized understanding of discharge instructions and all questions answered.  Patient discharged in stable condition accompanied by: self.  Departure Mode: Ambulatory.    Vonnie Hernandez RN

## 2020-09-24 NOTE — NURSING NOTE
Chief Complaint   Patient presents with     Blood Draw     Labs drawn via PORT by RN in lab. VS taken.      Lisa Kam RN

## 2020-09-25 ENCOUNTER — ONCOLOGY VISIT (OUTPATIENT)
Dept: TRANSPLANT | Facility: CLINIC | Age: 70
End: 2020-09-25
Attending: PHYSICIAN ASSISTANT
Payer: COMMERCIAL

## 2020-09-25 ENCOUNTER — INFUSION THERAPY VISIT (OUTPATIENT)
Dept: TRANSPLANT | Facility: CLINIC | Age: 70
End: 2020-09-25
Payer: COMMERCIAL

## 2020-09-25 VITALS
HEART RATE: 88 BPM | TEMPERATURE: 97.6 F | RESPIRATION RATE: 18 BRPM | DIASTOLIC BLOOD PRESSURE: 62 MMHG | OXYGEN SATURATION: 96 % | SYSTOLIC BLOOD PRESSURE: 103 MMHG | WEIGHT: 200 LBS | BODY MASS INDEX: 29.75 KG/M2

## 2020-09-25 DIAGNOSIS — C85.90 NON-HODGKIN'S LYMPHOMA, UNSPECIFIED BODY REGION, UNSPECIFIED NON-HODGKIN LYMPHOMA TYPE (H): Primary | ICD-10-CM

## 2020-09-25 DIAGNOSIS — C83.398 DIFFUSE LARGE B-CELL LYMPHOMA OF SOLID ORGAN EXCLUDING SPLEEN: Primary | ICD-10-CM

## 2020-09-25 DIAGNOSIS — Z11.59 ENCOUNTER FOR SCREENING FOR OTHER VIRAL DISEASES: ICD-10-CM

## 2020-09-25 DIAGNOSIS — C85.80 LARGE CELL LYMPHOMA (H): Primary | ICD-10-CM

## 2020-09-25 DIAGNOSIS — C83.398 DIFFUSE LARGE B-CELL LYMPHOMA OF SOLID ORGAN EXCLUDING SPLEEN: ICD-10-CM

## 2020-09-25 DIAGNOSIS — C83.3A DIFFUSE LARGE CELL LYMPHOMA IN REMISSION: ICD-10-CM

## 2020-09-25 LAB
ALBUMIN SERPL-MCNC: 3.6 G/DL (ref 3.4–5)
ALP SERPL-CCNC: 78 U/L (ref 40–150)
ALT SERPL W P-5'-P-CCNC: 20 U/L (ref 0–70)
ANION GAP SERPL CALCULATED.3IONS-SCNC: 7 MMOL/L (ref 3–14)
AST SERPL W P-5'-P-CCNC: 12 U/L (ref 0–45)
BASOPHILS # BLD AUTO: 0 10E9/L (ref 0–0.2)
BASOPHILS NFR BLD AUTO: 0.3 %
BILIRUB SERPL-MCNC: 0.4 MG/DL (ref 0.2–1.3)
BUN SERPL-MCNC: 16 MG/DL (ref 7–30)
CALCIUM SERPL-MCNC: 8.9 MG/DL (ref 8.5–10.1)
CHLORIDE SERPL-SCNC: 104 MMOL/L (ref 94–109)
CO2 SERPL-SCNC: 25 MMOL/L (ref 20–32)
CREAT SERPL-MCNC: 0.84 MG/DL (ref 0.66–1.25)
DIFFERENTIAL METHOD BLD: ABNORMAL
EOSINOPHIL # BLD AUTO: 0.1 10E9/L (ref 0–0.7)
EOSINOPHIL NFR BLD AUTO: 1.8 %
ERYTHROCYTE [DISTWIDTH] IN BLOOD BY AUTOMATED COUNT: 14.6 % (ref 10–15)
GFR SERPL CREATININE-BSD FRML MDRD: 89 ML/MIN/{1.73_M2}
GLUCOSE SERPL-MCNC: 368 MG/DL (ref 70–99)
HCT VFR BLD AUTO: 39.1 % (ref 40–53)
HGB BLD-MCNC: 12.5 G/DL (ref 13.3–17.7)
IMM GRANULOCYTES # BLD: 0 10E9/L (ref 0–0.4)
IMM GRANULOCYTES NFR BLD: 0.5 %
LYMPHOCYTES # BLD AUTO: 0.1 10E9/L (ref 0.8–5.3)
LYMPHOCYTES NFR BLD AUTO: 2.3 %
MAGNESIUM SERPL-MCNC: 1.8 MG/DL (ref 1.6–2.3)
MCH RBC QN AUTO: 28.7 PG (ref 26.5–33)
MCHC RBC AUTO-ENTMCNC: 32 G/DL (ref 31.5–36.5)
MCV RBC AUTO: 90 FL (ref 78–100)
MONOCYTES # BLD AUTO: 0.2 10E9/L (ref 0–1.3)
MONOCYTES NFR BLD AUTO: 5.7 %
NEUTROPHILS # BLD AUTO: 3.4 10E9/L (ref 1.6–8.3)
NEUTROPHILS NFR BLD AUTO: 89.4 %
NRBC # BLD AUTO: 0 10*3/UL
NRBC BLD AUTO-RTO: 0 /100
PHOSPHATE SERPL-MCNC: 3.7 MG/DL (ref 2.5–4.5)
PLATELET # BLD AUTO: 122 10E9/L (ref 150–450)
POTASSIUM SERPL-SCNC: 3.9 MMOL/L (ref 3.4–5.3)
PROT SERPL-MCNC: 6.6 G/DL (ref 6.8–8.8)
RBC # BLD AUTO: 4.36 10E12/L (ref 4.4–5.9)
SODIUM SERPL-SCNC: 136 MMOL/L (ref 133–144)
URATE SERPL-MCNC: 4.2 MG/DL (ref 3.5–7.2)
WBC # BLD AUTO: 3.9 10E9/L (ref 4–11)

## 2020-09-25 PROCEDURE — 80053 COMPREHEN METABOLIC PANEL: CPT | Performed by: INTERNAL MEDICINE

## 2020-09-25 PROCEDURE — 85025 COMPLETE CBC W/AUTO DIFF WBC: CPT | Performed by: INTERNAL MEDICINE

## 2020-09-25 PROCEDURE — 96417 CHEMO IV INFUS EACH ADDL SEQ: CPT

## 2020-09-25 PROCEDURE — 25800029 ZZH RX IP 258 OP 250: Mod: ZF | Performed by: INTERNAL MEDICINE

## 2020-09-25 PROCEDURE — 96375 TX/PRO/DX INJ NEW DRUG ADDON: CPT

## 2020-09-25 PROCEDURE — 83735 ASSAY OF MAGNESIUM: CPT | Performed by: INTERNAL MEDICINE

## 2020-09-25 PROCEDURE — 84100 ASSAY OF PHOSPHORUS: CPT | Performed by: INTERNAL MEDICINE

## 2020-09-25 PROCEDURE — 84550 ASSAY OF BLOOD/URIC ACID: CPT | Performed by: INTERNAL MEDICINE

## 2020-09-25 PROCEDURE — 25000128 H RX IP 250 OP 636: Mod: ZF | Performed by: PHYSICIAN ASSISTANT

## 2020-09-25 PROCEDURE — 25000128 H RX IP 250 OP 636: Mod: ZF | Performed by: INTERNAL MEDICINE

## 2020-09-25 PROCEDURE — 25800030 ZZH RX IP 258 OP 636: Mod: ZF | Performed by: INTERNAL MEDICINE

## 2020-09-25 PROCEDURE — 96413 CHEMO IV INFUSION 1 HR: CPT

## 2020-09-25 RX ORDER — HEPARIN SODIUM (PORCINE) LOCK FLUSH IV SOLN 100 UNIT/ML 100 UNIT/ML
5 SOLUTION INTRAVENOUS EVERY 8 HOURS
Status: DISCONTINUED | OUTPATIENT
Start: 2020-09-25 | End: 2020-09-25 | Stop reason: HOSPADM

## 2020-09-25 RX ORDER — SODIUM CHLORIDE 450 MG/100ML
INJECTION, SOLUTION INTRAVENOUS CONTINUOUS
Status: DISCONTINUED | OUTPATIENT
Start: 2020-09-25 | End: 2020-09-25 | Stop reason: HOSPADM

## 2020-09-25 RX ADMIN — CYCLOPHOSPHAMIDE 1055 MG: 500 INJECTION, POWDER, FOR SOLUTION INTRAVENOUS; ORAL at 10:54

## 2020-09-25 RX ADMIN — Medication 5 ML: at 08:17

## 2020-09-25 RX ADMIN — FLUDARABINE PHOSPHATE 63 MG: 25 INJECTION, SOLUTION INTRAVENOUS at 09:28

## 2020-09-25 RX ADMIN — ONDANSETRON 8 MG: 2 INJECTION INTRAMUSCULAR; INTRAVENOUS at 08:45

## 2020-09-25 RX ADMIN — SODIUM CHLORIDE: 4.5 INJECTION, SOLUTION INTRAVENOUS at 08:43

## 2020-09-25 ASSESSMENT — PAIN SCALES - GENERAL: PAINLEVEL: NO PAIN (0)

## 2020-09-25 NOTE — PROGRESS NOTES
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 T cells collected 8/31/2020. Consented for 2017-45 Yescarta, 2019-35: IT dex + simvastatin for JUAQUIN prevention.  LD chemo today.    INTERVAL  HISTORY   No medical complaints. No fevers. No new infectious issues. He brought insulin and meter as rec today. Morning  before eating cereal now in 300's. He is taking 25U regular insulin now.     Review of Systems: 10 point ROS negative except as noted above.    PHYSICAL EXAM     Weight In/Out     Wt Readings from Last 3 Encounters:   09/25/20 90.7 kg (200 lb)   09/24/20 91.2 kg (201 lb)   09/23/20 91.7 kg (202 lb 3.2 oz)      [unfilled]     /62   Pulse 88   Temp 97.6  F (36.4  C)   Resp 18   Wt 90.7 kg (200 lb)   SpO2 96%   BMI 29.75 kg/m       General: NAD   Eyes: VANGIE, sclera anicteric   Lungs: CTA bilaterally  Cardiovascular: RRR, no M/R/G   Abdominal/Rectal: +obese, soft, NT  Lymphatics: no edema  Skin: no rashes or petechaie  MSK: +Missing partial 2 toes L foot  Neuro: A&O   Additional Findings: Port-a-cath  I have assessed all abnormal lab values for their clinical significance and any values considered clinically significant have been addressed in the assessment and plan  OVERALL PLAN    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 T cells collected 8/31/2020. Consented for 2017-45 Yescarta, 2019-35: IT dex + simvastatin for JUAQUIN prevention.     1. DLBCL: Day 3 LD chemo. Tolerating fine.   - Prep: Cytoxan and Fludarabine 9/23-9/25.   - Covid swab 9/25  - LP with IT dex on 9/28. He is also on simvastatin. He can continue tricor. Crestor stopped.   - 9/29 Yescarta infusion after PICC line  placement.   - Already on allopurinol.     2. ID:  - Prophy: started lev, fluc, acy, given IV pentamidine 9/23.     3. Endo/DM: Discussed with CAR-T team in future need to have either endocrine consult or a plan for insulin with their primary endocrinologist for dex days. Need to adjust treatment plan as well for dex dose.   - Stop Metformin. Increased risk of lactic acidosis with chemo and he is starting levaquin.   - Continue sliding scale insulin and lantus 30 in the evening. He is checking BG QID. He has his regular insulin with him today and meter as I told him to do as I can't get insulin her to give him a dose. He is administering based off sliding scale and carb counting while here. Last day of dex today.  - He will be getting dex 9/23 and 9/24 (anti-emetic). I decreased it from 12mg to 4mg for poorly controlled DM. He is holding hydrocort 9/23 and 9/24. He will resume 9/25.   - ENDOCRINE CONSULT ON ADMISSION 9/29.  - Adrenal Insufficiency: On 20mg hydrocortisone--holding 9/23 and 9/24. I don't think 2 days of dex will cause an issue once it stops.     4. FEN:  - On KCL 20meq BID. Just cut from 40/20. Continue to cut as able.      RTC: Monday labs, provider, LP with IT dex.     Lisseth Hauser

## 2020-09-25 NOTE — LETTER
9/25/2020         RE: Pastor Garibay  [address]  [facility] [address]        Dear Colleague,    Thank you for referring your patient, Pastor Garibay, to the [facility] BLOOD AND MARROW TRANSPLANT. Please see a copy of my visit note below.    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    Patient seen by provider today: Yes: Lisseth Hauser   present during visit today: Not Applicable.    Note: Patient arrives for LD chemo in preparation for CART.  Premedicated with 8mg zofran and 0.45NS flush started @ 250ml/hr.  Fludarabine and cytoxan each administered over 1 hour with positive blood return noted before and after infusion.  Flush continued for 1h post chemo.  Tolerated well, patient aware of need for COVID test on his way out today.    Intravenous Access:  Implanted Port.    Treatment Conditions:  Lab Results   Component Value Date    HGB 12.5 09/25/2020     Lab Results   Component Value Date    WBC 3.9 09/25/2020      Lab Results   Component Value Date    ANEU 3.4 09/25/2020     Lab Results   Component Value Date     09/25/2020      Results reviewed, labs MET treatment parameters, ok to proceed with treatment.      Post Infusion Assessment:  Patient tolerated infusion without incident.  Blood return noted pre and post infusion.  No evidence of extravasations.  Access discontinued per protocol.       Discharge Plan:   Patient discharged in stable condition accompanied by: self.  Departure Mode: Ambulatory.    Vonnie Hernandez RN                          Again, thank you for allowing me to participate in the care of your patient.        Sincerely,        [facility]

## 2020-09-25 NOTE — LETTER
9/25/2020         RE: Pastor Garibay  [address]  [place] [address]        Dear Colleague,    Thank you for referring your patient, Pastor Garibay, to the [facility] BLOOD AND MARROW TRANSPLANT. Please see a copy of my visit note below.    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 T cells collected 8/31/2020. Consented for 2017-45 Yescarta, 2019-35: IT dex + simvastatin for JUAQUIN prevention.  LD chemo today.    INTERVAL  HISTORY   No medical complaints. No fevers. No new infectious issues. He brought insulin and meter as rec today. Morning  before eating cereal now in 300's. He is taking 25U regular insulin now.     Review of Systems: 10 point ROS negative except as noted above.    PHYSICAL EXAM     Weight In/Out     Wt Readings from Last 3 Encounters:   09/25/20 90.7 kg (200 lb)   09/24/20 91.2 kg (201 lb)   09/23/20 91.7 kg (202 lb 3.2 oz)      [unfilled]     /62   Pulse 88   Temp 97.6  F (36.4  C)   Resp 18   Wt 90.7 kg (200 lb)   SpO2 96%   BMI 29.75 kg/m       General: NAD   Eyes: VANGIE, sclera anicteric   Lungs: CTA bilaterally  Cardiovascular: RRR, no M/R/G   Abdominal/Rectal: +obese, soft, NT  Lymphatics: no edema  Skin: no rashes or petechaie  MSK: +Missing partial 2 toes L foot  Neuro: A&O   Additional Findings: Port-a-cath  I have assessed all abnormal lab values for their clinical significance and any values considered clinically significant have been addressed in the assessment and plan  OVERALL PLAN    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 T cells collected 8/31/2020. Consented for 2017-45 Yescarta, 2019-35: IT dex + simvastatin for JUAQUIN  prevention.     1. DLBCL: Day 3 LD chemo. Tolerating fine.   - Prep: Cytoxan and Fludarabine 9/23-9/25.   - Covid swab 9/25  - LP with IT dex on 9/28. He is also on simvastatin. He can continue tricor. Crestor stopped.   - 9/29 Yescarta infusion after PICC line placement.   - Already on allopurinol.     2. ID:  - Prophy: started lev, fluc, acy, given IV pentamidine 9/23.     3. Endo/DM: Discussed with CAR-T team in future need to have either endocrine consult or a plan for insulin with their primary endocrinologist for dex days. Need to adjust treatment plan as well for dex dose.   - Stop Metformin. Increased risk of lactic acidosis with chemo and he is starting levaquin.   - Continue sliding scale insulin and lantus 30 in the evening. He is checking BG QID. He has his regular insulin with him today and meter as I told him to do as I can't get insulin her to give him a dose. He is administering based off sliding scale and carb counting while here. Last day of dex today.  - He will be getting dex 9/23 and 9/24 (anti-emetic). I decreased it from 12mg to 4mg for poorly controlled DM. He is holding hydrocort 9/23 and 9/24. He will resume 9/25.   - ENDOCRINE CONSULT ON ADMISSION 9/29.  - Adrenal Insufficiency: On 20mg hydrocortisone--holding 9/23 and 9/24. I don't think 2 days of dex will cause an issue once it stops.     4. FEN:  - On KCL 20meq BID. Just cut from 40/20. Continue to cut as able.      RTC: Monday labs, provider, LP with IT dex.     Lisseth Hauser      Again, thank you for allowing me to participate in the care of your patient.        Sincerely,        BMT Advanced Practice Provider

## 2020-09-25 NOTE — NURSING NOTE
Chief Complaint   Patient presents with     Port Draw     power needle. heparin locked, vitals checked     Jeanie Parks RN on 9/25/2020 at 8:20 AM

## 2020-09-25 NOTE — PROGRESS NOTES
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    Patient seen by provider today: Yes: Lisseth Hauser   present during visit today: Not Applicable.    Note: Patient arrives for LD chemo in preparation for CART.  Premedicated with 8mg zofran and 0.45NS flush started @ 250ml/hr.  Fludarabine and cytoxan each administered over 1 hour with positive blood return noted before and after infusion.  Flush continued for 1h post chemo.  Tolerated well, patient aware of need for COVID test on his way out today.    Intravenous Access:  Implanted Port.    Treatment Conditions:  Lab Results   Component Value Date    HGB 12.5 09/25/2020     Lab Results   Component Value Date    WBC 3.9 09/25/2020      Lab Results   Component Value Date    ANEU 3.4 09/25/2020     Lab Results   Component Value Date     09/25/2020      Results reviewed, labs MET treatment parameters, ok to proceed with treatment.      Post Infusion Assessment:  Patient tolerated infusion without incident.  Blood return noted pre and post infusion.  No evidence of extravasations.  Access discontinued per protocol.       Discharge Plan:   Patient discharged in stable condition accompanied by: self.  Departure Mode: Ambulatory.    Vonnie Hernandez RN

## 2020-09-26 LAB
SARS-COV-2 RNA SPEC QL NAA+PROBE: NOT DETECTED
SPECIMEN SOURCE: NORMAL

## 2020-09-28 ENCOUNTER — ONCOLOGY VISIT (OUTPATIENT)
Dept: TRANSPLANT | Facility: CLINIC | Age: 70
End: 2020-09-28
Attending: PHYSICIAN ASSISTANT
Payer: COMMERCIAL

## 2020-09-28 VITALS
RESPIRATION RATE: 16 BRPM | HEART RATE: 96 BPM | TEMPERATURE: 97.1 F | DIASTOLIC BLOOD PRESSURE: 68 MMHG | BODY MASS INDEX: 28.93 KG/M2 | SYSTOLIC BLOOD PRESSURE: 115 MMHG | OXYGEN SATURATION: 94 % | WEIGHT: 194.5 LBS

## 2020-09-28 DIAGNOSIS — C85.90 NON-HODGKIN'S LYMPHOMA, UNSPECIFIED BODY REGION, UNSPECIFIED NON-HODGKIN LYMPHOMA TYPE (H): ICD-10-CM

## 2020-09-28 DIAGNOSIS — C85.80 LARGE CELL LYMPHOMA (H): Primary | ICD-10-CM

## 2020-09-28 LAB
ALBUMIN SERPL-MCNC: 3.5 G/DL (ref 3.4–5)
ALP SERPL-CCNC: 81 U/L (ref 40–150)
ALT SERPL W P-5'-P-CCNC: 18 U/L (ref 0–70)
ANION GAP SERPL CALCULATED.3IONS-SCNC: 7 MMOL/L (ref 3–14)
AST SERPL W P-5'-P-CCNC: 14 U/L (ref 0–45)
BASOPHILS # BLD AUTO: 0 10E9/L (ref 0–0.2)
BASOPHILS NFR BLD AUTO: 0 %
BILIRUB DIRECT SERPL-MCNC: 0.1 MG/DL (ref 0–0.2)
BILIRUB SERPL-MCNC: 0.4 MG/DL (ref 0.2–1.3)
BUN SERPL-MCNC: 17 MG/DL (ref 7–30)
CALCIUM SERPL-MCNC: 9.6 MG/DL (ref 8.5–10.1)
CHLORIDE SERPL-SCNC: 102 MMOL/L (ref 94–109)
CK SERPL-CCNC: 33 U/L (ref 30–300)
CO2 SERPL-SCNC: 25 MMOL/L (ref 20–32)
CREAT SERPL-MCNC: 0.82 MG/DL (ref 0.66–1.25)
DACRYOCYTES BLD QL SMEAR: SLIGHT
DIFFERENTIAL METHOD BLD: ABNORMAL
EOSINOPHIL # BLD AUTO: 0.1 10E9/L (ref 0–0.7)
EOSINOPHIL NFR BLD AUTO: 3.5 %
ERYTHROCYTE [DISTWIDTH] IN BLOOD BY AUTOMATED COUNT: 14.4 % (ref 10–15)
GFR SERPL CREATININE-BSD FRML MDRD: >90 ML/MIN/{1.73_M2}
GLUCOSE SERPL-MCNC: 306 MG/DL (ref 70–99)
HCT VFR BLD AUTO: 41.3 % (ref 40–53)
HGB BLD-MCNC: 13 G/DL (ref 13.3–17.7)
INR PPP: 1.01 (ref 0.86–1.14)
LYMPHOCYTES # BLD AUTO: 0 10E9/L (ref 0.8–5.3)
LYMPHOCYTES NFR BLD AUTO: 0 %
MCH RBC QN AUTO: 28.3 PG (ref 26.5–33)
MCHC RBC AUTO-ENTMCNC: 31.5 G/DL (ref 31.5–36.5)
MCV RBC AUTO: 90 FL (ref 78–100)
MONOCYTES # BLD AUTO: 0 10E9/L (ref 0–1.3)
MONOCYTES NFR BLD AUTO: 1.7 %
NEUTROPHILS # BLD AUTO: 1.9 10E9/L (ref 1.6–8.3)
NEUTROPHILS NFR BLD AUTO: 94.8 %
PLATELET # BLD AUTO: 72 10E9/L (ref 150–450)
PLATELET # BLD EST: ABNORMAL 10*3/UL
POIKILOCYTOSIS BLD QL SMEAR: SLIGHT
POTASSIUM SERPL-SCNC: 4.4 MMOL/L (ref 3.4–5.3)
PROT SERPL-MCNC: 7 G/DL (ref 6.8–8.8)
RBC # BLD AUTO: 4.6 10E12/L (ref 4.4–5.9)
SODIUM SERPL-SCNC: 134 MMOL/L (ref 133–144)
WBC # BLD AUTO: 2 10E9/L (ref 4–11)

## 2020-09-28 PROCEDURE — 85025 COMPLETE CBC W/AUTO DIFF WBC: CPT | Performed by: PHYSICIAN ASSISTANT

## 2020-09-28 PROCEDURE — 80076 HEPATIC FUNCTION PANEL: CPT | Performed by: PHYSICIAN ASSISTANT

## 2020-09-28 PROCEDURE — G0463 HOSPITAL OUTPT CLINIC VISIT: HCPCS

## 2020-09-28 PROCEDURE — 40000937 ZZHCL STATISTIC RESEARCH KIT COLLECTION: Performed by: PHYSICIAN ASSISTANT

## 2020-09-28 PROCEDURE — 82550 ASSAY OF CK (CPK): CPT | Performed by: PHYSICIAN ASSISTANT

## 2020-09-28 PROCEDURE — 25000128 H RX IP 250 OP 636: Mod: ZF | Performed by: PHYSICIAN ASSISTANT

## 2020-09-28 PROCEDURE — 80048 BASIC METABOLIC PNL TOTAL CA: CPT | Performed by: PHYSICIAN ASSISTANT

## 2020-09-28 PROCEDURE — 36591 DRAW BLOOD OFF VENOUS DEVICE: CPT

## 2020-09-28 PROCEDURE — 85610 PROTHROMBIN TIME: CPT | Performed by: PHYSICIAN ASSISTANT

## 2020-09-28 RX ORDER — HEPARIN SODIUM (PORCINE) LOCK FLUSH IV SOLN 100 UNIT/ML 100 UNIT/ML
5 SOLUTION INTRAVENOUS
Status: COMPLETED | OUTPATIENT
Start: 2020-09-28 | End: 2020-09-28

## 2020-09-28 RX ADMIN — Medication 5 ML: at 10:52

## 2020-09-28 ASSESSMENT — PAIN SCALES - GENERAL: PAINLEVEL: NO PAIN (0)

## 2020-09-28 NOTE — LETTER
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 Please see a copy of my visit note below.    BMT ONC Adult Lumbar Puncture and Intrathecal Dexamethasone Procedure Note    September 28, 2020    Procedure: Lumbar Puncture to obtain CSF and give IT dexamethasone    Diagnosis: DLBCL    Learning needs assessment complete within 12 months: YES     Vitals reviewed:  YES    Informed Consent: Procedure, benefits, risks, and alternatives explained to the patient who voiced understanding of the information and agreed to proceed with lumbar puncture. Risks include bleeding, headache, and or infection.   Patient safety checklist completed.    Labs: Reviewed:   INR   Date Value Ref Range Status   09/28/2020 1.01 0.86 - 1.14 Final      Lab Results   Component Value Date    WBC 2.0 09/28/2020     Lab Results   Component Value Date    RBC 4.60 09/28/2020     Lab Results   Component Value Date    HGB 13.0 09/28/2020     Lab Results   Component Value Date    HCT 41.3 09/28/2020     No components found for: MCT  Lab Results   Component Value Date    MCV 90 09/28/2020     Lab Results   Component Value Date    MCH 28.3 09/28/2020     Lab Results   Component Value Date    MCHC 31.5 09/28/2020     Lab Results   Component Value Date    RDW 14.4 09/28/2020     Lab Results   Component Value Date    PLT 72 09/28/2020         Description:. The patient was seated at the bedside with lumbar chair. The L4-5 disc space (initially, then L3-4) was prepped and draped in a sterile fashion. Local anesthesia with 5mL 1% preservative-free lidocaine was used. A 22 gauge, 4 inch needle was placed several attemps, but unable to obtain CSF despite pt repositioning, needle repositioning.      The needle was removed and a bandages were applied to the site.   Complications: No; pt tolerated well but  unsuccessful in collecting CSF or administering IT dex.     Disposition: LP rescheduled under fluoroscopy 9/29 0900 prior to admission for PICC and [facility]a CAR-T.    Performed by:   RADHA Tidwell PA-C

## 2020-09-28 NOTE — NURSING NOTE
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  vs taken     Bone Marrow Biopsy     pt here for lumbar puncture for non-hodkkins lymphoma     Initial Vitals: /68 (BP Location: Right arm, Patient Position: Sitting, Cuff Size: Adult Regular)   Pulse 96   Temp 97.1  F (36.2  C) (Tympanic)   Resp 16   Wt 88.2 kg (194 lb 8 oz)   SpO2 94%   BMI 28.93 kg/m   Estimated body mass index is 28.93 kg/m  as calculated from the following:    Height as of 9/18/20: 1.746 m (5' 8.75\").    Weight as of this encounter: 88.2 kg (194 lb 8 oz). Body surface area is 2.07 meters squared.  No Pain (0) Comment: Data Unavailable   No LMP for male patient.  Allergies reviewed: Yes  Medications reviewed: Yes    Medications: Medication refills not needed today.  Pharmacy name entered into [employer]:    [id number] [place], [place] - [address] [place] [address] PHARMACY [place], [place] -  [facility] [phone]    Clinical concerns: none      PATRICIA MUELLER RN              "

## 2020-09-28 NOTE — NURSING NOTE
"Chief Complaint   Patient presents with     Port Draw     labs drawn from port by rn.  vs taken     Port accessed with 20 gauge 3/4\" gripper needle and labs (including research labs) drawn by rn.  Port flushed with NS and heparin then de-accessed.  Pt tolerated well.  VS taken.  Pt checked in for next appt.    Louise Medina RN      "

## 2020-09-28 NOTE — LETTER
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 Please see a copy of my visit note below.    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 T cells collected 8/31/2020. Consented for 2017-45 Yescarta, 2019-35: IT dex + simvastatin for JUAQUIN prevention.  Follow-up visit and LP with IT Dex (unable to complete, see separate note), PICC placed.    INTERVAL  HISTORY   Reports some n/v over weekend, improving by yesterday. No diarrhea. No fevers or bleeding. He reports some L shoulder pain Friday in to Sat, now resolved. Unknown etiology. Also some sensitivity to top of L foot (no rash); he's had this before. His daughter is getting checked for Covid d/t decreased taste and smell sensation; he was around her this weekend (with mask). Pt Covid neg 9/25.    Review of Systems: 10 point ROS negative except as noted above.    PHYSICAL EXAM     Blood pressure 115/68, pulse 96, temperature 97.1  F (36.2  C), temperature source Tympanic, resp. rate 16, weight 88.2 kg (194 lb 8 oz), SpO2 94 %.    Wt Readings from Last 4 Encounters:   09/28/20 88.2 kg (194 lb 8 oz)   09/25/20 90.7 kg (200 lb)   09/24/20 91.2 kg (201 lb)   09/23/20 91.7 kg (202 lb 3.2 oz)     General: NAD   Eyes: sclera anicteric   Lungs: CTAB  Cardiovascular: RRR, no M/R/G   Abdominal/Rectal: +bs, soft, NT/ND  Lymphatics: no edema  Skin: no rash; no erythema top of L foot  MSK: +Missing partial 2 toes L foot  Neuro: non-focal   Additional Findings: Port-a-cath R chest not accessed    Labs:  Lab Results   Component Value Date    WBC 2.0 (L) 09/28/2020    ANEU 1.9 09/28/2020    HGB 13.0 (L) 09/28/2020    HCT 41.3 09/28/2020    PLT 72 (L)  09/28/2020     09/28/2020    POTASSIUM 4.4 09/28/2020    CHLORIDE 102 09/28/2020    CO2 25 09/28/2020     (H) 09/28/2020    BUN 17 09/28/2020    CR 0.82 09/28/2020    MAG 1.8 09/25/2020    INR 1.01 09/28/2020    BILITOTAL 0.4 09/28/2020    AST 14 09/28/2020    ALT 18 09/28/2020    ALKPHOS 81 09/28/2020    PROTTOTAL 7.0 09/28/2020    ALBUMIN 3.5 09/28/2020       I have assessed all abnormal lab values for their clinical significance and any values considered clinically significant have been addressed in the assessment and plan  OVERALL PLAN    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 T cells collected 8/31/2020. Consented for 2017-45 Yescarta, 2019-35: IT dex + simvastatin for JUAQUIN prevention.     1. DLBCL:    - Prep: Cytoxan and Fludarabine 9/23-9/25.   - Covid swab 9/25  - LP with IT dex now planned 9/29 under fluoroscopy (unable to get bedside LP 9/28). Cont simvastatin, tricor.   - 9/29 Yescarta infusion after PICC line placement.   - cont allopurinol.     2. ID: Afebrile.  - Prophy levo, fluc, acy, IV pentamidine (9/23).     3. Endo/DM: Discussed with CAR-T team in future need to have either endocrine consult or a plan for insulin with their primary endocrinologist for dex days. Need to adjust treatment plan as well for dex dose.   - holding Metformin. Increased risk of lactic acidosis with chemo and he is starting levaquin.   - Continue sliding scale insulin and lantus 30 in the evening. He is checking BG QID.   - ENDOCRINE CONSULT ON ADMISSION 9/29.  - Adrenal Insufficiency: On 20mg hydrocortisone--held 9/23 and 9/24.     4. FEN: Creat, lytes wnl.  - On KCL 20meq BID.     Final Plan:  Admit tomorrow.  LP w/ IT dex 9/29 under fluoro 0900. Dr. Maakaron will push IT dex (release from Research 1 plan when pt arrives to 5C)- pt needs to be admitted inpatient in  order for LP to happen this way. This likely means his outpt PICC line scheduled at 930 will get canceled so will need to order on-call PICC line for pt upon admission.  PICC line, Yescarta infusion tomorrow as well.  In addition to this visit and attempted LP, additional 60 min spent coordinating LP under fluoroscopy.  Nydia Smyth PA-C  [phone]

## 2020-09-28 NOTE — PROGRESS NOTES
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 T cells collected 8/31/2020. Consented for 2017-45 Yescarta, 2019-35: IT dex + simvastatin for JUAQUIN prevention.  Follow-up visit and LP with IT Dex (unable to complete, see separate note), PICC placed.    INTERVAL  HISTORY   Reports some n/v over weekend, improving by yesterday. No diarrhea. No fevers or bleeding. He reports some L shoulder pain Friday in to Sat, now resolved. Unknown etiology. Also some sensitivity to top of L foot (no rash); he's had this before. His daughter is getting checked for Covid d/t decreased taste and smell sensation; he was around her this weekend (with mask). Pt Covid neg 9/25.    Review of Systems: 10 point ROS negative except as noted above.    PHYSICAL EXAM     Blood pressure 115/68, pulse 96, temperature 97.1  F (36.2  C), temperature source Tympanic, resp. rate 16, weight 88.2 kg (194 lb 8 oz), SpO2 94 %.    Wt Readings from Last 4 Encounters:   09/28/20 88.2 kg (194 lb 8 oz)   09/25/20 90.7 kg (200 lb)   09/24/20 91.2 kg (201 lb)   09/23/20 91.7 kg (202 lb 3.2 oz)     General: NAD   Eyes: sclera anicteric   Lungs: CTAB  Cardiovascular: RRR, no M/R/G   Abdominal/Rectal: +bs, soft, NT/ND  Lymphatics: no edema  Skin: no rash; no erythema top of L foot  MSK: +Missing partial 2 toes L foot  Neuro: non-focal   Additional Findings: Port-a-cath R chest not accessed    Labs:  Lab Results   Component Value Date    WBC 2.0 (L) 09/28/2020    ANEU 1.9 09/28/2020    HGB 13.0 (L) 09/28/2020    HCT 41.3 09/28/2020    PLT 72 (L) 09/28/2020     09/28/2020    POTASSIUM 4.4 09/28/2020    CHLORIDE 102 09/28/2020    CO2 25 09/28/2020     (H) 09/28/2020    BUN 17 09/28/2020    CR 0.82 09/28/2020    MAG 1.8 09/25/2020    INR 1.01 09/28/2020    BILITOTAL 0.4 09/28/2020    AST  14 09/28/2020    ALT 18 09/28/2020    ALKPHOS 81 09/28/2020    PROTTOTAL 7.0 09/28/2020    ALBUMIN 3.5 09/28/2020       I have assessed all abnormal lab values for their clinical significance and any values considered clinically significant have been addressed in the assessment and plan  OVERALL PLAN    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 T cells collected 8/31/2020. Consented for 2017-45 Yescarta, 2019-35: IT dex + simvastatin for JUAQUIN prevention.     1. DLBCL:    - Prep: Cytoxan and Fludarabine 9/23-9/25.   - Covid swab 9/25  - LP with IT dex now planned 9/29 under fluoroscopy (unable to get bedside LP 9/28). Cont simvastatin, tricor.   - 9/29 Yescarta infusion after PICC line placement.   - cont allopurinol.     2. ID: Afebrile.  - Prophy levo, fluc, acy, IV pentamidine (9/23).     3. Endo/DM: Discussed with CAR-T team in future need to have either endocrine consult or a plan for insulin with their primary endocrinologist for dex days. Need to adjust treatment plan as well for dex dose.   - holding Metformin. Increased risk of lactic acidosis with chemo and he is starting levaquin.   - Continue sliding scale insulin and lantus 30 in the evening. He is checking BG QID.   - ENDOCRINE CONSULT ON ADMISSION 9/29.  - Adrenal Insufficiency: On 20mg hydrocortisone--held 9/23 and 9/24.     4. FEN: Creat, lytes wnl.  - On KCL 20meq BID.     Final Plan:  Admit tomorrow.  LP w/ IT dex 9/29 under fluoro 0900. Dr. Gillis will push IT dex (release from Research 1 plan when pt arrives to 5C)- pt needs to be admitted inpatient in order for LP to happen this way. This likely means his outpt PICC line scheduled at 930 will get canceled so will need to order on-call PICC line for pt upon admission.  PICC line, Yescarta infusion tomorrow as well.    In addition to this visit and attempted  LP, additional 60 min spent coordinating LP under fluoroscopy.  Nydia Smyth PA-C  [phone]

## 2020-09-28 NOTE — PROGRESS NOTES
BMT ONC Adult Lumbar Puncture and Intrathecal Dexamethasone Procedure Note    September 28, 2020    Procedure: Lumbar Puncture to obtain CSF and give IT dexamethasone    Diagnosis: DLBCL    Learning needs assessment complete within 12 months: YES     Vitals reviewed:  YES    Informed Consent: Procedure, benefits, risks, and alternatives explained to the patient who voiced understanding of the information and agreed to proceed with lumbar puncture. Risks include bleeding, headache, and or infection.   Patient safety checklist completed.    Labs: Reviewed:   INR   Date Value Ref Range Status   09/28/2020 1.01 0.86 - 1.14 Final      Lab Results   Component Value Date    WBC 2.0 09/28/2020     Lab Results   Component Value Date    RBC 4.60 09/28/2020     Lab Results   Component Value Date    HGB 13.0 09/28/2020     Lab Results   Component Value Date    HCT 41.3 09/28/2020     No components found for: MCT  Lab Results   Component Value Date    MCV 90 09/28/2020     Lab Results   Component Value Date    MCH 28.3 09/28/2020     Lab Results   Component Value Date    MCHC 31.5 09/28/2020     Lab Results   Component Value Date    RDW 14.4 09/28/2020     Lab Results   Component Value Date    PLT 72 09/28/2020         Description:. The patient was seated at the bedside with lumbar chair. The L4-5 disc space (initially, then L3-4) was prepped and draped in a sterile fashion. Local anesthesia with 5mL 1% preservative-free lidocaine was used. A 22 gauge, 4 inch needle was placed several attemps, but unable to obtain CSF despite pt repositioning, needle repositioning.      The needle was removed and a bandages were applied to the site.   Complications: No; pt tolerated well but unsuccessful in collecting CSF or administering IT dex.     Disposition: LP rescheduled under fluoroscopy 9/29 0900 prior to admission for PICC and Yescarta CAR-T.    Performed by:   RADHA Tidwell PA-C

## 2020-09-29 ENCOUNTER — HOSPITAL ENCOUNTER (INPATIENT)
Facility: CLINIC | Age: 70
LOS: 9 days | Discharge: HOME | DRG: 018 | End: 2020-10-08
Attending: INTERNAL MEDICINE | Admitting: INTERNAL MEDICINE
Payer: COMMERCIAL

## 2020-09-29 ENCOUNTER — APPOINTMENT (OUTPATIENT)
Dept: GENERAL RADIOLOGY | Facility: CLINIC | Age: 70
DRG: 018 | End: 2020-09-29
Payer: COMMERCIAL

## 2020-09-29 ENCOUNTER — APPOINTMENT (OUTPATIENT)
Dept: GENERAL RADIOLOGY | Facility: CLINIC | Age: 70
DRG: 018 | End: 2020-09-29
Attending: PHYSICIAN ASSISTANT
Payer: COMMERCIAL

## 2020-09-29 DIAGNOSIS — C83.30 DIFFUSE LARGE B-CELL LYMPHOMA, UNSPECIFIED BODY REGION (H): ICD-10-CM

## 2020-09-29 DIAGNOSIS — C83.398 DIFFUSE LARGE B-CELL LYMPHOMA OF SOLID ORGAN EXCLUDING SPLEEN: ICD-10-CM

## 2020-09-29 DIAGNOSIS — C83.3A DIFFUSE LARGE CELL LYMPHOMA IN REMISSION: ICD-10-CM

## 2020-09-29 DIAGNOSIS — C85.80 LARGE CELL LYMPHOMA (H): Primary | ICD-10-CM

## 2020-09-29 LAB
ABO + RH BLD: NORMAL
ABO + RH BLD: NORMAL
ALBUMIN SERPL-MCNC: 3.4 G/DL (ref 3.4–5)
ALBUMIN SERPL-MCNC: 3.5 G/DL (ref 3.4–5)
ALP SERPL-CCNC: 82 U/L (ref 40–150)
ALP SERPL-CCNC: 84 U/L (ref 40–150)
ALT SERPL W P-5'-P-CCNC: 19 U/L (ref 0–70)
ALT SERPL W P-5'-P-CCNC: 19 U/L (ref 0–70)
ANION GAP SERPL CALCULATED.3IONS-SCNC: 6 MMOL/L (ref 3–14)
ANION GAP SERPL CALCULATED.3IONS-SCNC: 6 MMOL/L (ref 3–14)
APTT PPP: 33 SEC (ref 22–37)
APTT PPP: 35 SEC (ref 22–37)
AST SERPL W P-5'-P-CCNC: 14 U/L (ref 0–45)
AST SERPL W P-5'-P-CCNC: 17 U/L (ref 0–45)
BASOPHILS # BLD AUTO: 0 10E9/L (ref 0–0.2)
BASOPHILS # BLD AUTO: 0 10E9/L (ref 0–0.2)
BASOPHILS NFR BLD AUTO: 0 %
BASOPHILS NFR BLD AUTO: 0.9 %
BILIRUB SERPL-MCNC: 0.5 MG/DL (ref 0.2–1.3)
BILIRUB SERPL-MCNC: 0.5 MG/DL (ref 0.2–1.3)
BLD GP AB SCN SERPL QL: NORMAL
BLOOD BANK CMNT PATIENT-IMP: NORMAL
BUN SERPL-MCNC: 17 MG/DL (ref 7–30)
BUN SERPL-MCNC: 20 MG/DL (ref 7–30)
BURR CELLS BLD QL SMEAR: SLIGHT
CALCIUM SERPL-MCNC: 8.6 MG/DL (ref 8.5–10.1)
CALCIUM SERPL-MCNC: 9.1 MG/DL (ref 8.5–10.1)
CHLORIDE SERPL-SCNC: 101 MMOL/L (ref 94–109)
CHLORIDE SERPL-SCNC: 104 MMOL/L (ref 94–109)
CO2 SERPL-SCNC: 24 MMOL/L (ref 20–32)
CO2 SERPL-SCNC: 27 MMOL/L (ref 20–32)
CREAT SERPL-MCNC: 0.75 MG/DL (ref 0.66–1.25)
CREAT SERPL-MCNC: 0.87 MG/DL (ref 0.66–1.25)
CRP SERPL-MCNC: 25 MG/L (ref 0–8)
D DIMER PPP FEU-MCNC: 5.5 UG/ML FEU (ref 0–0.5)
DIFFERENTIAL METHOD BLD: ABNORMAL
DIFFERENTIAL METHOD BLD: ABNORMAL
EOSINOPHIL # BLD AUTO: 0 10E9/L (ref 0–0.7)
EOSINOPHIL # BLD AUTO: 0.1 10E9/L (ref 0–0.7)
EOSINOPHIL NFR BLD AUTO: 2.1 %
EOSINOPHIL NFR BLD AUTO: 6.1 %
ERYTHROCYTE [DISTWIDTH] IN BLOOD BY AUTOMATED COUNT: 14.3 % (ref 10–15)
ERYTHROCYTE [DISTWIDTH] IN BLOOD BY AUTOMATED COUNT: 14.3 % (ref 10–15)
FERRITIN SERPL-MCNC: 178 NG/ML (ref 26–388)
FIBRINOGEN PPP-MCNC: 410 MG/DL (ref 200–420)
GFR SERPL CREATININE-BSD FRML MDRD: 88 ML/MIN/{1.73_M2}
GFR SERPL CREATININE-BSD FRML MDRD: >90 ML/MIN/{1.73_M2}
GLUCOSE BLDC GLUCOMTR-MCNC: 214 MG/DL (ref 70–99)
GLUCOSE BLDC GLUCOMTR-MCNC: 214 MG/DL (ref 70–99)
GLUCOSE BLDC GLUCOMTR-MCNC: 240 MG/DL (ref 70–99)
GLUCOSE BLDC GLUCOMTR-MCNC: 245 MG/DL (ref 70–99)
GLUCOSE BLDC GLUCOMTR-MCNC: 246 MG/DL (ref 70–99)
GLUCOSE BLDC GLUCOMTR-MCNC: 251 MG/DL (ref 70–99)
GLUCOSE BLDC GLUCOMTR-MCNC: 293 MG/DL (ref 70–99)
GLUCOSE BLDC GLUCOMTR-MCNC: 395 MG/DL (ref 70–99)
GLUCOSE SERPL-MCNC: 257 MG/DL (ref 70–99)
GLUCOSE SERPL-MCNC: 305 MG/DL (ref 70–99)
HCT VFR BLD AUTO: 37 % (ref 40–53)
HCT VFR BLD AUTO: 37.3 % (ref 40–53)
HGB BLD-MCNC: 12 G/DL (ref 13.3–17.7)
HGB BLD-MCNC: 12.2 G/DL (ref 13.3–17.7)
IMM GRANULOCYTES # BLD: 0 10E9/L (ref 0–0.4)
IMM GRANULOCYTES NFR BLD: 0.7 %
INR PPP: 1.04 (ref 0.86–1.14)
INR PPP: 1.07 (ref 0.86–1.14)
LABORATORY COMMENT REPORT: NORMAL
LDH SERPL L TO P-CCNC: 143 U/L (ref 85–227)
LYMPHOCYTES # BLD AUTO: 0 10E9/L (ref 0.8–5.3)
LYMPHOCYTES # BLD AUTO: 0 10E9/L (ref 0.8–5.3)
LYMPHOCYTES NFR BLD AUTO: 0 %
LYMPHOCYTES NFR BLD AUTO: 0.7 %
MAGNESIUM SERPL-MCNC: 1.7 MG/DL (ref 1.6–2.3)
MAGNESIUM SERPL-MCNC: 1.8 MG/DL (ref 1.6–2.3)
MCH RBC QN AUTO: 28.4 PG (ref 26.5–33)
MCH RBC QN AUTO: 28.5 PG (ref 26.5–33)
MCHC RBC AUTO-ENTMCNC: 32.2 G/DL (ref 31.5–36.5)
MCHC RBC AUTO-ENTMCNC: 33 G/DL (ref 31.5–36.5)
MCV RBC AUTO: 86 FL (ref 78–100)
MCV RBC AUTO: 89 FL (ref 78–100)
MONOCYTES # BLD AUTO: 0 10E9/L (ref 0–1.3)
MONOCYTES # BLD AUTO: 0 10E9/L (ref 0–1.3)
MONOCYTES NFR BLD AUTO: 0 %
MONOCYTES NFR BLD AUTO: 0.7 %
NEUTROPHILS # BLD AUTO: 1 10E9/L (ref 1.6–8.3)
NEUTROPHILS # BLD AUTO: 1.3 10E9/L (ref 1.6–8.3)
NEUTROPHILS NFR BLD AUTO: 93 %
NEUTROPHILS NFR BLD AUTO: 95.8 %
NRBC # BLD AUTO: 0 10*3/UL
NRBC BLD AUTO-RTO: 0 /100
OVALOCYTES BLD QL SMEAR: SLIGHT
PHOSPHATE SERPL-MCNC: 2.2 MG/DL (ref 2.5–4.5)
PLATELET # BLD AUTO: 52 10E9/L (ref 150–450)
PLATELET # BLD AUTO: 55 10E9/L (ref 150–450)
PLATELET # BLD EST: ABNORMAL 10*3/UL
POIKILOCYTOSIS BLD QL SMEAR: SLIGHT
POTASSIUM SERPL-SCNC: 4.1 MMOL/L (ref 3.4–5.3)
POTASSIUM SERPL-SCNC: 4.5 MMOL/L (ref 3.4–5.3)
PROT SERPL-MCNC: 6.6 G/DL (ref 6.8–8.8)
PROT SERPL-MCNC: 6.7 G/DL (ref 6.8–8.8)
RBC # BLD AUTO: 4.21 10E12/L (ref 4.4–5.9)
RBC # BLD AUTO: 4.3 10E12/L (ref 4.4–5.9)
SARS-COV-2 RNA SPEC QL NAA+PROBE: NEGATIVE
SARS-COV-2 RNA SPEC QL NAA+PROBE: NORMAL
SODIUM SERPL-SCNC: 134 MMOL/L (ref 133–144)
SODIUM SERPL-SCNC: 134 MMOL/L (ref 133–144)
SPECIMEN EXP DATE BLD: NORMAL
SPECIMEN SOURCE: NORMAL
SPECIMEN SOURCE: NORMAL
URATE SERPL-MCNC: 3.5 MG/DL (ref 3.5–7.2)
URATE SERPL-MCNC: 4.3 MG/DL (ref 3.5–7.2)
WBC # BLD AUTO: 1.1 10E9/L (ref 4–11)
WBC # BLD AUTO: 1.4 10E9/L (ref 4–11)

## 2020-09-29 PROCEDURE — 80053 COMPREHEN METABOLIC PANEL: CPT | Performed by: STUDENT IN AN ORGANIZED HEALTH CARE EDUCATION/TRAINING PROGRAM

## 2020-09-29 PROCEDURE — 96450 CHEMOTHERAPY INTO CNS: CPT

## 2020-09-29 PROCEDURE — 86140 C-REACTIVE PROTEIN: CPT | Performed by: STUDENT IN AN ORGANIZED HEALTH CARE EDUCATION/TRAINING PROGRAM

## 2020-09-29 PROCEDURE — 25000125 ZZHC RX 250: Performed by: RADIOLOGY

## 2020-09-29 PROCEDURE — 36569 INSJ PICC 5 YR+ W/O IMAGING: CPT

## 2020-09-29 PROCEDURE — 86900 BLOOD TYPING SEROLOGIC ABO: CPT | Performed by: INTERNAL MEDICINE

## 2020-09-29 PROCEDURE — 27211389 ZZ H KIT, 5 FR DL BIOFLO OPEN ENDED PICC

## 2020-09-29 PROCEDURE — 009U3ZX DRAINAGE OF SPINAL CANAL, PERCUTANEOUS APPROACH, DIAGNOSTIC: ICD-10-PCS | Performed by: RADIOLOGY

## 2020-09-29 PROCEDURE — 85730 THROMBOPLASTIN TIME PARTIAL: CPT | Performed by: INTERNAL MEDICINE

## 2020-09-29 PROCEDURE — 25000132 ZZH RX MED GY IP 250 OP 250 PS 637: Performed by: INTERNAL MEDICINE

## 2020-09-29 PROCEDURE — 25000132 ZZH RX MED GY IP 250 OP 250 PS 637: Performed by: STUDENT IN AN ORGANIZED HEALTH CARE EDUCATION/TRAINING PROGRAM

## 2020-09-29 PROCEDURE — 84550 ASSAY OF BLOOD/URIC ACID: CPT | Performed by: INTERNAL MEDICINE

## 2020-09-29 PROCEDURE — 00000146 ZZHCL STATISTIC GLUCOSE BY METER IP

## 2020-09-29 PROCEDURE — 84550 ASSAY OF BLOOD/URIC ACID: CPT | Performed by: STUDENT IN AN ORGANIZED HEALTH CARE EDUCATION/TRAINING PROGRAM

## 2020-09-29 PROCEDURE — 25000128 H RX IP 250 OP 636: Performed by: PHYSICIAN ASSISTANT

## 2020-09-29 PROCEDURE — 25000128 H RX IP 250 OP 636

## 2020-09-29 PROCEDURE — 85379 FIBRIN DEGRADATION QUANT: CPT | Performed by: STUDENT IN AN ORGANIZED HEALTH CARE EDUCATION/TRAINING PROGRAM

## 2020-09-29 PROCEDURE — 25000128 H RX IP 250 OP 636: Performed by: INTERNAL MEDICINE

## 2020-09-29 PROCEDURE — 3E0R33Z INTRODUCTION OF ANTI-INFLAMMATORY INTO SPINAL CANAL, PERCUTANEOUS APPROACH: ICD-10-PCS | Performed by: RADIOLOGY

## 2020-09-29 PROCEDURE — 85384 FIBRINOGEN ACTIVITY: CPT | Performed by: STUDENT IN AN ORGANIZED HEALTH CARE EDUCATION/TRAINING PROGRAM

## 2020-09-29 PROCEDURE — 83615 LACTATE (LD) (LDH) ENZYME: CPT | Performed by: STUDENT IN AN ORGANIZED HEALTH CARE EDUCATION/TRAINING PROGRAM

## 2020-09-29 PROCEDURE — 96450 CHEMOTHERAPY INTO CNS: CPT | Performed by: RADIOLOGY

## 2020-09-29 PROCEDURE — 86850 RBC ANTIBODY SCREEN: CPT | Performed by: INTERNAL MEDICINE

## 2020-09-29 PROCEDURE — 25000125 ZZHC RX 250: Performed by: CLINICAL NURSE SPECIALIST

## 2020-09-29 PROCEDURE — 86355 B CELLS TOTAL COUNT: CPT | Performed by: STUDENT IN AN ORGANIZED HEALTH CARE EDUCATION/TRAINING PROGRAM

## 2020-09-29 PROCEDURE — 83735 ASSAY OF MAGNESIUM: CPT | Performed by: STUDENT IN AN ORGANIZED HEALTH CARE EDUCATION/TRAINING PROGRAM

## 2020-09-29 PROCEDURE — 40000986 XR CHEST PORT 1 VW

## 2020-09-29 PROCEDURE — 85610 PROTHROMBIN TIME: CPT | Performed by: INTERNAL MEDICINE

## 2020-09-29 PROCEDURE — 20600000 ZZH R&B BMT

## 2020-09-29 PROCEDURE — 86360 T CELL ABSOLUTE COUNT/RATIO: CPT | Performed by: STUDENT IN AN ORGANIZED HEALTH CARE EDUCATION/TRAINING PROGRAM

## 2020-09-29 PROCEDURE — 86357 NK CELLS TOTAL COUNT: CPT | Performed by: STUDENT IN AN ORGANIZED HEALTH CARE EDUCATION/TRAINING PROGRAM

## 2020-09-29 PROCEDURE — 87081 CULTURE SCREEN ONLY: CPT | Performed by: STUDENT IN AN ORGANIZED HEALTH CARE EDUCATION/TRAINING PROGRAM

## 2020-09-29 PROCEDURE — 83735 ASSAY OF MAGNESIUM: CPT | Performed by: INTERNAL MEDICINE

## 2020-09-29 PROCEDURE — 82728 ASSAY OF FERRITIN: CPT | Performed by: STUDENT IN AN ORGANIZED HEALTH CARE EDUCATION/TRAINING PROGRAM

## 2020-09-29 PROCEDURE — 36415 COLL VENOUS BLD VENIPUNCTURE: CPT | Performed by: INTERNAL MEDICINE

## 2020-09-29 PROCEDURE — 25800030 ZZH RX IP 258 OP 636: Performed by: STUDENT IN AN ORGANIZED HEALTH CARE EDUCATION/TRAINING PROGRAM

## 2020-09-29 PROCEDURE — 85730 THROMBOPLASTIN TIME PARTIAL: CPT | Performed by: STUDENT IN AN ORGANIZED HEALTH CARE EDUCATION/TRAINING PROGRAM

## 2020-09-29 PROCEDURE — 85610 PROTHROMBIN TIME: CPT | Performed by: STUDENT IN AN ORGANIZED HEALTH CARE EDUCATION/TRAINING PROGRAM

## 2020-09-29 PROCEDURE — 27210577 ZZ H INTRODUCER MICRO SET

## 2020-09-29 PROCEDURE — 84100 ASSAY OF PHOSPHORUS: CPT | Performed by: STUDENT IN AN ORGANIZED HEALTH CARE EDUCATION/TRAINING PROGRAM

## 2020-09-29 PROCEDURE — U0003 INFECTIOUS AGENT DETECTION BY NUCLEIC ACID (DNA OR RNA); SEVERE ACUTE RESPIRATORY SYNDROME CORONAVIRUS 2 (SARS-COV-2) (CORONAVIRUS DISEASE [COVID-19]), AMPLIFIED PROBE TECHNIQUE, MAKING USE OF HIGH THROUGHPUT TECHNOLOGIES AS DESCRIBED BY CMS-2020-01-R: HCPCS | Performed by: STUDENT IN AN ORGANIZED HEALTH CARE EDUCATION/TRAINING PROGRAM

## 2020-09-29 PROCEDURE — 85025 COMPLETE CBC W/AUTO DIFF WBC: CPT | Performed by: STUDENT IN AN ORGANIZED HEALTH CARE EDUCATION/TRAINING PROGRAM

## 2020-09-29 PROCEDURE — 85025 COMPLETE CBC W/AUTO DIFF WBC: CPT | Performed by: INTERNAL MEDICINE

## 2020-09-29 PROCEDURE — XW043C3 INTRODUCTION OF ENGINEERED AUTOLOGOUS CHIMERIC ANTIGEN RECEPTOR T-CELL IMMUNOTHERAPY INTO CENTRAL VEIN, PERCUTANEOUS APPROACH, NEW TECHNOLOGY GROUP 3: ICD-10-PCS | Performed by: INTERNAL MEDICINE

## 2020-09-29 PROCEDURE — 82784 ASSAY IGA/IGD/IGG/IGM EACH: CPT | Performed by: STUDENT IN AN ORGANIZED HEALTH CARE EDUCATION/TRAINING PROGRAM

## 2020-09-29 PROCEDURE — 86901 BLOOD TYPING SEROLOGIC RH(D): CPT | Performed by: INTERNAL MEDICINE

## 2020-09-29 PROCEDURE — 86359 T CELLS TOTAL COUNT: CPT | Performed by: STUDENT IN AN ORGANIZED HEALTH CARE EDUCATION/TRAINING PROGRAM

## 2020-09-29 PROCEDURE — 25000125 ZZHC RX 250: Performed by: STUDENT IN AN ORGANIZED HEALTH CARE EDUCATION/TRAINING PROGRAM

## 2020-09-29 PROCEDURE — 80053 COMPREHEN METABOLIC PANEL: CPT | Performed by: INTERNAL MEDICINE

## 2020-09-29 RX ORDER — SODIUM CHLORIDE 9 MG/ML
INJECTION, SOLUTION INTRAVENOUS CONTINUOUS
Status: ACTIVE | OUTPATIENT
Start: 2020-09-29 | End: 2020-09-29

## 2020-09-29 RX ORDER — ALLOPURINOL 300 MG/1
300 TABLET ORAL DAILY
Status: DISCONTINUED | OUTPATIENT
Start: 2020-09-29 | End: 2020-10-08 | Stop reason: HOSPADM

## 2020-09-29 RX ORDER — LANOLIN ALCOHOL/MO/W.PET/CERES
3 CREAM (GRAM) TOPICAL AT BEDTIME
Status: DISCONTINUED | OUTPATIENT
Start: 2020-09-29 | End: 2020-10-08 | Stop reason: HOSPADM

## 2020-09-29 RX ORDER — LIDOCAINE 40 MG/G
CREAM TOPICAL
Status: DISCONTINUED | OUTPATIENT
Start: 2020-09-29 | End: 2020-10-08 | Stop reason: HOSPADM

## 2020-09-29 RX ORDER — HEPARIN SODIUM,PORCINE 10 UNIT/ML
5-10 VIAL (ML) INTRAVENOUS EVERY 24 HOURS
Status: DISCONTINUED | OUTPATIENT
Start: 2020-09-29 | End: 2020-10-08 | Stop reason: HOSPADM

## 2020-09-29 RX ORDER — DEXTROSE MONOHYDRATE 25 G/50ML
25-50 INJECTION, SOLUTION INTRAVENOUS
Status: DISCONTINUED | OUTPATIENT
Start: 2020-09-29 | End: 2020-10-08 | Stop reason: HOSPADM

## 2020-09-29 RX ORDER — DIPHENHYDRAMINE HCL 25 MG
50 CAPSULE ORAL ONCE
Status: COMPLETED | OUTPATIENT
Start: 2020-09-29 | End: 2020-09-29

## 2020-09-29 RX ORDER — ACETAMINOPHEN 325 MG/1
325-650 TABLET ORAL EVERY 4 HOURS PRN
Status: DISCONTINUED | OUTPATIENT
Start: 2020-09-29 | End: 2020-10-08 | Stop reason: HOSPADM

## 2020-09-29 RX ORDER — HEPARIN SODIUM,PORCINE 10 UNIT/ML
5-10 VIAL (ML) INTRAVENOUS
Status: DISCONTINUED | OUTPATIENT
Start: 2020-09-29 | End: 2020-09-29

## 2020-09-29 RX ORDER — NICOTINE POLACRILEX 4 MG
15-30 LOZENGE BUCCAL
Status: DISCONTINUED | OUTPATIENT
Start: 2020-09-29 | End: 2020-10-08 | Stop reason: HOSPADM

## 2020-09-29 RX ORDER — HEPARIN SODIUM,PORCINE 10 UNIT/ML
5-10 VIAL (ML) INTRAVENOUS EVERY 24 HOURS
Status: DISCONTINUED | OUTPATIENT
Start: 2020-09-29 | End: 2020-09-29

## 2020-09-29 RX ORDER — ACYCLOVIR 800 MG/1
800 TABLET ORAL
Status: DISCONTINUED | OUTPATIENT
Start: 2020-09-29 | End: 2020-10-07

## 2020-09-29 RX ORDER — PROCHLORPERAZINE MALEATE 5 MG
5 TABLET ORAL EVERY 6 HOURS PRN
Status: DISCONTINUED | OUTPATIENT
Start: 2020-09-29 | End: 2020-10-08 | Stop reason: HOSPADM

## 2020-09-29 RX ORDER — POTASSIUM CHLORIDE 7.45 MG/ML
10 INJECTION INTRAVENOUS
Status: DISCONTINUED | OUTPATIENT
Start: 2020-09-29 | End: 2020-10-08 | Stop reason: HOSPADM

## 2020-09-29 RX ORDER — HEPARIN SODIUM,PORCINE 10 UNIT/ML
5-10 VIAL (ML) INTRAVENOUS
Status: DISCONTINUED | OUTPATIENT
Start: 2020-09-29 | End: 2020-10-08 | Stop reason: HOSPADM

## 2020-09-29 RX ORDER — FLUCONAZOLE 200 MG/1
200 TABLET ORAL DAILY
Status: DISCONTINUED | OUTPATIENT
Start: 2020-09-29 | End: 2020-10-08 | Stop reason: HOSPADM

## 2020-09-29 RX ORDER — LORAZEPAM 2 MG/ML
.5-1 INJECTION INTRAMUSCULAR EVERY 4 HOURS PRN
Status: DISCONTINUED | OUTPATIENT
Start: 2020-09-29 | End: 2020-10-08 | Stop reason: HOSPADM

## 2020-09-29 RX ORDER — BISACODYL 10 MG
10 SUPPOSITORY, RECTAL RECTAL DAILY PRN
Status: DISCONTINUED | OUTPATIENT
Start: 2020-09-29 | End: 2020-09-30

## 2020-09-29 RX ORDER — PANTOPRAZOLE SODIUM 40 MG/1
40 TABLET, DELAYED RELEASE ORAL DAILY
Status: DISCONTINUED | OUTPATIENT
Start: 2020-09-29 | End: 2020-10-08 | Stop reason: HOSPADM

## 2020-09-29 RX ORDER — LIDOCAINE HYDROCHLORIDE 10 MG/ML
5 INJECTION, SOLUTION EPIDURAL; INFILTRATION; INTRACAUDAL; PERINEURAL ONCE
Status: COMPLETED | OUTPATIENT
Start: 2020-09-29 | End: 2020-09-29

## 2020-09-29 RX ORDER — POTASSIUM CHLORIDE 750 MG/1
20-40 TABLET, EXTENDED RELEASE ORAL
Status: DISCONTINUED | OUTPATIENT
Start: 2020-09-29 | End: 2020-10-08 | Stop reason: HOSPADM

## 2020-09-29 RX ORDER — SULFAMETHOXAZOLE/TRIMETHOPRIM 800-160 MG
1 TABLET ORAL
Status: DISCONTINUED | OUTPATIENT
Start: 2020-11-02 | End: 2020-10-02

## 2020-09-29 RX ORDER — MAGNESIUM SULFATE HEPTAHYDRATE 40 MG/ML
4 INJECTION, SOLUTION INTRAVENOUS EVERY 4 HOURS PRN
Status: DISCONTINUED | OUTPATIENT
Start: 2020-09-29 | End: 2020-10-08 | Stop reason: HOSPADM

## 2020-09-29 RX ORDER — POTASSIUM CHLORIDE 29.8 MG/ML
20 INJECTION INTRAVENOUS
Status: DISCONTINUED | OUTPATIENT
Start: 2020-09-29 | End: 2020-10-08 | Stop reason: HOSPADM

## 2020-09-29 RX ORDER — ACYCLOVIR 800 MG/1
800 TABLET ORAL
Status: DISCONTINUED | OUTPATIENT
Start: 2020-10-01 | End: 2020-09-29

## 2020-09-29 RX ORDER — POTASSIUM CL/LIDO/0.9 % NACL 10MEQ/0.1L
10 INTRAVENOUS SOLUTION, PIGGYBACK (ML) INTRAVENOUS
Status: DISCONTINUED | OUTPATIENT
Start: 2020-09-29 | End: 2020-10-08 | Stop reason: HOSPADM

## 2020-09-29 RX ORDER — SIMVASTATIN 20 MG
40 TABLET ORAL DAILY
Status: DISCONTINUED | OUTPATIENT
Start: 2020-09-29 | End: 2020-10-08 | Stop reason: HOSPADM

## 2020-09-29 RX ORDER — HEPARIN SODIUM,PORCINE 10 UNIT/ML
2-5 VIAL (ML) INTRAVENOUS
Status: DISPENSED | OUTPATIENT
Start: 2020-09-29 | End: 2020-10-02

## 2020-09-29 RX ORDER — ACETAMINOPHEN 325 MG/1
650 TABLET ORAL ONCE
Status: COMPLETED | OUTPATIENT
Start: 2020-09-29 | End: 2020-09-29

## 2020-09-29 RX ORDER — POTASSIUM CHLORIDE 1.5 G/1.58G
20-40 POWDER, FOR SOLUTION ORAL
Status: DISCONTINUED | OUTPATIENT
Start: 2020-09-29 | End: 2020-10-08 | Stop reason: HOSPADM

## 2020-09-29 RX ORDER — DIPHENHYDRAMINE HCL 25 MG
25 CAPSULE ORAL
Status: DISCONTINUED | OUTPATIENT
Start: 2020-09-29 | End: 2020-10-08 | Stop reason: HOSPADM

## 2020-09-29 RX ORDER — LORAZEPAM 0.5 MG/1
.5-1 TABLET ORAL EVERY 4 HOURS PRN
Status: DISCONTINUED | OUTPATIENT
Start: 2020-09-29 | End: 2020-10-08 | Stop reason: HOSPADM

## 2020-09-29 RX ORDER — LEVOFLOXACIN 250 MG/1
250 TABLET, FILM COATED ORAL
Status: DISCONTINUED | OUTPATIENT
Start: 2020-09-29 | End: 2020-10-08 | Stop reason: HOSPADM

## 2020-09-29 RX ADMIN — ACYCLOVIR 800 MG: 800 TABLET ORAL at 21:54

## 2020-09-29 RX ADMIN — ACETAMINOPHEN 650 MG: 325 TABLET, FILM COATED ORAL at 15:06

## 2020-09-29 RX ADMIN — DIPHENHYDRAMINE HYDROCHLORIDE 50 MG: 25 CAPSULE ORAL at 15:06

## 2020-09-29 RX ADMIN — ACYCLOVIR 800 MG: 800 TABLET ORAL at 13:39

## 2020-09-29 RX ADMIN — DEXAMETHASONE SODIUM PHOSPHATE: 10 INJECTION, SOLUTION INTRAMUSCULAR; INTRAVENOUS at 09:27

## 2020-09-29 RX ADMIN — LIDOCAINE HYDROCHLORIDE 5 ML: 10 INJECTION, SOLUTION EPIDURAL; INFILTRATION; INTRACAUDAL; PERINEURAL at 12:46

## 2020-09-29 RX ADMIN — HUMAN INSULIN 8.5 UNITS/HR: 100 INJECTION, SOLUTION SUBCUTANEOUS at 22:50

## 2020-09-29 RX ADMIN — ACYCLOVIR 800 MG: 800 TABLET ORAL at 17:23

## 2020-09-29 RX ADMIN — PANTOPRAZOLE SODIUM 40 MG: 40 TABLET, DELAYED RELEASE ORAL at 11:18

## 2020-09-29 RX ADMIN — SODIUM CHLORIDE, PRESERVATIVE FREE 5 ML: 5 INJECTION INTRAVENOUS at 23:56

## 2020-09-29 RX ADMIN — AXICABTAGENE CILOLEUCEL 1 DOSE: 2000000 SUSPENSION INTRAVENOUS at 16:12

## 2020-09-29 RX ADMIN — Medication 5 ML: at 23:56

## 2020-09-29 RX ADMIN — MELATONIN TAB 3 MG 3 MG: 3 TAB at 21:54

## 2020-09-29 RX ADMIN — HUMAN INSULIN 3.5 UNITS/HR: 100 INJECTION, SOLUTION SUBCUTANEOUS at 15:37

## 2020-09-29 RX ADMIN — SODIUM CHLORIDE: 9 INJECTION, SOLUTION INTRAVENOUS at 13:39

## 2020-09-29 RX ADMIN — SODIUM CHLORIDE, PRESERVATIVE FREE 10 ML: 5 INJECTION INTRAVENOUS at 13:39

## 2020-09-29 RX ADMIN — HUMAN INSULIN 12 UNITS/HR: 100 INJECTION, SOLUTION SUBCUTANEOUS at 17:07

## 2020-09-29 RX ADMIN — LIDOCAINE HYDROCHLORIDE 4 ML: 10 INJECTION, SOLUTION EPIDURAL; INFILTRATION; INTRACAUDAL; PERINEURAL at 09:20

## 2020-09-29 ASSESSMENT — ACTIVITIES OF DAILY LIVING (ADL)
ADLS_ACUITY_SCORE: 13

## 2020-09-29 ASSESSMENT — MIFFLIN-ST. JEOR: SCORE: 1627.59

## 2020-09-29 NOTE — PROCEDURES
BMT/Cellular Autologous Product Infusion         Patient Vitals for the past 24 hrs:   Temp Temp src Pulse Resp BP   09/29/20 0750 97.8  F (36.6  C) Axillary 74 16 113/70   09/29/20 1148 97.4  F (36.3  C) Axillary 89 16 96/73         BMT INFUSION DOCUMENTATION (last 48 hours)      BMT/Cellular Product Infusion    No documentation.                       Baseline Pre-Infusion Evaluation (to be completed by Provider):   Dyspnea: Grade 0 - none  Hypoxia: Grade 0 - not present  Fever: Grade 0 - afebrile  Chills: Grade 0 - none  Febrile Neutropenia: Grade 0 - not present  Sinus Bradycardia: Grade 0 - none  Hypertension: Grade 0 - none  Hypotension: Grade 0 - none  Chest Pain: Grade 0 - none  Bronchospasm: Grade 0 - none  Pain: Grade 0 - none  Rash: Grade 0 - None  Neurologic Specify: none    If adverse reactions, events or complications occur (fever greater than 2 degrees fahrenheit increase, and severe reactions of the following types: chills, dyspnea, bronchospasm, hyper/hypotension, hypoxia, bradycardia, chest pain, back/flank pain, hypoxia, and any other reaction deemed severe or life threatening; any instance of product bag breakage or unusual product appearance)    Any other events that are >= grade 3, then immediately contact the BMT Attending physician, the [facility] Medical Director (pager [phone]) and the [facility] ([phone]).  After midnight, holidays & weekends contact the [facility] on the appropriate campus ([facility] [place]: [phone]; [facility] [place]: [phone]).    Mary Skinner PA-C

## 2020-09-29 NOTE — PROGRESS NOTES
Below is the pt's psychosocial assessment for the staff to review as needed.      CLINICAL SOCIAL WORK   PSYCHOSOCIAL ASSESSMENT  BLOOD AND MARROW TRANSPLANT SERVICE        Assessment completed on September 16, 2020 of living situation, support system, financial status, functional status, coping, stressors, need for resources and social work intervention provided as needed.  Information for this assessment was provided by Pt and spouse report in addition to medical chart review and consultation with medical team.      Present at assessment: Patient, Pastor Garibay  and Toshia Garibay were present for this assessment conducted by Sugey Roa, BMT .      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 ([facility]).  Pt was accompanied to today's visit by his wife Toshia. Today's visit was completed via telephone due to COVID-19 restrictions.      Decision Making:   Self      Health Care Directive:   Copy in Chart      Relationship Status:    to his wife Toshia. They have been together for 45 years.     Special Needs: None identified at this time.      Family/Support System: Pt endorsed a good support system including family and close friends who will be available to support Pt throughout transplant process.      Spouse: Toshia Garibay     Children: 2 daughters Shaniqua Jones; 3 son's Tommie Mckeon,  Evan (Lives in [place])     Grandchildren: 2 - Gaurav (6) and Roger (4)     Parents:      Siblings: 3 brother (2 living)  and 1 sister     Caregiver: SW discussed with pt the caregiver role and expectation at length. Pt is agreeable to having a full time caregiver for a minimum of 30 days until cleared by the BMT physician. Pt's identified caregivers are his wife and children. Pt signed the caregiver contract which will be scanned into the EMR. Caregiver education and resources provided. No caregiver concerns identified. Pt and Pt's Toshia confirmed understanding caregiving requirement, including driving restrictions, as discussed during psychosocial assessment.      Name & Numbers  Toshia Garibay [phone]     Transportation Mode:  Private Car . Pt is aware of driving restrictions post-BMT and the need for the caregiver is to drive until cleared to drive by the  BMT physician. SW provided information on parking info and monthly parking pass options. Pt will utilize the [employer] security shuttle for transportation to and from the [facility] and BMT Clinic/Hospital.     Insurance:  No Insurance issues identified.  Pt denied specific insurance concerns at this time. SW reiterated information about the BMT [employer]  should specific insurance questions arise as Pt moves through transplant process.      Sources of Income:  No income concerns identified  The pt is still working 2 hrs a day and collecting unemployment as well as Toshia collecting a paycheck. Pt denied anticipation of financial hardship related to BMT at this time.  SW encouraged Pt to contact this SW for additional potential resources should financial situation change.      Employment:   Employer: [employer]  Last Day of Work: Currently working 2 hrs a day     Spouse's Employment:  Employer:  [employer]  Position: [employer] - currently working from home     Mental Health: No mental health issues identified        PHQ-9  "assessment, score was 1 ,which indicates no current signs of depression.  GAD7 assessment, score was 2, which indicates no current signs of anxiety.     Pastor notes that he focuses on keeping a positive mindset to help him get through the tough moments. Pastor does feel that he experienced some depression after his father , but did not need any interventions for this. Otherwise Pastor self reports no other times of anxiety or depression.      We talked about how some patients may see an increase in feelings of anxiety or depression while hospitalized for extended periods along with isolation. Encouraged Pastor to let us know if they are noticing an increase in symptoms. We talked about the variety of modalities available to use as coping mechanisms (including but not limited to guided imagery, relaxation techniques, progressive muscle relaxation, counseling/talk therapy and medication).     Chemical Use: No issues identified. The pt denies the use of tobacco, alcohol, marijuana and other drugs. Based on the information provided, there appear to be no specific risks or concerns identified at this time.      Trauma/Loss/Abuse History: Multiple losses associated with cancer diagnosis and treatment, including health, employment, changes to physical appearance, etc.      Spirituality:  Patient identifies with bulmaro community. Not practicing, does identify as Religious. Pt's wife Toshia is also Religious and attends [employer] virtually with her mother each week.     Coping: Pt noted that he is currently feeling \"ready to begin and excited\".  Pt shared that his main coping mechanisms are talking with his family.  Pt noted that he also manuel by being outdoors and swimming with his grandkids. SW and Pt discussed additional positive coping mechanisms that Pt can utilize while in the hospital.      Caregiver Coping: Pt's wife noted that she is feeling \"anxious, but ready for this to start\" at this time.  Toshia noted that she manuel " "by medication (started on a \"blue pill\"), running, yoga, going on bike rides and Gnosticism.      Toshia did share that she was brought to the ER one day because she was having lots of chest pain. She was diagnosed with anxiety and although she feels much better now and has found ways to cope better, she is very worried and nervous at times.      Education Provided: Transplant process expectations, Caregiver requirements, Caregiver self-care, Financial issues related to transplant, Financial resources/grants available, Common psychosocial stressors pre/post transplant, Support group(s) available, Tour/layout of the inpatient unit/non-use of cell phones, Hospital resources available, Web site information, Resources for transplant patients and their families as well as the Clinical Social Work role.      Interventions Provided: Supportive counseling and education      Recreation/Leisure Activities:  Golf, swimming, and hang with family     Plans for Hospital Stay Leisure:  Book, cribbage, tablet, and walk the halls     Assessment and Recommendations for Team:  Pt is a 70 year old male diagnosed with NHL who is here undergoing preparation for a planned Yescarta infusion.     Pt is a pleasant and well articulate male who feels comfortable communicating with the medical team. Pt has a good support team who are involved.      Pt may benefit from ongoing psychosocial support in regards to coping with the adjustment to the BMT process. Pt's family may benefit from ongoing psychosocial support in regards to coping with the adjustment to the BMT process and may also benefit from attending the BMT Caregiver Support Group that meets weekly on the inpatient unit.      Pt has a good support system and a good caregiver plan. Pt verbalizes understanding of the transplant process and wanting to proceed. SW provided contact information and encouraged Pt to contact SW with questions, concerns, resources and for support. Per this " assessment, I did not identify any barriers to this patient moving forward with transplant        Important Information:   - Pt may be interested in exercise equipment  -Pt's daughter will be his visitor for this stay.        Follow up Planned:   Psychosocial support  Resources for children  Support group information     ALEXA Hobson, [facility]  Pager: [phone]  Phone: [phone]

## 2020-09-29 NOTE — PROGRESS NOTES
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 who received infusion on 9/29/2020, currently day 0 of CAR-T cell therapy axi-cell with CNS prophy protocol MT [id number]    Vital Signs: /70 (BP Location: Right arm)   Pulse 74   Temp 97.8  F (36.6  C) (Axillary)   Resp 16   Ht 1.73 m (5' 8.11\")   Wt 88.1 kg (194 lb 4.8 oz)   SpO2 95%   BMI 29.45 kg/m      1) CRS Grading (based ONLY on parameters in box below)    Fever:   </= 100.4    Blood pressure:   SBP >/= 90 (not hypotensive)    Oxygen saturation:    >/= 90% on room air (not hypoxic)    CRS Parameter Grade 1  Grade 2 Grade 3 Grade 4   Fever* Tm >= 100.4 degrees F Tm >= 100.4 degrees F Tm >= 100.4 degrees F Tm >= to 100.4  degrees F      With Either:  Hypotension (SBP <90) None Responsive to Fluids  Requiring 1  vasopressor (w/ or w/o vasopressin) Requiring multiple  vasopressors  (excluding  vasopressin)     And/or  Hypoxia (O2 sats <90% on room air) None Low-flow nasal  cannula or blow-by High-flow nasal  cannula, face mask,  non-rebreather mask,or Venturi mask  Requiring positive  pressure (CPAP,  BiPAP intubation and  mechanical  ventilation)     CRS Summary  Does patient have CRS? No  Current CRS stgstrstastdstest:st st1st What therapy was given: n/a  Has CRS grade changed? n/a   Has CRS resolved? n/a       2) Neurotoxicity:    ICE Assessment  Orientation to year, month, city, hospital: 4 points, Name 3 objects (e.g., point to clock, pen, button): 3 point, Following commands: (e.g., Show me 2 fingers): 1 point, Write a standard sentence (e.g., The wilson jumped over the log): 1 point and Count backwards from 100 by ten: 1 point  Total points: 10: No impairment    ASTCT ICANS Consensus Grading for Adults  ICE Score   10    Seizure   No seizures    Motor Findings   No motor findings    Elevated ICP/Cerebral " Edema   None      Neurotoxicity  Domain Grade 1 Grade 2 Grade 3 Grade 4   ICE score 7-9 3-6 1-2 0   Depressed LOC      Awakens  spontaneously Awakens to  voice  Awakens only to  tactile stimulation Unarousable or  requires vigorous  or repetitive  tactile stimuli to  arouse. Stupor  or coma.   Seizure n/a n/a Any clinical  seizure focal or  generalized that  resolves rapidly  or nonconvulsive  seizures on EEG  that resolve with  intervention  Life-threatening  prolonged  seizure (>5 min);  or Repetitive  clinical or  electrical  seizures without  return to baseline  in between    Motor Findings      n/a n/a n/a Deep focal motor  weakness such  as hemiparesis  or paraparesis   Elevated  ICP/cerebral  edema  n/a n/a Focal/local  edema on  neuroimaging  Diffuse cerebral  edema on  neuroimaging;  decerebrate or  decorticate  posturing; or  cranial nerve VI  palsy; or  papilledema; or  Cushing's triad     ICANS grade is determined by the most severe event (ICE score, level of consciousness, seizure, motor findings, raised ICP/cerebral edema) not attributable to any other cause; for example, a patient with an ICE score of 3 who has a generalized seizure is classified as grade 3 ICANS.    A patient with an ICE score of 0 may be classified as grade 3 ICANS if awake with global aphasia, but a patient with an ICE score of 0 may be classified as grade 4 ICANS if unarousable.     Depressed level of consciousness should be attributable to no other cause (eg, no sedating medication)    Tremors and myoclonus associated with immune effector cell therapies may be graded according to CTCAE v5.0,but they do not influence ICANS grading.     Intracranial hemorrhage with or without associated edema is not considered a neurotoxicity feature and is  excluded from ICANS grading. It may be graded according to CTCAE v5.0.          Neurotoxicity Summary  Does patient have neurotoxicity? No  Current Neurotoxicity score (0-10): n/a  What therapy  was given: n/a  Has neurotoxicity grade changed? n/a   Has neurotoxicity resolved? n/a       3) Organ CAR-T toxicity:    Cardiac   none    Respiratory   none    Gastrointestinal   none    Hepatic    none    Skin   none     Coagulopathy    none     Other: n/a      4) HLH   Ferritin > 10,000 plus any TWO of the following:       * CTCAE grading can be found at   https://ctep.cancer.gov/protocoldevelopment/electronic_applications/docs/CTCAE_v5_Quick Reference_8.5x11.pdf    Mary Skinner [facility]  [phone]

## 2020-09-29 NOTE — H&P
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 T cells collected 8/31/2020. Consented for 2017-45 [facility], 2019-35: IT dex + simvastatin for JUAQUIN prevention.     As a detailed history adapted from 7/10/2020 Dr Eng from [facility]:   This patient is a 70 year old male with a four- to eight-week history of increasing dyspnea, mild anorexia, and weight loss.  On evaluation, a large right pleural effusion was detected, thoracentesis was performed with only slight improvement of his dyspnea and the cytology from the pleural fluid was benign.     A CT scan of the chest revealed extensive bulky 9-cm right paratracheal lymphadenopathy with additional mediastinal, bilateral pulmonary hilar, and upper abdominal, retroperitoneal lymphadenopathy.  A CT-guided biopsy of the retroperitoneal lymph node revealed a CD20-positive, B-cell non-Hodgkin's lymphoma, though not enough material was available for subtyping.     An excisional left supraclavicular lymph node biopsy confirmed grade III follicular lymphoma.     PET/CT scanning confirmed extensive lymphadenopathy in the neck, chest, abdomen, and pelvis. A bone marrow biopsy was normal.      His first cycle of chemotherapy with RCHOP was administered in December 2014. A CT scan in  February 2015 revealed an excellent partial remission after three cycles of chemotherapy, and a follow up CT scan in April 2015 revealed a continued response.     He completed six cycles of RCHOP chemotherapy with an excellent response.     A CT scan in October 2015 and 2016 were completed with no evidence of disease.     A CT scan in October 2017 revealed an enlarging 3 cm infrahilar mass. This is in a location different than his original nodes from 2014.      On 11/17/17 the patient underwent a thoracotomy. 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 The tumor was handled as a carcinoma, but the pathology is most consistent with recurrent grade 2-3 follicular lymphoma.     On 12/14/17 he began bendamustine with rituximab chemotherapy.     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 Started lovenox     On 6/4/18 after 6 cycles of BR chemotherapy a CT revealed stable mild lymphadenopathy with mild splenomegaly and a slight increase in the IVC thrombus.Lovenox was converted to Xarelto in 6/2018.    A PET/CT 6/13/19 confirmed the hypermetabolic right infrahilar mass and revealed moderate metabolic activity within a few small nodules just deep to tire pleura in the posterior aspect of tire mid right hemithorax, suspicious for neoplasm, without other disease.     As all disease could be encompassed in one radiation field, he completed 3000 cGy of radiotherapy to the right thoracic region 6/27/19 - 7/18/19.     A CT C/A/P 9/23/19 revealed improvement of disease with mild right lung radiation changes.     CT guided adrenal biopsy 5/27/20 revealed transformation to a diffuse large B cell lymphoma, germinal center type, 100 % KI-67 +, with positive immunohistochemical staining for BCL-2, BCL-6, and C-MYC are suggestive of a possible triple-hit lymphoma     --R ICE chemotherapy  6/5/2020 but complications of ifosphamide neurotoxicity discontinued 6/7  --filgrastim 6/9     --plan for new chemotherapy with R-DHAP 7/8.    Treatment/Chemotherapy Number of Cycles Date Range Outcomes & Complications   RCHOP 6 12/2014-4/2015    Bendamustine/rituximab 6 12/14/2017    Radiation 3000cGy right thoracic region  6/2019-7/2019    RICE  6/2020 Stopped due to neurotox complications ifosphamide    R-DHAP  7/2020              Bone Marrow Workup Results:  Blood Counts Recent Labs   Lab Test 09/29/20  0743   WBC 1.1*   ANEU 1.0*   ALYM 0.0*   CRICKET 0.0   AEOS 0.1   HGB 12.0*   HCT 37.3*   PLT 55*      Blood Type Recent Labs   Lab Test 09/29/20  0743   ABO O      Chemistries Recent Labs   Lab Test 09/29/20  0743      POTASSIUM 4.1   CHLORIDE 101   CO2 27   BUN 20   CR 0.87      Liver Tests Recent Labs   Lab Test 09/29/20  0743   BILITOTAL 0.5   ALKPHOS 84   AST 14   ALT 19      PET/CT: 8/21/2020                                          IMPRESSION: In this patient with history of follicular lymphoma  transformed to diffuse large B-cell lymphoma currently undergoing  chemotherapy, there is partial response per Lug[place] criteria:     1. Masslike consolidation in the right lower lobe with central  necrosis, unchanged in size, however diffusely hypoenhancing compared  to previously seen enhancement on 5/14/2020. Combination of findings  favor treated lesion, with residual uptake suggesting inflammation  (such as related to radiotherapy). Lack of significant enhancement  lowers suspicion for residual disease but not fully excluded. Consider  further follow-up with CT chest with contrast.      2. Mildly prominent mediastinal and right hilar lymph nodes  demonstrate uptake below background levels, however appear minimally  larger since 7/20/2020.  By PET criteria (used for DLBCL) this would  be complete response, however by CT criteria (used for follicular  lymphoma) this would technically be progressive disease.  Overall these  are indeterminant. Attention on follow-up.     3. Continued decreased size of the bilateral adrenal nodules compared  to 7/20/2020 and 5/14/2020, with no significant FDG uptake, consistent  with treated lesions.     4. Asymmetric skin thickening and mild FDG uptake to the right ear  lobe, in keeping with history of right ear melanoma. Unclear to what  extent these findings represent tumor versus postsurgical changes.      Surgical path Left foot second and third toe amputations,  non-traumatic     FINAL DIAGNOSIS:   Toes, second and third/left, amputation:   - Skin and multiple fragments of osteocartilaginous tissue examined.   - Skin with hyperkeratosis and ulceration.   - Cutaneous, soft tissue and osteocartilaginous margins without   abnormalities.   - No evidence of osteomyelitis or tumors.      PFTs FVC%  Recent Labs   Lab Test 11/06/17 0900 20003 68       FEV1%  Recent Labs   Lab Test 11/06/17 0900 20016 62       DLCO%  Recent Labs   Lab Test 11/06/17 0900 [id number] 65      ECHO or MUGA: ECHO: 8/24/2020    Interpretation Summary  Technically difficult study.Extremely poor acoustic windows.  Limited information was obtained during study.  Global and regional left ventricular function is normal with an EF of 60-65%.  Right ventricular function, chamber size, wall motion, and thickness are  normal.  No pericardial effusion is present.     EKG 8/20/2020  Sinus rhythm  Normal ECG  When compared with ECG of 14-NOV-2017 09:58,  No significant change was found   Serologies: CMV +, EBV -, HSV -     Vitamin D No results for input(s): VITDT in the last 69394 hours.       I have assessed all abnormal lab values for their clinical significance and any values considered clinically significant have been addressed in the assessment and plan    Family History: No family history on file.    Social History:   Social History     Socioeconomic History     Marital status:      Spouse name: Not on file      Number of children: Not on file     Years of education: Not on file     Highest education level: Not on file   Occupational History     Not on file   Social Needs     Financial resource strain: Not on file     Food insecurity     Worry: Not on file     Inability: Not on file     Transportation needs     Medical: Not on file     Non-medical: Not on file   Tobacco Use     Smoking status: Never Smoker     Smokeless tobacco: Never Used   Substance and Sexual Activity     Alcohol use: No     Drug use: No     Sexual activity: Not on file   Lifestyle     Physical activity     Days per week: Not on file     Minutes per session: Not on file     Stress: Not on file   Relationships     Social connections     Talks on phone: Not on file     Gets together: Not on file     Attends Synagogue service: Not on file     Active member of club or organization: Not on file     Attends meetings of clubs or organizations: Not on file     Relationship status: Not on file     Intimate partner violence     Fear of current or ex partner: Not on file     Emotionally abused: Not on file     Physically abused: Not on file     Forced sexual activity: Not on file   Other Topics Concern     Parent/sibling w/ CABG, MI or angioplasty before 65F 55M? Not Asked   Social History Narrative     Not on file       Past Medical History:   Past Medical History:   Diagnosis Date     Abnormal liver function test      Anemia      CPAP (continuous positive airway pressure) dependence      Diabetes (H)      Hx of skin cancer, basal cell      Hyperlipidemia      Hypertension      Keratoderma      Large cell lymphoma (H) 7/20/2020     Liver disease      Lymphoma (H)      Non-Hodgkin lymphoma (H)      Pedal edema     CHRONIC     Pleural effusion      Seborrheic keratosis, inflamed      Sleep apnea     Uses a CPAP     Thrombocytopenia (H) 7/20/2020     Thrombosis Felbruary 2018        Past Surgical History:   Past Surgical History:   Procedure Laterality Date      AMPUTATE TOE(S) Left 5/22/2020    Procedure: LEFT SECOND AND THIRD DISTAL TOE AMPUTATIONS;  Surgeon: Ramon Flores MD;  Location:  OR     AMPUTATE TOE(S) Left 7/1/2020    Procedure: 1.  Partial left second toe amputation with osteotomy through proximal phalanx.  2.  Partial left third toe amputation with osteotomy through proximal phalanx.;  Surgeon: Ismael Humphrey DPM;  Location: [facility] OR     BIOPSY LYMPH NODE CERVICAL N/A 12/5/2014    Procedure: BIOPSY LYMPH NODE CERVICAL;  Surgeon: Robbie Linda MD;  Location:  OR     BRONCHOSCOPY FLEXIBLE N/A 11/14/2017    Procedure: BRONCHOSCOPY FLEXIBLE;  FLEXIBLE BRONCHOSCOPY WITH BIOPSIES.;  Surgeon: Robbie Linda MD;  Location:  OR     COLONOSCOPY       COLONOSCOPY N/A 5/9/2018    Procedure: COMBINED COLONOSCOPY, SINGLE OR MULTIPLE BIOPSY/POLYPECTOMY BY BIOPSY;;  Surgeon: Ramos Bates MD;  Location:  GI     ENDOVASCULAR PLACEMENT VASCULAR DEVICE Left 12/5/2017    Procedure: ENDOVASCULAR PLACEMENT VASCULAR DEVICE;;  Surgeon: Robbie Linda MD;  Location: [facility]     ENT SURGERY      tonsillectomy     EXCISE NODE MEDIASTINAL N/A 11/17/2017    Procedure: EXCISE NODE MEDIASTINAL;;  Surgeon: Robbie Linda MD;  Location:  OR     EYE SURGERY       EYE SURGERY Left     vitrectomy     INSERT PORT VASCULAR ACCESS N/A 12/05/2014    Procedure: INSERT PORT VASCULAR ACCESS;  Surgeon: Robbie Linda MD;  Location:  OR     INSERT PORT VASCULAR ACCESS N/A 12/5/2017    Procedure: INSERT PORT VASCULAR ACCESS;  POWER PORT PLACEMENT ATTEMPTED, PLACEMENT OF ANGIO-SEAL VIP VASCULAR CLOSURE DEVICE;  Surgeon: Robbie Linda MD;  Location: [facility]     IR CHEST PORT PLACEMENT > 5 YRS OF AGE  6/5/2020     IR PORT REMOVAL RIGHT  12/10/2018     PHACOEMULSIFICATION CLEAR CORNEA WITH STANDARD INTRAOCULAR LENS IMPLANT Right 5/2/2016    Procedure: PHACOEMULSIFICATION CLEAR CORNEA WITH STANDARD INTRAOCULAR LENS IMPLANT;   Surgeon: Rasheed Finney MD;  Location:  [facility]     REMOVE PORT VASCULAR ACCESS N/A 3/14/2017    Procedure: REMOVE PORT VASCULAR ACCESS;  Surgeon: Robbie Linda MD;  Location: [facility] OR     SOFT TISSUE SURGERY      BASAL CELL CA FOREHEAD     THORACOTOMY Right 11/17/2017    Procedure: THORACOTOMY;  RIGHT EXPLORATORY THORACOTOMY/ EXTENSIVE PNEUMOLYSIS/ BIOPSY OF INTERLOBAR LYMPH NODE;  Surgeon: Robbie Linda MD;  Location:  OR       Allergies: No Known Allergies    Home Medications      Prior to Admission medications    Medication Sig Start Date End Date Taking? Authorizing Provider   acyclovir (ZOVIRAX) 400 MG tablet Take 2 tablets (800 mg) by mouth every 12 hours 9/23/20  Yes Temo Pena MD   allopurinol (ZYLOPRIM) 300 MG tablet Take 300 mg by mouth daily   Yes Unknown, Entered By History   fenofibrate (TRICOR) 145 MG tablet Take 145 mg by mouth daily   Yes Unknown, Entered By History   fluconazole (DIFLUCAN) 100 MG tablet Take 1 tablet (100 mg) by mouth daily 9/23/20  Yes Temo Pena MD   hydrocortisone (CORTEF) 10 MG tablet Take 20 mg by mouth daily before breakfast    Yes Reported, Patient   insulin aspart (NOVOLOG FLEXPEN) 100 UNIT/ML pen Inject Subcutaneous 3 times daily (with meals) Patient uses sliding scale based on Carbs and Blood Glucose. Has been using very little lately due to low readings and low appetite.   Yes Reported, Patient   insulin glargine (LANTUS VIAL) 100 UNIT/ML vial Inject 15 Units Subcutaneous At Bedtime  Patient taking differently: Inject 30 Units Subcutaneous At Bedtime  6/21/20  Yes Deanna Richards MD   levofloxacin (LEVAQUIN) 250 MG tablet Take 1 tablet (250 mg) by mouth daily 9/23/20  Yes Temo Pena MD   potassium chloride ER (KLOR-CON M) 20 MEQ CR tablet Take 1 tablet (20 mEq) by mouth 2 times daily 9/23/20  Yes Lisseth Hauser PA-C   study - simvastatin, IDS# [id number], 40 MG tablet Take 1 tablet (40 mg) by mouth daily Start at least 5 days prior  "to apheresis and continue until Day +30 after CAR-T infusion. 8/25/20  Yes Rosey Bunch MD   bisacodyl (DULCOLAX) 5 MG EC tablet Take 5 mg by mouth daily as needed for constipation    Reported, Patient   prochlorperazine (COMPAZINE) 5 MG tablet Take 1-2 tablets (5-10 mg) by mouth every 6 hours as needed (Breakthrough Nausea / Vomiting) 6/12/20   Temo Eng, APRN CNP       Review of Systems    Review of Systems:  CONSTITUTIONAL: NEGATIVE for fever, chills, change in weight  INTEGUMENTARY/SKIN: NEGATIVE for worrisome rashes, moles or lesions  EYES: NEGATIVE for vision changes or irritation  ENT/MOUTH: NEGATIVE for ear, mouth and throat problems  RESP: NEGATIVE for significant cough or SOB  BREAST: NEGATIVE for masses, tenderness or discharge  CV: NEGATIVE for chest pain, palpitations or peripheral edema  GI: NEGATIVE for nausea, abdominal pain, heartburn, or change in bowel habits  : NEGATIVE for frequency, dysuria, or hematuria  MUSCULOSKELETAL: NEGATIVE for significant arthralgias or myalgia  NEURO: NEGATIVE for weakness, dizziness or paresthesias  ENDOCRINE: NEGATIVE for temperature intolerance, skin/hair changes  HEME/ALLERGY: NEGATIVE for bleeding problems  PSYCHIATRIC: NEGATIVE for changes in mood or affect    PHYSICAL EXAM      Weight     Wt Readings from Last 3 Encounters:   09/29/20 88.1 kg (194 lb 4.8 oz)   09/28/20 88.2 kg (194 lb 8 oz)   09/25/20 90.7 kg (200 lb)        KPS: 90    /70 (BP Location: Right arm)   Pulse 74   Temp 97.8  F (36.6  C) (Axillary)   Resp 16   Ht 1.73 m (5' 8.11\")   Wt 88.1 kg (194 lb 4.8 oz)   SpO2 95%   BMI 29.45 kg/m       General: NAD   Eyes: VANGIE, sclera anicteric   Nose/Mouth/Throat: OP clear, buccal mucosa moist, no ulcerations   Lungs: CTA bilaterally  Cardiovascular: RRR, no M/R/G   Abdominal/Rectal: +BS, soft, NT, ND, No HSM   Lymphatics: No edema  Skin: No rashes or petechaie  MSK: Toes, second and third/left, amputation:  Neuro: A&O "   Additional Findings: Right peripheral line, PICC to be placed later this morning.      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 T cells collected 8/31/2020. Consented for 2017-45 [facility], 2019-35: IT dex + simvastatin for JUAQUIN prevention.    Day 0: LP with IT dex. PICC line placement, cells to be given AFTER 1530 (6 hours post LP)    1.  BMT:  DLBCL  Axi-cell product with CNS prevention protocol, day 0  CNS prophy: simvastatin 40mg tied to IDS drug    2.  HEME: Keep Hgb>7 and plts>10K. No pre-meds.                            3.  ID: Afebrile. Levo, Fluc, and HD ACV (CMV+, HSV+, EBV+) prophy. Bactrim or appropriate PCP therapy to start d+28.         COVID neg re swabbed as daughter was found to be covid pos this weekend.                                       4.  GI: Zofran and dexamethasone prior to chemo to prevent N/V. Ativan and compazine available PRN break-through symptoms. Protonix for GI prophy.     6.  FEN/Renal: Monitor creat and lytes. Replete lytes PRN per SS. Monitor weight, I+O, lytes per protocol with IVF flush.    7. Endo: DM on glipizide and insulin at home. Endocrine consulted.  Complication of DM with 2 left toes amputated 7/2020    Mary Skinner  [phone]    The patient was seen and examined by me separately from the midlevel provider. The note reflects our mutual assessment and plans and were approved by me personally.   I personally reviewed today's lab results vital signs and radiology results.    Each point of the assessment and plan were reviewed by the midlevel and me and either endorsed by me or were my added decisions.    My pertinent physical findings today are: Sfebrile, clear lungs and no rash.    My assessment and plan are: 71 yo with refractory DLBCL admitted for [facility]CARTA CART. Orders already signed. Monitor for  KEYONNA.    Melvin Tian M.D.

## 2020-09-29 NOTE — CONSULTS
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 Had T-cell collection 8/31, received LD chemo outpatient, now admitted 9/29/2020 for Yescarta infusion.  Pre-infusion flush has been changed to non-dextrose.  Length of stay estimated to be 5 days - 2 weeks.  Received dexamethasone IT 9/23 and 9/24 (anti-emetic)- decreased from 12mg to 4mg for poorly controlled DM.  Today, received dex 8 mg (also IT).  Pastor reports his glucose has been uncontrolled for quite a while.  He notes he was started on hydrocortisone in June (20 mg + 10 mg) and had high dose steroids with chemo.      He eliminated his evening dose hydrocortisone due to inability to sleep (says his endocrinologist is aware).  Pastor estimates he lost 50 lbs in spring and decreased his glargine from about 50 to 30 units.  With the high glucose values lately, he's increased his glargine up to 40 units (took 35 or 40 units last night-- can't remember), but his endocrinologist advised him to hold the glargine dose steady.  He has been using up to 40 units of aspart to correct BG in 350s-450s and cover food, at night.  He estimates he needs about 1 unit aspart to lower glucose 10 points.  July -- in the hospital, recalls sliding scale was way too conservative-- was getting hydrocortisone plus dexamethasone 40 mg daily.  BGs to 400s on glargine 20, aspart 1 per 15 grams carb and medium resistance correction.    Lab values during recent outpt visits have all been 300s.  Glucose was 350 last night at HS for which he took 40 units aspart.  BG at home this  Morning - 124.  Bfast 0630, no aspart with Trix 1.5 c and milk (estimate 52 g carb).  0743- lab .   "No further eating and BG at 1400 is 251.  Pastor plans to eat now.  Expects total intake will be less due to less access to snacks here.  Frustrated with lack of BG control.  Can tolerate increased BG monitoring for IV insulin without trouble.    Recent Labs   Lab 09/29/20  0743 09/28/20  1057 09/25/20  0818 09/24/20  0823 09/23/20  0822   * 306* 368* 350* 304*         Diabetes Type: 2  Diabetes Duration: 29 years, dx in 1991  Usual Diabetes Regimen:   AC, HS-- BG monitoring frequency, sometimes only BID  diet, varies.  Supper biggest meal, snacks in evening  - Stopped Metformin-- \"d/t Increased risk of lactic acidosis with chemo and he is starting levaquin\"  Metformin was 1 gm, 500 mg-- off for one week now  Denies glipizide use    Usual Lantus 30 in the evening. He is checking BG QID.  Novolog 1 unit to reduce glucose 5-10 mg/dL--  Add some for food  Ability to [place] Prescribed Regimen: fair, could be more systematic  Diabetes Control:   Used to be routinely  100-130 in AM  Later in day, higher.  Recently, -450s in evening  w/ steroids    Reports A1C 6.4% in August at endocrinologist's office--    Lab Results   Component Value Date    A1C 7.3 05/22/2020     Diabetes Complications: retinopathy-- laser x3-4, mild peripheral neuropathy in feet, no nephropathy, no CAD  History of DKA: No  Able to Detect Hypoglycemia: Not as easily, very mild sx when in 50s.  Has had none for two months though  Usual Diabetes Care Provider: BENSON Turner, [facility]  Factors Impacting Glucose Control: steroids mainly      Review of Systems  10 point ROS completed with pertinent positives and negatives noted in the HPI    Past medical, family and social histories are reviewed and updated.    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     Liver disease      Lymphoma (H)      Non-Hodgkin lymphoma (H)      Pedal edema     CHRONIC     Pleural effusion      Seborrheic keratosis, inflamed      Sleep apnea     Uses a CPAP     Thrombocytopenia (H) 7/20/2020     Thrombosis Felbruary 2018       Family History  Mother type 2 diabetes  Brother type 2 diabetes    Social History  Social History     Socioeconomic History     Marital status:      Spouse name: Not on file     Number of children: 5     Years of education: Not on file     Highest education level: Not on file   Occupational History     Not on file   Social Needs     Financial resource strain: Not on file     Food insecurity     Worry: Not on file     Inability: Not on file     Transportation needs     Medical: Not on file     Non-medical: Not on file   Tobacco Use     Smoking status: Never Smoker     Smokeless tobacco: Never Used   Substance and Sexual Activity     Alcohol use: No     Drug use: No     Sexual activity: Not on file   Lifestyle     Physical activity     Days per week: Not on file     Minutes per session: Not on file     Stress: Not on file   Relationships     Social connections     Talks on phone: Not on file     Gets together: Not on file     Attends Mandaeism service: Not on file     Active member of club or organization: Not on file     Attends meetings of clubs or organizations: Not on file     Relationship status: Not on file     Intimate partner violence     Fear of current or ex partner: Not on file     Emotionally abused: Not on file     Physically abused: Not on file     Forced sexual activity: Not on file   Other Topics Concern     Parent/sibling w/ CABG, MI or angioplasty before 65F 55M? Not Asked   Social History Narrative     Mostly retired, works Part time, [employer]         Physical Exam  Temp: 97.4  F (36.3  C) Temp src: Axillary BP: 96/73  Pulse: 89   Resp: 16 SpO2: 96 % O2 Device: None (Room air)      General:  pleasant man, in no distress.   HEENT: hearing intact to conversational volume.   Lungs: unlabored respiration, no cough  Mental status:  alert, oriented x3, communicating clearly, speech clear.    Psych:  calm, even mood  Remainder of exam unable to be performed due to Covid 19 precautions    Laboratory  Recent Labs   Lab Test 09/29/20  0743 09/28/20  1057    134   POTASSIUM 4.1 4.4   CHLORIDE 101 102   CO2 27 25   ANIONGAP 6 7   * 306*   BUN 20 17   CR 0.87 0.82   JEFF 8.6 9.6     CBC RESULTS:   Recent Labs   Lab Test 09/29/20  0743   WBC 1.1*   RBC 4.21*   HGB 12.0*   HCT 37.3*   MCV 89   MCH 28.5   MCHC 32.2   RDW 14.3   PLT 55*       Liver Function Studies -   Recent Labs   Lab Test 09/29/20  0743   PROTTOTAL 6.6*   ALBUMIN 3.4   BILITOTAL 0.5   ALKPHOS 84   AST 14   ALT 19       Active Medications  Current Facility-Administered Medications   Medication     acetaminophen (TYLENOL) tablet 325-650 mg     acetaminophen (TYLENOL) tablet 325-650 mg     acetaminophen (TYLENOL) tablet 650 mg     acyclovir (ZOVIRAX) tablet 800 mg     allopurinol (ZYLOPRIM) tablet 300 mg     axicabtagene ciloleucel (YESCARTA) infusion 1 Dose     ceFEPIme (MAXIPIME) 2 g vial to attach to  ml bag for ADULTS or 50 ml bag for PEDS     diphenhydrAMINE (BENADRYL) capsule 50 mg     fluconazole (DIFLUCAN) tablet 200 mg     heparin lock flush 10 UNIT/ML injection 2-5 mL     heparin lock flush 10 UNIT/ML injection 5-10 mL     heparin lock flush 10 UNIT/ML injection 5-10 mL     levofloxacin (LEVAQUIN) tablet 250 mg     lidocaine (LMX4) cream     lidocaine 1 % 0.1-1 mL     LORazepam (ATIVAN) injection 0.5-1 mg    Or     LORazepam (ATIVAN) tablet 0.5-1 mg     magnesium sulfate 4 g in 100 mL sterile water (premade)     pantoprazole (PROTONIX) EC tablet 40 mg     potassium chloride (KLOR-CON) Packet 20-40 mEq     potassium chloride 10 mEq in 100 mL intermittent  infusion with 10 mg lidocaine     potassium chloride 10 mEq in 100 mL sterile water intermittent infusion (premix)     potassium chloride 20 mEq in 50 mL intermittent infusion     potassium chloride ER (KLOR-CON M) CR tablet 20-40 mEq     potassium phosphate 15 mmol in D5W 250 mL intermittent infusion     potassium phosphate 20 mmol in D5W 250 mL intermittent infusion     potassium phosphate 20 mmol in D5W 500 mL intermittent infusion     potassium phosphate 25 mmol in D5W 500 mL intermittent infusion     prochlorperazine (COMPAZINE) injection 5 mg    Or     prochlorperazine (COMPAZINE) tablet 5 mg     sodium chloride (PF) 0.9% PF flush 10-20 mL     sodium chloride (PF) 0.9% PF flush 5-50 mL     sodium chloride 0.9% infusion     study - simvastatin (IDS# 5641) tablet 40 mg     [START ON 11/2/2020] sulfamethoxazole-trimethoprim (BACTRIM DS) 800-160 MG per tablet 1 tablet     No current outpatient medications on file.       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  Glucose has been persistently uncontrolled at home lately with major insulin resistance and this is expected to continue with additional steroid given today.    Plan    - IV insulin infusion per protocol  - BG q1-2 Hr per IV insulin protocol  - aspart 1 unit per 3 grams carbohydrate  - aim for transition to subcutaneous regimen tomorrow if BG stabilized  - hypoglycemia protocol    - restart home metformin and hydrocortisone when appropriate    Discussed w/ pt, RN, primary team    Mili Keys APRN [id number]    Diabetes Management Team job code: [id number]    Due to coronavirus precautions, a virtual visit instead of an in-person visit took place.       Pastor Garibay is  "a 69 yo male who is being evaluated via a billable telephone visit.         \"This telephone visit will be conducted via a call between patient and  provider. We have found that certain health care needs can be provided without the need for a full physical exam.  This service lets us provide the care you need with a phone conversation.\"    Patient has given verbal consent for Telephone visit? Yes        I spent a total of 27 minutes via telephone with patient, and 60 minutes floor time /care coordination managing glycemic care. See note for details.               "

## 2020-09-29 NOTE — PROGRESS NOTES
Vascular Access Services Notes:    Pt was NOT available for PICC placement. Pt to have lumbar puncture done at 0900. RN to notify VAS when pt's back from the LP procedure & ready for the PICC.        ANTONY ReedN, RN [facility]

## 2020-09-29 NOTE — PROGRESS NOTES
Type of transplant: Donor: Autologous  Product:      BMT INFUSION DOCUMENTATION (last 48 hours)      BMT/Cellular Product Infusion     Row Name 09/29/20 1300                [REMOVED] Product 09/29/20 1415 T Cells, Apheresis    Product Details Product Release Date: 09/29/20  - Product Release Time: 1415 -JL Product Type: T Cells, Apheresis  -JL DIN: [id number]  - Product Description Code: [id number]  -DAVID Volume Dispensed (mL): 68 mL  -JL Completion Date (RN to complete): 09/29/20  -SR Completion Time (RN to complete): 1640  -SR    Checked by (Patient RN)  Diana Llanes RN  -SR       Checked by (Witness)  Maryjane Freed  -SR       Product Volume Infused (mL)  63 mL  -SR       Flush Volume (mL)  10 mL  -SR       Volume Dispensed (mL)  --         User Key  (r) = Recorded By, (t) = Taken By, (c) = Cosigned By    Initials Name Effective Dates    Patrizia Mitchell 04/29/14 -     Diana Puga RN 08/21/17 -         Preparation: RN Documentation  Patient was premedicated as ordered: yes  Line Type: central line, left  Patient Stable Prior to Infusion: yes  Time Infusion Started: 1619  Teaching: side effects and monitoring  Tolerated/Reaction: without issue   Flush until: 1910   Plan: TTL voicemail and paged to notify that product bag is ready for  on unit.

## 2020-09-29 NOTE — PROGRESS NOTES
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    Interventions: Clinical  (CSW) met with Pt to introduce self and role as well as assess coping, provide supportive counseling and assist with resources as needed. Pt reports he is settling into his room well and denies questions/concerns. Pt was enjoying watching the [employer] game. CSW encouraged Pt to contact CSW for support, questions and/or resources.     Assessment: Pt presented as pleasant and engaged at this time. Pt continues to be supported by his family.     Plan: CSW will continue to provide supportive counseling and assistance with resources as needed. CSW will continue to collaborate with multidisciplinary team regarding Pt's plan of care.     ALEXA Riojas, [facility]  Adult Blood & Marrow Transplant   Phone: [phone]  Pager: [phone]

## 2020-09-29 NOTE — PLAN OF CARE
Afebrile, softer blood pressure, other VSS. Remained on bedrest 1 hour post lumbar puncture. Lumbar puncture site is clean with no signs of hematoma. PICC line placed in left arm. Insulin drip initiated, currently on algorithm 2 at 6 units/hr. Pt did not receive carbohydrate coverage for lunch as Novolog insulin pen was not available yet from pharmacy. Yescarta infusion completed via gravity into PICC, product bag flushed with 10 ml NS, tolerated product infusion without issue. TTL lab paged and voicemail left to notify that product bag is available for pickup.           Problem: Adult Inpatient Plan of Care  Goal: Plan of Care Review  Outcome: No Change     Problem: Adult Inpatient Plan of Care  Goal: Patient-Specific Goal (Individualization)  Outcome: No Change     Problem: Adult Inpatient Plan of Care  Goal: Optimal Comfort and Wellbeing  Outcome: No Change     Problem: Adult Inpatient Plan of Care  Goal: Readiness for Transition of Care  Outcome: No Change     Problem: Diabetes Comorbidity  Goal: Blood Glucose Level Within Desired Range  Outcome: Declining     Problem: Anemia (Chemotherapy Effects)  Goal: Anemia Symptom Improvement  Outcome: No Change     Problem: Neutropenia (Chemotherapy Effects)  Goal: Absence of Infection  Outcome: No Change

## 2020-09-29 NOTE — PROCEDURES
[facility], [place]    Double Lumen PICC Placement    Date/Time: 9/29/2020 3:24 PM  Performed by: Barbie Patel RN  Authorized by: Melvin Tian MD   Indications: vascular access    UNIVERSAL PROTOCOL   Site Marked: Yes  Prior Images Obtained and Reviewed:  Yes  Required items: Required blood products, implants, devices and special equipment available    Patient identity confirmed:  Verbally with patient and arm band  NA - No sedation, light sedation, or local anesthesia  Confirmation Checklist:  Patient's identity using two indicators, relevant allergies, procedure was appropriate and matched the consent or emergent situation and correct equipment/implants were available  Time out: Immediately prior to the procedure a time out was called    Universal Protocol: the Joint Commission Universal Protocol was followed    Preparation: Patient was prepped and draped in usual sterile fashion           ANESTHESIA    Anesthesia: Local infiltration  Local Anesthetic:  Lidocaine 1% without epinephrine  Anesthetic Total (mL):  5      SEDATION    Patient Sedated: No        Preparation: skin prepped with ChloraPrep  Skin prep agent: skin prep agent completely dried prior to procedure  Sterile barriers: maximum sterile barriers were used: cap, mask, sterile gown, sterile gloves, and large sterile sheet  Hand hygiene: hand hygiene performed prior to central venous catheter insertion  Type of line used: PICC and Power PICC  Catheter type: double lumen  Lumen type: non-valved  Catheter size: 5 Fr  Brand: Bard  Placement method: venipuncture, ultrasound, tip confirmation system and MST  Number of attempts: 1  Successful placement: yes  Orientation: left  Location: basilic vein (vd 0.45 cm)  Arm circumference: adults 10 cm  Extremity circumference: 27  Visible catheter length: 0  Total catheter length: 44  Dressing and securement: blood cleaned with CHG, chlorhexidine disc applied,  dressing applied, line secured, securement device, site cleaned, statlock and sterile dressing applied  Post procedure assessment: blood return through all ports and placement verified by x-ray  PROCEDURE   Patient Tolerance:  Patient tolerated the procedure well with no immediate complications  Describe Procedure: PICC ok to use

## 2020-09-29 NOTE — PROGRESS NOTES
Patient admitted to: 5C  Admitted from: Home  Arrived by: Private car  Reason for admission: Yescarta  Patient accompanied by: Self  Belongings: In patient room  Teaching: Oriented patient to plan of care and unit routine.  Skin double check completed by: Diana Llanes RN

## 2020-09-29 NOTE — PROCEDURES
Interventional Radiology Brief Post Procedure Note    Procedure: XR LUMBAR PUNCTURE DIAGNOSTIC / INTRATHECAL STEROID ADMIN    Post Procedure    Findings: Under fluoroscopic guidance, 3.5 inch 22 Gauge spinal needle inserted to the thecal sac. 3 ml CSF colleted. Intrathecal stroid was administrated by Hemo/oncology team.     Fluoroscopy Time:  0.1 minute(s)    SPECIMENS: Fluid and/or tissue for laboratory analysis and culture    Complications: 1. None     Condition: Stable    Plan: 1 hour bed rest. Bedrest with bathroom privilege for the rest of the day. Resume prior care.     Comments: See dictated procedure note for full details.

## 2020-09-29 NOTE — PROCEDURES
After confirming right patient, right drug and expiration, and free CSF flow, I administered dexamethasone 8 mg in 4 ml over 3 minutes with no immediate complications.

## 2020-09-30 ENCOUNTER — APPOINTMENT (OUTPATIENT)
Dept: PHYSICAL THERAPY | Facility: CLINIC | Age: 70
DRG: 018 | End: 2020-09-30
Attending: STUDENT IN AN ORGANIZED HEALTH CARE EDUCATION/TRAINING PROGRAM
Payer: COMMERCIAL

## 2020-09-30 LAB
ANION GAP SERPL CALCULATED.3IONS-SCNC: 5 MMOL/L (ref 3–14)
BASOPHILS # BLD AUTO: 0 10E9/L (ref 0–0.2)
BASOPHILS NFR BLD AUTO: 0 %
BUN SERPL-MCNC: 19 MG/DL (ref 7–30)
CALCIUM SERPL-MCNC: 9.4 MG/DL (ref 8.5–10.1)
CHLORIDE SERPL-SCNC: 106 MMOL/L (ref 94–109)
CMV DNA SPEC NAA+PROBE-ACNC: NORMAL [IU]/ML
CMV DNA SPEC NAA+PROBE-LOG#: NORMAL {LOG_IU}/ML
CO2 SERPL-SCNC: 25 MMOL/L (ref 20–32)
CREAT SERPL-MCNC: 0.75 MG/DL (ref 0.66–1.25)
DIFFERENTIAL METHOD BLD: ABNORMAL
EOSINOPHIL # BLD AUTO: 0 10E9/L (ref 0–0.7)
EOSINOPHIL NFR BLD AUTO: 0.9 %
ERYTHROCYTE [DISTWIDTH] IN BLOOD BY AUTOMATED COUNT: 13.8 % (ref 10–15)
GFR SERPL CREATININE-BSD FRML MDRD: >90 ML/MIN/{1.73_M2}
GLUCOSE BLDC GLUCOMTR-MCNC: 109 MG/DL (ref 70–99)
GLUCOSE BLDC GLUCOMTR-MCNC: 110 MG/DL (ref 70–99)
GLUCOSE BLDC GLUCOMTR-MCNC: 125 MG/DL (ref 70–99)
GLUCOSE BLDC GLUCOMTR-MCNC: 132 MG/DL (ref 70–99)
GLUCOSE BLDC GLUCOMTR-MCNC: 134 MG/DL (ref 70–99)
GLUCOSE BLDC GLUCOMTR-MCNC: 135 MG/DL (ref 70–99)
GLUCOSE BLDC GLUCOMTR-MCNC: 140 MG/DL (ref 70–99)
GLUCOSE BLDC GLUCOMTR-MCNC: 169 MG/DL (ref 70–99)
GLUCOSE BLDC GLUCOMTR-MCNC: 170 MG/DL (ref 70–99)
GLUCOSE BLDC GLUCOMTR-MCNC: 180 MG/DL (ref 70–99)
GLUCOSE BLDC GLUCOMTR-MCNC: 190 MG/DL (ref 70–99)
GLUCOSE BLDC GLUCOMTR-MCNC: 190 MG/DL (ref 70–99)
GLUCOSE BLDC GLUCOMTR-MCNC: 192 MG/DL (ref 70–99)
GLUCOSE BLDC GLUCOMTR-MCNC: 195 MG/DL (ref 70–99)
GLUCOSE BLDC GLUCOMTR-MCNC: 238 MG/DL (ref 70–99)
GLUCOSE BLDC GLUCOMTR-MCNC: 328 MG/DL (ref 70–99)
GLUCOSE BLDC GLUCOMTR-MCNC: 51 MG/DL (ref 70–99)
GLUCOSE BLDC GLUCOMTR-MCNC: 69 MG/DL (ref 70–99)
GLUCOSE BLDC GLUCOMTR-MCNC: 81 MG/DL (ref 70–99)
GLUCOSE BLDC GLUCOMTR-MCNC: 96 MG/DL (ref 70–99)
GLUCOSE SERPL-MCNC: 122 MG/DL (ref 70–99)
HCT VFR BLD AUTO: 34.1 % (ref 40–53)
HGB BLD-MCNC: 11 G/DL (ref 13.3–17.7)
IFC SPECIMEN: NORMAL
IGG SERPL-MCNC: 588 MG/DL (ref 610–1616)
IMMUNODEFICIENCY MARKERS SPEC-IMP: NORMAL
LYMPHOCYTES # BLD AUTO: 0 10E9/L (ref 0.8–5.3)
LYMPHOCYTES NFR BLD AUTO: 0 %
MAGNESIUM SERPL-MCNC: 1.9 MG/DL (ref 1.6–2.3)
MCH RBC QN AUTO: 27.7 PG (ref 26.5–33)
MCHC RBC AUTO-ENTMCNC: 32.3 G/DL (ref 31.5–36.5)
MCV RBC AUTO: 86 FL (ref 78–100)
MONOCYTES # BLD AUTO: 0 10E9/L (ref 0–1.3)
MONOCYTES NFR BLD AUTO: 0 %
NEUTROPHILS # BLD AUTO: 2.2 10E9/L (ref 1.6–8.3)
NEUTROPHILS NFR BLD AUTO: 99.1 %
OVALOCYTES BLD QL SMEAR: SLIGHT
PLATELET # BLD AUTO: 56 10E9/L (ref 150–450)
POIKILOCYTOSIS BLD QL SMEAR: SLIGHT
POTASSIUM SERPL-SCNC: 4.2 MMOL/L (ref 3.4–5.3)
RBC # BLD AUTO: 3.97 10E12/L (ref 4.4–5.9)
SODIUM SERPL-SCNC: 136 MMOL/L (ref 133–144)
SPECIMEN SOURCE: NORMAL
WBC # BLD AUTO: 2.2 10E9/L (ref 4–11)

## 2020-09-30 PROCEDURE — 00000146 ZZHCL STATISTIC GLUCOSE BY METER IP

## 2020-09-30 PROCEDURE — 83735 ASSAY OF MAGNESIUM: CPT | Performed by: STUDENT IN AN ORGANIZED HEALTH CARE EDUCATION/TRAINING PROGRAM

## 2020-09-30 PROCEDURE — 97161 PT EVAL LOW COMPLEX 20 MIN: CPT | Mod: GP | Performed by: PHYSICAL THERAPIST

## 2020-09-30 PROCEDURE — 85025 COMPLETE CBC W/AUTO DIFF WBC: CPT | Performed by: STUDENT IN AN ORGANIZED HEALTH CARE EDUCATION/TRAINING PROGRAM

## 2020-09-30 PROCEDURE — 25000131 ZZH RX MED GY IP 250 OP 636 PS 637: Performed by: CLINICAL NURSE SPECIALIST

## 2020-09-30 PROCEDURE — 25000132 ZZH RX MED GY IP 250 OP 250 PS 637: Performed by: STUDENT IN AN ORGANIZED HEALTH CARE EDUCATION/TRAINING PROGRAM

## 2020-09-30 PROCEDURE — 25000132 ZZH RX MED GY IP 250 OP 250 PS 637: Performed by: INTERNAL MEDICINE

## 2020-09-30 PROCEDURE — 20600000 ZZH R&B BMT

## 2020-09-30 PROCEDURE — 80048 BASIC METABOLIC PNL TOTAL CA: CPT | Performed by: STUDENT IN AN ORGANIZED HEALTH CARE EDUCATION/TRAINING PROGRAM

## 2020-09-30 PROCEDURE — 25000125 ZZHC RX 250: Performed by: CLINICAL NURSE SPECIALIST

## 2020-09-30 PROCEDURE — 25000128 H RX IP 250 OP 636: Performed by: INTERNAL MEDICINE

## 2020-09-30 PROCEDURE — 97110 THERAPEUTIC EXERCISES: CPT | Mod: GP | Performed by: PHYSICAL THERAPIST

## 2020-09-30 PROCEDURE — 97530 THERAPEUTIC ACTIVITIES: CPT | Mod: GP | Performed by: PHYSICAL THERAPIST

## 2020-09-30 PROCEDURE — 97116 GAIT TRAINING THERAPY: CPT | Mod: GP | Performed by: PHYSICAL THERAPIST

## 2020-09-30 RX ORDER — HEPARIN SODIUM (PORCINE) LOCK FLUSH IV SOLN 100 UNIT/ML 100 UNIT/ML
5 SOLUTION INTRAVENOUS
Status: DISCONTINUED | OUTPATIENT
Start: 2020-09-30 | End: 2020-10-08 | Stop reason: HOSPADM

## 2020-09-30 RX ORDER — DOCUSATE SODIUM 100 MG/1
100 CAPSULE, LIQUID FILLED ORAL 2 TIMES DAILY PRN
Status: DISCONTINUED | OUTPATIENT
Start: 2020-09-30 | End: 2020-10-01

## 2020-09-30 RX ADMIN — ACYCLOVIR 800 MG: 800 TABLET ORAL at 18:49

## 2020-09-30 RX ADMIN — ALLOPURINOL 300 MG: 300 TABLET ORAL at 08:14

## 2020-09-30 RX ADMIN — ACYCLOVIR 800 MG: 800 TABLET ORAL at 11:10

## 2020-09-30 RX ADMIN — Medication 40 MG: at 08:14

## 2020-09-30 RX ADMIN — DOCUSATE SODIUM 100 MG: 100 CAPSULE, LIQUID FILLED ORAL at 22:56

## 2020-09-30 RX ADMIN — FLUCONAZOLE 200 MG: 200 TABLET ORAL at 08:15

## 2020-09-30 RX ADMIN — INSULIN GLARGINE 50 UNITS: 100 INJECTION, SOLUTION SUBCUTANEOUS at 14:04

## 2020-09-30 RX ADMIN — MELATONIN TAB 3 MG 3 MG: 3 TAB at 22:56

## 2020-09-30 RX ADMIN — PANTOPRAZOLE SODIUM 40 MG: 40 TABLET, DELAYED RELEASE ORAL at 08:15

## 2020-09-30 RX ADMIN — LEVOFLOXACIN 250 MG: 250 TABLET, FILM COATED ORAL at 11:10

## 2020-09-30 RX ADMIN — ACYCLOVIR 800 MG: 800 TABLET ORAL at 22:56

## 2020-09-30 RX ADMIN — ACYCLOVIR 800 MG: 800 TABLET ORAL at 17:31

## 2020-09-30 RX ADMIN — HUMAN INSULIN 3 UNITS/HR: 100 INJECTION, SOLUTION SUBCUTANEOUS at 09:18

## 2020-09-30 RX ADMIN — ACYCLOVIR 800 MG: 800 TABLET ORAL at 08:14

## 2020-09-30 RX ADMIN — Medication 5 ML: at 17:31

## 2020-09-30 RX ADMIN — Medication: at 16:15

## 2020-09-30 ASSESSMENT — ACTIVITIES OF DAILY LIVING (ADL)
ADLS_ACUITY_SCORE: 13

## 2020-09-30 ASSESSMENT — MIFFLIN-ST. JEOR: SCORE: 1623.96

## 2020-09-30 NOTE — PROCEDURES
BMT Post Infusion Documentation    Data   No data found.     BMT INFUSION DOCUMENTATION (last 24 hours)      BMT/Cellular Product Infusion    No documentation.           Post-Infusion Evaluation:   Infusion Related Reaction: Grade 0 - none  Dyspnea: Grade 0 - none  Hypoxia: Grade 0 - not present  Fever: Grade 0 - afebrile  Chills: Grade 0 - none  Febrile Neutropenia: Grade 0 - not present  Sinus Bradycardia: Grade 0 - none  Hypertension: Grade 2 - stage 1 hypertension (systolic -159 mm Hg or diastolic BP 90-99 mm Hg); medical intervention indicated; recurrent or persistent (>/ 24 hours); symptomatic increase by >/ 20 mm Hg (diastolic) or to > 140/90 mm Hg if previously WNL; monotherapy indicated  Hypotension: Grade 0 - none  Chest Pain: Grade 0 - none  Bronchospasm: Grade 0 - none  Pain: Grade 0 - none  Rash: Grade 0 - None  Neurologic Specify: none    If this was a cord blood transplant, was more than one cord blood unit infused? No      SKINNY PachecoC

## 2020-09-30 NOTE — SIGNIFICANT EVENT
SPIRITUAL HEALTH SERVICES Significant Event  [employer] [facility] [employer]  [facility] ([place] 5c    Pt anointed by Father Munira per his request.    Fr. Munira Frazier   Pager [phone]

## 2020-09-30 NOTE — PLAN OF CARE
"./80 (BP Location: Left arm)   Pulse 80   Temp 97.6  F (36.4  C) (Axillary)   Resp 16   Ht 1.73 m (5' 8.11\")   Wt 88.1 kg (194 lb 4.8 oz)   SpO2 97%   BMI 29.45 kg/m      Pt alert and oriented. Neuros intact. Avss. Denies pain or nausea. Received sched Melatonin at HS. Pt sleeping between cares. He is on insulin gtt alg 3 at 1.5u/hr.  Checking blood sugar q1h, blood sugar stabilizing. BG within goal x4 therefore will be check q2hrs. Cont to monitor and follow poc.    Problem: Adult Inpatient Plan of Care  Goal: Plan of Care Review  9/30/2020 0528 by Lonny Alba RN  Outcome: No Change     Problem: Adult Inpatient Plan of Care  Goal: Patient-Specific Goal (Individualization)  9/30/2020 0528 by Lonny Alba, RN  Outcome: No Change     Problem: Adult Inpatient Plan of Care  Goal: Absence of Hospital-Acquired Illness or Injury  Outcome: No Change     Problem: Diabetes Comorbidity  Goal: Blood Glucose Level Within Desired Range  9/30/2020 0528 by Lonny Alba, RN  Outcome: Improving     Problem: Anemia (Chemotherapy Effects)  Goal: Anemia Symptom Improvement  9/30/2020 0528 by Lonny Alba, RN  Outcome: No Change     Problem: Urinary Bleeding Risk or Actual (Chemotherapy Effects)  Goal: Absence of Hematuria  Outcome: No Change     Problem: Nausea and Vomiting (Chemotherapy Effects)  Goal: Fluid and Electrolyte Balance  Outcome: No Change     Problem: Neurotoxicity (Chemotherapy Effects)  Goal: Neurotoxicity Symptom Control  Outcome: No Change     Problem: Neutropenia (Chemotherapy Effects)  Goal: Absence of Infection  9/30/2020 0528 by Lonny Alba, RN  Outcome: No Change        "

## 2020-09-30 NOTE — PLAN OF CARE
OT/5C: HOLD. Per PT, pt does not currently have any IP OT needs and PT will follow for balance, strength, and endurance. Due to course of treatment, OT will hold and follow from periphery and initiate as indicated.

## 2020-09-30 NOTE — PROGRESS NOTES
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 who received infusion on 9/29/2020, currently day +1 of CAR-T cell therapy [facility]    Vital Signs: /68   Pulse 78   Temp 98  F (36.7  C)   Resp 16   Ht 1.73 m (5' 8.11\")   Wt 88.1 kg (194 lb 4.8 oz)   SpO2 95%   BMI 29.45 kg/m      1) CRS Grading (based ONLY on parameters in box below)    Fever:   </= 100.4    Blood pressure:   SBP >/= 90 (not hypotensive)    Oxygen saturation:    >/= 90% on room air (not hypoxic)    CRS Parameter Grade 1  Grade 2 Grade 3 Grade 4   Fever* Tm >= 100.4 degrees F Tm >= 100.4 degrees F Tm >= 100.4 degrees F Tm >= to 100.4  degrees F      With Either:  Hypotension (SBP <90) None Responsive to Fluids  Requiring 1  vasopressor (w/ or w/o vasopressin) Requiring multiple  vasopressors  (excluding  vasopressin)     And/or  Hypoxia (O2 sats <90% on room air) None Low-flow nasal  cannula or blow-by High-flow nasal  cannula, face mask,  non-rebreather mask,or Venturi mask  Requiring positive  pressure (CPAP,  BiPAP intubation and  mechanical  ventilation)     CRS Summary  Does patient have CRS? No  Current CRS stgstrstastdstest:st st1st What therapy was given: n/a  Has CRS grade changed? n/a   Has CRS resolved? n/a       2) Neurotoxicity:    ICE Assessment  Orientation to year, month, city, hospital: 4 points, Name 3 objects (e.g., point to clock, pen, button): 3 point, Following commands: (e.g., Show me 2 fingers): 1 point, Write a standard sentence (e.g., [employer] wilson jumped over the log): 1 point and Count backwards from 100 by ten: 1 point  Total points: 10: No impairment    ASTCT ICANS Consensus Grading for Adults  ICE Score   10    Seizure   No seizures    Motor Findings   No motor findings    Elevated ICP/Cerebral Edema   None      Neurotoxicity  Domain Grade 1 Grade 2 Grade 3 Grade 4   ICE score 7-9 3-6 1-2 0   Depressed " LOC      Awakens  spontaneously Awakens to  voice  Awakens only to  tactile stimulation Unarousable or  requires vigorous  or repetitive  tactile stimuli to  arouse. Stupor  or coma.   Seizure n/a n/a Any clinical  seizure focal or  generalized that  resolves rapidly  or nonconvulsive  seizures on EEG  that resolve with  intervention  Life-threatening  prolonged  seizure (>5 min);  or Repetitive  clinical or  electrical  seizures without  return to baseline  in between    Motor Findings      n/a n/a n/a Deep focal motor  weakness such  as hemiparesis  or paraparesis   Elevated  ICP/cerebral  edema  n/a n/a Focal/local  edema on  neuroimaging  Diffuse cerebral  edema on  neuroimaging;  decerebrate or  decorticate  posturing; or  cranial nerve VI  palsy; or  papilledema; or  Cushing's triad     ICANS grade is determined by the most severe event (ICE score, level of consciousness, seizure, motor findings, raised ICP/cerebral edema) not attributable to any other cause; for example, a patient with an ICE score of 3 who has a generalized seizure is classified as grade 3 ICANS.    A patient with an ICE score of 0 may be classified as grade 3 ICANS if awake with global aphasia, but a patient with an ICE score of 0 may be classified as grade 4 ICANS if unarousable.     Depressed level of consciousness should be attributable to no other cause (eg, no sedating medication)    Tremors and myoclonus associated with immune effector cell therapies may be graded according to CTCAE v5.0,but they do not influence ICANS grading.     Intracranial hemorrhage with or without associated edema is not considered a neurotoxicity feature and is  excluded from ICANS grading. It may be graded according to CTCAE v5.0.          Neurotoxicity Summary  Does patient have neurotoxicity? No  Current Neurotoxicity score (0-10): 0  What therapy was given: n/a  Has neurotoxicity grade changed? n/a   Has neurotoxicity resolved? n/a       3) Organ CAR-T  toxicity:    Cardiac   none    Respiratory   none    Gastrointestinal   none    Hepatic    none    Skin   none     Coagulopathy    none     Other: n/a      4) HLH   Ferritin > 10,000 plus any TWO of the following:     * CTCAE grading can be found at   https://ctep.cancer.gov/protocoldevelopment/electronic_applications/docs/CTCAE_v5_Quick Reference_8.5x11.pdf    Mary Skinner [facility]  [phone]

## 2020-09-30 NOTE — PLAN OF CARE
AVSS.   No pain. No stool yet may request prn dulcolax.  Eating and drinking well.  Insulin gtt until 1600.  3-14.5units.  BS up only after eating.  Carb coverage done.  Lantus at 1400.  Is now BS checks AC/HS.  A & O.  Sentence sheet done will scan in.  Up with therapy and walking in the halls.  Continue to monitor.    Insulin gtt ending BS 81.  Pt felt ok.  Rechecked 1 hr later when pt said he felt his vision be off and a little light headed and dizzy.  BS check 51.  Ice cream x1.  1st post check 69 and pt said vision better.  2nd recheck 96.  Pt then eating dinner.  Wants to recheck BS after he is done then decide on carb coverage.  No sliding scale insulin given!  Per endocrine please do 0200 BS at 0200.  Then do am labs at normal time.  Need to monitor how the lantus dose is affecting him.    Problem: Adult Inpatient Plan of Care  Goal: Plan of Care Review  9/30/2020 1515 by Hailey Win, RN  Outcome: No Change  Flowsheets (Taken 9/30/2020 1515)  Plan of Care Reviewed With: patient     Problem: Adult Inpatient Plan of Care  Goal: Plan of Care Review  9/30/2020 1513 by Hailey Win, RN  Outcome: No Change  Flowsheets (Taken 9/30/2020 1513)  Plan of Care Reviewed With: patient     Problem: Neurotoxicity (Chemotherapy Effects)  Goal: Neurotoxicity Symptom Control  9/30/2020 1515 by Hailey Win, RN  Outcome: No Change     Problem: Adjustment to Transplant (Stem Cell/Bone Marrow Transplant)  Goal: Optimal Coping with Transplant  Outcome: No Change     Problem: Fatigue (Stem Cell/Bone Marrow Transplant)  Goal: Energy Level Supports Daily Activity  Outcome: No Change     Problem: Hematologic Alteration (Stem Cell/Bone Marrow Transplant)  Goal: Blood Counts Within Acceptable Range  Outcome: No Change     Problem: Infection Risk (Stem Cell/Bone Marrow Transplant)  Goal: Absence of Infection Signs/Symptoms  Outcome: No Change     Problem: Diabetes Comorbidity  Goal: Blood Glucose Level Within Desired Range  9/30/2020  1515 by Hailey Win, RN  Outcome: Improving

## 2020-09-30 NOTE — PLAN OF CARE
Discharge Planner PT   Patient plan for discharge: not discussed today   Current status: PT evaluation completed today. Pt is independent with bed mobility and sit to stand. With ambulation 750 ft with no assistive device and head turns/nods, he demo mild instability, requiring CGA x 1 for safety, though otherwise SBA for ambulation. Completed 14 minutes of recumbent bike exercise and tolerated well.   Barriers to return to prior living situation: medical needs, decreased functional mobility, impaired activity tolerance, impaired balance.   Recommendations for discharge: home with assist and OPPT  Rationale for recommendations: Anticipate functional decline during hospital stay, which pt would benefit from PT to improve functional mobility, activity tolerance, and balance impairments. He would benefit from ongoing skilled therapy in an OPPT setting to address his higher level balance impairments to allow him to work towards his long term goal of jogging with his wife.        Entered by: Jose Valderrama 09/30/2020 4:50 PM

## 2020-09-30 NOTE — PROGRESS NOTES
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  Glucose has been persistently uncontrolled at home lately with major insulin resistance and this is expected to continue with additional steroid given yesterday.     Plan     - IV insulin infusion per protocol  Until after glargine given  - BG q1-2 Hr per IV insulin protocol  - aspart 1 unit per 3 grams carbohydrate--> increase to 1 unit per 2 grams carb  - aim for transition to subcutaneous regimen this afternoon: glargine 50 untis q24h, then stop IV insulin 2 hours later  - aspart very high correction qAC, HS 0200 once off IV insilin  - hypoglycemia protocol     - restart home metformin and hydrocortisone when appropriate       Outpatient diabetes follow up: Dr Turner  Plan discussed with patient, bedside RN           Interval History:     The last 24 hours progress and nursing notes reviewed.  In target (mostly low to mid 100s) since about 0100, using 1.5-4 units/h, average = 2.6     Feeling pretty well.  Eating high carb meals without any issue.  Was nervous about 1 unit per 2 gram dosing this morning  Discussed steroid effect, expectations  Systematic dosing versus \"guess work\"-- can give him tools foir home    While eating high carb meals here, he is not snacking like he would at home    Recent Labs   Lab 09/30/20  0915 09/30/20  0810 09/30/20  0656 09/30/20  0500 09/30/20  0408 09/30/20  0312 09/30/20  0307  09/29/20  1415  09/29/20  0743 09/28/20  1057 09/25/20  0818 09/24/20  0823   GLC  --   --   --   --   --  122*  --   --  257*  --  305* 306* 368* 350*   * 140* 135* 109* 110*  --  125*   < >  --    < >  --   --   --   --     < > = values in this interval not displayed.          " " Nutrition:     Orders Placed This Encounter      High Kcal/High Protein Diet, ADULT    Supplements:         PTA Regimen:   Off metformin x about 1 week  glargine usually 30 units (but up to 40) at HS  aspart roughly 1 unit per 10 mg/dL correction plus some for food          Review of Systems:   See interval hx          Medications:   IT dexamethasone, last on 9/29-- 8 mg (also on 9/23, 9/24)         Physical Exam:     Gen: Alert, in NAD, resting in bed  HEENT: NC/AT hearing intact to conversational volume  Resp: Unlabored  Neuro: oriented x3, communicating clearly  Psych: calm, even mood  /68   Pulse 78   Temp 98  F (36.7  C)   Resp 16   Ht 1.73 m (5' 8.11\")   Wt 87.8 kg (193 lb 8 oz)   SpO2 95%   BMI 29.33 kg/m               Data:     Lab Results   Component Value Date    A1C 7.3 05/22/2020              CBC RESULTS:   Recent Labs   Lab Test 09/30/20 0312   WBC 2.2*   RBC 3.97*   HGB 11.0*   HCT 34.1*   MCV 86   MCH 27.7   MCHC 32.3   RDW 13.8   PLT 56*     Recent Labs   Lab Test 09/30/20  0312 09/29/20  1415    134   POTASSIUM 4.2 4.5   CHLORIDE 106 104   CO2 25 24   ANIONGAP 5 6   * 257*   BUN 19 17   CR 0.75 0.75   JEFF 9.4 9.1     Liver Function Studies -   Recent Labs   Lab Test 09/29/20  1415   PROTTOTAL 6.7*   ALBUMIN 3.5   BILITOTAL 0.5   ALKPHOS 82   AST 17   ALT 19     Lab Results   Component Value Date    INR 1.04 09/29/2020    INR 1.07 09/29/2020           Milisatnam Eatonpton APRN [facility] [phone]  Diabetes Management job code [id number]                "

## 2020-09-30 NOTE — PROGRESS NOTES
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    Transplant Essential Data:  Diagnosis NHLDL Non-Hodgkin lymphoma, Diffuse, large cell  HCT Type Autologous    Prep Regimen No data was found   Donor Source CAR-T    GVHD Prophylaxis No data was found  Clinical Trials n/a       HPI: Pastor woke up feeling well. His toes feel a little painful or burning. No sores he knows about. No nausea or vomiting. No diarrhea. Slightly reduced appetite but eating okay.       Review of Systems    8 point review with pertinent positive and negatives     PHYSICAL EXAM      Weight     Wt Readings from Last 3 Encounters:   09/29/20 88.1 kg (194 lb 4.8 oz)   09/28/20 88.2 kg (194 lb 8 oz)   09/25/20 90.7 kg (200 lb)        KPS: 90    /68   Pulse 78   Temp 98  F (36.7  C)   Resp 16   Ht 1.73 m (5' 8.11\")   Wt 88.1 kg (194 lb 4.8 oz)   SpO2 95%   BMI 29.45 kg/m       General: NAD   Eyes: VANGIE, sclera anicteric   Nose/Mouth/Throat: OP clear, buccal mucosa moist, no ulcerations   Lungs: CTA bilaterally  Cardiovascular: RRR, no M/R/G   Abdominal/Rectal: +BS, soft, NT, ND, No HSM. Umbilical hernia chronic, stable  Lymphatics: No edema  Skin: No rashes or petechaie  MSK: Toes, second and third/left, amputation, skin is dry, clean. No sores or break in skin.  Neuro: A&O   Additional Findings: Right chest port a cath, left upper arm PICC c/d/i    Labs:  Lab Results   Component Value Date    WBC 2.2 09/30/2020     Lab Results   Component Value Date    RBC 3.97 09/30/2020     Lab Results   Component Value Date    HGB 11.0 09/30/2020     Lab Results   Component Value Date    HCT 34.1 09/30/2020     Lab Results   Component Value Date    MCV 86 09/30/2020     Lab Results   Component Value Date    MCH 27.7 09/30/2020     Lab Results   Component Value Date    MCHC 32.3 09/30/2020     Lab Results   Component Value Date    RDW 13.8 09/30/2020 "     Lab Results   Component Value Date    PLT 56 09/30/2020     Last Comprehensive Metabolic Panel:  Sodium   Date Value Ref Range Status   09/30/2020 136 133 - 144 mmol/L Final     Potassium   Date Value Ref Range Status   09/30/2020 4.2 3.4 - 5.3 mmol/L Final     Chloride   Date Value Ref Range Status   09/30/2020 106 94 - 109 mmol/L Final     Carbon Dioxide   Date Value Ref Range Status   09/30/2020 25 20 - 32 mmol/L Final     Anion Gap   Date Value Ref Range Status   09/30/2020 5 3 - 14 mmol/L Final     Glucose   Date Value Ref Range Status   09/30/2020 122 (H) 70 - 99 mg/dL Final     Urea Nitrogen   Date Value Ref Range Status   09/30/2020 19 7 - 30 mg/dL Final     Creatinine   Date Value Ref Range Status   09/30/2020 0.75 0.66 - 1.25 mg/dL Final     GFR Estimate   Date Value Ref Range Status   09/30/2020 >90 >60 mL/min/[1.73_m2] Final     Comment:     Non  GFR Calc  Starting 12/18/2018, serum creatinine based estimated GFR (eGFR) will be   calculated using the Chronic Kidney Disease Epidemiology Collaboration   (CKD-EPI) equation.       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 T cells collected 8/31/2020. 2017-45 [facility]a, 2019-35: IT dex + simvastatin for JUAQUIN prevention. Day +1    Day 0: LP with IT dex. PICC line placement, cells to be given AFTER 1530 (6 hours post LP)    1.  BMT:  DLBCL  Axi-cell product with CNS prevention protocol, day 0  CNS prophy: simvastatin 40mg tied to IDS drug    2.  HEME: Keep Hgb>7 and plts>10K. No pre-meds.                            3.  ID: Afebrile. Levo, Fluc, and HD ACV (CMV+, HSV+, EBV+) prophy. Bactrim or appropriate PCP therapy to start d+28.         COVID neg re swabbed as daughter was found to be covid pos this  weekend.                                       4.  GI: Zofran and dexamethasone prior to chemo to prevent N/V. Ativan and compazine available PRN break-through symptoms. Protonix for GI prophy.     6.  FEN/Renal: Monitor creat and lytes. Replete lytes PRN per SS. Monitor weight, I+O, lytes per protocol with IVF flush.    7. Endo: DM on glipizide and insulin at home. Endocrine consulted.  Complication of DM with 2 left toes amputated 7/2020    Mary Skinner  [phone]    The patient was seen and examined by me separately from the midlevel provider. The note reflects our mutual assessment and plans and were approved by me personally.   I personally reviewed today's lab results vital signs and radiology results.    Each point of the assessment and plan were reviewed by the midlevel and me and either endorsed by me or were my added decisions.    My pertinent physical findings today are: Sfebrile, clear lungs and no rash.    My assessment and plan are: 69 yo with refractory DLBCL admitted for [facility]A CART. Orders already signed. Monitor for CRS.  Day +1 and doing well.    Melvin Tian M.D.

## 2020-10-01 LAB
ANION GAP SERPL CALCULATED.3IONS-SCNC: 5 MMOL/L (ref 3–14)
BACTERIA SPEC CULT: NORMAL
BASOPHILS # BLD AUTO: 0 10E9/L (ref 0–0.2)
BASOPHILS NFR BLD AUTO: 0.9 %
BUN SERPL-MCNC: 16 MG/DL (ref 7–30)
CALCIUM SERPL-MCNC: 9.2 MG/DL (ref 8.5–10.1)
CHLORIDE SERPL-SCNC: 104 MMOL/L (ref 94–109)
CO2 SERPL-SCNC: 28 MMOL/L (ref 20–32)
CREAT SERPL-MCNC: 0.77 MG/DL (ref 0.66–1.25)
DIFFERENTIAL METHOD BLD: ABNORMAL
EOSINOPHIL # BLD AUTO: 0.1 10E9/L (ref 0–0.7)
EOSINOPHIL NFR BLD AUTO: 7.7 %
ERYTHROCYTE [DISTWIDTH] IN BLOOD BY AUTOMATED COUNT: 14 % (ref 10–15)
GFR SERPL CREATININE-BSD FRML MDRD: >90 ML/MIN/{1.73_M2}
GLUCOSE BLDC GLUCOMTR-MCNC: 101 MG/DL (ref 70–99)
GLUCOSE BLDC GLUCOMTR-MCNC: 122 MG/DL (ref 70–99)
GLUCOSE BLDC GLUCOMTR-MCNC: 130 MG/DL (ref 70–99)
GLUCOSE BLDC GLUCOMTR-MCNC: 133 MG/DL (ref 70–99)
GLUCOSE BLDC GLUCOMTR-MCNC: 141 MG/DL (ref 70–99)
GLUCOSE BLDC GLUCOMTR-MCNC: 217 MG/DL (ref 70–99)
GLUCOSE BLDC GLUCOMTR-MCNC: 228 MG/DL (ref 70–99)
GLUCOSE BLDC GLUCOMTR-MCNC: 59 MG/DL (ref 70–99)
GLUCOSE BLDC GLUCOMTR-MCNC: 62 MG/DL (ref 70–99)
GLUCOSE SERPL-MCNC: 89 MG/DL (ref 70–99)
HCT VFR BLD AUTO: 34.9 % (ref 40–53)
HGB BLD-MCNC: 11.2 G/DL (ref 13.3–17.7)
LYMPHOCYTES # BLD AUTO: 0 10E9/L (ref 0.8–5.3)
LYMPHOCYTES NFR BLD AUTO: 0.9 %
Lab: NORMAL
MCH RBC QN AUTO: 27.9 PG (ref 26.5–33)
MCHC RBC AUTO-ENTMCNC: 32.1 G/DL (ref 31.5–36.5)
MCV RBC AUTO: 87 FL (ref 78–100)
MONOCYTES # BLD AUTO: 0 10E9/L (ref 0–1.3)
MONOCYTES NFR BLD AUTO: 0.9 %
NEUTROPHILS # BLD AUTO: 1.2 10E9/L (ref 1.6–8.3)
NEUTROPHILS NFR BLD AUTO: 89.6 %
PLATELET # BLD AUTO: 38 10E9/L (ref 150–450)
PLATELET # BLD EST: ABNORMAL 10*3/UL
POTASSIUM SERPL-SCNC: 3.5 MMOL/L (ref 3.4–5.3)
RBC # BLD AUTO: 4.01 10E12/L (ref 4.4–5.9)
RBC MORPH BLD: NORMAL
SODIUM SERPL-SCNC: 137 MMOL/L (ref 133–144)
SPECIMEN SOURCE: NORMAL
WBC # BLD AUTO: 1.3 10E9/L (ref 4–11)

## 2020-10-01 PROCEDURE — 85025 COMPLETE CBC W/AUTO DIFF WBC: CPT | Performed by: INTERNAL MEDICINE

## 2020-10-01 PROCEDURE — 80048 BASIC METABOLIC PNL TOTAL CA: CPT | Performed by: INTERNAL MEDICINE

## 2020-10-01 PROCEDURE — 999N001017 HC STATISTIC GLUCOSE BY METER IP

## 2020-10-01 PROCEDURE — 250N000013 HC RX MED GY IP 250 OP 250 PS 637: Performed by: STUDENT IN AN ORGANIZED HEALTH CARE EDUCATION/TRAINING PROGRAM

## 2020-10-01 PROCEDURE — 206N000001 HC R&B BMT UMMC

## 2020-10-01 PROCEDURE — 99233 SBSQ HOSP IP/OBS HIGH 50: CPT | Performed by: CLINICAL NURSE SPECIALIST

## 2020-10-01 PROCEDURE — 250N000011 HC RX IP 250 OP 636

## 2020-10-01 PROCEDURE — 250N000013 HC RX MED GY IP 250 OP 250 PS 637: Performed by: INTERNAL MEDICINE

## 2020-10-01 PROCEDURE — 99233 SBSQ HOSP IP/OBS HIGH 50: CPT | Performed by: INTERNAL MEDICINE

## 2020-10-01 RX ORDER — BISACODYL 5 MG
5 TABLET, DELAYED RELEASE (ENTERIC COATED) ORAL DAILY PRN
Status: DISCONTINUED | OUTPATIENT
Start: 2020-10-01 | End: 2020-10-08 | Stop reason: HOSPADM

## 2020-10-01 RX ADMIN — SODIUM CHLORIDE, PRESERVATIVE FREE 5 ML: 5 INJECTION INTRAVENOUS at 04:15

## 2020-10-01 RX ADMIN — PANTOPRAZOLE SODIUM 40 MG: 40 TABLET, DELAYED RELEASE ORAL at 08:04

## 2020-10-01 RX ADMIN — ACYCLOVIR 800 MG: 800 TABLET ORAL at 08:04

## 2020-10-01 RX ADMIN — MELATONIN TAB 3 MG 3 MG: 3 TAB at 21:52

## 2020-10-01 RX ADMIN — ALLOPURINOL 300 MG: 300 TABLET ORAL at 08:04

## 2020-10-01 RX ADMIN — Medication 40 MG: at 08:05

## 2020-10-01 RX ADMIN — ACYCLOVIR 800 MG: 800 TABLET ORAL at 13:30

## 2020-10-01 RX ADMIN — LEVOFLOXACIN 250 MG: 250 TABLET, FILM COATED ORAL at 11:21

## 2020-10-01 RX ADMIN — ACYCLOVIR 800 MG: 800 TABLET ORAL at 11:21

## 2020-10-01 RX ADMIN — ACYCLOVIR 800 MG: 800 TABLET ORAL at 21:52

## 2020-10-01 RX ADMIN — Medication 5 ML: at 08:05

## 2020-10-01 RX ADMIN — FLUCONAZOLE 200 MG: 200 TABLET ORAL at 08:04

## 2020-10-01 RX ADMIN — ACYCLOVIR 800 MG: 800 TABLET ORAL at 18:13

## 2020-10-01 ASSESSMENT — ACTIVITIES OF DAILY LIVING (ADL)
ADLS_ACUITY_SCORE: 13

## 2020-10-01 ASSESSMENT — MIFFLIN-ST. JEOR: SCORE: 1619.88

## 2020-10-01 NOTE — PLAN OF CARE
"/77   Pulse 80   Temp 97.6  F (36.4  C) (Axillary)   Resp 16   Ht 1.73 m (5' 8.11\")   Wt 87.8 kg (193 lb 8 oz)   SpO2 99%   BMI 29.33 kg/m      AVSS on RA. Denies pain or nausea. Adequate UOP and fluid intake. Carb coverage parameters higher than patient thought necessary, MD notified and one time dose of insulin ordered, see MAR. No insulin sliding scale needed. Pt independent in the room. Continue to monitor and follow POC.    Problem: Adult Inpatient Plan of Care  Goal: Plan of Care Review  10/1/2020 0557 by Rylee Gallegos, RN  Outcome: No Change  10/1/2020 0552 by Rylee Gallegos, RN  Outcome: No Change  Flowsheets (Taken 9/30/2020 2100)  Plan of Care Reviewed With: patient     Problem: Diabetes Comorbidity  Goal: Blood Glucose Level Within Desired Range  Outcome: No Change  Intervention: Monitor and Manage Glycemia  Recent Flowsheet Documentation  Taken 9/30/2020 2100 by Rylee Gallegos, RN  Glycemic Management: blood glucose monitoring     "

## 2020-10-01 NOTE — PROGRESS NOTES
09/30/20 1600   Quick Adds   Type of Visit Initial PT Evaluation   Student Supervision-Eval Only    Student Supervision Direct supervision provided;Direct patient contact provided      Language english    Living Environment   Lives With spouse   Living Arrangements house  (rambler)   Home Accessibility stairs within home   Number of Stairs, Within Home, Primary other (see comments)  (11)   Stair Railings, Within Home, Primary railing on right side (ascending)   Transportation Anticipated car, drives self   Living Environment Comment Pt lives at home with spouse in rambler home setup. Able to live on main floor with bedroom and bathroom.  Basement is mostly for leisure activities and he has been staying out of basement due to mold.    Self-Care   Usual Activity Tolerance moderate   Current Activity Tolerance fair   Equipment Currently Used at Home crutches;walker, rolling;other (see comments)  (also has walking stick, FWW. Does not use them regularly)   Functional Level Prior   Ambulation 0-->independent   Transferring 0-->independent   Toileting 0-->independent  (standard height toilet)   Bathing 0-->independent  (tub shower)   Communication 0-->understands/communicates without difficulty   Swallowing 0-->swallows foods/liquids without difficulty   Cognition 0 - no cognition issues reported   Fall history within last six months yes   Number of times patient has fallen within last six months 1  (fell on accident 2/2 tripping on raised surface)   Which of the above functional risks had a recent onset or change? none   General Information   Onset of Illness/Injury or Date of Surgery - Date 09/29/20   Referring Physician Mary Skinner, PAJanesC   Patient/Family Goals Statement To get into better shape. To be able to jog with wife as long term goal.    Pertinent History of Current Problem (include personal factors and/or comorbidities that impact the POC) 70 year old male with relapsed DLBCL (c-MYC and bcl-2+)  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 T cells collected 8/31/2020. 2017-45 [facility], 2019-35: IT dex + simvastatin for JUAQUIN prevention. Day +1   Precautions/Limitations immunosuppressed;abdominal precautions  (abdominal hernia)   General Observations supine in bed,  PICC LUE, recumbent bike in room    General Info Comments  [facility] - ADULT ICU Early Mobility Guidelines    Cognitive Status Examination   Orientation orientation to person, place and time   Level of Consciousness alert   Follows Commands and Answers Questions 100% of the time;able to follow multistep instructions   Personal Safety and Judgment intact   Memory intact   Posture    Posture Forward head position   Range of Motion (ROM)   ROM Comment UE and LE ROM WFL   Strength   Strength Comments BUE(5/5: abduction, elbow flexion/extension, finger flexion. BLE (5/5): hip flexion, knee flexion/extension, ankle plantar/dorsiflexion   Bed Mobility   Bed Mobility Comments Indepedent    Transfer Skills   Transfer Comments Independent. With supine to sit, pt uses LE momentum and UE support to assist with transition 2/2 abdominal weakness and abdominal hernia   Gait   Gait Comments Mild unsteadiness, which he states is baseline. No LOB. Increased heel-toe pattern on L foot    Balance   Balance Comments Good static sitting/standing balance. Dynamic standing balance has some instability noted with path deviation and slowed gait speed with head turns/nods, though no LOB.    Sensory Examination   Sensory Perception Comments denies n/t everywhere excepet L foot dorsum, which pt says comes and goes; pt attributes this to chemo   General Therapy Interventions   Planned Therapy Interventions balance training;bed mobility training;gait training;neuromuscular re-education;ROM;strengthening;stretching;transfer training;risk factor education;home program guidelines;progressive activity/exercise   Clinical Impression  "  Criteria for Skilled Therapeutic Intervention yes, treatment indicated   PT Diagnosis Deconditioning and impaired mobility   Influenced by the following impairments anticipated decline in functional strength and increase in fatigue 2/2 treatment    Functional limitations due to impairments decreased ambulation tolerance and ability to safely complete ADLs   Clinical Presentation Stable/Uncomplicated   Clinical Presentation Rationale clinical judgement; currently stable though anticipate functional decline 2/2 treatment    Clinical Decision Making (Complexity) Low complexity   Therapy Frequency 3x/week   Predicted Duration of Therapy Intervention (days/wks) 2 weeks   Anticipated Equipment Needs at Discharge shower chair   Anticipated Discharge Disposition Home with Outpatient Therapy;Home with Assist   Risk & Benefits of therapy have been explained Yes   Patient, Family & other staff in agreement with plan of care Yes   [facility] [employer] TM \"6 Clicks\"   2016, Trustees of [facility], under license to [employer].  All rights reserved.   6 Clicks Short Forms Basic Mobility Inpatient Short Form   [facility] AM-PAC  \"6 Clicks\" V.2 Basic Mobility Inpatient Short Form   1. Turning from your back to your side while in a flat bed without using bedrails? 4 - None   2. Moving from lying on your back to sitting on the side of a flat bed without using bedrails? 4 - None   3. Moving to and from a bed to a chair (including a wheelchair)? 4 - None   4. Standing up from a chair using your arms (e.g., wheelchair, or bedside chair)? 4 - None   5. To walk in hospital room? 4 - None   6. Climbing 3-5 steps with a railing? 4 - None   Basic Mobility Raw Score (Score out of 24.Lower scores equate to lower levels of function) 24   Total Evaluation Time   Total Evaluation Time (Minutes) 15     "

## 2020-10-01 NOTE — PROGRESS NOTES
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 Otherwise no complaints. Eating okay without n/v/d. No rashes, bruises or bleeding.      Review of Systems    8 point review with pertinent positive and negatives     PHYSICAL EXAM      Weight     Wt Readings from Last 3 Encounters:   10/01/20 87.4 kg (192 lb 9.6 oz)   09/28/20 88.2 kg (194 lb 8 oz)   09/25/20 90.7 kg (200 lb)        KPS: 90    /79 (BP Location: Right arm)   Pulse 83   Temp 96.6  F (35.9  C) (Axillary)   Resp 16   Ht 1.73 m (5' 8.11\")   Wt 87.4 kg (192 lb 9.6 oz)   SpO2 96%   BMI 29.19 kg/m       General: NAD   Eyes: VANGIE, sclera anicteric   Nose/Mouth/Throat: OP clear, buccal mucosa moist, no ulcerations   Lungs: CTA bilaterally  Cardiovascular: RRR, no M/R/G   Abdominal/Rectal: +BS, soft, NT, ND, No HSM. Umbilical hernia chronic, stable  Lymphatics: No edema  Skin: No rashes or petechaie  MSK: Toes, second and third/left, amputation, skin is dry, clean. No sores or break in skin.  Neuro: A&O   Additional Findings: Right chest port a cath, left upper arm PICC c/d/i    Labs:  Lab Results   Component Value Date    WBC 1.3 10/01/2020     Lab Results   Component Value Date    RBC 4.01 10/01/2020     Lab Results   Component Value Date    HGB 11.2 10/01/2020     Lab Results   Component Value Date    HCT 34.9 10/01/2020     Lab Results   Component Value Date    MCV 87 10/01/2020     Lab Results   Component Value Date    MCH 27.9 10/01/2020     Lab Results   Component Value Date    MCHC 32.1 10/01/2020     Lab Results   Component Value Date    " RDW 14.0 10/01/2020     Lab Results   Component Value Date    PLT 38 10/01/2020     Last Comprehensive Metabolic Panel:  Sodium   Date Value Ref Range Status   10/01/2020 137 133 - 144 mmol/L Final     Potassium   Date Value Ref Range Status   10/01/2020 3.5 3.4 - 5.3 mmol/L Final     Chloride   Date Value Ref Range Status   10/01/2020 104 94 - 109 mmol/L Final     Carbon Dioxide   Date Value Ref Range Status   10/01/2020 28 20 - 32 mmol/L Final     Anion Gap   Date Value Ref Range Status   10/01/2020 5 3 - 14 mmol/L Final     Glucose   Date Value Ref Range Status   10/01/2020 89 70 - 99 mg/dL Final     Urea Nitrogen   Date Value Ref Range Status   10/01/2020 16 7 - 30 mg/dL Final     Creatinine   Date Value Ref Range Status   10/01/2020 0.77 0.66 - 1.25 mg/dL Final     GFR Estimate   Date Value Ref Range Status   10/01/2020 >90 >60 mL/min/[1.73_m2] Final     Comment:     Non  GFR Calc  Starting 12/18/2018, serum creatinine based estimated GFR (eGFR) will be   calculated using the Chronic Kidney Disease Epidemiology Collaboration   (CKD-EPI) equation.       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 T cells collected 8/31/2020. 2017-45 [facility]a, 2019-35: IT dex + simvastatin for JUAQUIN prevention. Day +2.    Day 0: LP with IT dex. PICC line placement, [facility] Car-t infusion     1.  BMT:  DLBCL  Axi-cell product with CNS prevention protocol, day 0  CNS prophy: simvastatin 40mg tied to IDS drug    2.  HEME: Keep Hgb>7 and plts>10K. No pre-meds.                            3.  ID: Afebrile. Levo, Fluc, and HD ACV (CMV+, HSV+, EBV+) prophy. Bactrim or appropriate PCP therapy to start d+28.         COVID neg re swabbed as  daughter was found to be covid pos this weekend.                                       4.  GI: Zofran and dexamethasone prior to chemo to prevent N/V. Ativan and compazine available PRN break-through symptoms. Protonix for GI prophy.     6.  FEN/Renal: Monitor creat and lytes. Replete lytes PRN per SS. Monitor weight, I+O, lytes per protocol with IVF flush.    7. Endo: DM on glipizide and insulin at home. Endocrine consulted.  Complication of DM with 2 left toes amputated 7/2020    Mary Skinner  [phone]    The patient was seen and examined by me separately from the midlevel provider. The note reflects our mutual assessment and plans and were approved by me personally.   I personally reviewed today's lab results vital signs and radiology results.    Each point of the assessment and plan were reviewed by the midlevel and me and either endorsed by me or were my added decisions.    My pertinent physical findings today are: Sfebrile, clear lungs and no rash.    My assessment and plan are: 69 yo with refractory DLBCL admitted for YESCARTA CART. Orders already signed. Monitor for CRS.  Day +2 and doing well. CNS prophy.    Melvin Tian M.D.

## 2020-10-01 NOTE — PROGRESS NOTES
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  Glucose has been persistently uncontrolled at home lately with major insulin resistance in setting of steroids and metformin withholding.    Hypoglycemia following transition off insulin infusion last night.  Now with post-prandial too high on lower dose aspart.     Plan       - aspart 1 unit per 3 grams carbohydrate--> increase to 1 unit per 2.5 grams carb starting with lunch  - anticipating further steroid clearance, decrease glargine 50 untis to 30 units q24h, move to 1600 today (normally takes at HS)  - aspart very high correction qA, HS 0200   - hypoglycemia protocol     - restart home metformin and hydrocortisone when appropriate  - plan to discharge patient with more systematic method of dosing his aspart     Outpatient diabetes follow up: Dr Turner  Plan discussed with patient, bedside RN           Interval History:     The last 24 hours progress and nursing notes reviewed.    Pastor was pleased that he had sx low BG last evening, since they had been very blunted for a while.  IV insulin rate was pretty high at time of drip stop, may have contributed to the low BG.  Note later received aspart 20 units 2.5-3 hours after actually ate his supper (meal was about 80 grams carb).    Aspart reduced to 1 per 3 grams this morning.  PP 200s.    While eating high carb meals here, he is not snacking like he would at home  Pastor says he's doing great and might discharge sooner than original plan.        Recent Labs   Lab 10/01/20  1252 10/01/20  1117 10/01/20  0823 10/01/20  0416 10/01/20  0206 09/30/20  2255 09/30/20 2020 09/30/20  0312 09/30/20  0312 09/29/20  1415 09/29/20  1415 09/29/20  0743  "09/29/20  0743 09/28/20  1057 09/25/20  0818   GLC  --   --   --  89  --   --   --   --  122*  --  257*  --  305* 306* 368*   * 217* 133*  --  101* 180* 238*   < >  --    < >  --    < >  --   --   --     < > = values in this interval not displayed.           Nutrition:     Orders Placed This Encounter      High Kcal/High Protein Diet, ADULT    Supplements:         PTA Regimen:   Off metformin x about 1 week  glargine usually 30 units (but up to 40) at HS  aspart roughly 1 unit per 10 mg/dL correction plus some for food          Review of Systems:   See interval hx          Medications:   IT dexamethasone, last on 9/29-- 8 mg (also on 9/23, 9/24)         Physical Exam:     Gen: Alert, in NAD, resting in bed  HEENT: NC/AT hearing intact to conversational volume  Resp: Unlabored  Neuro: oriented x3, communicating clearly  Psych: calm, even mood, easily engaged  BP 93/64 (BP Location: Right arm)   Pulse 89   Temp 98.7  F (37.1  C) (Oral)   Resp 16   Ht 1.73 m (5' 8.11\")   Wt 87.4 kg (192 lb 9.6 oz)   SpO2 97%   BMI 29.19 kg/m               Data:     Lab Results   Component Value Date    A1C 7.3 05/22/2020            Recent Labs   Lab Test 10/01/20  0416 09/30/20  0312    136   POTASSIUM 3.5 4.2   CHLORIDE 104 106   CO2 28 25   ANIONGAP 5 5   GLC 89 122*   BUN 16 19   CR 0.77 0.75   JEFF 9.2 9.4     CBC RESULTS:   Recent Labs   Lab Test 10/01/20  0416   WBC 1.3*   RBC 4.01*   HGB 11.2*   HCT 34.9*   MCV 87   MCH 27.9   MCHC 32.1   RDW 14.0   PLT 38*     I spent a total of 35 minutes bedside and on the inpatient unit managing the glycemic care of Pastor Garibay. Over 50% of my time on the unit was spent counseling the patient  and/or coordinating care regarding acute BG mgmnt, counseling on low BG awareness.  See note for details.            Mili Eatonpton APRN [id number]  Diabetes Management job code [id number]                "

## 2020-10-01 NOTE — PROGRESS NOTES
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4 degrees F Tm >= to 100.4  degrees F      With Either:  Hypotension (SBP <90) None Responsive to Fluids  Requiring 1  vasopressor (w/ or w/o vasopressin) Requiring multiple  vasopressors  (excluding  vasopressin)     And/or  Hypoxia (O2 sats <90% on room air) None Low-flow nasal  cannula or blow-by High-flow nasal  cannula, face mask,  non-rebreather mask,or Venturi mask  Requiring positive  pressure (CPAP,  BiPAP intubation and  mechanical  ventilation)     CRS Summary  Does patient have CRS? No  Current CRS stgstrstastdstest:st st1st What therapy was given: n/a  Has CRS grade changed? n/a   Has CRS resolved? n/a       2) Neurotoxicity:    ICE Assessment  Orientation to year, month, city, hospital: 4 points, Name 3 objects (e.g., point to clock, pen, button): 3 point, Following commands: (e.g., Show me 2 fingers): 1 point, Write a standard sentence (e.g., The wilson jumped over the log): 1 point and Count backwards from 100 by ten: 1 point  Total points: 10: No impairment    ASTCT ICANS Consensus Grading for Adults  ICE Score   10    Seizure   No seizures    Motor Findings   No motor findings    Elevated ICP/Cerebral Edema   None      Neurotoxicity  Domain Grade 1 Grade 2 Grade 3 Grade 4 "   ICE score 7-9 3-6 1-2 0   Depressed LOC      Awakens  spontaneously Awakens to  voice  Awakens only to  tactile stimulation Unarousable or  requires vigorous  or repetitive  tactile stimuli to  arouse. Stupor  or coma.   Seizure n/a n/a Any clinical  seizure focal or  generalized that  resolves rapidly  or nonconvulsive  seizures on EEG  that resolve with  intervention  Life-threatening  prolonged  seizure (>5 min);  or Repetitive  clinical or  electrical  seizures without  return to baseline  in between    Motor Findings      n/a n/a n/a Deep focal motor  weakness such  as hemiparesis  or paraparesis   Elevated  ICP/cerebral  edema  n/a n/a Focal/local  edema on  neuroimaging  Diffuse cerebral  edema on  neuroimaging;  decerebrate or  decorticate  posturing; or  cranial nerve VI  palsy; or  papilledema; or  Cushing's triad     ICANS grade is determined by the most severe event (ICE score, level of consciousness, seizure, motor findings, raised ICP/cerebral edema) not attributable to any other cause; for example, a patient with an ICE score of 3 who has a generalized seizure is classified as grade 3 ICANS.    A patient with an ICE score of 0 may be classified as grade 3 ICANS if awake with global aphasia, but a patient with an ICE score of 0 may be classified as grade 4 ICANS if unarousable.     Depressed level of consciousness should be attributable to no other cause (eg, no sedating medication)    Tremors and myoclonus associated with immune effector cell therapies may be graded according to CTCAE v5.0,but they do not influence ICANS grading.     Intracranial hemorrhage with or without associated edema is not considered a neurotoxicity feature and is  excluded from ICANS grading. It may be graded according to CTCAE v5.0.          Neurotoxicity Summary  Does patient have neurotoxicity? No  Current Neurotoxicity score (0-10): 0  What therapy was given: n/a  Has neurotoxicity grade changed? n/a   Has neurotoxicity  resolved? n/a       3) Organ CAR-T toxicity:    Cardiac   none    Respiratory   none    Gastrointestinal   none    Hepatic    none    Skin   none     Coagulopathy    none     Other: n/a      4) HLH   Ferritin > 10,000 plus any TWO of the following:     * CTCAE grading can be found at   https://ctep.cancer.gov/protocoldevelopment/electronic_applications/docs/CTCAE_v5_Quick Reference_8.5x11.pdf    Mary Skinner PAC  [phone]

## 2020-10-02 ENCOUNTER — APPOINTMENT (OUTPATIENT)
Dept: PHYSICAL THERAPY | Facility: CLINIC | Age: 70
DRG: 018 | End: 2020-10-02
Attending: INTERNAL MEDICINE
Payer: COMMERCIAL

## 2020-10-02 LAB
ABO + RH BLD: NORMAL
ABO + RH BLD: NORMAL
ANION GAP SERPL CALCULATED.3IONS-SCNC: 7 MMOL/L (ref 3–14)
BASOPHILS # BLD AUTO: 0 10E9/L (ref 0–0.2)
BASOPHILS NFR BLD AUTO: 0.7 %
BLASTS # BLD: 0 10E9/L
BLASTS BLD QL SMEAR: 1.3 %
BLD GP AB SCN SERPL QL: NORMAL
BLOOD BANK CMNT PATIENT-IMP: NORMAL
BUN SERPL-MCNC: 14 MG/DL (ref 7–30)
CALCIUM SERPL-MCNC: 9.3 MG/DL (ref 8.5–10.1)
CHLORIDE SERPL-SCNC: 104 MMOL/L (ref 94–109)
CO2 SERPL-SCNC: 27 MMOL/L (ref 20–32)
CREAT SERPL-MCNC: 0.76 MG/DL (ref 0.66–1.25)
DIFFERENTIAL METHOD BLD: ABNORMAL
EOSINOPHIL # BLD AUTO: 0.1 10E9/L (ref 0–0.7)
EOSINOPHIL NFR BLD AUTO: 19.7 %
ERYTHROCYTE [DISTWIDTH] IN BLOOD BY AUTOMATED COUNT: 14.2 % (ref 10–15)
GFR SERPL CREATININE-BSD FRML MDRD: >90 ML/MIN/{1.73_M2}
GLUCOSE BLDC GLUCOMTR-MCNC: 105 MG/DL (ref 70–99)
GLUCOSE BLDC GLUCOMTR-MCNC: 106 MG/DL (ref 70–99)
GLUCOSE BLDC GLUCOMTR-MCNC: 155 MG/DL (ref 70–99)
GLUCOSE BLDC GLUCOMTR-MCNC: 159 MG/DL (ref 70–99)
GLUCOSE BLDC GLUCOMTR-MCNC: 165 MG/DL (ref 70–99)
GLUCOSE BLDC GLUCOMTR-MCNC: 171 MG/DL (ref 70–99)
GLUCOSE BLDC GLUCOMTR-MCNC: 65 MG/DL (ref 70–99)
GLUCOSE SERPL-MCNC: 154 MG/DL (ref 70–99)
HCT VFR BLD AUTO: 35.9 % (ref 40–53)
HGB BLD-MCNC: 11.7 G/DL (ref 13.3–17.7)
LACTATE BLD-SCNC: 2 MMOL/L (ref 0.7–2)
LACTATE BLD-SCNC: 2.6 MMOL/L (ref 0.7–2)
LYMPHOCYTES # BLD AUTO: 0 10E9/L (ref 0.8–5.3)
LYMPHOCYTES NFR BLD AUTO: 7.9 %
MAGNESIUM SERPL-MCNC: 1.9 MG/DL (ref 1.6–2.3)
MCH RBC QN AUTO: 28.3 PG (ref 26.5–33)
MCHC RBC AUTO-ENTMCNC: 32.6 G/DL (ref 31.5–36.5)
MCV RBC AUTO: 87 FL (ref 78–100)
MONOCYTES # BLD AUTO: 0 10E9/L (ref 0–1.3)
MONOCYTES NFR BLD AUTO: 2 %
NEUTROPHILS # BLD AUTO: 0.3 10E9/L (ref 1.6–8.3)
NEUTROPHILS NFR BLD AUTO: 68.4 %
NRBC # BLD AUTO: 0 10*3/UL
NRBC BLD AUTO-RTO: 1 /100
PLATELET # BLD AUTO: 32 10E9/L (ref 150–450)
POTASSIUM SERPL-SCNC: 3.8 MMOL/L (ref 3.4–5.3)
RBC # BLD AUTO: 4.14 10E12/L (ref 4.4–5.9)
RBC MORPH BLD: NORMAL
RESEARCH KIT COLLECTION: NORMAL
SODIUM SERPL-SCNC: 138 MMOL/L (ref 133–144)
SPECIMEN EXP DATE BLD: NORMAL
WBC # BLD AUTO: 0.4 10E9/L (ref 4–11)

## 2020-10-02 PROCEDURE — 97112 NEUROMUSCULAR REEDUCATION: CPT | Mod: GP | Performed by: PHYSICAL THERAPIST

## 2020-10-02 PROCEDURE — 86901 BLOOD TYPING SEROLOGIC RH(D): CPT | Performed by: STUDENT IN AN ORGANIZED HEALTH CARE EDUCATION/TRAINING PROGRAM

## 2020-10-02 PROCEDURE — 83605 ASSAY OF LACTIC ACID: CPT | Performed by: INTERNAL MEDICINE

## 2020-10-02 PROCEDURE — 250N000013 HC RX MED GY IP 250 OP 250 PS 637: Performed by: INTERNAL MEDICINE

## 2020-10-02 PROCEDURE — 258N000003 HC RX IP 258 OP 636: Performed by: PHYSICIAN ASSISTANT

## 2020-10-02 PROCEDURE — 999N001017 HC STATISTIC GLUCOSE BY METER IP

## 2020-10-02 PROCEDURE — 250N000011 HC RX IP 250 OP 636

## 2020-10-02 PROCEDURE — 250N000013 HC RX MED GY IP 250 OP 250 PS 637: Performed by: STUDENT IN AN ORGANIZED HEALTH CARE EDUCATION/TRAINING PROGRAM

## 2020-10-02 PROCEDURE — 86850 RBC ANTIBODY SCREEN: CPT | Performed by: STUDENT IN AN ORGANIZED HEALTH CARE EDUCATION/TRAINING PROGRAM

## 2020-10-02 PROCEDURE — 80048 BASIC METABOLIC PNL TOTAL CA: CPT | Performed by: STUDENT IN AN ORGANIZED HEALTH CARE EDUCATION/TRAINING PROGRAM

## 2020-10-02 PROCEDURE — 99231 SBSQ HOSP IP/OBS SF/LOW 25: CPT | Performed by: INTERNAL MEDICINE

## 2020-10-02 PROCEDURE — 99233 SBSQ HOSP IP/OBS HIGH 50: CPT | Performed by: NURSE PRACTITIONER

## 2020-10-02 PROCEDURE — 83735 ASSAY OF MAGNESIUM: CPT | Performed by: STUDENT IN AN ORGANIZED HEALTH CARE EDUCATION/TRAINING PROGRAM

## 2020-10-02 PROCEDURE — 206N000001 HC R&B BMT UMMC

## 2020-10-02 PROCEDURE — 85025 COMPLETE CBC W/AUTO DIFF WBC: CPT | Performed by: STUDENT IN AN ORGANIZED HEALTH CARE EDUCATION/TRAINING PROGRAM

## 2020-10-02 PROCEDURE — 99233 SBSQ HOSP IP/OBS HIGH 50: CPT | Performed by: INTERNAL MEDICINE

## 2020-10-02 PROCEDURE — 97110 THERAPEUTIC EXERCISES: CPT | Mod: GP | Performed by: PHYSICAL THERAPIST

## 2020-10-02 PROCEDURE — 250N000013 HC RX MED GY IP 250 OP 250 PS 637: Performed by: CLINICAL NURSE SPECIALIST

## 2020-10-02 PROCEDURE — 86900 BLOOD TYPING SEROLOGIC ABO: CPT | Performed by: STUDENT IN AN ORGANIZED HEALTH CARE EDUCATION/TRAINING PROGRAM

## 2020-10-02 PROCEDURE — 97530 THERAPEUTIC ACTIVITIES: CPT | Mod: GP | Performed by: PHYSICAL THERAPIST

## 2020-10-02 RX ADMIN — FLUCONAZOLE 200 MG: 200 TABLET ORAL at 07:48

## 2020-10-02 RX ADMIN — ACYCLOVIR 800 MG: 800 TABLET ORAL at 18:08

## 2020-10-02 RX ADMIN — SODIUM CHLORIDE, PRESERVATIVE FREE 5 ML: 5 INJECTION INTRAVENOUS at 04:42

## 2020-10-02 RX ADMIN — ACYCLOVIR 800 MG: 800 TABLET ORAL at 07:48

## 2020-10-02 RX ADMIN — DEXTROSE 15 G: 15 GEL ORAL at 19:08

## 2020-10-02 RX ADMIN — ALLOPURINOL 300 MG: 300 TABLET ORAL at 07:48

## 2020-10-02 RX ADMIN — ACYCLOVIR 800 MG: 800 TABLET ORAL at 21:30

## 2020-10-02 RX ADMIN — ACYCLOVIR 800 MG: 800 TABLET ORAL at 10:52

## 2020-10-02 RX ADMIN — Medication 5 ML: at 07:58

## 2020-10-02 RX ADMIN — MELATONIN TAB 3 MG 3 MG: 3 TAB at 21:30

## 2020-10-02 RX ADMIN — SODIUM CHLORIDE 1000 ML: 9 INJECTION, SOLUTION INTRAVENOUS at 15:41

## 2020-10-02 RX ADMIN — LEVOFLOXACIN 250 MG: 250 TABLET, FILM COATED ORAL at 10:52

## 2020-10-02 RX ADMIN — INSULIN ASPART 2 UNITS: 100 INJECTION, SOLUTION INTRAVENOUS; SUBCUTANEOUS at 14:40

## 2020-10-02 RX ADMIN — PANTOPRAZOLE SODIUM 40 MG: 40 TABLET, DELAYED RELEASE ORAL at 07:48

## 2020-10-02 RX ADMIN — Medication 40 MG: at 07:48

## 2020-10-02 RX ADMIN — ACYCLOVIR 800 MG: 800 TABLET ORAL at 13:22

## 2020-10-02 ASSESSMENT — ACTIVITIES OF DAILY LIVING (ADL)
ADLS_ACUITY_SCORE: 13

## 2020-10-02 ASSESSMENT — MIFFLIN-ST. JEOR: SCORE: 1610.35

## 2020-10-02 NOTE — PROGRESS NOTES
Rapid Response Team Note    Assessment   In assessment a rapid response was called on Pastor Garibay due to lactic acidosis.     This presentation is likely due to underlying malignancy and possible hypovolemia and worsened by the comorbidities listed above. The patient is NOT critically ill at this time.    Sepsis Evaluation   NO EVIDENCE OF SEPSIS at this time.  Vital sign, physical exam, and lab findings are likely due to underlying malignancy and possible concomitant hypovolemia..    Plan   - Patient finished workout at the gym, asymptomatic  - Will administer 1L IVF and repeat lactate afterwards    Disposition: The patient will remain on the current unit. We will continue to monitor this patient closely..    The Internal Medicine primary team was there at the time of the call..    Reassessment   Reassessment and plan follow-up will be performed by nursing staff. Repeat BP and vitals once IVF bolus is complete along with repeat lactate.    Time Spent on this Encounter   Total Critical Care time spent by me, excluding procedures, was 0 minutes.    Lydia Ragland MD  Pager [phone]    Hospital Course   Admission Diagnosis: N H L   DLBCL (diffuse large B cell lymphoma)    Brief Summary of events leading to rapid response:   A rapid response was called for Pastor Garibay due to lactic acidosis triggered by sepsis/SIRS BPA at 4:26 PM  10/02/20.    The patients is not known to have an infection.    Significant Comorbidities:   DLBCL recieving CAR-T therapy    Medications   Scheduled     acyclovir  800 mg Oral 5x Daily     allopurinol  300 mg Oral Daily     fluconazole  200 mg Oral Daily     heparin  5 mL Intracatheter Q28 Days     heparin lock flush  5-10 mL Intracatheter Q24H     insulin aspart  1-10 Units Subcutaneous TID AC     insulin aspart  1-7 Units Subcutaneous At Bedtime     insulin aspart   Subcutaneous TID w/meals     insulin glargine  25 Units Subcutaneous Q24H     levofloxacin  250 mg Oral Daily at  10 am     melatonin  3 mg Oral At Bedtime     pantoprazole  40 mg Oral Daily     study - simvastatin (IDS# [id number])  40 mg Oral Daily      PRN   acetaminophen, acetaminophen, bisacodyl, ceFEPIme (MAXIPIME) IV, glucose **OR** dextrose **OR** glucagon, diphenhydrAMINE, heparin lock flush, insulin aspart, lidocaine 4%, lidocaine (buffered or not buffered), LORazepam **OR** LORazepam, magnesium sulfate, potassium chloride, potassium chloride with lidocaine, potassium chloride, potassium chloride, potassium chloride, potassium phosphate (KPHOS) in D5W IV, potassium phosphate (KPHOS) in D5W IV, potassium phosphate (KPHOS) in D5W IV, potassium phosphate (KPHOS) in D5W IV, prochlorperazine **OR** prochlorperazine, sodium chloride (PF)   Allergies   No Known Allergies     Physical Exam   Temp: 97.7  F (36.5  C) Temp  Min: 96.7  F (35.9  C)  Max: 98.3  F (36.8  C)  Resp: 18 Resp  Min: 16  Max: 20  SpO2: 99 % SpO2  Min: 94 %  Max: 99 %  Pulse: 97 Pulse  Min: 83  Max: 97    No data recorded  BP: (!) 102/90 Systolic (24hrs), Av , Min:81 , Max:117   Diastolic (24hrs), Av, Min:63, Max:90     I/Os: I/O last 3 completed shifts:  In: 1660 [P.O.:1660]  Out: 1875 [Urine:1875]     Exam:   General: in no acute distress, just finished working out in the gym with PT  Mental Status: AAOx4.    Significant Results and Procedures   Lactic Acid:   Recent Labs   Lab Test 10/02/20  1544 20  1134 20  1650   LACT  --  1.7 1.3   LACTS 2.6*  --   --      CBC:   Recent Labs   Lab Test 10/02/20  0436 10/01/20  0416 20  0312   WBC 0.4* 1.3* 2.2*   HGB 11.7* 11.2* 11.0*   HCT 35.9* 34.9* 34.1*   PLT 32* 38* 56*

## 2020-10-02 NOTE — PROGRESS NOTES
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 who received infusion on 9/29/2020, currently day 3 of CAR-T cell therapy [facility]    Vital Signs: /70   Pulse 93   Temp 96.7  F (35.9  C) (Oral)   Resp 18   Ht 1.73 m (5' 8.11\")   Wt 86.4 kg (190 lb 8 oz)   SpO2 98%   BMI 28.87 kg/m      1) CRS Grading (based ONLY on parameters in box below)    Fever:   </= 100.4    Blood pressure:   SBP >/= 90 (not hypotensive)    Oxygen saturation:    >/= 90% on room air (not hypoxic)    CRS Parameter Grade 1  Grade 2 Grade 3 Grade 4   Fever* Tm >= 100.4 degrees F Tm >= 100.4 degrees F Tm >= 100.4 degrees F Tm >= to 100.4  degrees F      With Either:  Hypotension (SBP <90) None Responsive to Fluids  Requiring 1  vasopressor (w/ or w/o vasopressin) Requiring multiple  vasopressors  (excluding  vasopressin)     And/or  Hypoxia (O2 sats <90% on room air) None Low-flow nasal  cannula or blow-by High-flow nasal  cannula, face mask,  non-rebreather mask,or Venturi mask  Requiring positive  pressure (CPAP,  BiPAP intubation and  mechanical  ventilation)     CRS Summary  Does patient have CRS? No  Current CRS stgstrstastdstest:st st1st What therapy was given: n/a  Has CRS grade changed? n/a   Has CRS resolved? n/a       2) Neurotoxicity:    ICE Assessment  Orientation to year, month, city, hospital: 4 points, Name 3 objects (e.g., point to clock, pen, button): 3 point, Following commands: (e.g., Show me 2 fingers): 1 point, Write a standard sentence (e.g., The wilson jumped over the log): 1 point and Count backwards from 100 by ten: 1 point  Total points: 10: No impairment    ASTCT ICANS Consensus Grading for Adults  ICE Score   10    Seizure   No seizures    Motor Findings   No motor findings    Elevated ICP/Cerebral Edema   None      Neurotoxicity  Domain Grade 1 Grade 2 Grade 3 Grade 4   ICE score 7-9 3-6 1-2 0 "   Depressed LOC      Awakens  spontaneously Awakens to  voice  Awakens only to  tactile stimulation Unarousable or  requires vigorous  or repetitive  tactile stimuli to  arouse. Stupor  or coma.   Seizure n/a n/a Any clinical  seizure focal or  generalized that  resolves rapidly  or nonconvulsive  seizures on EEG  that resolve with  intervention  Life-threatening  prolonged  seizure (>5 min);  or Repetitive  clinical or  electrical  seizures without  return to baseline  in between    Motor Findings      n/a n/a n/a Deep focal motor  weakness such  as hemiparesis  or paraparesis   Elevated  ICP/cerebral  edema  n/a n/a Focal/local  edema on  neuroimaging  Diffuse cerebral  edema on  neuroimaging;  decerebrate or  decorticate  posturing; or  cranial nerve VI  palsy; or  papilledema; or  Cushing's triad     ICANS grade is determined by the most severe event (ICE score, level of consciousness, seizure, motor findings, raised ICP/cerebral edema) not attributable to any other cause; for example, a patient with an ICE score of 3 who has a generalized seizure is classified as grade 3 ICANS.    A patient with an ICE score of 0 may be classified as grade 3 ICANS if awake with global aphasia, but a patient with an ICE score of 0 may be classified as grade 4 ICANS if unarousable.     Depressed level of consciousness should be attributable to no other cause (eg, no sedating medication)    Tremors and myoclonus associated with immune effector cell therapies may be graded according to CTCAE v5.0,but they do not influence ICANS grading.     Intracranial hemorrhage with or without associated edema is not considered a neurotoxicity feature and is  excluded from ICANS grading. It may be graded according to CTCAE v5.0.          Neurotoxicity Summary  Does patient have neurotoxicity? No  Current Neurotoxicity score (0-10): 0  What therapy was given: n/a  Has neurotoxicity grade changed? n/a   Has neurotoxicity resolved? n/a       3)  Organ CAR-T toxicity:    Cardiac   none    Respiratory   none    Gastrointestinal   none    Hepatic    none    Skin   none     Coagulopathy    none     Other: n/a      4) HLH   na     * CTCAE grading can be found at   https://ctep.cancer.gov/protocoldevelopment/electronic_applications/docs/CTCAE_v5_Quick Reference_8.5x11.pdf    Nydia Smyth PA-C  [phone]

## 2020-10-02 NOTE — PROGRESS NOTES
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  Glucose has been persistently uncontrolled at home lately with major insulin resistance in setting of steroids and metformin withholding.       Assessment:     1) Type 2 Diabetes Mellitus, poor controlled (A1c 7.3%).  Hx of toe amp 7/2020    2) s/p BMT      Plan:       - aspart 1 unit per 3.5 grams carbohydrate--> increase to 1 unit per 3.5 grams carb starting with lunch following the hypoglycemia following dinner  - anticipating further steroid clearance,  glargine 25 units daily, now at  1600 (normally takes at HS)  - Reduce to aspart high correction qAC, HS 0200 from very high.  - hypoglycemia protocol  - Consult to IP nutrition re: enhanced learning for carb counting/coverage     - restart home metformin and hydrocortisone when appropriate  - plan to discharge patient with more systematic method of dosing his aspart     Outpatient diabetes follow up: Dr Turner  Plan discussed with patient, bedside RN    Interval History and Assessment: interval glucose trend reviewed: patient with Bg below target to 59 at 1826 returning to target of 130 at 1857.    BS was checked for dinner and because pt felt low and his \"vision changes\".  BS 62.  Ice cream given.  Recheck 59.  OJ given.  Dinner tray given which he ate and checked when done eating.  .    Carbs were 71g should have taken 31units but only replaced 15units. Per chart review.   Patient reports he rode the stationary bike and walked yesterday afternoon before dinner but this was around 1430.    BG at target through this am.   Discussed adjusting to 1:4 g cho and patient prefers to adjust to 1:3.5 g cho    Patient shared he has lost a sig amount of weight - " 30-35 pounds since April - June.  This loss is likely contributing to reduction of insulin req and resistance.  Potential concern - Alexi who drove him home last Friday was diagnosed this Tues with Covid-19.  Patient's test on Tues was negative.  Asymptomatic today.    Current nutritional intake and type: Orders Placed This Encounter      High Kcal/High Protein Diet, ADULT    Planned Procedures/surgeries: No  Steroid planning: IT dexamethasone, last on 9/29-- 8 mg (also on 9/23, 9/24)  D5W-containing solutions: No    PTA Diabetes Regimen: Off metformin x about 1 week  glargine usually 30 units (but up to 40) at HS  aspart roughly 1 unit per 10 mg/dL correction plus some for food    Discharge Planning: ? Possibly this weekend?  Will watch for orders.         Diabetes History:   Type of Diabetes: Type 2 Diabetes Mellitus  Lab Results   Component Value Date    A1C 7.3 05/22/2020              Review of Systems:   Constitutional:   No fever, no chills  ENT/Mouth:   No hearing changes, no ear pain, no sore throat, no rhinorrhea, no difficulty swallowing  Eyes:  No eye pain, no discharge, no vision changes  CV:   No CP/SOB, no new edema  Resp:  No cough, no wheezing  GI:   No nausea, no vomiting, no constipation, no diarrhea  :  No dysuria, no frequency, no hematuria  Musk:  No joint swelling/pain, No back pain  Skin/heme:   No new rashes/bruises/open areas.  No pruritis  Neuro:   No new weakness, no numbness/tingling, no headache  Psych:   No new anxiety, no depression  Endocrine:  No polyuria, no polydipsia          Medications:     Current Facility-Administered Medications   Medication     acetaminophen (TYLENOL) tablet 325-650 mg     acetaminophen (TYLENOL) tablet 325-650 mg     acyclovir (ZOVIRAX) tablet 800 mg     allopurinol (ZYLOPRIM) tablet 300 mg     bisacodyl (DULCOLAX) EC tablet 5 mg     ceFEPIme (MAXIPIME) 2 g vial to attach to  ml bag for ADULTS or 50 ml bag for PEDS     glucose gel 15-30 g    Or      "dextrose 50 % injection 25-50 mL    Or     glucagon injection 1 mg     diphenhydrAMINE (BENADRYL) capsule 25 mg     fluconazole (DIFLUCAN) tablet 200 mg     heparin 100 UNIT/ML injection 5 mL     heparin lock flush 10 UNIT/ML injection 5-10 mL     heparin lock flush 10 UNIT/ML injection 5-10 mL     insulin aspart (NovoLOG) injection (RAPID ACTING)     insulin aspart (NovoLOG) injection (RAPID ACTING)     insulin aspart (NovoLOG) injection (RAPID ACTING)     insulin aspart (NovoLOG) injection (RAPID ACTING)     insulin glargine (LANTUS PEN) injection 30 Units     levofloxacin (LEVAQUIN) tablet 250 mg     lidocaine (LMX4) cream     lidocaine 1 % 0.1-1 mL     LORazepam (ATIVAN) injection 0.5-1 mg    Or     LORazepam (ATIVAN) tablet 0.5-1 mg     magnesium sulfate 4 g in 100 mL sterile water (premade)     melatonin tablet 3 mg     pantoprazole (PROTONIX) EC tablet 40 mg     potassium chloride (KLOR-CON) Packet 20-40 mEq     potassium chloride 10 mEq in 100 mL intermittent infusion with 10 mg lidocaine     potassium chloride 10 mEq in 100 mL sterile water intermittent infusion (premix)     potassium chloride 20 mEq in 50 mL intermittent infusion     potassium chloride ER (KLOR-CON M) CR tablet 20-40 mEq     potassium phosphate 15 mmol in D5W 250 mL intermittent infusion     potassium phosphate 20 mmol in D5W 250 mL intermittent infusion     potassium phosphate 20 mmol in D5W 500 mL intermittent infusion     potassium phosphate 25 mmol in D5W 500 mL intermittent infusion     prochlorperazine (COMPAZINE) injection 5 mg    Or     prochlorperazine (COMPAZINE) tablet 5 mg     sodium chloride (PF) 0.9% PF flush 10-20 mL     study - simvastatin (IDS# 5641) tablet 40 mg     [START ON 11/2/2020] sulfamethoxazole-trimethoprim (BACTRIM DS) 800-160 MG per tablet 1 tablet            Physical Exam:    BP 94/64   Pulse 83   Temp 98.3  F (36.8  C) (Oral)   Resp 20   Ht 1.73 m (5' 8.11\")   Wt 86.4 kg (190 lb 8 oz)   SpO2 96%   BMI " 28.87 kg/m    General:  pleasant, in no acute distress.  HEENT:  NC/AT. MMM, sclera not injected  Lungs:  unremarkable, no new cough, no SOB  ABD:  rounded, soft, no lipodystrophy noted, non-tender  Skin:  warm and dry, no obvious lesions  Feet:    CMS intact - L foot 2nd and 3rd toe amp, R foot 2nd and 5th toe nail with old bruising present at nailbed.    MSK:   moving all extremities  Lymp:    LE edema - slight with no calf tenderness  Mental Status:  Alert and oriented x3  Psych:   Cooperative, good eye contact, full affect          Data:     Recent Labs   Lab 10/02/20  0736 10/02/20  0436 10/02/20  0204 10/02/20  0031 10/01/20  2147 10/01/20  2111 10/01/20  1857 10/01/20  0416 10/01/20  0416 09/30/20  0312 09/30/20  0312 09/29/20  1415 09/29/20  1415 09/29/20  0743 09/29/20  0743 09/28/20  1057   GLC  --  154*  --   --   --   --   --   --  89  --  122*  --  257*  --  305* 306*   *  --  159* 155* 122* 141* 130*   < >  --    < >  --    < >  --    < >  --   --     < > = values in this interval not displayed.     Lab Results   Component Value Date    WBC 0.4 (LL) 10/02/2020    WBC 1.3 (L) 10/01/2020    WBC 2.2 (L) 09/30/2020    HGB 11.7 (L) 10/02/2020    HGB 11.2 (L) 10/01/2020    HGB 11.0 (L) 09/30/2020    HCT 35.9 (L) 10/02/2020    HCT 34.9 (L) 10/01/2020    HCT 34.1 (L) 09/30/2020    MCV 87 10/02/2020    MCV 87 10/01/2020    MCV 86 09/30/2020    PLT 32 (LL) 10/02/2020    PLT 38 (LL) 10/01/2020    PLT 56 (L) 09/30/2020     Lab Results   Component Value Date     10/02/2020     10/01/2020     09/30/2020    POTASSIUM 3.8 10/02/2020    POTASSIUM 3.5 10/01/2020    POTASSIUM 4.2 09/30/2020    CHLORIDE 104 10/02/2020    CHLORIDE 104 10/01/2020    CHLORIDE 106 09/30/2020    CO2 27 10/02/2020    CO2 28 10/01/2020    CO2 25 09/30/2020     (H) 10/02/2020    GLC 89 10/01/2020     (H) 09/30/2020     Lab Results   Component Value Date    BUN 14 10/02/2020    BUN 16 10/01/2020    BUN 19  09/30/2020     No results found for: TSH  Lab Results   Component Value Date    AST 17 09/29/2020    AST 14 09/29/2020    AST 14 09/28/2020    ALT 19 09/29/2020    ALT 19 09/29/2020    ALT 18 09/28/2020    ALKPHOS 82 09/29/2020    ALKPHOS 84 09/29/2020    ALKPHOS 81 09/28/2020     I spent a total of 35 minutes bedside and on the inpatient unit managing glycemic care. Over 50% of my time on the unit was spent counseling the patient and/or coordinating care regarding acute hyperglycemia management.  See note for details.    To contact Endocrine Diabetes service:   From 8AM-4PM: page inpatient diabetes provider that is following the patient  For questions or updates from 4PM-8AM: page the diabetes job code for on call fellow: [phone]    VERNA Eastman CNP   Inpatient Diabetes Management Service  Pager - [phone]  Friday - Monday 0800 - 1600 hrs  Diabetes Management Team job code: [phone]

## 2020-10-02 NOTE — PLAN OF CARE
Pt BP was 81/64 for NST and recheck by me 102/90. Provider notified and 1 L NS bolus given. Pt was not symptomatic. Lactic triggered and was 2.6 MD called and RRT was called but pt was working out with therapy. Pt ,171 and 106 today. Pt met with dietician to go over carb counting.  Problem: Adult Inpatient Plan of Care  Goal: Plan of Care Review  Outcome: No Change  Flowsheets (Taken 10/2/2020 1067)  Plan of Care Reviewed With: patient  Progress: no change  Goal: Patient-Specific Goal (Individualization)  Outcome: No Change  Goal: Absence of Hospital-Acquired Illness or Injury  Outcome: No Change  Intervention: Identify and Manage Fall Risk  Recent Flowsheet Documentation  Taken 10/2/2020 0900 by Sheree Escalera RN  Safety Promotion/Fall Prevention:   safety round/check completed   nonskid shoes/slippers when out of bed  Intervention: Prevent VTE (venous thromboembolism)  Recent Flowsheet Documentation  Taken 10/2/2020 0900 by Sheree Escalera RN  VTE Prevention/Management: ambulation promoted  Goal: Optimal Comfort and Wellbeing  Outcome: No Change  Intervention: Provide Person-Centered Care  Recent Flowsheet Documentation  Taken 10/2/2020 0900 by Sheree Escalera RN  Trust Relationship/Rapport: care explained  Goal: Readiness for Transition of Care  Outcome: No Change  Goal: Rounds/Family Conference  Outcome: No Change     Problem: Diabetes Comorbidity  Goal: Blood Glucose Level Within Desired Range  Outcome: No Change     Problem: Neurotoxicity (Chemotherapy Effects)  Goal: Neurotoxicity Symptom Control  Outcome: No Change     Problem: Adjustment to Transplant (Stem Cell/Bone Marrow Transplant)  Goal: Optimal Coping with Transplant  Outcome: No Change  Intervention: Optimize Patient/Family Adjustment Response to Transplant  Recent Flowsheet Documentation  Taken 10/2/2020 0900 by Sheree Escalera RN  Supportive Measures: active listening utilized     Problem: Diarrhea (Stem Cell/Bone Marrow  Transplant)  Goal: Diarrhea Symptom Control  Outcome: No Change  Intervention: Manage Diarrhea  Recent Flowsheet Documentation  Taken 10/2/2020 0900 by Sheree Escalera RN  Fluid/Electrolyte Management: fluids provided     Problem: Fatigue (Stem Cell/Bone Marrow Transplant)  Goal: Energy Level Supports Daily Activity  Outcome: No Change     Problem: Hematologic Alteration (Stem Cell/Bone Marrow Transplant)  Goal: Blood Counts Within Acceptable Range  Outcome: No Change  Intervention: Monitor and Manage Hematologic Symptoms  Recent Flowsheet Documentation  Taken 10/2/2020 0900 by Sheree Escalera RN  Bleeding Precautions: blood pressure closely monitored     Problem: Infection Risk (Stem Cell/Bone Marrow Transplant)  Goal: Absence of Infection Signs/Symptoms  Outcome: No Change  Intervention: Prevent and Manage Infection  Recent Flowsheet Documentation  Taken 10/2/2020 0900 by Sheree Escalera RN  Infection Prevention:   single patient room provided   visitors restricted/screened  Isolation Precautions: protective environment maintained     Problem: Hyperglycemia  Goal: Blood Glucose Level Within Targeted Range  Outcome: No Change

## 2020-10-02 NOTE — PROGRESS NOTES
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 Denies n/v. Had 4 soft stools yesterday after taking stool softener. Colace 2 days ago. No fevers, HA, pain, bleeding.      ADDENDUM: ~1530, BP reported 81/64, asymptomatic. Recheck 102/90 without intervention. 1LNS already ordered so will infuse and monitor closely.    Review of Systems    8 point review with pertinent positive and negatives     PHYSICAL EXAM    KPS: 90    BP 94/64   Pulse 83   Temp 98.3  F (36.8  C) (Oral)   Resp 20   Ht 1.73 m (5' 8.11\")   Wt 86.4 kg (190 lb 8 oz)   SpO2 98%   BMI 28.87 kg/m       General: NAD; mild facial flushing  Eyes: VANGIE, sclera anicteric   Nose/Mouth/Throat: OP moist, no ulcerations   Lungs: CTA bilaterally  Cardiovascular: RRR, no M/R/G   Abdominal/Rectal: +BS, soft, NT, ND, No HSM. Umbilical hernia chronic, stable  Lymphatics: No edema  Skin: No rashes or petechaie  MSK: Toes, second and third/left, amputation, skin is dry, clean. No sores or break in skin.  Neuro: A&O ICE 10/10; no CRS (see CAR-tox note)  Additional Findings: Right chest port a cath, left upper arm PICC c/d/i    Labs:  Lab Results   Component Value Date    WBC 0.4 (LL) 10/02/2020    ANEU 0.3 (LL) 10/02/2020    HGB 11.7 (L) 10/02/2020    HCT 35.9 (L) 10/02/2020    PLT 32 (LL) 10/02/2020     10/02/2020    POTASSIUM 3.8 10/02/2020    CHLORIDE 104 10/02/2020    CO2 27 10/02/2020     (H) 10/02/2020    BUN 14 10/02/2020    CR 0.76 10/02/2020    MAG 1.9 10/02/2020    INR 1.04 09/29/2020    BILITOTAL 0.5 09/29/2020    AST 17 09/29/2020    ALT 19 09/29/2020    ALKPHOS 82 09/29/2020    PROTTOTAL 6.7 (L) 09/29/2020    ALBUMIN 3.5 09/29/2020           ASSESSMENT BY SYSTEMS   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 T cells collected 8/31/2020. 2017-45 [facility], 2019-35: IT dex + simvastatin for JUAQUIN prevention. Day 3.    Day 0: LP with IT dex. PICC line placement, [facility] Car-t infusion   Day 6 (10/5): LP/IT dex - needs under fluoroscopy; today (10/2) trying to schedule XR/LP but booked Monday 10/5, trying for 10/6 (this should have been scheduled on admission)  Day 13 (10/12): LP/IT dex (set up next week for 10/12 or 10/13)    1.  BMT:  DLBCL  Axi-cell product with CNS prevention protocol, day 0  CNS prophy: simvastatin 40mg tied to IDS drug  - cont allopurinol    2.  HEME: Keep Hgb>7 and plts>10K. No pre-meds.                            3.  ID: Afebrile.   - Prophy levo, fluc, acy, IV pentamidine (9/23).     COVID neg (9/25) swabbed as daughter was found to be covid pos prior to admission.                                       4.  GI: no complaints.  - prn Ativan, Compazine  - Protonix for GI prophy.   - Dulcolax prn constipation    5.  FEN/Renal: Creat, lytes wnl.    6. Endo: Type 2 DM.  - holding metformin during CAR-T (risk of lactic acidosis). Endocrine following.  Lantus and Novolog sliding scale.  Complication of DM with 2 left toes amputated 7/2020  - hx Adrenal Insufficiency: PTA on hydrocortisone 20mg/d; HELD on admission     SKINNY BlakelyC  [phone]        The patient was seen and examined by me separately from the midlevel provider. The note reflects our mutual assessment and plans and were approved by me personally.   I personally reviewed today's lab results vital signs and radiology results.    Each point of the assessment and plan were reviewed by the midlevel and me and either endorsed by me or were my added decisions.    My pertinent physical findings today are: Sfebrile, clear lungs and no rash.    My assessment and plan are: 69 yo with refractory DLBCL admitted for [facility] CART. Orders already signed. Monitor for CRS.  Day +3 and doing well. CNS prophy. IT dex Tuesday AM. . No neurotox  or KEYONNA.    Melvin Tian M.D.

## 2020-10-02 NOTE — PROGRESS NOTES
"CLINICAL NUTRITION SERVICES - ASSESSMENT NOTE     Nutrition Prescription    RECOMMENDATIONS FOR MDs/PROVIDERS TO ORDER:  None at this time    Malnutrition Status:    Patient does not meet two of the established criteria necessary for diagnosing malnutrition    Future/Additional Recommendations:  Encourage CHO counting to better manage BG levels.      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 Pt eats 2 meals per day plus snacks. Br - cereal, L - skips, has fruit, or snacks mid afternoon, D - large meal. Pt used to be very good about carb counting and managing diabetes, but got out of the habit.  Now does more DM management after the fact when he realizes blood sugars are high. Wants to be more proactive and is eager to re-learn.     CURRENT NUTRITION ORDERS  Diet: High Kcal/High Protein  Intake/Tolerance: Intake 100% of meals    LABS  Labs reviewed    MEDICATIONS  Medications reviewed    ANTHROPOMETRICS  Height: 173 cm (5' 8.11\")  Most Recent Weight: 86.4 kg (190 lb 8 oz)    IBW: 70 kg  BMI: Overweight BMI 25-29.9  Weight History: 4# (2%) wt loss noted in past month.   Wt Readings from Last 10 Encounters:   10/02/20 86.4 kg (190 lb 8 oz)   09/28/20 88.2 kg (194 lb 8 oz)   09/25/20 90.7 kg (200 lb)   09/24/20 91.2 kg (201 lb)   09/23/20 91.7 kg (202 lb 3.2 oz)   09/18/20 91.5 kg (201 lb 12.8 oz)   09/15/20 90.7 kg (200 lb)   09/08/20 91.6 kg (202 lb)   08/31/20 89.1 kg (196 lb 6.9 oz)   08/27/20 90.5 kg (199 lb 9.6 oz)     Dosing Weight: 74 kg    ASSESSED NUTRITION NEEDS  Estimated Energy Needs: 8057-0666 kcals/day (25 - 30 kcals/kg)  Justification: Maintenance  Estimated Protein Needs: 110-150 grams protein/day (1.5 - 2 grams of pro/kg)  Justification: Maintenance  Estimated Fluid Needs:  (1 mL/kcal)   Justification: Maintenance    PHYSICAL " FINDINGS  See malnutrition section below.  No abnormal nutrition-related physical findings observed.     MALNUTRITION  % Intake: No decreased intake noted  % Weight Loss: Weight loss does not meet criteria  Subcutaneous Fat Loss: None observed  Muscle Loss: None observed  Fluid Accumulation/Edema: None noted  Malnutrition Diagnosis: Patient does not meet two of the established criteria necessary for diagnosing malnutrition    NUTRITION DIAGNOSIS  Food- and nutrition-related knowledge deficit r/t limited adherence of CHO counting AEB pt reports doesn't pay attention to CHO grams when eating.      INTERVENTIONS  Implementation  Nutrition Education: Provided education on Carbohydrate counting   Collaboration with other providers during rounds    Goals  Pt to adjust aspart to 1 unit per 3.5 gm CHO of intake, per Endo, to better manage BG levels.      Monitoring/Evaluation  Progress toward goals will be monitored and evaluated per protocol.    Theresa Sarmiento RDN, LD  Pg [phone]

## 2020-10-02 NOTE — PLAN OF CARE
Afebrile. OVSS. Using home CPAP overnight. Denies pain. Denies n/v/d. Blood glucose better overnight, 141, 159 & 159. Lantus given at 2100 last night. No replacements this morning. Voiding. No stools. Up independently. Continue plan of care.         Problem: Adult Inpatient Plan of Care  Goal: Plan of Care Review  Outcome: No Change  Flowsheets (Taken 10/2/2020 0537)  Plan of Care Reviewed With: patient  Progress: no change     Problem: Adult Inpatient Plan of Care  Goal: Patient-Specific Goal (Individualization)  Outcome: No Change     Problem: Adult Inpatient Plan of Care  Goal: Absence of Hospital-Acquired Illness or Injury  Outcome: No Change     Problem: Adult Inpatient Plan of Care  Goal: Absence of Hospital-Acquired Illness or Injury  Intervention: Identify and Manage Fall Risk  Recent Flowsheet Documentation  Taken 10/1/2020 2000 by Lizbeth Beal RN  Safety Promotion/Fall Prevention:    clutter free environment maintained    fall prevention program maintained    nonskid shoes/slippers when out of bed    room organization consistent    safety round/check completed     Problem: Adult Inpatient Plan of Care  Goal: Absence of Hospital-Acquired Illness or Injury  Intervention: Prevent VTE (venous thromboembolism)  Recent Flowsheet Documentation  Taken 10/1/2020 2000 by Lizbeth Beal RN  VTE Prevention/Management:    ambulation promoted    bleeding precautions maintained    bleeding risk assessed    fluids promoted     Problem: Adult Inpatient Plan of Care  Goal: Optimal Comfort and Wellbeing  Outcome: No Change     Problem: Adult Inpatient Plan of Care  Goal: Optimal Comfort and Wellbeing  Intervention: Provide Person-Centered Care  Recent Flowsheet Documentation  Taken 10/1/2020 2000 by Lizbeth Beal RN  Trust Relationship/Rapport:    care explained    choices provided    questions answered    reassurance provided    thoughts/feelings acknowledged     Problem: Adult Inpatient Plan of Care  Goal:  Readiness for Transition of Care  Outcome: No Change     Problem: Adult Inpatient Plan of Care  Goal: Rounds/Family Conference  Outcome: No Change     Problem: Diabetes Comorbidity  Goal: Blood Glucose Level Within Desired Range  Outcome: No Change     Problem: Neurotoxicity (Chemotherapy Effects)  Goal: Neurotoxicity Symptom Control  Outcome: No Change     Problem: Adjustment to Transplant (Stem Cell/Bone Marrow Transplant)  Goal: Optimal Coping with Transplant  Outcome: No Change     Problem: Adjustment to Transplant (Stem Cell/Bone Marrow Transplant)  Goal: Optimal Coping with Transplant  Intervention: Optimize Patient/Family Adjustment Response to Transplant  Recent Flowsheet Documentation  Taken 10/1/2020 2000 by Lizbeth Beal RN  Supportive Measures:    active listening utilized    verbalization of feelings encouraged     Problem: Diarrhea (Stem Cell/Bone Marrow Transplant)  Goal: Diarrhea Symptom Control  Outcome: No Change     Problem: Diarrhea (Stem Cell/Bone Marrow Transplant)  Goal: Diarrhea Symptom Control  Intervention: Manage Diarrhea  Recent Flowsheet Documentation  Taken 10/1/2020 2000 by Lizbeth Beal, RN  Fluid/Electrolyte Management: fluids provided     Problem: Fatigue (Stem Cell/Bone Marrow Transplant)  Goal: Energy Level Supports Daily Activity  Outcome: No Change     Problem: Hematologic Alteration (Stem Cell/Bone Marrow Transplant)  Goal: Blood Counts Within Acceptable Range  Outcome: No Change     Problem: Hematologic Alteration (Stem Cell/Bone Marrow Transplant)  Goal: Blood Counts Within Acceptable Range  Intervention: Monitor and Manage Hematologic Symptoms  Recent Flowsheet Documentation  Taken 10/1/2020 2000 by Lizbeth Beal RN  Bleeding Precautions:    blood pressure closely monitored    gentle oral care promoted    monitored for signs of bleeding     Problem: Infection Risk (Stem Cell/Bone Marrow Transplant)  Goal: Absence of Infection Signs/Symptoms  Outcome: No Change      Problem: Infection Risk (Stem Cell/Bone Marrow Transplant)  Goal: Absence of Infection Signs/Symptoms  Intervention: Prevent and Manage Infection  Recent Flowsheet Documentation  Taken 10/1/2020 2000 by Lizbeth Beal, RN  Infection Prevention:    cohorting utilized    hand hygiene promoted    rest/sleep promoted    single patient room provided    visitors restricted/screened  Isolation Precautions: protective environment maintained     Problem: Hyperglycemia  Goal: Blood Glucose Level Within Targeted Range  Outcome: No Change

## 2020-10-02 NOTE — PLAN OF CARE
Afebrile BP 90s/60s this evening, but asymptomatic.  Encouraged to drink some more.  Up walking in the halls.  Had a couple stools so no dulcolax needed.  No pain, No nausea.  A & O.  , and carb covered. 2hr post check 217.  Lunch check 228.  Carb covered and sliding scale insulin.  Lantus was due at 1600 but was delayed in giving.  When going to administer BS was checked for dinner and because pt felt low and his vision changing.  BS 62.  Ice cream given.  Recheck 59.  OJ given.  Dinner tray given which he ate and checked when done eating.  .  Carbs were 71g should have taken 31units but only replaced 15units.  Next nurse will give lantus at 2100 when BS checked and hopefully continues to go up.  Also reminded her to do 0200 BS check.  Continue to monitor.  Continue plan of care.  Would like to talk to SW about how to get a parking pass now.    Problem: Adult Inpatient Plan of Care  Goal: Plan of Care Review  Outcome: No Change  Flowsheets (Taken 10/1/2020 1854)  Plan of Care Reviewed With: patient     Problem: Diabetes Comorbidity  Goal: Blood Glucose Level Within Desired Range  Outcome: No Change     Problem: Diabetes Comorbidity  Goal: Blood Glucose Level Within Desired Range  Intervention: Monitor and Manage Glycemia  Recent Flowsheet Documentation  Taken 10/1/2020 0800 by Hailey Win, RN  Glycemic Management: blood glucose monitored     Problem: Neurotoxicity (Chemotherapy Effects)  Goal: Neurotoxicity Symptom Control  Outcome: No Change     Problem: Adjustment to Transplant (Stem Cell/Bone Marrow Transplant)  Goal: Optimal Coping with Transplant  Outcome: No Change     Problem: Diarrhea (Stem Cell/Bone Marrow Transplant)  Goal: Diarrhea Symptom Control  Outcome: No Change     Problem: Fatigue (Stem Cell/Bone Marrow Transplant)  Goal: Energy Level Supports Daily Activity  Outcome: No Change     Problem: Infection Risk (Stem Cell/Bone Marrow Transplant)  Goal: Absence of Infection  Signs/Symptoms  Outcome: No Change     Problem: Infection Risk (Stem Cell/Bone Marrow Transplant)  Goal: Absence of Infection Signs/Symptoms  Intervention: Prevent and Manage Infection  Recent Flowsheet Documentation  Taken 10/1/2020 0800 by Hailey Win RN  Infection Prevention:   equipment surfaces disinfected   hand hygiene promoted   personal protective equipment utilized   rest/sleep promoted   single patient room provided   visitors restricted/screened  Isolation Precautions: protective environment maintained     Problem: Hyperglycemia  Goal: Blood Glucose Level Within Targeted Range  Outcome: Improving     Problem: Hyperglycemia  Goal: Blood Glucose Level Within Targeted Range  Intervention: Optimize Glycemic Control  Recent Flowsheet Documentation  Taken 10/1/2020 0800 by Hailey Win RN  Glycemic Management: blood glucose monitored     Problem: Adult Inpatient Plan of Care  Goal: Absence of Hospital-Acquired Illness or Injury  Intervention: Identify and Manage Fall Risk  Recent Flowsheet Documentation  Taken 10/1/2020 0800 by Hailey Win RN  Safety Promotion/Fall Prevention: nonskid shoes/slippers when out of bed     Problem: Neutropenia (Chemotherapy Effects)  Goal: Absence of Infection  Intervention: Prevent Infection and Maximize Resistance  Recent Flowsheet Documentation  Taken 10/1/2020 0800 by Hailey Win RN  Infection Prevention:   equipment surfaces disinfected   hand hygiene promoted   personal protective equipment utilized   rest/sleep promoted   single patient room provided   visitors restricted/screened

## 2020-10-03 LAB
ALBUMIN SERPL-MCNC: 3.2 G/DL (ref 3.4–5)
ALP SERPL-CCNC: 89 U/L (ref 40–150)
ALT SERPL W P-5'-P-CCNC: 34 U/L (ref 0–70)
ANION GAP SERPL CALCULATED.3IONS-SCNC: 5 MMOL/L (ref 3–14)
APTT PPP: 32 SEC (ref 22–37)
AST SERPL W P-5'-P-CCNC: 17 U/L (ref 0–45)
BILIRUB SERPL-MCNC: 0.4 MG/DL (ref 0.2–1.3)
BUN SERPL-MCNC: 14 MG/DL (ref 7–30)
CALCIUM SERPL-MCNC: 9.2 MG/DL (ref 8.5–10.1)
CHLORIDE SERPL-SCNC: 105 MMOL/L (ref 94–109)
CO2 SERPL-SCNC: 28 MMOL/L (ref 20–32)
CREAT SERPL-MCNC: 0.84 MG/DL (ref 0.66–1.25)
CRP SERPL-MCNC: 22 MG/L (ref 0–8)
D DIMER PPP FEU-MCNC: 6.1 UG/ML FEU (ref 0–0.5)
DIFFERENTIAL METHOD BLD: ABNORMAL
ERYTHROCYTE [DISTWIDTH] IN BLOOD BY AUTOMATED COUNT: 13.9 % (ref 10–15)
FERRITIN SERPL-MCNC: 218 NG/ML (ref 26–388)
FIBRINOGEN PPP-MCNC: 416 MG/DL (ref 200–420)
GFR SERPL CREATININE-BSD FRML MDRD: 89 ML/MIN/{1.73_M2}
GLUCOSE BLDC GLUCOMTR-MCNC: 139 MG/DL (ref 70–99)
GLUCOSE BLDC GLUCOMTR-MCNC: 177 MG/DL (ref 70–99)
GLUCOSE BLDC GLUCOMTR-MCNC: 182 MG/DL (ref 70–99)
GLUCOSE BLDC GLUCOMTR-MCNC: 240 MG/DL (ref 70–99)
GLUCOSE BLDC GLUCOMTR-MCNC: 241 MG/DL (ref 70–99)
GLUCOSE SERPL-MCNC: 157 MG/DL (ref 70–99)
HCT VFR BLD AUTO: 35.4 % (ref 40–53)
HGB BLD-MCNC: 11.6 G/DL (ref 13.3–17.7)
INR PPP: 1.08 (ref 0.86–1.14)
LDH SERPL L TO P-CCNC: 138 U/L (ref 85–227)
MAGNESIUM SERPL-MCNC: 1.8 MG/DL (ref 1.6–2.3)
MCH RBC QN AUTO: 28.2 PG (ref 26.5–33)
MCHC RBC AUTO-ENTMCNC: 32.8 G/DL (ref 31.5–36.5)
MCV RBC AUTO: 86 FL (ref 78–100)
PHOSPHATE SERPL-MCNC: 3.6 MG/DL (ref 2.5–4.5)
PLATELET # BLD AUTO: 36 10E9/L (ref 150–450)
POTASSIUM SERPL-SCNC: 3.8 MMOL/L (ref 3.4–5.3)
PROT SERPL-MCNC: 6.1 G/DL (ref 6.8–8.8)
RBC # BLD AUTO: 4.11 10E12/L (ref 4.4–5.9)
SODIUM SERPL-SCNC: 138 MMOL/L (ref 133–144)
URATE SERPL-MCNC: 3.4 MG/DL (ref 3.5–7.2)
WBC # BLD AUTO: 0.3 10E9/L (ref 4–11)

## 2020-10-03 PROCEDURE — 83735 ASSAY OF MAGNESIUM: CPT | Performed by: STUDENT IN AN ORGANIZED HEALTH CARE EDUCATION/TRAINING PROGRAM

## 2020-10-03 PROCEDURE — 250N000013 HC RX MED GY IP 250 OP 250 PS 637: Performed by: STUDENT IN AN ORGANIZED HEALTH CARE EDUCATION/TRAINING PROGRAM

## 2020-10-03 PROCEDURE — 84550 ASSAY OF BLOOD/URIC ACID: CPT | Performed by: STUDENT IN AN ORGANIZED HEALTH CARE EDUCATION/TRAINING PROGRAM

## 2020-10-03 PROCEDURE — 80053 COMPREHEN METABOLIC PANEL: CPT | Performed by: STUDENT IN AN ORGANIZED HEALTH CARE EDUCATION/TRAINING PROGRAM

## 2020-10-03 PROCEDURE — 250N000013 HC RX MED GY IP 250 OP 250 PS 637: Performed by: INTERNAL MEDICINE

## 2020-10-03 PROCEDURE — 99232 SBSQ HOSP IP/OBS MODERATE 35: CPT | Performed by: NURSE PRACTITIONER

## 2020-10-03 PROCEDURE — 250N000011 HC RX IP 250 OP 636

## 2020-10-03 PROCEDURE — 96372 THER/PROPH/DIAG INJ SC/IM: CPT | Performed by: NURSE PRACTITIONER

## 2020-10-03 PROCEDURE — 86140 C-REACTIVE PROTEIN: CPT | Performed by: STUDENT IN AN ORGANIZED HEALTH CARE EDUCATION/TRAINING PROGRAM

## 2020-10-03 PROCEDURE — 250N000011 HC RX IP 250 OP 636: Performed by: PHYSICIAN ASSISTANT

## 2020-10-03 PROCEDURE — 206N000001 HC R&B BMT UMMC

## 2020-10-03 PROCEDURE — 85384 FIBRINOGEN ACTIVITY: CPT | Performed by: STUDENT IN AN ORGANIZED HEALTH CARE EDUCATION/TRAINING PROGRAM

## 2020-10-03 PROCEDURE — 250N000012 HC RX MED GY IP 250 OP 636 PS 637: Performed by: NURSE PRACTITIONER

## 2020-10-03 PROCEDURE — 250N000013 HC RX MED GY IP 250 OP 250 PS 637: Performed by: PHYSICIAN ASSISTANT

## 2020-10-03 PROCEDURE — 83615 LACTATE (LD) (LDH) ENZYME: CPT | Performed by: STUDENT IN AN ORGANIZED HEALTH CARE EDUCATION/TRAINING PROGRAM

## 2020-10-03 PROCEDURE — 85730 THROMBOPLASTIN TIME PARTIAL: CPT | Performed by: STUDENT IN AN ORGANIZED HEALTH CARE EDUCATION/TRAINING PROGRAM

## 2020-10-03 PROCEDURE — 84100 ASSAY OF PHOSPHORUS: CPT | Performed by: STUDENT IN AN ORGANIZED HEALTH CARE EDUCATION/TRAINING PROGRAM

## 2020-10-03 PROCEDURE — 85027 COMPLETE CBC AUTOMATED: CPT

## 2020-10-03 PROCEDURE — 82728 ASSAY OF FERRITIN: CPT | Performed by: STUDENT IN AN ORGANIZED HEALTH CARE EDUCATION/TRAINING PROGRAM

## 2020-10-03 PROCEDURE — 999N001017 HC STATISTIC GLUCOSE BY METER IP

## 2020-10-03 PROCEDURE — 85610 PROTHROMBIN TIME: CPT | Performed by: STUDENT IN AN ORGANIZED HEALTH CARE EDUCATION/TRAINING PROGRAM

## 2020-10-03 PROCEDURE — 85379 FIBRIN DEGRADATION QUANT: CPT | Performed by: STUDENT IN AN ORGANIZED HEALTH CARE EDUCATION/TRAINING PROGRAM

## 2020-10-03 PROCEDURE — 99233 SBSQ HOSP IP/OBS HIGH 50: CPT | Performed by: INTERNAL MEDICINE

## 2020-10-03 RX ORDER — DOCUSATE SODIUM 100 MG/1
100 CAPSULE, LIQUID FILLED ORAL 2 TIMES DAILY PRN
Status: DISCONTINUED | OUTPATIENT
Start: 2020-10-03 | End: 2020-10-08 | Stop reason: HOSPADM

## 2020-10-03 RX ADMIN — HYDROCORTISONE SODIUM SUCCINATE 20 MG: 100 INJECTION, POWDER, FOR SOLUTION INTRAMUSCULAR; INTRAVENOUS at 10:04

## 2020-10-03 RX ADMIN — Medication 40 MG: at 08:05

## 2020-10-03 RX ADMIN — INSULIN ASPART 2 UNITS: 100 INJECTION, SOLUTION INTRAVENOUS; SUBCUTANEOUS at 18:06

## 2020-10-03 RX ADMIN — ACYCLOVIR 800 MG: 800 TABLET ORAL at 13:49

## 2020-10-03 RX ADMIN — MELATONIN TAB 3 MG 3 MG: 3 TAB at 21:50

## 2020-10-03 RX ADMIN — FLUCONAZOLE 200 MG: 200 TABLET ORAL at 08:05

## 2020-10-03 RX ADMIN — SODIUM CHLORIDE, PRESERVATIVE FREE 5 ML: 5 INJECTION INTRAVENOUS at 10:04

## 2020-10-03 RX ADMIN — ACYCLOVIR 800 MG: 800 TABLET ORAL at 17:57

## 2020-10-03 RX ADMIN — PANTOPRAZOLE SODIUM 40 MG: 40 TABLET, DELAYED RELEASE ORAL at 08:05

## 2020-10-03 RX ADMIN — ACYCLOVIR 800 MG: 800 TABLET ORAL at 21:50

## 2020-10-03 RX ADMIN — ACYCLOVIR 800 MG: 800 TABLET ORAL at 08:05

## 2020-10-03 RX ADMIN — ACYCLOVIR 800 MG: 800 TABLET ORAL at 10:01

## 2020-10-03 RX ADMIN — LEVOFLOXACIN 250 MG: 250 TABLET, FILM COATED ORAL at 10:01

## 2020-10-03 RX ADMIN — Medication 5 ML: at 08:05

## 2020-10-03 RX ADMIN — ALLOPURINOL 300 MG: 300 TABLET ORAL at 08:05

## 2020-10-03 RX ADMIN — DOCUSATE SODIUM 100 MG: 100 CAPSULE, LIQUID FILLED ORAL at 21:50

## 2020-10-03 RX ADMIN — DOCUSATE SODIUM 100 MG: 100 CAPSULE, LIQUID FILLED ORAL at 10:01

## 2020-10-03 RX ADMIN — INSULIN ASPART 5 UNITS: 100 INJECTION, SOLUTION INTRAVENOUS; SUBCUTANEOUS at 12:23

## 2020-10-03 RX ADMIN — INSULIN ASPART 2 UNITS: 100 INJECTION, SOLUTION INTRAVENOUS; SUBCUTANEOUS at 18:07

## 2020-10-03 ASSESSMENT — ACTIVITIES OF DAILY LIVING (ADL)
ADLS_ACUITY_SCORE: 13

## 2020-10-03 ASSESSMENT — MIFFLIN-ST. JEOR: SCORE: 1623.05

## 2020-10-03 NOTE — PROVIDER NOTIFICATION
10/02/20 1700   Call Information   Date of Call 10/02/20   Time of Call 1605   Name of person requesting the team Sheree   Title of person requesting team RN   RRT Arrival time 1608   Time RRT ended 1632   Reason for call   Type of RRT Adult   Primary reason for call Sepsis suspected   Sepsis Suspected Elevated Lactate level   Was patient transferred from the ED, ICU, or PACU within last 24 hours prior to RRT call? No   SBAR   Situation LA 2.6   Background Hx: DM II, hyperlipidemia, HTN and relapsed BLBCL s/p R-CHOP. Admitted for CAR-T infusion.    Notable History/Conditions Diabetes;Cancer   Assessment Pt is A&O. Up and walking. No SOB, chest pain, or N/V. VSS   Interventions Fluid bolus;Labs   Patient Outcome   Patient Outcome Stabilized on unit   RRT Team   Date Attending Physician notified 10/02/20   Time Attending Physician notified 1605   Physician(s) Lydia Ragland MD   Lead RN Cesar Bentley    RT Roxanne   Post RRT Intervention Assessment   Post RRT Assessment Stable/Improved   Date Follow Up Done 10/02/20   Time Follow Up Done 1927

## 2020-10-03 NOTE — PROGRESS NOTES
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 Denies n/v. Wants colace this morning.  Had a low glucose of 65 around 7pm yesterday evening; noted some blurry vision and feeling \"off\" at the time.  Also had some fluctuating blood pressures yesterday, with hypotension that did not improve with fluids.  Noted to have history of adrenal insufficiency. Hydrocortisone for this was discontinued on admission; however, protocol reviewed and adrenal replacement steroids are permissible on car-T protocols.       Review of Systems    8 point review with pertinent positive and negatives     PHYSICAL EXAM    KPS: 90    /69 (BP Location: Right arm, Cuff Size: Adult Regular)   Pulse 92   Temp 98.4  F (36.9  C) (Axillary)   Resp 18   Ht 1.73 m (5' 8.11\")   Wt 86.4 kg (190 lb 8 oz)   SpO2 95%   BMI 28.87 kg/m       General: NAD; mild facial flushing  Eyes: VANGIE, sclera anicteric   Nose/Mouth/Throat: OP moist, no ulcerations   Lungs: CTA bilaterally  Cardiovascular: RRR, no M/R/G   Abdominal/Rectal: +BS, soft, NT, ND, No HSM. Umbilical hernia chronic, stable  Lymphatics: No edema  Skin: No rashes or petechaie  MSK: Toes, second and third on left are missing (s/p amputation 2/2 diabetic complications)  Neuro: A&O ICE 10/10; no CRS (see CAR-tox note)  Additional Findings: Right chest port a cath, left upper arm PICC c/d/i    Labs:  Lab Results   Component Value Date    WBC 0.3 (LL) 10/03/2020    ANEU 0.3 (LL) 10/02/2020    HGB 11.6 (L) 10/03/2020    HCT 35.4 (L) 10/03/2020    PLT 36 (LL) 10/03/2020     10/03/2020    POTASSIUM 3.8 10/03/2020    CHLORIDE 105 10/03/2020    CO2 28 10/03/2020     (H) 10/03/2020    BUN 14 10/03/2020    CR 0.84 10/03/2020    MAG 1.8 10/03/2020    INR 1.08 10/03/2020    BILITOTAL 0.4 10/03/2020    AST 17 10/03/2020    ALT 34 10/03/2020    ALKPHOS 89 10/03/2020    PROTTOTAL 6.1 (L) " 10/03/2020    ALBUMIN 3.2 (L) 10/03/2020           ASSESSMENT BY SYSTEMS   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 T cells collected 8/31/2020. 2017-45 [facility], 2019-35: IT dex + simvastatin for JUAQUIN prevention. Day 4.    Day 0: LP with IT dex. PICC line placement, [facility] Car-t infusion   Day 6 (10/5): LP/IT dex - needs under fluoroscopy; 10/5 was booked, so this will happen 10/6 per notes.  Please confirm on Monday.    Day 13 (10/12): LP/IT dex (set up next week for 10/12 or 10/13)    1.  BMT:  DLBCL  Axi-cell product with CNS prevention protocol, day 0  CNS prophy: simvastatin 40mg tied to IDS drug  - cont allopurinol    2.  HEME: Keep Hgb>7 and plts>10K. No pre-meds.                            3.  ID: Afebrile.   - Prophy levo, fluc, acy, IV pentamidine (9/23).       4.  GI: no complaints.  - prn Ativan, Compazine  - Protonix for GI prophy.   - Dulcolax prn constipation    5.  FEN/Renal: Creat, lytes wnl.    6. Endo: Type 2 DM.  - holding metformin during CAR-T (risk of lactic acidosis). Endocrine following.  Lantus and Novolog sliding scale and carb counting insulin being adjusted by endocrine team.   Complication of DM with 2 left toes amputated 7/2020  - hx Adrenal Insufficiency: PTA on hydrocortisone 20mg/d; resume 10/3.  This is permitted on Car-T protocols.     Shawanda Barajas PA-C  10/3/2020          The patient was seen and examined by me separately from the midlevel provider. The note reflects our mutual assessment and plans and were approved by me personally.   I personally reviewed today's lab results vital signs and radiology results.    Each point of the assessment and plan were reviewed by the midlevel and me and either endorsed by me or were my added decisions.    My pertinent physical findings today are: Sfebrile, clear lungs and no rash.    My  assessment and plan are: 71 yo with refractory DLBCL admitted for [facility] CART. Orders already signed. Monitor for CRS.  Day +4 and doing well. CNS prophy. IT dex Tuesday AM. . No neurotox or CRS. Add HC replacement for adrenal insufficiency (permitted by protocol).    Melvin Tain M.D.

## 2020-10-03 NOTE — PROGRESS NOTES
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 currently day +4 of CAR-T cell therapy [facility]    Vital Signs: BP (!) 118/97   Pulse 91   Temp 98  F (36.7  C)   Resp 18   Ht 1.73 m (5' 8.11\")   Wt 87.7 kg (193 lb 4.8 oz)   SpO2 93%   BMI 29.30 kg/m      1) CRS Grading (based ONLY on parameters in box below)    Fever:   </= 100.4    Blood pressure:   SBP >/= 90 (not hypotensive)    Oxygen saturation:    >/= 90% on room air (not hypoxic)    CRS Parameter Grade 1  Grade 2 Grade 3 Grade 4   Fever* Tm >= 100.4 degrees F Tm >= 100.4 degrees F Tm >= 100.4 degrees F Tm >= to 100.4  degrees F      With Either:  Hypotension (SBP <90) None Responsive to Fluids  Requiring 1  vasopressor (w/ or w/o vasopressin) Requiring multiple  vasopressors  (excluding  vasopressin)     And/or  Hypoxia (O2 sats <90% on room air) None Low-flow nasal  cannula or blow-by High-flow nasal  cannula, face mask,  non-rebreather mask,or Venturi mask  Requiring positive  pressure (CPAP,  BiPAP intubation and  mechanical  ventilation)     CRS Summary  Does patient have CRS? No  Current CRS stgstrstastdstest:st st1st 2) Neurotoxicity:    ICE Assessment  Orientation to year, month, city, hospital: 4 points, Name 3 objects (e.g., point to clock, pen, button): 3 point, Following commands: (e.g., Show me 2 fingers): 1 point, Write a standard sentence (e.g., [employer] wilson jumped over the log): 1 point and Count backwards from 100 by ten: 1 point  Total points: 10: No impairment    ASTCT ICANS Consensus Grading for Adults  ICE Score   10    Seizure   No seizures    Motor Findings   No motor findings    Elevated ICP/Cerebral Edema   None      Neurotoxicity  Domain Grade 1 Grade 2 Grade 3 Grade 4   ICE score 7-9 3-6 1-2 0   Depressed LOC      Awakens  spontaneously Awakens to  voice  Awakens only to  tactile stimulation Unarousable or  requires " vigorous  or repetitive  tactile stimuli to  arouse. Stupor  or coma.   Seizure n/a n/a Any clinical  seizure focal or  generalized that  resolves rapidly  or nonconvulsive  seizures on EEG  that resolve with  intervention  Life-threatening  prolonged  seizure (>5 min);  or Repetitive  clinical or  electrical  seizures without  return to baseline  in between    Motor Findings      n/a n/a n/a Deep focal motor  weakness such  as hemiparesis  or paraparesis   Elevated  ICP/cerebral  edema  n/a n/a Focal/local  edema on  neuroimaging  Diffuse cerebral  edema on  neuroimaging;  decerebrate or  decorticate  posturing; or  cranial nerve VI  palsy; or  papilledema; or  Cushing's triad     ICANS grade is determined by the most severe event (ICE score, level of consciousness, seizure, motor findings, raised ICP/cerebral edema) not attributable to any other cause; for example, a patient with an ICE score of 3 who has a generalized seizure is classified as grade 3 ICANS.    A patient with an ICE score of 0 may be classified as grade 3 ICANS if awake with global aphasia, but a patient with an ICE score of 0 may be classified as grade 4 ICANS if unarousable.     Depressed level of consciousness should be attributable to no other cause (eg, no sedating medication)    Tremors and myoclonus associated with immune effector cell therapies may be graded according to CTCAE v5.0,but they do not influence ICANS grading.     Intracranial hemorrhage with or without associated edema is not considered a neurotoxicity feature and is  excluded from ICANS grading. It may be graded according to CTCAE v5.0.          Neurotoxicity Summary  Does patient have neurotoxicity? No  Current Neurotoxicity score (0-10): 10/10        3) Organ CAR-T toxicity:    Cardiac   none    Respiratory   none    Gastrointestinal   none    Hepatic    none    Skin   none     Coagulopathy    none     Other: n/a      4) HLH   No HLH     * CTCAE grading can be found at    https://ctep.cancer.gov/protocoldevelopment/electronic_applications/docs/CTCAE_v5_Quick Reference_[email or web address].pdf

## 2020-10-03 NOTE — PLAN OF CARE
"Blood pressure 105/69, pulse 92, temperature 98.4  F (36.9  C), temperature source Axillary, resp. rate 18, height 1.73 m (5' 8.11\"), weight 86.4 kg (190 lb 8 oz), SpO2 95 %.    AVSS as recorded above. All Vitals are WNL. Lactic acid rechecked was 2.0. Provider aware no new action taken. Blood glucoses were 103 at 22:00 and 177 at midnight. No insulin sliding scale needed. Blood glucose on AM lab draw was 157. He got 15 unit Rapid acting insulin to cover carb last evening. Neuro intact. A/O x 4. Up independently to the bathroom and voiding good Denies any pain/N/V/D. Pt may need some stool softner this morning. Scheduled Melatonin given with good result. Sleeping with home C-PAP. No issues this shift. Continue to give support and continue to follow plan of care.      Problem: Adult Inpatient Plan of Care  Goal: Plan of Care Review  Outcome: No Change  Goal: Patient-Specific Goal (Individualization)  Outcome: No Change  Goal: Absence of Hospital-Acquired Illness or Injury  Outcome: No Change  Intervention: Identify and Manage Fall Risk  Recent Flowsheet Documentation  Taken 10/3/2020 0400 by Shannan King RN  Safety Promotion/Fall Prevention:   lighting adjusted   fall prevention program maintained   clutter free environment maintained   room organization consistent   safety round/check completed   nonskid shoes/slippers when out of bed  Taken 10/2/2020 2000 by Shannan King RN  Safety Promotion/Fall Prevention:   lighting adjusted   fall prevention program maintained   clutter free environment maintained   room organization consistent   safety round/check completed   nonskid shoes/slippers when out of bed  Intervention: Prevent VTE (venous thromboembolism)  Recent Flowsheet Documentation  Taken 10/3/2020 0400 by Shannan King RN  VTE Prevention/Management: ambulation promoted  Taken 10/2/2020 2000 by Shannan King RN  VTE Prevention/Management: ambulation promoted  Goal: Optimal Comfort and " Wellbeing  Outcome: No Change  Intervention: Provide Person-Centered Care  Recent Flowsheet Documentation  Taken 10/3/2020 0400 by Shannan King RN  Trust Relationship/Rapport:   care explained   questions answered   questions encouraged  Taken 10/2/2020 2000 by Shannan King RN  Trust Relationship/Rapport:   care explained   questions answered   questions encouraged     Problem: Diabetes Comorbidity  Goal: Blood Glucose Level Within Desired Range  Outcome: No Change  Intervention: Monitor and Manage Glycemia  Recent Flowsheet Documentation  Taken 10/3/2020 0400 by Shannan King RN  Glycemic Management: blood glucose monitored  Taken 10/2/2020 2000 by Shannan King RN  Glycemic Management: blood glucose monitored     Problem: Neurotoxicity (Chemotherapy Effects)  Goal: Neurotoxicity Symptom Control  Outcome: No Change     Problem: Adjustment to Transplant (Stem Cell/Bone Marrow Transplant)  Goal: Optimal Coping with Transplant  Outcome: No Change  Intervention: Optimize Patient/Family Adjustment Response to Transplant  Recent Flowsheet Documentation  Taken 10/3/2020 0400 by Shannan King RN  Supportive Measures: active listening utilized  Taken 10/2/2020 2000 by Shannan King RN  Supportive Measures: active listening utilized     Problem: Diarrhea (Stem Cell/Bone Marrow Transplant)  Goal: Diarrhea Symptom Control  Outcome: No Change  Intervention: Manage Diarrhea  Recent Flowsheet Documentation  Taken 10/3/2020 0400 by Shannan King RN  Fluid/Electrolyte Management: fluids provided  Taken 10/2/2020 2000 by Shannan King RN  Fluid/Electrolyte Management: fluids provided     Problem: Fatigue (Stem Cell/Bone Marrow Transplant)  Goal: Energy Level Supports Daily Activity  Outcome: No Change     Problem: Hematologic Alteration (Stem Cell/Bone Marrow Transplant)  Goal: Blood Counts Within Acceptable Range  Outcome: No Change  Intervention: Monitor and Manage Hematologic  Symptoms  Recent Flowsheet Documentation  Taken 10/3/2020 0400 by Shannan King RN  Bleeding Precautions: blood pressure closely monitored  Taken 10/2/2020 2000 by Shannan King RN  Bleeding Precautions: blood pressure closely monitored     Problem: Infection Risk (Stem Cell/Bone Marrow Transplant)  Goal: Absence of Infection Signs/Symptoms  Outcome: No Change  Intervention: Prevent and Manage Infection  Recent Flowsheet Documentation  Taken 10/3/2020 0400 by Shannan King RN  Infection Prevention:   hand hygiene promoted   equipment surfaces disinfected   environmental surveillance performed   personal protective equipment utilized   rest/sleep promoted   single patient room provided  Isolation Precautions: protective environment maintained  Taken 10/2/2020 2000 by Shannan King RN  Infection Prevention:   hand hygiene promoted   equipment surfaces disinfected   environmental surveillance performed   personal protective equipment utilized   rest/sleep promoted   single patient room provided  Isolation Precautions: protective environment maintained     Problem: Hyperglycemia  Goal: Blood Glucose Level Within Targeted Range  Outcome: No Change  Intervention: Optimize Glycemic Control  Recent Flowsheet Documentation  Taken 10/3/2020 0400 by Shannan King RN  Glycemic Management: blood glucose monitored  Taken 10/2/2020 2000 by Shannan King RN  Glycemic Management: blood glucose monitored     Problem: Adult Inpatient Plan of Care  Goal: Patient-Specific Goal (Individualization)  Outcome: No Change     Problem: Diabetes Comorbidity  Goal: Blood Glucose Level Within Desired Range  Outcome: No Change  Intervention: Monitor and Manage Glycemia  Recent Flowsheet Documentation  Taken 10/3/2020 0400 by Shannan King RN  Glycemic Management: blood glucose monitored  Taken 10/2/2020 2000 by Shannan King RN  Glycemic Management: blood glucose monitored     Problem: Diabetes  Comorbidity  Goal: Blood Glucose Level Within Desired Range  Intervention: Monitor and Manage Glycemia  Recent Flowsheet Documentation  Taken 10/3/2020 0400 by Shannan King RN  Glycemic Management: blood glucose monitored  Taken 10/2/2020 2000 by Shannan King RN  Glycemic Management: blood glucose monitored     Problem: Neurotoxicity (Chemotherapy Effects)  Goal: Neurotoxicity Symptom Control  Outcome: No Change     Problem: Adjustment to Transplant (Stem Cell/Bone Marrow Transplant)  Goal: Optimal Coping with Transplant  Outcome: No Change  Intervention: Optimize Patient/Family Adjustment Response to Transplant  Recent Flowsheet Documentation  Taken 10/3/2020 0400 by Shannan King RN  Supportive Measures: active listening utilized  Taken 10/2/2020 2000 by Shannan King RN  Supportive Measures: active listening utilized     Problem: Fatigue (Stem Cell/Bone Marrow Transplant)  Goal: Energy Level Supports Daily Activity  Outcome: No Change     Problem: Hematologic Alteration (Stem Cell/Bone Marrow Transplant)  Goal: Blood Counts Within Acceptable Range  Outcome: No Change  Intervention: Monitor and Manage Hematologic Symptoms  Recent Flowsheet Documentation  Taken 10/3/2020 0400 by Shannan King RN  Bleeding Precautions: blood pressure closely monitored  Taken 10/2/2020 2000 by Shannan King RN  Bleeding Precautions: blood pressure closely monitored     Problem: Infection Risk (Stem Cell/Bone Marrow Transplant)  Goal: Absence of Infection Signs/Symptoms  Outcome: No Change  Intervention: Prevent and Manage Infection  Recent Flowsheet Documentation  Taken 10/3/2020 0400 by Shannan King RN  Infection Prevention:   hand hygiene promoted   equipment surfaces disinfected   environmental surveillance performed   personal protective equipment utilized   rest/sleep promoted   single patient room provided  Isolation Precautions: protective environment maintained  Taken 10/2/2020 2000 by  Shannan King RN  Infection Prevention:   hand hygiene promoted   equipment surfaces disinfected   environmental surveillance performed   personal protective equipment utilized   rest/sleep promoted   single patient room provided  Isolation Precautions: protective environment maintained

## 2020-10-03 NOTE — PROGRESS NOTES
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  Glucose has been persistently uncontrolled at home lately with major insulin resistance in setting of steroids and metformin withholding.       Assessment:      1) Type 2 Diabetes Mellitus, poor controlled (A1c 7.3%).  Hx of toe amp 7/2020     2) s/p BMT       Plan:                  - aspart 1 unit per 4 grams carbohydrate--> had adjusted to 1:6 given the hypoglycemia however he re-started steroids this am and given his pattern of insulin resistance with steroids, reduced to 1:4     -  glargine 30 units daily, now at  1600 (normally takes at HS) increased 20% for anticipated need d/t resumption of steroids.     - Aspart high correction qAC, HS 0200 from very high.  - hypoglycemia protocol  - Consult to IP nutrition re: enhanced learning for carb counting/coverage     - restart home metformin and hydrocortisone when appropriate  - plan to discharge patient with more systematic method of dosing his aspart     Outpatient diabetes follow up: Dr Turner  Plan discussed with patient, bedside RN              Interval History:     The last 24 hours progress and nursing notes reviewed.  BG noted to drop below target again following dinner carb coverage.  As suspected, he is less insulin resistant with steroid effect lessening and weight loss. Will adjust carb coverage more significantly to help avoid further hypoglycemic events.    Addendum 1:43 PM - patient re-started steroids and elevation of BG present by lunch BG reading of 241.  Adjusted carb coverage to account for hydrocortisone dosing.    Lactic acid increased - assessed per medicine \"is likely due to underlying malignancy and possible " "hypovolemia and worsened by the comorbidities and possible concomitant hypovolemia\".  Will req following if re-starting Metformin.         Recent Labs   Lab 10/03/20  0305 10/03/20  0018 10/02/20  1929 10/02/20  1905 10/02/20  1655 10/02/20  1253 10/02/20  0736 10/02/20  0436 10/01/20  0416 10/01/20  0416 09/30/20  0312 09/30/20  0312 09/29/20  1415 09/29/20  1415 09/29/20  0743 09/29/20  0743   *  --   --   --   --   --   --  154*  --  89  --  122*  --  257*  --  305*   BGM  --  177* 105* 65* 106* 171* 165*  --    < >  --    < >  --    < >  --    < >  --     < > = values in this interval not displayed.           Nutrition:     Orders Placed This Encounter      High Kcal/High Protein Diet, ADULT          PTA Regimen:     Off metformin x about 1 week (caution with re-starting given lactic acidosis 10/2)  glargine usually 30 units (but up to 40) at HS  aspart roughly 1 unit per 10 mg/dL correction plus some for food          Review of Systems:   Constitutional:   No fever, no chills  ENT/Mouth:   No hearing changes, no ear pain, no sore throat, no rhinorrhea, no difficulty swallowing  Eyes:  No eye pain, no discharge, no vision changes  CV:   No CP/SOB, no new edema  Resp:  No cough, no wheezing  GI:   No nausea, no vomiting, denies constipation, or diarrhea  :  No dysuria, no frequency, no hematuria  Musk:  No joint swelling/pain, No back pain  Skin/heme:   No new rashes/bruises/open areas.  No pruritis  Neuro:   No new weakness, denies new numbness/tingling, no headache  Psych:   No new anxiety, no depression  Endocrine:  No polyuria, no polydipsia          Medications:   Steroid planning: received Hydrocortisone 20 mg this am 1004 AM   IT dexamethasone, last on 9/29-- 8 mg (also on 9/23, 9/24)       Physical Exam:     General:  pleasant, in no acute distress.  Laying comfortably in bed.   HEENT:  NC/AT. MMM, sclera not injected  Lungs:  unremarkable, no new cough, no SOB  ABD:   soft,  non-tender  Skin:  " warm and dry, no obvious lesions/rash  Feet:    CMS intact, PPP  MSK:   moving all extremities  Lymp:   no LE edema  Mental Status:  Alert and oriented x3  Psych:   Cooperative, good eye contact, full affect          Data:     Lab Results   Component Value Date    A1C 7.3 05/22/2020            CBC RESULTS:   Recent Labs   Lab Test 10/03/20  0305   WBC 0.3*   RBC 4.11*   HGB 11.6*   HCT 35.4*   MCV 86   MCH 28.2   MCHC 32.8   RDW 13.9   PLT 36*     Recent Labs   Lab Test 10/03/20  0305 10/02/20  0436    138   POTASSIUM 3.8 3.8   CHLORIDE 105 104   CO2 28 27   ANIONGAP 5 7   * 154*   BUN 14 14   CR 0.84 0.76   JEFF 9.2 9.3     Liver Function Studies -   Recent Labs   Lab Test 10/03/20  0305   PROTTOTAL 6.1*   ALBUMIN 3.2*   BILITOTAL 0.4   ALKPHOS 89   AST 17   ALT 34     Lab Results   Component Value Date    INR 1.08 10/03/2020    INR 1.04 09/29/2020    INR 1.07 09/29/2020    INR 1.01 09/28/2020    INR 1.09 09/15/2020    INR 1.11 08/20/2020    INR 1.22 05/27/2020    INR 0.99 12/10/2018    INR 0.92 11/14/2017    INR 0.98 10/26/2017    INR 0.98 11/13/2014         VERNA Eastman CNP   Inpatient Diabetes Management Service  Pager - [phone]  Friday - Monday 0800 - 1600 hrs  Diabetes Management Team job code: [id number]       I spent a total of 25 minutes bedside and on the inpatient unit managing glycemic care.  Over 50% of my time on the unit was spent counseling the patient and/or coordinating care regarding acute hyperglycemic management.  See note for details.

## 2020-10-04 LAB
ANION GAP SERPL CALCULATED.3IONS-SCNC: 5 MMOL/L (ref 3–14)
BUN SERPL-MCNC: 16 MG/DL (ref 7–30)
CALCIUM SERPL-MCNC: 9.1 MG/DL (ref 8.5–10.1)
CHLORIDE SERPL-SCNC: 105 MMOL/L (ref 94–109)
CO2 SERPL-SCNC: 29 MMOL/L (ref 20–32)
CREAT SERPL-MCNC: 0.71 MG/DL (ref 0.66–1.25)
DIFFERENTIAL METHOD BLD: ABNORMAL
ERYTHROCYTE [DISTWIDTH] IN BLOOD BY AUTOMATED COUNT: 14.1 % (ref 10–15)
GFR SERPL CREATININE-BSD FRML MDRD: >90 ML/MIN/{1.73_M2}
GLUCOSE BLDC GLUCOMTR-MCNC: 104 MG/DL (ref 70–99)
GLUCOSE BLDC GLUCOMTR-MCNC: 148 MG/DL (ref 70–99)
GLUCOSE BLDC GLUCOMTR-MCNC: 163 MG/DL (ref 70–99)
GLUCOSE BLDC GLUCOMTR-MCNC: 221 MG/DL (ref 70–99)
GLUCOSE SERPL-MCNC: 180 MG/DL (ref 70–99)
HCT VFR BLD AUTO: 33.6 % (ref 40–53)
HGB BLD-MCNC: 11 G/DL (ref 13.3–17.7)
MCH RBC QN AUTO: 28.1 PG (ref 26.5–33)
MCHC RBC AUTO-ENTMCNC: 32.7 G/DL (ref 31.5–36.5)
MCV RBC AUTO: 86 FL (ref 78–100)
PLATELET # BLD AUTO: 34 10E9/L (ref 150–450)
POTASSIUM SERPL-SCNC: 3.7 MMOL/L (ref 3.4–5.3)
RBC # BLD AUTO: 3.91 10E12/L (ref 4.4–5.9)
SODIUM SERPL-SCNC: 138 MMOL/L (ref 133–144)
WBC # BLD AUTO: 0.3 10E9/L (ref 4–11)

## 2020-10-04 PROCEDURE — 250N000011 HC RX IP 250 OP 636

## 2020-10-04 PROCEDURE — 99233 SBSQ HOSP IP/OBS HIGH 50: CPT | Performed by: INTERNAL MEDICINE

## 2020-10-04 PROCEDURE — 206N000001 HC R&B BMT UMMC

## 2020-10-04 PROCEDURE — 250N000013 HC RX MED GY IP 250 OP 250 PS 637: Performed by: INTERNAL MEDICINE

## 2020-10-04 PROCEDURE — 80048 BASIC METABOLIC PNL TOTAL CA: CPT | Performed by: INTERNAL MEDICINE

## 2020-10-04 PROCEDURE — 85027 COMPLETE CBC AUTOMATED: CPT

## 2020-10-04 PROCEDURE — 250N000013 HC RX MED GY IP 250 OP 250 PS 637: Performed by: STUDENT IN AN ORGANIZED HEALTH CARE EDUCATION/TRAINING PROGRAM

## 2020-10-04 PROCEDURE — 999N001017 HC STATISTIC GLUCOSE BY METER IP

## 2020-10-04 PROCEDURE — 99232 SBSQ HOSP IP/OBS MODERATE 35: CPT | Performed by: NURSE PRACTITIONER

## 2020-10-04 PROCEDURE — 250N000013 HC RX MED GY IP 250 OP 250 PS 637: Performed by: PHYSICIAN ASSISTANT

## 2020-10-04 RX ORDER — HYDROCORTISONE 20 MG/1
20 TABLET ORAL DAILY
Status: DISCONTINUED | OUTPATIENT
Start: 2020-10-04 | End: 2020-10-08 | Stop reason: HOSPADM

## 2020-10-04 RX ADMIN — ALLOPURINOL 300 MG: 300 TABLET ORAL at 08:11

## 2020-10-04 RX ADMIN — INSULIN ASPART 1 UNITS: 100 INJECTION, SOLUTION INTRAVENOUS; SUBCUTANEOUS at 08:18

## 2020-10-04 RX ADMIN — ACYCLOVIR 800 MG: 800 TABLET ORAL at 08:11

## 2020-10-04 RX ADMIN — DOCUSATE SODIUM 100 MG: 100 CAPSULE, LIQUID FILLED ORAL at 21:00

## 2020-10-04 RX ADMIN — ACYCLOVIR 800 MG: 800 TABLET ORAL at 21:00

## 2020-10-04 RX ADMIN — Medication 40 MG: at 08:11

## 2020-10-04 RX ADMIN — PANTOPRAZOLE SODIUM 40 MG: 40 TABLET, DELAYED RELEASE ORAL at 08:11

## 2020-10-04 RX ADMIN — Medication 5 ML: at 08:11

## 2020-10-04 RX ADMIN — MELATONIN TAB 3 MG 3 MG: 3 TAB at 21:00

## 2020-10-04 RX ADMIN — INSULIN ASPART 4 UNITS: 100 INJECTION, SOLUTION INTRAVENOUS; SUBCUTANEOUS at 13:26

## 2020-10-04 RX ADMIN — ACYCLOVIR 800 MG: 800 TABLET ORAL at 18:06

## 2020-10-04 RX ADMIN — SODIUM CHLORIDE, PRESERVATIVE FREE 5 ML: 5 INJECTION INTRAVENOUS at 03:26

## 2020-10-04 RX ADMIN — LEVOFLOXACIN 250 MG: 250 TABLET, FILM COATED ORAL at 09:29

## 2020-10-04 RX ADMIN — FLUCONAZOLE 200 MG: 200 TABLET ORAL at 08:11

## 2020-10-04 RX ADMIN — ACYCLOVIR 800 MG: 800 TABLET ORAL at 13:29

## 2020-10-04 RX ADMIN — HYDROCORTISONE 20 MG: 20 TABLET ORAL at 09:28

## 2020-10-04 RX ADMIN — ACYCLOVIR 800 MG: 800 TABLET ORAL at 11:13

## 2020-10-04 ASSESSMENT — ACTIVITIES OF DAILY LIVING (ADL)
ADLS_ACUITY_SCORE: 13

## 2020-10-04 ASSESSMENT — MIFFLIN-ST. JEOR: SCORE: 1619.25

## 2020-10-04 NOTE — PROGRESS NOTES
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 currently day +4 of CAR-T cell therapy [facility]    Vital Signs: /68   Pulse 91   Temp 97.7  F (36.5  C)   Resp 16   Ht 1.73 m (5' 8.11\")   Wt 87.3 kg (192 lb 7.4 oz)   SpO2 96%   BMI 29.17 kg/m      1) CRS Grading (based ONLY on parameters in box below)    Fever:   </= 100.4    Blood pressure:   SBP >/= 90 (not hypotensive)    Oxygen saturation:    >/= 90% on room air (not hypoxic)    CRS Parameter Grade 1  Grade 2 Grade 3 Grade 4   Fever* Tm >= 100.4 degrees F Tm >= 100.4 degrees F Tm >= 100.4 degrees F Tm >= to 100.4  degrees F      With Either:  Hypotension (SBP <90) None Responsive to Fluids  Requiring 1  vasopressor (w/ or w/o vasopressin) Requiring multiple  vasopressors  (excluding  vasopressin)     And/or  Hypoxia (O2 sats <90% on room air) None Low-flow nasal  cannula or blow-by High-flow nasal  cannula, face mask,  non-rebreather mask,or Venturi mask  Requiring positive  pressure (CPAP,  BiPAP intubation and  mechanical  ventilation)     CRS Summary  Does patient have CRS? No  Current CRS stgstrstastdstest:st st1st 2) Neurotoxicity:    ICE Assessment  Orientation to year, month, city, hospital: 4 points, Name 3 objects (e.g., point to clock, pen, button): 3 point, Following commands: (e.g., Show me 2 fingers): 1 point, Write a standard sentence (e.g., [employer]son jumped over the log): 1 point and Count backwards from 100 by ten: 1 point  Total points: 10: No impairment    ASTCT ICANS Consensus Grading for Adults  ICE Score   10    Seizure   No seizures    Motor Findings   No motor findings    Elevated ICP/Cerebral Edema   None      Neurotoxicity  Domain Grade 1 Grade 2 Grade 3 Grade 4   ICE score 7-9 3-6 1-2 0   Depressed LOC      Awakens  spontaneously Awakens to  voice  Awakens only to  tactile stimulation Unarousable or  requires " vigorous  or repetitive  tactile stimuli to  arouse. Stupor  or coma.   Seizure n/a n/a Any clinical  seizure focal or  generalized that  resolves rapidly  or nonconvulsive  seizures on EEG  that resolve with  intervention  Life-threatening  prolonged  seizure (>5 min);  or Repetitive  clinical or  electrical  seizures without  return to baseline  in between    Motor Findings      n/a n/a n/a Deep focal motor  weakness such  as hemiparesis  or paraparesis   Elevated  ICP/cerebral  edema  n/a n/a Focal/local  edema on  neuroimaging  Diffuse cerebral  edema on  neuroimaging;  decerebrate or  decorticate  posturing; or  cranial nerve VI  palsy; or  papilledema; or  Cushing's triad     ICANS grade is determined by the most severe event (ICE score, level of consciousness, seizure, motor findings, raised ICP/cerebral edema) not attributable to any other cause; for example, a patient with an ICE score of 3 who has a generalized seizure is classified as grade 3 ICANS.    A patient with an ICE score of 0 may be classified as grade 3 ICANS if awake with global aphasia, but a patient with an ICE score of 0 may be classified as grade 4 ICANS if unarousable.     Depressed level of consciousness should be attributable to no other cause (eg, no sedating medication)    Tremors and myoclonus associated with immune effector cell therapies may be graded according to CTCAE v5.0,but they do not influence ICANS grading.     Intracranial hemorrhage with or without associated edema is not considered a neurotoxicity feature and is  excluded from ICANS grading. It may be graded according to CTCAE v5.0.          Neurotoxicity Summary  Does patient have neurotoxicity? No  Current Neurotoxicity score (0-10): 10/10        3) Organ CAR-T toxicity:    Cardiac   none    Respiratory   none    Gastrointestinal   none    Hepatic    none    Skin   none     Coagulopathy    none     Other: n/a      4) HLH   No HLH     * CTCAE grading can be found at    https://ctep.cancer.gov/protocoldevelopment/electronic_applications/docs/CTCAE_v5_Quick Reference_8.5x11.pdf

## 2020-10-04 NOTE — PLAN OF CARE
Pt doing well, no CRS activity. Pt took hydrocortisone 20 mg po today vs IV. Pt ,221 so far. Pt BP low so far was 99/66.  Problem: Adult Inpatient Plan of Care  Goal: Plan of Care Review  Outcome: No Change  Flowsheets (Taken 10/4/2020 1706)  Plan of Care Reviewed With: patient  Progress: no change  Goal: Patient-Specific Goal (Individualization)  Outcome: No Change  Goal: Absence of Hospital-Acquired Illness or Injury  Outcome: No Change  Intervention: Identify and Manage Fall Risk  Recent Flowsheet Documentation  Taken 10/4/2020 0800 by Sheree Escalera RN  Safety Promotion/Fall Prevention:   nonskid shoes/slippers when out of bed   safety round/check completed  Intervention: Prevent VTE (venous thromboembolism)  Recent Flowsheet Documentation  Taken 10/4/2020 0800 by Sheree Escalera RN  VTE Prevention/Management: ambulation promoted  Goal: Optimal Comfort and Wellbeing  Outcome: No Change  Intervention: Provide Person-Centered Care  Recent Flowsheet Documentation  Taken 10/4/2020 0800 by Sheree Escalera RN  Trust Relationship/Rapport: questions answered  Goal: Readiness for Transition of Care  Outcome: No Change  Goal: Rounds/Family Conference  Outcome: No Change     Problem: Diabetes Comorbidity  Goal: Blood Glucose Level Within Desired Range  Outcome: No Change  Intervention: Monitor and Manage Glycemia  Recent Flowsheet Documentation  Taken 10/4/2020 0800 by Sheree Escalera RN  Glycemic Management: blood glucose monitored     Problem: Neurotoxicity (Chemotherapy Effects)  Goal: Neurotoxicity Symptom Control  Outcome: No Change     Problem: Adjustment to Transplant (Stem Cell/Bone Marrow Transplant)  Goal: Optimal Coping with Transplant  Outcome: No Change  Intervention: Optimize Patient/Family Adjustment Response to Transplant  Recent Flowsheet Documentation  Taken 10/4/2020 0800 by Sheree Escalera RN  Supportive Measures: active listening utilized     Problem: Diarrhea (Stem Cell/Bone  Marrow Transplant)  Goal: Diarrhea Symptom Control  Outcome: No Change  Intervention: Manage Diarrhea  Recent Flowsheet Documentation  Taken 10/4/2020 0800 by Sheree Escalera RN  Fluid/Electrolyte Management: fluids provided     Problem: Fatigue (Stem Cell/Bone Marrow Transplant)  Goal: Energy Level Supports Daily Activity  Outcome: No Change     Problem: Hematologic Alteration (Stem Cell/Bone Marrow Transplant)  Goal: Blood Counts Within Acceptable Range  Outcome: No Change  Intervention: Monitor and Manage Hematologic Symptoms  Recent Flowsheet Documentation  Taken 10/4/2020 0800 by Sheree Escalera RN  Bleeding Precautions: blood pressure closely monitored     Problem: Infection Risk (Stem Cell/Bone Marrow Transplant)  Goal: Absence of Infection Signs/Symptoms  Outcome: No Change  Intervention: Prevent and Manage Infection  Recent Flowsheet Documentation  Taken 10/4/2020 0800 by Sheree Escalera RN  Infection Prevention:   single patient room provided   visitors restricted/screened  Isolation Precautions: protective environment maintained     Problem: Hyperglycemia  Goal: Blood Glucose Level Within Targeted Range  Outcome: No Change  Intervention: Optimize Glycemic Control  Recent Flowsheet Documentation  Taken 10/4/2020 0800 by Sheree Escalera RN  Glycemic Management: blood glucose monitored

## 2020-10-04 NOTE — PLAN OF CARE
"Blood pressure 110/73, pulse 77, temperature 98.3  F (36.8  C), temperature source Axillary, resp. rate 16, height 1.73 m (5' 8.11\"), weight 87.7 kg (193 lb 4.8 oz), SpO2 96 %.    AVSS as above. A/O x 4. No Neurotoxicity symptoms. Up independently to the bathroom and he is voiding good. Colace prn given for constipation with no stool this shift. Pt denies any pain/N/V/D.He slept well with Home C-PAP. Melatonin with good result. Blood glucose at HS was 240 and he got sliding scale insulin per order He is heparin locked as he does not need any replacements.   Problem: Diabetes Comorbidity  Goal: Blood Glucose Level Within Desired Range  Intervention: Monitor and Manage Glycemia  Recent Flowsheet Documentation  Taken 10/4/2020 0330 by Shannan King RN  Glycemic Management:   blood glucose monitored   insulin infusion adjusted  Taken 10/3/2020 2000 by Shannan King RN  Glycemic Management:   blood glucose monitored   insulin infusion adjusted   Continue to monitor pt and give support and continue with plan of care.      Problem: Adult Inpatient Plan of Care  Goal: Plan of Care Review  Outcome: No Change  Goal: Patient-Specific Goal (Individualization)  Outcome: No Change  Goal: Absence of Hospital-Acquired Illness or Injury  Outcome: No Change  Intervention: Identify and Manage Fall Risk  Recent Flowsheet Documentation  Taken 10/4/2020 0330 by Shannan King RN  Safety Promotion/Fall Prevention:   nonskid shoes/slippers when out of bed   room organization consistent   lighting adjusted  Taken 10/3/2020 2000 by Shannan King RN  Safety Promotion/Fall Prevention:   nonskid shoes/slippers when out of bed   room organization consistent   lighting adjusted  Intervention: Prevent VTE (venous thromboembolism)  Recent Flowsheet Documentation  Taken 10/4/2020 0330 by Shannan King RN  VTE Prevention/Management: pneumatic compression device  Taken 10/3/2020 2000 by Shannan King RN  VTE " Prevention/Management: pneumatic compression device  Goal: Optimal Comfort and Wellbeing  Outcome: No Change  Intervention: Provide Person-Centered Care  Recent Flowsheet Documentation  Taken 10/4/2020 0330 by Shannan King RN  Trust Relationship/Rapport:   questions answered   questions encouraged   care explained  Taken 10/3/2020 2000 by Shannan King RN  Trust Relationship/Rapport:   questions answered   questions encouraged   care explained     Problem: Diabetes Comorbidity  Goal: Blood Glucose Level Within Desired Range  Outcome: No Change  Intervention: Monitor and Manage Glycemia  Recent Flowsheet Documentation  Taken 10/4/2020 0330 by Shannan King RN  Glycemic Management:   blood glucose monitored   insulin infusion adjusted  Taken 10/3/2020 2000 by Shannan King RN  Glycemic Management:   blood glucose monitored   insulin infusion adjusted     Problem: Neurotoxicity (Chemotherapy Effects)  Goal: Neurotoxicity Symptom Control  Outcome: No Change     Problem: Diarrhea (Stem Cell/Bone Marrow Transplant)  Goal: Diarrhea Symptom Control  Outcome: No Change  Intervention: Manage Diarrhea  Recent Flowsheet Documentation  Taken 10/4/2020 0330 by Shannan King RN  Fluid/Electrolyte Management: fluids provided  Taken 10/3/2020 2000 by Shannan King RN  Fluid/Electrolyte Management: fluids provided     Problem: Fatigue (Stem Cell/Bone Marrow Transplant)  Goal: Energy Level Supports Daily Activity  Outcome: No Change     Problem: Hematologic Alteration (Stem Cell/Bone Marrow Transplant)  Goal: Blood Counts Within Acceptable Range  Outcome: No Change  Intervention: Monitor and Manage Hematologic Symptoms  Recent Flowsheet Documentation  Taken 10/4/2020 0330 by Shannan King RN  Bleeding Precautions: blood pressure closely monitored  Taken 10/3/2020 2000 by Shannan King RN  Bleeding Precautions: blood pressure closely monitored     Problem: Infection Risk (Stem Cell/Bone Marrow  Transplant)  Goal: Absence of Infection Signs/Symptoms  Outcome: No Change  Intervention: Prevent and Manage Infection  Recent Flowsheet Documentation  Taken 10/4/2020 0330 by Shannan King RN  Infection Prevention:   rest/sleep promoted   single patient room provided   equipment surfaces disinfected   environmental surveillance performed   hand hygiene promoted   personal protective equipment utilized  Isolation Precautions: protective environment maintained  Taken 10/3/2020 2000 by Shannan King RN  Infection Prevention:   rest/sleep promoted   single patient room provided   equipment surfaces disinfected   environmental surveillance performed   hand hygiene promoted   personal protective equipment utilized  Isolation Precautions: protective environment maintained     Problem: Hyperglycemia  Goal: Blood Glucose Level Within Targeted Range  Outcome: No Change  Intervention: Optimize Glycemic Control  Recent Flowsheet Documentation  Taken 10/4/2020 0330 by Shannan King RN  Glycemic Management:   blood glucose monitored   insulin infusion adjusted  Taken 10/3/2020 2000 by Shannan King RN  Glycemic Management:   blood glucose monitored   insulin infusion adjusted     Problem: Adult Inpatient Plan of Care  Goal: Plan of Care Review  Outcome: No Change     Problem: Adult Inpatient Plan of Care  Goal: Absence of Hospital-Acquired Illness or Injury  Outcome: No Change  Intervention: Identify and Manage Fall Risk  Recent Flowsheet Documentation  Taken 10/4/2020 0330 by Shannan King RN  Safety Promotion/Fall Prevention:   nonskid shoes/slippers when out of bed   room organization consistent   lighting adjusted  Taken 10/3/2020 2000 by Shannan King RN  Safety Promotion/Fall Prevention:   nonskid shoes/slippers when out of bed   room organization consistent   lighting adjusted  Intervention: Prevent VTE (venous thromboembolism)  Recent Flowsheet Documentation  Taken 10/4/2020 0330 by Christine  Shannan NICE RN  VTE Prevention/Management: pneumatic compression device  Taken 10/3/2020 2000 by Shannan King RN  VTE Prevention/Management: pneumatic compression device     Problem: Adult Inpatient Plan of Care  Goal: Absence of Hospital-Acquired Illness or Injury  Intervention: Identify and Manage Fall Risk  Recent Flowsheet Documentation  Taken 10/4/2020 0330 by Shannan King RN  Safety Promotion/Fall Prevention:   nonskid shoes/slippers when out of bed   room organization consistent   lighting adjusted  Taken 10/3/2020 2000 by Shannan King RN  Safety Promotion/Fall Prevention:   nonskid shoes/slippers when out of bed   room organization consistent   lighting adjusted

## 2020-10-04 NOTE — PLAN OF CARE
BP a little on the low side today. 97/70 was lowest during this shift. Pt has adrenal insufficiency and there was a one-time order placed for hydrocortisone given IV push. Charge notified. Will follow up in rounds tomorrow. Blood sugars ranged from 139 to 241. Covered with sliding scale. Pt complained of constipation. Colace was given. Pt did have bowel movement. Pt up and walking independently in halls.    Problem: Adult Inpatient Plan of Care  Goal: Plan of Care Review  Outcome: No Change  Goal: Patient-Specific Goal (Individualization)  Outcome: No Change  Goal: Absence of Hospital-Acquired Illness or Injury  Outcome: No Change  Goal: Optimal Comfort and Wellbeing  Outcome: No Change  Goal: Readiness for Transition of Care  Outcome: No Change  Goal: Rounds/Family Conference  Outcome: No Change     Problem: Diabetes Comorbidity  Goal: Blood Glucose Level Within Desired Range  Outcome: No Change     Problem: Neurotoxicity (Chemotherapy Effects)  Goal: Neurotoxicity Symptom Control  Outcome: No Change     Problem: Adjustment to Transplant (Stem Cell/Bone Marrow Transplant)  Goal: Optimal Coping with Transplant  Outcome: No Change     Problem: Diarrhea (Stem Cell/Bone Marrow Transplant)  Goal: Diarrhea Symptom Control  Outcome: No Change     Problem: Fatigue (Stem Cell/Bone Marrow Transplant)  Goal: Energy Level Supports Daily Activity  Outcome: No Change       Problem: Hematologic Alteration (Stem Cell/Bone Marrow Transplant)  Goal: Blood Counts Within Acceptable Range  Outcome: No Change     Problem: Infection Risk (Stem Cell/Bone Marrow Transplant)  Goal: Absence of Infection Signs/Symptoms  Outcome: No Change     Problem: Hyperglycemia  Goal: Blood Glucose Level Within Targeted Range  Outcome: No Change

## 2020-10-04 NOTE — PROGRESS NOTES
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  Glucose has been persistently uncontrolled at home lately with major insulin resistance in setting of steroids and metformin withholding.       Assessment:      1) Type 2 Diabetes Mellitus, poor controlled (A1c 7.3%).  Hx of toe amp 7/2020     2) s/p BMT      3)  Adrenal insufficiency; hydrocotisone administered (10/2 and 10/3)     Plan:                  - aspart 1 unit per 3.5 grams carbohydrate--> had adjusted to 1:6 given the hypoglycemia however he re-started steroids 10/2 and given his pattern of insulin resistance with steroids, adjusted further to 1:3.5 g CHO     -  glargine 32 units daily,  at 1600 (normally takes at HS) Increased to cover steroids      - Aspart high correction qAC, HS   - hypoglycemia protocol    - Consult to IP nutrition re: enhanced learning for carb counting/coverage - completed      - restart home metformin and hydrocortisone when appropriate  - plan to discharge patient with more systematic method of dosing his aspart     Outpatient diabetes follow up: Dr Turner  Plan discussed with patient, bedside RN                Interval History:     The last 24 hours progress and nursing notes reviewed.  No acute events this interval.  BG trend with characteristic post-prandial spikes following steroid.  Overnight fasting BG was 180.  Patient has another dose of hydrocortisone this am 0800.  BG this AM at target 163.  Increased carb coverage ratio to help with postprandial spikes.  No complaints today.    LP planned for Tuesday.   Excited about the carb counting book from [employer].  We discussed the use of APPs for food and he has downloaded [employer] for carb counting going  "forward.  Son will help.  We reviewed a youtube on using [employer].       Recent Labs   Lab 10/04/20  0327 10/03/20  2258 10/03/20  1805 10/03/20  1153 10/03/20  0816 10/03/20  0305 10/03/20  0018 10/02/20  1929 10/02/20  0436 10/02/20  0436 10/01/20  0416 10/01/20  0416 09/30/20  0312 09/30/20  0312 09/29/20  1415 09/29/20  1415   *  --   --   --   --  157*  --   --   --  154*  --  89  --  122*  --  257*   BGM  --  240* 182* 241* 139*  --  177* 105*   < >  --    < >  --    < >  --    < >  --     < > = values in this interval not displayed.           Nutrition:     Orders Placed This Encounter      High Kcal/High Protein Diet, ADULT          PTA Regimen:     Off metformin x about 1 week (caution with re-starting given lactic acidosis 10/2)  glargine usually 30 units (but up to 40) at HS  aspart roughly 1 unit per 10 mg/dL correction plus some for food          Review of Systems:   Constitutional:   No fever, no chills  ENT/Mouth:   No hearing changes, no ear pain, no sore throat, no rhinorrhea, no difficulty swallowing  Eyes:  No eye pain, no discharge, no vision changes  CV:   No CP/SOB, no new edema  Resp:  No cough, no wheezing  GI:   No nausea, no vomiting, no constipation (had BM yesterday and today), diarrhea  :  No dysuria, no frequency, no hematuria  Musk:  No joint swelling/pain, No back pain  Skin/heme:   No new rashes/bruises/open areas.  No pruritis  Neuro:   No new weakness, denies numbness/tingling, no headache  Psych:   No new anxiety, no depression  Endocrine:  No polyuria, no polydipsia          Medications:   Steroid planning:  received Hydrocortisone 20 mg 10/3, scheduled for today (10/4) at 0800   IT dexamethasone, last on 9/29-- 8 mg (also on 9/23, 9/24)       Physical Exam:     /68   Pulse 91   Temp 97.7  F (36.5  C)   Resp 16   Ht 1.73 m (5' 8.11\")   Wt 87.3 kg (192 lb 7.4 oz)   SpO2 96%   BMI 29.17 kg/m      General:  pleasant, in no acute distress.  sitting " comfortably in bed, watching [employer] game  HEENT:  NC/AT. MMM, sclera not injected  Lungs:  unremarkable, no new cough, no SOB  ABD:   soft,  non-tender,  no lipodystrophy noted  Skin:  warm and dry, no obvious lesions/rash  Feet:    CMS intact, PPP  MSK:   moving all extremities  Lymp:   no LE edema  Mental Status:  Alert and oriented x3  Psych:   Cooperative, good eye contact, full affect          Data:     Lab Results   Component Value Date    A1C 7.3 05/22/2020            CBC RESULTS:   Recent Labs   Lab Test 10/04/20  0327   WBC 0.3*   RBC 3.91*   HGB 11.0*   HCT 33.6*   MCV 86   MCH 28.1   MCHC 32.7   RDW 14.1   PLT 34*     Recent Labs   Lab Test 10/04/20  0327 10/03/20  0305    138   POTASSIUM 3.7 3.8   CHLORIDE 105 105   CO2 29 28   ANIONGAP 5 5   * 157*   BUN 16 14   CR 0.71 0.84   JEFF 9.1 9.2     Liver Function Studies -   Recent Labs   Lab Test 10/03/20  0305   PROTTOTAL 6.1*   ALBUMIN 3.2*   BILITOTAL 0.4   ALKPHOS 89   AST 17   ALT 34     Lab Results   Component Value Date    INR 1.08 10/03/2020    INR 1.04 09/29/2020    INR 1.07 09/29/2020    INR 1.01 09/28/2020    INR 1.09 09/15/2020    INR 1.11 08/20/2020    INR 1.22 05/27/2020    INR 0.99 12/10/2018    INR 0.92 11/14/2017    INR 0.98 10/26/2017    INR 0.98 11/13/2014         VERNA Eastman CNP   Inpatient Diabetes Management Service  Pager - [phone]  Friday - Monday 0800 - 1600 hrs  Diabetes Management Team job code: [phone]       I spent a total of 25 minutes bedside and on the inpatient unit managing glycemic care.  Over 50% of my time on the unit was spent counseling the patient and/or coordinating care regarding acute hyperglycemic management.  See note for details.

## 2020-10-04 NOTE — PROGRESS NOTES
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 No n/v/d.  Had a BM after colace.  No bleeding, no cough.  Eating and drinking.  No confusion.  No further hypoglycemia.  Blood pressures more stable since resuming hydrocortisone for adrenal insufficiency.       Review of Systems    8 point review with pertinent positive and negatives     PHYSICAL EXAM    KPS: 90    /73 (BP Location: Right arm, Cuff Size: Adult Regular)   Pulse 77   Temp 98.3  F (36.8  C) (Axillary)   Resp 16   Ht 1.73 m (5' 8.11\")   Wt 87.7 kg (193 lb 4.8 oz)   SpO2 96%   BMI 29.30 kg/m       General: NAD   Eyes: VANGIE, sclera anicteric   Nose/Mouth/Throat: OP moist, no ulcerations   Lungs: CTA bilaterally  Cardiovascular: RRR, no M/R/G   Abdominal/Rectal: +BS, soft, NT, ND, No HSM. Large ventral hernia chronic, stable  Lymphatics: No edema  Skin: No rashes or petechaie  MSK: Toes, second and third on left are missing (s/p amputation 2/2 diabetic complications)  Neuro: A&O ICE 10/10; no CRS (see CAR-tox note)  Additional Findings: Right chest port a cath, left upper arm PICC c/d/i    Labs:  Lab Results   Component Value Date    WBC 0.3 (LL) 10/04/2020    ANEU 0.3 (LL) 10/02/2020    HGB 11.0 (L) 10/04/2020    HCT 33.6 (L) 10/04/2020    PLT 34 (LL) 10/04/2020     10/04/2020    POTASSIUM 3.7 10/04/2020    CHLORIDE 105 10/04/2020    CO2 29 10/04/2020     (H) 10/04/2020    BUN 16 10/04/2020    CR 0.71 10/04/2020    MAG 1.8 10/03/2020    INR 1.08 10/03/2020    BILITOTAL 0.4 10/03/2020    AST 17 10/03/2020    ALT 34 10/03/2020    ALKPHOS 89 10/03/2020    PROTTOTAL 6.1 (L) 10/03/2020    ALBUMIN 3.2 (L) 10/03/2020           ASSESSMENT BY SYSTEMS   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 T cells " collected 8/31/2020. 2017-45 [facility], 2019-35: IT dex + simvastatin for JUAQUIN prevention. Day 5.    Day 0: LP with IT dex. PICC line placement, [facility] Car-t infusion   Day 6 (10/5): LP/IT dex - needs under fluoroscopy; 10/5 was booked, so this will happen 10/6 per notes.  Please confirm on Monday.    Day 13 (10/12): LP/IT dex (set up next week for 10/12 or 10/13)    1.  BMT:  DLBCL  Axi-cell product with CNS prevention protocol, day 0  CNS prophy: simvastatin 40mg tied to IDS drug  - cont allopurinol    2.  HEME: Keep Hgb>7 and plts>10K. No pre-meds.                            3.  ID: Afebrile.   - Prophy levo, fluc, acy, IV pentamidine (9/23).       4.  GI: no complaints.  - prn Ativan, Compazine  - Protonix for GI prophy.   - Dulcolax prn constipation    5.  FEN/Renal: Creat, lytes wnl.    6. Endo: Type 2 DM.  - holding metformin during CAR-T (risk of lactic acidosis). Endocrine following.  Lantus and Novolog sliding scale and carb counting insulin being adjusted by endocrine team.   Complication of DM with 2 left toes amputated 7/2020  - hx Adrenal Insufficiency: PTA on hydrocortisone 20mg/d; resume 10/3.  This is permitted on Car-T protocols.     Shawanda Barajas PA-C  10/4/2020           The patient was seen and examined by me separately from the midlevel provider. The note reflects our mutual assessment and plans and were approved by me personally.   I personally reviewed today's lab results vital signs and radiology results.    Each point of the assessment and plan were reviewed by the midlevel and me and either endorsed by me or were my added decisions.    My pertinent physical findings today are: Sfebrile, clear lungs and no rash.    My assessment and plan are: 69 yo with refractory DLBCL admitted for [facility] CART. Orders already signed. Monitor for CRS.  Day +4 and doing well. CNS prophy. IT dex Tuesday AM. . No neurotox or CRS. Add HC replacement for adrenal insufficiency (permitted by  protocol).    Melvin Tian M.D.

## 2020-10-05 LAB
ABO + RH BLD: NORMAL
ABO + RH BLD: NORMAL
ALBUMIN SERPL-MCNC: 3.2 G/DL (ref 3.4–5)
ALP SERPL-CCNC: 80 U/L (ref 40–150)
ALT SERPL W P-5'-P-CCNC: 32 U/L (ref 0–70)
ANION GAP SERPL CALCULATED.3IONS-SCNC: 7 MMOL/L (ref 3–14)
AST SERPL W P-5'-P-CCNC: 19 U/L (ref 0–45)
BILIRUB DIRECT SERPL-MCNC: 0.2 MG/DL (ref 0–0.2)
BILIRUB SERPL-MCNC: 0.6 MG/DL (ref 0.2–1.3)
BLD GP AB SCN SERPL QL: NORMAL
BLOOD BANK CMNT PATIENT-IMP: NORMAL
BUN SERPL-MCNC: 12 MG/DL (ref 7–30)
CALCIUM SERPL-MCNC: 9.2 MG/DL (ref 8.5–10.1)
CHLORIDE SERPL-SCNC: 102 MMOL/L (ref 94–109)
CK SERPL-CCNC: 35 U/L (ref 30–300)
CO2 SERPL-SCNC: 27 MMOL/L (ref 20–32)
CREAT SERPL-MCNC: 0.78 MG/DL (ref 0.66–1.25)
DIFFERENTIAL METHOD BLD: ABNORMAL
ERYTHROCYTE [DISTWIDTH] IN BLOOD BY AUTOMATED COUNT: 14.2 % (ref 10–15)
GFR SERPL CREATININE-BSD FRML MDRD: >90 ML/MIN/{1.73_M2}
GLUCOSE BLDC GLUCOMTR-MCNC: 182 MG/DL (ref 70–99)
GLUCOSE BLDC GLUCOMTR-MCNC: 186 MG/DL (ref 70–99)
GLUCOSE BLDC GLUCOMTR-MCNC: 216 MG/DL (ref 70–99)
GLUCOSE BLDC GLUCOMTR-MCNC: 236 MG/DL (ref 70–99)
GLUCOSE SERPL-MCNC: 158 MG/DL (ref 70–99)
HCT VFR BLD AUTO: 35.1 % (ref 40–53)
HGB BLD-MCNC: 11.4 G/DL (ref 13.3–17.7)
INR PPP: 1.14 (ref 0.86–1.14)
LACTATE BLD-SCNC: 2.2 MMOL/L (ref 0.7–2)
MAGNESIUM SERPL-MCNC: 1.6 MG/DL (ref 1.6–2.3)
MCH RBC QN AUTO: 28.1 PG (ref 26.5–33)
MCHC RBC AUTO-ENTMCNC: 32.5 G/DL (ref 31.5–36.5)
MCV RBC AUTO: 87 FL (ref 78–100)
PHOSPHATE SERPL-MCNC: 3.6 MG/DL (ref 2.5–4.5)
PLATELET # BLD AUTO: 33 10E9/L (ref 150–450)
POTASSIUM SERPL-SCNC: 3.5 MMOL/L (ref 3.4–5.3)
PROT SERPL-MCNC: 6.2 G/DL (ref 6.8–8.8)
RBC # BLD AUTO: 4.06 10E12/L (ref 4.4–5.9)
SODIUM SERPL-SCNC: 136 MMOL/L (ref 133–144)
SPECIMEN EXP DATE BLD: NORMAL
WBC # BLD AUTO: 0.4 10E9/L (ref 4–11)

## 2020-10-05 PROCEDURE — 85610 PROTHROMBIN TIME: CPT | Performed by: STUDENT IN AN ORGANIZED HEALTH CARE EDUCATION/TRAINING PROGRAM

## 2020-10-05 PROCEDURE — 250N000013 HC RX MED GY IP 250 OP 250 PS 637: Performed by: STUDENT IN AN ORGANIZED HEALTH CARE EDUCATION/TRAINING PROGRAM

## 2020-10-05 PROCEDURE — 80048 BASIC METABOLIC PNL TOTAL CA: CPT | Performed by: STUDENT IN AN ORGANIZED HEALTH CARE EDUCATION/TRAINING PROGRAM

## 2020-10-05 PROCEDURE — 84100 ASSAY OF PHOSPHORUS: CPT | Performed by: STUDENT IN AN ORGANIZED HEALTH CARE EDUCATION/TRAINING PROGRAM

## 2020-10-05 PROCEDURE — 82550 ASSAY OF CK (CPK): CPT | Performed by: STUDENT IN AN ORGANIZED HEALTH CARE EDUCATION/TRAINING PROGRAM

## 2020-10-05 PROCEDURE — 85025 COMPLETE CBC W/AUTO DIFF WBC: CPT | Performed by: STUDENT IN AN ORGANIZED HEALTH CARE EDUCATION/TRAINING PROGRAM

## 2020-10-05 PROCEDURE — 86850 RBC ANTIBODY SCREEN: CPT | Performed by: STUDENT IN AN ORGANIZED HEALTH CARE EDUCATION/TRAINING PROGRAM

## 2020-10-05 PROCEDURE — 99232 SBSQ HOSP IP/OBS MODERATE 35: CPT | Performed by: NURSE PRACTITIONER

## 2020-10-05 PROCEDURE — 85027 COMPLETE CBC AUTOMATED: CPT

## 2020-10-05 PROCEDURE — 250N000013 HC RX MED GY IP 250 OP 250 PS 637: Performed by: PHYSICIAN ASSISTANT

## 2020-10-05 PROCEDURE — 86900 BLOOD TYPING SEROLOGIC ABO: CPT | Performed by: STUDENT IN AN ORGANIZED HEALTH CARE EDUCATION/TRAINING PROGRAM

## 2020-10-05 PROCEDURE — 258N000003 HC RX IP 258 OP 636

## 2020-10-05 PROCEDURE — 83735 ASSAY OF MAGNESIUM: CPT | Performed by: STUDENT IN AN ORGANIZED HEALTH CARE EDUCATION/TRAINING PROGRAM

## 2020-10-05 PROCEDURE — 250N000011 HC RX IP 250 OP 636

## 2020-10-05 PROCEDURE — 999N001017 HC STATISTIC GLUCOSE BY METER IP

## 2020-10-05 PROCEDURE — 83605 ASSAY OF LACTIC ACID: CPT | Performed by: PHYSICIAN ASSISTANT

## 2020-10-05 PROCEDURE — 80076 HEPATIC FUNCTION PANEL: CPT | Performed by: STUDENT IN AN ORGANIZED HEALTH CARE EDUCATION/TRAINING PROGRAM

## 2020-10-05 PROCEDURE — 99233 SBSQ HOSP IP/OBS HIGH 50: CPT | Performed by: INTERNAL MEDICINE

## 2020-10-05 PROCEDURE — 250N000013 HC RX MED GY IP 250 OP 250 PS 637: Performed by: INTERNAL MEDICINE

## 2020-10-05 PROCEDURE — 206N000001 HC R&B BMT UMMC

## 2020-10-05 PROCEDURE — 86901 BLOOD TYPING SEROLOGIC RH(D): CPT | Performed by: STUDENT IN AN ORGANIZED HEALTH CARE EDUCATION/TRAINING PROGRAM

## 2020-10-05 RX ORDER — SODIUM CHLORIDE, SODIUM LACTATE, POTASSIUM CHLORIDE, CALCIUM CHLORIDE 600; 310; 30; 20 MG/100ML; MG/100ML; MG/100ML; MG/100ML
INJECTION, SOLUTION INTRAVENOUS
Status: COMPLETED
Start: 2020-10-05 | End: 2020-10-06

## 2020-10-05 RX ADMIN — ACYCLOVIR 800 MG: 800 TABLET ORAL at 18:07

## 2020-10-05 RX ADMIN — ACYCLOVIR 800 MG: 800 TABLET ORAL at 08:30

## 2020-10-05 RX ADMIN — INSULIN ASPART 22 UNITS: 100 INJECTION, SOLUTION INTRAVENOUS; SUBCUTANEOUS at 20:48

## 2020-10-05 RX ADMIN — DOCUSATE SODIUM 100 MG: 100 CAPSULE, LIQUID FILLED ORAL at 21:12

## 2020-10-05 RX ADMIN — DOCUSATE SODIUM 100 MG: 100 CAPSULE, LIQUID FILLED ORAL at 16:21

## 2020-10-05 RX ADMIN — INSULIN ASPART 2 UNITS: 100 INJECTION, SOLUTION INTRAVENOUS; SUBCUTANEOUS at 08:37

## 2020-10-05 RX ADMIN — MELATONIN TAB 3 MG 3 MG: 3 TAB at 21:12

## 2020-10-05 RX ADMIN — Medication 40 MG: at 08:28

## 2020-10-05 RX ADMIN — INSULIN ASPART 2 UNITS: 100 INJECTION, SOLUTION INTRAVENOUS; SUBCUTANEOUS at 20:48

## 2020-10-05 RX ADMIN — ACYCLOVIR 800 MG: 800 TABLET ORAL at 21:12

## 2020-10-05 RX ADMIN — ALLOPURINOL 300 MG: 300 TABLET ORAL at 08:30

## 2020-10-05 RX ADMIN — SODIUM CHLORIDE, POTASSIUM CHLORIDE, SODIUM LACTATE AND CALCIUM CHLORIDE 1000 ML: 600; 310; 30; 20 INJECTION, SOLUTION INTRAVENOUS at 21:47

## 2020-10-05 RX ADMIN — ACYCLOVIR 800 MG: 800 TABLET ORAL at 11:23

## 2020-10-05 RX ADMIN — FLUCONAZOLE 200 MG: 200 TABLET ORAL at 08:30

## 2020-10-05 RX ADMIN — SODIUM CHLORIDE, PRESERVATIVE FREE 10 ML: 5 INJECTION INTRAVENOUS at 06:44

## 2020-10-05 RX ADMIN — LEVOFLOXACIN 250 MG: 250 TABLET, FILM COATED ORAL at 11:23

## 2020-10-05 RX ADMIN — INSULIN ASPART 4 UNITS: 100 INJECTION, SOLUTION INTRAVENOUS; SUBCUTANEOUS at 13:39

## 2020-10-05 RX ADMIN — PANTOPRAZOLE SODIUM 40 MG: 40 TABLET, DELAYED RELEASE ORAL at 08:30

## 2020-10-05 RX ADMIN — ACYCLOVIR 800 MG: 800 TABLET ORAL at 15:15

## 2020-10-05 RX ADMIN — HYDROCORTISONE 20 MG: 20 TABLET ORAL at 08:30

## 2020-10-05 ASSESSMENT — ACTIVITIES OF DAILY LIVING (ADL)
ADLS_ACUITY_SCORE: 13

## 2020-10-05 NOTE — PLAN OF CARE
No change since earlier note. VSS  Problem: Adult Inpatient Plan of Care  Goal: Plan of Care Review  10/4/2020 2141 by Sheree Escalera RN  Outcome: No Change  Flowsheets (Taken 10/4/2020 2141)  Plan of Care Reviewed With: patient  Progress: no change  10/4/2020 1706 by Sheree Escalera RN  Outcome: No Change  Flowsheets (Taken 10/4/2020 1706)  Plan of Care Reviewed With: patient  Progress: no change  Goal: Patient-Specific Goal (Individualization)  10/4/2020 2141 by Sheree Escalera RN  Outcome: No Change  10/4/2020 1706 by Sheree Escalera RN  Outcome: No Change  Goal: Absence of Hospital-Acquired Illness or Injury  10/4/2020 2141 by Sheree Escalera RN  Outcome: No Change  10/4/2020 1706 by Sheree Escalera RN  Outcome: No Change  Intervention: Identify and Manage Fall Risk  Recent Flowsheet Documentation  Taken 10/4/2020 0800 by Sheree Escalera RN  Safety Promotion/Fall Prevention:   nonskid shoes/slippers when out of bed   safety round/check completed  Intervention: Prevent VTE (venous thromboembolism)  Recent Flowsheet Documentation  Taken 10/4/2020 0800 by Sheree Escalera RN  VTE Prevention/Management: ambulation promoted  Goal: Optimal Comfort and Wellbeing  10/4/2020 2141 by Sheree Escalera RN  Outcome: No Change  10/4/2020 1706 by Sheree Escalera RN  Outcome: No Change  Intervention: Provide Person-Centered Care  Recent Flowsheet Documentation  Taken 10/4/2020 0800 by Sheree Escalera RN  Trust Relationship/Rapport: questions answered  Goal: Readiness for Transition of Care  10/4/2020 2141 by Sheree Escalera RN  Outcome: No Change  10/4/2020 1706 by Sheree Escalera RN  Outcome: No Change  Goal: Rounds/Family Conference  10/4/2020 2141 by Sheree Escalera RN  Outcome: No Change  10/4/2020 1706 by Sheree Escalera RN  Outcome: No Change     Problem: Diabetes Comorbidity  Goal: Blood Glucose Level Within Desired Range  10/4/2020 2141 by Sheree Escalera, RN  Outcome: No  Change  10/4/2020 1706 by Sheree Escalera RN  Outcome: No Change  Intervention: Monitor and Manage Glycemia  Recent Flowsheet Documentation  Taken 10/4/2020 0800 by Sheree Escalera RN  Glycemic Management: blood glucose monitored     Problem: Neurotoxicity (Chemotherapy Effects)  Goal: Neurotoxicity Symptom Control  10/4/2020 2141 by Sheree Escalera RN  Outcome: No Change  10/4/2020 1706 by Sheree Escalera RN  Outcome: No Change     Problem: Adjustment to Transplant (Stem Cell/Bone Marrow Transplant)  Goal: Optimal Coping with Transplant  10/4/2020 2141 by Sheree Escalera RN  Outcome: No Change  10/4/2020 1706 by Sheree Escalera RN  Outcome: No Change  Intervention: Optimize Patient/Family Adjustment Response to Transplant  Recent Flowsheet Documentation  Taken 10/4/2020 0800 by Sheree Escalera RN  Supportive Measures: active listening utilized     Problem: Diarrhea (Stem Cell/Bone Marrow Transplant)  Goal: Diarrhea Symptom Control  10/4/2020 2141 by Sheree Escalera RN  Outcome: No Change  10/4/2020 1706 by Sheree Escalera RN  Outcome: No Change  Intervention: Manage Diarrhea  Recent Flowsheet Documentation  Taken 10/4/2020 0800 by Sheree Escalera RN  Fluid/Electrolyte Management: fluids provided     Problem: Fatigue (Stem Cell/Bone Marrow Transplant)  Goal: Energy Level Supports Daily Activity  10/4/2020 2141 by Sheree Escalera RN  Outcome: No Change  10/4/2020 1706 by Sheree Escalera RN  Outcome: No Change     Problem: Hematologic Alteration (Stem Cell/Bone Marrow Transplant)  Goal: Blood Counts Within Acceptable Range  10/4/2020 2141 by Sheree Escalera RN  Outcome: No Change  10/4/2020 1706 by Sheree Escalera RN  Outcome: No Change  Intervention: Monitor and Manage Hematologic Symptoms  Recent Flowsheet Documentation  Taken 10/4/2020 0800 by Sheree Escalera RN  Bleeding Precautions: blood pressure closely monitored     Problem: Infection Risk (Stem Cell/Bone Marrow  Transplant)  Goal: Absence of Infection Signs/Symptoms  10/4/2020 2141 by Sheree Escalera RN  Outcome: No Change  10/4/2020 1706 by Sheree Escalera RN  Outcome: No Change  Intervention: Prevent and Manage Infection  Recent Flowsheet Documentation  Taken 10/4/2020 0800 by Sheree Escalera RN  Infection Prevention:   single patient room provided   visitors restricted/screened  Isolation Precautions: protective environment maintained     Problem: Hyperglycemia  Goal: Blood Glucose Level Within Targeted Range  10/4/2020 2141 by Sheree Escalera RN  Outcome: No Change  10/4/2020 1706 by Sheree Escalera RN  Outcome: No Change  Intervention: Optimize Glycemic Control  Recent Flowsheet Documentation  Taken 10/4/2020 0800 by Sheree Escalera RN  Glycemic Management: blood glucose monitored

## 2020-10-05 NOTE — PROGRESS NOTES
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  Glucose has been persistently uncontrolled at home lately with major insulin resistance in setting of steroids and metformin withholding.      LP scheduled on 10/6     Assessment:      1) Type 2 Diabetes Mellitus, poor controlled (A1c 7.3%).  Hx of toe amp 7/2020     2) s/p BMT       3)  Adrenal insufficiency; hydrocotisone administered (10/2 and 10/3)     Plan:                  - aspart 1 unit per 3.5 grams carbohydrate--> had adjusted to 1:6 given the hypoglycemia however he re-started steroids 10/2 and given his pattern of insulin resistance with steroids, adjusted further to 1:3.5 g CHO.Will need further adjustments as the steroid changes.      -  glargine 32 units daily,  at 1600 (normally takes at HS) Increased to cover steroids      - Aspart high correction qAC, HS   - hypoglycemia protocol     - Consult to IP nutrition re: enhanced learning for carb counting/coverage - completed      - restart home metformin and hydrocortisone when appropriate  - plan to discharge patient with more systematic method of dosing his aspart - he has been very active with endocrine team with following his carb counts - believe he would be competent if home on carb counting/novolog regimen.  (Has been roughly 1 per 3.5 when taking steroids and 1 per 6 when no steroid effect)     Outpatient diabetes follow up: Dr Turner  Plan discussed with patient, bedside RN      Anticipated discharge - 1-2 days (fyi - LP tomorrow 10/6)             Interval History:     The last 24 hours progress and nursing notes reviewed.  No acute events this interval.  BG trending very close to goal. Slight lower reading yesterday  evening at 1808 to 104 mg/dL.  Patient very happy with BG stability now.  No changes today.  He is going for LP tomorrow  Continues to feel good.  BP has been soft - he will increase water intake  Been very active with his carb counting references via journal he was given as well as utilizing [employer] mine to assist with carb counting select foods    Recent Labs   Lab 10/05/20  0837 10/05/20  0427 10/04/20  2211 10/04/20  1808 10/04/20  1253 10/04/20  0817 10/04/20  0327 10/03/20  2258 10/03/20  0305 10/03/20  0305 10/02/20  0436 10/02/20  0436 10/01/20  0416 10/01/20  0416 09/30/20  0312 09/30/20  0312   GLC  --  158*  --   --   --   --  180*  --   --  157*  --  154*  --  89  --  122*   *  --  148* 104* 221* 163*  --  240*   < >  --    < >  --    < >  --    < >  --     < > = values in this interval not displayed.           Nutrition:     Orders Placed This Encounter      High Kcal/High Protein Diet, ADULT          PTA Regimen:      Off metformin x about 1 week (caution with re-starting given lactic acidosis 10/2)  glargine usually 30 units (but up to 40) at HS  aspart roughly 1 unit per 10 mg/dL correction plus some for food           Review of Systems:   Constitutional:   No fever, no chills  ENT/Mouth:   No hearing changes, no ear pain, no sore throat, no rhinorrhea, no difficulty swallowing  Eyes:  No eye pain, no discharge, no vision changes  CV:   No CP/SOB, no new edema  Resp:  No cough, no wheezing  GI:   No nausea, no vomiting, no constipation, no diarrhea  :  No dysuria, no frequency, no hematuria  Musk:  No joint swelling/pain, No back pain  Skin/heme:   No new rashes/bruises/open areas.  No pruritis  Neuro:   No new weakness, no  numbness/tingling, no headache  Psych:   No new anxiety, no depression  Endocrine:  noNo polyuria, no polydipsia          Medications:   Steroid planning:  Steroid planning:  received Hydrocortisone 20 mg 10/3, 10/4, 10/5 (no stop date on MAR)     IT dexamethasone,  "last on 9/29-- 8 mg (also on 9/23, 9/24)       Physical Exam:   /69 (BP Location: Right arm)   Pulse 74   Temp 98.1  F (36.7  C) (Axillary)   Resp 18   Ht 1.73 m (5' 8.11\")   Wt 87.3 kg (192 lb 7.4 oz)   SpO2 97%   BMI 29.17 kg/m      General:  pleasant, in no acute distress.  sitting comfortably in bed.   HEENT:  NC/AT. MMM, sclera not injected  Lungs:  unremarkable, no new cough, no SOB  ABD:   soft,  non-tender,  no lipodystrophy noted  Skin:  warm and dry, no obvious lesions/rash  Feet:    CMS intact, PPP  MSK:   moving all extremities  Lymp:   no LE edema  Mental Status:  Alert and oriented x3  Psych:   Cooperative, good eye contact, full affect          Data:     Lab Results   Component Value Date    A1C 7.3 05/22/2020          CBC RESULTS:   Recent Labs   Lab Test 10/05/20  0427   WBC 0.4*   RBC 4.06*   HGB 11.4*   HCT 35.1*   MCV 87   MCH 28.1   MCHC 32.5   RDW 14.2   PLT 33*     Recent Labs   Lab Test 10/05/20  0427 10/04/20  0327    138   POTASSIUM 3.5 3.7   CHLORIDE 102 105   CO2 27 29   ANIONGAP 7 5   * 180*   BUN 12 16   CR 0.78 0.71   JEFF 9.2 9.1     Liver Function Studies -   Recent Labs   Lab Test 10/05/20  0427   PROTTOTAL 6.2*   ALBUMIN 3.2*   BILITOTAL 0.6   ALKPHOS 80   AST 19   ALT 32     Lab Results   Component Value Date    INR 1.14 10/05/2020    INR 1.08 10/03/2020    INR 1.04 09/29/2020    INR 1.07 09/29/2020    INR 1.01 09/28/2020    INR 1.09 09/15/2020    INR 1.11 08/20/2020    INR 1.22 05/27/2020    INR 0.99 12/10/2018    INR 0.92 11/14/2017    INR 0.98 10/26/2017    INR 0.98 11/13/2014       VERNA Eastman CNP   Inpatient Diabetes Management Service  Pager - [phone]  Friday - Monday 0800 - 1600 hrs  Diabetes Management Team job code: [phone]       I spent a total of 25 minutes bedside and on the inpatient unit managing glycemic care.  Over 50% of my time on the unit was spent counseling the patient and/or coordinating care regarding acute hyperglycemic " management.  See note for details.

## 2020-10-05 NOTE — PLAN OF CARE
Patient slept at intervals. Awakening  to use bathroom. Voiding ok. PICC line heplocked overnight. Denied pain. No complaints of nausea. Afebrile. Vitals stable.Sats ok on room air (using home cpap machine). Labs ok this morning; no replacements necessary. Alert and oriented x 4; no CRS activity.

## 2020-10-05 NOTE — PROGRESS NOTES
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 Eating/drinking ok. No n/v/d/c. Taking stool softeners with good results the past couple of days. No fevers, bleeding, or AMS.       Review of Systems    8 point review with pertinent positive and negatives     PHYSICAL EXAM    KPS: 90    /69 (BP Location: Right arm)   Pulse 74   Temp 98.1  F (36.7  C) (Axillary)   Resp 18   Ht 1.73 m (5' 8.11\")   Wt 87.3 kg (192 lb 7.4 oz)   SpO2 97%   BMI 29.17 kg/m       General: NAD   Eyes: sclera anicteric   Nose/Mouth/Throat: OP moist, no ulcerations   Lungs: CTAB  Cardiovascular: RRR, no M/R/G   Abdominal/Rectal: +BS, soft, NT, ND, No HSM. Large ventral hernia chronic, stable  Lymphatics: No edema  Skin: No rash  MSK: Toes, second and third on left are missing (s/p amputation 2/2 diabetic complications)  Neuro: A&O ICE 10/10; no CRS (see CAR-tox note)  Additional Findings: Right chest port a cath, left upper arm PICC NT, dressing cdi    Labs:  Lab Results   Component Value Date    WBC 0.4 (LL) 10/05/2020    ANEU 0.3 (LL) 10/02/2020    HGB 11.4 (L) 10/05/2020    HCT 35.1 (L) 10/05/2020    PLT 33 (LL) 10/05/2020     10/05/2020    POTASSIUM 3.5 10/05/2020    CHLORIDE 102 10/05/2020    CO2 27 10/05/2020     (H) 10/05/2020    BUN 12 10/05/2020    CR 0.78 10/05/2020    MAG 1.6 10/05/2020    INR 1.14 10/05/2020    BILITOTAL 0.6 10/05/2020    AST 19 10/05/2020    ALT 32 10/05/2020    ALKPHOS 80 10/05/2020    PROTTOTAL 6.2 (L) 10/05/2020    ALBUMIN 3.2 (L) 10/05/2020           ASSESSMENT BY SYSTEMS   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 T cells collected 8/31/2020. 2017-45 [place], 2019-35: IT dex + simvastatin for JUAQUIN prevention. Day 6.    Day 0: LP with IT dex. PICC " line placement, [facility] Car-t infusion   Day 7 (10/6; post-poned 1 day d/t scheduling): LP under fluoroscopy w/IT dex. 11am  Day 13 (10/12): LP/IT dex (set up for 10/12 or 10/13)    1.  BMT:  DLBCL  Axi-cell product with CNS prevention protocol, day 0  CNS prophy: simvastatin 40mg tied to IDS drug  - cont allopurinol    2.  HEME: Keep Hgb>7 and plts>10K. No pre-meds.                            3.  ID: Afebrile.   - Prophy levo, fluc, acy, IV pentamidine (9/23).       4.  GI: no complaints.  - prn Ativan, Compazine  - Protonix for GI prophy.   - Dulcolax prn constipation    5.  FEN/Renal: Creat, lytes wnl.    6. Endo: Type 2 DM.  - holding metformin during CAR-T (risk of lactic acidosis). Endocrine following.  Lantus and Novolog sliding scale and carb counting insulin being adjusted by endocrine team.   Complication of DM with 2 left toes amputated 7/2020  - hx Adrenal Insufficiency: PTA on hydrocortisone 20mg/d; was held on admission for unclear reason and not listed in notes; resumed 10/3 (perrmitted on Car-T protocols).     Nydia Smyth PA-C  [phone]          The patient was seen and examined by me separately from the midlevel provider. The note reflects our mutual assessment and plans and were approved by me personally.   I personally reviewed today's lab results vital signs and radiology results.    Each point of the assessment and plan were reviewed by the midlevel and me and either endorsed by me or were my added decisions.    My pertinent physical findings today are: Sfebrile, clear lungs and no rash.    My assessment and plan are: 69 yo with refractory DLBCL admitted for [facility] CART. Orders already signed. Monitor for CRS.  Day +4 and doing well. CNS prophy. IT dex Tuesday AM. . No neurotox or CRS. Add HC replacement for adrenal insufficiency (permitted by protocol). LP with dex tomorrow.    Melvin Tian M.D.

## 2020-10-05 NOTE — PROGRESS NOTES
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 a 70 year old year old male currently day 6 of CAR-T cell therapy [facility]    Vital Signs: /69 (BP Location: Right arm)   Pulse 74   Temp 98.1  F (36.7  C) (Axillary)   Resp 18   Ht 1.73 m (5' 8.11\")   Wt 87.3 kg (192 lb 7.4 oz)   SpO2 97%   BMI 29.17 kg/m      1) CRS Grading (based ONLY on parameters in box below)    Fever:   </= 100.4    Blood pressure:   SBP >/= 90 (not hypotensive)    Oxygen saturation:    >/= 90% on room air (not hypoxic)    CRS Parameter Grade 1  Grade 2 Grade 3 Grade 4   Fever* Tm >= 100.4 degrees F Tm >= 100.4 degrees F Tm >= 100.4 degrees F Tm >= to 100.4  degrees F      With Either:  Hypotension (SBP <90) None Responsive to Fluids  Requiring 1  vasopressor (w/ or w/o vasopressin) Requiring multiple  vasopressors  (excluding  vasopressin)     And/or  Hypoxia (O2 sats <90% on room air) None Low-flow nasal  cannula or blow-by High-flow nasal  cannula, face mask,  non-rebreather mask,or Venturi mask  Requiring positive  pressure (CPAP,  BiPAP intubation and  mechanical  ventilation)     CRS Summary  Does patient have CRS? No  Current CRS stgstrstastdstest:st st1st 2) Neurotoxicity:    ICE Assessment  Orientation to year, month, city, hospital: 4 points, Name 3 objects (e.g., point to clock, pen, button): 3 point, Following commands: (e.g., Show me 2 fingers): 1 point, Write a standard sentence (e.g., [employer] wilson jumped over the log): 1 point and Count backwards from 100 by ten: 1 point  Total points: 10: No impairment    ASTCT ICANS Consensus Grading for Adults  ICE Score   10    Seizure   No seizures    Motor Findings   No motor findings    Elevated ICP/Cerebral Edema   None      Neurotoxicity  Domain Grade 1 Grade 2 Grade 3 Grade 4   ICE score 7-9 3-6 1-2 0   Depressed LOC      Awakens  spontaneously Awakens to  voice  Awakens only to  tactile " stimulation Unarousable or  requires vigorous  or repetitive  tactile stimuli to  arouse. Stupor  or coma.   Seizure n/a n/a Any clinical  seizure focal or  generalized that  resolves rapidly  or nonconvulsive  seizures on EEG  that resolve with  intervention  Life-threatening  prolonged  seizure (>5 min);  or Repetitive  clinical or  electrical  seizures without  return to baseline  in between    Motor Findings      n/a n/a n/a Deep focal motor  weakness such  as hemiparesis  or paraparesis   Elevated  ICP/cerebral  edema  n/a n/a Focal/local  edema on  neuroimaging  Diffuse cerebral  edema on  neuroimaging;  decerebrate or  decorticate  posturing; or  cranial nerve VI  palsy; or  papilledema; or  Cushing's triad     ICANS grade is determined by the most severe event (ICE score, level of consciousness, seizure, motor findings, raised ICP/cerebral edema) not attributable to any other cause; for example, a patient with an ICE score of 3 who has a generalized seizure is classified as grade 3 ICANS.    A patient with an ICE score of 0 may be classified as grade 3 ICANS if awake with global aphasia, but a patient with an ICE score of 0 may be classified as grade 4 ICANS if unarousable.     Depressed level of consciousness should be attributable to no other cause (eg, no sedating medication)    Tremors and myoclonus associated with immune effector cell therapies may be graded according to CTCAE v5.0,but they do not influence ICANS grading.     Intracranial hemorrhage with or without associated edema is not considered a neurotoxicity feature and is  excluded from ICANS grading. It may be graded according to CTCAE v5.0.          Neurotoxicity Summary  Does patient have neurotoxicity? No  Current Neurotoxicity score (0-10): 10/10        3) Organ CAR-T toxicity:    Cardiac   none    Respiratory   none    Gastrointestinal   none    Hepatic    none    Skin   none     Coagulopathy    none     Other: n/a      4) HLH   No HLH      * CTCAE grading can be found at   https://ctep.cancer.gov/protocoldevelopment/electronic_applications/docs/CTCAE_v5_Quick Reference_8.5x11.pdf    Nydia Smyth PA-C  [phone]

## 2020-10-06 ENCOUNTER — APPOINTMENT (OUTPATIENT)
Dept: GENERAL RADIOLOGY | Facility: CLINIC | Age: 70
DRG: 018 | End: 2020-10-06
Attending: PHYSICIAN ASSISTANT
Payer: COMMERCIAL

## 2020-10-06 ENCOUNTER — APPOINTMENT (OUTPATIENT)
Dept: PHYSICAL THERAPY | Facility: CLINIC | Age: 70
DRG: 018 | End: 2020-10-06
Attending: INTERNAL MEDICINE
Payer: COMMERCIAL

## 2020-10-06 LAB
ALBUMIN SERPL-MCNC: 3.1 G/DL (ref 3.4–5)
ALP SERPL-CCNC: 83 U/L (ref 40–150)
ALT SERPL W P-5'-P-CCNC: 29 U/L (ref 0–70)
ANION GAP SERPL CALCULATED.3IONS-SCNC: 4 MMOL/L (ref 3–14)
APTT PPP: 32 SEC (ref 22–37)
AST SERPL W P-5'-P-CCNC: 17 U/L (ref 0–45)
BASOPHILS # BLD AUTO: 0 10E9/L (ref 0–0.2)
BASOPHILS NFR BLD AUTO: 0.7 %
BILIRUB SERPL-MCNC: 0.6 MG/DL (ref 0.2–1.3)
BLD PROD TYP BPU: NORMAL
BLD UNIT ID BPU: 0
BLD UNIT ID BPU: 0
BLOOD PRODUCT CODE: NORMAL
BLOOD PRODUCT CODE: NORMAL
BPU ID: NORMAL
BPU ID: NORMAL
BUN SERPL-MCNC: 15 MG/DL (ref 7–30)
CALCIUM SERPL-MCNC: 9 MG/DL (ref 8.5–10.1)
CHLORIDE SERPL-SCNC: 103 MMOL/L (ref 94–109)
CO2 SERPL-SCNC: 28 MMOL/L (ref 20–32)
CREAT SERPL-MCNC: 0.85 MG/DL (ref 0.66–1.25)
CRP SERPL-MCNC: 39 MG/L (ref 0–8)
D DIMER PPP FEU-MCNC: 7.5 UG/ML FEU (ref 0–0.5)
DIFFERENTIAL METHOD BLD: ABNORMAL
EOSINOPHIL # BLD AUTO: 0.1 10E9/L (ref 0–0.7)
EOSINOPHIL NFR BLD AUTO: 15.4 %
ERYTHROCYTE [DISTWIDTH] IN BLOOD BY AUTOMATED COUNT: 14.6 % (ref 10–15)
FERRITIN SERPL-MCNC: 163 NG/ML (ref 26–388)
FIBRINOGEN PPP-MCNC: 497 MG/DL (ref 200–420)
GFR SERPL CREATININE-BSD FRML MDRD: 89 ML/MIN/{1.73_M2}
GLUCOSE BLDC GLUCOMTR-MCNC: 127 MG/DL (ref 70–99)
GLUCOSE BLDC GLUCOMTR-MCNC: 175 MG/DL (ref 70–99)
GLUCOSE BLDC GLUCOMTR-MCNC: 207 MG/DL (ref 70–99)
GLUCOSE BLDC GLUCOMTR-MCNC: 226 MG/DL (ref 70–99)
GLUCOSE SERPL-MCNC: 219 MG/DL (ref 70–99)
HCT VFR BLD AUTO: 35.2 % (ref 40–53)
HGB BLD-MCNC: 11.2 G/DL (ref 13.3–17.7)
INR PPP: 1.06 (ref 0.86–1.14)
LACTATE BLD-SCNC: 1.6 MMOL/L (ref 0.7–2)
LDH SERPL L TO P-CCNC: 167 U/L (ref 85–227)
LYMPHOCYTES # BLD AUTO: 0.2 10E9/L (ref 0.8–5.3)
LYMPHOCYTES NFR BLD AUTO: 34.9 %
MAGNESIUM SERPL-MCNC: 1.7 MG/DL (ref 1.6–2.3)
MCH RBC QN AUTO: 27.5 PG (ref 26.5–33)
MCHC RBC AUTO-ENTMCNC: 31.8 G/DL (ref 31.5–36.5)
MCV RBC AUTO: 87 FL (ref 78–100)
MONOCYTES # BLD AUTO: 0.2 10E9/L (ref 0–1.3)
MONOCYTES NFR BLD AUTO: 47.6 %
NEUTROPHILS # BLD AUTO: 0 10E9/L (ref 1.6–8.3)
NEUTROPHILS NFR BLD AUTO: 1.4 %
NRBC # BLD AUTO: 0 10*3/UL
NRBC BLD AUTO-RTO: 2 /100
NUM BPU REQUESTED: 1
NUM BPU REQUESTED: 1
PHOSPHATE SERPL-MCNC: 2.9 MG/DL (ref 2.5–4.5)
PLATELET # BLD AUTO: 34 10E9/L (ref 150–450)
PLATELET # BLD AUTO: 47 10E9/L (ref 150–450)
PLATELET # BLD AUTO: 65 10E9/L (ref 150–450)
POLYCHROMASIA BLD QL SMEAR: SLIGHT
POTASSIUM SERPL-SCNC: 3.9 MMOL/L (ref 3.4–5.3)
PROT SERPL-MCNC: 6.1 G/DL (ref 6.8–8.8)
RBC # BLD AUTO: 4.07 10E12/L (ref 4.4–5.9)
SODIUM SERPL-SCNC: 135 MMOL/L (ref 133–144)
TRANSFUSION STATUS PATIENT QL: NORMAL
URATE SERPL-MCNC: 3.7 MG/DL (ref 3.5–7.2)
WBC # BLD AUTO: 0.5 10E9/L (ref 4–11)

## 2020-10-06 PROCEDURE — 250N000011 HC RX IP 250 OP 636

## 2020-10-06 PROCEDURE — 258N000003 HC RX IP 258 OP 636: Performed by: PHYSICIAN ASSISTANT

## 2020-10-06 PROCEDURE — 250N000013 HC RX MED GY IP 250 OP 250 PS 637: Performed by: STUDENT IN AN ORGANIZED HEALTH CARE EDUCATION/TRAINING PROGRAM

## 2020-10-06 PROCEDURE — 83615 LACTATE (LD) (LDH) ENZYME: CPT | Performed by: STUDENT IN AN ORGANIZED HEALTH CARE EDUCATION/TRAINING PROGRAM

## 2020-10-06 PROCEDURE — 97530 THERAPEUTIC ACTIVITIES: CPT | Mod: GP

## 2020-10-06 PROCEDURE — 250N000013 HC RX MED GY IP 250 OP 250 PS 637: Performed by: INTERNAL MEDICINE

## 2020-10-06 PROCEDURE — 85025 COMPLETE CBC W/AUTO DIFF WBC: CPT | Performed by: STUDENT IN AN ORGANIZED HEALTH CARE EDUCATION/TRAINING PROGRAM

## 2020-10-06 PROCEDURE — 82728 ASSAY OF FERRITIN: CPT | Performed by: STUDENT IN AN ORGANIZED HEALTH CARE EDUCATION/TRAINING PROGRAM

## 2020-10-06 PROCEDURE — 250N000013 HC RX MED GY IP 250 OP 250 PS 637: Performed by: PHYSICIAN ASSISTANT

## 2020-10-06 PROCEDURE — 87081 CULTURE SCREEN ONLY: CPT | Performed by: STUDENT IN AN ORGANIZED HEALTH CARE EDUCATION/TRAINING PROGRAM

## 2020-10-06 PROCEDURE — 250N000009 HC RX 250: Performed by: STUDENT IN AN ORGANIZED HEALTH CARE EDUCATION/TRAINING PROGRAM

## 2020-10-06 PROCEDURE — 85379 FIBRIN DEGRADATION QUANT: CPT | Performed by: STUDENT IN AN ORGANIZED HEALTH CARE EDUCATION/TRAINING PROGRAM

## 2020-10-06 PROCEDURE — 250N000012 HC RX MED GY IP 250 OP 636 PS 637: Performed by: CLINICAL NURSE SPECIALIST

## 2020-10-06 PROCEDURE — 009U3ZX DRAINAGE OF SPINAL CANAL, PERCUTANEOUS APPROACH, DIAGNOSTIC: ICD-10-PCS | Performed by: STUDENT IN AN ORGANIZED HEALTH CARE EDUCATION/TRAINING PROGRAM

## 2020-10-06 PROCEDURE — 83735 ASSAY OF MAGNESIUM: CPT | Performed by: STUDENT IN AN ORGANIZED HEALTH CARE EDUCATION/TRAINING PROGRAM

## 2020-10-06 PROCEDURE — 3E0R33Z INTRODUCTION OF ANTI-INFLAMMATORY INTO SPINAL CANAL, PERCUTANEOUS APPROACH: ICD-10-PCS | Performed by: STUDENT IN AN ORGANIZED HEALTH CARE EDUCATION/TRAINING PROGRAM

## 2020-10-06 PROCEDURE — 83605 ASSAY OF LACTIC ACID: CPT | Performed by: PHYSICIAN ASSISTANT

## 2020-10-06 PROCEDURE — P9037 PLATE PHERES LEUKOREDU IRRAD: HCPCS | Performed by: STUDENT IN AN ORGANIZED HEALTH CARE EDUCATION/TRAINING PROGRAM

## 2020-10-06 PROCEDURE — 80053 COMPREHEN METABOLIC PANEL: CPT | Performed by: STUDENT IN AN ORGANIZED HEALTH CARE EDUCATION/TRAINING PROGRAM

## 2020-10-06 PROCEDURE — 85049 AUTOMATED PLATELET COUNT: CPT | Performed by: INTERNAL MEDICINE

## 2020-10-06 PROCEDURE — 999N001017 HC STATISTIC GLUCOSE BY METER IP

## 2020-10-06 PROCEDURE — 84100 ASSAY OF PHOSPHORUS: CPT | Performed by: STUDENT IN AN ORGANIZED HEALTH CARE EDUCATION/TRAINING PROGRAM

## 2020-10-06 PROCEDURE — 85610 PROTHROMBIN TIME: CPT | Performed by: STUDENT IN AN ORGANIZED HEALTH CARE EDUCATION/TRAINING PROGRAM

## 2020-10-06 PROCEDURE — 77003 FLUOROGUIDE FOR SPINE INJECT: CPT

## 2020-10-06 PROCEDURE — 206N000001 HC R&B BMT UMMC

## 2020-10-06 PROCEDURE — 86140 C-REACTIVE PROTEIN: CPT | Performed by: STUDENT IN AN ORGANIZED HEALTH CARE EDUCATION/TRAINING PROGRAM

## 2020-10-06 PROCEDURE — 97110 THERAPEUTIC EXERCISES: CPT | Mod: GP

## 2020-10-06 PROCEDURE — 85384 FIBRINOGEN ACTIVITY: CPT | Performed by: STUDENT IN AN ORGANIZED HEALTH CARE EDUCATION/TRAINING PROGRAM

## 2020-10-06 PROCEDURE — 99233 SBSQ HOSP IP/OBS HIGH 50: CPT | Performed by: INTERNAL MEDICINE

## 2020-10-06 PROCEDURE — 99207 PR SERVICE NOT STAFFED W/SUPERV PROV: CPT | Performed by: STUDENT IN AN ORGANIZED HEALTH CARE EDUCATION/TRAINING PROGRAM

## 2020-10-06 PROCEDURE — 84550 ASSAY OF BLOOD/URIC ACID: CPT | Performed by: STUDENT IN AN ORGANIZED HEALTH CARE EDUCATION/TRAINING PROGRAM

## 2020-10-06 PROCEDURE — 99233 SBSQ HOSP IP/OBS HIGH 50: CPT | Performed by: CLINICAL NURSE SPECIALIST

## 2020-10-06 PROCEDURE — 250N000011 HC RX IP 250 OP 636: Performed by: INTERNAL MEDICINE

## 2020-10-06 PROCEDURE — 96372 THER/PROPH/DIAG INJ SC/IM: CPT | Performed by: CLINICAL NURSE SPECIALIST

## 2020-10-06 PROCEDURE — 85049 AUTOMATED PLATELET COUNT: CPT | Performed by: PHYSICIAN ASSISTANT

## 2020-10-06 PROCEDURE — 999N001121 HC STATISTIC RESEARCH KIT COLLECTION

## 2020-10-06 PROCEDURE — 85730 THROMBOPLASTIN TIME PARTIAL: CPT | Performed by: STUDENT IN AN ORGANIZED HEALTH CARE EDUCATION/TRAINING PROGRAM

## 2020-10-06 PROCEDURE — P9037 PLATE PHERES LEUKOREDU IRRAD: HCPCS | Performed by: PHYSICIAN ASSISTANT

## 2020-10-06 RX ORDER — LIDOCAINE HYDROCHLORIDE 10 MG/ML
10 INJECTION, SOLUTION EPIDURAL; INFILTRATION; INTRACAUDAL; PERINEURAL ONCE
Status: COMPLETED | OUTPATIENT
Start: 2020-10-06 | End: 2020-10-06

## 2020-10-06 RX ORDER — NICOTINE POLACRILEX 4 MG
15-30 LOZENGE BUCCAL
Status: CANCELLED | OUTPATIENT
Start: 2020-10-06

## 2020-10-06 RX ORDER — DEXTROSE MONOHYDRATE 25 G/50ML
25-50 INJECTION, SOLUTION INTRAVENOUS
Status: CANCELLED | OUTPATIENT
Start: 2020-10-06

## 2020-10-06 RX ORDER — DEXAMETHASONE SODIUM PHOSPHATE 10 MG/ML
8 INJECTION, SOLUTION INTRAMUSCULAR; INTRAVENOUS ONCE
Status: DISCONTINUED | OUTPATIENT
Start: 2020-10-06 | End: 2020-10-06

## 2020-10-06 RX ADMIN — SODIUM CHLORIDE, PRESERVATIVE FREE 5 ML: 5 INJECTION INTRAVENOUS at 04:26

## 2020-10-06 RX ADMIN — SODIUM CHLORIDE, PRESERVATIVE FREE 5 ML: 5 INJECTION INTRAVENOUS at 00:57

## 2020-10-06 RX ADMIN — Medication 40 MG: at 08:15

## 2020-10-06 RX ADMIN — LEVOFLOXACIN 250 MG: 250 TABLET, FILM COATED ORAL at 09:14

## 2020-10-06 RX ADMIN — DEXAMETHASONE SODIUM PHOSPHATE: 10 INJECTION, SOLUTION INTRAMUSCULAR; INTRAVENOUS at 11:40

## 2020-10-06 RX ADMIN — Medication 5 ML: at 07:07

## 2020-10-06 RX ADMIN — HYDROCORTISONE 20 MG: 20 TABLET ORAL at 08:15

## 2020-10-06 RX ADMIN — SODIUM CHLORIDE 1000 ML: 9 INJECTION, SOLUTION INTRAVENOUS at 12:29

## 2020-10-06 RX ADMIN — DOCUSATE SODIUM 100 MG: 100 CAPSULE, LIQUID FILLED ORAL at 22:50

## 2020-10-06 RX ADMIN — PANTOPRAZOLE SODIUM 40 MG: 40 TABLET, DELAYED RELEASE ORAL at 08:16

## 2020-10-06 RX ADMIN — LIDOCAINE HYDROCHLORIDE 4 ML: 10 INJECTION, SOLUTION EPIDURAL; INFILTRATION; INTRACAUDAL; PERINEURAL at 11:39

## 2020-10-06 RX ADMIN — ACYCLOVIR 800 MG: 800 TABLET ORAL at 10:12

## 2020-10-06 RX ADMIN — ALLOPURINOL 300 MG: 300 TABLET ORAL at 08:16

## 2020-10-06 RX ADMIN — MELATONIN TAB 3 MG 3 MG: 3 TAB at 22:45

## 2020-10-06 RX ADMIN — ACYCLOVIR 800 MG: 800 TABLET ORAL at 13:45

## 2020-10-06 RX ADMIN — INSULIN ASPART 33 UNITS: 100 INJECTION, SOLUTION INTRAVENOUS; SUBCUTANEOUS at 13:46

## 2020-10-06 RX ADMIN — ACYCLOVIR 800 MG: 800 TABLET ORAL at 22:45

## 2020-10-06 RX ADMIN — INSULIN GLARGINE 45 UNITS: 100 INJECTION, SOLUTION SUBCUTANEOUS at 15:21

## 2020-10-06 RX ADMIN — ACYCLOVIR 800 MG: 800 TABLET ORAL at 18:06

## 2020-10-06 RX ADMIN — FLUCONAZOLE 200 MG: 200 TABLET ORAL at 08:16

## 2020-10-06 RX ADMIN — ACYCLOVIR 800 MG: 800 TABLET ORAL at 08:16

## 2020-10-06 ASSESSMENT — ACTIVITIES OF DAILY LIVING (ADL)
ADLS_ACUITY_SCORE: 13

## 2020-10-06 ASSESSMENT — MIFFLIN-ST. JEOR: SCORE: 1633.49

## 2020-10-06 NOTE — PLAN OF CARE
"./67   Pulse 96   Temp 99.5  F (37.5  C) (Axillary)   Resp 16   Ht 1.73 m (5' 8.11\")   Wt 87.3 kg (192 lb 7.4 oz)   SpO2 93%   BMI 29.17 kg/m      Pt alert and oriented. Neuros intact. Tmax 100.1ax. low tachy. Ovss. Denies pain or nausea. Triggered Sepsis last night, lactic acid 2.2. RRT called and pt received 1L LR bolus. Lactic acid improved with recheck. Mealtime blood sugar covered per carb coverage, 22units. Bedtime blood sugar covered as well. Received prn Dulcolax x1, pt had 1 small formed stool. Received 1 unit of platelets for planned LP today. Will recheck post plt count. Cont to monitor and follow poc.     Problem: Adult Inpatient Plan of Care  Goal: Plan of Care Review  Outcome: No Change     Problem: Adult Inpatient Plan of Care  Goal: Patient-Specific Goal (Individualization)  Outcome: No Change     Problem: Adult Inpatient Plan of Care  Goal: Absence of Hospital-Acquired Illness or Injury  Outcome: No Change     Problem: Diabetes Comorbidity  Goal: Blood Glucose Level Within Desired Range  Outcome: No Change     Problem: Neurotoxicity (Chemotherapy Effects)  Goal: Neurotoxicity Symptom Control  Outcome: No Change     Problem: Diarrhea (Stem Cell/Bone Marrow Transplant)  Goal: Diarrhea Symptom Control  Outcome: No Change     Problem: Fatigue (Stem Cell/Bone Marrow Transplant)  Goal: Energy Level Supports Daily Activity  Outcome: No Change     Problem: Hematologic Alteration (Stem Cell/Bone Marrow Transplant)  Goal: Blood Counts Within Acceptable Range  Outcome: Improving     Problem: Hyperglycemia  Goal: Blood Glucose Level Within Targeted Range  Outcome: No Change     "

## 2020-10-06 NOTE — PROCEDURES
LP under fluoroscopy - see separate note.  After confirming pt ID, medication and expiration, and free CSF flow, I administered dexamethasone 8 mg in 4 ml 0.9% PF NS over 3 minutes with no immediate complications. Drug verified with XR staff. Pt tolerated without immediate complication. Research sample/paperwork taken to Acute Care lab.    Nydia Smyth PA-C  [phone]

## 2020-10-06 NOTE — PLAN OF CARE
Pt got xtra bag of plts to get count >50K for LP in xray. Pt laid flat for 1.5 hours post. Pt found to desat (88-92%)this noon after his LP when he was resting, but when woken he was saturating well(95-96%). Pt blood sugar parameters and lantus increased due to the fact that he got dexamethasone 8 mg IT during LP. Pt showered and worked with therapy this afternoon.  If pt's BG is >300 x2 they wrote a prn insulin gtt.  Problem: Adult Inpatient Plan of Care  Goal: Plan of Care Review  Outcome: No Change  Flowsheets (Taken 10/6/2020 5701)  Plan of Care Reviewed With: patient  Progress: no change  Goal: Patient-Specific Goal (Individualization)  Outcome: No Change  Goal: Absence of Hospital-Acquired Illness or Injury  Outcome: No Change  Intervention: Identify and Manage Fall Risk  Recent Flowsheet Documentation  Taken 10/6/2020 0900 by Sheree Escalera RN  Safety Promotion/Fall Prevention: clutter free environment maintained  Intervention: Prevent VTE (venous thromboembolism)  Recent Flowsheet Documentation  Taken 10/6/2020 0900 by Sheree Escalera RN  VTE Prevention/Management: ambulation promoted  Goal: Optimal Comfort and Wellbeing  Outcome: No Change  Intervention: Provide Person-Centered Care  Recent Flowsheet Documentation  Taken 10/6/2020 0900 by Sheree Escalera RN  Trust Relationship/Rapport: care explained  Goal: Readiness for Transition of Care  Outcome: No Change  Goal: Rounds/Family Conference  Outcome: No Change     Problem: Diabetes Comorbidity  Goal: Blood Glucose Level Within Desired Range  Outcome: No Change     Problem: Neurotoxicity (Chemotherapy Effects)  Goal: Neurotoxicity Symptom Control  Outcome: No Change     Problem: Adjustment to Transplant (Stem Cell/Bone Marrow Transplant)  Goal: Optimal Coping with Transplant  Outcome: No Change  Intervention: Optimize Patient/Family Adjustment Response to Transplant  Recent Flowsheet Documentation  Taken 10/6/2020 0900 by Sheree Escalera  RN  Supportive Measures: active listening utilized     Problem: Diarrhea (Stem Cell/Bone Marrow Transplant)  Goal: Diarrhea Symptom Control  Outcome: No Change  Intervention: Manage Diarrhea  Recent Flowsheet Documentation  Taken 10/6/2020 0900 by Sheree Escalera RN  Fluid/Electrolyte Management: fluids provided     Problem: Fatigue (Stem Cell/Bone Marrow Transplant)  Goal: Energy Level Supports Daily Activity  Outcome: No Change     Problem: Hematologic Alteration (Stem Cell/Bone Marrow Transplant)  Goal: Blood Counts Within Acceptable Range  Outcome: No Change  Intervention: Monitor and Manage Hematologic Symptoms  Recent Flowsheet Documentation  Taken 10/6/2020 0900 by Sheree Escalera RN  Bleeding Precautions: monitored for signs of bleeding     Problem: Infection Risk (Stem Cell/Bone Marrow Transplant)  Goal: Absence of Infection Signs/Symptoms  Outcome: No Change  Intervention: Prevent and Manage Infection  Recent Flowsheet Documentation  Taken 10/6/2020 0900 by Sheree Escalera RN  Infection Prevention:    single patient room provided    visitors restricted/screened  Isolation Precautions: protective environment maintained     Problem: Hyperglycemia  Goal: Blood Glucose Level Within Targeted Range  Outcome: No Change

## 2020-10-06 NOTE — PROCEDURES
[facility] Procedure Note          Lumbar Puncture and Intrathecal Steroid Injection:      Time: 11:44 AM  Performed by: Lauren Castro MD  Authorized by: Kevin Angela MD    Indications: DLBCL (diffuse large B cell lymphoma)    Consent given by: Patient who states understanding of the procedure being performed after discussing the risks, benefits and alternatives.    Prior to the start of the procedure and with procedural staff participation, I verbally confirmed the patient s identity using two indicators, relevant allergies, that the procedure was appropriate and matched the consent or emergent situation, and that the correct equipment/implants were available. Immediately prior to starting the procedure I conducted the Time Out with the procedural staff and re-confirmed the patient s name, procedure, and site/side. (The Joint Commission universal protocol was followed.) Yes    Under sterile conditions the patient was positioned Prone. Betadine solution and sterile drapes were utilized.  Local anesthetic at the site: 2 ml of lidocaine 1% without epinephrine from the LP tray  A 22 G spinal needle was inserted at the L 3-4 interspace.  Opening Pressurewas not checked.  A total of 6mL of clear and colorless spinal fluid was obtained and sent to the laboratory.   After the needle was removed, a bandaid and pressure were applied and the patient was instructed to stay horizontal until the results were back.    Complications:  None    Patient tolerance: Patient tolerated the procedure well with no immediate complications.

## 2020-10-06 NOTE — PLAN OF CARE
Patient has no complaints of pain or nausea today.  He is eating well.  He is up independently, showering and walking in the halls.  Platelet parameters changed to 50,000 for LP tomorrow.    Problem: Adult Inpatient Plan of Care  Goal: Plan of Care Review  Outcome: No Change     Problem: Adult Inpatient Plan of Care  Goal: Absence of Hospital-Acquired Illness or Injury  Outcome: No Change     Problem: Adult Inpatient Plan of Care  Goal: Absence of Hospital-Acquired Illness or Injury  Intervention: Prevent VTE (venous thromboembolism)  Recent Flowsheet Documentation  Taken 10/5/2020 0800 by Jennifer Webb RN  VTE Prevention/Management: ambulation promoted     Problem: Adult Inpatient Plan of Care  Goal: Optimal Comfort and Wellbeing  Outcome: No Change     Problem: Diabetes Comorbidity  Goal: Blood Glucose Level Within Desired Range  Outcome: No Change     Problem: Nausea and Vomiting (Chemotherapy Effects)  Goal: Fluid and Electrolyte Balance  Intervention: Prevent and Manage Nausea and Vomiting  Recent Flowsheet Documentation  Taken 10/5/2020 0800 by Jennifer Webb RN  Environmental Support: calm environment promoted     Problem: Thrombocytopenia Bleeding Risk (Chemotherapy Effects)  Goal: Absence of Bleeding  Intervention: Minimize Bleeding Risk  Recent Flowsheet Documentation  Taken 10/5/2020 0800 by Jennifer Webb RN  Bleeding Precautions: monitored for signs of bleeding     Problem: Adjustment to Transplant (Stem Cell/Bone Marrow Transplant)  Goal: Optimal Coping with Transplant  Intervention: Optimize Patient/Family Adjustment Response to Transplant  Recent Flowsheet Documentation  Taken 10/5/2020 0800 by Jennifer Webb RN  Supportive Measures: active listening utilized     Problem: Hematologic Alteration (Stem Cell/Bone Marrow Transplant)  Goal: Blood Counts Within Acceptable Range  Intervention: Monitor and Manage Hematologic Symptoms  Recent Flowsheet Documentation  Taken 10/5/2020 0800 by Jon  Jennifer KNOWLES RN  Bleeding Precautions: monitored for signs of bleeding

## 2020-10-06 NOTE — PROGRESS NOTES
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 Triggered lactate check d/t neutropenia and HR (was 2). RR called but no s/s CRS or infection. 1L LR given and repeat lactate 1.6. Pt denies n/v/d/c. No cough, pain, or bleeding.       Review of Systems    8 point review with pertinent positive and negatives     PHYSICAL EXAM    KPS: 90    /67 (BP Location: Right arm)   Pulse 95   Temp 97.2  F (36.2  C) (Axillary)   Resp 16   Ht 1.73 m (5' 8.11\")   Wt 88.7 kg (195 lb 9.6 oz)   SpO2 92%   BMI 29.64 kg/m       General: NAD   Eyes: sclera anicteric   Nose/Mouth/Throat: OP moist, no ulcerations   Lungs: CTAB  Cardiovascular: RRR, no M/R/G   Abdominal/Rectal: +BS, soft, NT, ND, No HSM. Large ventral hernia chronic, stable  Lymphatics: No edema  Skin: No rash  MSK: Toes, second and third on left are missing (s/p amputation 2/2 diabetic complications)  Neuro: A&O ICE 10/10; no CRS (see CAR-tox note)  Additional Findings: Right chest port a cath, left upper arm PICC NT, dressing cdi    Labs:  Lab Results   Component Value Date    WBC 0.5 (LL) 10/06/2020    ANEU 0.0 (LL) 10/06/2020    HGB 11.2 (L) 10/06/2020    HCT 35.2 (L) 10/06/2020    PLT 65 (L) 10/06/2020     10/06/2020    POTASSIUM 3.9 10/06/2020    CHLORIDE 103 10/06/2020    CO2 28 10/06/2020     (H) 10/06/2020    BUN 15 10/06/2020    CR 0.85 10/06/2020    MAG 1.7 10/06/2020    INR 1.06 10/06/2020    BILITOTAL 0.6 10/06/2020    AST 17 10/06/2020    ALT 29 10/06/2020    ALKPHOS 83 10/06/2020    PROTTOTAL 6.1 (L) 10/06/2020    ALBUMIN 3.1 (L) 10/06/2020       ASSESSMENT/PLAN   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 T cells collected 8/31/2020. 2017-45 [facility], 2019-35: IT dex + simvastatin for JUAQUIN prevention. " Day 7.    Day 0: LP with IT dex. PICC line placement, [facility] Car-t infusion   Day 7: LP under fluoroscopy w/IT dex.   Day 13 (10/12): LP/IT dex; working to schedule as outpt for now (easier to do this way then cancel 2A if remains inpatient; per Danitza w/XR).    1.  BMT:  DLBCL  Axi-cell product with CNS prevention protocol, day 0  CNS prophy: simvastatin 40mg tied to IDS drug  - cont allopurinol    2.  HEME: Keep Hgb>7 and plts>10K. No pre-meds.                            3.  ID: Tmax 100.1.   - Prophy levo, fluc, acy, IV pentamidine (9/23).       4.  GI: no complaints.  - prn Ativan, Compazine  - Protonix for GI prophy.   - Dulcolax prn constipation    5.  FEN/Renal: Creat, lytes wnl.    6. Endo: Type 2 DM.  - holding metformin during CAR-T (risk of lactic acidosis). Endocrine following.  Lantus and Novolog sliding scale and carb counting insulin being adjusted by endocrine team.   Complication of DM with 2 left toes amputated 7/2020  - hx Adrenal Insufficiency: PTA on hydrocortisone 20mg/d; was held on admission for unclear reason and not listed in notes; resumed 10/3 (perrmitted on Car-T protocols).     Nydia Smyth PA-C  [phone]          The patient was seen and examined by me separately from the midlevel provider. The note reflects our mutual assessment and plans and were approved by me personally.   I personally reviewed today's lab results vital signs and radiology results.    Each point of the assessment and plan were reviewed by the midlevel and me and either endorsed by me or were my added decisions.    My pertinent physical findings today are: Sfebrile, clear lungs and no rash.    My assessment and plan are: 71 yo with refractory DLBCL admitted for [facility] CART. Orders already signed. Monitor for CRS.  Day +4 and doing well. CNS prophy. IT dex Tuesday AM. . No neurotox or CRS. Add HC replacement for adrenal insufficiency (permitted by protocol). LP with dex today T max 100.1: monitor closely  for CRS.    Melvin Tian M.D.

## 2020-10-06 NOTE — PROGRESS NOTES
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  Glucose has been persistently uncontrolled at home lately with major insulin resistance in setting of steroids and metformin withholding.        Glucose has been persistently elevated and now receiving IT dex today.     Plan:        Will increase subcut insulin more conservatively, since previous high needs after IT dex may have been partially influenced by prevailing glucose toxicity.    - additional IVF per primary            - aspart 1 unit per 3.5 grams carbohydrate--> increase to 1 per 2.5 grams starting at supper (PRN snacks still 1 per 3.5)  -  glargine 32 units daily 1600--> increase to 45 units today  - Aspart high correction qAC, HS --> increase to 2 per 30 mg/dL glucose qAC, HS  - PRN IV insulin infusion for BG >300 x 2  - hypoglycemia protocol      - restart home metformin when appropriate    - plan to discharge patient with more systematic method of dosing his aspart - including prandial aspart (Has been roughly 1 per 3.5 when taking steroids and 1 per 6 when no steroid effect)     Outpatient diabetes follow up: Dr Turner  Plan discussed with patient, bedside RN, primary team                   Interval History:     The last 24 hours progress and nursing notes reviewed.   Triggered sepsis protocol.  Received 1 L lactated ringer's.    LP today.  Getting another 8 mg dexamethasone IT.  After outpatient IV dex, BG high for days.  Admission 9/29 was 5 days after last IV dex.  After the IT dex that day, IV insulin needs were quite high (transitioned back to subcut with glargine 50 24h+after the dex).    Pastro says drinking less because he's been eating less.  He feels okay.  Asked to  "recall any change in pattern preceding the Sunday BG blaine of 104-- he thinks he had only a salad for lunch and maybe total carb intake was less.  It was 90 grams, so not less than his more common  grams.  Already today, BG improving.  Perhaps timing of aspart has been variable. . .        Been very active with his carb counting references via journal he was given as well as utilizing Jimmy Fairly mine to assist with carb counting select foods  - Consult to IP nutrition re: enhanced learning for carb counting/coverage - completed     Recent Labs   Lab 10/06/20  0416 10/06/20  0016 10/05/20  2203 10/05/20  1757 10/05/20  1259 10/05/20  0837 10/05/20  0427 10/04/20  2211 10/04/20  0327 10/04/20  0327 10/03/20  0305 10/03/20  0305 10/02/20  0436 10/02/20  0436 10/01/20  0416 10/01/20  0416   *  --   --   --   --   --  158*  --   --  180*  --  157*  --  154*  --  89   BGM  --  226* 236* 182* 216* 186*  --  148*   < >  --    < >  --    < >  --    < >  --     < > = values in this interval not displayed.           Nutrition:     Orders Placed This Encounter      High Kcal/High Protein Diet, ADULT          PTA Regimen:      Off metformin x about 1 week (caution with re-starting given lactic acidosis 10/2)  glargine usually 30 units (but up to 40) at HS  aspart roughly 1 unit per 10 mg/dL correction plus some for food           Review of Systems:    see interval hx         Medications:   Steroid planning:  Home Hydrocortisone 20 mg restarted 10/3,     IT dexamethasone, last on 9/29-- 8 mg (also received IV dex on 9/23, 9/24)         Physical Exam:   /76 (BP Location: Right arm)   Pulse 97   Temp 99.8  F (37.7  C) (Axillary)   Resp 16   Ht 1.73 m (5' 8.11\")   Wt 88.7 kg (195 lb 9.6 oz)   SpO2 96%   BMI 29.64 kg/m      General:  pleasant, in no acute distress,sitting comfortably in bed  HEENT:  NC/AT. Oral MMM, sclera not injected  Lungs:  no SOB  ABD:  Rounded, smooth  Skin:  dry, no obvious " lesions/rash  MSK:   moving all extremities  Lymp:   no LE edema  Mental Status:  Alert and oriented x3  Psych:   Easily engaged, calm mood         Data:     Lab Results   Component Value Date    A1C 7.3 05/22/2020          Recent Labs   Lab Test 10/06/20  0416 10/05/20  0427    136   POTASSIUM 3.9 3.5   CHLORIDE 103 102   CO2 28 27   ANIONGAP 4 7   * 158*   BUN 15 12   CR 0.85 0.78   JEFF 9.0 9.2     CBC RESULTS:   Recent Labs   Lab Test 10/06/20  0706 10/06/20  0416   WBC  --  0.5*   RBC  --  4.07*   HGB  --  11.2*   HCT  --  35.2*   MCV  --  87   MCH  --  27.5   MCHC  --  31.8   RDW  --  14.6   PLT 47* 34*   I spent a total of 35 minutes bedside and on the inpatient unit managing the glycemic care of Pastor Garibay. Over 50% of my time on the unit was spent counseling the patient  and/or coordinating care regarding acute hyperglycemia management.  See note for details.    Mili Keys APRN [id number]  Diabetes Management Team job code: [id number]

## 2020-10-06 NOTE — PROVIDER NOTIFICATION
10/05/20 2100   Call Information   Date of Call 10/05/20   Time of Call 2126   Name of person requesting the team Lonny   Title of person requesting team RN   RRT Arrival time 2127   Time RRT ended 2200   Reason for call   Type of RRT Adult   Primary reason for call Sepsis suspected   Sepsis Suspected Elevated Lactate level;Heart Rate > 100;BMT/Oncology patient with WBC<0.5 or >12 or ANC <500   Was patient transferred from the ED, ICU, or PACU within last 24 hours prior to RRT call? No   SBAR   Situation LA 2.2   Background 70 year old male with relapsed DLBCL (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 T cells collected 8/31/2020. Consented for 2017-45 [facility], 2019-35: IT dex + simvastatin for JUAQUIN prevention.   Notable History/Conditions Cancer;Diabetes;Hypertension;Transplant   Assessment AOx4, denies pain & SOB, not drinking as much-trying to drink more fluids, LS clear   Interventions Fluid bolus;Labs   Patient Outcome   Patient Outcome Stabilized on unit   RRT Team   Physician(s) LIAN Hernandez   Lead RN Eliz Mukherjee   RT Vonnie   Post RRT Intervention Assessment   Post RRT Assessment Stable/Improved   Date Follow Up Done 10/06/20   Time Follow Up Done 0010

## 2020-10-06 NOTE — PROGRESS NOTES
Rapid Response Team Note    Assessment   In assessment a rapid response was called on Pastor Garibay due to SIRS/Sepsis trigger.     This presentation is likely due to volume depletion and type B lactic acidosis and worsened by the comorbidities listed above. The patient is NOT critically ill at this time.    Sepsis Evaluation   NO EVIDENCE OF SEPSIS at this time.  Vital sign, physical exam, and lab findings are likely due to volume depletion and type B lactic acidosis.    Plan   1. Give 1000 mL bolus of LR  2. Repeat lactate in 4 hours    Disposition: The patient will remain on the current unit. We will continue to monitor this patient closely..    The Hematology/Oncology primary team was able to be reached and they are in agreement with the above plan.    Reassessment   Reassessment and plan follow-up will be performed by the primary team. The current agreed upon plan for reassessment/follow-up includes the following labs: lactate and will be completed in 4 hours.    Time Spent on this Encounter   Total Critical Care time spent by me, excluding procedures, was 0 minutes.    Rod Grier PA-C  [facility] RRT [facility] Job Code Contact #[phone]    Hospital Course   Admission Diagnosis: N H L   DLBCL (diffuse large B cell lymphoma) (H)     Brief Summary of events leading to rapid response:   A rapid response was called for Pastor Garibay due to lactic acidosis triggered by sepsis/SIRS BPA at 20:51 on 10/05/20.    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  Triggered sepsis BPA due to WBC 0.4 and 's.  Lactate 2.2.  Temp 99.2.  Vitals otherwise stable.  Patient reports feeling well.  He denies any acute complaints.  No fevers, chills, dizziness, chest pain, dyspnea, cough, headache, nausea, vomiting, diarrhea, abdominal pain, dysuria or urinary frequency.  Patient is on prophylactic acyclovir, fluconazole,  and levofloxacin.      He notes poor PO hydration over the past few days, however that has improved today.    The patients is not known to have an infection.    Significant Comorbidities:   HTN, DM2, and DLBCL    Medications   Scheduled     lactated ringers         acyclovir  800 mg Oral 5x Daily     allopurinol  300 mg Oral Daily     fluconazole  200 mg Oral Daily     heparin  5 mL Intracatheter Q28 Days     heparin lock flush  5-10 mL Intracatheter Q24H     hydrocortisone  20 mg Oral Daily     insulin aspart  1-10 Units Subcutaneous TID AC     insulin aspart  1-7 Units Subcutaneous At Bedtime     insulin aspart   Subcutaneous TID w/meals     insulin glargine  32 Units Subcutaneous Q24H     lactated ringers  1,000 mL Intravenous Once     levofloxacin  250 mg Oral Daily at 10 am     melatonin  3 mg Oral At Bedtime     pantoprazole  40 mg Oral Daily     study - simvastatin (IDS# [id number])  40 mg Oral Daily      PRN   acetaminophen, acetaminophen, bisacodyl, ceFEPIme (MAXIPIME) IV, glucose **OR** dextrose **OR** glucagon, diphenhydrAMINE, docusate sodium, heparin lock flush, insulin aspart, lidocaine 4%, lidocaine (buffered or not buffered), LORazepam **OR** LORazepam, magnesium sulfate, potassium chloride, potassium chloride with lidocaine, potassium chloride, potassium chloride, potassium chloride, potassium phosphate (KPHOS) in D5W IV, potassium phosphate (KPHOS) in D5W IV, potassium phosphate (KPHOS) in D5W IV, potassium phosphate (KPHOS) in D5W IV, prochlorperazine **OR** prochlorperazine, sodium chloride (PF)   Allergies   No Known Allergies     Physical Exam   Temp: 99.2  F (37.3  C) Temp  Min: 98.1  F (36.7  C)  Max: 99.2  F (37.3  C)  Resp: 16 Resp  Min: 16  Max: 18  SpO2: 98 % SpO2  Min: 94 %  Max: 98 %  Pulse: 103 Pulse  Min: 74  Max: 103    No data recorded  BP: 103/73 Systolic (24hrs), Av , Min:99 , Max:111   Diastolic (24hrs), Av, Min:64, Max:74     I/Os: I/O last 3 completed shifts:  In: 4300  [P.O.:2290]  Out: 2025 [Urine:2025]     Exam:   General: in no acute distress  Mental Status: AAOx4.  CV:  RRR, S1 and S2, no murmur or gallops  Pulm:  CTAB  GI:  Soft, ND, NT, +Bs  Neuro:  Grossly non-focal.  Ext:  No pitting edema.  Skin:  Warm, dry, no rash    Significant Results and Procedures   Lactic Acid:   Recent Labs   Lab Test 10/05/20  2051 10/02/20  2134 10/02/20  1544 06/17/20  1134 06/07/20  1650   LACT  --  2.0  --  1.7 1.3   LACTS 2.2*  --  2.6*  --   --      CBC:   Recent Labs   Lab Test 10/05/20  0427 10/04/20  0327 10/03/20  0305   WBC 0.4* 0.3* 0.3*   HGB 11.4* 11.0* 11.6*   HCT 35.1* 33.6* 35.4*   PLT 33* 34* 36*

## 2020-10-06 NOTE — PROGRESS NOTES
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) is a 70 year old year old male currently day 7 of CAR-T cell therapy [facility]    Vital Signs: /43   Pulse 98   Temp 97.4  F (36.3  C)   Resp 16   Ht 1.73 m (5' 8.11\")   Wt 88.7 kg (195 lb 9.6 oz)   SpO2 98%   BMI 29.64 kg/m      1) CRS Grading (based ONLY on parameters in box below)    Fever:   </= 100.4    Blood pressure:   SBP >/= 90 (not hypotensive)    Oxygen saturation:    >/= 90% on room air (not hypoxic)    CRS Parameter Grade 1  Grade 2 Grade 3 Grade 4   Fever* Tm >= 100.4 degrees F Tm >= 100.4 degrees F Tm >= 100.4 degrees F Tm >= to 100.4  degrees F      With Either:  Hypotension (SBP <90) None Responsive to Fluids  Requiring 1  vasopressor (w/ or w/o vasopressin) Requiring multiple  vasopressors  (excluding  vasopressin)     And/or  Hypoxia (O2 sats <90% on room air) None Low-flow nasal  cannula or blow-by High-flow nasal  cannula, face mask,  non-rebreather mask,or Venturi mask  Requiring positive  pressure (CPAP,  BiPAP intubation and  mechanical  ventilation)     CRS Summary  Does patient have CRS? No  Current CRS stgstrstastdstest:st st1st 2) Neurotoxicity:    ICE Assessment  Orientation to year, month, city, hospital: 4 points, Name 3 objects (e.g., point to clock, pen, button): 3 point, Following commands: (e.g., Show me 2 fingers): 1 point, Write a standard sentence (e.g., The wilson jumped over the log): 1 point and Count backwards from 100 by ten: 1 point  Total points: 10: No impairment    ASTCT ICANS Consensus Grading for Adults  ICE Score   10    Seizure   No seizures    Motor Findings   No motor findings    Elevated ICP/Cerebral Edema   None      Neurotoxicity  Domain Grade 1 Grade 2 Grade 3 Grade 4   ICE score 7-9 3-6 1-2 0   Depressed LOC      Awakens  spontaneously Awakens to  voice  Awakens only to  tactile stimulation Unarousable or  requires " vigorous  or repetitive  tactile stimuli to  arouse. Stupor  or coma.   Seizure n/a n/a Any clinical  seizure focal or  generalized that  resolves rapidly  or nonconvulsive  seizures on EEG  that resolve with  intervention  Life-threatening  prolonged  seizure (>5 min);  or Repetitive  clinical or  electrical  seizures without  return to baseline  in between    Motor Findings      n/a n/a n/a Deep focal motor  weakness such  as hemiparesis  or paraparesis   Elevated  ICP/cerebral  edema  n/a n/a Focal/local  edema on  neuroimaging  Diffuse cerebral  edema on  neuroimaging;  decerebrate or  decorticate  posturing; or  cranial nerve VI  palsy; or  papilledema; or  Cushing's triad     ICANS grade is determined by the most severe event (ICE score, level of consciousness, seizure, motor findings, raised ICP/cerebral edema) not attributable to any other cause; for example, a patient with an ICE score of 3 who has a generalized seizure is classified as grade 3 ICANS.    A patient with an ICE score of 0 may be classified as grade 3 ICANS if awake with global aphasia, but a patient with an ICE score of 0 may be classified as grade 4 ICANS if unarousable.     Depressed level of consciousness should be attributable to no other cause (eg, no sedating medication)    Tremors and myoclonus associated with immune effector cell therapies may be graded according to CTCAE v5.0,but they do not influence ICANS grading.     Intracranial hemorrhage with or without associated edema is not considered a neurotoxicity feature and is  excluded from ICANS grading. It may be graded according to CTCAE v5.0.          Neurotoxicity Summary  Does patient have neurotoxicity? No  Current Neurotoxicity score (0-10): 10/10        3) Organ CAR-T toxicity:    Cardiac   none    Respiratory   none    Gastrointestinal   none    Hepatic    none    Skin   none     Coagulopathy    none     Other: n/a      4) HLH   No HLH     * CTCAE grading can be found at    https://ctep.cancer.gov/protocoldevelopment/electronic_applications/docs/CTCAE_v5_Quick Reference_8.5x11.pdf    Nydia Smyth PA-C  [phone]

## 2020-10-07 ENCOUNTER — HOSPITAL ENCOUNTER (OUTPATIENT)
Facility: CLINIC | Age: 70
End: 2020-10-07
Attending: PHYSICIAN ASSISTANT
Payer: COMMERCIAL

## 2020-10-07 ENCOUNTER — APPOINTMENT (OUTPATIENT)
Dept: PHYSICAL THERAPY | Facility: CLINIC | Age: 70
DRG: 018 | End: 2020-10-07
Attending: INTERNAL MEDICINE
Payer: COMMERCIAL

## 2020-10-07 LAB
ANION GAP SERPL CALCULATED.3IONS-SCNC: 6 MMOL/L (ref 3–14)
BASOPHILS # BLD AUTO: 0 10E9/L (ref 0–0.2)
BASOPHILS NFR BLD AUTO: 1.2 %
BUN SERPL-MCNC: 15 MG/DL (ref 7–30)
CALCIUM SERPL-MCNC: 9.5 MG/DL (ref 8.5–10.1)
CHLORIDE SERPL-SCNC: 105 MMOL/L (ref 94–109)
CMV DNA SPEC NAA+PROBE-ACNC: NORMAL [IU]/ML
CMV DNA SPEC NAA+PROBE-LOG#: NORMAL {LOG_IU}/ML
CO2 SERPL-SCNC: 27 MMOL/L (ref 20–32)
CREAT SERPL-MCNC: 0.72 MG/DL (ref 0.66–1.25)
DIFFERENTIAL METHOD BLD: ABNORMAL
EOSINOPHIL # BLD AUTO: 0 10E9/L (ref 0–0.7)
EOSINOPHIL NFR BLD AUTO: 2.5 %
ERYTHROCYTE [DISTWIDTH] IN BLOOD BY AUTOMATED COUNT: 14.6 % (ref 10–15)
GFR SERPL CREATININE-BSD FRML MDRD: >90 ML/MIN/{1.73_M2}
GLUCOSE BLDC GLUCOMTR-MCNC: 114 MG/DL (ref 70–99)
GLUCOSE BLDC GLUCOMTR-MCNC: 168 MG/DL (ref 70–99)
GLUCOSE BLDC GLUCOMTR-MCNC: 194 MG/DL (ref 70–99)
GLUCOSE BLDC GLUCOMTR-MCNC: 219 MG/DL (ref 70–99)
GLUCOSE BLDC GLUCOMTR-MCNC: 248 MG/DL (ref 70–99)
GLUCOSE BLDC GLUCOMTR-MCNC: 313 MG/DL (ref 70–99)
GLUCOSE SERPL-MCNC: 187 MG/DL (ref 70–99)
HCT VFR BLD AUTO: 33 % (ref 40–53)
HGB BLD-MCNC: 10.9 G/DL (ref 13.3–17.7)
LYMPHOCYTES # BLD AUTO: 0.1 10E9/L (ref 0.8–5.3)
LYMPHOCYTES NFR BLD AUTO: 19.8 %
MAGNESIUM SERPL-MCNC: 1.8 MG/DL (ref 1.6–2.3)
MCH RBC QN AUTO: 28.4 PG (ref 26.5–33)
MCHC RBC AUTO-ENTMCNC: 33 G/DL (ref 31.5–36.5)
MCV RBC AUTO: 86 FL (ref 78–100)
MONOCYTES # BLD AUTO: 0.3 10E9/L (ref 0–1.3)
MONOCYTES NFR BLD AUTO: 65.4 %
NEUTROPHILS # BLD AUTO: 0.1 10E9/L (ref 1.6–8.3)
NEUTROPHILS NFR BLD AUTO: 11.1 %
NRBC # BLD AUTO: 0 10*3/UL
NRBC BLD AUTO-RTO: 1 /100
PLATELET # BLD AUTO: 61 10E9/L (ref 150–450)
POLYCHROMASIA BLD QL SMEAR: SLIGHT
POTASSIUM SERPL-SCNC: 3.7 MMOL/L (ref 3.4–5.3)
RBC # BLD AUTO: 3.84 10E12/L (ref 4.4–5.9)
SODIUM SERPL-SCNC: 137 MMOL/L (ref 133–144)
SPECIMEN SOURCE: NORMAL
WBC # BLD AUTO: 0.5 10E9/L (ref 4–11)

## 2020-10-07 PROCEDURE — 999N001017 HC STATISTIC GLUCOSE BY METER IP

## 2020-10-07 PROCEDURE — 99233 SBSQ HOSP IP/OBS HIGH 50: CPT | Performed by: INTERNAL MEDICINE

## 2020-10-07 PROCEDURE — 85025 COMPLETE CBC W/AUTO DIFF WBC: CPT | Performed by: STUDENT IN AN ORGANIZED HEALTH CARE EDUCATION/TRAINING PROGRAM

## 2020-10-07 PROCEDURE — 97112 NEUROMUSCULAR REEDUCATION: CPT | Mod: GP | Performed by: PHYSICAL THERAPIST

## 2020-10-07 PROCEDURE — 97110 THERAPEUTIC EXERCISES: CPT | Mod: GP | Performed by: PHYSICAL THERAPIST

## 2020-10-07 PROCEDURE — 80048 BASIC METABOLIC PNL TOTAL CA: CPT | Performed by: STUDENT IN AN ORGANIZED HEALTH CARE EDUCATION/TRAINING PROGRAM

## 2020-10-07 PROCEDURE — 250N000013 HC RX MED GY IP 250 OP 250 PS 637: Performed by: PHYSICIAN ASSISTANT

## 2020-10-07 PROCEDURE — 99233 SBSQ HOSP IP/OBS HIGH 50: CPT | Performed by: CLINICAL NURSE SPECIALIST

## 2020-10-07 PROCEDURE — 250N000013 HC RX MED GY IP 250 OP 250 PS 637: Performed by: INTERNAL MEDICINE

## 2020-10-07 PROCEDURE — 97530 THERAPEUTIC ACTIVITIES: CPT | Mod: GP | Performed by: PHYSICAL THERAPIST

## 2020-10-07 PROCEDURE — 206N000001 HC R&B BMT UMMC

## 2020-10-07 PROCEDURE — 250N000013 HC RX MED GY IP 250 OP 250 PS 637: Performed by: STUDENT IN AN ORGANIZED HEALTH CARE EDUCATION/TRAINING PROGRAM

## 2020-10-07 PROCEDURE — 83735 ASSAY OF MAGNESIUM: CPT | Performed by: STUDENT IN AN ORGANIZED HEALTH CARE EDUCATION/TRAINING PROGRAM

## 2020-10-07 PROCEDURE — 250N000013 HC RX MED GY IP 250 OP 250 PS 637: Performed by: CLINICAL NURSE SPECIALIST

## 2020-10-07 PROCEDURE — 250N000011 HC RX IP 250 OP 636

## 2020-10-07 RX ORDER — LEVOFLOXACIN 250 MG/1
250 TABLET, FILM COATED ORAL DAILY
Qty: 14 TABLET | Refills: 0 | Status: ON HOLD | OUTPATIENT
Start: 2020-10-07 | End: 2020-10-13

## 2020-10-07 RX ORDER — FLUCONAZOLE 200 MG/1
200 TABLET ORAL DAILY
Qty: 30 TABLET | Refills: 0 | Status: ON HOLD | OUTPATIENT
Start: 2020-10-08 | End: 2020-10-13

## 2020-10-07 RX ORDER — ACYCLOVIR 800 MG/1
800 TABLET ORAL 2 TIMES DAILY
Qty: 60 TABLET | Refills: 1 | Status: SHIPPED | OUTPATIENT
Start: 2020-10-07 | End: 2020-10-08

## 2020-10-07 RX ORDER — ACYCLOVIR 800 MG/1
800 TABLET ORAL
Status: DISCONTINUED | OUTPATIENT
Start: 2020-10-07 | End: 2020-10-07

## 2020-10-07 RX ORDER — ACYCLOVIR 800 MG/1
800 TABLET ORAL 2 TIMES DAILY
Status: DISCONTINUED | OUTPATIENT
Start: 2020-10-08 | End: 2020-10-08 | Stop reason: HOSPADM

## 2020-10-07 RX ADMIN — DOCUSATE SODIUM 100 MG: 100 CAPSULE, LIQUID FILLED ORAL at 21:34

## 2020-10-07 RX ADMIN — SODIUM CHLORIDE, PRESERVATIVE FREE 5 ML: 5 INJECTION INTRAVENOUS at 04:11

## 2020-10-07 RX ADMIN — ACYCLOVIR 800 MG: 800 TABLET ORAL at 10:06

## 2020-10-07 RX ADMIN — INSULIN ASPART 5 UNITS: 100 INJECTION, SOLUTION INTRAVENOUS; SUBCUTANEOUS at 15:48

## 2020-10-07 RX ADMIN — PANTOPRAZOLE SODIUM 40 MG: 40 TABLET, DELAYED RELEASE ORAL at 07:51

## 2020-10-07 RX ADMIN — HYDROCORTISONE 20 MG: 20 TABLET ORAL at 07:51

## 2020-10-07 RX ADMIN — ACYCLOVIR 800 MG: 800 TABLET ORAL at 07:51

## 2020-10-07 RX ADMIN — Medication 40 MG: at 07:52

## 2020-10-07 RX ADMIN — MELATONIN TAB 3 MG 3 MG: 3 TAB at 21:34

## 2020-10-07 RX ADMIN — FLUCONAZOLE 200 MG: 200 TABLET ORAL at 07:51

## 2020-10-07 RX ADMIN — ACYCLOVIR 800 MG: 800 TABLET ORAL at 14:00

## 2020-10-07 RX ADMIN — METFORMIN HYDROCHLORIDE 500 MG: 500 TABLET, FILM COATED ORAL at 17:50

## 2020-10-07 RX ADMIN — LEVOFLOXACIN 250 MG: 250 TABLET, FILM COATED ORAL at 10:06

## 2020-10-07 RX ADMIN — ALLOPURINOL 300 MG: 300 TABLET ORAL at 07:51

## 2020-10-07 ASSESSMENT — MIFFLIN-ST. JEOR: SCORE: 1631.22

## 2020-10-07 ASSESSMENT — ACTIVITIES OF DAILY LIVING (ADL)
ADLS_ACUITY_SCORE: 13

## 2020-10-07 NOTE — PROGRESS NOTES
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 Otherwise feeling well. No fevers. Eating well. No n/v/d. Taking Colace to manage bowel movements. No bleeidng.       Review of Systems    8 point review with pertinent positive and negatives     PHYSICAL EXAM    KPS: 90    /71 (BP Location: Right arm)   Pulse 80   Temp 96.7  F (35.9  C) (Axillary)   Resp 16   Ht 1.73 m (5' 8.11\")   Wt 88.5 kg (195 lb 1.6 oz)   SpO2 96%   BMI 29.57 kg/m       General: NAD   Eyes: sclera anicteric   Nose/Mouth/Throat: OP moist, no ulcerations   Lungs: CTAB  Cardiovascular: RRR, no M/R/G   Abdominal/Rectal: +BS, soft, NT, ND, No HSM. Large ventral hernia chronic, stable  Lymphatics: trace LE edema  Skin: No rash  MSK: Toes, second and third on left are missing (s/p amputation 2/2 diabetic complications)  Neuro: A&O ICE 10/10; no CRS (see CAR-tox note)  Additional Findings: Right chest port a cath, left upper arm PICC NT, dressing cdi    Labs:  Lab Results   Component Value Date    WBC 0.5 (LL) 10/07/2020    ANEU 0.1 (LL) 10/07/2020    HGB 10.9 (L) 10/07/2020    HCT 33.0 (L) 10/07/2020    PLT 61 (L) 10/07/2020     10/07/2020    POTASSIUM 3.7 10/07/2020    CHLORIDE 105 10/07/2020    CO2 27 10/07/2020     (H) 10/07/2020    BUN 15 10/07/2020    CR 0.72 10/07/2020    MAG 1.8 10/07/2020    INR 1.06 10/06/2020    BILITOTAL 0.6 10/06/2020    AST 17 10/06/2020    ALT 29 10/06/2020    ALKPHOS 83 10/06/2020    PROTTOTAL 6.1 (L) 10/06/2020    ALBUMIN 3.1 (L) 10/06/2020       ASSESSMENT/PLAN   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 T cells collected 8/31/2020. 2017-45 [facility], 2019-35: IT dex + simvastatin for JUAQUIN prevention. Day 8.    Day 0: LP with IT " dex. PICC line placement, [facility] Car-t infusion   Day 7: LP under fluoroscopy w/IT dex.   Day 13 (10/12): LP/IT dex; working to schedule as outpt for now (easier to do this way then cancel 2A if remains inpatient; per Danitza w/XR).    1.  BMT:  DLBCL  Axi-cell product with CNS prevention protocol, day 0  CNS prophy: simvastatin 40mg tied to IDS drug  - cont allopurinol    2.  HEME: Keep Hgb>7 and plts>10K. No pre-meds.                            3.  ID: Afebrile.  - Prophy levo, fluc, acy, IV pentamidine (9/23).       4.  GI: no n/v.  - prn Ativan, Compazine  - Protonix for GI prophy.   - Dulcolax, Colace prn constipation    5.  FEN/Renal: Creat, lytes wnl.    6. Endo: Type 2 DM.  - holding metformin during CAR-T (risk of lactic acidosis). Endocrine following.  Lantus and Novolog sliding scale and carb counting insulin being adjusted by endocrine team.   Complication of DM with 2 left toes amputated 7/2020  - hx Adrenal Insufficiency: PTA on hydrocortisone 20mg/d; was held on admission for unclear reason and not listed in notes; resumed 10/3 (perrmitted on Car-T protocols).       SKINNY BlakelyC  [phone]          The patient was seen and examined by me separately from the midlevel provider. The note reflects our mutual assessment and plans and were approved by me personally.   I personally reviewed today's lab results vital signs and radiology results.    Each point of the assessment and plan were reviewed by the midlevel and me and either endorsed by me or were my added decisions.    My pertinent physical findings today are: Sfebrile, clear lungs and no rash.    My assessment and plan are: 71 yo with refractory DLBCL admitted for [facility]A CART. Orders already signed. Monitor for CRS.  Day +4 and doing well. CNS prophy. IT dex Tuesday AM. . No neurotox or CRS. Add HC replacement for adrenal insufficiency (permitted by protocol). LP with dex today T max 100.1: monitor closely for CRS.    Melvin Tian  M.D.      Bone Marrow Transplant (5C) Attending Progress Note    I am assuming care for this patient after receiving sign-out from Dr. Tian yesterday. He updated me on all issues and plan for smooth transition of care.      The patient was discussed on morning rounds with the nurses, and mid-level provider, and was seen and examined by me. All labs and imaging were reviewed. I reviewed events over the last 24 hours including vitals and flow sheets. I agree with the above note and have been responsible for the care plan and interpretation of progress. Overall, the patient is a 70-year-old man with an aggressive non-Hodgkin's lymphoma who is now 8 days after [facility]a T-cell therapy.  He had a fever of 101 yesterday but has been afebrile over the past 24 hours.  He is also on a study receiving intrathecal Decadron.  Symptomatically he has no evidence of neurotoxicity or cytokine release syndrome.  He is in good spirits on rounds today.  He denies any shortness of breath or cough.    There is no evidence of mucositis. There is no adenopathy on exam and lungs are clear. There is no LE edema and no rash. Labs show a creatinine of 0.72, hemoglobin of 21,000 white count of 500 with 100 absolute neutrophils.  Note that he is starting to recover from his lympho-depleting chemotherapy.     It is my overall impression that Pastor is stable 8 days after T car therapy.  He is having no evidence of complications. We discussed expectations for the next several days.  If he continues to do well overnight consideration of discharge tomorrow may be possible.  We have discussed this with Dr. Bunch.    Dr Quang Mclaughlin will be assuming care tomorrow and I updated her regarding this patient and the plan for a smooth transition of care.      Karl Estrada MD

## 2020-10-07 NOTE — DISCHARGE INSTRUCTIONS
Diabetes plan-  Follow up with Dr. Turner to reassess Novolog dosing, alternatives/adjuncts to insulin (Trulicity, for example)    Diabetes Regimen for home  Metformin 500 mg two times per day, before meals  Lantus 30 units in the evening  Novolog 1 unit per 3.5 grams of carbohydrate   Novolog Correction scale  Before meals--  -169 give 2 units.  -199 give 4 units.  -229 give 6 units.  -259 give 8 units.  -289 give 10 units.  -319 give 12 units.  -349 give 14 units.  BG >/= 350 give 16 units.  At bedtime--  -229 give 2 units.  -259 give 4 units.  -289 give 6 units.  -319 give 8 units.  -349 give 10 units.  BG >/= 350 give 12 units.    Day of  intrathecal dexamethasone  Increase your carb coverage to 1 unit per 2 grams starting with supper  Increase your Lantus to 45 units that night  Next day use 1 unit per 2 grams for breakfast and lunch, return to normal ratio at supper  Return to normal Lantus dose 30 units

## 2020-10-07 NOTE — PROGRESS NOTES
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    Interventions: Per Med Team, pt is likely medically stable for discharge tomorrow (10/8/20). SW met with pt to assess coping, provide psychosocial support and provide discharge packet with post-transplant resources for patient and caregiver. Pt shared that he feels \"ready for discharge\" and that \"as soon as they say the word I'll be out the door.\" SW and pt discussed his plan for parking and frequent clinic visits post-discharge. Pt denied questions or concerns. Pt reports he has Medicare part A, SW reviewed Notice of Medicare Discharge Rights with Pt verbally and provided a copy for his review. Pt denied discharge concerns. SW encouraged pt to contact SW for support, questions and/or resources. SW provided patient with discharge resource packet.    Education Provided: Discharge Resource Packet, Caregiver Self-Care Education, and Expected Emotional Responses to Transition Home.    Assessment: Pt presented as pleasant and engaged. Pt appears to be coping appropriately. Pt continues to be supported by his family.    Plan: Pt to discharge to home ([place]) with his wife as his primary caregiver with the support of his children. Pt anticipates his daughter will be transporting him to/from clinic visits. SW will continue to provide psychosocial support and assistance with resources as needed. SW will continue to collaborate with multidisciplinary team regarding pt's plan of care.     ALEXA Riojas, [facility]  Adult Blood & Marrow Transplant   Phone: [phone]  Pager: [phone]    "

## 2020-10-07 NOTE — PLAN OF CARE
"/79 (BP Location: Right arm, Cuff Size: Adult Regular)   Pulse 76   Temp 97.5  F (36.4  C) (Axillary)   Resp 16   Ht 1.73 m (5' 8.11\")   Wt 88.7 kg (195 lb 9.6 oz)   SpO2 98%   BMI 29.64 kg/m    Pt's AVSS, no c/o pain or nausea all night. Pt's up independently in room and voiding good amount. PICC intact and Hep locked. Am lab stable with Hgb at 10.9,K+ 3.7, Mag 1.8, WBC 0.5 and . Pt had uneventful night. Keep monitoring pt as ordered and notify MD with any new changes.   Problem: Adult Inpatient Plan of Care  Goal: Plan of Care Review  Outcome: No Change  Goal: Patient-Specific Goal (Individualization)  Outcome: No Change  Goal: Absence of Hospital-Acquired Illness or Injury  Outcome: No Change  Goal: Optimal Comfort and Wellbeing  Outcome: No Change  Intervention: Provide Person-Centered Care  Recent Flowsheet Documentation  Taken 10/7/2020 0009 by Richy Conner RN  Trust Relationship/Rapport:   questions answered   questions encouraged  Goal: Readiness for Transition of Care  Outcome: No Change  Goal: Rounds/Family Conference  Outcome: No Change     "

## 2020-10-07 NOTE — PROGRESS NOTES
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 currently day 8 of CAR-T cell therapy [facility]    Vital Signs: /71 (BP Location: Right arm)   Pulse 80   Temp 96.7  F (35.9  C) (Axillary)   Resp 16   Ht 1.73 m (5' 8.11\")   Wt 88.5 kg (195 lb 1.6 oz)   SpO2 96%   BMI 29.57 kg/m      1) CRS Grading (based ONLY on parameters in box below)    Fever:   </= 100.4    Blood pressure:   SBP >/= 90 (not hypotensive)    Oxygen saturation:    >/= 90% on room air (not hypoxic)    CRS Parameter Grade 1  Grade 2 Grade 3 Grade 4   Fever* Tm >= 100.4 degrees F Tm >= 100.4 degrees F Tm >= 100.4 degrees F Tm >= to 100.4  degrees F      With Either:  Hypotension (SBP <90) None Responsive to Fluids  Requiring 1  vasopressor (w/ or w/o vasopressin) Requiring multiple  vasopressors  (excluding  vasopressin)     And/or  Hypoxia (O2 sats <90% on room air) None Low-flow nasal  cannula or blow-by High-flow nasal  cannula, face mask,  non-rebreather mask,or Venturi mask  Requiring positive  pressure (CPAP,  BiPAP intubation and  mechanical  ventilation)     CRS Summary  Does patient have CRS? No  Current CRS stgstrstastdstest:st st1st 2) Neurotoxicity:    ICE Assessment  Orientation to year, month, city, hospital: 4 points, Name 3 objects (e.g., point to clock, pen, button): 3 point, Following commands: (e.g., Show me 2 fingers): 1 point, Write a standard sentence (e.g., [employer] wilson jumped over the log): 1 point and Count backwards from 100 by ten: 1 point  Total points: 10: No impairment    ASTCT ICANS Consensus Grading for Adults  ICE Score   10    Seizure   No seizures    Motor Findings   No motor findings    Elevated ICP/Cerebral Edema   None      Neurotoxicity  Domain Grade 1 Grade 2 Grade 3 Grade 4   ICE score 7-9 3-6 1-2 0   Depressed LOC      Awakens  spontaneously Awakens to  voice  Awakens only to  tactile " stimulation Unarousable or  requires vigorous  or repetitive  tactile stimuli to  arouse. Stupor  or coma.   Seizure n/a n/a Any clinical  seizure focal or  generalized that  resolves rapidly  or nonconvulsive  seizures on EEG  that resolve with  intervention  Life-threatening  prolonged  seizure (>5 min);  or Repetitive  clinical or  electrical  seizures without  return to baseline  in between    Motor Findings      n/a n/a n/a Deep focal motor  weakness such  as hemiparesis  or paraparesis   Elevated  ICP/cerebral  edema  n/a n/a Focal/local  edema on  neuroimaging  Diffuse cerebral  edema on  neuroimaging;  decerebrate or  decorticate  posturing; or  cranial nerve VI  palsy; or  papilledema; or  Cushing's triad     ICANS grade is determined by the most severe event (ICE score, level of consciousness, seizure, motor findings, raised ICP/cerebral edema) not attributable to any other cause; for example, a patient with an ICE score of 3 who has a generalized seizure is classified as grade 3 ICANS.    A patient with an ICE score of 0 may be classified as grade 3 ICANS if awake with global aphasia, but a patient with an ICE score of 0 may be classified as grade 4 ICANS if unarousable.     Depressed level of consciousness should be attributable to no other cause (eg, no sedating medication)    Tremors and myoclonus associated with immune effector cell therapies may be graded according to CTCAE v5.0,but they do not influence ICANS grading.     Intracranial hemorrhage with or without associated edema is not considered a neurotoxicity feature and is  excluded from ICANS grading. It may be graded according to CTCAE v5.0.          Neurotoxicity Summary  Does patient have neurotoxicity? No  Current Neurotoxicity score (0-10): 10/10        3) Organ CAR-T toxicity:    Cardiac   none    Respiratory   none    Gastrointestinal   none    Hepatic    none    Skin   none     Coagulopathy    none     Other: n/a      4) HLH   No HLH      * CTCAE grading can be found at   https://ctep.cancer.gov/protocoldevelopment/electronic_applications/docs/CTCAE_v5_Quick Reference_8.5x11.pdf    Nydia Smyth PA-C  [phone]

## 2020-10-07 NOTE — PROGRESS NOTES
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  Glucose has been persistently uncontrolled at home lately with major insulin resistance in setting of steroids and metformin withholding.        Hyperglycemic to 300 x 1 last night, following IT dex.  Improved to around 200 overnight.    Plan:                 - aspart increased to 1 unit per 2 grams carbohydrate for breakfast and lunch today.  Plan for 1 unit per 3 grams this evening and PRN snacks  -  glargine 45 units daily 1600--> decrease to 35 units today  - Aspart 2 per 30 mg/dL glucose qAC, HS  - PRN IV insulin infusion for BG >300 x 2-- order discontinued  - hypoglycemia protocol      - restart home metformin, potentially at lower dose, once approved by primary team       Outpatient diabetes follow up: Dr Turner  Plan discussed with patient, bedside RN, primary team      Draft home diabetes plan and adjustments for steroid pasted into AVS.             Interval History:     The last 24 hours progress and nursing notes reviewed.       LP and received another 8 mg dexamethasone IT, around 1130.  Less significant hyperglycemia after lunch, then high to 300 using aspart 1 unit per 2.5 grams carb.  Overnight improved to blaine 187 w/ glargine 45 units on board.  Increased to 1 untis per 2 grams carb at Bfast, dosed about 30 min after started eating, BG still 248 before lunch.  Potential to discharge home tomorrow.  Pastor feels well so is very hopeful.  Agrees he can carb count.  He's working on a personal food database.  Asks about sending discharge plan to his outpt endocrinologist.    Uncertain if okay to restart metformin-- BMT will discuss w/ pharmD.  Also not clear if will get his  "next dexamethasone IT on Monday or Tuesday next week.        Been very active with his carb counting references via journal he was given as well as utilizing [employer] to assist with carb counting select foods  - Consult to IP nutrition re: enhanced learning for carb counting/coverage - completed     Recent Labs   Lab 10/07/20  1336 10/07/20  0756 10/07/20  0419 10/07/20  0209 10/06/20  2223 10/06/20  1808 10/06/20  1301 10/06/20  0416 10/06/20  0416 10/05/20  0427 10/05/20  0427 10/04/20  0327 10/04/20  0327 10/03/20  0305 10/03/20  0305 10/02/20  0436 10/02/20  0436   GLC  --   --  187*  --   --   --   --   --  219*  --  158*  --  180*  --  157*  --  154*   * 219*  --  194* 313* 207* 127*   < >  --    < >  --    < >  --    < >  --    < >  --     < > = values in this interval not displayed.           Nutrition:     Orders Placed This Encounter      High Kcal/High Protein Diet, ADULT          PTA Regimen:      Off metformin x about 1 week (1 gm in AM, 500 mg in PM)  glargine usually 30 units (but up to 40) at HS  aspart roughly 1 unit per 10 mg/dL correction plus some for food           Review of Systems:    see interval hx         Medications:   Steroid planning:  Home Hydrocortisone 20 mg restarted 10/3,     IT dexamethasone, on 9/29 and 10/6-- 8 mg (also received IV dex on 9/23, 9/24)         Physical Exam:   /69 (BP Location: Right arm)   Pulse 89   Temp 97  F (36.1  C) (Axillary)   Resp 16   Ht 1.73 m (5' 8.11\")   Wt 88.5 kg (195 lb 1.6 oz)   SpO2 97%   BMI 29.57 kg/m      General:  pleasant, in no acute distress,sitting comfortably in bed, earlier seen up walking in hals  HEENT:  NC/AT. Oral MMM, sclera not injected  Lungs:  no SOB  ABD:  Rounded, smooth  Skin:  dry, no obvious lesions/rash  MSK:   moving all extremities  Lymp:   no LE edema  Mental Status:  Alert and oriented x3  Psych:   Easily engaged, calm mood         Data:     Lab Results   Component Value Date    A1C 7.3 " 05/22/2020          Recent Labs   Lab Test 10/07/20  0419 10/06/20  0416    135   POTASSIUM 3.7 3.9   CHLORIDE 105 103   CO2 27 28   ANIONGAP 6 4   * 219*   BUN 15 15   CR 0.72 0.85   JEFF 9.5 9.0     CBC RESULTS:   Recent Labs   Lab Test 10/07/20  0419   WBC 0.5*   RBC 3.84*   HGB 10.9*   HCT 33.0*   MCV 86   MCH 28.4   MCHC 33.0   RDW 14.6   PLT 61*       I spent a total of 35 minutes bedside and on the inpatient unit managing the glycemic care of Patsor Garibay. Over 50% of my time on the unit was spent counseling the patient  and/or coordinating care regarding acute hyperglycemia management in setting of steroids and planning for discharge, outpt steroids. See note for details.    Milibarry Keys APRN [facility] [phone]  Diabetes Management Team job code: [id number]

## 2020-10-07 NOTE — PLAN OF CARE
Pt BG in 200's today and adjustments have been made frequently to carb coverage and changed lantus for tonight. Pt reviewed plan for discharge from diabetes team and is ok with it as we have proven to him that he needs that much insulin for carbs consumed. Plan is for 1100 discharge tomorrow. Pt has had no complaints.  Problem: Adult Inpatient Plan of Care  Goal: Plan of Care Review  Outcome: Improving  Flowsheets (Taken 10/7/2020 1751)  Plan of Care Reviewed With: patient  Progress: improving  Goal: Patient-Specific Goal (Individualization)  Outcome: Improving  Goal: Absence of Hospital-Acquired Illness or Injury  Outcome: Improving  Intervention: Identify and Manage Fall Risk  Recent Flowsheet Documentation  Taken 10/7/2020 0800 by Sheree Escalera RN  Safety Promotion/Fall Prevention:   safety round/check completed   nonskid shoes/slippers when out of bed  Intervention: Prevent VTE (venous thromboembolism)  Recent Flowsheet Documentation  Taken 10/7/2020 0800 by Sheree Escalera RN  VTE Prevention/Management: ambulation promoted  Goal: Optimal Comfort and Wellbeing  Outcome: Improving  Intervention: Provide Person-Centered Care  Recent Flowsheet Documentation  Taken 10/7/2020 0800 by Sheree Escalera RN  Trust Relationship/Rapport: questions answered  Goal: Readiness for Transition of Care  Outcome: Improving  Goal: Rounds/Family Conference  Outcome: Improving     Problem: Diabetes Comorbidity  Goal: Blood Glucose Level Within Desired Range  Outcome: Improving  Intervention: Monitor and Manage Glycemia  Recent Flowsheet Documentation  Taken 10/7/2020 0800 by Sheree Escalera RN  Glycemic Management: blood glucose monitored     Problem: Neurotoxicity (Chemotherapy Effects)  Goal: Neurotoxicity Symptom Control  Outcome: Improving     Problem: Adjustment to Transplant (Stem Cell/Bone Marrow Transplant)  Goal: Optimal Coping with Transplant  Outcome: Improving  Intervention: Optimize Patient/Family Adjustment  Response to Transplant  Recent Flowsheet Documentation  Taken 10/7/2020 0800 by Sheree Escalera RN  Supportive Measures: active listening utilized     Problem: Diarrhea (Stem Cell/Bone Marrow Transplant)  Goal: Diarrhea Symptom Control  Outcome: Improving  Intervention: Manage Diarrhea  Recent Flowsheet Documentation  Taken 10/7/2020 0800 by Sheree Escalera RN  Fluid/Electrolyte Management: fluids provided     Problem: Fatigue (Stem Cell/Bone Marrow Transplant)  Goal: Energy Level Supports Daily Activity  Outcome: Improving     Problem: Hematologic Alteration (Stem Cell/Bone Marrow Transplant)  Goal: Blood Counts Within Acceptable Range  Outcome: Improving  Intervention: Monitor and Manage Hematologic Symptoms  Recent Flowsheet Documentation  Taken 10/7/2020 0800 by Sheree Escalera RN  Bleeding Precautions: monitored for signs of bleeding     Problem: Infection Risk (Stem Cell/Bone Marrow Transplant)  Goal: Absence of Infection Signs/Symptoms  Outcome: Improving  Intervention: Prevent and Manage Infection  Recent Flowsheet Documentation  Taken 10/7/2020 0800 by Sheree Escalera RN  Infection Prevention:   single patient room provided   visitors restricted/screened  Isolation Precautions: protective environment maintained     Problem: Hyperglycemia  Goal: Blood Glucose Level Within Targeted Range  Outcome: Improving  Intervention: Optimize Glycemic Control  Recent Flowsheet Documentation  Taken 10/7/2020 0800 by Sheree Escalera RN  Glycemic Management: blood glucose monitored

## 2020-10-07 NOTE — PROGRESS NOTES
"SPIRITUAL HEALTH SERVICES  [facility] ([place] 5C  REFERRAL SOURCE: [facility]DELINE     Introduced spiritual health services to pt as he was sitting in bed. Pt was cheerful, thanked  for checking-in with him and added \"I'm feeling good.\" He had no Spiritual Health needs at this time.     PLAN:  remains available per pt/family/staff request.     Rev. Armida Brasher MDiv, [facility]  Staff    Pager [phone]  * [facility] remains available 24/7 for emergent requests/referrals, either by having the switchboard page the on-call  or by entering an ASAP/STAT consult in Epic (this will also page the on-call ).*   "

## 2020-10-07 NOTE — PLAN OF CARE
OT/5C Deferral: Per PT, pt does not present with any IP needs and is to discharge 10/8. OT will sign off at this time. Please consult again if new needs arise.

## 2020-10-07 NOTE — PLAN OF CARE
Pt was running high blood glucose and being covered with insulin; last BG was 313 and he received 8 units; cab coverage was 44 units. Took colace for constipation; afebrile, vital signs are stable, monitor and continue plan of care.    Problem: Adult Inpatient Plan of Care  Goal: Plan of Care Review  Outcome: No Change  Flowsheets (Taken 10/6/2020 7628)  Plan of Care Reviewed With: patient     Problem: Adult Inpatient Plan of Care  Goal: Patient-Specific Goal (Individualization)  Outcome: No Change     Problem: Diabetes Comorbidity  Goal: Blood Glucose Level Within Desired Range  Outcome: No Change     Problem: Hyperglycemia  Goal: Blood Glucose Level Within Targeted Range  Outcome: No Change

## 2020-10-08 ENCOUNTER — TELEPHONE (OUTPATIENT)
Dept: INTERVENTIONAL RADIOLOGY/VASCULAR | Facility: CLINIC | Age: 70
End: 2020-10-08

## 2020-10-08 ENCOUNTER — APPOINTMENT (OUTPATIENT)
Dept: GENERAL RADIOLOGY | Facility: CLINIC | Age: 70
DRG: 809 | End: 2020-10-08
Attending: EMERGENCY MEDICINE
Payer: COMMERCIAL

## 2020-10-08 ENCOUNTER — TELEPHONE (OUTPATIENT)
Dept: TRANSPLANT | Facility: CLINIC | Age: 70
End: 2020-10-08

## 2020-10-08 ENCOUNTER — HOME INFUSION (PRE-WILLOW HOME INFUSION) (OUTPATIENT)
Dept: PHARMACY | Facility: CLINIC | Age: 70
End: 2020-10-08

## 2020-10-08 ENCOUNTER — HOSPITAL ENCOUNTER (INPATIENT)
Facility: CLINIC | Age: 70
LOS: 5 days | Discharge: HOME | DRG: 809 | End: 2020-10-13
Attending: EMERGENCY MEDICINE | Admitting: INTERNAL MEDICINE
Payer: COMMERCIAL

## 2020-10-08 ENCOUNTER — CARE COORDINATION (OUTPATIENT)
Dept: ONCOLOGY | Facility: CLINIC | Age: 70
End: 2020-10-08

## 2020-10-08 VITALS
TEMPERATURE: 98.1 F | BODY MASS INDEX: 29.64 KG/M2 | OXYGEN SATURATION: 97 % | RESPIRATION RATE: 18 BRPM | HEART RATE: 86 BPM | HEIGHT: 68 IN | SYSTOLIC BLOOD PRESSURE: 119 MMHG | DIASTOLIC BLOOD PRESSURE: 71 MMHG | WEIGHT: 195.6 LBS

## 2020-10-08 DIAGNOSIS — C83.30 DIFFUSE LARGE B-CELL LYMPHOMA, UNSPECIFIED BODY REGION (H): Primary | ICD-10-CM

## 2020-10-08 DIAGNOSIS — C85.80 LARGE CELL LYMPHOMA (H): ICD-10-CM

## 2020-10-08 DIAGNOSIS — C83.3A DIFFUSE LARGE CELL LYMPHOMA IN REMISSION: ICD-10-CM

## 2020-10-08 DIAGNOSIS — D70.9 NEUTROPENIC FEVER (H): ICD-10-CM

## 2020-10-08 DIAGNOSIS — C83.398 DIFFUSE LARGE B-CELL LYMPHOMA OF SOLID ORGAN EXCLUDING SPLEEN: ICD-10-CM

## 2020-10-08 DIAGNOSIS — R50.81 NEUTROPENIC FEVER (H): ICD-10-CM

## 2020-10-08 LAB
ABO + RH BLD: NORMAL
ABO + RH BLD: NORMAL
ALBUMIN SERPL-MCNC: 3.4 G/DL (ref 3.4–5)
ALP SERPL-CCNC: 78 U/L (ref 40–150)
ALT SERPL W P-5'-P-CCNC: 30 U/L (ref 0–70)
ANION GAP SERPL CALCULATED.3IONS-SCNC: 7 MMOL/L (ref 3–14)
ANION GAP SERPL CALCULATED.3IONS-SCNC: 8 MMOL/L (ref 3–14)
AST SERPL W P-5'-P-CCNC: 17 U/L (ref 0–45)
BACTERIA SPEC CULT: NORMAL
BASOPHILS # BLD AUTO: 0 10E9/L (ref 0–0.2)
BASOPHILS # BLD AUTO: 0 10E9/L (ref 0–0.2)
BASOPHILS NFR BLD AUTO: 0.4 %
BASOPHILS NFR BLD AUTO: 0.9 %
BILIRUB SERPL-MCNC: 0.7 MG/DL (ref 0.2–1.3)
BLD GP AB SCN SERPL QL: NORMAL
BLOOD BANK CMNT PATIENT-IMP: NORMAL
BUN SERPL-MCNC: 15 MG/DL (ref 7–30)
BUN SERPL-MCNC: 19 MG/DL (ref 7–30)
CALCIUM SERPL-MCNC: 8.6 MG/DL (ref 8.5–10.1)
CALCIUM SERPL-MCNC: 9.1 MG/DL (ref 8.5–10.1)
CHLORIDE SERPL-SCNC: 103 MMOL/L (ref 94–109)
CHLORIDE SERPL-SCNC: 105 MMOL/L (ref 94–109)
CO2 SERPL-SCNC: 26 MMOL/L (ref 20–32)
CO2 SERPL-SCNC: 27 MMOL/L (ref 20–32)
CREAT SERPL-MCNC: 0.96 MG/DL (ref 0.66–1.25)
CREAT SERPL-MCNC: 0.99 MG/DL (ref 0.66–1.25)
CRP SERPL-MCNC: 20 MG/L (ref 0–8)
DIFFERENTIAL METHOD BLD: ABNORMAL
DIFFERENTIAL METHOD BLD: ABNORMAL
EOSINOPHIL # BLD AUTO: 0 10E9/L (ref 0–0.7)
EOSINOPHIL # BLD AUTO: 0.1 10E9/L (ref 0–0.7)
EOSINOPHIL NFR BLD AUTO: 1.7 %
EOSINOPHIL NFR BLD AUTO: 6 %
ERYTHROCYTE [DISTWIDTH] IN BLOOD BY AUTOMATED COUNT: 14.9 % (ref 10–15)
ERYTHROCYTE [DISTWIDTH] IN BLOOD BY AUTOMATED COUNT: 15.9 % (ref 10–15)
GFR SERPL CREATININE-BSD FRML MDRD: 78 ML/MIN/{1.73_M2}
GFR SERPL CREATININE-BSD FRML MDRD: 80 ML/MIN/{1.73_M2}
GLUCOSE SERPL-MCNC: 108 MG/DL (ref 70–99)
GLUCOSE SERPL-MCNC: 230 MG/DL (ref 70–99)
HCT VFR BLD AUTO: 33.2 % (ref 40–53)
HCT VFR BLD AUTO: 37.8 % (ref 40–53)
HGB BLD-MCNC: 10.6 G/DL (ref 13.3–17.7)
HGB BLD-MCNC: 12.1 G/DL (ref 13.3–17.7)
IMM GRANULOCYTES # BLD: 0 10E9/L (ref 0–0.4)
IMM GRANULOCYTES NFR BLD: 1.7 %
LACTATE BLD-SCNC: 1.9 MMOL/L (ref 0.7–2)
LYMPHOCYTES # BLD AUTO: 0.1 10E9/L (ref 0.8–5.3)
LYMPHOCYTES # BLD AUTO: 0.1 10E9/L (ref 0.8–5.3)
LYMPHOCYTES NFR BLD AUTO: 12.8 %
LYMPHOCYTES NFR BLD AUTO: 3.5 %
Lab: NORMAL
MCH RBC QN AUTO: 27.5 PG (ref 26.5–33)
MCH RBC QN AUTO: 28.2 PG (ref 26.5–33)
MCHC RBC AUTO-ENTMCNC: 31.9 G/DL (ref 31.5–36.5)
MCHC RBC AUTO-ENTMCNC: 32 G/DL (ref 31.5–36.5)
MCV RBC AUTO: 86 FL (ref 78–100)
MCV RBC AUTO: 88 FL (ref 78–100)
MONOCYTES # BLD AUTO: 0.5 10E9/L (ref 0–1.3)
MONOCYTES # BLD AUTO: 0.7 10E9/L (ref 0–1.3)
MONOCYTES NFR BLD AUTO: 29.3 %
MONOCYTES NFR BLD AUTO: 47 %
MYELOCYTES # BLD: 0 10E9/L
MYELOCYTES NFR BLD MANUAL: 0.9 %
NEUTROPHILS # BLD AUTO: 0.3 10E9/L (ref 1.6–8.3)
NEUTROPHILS # BLD AUTO: 1.5 10E9/L (ref 1.6–8.3)
NEUTROPHILS NFR BLD AUTO: 32.4 %
NEUTROPHILS NFR BLD AUTO: 63.4 %
NRBC # BLD AUTO: 0 10*3/UL
NRBC BLD AUTO-RTO: 1 /100
OVALOCYTES BLD QL SMEAR: SLIGHT
PLATELET # BLD AUTO: 64 10E9/L (ref 150–450)
PLATELET # BLD AUTO: 71 10E9/L (ref 150–450)
PLATELET # BLD EST: ABNORMAL 10*3/UL
POIKILOCYTOSIS BLD QL SMEAR: SLIGHT
POTASSIUM SERPL-SCNC: 3.5 MMOL/L (ref 3.4–5.3)
POTASSIUM SERPL-SCNC: 3.6 MMOL/L (ref 3.4–5.3)
PROT SERPL-MCNC: 6.3 G/DL (ref 6.8–8.8)
RBC # BLD AUTO: 3.85 10E12/L (ref 4.4–5.9)
RBC # BLD AUTO: 4.29 10E12/L (ref 4.4–5.9)
SODIUM SERPL-SCNC: 137 MMOL/L (ref 133–144)
SODIUM SERPL-SCNC: 139 MMOL/L (ref 133–144)
SPECIMEN EXP DATE BLD: NORMAL
SPECIMEN SOURCE: NORMAL
WBC # BLD AUTO: 1 10E9/L (ref 4–11)
WBC # BLD AUTO: 2.3 10E9/L (ref 4–11)

## 2020-10-08 PROCEDURE — 250N000011 HC RX IP 250 OP 636

## 2020-10-08 PROCEDURE — 83605 ASSAY OF LACTIC ACID: CPT | Performed by: EMERGENCY MEDICINE

## 2020-10-08 PROCEDURE — 86901 BLOOD TYPING SEROLOGIC RH(D): CPT | Performed by: STUDENT IN AN ORGANIZED HEALTH CARE EDUCATION/TRAINING PROGRAM

## 2020-10-08 PROCEDURE — 250N000013 HC RX MED GY IP 250 OP 250 PS 637: Performed by: PHYSICIAN ASSISTANT

## 2020-10-08 PROCEDURE — 99239 HOSP IP/OBS DSCHRG MGMT >30: CPT | Performed by: INTERNAL MEDICINE

## 2020-10-08 PROCEDURE — 250N000013 HC RX MED GY IP 250 OP 250 PS 637: Performed by: STUDENT IN AN ORGANIZED HEALTH CARE EDUCATION/TRAINING PROGRAM

## 2020-10-08 PROCEDURE — 82728 ASSAY OF FERRITIN: CPT | Performed by: EMERGENCY MEDICINE

## 2020-10-08 PROCEDURE — 250N000013 HC RX MED GY IP 250 OP 250 PS 637: Performed by: CLINICAL NURSE SPECIALIST

## 2020-10-08 PROCEDURE — 85025 COMPLETE CBC W/AUTO DIFF WBC: CPT | Performed by: INTERNAL MEDICINE

## 2020-10-08 PROCEDURE — 99285 EMERGENCY DEPT VISIT HI MDM: CPT | Mod: 25 | Performed by: EMERGENCY MEDICINE

## 2020-10-08 PROCEDURE — 71045 X-RAY EXAM CHEST 1 VIEW: CPT

## 2020-10-08 PROCEDURE — 87800 DETECT AGNT MULT DNA DIREC: CPT | Performed by: EMERGENCY MEDICINE

## 2020-10-08 PROCEDURE — 96375 TX/PRO/DX INJ NEW DRUG ADDON: CPT | Performed by: EMERGENCY MEDICINE

## 2020-10-08 PROCEDURE — 206N000001 HC R&B BMT UMMC

## 2020-10-08 PROCEDURE — 80053 COMPREHEN METABOLIC PANEL: CPT | Performed by: EMERGENCY MEDICINE

## 2020-10-08 PROCEDURE — 87040 BLOOD CULTURE FOR BACTERIA: CPT | Performed by: EMERGENCY MEDICINE

## 2020-10-08 PROCEDURE — 87186 SC STD MICRODIL/AGAR DIL: CPT | Performed by: EMERGENCY MEDICINE

## 2020-10-08 PROCEDURE — 80048 BASIC METABOLIC PNL TOTAL CA: CPT | Performed by: INTERNAL MEDICINE

## 2020-10-08 PROCEDURE — 86850 RBC ANTIBODY SCREEN: CPT | Performed by: STUDENT IN AN ORGANIZED HEALTH CARE EDUCATION/TRAINING PROGRAM

## 2020-10-08 PROCEDURE — 86900 BLOOD TYPING SEROLOGIC ABO: CPT | Performed by: STUDENT IN AN ORGANIZED HEALTH CARE EDUCATION/TRAINING PROGRAM

## 2020-10-08 PROCEDURE — 99233 SBSQ HOSP IP/OBS HIGH 50: CPT | Performed by: CLINICAL NURSE SPECIALIST

## 2020-10-08 PROCEDURE — 96361 HYDRATE IV INFUSION ADD-ON: CPT | Performed by: EMERGENCY MEDICINE

## 2020-10-08 PROCEDURE — 86140 C-REACTIVE PROTEIN: CPT | Performed by: EMERGENCY MEDICINE

## 2020-10-08 PROCEDURE — 85025 COMPLETE CBC W/AUTO DIFF WBC: CPT | Performed by: EMERGENCY MEDICINE

## 2020-10-08 PROCEDURE — 258N000003 HC RX IP 258 OP 636: Performed by: EMERGENCY MEDICINE

## 2020-10-08 PROCEDURE — 71045 X-RAY EXAM CHEST 1 VIEW: CPT | Mod: 26 | Performed by: RADIOLOGY

## 2020-10-08 PROCEDURE — 99285 EMERGENCY DEPT VISIT HI MDM: CPT | Performed by: EMERGENCY MEDICINE

## 2020-10-08 PROCEDURE — 87077 CULTURE AEROBIC IDENTIFY: CPT | Performed by: EMERGENCY MEDICINE

## 2020-10-08 PROCEDURE — 250N000011 HC RX IP 250 OP 636: Performed by: EMERGENCY MEDICINE

## 2020-10-08 RX ORDER — DEXTROSE MONOHYDRATE 25 G/50ML
25-50 INJECTION, SOLUTION INTRAVENOUS
Status: CANCELLED | OUTPATIENT
Start: 2020-10-08

## 2020-10-08 RX ORDER — ONDANSETRON 2 MG/ML
4 INJECTION INTRAMUSCULAR; INTRAVENOUS EVERY 30 MIN PRN
Status: DISCONTINUED | OUTPATIENT
Start: 2020-10-08 | End: 2020-10-13 | Stop reason: HOSPADM

## 2020-10-08 RX ORDER — INSULIN ASPART 100 [IU]/ML
INJECTION, SOLUTION INTRAVENOUS; SUBCUTANEOUS
COMMUNITY
Start: 2020-10-08

## 2020-10-08 RX ORDER — DOCUSATE SODIUM 100 MG/1
100 CAPSULE, LIQUID FILLED ORAL 2 TIMES DAILY PRN
COMMUNITY
Start: 2020-10-08

## 2020-10-08 RX ORDER — ACYCLOVIR 800 MG/1
800 TABLET ORAL 2 TIMES DAILY
COMMUNITY
Start: 2020-10-08 | End: 2020-11-06

## 2020-10-08 RX ORDER — NICOTINE POLACRILEX 4 MG
15-30 LOZENGE BUCCAL
Status: CANCELLED | OUTPATIENT
Start: 2020-10-08

## 2020-10-08 RX ADMIN — SODIUM CHLORIDE, POTASSIUM CHLORIDE, SODIUM LACTATE AND CALCIUM CHLORIDE 1000 ML: 600; 310; 30; 20 INJECTION, SOLUTION INTRAVENOUS at 23:20

## 2020-10-08 RX ADMIN — FLUCONAZOLE 200 MG: 200 TABLET ORAL at 07:20

## 2020-10-08 RX ADMIN — HYDROCORTISONE 20 MG: 20 TABLET ORAL at 07:20

## 2020-10-08 RX ADMIN — SODIUM CHLORIDE, PRESERVATIVE FREE 10 ML: 5 INJECTION INTRAVENOUS at 02:58

## 2020-10-08 RX ADMIN — ALLOPURINOL 300 MG: 300 TABLET ORAL at 07:20

## 2020-10-08 RX ADMIN — METFORMIN HYDROCHLORIDE 500 MG: 500 TABLET, FILM COATED ORAL at 10:15

## 2020-10-08 RX ADMIN — Medication 40 MG: at 07:20

## 2020-10-08 RX ADMIN — ONDANSETRON 4 MG: 2 INJECTION INTRAMUSCULAR; INTRAVENOUS at 23:21

## 2020-10-08 RX ADMIN — ACYCLOVIR 800 MG: 800 TABLET ORAL at 07:20

## 2020-10-08 RX ADMIN — PANTOPRAZOLE SODIUM 40 MG: 40 TABLET, DELAYED RELEASE ORAL at 07:20

## 2020-10-08 RX ADMIN — DOCUSATE SODIUM 100 MG: 100 CAPSULE, LIQUID FILLED ORAL at 07:20

## 2020-10-08 RX ADMIN — LEVOFLOXACIN 250 MG: 250 TABLET, FILM COATED ORAL at 09:53

## 2020-10-08 ASSESSMENT — MIFFLIN-ST. JEOR
SCORE: 1633.49
SCORE: 1629.01

## 2020-10-08 ASSESSMENT — ACTIVITIES OF DAILY LIVING (ADL)
ADLS_ACUITY_SCORE: 13
NUMBER_OF_TIMES_PATIENT_HAS_FALLEN_WITHIN_LAST_SIX_MONTHS: 1
ADLS_ACUITY_SCORE: 13
ADLS_ACUITY_SCORE: 13

## 2020-10-08 NOTE — PROGRESS NOTES
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  Glucose has been persistently uncontrolled at home lately with major insulin resistance in setting of steroids and metformin withholding.        Glucose trending in a safe range.    Plan:        - increase metformin to 500 mg BID (started 500 mg at supper last night)  - decrease glargine to 30 units, qPM  - aspart changed to 1 units per 3.5 grams carb  - continue aspart 2 per 30 correction scale    Dosing plan reviewed w/ patient.  Treatment plan for everyday diabetes management and for steroid (for next IT dexamethasone day ) pasted into AVS.     Outpatient diabetes follow up: Dr Turner  Plan discussed with patient, bedside RN, page to primary team                   Interval History:     The last 24 hours progress and nursing notes reviewed.     Feels well and ready to discharge today.  He mentioned he is going to ask his daughter to get another Covid test before he sees her.  Wondered about low cost/free choices, so directed him to the MD listing.    Feels a little gas discomfort.  Not necessarily from metformin, but possibly.  Okay w/ increasing dose.  Understands not to go to 1 gm in AM.  Reviewed written plan and he voices understanding.  Expects he'll see Dr. Turner in a couple months.  Discussed alternative agents like GLP-1 RA.  He hasn't discussed this with his endo, but is interested. Usually his med copays are affordable.      Recent Labs   Lab 10/08/20  0801 10/08/20  0253 10/08/20  0250 10/07/20  2129 10/07/20  1849 10/07/20  1336 10/07/20  0756 10/07/20  0419 10/06/20  0416 10/06/20  0416 10/05/20  0427 10/05/20  0427 10/04/20  0327 10/04/20  0327 10/03/20  0305  "10/03/20  0305   GLC  --   --  108*  --   --   --   --  187*  --  219*  --  158*  --  180*  --  157*   * 119*  --  168* 114* 248* 219*  --    < >  --    < >  --    < >  --    < >  --     < > = values in this interval not displayed.           Nutrition:     None          PTA Regimen:      Off metformin x about 1 week (1 gm in AM, 500 mg in PM)  glargine usually 30 units (but up to 40) at HS  aspart roughly 1 unit per 10 mg/dL correction plus some for food           Review of Systems:    see interval hx         Medications:   Steroid planning:  Home Hydrocortisone 20 mg restarted 10/3,     IT dexamethasone, on 9/29 and 10/6-- 8 mg (also received IV dex on 9/23, 9/24)         Physical Exam:   /71 (BP Location: Right arm)   Pulse 86   Temp 98.1  F (36.7  C) (Axillary)   Resp 18   Ht 1.73 m (5' 8.11\")   Wt 88.7 kg (195 lb 9.6 oz)   SpO2 97%   BMI 29.64 kg/m      General:  pleasant, in no acute distress,sitting comfortably in bed,    HEENT:  NC/AT. Oral MMM, sclera not injected  Lungs:  no SOB  ABD:  Rounded  Skin:  dry, no obvious lesions/rash  MSK:   moving all extremities  Lymp:   no LE edema  Mental Status:  Alert and oriented x3  Psych:   Easily engaged, calm mood         Data:     Lab Results   Component Value Date    A1C 7.3 05/22/2020          Recent Labs   Lab Test 10/08/20  0250 10/07/20  0419    137   POTASSIUM 3.5 3.7   CHLORIDE 105 105   CO2 27 27   ANIONGAP 7 6   * 187*   BUN 19 15   CR 0.96 0.72   JEFF 9.1 9.5     CBC RESULTS:   Recent Labs   Lab Test 10/08/20  0250   WBC 1.0*   RBC 3.85*   HGB 10.6*   HCT 33.2*   MCV 86   MCH 27.5   MCHC 31.9   RDW 14.9   PLT 71*       I spent a total of 35 minutes bedside and on the inpatient unit managing the glycemic care of Pastor Garibay. Over 50% of my time on the unit was spent counseling the patient  and/or coordinating care regarding treatment plan for discharge, outpt steroids. See note for details.    Mili Keys APRN CNS " [phone]  Diabetes Management Team job code: [phone]

## 2020-10-08 NOTE — PROGRESS NOTES
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 Appetite is good and he denies n/v/d. Mild constipation, taking stool softeners. No HA, pain, confusion. No fevers or bleeding. Vitals stable. Ready to discharge today with daily follow-up in BMT Clinic at least through D14 post CAR-T (10/13).    Review of Systems    8 point review with pertinent positive and negatives     PHYSICAL EXAM    KPS: 90    /71 (BP Location: Right arm)   Pulse 86   Temp 98.1  F (36.7  C) (Axillary)   Resp 18   Ht 1.73 m (5' 8.11\")   Wt 88.7 kg (195 lb 9.6 oz)   SpO2 97%   BMI 29.64 kg/m       General: NAD; rommel complexion  Eyes: sclera anicteric   Nose/Mouth/Throat: OP moist, no ulcerations   Lungs: CTAB  Cardiovascular: RRR, no M/R/G   Abdominal/Rectal: +BS, soft, NT, ND, No HSM. Large ventral hernia chronic, stable  Lymphatics: trace LE edema  Skin: No rash  MSK: Toes, second and third on left are missing (s/p amputation 2/2 diabetic complications)  Neuro: A&O ICE 10/10; no CRS (see CAR-tox note)  Additional Findings: Right chest port a cath, left upper arm PICC NT, dressing cdi    Labs:  Lab Results   Component Value Date    WBC 1.0 (L) 10/08/2020    ANEU 0.3 (LL) 10/08/2020    HGB 10.6 (L) 10/08/2020    HCT 33.2 (L) 10/08/2020    PLT 71 (L) 10/08/2020     10/08/2020    POTASSIUM 3.5 10/08/2020    CHLORIDE 105 10/08/2020    CO2 27 10/08/2020     (H) 10/08/2020    BUN 19 10/08/2020    CR 0.96 10/08/2020    MAG 1.8 10/07/2020    INR 1.06 10/06/2020    BILITOTAL 0.6 10/06/2020    AST 17 10/06/2020    ALT 29 10/06/2020    ALKPHOS 83 10/06/2020    PROTTOTAL 6.1 (L) 10/06/2020    ALBUMIN 3.1 (L) 10/06/2020       ASSESSMENT/PLAN   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 T cells " collected 8/31/2020. 2017-45 [facility], 2019-35: IT dex + simvastatin for JUAQUIN prevention. Day 9.    Day 0: LP with IT dex. PICC line placement, [facility] Car-t infusion   Day 7: LP under fluoroscopy w/IT dex.   Day 13 (10/12) at 1pm in XR: LP under fluoroscopy w/IT dex. (AKramer will push dex)    1.  BMT:  DLBCL  Axi-cell product with CNS prevention protocol, day 0  CNS prophy: simvastatin 40mg tied to IDS drug  - cont allopurinol    2.  HEME: Keep Hgb>7 and plts>10K. No pre-meds.                            3.  ID: Afebrile.  - Prophy levo, fluc, acy, IV pentamidine (9/23).       4.  GI: no n/v.  - prn Ativan, Compazine  - Protonix for GI prophy.   - Dulcolax, Colace prn constipation    5.  FEN/Renal: Creat, lytes wnl.    6. Endo: Type 2 DM.  - have been metformin during CAR-T (risk of lactic acidosis); will cautiously resume at half-dose 500mg bid 10/7.  Endocrine followed this admission.  Lantus 30u at hs and Novolog sliding scale and carb counting (see endocrine plan below).  Complication of DM with 2 left toes amputated 7/2020  - hx Adrenal Insufficiency: PTA on hydrocortisone 20mg/d; was held on admission for unclear reason and not listed in notes; resumed 10/3 (perrmitted on Car-T protocols).     Dispo: discharge today with daily clinic follow-up through at least D14 post CAR-T (Tues 10/13). Pt has PICC and port-a-cath. Does have line care coverage but not initiated as we anticipated pulling PICC line early next week if he remains stable.  - needs asymptomatic Covid test Friday 10/9 for planned LP under fluoro Mon 10/12. Messaged schedulers to arrange. IR placed Covid test order.    Nydia Smyth PA-C  [phone]    Diabetes plan-  Follow up with Dr. Turner to reassess Novolog dosing, alternatives/adjuncts to insulin (Trulicity, for example)   Diabetes Regimen for home  Metformin 500 mg two times per day, before meals  Lantus 30 units in the evening  Novolog 1 unit per 3.5 grams of carbohydrate   Novolog  Correction scale  Before meals--  -169 give 2 units.  -199 give 4 units.  -229 give 6 units.  -259 give 8 units.  -289 give 10 units.  -319 give 12 units.  -349 give 14 units.  BG >/= 350 give 16 units.  At bedtime--  -229 give 2 units.  -259 give 4 units.  -289 give 6 units.  -319 give 8 units.  -349 give 10 units.  BG >/= 350 give 12 units.     Day of  intrathecal dexamethasone  Increase your carb coverage to 1 unit per 2 grams starting with supper  Increase your Lantus to 45 units that night  Next day use 1 unit per 2 grams for breakfast and lunch, return to normal ratio at supper  Return to normal Lantus dose 30 units          BMT Staff:  The patient was discussed on morning rounds with the nurses, mid level provider, and house staff and seen and examined by me. All labs and imaging were reviewed. I reviewed events over the last 24 hours including vitals and flow sheets. I agree with the above note and have been responsible for the care plan and interpretation of progress.     Subjective:  Reports feeling overall well, no abd pain, no F/C/NS, no N/V/C/D, no GI or  complaints, no URI or other respiratory symptoms, no HA/LH/Dizziness. He is in good spirits and wants to be discharged home.     Vitals reviewed, labs reviewed  PE:  NAD, Alert, oriented x3  HEENT: moist mmm, no oral ulcers  Heart: RRR  Lungs: CTAB  Abdomen: +BS, soft non tender, non distended  LE: no edema  Skin: no rashes     Assessment and Plan:     70-year-old man with an aggressive non-Hodgkin's lymphoma who is now 9 days after [facility] T-cell therapy.  He has been afebrile over the past 48 hours. No evidence of neurotoxicity or cytokine release syndrome.  Patient feels comfortable with discharge plan today he is aware of the follow-up daily in clinic and has no further questions or concerns.  More than 30 minutes spent in coordination of care for discharge.       Quang Acevedo  MD [place]

## 2020-10-08 NOTE — PROGRESS NOTES
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 currently day 9 of CAR-T cell therapy [facility]    Vital Signs: /71 (BP Location: Right arm)   Pulse 86   Temp 98.1  F (36.7  C) (Axillary)   Resp 18   Ht 1.73 m (5' 8.11\")   Wt 88.7 kg (195 lb 9.6 oz)   SpO2 97%   BMI 29.64 kg/m      1) CRS Grading (based ONLY on parameters in box below)    Fever:   </= 100.4    Blood pressure:   SBP >/= 90 (not hypotensive)    Oxygen saturation:    >/= 90% on room air (not hypoxic)    CRS Parameter Grade 1  Grade 2 Grade 3 Grade 4   Fever* Tm >= 100.4 degrees F Tm >= 100.4 degrees F Tm >= 100.4 degrees F Tm >= to 100.4  degrees F      With Either:  Hypotension (SBP <90) None Responsive to Fluids  Requiring 1  vasopressor (w/ or w/o vasopressin) Requiring multiple  vasopressors  (excluding  vasopressin)     And/or  Hypoxia (O2 sats <90% on room air) None Low-flow nasal  cannula or blow-by High-flow nasal  cannula, face mask,  non-rebreather mask,or Venturi mask  Requiring positive  pressure (CPAP,  BiPAP intubation and  mechanical  ventilation)     CRS Summary  Does patient have CRS? No  Current CRS stgstrstastdstest:st st1st 2) Neurotoxicity:    ICE Assessment  Orientation to year, month, city, hospital: 4 points, Name 3 objects (e.g., point to clock, pen, button): 3 point, Following commands: (e.g., Show me 2 fingers): 1 point, Write a standard sentence (e.g., [employer] wilson jumped over the log): 1 point and Count backwards from 100 by ten: 1 point  Total points: 10: No impairment    ASTCT ICANS Consensus Grading for Adults  ICE Score   10    Seizure   No seizures    Motor Findings   No motor findings    Elevated ICP/Cerebral Edema   None      Neurotoxicity  Domain Grade 1 Grade 2 Grade 3 Grade 4   ICE score 7-9 3-6 1-2 0   Depressed LOC      Awakens  spontaneously Awakens to  voice  Awakens only to  tactile " stimulation Unarousable or  requires vigorous  or repetitive  tactile stimuli to  arouse. Stupor  or coma.   Seizure n/a n/a Any clinical  seizure focal or  generalized that  resolves rapidly  or nonconvulsive  seizures on EEG  that resolve with  intervention  Life-threatening  prolonged  seizure (>5 min);  or Repetitive  clinical or  electrical  seizures without  return to baseline  in between    Motor Findings      n/a n/a n/a Deep focal motor  weakness such  as hemiparesis  or paraparesis   Elevated  ICP/cerebral  edema  n/a n/a Focal/local  edema on  neuroimaging  Diffuse cerebral  edema on  neuroimaging;  decerebrate or  decorticate  posturing; or  cranial nerve VI  palsy; or  papilledema; or  Cushing's triad     ICANS grade is determined by the most severe event (ICE score, level of consciousness, seizure, motor findings, raised ICP/cerebral edema) not attributable to any other cause; for example, a patient with an ICE score of 3 who has a generalized seizure is classified as grade 3 ICANS.    A patient with an ICE score of 0 may be classified as grade 3 ICANS if awake with global aphasia, but a patient with an ICE score of 0 may be classified as grade 4 ICANS if unarousable.     Depressed level of consciousness should be attributable to no other cause (eg, no sedating medication)    Tremors and myoclonus associated with immune effector cell therapies may be graded according to CTCAE v5.0,but they do not influence ICANS grading.     Intracranial hemorrhage with or without associated edema is not considered a neurotoxicity feature and is  excluded from ICANS grading. It may be graded according to CTCAE v5.0.          Neurotoxicity Summary  Does patient have neurotoxicity? No  Current Neurotoxicity score (0-10): 10/10        3) Organ CAR-T toxicity:    Cardiac   none    Respiratory   none    Gastrointestinal   none    Hepatic    none    Skin   none     Coagulopathy    none     Other: n/a      4) HLH   No HLH      * CTCAE grading can be found at   https://ctep.cancer.gov/protocoldevelopment/electronic_applications/docs/CTCAE_v5_Quick Reference_8.5x11.pdf    Nydia Smyth PA-C  [phone]

## 2020-10-08 NOTE — PLAN OF CARE
AVSS on RA. Pt offers no complaints, slept well between cares. BG levels 119 and 168 overnight, no insulin required. Colace given at bedtime per pt request. Lab stable, no replacements needed. Plan for discharge today. Continue to monitor.    Problem: Adult Inpatient Plan of Care  Goal: Optimal Comfort and Wellbeing  Outcome: No Change     Problem: Neurotoxicity (Chemotherapy Effects)  Goal: Neurotoxicity Symptom Control  Outcome: No Change     Problem: Hyperglycemia  Goal: Blood Glucose Level Within Targeted Range  Outcome: Improving

## 2020-10-08 NOTE — PROGRESS NOTES
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 Diagnoses of Diffuse large cell lymphoma in remission (H), Diffuse large B-cell lymphoma of solid organ excluding spleen (H), and Diffuse large B-cell lymphoma, unspecified body region (H) were also pertinent to this visit.    Teaching Topic: [facility] CAR-T cellular product infusion discharge teaching    Person(s) involved in teaching: Patient and Spouse  Motivation Level  Asks Questions: Yes  Eager to Learn: Yes  Cooperative: Yes  Receptive (willing/able to accept information): Yes  Any cultural factors/Anglican beliefs that may influence understanding or compliance? No    Patient and Caregiver demonstrates understanding of the following:  - Reason for the appointment, diagnosis and treatment plan: Yes  - Knowledge of proper use of medications and conditions for which they are ordered (with special attention to potential side effects or drug interactions): No, explain: Bedside RN to complete medication teaching with patient and caregiver prior to discharge.   - Which situations necessitate calling provider and whom to contact: Yes    Teaching concerns addressed: None identified    Proper use and care of (medical equipment, care aids, etc.) Yes  Pain management techniques: Yes  Patient instructed on hand hygiene: Yes  How and/when to access community resources: Yes    Infection Control:  Patient and Caregiver demonstrates understanding of the following:  Surgical procedure site care taught NA  Signs and symptoms of infection taught Yes  Wound care taught NA  Central venous catheter care taught No, explain: Patient and caregiver previously received teaching, and decline further need     Instructional Materials Used/Given:   Discharge teaching completed with patient and family. Written guidelines provided including clinic follow up, signs and symptoms to report, infection prevention, and  contact list. Discussed activities to avoid to prevent infections and mask guidelines. Line care to be performed in clinic until PICC removal - pt will otherwise request heparin in clinic. Pt and family verbalize understanding of material via teach back and all questions have been answered. Patient confirmed he has [employer] wallet card. Patient instructed to remain within close proximity (at least one hour) for at least four weeks post infusion.    Time spent with patient: 40 minutes.    Specific Concerns: NA

## 2020-10-08 NOTE — SUMMARY OF CARE
BMT Summary of Care    October 8, 2020 9:42 AM  Pastor Garibay  MRN: [id number]    Discharge Date: 10/8/2020     BMT Primary Physician: Dr. Bunch    BMT Nurse Coordinator:     Discharge Diagnosis: Day 9 s/p [facility] CAR-T for lymphoma    Discharge To: home    Activity: as tolerated    Catheter Care: PICC - Flush each line with 5mL of 10 unit/mL heparin every 24 hours    Vascular Access Device Protocol Per Policy  Line Care in BMT Clinic; will remove PICC when no longer needed. Pt also has port-a-cath    Nutrition: Regular diet as tolerated    Blood Transfusions:  Transfuse if Hemoglobin < or equal 7 mg/dL  Red Blood Cell Order: 1 unit, irradiated and leukoreduced   Transfuse if Platelet count < or equal 10 uL  Platelet order: 1 adult dose, irradiated and leukoreduced  Transfusion Pre-meds:  None    Intravenous Electrolyte Replacement: per BMT Clinic sliding scale    Laboratory Tests:  At next clinic appointment (date: 10/9/2020)  Hemogram (CBC) differential, platelet count  Comprehensive Metabolic Panel  *CAR-T labs next on 10/10 (in Treatment Plan)    Appointments:   BMT Clinic (date, time, provider):   1) Friday 10/9 check in at 3:30pm for labs and 4pm visit with Lynette Ignacio NP    2) Saturday 10/10 check in at 9am for labs and follow-up with Dr. Smith    3) Serv[place] 10/11 check in at 9am for labs and follow-up with Dr. Smith    4) Monday 10/12 check in at 9:30am for labs and 10am visit with LIAN Gaston  - then 11:30am check-in [facility] Waiting room (main floor of hospital) for 1pm lumbar puncture with intrathecal dexamethasone.    Tuesday 10/13 appt requested/pending.      Nydia Smyth PA-C      BMT Contact Information:-   For issues including fevers 100.5 or more:  Please call during the week: Monday through Friday between hours of 8:00 am and 4:30 pm- Call BMT office- [phone]  After hours/Weekends: Please call [facility] : [phone] and ask for  BMT physician on call and the  will have the BMT physician call you back.

## 2020-10-08 NOTE — DISCHARGE SUMMARY
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 Tian  Discharge Physician:  Dr. Curtis Mclaughlin  Discharge Diagnoses:    Day 9 s/p Yescarta CAR-T cell therapy for relapsed DLBCL  Discharge Medications:       Pastor Garibay   Home Medication Instructions ALO:[id number]    Printed on:10/08/20 5356   Medication Information                      acyclovir (ZOVIRAX) 800 MG tablet  Take 1 tablet (800 mg) by mouth 2 times daily             allopurinol (ZYLOPRIM) 300 MG tablet  Take 300 mg by mouth daily             bisacodyl (DULCOLAX) 5 MG EC tablet  Take 5 mg by mouth daily as needed for constipation             docusate sodium (COLACE) 100 MG capsule  Take 1 capsule (100 mg) by mouth 2 times daily as needed for constipation             fluconazole (DIFLUCAN) 200 MG tablet  Take 1 tablet (200 mg) by mouth daily Do not start before October 8, 2020.             hydrocortisone (CORTEF) 10 MG tablet  Take 20 mg by mouth daily before breakfast              insulin aspart (NOVOLOG FLEXPEN) 100 UNIT/ML pen  Inject Subcutaneous 3 times daily (with meals) use sliding scale based on Carbs and Blood Glucose.             insulin glargine (LANTUS PEN) 100 UNIT/ML pen  Inject 30 Units Subcutaneous every evening             levofloxacin (LEVAQUIN) 250 MG tablet  Take 1 tablet (250 mg) by mouth daily             metFORMIN (GLUCOPHAGE) 500 MG tablet  Take 1 tablet (500 mg) by mouth 2 times daily (with meals)             prochlorperazine (COMPAZINE) 5 MG tablet  Take 1-2 tablets (5-10 mg) by mouth every 6 hours as needed (Breakthrough Nausea / Vomiting)             study - simvastatin, IDS# 5641, 40 MG tablet  Take 1 tablet (40 mg) by mouth daily Start at least 5 days prior to apheresis and continue until Day +30 after CAR-T infusion.               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 T cells collected 8/31/2020. Consented for 2017-45 Yescarta, 2019-35: IT dex + simvastatin for JUAQUIN prevention.     As a detailed history adapted from 7/10/2020 Dr Eng from [facility]:   This patient is a 70 year old male with a four- to eight-week history of increasing dyspnea, mild anorexia, and weight loss.  On evaluation, a large right pleural effusion was detected, thoracentesis was performed with only slight improvement of his dyspnea and the cytology from the pleural fluid was benign.   A CT scan of the chest revealed extensive bulky 9-cm right paratracheal lymphadenopathy with additional mediastinal, bilateral pulmonary hilar, and upper abdominal, retroperitoneal lymphadenopathy.  A CT-guided biopsy of the retroperitoneal lymph node revealed a CD20-positive, B-cell non-Hodgkin's lymphoma, though not enough material was available for subtyping.   An excisional left supraclavicular lymph node biopsy confirmed grade III follicular lymphoma.  PET/CT scanning confirmed extensive lymphadenopathy in the neck, chest, abdomen, and pelvis. A bone marrow biopsy was normal.    His first cycle of chemotherapy with RCHOP was administered in December 2014. A CT scan in February 2015 revealed an excellent partial remission after three cycles of chemotherapy, and a follow up CT scan in April 2015 revealed a continued response.   He completed six cycles of RCHOP chemotherapy with an excellent response.   A CT scan in October 2015 and 2016 were completed with no evidence of disease.   A CT scan in October 2017 revealed an enlarging 3 cm infrahilar mass. This is in a location different than his original nodes from 2014.    On 11/17/17 the patient underwent a thoracotomy. The pathology revealed recurrent follicular center  cell lymphoma, with no evidence of a primary lung cancer. The tumor was handled as a carcinoma, but the pathology is most consistent with recurrent grade 2-3 follicular lymphoma.   On 12/14/17 he began bendamustine with rituximab chemotherapy.   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 Started lovenox.   On 6/4/18 after 6 cycles of BR chemotherapy a CT revealed stable mild lymphadenopathy with mild splenomegaly and a slight increase in the IVC thrombus. Lovenox was converted to Xarelto in 6/2018.   A PET/CT 6/13/19 confirmed the hypermetabolic right infrahilar mass and revealed moderate metabolic activity within a few small nodules just deep to tire pleura in the posterior aspect of tire mid right hemithorax, suspicious for neoplasm, without other disease.   As all disease could be encompassed in one radiation field, he completed 3000 cGy of radiotherapy to the right thoracic region 6/27/19 - 7/18/19.   A CT C/A/P 9/23/19 revealed improvement of disease with mild right lung radiation changes.   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 R-DHAP 7/8/20.    To summarize, this is a patient with relapsed DLBCL (c-MYC and bcl-2+) now in a partial remission following R-DAHP chemotherapy but with a persistent disease in his lung.  T cells collected 8/31/2020.  Status post lymphodepleting chemotherapy (fludarabine/Cytoxan) 9/23-25/2020, IT dex (MT 2019-35) on 9/28/2020, and admitted for Yescarta CAR-Ton 9/29/2020.      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 T cells collected 8/31/2020. 2017-45 Yescarta, 2019-35: IT dex + simvastatin for JUAQUIN prevention. Day 9.    Day 0: LP with IT dex. PICC line placement, Yescarta Car-t infusion   Day 7: LP under fluoroscopy w/IT dex.   Day 13 (10/12) at 1pm: LP/IT dex.     1.  BMT:  DLBCL  Axi-cell product with CNS prevention protocol, day 0  CNS prophy: simvastatin 40mg tied to IDS drug  - cont allopurinol    2.  HEME: Keep Hgb>7 and plts>10K. No pre-meds.                            3.  ID: Afebrile.  - Prophy levo, fluc, acy, IV pentamidine (9/23).       4.  GI: no n/v.  - prn Ativan, Compazine  - Protonix for GI prophy.   - Dulcolax, Colace prn constipation    5.  FEN/Renal: Creat, lytes wnl.    6. Endo: Type 2 DM.  - have been metformin during CAR-T (risk of lactic acidosis); will cautiously resume at half-dose 500mg bid 10/7.  Endocrine followed this admission.  Lantus 30u at hs and Novolog sliding scale and carb counting (see endocrine plan below).  Complication of DM with 2 left toes amputated 7/2020  - hx Adrenal Insufficiency: PTA on hydrocortisone 20mg/d; was held on admission for unclear reason and not listed in notes; resumed 10/3 (perrmitted on Car-T protocols).       LIAN Blakely-C  [phone]    Diabetes plan-  Follow up with Dr. Turner to reassess Novolog dosing, alternatives/adjuncts to insulin (Trulicity, for example)   Diabetes Regimen for home  Metformin 500 mg two times per day, before meals  Lantus 30 units in the evening  Novolog 1 unit per 3.5 grams of carbohydrate   Novolog Correction scale  Before meals--  -169 give 2 units.  -199 give 4 units.  -229 give 6 units.  -259 give 8 units.  -289 give 10 units.  -319 give 12 units.  -349 give 14 units.  BG >/= 350 give 16 units.  At bedtime--  -229 give 2 units.  -259 give 4 units.  -289 give 6 units.  -319 give  8 units.  -349 give 10 units.  BG >/= 350 give 12 units.     Day of  intrathecal dexamethasone  Increase your carb coverage to 1 unit per 2 grams starting with supper  Increase your Lantus to 45 units that night  Next day use 1 unit per 2 grams for breakfast and lunch, return to normal ratio at supper  Return to normal Lantus dose 30 units    Discharge Instructions and Follow-Up:    Discharge diet: Regular diet as tolerated  Discharge activity: Activity as tolerated   Discharge follow-up: Follow up with BMT Clinic as follows: 10/9 3:30pm labs, 4pm visit with Lynette Ignacio NP    Discharge Disposition:    Discharged to home.    Nydia Smyth PA-C  [phone]      BMT Staff:  The patient was discussed on morning rounds with the nurses, mid level provider, and house staff and seen and examined by me. All labs and imaging were reviewed. I reviewed events over the last 24 hours including vitals and flow sheets. I agree with the above note and have been responsible for the care plan and interpretation of progress.     Subjective:  Reports feeling overall well, no abd pain, no F/C/NS, no N/V/C/D, no GI or  complaints, no URI or other respiratory symptoms, no HA/LH/Dizziness. He is in good spirits and wants to be discharged home.     Vitals reviewed, labs reviewed  PE:  NAD, Alert, oriented x3  HEENT: moist mmm, no oral ulcers  Heart: RRR  Lungs: CTAB  Abdomen: +BS, soft non tender, non distended  LE: no edema  Skin: no rashes     Assessment and Plan:     70-year-old man with an aggressive non-Hodgkin's lymphoma who is now 9 days after Yescarta T-cell therapy.  He has been afebrile over the past 48 hours. No evidence of neurotoxicity or cytokine release syndrome.  Patient feels comfortable with discharge plan today he is aware of the follow-up daily in clinic and has no further questions or concerns.  More than 30 minutes spent in coordination of care for discharge.       Quang Mclaughlin MD

## 2020-10-08 NOTE — PROGRESS NOTES
Care Coordination - Discharge Note     Line Company:  Not Arranged - will be in clinic daily and then likely PICC line removal (but does have coverage for FHI if needed)  Referral made for line care:  NA - see above  IV medications needed at discharge:  None   Pharmacy concerns:  None. (Of note, [facility] requires prior auth)     PT/OT recommended:  OP PT through [facility]  Referral made for PT/OT:  No - pt declined rehab services at this time    D/c location:  Home - [place]  Has placement need been communicated to Social Work?  NA    NC Teaching time arranged with family:  Completed 10/7, via conference call with pt and wife-Toshia  Notify nurse to schedule line care class/ DM teaching, prior to d/c:  Patient and caregiver previously received teaching, and decline further need     Caregiver:  Toshia (Wife) [phone]    Outpatient Nurse Coordinator notified of patient discharge.       Mamta De La Fuente, RN, BSN  RN Care Coordinator - Inpatient BMT, Unit 5C  Ph [phone]  Pg [phone]

## 2020-10-08 NOTE — PROGRESS NOTES
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 No    Background:  Primary diagnosis: DLBCL  /71 (BP Location: Right arm)   Pulse 86   Temp 98.1  F (36.7  C) (Axillary)   Resp 18   Ht 1.73 m (5' 8.11\")   Wt 88.7 kg (195 lb 9.6 oz)   SpO2 97%   BMI 29.64 kg/m    Type of donor: CART- yeascarta  Type of stem cells: CART yescarta  Relapsed? No  Falls Precautions? No  Isolation? No  DNR? No  DNI? No  Confidential Patient? No  Positive blood cultures? No    Assessment:  Discharge teaching    Review discharge medications and schedule: Yes    Set up pill box: No- because all meds at home    Discharge instructions reviewed: Yes    Special considerations (think about previous reactions, issues with flushing CVC, premedication needs, etc): No    Patient Concerns: No    Recommendations:  Anticipated needs:    Daily infusions: No    Daily transfusions: No    G-CSF: No    Other: Not Applicable  Verbal report called to clinic: No      "

## 2020-10-08 NOTE — SUMMARY OF CARE
Pastor Garibay   Home Medication Instructions ALO:[id number]    Printed on:10/08/20 1274   Medication Information                      acyclovir (ZOVIRAX) 800 MG tablet  Take 1 tablet (800 mg) by mouth 2 times daily             allopurinol (ZYLOPRIM) 300 MG tablet  Take 300 mg by mouth daily             bisacodyl (DULCOLAX) 5 MG EC tablet  Take 5 mg by mouth daily as needed for constipation             docusate sodium (COLACE) 100 MG capsule  Take 1 capsule (100 mg) by mouth 2 times daily as needed for constipation             fluconazole (DIFLUCAN) 200 MG tablet  Take 1 tablet (200 mg) by mouth daily Do not start before October 8, 2020.             hydrocortisone (CORTEF) 10 MG tablet  Take 20 mg by mouth daily before breakfast              insulin aspart (NOVOLOG FLEXPEN) 100 UNIT/ML pen  Inject Subcutaneous 3 times daily (with meals) use sliding scale based on Carbs and Blood Glucose.             insulin glargine (LANTUS PEN) 100 UNIT/ML pen  Inject 30 Units Subcutaneous every evening             levofloxacin (LEVAQUIN) 250 MG tablet  Take 1 tablet (250 mg) by mouth daily             metFORMIN (GLUCOPHAGE) 500 MG tablet  Take 1 tablet (500 mg) by mouth 2 times daily (with meals)  *resume at lower dose           prochlorperazine (COMPAZINE) 5 MG tablet  Take 1-2 tablets (5-10 mg) by mouth every 6 hours as needed (Breakthrough Nausea / Vomiting)             study - simvastatin, IDS# 5641, 40 MG tablet  Take 1 tablet (40 mg) by mouth daily Start at least 5 days prior to apheresis and continue until Day +30 after CAR-T infusion.

## 2020-10-09 ENCOUNTER — APPOINTMENT (OUTPATIENT)
Dept: CT IMAGING | Facility: CLINIC | Age: 70
DRG: 809 | End: 2020-10-09
Attending: NURSE PRACTITIONER
Payer: COMMERCIAL

## 2020-10-09 LAB
ALBUMIN UR-MCNC: NEGATIVE MG/DL
ANION GAP SERPL CALCULATED.3IONS-SCNC: 6 MMOL/L (ref 3–14)
APPEARANCE UR: CLEAR
APTT PPP: 30 SEC (ref 22–37)
BASOPHILS # BLD AUTO: 0 10E9/L (ref 0–0.2)
BASOPHILS NFR BLD AUTO: 0 %
BILIRUB UR QL STRIP: NEGATIVE
BUN SERPL-MCNC: 14 MG/DL (ref 7–30)
C PNEUM DNA SPEC QL NAA+PROBE: NOT DETECTED
CALCIUM SERPL-MCNC: 8.5 MG/DL (ref 8.5–10.1)
CHLORIDE SERPL-SCNC: 104 MMOL/L (ref 94–109)
CO2 SERPL-SCNC: 28 MMOL/L (ref 20–32)
COLOR UR AUTO: YELLOW
CREAT SERPL-MCNC: 0.87 MG/DL (ref 0.66–1.25)
CRP SERPL-MCNC: 44 MG/L (ref 0–8)
D DIMER PPP FEU-MCNC: 8.6 UG/ML FEU (ref 0–0.5)
DIFFERENTIAL METHOD BLD: ABNORMAL
EOSINOPHIL # BLD AUTO: 0 10E9/L (ref 0–0.7)
EOSINOPHIL NFR BLD AUTO: 1 %
ERYTHROCYTE [DISTWIDTH] IN BLOOD BY AUTOMATED COUNT: 16 % (ref 10–15)
FERRITIN SERPL-MCNC: 121 NG/ML (ref 26–388)
FERRITIN SERPL-MCNC: 129 NG/ML (ref 26–388)
FIBRINOGEN PPP-MCNC: 395 MG/DL (ref 200–420)
FLUAV H1 2009 PAND RNA SPEC QL NAA+PROBE: NOT DETECTED
FLUAV H1 RNA SPEC QL NAA+PROBE: NOT DETECTED
FLUAV H3 RNA SPEC QL NAA+PROBE: NOT DETECTED
FLUAV RNA SPEC QL NAA+PROBE: NOT DETECTED
FLUBV RNA SPEC QL NAA+PROBE: NOT DETECTED
GFR SERPL CREATININE-BSD FRML MDRD: 88 ML/MIN/{1.73_M2}
GLUCOSE SERPL-MCNC: 194 MG/DL (ref 70–99)
GLUCOSE UR STRIP-MCNC: 70 MG/DL
HADV DNA SPEC QL NAA+PROBE: NOT DETECTED
HBA1C MFR BLD: 7.7 % (ref 0–5.6)
HCOV PNL SPEC NAA+PROBE: NOT DETECTED
HCT VFR BLD AUTO: 33.9 % (ref 40–53)
HGB BLD-MCNC: 10.8 G/DL (ref 13.3–17.7)
HGB UR QL STRIP: NEGATIVE
HMPV RNA SPEC QL NAA+PROBE: NOT DETECTED
HPIV1 RNA SPEC QL NAA+PROBE: NOT DETECTED
HPIV2 RNA SPEC QL NAA+PROBE: NOT DETECTED
HPIV3 RNA SPEC QL NAA+PROBE: NOT DETECTED
HPIV4 RNA SPEC QL NAA+PROBE: NOT DETECTED
IMM GRANULOCYTES # BLD: 0 10E9/L (ref 0–0.4)
IMM GRANULOCYTES NFR BLD: 1.5 %
INR PPP: 1.15 (ref 0.86–1.14)
KETONES UR STRIP-MCNC: NEGATIVE MG/DL
L PNEUMO1 AG UR QL IA: NORMAL
LABORATORY COMMENT REPORT: NORMAL
LACTATE BLD-SCNC: 1.3 MMOL/L (ref 0.7–2)
LDH SERPL L TO P-CCNC: 152 U/L (ref 85–227)
LDH SERPL L TO P-CCNC: 153 U/L (ref 85–227)
LEUKOCYTE ESTERASE UR QL STRIP: NEGATIVE
LYMPHOCYTES # BLD AUTO: 0.1 10E9/L (ref 0.8–5.3)
LYMPHOCYTES NFR BLD AUTO: 3.6 %
M PNEUMO DNA SPEC QL NAA+PROBE: NOT DETECTED
MAGNESIUM SERPL-MCNC: 1.4 MG/DL (ref 1.6–2.3)
MAGNESIUM SERPL-MCNC: 2.8 MG/DL (ref 1.6–2.3)
MCH RBC QN AUTO: 28.2 PG (ref 26.5–33)
MCHC RBC AUTO-ENTMCNC: 31.9 G/DL (ref 31.5–36.5)
MCV RBC AUTO: 89 FL (ref 78–100)
MICROBIOLOGIST REVIEW: NORMAL
MONOCYTES # BLD AUTO: 0.5 10E9/L (ref 0–1.3)
MONOCYTES NFR BLD AUTO: 25.3 %
MUCOUS THREADS #/AREA URNS LPF: PRESENT /LPF
NEUTROPHILS # BLD AUTO: 1.3 10E9/L (ref 1.6–8.3)
NEUTROPHILS NFR BLD AUTO: 68.6 %
NITRATE UR QL: NEGATIVE
NRBC # BLD AUTO: 0 10*3/UL
NRBC BLD AUTO-RTO: 0 /100
PH UR STRIP: 5 PH (ref 5–7)
PHOSPHATE SERPL-MCNC: 3.4 MG/DL (ref 2.5–4.5)
PLATELET # BLD AUTO: 54 10E9/L (ref 150–450)
PLATELET # BLD EST: ABNORMAL 10*3/UL
POTASSIUM SERPL-SCNC: 3.6 MMOL/L (ref 3.4–5.3)
RBC # BLD AUTO: 3.83 10E12/L (ref 4.4–5.9)
RBC #/AREA URNS AUTO: <1 /HPF (ref 0–2)
RSV RNA SPEC QL NAA+PROBE: NOT DETECTED
RSV RNA SPEC QL NAA+PROBE: NOT DETECTED
RV+EV RNA SPEC QL NAA+PROBE: NOT DETECTED
S PNEUM AG SPEC QL: NORMAL
SARS-COV-2 RNA SPEC QL NAA+PROBE: NEGATIVE
SARS-COV-2 RNA SPEC QL NAA+PROBE: NORMAL
SODIUM SERPL-SCNC: 138 MMOL/L (ref 133–144)
SOURCE: ABNORMAL
SP GR UR STRIP: 1.02 (ref 1–1.03)
SPECIMEN SOURCE: NORMAL
URATE SERPL-MCNC: 4.2 MG/DL (ref 3.5–7.2)
UROBILINOGEN UR STRIP-MCNC: NORMAL MG/DL (ref 0–2)
WBC # BLD AUTO: 1.9 10E9/L (ref 4–11)
WBC #/AREA URNS AUTO: 1 /HPF (ref 0–5)

## 2020-10-09 PROCEDURE — 82728 ASSAY OF FERRITIN: CPT | Performed by: NURSE PRACTITIONER

## 2020-10-09 PROCEDURE — 71250 CT THORAX DX C-: CPT

## 2020-10-09 PROCEDURE — 84100 ASSAY OF PHOSPHORUS: CPT | Performed by: INTERNAL MEDICINE

## 2020-10-09 PROCEDURE — 80048 BASIC METABOLIC PNL TOTAL CA: CPT | Performed by: INTERNAL MEDICINE

## 2020-10-09 PROCEDURE — 83615 LACTATE (LD) (LDH) ENZYME: CPT | Performed by: INTERNAL MEDICINE

## 2020-10-09 PROCEDURE — 87899 AGENT NOS ASSAY W/OPTIC: CPT | Performed by: NURSE PRACTITIONER

## 2020-10-09 PROCEDURE — 250N000011 HC RX IP 250 OP 636: Performed by: EMERGENCY MEDICINE

## 2020-10-09 PROCEDURE — 85610 PROTHROMBIN TIME: CPT | Performed by: INTERNAL MEDICINE

## 2020-10-09 PROCEDURE — 258N000003 HC RX IP 258 OP 636: Performed by: STUDENT IN AN ORGANIZED HEALTH CARE EDUCATION/TRAINING PROGRAM

## 2020-10-09 PROCEDURE — 85610 PROTHROMBIN TIME: CPT | Performed by: STUDENT IN AN ORGANIZED HEALTH CARE EDUCATION/TRAINING PROGRAM

## 2020-10-09 PROCEDURE — 87081 CULTURE SCREEN ONLY: CPT | Performed by: STUDENT IN AN ORGANIZED HEALTH CARE EDUCATION/TRAINING PROGRAM

## 2020-10-09 PROCEDURE — 96361 HYDRATE IV INFUSION ADD-ON: CPT | Performed by: EMERGENCY MEDICINE

## 2020-10-09 PROCEDURE — 83036 HEMOGLOBIN GLYCOSYLATED A1C: CPT | Performed by: INTERNAL MEDICINE

## 2020-10-09 PROCEDURE — C9803 HOPD COVID-19 SPEC COLLECT: HCPCS | Performed by: EMERGENCY MEDICINE

## 2020-10-09 PROCEDURE — 250N000013 HC RX MED GY IP 250 OP 250 PS 637: Performed by: STUDENT IN AN ORGANIZED HEALTH CARE EDUCATION/TRAINING PROGRAM

## 2020-10-09 PROCEDURE — 87633 RESP VIRUS 12-25 TARGETS: CPT | Performed by: NURSE PRACTITIONER

## 2020-10-09 PROCEDURE — 96367 TX/PROPH/DG ADDL SEQ IV INF: CPT | Performed by: EMERGENCY MEDICINE

## 2020-10-09 PROCEDURE — 83735 ASSAY OF MAGNESIUM: CPT

## 2020-10-09 PROCEDURE — 87581 M.PNEUMON DNA AMP PROBE: CPT | Performed by: NURSE PRACTITIONER

## 2020-10-09 PROCEDURE — 85384 FIBRINOGEN ACTIVITY: CPT | Performed by: INTERNAL MEDICINE

## 2020-10-09 PROCEDURE — 85025 COMPLETE CBC W/AUTO DIFF WBC: CPT | Performed by: INTERNAL MEDICINE

## 2020-10-09 PROCEDURE — 83615 LACTATE (LD) (LDH) ENZYME: CPT | Performed by: NURSE PRACTITIONER

## 2020-10-09 PROCEDURE — U0003 INFECTIOUS AGENT DETECTION BY NUCLEIC ACID (DNA OR RNA); SEVERE ACUTE RESPIRATORY SYNDROME CORONAVIRUS 2 (SARS-COV-2) (CORONAVIRUS DISEASE [COVID-19]), AMPLIFIED PROBE TECHNIQUE, MAKING USE OF HIGH THROUGHPUT TECHNOLOGIES AS DESCRIBED BY CMS-2020-01-R: HCPCS | Performed by: EMERGENCY MEDICINE

## 2020-10-09 PROCEDURE — 86140 C-REACTIVE PROTEIN: CPT | Performed by: NURSE PRACTITIONER

## 2020-10-09 PROCEDURE — 96372 THER/PROPH/DIAG INJ SC/IM: CPT | Performed by: STUDENT IN AN ORGANIZED HEALTH CARE EDUCATION/TRAINING PROGRAM

## 2020-10-09 PROCEDURE — 99233 SBSQ HOSP IP/OBS HIGH 50: CPT | Performed by: INTERNAL MEDICINE

## 2020-10-09 PROCEDURE — 85730 THROMBOPLASTIN TIME PARTIAL: CPT | Performed by: INTERNAL MEDICINE

## 2020-10-09 PROCEDURE — 87086 URINE CULTURE/COLONY COUNT: CPT | Performed by: EMERGENCY MEDICINE

## 2020-10-09 PROCEDURE — 250N000011 HC RX IP 250 OP 636: Performed by: STUDENT IN AN ORGANIZED HEALTH CARE EDUCATION/TRAINING PROGRAM

## 2020-10-09 PROCEDURE — 71250 CT THORAX DX C-: CPT | Mod: 26 | Performed by: RADIOLOGY

## 2020-10-09 PROCEDURE — 206N000001 HC R&B BMT UMMC

## 2020-10-09 PROCEDURE — 99223 1ST HOSP IP/OBS HIGH 75: CPT | Mod: AI | Performed by: STUDENT IN AN ORGANIZED HEALTH CARE EDUCATION/TRAINING PROGRAM

## 2020-10-09 PROCEDURE — 85730 THROMBOPLASTIN TIME PARTIAL: CPT | Performed by: STUDENT IN AN ORGANIZED HEALTH CARE EDUCATION/TRAINING PROGRAM

## 2020-10-09 PROCEDURE — 250N000012 HC RX MED GY IP 250 OP 636 PS 637: Performed by: STUDENT IN AN ORGANIZED HEALTH CARE EDUCATION/TRAINING PROGRAM

## 2020-10-09 PROCEDURE — 83735 ASSAY OF MAGNESIUM: CPT | Performed by: INTERNAL MEDICINE

## 2020-10-09 PROCEDURE — 96366 THER/PROPH/DIAG IV INF ADDON: CPT | Performed by: EMERGENCY MEDICINE

## 2020-10-09 PROCEDURE — 258N000003 HC RX IP 258 OP 636: Performed by: NURSE PRACTITIONER

## 2020-10-09 PROCEDURE — 81001 URINALYSIS AUTO W/SCOPE: CPT | Performed by: INTERNAL MEDICINE

## 2020-10-09 PROCEDURE — 85379 FIBRIN DEGRADATION QUANT: CPT | Performed by: INTERNAL MEDICINE

## 2020-10-09 PROCEDURE — 96365 THER/PROPH/DIAG IV INF INIT: CPT | Performed by: EMERGENCY MEDICINE

## 2020-10-09 PROCEDURE — 83605 ASSAY OF LACTIC ACID: CPT

## 2020-10-09 PROCEDURE — 86900 BLOOD TYPING SEROLOGIC ABO: CPT | Performed by: INTERNAL MEDICINE

## 2020-10-09 PROCEDURE — 87486 CHLMYD PNEUM DNA AMP PROBE: CPT | Performed by: NURSE PRACTITIONER

## 2020-10-09 PROCEDURE — 87040 BLOOD CULTURE FOR BACTERIA: CPT | Performed by: EMERGENCY MEDICINE

## 2020-10-09 PROCEDURE — 84550 ASSAY OF BLOOD/URIC ACID: CPT | Performed by: INTERNAL MEDICINE

## 2020-10-09 PROCEDURE — 83735 ASSAY OF MAGNESIUM: CPT | Performed by: NURSE PRACTITIONER

## 2020-10-09 RX ORDER — MAGNESIUM SULFATE HEPTAHYDRATE 40 MG/ML
4 INJECTION, SOLUTION INTRAVENOUS ONCE
Status: COMPLETED | OUTPATIENT
Start: 2020-10-09 | End: 2020-10-09

## 2020-10-09 RX ORDER — HYDROCORTISONE 20 MG/1
20 TABLET ORAL
Status: DISCONTINUED | OUTPATIENT
Start: 2020-10-09 | End: 2020-10-13 | Stop reason: HOSPADM

## 2020-10-09 RX ORDER — ACETAMINOPHEN 325 MG/1
325-650 TABLET ORAL EVERY 4 HOURS PRN
Status: DISCONTINUED | OUTPATIENT
Start: 2020-10-09 | End: 2020-10-09

## 2020-10-09 RX ORDER — SIMVASTATIN 5 MG
5 TABLET ORAL EVERY EVENING
Status: DISCONTINUED | OUTPATIENT
Start: 2020-10-09 | End: 2020-10-09

## 2020-10-09 RX ORDER — PROCHLORPERAZINE MALEATE 5 MG
5 TABLET ORAL EVERY 6 HOURS PRN
Status: DISCONTINUED | OUTPATIENT
Start: 2020-10-09 | End: 2020-10-13 | Stop reason: HOSPADM

## 2020-10-09 RX ORDER — BISACODYL 5 MG
5 TABLET, DELAYED RELEASE (ENTERIC COATED) ORAL DAILY PRN
Status: DISCONTINUED | OUTPATIENT
Start: 2020-10-09 | End: 2020-10-13 | Stop reason: HOSPADM

## 2020-10-09 RX ORDER — SIMVASTATIN 20 MG
40 TABLET ORAL DAILY
Status: DISCONTINUED | OUTPATIENT
Start: 2020-10-09 | End: 2020-10-13 | Stop reason: HOSPADM

## 2020-10-09 RX ORDER — ACYCLOVIR 800 MG/1
800 TABLET ORAL 2 TIMES DAILY
Status: DISCONTINUED | OUTPATIENT
Start: 2020-10-09 | End: 2020-10-13 | Stop reason: HOSPADM

## 2020-10-09 RX ORDER — LANOLIN ALCOHOL/MO/W.PET/CERES
3 CREAM (GRAM) TOPICAL
Status: DISCONTINUED | OUTPATIENT
Start: 2020-10-09 | End: 2020-10-13 | Stop reason: HOSPADM

## 2020-10-09 RX ORDER — ACETAMINOPHEN 325 MG/1
650 TABLET ORAL EVERY 4 HOURS PRN
Status: DISCONTINUED | OUTPATIENT
Start: 2020-10-09 | End: 2020-10-13 | Stop reason: HOSPADM

## 2020-10-09 RX ORDER — SODIUM CHLORIDE 9 MG/ML
INJECTION, SOLUTION INTRAVENOUS CONTINUOUS
Status: DISCONTINUED | OUTPATIENT
Start: 2020-10-09 | End: 2020-10-13 | Stop reason: HOSPADM

## 2020-10-09 RX ORDER — LORAZEPAM 2 MG/ML
.5-1 INJECTION INTRAMUSCULAR EVERY 4 HOURS PRN
Status: DISCONTINUED | OUTPATIENT
Start: 2020-10-09 | End: 2020-10-13 | Stop reason: HOSPADM

## 2020-10-09 RX ORDER — LORAZEPAM 0.5 MG/1
.5-1 TABLET ORAL EVERY 4 HOURS PRN
Status: DISCONTINUED | OUTPATIENT
Start: 2020-10-09 | End: 2020-10-13 | Stop reason: HOSPADM

## 2020-10-09 RX ORDER — DOCUSATE SODIUM 100 MG/1
100 CAPSULE, LIQUID FILLED ORAL 2 TIMES DAILY PRN
Status: DISCONTINUED | OUTPATIENT
Start: 2020-10-09 | End: 2020-10-13 | Stop reason: HOSPADM

## 2020-10-09 RX ORDER — NICOTINE POLACRILEX 4 MG
15-30 LOZENGE BUCCAL
Status: DISCONTINUED | OUTPATIENT
Start: 2020-10-09 | End: 2020-10-13 | Stop reason: HOSPADM

## 2020-10-09 RX ORDER — FLUCONAZOLE 200 MG/1
200 TABLET ORAL DAILY
Status: DISCONTINUED | OUTPATIENT
Start: 2020-10-09 | End: 2020-10-13 | Stop reason: HOSPADM

## 2020-10-09 RX ORDER — DEXTROSE MONOHYDRATE 25 G/50ML
25-50 INJECTION, SOLUTION INTRAVENOUS
Status: DISCONTINUED | OUTPATIENT
Start: 2020-10-09 | End: 2020-10-13 | Stop reason: HOSPADM

## 2020-10-09 RX ORDER — PANTOPRAZOLE SODIUM 40 MG/1
40 TABLET, DELAYED RELEASE ORAL
Status: DISCONTINUED | OUTPATIENT
Start: 2020-10-09 | End: 2020-10-13 | Stop reason: HOSPADM

## 2020-10-09 RX ORDER — ALLOPURINOL 300 MG/1
300 TABLET ORAL DAILY
Status: DISCONTINUED | OUTPATIENT
Start: 2020-10-09 | End: 2020-10-13 | Stop reason: HOSPADM

## 2020-10-09 RX ADMIN — SODIUM CHLORIDE 1000 ML: 9 INJECTION, SOLUTION INTRAVENOUS at 08:15

## 2020-10-09 RX ADMIN — TOCILIZUMAB 700 MG: 20 INJECTION, SOLUTION, CONCENTRATE INTRAVENOUS at 01:36

## 2020-10-09 RX ADMIN — ACETAMINOPHEN 650 MG: 325 TABLET, FILM COATED ORAL at 11:19

## 2020-10-09 RX ADMIN — SODIUM CHLORIDE: 900 INJECTION INTRAVENOUS at 05:12

## 2020-10-09 RX ADMIN — FLUCONAZOLE 200 MG: 200 TABLET ORAL at 07:57

## 2020-10-09 RX ADMIN — SODIUM CHLORIDE 1000 ML: 9 INJECTION, SOLUTION INTRAVENOUS at 03:14

## 2020-10-09 RX ADMIN — CEFEPIME HYDROCHLORIDE 2 G: 2 INJECTION, POWDER, FOR SOLUTION INTRAVENOUS at 15:57

## 2020-10-09 RX ADMIN — MELATONIN TAB 3 MG 3 MG: 3 TAB at 21:36

## 2020-10-09 RX ADMIN — SODIUM CHLORIDE 1000 ML: 9 INJECTION, SOLUTION INTRAVENOUS at 10:36

## 2020-10-09 RX ADMIN — PANTOPRAZOLE SODIUM 40 MG: 40 TABLET, DELAYED RELEASE ORAL at 07:57

## 2020-10-09 RX ADMIN — SODIUM CHLORIDE: 900 INJECTION INTRAVENOUS at 23:12

## 2020-10-09 RX ADMIN — Medication 40 MG: at 15:57

## 2020-10-09 RX ADMIN — CEFEPIME HYDROCHLORIDE 2 G: 2 INJECTION, POWDER, FOR SOLUTION INTRAVENOUS at 00:32

## 2020-10-09 RX ADMIN — ALLOPURINOL 300 MG: 300 TABLET ORAL at 07:56

## 2020-10-09 RX ADMIN — HYDROCORTISONE 20 MG: 20 TABLET ORAL at 07:56

## 2020-10-09 RX ADMIN — MAGNESIUM SULFATE HEPTAHYDRATE 4 G: 40 INJECTION, SOLUTION INTRAVENOUS at 06:21

## 2020-10-09 RX ADMIN — ACYCLOVIR 800 MG: 800 TABLET ORAL at 19:31

## 2020-10-09 RX ADMIN — CEFEPIME HYDROCHLORIDE 2 G: 2 INJECTION, POWDER, FOR SOLUTION INTRAVENOUS at 07:57

## 2020-10-09 RX ADMIN — ACYCLOVIR 800 MG: 800 TABLET ORAL at 07:56

## 2020-10-09 RX ADMIN — INSULIN GLARGINE 10 UNITS: 100 INJECTION, SOLUTION SUBCUTANEOUS at 07:57

## 2020-10-09 RX ADMIN — CEFEPIME HYDROCHLORIDE 2 G: 2 INJECTION, POWDER, FOR SOLUTION INTRAVENOUS at 23:12

## 2020-10-09 ASSESSMENT — ACTIVITIES OF DAILY LIVING (ADL)
FALL_HISTORY_WITHIN_LAST_SIX_MONTHS: NO
CONCENTRATING,_REMEMBERING_OR_MAKING_DECISIONS_DIFFICULTY: NO
DIFFICULTY_COMMUNICATING: NO
DIFFICULTY_EATING/SWALLOWING: NO
NUMBER_OF_TIMES_PATIENT_HAS_FALLEN_WITHIN_LAST_SIX_MONTHS: 1
NUMBER_OF_TIMES_PATIENT_HAS_FALLEN_WITHIN_LAST_SIX_MONTHS: 1
VISION_MANAGEMENT: GLASSES
WEAR_GLASSES_OR_BLIND: YES
WEAR_GLASSES_OR_BLIND: YES
DOING_ERRANDS_INDEPENDENTLY_DIFFICULTY: NO
ADLS_ACUITY_SCORE: 11
DOING_ERRANDS_INDEPENDENTLY_DIFFICULTY: NO
DIFFICULTY_COMMUNICATING: NO
CONCENTRATING,_REMEMBERING_OR_MAKING_DECISIONS_DIFFICULTY: NO
TOILETING_ISSUES: NO
TOILETING_ISSUES: NO
ADLS_ACUITY_SCORE: 11
DRESSING/BATHING_DIFFICULTY: NO
DIFFICULTY_EATING/SWALLOWING: NO
FALL_HISTORY_WITHIN_LAST_SIX_MONTHS: YES
WALKING_OR_CLIMBING_STAIRS_DIFFICULTY: NO
DRESSING/BATHING_DIFFICULTY: NO
WALKING_OR_CLIMBING_STAIRS_DIFFICULTY: NO
VISION_MANAGEMENT: GLASSES

## 2020-10-09 NOTE — PROGRESS NOTES
Hematology Brief Note    Pt was initially sent to ED for concern for neutropenic fever vs CRS.  Tm 100.9F at home.  On discussing with ED staff during signout for admission, he was now requiring O2 by nasal cannula for desats down to 90%.  Review of vitals noted drop of SBP from 120s to 90s since arrival in the ED despite 1L NS bolus.  He received cefepime, blood cultures, CXR prior to admission.  CXR without opacity, although there is persistent R deviation of mediastinum that was first noted on 9/29 CXR.      ANC increased to 1.5.  Cr near baseline. CRP 20, Ferritin 129.  Lactate 1.9.      Discussed case with Dr. Curtis Mclaughlin, BMT staff, and agree that he meets criteria for Grade 2 cytokine release syndrome due to fever, need for O2 supplementation and hypotension requiring IVF administration.      CRS orderset was placed with stat administration of tocilizumab 8mg/kg in addition to 1L of additional fluid.      Admitting hospitalist and ED nursing staff were notified that tocilizumab needs to be given in STAT.  ED staff was in midst of code when this call was made and was unavailable to discuss.  Also notified 5C of decision to given tocilizumab.      -CRS orderset placed.   -Low threshold to transfer to ICU for pressors if hypotensive after 2nd liter of IVF.     -high risk for capillary leak with additional large volumes of IVF  -Port and PICC are in place and can be used for pressors if necessary.  -Continue cefepime  -NO STEROIDS UNLESS APPROVED BY BMT.   -CAR-T neurotox checks ordered.     Please call if any questions or concerns arise.      Patient discussed with Dr. Curtis Mclaughlin.     Arvind Crocker MD, PhD  Hematology/Oncology Fellow  Pager: [phone]

## 2020-10-09 NOTE — H&P
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 T cells collected 8/31/2020. 2017-45 [facility], 2019-35: IT dex + simvastatin for JUAQUIN prevention.  Admitted 10/8/2020 with concern for grade 2 CRS.    Cytokine release syndrome, grade 2, likely  Acute hypoxic respiratory insufficiency  COVID PUI  Hypotension  Patient presents with symptoms of nausea and vomiting with fever to 100.9F and hypoxia to 89%. Findings concerning for CRS and decision made to give Tocilizumab while in the emergency department.  Received 2 L IV fluids in emergency department as well.  -Continue maintenance IV fluids, normal saline at 100 cc/h.  -Car-T neuro checks.  -CRS order set placed.  -Follow-up COVID PCR, continue COVID precautions in the interim.  -Continue hydrocortisone for adrenal insufficiency and given hypotension.    Neutropenic fever  Fever to 100.9 Fahrenheit.  Given cefepime in the emergency department.  Urinalysis bland, chest x-ray without infiltrate.  -Follow urine and blood cultures.  -Continue PPX fluconazole, acyclovir.    Diffuse large B-cell lymphoma  Noted. Undergoing treatment.     Heme  Hemoglobin on admission 10.8, platelet count 54.  -Hgb>7, plt>10.    FEN/Renal  Renal function normal. Lytes ok other than low magnesium at 1.4.  -4 grams IV mag.  -Daily BMP.    GI  -PRN Ativan, compazine  -Protonix for GI prophylaxis.    Endocrine  Type 2 diabetes  History of poor control with 2 left toes amputated 7/2020. Endocrinology has seen previous admission for diabetes management. Last hemoglobin A1C was 7.3% on 5/2020.  -Reduce PTA lantus 30 units at bedtime to 10 units given poor appetite. .  -Hold metformin.    Adrenal  insufficiency  -Continue hydrocortisone.    Misc  -Continue PTA allopurinol.  -Will need simvastatin reordered per study protocol.     Diet:   Regular.  DVT Prophylaxis: Pneumatic compression device.  Champion Catheter: not present  Code Status:  Full code.         Disposition Plan   Expected discharge: 4 - 7 days, recommended to prior living arrangement once medical stability following treatment of neutropenic fever, CRS.  Entered: Milton Pardo MD 10/09/2020, 1:06 AM     The patient's care was discussed with the Patient.    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 Patient was initially sent to ED for concern for neutropenic fever with a temperature of 100.9F at home. On arrival to the ED the patient was notable saturating 89% on room air and was given supplemental oxygen.     Patient had a recent hospitalization 9/29/2020-10/8/2020.  Patient discharged on 10/8/2020 home.  He reports that several hours after he arrived home he started feeling unwell with symptoms of fever, nausea, vomiting.  The symptoms began around 5 PM in the afternoon on 10/8/2020.  Patient does not endorse any alleviating factors.  Of note he denies abdominal pain, diarrhea, chest pain, leg swelling.  He did endorse mild shortness of breath and cough on arrival to the emergency department however the symptoms have improved at time of interview at 0600.    Review of Systems    The 10 point Review of Systems is negative other than noted in the HPI or here.    Past Medical History    I have reviewed this patient's medical history and updated it with pertinent information if needed.   Past Medical  History:   Diagnosis Date     Abnormal liver function test      Anemia      CPAP (continuous positive airway pressure) dependence      Diabetes (H)      Hx of skin cancer, basal cell      Hyperlipidemia      Hypertension      Keratoderma      Large cell lymphoma (H) 7/20/2020     Liver disease      Lymphoma (H)      Non-Hodgkin lymphoma (H)      Pedal edema     CHRONIC     Pleural effusion      Seborrheic keratosis, inflamed      Sleep apnea     Uses a CPAP     Thrombocytopenia (H) 7/20/2020     Thrombosis Felbruary 2018       Past Surgical History   I have reviewed this patient's surgical history and updated it with pertinent information if needed.  Past Surgical History:   Procedure Laterality Date     AMPUTATE TOE(S) Left 5/22/2020    Procedure: LEFT SECOND AND THIRD DISTAL TOE AMPUTATIONS;  Surgeon: Ramon Flores MD;  Location:  OR     AMPUTATE TOE(S) Left 7/1/2020    Procedure: 1.  Partial left second toe amputation with osteotomy through proximal phalanx.  2.  Partial left third toe amputation with osteotomy through proximal phalanx.;  Surgeon: Ismael Humphrey DPM;  Location: [facility] OR     BIOPSY LYMPH NODE CERVICAL N/A 12/5/2014    Procedure: BIOPSY LYMPH NODE CERVICAL;  Surgeon: Robbie Linda MD;  Location:  OR     BRONCHOSCOPY FLEXIBLE N/A 11/14/2017    Procedure: BRONCHOSCOPY FLEXIBLE;  FLEXIBLE BRONCHOSCOPY WITH BIOPSIES.;  Surgeon: Robbie Linda MD;  Location:  OR     COLONOSCOPY       COLONOSCOPY N/A 5/9/2018    Procedure: COMBINED COLONOSCOPY, SINGLE OR MULTIPLE BIOPSY/POLYPECTOMY BY BIOPSY;;  Surgeon: Ramos Bates MD;  Location:  GI     ENDOVASCULAR PLACEMENT VASCULAR DEVICE Left 12/5/2017    Procedure: ENDOVASCULAR PLACEMENT VASCULAR DEVICE;;  Surgeon: Robbie Linda MD;  Location: [facility]     ENT SURGERY      tonsillectomy     EXCISE NODE MEDIASTINAL N/A 11/17/2017    Procedure: EXCISE NODE MEDIASTINAL;;  Surgeon: Robbie Linda MD;   Location:  OR     EYE SURGERY       EYE SURGERY Left     vitrectomy     INSERT PORT VASCULAR ACCESS N/A 12/05/2014    Procedure: INSERT PORT VASCULAR ACCESS;  Surgeon: Robbie Linda MD;  Location:  OR     INSERT PORT VASCULAR ACCESS N/A 12/5/2017    Procedure: INSERT PORT VASCULAR ACCESS;  POWER PORT PLACEMENT ATTEMPTED, PLACEMENT OF ANGIO-SEAL VIP VASCULAR CLOSURE DEVICE;  Surgeon: Robbie Linda MD;  Location:  [facility]     IR CHEST PORT PLACEMENT > 5 YRS OF AGE  6/5/2020     IR PORT REMOVAL RIGHT  12/10/2018     PHACOEMULSIFICATION CLEAR CORNEA WITH STANDARD INTRAOCULAR LENS IMPLANT Right 5/2/2016    Procedure: PHACOEMULSIFICATION CLEAR CORNEA WITH STANDARD INTRAOCULAR LENS IMPLANT;  Surgeon: Rasheed Finney MD;  Location:  [facility]     REMOVE PORT VASCULAR ACCESS N/A 3/14/2017    Procedure: REMOVE PORT VASCULAR ACCESS;  Surgeon: Robbie Linda MD;  Location:  OR     SOFT TISSUE SURGERY      BASAL CELL CA FOREHEAD     THORACOTOMY Right 11/17/2017    Procedure: THORACOTOMY;  RIGHT EXPLORATORY THORACOTOMY/ EXTENSIVE PNEUMOLYSIS/ BIOPSY OF INTERLOBAR LYMPH NODE;  Surgeon: Robbie Linda MD;  Location:  OR       Social History   I have reviewed this patient's social history and updated it with pertinent information if needed.  Social History     Tobacco Use     Smoking status: Never Smoker     Smokeless tobacco: Never Used   Substance Use Topics     Alcohol use: No     Drug use: No   Patient lives in [facility].  Denies tobacco or alcohol use.    Family History   Father has history of bone cancer.  Patient has 2 brothers and 1 sister all of whom are generally healthy.    Prior to Admission Medications   Prior to Admission Medications   Prescriptions Last Dose Informant Patient Reported? Taking?   acyclovir (ZOVIRAX) 800 MG tablet   Yes No   Sig: Take 1 tablet (800 mg) by mouth 2 times daily   allopurinol (ZYLOPRIM) 300 MG tablet  Self Yes No   Sig: Take 300 mg  by mouth daily   bisacodyl (DULCOLAX) 5 MG EC tablet   Yes No   Sig: Take 5 mg by mouth daily as needed for constipation   docusate sodium (COLACE) 100 MG capsule   Yes No   Sig: Take 1 capsule (100 mg) by mouth 2 times daily as needed for constipation   fluconazole (DIFLUCAN) 200 MG tablet   No No   Sig: Take 1 tablet (200 mg) by mouth daily Do not start before October 8, 2020.   hydrocortisone (CORTEF) 10 MG tablet  Self Yes No   Sig: Take 20 mg by mouth daily before breakfast    insulin aspart (NOVOLOG FLEXPEN) 100 UNIT/ML pen   Yes No   Sig: Inject Subcutaneous 3 times daily (with meals) use sliding scale based on Carbs and Blood Glucose.   insulin glargine (LANTUS PEN) 100 UNIT/ML pen   Yes No   Sig: Inject 30 Units Subcutaneous every evening   levofloxacin (LEVAQUIN) 250 MG tablet   No No   Sig: Take 1 tablet (250 mg) by mouth daily   metFORMIN (GLUCOPHAGE) 500 MG tablet   Yes No   Sig: Take 1 tablet (500 mg) by mouth 2 times daily (with meals)   prochlorperazine (COMPAZINE) 5 MG tablet  Self No No   Sig: Take 1-2 tablets (5-10 mg) by mouth every 6 hours as needed (Breakthrough Nausea / Vomiting)   study - simvastatin, IDS# [id number], 40 MG tablet   No No   Sig: Take 1 tablet (40 mg) by mouth daily Start at least 5 days prior to apheresis and continue until Day +30 after CAR-T infusion.      Facility-Administered Medications: None     Allergies   No Known Allergies    Physical Exam   Vital Signs: Temp: 99.8  F (37.7  C) Temp src: Oral BP: 90/55 Pulse: 99   Resp: 24 SpO2: 96 % O2 Device: None (Room air)    Weight: 195 lbs 0 oz    Constitutional: awake, alert, cooperative, no apparent distress, and appears stated age  Eyes: lids and lashes normal, extra-ocular muscles intact and sclera clear  ENT: Normocephalic, without obvious abnormality, atraumatic, external ears without lesions  Respiratory: No increased work of breathing, good air exchange, clear to auscultation bilaterally, no crackles or  wheezing  Cardiovascular: Normal apical impulse, regular rate and rhythm, normal S1 and S2, no S3 or S4, and no murmur noted  GI: normal bowel sounds, soft, non-distended, non-tender, no masses palpated, no hepatosplenomegally  Skin: normal skin color, texture, turgor, no redness, warmth, or swelling, no rashes and no jaundice  Musculoskeletal: no lower extremity pitting edema present  there is no redness, warmth, or swelling of the joints  full range of motion noted  motor strength is 5 out of 5 all extremities bilaterally  Neurologic: Mental Status Exam:  Level of Alertness:   awake  Orientation:   person, place, time  Memory:   normal  Cranial Nerves:  cranial nerves II-XII are grossly intact  Motor Exam:  Motor exam is symmetrical 5 out of 5 all extremities bilaterally  Sensory:  Sensory intact  Neuropsychiatric: General: normal, calm and normal eye contact  Level of consciousness: alert / normal  Affect: normal and pleasant    Data   Data reviewed today: I reviewed all medications, new labs and imaging results over the last 24 hours. I personally reviewed the Chest x-ray image(s) showing Clear lungs without obvious infiltrate.    Recent Labs   Lab 10/09/20  0324 10/08/20  2308 10/08/20  0250 10/06/20  0416 10/06/20  0416 10/05/20  0427   WBC 1.9* 2.3* 1.0*   < > 0.5* 0.4*   HGB 10.8* 12.1* 10.6*   < > 11.2* 11.4*   MCV 89 88 86   < > 87 87   PLT 54* 64* 71*   < > 34* 33*   INR 1.15*  --   --   --  1.06 1.14    137 139   < > 135 136   POTASSIUM 3.6 3.6 3.5   < > 3.9 3.5   CHLORIDE 104 103 105   < > 103 102   CO2 28 26 27   < > 28 27   BUN 14 15 19   < > 15 12   CR 0.87 0.99 0.96   < > 0.85 0.78   ANIONGAP 6 8 7   < > 4 7   JEFF 8.5 8.6 9.1   < > 9.0 9.2   * 230* 108*   < > 219* 158*   ALBUMIN  --  3.4  --   --  3.1* 3.2*   PROTTOTAL  --  6.3*  --   --  6.1* 6.2*   BILITOTAL  --  0.7  --   --  0.6 0.6   ALKPHOS  --  78  --   --  83 80   ALT  --  30  --   --  29 32   AST  --  17  --   --  17 19    < >  = values in this interval not displayed.

## 2020-10-09 NOTE — PLAN OF CARE
Pt AVSS, BP improved after 2L NS. Got chest CT this am. COVID-19 neg; still in droplet iso until resp panel results return. Neuros intact.Mag recheck WNL. No immediate nursing concerns. Continue POC.     Problem: Adult Inpatient Plan of Care  Goal: Plan of Care Review  Outcome: No Change     Problem: Adult Inpatient Plan of Care  Goal: Absence of Hospital-Acquired Illness or Injury  Intervention: Identify and Manage Fall Risk  Recent Flowsheet Documentation  Taken 10/9/2020 0800 by Aliya Eid RN  Safety Promotion/Fall Prevention:   assistive device/personal items within reach   nonskid shoes/slippers when out of bed   lighting adjusted   fall prevention program maintained   clutter free environment maintained     Problem: Adult Inpatient Plan of Care  Goal: Absence of Hospital-Acquired Illness or Injury  Intervention: Prevent Infection  Recent Flowsheet Documentation  Taken 10/9/2020 0800 by Aliya Eid, RN  Infection Prevention:   cohorting utilized   personal protective equipment utilized   hand hygiene promoted   equipment surfaces disinfected   environmental surveillance performed   rest/sleep promoted   single patient room provided

## 2020-10-09 NOTE — PLAN OF CARE
"BP 98/63 (BP Location: Right arm)   Pulse 86   Temp 99.4  F (37.4  C) (Oral)   Resp 16   Ht 1.727 m (5' 8\")   Wt 88.5 kg (195 lb)   SpO2 95%   BMI 29.65 kg/m      Admitted for CRS management. Day +10 [facility] Car-T  Low-suspicion covid rule out - Special precautions    TMAX: 99.6, HR in 90s-100s, BPs 90s/60s, O2 sats 95% on 2L NC. Pt was 87-88% on room air, pt usually wears cpap at night and tends to dip down per report from ED. Independent in room. Had large BM in ED, relieved abd pain/nausea/vomiting. Denies pain/n/v currently. 4g mag infusing will need recheck around 0900 and tmw AM. Neuros currently intact. A&Ox4. No other complaints or replacments needed, continue to monitor per POC.     One dose of Toci given @ 0130 in ED.     Problem: Adult Inpatient Plan of Care  Goal: Plan of Care Review  10/9/2020 0635 by Brody Pat  Outcome: No Change     "

## 2020-10-09 NOTE — ED TRIAGE NOTES
Pt presented to room reporting onset of N/V at around 1700 this evening. Pt was discharged from this hospital earlier today. Pt also reports developing chills with the N/V and measuring a fever at home. Pt reports having abdominal pain pta but states that has resolved after having a BM here. Pt states he has not had a BM for the past 2 days until he arrived here. Pt was placed on O2 in the room after having SpO2 readings below 90% at room air.

## 2020-10-09 NOTE — CONSULTS
Care Management Assessment and Discharge Consult    General Information  Assessment completed with:: Patient, Pastor Garibay  Type of CM/SW Visit: Initial Assessment     Readmission Within the Last 30 Days: other (see comments)  Return Category: Exacerbation of disease  Reason for Consult: other (see comments)  Advance Care Planning: Advance Care Planning Reviewed: present on chart     General Information Comments: BMT    Communication Assessment  Patient's communication style: spoken language (English or Bilingual)  Hearing Difficulty or Deaf: no Wear Glasses or Blind: yes    Cognitive  Cognitive/Neuro/Behavioral: WDL                  Living Environment:   People in home: spouse  Name(s) of People in Home: Toshia  Current living Arrangements: house          Family/Social Support:  Care provided by: spouse/significant other  Provides care for:    Marital Status:   Who is your support system?: Wife, Children  Spouse's Name: Toshia  Description of Support System: Supportive  Description of Support System: Supportive  Adequate family and caregiver support        Description of Support System: Supportive  Support Assessment: Adequate family and caregiver support    Employment:  Employment Status: employed part-time     Employment/ Comments: [employer]  Financial/Environmental Concerns: (N/A)        Lifestyle & Psychosocial Needs:        Socioeconomic History     Marital status:      Spouse name: Not on file     Number of children: Not on file     Years of education: Not on file     Highest education level: Not on file     Tobacco Use     Smoking status: Never Smoker     Smokeless tobacco: Never Used   Substance and Sexual Activity     Alcohol use: No     Drug use: No       Functional Status:  Prior to admission patient needed assistance: Transportation        Mental Health Status:  WDL     Mental Health Status: Other (see comment)(Declined)     Chemical Dependency Status: Other (see  comment)(Declined)              Values/Beliefs:  Spiritual, Cultural Beliefs, Buddhist Practices, Values that affect care: yes          Jainism    Discharge Planning:  Expected Discharge Date:       Concerns to be Addressed:         Anticipated Discharge Disposition: Home  With Home Care Services    Patient/family educated on Medicare website which has current facility and service quality ratings: NA  Referrals Placed by CM/SW:  N/A  Education Provided on the Discharge Plan:  yes  Patient/Family in Agreement with the Plan: yes     Disposition Comments: IDT will follow for safe discharge planning pending progress and needs.    Selected Continued Care - Admitted Since 10/8/2020    No services have been selected for the patient.       ALEXA Riojas, [facility]  Adult Blood & Marrow Transplant   Phone: [phone]  Pager: [phone]      Additional Information:  Below is the pt's psychosocial assessment for the staff to review as needed.        CLINICAL SOCIAL WORK   PSYCHOSOCIAL ASSESSMENT  BLOOD AND MARROW TRANSPLANT SERVICE        Assessment completed on September 16, 2020 of living situation, support system, financial status, functional status, coping, stressors, need for resources and social work intervention provided as needed.  Information for this assessment was provided by Pt and spouse report in addition to medical chart review and consultation with medical team.      Present at assessment: Patient, Pastor ELVIS Garibay  and Toshia Garibay were present for this assessment conducted by Sugey Roa BMT .      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  Pt was accompanied to today's visit by his wife Toshia. Today's visit was completed via telephone due to COVID-19 restrictions.      Decision Making:   Self      Health Care Directive:   Copy in Chart      Relationship Status:    to his wife Toshia. They have been together for 45 years.     Special Needs: None identified at this time.      Family/Support System: Pt endorsed a good support system including family and close friends who will be available to support Pt throughout transplant process.      Spouse: Toshia Garibay     Children: 2 daughters Karen, Shaniqua; 3 son's Mike, Tommie, Evan (Lives in [place])     Grandchildren: 2 - Zander (6) and Roger (4)     Parents:      Siblings: 3 brother (2 living)  and 1 sister     Caregiver: SW discussed with pt the caregiver role and expectation at length. Pt is agreeable to having a full time caregiver for a minimum of 30 days until cleared by the BMT physician. Pt's identified caregivers are his wife and children. Pt signed the caregiver contract which will be scanned into the EMR. Caregiver education and resources provided. No caregiver concerns identified. Pt and Pt's Toshia confirmed understanding caregiving requirement, including driving restrictions, as discussed during psychosocial assessment.      Name & Numbers  Toshia Garibay [phone]     Transportation Mode:  Private Car . Pt is aware of driving restrictions post-BMT and the need for the caregiver is to drive until cleared to drive by the  BMT physician. SW provided information on parking info and monthly parking pass options. Pt will utilize the [employer] for transportation to and from  the [facility] and BMT Clinic/Hospital.     Insurance:  No Insurance issues identified.  Pt denied specific insurance concerns at this time. SW reiterated information about the BMT [employer]  should specific insurance questions arise as Pt moves through transplant process.      Sources of Income:  No income concerns identified  The pt is still working 2 hrs a day and collecting unemployment as well as Toshia collecting a paycheck. Pt denied anticipation of financial hardship related to BMT at this time.  SW encouraged Pt to contact this SW for additional potential resources should financial situation change.      Employment:   Employer: [employer]  Last Day of Work: Currently working 2 hrs a day     Spouse's Employment:  Employer:  [employer]  Position: [place] - currently working from home     Mental Health: No mental health issues identified        PHQ-9 assessment, score was 1 ,which indicates no current signs of depression.  GAD7 assessment, score was 2, which indicates no current signs of anxiety.     Pastor notes that he focuses on keeping a positive mindset to help him get through the tough moments. Pastor does feel that he experienced some depression after his father , but did not need any interventions for this. Otherwise Pastor self reports no other times of anxiety or depression.      We talked about how some patients may see an increase in feelings of anxiety or depression while hospitalized for extended periods along with isolation. Encouraged Pastor to let us know if they are noticing an increase in symptoms. We talked about the variety of modalities available to use as coping mechanisms (including but not limited to guided imagery, relaxation techniques, progressive muscle relaxation, counseling/talk therapy and medication).     Chemical Use: No issues identified. The pt denies the use of tobacco, alcohol, marijuana and other drugs. Based on the information provided,  "there appear to be no specific risks or concerns identified at this time.      Trauma/Loss/Abuse History: Multiple losses associated with cancer diagnosis and treatment, including health, employment, changes to physical appearance, etc.      Spirituality:  Patient identifies with bulmaro community. Not practicing, does identify as Restorationism. Pt's wife Toshia is also Restorationism and attends [employer] virtually with her mother each week.     Coping: Pt noted that he is currently feeling \"ready to begin and excited\".  Pt shared that his main coping mechanisms are talking with his family.  Pt noted that he also manuel by being outdoors and swimming with his grandkids. SW and Pt discussed additional positive coping mechanisms that Pt can utilize while in the hospital.      Caregiver Coping: Pt's wife noted that she is feeling \"anxious, but ready for this to start\" at this time.  Toshia noted that she manuel by medication (started on a \"blue pill\"), running, yoga, going on bike rides and Cheondoism.      Toshia did share that she was brought to the ER one day because she was having lots of chest pain. She was diagnosed with anxiety and although she feels much better now and has found ways to cope better, she is very worried and nervous at times.      Education Provided: Transplant process expectations, Caregiver requirements, Caregiver self-care, Financial issues related to transplant, Financial resources/grants available, Common psychosocial stressors pre/post transplant, Support group(s) available, Tour/layout of the inpatient unit/non-use of cell phones, Hospital resources available, Web site information, Resources for transplant patients and their families as well as the Clinical Social Work role.      Interventions Provided: Supportive counseling and education      Recreation/Leisure Activities:  Golf, swimming, and hang with family     Plans for Hospital Stay Leisure:  Book, cribbage, tablet, and walk the halls     Assessment and " Recommendations for Team:  Pt is a 70 year old male diagnosed with NHL who is here undergoing preparation for a planned Yescarta infusion.     Pt is a pleasant and well articulate male who feels comfortable communicating with the medical team. Pt has a good support team who are involved.      Pt may benefit from ongoing psychosocial support in regards to coping with the adjustment to the BMT process. Pt's family may benefit from ongoing psychosocial support in regards to coping with the adjustment to the BMT process and may also benefit from attending the BMT Caregiver Support Group that meets weekly on the inpatient unit.      Pt has a good support system and a good caregiver plan. Pt verbalizes understanding of the transplant process and wanting to proceed. SW provided contact information and encouraged Pt to contact SW with questions, concerns, resources and for support. Per this assessment, I did not identify any barriers to this patient moving forward with transplant        Important Information:   - Pt may be interested in exercise equipment  -Pt's daughter will be his visitor for this stay.        Follow up Planned:   Psychosocial support  Resources for children  Support group information     ALEXA Hobson, [facility]  Pager: [phone]  Phone: [phone]      ALEXA Chris

## 2020-10-09 NOTE — PLAN OF CARE
Patient admitted to: 5C  Admitted from: ED  Arrived by: bed  Reason for admission: Fevers and low BP following Car-T infusions for management of suspected grade 2 CRS.  Patient accompanied by: belongings  Belongings: suitcase, lantus, BMT research medication, glasses, watch and clothing  Teaching: Signs/symptoms of CRS/neuro toxicities  Skin double check completed by: Pt with recent discharge in past 24hrs and no new skin complaints. Skin double check was not completed due pt arriving to unit late and not having time along with pt assignment. Will pass on to day shift.

## 2020-10-09 NOTE — ED PROVIDER NOTES
"    [place] EMERGENCY DEPARTMENT ([facility])  October 8, 2020  History     Chief Complaint   Patient presents with     Fever     The history is provided by the patient and medical records.     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 Per chart review, patient was hospitalized from 9/29/2020-10/8/2020. He is now 9 days after [facility] T-cell therapy. At time of discharge, he had been afebrile over 48 hours. Patient reports about 6 hours after he returned home he \"gagged up a little fluid.\" He states he also had chills so he covered up with a blanket and took a nap. Patient reports he then ate at 7 pm and vomited. He states he had another episode of vomiting here in the ED. Patient reports he had a temperature of 100.9 at home. He took Tylenol PM at about 9:30 pm. Patient reports he feels better now. He states he had previously been constipated but had a BM here in the ED. Patient denies any current shortness of breath but had felt like he may have had some before as he had not been feeling up to walking.    PAST MEDICAL HISTORY:   Past Medical History:   Diagnosis Date     Abnormal liver function test      Anemia      CPAP (continuous positive airway pressure) dependence      Diabetes (H)      Hx of skin cancer, basal cell      Hyperlipidemia      Hypertension      Keratoderma      Large cell lymphoma (H) 7/20/2020     Liver disease      Lymphoma (H)      Non-Hodgkin lymphoma (H)      Pedal edema     CHRONIC     Pleural effusion      Seborrheic keratosis, inflamed      Sleep apnea     Uses a CPAP     Thrombocytopenia (H) 7/20/2020     Thrombosis Felbruary 2018       PAST SURGICAL HISTORY:   Past Surgical History:   Procedure Laterality Date     AMPUTATE TOE(S) Left 5/22/2020    Procedure: LEFT SECOND AND THIRD DISTAL " TOE AMPUTATIONS;  Surgeon: Ramon Flores MD;  Location:  OR     AMPUTATE TOE(S) Left 7/1/2020    Procedure: 1.  Partial left second toe amputation with osteotomy through proximal phalanx.  2.  Partial left third toe amputation with osteotomy through proximal phalanx.;  Surgeon: Ismael Humphrey DPM;  Location: [facility] OR     BIOPSY LYMPH NODE CERVICAL N/A 12/5/2014    Procedure: BIOPSY LYMPH NODE CERVICAL;  Surgeon: Robbie Linda MD;  Location:  OR     BRONCHOSCOPY FLEXIBLE N/A 11/14/2017    Procedure: BRONCHOSCOPY FLEXIBLE;  FLEXIBLE BRONCHOSCOPY WITH BIOPSIES.;  Surgeon: Robbie Linda MD;  Location:  OR     COLONOSCOPY       COLONOSCOPY N/A 5/9/2018    Procedure: COMBINED COLONOSCOPY, SINGLE OR MULTIPLE BIOPSY/POLYPECTOMY BY BIOPSY;;  Surgeon: Ramos Bates MD;  Location:  GI     ENDOVASCULAR PLACEMENT VASCULAR DEVICE Left 12/5/2017    Procedure: ENDOVASCULAR PLACEMENT VASCULAR DEVICE;;  Surgeon: Robbie Linda MD;  Location: [facility]     ENT SURGERY      tonsillectomy     EXCISE NODE MEDIASTINAL N/A 11/17/2017    Procedure: EXCISE NODE MEDIASTINAL;;  Surgeon: Robbie Linda MD;  Location:  OR     EYE SURGERY       EYE SURGERY Left     vitrectomy     INSERT PORT VASCULAR ACCESS N/A 12/05/2014    Procedure: INSERT PORT VASCULAR ACCESS;  Surgeon: Robbie Linda MD;  Location:  OR     INSERT PORT VASCULAR ACCESS N/A 12/5/2017    Procedure: INSERT PORT VASCULAR ACCESS;  POWER PORT PLACEMENT ATTEMPTED, PLACEMENT OF ANGIO-SEAL VIP VASCULAR CLOSURE DEVICE;  Surgeon: Robbie Linda MD;  Location: [facility]     IR CHEST PORT PLACEMENT > 5 YRS OF AGE  6/5/2020     IR PORT REMOVAL RIGHT  12/10/2018     PHACOEMULSIFICATION CLEAR CORNEA WITH STANDARD INTRAOCULAR LENS IMPLANT Right 5/2/2016    Procedure: PHACOEMULSIFICATION CLEAR CORNEA WITH STANDARD INTRAOCULAR LENS IMPLANT;  Surgeon: Rasheed Finney MD;  Location:  EC     REMOVE PORT VASCULAR  ACCESS N/A 3/14/2017    Procedure: REMOVE PORT VASCULAR ACCESS;  Surgeon: Robbie Linda MD;  Location: [facility] OR     SOFT TISSUE SURGERY      BASAL CELL CA FOREHEAD     THORACOTOMY Right 11/17/2017    Procedure: THORACOTOMY;  RIGHT EXPLORATORY THORACOTOMY/ EXTENSIVE PNEUMOLYSIS/ BIOPSY OF INTERLOBAR LYMPH NODE;  Surgeon: Robbie Linda MD;  Location: [facility] OR       Past medical history, past surgical history, medications, and allergies were reviewed with the patient. Additional pertinent items: None    FAMILY HISTORY: No family history on file.    SOCIAL HISTORY:   Social History     Tobacco Use     Smoking status: Never Smoker     Smokeless tobacco: Never Used   Substance Use Topics     Alcohol use: No     Social history was reviewed with the patient. Additional pertinent items: None      Patient's Medications   New Prescriptions    No medications on file   Previous Medications    ACYCLOVIR (ZOVIRAX) 800 MG TABLET    Take 1 tablet (800 mg) by mouth 2 times daily    ALLOPURINOL (ZYLOPRIM) 300 MG TABLET    Take 300 mg by mouth daily    BISACODYL (DULCOLAX) 5 MG EC TABLET    Take 5 mg by mouth daily as needed for constipation    DOCUSATE SODIUM (COLACE) 100 MG CAPSULE    Take 1 capsule (100 mg) by mouth 2 times daily as needed for constipation    FLUCONAZOLE (DIFLUCAN) 200 MG TABLET    Take 1 tablet (200 mg) by mouth daily Do not start before October 8, 2020.    HYDROCORTISONE (CORTEF) 10 MG TABLET    Take 20 mg by mouth daily before breakfast     INSULIN ASPART (NOVOLOG FLEXPEN) 100 UNIT/ML PEN    Inject Subcutaneous 3 times daily (with meals) use sliding scale based on Carbs and Blood Glucose.    INSULIN GLARGINE (LANTUS PEN) 100 UNIT/ML PEN    Inject 30 Units Subcutaneous every evening    LEVOFLOXACIN (LEVAQUIN) 250 MG TABLET    Take 1 tablet (250 mg) by mouth daily    METFORMIN (GLUCOPHAGE) 500 MG TABLET    Take 1 tablet (500 mg) by mouth 2 times daily (with meals)    PROCHLORPERAZINE (COMPAZINE) 5  "MG TABLET    Take 1-2 tablets (5-10 mg) by mouth every 6 hours as needed (Breakthrough Nausea / Vomiting)    STUDY - SIMVASTATIN, IDS# [id number], 40 MG TABLET    Take 1 tablet (40 mg) by mouth daily Start at least 5 days prior to apheresis and continue until Day +30 after CAR-T infusion.   Modified Medications    No medications on file   Discontinued Medications    No medications on file        No Known Allergies     Review of Systems  A complete review of systems was performed with pertinent positives and negatives noted in the HPI, and all other systems negative.    Physical Exam   BP: 121/81  Pulse: 103  Temp: 99.8  F (37.7  C)  Resp: 24  Height: 172.7 cm (5' 8\")  Weight: 88.5 kg (195 lb)  SpO2: 92 %      Physical Exam  Constitutional:       General: He is not in acute distress.     Appearance: He is well-developed. He is not ill-appearing, toxic-appearing or diaphoretic.      Comments: Comfortably resting, lying in bed, NAD, nondiaphoretic, lucid, fully conversant, no  respiratory distress, alert and oriented.     HENT:      Head: Normocephalic and atraumatic.      Mouth/Throat:      Mouth: Mucous membranes are moist.   Eyes:      General: No scleral icterus.     Extraocular Movements: Extraocular movements intact.      Pupils: Pupils are equal, round, and reactive to light.   Neck:      Musculoskeletal: Normal range of motion and neck supple.   Cardiovascular:      Rate and Rhythm: Normal rate.      Heart sounds: Normal heart sounds.   Pulmonary:      Effort: Pulmonary effort is normal. No respiratory distress.      Breath sounds: Normal breath sounds.   Abdominal:      General: Bowel sounds are normal.      Palpations: Abdomen is soft.      Tenderness: There is no abdominal tenderness.   Musculoskeletal:         General: No tenderness.      Right lower leg: No edema.      Left lower leg: No edema.   Skin:     General: Skin is warm and dry.      Capillary Refill: Capillary refill takes less than 2 seconds.      " Findings: No rash.   Neurological:      Mental Status: He is alert and oriented to person, place, and time.         ED Course   11:14 PM  The patient was seen and examined by Karl Jackson MD in Room ED27.     Procedures                           Results for orders placed or performed during the hospital encounter of 10/08/20 (from the past 24 hour(s))   CBC with platelets differential   Result Value Ref Range    WBC 2.3 (L) 4.0 - 11.0 10e9/L    RBC Count 4.29 (L) 4.4 - 5.9 10e12/L    Hemoglobin 12.1 (L) 13.3 - 17.7 g/dL    Hematocrit 37.8 (L) 40.0 - 53.0 %    MCV 88 78 - 100 fl    MCH 28.2 26.5 - 33.0 pg    MCHC 32.0 31.5 - 36.5 g/dL    RDW 15.9 (H) 10.0 - 15.0 %    Platelet Count 64 (L) 150 - 450 10e9/L    Diff Method Automated Method     % Neutrophils 63.4 %    % Lymphocytes 3.5 %    % Monocytes 29.3 %    % Eosinophils 1.7 %    % Basophils 0.4 %    % Immature Granulocytes 1.7 %    Nucleated RBCs 1 (H) 0 /100    Absolute Neutrophil 1.5 (L) 1.6 - 8.3 10e9/L    Absolute Lymphocytes 0.1 (L) 0.8 - 5.3 10e9/L    Absolute Monocytes 0.7 0.0 - 1.3 10e9/L    Absolute Eosinophils 0.0 0.0 - 0.7 10e9/L    Absolute Basophils 0.0 0.0 - 0.2 10e9/L    Abs Immature Granulocytes 0.0 0 - 0.4 10e9/L    Absolute Nucleated RBC 0.0     Platelet Estimate Confirming automated cell count    Comprehensive metabolic panel   Result Value Ref Range    Sodium 137 133 - 144 mmol/L    Potassium 3.6 3.4 - 5.3 mmol/L    Chloride 103 94 - 109 mmol/L    Carbon Dioxide 26 20 - 32 mmol/L    Anion Gap 8 3 - 14 mmol/L    Glucose 230 (H) 70 - 99 mg/dL    Urea Nitrogen 15 7 - 30 mg/dL    Creatinine 0.99 0.66 - 1.25 mg/dL    GFR Estimate 78 >60 mL/min/[1.73_m2]    GFR Estimate If Black >90 >60 mL/min/[1.73_m2]    Calcium 8.6 8.5 - 10.1 mg/dL    Bilirubin Total 0.7 0.2 - 1.3 mg/dL    Albumin 3.4 3.4 - 5.0 g/dL    Protein Total 6.3 (L) 6.8 - 8.8 g/dL    Alkaline Phosphatase 78 40 - 150 U/L    ALT 30 0 - 70 U/L    AST 17 0 - 45 U/L   Lactic acid whole blood    Result Value Ref Range    Lactic Acid 1.9 0.7 - 2.0 mmol/L   Blood culture    Specimen: Arm, Left; Blood    Left Arm   Result Value Ref Range    Specimen Description Blood Left Arm     Culture Micro PENDING    CRP inflammation   Result Value Ref Range    CRP Inflammation 20.0 (H) 0.0 - 8.0 mg/L   Ferritin   Result Value Ref Range    Ferritin 129 26 - 388 ng/mL   XR Chest Port 1 View    Narrative    EXAM: XR CHEST PORT 1 VW  LOCATION: [facility]  DATE/TIME: 10/8/2020 11:35 PM    INDICATION:  cough fever  COMPARISON: 09/29/2020      Impression    IMPRESSION: No change in the chest. Right IJ Port-A-Cath terminates at the cavoatrial junction stable. Left upper extremity PICC line in the SVC, stable. Mild volume loss in the right hemithorax with rightward mediastinal shift. Stable mild right   perihilar opacity. Normal heart size. No evidence for CHF or pneumonia.     Medications   ondansetron (ZOFRAN) injection 4 mg (4 mg Intravenous Given 10/8/20 2321)   ceFEPIme (MAXIPIME) 2 g vial to attach to  ml bag for ADULTS or 50 ml bag for PEDS (has no administration in time range)   lactated ringers BOLUS 1,000 mL (1,000 mLs Intravenous New Bag 10/8/20 2320)             Assessments & Plan (with Medical Decision Making)   This is a 70-year-old male coming into the emergency room who was just discharged from the BMT clinic service this morning who is now presenting to the emergency room with a temperature of 100.9 Fahrenheit at home with nausea and vomiting.  Patient states that he started having symptoms earlier this evening.  He took some Tylenol but continued to have body aches and some chills with some nausea.  Patient presents to the emergency room today actively vomiting.  Is otherwise slightly hypoxic with room air saturations around 89% and was provided with 2LPM with good response.  He is otherwise vitally stable at this time.  He has mostly no complaints at this time.  He was started on cefepime  here in the emergency room.  Labs and chest x-ray are reviewed.  At this time we will discussed the case with BMT and he will be admitted to their service for continued care management.      I have reviewed the nursing notes.    I have reviewed the findings, diagnosis, plan and need for follow up with the patient.    New Prescriptions    No medications on file       Final diagnoses:   Neutropenic fever (H)     INarda, am serving as a trained medical scribe to document services personally performed by Karl Jackson MD, based on the provider's statements to me.      IKarl MD, was physically present and have reviewed and verified the accuracy of this note documented by Narda Wood.     10/8/2020   [facility] EMERGENCY DEPARTMENT     Karl Jackson MD  10/09/20 0029

## 2020-10-09 NOTE — PROGRESS NOTES
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 who received infusion, currently day 10 of CAR-T cell therapy [facility]    Vital Signs: /66   Pulse 76   Temp 98.5  F (36.9  C)   Resp 18   Ht 1.727 m (5' 8\")   Wt 88.5 kg (195 lb)   SpO2 97%   BMI 29.65 kg/m      Organ CAR-T toxicity:    Cardiac: hypotension < 100/60   Pulmonary: hypoxia requiring oxygen FiO2 <= 40%   Renal: none   Liver: none   Coagulopathy:none   Neurologic: none   Immune: none    Overall CRS grade Grade 2    CRS Parameter Grade 1 Grade 2 Grade 3 Grade 4   Fever* Tm >= 100.4 degrees F Tm >= 100.4 degrees F Tm >= 100.4 degrees F Tm >= to 100.4 degrees F       With Either:  Hypotension None Responsive to Fluids Requiring 1 vasopressor (w/ or w/o vasopressin) Requiring multiple vasopressors (excluding vasopressin)     And/or  Hypoxia None Low-flow nasal cannula or blow-by High-flow nasal cannula, face mask, non-rebreather mask, or Venturi mask Requiring positive pressure (CPAP, BiPAP intubation and mechanical ventilation)       * Definition of High Dose Pressors for Grade 4  Vasopressor Duration >= 3 hours   Norepinephrine monotherapy >= 20 mcg/kg/minute   Dopamine monotherapy >= 10 mcg/kg/minute   Phenylephrine monotherapy >= 200 mcg/minute   Epinephrine monotherapy >= 10 mcg/minute   If on vasopressin + another agent High dose of vasopressin + norepi equivalent (using VASST formula below) is >= 10 mcg/minute   If on combination vasopressors, not including vasopressin Norepi equivalent of > 20 mcg/minute (using VASST formula below)     VASST formula: Norepi equivalent dose = [NE (mcg/min)] + [dopamine (mcg/min) / 2] + [epinephrine (mcg/min)] + [phenylephrine (mcg/min) / 10]    ICE Assessment  Orientation to year, month, city, hospital: 4 points  Name 3 objects (e.g., point to clock, pen, button): 3 " points  Following commands: (e.g., Show me 2 fingers): 1 point   Write a standard sentence (e.g., The wilson jumped over the log): 1 point   Count backwards from 100 by ten: 1 point  Total points: 10: No impairment    CRS Summary  Does patient have CRS? Yes, date of onset: 10/9/20  Current CRS stgstrstastdstest:st st1st What is the maximum severity to date: 2  What therapy was given: yes  Has CRS grade changed? No  Has CRS resolved? No    Neurotoxicity Summary  Does patient have neurotoxicity? No  Current Neurotoxicity score (0-10): 10  What is the maximum severity to date:  0  What therapy was given: no  Has neurotoxicity resolved? No neurotox

## 2020-10-09 NOTE — ED NOTES
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  Past Medical Hx:   Past Medical History:   Diagnosis Date     Abnormal liver function test      Anemia      CPAP (continuous positive airway pressure) dependence      Diabetes (H)      Hx of skin cancer, basal cell      Hyperlipidemia      Hypertension      Keratoderma      Large cell lymphoma (H) 7/20/2020     Liver disease      Lymphoma (H)      Non-Hodgkin lymphoma (H)      Pedal edema     CHRONIC     Pleural effusion      Seborrheic keratosis, inflamed      Sleep apnea     Uses a CPAP     Thrombocytopenia (H) 7/20/2020     Thrombosis Felbruary 2018      Baseline Mental status: WDL  Current Mental Status changes: at basesline    Infection present or suspected this encounter: yes  Sepsis suspected: No  Isolation type: Special Precautions-COVID-19     Activity level - Baseline/Home:  Stand with Assist  Activity Level - Current:   Stand with Assist    Bariatric equipment needed?: No    In the ED these meds were given:   Medications   ondansetron (ZOFRAN) injection 4 mg (4 mg Intravenous Given 10/8/20 2321)   acetaminophen (TYLENOL) tablet 650 mg (has no administration in time range)   sodium chloride 0.9% infusion (has no administration in time range)   0.9% sodium chloride BOLUS (has no administration in time range)   prochlorperazine (COMPAZINE) injection 5 mg (has no administration in time range)     Or   prochlorperazine (COMPAZINE) tablet 5 mg (has no administration in time range)   LORazepam (ATIVAN) injection 0.5-1 mg (has no administration in time range)     Or   LORazepam  (ATIVAN) tablet 0.5-1 mg (has no administration in time range)   ceFEPIme (MAXIPIME) 2 g vial to attach to  ml bag for ADULTS or 50 ml bag for PEDS (has no administration in time range)   lactated ringers BOLUS 1,000 mL (0 mLs Intravenous Stopped 10/9/20 0213)   ceFEPIme (MAXIPIME) 2 g vial to attach to  ml bag for ADULTS or 50 ml bag for PEDS (0 g Intravenous Stopped 10/9/20 0216)   tocilizumab (ACTEMRA) 700 mg in sodium chloride 0.9 % 135 mL infusion (700 mg Intravenous New Bag 10/9/20 0136)       Drips running?  Yes    Home pump  No    Current LDAs  PICC Double Lumen 09/29/20 Left Basilic (Active)   Number of days: 10       Port A Cath Single 06/05/20 Right Chest wall (Active)   Number of days: 126       Intrathecal/Epidural Catheter (Active)   Number of days:        Rash 06/19/20 0951 other (see comments) lower lumbar spine patch (Active)   Number of days: 112       Incision/Surgical Site 07/01/20 Left Other (Comment) (Active)   Number of days: 100       Incision/Surgical Site 07/08/20 Left Toe (Comment  which one) (Active)   Number of days: 93       Labs results:   Labs Ordered and Resulted from Time of ED Arrival Up to the Time of Departure from the ED   CBC WITH PLATELETS DIFFERENTIAL - Abnormal; Notable for the following components:       Result Value    WBC 2.3 (*)     RBC Count 4.29 (*)     Hemoglobin 12.1 (*)     Hematocrit 37.8 (*)     RDW 15.9 (*)     Platelet Count 64 (*)     Nucleated RBCs 1 (*)     Absolute Neutrophil 1.5 (*)     Absolute Lymphocytes 0.1 (*)     All other components within normal limits   COMPREHENSIVE METABOLIC PANEL - Abnormal; Notable for the following components:    Glucose 230 (*)     Protein Total 6.3 (*)     All other components within normal limits   CRP INFLAMMATION - Abnormal; Notable for the following components:    CRP Inflammation 20.0 (*)     All other components within normal limits   LACTIC ACID WHOLE BLOOD   FERRITIN   ROUTINE UA WITH MICROSCOPIC  "  MAGNESIUM   PHOSPHORUS   INR   PARTIAL THROMBOPLASTIN TIME   FIBRINOGEN ACTIVITY   D DIMER QUANTITATIVE   LACTATE DEHYDROGENASE   URIC ACID   ROUTINE UA WITH MICROSCOPIC REFLEX TO CULTURE   INR   PARTIAL THROMBOPLASTIN TIME   COVID-19 VIRUS (CORONAVIRUS) BY PCR   IP ASSIGN PROVIDER TEAM TO TREATMENT TEAM   MEASURE HEIGHT AND WEIGHT   DAILY WEIGHTS   VITAL SIGNS AND PAIN ASSESSMENT   INTAKE AND OUTPUT   PULSE OXIMETRY NURSING   ORAL HYGIENE   AMBULATE BMT ADULT   IV ACCESS   ABO/RH TYPE AND SCREEN   ABO/RH TYPE AND SCREEN   BLOOD CULTURE   BLOOD CULTURE   URINE CULTURE AEROBIC BACTERIAL   VANCOMYCIN RESISTANT ENTER CULTURE       Imaging Studies:   Recent Results (from the past 24 hour(s))   XR Chest Port 1 View    Narrative    EXAM: XR CHEST PORT 1 VW  LOCATION: [facility]  DATE/TIME: 10/8/2020 11:35 PM    INDICATION:  cough fever  COMPARISON: 09/29/2020      Impression    IMPRESSION: No change in the chest. Right IJ Port-A-Cath terminates at the cavoatrial junction stable. Left upper extremity PICC line in the SVC, stable. Mild volume loss in the right hemithorax with rightward mediastinal shift. Stable mild right   perihilar opacity. Normal heart size. No evidence for CHF or pneumonia.       Recent vital signs:   BP 97/63   Pulse 98   Temp 99.8  F (37.7  C) (Oral)   Resp 24   Ht 1.727 m (5' 8\")   Wt 88.5 kg (195 lb)   SpO2 96%   BMI 29.65 kg/m      Rosanky Coma Scale Score: 15 (10/09/20 0141)       Cardiac Rhythm: Tachycardia  Pt needs tele? No  Skin/wound Issues: None    Code Status: TBJOSE c MD    Pain control: fair    Nausea control: fair    Abnormal labs/tests/findings requiring intervention: Labs pending    Family present during ED course? No   Family Comments/Social Situation comments: N/a    Tasks needing completion: None    Miguelina Medina, RN    [phone] Health system      "

## 2020-10-09 NOTE — PROGRESS NOTES
This is a recent snapshot of the patient's [facility] medical record.  For current drug dose and complete information and questions, call [phone] or In Basket pool, fv home infusion ([id number])  CSN Number:  [id number]

## 2020-10-09 NOTE — PROGRESS NOTES
Admission skin double check performed by Aliya Eid RN and Noe Pérez RN.    Findings: significant callus/peeling skin on R heel. Two partial toes/nails missing on R foot.  Implanted portacath in R chest wall, PICC in L arm.

## 2020-10-09 NOTE — ED NOTES
RN spoke with heme fellow who verbalized the urgency of administration of the tocilizumab medication as the pt is likely in grade 2 CRS toxicity and can decompensate very quickly

## 2020-10-09 NOTE — PROGRESS NOTES
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 COVID pending. Received fluid rescusitation and tocilizimab early this morning.    Review of Systems    8 point review with pertinent positive and negatives     PHYSICAL EXAM    KPS: 90    /68 (BP Location: Right arm)   Pulse 83   Temp 99.2  F (37.3  C) (Oral)   Resp 16   Ht 1.727 m (5' 8\")   Wt 88.5 kg (195 lb)   SpO2 98%   BMI 29.65 kg/m       General: NAD; rommel complexion  Eyes: sclera anicteric   Nose/Mouth/Throat: OP moist, no ulcerations   Lungs: CTAB  Cardiovascular: RRR, no M/R/G   Abdominal/Rectal: +BS, soft, NT, ND, No HSM. Large ventral hernia chronic, stable  Lymphatics: trace LE edema  Skin: No rash  MSK: Toes, second and third on left are missing (s/p amputation 2/2 diabetic complications)  Neuro: A&O ICE 10/10   Additional Findings: Right chest port a cath, left upper arm PICC NT, dressing cdi    Labs:  Lab Results   Component Value Date    WBC 1.9 (L) 10/09/2020    ANEU 1.3 (L) 10/09/2020    HGB 10.8 (L) 10/09/2020    HCT 33.9 (L) 10/09/2020    PLT 54 (L) 10/09/2020     10/09/2020    POTASSIUM 3.6 10/09/2020    CHLORIDE 104 10/09/2020    CO2 28 10/09/2020     (H) 10/09/2020    BUN 14 10/09/2020    CR 0.87 10/09/2020    MAG 2.8 (H) 10/09/2020    INR 1.15 (H) 10/09/2020    BILITOTAL 0.7 10/08/2020    AST 17 10/08/2020    ALT 30 10/08/2020    ALKPHOS 78 10/08/2020    PROTTOTAL 6.3 (L) 10/08/2020    ALBUMIN 3.4 10/08/2020       ASSESSMENT/PLAN   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 T cells collected 8/31/2020. 2017-45 [place], 2019-35: IT dex + simvastatin for JUAQUIN prevention. Day 10 readmitted with g2 crs/neutropenic fever.    Day 0: LP with " IT dex. PICC line placement, [facility] Car-t infusion   Day 7: LP under fluoroscopy w/IT dex.   Day 13 (10/12) at 1pm in XR: LP under fluoroscopy w/IT dex. (AKramer will push dex)    1.  BMT:  DLBCL  Axi-cell product with CNS prevention protocol, day 0  CNS prophy: simvastatin 40mg tied to IDS drug  - cont allopurinol    2.  HEME: Keep Hgb>7 and plts>10K. No pre-meds.                            3.  ID: Neutropenic fever on admit, cefepime started. Blood cultures pending.CCT pending. RVP pending. S pneumo, legonella pending  - Prophy (levo), fluc, acy, IV pentamidine (9/23).       4.  GI: no n/v.  - prn Ativan, Compazine  - Protonix for GI prophy.   - Colace prn constipation    5.  FEN/Renal: Creat, lytes wnl.    6. Endo: Type 2 DM. See DM plan below.  - had been on metformin during CAR-T (risk of lactic acidosis); cautiously resumed at half-dose 500mg bid 10/7.  Lantus 30u at hs and Novolog sliding scale and carb counting (see endocrine plan below).  Complication of DM with 2 left toes amputated 7/2020  - hx Adrenal Insufficiency: PTA on hydrocortisone 20mg/d; was held on admission for unclear reason and not listed in notes; resumed 10/3 (perrmitted on Car-T protocols).     Dispo: will be admitted until CRS/fevers subside.  Will need follow-up through at least D14 post CAR-T (Tues 10/13). Pt has PICC and port-a-cath. Does have line care coverage but not initiated as we anticipated pulling PICC line early next week if he remains stable.     Tequila Garcia NP  [phone]    Diabetes plan-  Follow up with Dr. Turner to reassess Novolog dosing, alternatives/adjuncts to insulin (Trulicity, for example)   Diabetes Regimen for home  Metformin 500 mg two times per day, before meals  Lantus 30 units in the evening  Novolog 1 unit per 3.5 grams of carbohydrate   Novolog Correction scale  Before meals--  -169 give 2 units.  -199 give 4 units.  -229 give 6 units.  -259 give 8 units.  -289 give 10  units.  -319 give 12 units.  -349 give 14 units.  BG >/= 350 give 16 units.  At bedtime--  -229 give 2 units.  -259 give 4 units.  -289 give 6 units.  -319 give 8 units.  -349 give 10 units.  BG >/= 350 give 12 units.     Day of  intrathecal dexamethasone  Increase your carb coverage to 1 unit per 2 grams starting with supper  Increase your Lantus to 45 units that night  Next day use 1 unit per 2 grams for breakfast and lunch, return to normal ratio at supper  Return to normal Lantus dose 30 units          BMT Staff:  The patient was discussed on morning rounds with the nurses, mid level provider, and house staff and seen and examined by me. All labs and imaging were reviewed. I reviewed events over the last 24 hours including vitals and flow sheets. I agree with the above note and have been responsible for the care plan and interpretation of progress.     Subjective:  Reports feeling overall quite well this morning with no specific concerns or symptoms, he reports feeling much better since he last presented to the ED yesterday, no abd pain, no C/NS, no N/V/C/D, no GI or  complaints, has a mild productive cough which he reports is on and off for him for many weeks and not significantly different, no HA/LH/Dizziness.      Vitals reviewed, labs reviewed  PE:  NAD, Alert, oriented x3  HEENT: moist mmm, no oral ulcers  Heart: RRR  Lungs: Right upper and middle lobe crackles  Abdomen: +BS, soft non tender, non distended  LE: no edema  Skin: no rashes     Assessment and Plan:     70-year-old man with an aggressive non-Hodgkin's lymphoma who is now 9 days after [facility] T-cell therapy.  Presented overnight on day +9 with fever, hypotension and new hypoxia, that was concerning for CRS grade 2 so patient was given a dose of Tocilizumab and received fluid resuscitation, however ferritin is not elevated and patient is neutropenic with a CT revealing new consolidation and therefore  this is likely compatible with infection/neutropenic fever/sepsis given the timeline of presentation and symptoms with laboratory findings.  Of note the patient has been saturating normally on room air without needing additional oxygenation since this morning, has been afebrile overnight, with normalization of blood pressure after fluid resuscitation.  No evidence of neurotoxicity.  Continue to closely monitored with modification with any clinical changes.       Quang Mclaughlin MD

## 2020-10-09 NOTE — PLAN OF CARE
"Physical Therapy Discharge Summary    Reason for therapy discharge:    Discharged to home with outpatient therapy.    Progress towards therapy goal(s). See goals on Care Plan in [facility] electronic health record for goal details.  Goals not met.  Barriers to achieving goals:   discharge from facility.    Therapy recommendation(s):    Continued therapy is recommended.  Rationale/Recommendations:  OP PT for continued balance, CV endurance and strength training- pt likely wanting to wait \"a few months\" prior to following up to OP PT. Encouraged requesting orders from doctor at follow up appointment with anticipated OP PT 2 months away. .      "

## 2020-10-09 NOTE — TELEPHONE ENCOUNTER
Received call from pt's wife regarding fever of 100.9F just now.  Pt was discharged today from .  Day +9 [facility].      Recommended he proceed to the ED for further evaluation and admission for neutropenic fever at minimum.  Will need evaluation for CAR-T toxicity as well.      He states he has no additional symptoms other than fever and chills.      They are in agreement to proceed to the ED now.     Arvind Crocker MD, PhD  Hematology/Oncology Fellow  Pager: [phone]

## 2020-10-10 LAB
ALBUMIN SERPL-MCNC: 2.7 G/DL (ref 3.4–5)
ALP SERPL-CCNC: 60 U/L (ref 40–150)
ALT SERPL W P-5'-P-CCNC: 19 U/L (ref 0–70)
ANION GAP SERPL CALCULATED.3IONS-SCNC: 4 MMOL/L (ref 3–14)
APTT PPP: 38 SEC (ref 22–37)
AST SERPL W P-5'-P-CCNC: 10 U/L (ref 0–45)
BACTERIA SPEC CULT: NO GROWTH
BASOPHILS # BLD AUTO: 0 10E9/L (ref 0–0.2)
BASOPHILS NFR BLD AUTO: 0 %
BILIRUB SERPL-MCNC: 0.4 MG/DL (ref 0.2–1.3)
BUN SERPL-MCNC: 9 MG/DL (ref 7–30)
CALCIUM SERPL-MCNC: 8.3 MG/DL (ref 8.5–10.1)
CHLORIDE SERPL-SCNC: 107 MMOL/L (ref 94–109)
CO2 SERPL-SCNC: 29 MMOL/L (ref 20–32)
CREAT SERPL-MCNC: 0.69 MG/DL (ref 0.66–1.25)
CRP SERPL-MCNC: 45 MG/L (ref 0–8)
D DIMER PPP FEU-MCNC: 8.4 UG/ML FEU (ref 0–0.5)
DIFFERENTIAL METHOD BLD: ABNORMAL
EOSINOPHIL # BLD AUTO: 0 10E9/L (ref 0–0.7)
EOSINOPHIL NFR BLD AUTO: 1.7 %
ERYTHROCYTE [DISTWIDTH] IN BLOOD BY AUTOMATED COUNT: 15.6 % (ref 10–15)
FIBRINOGEN PPP-MCNC: 313 MG/DL (ref 200–420)
GFR SERPL CREATININE-BSD FRML MDRD: >90 ML/MIN/{1.73_M2}
GLUCOSE SERPL-MCNC: 181 MG/DL (ref 70–99)
HCT VFR BLD AUTO: 30.1 % (ref 40–53)
HGB BLD-MCNC: 9.8 G/DL (ref 13.3–17.7)
IMM GRANULOCYTES # BLD: 0 10E9/L (ref 0–0.4)
IMM GRANULOCYTES NFR BLD: 1.1 %
INR PPP: 1.15 (ref 0.86–1.14)
LDH SERPL L TO P-CCNC: 151 U/L (ref 85–227)
LYMPHOCYTES # BLD AUTO: 0.1 10E9/L (ref 0.8–5.3)
LYMPHOCYTES NFR BLD AUTO: 3.4 %
Lab: NORMAL
MAGNESIUM SERPL-MCNC: 1.7 MG/DL (ref 1.6–2.3)
MCH RBC QN AUTO: 28.6 PG (ref 26.5–33)
MCHC RBC AUTO-ENTMCNC: 32.6 G/DL (ref 31.5–36.5)
MCV RBC AUTO: 88 FL (ref 78–100)
MONOCYTES # BLD AUTO: 0.3 10E9/L (ref 0–1.3)
MONOCYTES NFR BLD AUTO: 17.9 %
NEUTROPHILS # BLD AUTO: 1.4 10E9/L (ref 1.6–8.3)
NEUTROPHILS NFR BLD AUTO: 75.9 %
NRBC # BLD AUTO: 0 10*3/UL
NRBC BLD AUTO-RTO: 0 /100
PHOSPHATE SERPL-MCNC: 2.8 MG/DL (ref 2.5–4.5)
PLATELET # BLD AUTO: 50 10E9/L (ref 150–450)
POTASSIUM SERPL-SCNC: 3.3 MMOL/L (ref 3.4–5.3)
POTASSIUM SERPL-SCNC: 3.3 MMOL/L (ref 3.4–5.3)
PROT SERPL-MCNC: 5 G/DL (ref 6.8–8.8)
RBC # BLD AUTO: 3.43 10E12/L (ref 4.4–5.9)
SODIUM SERPL-SCNC: 139 MMOL/L (ref 133–144)
SPECIMEN SOURCE: NORMAL
URATE SERPL-MCNC: 3.6 MG/DL (ref 3.5–7.2)
WBC # BLD AUTO: 1.8 10E9/L (ref 4–11)

## 2020-10-10 PROCEDURE — 85384 FIBRINOGEN ACTIVITY: CPT | Performed by: STUDENT IN AN ORGANIZED HEALTH CARE EDUCATION/TRAINING PROGRAM

## 2020-10-10 PROCEDURE — 250N000013 HC RX MED GY IP 250 OP 250 PS 637: Performed by: STUDENT IN AN ORGANIZED HEALTH CARE EDUCATION/TRAINING PROGRAM

## 2020-10-10 PROCEDURE — 250N000012 HC RX MED GY IP 250 OP 636 PS 637: Performed by: NURSE PRACTITIONER

## 2020-10-10 PROCEDURE — 85379 FIBRIN DEGRADATION QUANT: CPT | Performed by: STUDENT IN AN ORGANIZED HEALTH CARE EDUCATION/TRAINING PROGRAM

## 2020-10-10 PROCEDURE — 85610 PROTHROMBIN TIME: CPT | Performed by: STUDENT IN AN ORGANIZED HEALTH CARE EDUCATION/TRAINING PROGRAM

## 2020-10-10 PROCEDURE — 250N000011 HC RX IP 250 OP 636: Performed by: NURSE PRACTITIONER

## 2020-10-10 PROCEDURE — 85730 THROMBOPLASTIN TIME PARTIAL: CPT | Performed by: STUDENT IN AN ORGANIZED HEALTH CARE EDUCATION/TRAINING PROGRAM

## 2020-10-10 PROCEDURE — 250N000013 HC RX MED GY IP 250 OP 250 PS 637: Performed by: NURSE PRACTITIONER

## 2020-10-10 PROCEDURE — 83735 ASSAY OF MAGNESIUM: CPT | Performed by: STUDENT IN AN ORGANIZED HEALTH CARE EDUCATION/TRAINING PROGRAM

## 2020-10-10 PROCEDURE — 96372 THER/PROPH/DIAG INJ SC/IM: CPT | Performed by: NURSE PRACTITIONER

## 2020-10-10 PROCEDURE — 250N000011 HC RX IP 250 OP 636: Performed by: INTERNAL MEDICINE

## 2020-10-10 PROCEDURE — 80053 COMPREHEN METABOLIC PANEL: CPT | Performed by: STUDENT IN AN ORGANIZED HEALTH CARE EDUCATION/TRAINING PROGRAM

## 2020-10-10 PROCEDURE — 84100 ASSAY OF PHOSPHORUS: CPT | Performed by: STUDENT IN AN ORGANIZED HEALTH CARE EDUCATION/TRAINING PROGRAM

## 2020-10-10 PROCEDURE — 206N000001 HC R&B BMT UMMC

## 2020-10-10 PROCEDURE — 250N000013 HC RX MED GY IP 250 OP 250 PS 637: Performed by: HOSPITALIST

## 2020-10-10 PROCEDURE — 84550 ASSAY OF BLOOD/URIC ACID: CPT | Performed by: STUDENT IN AN ORGANIZED HEALTH CARE EDUCATION/TRAINING PROGRAM

## 2020-10-10 PROCEDURE — 250N000011 HC RX IP 250 OP 636: Performed by: STUDENT IN AN ORGANIZED HEALTH CARE EDUCATION/TRAINING PROGRAM

## 2020-10-10 PROCEDURE — 84132 ASSAY OF SERUM POTASSIUM: CPT | Performed by: INTERNAL MEDICINE

## 2020-10-10 PROCEDURE — 258N000003 HC RX IP 258 OP 636: Performed by: INTERNAL MEDICINE

## 2020-10-10 PROCEDURE — 85025 COMPLETE CBC W/AUTO DIFF WBC: CPT | Performed by: STUDENT IN AN ORGANIZED HEALTH CARE EDUCATION/TRAINING PROGRAM

## 2020-10-10 PROCEDURE — 99233 SBSQ HOSP IP/OBS HIGH 50: CPT | Performed by: INTERNAL MEDICINE

## 2020-10-10 PROCEDURE — 86140 C-REACTIVE PROTEIN: CPT | Performed by: STUDENT IN AN ORGANIZED HEALTH CARE EDUCATION/TRAINING PROGRAM

## 2020-10-10 PROCEDURE — 83615 LACTATE (LD) (LDH) ENZYME: CPT | Performed by: STUDENT IN AN ORGANIZED HEALTH CARE EDUCATION/TRAINING PROGRAM

## 2020-10-10 RX ORDER — DEXTROSE MONOHYDRATE 25 G/50ML
25-50 INJECTION, SOLUTION INTRAVENOUS
Status: DISCONTINUED | OUTPATIENT
Start: 2020-10-10 | End: 2020-10-10

## 2020-10-10 RX ORDER — NICOTINE POLACRILEX 4 MG
15-30 LOZENGE BUCCAL
Status: DISCONTINUED | OUTPATIENT
Start: 2020-10-10 | End: 2020-10-10

## 2020-10-10 RX ORDER — POTASSIUM CHLORIDE 750 MG/1
20-40 TABLET, EXTENDED RELEASE ORAL
Status: DISCONTINUED | OUTPATIENT
Start: 2020-10-10 | End: 2020-10-13 | Stop reason: HOSPADM

## 2020-10-10 RX ORDER — POTASSIUM CHLORIDE 7.45 MG/ML
10 INJECTION INTRAVENOUS
Status: DISCONTINUED | OUTPATIENT
Start: 2020-10-10 | End: 2020-10-13 | Stop reason: HOSPADM

## 2020-10-10 RX ORDER — POTASSIUM CHLORIDE 1.5 G/1.58G
20-40 POWDER, FOR SOLUTION ORAL
Status: DISCONTINUED | OUTPATIENT
Start: 2020-10-10 | End: 2020-10-13 | Stop reason: HOSPADM

## 2020-10-10 RX ORDER — POTASSIUM CL/LIDO/0.9 % NACL 10MEQ/0.1L
10 INTRAVENOUS SOLUTION, PIGGYBACK (ML) INTRAVENOUS
Status: DISCONTINUED | OUTPATIENT
Start: 2020-10-10 | End: 2020-10-13 | Stop reason: HOSPADM

## 2020-10-10 RX ORDER — POTASSIUM CHLORIDE 29.8 MG/ML
20 INJECTION INTRAVENOUS
Status: DISCONTINUED | OUTPATIENT
Start: 2020-10-10 | End: 2020-10-13 | Stop reason: HOSPADM

## 2020-10-10 RX ORDER — DIPHENHYDRAMINE HCL 25 MG
25 CAPSULE ORAL
Status: DISCONTINUED | OUTPATIENT
Start: 2020-10-10 | End: 2020-10-13 | Stop reason: HOSPADM

## 2020-10-10 RX ORDER — MAGNESIUM SULFATE HEPTAHYDRATE 40 MG/ML
4 INJECTION, SOLUTION INTRAVENOUS EVERY 4 HOURS PRN
Status: DISCONTINUED | OUTPATIENT
Start: 2020-10-10 | End: 2020-10-13 | Stop reason: HOSPADM

## 2020-10-10 RX ADMIN — INSULIN ASPART 3 UNITS: 100 INJECTION, SOLUTION INTRAVENOUS; SUBCUTANEOUS at 13:42

## 2020-10-10 RX ADMIN — Medication 40 MG: at 07:30

## 2020-10-10 RX ADMIN — POTASSIUM CHLORIDE 20 MEQ: 750 TABLET, EXTENDED RELEASE ORAL at 16:29

## 2020-10-10 RX ADMIN — POTASSIUM CHLORIDE 20 MEQ: 29.8 INJECTION, SOLUTION INTRAVENOUS at 23:15

## 2020-10-10 RX ADMIN — HYDROCORTISONE 20 MG: 20 TABLET ORAL at 07:30

## 2020-10-10 RX ADMIN — CEFEPIME HYDROCHLORIDE 2 G: 2 INJECTION, POWDER, FOR SOLUTION INTRAVENOUS at 07:30

## 2020-10-10 RX ADMIN — ACETAMINOPHEN 650 MG: 325 TABLET, FILM COATED ORAL at 21:18

## 2020-10-10 RX ADMIN — ACYCLOVIR 800 MG: 800 TABLET ORAL at 19:54

## 2020-10-10 RX ADMIN — ACYCLOVIR 800 MG: 800 TABLET ORAL at 07:30

## 2020-10-10 RX ADMIN — ACETAMINOPHEN 650 MG: 325 TABLET, FILM COATED ORAL at 13:45

## 2020-10-10 RX ADMIN — ALLOPURINOL 300 MG: 300 TABLET ORAL at 07:30

## 2020-10-10 RX ADMIN — INSULIN GLARGINE 10 UNITS: 100 INJECTION, SOLUTION SUBCUTANEOUS at 07:31

## 2020-10-10 RX ADMIN — ACETAMINOPHEN 650 MG: 325 TABLET, FILM COATED ORAL at 07:30

## 2020-10-10 RX ADMIN — POTASSIUM CHLORIDE 40 MEQ: 750 TABLET, EXTENDED RELEASE ORAL at 13:41

## 2020-10-10 RX ADMIN — FLUCONAZOLE 200 MG: 200 TABLET ORAL at 07:29

## 2020-10-10 RX ADMIN — CEFEPIME HYDROCHLORIDE 2 G: 2 INJECTION, POWDER, FOR SOLUTION INTRAVENOUS at 23:15

## 2020-10-10 RX ADMIN — POTASSIUM CHLORIDE 20 MEQ: 29.8 INJECTION, SOLUTION INTRAVENOUS at 22:07

## 2020-10-10 RX ADMIN — CEFEPIME HYDROCHLORIDE 2 G: 2 INJECTION, POWDER, FOR SOLUTION INTRAVENOUS at 16:30

## 2020-10-10 RX ADMIN — VANCOMYCIN HYDROCHLORIDE 1750 MG: 10 INJECTION, POWDER, LYOPHILIZED, FOR SOLUTION INTRAVENOUS at 13:41

## 2020-10-10 RX ADMIN — DIPHENHYDRAMINE HYDROCHLORIDE 25 MG: 25 CAPSULE ORAL at 21:56

## 2020-10-10 RX ADMIN — MELATONIN TAB 3 MG 3 MG: 3 TAB at 21:18

## 2020-10-10 RX ADMIN — INSULIN ASPART 1 UNITS: 100 INJECTION, SOLUTION INTRAVENOUS; SUBCUTANEOUS at 18:49

## 2020-10-10 RX ADMIN — PANTOPRAZOLE SODIUM 40 MG: 40 TABLET, DELAYED RELEASE ORAL at 07:30

## 2020-10-10 ASSESSMENT — ACTIVITIES OF DAILY LIVING (ADL)
ADLS_ACUITY_SCORE: 11

## 2020-10-10 ASSESSMENT — MIFFLIN-ST. JEOR: SCORE: 1647.16

## 2020-10-10 ASSESSMENT — PAIN DESCRIPTION - DESCRIPTORS
DESCRIPTORS: HEADACHE
DESCRIPTORS: ACHING

## 2020-10-10 NOTE — PHARMACY-VANCOMYCIN DOSING SERVICE
Pharmacy Vancomycin Initial Note  Date of Service October 10, 2020  Patient's  1950  70 year old, male    Indication: Bacteremia    Current estimated CrCl = Estimated Creatinine Clearance: 111.9 mL/min (based on SCr of 0.69 mg/dL).    Creatinine for last 3 days  10/8/2020:  2:50 AM Creatinine 0.96 mg/dL; 11:08 PM Creatinine 0.99 mg/dL  10/9/2020:  3:24 AM Creatinine 0.87 mg/dL  10/10/2020:  4:32 AM Creatinine 0.69 mg/dL    Recent Vancomycin Level(s) for last 3 days  No results found for requested labs within last 72 hours.      Vancomycin IV Administrations (past 72 hours)      No vancomycin orders with administrations in past 72 hours.                Nephrotoxins and other renal medications (From now, onward)    Start     Dose/Rate Route Frequency Ordered Stop    10/10/20 1400  vancomycin (VANCOCIN) 1,750 mg in sodium chloride 0.9 % 250 mL intermittent infusion      1,750 mg (central catheter)  over 60 Minutes Intravenous EVERY 12 HOURS 10/10/20 1314      10/09/20 0800  acyclovir (ZOVIRAX) tablet 800 mg      800 mg Oral 2 TIMES DAILY 10/09/20 0536            Contrast Orders - past 72 hours (72h ago, onward)    None                Plan:  1.  Start vancomycin  1750 (~19 mg/kg) mg IV q12h.   2.  Goal Trough Level: 15-20 mg/L   3.  Pharmacy will check trough levels as appropriate in 1-3 Days.    4. Serum creatinine levels will be ordered daily for the first week of therapy and at least twice weekly for subsequent weeks.    5. [place] method utilized to dose vancomycin therapy: Method 2    Hailey Abdul, JoshuaD  [phone] (text capable)

## 2020-10-10 NOTE — PLAN OF CARE
Afebrile. OVSS on RA. Cpap @ HS. Neuros intact. Pt denies N/V/D and pain. Melatonin x1 given for sleep. NS @ 100 mL/hr. Carb counts. No replacements needed. Continue with POC.       Problem: Adult Inpatient Plan of Care  Goal: Plan of Care Review  Outcome: No Change     Problem: Adult Inpatient Plan of Care  Goal: Absence of Hospital-Acquired Illness or Injury  Outcome: No Change

## 2020-10-10 NOTE — PROGRESS NOTES
RN notified of updated blood culture result (culture from 10/8 now growing a staph species). MD notified.

## 2020-10-10 NOTE — PROGRESS NOTES
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 who received infusion, currently day 10 of CAR-T cell therapy [facility]    Vital Signs: /78 (BP Location: Right arm)   Pulse 82   Temp 96.9  F (36.1  C) (Axillary)   Resp 18   Ht 1.727 m (5' 8\")   Wt 90.3 kg (199 lb)   SpO2 93%   BMI 30.26 kg/m      Organ CAR-T toxicity:    Cardiac: none   Pulmonary: none   Renal: none   Liver: none   Coagulopathy:none   Neurologic: none   Immune: none    Overall CRS grade 1CRS    CRS Parameter Grade 1 Grade 2 Grade 3 Grade 4   Fever* Tm >= 100.4 degrees F Tm >= 100.4 degrees F Tm >= 100.4 degrees F Tm >= to 100.4 degrees F       With Either:  Hypotension None Responsive to Fluids Requiring 1 vasopressor (w/ or w/o vasopressin) Requiring multiple vasopressors (excluding vasopressin)     And/or  Hypoxia None Low-flow nasal cannula or blow-by High-flow nasal cannula, face mask, non-rebreather mask, or Venturi mask Requiring positive pressure (CPAP, BiPAP intubation and mechanical ventilation)       * Definition of High Dose Pressors for Grade 4  Vasopressor Duration >= 3 hours   Norepinephrine monotherapy >= 20 mcg/kg/minute   Dopamine monotherapy >= 10 mcg/kg/minute   Phenylephrine monotherapy >= 200 mcg/minute   Epinephrine monotherapy >= 10 mcg/minute   If on vasopressin + another agent High dose of vasopressin + norepi equivalent (using VASST formula below) is >= 10 mcg/minute   If on combination vasopressors, not including vasopressin Norepi equivalent of > 20 mcg/minute (using VASST formula below)     VASST formula: Norepi equivalent dose = [NE (mcg/min)] + [dopamine (mcg/min) / 2] + [epinephrine (mcg/min)] + [phenylephrine (mcg/min) / 10]    ICE Assessment  Orientation to year, month, city, hospital: 4 points  Name 3 objects (e.g., point to clock, pen, button): 3 points  Following commands: " (e.g., Show me 2 fingers): 1 point   Write a standard sentence (e.g., The wilson jumped over the log): 1 point   Count backwards from 100 by ten: 1 point  Total points: 10: No impairment    CRS Summary  Does patient have CRS? no  Current CRS stgstrstastdstest:st st1st What is the maximum severity to date: 2  What therapy was given: yes  Has CRS grade changed? yes  Has CRS resolved? yes    Neurotoxicity Summary  Does patient have neurotoxicity? No  Current Neurotoxicity score (0-10): 10  What is the maximum severity to date:  0  What therapy was given: no  Has neurotoxicity resolved? No neurotox

## 2020-10-10 NOTE — PROGRESS NOTES
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 No lightheaded/dizziness. Still with decreased appetite and eating minimally. Afebrile. On RA.    Review of Systems    8 point review with pertinent positive and negatives     PHYSICAL EXAM    KPS: 90    /78 (BP Location: Right arm)   Pulse 82   Temp 96.9  F (36.1  C) (Axillary)   Resp 18   Ht 1.727 m (5' 8\")   Wt 90.3 kg (199 lb)   SpO2 93%   BMI 30.26 kg/m       General: NAD; rommel complexion  Eyes: sclera anicteric   Nose/Mouth/Throat: OP moist, no ulcerations   Lungs: RUL with bronchial breath sounds  Cardiovascular: RRR, no M/R/G   Abdominal/Rectal: +BS, soft, NT, ND, No HSM. Large ventral hernia chronic, stable  Lymphatics: trace LE edema  Skin: No rash  MSK: Toes, second and third on left are missing (s/p amputation 2/2 diabetic complications)  Neuro: A&O ICE 10/10   Additional Findings: Right chest port a cath, left upper arm PICC NT, dressing cdi    Labs:  Lab Results   Component Value Date    WBC 1.8 (L) 10/10/2020    ANEU 1.4 (L) 10/10/2020    HGB 9.8 (L) 10/10/2020    HCT 30.1 (L) 10/10/2020    PLT 50 (L) 10/10/2020     10/10/2020    POTASSIUM 3.3 (L) 10/10/2020    CHLORIDE 107 10/10/2020    CO2 29 10/10/2020     (H) 10/10/2020    BUN 9 10/10/2020    CR 0.69 10/10/2020    MAG 1.7 10/10/2020    INR 1.15 (H) 10/10/2020    BILITOTAL 0.4 10/10/2020    AST 10 10/10/2020    ALT 19 10/10/2020    ALKPHOS 60 10/10/2020    PROTTOTAL 5.0 (L) 10/10/2020    ALBUMIN 2.7 (L) 10/10/2020       ASSESSMENT/PLAN   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 T cells collected 8/31/2020. 2017-45 [place], 2019-35: IT dex + simvastatin for JUAQUIN prevention. Day 11, readmitted with g2 crs/neutropenic " fever.    Day 0: LP with IT dex. PICC line placement, [facility] Car-t infusion   Day 7: LP under fluoroscopy w/IT dex.   Day 13 (10/12) at 1pm in XR: LP under fluoroscopy w/IT dex. (AKramer will push dex)    1.  BMT:  DLBCL  Axi-cell product with CNS prevention protocol, day 0  CNS prophy: simvastatin 40mg tied to IDS drug  - cont allopurinol    2.  HEME: Keep Hgb>7 and plts>10K. No pre-meds.                            3.  ID: Neutropenic fever on admit, cefepime started. Blood cultures pending.CCT with improvement in RLL but worse in RUL. RVP neg. S pneumo -, legonella -  - Prophy (levo), fluc, acy, IV pentamidine (9/23).       4.  GI: no n/v. +decreased appetite  - prn Ativan, Compazine  - Protonix for GI prophy.   - Colace prn constipation    5.  FEN/Renal: Creat, lytes wnl.    6. Endo: Type 2 DM. See DM plan below.  - had been on metformin during CAR-T (risk of lactic acidosis); cautiously resumed at half-dose 500mg bid 10/7-held on admit.   Lantus 30u at hs and Novolog sliding scale and carb counting (see endocrine plan below). Only on 10u on readmit as not eating much but add medium SS  Complication of DM with 2 left toes amputated 7/2020  - hx Adrenal Insufficiency: PTA on hydrocortisone 20mg/d; was held on admission for unclear reason and not listed in notes; resumed 10/3 (perrmitted on Car-T protocols).     Dispo: will be admitted until CRS/fevers subside.  Will need follow-up through at least D14 post CAR-T (Tues 10/13). Pt has PICC and port-a-cath. Does have line care coverage but not initiated as we anticipated pulling PICC line early next week if he remains stable.     Tequila Garcia NP  [phone]    Diabetes plan-  Follow up with Dr. Turner to reassess Novolog dosing, alternatives/adjuncts to insulin (Trulicity, for example)   Diabetes Regimen for home  Metformin 500 mg two times per day, before meals  Lantus 30 units in the evening  Novolog 1 unit per 3.5 grams of carbohydrate   Novolog Correction  scale  Before meals--  -169 give 2 units.  -199 give 4 units.  -229 give 6 units.  -259 give 8 units.  -289 give 10 units.  -319 give 12 units.  -349 give 14 units.  BG >/= 350 give 16 units.  At bedtime--  -229 give 2 units.  -259 give 4 units.  -289 give 6 units.  -319 give 8 units.  -349 give 10 units.  BG >/= 350 give 12 units.     Day of  intrathecal dexamethasone  Increase your carb coverage to 1 unit per 2 grams starting with supper  Increase your Lantus to 45 units that night  Next day use 1 unit per 2 grams for breakfast and lunch, return to normal ratio at supper  Return to normal Lantus dose 30 units          BMT Staff:  The patient was discussed on morning rounds with the nurses, mid level provider, and house staff and seen and examined by me. All labs and imaging were reviewed. I reviewed events over the last 24 hours including vitals and flow sheets. I agree with the above note and have been responsible for the care plan and interpretation of progress.     Subjective:  Reports feeling overall quite well this morning with no specific concerns or symptoms, he reports feeling much better since he last presented to the ED yesterday, no abd pain, no C/NS, no N/V/C/D, no GI or  complaints, has a mild productive cough which he reports is on and off for him for many weeks and not significantly different, no HA/LH/Dizziness.      Vitals reviewed, labs reviewed  PE:  NAD, Alert, oriented x3  HEENT: moist mmm, no oral ulcers  Heart: RRR  Lungs: Right upper and middle lobe crackles  Abdomen: +BS, soft non tender, non distended  LE: no edema  Skin: no rashes     Assessment and Plan:     70-year-old man with an aggressive non-Hodgkin's lymphoma who is now 11 days after [facility] T-cell therapy.  Presented overnight on day +9 with fever, hypotension and new hypoxia, that was concerning for CRS grade 2 so patient was given a dose of Tocilizumab  and received fluid resuscitation, however ferritin is not elevated and patient is neutropenic with a CT revealing new consolidation and therefore this is compatible with infection/neutropenic fever/sepsis given the timeline of presentation and symptoms with laboratory findings.  Afebrile overnight, with normalization of blood pressure after fluid resuscitation.  No evidence of neurotoxicity. 10/8 started on cefepime for neutropenic fever, cultures now positive for gram positive cocci in clusters on second day of incubation added vancomycin on 10/10. Continue to closely monitored with modification with any clinical changes.       Quang Mclaughlin MD

## 2020-10-11 LAB
ANION GAP SERPL CALCULATED.3IONS-SCNC: 9 MMOL/L (ref 3–14)
BACTERIA SPEC CULT: NORMAL
BASOPHILS # BLD AUTO: 0 10E9/L (ref 0–0.2)
BASOPHILS NFR BLD AUTO: 0 %
BUN SERPL-MCNC: 7 MG/DL (ref 7–30)
CALCIUM SERPL-MCNC: 8.4 MG/DL (ref 8.5–10.1)
CHLORIDE SERPL-SCNC: 105 MMOL/L (ref 94–109)
CO2 SERPL-SCNC: 25 MMOL/L (ref 20–32)
CREAT SERPL-MCNC: 0.67 MG/DL (ref 0.66–1.25)
DIFFERENTIAL METHOD BLD: ABNORMAL
EOSINOPHIL # BLD AUTO: 0 10E9/L (ref 0–0.7)
EOSINOPHIL NFR BLD AUTO: 1.2 %
ERYTHROCYTE [DISTWIDTH] IN BLOOD BY AUTOMATED COUNT: 15.8 % (ref 10–15)
GFR SERPL CREATININE-BSD FRML MDRD: >90 ML/MIN/{1.73_M2}
GLUCOSE SERPL-MCNC: 168 MG/DL (ref 70–99)
HCT VFR BLD AUTO: 31.6 % (ref 40–53)
HGB BLD-MCNC: 10.3 G/DL (ref 13.3–17.7)
IMM GRANULOCYTES # BLD: 0 10E9/L (ref 0–0.4)
IMM GRANULOCYTES NFR BLD: 2.4 %
LYMPHOCYTES # BLD AUTO: 0.1 10E9/L (ref 0.8–5.3)
LYMPHOCYTES NFR BLD AUTO: 8.4 %
Lab: NORMAL
MCH RBC QN AUTO: 28.5 PG (ref 26.5–33)
MCHC RBC AUTO-ENTMCNC: 32.6 G/DL (ref 31.5–36.5)
MCV RBC AUTO: 87 FL (ref 78–100)
MONOCYTES # BLD AUTO: 0.4 10E9/L (ref 0–1.3)
MONOCYTES NFR BLD AUTO: 23.4 %
NEUTROPHILS # BLD AUTO: 1.1 10E9/L (ref 1.6–8.3)
NEUTROPHILS NFR BLD AUTO: 64.6 %
NRBC # BLD AUTO: 0 10*3/UL
NRBC BLD AUTO-RTO: 0 /100
PLATELET # BLD AUTO: 51 10E9/L (ref 150–450)
POTASSIUM SERPL-SCNC: 3.7 MMOL/L (ref 3.4–5.3)
RBC # BLD AUTO: 3.62 10E12/L (ref 4.4–5.9)
SODIUM SERPL-SCNC: 138 MMOL/L (ref 133–144)
SPECIMEN SOURCE: NORMAL
WBC # BLD AUTO: 1.7 10E9/L (ref 4–11)

## 2020-10-11 PROCEDURE — 99233 SBSQ HOSP IP/OBS HIGH 50: CPT | Performed by: INTERNAL MEDICINE

## 2020-10-11 PROCEDURE — 250N000013 HC RX MED GY IP 250 OP 250 PS 637: Performed by: STUDENT IN AN ORGANIZED HEALTH CARE EDUCATION/TRAINING PROGRAM

## 2020-10-11 PROCEDURE — 85025 COMPLETE CBC W/AUTO DIFF WBC: CPT | Performed by: STUDENT IN AN ORGANIZED HEALTH CARE EDUCATION/TRAINING PROGRAM

## 2020-10-11 PROCEDURE — 80048 BASIC METABOLIC PNL TOTAL CA: CPT | Performed by: STUDENT IN AN ORGANIZED HEALTH CARE EDUCATION/TRAINING PROGRAM

## 2020-10-11 PROCEDURE — 258N000003 HC RX IP 258 OP 636: Performed by: STUDENT IN AN ORGANIZED HEALTH CARE EDUCATION/TRAINING PROGRAM

## 2020-10-11 PROCEDURE — 87040 BLOOD CULTURE FOR BACTERIA: CPT | Performed by: INTERNAL MEDICINE

## 2020-10-11 PROCEDURE — 250N000011 HC RX IP 250 OP 636: Performed by: INTERNAL MEDICINE

## 2020-10-11 PROCEDURE — 250N000011 HC RX IP 250 OP 636: Performed by: STUDENT IN AN ORGANIZED HEALTH CARE EDUCATION/TRAINING PROGRAM

## 2020-10-11 PROCEDURE — 258N000003 HC RX IP 258 OP 636: Performed by: INTERNAL MEDICINE

## 2020-10-11 PROCEDURE — 206N000001 HC R&B BMT UMMC

## 2020-10-11 RX ADMIN — VANCOMYCIN HYDROCHLORIDE 1750 MG: 10 INJECTION, POWDER, LYOPHILIZED, FOR SOLUTION INTRAVENOUS at 02:03

## 2020-10-11 RX ADMIN — INSULIN ASPART 3 UNITS: 100 INJECTION, SOLUTION INTRAVENOUS; SUBCUTANEOUS at 18:35

## 2020-10-11 RX ADMIN — INSULIN GLARGINE 10 UNITS: 100 INJECTION, SOLUTION SUBCUTANEOUS at 09:00

## 2020-10-11 RX ADMIN — SODIUM CHLORIDE: 900 INJECTION INTRAVENOUS at 12:34

## 2020-10-11 RX ADMIN — CEFEPIME HYDROCHLORIDE 2 G: 2 INJECTION, POWDER, FOR SOLUTION INTRAVENOUS at 15:37

## 2020-10-11 RX ADMIN — ALLOPURINOL 300 MG: 300 TABLET ORAL at 08:58

## 2020-10-11 RX ADMIN — FLUCONAZOLE 200 MG: 200 TABLET ORAL at 08:58

## 2020-10-11 RX ADMIN — VANCOMYCIN HYDROCHLORIDE 1750 MG: 10 INJECTION, POWDER, LYOPHILIZED, FOR SOLUTION INTRAVENOUS at 12:58

## 2020-10-11 RX ADMIN — CEFEPIME HYDROCHLORIDE 2 G: 2 INJECTION, POWDER, FOR SOLUTION INTRAVENOUS at 23:30

## 2020-10-11 RX ADMIN — CEFEPIME HYDROCHLORIDE 2 G: 2 INJECTION, POWDER, FOR SOLUTION INTRAVENOUS at 08:58

## 2020-10-11 RX ADMIN — PANTOPRAZOLE SODIUM 40 MG: 40 TABLET, DELAYED RELEASE ORAL at 08:58

## 2020-10-11 RX ADMIN — HYDROCORTISONE 20 MG: 20 TABLET ORAL at 08:58

## 2020-10-11 RX ADMIN — ACYCLOVIR 800 MG: 800 TABLET ORAL at 08:58

## 2020-10-11 RX ADMIN — LORAZEPAM 0.5 MG: 0.5 TABLET ORAL at 02:03

## 2020-10-11 RX ADMIN — ACETAMINOPHEN 650 MG: 325 TABLET, FILM COATED ORAL at 08:57

## 2020-10-11 RX ADMIN — Medication 40 MG: at 09:04

## 2020-10-11 RX ADMIN — SODIUM CHLORIDE: 900 INJECTION INTRAVENOUS at 10:07

## 2020-10-11 RX ADMIN — ACYCLOVIR 800 MG: 800 TABLET ORAL at 19:16

## 2020-10-11 RX ADMIN — MELATONIN TAB 3 MG 3 MG: 3 TAB at 21:35

## 2020-10-11 RX ADMIN — LORAZEPAM 0.5 MG: 0.5 TABLET ORAL at 12:57

## 2020-10-11 RX ADMIN — INSULIN ASPART 1 UNITS: 100 INJECTION, SOLUTION INTRAVENOUS; SUBCUTANEOUS at 08:59

## 2020-10-11 ASSESSMENT — MIFFLIN-ST. JEOR: SCORE: 1630.83

## 2020-10-11 ASSESSMENT — ACTIVITIES OF DAILY LIVING (ADL)
ADLS_ACUITY_SCORE: 11

## 2020-10-11 NOTE — PLAN OF CARE
Problem: Adult Inpatient Plan of Care  Goal: Plan of Care Review  Outcome: No Change  Goal: Patient-Specific Goal (Individualized)  Outcome: No Change  Goal: Absence of Hospital-Acquired Illness or Injury  Outcome: No Change  Intervention: Identify and Manage Fall Risk  Recent Flowsheet Documentation  Taken 10/11/2020 0800 by Ashley Wen RN  Safety Promotion/Fall Prevention: clutter free environment maintained  Intervention: Prevent Skin Injury  Recent Flowsheet Documentation  Taken 10/11/2020 0800 by Ashley Wen RN  Body Position: position changed independently  Intervention: Prevent and Manage VTE (Venous Thromboembolism) Risk  Recent Flowsheet Documentation  Taken 10/11/2020 0800 by Ashley Wen RN  VTE Prevention/Management:   ambulation promoted   fluids promoted  Intervention: Prevent Infection  Recent Flowsheet Documentation  Taken 10/11/2020 0800 by Ashley Wen RN  Infection Prevention:   rest/sleep promoted   single patient room provided  Goal: Optimal Comfort and Wellbeing  Outcome: No Change  Intervention: Provide Person-Centered Care  Recent Flowsheet Documentation  Taken 10/11/2020 0800 by Ashley Wen RN  Trust Relationship/Rapport:   care explained   choices provided   questions answered   thoughts/feelings acknowledged  Goal: Readiness for Transition of Care  Outcome: No Change  Temp max 99.4. Neuro's intact. Wrote the sentence the wilson jumped over the log and handwriting looks the same. Sharp side pain and received tylenol. Queasy and received ativan. Blood sugar 158,102. Had 83 grams of carbs for breakfast and 84 for lunch. Received study drug. Small formed stool. Independent.

## 2020-10-11 NOTE — PLAN OF CARE
Afebrile. OVSS on RA. Pt denies N/V/D. Pt typically takes tylenol PM for sleep, tylenol x1 given for mild HA and sleep with benadryl x1 with some relief. Melatonin x1 and PO ativan x1 given for sleep with relief. Repeat blood cultures drawn after midnight. K+ recheck on evenings 3.3, 40 mEq K+ given. No replacements needed this morning. Fair appetite, on carb counts. Infrequent, nonproductive cough, no sputum to collect. Continue with POC.       Problem: Adult Inpatient Plan of Care  Goal: Plan of Care Review  Outcome: No Change     Problem: Adult Inpatient Plan of Care  Goal: Optimal Comfort and Wellbeing  Outcome: No Change

## 2020-10-11 NOTE — PLAN OF CARE
Pt AVSS. C/o headache and slightly blurred vision (thought d/t higher BGs). Meal and sliding scale insulin added. Potassium replaced- needs recheck tonight at 2030. Positive blood cultures from 10/8 (see prog notes). Vanco started and repeat cultures to be drawn overnight tonight. Eating well and up ad ede; showered. No immediate nursing concerns. Continue POC.     Problem: Adult Inpatient Plan of Care  Goal: Plan of Care Review  Outcome: No Change     Problem: Adult Inpatient Plan of Care  Goal: Absence of Hospital-Acquired Illness or Injury  Intervention: Prevent Infection  Recent Flowsheet Documentation  Taken 10/10/2020 0800 by Aliya Eid, RN  Infection Prevention:   single patient room provided   rest/sleep promoted   personal protective equipment utilized   hand hygiene promoted   equipment surfaces disinfected   environmental surveillance performed   cohorting utilized   visitors restricted/screened

## 2020-10-11 NOTE — PROGRESS NOTES
MD ([phone]) paged regarding request for benadryl for sleep. Typically takes tylenol PM which has 25 mg benadryl in it for sleep.

## 2020-10-11 NOTE — PROGRESS NOTES
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 Ate lunch and dinner last night. No GI symptoms. Afebrile. On RA.    Review of Systems    8 point review with pertinent positive and negatives     PHYSICAL EXAM    KPS: 90    /56 (BP Location: Right arm)   Pulse 84   Temp 99.4  F (37.4  C) (Axillary)   Resp 16   Ht 1.727 m (5' 8\")   Wt 88.6 kg (195 lb 6.4 oz)   SpO2 96%   BMI 29.71 kg/m       General: NAD; rommel complexion  Eyes: sclera anicteric   Nose/Mouth/Throat: OP moist, no ulcerations   Lungs: RUL with bronchial breath sounds  Cardiovascular: RRR, no M/R/G   Abdominal/Rectal: +BS, soft, NT, ND, No HSM. Large ventral hernia chronic, stable  Lymphatics: trace LE edema  Skin: No rash  MSK: Toes, second and third on left are missing (s/p amputation 2/2 diabetic complications)  Neuro: A&O ICE 10/10 10/11   Additional Findings: Right chest port a cath, left upper arm PICC NT, dressing cdi    Labs:  Lab Results   Component Value Date    WBC 1.7 (L) 10/11/2020    ANEU 1.1 (L) 10/11/2020    HGB 10.3 (L) 10/11/2020    HCT 31.6 (L) 10/11/2020    PLT 51 (L) 10/11/2020     10/11/2020    POTASSIUM 3.7 10/11/2020    CHLORIDE 105 10/11/2020    CO2 25 10/11/2020     (H) 10/11/2020    BUN 7 10/11/2020    CR 0.67 10/11/2020    MAG 1.7 10/10/2020    INR 1.15 (H) 10/10/2020    BILITOTAL 0.4 10/10/2020    AST 10 10/10/2020    ALT 19 10/10/2020    ALKPHOS 60 10/10/2020    PROTTOTAL 5.0 (L) 10/10/2020    ALBUMIN 2.7 (L) 10/10/2020       ASSESSMENT/PLAN   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 T cells collected 8/31/2020. 2017-45 [place], 2019-35: IT dex + simvastatin for JUAQIUN prevention. Day 12, readmitted with g2 crs/neutropenic " fever/hypotension/hypoxia.    Day 0: LP with IT dex. PICC line placement, [facility] Car-t infusion   Day 7: LP under fluoroscopy w/IT dex.   Day 13 (10/12) at 1pm in XR: LP under fluoroscopy w/IT dex. (AKramer will push dex)    1.  BMT:  DLBCL  Axi-cell product with CNS prevention protocol, day 0  CNS prophy: simvastatin 40mg tied to IDS drug  - cont allopurinol    2.  HEME: Keep Hgb>7 and plts>10K. No pre-meds.                            3.  ID: Neutropenic fever on admit, cefepime started. Blood culture 10/8 + Staph hominis, vanco added 10/11. CCT with improvement in RLL but worse in RUL. RVP neg. S pneumo -, legonella -  - Prophy (levo), fluc, acy, IV pentamidine (9/23).       4.  GI: no n/v. +decreased appetite  - prn Ativan, Compazine  - Protonix for GI prophy.   - Colace prn constipation    5.  FEN/Renal: Creat, lytes wnl.    6. Endo: Type 2 DM. See DM plan below.  - had been on metformin during CAR-T (risk of lactic acidosis); cautiously resumed at half-dose 500mg bid 10/7-held on admit.   Lantus 30u at hs and Novolog sliding scale and carb counting (see endocrine plan below). Only on 10u on readmit as not eating much but add medium SS  Complication of DM with 2 left toes amputated 7/2020  - hx Adrenal Insufficiency: PTA on hydrocortisone 20mg/d; was held on admission for unclear reason and not listed in notes; resumed 10/3 (perrmitted on Car-T protocols).     Dispo: will be admitted until CRS/fevers subside.  Will need follow-up through at least D14 post CAR-T (Tues 10/13). Pt has PICC and port-a-cath. Does have line care coverage but not initiated as we anticipated pulling PICC line early next week if he remains stable.     Tequila Garcia NP  [id number]    Diabetes plan-  Follow up with Dr. Turner to reassess Novolog dosing, alternatives/adjuncts to insulin (Trulicity, for example)   Diabetes Regimen for home  Metformin 500 mg two times per day, before meals  Lantus 30 units in the evening  Novolog 1 unit per 3.5  grams of carbohydrate   Novolog Correction scale  Before meals--  -169 give 2 units.  -199 give 4 units.  -229 give 6 units.  -259 give 8 units.  -289 give 10 units.  -319 give 12 units.  -349 give 14 units.  BG >/= 350 give 16 units.  At bedtime--  -229 give 2 units.  -259 give 4 units.  -289 give 6 units.  -319 give 8 units.  -349 give 10 units.  BG >/= 350 give 12 units.     Day of  intrathecal dexamethasone  Increase your carb coverage to 1 unit per 2 grams starting with supper  Increase your Lantus to 45 units that night  Next day use 1 unit per 2 grams for breakfast and lunch, return to normal ratio at supper  Return to normal Lantus dose 30 units          BMT Staff:  The patient was discussed on morning rounds with the nurses, mid level provider, and house staff and seen and examined by me. All labs and imaging were reviewed. I reviewed events over the last 24 hours including vitals and flow sheets. I agree with the above note and have been responsible for the care plan and interpretation of progress.     Subjective:  Reports feeling overall quite well with no specific concerns or symptoms, no abd pain, no C/NS, no N/V/C/D, no GI or  complaints, has a mild productive cough which he reports is on and off for him for many weeks and not significantly different unable to give sample, no HA/LH/Dizziness.      Vitals reviewed, labs reviewed  PE:  NAD, Alert, oriented x3  HEENT: moist mmm, no oral ulcers  Heart: RRR  Lungs: Right upper and middle lobe crackles  Abdomen: +BS, soft non tender, non distended  LE: no edema  Skin: no rashes     Assessment and Plan:     70-year-old man with an aggressive non-Hodgkin's lymphoma who is now 11 days after [facility] T-cell therapy.  Presented overnight on day +9 with fever, hypotension and new hypoxia, that was concerning for CRS grade 2 so patient was given a dose of Tocilizumab and received fluid  resuscitation, however ferritin is not elevated and patient is neutropenic with a CT revealing new consolidation and therefore this is compatible with infection/neutropenic fever/sepsis given the timeline of presentation and symptoms with laboratory findings.  Afebrile overnight, with normalization of blood pressure after fluid resuscitation.  No evidence of neurotoxicity. 10/8 started on cefepime for neutropenic fever, cultures positive for Staph hominis on second day of incubation added vancomycin on 10/10. Continue to closely monitored with modification with any clinical changes.       Quang Mclaughlin MD

## 2020-10-11 NOTE — PROGRESS NOTES
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 a 70 year old year old male who received infusion, currently day 11 of CAR-T cell therapy [facility]    Vital Signs: /56 (BP Location: Right arm)   Pulse 84   Temp 99.4  F (37.4  C) (Axillary)   Resp 16   Ht 1.727 m (5' 8\")   Wt 88.6 kg (195 lb 6.4 oz)   SpO2 96%   BMI 29.71 kg/m      Organ CAR-T toxicity:    Cardiac: none   Pulmonary: none   Renal: none   Liver: none   Coagulopathy:none   Neurologic: none   Immune: none    Overall CRS grade 1CRS    CRS Parameter Grade 1 Grade 2 Grade 3 Grade 4   Fever* Tm >= 100.4 degrees F Tm >= 100.4 degrees F Tm >= 100.4 degrees F Tm >= to 100.4 degrees F       With Either:  Hypotension None Responsive to Fluids Requiring 1 vasopressor (w/ or w/o vasopressin) Requiring multiple vasopressors (excluding vasopressin)     And/or  Hypoxia None Low-flow nasal cannula or blow-by High-flow nasal cannula, face mask, non-rebreather mask, or Venturi mask Requiring positive pressure (CPAP, BiPAP intubation and mechanical ventilation)       * Definition of High Dose Pressors for Grade 4  Vasopressor Duration >= 3 hours   Norepinephrine monotherapy >= 20 mcg/kg/minute   Dopamine monotherapy >= 10 mcg/kg/minute   Phenylephrine monotherapy >= 200 mcg/minute   Epinephrine monotherapy >= 10 mcg/minute   If on vasopressin + another agent High dose of vasopressin + norepi equivalent (using VASST formula below) is >= 10 mcg/minute   If on combination vasopressors, not including vasopressin Norepi equivalent of > 20 mcg/minute (using VASST formula below)     VASST formula: Norepi equivalent dose = [NE (mcg/min)] + [dopamine (mcg/min) / 2] + [epinephrine (mcg/min)] + [phenylephrine (mcg/min) / 10]    ICE Assessment  Orientation to year, month, city, hospital: 4 points  Name 3 objects (e.g., point to clock, pen, button): 3 points  Following " commands: (e.g., Show me 2 fingers): 1 point   Write a standard sentence (e.g., The wilson jumped over the log): 1 point   Count backwards from 100 by ten: 1 point  Total points: 10: No impairment    CRS Summary  Does patient have CRS? no  Current CRS stgstrstastdstest:st st1st What is the maximum severity to date: 2  What therapy was given: yes  Has CRS grade changed? yes  Has CRS resolved? yes    Neurotoxicity Summary  Does patient have neurotoxicity? No  Current Neurotoxicity score (0-10): 10  What is the maximum severity to date:  0  What therapy was given: no  Has neurotoxicity resolved? No neurotox

## 2020-10-12 ENCOUNTER — HOSPITAL ENCOUNTER (OUTPATIENT)
Dept: INTERVENTIONAL RADIOLOGY/VASCULAR | Facility: CLINIC | Age: 70
Discharge: HOME | DRG: 809 | End: 2020-10-12
Attending: PHYSICIAN ASSISTANT | Admitting: PHYSICIAN ASSISTANT
Payer: COMMERCIAL

## 2020-10-12 DIAGNOSIS — C85.90 NON-HODGKIN'S LYMPHOMA, UNSPECIFIED BODY REGION, UNSPECIFIED NON-HODGKIN LYMPHOMA TYPE (H): ICD-10-CM

## 2020-10-12 DIAGNOSIS — C83.38 DIFFUSE LARGE B-CELL LYMPHOMA OF LYMPH NODES OF MULTIPLE REGIONS (H): ICD-10-CM

## 2020-10-12 DIAGNOSIS — Z86.2 PERSONAL HISTORY OF DISEASES OF BLOOD AND BLOOD-FORMING ORGANS: ICD-10-CM

## 2020-10-12 LAB
ABO + RH BLD: NORMAL
ABO + RH BLD: NORMAL
ALBUMIN SERPL-MCNC: 2.9 G/DL (ref 3.4–5)
ALP SERPL-CCNC: 65 U/L (ref 40–150)
ALT SERPL W P-5'-P-CCNC: 26 U/L (ref 0–70)
ANION GAP SERPL CALCULATED.3IONS-SCNC: 7 MMOL/L (ref 3–14)
AST SERPL W P-5'-P-CCNC: 16 U/L (ref 0–45)
BACTERIA SPEC CULT: ABNORMAL
BASOPHILS # BLD AUTO: 0 10E9/L (ref 0–0.2)
BASOPHILS NFR BLD AUTO: 0.5 %
BILIRUB DIRECT SERPL-MCNC: 0.1 MG/DL (ref 0–0.2)
BILIRUB SERPL-MCNC: 0.6 MG/DL (ref 0.2–1.3)
BLD GP AB SCN SERPL QL: NORMAL
BLOOD BANK CMNT PATIENT-IMP: NORMAL
BUN SERPL-MCNC: 6 MG/DL (ref 7–30)
CALCIUM SERPL-MCNC: 8.1 MG/DL (ref 8.5–10.1)
CHLORIDE SERPL-SCNC: 102 MMOL/L (ref 94–109)
CK SERPL-CCNC: 16 U/L (ref 30–300)
CO2 SERPL-SCNC: 28 MMOL/L (ref 20–32)
CREAT SERPL-MCNC: 0.67 MG/DL (ref 0.66–1.25)
DIFFERENTIAL METHOD BLD: ABNORMAL
EOSINOPHIL # BLD AUTO: 0 10E9/L (ref 0–0.7)
EOSINOPHIL NFR BLD AUTO: 1 %
ERYTHROCYTE [DISTWIDTH] IN BLOOD BY AUTOMATED COUNT: 15.8 % (ref 10–15)
GFR SERPL CREATININE-BSD FRML MDRD: >90 ML/MIN/{1.73_M2}
GLUCOSE BLDC GLUCOMTR-MCNC: 114 MG/DL (ref 70–99)
GLUCOSE BLDC GLUCOMTR-MCNC: 129 MG/DL (ref 70–99)
GLUCOSE BLDC GLUCOMTR-MCNC: 250 MG/DL (ref 70–99)
GLUCOSE SERPL-MCNC: 172 MG/DL (ref 70–99)
HCT VFR BLD AUTO: 32.6 % (ref 40–53)
HGB BLD-MCNC: 10.7 G/DL (ref 13.3–17.7)
IMM GRANULOCYTES # BLD: 0.1 10E9/L (ref 0–0.4)
IMM GRANULOCYTES NFR BLD: 2.5 %
INR PPP: 1.15 (ref 0.86–1.14)
LYMPHOCYTES # BLD AUTO: 0.2 10E9/L (ref 0.8–5.3)
LYMPHOCYTES NFR BLD AUTO: 12 %
MAGNESIUM SERPL-MCNC: 1.4 MG/DL (ref 1.6–2.3)
MAGNESIUM SERPL-MCNC: 2.3 MG/DL (ref 1.6–2.3)
MCH RBC QN AUTO: 28.3 PG (ref 26.5–33)
MCHC RBC AUTO-ENTMCNC: 32.8 G/DL (ref 31.5–36.5)
MCV RBC AUTO: 86 FL (ref 78–100)
MONOCYTES # BLD AUTO: 0.5 10E9/L (ref 0–1.3)
MONOCYTES NFR BLD AUTO: 27 %
NEUTROPHILS # BLD AUTO: 1.1 10E9/L (ref 1.6–8.3)
NEUTROPHILS NFR BLD AUTO: 57 %
NRBC # BLD AUTO: 0 10*3/UL
NRBC BLD AUTO-RTO: 0 /100
PHOSPHATE SERPL-MCNC: 2.7 MG/DL (ref 2.5–4.5)
PLATELET # BLD AUTO: 63 10E9/L (ref 150–450)
PLATELET # BLD EST: ABNORMAL 10*3/UL
POTASSIUM SERPL-SCNC: 3.3 MMOL/L (ref 3.4–5.3)
POTASSIUM SERPL-SCNC: 3.4 MMOL/L (ref 3.4–5.3)
PROT SERPL-MCNC: 5.1 G/DL (ref 6.8–8.8)
RBC # BLD AUTO: 3.78 10E12/L (ref 4.4–5.9)
SODIUM SERPL-SCNC: 137 MMOL/L (ref 133–144)
SPECIMEN EXP DATE BLD: NORMAL
SPECIMEN SOURCE: ABNORMAL
VANCOMYCIN SERPL-MCNC: 11.6 MG/L
WBC # BLD AUTO: 2 10E9/L (ref 4–11)

## 2020-10-12 PROCEDURE — 80076 HEPATIC FUNCTION PANEL: CPT | Performed by: STUDENT IN AN ORGANIZED HEALTH CARE EDUCATION/TRAINING PROGRAM

## 2020-10-12 PROCEDURE — 250N000011 HC RX IP 250 OP 636: Performed by: NURSE PRACTITIONER

## 2020-10-12 PROCEDURE — 85025 COMPLETE CBC W/AUTO DIFF WBC: CPT | Performed by: STUDENT IN AN ORGANIZED HEALTH CARE EDUCATION/TRAINING PROGRAM

## 2020-10-12 PROCEDURE — 250N000013 HC RX MED GY IP 250 OP 250 PS 637: Performed by: STUDENT IN AN ORGANIZED HEALTH CARE EDUCATION/TRAINING PROGRAM

## 2020-10-12 PROCEDURE — 83735 ASSAY OF MAGNESIUM: CPT | Performed by: INTERNAL MEDICINE

## 2020-10-12 PROCEDURE — 85610 PROTHROMBIN TIME: CPT | Performed by: INTERNAL MEDICINE

## 2020-10-12 PROCEDURE — 999N001017 HC STATISTIC GLUCOSE BY METER IP

## 2020-10-12 PROCEDURE — 250N000011 HC RX IP 250 OP 636: Performed by: STUDENT IN AN ORGANIZED HEALTH CARE EDUCATION/TRAINING PROGRAM

## 2020-10-12 PROCEDURE — 258N000003 HC RX IP 258 OP 636: Performed by: INTERNAL MEDICINE

## 2020-10-12 PROCEDURE — 250N000009 HC RX 250: Performed by: RADIOLOGY

## 2020-10-12 PROCEDURE — 250N000011 HC RX IP 250 OP 636: Performed by: INTERNAL MEDICINE

## 2020-10-12 PROCEDURE — 258N000003 HC RX IP 258 OP 636: Performed by: STUDENT IN AN ORGANIZED HEALTH CARE EDUCATION/TRAINING PROGRAM

## 2020-10-12 PROCEDURE — 80202 ASSAY OF VANCOMYCIN: CPT | Performed by: INTERNAL MEDICINE

## 2020-10-12 PROCEDURE — 82550 ASSAY OF CK (CPK): CPT | Performed by: STUDENT IN AN ORGANIZED HEALTH CARE EDUCATION/TRAINING PROGRAM

## 2020-10-12 PROCEDURE — 99207 XR LUMBAR PUNCTURE INTRATHECAL CHEMO ADMIN: CPT | Performed by: RADIOLOGY

## 2020-10-12 PROCEDURE — 99233 SBSQ HOSP IP/OBS HIGH 50: CPT | Performed by: INTERNAL MEDICINE

## 2020-10-12 PROCEDURE — 87040 BLOOD CULTURE FOR BACTERIA: CPT | Performed by: NURSE PRACTITIONER

## 2020-10-12 PROCEDURE — 83735 ASSAY OF MAGNESIUM: CPT | Performed by: STUDENT IN AN ORGANIZED HEALTH CARE EDUCATION/TRAINING PROGRAM

## 2020-10-12 PROCEDURE — 206N000001 HC R&B BMT UMMC

## 2020-10-12 PROCEDURE — 84132 ASSAY OF SERUM POTASSIUM: CPT | Performed by: INTERNAL MEDICINE

## 2020-10-12 PROCEDURE — 3E0R33Z INTRODUCTION OF ANTI-INFLAMMATORY INTO SPINAL CANAL, PERCUTANEOUS APPROACH: ICD-10-PCS | Performed by: RADIOLOGY

## 2020-10-12 PROCEDURE — 80048 BASIC METABOLIC PNL TOTAL CA: CPT | Performed by: STUDENT IN AN ORGANIZED HEALTH CARE EDUCATION/TRAINING PROGRAM

## 2020-10-12 PROCEDURE — 86901 BLOOD TYPING SEROLOGIC RH(D): CPT | Performed by: STUDENT IN AN ORGANIZED HEALTH CARE EDUCATION/TRAINING PROGRAM

## 2020-10-12 PROCEDURE — 00JU3ZZ INSPECTION OF SPINAL CANAL, PERCUTANEOUS APPROACH: ICD-10-PCS | Performed by: RADIOLOGY

## 2020-10-12 PROCEDURE — 999N001121 HC STATISTIC RESEARCH KIT COLLECTION

## 2020-10-12 PROCEDURE — 84100 ASSAY OF PHOSPHORUS: CPT | Performed by: STUDENT IN AN ORGANIZED HEALTH CARE EDUCATION/TRAINING PROGRAM

## 2020-10-12 PROCEDURE — 86900 BLOOD TYPING SEROLOGIC ABO: CPT | Performed by: STUDENT IN AN ORGANIZED HEALTH CARE EDUCATION/TRAINING PROGRAM

## 2020-10-12 PROCEDURE — 96450 CHEMOTHERAPY INTO CNS: CPT

## 2020-10-12 PROCEDURE — 86850 RBC ANTIBODY SCREEN: CPT | Performed by: STUDENT IN AN ORGANIZED HEALTH CARE EDUCATION/TRAINING PROGRAM

## 2020-10-12 RX ADMIN — FLUCONAZOLE 200 MG: 200 TABLET ORAL at 08:53

## 2020-10-12 RX ADMIN — VANCOMYCIN HYDROCHLORIDE 1750 MG: 10 INJECTION, POWDER, LYOPHILIZED, FOR SOLUTION INTRAVENOUS at 14:44

## 2020-10-12 RX ADMIN — ACYCLOVIR 800 MG: 800 TABLET ORAL at 19:32

## 2020-10-12 RX ADMIN — CEFEPIME HYDROCHLORIDE 2 G: 2 INJECTION, POWDER, FOR SOLUTION INTRAVENOUS at 16:46

## 2020-10-12 RX ADMIN — ACYCLOVIR 800 MG: 800 TABLET ORAL at 08:53

## 2020-10-12 RX ADMIN — POTASSIUM CHLORIDE 20 MEQ: 29.8 INJECTION, SOLUTION INTRAVENOUS at 10:01

## 2020-10-12 RX ADMIN — INSULIN ASPART 2 UNITS: 100 INJECTION, SOLUTION INTRAVENOUS; SUBCUTANEOUS at 08:52

## 2020-10-12 RX ADMIN — CEFEPIME HYDROCHLORIDE 2 G: 2 INJECTION, POWDER, FOR SOLUTION INTRAVENOUS at 23:45

## 2020-10-12 RX ADMIN — LIDOCAINE HYDROCHLORIDE 5 ML: 10 INJECTION, SOLUTION EPIDURAL; INFILTRATION; INTRACAUDAL; PERINEURAL at 13:55

## 2020-10-12 RX ADMIN — PANTOPRAZOLE SODIUM 40 MG: 40 TABLET, DELAYED RELEASE ORAL at 08:53

## 2020-10-12 RX ADMIN — DEXAMETHASONE SODIUM PHOSPHATE: 10 INJECTION, SOLUTION INTRAMUSCULAR; INTRAVENOUS at 13:48

## 2020-10-12 RX ADMIN — ACETAMINOPHEN 650 MG: 325 TABLET, FILM COATED ORAL at 09:31

## 2020-10-12 RX ADMIN — SODIUM CHLORIDE: 900 INJECTION INTRAVENOUS at 03:22

## 2020-10-12 RX ADMIN — MAGNESIUM SULFATE IN WATER 4 G: 40 INJECTION, SOLUTION INTRAVENOUS at 05:20

## 2020-10-12 RX ADMIN — VANCOMYCIN HYDROCHLORIDE 1750 MG: 10 INJECTION, POWDER, LYOPHILIZED, FOR SOLUTION INTRAVENOUS at 02:04

## 2020-10-12 RX ADMIN — ALLOPURINOL 300 MG: 300 TABLET ORAL at 08:53

## 2020-10-12 RX ADMIN — CEFEPIME HYDROCHLORIDE 2 G: 2 INJECTION, POWDER, FOR SOLUTION INTRAVENOUS at 08:53

## 2020-10-12 RX ADMIN — POTASSIUM CHLORIDE 20 MEQ: 29.8 INJECTION, SOLUTION INTRAVENOUS at 08:56

## 2020-10-12 RX ADMIN — MELATONIN TAB 3 MG 3 MG: 3 TAB at 22:51

## 2020-10-12 RX ADMIN — INSULIN GLARGINE 10 UNITS: 100 INJECTION, SOLUTION SUBCUTANEOUS at 08:54

## 2020-10-12 RX ADMIN — Medication 40 MG: at 09:01

## 2020-10-12 RX ADMIN — HYDROCORTISONE 20 MG: 20 TABLET ORAL at 08:53

## 2020-10-12 ASSESSMENT — ACTIVITIES OF DAILY LIVING (ADL)
ADLS_ACUITY_SCORE: 11

## 2020-10-12 NOTE — PLAN OF CARE
Problem: Adult Inpatient Plan of Care  Goal: Plan of Care Review  Outcome: No Change  Goal: Patient-Specific Goal (Individualized)  Outcome: No Change  Goal: Absence of Hospital-Acquired Illness or Injury  Outcome: No Change  Intervention: Identify and Manage Fall Risk  Recent Flowsheet Documentation  Taken 10/12/2020 0800 by Ashley Wen RN  Safety Promotion/Fall Prevention: clutter free environment maintained  Intervention: Prevent Skin Injury  Recent Flowsheet Documentation  Taken 10/12/2020 0800 by Ashley Wen RN  Body Position: position changed independently  Intervention: Prevent and Manage VTE (Venous Thromboembolism) Risk  Recent Flowsheet Documentation  Taken 10/12/2020 0800 by Ashley Wen RN  VTE Prevention/Management:   ambulation promoted   bleeding risk assessed  Intervention: Prevent Infection  Recent Flowsheet Documentation  Taken 10/12/2020 0800 by Ashley Wen RN  Infection Prevention:   rest/sleep promoted   single patient room provided  Goal: Optimal Comfort and Wellbeing  Outcome: No Change  Intervention: Provide Person-Centered Care  Recent Flowsheet Documentation  Taken 10/12/2020 0800 by Ashley Wen RN  Trust Relationship/Rapport:   care explained   choices provided   questions answered   thoughts/feelings acknowledged  Goal: Readiness for Transition of Care  Outcome: No Change  Vss. Neuro's intact. Wrote the sentence the wilson jumped over the log. LP done and patient is sleeping. Right flank pain and received tylenol. Blood sugar 199 and 129. 84 grams of carbs for breakfast. No lunch. IVF infusing at 100 ml/hr. Replaced K+. Walked in the halls. Medium stool and constipation. Research lab, Mg, K+, blood culture and vancomycin level drawn. Independent.

## 2020-10-12 NOTE — PHARMACY-VANCOMYCIN DOSING SERVICE
Pharmacy Vancomycin Note  Date of Service 2020  Patient's  1950   70 year old, male    Indication: Bacteremia  Goal Trough Level: 15-20 mg/L  Day of Therapy: 3  Current Vancomycin regimen:  1750 mg IV q12h    Current estimated CrCl = Estimated Creatinine Clearance: 114.2 mL/min (based on SCr of 0.67 mg/dL).    Creatinine for last 3 days  10/10/2020:  4:32 AM Creatinine 0.69 mg/dL  10/11/2020:  4:19 AM Creatinine 0.67 mg/dL  10/12/2020:  3:29 AM Creatinine 0.67 mg/dL    Recent Vancomycin Levels (past 3 days)  10/12/2020:  2:30 PM Vancomycin Level 11.6 mg/L    Vancomycin IV Administrations (past 72 hours)                   vancomycin (VANCOCIN) 1,750 mg in sodium chloride 0.9 % 250 mL intermittent infusion (mg) 1,750 mg New Bag 10/12/20 1444     1,750 mg New Bag  0204     1,750 mg New Bag 10/11/20 1258     1,750 mg New Bag  0203     1,750 mg New Bag 10/10/20 1341                Nephrotoxins and other renal medications (From now, onward)    Start     Dose/Rate Route Frequency Ordered Stop    10/13/20 0200  vancomycin (VANCOCIN) 2,000 mg in sodium chloride 0.9 % 250 mL intermittent infusion      2,000 mg (central catheter)  over 60 Minutes Intravenous EVERY 12 HOURS 10/12/20 1530      10/09/20 0800  acyclovir (ZOVIRAX) tablet 800 mg      800 mg Oral 2 TIMES DAILY 10/09/20 0536               Contrast Orders - past 72 hours (72h ago, onward)    None          Interpretation of levels and current regimen:  Trough level is  Subtherapeutic    Has serum creatinine changed > 50% in last 72 hours: No    Urine output:  good urine output    Renal Function: Stable    Plan:  1.  Increase Dose to 2000mg IV q12 hours.  2.  Pharmacy will check trough levels as appropriate in 1-3 Days.    3. Serum creatinine levels will be ordered daily for the first week of therapy and at least twice weekly for subsequent weeks.      Hailey Abdul PharmD  [phone] (text capable)

## 2020-10-12 NOTE — PROGRESS NOTES
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 who received infusion, currently day 12 of CAR-T cell therapy [facility]    Vital Signs: /71 (BP Location: Right arm)   Pulse 82   Temp 98.5  F (36.9  C) (Axillary)   Resp 18   Ht 1.727 m (5' 8\")   Wt 88.6 kg (195 lb 6.4 oz)   SpO2 92%   BMI 29.71 kg/m      Organ CAR-T toxicity:    Cardiac: none   Pulmonary: none   Renal: none   Liver: none   Coagulopathy:none   Neurologic: none   Immune: none    Overall CRS grade 1CRS    CRS Parameter Grade 1 Grade 2 Grade 3 Grade 4   Fever* Tm >= 100.4 degrees F Tm >= 100.4 degrees F Tm >= 100.4 degrees F Tm >= to 100.4 degrees F       With Either:  Hypotension None Responsive to Fluids Requiring 1 vasopressor (w/ or w/o vasopressin) Requiring multiple vasopressors (excluding vasopressin)     And/or  Hypoxia None Low-flow nasal cannula or blow-by High-flow nasal cannula, face mask, non-rebreather mask, or Venturi mask Requiring positive pressure (CPAP, BiPAP intubation and mechanical ventilation)       * Definition of High Dose Pressors for Grade 4  Vasopressor Duration >= 3 hours   Norepinephrine monotherapy >= 20 mcg/kg/minute   Dopamine monotherapy >= 10 mcg/kg/minute   Phenylephrine monotherapy >= 200 mcg/minute   Epinephrine monotherapy >= 10 mcg/minute   If on vasopressin + another agent High dose of vasopressin + norepi equivalent (using VASST formula below) is >= 10 mcg/minute   If on combination vasopressors, not including vasopressin Norepi equivalent of > 20 mcg/minute (using VASST formula below)     VASST formula: Norepi equivalent dose = [NE (mcg/min)] + [dopamine (mcg/min) / 2] + [epinephrine (mcg/min)] + [phenylephrine (mcg/min) / 10]    ICE Assessment  Orientation to year, month, city, hospital: 4 points  Name 3 objects (e.g., point to clock, pen, button): 3 points  Following " commands: (e.g., Show me 2 fingers): 1 point   Write a standard sentence (e.g., The wilson jumped over the log): 1 point   Count backwards from 100 by ten: 1 point  Total points: 10: No impairment    CRS Summary  Does patient have CRS? no  Current CRS stgstrstastdstest:st st1st What is the maximum severity to date: 2  What therapy was given: yes  Has CRS grade changed? yes  Has CRS resolved? yes    Neurotoxicity Summary  Does patient have neurotoxicity? No  Current Neurotoxicity score (0-10): 10  What is the maximum severity to date:  0  What therapy was given: no  Has neurotoxicity resolved? No neurotox

## 2020-10-12 NOTE — PROCEDURES
Protocol 2019-35 Neurotox prophy IT-Dex (3rd of 3)    LP under fluoroscopy - see separate note.  After confirming pt ID, medication and expiration, and free CSF flow, I administered dexamethasone 8 mg in 4 ml 0.9% PF NS over 3 minutes with no immediate complications. Drug verified with XR staff. Pt tolerated without immediate complication. Research sample/paperwork taken to Acute Care lab.    SKINNY BlakelyC  [phone]

## 2020-10-12 NOTE — PROCEDURES
[facility]     Procedure: Image Guided Lumbar Puncture with Intrathecal Steroid Injection    Date/Time: 10/12/2020 2:01 PM  Performed by: Robbin So MD  Authorized by: Robbin So MD     UNIVERSAL PROTOCOL   Site Marked: Yes  Prior Images Obtained and Reviewed:  Yes  Required items: Required blood products, implants, devices and special equipment available    Patient identity confirmed:  Verbally with patient and arm band  NA - No sedation, light sedation, or local anesthesia  Confirmation Checklist:  Patient's identity using two indicators, relevant allergies, procedure was appropriate and matched the consent or emergent situation and correct equipment/implants were available  Time out: Immediately prior to the procedure a time out was called    Universal Protocol: the Joint Commission Universal Protocol was followed    Preparation: Patient was prepped and draped in usual sterile fashion           ANESTHESIA    Local Anesthetic: Lidocaine 1% without epinephrine  Anesthetic Total (mL):  4      SEDATION    Patient Sedated: No    See dictated procedure note for full details.  PROCEDURE   Patient Tolerance:  Patient tolerated the procedure well with no immediate complications  Describe Procedure: L2-3, 3.5 Inch Spinal Needle  Length of time physician/provider present for 1:1 monitoring during sedation: 0

## 2020-10-12 NOTE — PROGRESS NOTES
s-pt with 29gms carb this jayesh for meal. Pre gluc 241 -supplement ins given for level and carbs. Up in room -did nap for 1 hrs stating had gotten poor sleep last jayesh. Iv at 100cc hrly with good uop-presently up in fluids by 150cc. Lungs clear.   b-pt s/p carti with intermitant fevers.  A-stable  r-continue iv fluids and monitor fluid status- encourage interrmitant naps due to interrupted sleep. Offer un-interrupted quiet time when able. Assess for neuro / thermo changes prn.

## 2020-10-12 NOTE — PLAN OF CARE
[id number]: VSS. Alert and oriented. Neuros intact. Uses CPAP during sleep. Denies pain, nausea and vomiting. Slept on and off. Up independently and voiding freely. NS x 100ml/hr infusing. Magnesium 4g infusing and needs recheck at 0930. Still needs potassium. For research lab, no kit available. Plan to do LP today at 1300.  Problem: Adult Inpatient Plan of Care  Goal: Plan of Care Review  Outcome: No Change     Problem: Adult Inpatient Plan of Care  Goal: Patient-Specific Goal (Individualized)  Outcome: No Change     Problem: Adult Inpatient Plan of Care  Goal: Absence of Hospital-Acquired Illness or Injury  Outcome: No Change     Problem: Adult Inpatient Plan of Care  Goal: Absence of Hospital-Acquired Illness or Injury  Intervention: Identify and Manage Fall Risk  Recent Flowsheet Documentation  Taken 10/11/2020 2342 by Princess Jodee Garber RN  Safety Promotion/Fall Prevention:    clutter free environment maintained    fall prevention program maintained    assistive device/personal items within reach    lighting adjusted    nonskid shoes/slippers when out of bed    safety round/check completed     Problem: Adult Inpatient Plan of Care  Goal: Absence of Hospital-Acquired Illness or Injury  Intervention: Prevent Skin Injury  Recent Flowsheet Documentation  Taken 10/11/2020 2342 by Princess Jodee Garber RN  Body Position: position changed independently     Problem: Adult Inpatient Plan of Care  Goal: Absence of Hospital-Acquired Illness or Injury  Intervention: Prevent and Manage VTE (Venous Thromboembolism) Risk  Recent Flowsheet Documentation  Taken 10/11/2020 2342 by Princess Jodee Garber, RN  VTE Prevention/Management:    ambulation promoted    bleeding precautions maintained    bleeding risk assessed    fluids promoted     Problem: Adult Inpatient Plan of Care  Goal: Absence of Hospital-Acquired Illness or Injury  Intervention: Prevent Infection  Recent Flowsheet Documentation  Taken 10/11/2020 2342 by Shirlene  Princess Jodee DAVENPORT RN  Infection Prevention:    personal protective equipment utilized    rest/sleep promoted     Problem: Adult Inpatient Plan of Care  Goal: Optimal Comfort and Wellbeing  Outcome: No Change  Intervention: Provide Person-Centered Care  Recent Flowsheet Documentation  Taken 10/11/2020 2342 by Princess Jodee Garber RN  Trust Relationship/Rapport:    care explained    choices provided    empathic listening provided    thoughts/feelings acknowledged     Problem: Adult Inpatient Plan of Care  Goal: Optimal Comfort and Wellbeing  Intervention: Provide Person-Centered Care  Recent Flowsheet Documentation  Taken 10/11/2020 2342 by Princess Jodee Garber RN  Trust Relationship/Rapport:    care explained    choices provided    empathic listening provided    thoughts/feelings acknowledged     Problem: Adult Inpatient Plan of Care  Goal: Readiness for Transition of Care  Outcome: No Change     Problem: Adjustment to Transplant (Stem Cell/Bone Marrow Transplant)  Goal: Optimal Coping with Transplant  Outcome: No Change  Intervention: Optimize Patient/Family Adjustment Response to Transplant  Recent Flowsheet Documentation  Taken 10/11/2020 2342 by Princess Jodee Garber RN  Supportive Measures: active listening utilized     Problem: Adjustment to Transplant (Stem Cell/Bone Marrow Transplant)  Goal: Optimal Coping with Transplant  Intervention: Optimize Patient/Family Adjustment Response to Transplant  Recent Flowsheet Documentation  Taken 10/11/2020 2342 by Princess Jodee Garber RN  Supportive Measures: active listening utilized     Problem: Bladder Irritation (Stem Cell/Bone Marrow Transplant)  Goal: Symptom-Free Urinary Elimination  Outcome: No Change     Problem: Diarrhea (Stem Cell/Bone Marrow Transplant)  Goal: Diarrhea Symptom Control  Outcome: No Change  Intervention: Manage Diarrhea  Recent Flowsheet Documentation  Taken 10/11/2020 2342 by Princess Jodee Garber RN  Fluid/Electrolyte Management: fluids  provided     Problem: Diarrhea (Stem Cell/Bone Marrow Transplant)  Goal: Diarrhea Symptom Control  Intervention: Manage Diarrhea  Recent Flowsheet Documentation  Taken 10/11/2020 2342 by Princess Jodee Garber RN  Fluid/Electrolyte Management: fluids provided     Problem: Fatigue (Stem Cell/Bone Marrow Transplant)  Goal: Energy Level Supports Daily Activity  Outcome: No Change  Intervention: Manage Fatigue  Recent Flowsheet Documentation  Taken 10/11/2020 2342 by Princess Jodee Garber RN  Fatigue Management: paced activity encouraged  Sleep/Rest Enhancement:    awakenings minimized    regular sleep/rest pattern promoted     Problem: Fatigue (Stem Cell/Bone Marrow Transplant)  Goal: Energy Level Supports Daily Activity  Intervention: Manage Fatigue  Recent Flowsheet Documentation  Taken 10/11/2020 2342 by Princess Jodee Garber RN  Fatigue Management: paced activity encouraged  Sleep/Rest Enhancement:    awakenings minimized    regular sleep/rest pattern promoted     Problem: Hematologic Alteration (Stem Cell/Bone Marrow Transplant)  Goal: Blood Counts Within Acceptable Range  Outcome: No Change  Intervention: Monitor and Manage Hematologic Symptoms  Recent Flowsheet Documentation  Taken 10/11/2020 2342 by Princess Jodee Garber RN  Bleeding Precautions:    blood pressure closely monitored    gentle oral care promoted    monitored for signs of bleeding     Problem: Hematologic Alteration (Stem Cell/Bone Marrow Transplant)  Goal: Blood Counts Within Acceptable Range  Intervention: Monitor and Manage Hematologic Symptoms  Recent Flowsheet Documentation  Taken 10/11/2020 2342 by Princess Jodee Garber RN  Bleeding Precautions:    blood pressure closely monitored    gentle oral care promoted    monitored for signs of bleeding     Problem: Hypersensitivity Reaction (Stem Cell/Bone Marrow Transplant)  Goal: Absence of Hypersensitivity Reaction  Outcome: No Change     Problem: Infection Risk (Stem Cell/Bone Marrow  Transplant)  Goal: Absence of Infection  Outcome: No Change  Intervention: Prevent and Manage Infection  Recent Flowsheet Documentation  Taken 10/11/2020 2342 by Princess Jodee Garber RN  Infection Prevention:    personal protective equipment utilized    rest/sleep promoted  Infection Management: aseptic technique maintained  Isolation Precautions: protective environment maintained     Problem: Infection Risk (Stem Cell/Bone Marrow Transplant)  Goal: Absence of Infection  Intervention: Prevent and Manage Infection  Recent Flowsheet Documentation  Taken 10/11/2020 2342 by Princess Jodee Garber RN  Infection Prevention:    personal protective equipment utilized    rest/sleep promoted  Infection Management: aseptic technique maintained  Isolation Precautions: protective environment maintained     Problem: Mucositis (Stem Cell/Bone Marrow Transplant)  Goal: Mucous Membrane Health and Integrity  Outcome: No Change  Intervention: Maintain Oral Mucous Membrane Integrity  Recent Flowsheet Documentation  Taken 10/11/2020 2342 by Princess Jodee Garber RN  Oral Mucous Membrane Protection: nonirritating oral fluids promoted     Problem: Mucositis (Stem Cell/Bone Marrow Transplant)  Goal: Mucous Membrane Health and Integrity  Intervention: Maintain Oral Mucous Membrane Integrity  Recent Flowsheet Documentation  Taken 10/11/2020 2342 by Princess Jodee Garber RN  Oral Mucous Membrane Protection: nonirritating oral fluids promoted     Problem: Nausea and Vomiting (Stem Cell/Bone Marrow Transplant)  Goal: Nausea and Vomiting Symptom Relief  Outcome: No Change     Problem: Nutrition Intake Altered (Stem Cell/Bone Marrow Transplant)  Goal: Optimal Nutrition Intake  Outcome: No Change

## 2020-10-12 NOTE — PROGRESS NOTES
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 Complaining of soreness/mild pain in right chest. Non pleuric. No black pain. No cough. Eating okay but appetite low. No n/v/d.   Afebrile. On RA.    Review of Systems    8 point review with pertinent positive and negatives     PHYSICAL EXAM    KPS: 90    /71 (BP Location: Right arm)   Pulse 82   Temp 98.5  F (36.9  C) (Axillary)   Resp 18   Ht 1.727 m (5' 8\")   Wt 88.6 kg (195 lb 6.4 oz)   SpO2 92%   BMI 29.71 kg/m       General: NAD; rommel complexion  Eyes: sclera anicteric   Nose/Mouth/Throat: OP moist, no ulcerations   Lungs: RUL with bronchial breath sounds  Cardiovascular: RRR, no M/R/G   Abdominal/Rectal: +BS, soft, NT, ND, No HSM. Large ventral hernia chronic, stable  Lymphatics: trace LE edema  Skin: Small lesion anteriorly on T6/T7, however pt unsure if that was there before.  MSK: Toes, second and third on left are missing (s/p amputation 2/2 diabetic complications)  Neuro: A&O ICE 10/10 10/12   Additional Findings: Right chest port a cath, left upper arm PICC NT, dressing cdi    Labs:  Lab Results   Component Value Date    WBC 2.0 (L) 10/12/2020    ANEU 1.1 (L) 10/12/2020    HGB 10.7 (L) 10/12/2020    HCT 32.6 (L) 10/12/2020    PLT 63 (L) 10/12/2020     10/12/2020    POTASSIUM 3.3 (L) 10/12/2020    CHLORIDE 102 10/12/2020    CO2 28 10/12/2020     (H) 10/12/2020    BUN 6 (L) 10/12/2020    CR 0.67 10/12/2020    MAG 1.4 (L) 10/12/2020    INR 1.15 (H) 10/12/2020    BILITOTAL 0.6 10/12/2020    AST 16 10/12/2020    ALT 26 10/12/2020    ALKPHOS 65 10/12/2020    PROTTOTAL 5.1 (L) 10/12/2020    ALBUMIN 2.9 (L) 10/12/2020       ASSESSMENT/PLAN   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 T " cells collected 8/31/2020. 2017-45 [facility], 2019-35: IT dex + simvastatin for JUAQUIN prevention. Day 12, readmitted with g2 crs/neutropenic fever/hypotension/hypoxia.    Day 0: LP with IT dex. PICC line placement, [facility] Car-t infusion   Day 7: LP under fluoroscopy w/IT dex.   Day 13 (10/12) at 1pm in XR: LP under fluoroscopy w/IT dex. (AKramer will push dex)    1.  BMT:  DLBCL  Axi-cell product with CNS prevention protocol, day 0  CNS prophy: simvastatin 40mg tied to IDS drug  - cont allopurinol    2.  HEME: Keep Hgb>7 and plts>10K. No pre-meds.                            3.  ID: Neutropenic fever on admit, cefepime started. Blood culture 10/8 + Staph hominis, vanco added 10/11. Consider switching to levaquin soon. CCT with improvement in RLL but worse in RUL-10/12 now with right chest soreness so will repeat CXR and leave Vanco on for now. Pneumonia vs shingles?   RVP neg. S pneumo -, legonella -  - Prophy (levo), fluc, acy, IV pentamidine (9/23).       4.  GI: no n/v. +decreased appetite  - prn Ativan, Compazine  - Protonix for GI prophy.   - Colace prn constipation    5.  FEN/Renal: Creat, lytes wnl.    6. Endo: Type 2 DM. See DM plan below.  - had been on metformin during CAR-T (risk of lactic acidosis); cautiously resumed at half-dose 500mg bid 10/7-held on admit.   Lantus 30u at hs and Novolog sliding scale and carb counting (see endocrine plan below). Only on 10u on readmit as not eating much but add medium SS  Complication of DM with 2 left toes amputated 7/2020  - hx Adrenal Insufficiency: PTA on hydrocortisone 20mg/d; was held on admission for unclear reason and not listed in notes; resumed 10/3 (perrmitted on Car-T protocols).     Dispo: will be admitted until CRS/fevers subside.  Will need follow-up through at least D14 post CAR-T (Tues 10/13). Pt has PICC and port-a-cath. Does have line care coverage but not initiated as we anticipated pulling PICC line early next week if he remains stable.     Tequila  Jose NP  [phone]    Diabetes plan-  Follow up with Dr. Turner to reassess Novolog dosing, alternatives/adjuncts to insulin (Trulicity, for example)   Diabetes Regimen for home  Metformin 500 mg two times per day, before meals  Lantus 30 units in the evening  Novolog 1 unit per 3.5 grams of carbohydrate   Novolog Correction scale  Before meals--  -169 give 2 units.  -199 give 4 units.  -229 give 6 units.  -259 give 8 units.  -289 give 10 units.  -319 give 12 units.  -349 give 14 units.  BG >/= 350 give 16 units.  At bedtime--  -229 give 2 units.  -259 give 4 units.  -289 give 6 units.  -319 give 8 units.  -349 give 10 units.  BG >/= 350 give 12 units.     Day of  intrathecal dexamethasone  Increase your carb coverage to 1 unit per 2 grams starting with supper  Increase your Lantus to 45 units that night  Next day use 1 unit per 2 grams for breakfast and lunch, return to normal ratio at supper  Return to normal Lantus dose 30 units          BMT Staff:  The patient was discussed on morning rounds with the nurses, mid level provider, and house staff and seen and examined by me. All labs and imaging were reviewed. I reviewed events over the last 24 hours including vitals and flow sheets. I agree with the above note and have been responsible for the care plan and interpretation of progress.     Subjective:  Reports feeling overall quite well, reports new pruritic right-sided chest pain that started yesterday with no external tenderness, no abd pain, no F/C/NS, no N/V/C/D, no GI or  complaints, has a mild productive cough which he reports is on and off for him for many weeks and not significantly different unable to give sample, no HA/LH/Dizziness.      Vitals reviewed, labs reviewed  PE:  NAD, Alert, oriented x3  HEENT: moist mmm, no oral ulcers  Heart: RRR  Lungs: Right upper and middle lobe crackles, decreased breath sounds over the right  base  Abdomen: +BS, soft non tender, non distended  LE: no edema  Skin: no rashes     Assessment and Plan:     70-year-old man with an aggressive non-Hodgkin's lymphoma who is now 13 days after [facility] T-cell therapy.  Presented overnight on day +9 with fever, hypotension and new hypoxia, that was concerning for CRS grade 2 so patient was given a dose of Tocilizumab and received fluid resuscitation, however ferritin is not elevated and patient is neutropenic with a CT revealing new consolidation and therefore this is compatible with infection/neutropenic fever/sepsis given the timeline of presentation and symptoms with laboratory findings.  Afebrile, with normalization of blood pressure after fluid resuscitation.  No evidence of neurotoxicity. 10/8 started on cefepime for neutropenic fever, cultures positive for Staph hominis on second day of incubation added vancomycin on 10/10. 10/12 repeat chest imaging to evaluate for new pleuritic right-sided chest pain.  Continue to closely monitored with modification with any clinical changes.       Quang Mclaughlin MD

## 2020-10-13 ENCOUNTER — CARE COORDINATION (OUTPATIENT)
Dept: ONCOLOGY | Facility: CLINIC | Age: 70
End: 2020-10-13

## 2020-10-13 ENCOUNTER — APPOINTMENT (OUTPATIENT)
Dept: GENERAL RADIOLOGY | Facility: CLINIC | Age: 70
DRG: 809 | End: 2020-10-13
Attending: NURSE PRACTITIONER
Payer: COMMERCIAL

## 2020-10-13 VITALS
OXYGEN SATURATION: 100 % | TEMPERATURE: 96.2 F | DIASTOLIC BLOOD PRESSURE: 77 MMHG | RESPIRATION RATE: 18 BRPM | HEIGHT: 68 IN | WEIGHT: 193 LBS | HEART RATE: 91 BPM | SYSTOLIC BLOOD PRESSURE: 126 MMHG | BODY MASS INDEX: 29.25 KG/M2

## 2020-10-13 LAB
ALBUMIN SERPL-MCNC: 3 G/DL (ref 3.4–5)
ALP SERPL-CCNC: 65 U/L (ref 40–150)
ALT SERPL W P-5'-P-CCNC: 36 U/L (ref 0–70)
ANION GAP SERPL CALCULATED.3IONS-SCNC: 7 MMOL/L (ref 3–14)
APTT PPP: 27 SEC (ref 22–37)
AST SERPL W P-5'-P-CCNC: 20 U/L (ref 0–45)
BASOPHILS # BLD AUTO: 0 10E9/L (ref 0–0.2)
BASOPHILS NFR BLD AUTO: 0.2 %
BILIRUB SERPL-MCNC: 0.6 MG/DL (ref 0.2–1.3)
BUN SERPL-MCNC: 9 MG/DL (ref 7–30)
CALCIUM SERPL-MCNC: 8.5 MG/DL (ref 8.5–10.1)
CHLORIDE SERPL-SCNC: 104 MMOL/L (ref 94–109)
CO2 SERPL-SCNC: 26 MMOL/L (ref 20–32)
CREAT SERPL-MCNC: 0.63 MG/DL (ref 0.66–1.25)
CRP SERPL-MCNC: 4.8 MG/L (ref 0–8)
D DIMER PPP FEU-MCNC: 4.9 UG/ML FEU (ref 0–0.5)
DIFFERENTIAL METHOD BLD: ABNORMAL
EOSINOPHIL # BLD AUTO: 0 10E9/L (ref 0–0.7)
EOSINOPHIL NFR BLD AUTO: 0 %
ERYTHROCYTE [DISTWIDTH] IN BLOOD BY AUTOMATED COUNT: 16.1 % (ref 10–15)
FERRITIN SERPL-MCNC: 133 NG/ML (ref 26–388)
FIBRINOGEN PPP-MCNC: 207 MG/DL (ref 200–420)
GFR SERPL CREATININE-BSD FRML MDRD: >90 ML/MIN/{1.73_M2}
GLUCOSE SERPL-MCNC: 207 MG/DL (ref 70–99)
HCT VFR BLD AUTO: 37.2 % (ref 40–53)
HGB BLD-MCNC: 12.2 G/DL (ref 13.3–17.7)
IMM GRANULOCYTES # BLD: 0.1 10E9/L (ref 0–0.4)
IMM GRANULOCYTES NFR BLD: 1.1 %
INR PPP: 1.08 (ref 0.86–1.14)
LDH SERPL L TO P-CCNC: 174 U/L (ref 85–227)
LYMPHOCYTES # BLD AUTO: 0.3 10E9/L (ref 0.8–5.3)
LYMPHOCYTES NFR BLD AUTO: 4.5 %
MAGNESIUM SERPL-MCNC: 1.9 MG/DL (ref 1.6–2.3)
MCH RBC QN AUTO: 28.3 PG (ref 26.5–33)
MCHC RBC AUTO-ENTMCNC: 32.8 G/DL (ref 31.5–36.5)
MCV RBC AUTO: 86 FL (ref 78–100)
MONOCYTES # BLD AUTO: 0.2 10E9/L (ref 0–1.3)
MONOCYTES NFR BLD AUTO: 3.8 %
NEUTROPHILS # BLD AUTO: 5.1 10E9/L (ref 1.6–8.3)
NEUTROPHILS NFR BLD AUTO: 90.4 %
NRBC # BLD AUTO: 0 10*3/UL
NRBC BLD AUTO-RTO: 0 /100
PHOSPHATE SERPL-MCNC: 3.1 MG/DL (ref 2.5–4.5)
PLATELET # BLD AUTO: 59 10E9/L (ref 150–450)
POTASSIUM SERPL-SCNC: 3.9 MMOL/L (ref 3.4–5.3)
PROT SERPL-MCNC: 5.6 G/DL (ref 6.8–8.8)
RBC # BLD AUTO: 4.31 10E12/L (ref 4.4–5.9)
SODIUM SERPL-SCNC: 136 MMOL/L (ref 133–144)
URATE SERPL-MCNC: 2.9 MG/DL (ref 3.5–7.2)
WBC # BLD AUTO: 5.6 10E9/L (ref 4–11)

## 2020-10-13 PROCEDURE — 71046 X-RAY EXAM CHEST 2 VIEWS: CPT

## 2020-10-13 PROCEDURE — 84100 ASSAY OF PHOSPHORUS: CPT | Performed by: STUDENT IN AN ORGANIZED HEALTH CARE EDUCATION/TRAINING PROGRAM

## 2020-10-13 PROCEDURE — 250N000013 HC RX MED GY IP 250 OP 250 PS 637: Performed by: STUDENT IN AN ORGANIZED HEALTH CARE EDUCATION/TRAINING PROGRAM

## 2020-10-13 PROCEDURE — 71046 X-RAY EXAM CHEST 2 VIEWS: CPT | Mod: 26 | Performed by: RADIOLOGY

## 2020-10-13 PROCEDURE — 85730 THROMBOPLASTIN TIME PARTIAL: CPT | Performed by: STUDENT IN AN ORGANIZED HEALTH CARE EDUCATION/TRAINING PROGRAM

## 2020-10-13 PROCEDURE — 80053 COMPREHEN METABOLIC PANEL: CPT | Performed by: STUDENT IN AN ORGANIZED HEALTH CARE EDUCATION/TRAINING PROGRAM

## 2020-10-13 PROCEDURE — 85384 FIBRINOGEN ACTIVITY: CPT | Performed by: STUDENT IN AN ORGANIZED HEALTH CARE EDUCATION/TRAINING PROGRAM

## 2020-10-13 PROCEDURE — 85610 PROTHROMBIN TIME: CPT | Performed by: STUDENT IN AN ORGANIZED HEALTH CARE EDUCATION/TRAINING PROGRAM

## 2020-10-13 PROCEDURE — 99239 HOSP IP/OBS DSCHRG MGMT >30: CPT | Performed by: INTERNAL MEDICINE

## 2020-10-13 PROCEDURE — 86140 C-REACTIVE PROTEIN: CPT | Performed by: STUDENT IN AN ORGANIZED HEALTH CARE EDUCATION/TRAINING PROGRAM

## 2020-10-13 PROCEDURE — 85379 FIBRIN DEGRADATION QUANT: CPT | Performed by: STUDENT IN AN ORGANIZED HEALTH CARE EDUCATION/TRAINING PROGRAM

## 2020-10-13 PROCEDURE — 250N000013 HC RX MED GY IP 250 OP 250 PS 637: Performed by: PHYSICIAN ASSISTANT

## 2020-10-13 PROCEDURE — 250N000011 HC RX IP 250 OP 636: Performed by: INTERNAL MEDICINE

## 2020-10-13 PROCEDURE — 87181 SC STD AGAR DILUTION PER AGT: CPT | Performed by: STUDENT IN AN ORGANIZED HEALTH CARE EDUCATION/TRAINING PROGRAM

## 2020-10-13 PROCEDURE — 84550 ASSAY OF BLOOD/URIC ACID: CPT | Performed by: STUDENT IN AN ORGANIZED HEALTH CARE EDUCATION/TRAINING PROGRAM

## 2020-10-13 PROCEDURE — 250N000011 HC RX IP 250 OP 636: Performed by: STUDENT IN AN ORGANIZED HEALTH CARE EDUCATION/TRAINING PROGRAM

## 2020-10-13 PROCEDURE — 85025 COMPLETE CBC W/AUTO DIFF WBC: CPT | Performed by: STUDENT IN AN ORGANIZED HEALTH CARE EDUCATION/TRAINING PROGRAM

## 2020-10-13 PROCEDURE — 87081 CULTURE SCREEN ONLY: CPT | Performed by: STUDENT IN AN ORGANIZED HEALTH CARE EDUCATION/TRAINING PROGRAM

## 2020-10-13 PROCEDURE — 258N000003 HC RX IP 258 OP 636: Performed by: INTERNAL MEDICINE

## 2020-10-13 PROCEDURE — 82728 ASSAY OF FERRITIN: CPT | Performed by: STUDENT IN AN ORGANIZED HEALTH CARE EDUCATION/TRAINING PROGRAM

## 2020-10-13 PROCEDURE — 83615 LACTATE (LD) (LDH) ENZYME: CPT | Performed by: STUDENT IN AN ORGANIZED HEALTH CARE EDUCATION/TRAINING PROGRAM

## 2020-10-13 PROCEDURE — 83735 ASSAY OF MAGNESIUM: CPT | Performed by: STUDENT IN AN ORGANIZED HEALTH CARE EDUCATION/TRAINING PROGRAM

## 2020-10-13 RX ORDER — LEVOFLOXACIN 250 MG/1
750 TABLET, FILM COATED ORAL ONCE
Status: COMPLETED | OUTPATIENT
Start: 2020-10-13 | End: 2020-10-13

## 2020-10-13 RX ORDER — HEPARIN SODIUM (PORCINE) LOCK FLUSH IV SOLN 100 UNIT/ML 100 UNIT/ML
5 SOLUTION INTRAVENOUS
Status: CANCELLED | OUTPATIENT
Start: 2020-10-14

## 2020-10-13 RX ORDER — HEPARIN SODIUM,PORCINE 10 UNIT/ML
5 VIAL (ML) INTRAVENOUS
Status: CANCELLED | OUTPATIENT
Start: 2020-10-14

## 2020-10-13 RX ORDER — LEVOFLOXACIN 250 MG/1
750 TABLET, FILM COATED ORAL DAILY
COMMUNITY
Start: 2020-10-13 | End: 2020-10-14

## 2020-10-13 RX ORDER — PANTOPRAZOLE SODIUM 40 MG/1
40 TABLET, DELAYED RELEASE ORAL
Qty: 30 TABLET | Refills: 0 | Status: SHIPPED | OUTPATIENT
Start: 2020-10-14 | End: 2020-11-09

## 2020-10-13 RX ORDER — FLUCONAZOLE 200 MG/1
100 TABLET ORAL DAILY
Qty: 30 TABLET | Refills: 0 | Status: SHIPPED | OUTPATIENT
Start: 2020-10-13 | End: 2020-10-28

## 2020-10-13 RX ADMIN — ALLOPURINOL 300 MG: 300 TABLET ORAL at 07:57

## 2020-10-13 RX ADMIN — CEFEPIME HYDROCHLORIDE 2 G: 2 INJECTION, POWDER, FOR SOLUTION INTRAVENOUS at 07:57

## 2020-10-13 RX ADMIN — PANTOPRAZOLE SODIUM 40 MG: 40 TABLET, DELAYED RELEASE ORAL at 07:57

## 2020-10-13 RX ADMIN — VANCOMYCIN HYDROCHLORIDE 2000 MG: 1 INJECTION, POWDER, LYOPHILIZED, FOR SOLUTION INTRAVENOUS at 01:44

## 2020-10-13 RX ADMIN — HYDROCORTISONE 20 MG: 20 TABLET ORAL at 07:57

## 2020-10-13 RX ADMIN — ACYCLOVIR 800 MG: 800 TABLET ORAL at 07:57

## 2020-10-13 RX ADMIN — INSULIN GLARGINE 10 UNITS: 100 INJECTION, SOLUTION SUBCUTANEOUS at 08:10

## 2020-10-13 RX ADMIN — LEVOFLOXACIN 750 MG: 250 TABLET, FILM COATED ORAL at 11:00

## 2020-10-13 RX ADMIN — FLUCONAZOLE 200 MG: 200 TABLET ORAL at 07:57

## 2020-10-13 RX ADMIN — Medication 40 MG: at 07:58

## 2020-10-13 RX ADMIN — INSULIN ASPART 2 UNITS: 100 INJECTION, SOLUTION INTRAVENOUS; SUBCUTANEOUS at 08:27

## 2020-10-13 ASSESSMENT — ACTIVITIES OF DAILY LIVING (ADL)
ADLS_ACUITY_SCORE: 11

## 2020-10-13 ASSESSMENT — MIFFLIN-ST. JEOR: SCORE: 1619.94

## 2020-10-13 NOTE — PLAN OF CARE
VSS. No c/o n/v/d or pain. Up independently. Received melatonin at bedtime. I did not give him his 2200 novolog since he was not given his mealtime insulin until 2030. Fluids infusing @ 100mL/hr. Continue POC.    Problem: Adult Inpatient Plan of Care  Goal: Plan of Care Review  Outcome: No Change     Problem: Adult Inpatient Plan of Care  Goal: Patient-Specific Goal (Individualized)  Outcome: No Change     Problem: Adult Inpatient Plan of Care  Goal: Absence of Hospital-Acquired Illness or Injury  Outcome: No Change     Problem: Adult Inpatient Plan of Care  Goal: Absence of Hospital-Acquired Illness or Injury  Intervention: Prevent Skin Injury  Recent Flowsheet Documentation  Taken 10/12/2020 1600 by Tana Rodas RN  Body Position: position changed independently

## 2020-10-13 NOTE — DISCHARGE SUMMARY
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 Staph hominis bacteremia  3. Neutropenic fever  4. Type 2 diabetes mellitus  5. Pancytopenia secondary to chemotherapy (now resolved)  6. Acute hypoxic respiratory insufficiency without respiratory failure    Discharge Medications:       Pastor Garibay   Home Medication Instructions ALO:[id number]    Printed on:10/13/20 1005   Medication Information                      acyclovir (ZOVIRAX) 800 MG tablet  Take 1 tablet (800 mg) by mouth 2 times daily             allopurinol (ZYLOPRIM) 300 MG tablet  Take 300 mg by mouth daily             bisacodyl (DULCOLAX) 5 MG EC tablet  Take 5 mg by mouth daily as needed for constipation             docusate sodium (COLACE) 100 MG capsule  Take 1 capsule (100 mg) by mouth 2 times daily as needed for constipation             fluconazole (DIFLUCAN) 200 MG tablet  Take 0.5 tablets (100 mg) by mouth daily             hydrocortisone (CORTEF) 10 MG tablet  Take 20 mg by mouth daily before breakfast              insulin aspart (NOVOLOG FLEXPEN) 100 UNIT/ML pen  Inject Subcutaneous 3 times daily (with meals) use sliding scale based on Carbs and Blood Glucose. See attached sheet.              insulin glargine (LANTUS PEN) 100 UNIT/ML pen  Inject 10 Units Subcutaneous every evening             levofloxacin (LEVAQUIN) 250 MG tablet  Take 3 tablets (750 mg) by mouth daily through 10/22/2020.              metFORMIN (GLUCOPHAGE) 500 MG tablet  Take 1 tablet (500 mg) by mouth 2 times daily (with meals)             pantoprazole (PROTONIX) 40 MG EC tablet  Take 1 tablet (40 mg) by mouth every morning (before breakfast)             prochlorperazine  (COMPAZINE) 5 MG tablet  Take 1-2 tablets (5-10 mg) by mouth every 6 hours as needed (Breakthrough Nausea / Vomiting)             study - simvastatin, IDS# 5641, 40 MG tablet  Take 1 tablet (40 mg) by mouth daily Start at least 5 days prior to apheresis and continue until Day +30 after CAR-T infusion.               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 Patient was initially sent to ED for concern for neutropenic fever with a temperature of 100.9F at home. On arrival to the ED the patient was notable saturating 89% on room air and was given supplemental oxygen.      Patient had a recent hospitalization 9/29/2020-10/8/2020.  Patient discharged on 10/8/2020 home.  He reports that several hours after he arrived home he started feeling unwell with symptoms of fever, nausea, vomiting.  The symptoms began around 5 PM in the afternoon on 10/8/2020.  Patient does not endorse any alleviating factors.  Of note he denies abdominal pain, diarrhea, chest pain, leg swelling.  He did endorse mild shortness of breath and cough on arrival to the emergency department however the symptoms have improved at time of interview at 0600.         Lab Values     Lab Results   Component Value Date    WBC 5.6 10/13/2020    ANEU 5.1 10/13/2020    HGB 12.2 (L) 10/13/2020    HCT 37.2 (L) 10/13/2020    PLT 59 (L) 10/13/2020     10/13/2020    POTASSIUM 3.9 10/13/2020    CHLORIDE 104 10/13/2020    CO2 26 10/13/2020     (H) 10/13/2020    BUN 9 10/13/2020    CR 0.63 (L) 10/13/2020    MAG 1.9 10/13/2020    INR 1.08 10/13/2020    BILITOTAL 0.6 10/13/2020    AST 20 10/13/2020    ALT 36 10/13/2020    ALKPHOS 65 10/13/2020    PROTTOTAL 5.6 (L) 10/13/2020    ALBUMIN 3.0 (L) 10/13/2020       I have assessed all abnormal lab values for their clinical significance and any values considered clinically significant have been addressed in the assessment and plan.       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 T cells collected 8/31/2020. 2017-45 [facility], 2019-35: IT dex + simvastatin for JUAQUIN prevention. Day 14, readmitted with g2 crs/neutropenic fever/hypotension/hypoxia; now resolved.     Day 0: LP with IT dex. PICC line placement, [facility] Car-t infusion   Day 7: LP under fluoroscopy w/IT dex.   Day 13 (10/12) LP under fluoroscopy w/IT dex.       1.  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  HEME: Keep Hgb>7 and plts>10K. No pre-meds.                            3.  ID:   - FN on admission; Blood culture 10/8 + Staph hominis, vanco added 10/11.  As it is sensitive to levaquin, we will transition to levaquin 750mg po daily through 10/22/2020. Stop vanco/cefepime on 10/13.   - RVP neg. S pneumo -, legonella -  - Prophy (levo), fluc, acy, IV pentamidine (9/23).        4.  GI:   - prn Ativan, Compazine if needed  - Protonix for GI prophy.   - Colace prn constipation     5.  FEN/Renal: Creat, lytes wnl.     6. Endo: Type 2 DM.  Plan for discharge:  Metformin 500mg bid; medium sliding scale novolog; carb counting novolog 1 unit per 3.5 grams carbs; Lantus 10 units every evening.    Note that he had been on lantus 30 units daily, but his intake is lower and he is being discharged on levaquin, which can lower blood sugars.  Will continue at lantus 10 units daily and monitor glucoses outpatient.   Complication of DM with 2 left toes amputated 7/2020   Adrenal Insufficiency:  hydrocortisone 20mg/d     Dispo: Pull PICC and discharge.  Clinic follow up 10/14.     CODE STATUS: FULL CODE  Discharge Instructions and Follow-Up:    Discharge diet: Regular diet as tolerated  Discharge activity: Activity as tolerated   Discharge  follow-up: Follow up with BMT Clinic as follows: 10/14/2020    Discharge Disposition:    Discharged to home.    Shawanda Barajas PA-C  10/13/2020    Advice for Patients concerning COVID19:  a. Avoid contact with individuals:   i. Who are sick or have recently been sick  ii. Have traveled to high risk areas (per CDC guidelines) or have been on a cruise in the last 14 days  iii. Who were or could have been exposed to COVID-19   b. If experiencing symptoms such as: Fever, cough or shortness of breath contact BMT at [phone] Mon-Fri 8am-4:30pm or After Hours at [phone] (ask to speak to a BMT Fellow) for guidance on need for clinical assessment  c. Avoid all non- essential travel at this time; if traveling is necessary use mask (N-95)   d. Wear a mask when in public areas  d. Avoid crowded places, if possible  f. Follow CDC advice https://www.cdc.gov/coronavirus/2019-ncov/index.html and travel guidelines https://www.cdc.gov/coronavirus/2019-ncov/travelers/index.html

## 2020-10-13 NOTE — SUMMARY OF CARE
BMT Hospital Discharge Summary of Care    Treatment Team:  Patient Care Team:  Francisco Armijo MD as PCP - General (Family Practice)  Mike Olson MD as Referring Physician (Oncology)  [facility], [facility] 626 as Kailey Gatica RN as BMT Nurse Coordinator (BMT - Adult)  Rosey Bunch MD as BMT Physician (Internal Medicine)  Yolis Win, ADIEL as Registered Nurse (BMT - Adult)  Reyna Yepez MSW as   Prosper Gillis MD as MD (Hematology & Oncology)  Discharge Diagnosis: S/P readmission for cytokine release syndrome associated with Car-T therapy    Hospital Discharge on 10/13/20  Discharge Location Home   Activity at Discharge Ad ede   Nutrition Regular diet as tolerated     Blood Transfusion Parameters Transfuse if Hemoglobin < or equal 7 mg/dL  Red Blood Cell Order: 1 units, irradiated and leukoreduced   Transfuse if Platelet count < or equal 10,000 uL  Platelet order: 1 adult dose, irradiated and leukoreduced        Blood Counts Recent Labs   Lab Test 10/13/20  0340   WBC 5.6   ANEU 5.1   ALYM 0.3*   CRICKET 0.2   AEOS 0.0   HGB 12.2*   HCT 37.2*   PLT 59*      Electrolyte Replacement  Parameters in Clinic Intravenous Electrolyte Replacement:    Potassium Chloride  Give only if serum creatinine <2. Reference range 3.4-5.3 mmol/L        3.0 - 3.3 mmol/L Oral: 40 mEq by mouth x 1  PIV/CVC on TPN: 30 mEq over 1 hour x 1 doses & 20 mEq by mouth  CVC NOT on TPN: 40 mEq IV x 1   2.5-2.9 mmol/L  Oral: 40 mEq by mouth every 2 hrs x 2  PIV/CVC on TPN: 30 mEq IV & 40 mEq by mouth   CVC NOT on TPN: 60 mEq IV        <2.5 PIV/CVC on TPN: 30 mEq IV & 40 mEq by mouth   CVC NOT on TPN: 60 mEq IV      Magnesium Sulfate  Reference range 1.6-2.3 mg/dl       1.2-1.5 mg/dl  Oral:400 mg by mouth twice daily x 2 days   IV: 2 gm over 1 hour        0.9-1.1 IV Only: 4 gm IV over 2 hours   < 0.9  Discretion of provider and pharmacist        Home Infusion  IV Medications through  home infusion: None   Line Care Care: Port-a-cath - Flush each line with 5mL of 100 unit/mL heparin every 28 days  Supplies Through: [facility]  Fax: [phone]  Ph: [phone]      Physical Therapy & Support Services None     Laboratory Tests at Next Clinic Visit Hemogram (CBC) differential, platelet count  Basic Metabolic Panel     Healthy Lifestyle Follow general guidelines for physical activity as recommended by the Office of Disease Prevention & Health Promotion (2017) www.health.gov/paguidelines:    Avoid Inactivity  Some physical activity is better than none -- and any amount has health benefits.    Do Aerobic Activity  For substantial health benefits, work up to:    150 minutes (2 hours and 30 minutes) each week of moderate-intensity aerobic physical activity (such as brisk walking or tennis)    Do aerobic physical activity in episodes of at least 10 minutes and, if possible, spread it out through the week.  For even greater health benefits, do one of the following:    Doing more will lead to even greater health benefits.    Strengthen Muscles  Do muscle-strengthening activities that involve all major muscle groups on 2 or more days a week.    Healthy Lifestyle    Eat a healthy diet with a wide variety of foods    Don t smoke (passive or active exposure), chew tobacco, or use illegal drugs.     Discuss drinking alcohol with your provider. If cleared to use alcohol, do so in moderation, generally less than two drinks per day.     Maintain a healthy weight    Avoid excessive sun exposure and wear sunscreen protection for anticipated periods of long exposure.    Information above directly cited from these sources:  1. CORNELIUS Marquez., LUI Deleon, ALFONSO Hewitt, SHAYNE Henriquez., BENSON Leger., FELIPA Nelson.,   [place], K. M. (2013). Prevalence of Hematopoietic Cell Transplant Survivors in the [place]. Biology of Blood and Marrow Transplantation?: Journal of the American Society for Blood and Marrow  Transplantation, 19(10), 5913-6638. doi   10.1016/j.bbmt.2013.07.020  2. Office of Disease Prevention and Health Promotion. (2017). Physical activity   guidelines. Retrieved from https://health.gov/paguiUnivita Health/.     When to Call  (Refer to Discharge Teaching Materials)   Fever > 100.5 F    Bleeding that does not stop after a few minutes    Severe nausea, vomiting, or diarrhea     New fall or injury    Change in designated caregiver    Medication question    Any other urgent concern   Follow Up Visits BMT Clinic Appointment Date/Time:   BMT Clinic (date, time, provider): 10/14/2020, check in at 11:45am for labs; to see provider at 12:30pm.        BMT Contact Information  For issues including fevers 100.5 or more:  Please call during the week: Monday through Friday between hours of 8:00 am and 4:30 pm- Call BMT office- [phone]  After hours/Weekends: Please call [facility]  and ask for BMT physician on call and the  will have the BMT Fellow Physician call you back: [phone]     Advice for Patients on COVID19:  a. Avoid contact with individuals:   i. Who are sick or have recently been sick  ii. Have traveled to high risk areas (per CDC guidelines) or have been on a cruise in the last 14 days  iii. Who were or could have been exposed to COVID-19   b. If experiencing symptoms such as: Fever, cough or shortness of breath contact BMT at [phone] Mon-Fri 8am-4:30pm or After Hours at [phone] (ask to speak to a BMT Fellow) for guidance on need for clinical assessment  c. Avoid all non- essential travel at this time; if traveling is necessary use mask (N-95)   d. Wear a mask when in public areas  d. Avoid crowded places, if possible  f. Follow CDC advice https://www.cdc.gov/coronavirus/2019-ncov/index.html and travel guidelines https://www.cdc.gov/coronavirus/2019-ncov/travelers/index.html  Shawanda Barajas

## 2020-10-13 NOTE — SUMMARY OF CARE
Diabetes Management Plan    Metformin 500 mg two times per day, before meals    Lantus 10 units in the evening    Novolog 1 unit per 3.5 grams of carbohydrate     Sliding scale Novolog Correction scale:  Before meals--  -169 give 2 units.  -199 give 4 units.  -229 give 6 units.  -259 give 8 units.  -289 give 10 units.  -319 give 12 units.  -349 give 14 units.  BG >/= 350 give 16 units.    At bedtime--  -229 give 2 units.  -259 give 4 units.  -289 give 6 units.  -319 give 8 units.  -349 give 10 units.  BG >/= 350 give 12 units.

## 2020-10-13 NOTE — PROGRESS NOTES
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      CRS Parameter Grade 1 Grade 2 Grade 3 Grade 4   Fever* Tm >= 100.4 degrees F Tm >= 100.4 degrees F Tm >= 100.4 degrees F Tm >= to 100.4 degrees F       With Either:  Hypotension None Responsive to Fluids Requiring 1 vasopressor (w/ or w/o vasopressin) Requiring multiple vasopressors (excluding vasopressin)     And/or  Hypoxia None Low-flow nasal cannula or blow-by High-flow nasal cannula, face mask, non-rebreather mask, or Venturi mask Requiring positive pressure (CPAP, BiPAP intubation and mechanical ventilation)       * Definition of High Dose Pressors for Grade 4  Vasopressor Duration >= 3 hours   Norepinephrine monotherapy >= 20 mcg/kg/minute   Dopamine monotherapy >= 10 mcg/kg/minute   Phenylephrine monotherapy >= 200 mcg/minute   Epinephrine monotherapy >= 10 mcg/minute   If on vasopressin + another agent High dose of vasopressin + norepi equivalent (using VASST formula below) is >= 10 mcg/minute   If on combination vasopressors, not including vasopressin Norepi equivalent of > 20 mcg/minute (using VASST formula below)     VASST formula: Norepi equivalent dose = [NE (mcg/min)] + [dopamine (mcg/min) / 2] + [epinephrine (mcg/min)] + [phenylephrine (mcg/min) / 10]    ICE Assessment  Orientation to year, month, city, hospital: 4 points  Name 3 objects (e.g., point to clock, pen, button): 3 points  Following commands: (e.g., " Show me 2 fingers): 1 point   Write a standard sentence (e.g., The wilson jumped over the log): 1 point   Count backwards from 100 by ten: 1 point  Total points: 10: No impairment    CRS Summary  Does patient have CRS? no  Current CRS stgstrstastdstest:st st1st What is the maximum severity to date: 2  What therapy was given: yes  Has CRS grade changed? yes  Has CRS resolved? yes    Neurotoxicity Summary  Does patient have neurotoxicity? No  Current Neurotoxicity score (0-10): 10  What is the maximum severity to date:  0  What therapy was given: no  Has neurotoxicity resolved? No neurotox    Shawanda Barajas PA-C  10/13/2020

## 2020-10-13 NOTE — PLAN OF CARE
Patient slept at intervals. Using home cpap machine overnight. Sats ok. Afebrile. Vitals stable. Antibiotic coverage with Cefepime and Vancomycin. Continues with IV maintenance fluid infusing at 100 ml/hour. Voiding ok. Creatinine 0.63 today. Glucose 209 (fingerstick ).; not corrected. No CRS symptoms.. Alert and oriented x4. Labs ok; no replacements necessary. Up independently. Still needs CXR ordered for yesterday. (canceled due to LP that was done also,).

## 2020-10-13 NOTE — PROGRESS NOTES
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 No further right chest soreness.  Eating/drinking and has no signs of CRS.      Review of Systems    8 point review negative    PHYSICAL EXAM    KPS: 90    /74 (BP Location: Right arm)   Pulse 74   Temp 97.6  F (36.4  C) (Oral)   Resp 18   Ht 1.727 m (5' 8\")   Wt 88.6 kg (195 lb 6.4 oz)   SpO2 96%   BMI 29.71 kg/m       General: NAD; rommel complexion  Eyes: sclera anicteric   Nose/Mouth/Throat: OP moist, no ulcerations   Lungs: RUL with bronchial breath sounds  Cardiovascular: RRR, no M/R/G   Abdominal/Rectal: +BS, soft, NT, ND, No HSM. Large ventral hernia chronic, stable  Lymphatics: trace LE edema  Skin: no rash noted  MSK: Toes, second and third on left are missing (s/p amputation 2/2 diabetic complications)  Neuro: A&O ICE 10/10 10/12   Additional Findings: Right chest port a cath, left upper arm PICC NT, dressing cdi    Labs:  Lab Results   Component Value Date    WBC 5.6 10/13/2020    ANEU 5.1 10/13/2020    HGB 12.2 (L) 10/13/2020    HCT 37.2 (L) 10/13/2020    PLT 59 (L) 10/13/2020     10/13/2020    POTASSIUM 3.9 10/13/2020    CHLORIDE 104 10/13/2020    CO2 26 10/13/2020     (H) 10/13/2020    BUN 9 10/13/2020    CR 0.63 (L) 10/13/2020    MAG 1.9 10/13/2020    INR 1.08 10/13/2020    BILITOTAL 0.6 10/13/2020    AST 20 10/13/2020    ALT 36 10/13/2020    ALKPHOS 65 10/13/2020    PROTTOTAL 5.6 (L) 10/13/2020    ALBUMIN 3.0 (L) 10/13/2020       ASSESSMENT/PLAN   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 T cells collected 8/31/2020. 2017-45 Yescarta, 2019-35: IT dex + simvastatin for JUAQUIN prevention. Day 14, readmitted with g2 crs/neutropenic fever/hypotension/hypoxia; now resolved.    Day 0: " LP with IT dex. PICC line placement, Yescarta Car-t infusion   Day 7: LP under fluoroscopy w/IT dex.   Day 13 (10/12) LP under fluoroscopy w/IT dex.      1.  BMT:  DLBCL  Axi-cell product with CNS prevention protocol   CNS prophy: simvastatin 40mg tied to IDS drug  - allopurinol as TLS prophylaxis    2.  HEME: Keep Hgb>7 and plts>10K. No pre-meds.                            3.  ID:   - FN on admission; Blood culture 10/8 + Staph hominis, vanco added 10/11.   - RVP neg. S pneumo -, legonella -  - Prophy (levo), fluc, acy, IV pentamidine (9/23).       4.  GI:   - prn Ativan, Compazine if needed  - Protonix for GI prophy.   - Colace prn constipation    5.  FEN/Renal: Creat, lytes wnl.    6. Endo: Type 2 DM. See DM plan below.  - had been on metformin during CAR-T (risk of lactic acidosis); cautiously resumed at half-dose 500mg bid 10/7-held on admit.   Lantus 30u at hs and Novolog sliding scale and carb counting (see endocrine plan below). Only on 10u on readmit as not eating much but add medium SS  Complication of DM with 2 left toes amputated 7/2020  - hx Adrenal Insufficiency: PTA on hydrocortisone 20mg/d; was held on admission for unclear reason and not listed in notes; resumed 10/3 (perrmitted on Car-T protocols).     Dispo: Pull PICC and discharge.  Clinic follow up 10/14.     Shawanda Barajas PA-C  10/13/2020      Diabetes plan-  Follow up with Dr. Turner to reassess Novolog dosing, alternatives/adjuncts to insulin (Trulicity, for example)   Diabetes Regimen for home  Metformin 500 mg two times per day, before meals  Lantus 30 units in the evening  Novolog 1 unit per 3.5 grams of carbohydrate   Novolog Correction scale  Before meals--  -169 give 2 units.  -199 give 4 units.  -229 give 6 units.  -259 give 8 units.  -289 give 10 units.  -319 give 12 units.  -349 give 14 units.  BG >/= 350 give 16 units.  At bedtime--  -229 give 2 units.  -259 give 4  units.  -289 give 6 units.  -319 give 8 units.  -349 give 10 units.  BG >/= 350 give 12 units.     Day of  intrathecal dexamethasone  Increase your carb coverage to 1 unit per 2 grams starting with supper  Increase your Lantus to 45 units that night  Next day use 1 unit per 2 grams for breakfast and lunch, return to normal ratio at supper  Return to normal Lantus dose 30 units          BMT Staff:  The patient was discussed on morning rounds with the nurses, mid level provider, and house staff and seen and examined by me. All labs and imaging were reviewed. I reviewed events over the last 24 hours including vitals and flow sheets. I agree with the above note and have been responsible for the care plan and interpretation of progress.     Subjective:  Reports feeling overall quite well, would like to go home, resolution of right-sided pleuritic chest pain, no abd pain, no F/C/NS, no N/V/C/D, no GI or  complaints, has a mild productive cough which he reports is on and off for him for many weeks and not significantly different unable to give sample, no HA/LH/Dizziness.      Vitals reviewed, labs reviewed  PE:  NAD, Alert, oriented x3  HEENT: moist mmm, no oral ulcers  Heart: RRR  Lungs: Right upper and middle lobe crackles  Abdomen: +BS, soft non tender, non distended  LE: no edema  Skin: no rashes     Assessment and Plan:     70-year-old man with an aggressive non-Hodgkin's lymphoma who is now 13 days after Yescarta T-cell therapy.  Presented overnight on day +9 with fever, hypotension and new hypoxia, that was concerning for CRS grade 2 so patient was given a dose of Tocilizumab and received fluid resuscitation, however ferritin is not elevated and patient is neutropenic with a CT revealing new consolidation and therefore this is compatible with infection/neutropenic fever/sepsis given the timeline of presentation and symptoms with laboratory findings.  Afebrile, with normalization of blood pressure  after fluid resuscitation.  No evidence of neurotoxicity. 10/8 started on cefepime for neutropenic fever, cultures positive for Staph hominis on second day of incubation added vancomycin on 10/10. 10/12 repeat chest imaging to evaluate for new pleuritic right-sided chest pain, overall improved with no pleural effusion, pain resolved.  Will be discharged to complete 14 days of therapy, more than 30 minutes spent in the coordination of care for discharge.  Patient knows that he will have daily clinic follow-up and that he should call with any fevers or clinical changes.     Quang Mclaughlin MD

## 2020-10-13 NOTE — PROGRESS NOTES
Care Coordination - Discharge Note     Line Company:  NA - PICC line removed 10/13 for d/c  Referral made for line care:  NA  IV medications needed at discharge:  None   Pharmacy concerns:  None. (Of note, [facility] requires prior auth)     PT/OT recommended:  OP PT through [facility]  Referral made for PT/OT:  No - pt declined rehab services at this time    D/c location:  Home - [place]  Has placement need been communicated to Social Work?  NA    [place] Teaching time arranged with family:  Completed 10/7, via conference call with pt and wife-Toshia  Notify nurse to schedule line care class/ DM teaching, prior to d/c:  Patient and caregiver previously received teaching, and decline further need     Caregiver:  Toshia (Wife) [phone]    Outpatient Nurse Coordinator notified of patient discharge.       Mamta De La Fuente, RN, BSN  RN Care Coordinator - Inpatient BMT, Unit 5C  Ph [phone]  Pg [phone]

## 2020-10-13 NOTE — SUMMARY OF CARE
Pastor Garibay   Home Medication Instructions ALO:[id number]    Printed on:10/13/20 1021   Medication Information                      acyclovir (ZOVIRAX) 800 MG tablet  Take 1 tablet (800 mg) by mouth 2 times daily             allopurinol (ZYLOPRIM) 300 MG tablet  Take 300 mg by mouth daily             bisacodyl (DULCOLAX) 5 MG EC tablet  Take 5 mg by mouth daily as needed for constipation             docusate sodium (COLACE) 100 MG capsule  Take 1 capsule (100 mg) by mouth 2 times daily as needed for constipation             fluconazole (DIFLUCAN) 200 MG tablet  Take 0.5 tablets (100 mg) by mouth daily             hydrocortisone (CORTEF) 10 MG tablet  Take 20 mg by mouth daily before breakfast              insulin aspart (NOVOLOG FLEXPEN) 100 UNIT/ML pen  Inject Subcutaneous 3 times daily (with meals) use sliding scale based on Carbs and Blood Glucose.             insulin glargine (LANTUS PEN) 100 UNIT/ML pen  Inject 10 Units Subcutaneous every evening             levofloxacin (LEVAQUIN) 250 MG tablet  Take 3 tablets (750 mg) by mouth daily             metFORMIN (GLUCOPHAGE) 500 MG tablet  Take 1 tablet (500 mg) by mouth 2 times daily (with meals)             pantoprazole (PROTONIX) 40 MG EC tablet  Take 1 tablet (40 mg) by mouth every morning (before breakfast)             prochlorperazine (COMPAZINE) 5 MG tablet  Take 1-2 tablets (5-10 mg) by mouth every 6 hours as needed (Breakthrough Nausea / Vomiting)             study - simvastatin, IDS# [id number], 40 MG tablet  Take 1 tablet (40 mg) by mouth daily Start at least 5 days prior to apheresis and continue until Day +30 after CAR-T infusion.

## 2020-10-13 NOTE — PROGRESS NOTES
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 Yes    Background:  Primary diagnosis: CAR T  /77 (BP Location: Right arm)   Pulse 91   Temp 96.2  F (35.7  C) (Axillary)   Resp 18   Ht 1.727 m (5' 8\")   Wt 87.5 kg (193 lb)   SpO2 100%   BMI 29.35 kg/m    Type of donor: CAR T  Type of stem cells: CAR T  Relapsed? No  Falls Precautions? No  Isolation? No  DNR? No  DNI? No  Confidential Patient? No  Positive blood cultures? No    Assessment:  Discharge teaching    Review discharge medications and schedule: Yes    Set up pill box: No    Discharge instructions reviewed: Yes    Special considerations (think about previous reactions, issues with flushing CVC, premedication needs, etc): No    Patient Concerns: No    Recommendations:  Anticipated needs:    Daily infusions: No    Daily transfusions: No    G-CSF: No    Other: Not Applicable  Verbal report called to clinic: No      "

## 2020-10-14 ENCOUNTER — APPOINTMENT (OUTPATIENT)
Dept: LAB | Facility: CLINIC | Age: 70
End: 2020-10-14
Attending: PHYSICIAN ASSISTANT
Payer: COMMERCIAL

## 2020-10-14 ENCOUNTER — ONCOLOGY VISIT (OUTPATIENT)
Dept: TRANSPLANT | Facility: CLINIC | Age: 70
End: 2020-10-14
Attending: PHYSICIAN ASSISTANT
Payer: COMMERCIAL

## 2020-10-14 VITALS
WEIGHT: 198.7 LBS | OXYGEN SATURATION: 98 % | RESPIRATION RATE: 16 BRPM | TEMPERATURE: 97.3 F | DIASTOLIC BLOOD PRESSURE: 66 MMHG | SYSTOLIC BLOOD PRESSURE: 113 MMHG | HEIGHT: 68 IN | HEART RATE: 83 BPM | BODY MASS INDEX: 30.11 KG/M2

## 2020-10-14 DIAGNOSIS — C83.30 DIFFUSE LARGE B-CELL LYMPHOMA, UNSPECIFIED BODY REGION (H): ICD-10-CM

## 2020-10-14 LAB
ALBUMIN SERPL-MCNC: 3.2 G/DL (ref 3.4–5)
ALP SERPL-CCNC: 63 U/L (ref 40–150)
ALT SERPL W P-5'-P-CCNC: 36 U/L (ref 0–70)
ANION GAP SERPL CALCULATED.3IONS-SCNC: 7 MMOL/L (ref 3–14)
AST SERPL W P-5'-P-CCNC: 22 U/L (ref 0–45)
BASOPHILS # BLD AUTO: 0 10E9/L (ref 0–0.2)
BASOPHILS NFR BLD AUTO: 0.8 %
BILIRUB SERPL-MCNC: 0.4 MG/DL (ref 0.2–1.3)
BUN SERPL-MCNC: 11 MG/DL (ref 7–30)
CALCIUM SERPL-MCNC: 8.5 MG/DL (ref 8.5–10.1)
CHLORIDE SERPL-SCNC: 105 MMOL/L (ref 94–109)
CO2 SERPL-SCNC: 25 MMOL/L (ref 20–32)
CREAT SERPL-MCNC: 0.64 MG/DL (ref 0.66–1.25)
DIFFERENTIAL METHOD BLD: ABNORMAL
EOSINOPHIL # BLD AUTO: 0 10E9/L (ref 0–0.7)
EOSINOPHIL NFR BLD AUTO: 0.2 %
ERYTHROCYTE [DISTWIDTH] IN BLOOD BY AUTOMATED COUNT: 17.4 % (ref 10–15)
GFR SERPL CREATININE-BSD FRML MDRD: >90 ML/MIN/{1.73_M2}
GLUCOSE SERPL-MCNC: 166 MG/DL (ref 70–99)
HCT VFR BLD AUTO: 36.2 % (ref 40–53)
HGB BLD-MCNC: 11.5 G/DL (ref 13.3–17.7)
IMM GRANULOCYTES # BLD: 0.1 10E9/L (ref 0–0.4)
IMM GRANULOCYTES NFR BLD: 0.9 %
LYMPHOCYTES # BLD AUTO: 0.3 10E9/L (ref 0.8–5.3)
LYMPHOCYTES NFR BLD AUTO: 5.7 %
MCH RBC QN AUTO: 28.7 PG (ref 26.5–33)
MCHC RBC AUTO-ENTMCNC: 31.8 G/DL (ref 31.5–36.5)
MCV RBC AUTO: 90 FL (ref 78–100)
MONOCYTES # BLD AUTO: 0.7 10E9/L (ref 0–1.3)
MONOCYTES NFR BLD AUTO: 13.3 %
NEUTROPHILS # BLD AUTO: 4.2 10E9/L (ref 1.6–8.3)
NEUTROPHILS NFR BLD AUTO: 79.1 %
NRBC # BLD AUTO: 0 10*3/UL
NRBC BLD AUTO-RTO: 0 /100
PLATELET # BLD AUTO: 72 10E9/L (ref 150–450)
POTASSIUM SERPL-SCNC: 3.6 MMOL/L (ref 3.4–5.3)
PROT SERPL-MCNC: 5.8 G/DL (ref 6.8–8.8)
RBC # BLD AUTO: 4.01 10E12/L (ref 4.4–5.9)
SODIUM SERPL-SCNC: 138 MMOL/L (ref 133–144)
WBC # BLD AUTO: 5.3 10E9/L (ref 4–11)

## 2020-10-14 PROCEDURE — G0463 HOSPITAL OUTPT CLINIC VISIT: HCPCS

## 2020-10-14 PROCEDURE — 36591 DRAW BLOOD OFF VENOUS DEVICE: CPT

## 2020-10-14 PROCEDURE — 80053 COMPREHEN METABOLIC PANEL: CPT | Performed by: PATHOLOGY

## 2020-10-14 PROCEDURE — 99214 OFFICE O/P EST MOD 30 MIN: CPT

## 2020-10-14 PROCEDURE — 250N000011 HC RX IP 250 OP 636: Performed by: PHYSICIAN ASSISTANT

## 2020-10-14 PROCEDURE — 85025 COMPLETE CBC W/AUTO DIFF WBC: CPT | Performed by: PATHOLOGY

## 2020-10-14 RX ORDER — LEVOFLOXACIN 750 MG/1
750 TABLET, FILM COATED ORAL DAILY
Qty: 3 TABLET | Refills: 0 | Status: SHIPPED | OUTPATIENT
Start: 2020-10-14 | End: 2020-10-23

## 2020-10-14 RX ORDER — HEPARIN SODIUM (PORCINE) LOCK FLUSH IV SOLN 100 UNIT/ML 100 UNIT/ML
5 SOLUTION INTRAVENOUS
Status: COMPLETED | OUTPATIENT
Start: 2020-10-14 | End: 2020-10-14

## 2020-10-14 RX ADMIN — Medication 5 ML: at 12:03

## 2020-10-14 ASSESSMENT — PAIN SCALES - GENERAL: PAINLEVEL: NO PAIN (0)

## 2020-10-14 ASSESSMENT — MIFFLIN-ST. JEOR: SCORE: 1645.8

## 2020-10-14 NOTE — NURSING NOTE
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  vs taken     RECHECK     Return: Hospital d/c- Diffuse large B-cell lymphoma, unspecified body region     Initial Vitals: /66 (BP Location: Right arm, Patient Position: Sitting, Cuff Size: Adult Regular)   Pulse 83   Temp 97.3  F (36.3  C) (Axillary)   Resp 16   Ht 1.727 m (5' 8\")   Wt 90.1 kg (198 lb 11.2 oz)   SpO2 98%   BMI 30.21 kg/m   Estimated body mass index is 30.21 kg/m  as calculated from the following:    Height as of this encounter: 1.727 m (5' 8\").    Weight as of this encounter: 90.1 kg (198 lb 11.2 oz). Body surface area is 2.08 meters squared.  No Pain (0) Comment: Data Unavailable   No LMP for male patient.  Allergies reviewed: Yes  Medications reviewed: Yes    Medications: MEDICATION REFILLS NEEDED TODAY. Provider was notified.  Pharmacy name entered into [email or web address]:    [id number] [place] [facility] - [place], [place] - [facility] [address] [place] [address] PHARMACY [facility] - [place], [place] - [phone]    Clinical concerns: Refill Levaquin.  Dr. Smith was notified.      Kasandra Vela CMA              "

## 2020-10-14 NOTE — PROGRESS NOTES
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 Eating well.  No fevers, N/V/D.  He had a minor headache this morning that rapidly improved.     Review of Systems                                                                                                                           8 point review with pertinent positive and negatives      PHYSICAL EXAM                                                                                                                                     KPS: 90     /71 (BP Location: Right arm)   Pulse 82   Temp 98.5  F (36.9  C) (Axillary)   Resp 18   Ht 1.727 m (5' 8\")   Wt 88.6 kg (195 lb 6.4 oz)   SpO2 92%   BMI 29.71 kg/m        General: NAD; rommel complexion  Eyes: sclera anicteric   Nose/Mouth/Throat: OP moist, no ulcerations   Lungs: RUL with bronchial breath sounds  Cardiovascular: RRR, no M/R/G   Abdominal/Rectal: +BS, soft, NT, ND, No HSM. Large ventral hernia chronic, stable  Lymphatics: trace LE edema  Skin: Small lesion anteriorly on T6/T7, however pt unsure if that was there before.  MSK: Toes, second and third on left are missing (s/p amputation 2/2 diabetic complications)  Neuro: A&O, non-focal   Additional Findings: Right chest port a cath     Labs:        Lab Results   Component Value Date     WBC 2.0 (L) 10/12/2020     ANEU 1.1 (L) 10/12/2020     HGB 10.7 (L) 10/12/2020     HCT 32.6 (L) 10/12/2020     PLT 63 (L) 10/12/2020      10/12/2020     POTASSIUM 3.3 (L) 10/12/2020     CHLORIDE 102 10/12/2020     CO2 28 10/12/2020      (H) 10/12/2020     BUN 6 (L) 10/12/2020     CR 0.67 10/12/2020     MAG 1.4 (L) 10/12/2020     INR 1.15 (H) 10/12/2020     BILITOTAL 0.6 10/12/2020     AST 16 10/12/2020     ALT 26 10/12/2020     ALKPHOS 65 10/12/2020     PROTTOTAL 5.1 (L) 10/12/2020     ALBUMIN 2.9 (L) 10/12/2020         ASSESSMENT/PLAN "     Day 0: LP with IT dex. PICC line placement, [employer] Car-t infusion   Day 7: LP under fluoroscopy w/IT dex.   Day 13 (10/12) LP under fluoroscopy w/IT dex.       1.  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  HEME: Keep Hgb>7 and plts>10K. No pre-meds.                            3.  ID:   - FN on admission; Blood culture 10/8 + Staph hominis, vanco added 10/11. Stop vanco/cefepime on 10/13. As it is sensitive to levaquin, we will transition to levaquin 750mg po daily through 10/22/2020. I gave him an additional 3 pills today to cover him through that date.    - RVP neg. S pneumo -, legonella -  - Prophy (levo), fluc, acy, IV pentamidine (9/23).        4.  GI:   - prn Ativan, Compazine if needed  - Protonix for GI prophy.   - Colace prn constipation     5.  FEN/Renal: Creat, lytes wnl.     6. Endo: Type 2 DM.  Plan for discharge:  Metformin 500mg bid; medium sliding scale novolog; carb counting novolog 1 unit per 3.5 grams carbs; Lantus 10 units every evening.    Note that he had been on lantus 30 units daily, but his intake is lower and he is being discharged on levaquin, which can lower blood sugars.  Will continue at lantus 10 units daily and monitor glucoses outpatient.   Complication of DM with 2 left toes amputated 7/2020   Adrenal Insufficiency:  hydrocortisone 20mg/d    Overall plan: Now that ANC has recovered and he has no sign of CRS or other toxicities, ok to start spacing out clinic appointments.  Plan to see him back Friday and next Monday, sooner PRN.       Chano Smith MD, pager [phone]

## 2020-10-14 NOTE — NURSING NOTE
"Chief Complaint   Patient presents with     Port Draw     labs drawn from port by rn.  vs taken     Port accessed with 20 gauge 3/4\" gripper needle and labs drawn by rn.  Port flushed with NS and heparin then de-accessed.  Pt tolerated well.  VS taken.  Pt checked in for next appt.    Louise Medina RN      "

## 2020-10-14 NOTE — LETTER
"    10/14/2020         RE: Pastor Garibay  [address]  [facility] [address]        Dear Colleague,    Thank you for referring your patient, Pastor Garibay, to the [facility] BLOOD AND MARROW TRANSPLANT PROGRAM [place]. Please see a copy of my visit note below.    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 Eating well.  No fevers, N/V/D.  He had a minor headache this morning that rapidly improved.     Review of Systems                                                                                                                           8 point review with pertinent positive and negatives      PHYSICAL EXAM                                                                                                                                     KPS: 90     /71 (BP Location: Right arm)   Pulse 82   Temp 98.5  F (36.9  C) (Axillary)   Resp 18   Ht 1.727 m (5' 8\")   Wt 88.6 kg (195 lb 6.4 oz)   SpO2 92%   BMI 29.71 kg/m        General: NAD; rommel complexion  Eyes: sclera anicteric   Nose/Mouth/Throat: OP moist, no ulcerations   Lungs: RUL with bronchial breath sounds  Cardiovascular: RRR, no M/R/G   Abdominal/Rectal: +BS, soft, NT, ND, No HSM. Large ventral hernia chronic, stable  Lymphatics: trace LE edema  Skin: Small lesion anteriorly on T6/T7, however pt unsure if that was there before.  MSK: Toes, second and third on left are missing (s/p amputation 2/2 diabetic complications)  Neuro: A&O, non-focal   Additional Findings: Right chest port a cath     Labs:        Lab Results   Component Value Date     WBC 2.0 (L) 10/12/2020     ANEU 1.1 (L) 10/12/2020     HGB 10.7 (L) 10/12/2020     HCT 32.6 (L) 10/12/2020     PLT 63 (L) 10/12/2020      10/12/2020     POTASSIUM 3.3 (L) 10/12/2020     CHLORIDE 102 10/12/2020     CO2 28 10/12/2020      (H) 10/12/2020     " BUN 6 (L) 10/12/2020     CR 0.67 10/12/2020     MAG 1.4 (L) 10/12/2020     INR 1.15 (H) 10/12/2020     BILITOTAL 0.6 10/12/2020     AST 16 10/12/2020     ALT 26 10/12/2020     ALKPHOS 65 10/12/2020     PROTTOTAL 5.1 (L) 10/12/2020     ALBUMIN 2.9 (L) 10/12/2020         ASSESSMENT/PLAN     Day 0: LP with IT dex. PICC line placement, [employer] Car-t infusion   Day 7: LP under fluoroscopy w/IT dex.   Day 13 (10/12) LP under fluoroscopy w/IT dex.       1.  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  HEME: Keep Hgb>7 and plts>10K. No pre-meds.                            3.  ID:   - FN on admission; Blood culture 10/8 + Staph hominis, vanco added 10/11. Stop vanco/cefepime on 10/13. As it is sensitive to levaquin, we will transition to levaquin 750mg po daily through 10/22/2020. I gave him an additional 3 pills today to cover him through that date.    - RVP neg. S pneumo -, legonella -  - Prophy (levo), fluc, acy, IV pentamidine (9/23).        4.  GI:   - prn Ativan, Compazine if needed  - Protonix for GI prophy.   - Colace prn constipation     5.  FEN/Renal: Creat, lytes wnl.     6. Endo: Type 2 DM.  Plan for discharge:  Metformin 500mg bid; medium sliding scale novolog; carb counting novolog 1 unit per 3.5 grams carbs; Lantus 10 units every evening.    Note that he had been on lantus 30 units daily, but his intake is lower and he is being discharged on levaquin, which can lower blood sugars.  Will continue at lantus 10 units daily and monitor glucoses outpatient.   Complication of DM with 2 left toes amputated 7/2020   Adrenal Insufficiency:  hydrocortisone 20mg/d    Overall plan: Now that ANC has recovered and he has no sign of CRS or other toxicities, ok to start spacing out clinic appointments.  Plan to see him back Friday and next Monday, sooner PRN.       Chano Smith MD, pager [phone]            Again, thank you  for allowing me to participate in the care of your patient.        Sincerely,        BMT DOM

## 2020-10-15 LAB
BACTERIA SPEC CULT: NO GROWTH
SPECIMEN SOURCE: NORMAL

## 2020-10-16 ENCOUNTER — INFUSION THERAPY VISIT (OUTPATIENT)
Dept: TRANSPLANT | Facility: CLINIC | Age: 70
End: 2020-10-16
Attending: PHYSICIAN ASSISTANT
Payer: COMMERCIAL

## 2020-10-16 ENCOUNTER — ONCOLOGY VISIT (OUTPATIENT)
Dept: TRANSPLANT | Facility: CLINIC | Age: 70
End: 2020-10-16
Payer: COMMERCIAL

## 2020-10-16 ENCOUNTER — APPOINTMENT (OUTPATIENT)
Dept: LAB | Facility: CLINIC | Age: 70
End: 2020-10-16
Payer: COMMERCIAL

## 2020-10-16 VITALS
HEART RATE: 95 BPM | RESPIRATION RATE: 18 BRPM | DIASTOLIC BLOOD PRESSURE: 61 MMHG | HEIGHT: 68 IN | TEMPERATURE: 98.1 F | SYSTOLIC BLOOD PRESSURE: 106 MMHG | WEIGHT: 193.9 LBS | OXYGEN SATURATION: 96 % | BODY MASS INDEX: 29.39 KG/M2

## 2020-10-16 DIAGNOSIS — C83.30 DIFFUSE LARGE B-CELL LYMPHOMA, UNSPECIFIED BODY REGION (H): Primary | ICD-10-CM

## 2020-10-16 LAB
ANION GAP SERPL CALCULATED.3IONS-SCNC: 7 MMOL/L (ref 3–14)
BACTERIA SPEC CULT: ABNORMAL
BASOPHILS # BLD AUTO: 0.1 10E9/L (ref 0–0.2)
BASOPHILS NFR BLD AUTO: 1.7 %
BUN SERPL-MCNC: 9 MG/DL (ref 7–30)
CALCIUM SERPL-MCNC: 8.6 MG/DL (ref 8.5–10.1)
CHLORIDE SERPL-SCNC: 104 MMOL/L (ref 94–109)
CO2 SERPL-SCNC: 27 MMOL/L (ref 20–32)
CREAT SERPL-MCNC: 0.69 MG/DL (ref 0.66–1.25)
DIFFERENTIAL METHOD BLD: ABNORMAL
EOSINOPHIL # BLD AUTO: 0 10E9/L (ref 0–0.7)
EOSINOPHIL NFR BLD AUTO: 0.3 %
ERYTHROCYTE [DISTWIDTH] IN BLOOD BY AUTOMATED COUNT: 17.9 % (ref 10–15)
GFR SERPL CREATININE-BSD FRML MDRD: >90 ML/MIN/{1.73_M2}
GLUCOSE SERPL-MCNC: 173 MG/DL (ref 70–99)
HCT VFR BLD AUTO: 38.9 % (ref 40–53)
HGB BLD-MCNC: 12.6 G/DL (ref 13.3–17.7)
IMM GRANULOCYTES # BLD: 0.1 10E9/L (ref 0–0.4)
IMM GRANULOCYTES NFR BLD: 1.7 %
LYMPHOCYTES # BLD AUTO: 0.3 10E9/L (ref 0.8–5.3)
LYMPHOCYTES NFR BLD AUTO: 7.2 %
Lab: ABNORMAL
MAGNESIUM SERPL-MCNC: 1.4 MG/DL (ref 1.6–2.3)
MCH RBC QN AUTO: 29.2 PG (ref 26.5–33)
MCHC RBC AUTO-ENTMCNC: 32.4 G/DL (ref 31.5–36.5)
MCV RBC AUTO: 90 FL (ref 78–100)
MONOCYTES # BLD AUTO: 0.6 10E9/L (ref 0–1.3)
MONOCYTES NFR BLD AUTO: 17.8 %
NEUTROPHILS # BLD AUTO: 2.6 10E9/L (ref 1.6–8.3)
NEUTROPHILS NFR BLD AUTO: 71.3 %
NRBC # BLD AUTO: 0 10*3/UL
NRBC BLD AUTO-RTO: 0 /100
PLATELET # BLD AUTO: 78 10E9/L (ref 150–450)
POTASSIUM SERPL-SCNC: 3.3 MMOL/L (ref 3.4–5.3)
RBC # BLD AUTO: 4.31 10E12/L (ref 4.4–5.9)
SODIUM SERPL-SCNC: 138 MMOL/L (ref 133–144)
SPECIMEN SOURCE: ABNORMAL
WBC # BLD AUTO: 3.6 10E9/L (ref 4–11)

## 2020-10-16 PROCEDURE — 83735 ASSAY OF MAGNESIUM: CPT

## 2020-10-16 PROCEDURE — 85025 COMPLETE CBC W/AUTO DIFF WBC: CPT

## 2020-10-16 PROCEDURE — 96365 THER/PROPH/DIAG IV INF INIT: CPT

## 2020-10-16 PROCEDURE — 99214 OFFICE O/P EST MOD 30 MIN: CPT

## 2020-10-16 PROCEDURE — 80048 BASIC METABOLIC PNL TOTAL CA: CPT

## 2020-10-16 PROCEDURE — G0463 HOSPITAL OUTPT CLINIC VISIT: HCPCS | Mod: 25

## 2020-10-16 PROCEDURE — 99207 PR NO CHARGE LOS: CPT

## 2020-10-16 PROCEDURE — 250N000011 HC RX IP 250 OP 636

## 2020-10-16 PROCEDURE — G0463 HOSPITAL OUTPT CLINIC VISIT: HCPCS

## 2020-10-16 PROCEDURE — 250N000011 HC RX IP 250 OP 636: Performed by: PHYSICIAN ASSISTANT

## 2020-10-16 RX ORDER — MAGNESIUM SULFATE HEPTAHYDRATE 40 MG/ML
2 INJECTION, SOLUTION INTRAVENOUS ONCE
Status: COMPLETED | OUTPATIENT
Start: 2020-10-16 | End: 2020-10-16

## 2020-10-16 RX ORDER — HEPARIN SODIUM (PORCINE) LOCK FLUSH IV SOLN 100 UNIT/ML 100 UNIT/ML
5 SOLUTION INTRAVENOUS EVERY 8 HOURS
Status: DISCONTINUED | OUTPATIENT
Start: 2020-10-16 | End: 2020-10-16 | Stop reason: HOSPADM

## 2020-10-16 RX ADMIN — MAGNESIUM SULFATE 2 G: 2 INJECTION INTRAVENOUS at 07:46

## 2020-10-16 RX ADMIN — Medication 5 ML: at 06:54

## 2020-10-16 ASSESSMENT — MIFFLIN-ST. JEOR: SCORE: 1624.02

## 2020-10-16 ASSESSMENT — PAIN SCALES - GENERAL: PAINLEVEL: NO PAIN (0)

## 2020-10-16 NOTE — NURSING NOTE
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    Weight as of this encounter: 88 kg (193 lb 14.4 oz). Body surface area is 2.05 meters squared.  No Pain (0) Comment: Data Unavailable   No LMP for male patient.  Allergies reviewed: Yes  Medications reviewed: Yes    Medications: Medication refills not needed today.  Pharmacy name entered into [employer]:    [id number] [place], [place] - [address] [place] [address] PHARMACY [place], [place] - [phone]    Clinical concerns: N/A       Kasandra Vela CMA              "

## 2020-10-16 NOTE — PROGRESS NOTES
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 Eating well.  No fevers, headaches, NO vomiting. He does have constipation alternating with diarrhea have taking dulcolax. No dizziness- stead on feet. No MS changes per patient nor his son.      Review of Systems                                                                                                                           8 point review with pertinent positive and negatives      PHYSICAL EXAM                                                                                                                                     KPS: 90     /71 (BP Location: Right arm)   Pulse 82   Temp 98.5  F (36.9  C) (Axillary)   Resp 18   Ht 1.727 m (5' 8\")   Wt 88.6 kg (195 lb 6.4 oz)   SpO2 92%   BMI 29.71 kg/m        General: NAD; rommel complexion  Eyes: sclera anicteric   Nose/Mouth/Throat: OP moist, no ulcerations   Lungs: RUL with bronchial breath sounds  Cardiovascular: RRR, no M/R/G   Abdominal/Rectal: +BS, soft, NT, ND, No HSM. Large ventral hernia chronic, stable  Lymphatics: trace LE edema  Skin: Small lesion anteriorly on T6/T7, however pt unsure if that was there before.  MSK: Toes, second and third on left are missing (s/p amputation 2/2 diabetic complications)  Neuro: A&O, non-focal   Additional Findings: Right chest port a cath     Labs:        Lab Results   Component Value Date     WBC 2.0 (L) 10/12/2020     ANEU 1.1 (L) 10/12/2020     HGB 10.7 (L) 10/12/2020     HCT 32.6 (L) 10/12/2020   -  PLT 63 (L) 10/12/2020      10/12/2020     POTASSIUM 3.3 (L) 10/12/2020     CHLORIDE 102 10/12/2020     CO2 28 10/12/2020      (H) 10/12/2020     BUN 6 (L) 10/12/2020     CR 0.67 10/12/2020     MAG 1.4 (L) 10/12/2020     INR 1.15 (H) 10/12/2020     BILITOTAL 0.6 10/12/2020     AST 16 10/12/2020     ALT 26 " 10/12/2020     ALKPHOS 65 10/12/2020     PROTTOTAL 5.1 (L) 10/12/2020     ALBUMIN 2.9 (L) 10/12/2020         ASSESSMENT/PLAN     Day 0: LP with IT dex. PICC line placement, [employer] Car-t infusion   Day 7: LP under fluoroscopy w/IT dex.   Day 13 (10/12) LP under fluoroscopy w/IT dex.       1.  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  HEME: Keep Hgb>7 and plts>10K. No pre-meds.                            3.  ID:   - FN on admission; Blood culture 10/8 + Staph hominis, vanco added 10/11. Stop vanco/cefepime on 10/13. As it is sensitive to levaquin, we will transition to levaquin 750mg po daily through 10/22/2020.    - RVP neg. S pneumo -, legonella -  - Prophy (levo), fluc, acy, IV pentamidine (9/23).        4.  GI:   - prn Ativan, Compazine if needed  - Protonix for GI prophy.   - Colace prn constipation     5.  FEN/Renal: Cr stable.   Mag low 1.4- intermittent diarrhea? Give 2g IV mag.   Hypokalemia- hx of low K - has Kdur at home. Will resume 20meq by mouth daily. Recheck Tuesday      6. Endo: Type 2 DM.  Plan for discharge:  Metformin 500mg bid; medium sliding scale novolog; carb counting novolog 1 unit per 3.5 grams carbs; Lantus 10 units every evening.    Note that he had been on lantus 30 units daily, but his intake is lower and he is being discharged on levaquin, which can lower blood sugars.  Will continue at lantus 10 units daily and monitor glucoses outpatient.   Complication of DM with 2 left toes amputated 7/2020   Adrenal Insufficiency:  hydrocortisone 20mg/d    Overall plan: Now that ANC has recovered and he has no sign of CRS or other toxicities, ok to start spacing out clinic appointments.   - Per protocol study labs Tuesday - so will have him come Tues/Friday next week as long as feeling well.     Donna Rios PA-C

## 2020-10-16 NOTE — PROGRESS NOTES
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    Patient seen by provider today: Yes: Donna Rios   present during visit today: Not Applicable.    Note: Labs were monitored.    Intravenous Access:  Implanted Port.    Treatment Conditions:  Patient received an add-on 2 gram IV magnesium for a magnesium level of 1.4.  His potassium level was 3.3, and Provider instructed Patient on taking his home oral potassium.      Post Infusion Assessment:  Patient tolerated infusion without incident.       Discharge Plan:   Patient discharged in stable condition accompanied by: family ANURAG RAUSCH RN

## 2020-10-16 NOTE — LETTER
"    10/16/2020         RE: Pastor Garibay  [address]  [facility] [address]        Dear Colleague,    Thank you for referring your patient, Pastor Garibay, to the [facility] BLOOD AND MARROW TRANSPLANT PROGRAM [place]. Please see a copy of my visit note below.    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 Eating well.  No fevers, headaches, NO vomiting. He does have constipation alternating with diarrhea have taking dulcolax. No dizziness- stead on feet. No MS changes per patient nor his son.      Review of Systems                                                                                                                           8 point review with pertinent positive and negatives      PHYSICAL EXAM                                                                                                                                     KPS: 90     /71 (BP Location: Right arm)   Pulse 82   Temp 98.5  F (36.9  C) (Axillary)   Resp 18   Ht 1.727 m (5' 8\")   Wt 88.6 kg (195 lb 6.4 oz)   SpO2 92%   BMI 29.71 kg/m        General: NAD; rommel complexion  Eyes: sclera anicteric   Nose/Mouth/Throat: OP moist, no ulcerations   Lungs: RUL with bronchial breath sounds  Cardiovascular: RRR, no M/R/G   Abdominal/Rectal: +BS, soft, NT, ND, No HSM. Large ventral hernia chronic, stable  Lymphatics: trace LE edema  Skin: Small lesion anteriorly on T6/T7, however pt unsure if that was there before.  MSK: Toes, second and third on left are missing (s/p amputation 2/2 diabetic complications)  Neuro: A&O, non-focal   Additional Findings: Right chest port a cath     Labs:        Lab Results   Component Value Date     WBC 2.0 (L) 10/12/2020     ANEU 1.1 (L) 10/12/2020     HGB 10.7 (L) 10/12/2020     HCT 32.6 (L) 10/12/2020   -  PLT 63 (L) 10/12/2020      " 10/12/2020     POTASSIUM 3.3 (L) 10/12/2020     CHLORIDE 102 10/12/2020     CO2 28 10/12/2020      (H) 10/12/2020     BUN 6 (L) 10/12/2020     CR 0.67 10/12/2020     MAG 1.4 (L) 10/12/2020     INR 1.15 (H) 10/12/2020     BILITOTAL 0.6 10/12/2020     AST 16 10/12/2020     ALT 26 10/12/2020     ALKPHOS 65 10/12/2020     PROTTOTAL 5.1 (L) 10/12/2020     ALBUMIN 2.9 (L) 10/12/2020         ASSESSMENT/PLAN     Day 0: LP with IT dex. PICC line placement, [employer] Car-t infusion   Day 7: LP under fluoroscopy w/IT dex.   Day 13 (10/12) LP under fluoroscopy w/IT dex.       1.  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  HEME: Keep Hgb>7 and plts>10K. No pre-meds.                            3.  ID:   - FN on admission; Blood culture 10/8 + Staph hominis, vanco added 10/11. Stop vanco/cefepime on 10/13. As it is sensitive to levaquin, we will transition to levaquin 750mg po daily through 10/22/2020.    - RVP neg. S pneumo -, legonella -  - Prophy (levo), fluc, acy, IV pentamidine (9/23).        4.  GI:   - prn Ativan, Compazine if needed  - Protonix for GI prophy.   - Colace prn constipation     5.  FEN/Renal: Cr stable.   Mag low 1.4- intermittent diarrhea? Give 2g IV mag.   Hypokalemia- hx of low K - has Kdur at home. Will resume 20meq by mouth daily. Recheck Tuesday      6. Endo: Type 2 DM.  Plan for discharge:  Metformin 500mg bid; medium sliding scale novolog; carb counting novolog 1 unit per 3.5 grams carbs; Lantus 10 units every evening.    Note that he had been on lantus 30 units daily, but his intake is lower and he is being discharged on levaquin, which can lower blood sugars.  Will continue at lantus 10 units daily and monitor glucoses outpatient.   Complication of DM with 2 left toes amputated 7/2020   Adrenal Insufficiency:  hydrocortisone 20mg/d    Overall plan: Now that ANC has recovered and he has no  sign of CRS or other toxicities, ok to start spacing out clinic appointments.   - Per protocol study labs Tuesday - so will have him come Tues/Friday next week as long as feeling well.     Donna Rios PA-C        Again, thank you for allowing me to participate in the care of your patient.        Sincerely,        BMT Advanced Practice Provider

## 2020-10-16 NOTE — LETTER
10/16/2020         RE: Pastor Garibay  [address]  [facility] [address]        Dear Colleague,    Thank you for referring your patient, Pastor Garibay, to the [facility] BLOOD AND MARROW TRANSPLANT PROGRAM [place]. Please see a copy of my visit note below.    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    Patient seen by provider today: Yes: Donna Rios   present during visit today: Not Applicable.    Note: Labs were monitored.    Intravenous Access:  Implanted Port.    Treatment Conditions:  Patient received an add-on 2 gram IV magnesium for a magnesium level of 1.4.  His potassium level was 3.3, and Provider instructed Patient on taking his home oral potassium.      Post Infusion Assessment:  Patient tolerated infusion without incident.       Discharge Plan:   Patient discharged in stable condition accompanied by: family    ANURAG RAUSCH RN                            Again, thank you for allowing me to participate in the care of your patient.        Sincerely,        [facility]

## 2020-10-17 LAB
BACTERIA SPEC CULT: NO GROWTH
BACTERIA SPEC CULT: NO GROWTH
Lab: NORMAL
RESEARCH KIT COLLECTION: NORMAL
SPECIMEN SOURCE: NORMAL
SPECIMEN SOURCE: NORMAL

## 2020-10-19 NOTE — PROGRESS NOTES
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  He has some pain on the dorsum of L foot.  He notices it at night.  He rubs it and it feels a little better.  No fever.  No new skin rash.  No new erythema.  He also thinks he may be a little dehydrated as his mouth feels dry.  No other complaints today.         Review of Systems                                                                                                                           8 point review with pertinent positive and negatives      PHYSICAL EXAM                                                                                                                                     KPS: 90    Blood pressure 91/59, pulse 98, temperature 98.1  F (36.7  C), temperature source Oral, resp. rate 18, weight 88.3 kg (194 lb 11.2 oz), SpO2 96 %.    Wt Readings from Last 4 Encounters:   10/20/20 88.3 kg (194 lb 11.2 oz)   10/16/20 88 kg (193 lb 14.4 oz)   10/14/20 90.1 kg (198 lb 11.2 oz)   10/13/20 87.5 kg (193 lb)     General: NAD; rommel complexion  Eyes: sclera anicteric   Nose/Mouth/Throat: OP dry, no ulcerations   Lungs: RUL with bronchial breath sounds  Cardiovascular: RRR, no M/R/G   Abdominal/Rectal: +BS, soft, NT, ND, No HSM. Large ventral hernia chronic, stable  Lymphatics: trace LE edema  MSK: Toes, second and third on left are missing (s/p amputation x 2 due to osteomyelitis 2/2 DM).  No new skin changes at amputation site.  No warmth, edema or eythema to dorsum of L foot.  No point tenderness appreciated on exam.  Neuro: A&O, non-focal   Additional Findings: Right chest port a cath     Labs:  Lab Results   Component Value Date    WBC 3.6 (L) 10/16/2020    ANEU 2.6 10/16/2020    HGB 12.6 (L) 10/16/2020    HCT 38.9 (L) 10/16/2020    PLT 78 (L) 10/16/2020     10/16/2020    POTASSIUM 3.3 (L) 10/16/2020    CHLORIDE 104 10/16/2020     CO2 27 10/16/2020     (H) 10/16/2020    BUN 9 10/16/2020    CR 0.69 10/16/2020    MAG 1.4 (L) 10/16/2020    INR 1.08 10/13/2020          ASSESSMENT/PLAN     Day 0: LP with IT dex. PICC line placement, [employer] Car-t infusion   Day 7: LP under fluoroscopy w/IT dex.   Day 13 (10/12) LP under fluoroscopy w/IT dex.       1.  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  HEME: Keep Hgb>7 and plts>10K. No pre-meds.                            3.  ID: will obtain surveillance bld cx (lowish BP, new L foot pain).    - FN on admission; Blood culture 10/8 + Staph hominis, vanco added 10/11. Stop vanco/cefepime on 10/13. As it is sensitive to levaquin, we will transition to levaquin 750mg po daily through 10/22/2020.    - RVP neg. S pneumo -, legonella -  - Prophy (levo), fluc, acy, IV pentamidine (9/23).        4.  GI:   - prn Ativan, Compazine if needed  - Protonix for GI prophy.   - Colace prn constipation     5.  FEN/Renal: Cr stable.   Mag low 1.4- intermittent diarrhea? Give 2g IV mag.   Hypokalemia- hx of low K - has Kdur at home. Will resume 20meq by mouth daily. Recheck Tuesday      6. Endo: Type 2 DM.  Plan for discharge:  Metformin 500mg bid; medium sliding scale novolog; carb counting novolog 1 unit per 3.5 grams carbs; Lantus 10 units every evening.    Note that he had been on lantus 30 units daily, but his intake is lower and he is being discharged on levaquin, which can lower blood sugars.  Will continue at lantus 10 units daily and monitor glucoses outpatient.   Complication of DM with 2 left toes amputated 7/2020   Adrenal Insufficiency:  hydrocortisone 20mg/d    7.  Musk:  Hx of osteomyelitis of 2nd & 3rd L toes w/ amputation of distal phalanx of both digits.  New pain of dorsum of L foot.    - will order MRI L foot to evaluate for progression of osteo.    Overall plan: Now that ANC has recovered and  he has no sign of CRS or other toxicities, ok to start spacing out clinic appointments.   - Per protocol study labs Tuesday - so will have him come Tues/Friday next week as long as feeling well.   - f/u MRI (ordered today--asked to be done prior to appt on Friday).  - 1 L IVF today for softer pressure  - surveillance bld cx today    Cherelle Rivera pa-c  [phone]

## 2020-10-20 ENCOUNTER — INFUSION THERAPY VISIT (OUTPATIENT)
Dept: TRANSPLANT | Facility: CLINIC | Age: 70
End: 2020-10-20
Payer: COMMERCIAL

## 2020-10-20 ENCOUNTER — APPOINTMENT (OUTPATIENT)
Dept: LAB | Facility: CLINIC | Age: 70
End: 2020-10-20
Attending: PHYSICIAN ASSISTANT
Payer: COMMERCIAL

## 2020-10-20 ENCOUNTER — ONCOLOGY VISIT (OUTPATIENT)
Dept: TRANSPLANT | Facility: CLINIC | Age: 70
End: 2020-10-20
Attending: PHYSICIAN ASSISTANT
Payer: COMMERCIAL

## 2020-10-20 VITALS
SYSTOLIC BLOOD PRESSURE: 91 MMHG | TEMPERATURE: 98.1 F | WEIGHT: 194.7 LBS | HEART RATE: 98 BPM | OXYGEN SATURATION: 96 % | RESPIRATION RATE: 18 BRPM | BODY MASS INDEX: 29.6 KG/M2 | DIASTOLIC BLOOD PRESSURE: 59 MMHG

## 2020-10-20 VITALS — SYSTOLIC BLOOD PRESSURE: 113 MMHG | DIASTOLIC BLOOD PRESSURE: 70 MMHG

## 2020-10-20 DIAGNOSIS — C83.30 DIFFUSE LARGE B-CELL LYMPHOMA, UNSPECIFIED BODY REGION (H): ICD-10-CM

## 2020-10-20 DIAGNOSIS — C83.30 DIFFUSE LARGE B-CELL LYMPHOMA, UNSPECIFIED BODY REGION (H): Primary | ICD-10-CM

## 2020-10-20 LAB
ALBUMIN SERPL-MCNC: 3.6 G/DL (ref 3.4–5)
ALP SERPL-CCNC: 68 U/L (ref 40–150)
ALT SERPL W P-5'-P-CCNC: 43 U/L (ref 0–70)
ANION GAP SERPL CALCULATED.3IONS-SCNC: 5 MMOL/L (ref 3–14)
AST SERPL W P-5'-P-CCNC: 34 U/L (ref 0–45)
BASOPHILS # BLD AUTO: 0.1 10E9/L (ref 0–0.2)
BASOPHILS NFR BLD AUTO: 1.5 %
BILIRUB SERPL-MCNC: 0.6 MG/DL (ref 0.2–1.3)
BUN SERPL-MCNC: 10 MG/DL (ref 7–30)
CALCIUM SERPL-MCNC: 8.5 MG/DL (ref 8.5–10.1)
CHLORIDE SERPL-SCNC: 105 MMOL/L (ref 94–109)
CO2 SERPL-SCNC: 29 MMOL/L (ref 20–32)
CREAT SERPL-MCNC: 0.7 MG/DL (ref 0.66–1.25)
DIFFERENTIAL METHOD BLD: ABNORMAL
EOSINOPHIL # BLD AUTO: 0.1 10E9/L (ref 0–0.7)
EOSINOPHIL NFR BLD AUTO: 4.2 %
ERYTHROCYTE [DISTWIDTH] IN BLOOD BY AUTOMATED COUNT: 19 % (ref 10–15)
GFR SERPL CREATININE-BSD FRML MDRD: >90 ML/MIN/{1.73_M2}
GLUCOSE SERPL-MCNC: 165 MG/DL (ref 70–99)
HCT VFR BLD AUTO: 40.8 % (ref 40–53)
HGB BLD-MCNC: 13.1 G/DL (ref 13.3–17.7)
IMM GRANULOCYTES # BLD: 0 10E9/L (ref 0–0.4)
IMM GRANULOCYTES NFR BLD: 0.9 %
LYMPHOCYTES # BLD AUTO: 0.4 10E9/L (ref 0.8–5.3)
LYMPHOCYTES NFR BLD AUTO: 12.1 %
MAGNESIUM SERPL-MCNC: 1.6 MG/DL (ref 1.6–2.3)
MCH RBC QN AUTO: 29.2 PG (ref 26.5–33)
MCHC RBC AUTO-ENTMCNC: 32.1 G/DL (ref 31.5–36.5)
MCV RBC AUTO: 91 FL (ref 78–100)
MONOCYTES # BLD AUTO: 0.6 10E9/L (ref 0–1.3)
MONOCYTES NFR BLD AUTO: 16.6 %
NEUTROPHILS # BLD AUTO: 2.1 10E9/L (ref 1.6–8.3)
NEUTROPHILS NFR BLD AUTO: 64.7 %
NRBC # BLD AUTO: 0 10*3/UL
NRBC BLD AUTO-RTO: 0 /100
PLATELET # BLD AUTO: 71 10E9/L (ref 150–450)
POTASSIUM SERPL-SCNC: 3.7 MMOL/L (ref 3.4–5.3)
PROT SERPL-MCNC: 5.9 G/DL (ref 6.8–8.8)
RBC # BLD AUTO: 4.48 10E12/L (ref 4.4–5.9)
SODIUM SERPL-SCNC: 139 MMOL/L (ref 133–144)
WBC # BLD AUTO: 3.3 10E9/L (ref 4–11)

## 2020-10-20 PROCEDURE — 80053 COMPREHEN METABOLIC PANEL: CPT | Performed by: PHYSICIAN ASSISTANT

## 2020-10-20 PROCEDURE — 85025 COMPLETE CBC W/AUTO DIFF WBC: CPT | Performed by: PHYSICIAN ASSISTANT

## 2020-10-20 PROCEDURE — 250N000011 HC RX IP 250 OP 636: Performed by: PHYSICIAN ASSISTANT

## 2020-10-20 PROCEDURE — G0463 HOSPITAL OUTPT CLINIC VISIT: HCPCS

## 2020-10-20 PROCEDURE — 96360 HYDRATION IV INFUSION INIT: CPT

## 2020-10-20 PROCEDURE — G0463 HOSPITAL OUTPT CLINIC VISIT: HCPCS | Mod: 25

## 2020-10-20 PROCEDURE — 258N000003 HC RX IP 258 OP 636: Performed by: PHYSICIAN ASSISTANT

## 2020-10-20 PROCEDURE — 87040 BLOOD CULTURE FOR BACTERIA: CPT | Performed by: PHYSICIAN ASSISTANT

## 2020-10-20 PROCEDURE — 99214 OFFICE O/P EST MOD 30 MIN: CPT

## 2020-10-20 PROCEDURE — 83735 ASSAY OF MAGNESIUM: CPT | Performed by: PHYSICIAN ASSISTANT

## 2020-10-20 PROCEDURE — 99207 PR NO CHARGE LOS: CPT

## 2020-10-20 RX ORDER — HEPARIN SODIUM (PORCINE) LOCK FLUSH IV SOLN 100 UNIT/ML 100 UNIT/ML
5 SOLUTION INTRAVENOUS ONCE
Status: COMPLETED | OUTPATIENT
Start: 2020-10-20 | End: 2020-10-20

## 2020-10-20 RX ADMIN — Medication 5 ML: at 08:10

## 2020-10-20 RX ADMIN — SODIUM CHLORIDE 1000 ML: 9 INJECTION, SOLUTION INTRAVENOUS at 09:09

## 2020-10-20 ASSESSMENT — PAIN SCALES - GENERAL: PAINLEVEL: NO PAIN (0)

## 2020-10-20 NOTE — NURSING NOTE
"Chief Complaint   Patient presents with     Port Draw     Labs drawn via port by RN. VS taken     Port accessed with 20g 3/4\" gripper needle and labs drawn by rn.  Port flushed with NS and heparin.  Pt tolerated well.  VS taken.  Pt checked in for next appt.    Jr Stern RN    "

## 2020-10-20 NOTE — LETTER
10/20/2020         RE: Pastor Garibay  [address]  [facility] [address]        Dear Colleague,    Thank you for referring your patient, Pastor Garibay, to the [facility] BLOOD AND MARROW TRANSPLANT PROGRAM [place]. Please see a copy of my visit note below.    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    Patient seen by provider today: Yes: Cherelle Rivera   present during visit today: Not Applicable.    Note: Labs were monitored.    Intravenous Access:  Implanted Port.    Treatment Conditions:  Per Provider order, Patient received an add-on IV fluid infusion over one hour for a low blood pressure.  Blood cultures were drawn from port.      Post Infusion Assessment:  Patient tolerated infusion without incident.       Discharge Plan:   Patient discharged in stable condition accompanied by: self.  Family is picking Patient up at front door.    ANURAG RAUSCH, RN                            Again, thank you for allowing me to participate in the care of your patient.        Sincerely,        BMT Infusion

## 2020-10-20 NOTE — LETTER
10/20/2020         RE: Pastor Garibay  [address]  [facility] [address]        Dear Colleague,    Thank you for referring your patient, Pastor Garibay, to the [facility] BLOOD AND MARROW TRANSPLANT PROGRAM [place]. Please see a copy of my visit note below.    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  He has some pain on the dorsum of L foot.  He notices it at night.  He rubs it and it feels a little better.  No fever.  No new skin rash.  No new erythema.  He also thinks he may be a little dehydrated as his mouth feels dry.  No other complaints today.         Review of Systems                                                                                                                           8 point review with pertinent positive and negatives      PHYSICAL EXAM                                                                                                                                     KPS: 90    Blood pressure 91/59, pulse 98, temperature 98.1  F (36.7  C), temperature source Oral, resp. rate 18, weight 88.3 kg (194 lb 11.2 oz), SpO2 96 %.    Wt Readings from Last 4 Encounters:   10/20/20 88.3 kg (194 lb 11.2 oz)   10/16/20 88 kg (193 lb 14.4 oz)   10/14/20 90.1 kg (198 lb 11.2 oz)   10/13/20 87.5 kg (193 lb)     General: NAD; rommel complexion  Eyes: sclera anicteric   Nose/Mouth/Throat: OP dry, no ulcerations   Lungs: RUL with bronchial breath sounds  Cardiovascular: RRR, no M/R/G   Abdominal/Rectal: +BS, soft, NT, ND, No HSM. Large ventral hernia chronic, stable  Lymphatics: trace LE edema  MSK: Toes, second and third on left are missing (s/p amputation x 2 due to osteomyelitis 2/2 DM).  No new skin changes at amputation site.  No warmth, edema or eythema to dorsum of L foot.  No point tenderness appreciated on exam.  Neuro: A&O, non-focal   Additional  Findings: Right chest port a cath     Labs:  Lab Results   Component Value Date    WBC 3.6 (L) 10/16/2020    ANEU 2.6 10/16/2020    HGB 12.6 (L) 10/16/2020    HCT 38.9 (L) 10/16/2020    PLT 78 (L) 10/16/2020     10/16/2020    POTASSIUM 3.3 (L) 10/16/2020    CHLORIDE 104 10/16/2020    CO2 27 10/16/2020     (H) 10/16/2020    BUN 9 10/16/2020    CR 0.69 10/16/2020    MAG 1.4 (L) 10/16/2020    INR 1.08 10/13/2020          ASSESSMENT/PLAN     Day 0: LP with IT dex. PICC line placement, [place] Car-t infusion   Day 7: LP under fluoroscopy w/IT dex.   Day 13 (10/12) LP under fluoroscopy w/IT dex.       1.  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  HEME: Keep Hgb>7 and plts>10K. No pre-meds.                            3.  ID: will obtain surveillance bld cx (lowish BP, new L foot pain).    - FN on admission; Blood culture 10/8 + Staph hominis, vanco added 10/11. Stop vanco/cefepime on 10/13. As it is sensitive to levaquin, we will transition to levaquin 750mg po daily through 10/22/2020.    - RVP neg. S pneumo -, legonella -  - Prophy (levo), fluc, acy, IV pentamidine (9/23).        4.  GI:   - prn Ativan, Compazine if needed  - Protonix for GI prophy.   - Colace prn constipation     5.  FEN/Renal: Cr stable.   Mag low 1.4- intermittent diarrhea? Give 2g IV mag.   Hypokalemia- hx of low K - has Kdur at home. Will resume 20meq by mouth daily. Recheck Tuesday      6. Endo: Type 2 DM.  Plan for discharge:  Metformin 500mg bid; medium sliding scale novolog; carb counting novolog 1 unit per 3.5 grams carbs; Lantus 10 units every evening.    Note that he had been on lantus 30 units daily, but his intake is lower and he is being discharged on levaquin, which can lower blood sugars.  Will continue at lantus 10 units daily and monitor glucoses outpatient.   Complication of DM with 2 left toes amputated 7/2020    Adrenal Insufficiency:  hydrocortisone 20mg/d    7.  Musk:  Hx of osteomyelitis of 2nd & 3rd L toes w/ amputation of distal phalanx of both digits.  New pain of dorsum of L foot.    - will order MRI L foot to evaluate for progression of osteo.    Overall plan: Now that ANC has recovered and he has no sign of CRS or other toxicities, ok to start spacing out clinic appointments.   - Per protocol study labs Tuesday - so will have him come Tues/Friday next week as long as feeling well.   - f/u MRI (ordered today--asked to be done prior to appt on Friday).  - 1 L IVF today for softer pressure  - surveillance bld cx today    Cherelle Rivera pa-c  [phone]          Again, thank you for allowing me to participate in the care of your patient.        Sincerely,        BMT Advanced Practice Provider

## 2020-10-20 NOTE — PROGRESS NOTES
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    Patient seen by provider today: Yes: Cherelle Rivera   present during visit today: Not Applicable.    Note: Labs were monitored.    Intravenous Access:  Implanted Port.    Treatment Conditions:  Per Provider order, Patient received an add-on IV fluid infusion over one hour for a low blood pressure.  Blood cultures were drawn from port.      Post Infusion Assessment:  Patient tolerated infusion without incident.       Discharge Plan:   Patient discharged in stable condition accompanied by: self.  Family is picking Patient up at front door.    ANURAG RAUSCH RN

## 2020-10-20 NOTE — NURSING NOTE
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 VS taken     Initial Vitals: BP 91/59 (BP Location: Left arm, Patient Position: Sitting, Cuff Size: Adult Regular)   Pulse 98   Temp 98.1  F (36.7  C) (Oral)   Resp 18   Wt 88.3 kg (194 lb 11.2 oz)   SpO2 96%   BMI 29.60 kg/m   Estimated body mass index is 29.6 kg/m  as calculated from the following:    Height as of 10/16/20: 1.727 m (5' 8\").    Weight as of this encounter: 88.3 kg (194 lb 11.2 oz). Body surface area is 2.06 meters squared.  No Pain (0) Comment: Data Unavailable   No LMP for male patient.  Allergies reviewed: Yes  Medications reviewed: Yes    Medications: Medication refills not needed today.  Pharmacy name entered into [employer]:    [id number] [place], [place] - [address] [place] [address] PHARMACY [place], [place] - [address] [facility] [phone]    Clinical concerns:    Provider was informed of patient's blood pressure.  Orthostatics was done per provider's order.       Yumiko Richardson CMA              "

## 2020-10-23 ENCOUNTER — ONCOLOGY VISIT (OUTPATIENT)
Dept: TRANSPLANT | Facility: CLINIC | Age: 70
End: 2020-10-23
Attending: PHYSICIAN ASSISTANT
Payer: COMMERCIAL

## 2020-10-23 ENCOUNTER — APPOINTMENT (OUTPATIENT)
Dept: LAB | Facility: CLINIC | Age: 70
End: 2020-10-23
Attending: PHYSICIAN ASSISTANT
Payer: COMMERCIAL

## 2020-10-23 VITALS
BODY MASS INDEX: 29.65 KG/M2 | WEIGHT: 195 LBS | OXYGEN SATURATION: 99 % | HEART RATE: 96 BPM | SYSTOLIC BLOOD PRESSURE: 86 MMHG | DIASTOLIC BLOOD PRESSURE: 59 MMHG | RESPIRATION RATE: 18 BRPM | TEMPERATURE: 98.1 F

## 2020-10-23 VITALS
DIASTOLIC BLOOD PRESSURE: 68 MMHG | SYSTOLIC BLOOD PRESSURE: 119 MMHG | HEART RATE: 96 BPM | RESPIRATION RATE: 20 BRPM | OXYGEN SATURATION: 97 % | TEMPERATURE: 98.3 F

## 2020-10-23 DIAGNOSIS — C83.30 DIFFUSE LARGE B-CELL LYMPHOMA, UNSPECIFIED BODY REGION (H): ICD-10-CM

## 2020-10-23 DIAGNOSIS — C83.30 DIFFUSE LARGE B-CELL LYMPHOMA, UNSPECIFIED BODY REGION (H): Primary | ICD-10-CM

## 2020-10-23 LAB
ANION GAP SERPL CALCULATED.3IONS-SCNC: 8 MMOL/L (ref 3–14)
BASOPHILS # BLD AUTO: 0.1 10E9/L (ref 0–0.2)
BASOPHILS NFR BLD AUTO: 1.7 %
BUN SERPL-MCNC: 10 MG/DL (ref 7–30)
CALCIUM SERPL-MCNC: 8.8 MG/DL (ref 8.5–10.1)
CHLORIDE SERPL-SCNC: 102 MMOL/L (ref 94–109)
CO2 SERPL-SCNC: 26 MMOL/L (ref 20–32)
CREAT SERPL-MCNC: 0.76 MG/DL (ref 0.66–1.25)
DIFFERENTIAL METHOD BLD: ABNORMAL
EOSINOPHIL # BLD AUTO: 0.2 10E9/L (ref 0–0.7)
EOSINOPHIL NFR BLD AUTO: 5.3 %
ERYTHROCYTE [DISTWIDTH] IN BLOOD BY AUTOMATED COUNT: 19.8 % (ref 10–15)
GFR SERPL CREATININE-BSD FRML MDRD: >90 ML/MIN/{1.73_M2}
GLUCOSE SERPL-MCNC: 272 MG/DL (ref 70–99)
HCT VFR BLD AUTO: 40.1 % (ref 40–53)
HGB BLD-MCNC: 13.1 G/DL (ref 13.3–17.7)
IMM GRANULOCYTES # BLD: 0 10E9/L (ref 0–0.4)
IMM GRANULOCYTES NFR BLD: 0.7 %
LYMPHOCYTES # BLD AUTO: 0.3 10E9/L (ref 0.8–5.3)
LYMPHOCYTES NFR BLD AUTO: 8.6 %
MAGNESIUM SERPL-MCNC: 1.4 MG/DL (ref 1.6–2.3)
MCH RBC QN AUTO: 29.2 PG (ref 26.5–33)
MCHC RBC AUTO-ENTMCNC: 32.7 G/DL (ref 31.5–36.5)
MCV RBC AUTO: 89 FL (ref 78–100)
MONOCYTES # BLD AUTO: 0.5 10E9/L (ref 0–1.3)
MONOCYTES NFR BLD AUTO: 15.9 %
NEUTROPHILS # BLD AUTO: 2.1 10E9/L (ref 1.6–8.3)
NEUTROPHILS NFR BLD AUTO: 67.8 %
NRBC # BLD AUTO: 0 10*3/UL
NRBC BLD AUTO-RTO: 0 /100
PLATELET # BLD AUTO: 65 10E9/L (ref 150–450)
POTASSIUM SERPL-SCNC: 4.3 MMOL/L (ref 3.4–5.3)
RBC # BLD AUTO: 4.49 10E12/L (ref 4.4–5.9)
SODIUM SERPL-SCNC: 136 MMOL/L (ref 133–144)
WBC # BLD AUTO: 3 10E9/L (ref 4–11)

## 2020-10-23 PROCEDURE — G0463 HOSPITAL OUTPT CLINIC VISIT: HCPCS | Mod: 25

## 2020-10-23 PROCEDURE — 258N000003 HC RX IP 258 OP 636: Performed by: PHYSICIAN ASSISTANT

## 2020-10-23 PROCEDURE — 96366 THER/PROPH/DIAG IV INF ADDON: CPT

## 2020-10-23 PROCEDURE — G0463 HOSPITAL OUTPT CLINIC VISIT: HCPCS

## 2020-10-23 PROCEDURE — 250N000011 HC RX IP 250 OP 636: Performed by: PHYSICIAN ASSISTANT

## 2020-10-23 PROCEDURE — 83735 ASSAY OF MAGNESIUM: CPT | Performed by: PHYSICIAN ASSISTANT

## 2020-10-23 PROCEDURE — 96365 THER/PROPH/DIAG IV INF INIT: CPT

## 2020-10-23 PROCEDURE — 99207 PR NO CHARGE LOS: CPT

## 2020-10-23 PROCEDURE — 80048 BASIC METABOLIC PNL TOTAL CA: CPT | Performed by: PHYSICIAN ASSISTANT

## 2020-10-23 PROCEDURE — 85025 COMPLETE CBC W/AUTO DIFF WBC: CPT | Performed by: PHYSICIAN ASSISTANT

## 2020-10-23 PROCEDURE — 99214 OFFICE O/P EST MOD 30 MIN: CPT

## 2020-10-23 RX ORDER — HEPARIN SODIUM (PORCINE) LOCK FLUSH IV SOLN 100 UNIT/ML 100 UNIT/ML
5 SOLUTION INTRAVENOUS EVERY 8 HOURS
Status: DISCONTINUED | OUTPATIENT
Start: 2020-10-23 | End: 2020-10-23 | Stop reason: HOSPADM

## 2020-10-23 RX ORDER — MAGNESIUM SULFATE HEPTAHYDRATE 40 MG/ML
2 INJECTION, SOLUTION INTRAVENOUS ONCE
Status: COMPLETED | OUTPATIENT
Start: 2020-10-23 | End: 2020-10-23

## 2020-10-23 RX ADMIN — Medication 5 ML: at 09:28

## 2020-10-23 RX ADMIN — SODIUM CHLORIDE 1000 ML: 9 INJECTION, SOLUTION INTRAVENOUS at 10:13

## 2020-10-23 RX ADMIN — MAGNESIUM SULFATE HEPTAHYDRATE 2 G: 40 INJECTION, SOLUTION INTRAVENOUS at 10:13

## 2020-10-23 ASSESSMENT — PAIN SCALES - GENERAL: PAINLEVEL: NO PAIN (0)

## 2020-10-23 NOTE — LETTER
10/23/2020         RE: Pastor Garibay  [address]  [facility] [address]        Dear Colleague,    Thank you for referring your patient, Pastor Garibay, to the [facility] BLOOD AND MARROW TRANSPLANT PROGRAM [place]. Please see a copy of my visit note below.    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 BP low today. He is sure he is not taking his norvasc or lisinopril. He feels stable on feet. No dizzyness. No fevers. No infectious symptoms. His site on foot with prior amputation really no longer bothering him.       Review of Systems                                                                                                                           8 point review with pertinent positive and negatives      PHYSICAL EXAM                                                                                                                                     KPS: 90    Blood pressure (!) 86/59, pulse 96, temperature 98.1  F (36.7  C), resp. rate 18, weight 88.5 kg (195 lb), SpO2 99 %.    Wt Readings from Last 4 Encounters:   10/23/20 88.5 kg (195 lb)   10/20/20 88.3 kg (194 lb 11.2 oz)   10/16/20 88 kg (193 lb 14.4 oz)   10/14/20 90.1 kg (198 lb 11.2 oz)     General: NAD; rommel complexion  Eyes: sclera anicteric   Nose/Mouth/Throat: OP dry, no ulcerations   Lungs: BCTA  Cardiovascular: RRR, no M/R/G   Lymphatics: trace LE edema  MSK: Toes, second and third on left are missing (s/p amputation x 2 due to osteomyelitis 2/2 DM).    Neuro: A&O, non-focal   Additional Findings: Right chest port a cath     Labs:  Lab Results   Component Value Date    WBC 3.3 (L) 10/20/2020    ANEU 2.1 10/20/2020    HGB 13.1 (L) 10/20/2020    HCT 40.8 10/20/2020    PLT 71 (L) 10/20/2020     10/20/2020    POTASSIUM 3.7 10/20/2020    CHLORIDE 105 10/20/2020    CO2 29  10/20/2020     (H) 10/20/2020    BUN 10 10/20/2020    CR 0.70 10/20/2020    MAG 1.6 10/20/2020    INR 1.08 10/13/2020          ASSESSMENT/PLAN     Day 0: LP with IT dex. PICC line placement, [place] Car-t infusion   Day 7: LP under fluoroscopy w/IT dex.   Day 13 (10/12) LP under fluoroscopy w/IT dex.       1.  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  HEME: Keep Hgb>7 and plts>10K. No pre-meds.                            3.  ID:   - surveillance BC's with low BP NGTD.  - FN on admission; Blood culture 10/8 + Staph hominis, vanco added 10/11. Stop vanco/cefepime on 10/13. As it is sensitive to levaquin, we will transition to levaquin 750mg po daily through 10/23/2020. He will stop.   - Prophy fluc, acy, IV pentamidine (9/23) Pentamidine not ordered today as he has low BP. Scheduled it for 10/28. Counts dropping won't start bactrim.      4.  GI:   - prn Ativan, Compazine if needed  - Protonix for GI prophy.   - Colace prn constipation     5.  FEN/Renal: Cr stable.   Hypokalemia: 20meq daily.   Given 2g IV mg today.   Asymptomatic low BP again: 1L IVF. Also scheduled on 10/28 when here.      6. Endo: Type 2 DM.  Plan for discharge:  Metformin 500mg bid; medium sliding scale novolog; carb counting novolog 1 unit per 3.5 grams carbs; Lantus 10 units every evening.    Complication of DM with 2 left toes amputated 7/2020   Adrenal Insufficiency:  hydrocortisone 20mg/d    7.  Musk:  Hx of osteomyelitis of 2nd & 3rd L toes w/ amputation of distal phalanx of both digits.  New pain of dorsum of L foot currently has MRI 11/5--he tells me near resolved will leave scheduled for now.    RTC: as scheduled with day +28 PET, re-stage eval, Pentamidine/IVF.    Lisseth Hauser PA-C  #[id number]      Again, thank you for allowing me to participate in the care of your patient.        Sincerely,        BMT Advanced Practice  Provider

## 2020-10-23 NOTE — NURSING NOTE
Chief Complaint   Patient presents with     Port Draw     power  needle. heparin locked, vitals checked     Oncology Clinic Visit     Return; DLBCL     Jeanie Parks RN on 10/23/2020 at 9:29 AM

## 2020-10-23 NOTE — LETTER
10/23/2020         RE: Pastor Garibay  [address]  [facility] [address]        Dear Colleague,    Thank you for referring your patient, Pastor Garibay, to the [facility] BLOOD AND MARROW TRANSPLANT PROGRAM [place]. Please see a copy of my visit note below.    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    Patient seen by provider today: Yes: Kayla Hauser   present during visit today: Not Applicable.    Note: Labs monitored, low b/p. Pt is asymptomatic. 1L NS bolus administered. 2g IV mag administered. Pt tolerated infusion.     Intravenous Access:  Implanted Port.    Treatment Conditions:  Results reviewed, labs MET treatment parameters, ok to proceed with treatment.      Post Infusion Assessment:  Patient tolerated infusion without incident.       Discharge Plan:   Patient discharged in stable condition accompanied by: self.  Departure Mode: Ambulatory.    Kyler Ferro RN                            Again, thank you for allowing me to participate in the care of your patient.        Sincerely,        [facility]

## 2020-10-23 NOTE — PROGRESS NOTES
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    Patient seen by provider today: Yes: Kayla Hauser   present during visit today: Not Applicable.    Note: Labs monitored, low b/p. Pt is asymptomatic. 1L NS bolus administered. 2g IV mag administered. Pt tolerated infusion.     Intravenous Access:  Implanted Port.    Treatment Conditions:  Results reviewed, labs MET treatment parameters, ok to proceed with treatment.      Post Infusion Assessment:  Patient tolerated infusion without incident.       Discharge Plan:   Patient discharged in stable condition accompanied by: self.  Departure Mode: Ambulatory.    Kyler Ferro RN

## 2020-10-23 NOTE — PROGRESS NOTES
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 BP low today. He is sure he is not taking his norvasc or lisinopril. He feels stable on feet. No dizzyness. No fevers. No infectious symptoms. His site on foot with prior amputation really no longer bothering him.       Review of Systems                                                                                                                           8 point review with pertinent positive and negatives      PHYSICAL EXAM                                                                                                                                     KPS: 90    Blood pressure (!) 86/59, pulse 96, temperature 98.1  F (36.7  C), resp. rate 18, weight 88.5 kg (195 lb), SpO2 99 %.    Wt Readings from Last 4 Encounters:   10/23/20 88.5 kg (195 lb)   10/20/20 88.3 kg (194 lb 11.2 oz)   10/16/20 88 kg (193 lb 14.4 oz)   10/14/20 90.1 kg (198 lb 11.2 oz)     General: NAD; rommel complexion  Eyes: sclera anicteric   Nose/Mouth/Throat: OP dry, no ulcerations   Lungs: BCTA  Cardiovascular: RRR, no M/R/G   Lymphatics: trace LE edema  MSK: Toes, second and third on left are missing (s/p amputation x 2 due to osteomyelitis 2/2 DM).    Neuro: A&O, non-focal   Additional Findings: Right chest port a cath     Labs:  Lab Results   Component Value Date    WBC 3.3 (L) 10/20/2020    ANEU 2.1 10/20/2020    HGB 13.1 (L) 10/20/2020    HCT 40.8 10/20/2020    PLT 71 (L) 10/20/2020     10/20/2020    POTASSIUM 3.7 10/20/2020    CHLORIDE 105 10/20/2020    CO2 29 10/20/2020     (H) 10/20/2020    BUN 10 10/20/2020    CR 0.70 10/20/2020    MAG 1.6 10/20/2020    INR 1.08 10/13/2020          ASSESSMENT/PLAN     Day 0: LP with IT dex. PICC line placement, [place] Car-t infusion   Day 7: LP under fluoroscopy w/IT dex.   Day 13 (10/12) LP under  fluoroscopy w/IT dex.       1.  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  HEME: Keep Hgb>7 and plts>10K. No pre-meds.                            3.  ID:   - surveillance BC's with low BP NGTD.  - FN on admission; Blood culture 10/8 + Staph hominis, vanco added 10/11. Stop vanco/cefepime on 10/13. As it is sensitive to levaquin, we will transition to levaquin 750mg po daily through 10/23/2020. He will stop.   - Prophy fluc, acy, IV pentamidine (9/23) Pentamidine not ordered today as he has low BP. Scheduled it for 10/28. Counts dropping won't start bactrim.      4.  GI:   - prn Ativan, Compazine if needed  - Protonix for GI prophy.   - Colace prn constipation     5.  FEN/Renal: Cr stable.   Hypokalemia: 20meq daily.   Given 2g IV mg today.   Asymptomatic low BP again: 1L IVF. Also scheduled on 10/28 when here.      6. Endo: Type 2 DM.  Plan for discharge:  Metformin 500mg bid; medium sliding scale novolog; carb counting novolog 1 unit per 3.5 grams carbs; Lantus 10 units every evening.    Complication of DM with 2 left toes amputated 7/2020   Adrenal Insufficiency:  hydrocortisone 20mg/d    7.  Musk:  Hx of osteomyelitis of 2nd & 3rd L toes w/ amputation of distal phalanx of both digits.  New pain of dorsum of L foot currently has MRI 11/5--he tells me near resolved will leave scheduled for now.    RTC: as scheduled with day +28 PET, re-stage eval, Pentamidine/IVF.    Lisseth Hauser PA-C  #[id number]

## 2020-10-23 NOTE — NURSING NOTE
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 heparin locked, vitals checked     Oncology Clinic Visit     Return; DLBCL     Initial Vitals: BP (!) 86/59   Pulse 96   Temp 98.1  F (36.7  C)   Resp 18   Wt 88.5 kg (195 lb)   SpO2 99%   BMI 29.65 kg/m   Estimated body mass index is 29.65 kg/m  as calculated from the following:    Height as of 10/16/20: 1.727 m (5' 8\").    Weight as of this encounter: 88.5 kg (195 lb). Body surface area is 2.06 meters squared.  No Pain (0) Comment: Data Unavailable   No LMP for male patient.  Allergies reviewed: Yes  Medications reviewed: Yes    Medications: Medication refills not needed today.  Pharmacy name entered into [employer]:    [id number] [place] [facility] [place], [place] - [address] [place] [address] PHARMACY [facility] - [place], [place] - [address] [facility] [phone]    Clinical concerns: No new concerns.        Karely Forerst CMA              "

## 2020-10-26 LAB
BACTERIA SPEC CULT: NO GROWTH
SPECIMEN SOURCE: NORMAL

## 2020-10-27 ENCOUNTER — TELEPHONE (OUTPATIENT)
Dept: TRANSPLANT | Facility: CLINIC | Age: 70
End: 2020-10-27

## 2020-10-27 NOTE — TELEPHONE ENCOUNTER
Regarding study [id number]. Spoke with Mr. Garibay today regarding his Simvastatin. Per study, I have instructed him to take his last dose on day +30, Thursday 10/29. He will be coming to clinic to see DOM on Friday 10/30, and will turn in his diary at that time. There will be no further study interventions for this study, only survival status updates, which I explained to him and he understood.

## 2020-10-28 ENCOUNTER — ONCOLOGY VISIT (OUTPATIENT)
Dept: TRANSPLANT | Facility: CLINIC | Age: 70
End: 2020-10-28
Attending: STUDENT IN AN ORGANIZED HEALTH CARE EDUCATION/TRAINING PROGRAM
Payer: COMMERCIAL

## 2020-10-28 ENCOUNTER — HOSPITAL ENCOUNTER (OUTPATIENT)
Dept: PET IMAGING | Facility: CLINIC | Age: 70
End: 2020-10-28
Attending: STUDENT IN AN ORGANIZED HEALTH CARE EDUCATION/TRAINING PROGRAM
Payer: COMMERCIAL

## 2020-10-28 VITALS
RESPIRATION RATE: 18 BRPM | BODY MASS INDEX: 30.11 KG/M2 | TEMPERATURE: 97.6 F | OXYGEN SATURATION: 97 % | WEIGHT: 198 LBS | DIASTOLIC BLOOD PRESSURE: 62 MMHG | HEART RATE: 97 BPM | SYSTOLIC BLOOD PRESSURE: 108 MMHG

## 2020-10-28 VITALS
DIASTOLIC BLOOD PRESSURE: 55 MMHG | HEART RATE: 99 BPM | SYSTOLIC BLOOD PRESSURE: 101 MMHG | RESPIRATION RATE: 18 BRPM | OXYGEN SATURATION: 94 %

## 2020-10-28 DIAGNOSIS — C83.30 DIFFUSE LARGE B-CELL LYMPHOMA, UNSPECIFIED BODY REGION (H): Primary | ICD-10-CM

## 2020-10-28 DIAGNOSIS — C83.398 DIFFUSE LARGE B-CELL LYMPHOMA OF SOLID ORGAN EXCLUDING SPLEEN: ICD-10-CM

## 2020-10-28 DIAGNOSIS — C83.3A DIFFUSE LARGE CELL LYMPHOMA IN REMISSION: ICD-10-CM

## 2020-10-28 DIAGNOSIS — C85.80 LARGE CELL LYMPHOMA (H): ICD-10-CM

## 2020-10-28 DIAGNOSIS — C83.30 DIFFUSE LARGE B-CELL LYMPHOMA, UNSPECIFIED BODY REGION (H): ICD-10-CM

## 2020-10-28 DIAGNOSIS — C83.3A DIFFUSE LARGE CELL LYMPHOMA IN REMISSION: Primary | ICD-10-CM

## 2020-10-28 DIAGNOSIS — I26.99 ACUTE PULMONARY EMBOLISM WITHOUT ACUTE COR PULMONALE, UNSPECIFIED PULMONARY EMBOLISM TYPE (H): Primary | ICD-10-CM

## 2020-10-28 LAB
ALBUMIN SERPL-MCNC: 3.7 G/DL (ref 3.4–5)
ALP SERPL-CCNC: 69 U/L (ref 40–150)
ALT SERPL W P-5'-P-CCNC: 41 U/L (ref 0–70)
ANION GAP SERPL CALCULATED.3IONS-SCNC: 6 MMOL/L (ref 3–14)
APTT PPP: 36 SEC (ref 22–37)
AST SERPL W P-5'-P-CCNC: 26 U/L (ref 0–45)
BASOPHILS # BLD AUTO: 0 10E9/L (ref 0–0.2)
BASOPHILS NFR BLD AUTO: 0.9 %
BILIRUB DIRECT SERPL-MCNC: 0.2 MG/DL (ref 0–0.2)
BILIRUB SERPL-MCNC: 0.8 MG/DL (ref 0.2–1.3)
BUN SERPL-MCNC: 11 MG/DL (ref 7–30)
CALCIUM SERPL-MCNC: 8.4 MG/DL (ref 8.5–10.1)
CD19 CELLS # BLD: <1 CELLS/UL (ref 107–698)
CD19 CELLS NFR BLD: <1 % (ref 6–27)
CD3 CELLS # BLD: 448 CELLS/UL (ref 603–2990)
CD3 CELLS NFR BLD: 57 % (ref 49–84)
CD3+CD4+ CELLS # BLD: 77 CELLS/UL (ref 441–2156)
CD3+CD4+ CELLS NFR BLD: 10 % (ref 28–63)
CD3+CD4+ CELLS/CD3+CD8+ CLL BLD: 0.21 % (ref 1.4–2.6)
CD3+CD8+ CELLS # BLD: 370 CELLS/UL (ref 125–1312)
CD3+CD8+ CELLS NFR BLD: 47 % (ref 10–40)
CD3-CD16+CD56+ CELLS # BLD: 332 CELLS/UL (ref 95–640)
CD3-CD16+CD56+ CELLS NFR BLD: 42 % (ref 4–25)
CHLORIDE SERPL-SCNC: 104 MMOL/L (ref 94–109)
CK SERPL-CCNC: 38 U/L (ref 30–300)
CO2 SERPL-SCNC: 25 MMOL/L (ref 20–32)
CREAT SERPL-MCNC: 0.69 MG/DL (ref 0.66–1.25)
CRP SERPL-MCNC: <2.9 MG/L (ref 0–8)
D DIMER PPP FEU-MCNC: 1.4 UG/ML FEU (ref 0–0.5)
DIFFERENTIAL METHOD BLD: ABNORMAL
EOSINOPHIL # BLD AUTO: 0.1 10E9/L (ref 0–0.7)
EOSINOPHIL NFR BLD AUTO: 2.7 %
ERYTHROCYTE [DISTWIDTH] IN BLOOD BY AUTOMATED COUNT: 20.6 % (ref 10–15)
FERRITIN SERPL-MCNC: 65 NG/ML (ref 26–388)
FIBRINOGEN PPP-MCNC: 142 MG/DL (ref 200–420)
GFR SERPL CREATININE-BSD FRML MDRD: >90 ML/MIN/{1.73_M2}
GLUCOSE SERPL-MCNC: 307 MG/DL (ref 70–99)
HCT VFR BLD AUTO: 40 % (ref 40–53)
HGB BLD-MCNC: 12.8 G/DL (ref 13.3–17.7)
IFC SPECIMEN: ABNORMAL
IMM GRANULOCYTES # BLD: 0 10E9/L (ref 0–0.4)
IMM GRANULOCYTES NFR BLD: 0.6 %
INR PPP: 1.16 (ref 0.86–1.14)
LDH SERPL L TO P-CCNC: 264 U/L (ref 85–227)
LYMPHOCYTES # BLD AUTO: 0.8 10E9/L (ref 0.8–5.3)
LYMPHOCYTES NFR BLD AUTO: 23 %
MAGNESIUM SERPL-MCNC: 1.5 MG/DL (ref 1.6–2.3)
MCH RBC QN AUTO: 29.3 PG (ref 26.5–33)
MCHC RBC AUTO-ENTMCNC: 32 G/DL (ref 31.5–36.5)
MCV RBC AUTO: 92 FL (ref 78–100)
MONOCYTES # BLD AUTO: 0.4 10E9/L (ref 0–1.3)
MONOCYTES NFR BLD AUTO: 11.9 %
NEUTROPHILS # BLD AUTO: 2 10E9/L (ref 1.6–8.3)
NEUTROPHILS NFR BLD AUTO: 60.9 %
NRBC # BLD AUTO: 0 10*3/UL
NRBC BLD AUTO-RTO: 0 /100
PHOSPHATE SERPL-MCNC: 3.1 MG/DL (ref 2.5–4.5)
PLATELET # BLD AUTO: 58 10E9/L (ref 150–450)
POTASSIUM SERPL-SCNC: 4.5 MMOL/L (ref 3.4–5.3)
PROT SERPL-MCNC: 6 G/DL (ref 6.8–8.8)
RBC # BLD AUTO: 4.37 10E12/L (ref 4.4–5.9)
SODIUM SERPL-SCNC: 135 MMOL/L (ref 133–144)
URATE SERPL-MCNC: 3.6 MG/DL (ref 3.5–7.2)
WBC # BLD AUTO: 3.4 10E9/L (ref 4–11)

## 2020-10-28 PROCEDURE — G0463 HOSPITAL OUTPT CLINIC VISIT: HCPCS | Mod: 25

## 2020-10-28 PROCEDURE — 86359 T CELLS TOTAL COUNT: CPT | Performed by: PHYSICIAN ASSISTANT

## 2020-10-28 PROCEDURE — 70491 CT SOFT TISSUE NECK W/DYE: CPT | Mod: 26 | Performed by: RADIOLOGY

## 2020-10-28 PROCEDURE — 343N000001 HC RX 343: Performed by: STUDENT IN AN ORGANIZED HEALTH CARE EDUCATION/TRAINING PROGRAM

## 2020-10-28 PROCEDURE — 258N000003 HC RX IP 258 OP 636: Performed by: PHYSICIAN ASSISTANT

## 2020-10-28 PROCEDURE — 82550 ASSAY OF CK (CPK): CPT | Performed by: PHYSICIAN ASSISTANT

## 2020-10-28 PROCEDURE — 85610 PROTHROMBIN TIME: CPT | Performed by: PHYSICIAN ASSISTANT

## 2020-10-28 PROCEDURE — 96367 TX/PROPH/DG ADDL SEQ IV INF: CPT

## 2020-10-28 PROCEDURE — 82728 ASSAY OF FERRITIN: CPT | Performed by: PHYSICIAN ASSISTANT

## 2020-10-28 PROCEDURE — 83735 ASSAY OF MAGNESIUM: CPT | Performed by: PHYSICIAN ASSISTANT

## 2020-10-28 PROCEDURE — 85384 FIBRINOGEN ACTIVITY: CPT | Performed by: PHYSICIAN ASSISTANT

## 2020-10-28 PROCEDURE — 86360 T CELL ABSOLUTE COUNT/RATIO: CPT | Performed by: PHYSICIAN ASSISTANT

## 2020-10-28 PROCEDURE — 71260 CT THORAX DX C+: CPT | Mod: 26 | Performed by: RADIOLOGY

## 2020-10-28 PROCEDURE — 85730 THROMBOPLASTIN TIME PARTIAL: CPT | Performed by: PHYSICIAN ASSISTANT

## 2020-10-28 PROCEDURE — 250N000011 HC RX IP 250 OP 636: Performed by: STUDENT IN AN ORGANIZED HEALTH CARE EDUCATION/TRAINING PROGRAM

## 2020-10-28 PROCEDURE — 82248 BILIRUBIN DIRECT: CPT | Performed by: PHYSICIAN ASSISTANT

## 2020-10-28 PROCEDURE — 99207 PR NO CHARGE LOS: CPT

## 2020-10-28 PROCEDURE — 84100 ASSAY OF PHOSPHORUS: CPT | Performed by: PHYSICIAN ASSISTANT

## 2020-10-28 PROCEDURE — 78816 PET IMAGE W/CT FULL BODY: CPT | Mod: PS

## 2020-10-28 PROCEDURE — 99214 OFFICE O/P EST MOD 30 MIN: CPT

## 2020-10-28 PROCEDURE — 96365 THER/PROPH/DIAG IV INF INIT: CPT

## 2020-10-28 PROCEDURE — 83615 LACTATE (LD) (LDH) ENZYME: CPT | Performed by: PHYSICIAN ASSISTANT

## 2020-10-28 PROCEDURE — 85025 COMPLETE CBC W/AUTO DIFF WBC: CPT | Performed by: PHYSICIAN ASSISTANT

## 2020-10-28 PROCEDURE — 86355 B CELLS TOTAL COUNT: CPT | Performed by: PHYSICIAN ASSISTANT

## 2020-10-28 PROCEDURE — 250N000009 HC RX 250: Performed by: PHYSICIAN ASSISTANT

## 2020-10-28 PROCEDURE — 70491 CT SOFT TISSUE NECK W/DYE: CPT

## 2020-10-28 PROCEDURE — 85379 FIBRIN DEGRADATION QUANT: CPT | Performed by: PHYSICIAN ASSISTANT

## 2020-10-28 PROCEDURE — 86357 NK CELLS TOTAL COUNT: CPT | Performed by: PHYSICIAN ASSISTANT

## 2020-10-28 PROCEDURE — 999N000104 HC STATISTIC NO CHARGE

## 2020-10-28 PROCEDURE — 74177 CT ABD & PELVIS W/CONTRAST: CPT | Mod: 26 | Performed by: RADIOLOGY

## 2020-10-28 PROCEDURE — 250N000011 HC RX IP 250 OP 636: Performed by: PHYSICIAN ASSISTANT

## 2020-10-28 PROCEDURE — 80053 COMPREHEN METABOLIC PANEL: CPT | Performed by: PHYSICIAN ASSISTANT

## 2020-10-28 PROCEDURE — A9552 F18 FDG: HCPCS | Performed by: STUDENT IN AN ORGANIZED HEALTH CARE EDUCATION/TRAINING PROGRAM

## 2020-10-28 PROCEDURE — 78816 PET IMAGE W/CT FULL BODY: CPT | Mod: 26 | Performed by: RADIOLOGY

## 2020-10-28 PROCEDURE — 84550 ASSAY OF BLOOD/URIC ACID: CPT | Performed by: PHYSICIAN ASSISTANT

## 2020-10-28 PROCEDURE — 86140 C-REACTIVE PROTEIN: CPT | Performed by: PHYSICIAN ASSISTANT

## 2020-10-28 RX ORDER — FLUCONAZOLE 200 MG/1
100 TABLET ORAL DAILY
Qty: 30 TABLET | Refills: 0 | Status: SHIPPED | OUTPATIENT
Start: 2020-10-28 | End: 2020-11-25

## 2020-10-28 RX ORDER — HEPARIN SODIUM (PORCINE) LOCK FLUSH IV SOLN 100 UNIT/ML 100 UNIT/ML
5 SOLUTION INTRAVENOUS
Status: CANCELLED | OUTPATIENT
Start: 2020-10-28

## 2020-10-28 RX ORDER — MAGNESIUM SULFATE HEPTAHYDRATE 40 MG/ML
2 INJECTION, SOLUTION INTRAVENOUS ONCE
Status: COMPLETED | OUTPATIENT
Start: 2020-10-28 | End: 2020-10-28

## 2020-10-28 RX ORDER — HEPARIN SODIUM,PORCINE 10 UNIT/ML
5 VIAL (ML) INTRAVENOUS ONCE
Status: COMPLETED | OUTPATIENT
Start: 2020-10-28 | End: 2020-10-28

## 2020-10-28 RX ORDER — IOPAMIDOL 755 MG/ML
119 INJECTION, SOLUTION INTRAVASCULAR ONCE
Status: COMPLETED | OUTPATIENT
Start: 2020-10-28 | End: 2020-10-28

## 2020-10-28 RX ORDER — HEPARIN SODIUM (PORCINE) LOCK FLUSH IV SOLN 100 UNIT/ML 100 UNIT/ML
5 SOLUTION INTRAVENOUS ONCE
Status: COMPLETED | OUTPATIENT
Start: 2020-10-28 | End: 2020-10-28

## 2020-10-28 RX ORDER — HEPARIN SODIUM,PORCINE 10 UNIT/ML
5 VIAL (ML) INTRAVENOUS
Status: CANCELLED | OUTPATIENT
Start: 2020-10-28

## 2020-10-28 RX ADMIN — PENTAMIDINE ISETHIONATE 300 MG: 300 INJECTION, POWDER, LYOPHILIZED, FOR SOLUTION INTRAMUSCULAR; INTRAVENOUS at 13:07

## 2020-10-28 RX ADMIN — Medication 5 ML: at 08:48

## 2020-10-28 RX ADMIN — Medication 5 ML: at 11:37

## 2020-10-28 RX ADMIN — MAGNESIUM SULFATE 2 G: 2 INJECTION INTRAVENOUS at 14:13

## 2020-10-28 RX ADMIN — FLUDEOXYGLUCOSE F-18 11.57 MCI.: 500 INJECTION, SOLUTION INTRAVENOUS at 08:07

## 2020-10-28 RX ADMIN — IOPAMIDOL 119 ML: 755 INJECTION, SOLUTION INTRAVENOUS at 08:28

## 2020-10-28 RX ADMIN — SODIUM CHLORIDE 1000 ML: 9 INJECTION, SOLUTION INTRAVENOUS at 12:15

## 2020-10-28 ASSESSMENT — PAIN SCALES - GENERAL: PAINLEVEL: NO PAIN (0)

## 2020-10-28 NOTE — LETTER
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 Please see a copy of my visit note below.    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    Patient seen by provider today: Yes: Donna Rios   present during visit today: Not Applicable.    Note: Labs were monitored.    Intravenous Access:  Implanted Port.    Treatment Conditions:  Patient received scheduled IV fluid infusion.  He received scheduled IV pentamidine infusion.  Patient received an add-on 2 grams IV magnesium for a magnesium level of 1.5.      Post Infusion Assessment:  Patient tolerated infusions without incident.       Discharge Plan:   Patient discharged in stable condition accompanied by: daughter.    ANURAG RAUSCH RN                            Again, thank you for allowing me to participate in the care of your patient.        Sincerely,        [facility]

## 2020-10-28 NOTE — NURSING NOTE
Chief Complaint   Patient presents with     Port Draw     labs drawn via port by RN in lab     /62 (BP Location: Left arm, Patient Position: Chair, Cuff Size: Adult Large)   Pulse 97   Temp 97.6  F (36.4  C) (Oral)   Resp 18   Wt 89.8 kg (198 lb)   SpO2 97%   BMI 30.11 kg/m      Port already accessed from scan. Labs collected and sent. Pt tolerated well. Line flushed with Normal Saline & Heparin.   Pt checked in for next appointment.    Carina De Anda, RN

## 2020-10-28 NOTE — LETTER
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 Please see a copy of my visit note below.    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 BP low - notes this has happened to him after each cycle of chemo in the past. No dizziness, falls. Feels like his energy has recovered. Eating and drinking normally. Would like to drive and resume part-time work.        Review of Systems                                                                                                                           8 point review with pertinent positive and negatives      PHYSICAL EXAM                                                                                                                                     KPS: 90    Blood pressure 108/62, pulse 97, temperature 97.6  F (36.4  C), temperature source Oral, resp. rate 18, weight 89.8 kg (198 lb), SpO2 97 %.    Wt Readings from Last 4 Encounters:   10/28/20 89.8 kg (198 lb)   10/23/20 88.5 kg (195 lb)   10/20/20 88.3 kg (194 lb 11.2 oz)   10/16/20 88 kg (193 lb 14.4 oz)     General: NAD; rommel complexion  Eyes: sclera anicteric   Nose/Mouth/Throat: OP dry, no ulcerations   Lungs: BCTA  Cardiovascular: RRR, no M/R/G   Lymphatics: trace LE edema  MSK: Toes, second and third on left are missing (s/p amputation x 2 due to osteomyelitis 2/2 DM).  - did not exam.   Neuro: A&O, non-focal   Additional Findings: Right chest port a cath     Labs:  Lab Results   Component Value Date    WBC 3.4 (L) 10/28/2020    ANEU 2.0 10/28/2020    HGB 12.8 (L) 10/28/2020    HCT 40.0 10/28/2020    PLT 58 (L) 10/28/2020     10/23/2020    POTASSIUM 4.3 10/23/2020    CHLORIDE  102 10/23/2020    CO2 26 10/23/2020     (H) 10/23/2020    BUN 10 10/23/2020    CR 0.76 10/23/2020    MAG 1.4 (L) 10/23/2020    INR 1.16 (H) 10/28/2020          ASSESSMENT/PLAN     Day 0: LP with IT dex. PICC line placement, Yescarta Car-t infusion   Day 7: LP under fluoroscopy w/IT dex.   Day 13 (10/12) LP under fluoroscopy w/IT dex.       1.  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  HEME: Keep Hgb>7 and plts>10K. No pre-meds.                            3.  ID:   - 10/23 BC NGTD- hx of BP lower after chemo- suspect autonomic dysfunction as otherwise well appearing/feeling.   - FN on admission; Blood culture 10/8 + Staph hominis, vanco added 10/11. Stop vanco/cefepime on 10/13. completed tx with levaquin 750mg Daily 10/23.    - Prophy fluc, acy, IV pentamidine (10/28) - hopeful counts will tolerated bactrim 11/28     4.  GI:   - Protonix for GI prophy- okay to stop. Please let patient know next visit.   - Colace prn constipation     5.  FEN/Renal: Cr stable.   Hypokalemia: 20meq daily - K has been trending up - 4.5 today. Told him to start cutting in half and take 10meq daily or change to taking 20meq MWF.   Mag is 1.5 - Give  Given 2g IV mg today. -.   Bp soft normal- given 1L NS - essentially asymptomatic.       6. Endo: Type 2 DM.  Plan for discharge:  Metformin 500mg bid; medium sliding scale novolog; carb counting novolog 1 unit per 3.5 grams carbs; Lantus 10 units every evening.    Complication of DM with 2 left toes amputated 7/2020   Adrenal Insufficiency:  hydrocortisone 20mg/d    7.  Musk:  Hx of osteomyelitis of 2nd & 3rd L toes w/ amputation of distal phalanx of both digits.  New pain of dorsum of L foot currently has MRI 11/5--he tells me near resolved will leave scheduled for now.     8. PULM: Paged at end of day about Incidental finding of new PE on PET scan. Called Pastor- he again denied  any chest pain , sob.   - Start lovenox 90mg q12hrs- he will  at [facility]- start tonight. Hopeful can transition to xeralto. Only prescripted #14day supply. Defer to Dr Bunch/provider next week if plts stable >50k if okay for xeralto  - Patient understands any chest pain, sob He needs to call or present to ER.     RTC: Give IV pentamidine today, 2g IV mag. Decrease Kdur to 10meq daily.   - restaging scans today  - 10/30 will have review with DOM. Possible IV magnesium.         SKINNY RosenC  [phone]      Again, thank you for allowing me to participate in the care of your patient.        Sincerely,        BMT Advanced Practice Provider

## 2020-10-28 NOTE — PROGRESS NOTES
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 BP low - notes this has happened to him after each cycle of chemo in the past. No dizziness, falls. Feels like his energy has recovered. Eating and drinking normally. Would like to drive and resume part-time work.        Review of Systems                                                                                                                           8 point review with pertinent positive and negatives      PHYSICAL EXAM                                                                                                                                     KPS: 90    Blood pressure 108/62, pulse 97, temperature 97.6  F (36.4  C), temperature source Oral, resp. rate 18, weight 89.8 kg (198 lb), SpO2 97 %.    Wt Readings from Last 4 Encounters:   10/28/20 89.8 kg (198 lb)   10/23/20 88.5 kg (195 lb)   10/20/20 88.3 kg (194 lb 11.2 oz)   10/16/20 88 kg (193 lb 14.4 oz)     General: NAD; rommel complexion  Eyes: sclera anicteric   Nose/Mouth/Throat: OP dry, no ulcerations   Lungs: BCTA  Cardiovascular: RRR, no M/R/G   Lymphatics: trace LE edema  MSK: Toes, second and third on left are missing (s/p amputation x 2 due to osteomyelitis 2/2 DM).  - did not exam.   Neuro: A&O, non-focal   Additional Findings: Right chest port a cath     Labs:  Lab Results   Component Value Date    WBC 3.4 (L) 10/28/2020    ANEU 2.0 10/28/2020    HGB 12.8 (L) 10/28/2020    HCT 40.0 10/28/2020    PLT 58 (L) 10/28/2020     10/23/2020    POTASSIUM 4.3 10/23/2020    CHLORIDE 102 10/23/2020    CO2 26 10/23/2020     (H) 10/23/2020    BUN 10 10/23/2020    CR 0.76 10/23/2020    MAG 1.4 (L) 10/23/2020    INR 1.16 (H) 10/28/2020          ASSESSMENT/PLAN     Day 0: LP with IT dex. PICC line placement, Yescarta Car-t infusion   Day 7: LP under fluoroscopy w/IT  dex.   Day 13 (10/12) LP under fluoroscopy w/IT dex.       1.  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  HEME: Keep Hgb>7 and plts>10K. No pre-meds.                            3.  ID:   - 10/23 BC NGTD- hx of BP lower after chemo- suspect autonomic dysfunction as otherwise well appearing/feeling.   - FN on admission; Blood culture 10/8 + Staph hominis, vanco added 10/11. Stop vanco/cefepime on 10/13. completed tx with levaquin 750mg Daily 10/23.    - Prophy fluc, acy, IV pentamidine (10/28) - hopeful counts will tolerated bactrim 11/28     4.  GI:   - Protonix for GI prophy- okay to stop. Please let patient know next visit.   - Colace prn constipation     5.  FEN/Renal: Cr stable.   Hypokalemia: 20meq daily - K has been trending up - 4.5 today. Told him to start cutting in half and take 10meq daily or change to taking 20meq MWF.   Mag is 1.5 - Give  Given 2g IV mg today. -.   Bp soft normal- given 1L NS - essentially asymptomatic.       6. Endo: Type 2 DM.  Plan for discharge:  Metformin 500mg bid; medium sliding scale novolog; carb counting novolog 1 unit per 3.5 grams carbs; Lantus 10 units every evening.    Complication of DM with 2 left toes amputated 7/2020   Adrenal Insufficiency:  hydrocortisone 20mg/d    7.  Musk:  Hx of osteomyelitis of 2nd & 3rd L toes w/ amputation of distal phalanx of both digits.  New pain of dorsum of L foot currently has MRI 11/5--he tells me near resolved will leave scheduled for now.     8. PULM: Paged at end of day about Incidental finding of new PE on PET scan. Called Pastor- he again denied any chest pain , sob.   - Start lovenox 90mg q12hrs- he will  at [facility]- start tonight. Hopeful can transition to xeralto. Only prescripted #14day supply. Defer to Dr Bunch/provider next week if plts stable >50k if okay for xeralto  - Patient understands any chest pain, sob  He needs to call or present to ER.     RTC: Give IV pentamidine today, 2g IV mag. Decrease Kdur to 10meq daily.   - restaging scans today  - 10/30 will have review with DOM. Possible IV magnesium.         SKINNY RosenC  [phone]

## 2020-10-28 NOTE — PROGRESS NOTES
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    Patient seen by provider today: Yes: Donna Rios   present during visit today: Not Applicable.    Note: Labs were monitored.    Intravenous Access:  Implanted Port.    Treatment Conditions:  Patient received scheduled IV fluid infusion.  He received scheduled IV pentamidine infusion.  Patient received an add-on 2 grams IV magnesium for a magnesium level of 1.5.      Post Infusion Assessment:  Patient tolerated infusions without incident.       Discharge Plan:   Patient discharged in stable condition accompanied by: daughter.    ANURAG RAUSCH RN

## 2020-10-28 NOTE — NURSING NOTE
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    Weight as of this encounter: 89.8 kg (198 lb). Body surface area is 2.08 meters squared.  No Pain (0) Comment: Data Unavailable   No LMP for male patient.  Allergies reviewed: Yes  Medications reviewed: Yes    Medications: MEDICATION REFILLS NEEDED TODAY. Provider was notified.    Patient needs refill on fluconazole if needed, has about a one week supply.    Pharmacy name entered into [employer]:    [id number] [place] [place] [facility] [address] [place] [address] PHARMACY [facility] - [place], [place] - [address] [place] [address] [phone]    Clinical concerns:  Patient denies fevers/N/V/respiratory symptoms.  He has had constipation and takes colace - he did have a bowel movement today.      ANURAG RAUSCH RN              "

## 2020-10-29 LAB — RADIOLOGIST FLAGS: ABNORMAL

## 2020-10-30 ENCOUNTER — OFFICE VISIT (OUTPATIENT)
Dept: TRANSPLANT | Facility: CLINIC | Age: 70
End: 2020-10-30
Payer: COMMERCIAL

## 2020-10-30 VITALS
OXYGEN SATURATION: 96 % | SYSTOLIC BLOOD PRESSURE: 109 MMHG | TEMPERATURE: 97.5 F | BODY MASS INDEX: 30.15 KG/M2 | WEIGHT: 198.3 LBS | RESPIRATION RATE: 16 BRPM | HEART RATE: 98 BPM | DIASTOLIC BLOOD PRESSURE: 69 MMHG

## 2020-10-30 DIAGNOSIS — C83.3A DIFFUSE LARGE CELL LYMPHOMA IN REMISSION: Primary | ICD-10-CM

## 2020-10-30 PROCEDURE — 99214 OFFICE O/P EST MOD 30 MIN: CPT

## 2020-10-30 PROCEDURE — G0463 HOSPITAL OUTPT CLINIC VISIT: HCPCS

## 2020-10-30 RX ORDER — LEVOFLOXACIN 750 MG/1
TABLET, FILM COATED ORAL
COMMUNITY
Start: 2020-10-14 | End: 2020-10-30

## 2020-10-30 ASSESSMENT — PAIN SCALES - GENERAL: PAINLEVEL: NO PAIN (0)

## 2020-10-30 NOTE — PROGRESS NOTES
I saw Pastor for the follow-up today.  He is 30 days post Yescarta infusion.    He is doing very well. He is fully recovered, feels strong, he had no recent fevers or changes in his health.  He had a neutropenic fever with Staph hominis bacteremia and no CRS; did not receive steroids. he did well very well on the clinical trial using simvastatin and intrathecal therapy with I.t.dexamethasone and had no significant adverse events. This was to prevent neurotoxicity.      We reviewed the restaging PET scan which demonstrated improvement of the lung mass with necrotic tissue with low SUV (max 4.2) is at level of mediastinum suggesting near complete metabolic response.  On images there is still significant amount of the tissue with peripheral rim of hypermetabolic activity which is likely reactive.  There are no new lesions.    This is excellent response but we need to closely monitor the disease given persistent abnormalities. I suggest to recheck chest CT with IV contract around day 60 and PET/CT at day 100.     On physical exam today, he is well alert oriented ICAN is 10 out of 10.  HEENT unremarkable, neck supple, chest clear to auscultation heart, and tones regular S1-S2, normal gallop, murmur or rub, abdomen soft nontender, extremities without swelling. no rashes    Lab Results   Component Value Date    WBC 3.4 (L) 10/28/2020    ANEU 2.0 10/28/2020    HGB 12.8 (L) 10/28/2020    HCT 40.0 10/28/2020    PLT 58 (L) 10/28/2020     10/28/2020    POTASSIUM 4.5 10/28/2020    CHLORIDE 104 10/28/2020    CO2 25 10/28/2020     (H) 10/28/2020    BUN 11 10/28/2020    CR 0.69 10/28/2020    MAG 1.5 (L) 10/28/2020    INR 1.16 (H) 10/28/2020    AST 26 10/28/2020    ALT 41 10/28/2020     Assessment and plan    Relapsed refractory DLBCL involving lung - now 30 days post post Yescarta therapy.  Day 30 with the excellent response at least excellent SD possibly CR with Deauville 3.  Next restaging is a day 100 PET scan.  I  suggest to monitor more closely and obtain chest CT with IV contrast at day 60. We have a follow-up studies using Peg IL-15 available to deepen the remission is needed.    We will order it.  The patient will stop simvastatin.  I collected drug diary.  He now completed neurotoxicity study.    The patient is fully recovered.  No neurological toxicity.  Counts are still slightly lower but resolved neutropenia and not transfusion needs.  He will have a CBC next week and virtual visit with CHUY.    We will slowly transition back to .     #2 newly diagnosed multiple pulmonary emboli on 10/28/2020.  He started enoxaparin 90 mg BID cq and when platelets are well above 50, we can switch to DOAC.     #3 infectious disease prophylaxis.  He can decrease acyclovir to 400 mg twice daily.  He can stop his Diflucan now.  He will need a PCP prophylaxis with Bactrim 1 p.o. twice daily Monday and Tuesday for 3 months.    4.  Type 2 diabetes continue insulin and Metformin.     Pastor will follow up with  and will come back for anniversary visit for day 60 and day 100.    It is great to see him recovering and doing great.    Rosey Bunch MD  Professor of Medicine  [phone]

## 2020-10-30 NOTE — LETTER
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 Please see a copy of my visit note below.    I saw Pastor for the follow-up today.  He is 30 days post Yescarta infusion.    He is doing very well. He is fully recovered, feels strong, he had no recent fevers or changes in his health.  He had a neutropenic fever with Staph hominis bacteremia and no CRS; did not receive steroids. he did well very well on the clinical trial using simvastatin and intrathecal therapy with I.t.dexamethasone and had no significant adverse events. This was to prevent neurotoxicity.      We reviewed the restaging PET scan which demonstrated improvement of the lung mass with necrotic tissue with low SUV (max 4.2) is at level of mediastinum suggesting near complete metabolic response.  On images there is still significant amount of the tissue with peripheral rim of hypermetabolic activity which is likely reactive.  There are no new lesions.    This is excellent response but we need to closely monitor the disease given persistent abnormalities. I suggest to recheck chest CT with IV contract around day 60 and PET/CT at day 100.     On physical exam today, he is well alert oriented ICAN is 10 out of 10.  HEENT unremarkable, neck supple, chest clear to auscultation heart, and tones regular S1-S2, normal gallop, murmur or rub, abdomen soft nontender, extremities without swelling. no rashes    Lab Results   Component Value Date    WBC 3.4 (L) 10/28/2020    ANEU 2.0 10/28/2020    HGB 12.8 (L) 10/28/2020    HCT 40.0 10/28/2020    PLT 58 (L) 10/28/2020     10/28/2020    POTASSIUM 4.5 10/28/2020    CHLORIDE 104 10/28/2020    CO2 25 10/28/2020     (H) 10/28/2020    BUN 11 10/28/2020    CR 0.69 10/28/2020    MAG 1.5 (L) 10/28/2020    INR 1.16 (H)  10/28/2020    AST 26 10/28/2020    ALT 41 10/28/2020     Assessment and plan    Relapsed refractory DLBCL involving lung - now 30 days post post Yescarta therapy.  Day 30 with the excellent response at least excellent CT possibly CR with Deauville 3.  Next restaging is a day 100 PET scan.  I suggest to monitor more closely and obtain chest CT with IV contrast at day 60. We have a follow-up studies using Peg IL-15 available to deepen the remission is needed.    We will order it.  The patient will stop simvastatin.  I collected drug diary.  He now completed neurotoxicity study.    The patient is fully recovered.  No neurological toxicity.  Counts are still slightly lower but resolved neutropenia and not transfusion needs.  He will have a CBC next week and virtual visit with CHUY.    We will slowly transition back to .     #2 newly diagnosed multiple pulmonary emboli on 10/28/2020.  He started enoxaparin 90 mg BID cq and when platelets are well above 50, we can switch to DOAC.     #3 infectious disease prophylaxis.  He can decrease acyclovir to 400 mg twice daily.  He can stop his Diflucan now.  He will need a PCP prophylaxis with Bactrim 1 p.o. twice daily Monday and Tuesday for 3 months.    4.  Type 2 diabetes continue insulin and Metformin.     Pastor will follow up with  and will come back for anniversary visit for day 60 and day 100.    It is great to see him recovering and doing great.    Rosey Bunch MD  Professor of Medicine  [phone]

## 2020-10-30 NOTE — NURSING NOTE
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    Weight as of this encounter: 89.9 kg (198 lb 4.8 oz). Body surface area is 2.08 meters squared.  No Pain (0) Comment: Data Unavailable   No LMP for male patient.  Allergies reviewed: Yes  Medications reviewed: Yes    Medications: Medication refills not needed today.  Pharmacy name entered into [employer]:    [id number] [place], [place] - [address] [place] [address] PHARMACY [place], [place] - [address] [facility] [phone]    Clinical concerns: No new concerns today Dr. Bunch was notified.      Boyd Delgadillo MA            "

## 2020-10-31 ENCOUNTER — TELEPHONE (OUTPATIENT)
Dept: TRANSPLANT | Facility: CLINIC | Age: 70
End: 2020-10-31

## 2020-11-02 NOTE — TELEPHONE ENCOUNTER
Gamalr contacted Dr Olson's nurse, Beth, to discuss transition of care back to their practice. He will get cbc there on 11/4 and they will schedule follow up with Whitney in the upcoming weeks to re-establish care. Writer also spoke to Pastor and he verified that he has lab appt on 11/4 at 130. He will call BMT if he has concerns.

## 2020-11-05 ENCOUNTER — HOSPITAL ENCOUNTER (OUTPATIENT)
Dept: MRI IMAGING | Facility: CLINIC | Age: 70
Discharge: HOME | End: 2020-11-05
Attending: PHYSICIAN ASSISTANT | Admitting: PHYSICIAN ASSISTANT
Payer: COMMERCIAL

## 2020-11-05 DIAGNOSIS — C83.30 DIFFUSE LARGE B-CELL LYMPHOMA, UNSPECIFIED BODY REGION (H): ICD-10-CM

## 2020-11-05 PROCEDURE — 73720 MRI LWR EXTREMITY W/O&W/DYE: CPT | Mod: 26 | Performed by: RADIOLOGY

## 2020-11-05 PROCEDURE — 73720 MRI LWR EXTREMITY W/O&W/DYE: CPT | Mod: LT

## 2020-11-05 PROCEDURE — 255N000002 HC RX 255 OP 636: Performed by: PHYSICIAN ASSISTANT

## 2020-11-05 PROCEDURE — A9585 GADOBUTROL INJECTION: HCPCS | Performed by: PHYSICIAN ASSISTANT

## 2020-11-05 RX ORDER — GADOBUTROL 604.72 MG/ML
10 INJECTION INTRAVENOUS ONCE
Status: COMPLETED | OUTPATIENT
Start: 2020-11-05 | End: 2020-11-05

## 2020-11-05 RX ADMIN — GADOBUTROL 10 ML: 604.72 INJECTION INTRAVENOUS at 15:02

## 2020-11-06 ENCOUNTER — VIRTUAL VISIT (OUTPATIENT)
Dept: TRANSPLANT | Facility: CLINIC | Age: 70
End: 2020-11-06
Attending: PHYSICIAN ASSISTANT
Payer: COMMERCIAL

## 2020-11-06 DIAGNOSIS — C83.30 DIFFUSE LARGE B-CELL LYMPHOMA, UNSPECIFIED BODY REGION (H): ICD-10-CM

## 2020-11-06 PROCEDURE — 99214 OFFICE O/P EST MOD 30 MIN: CPT | Mod: 95

## 2020-11-06 PROCEDURE — 999N001193 HC VIDEO/TELEPHONE VISIT; NO CHARGE

## 2020-11-06 RX ORDER — ACYCLOVIR 800 MG/1
400 TABLET ORAL 2 TIMES DAILY
COMMUNITY
Start: 2020-11-06 | End: 2020-11-09

## 2020-11-06 NOTE — LETTER
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 Please see a copy of my visit note below.    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      The patient has been notified of following:     \"This video visit will be conducted via a call between you and your physician/provider. We have found that certain health care needs can be provided without the need for an in-person physical exam.  This service lets us provide the care you need with a video conversation.  If a prescription is necessary we can send it directly to your pharmacy.  If lab work is needed we can place an order for that and you can then stop by our lab to have the test done at a later time.    Video visits are billed at different rates depending on your insurance coverage.  Please reach out to your insurance provider with any questions.    If during the course of the call the physician/provider feels a video visit is not appropriate, you will not be charged for this service.\"    Patient has given verbal consent for Video visit? Yes    How would you like to obtain your AVS? Mail a copy    If you are dropped from the video visit, the video invite should be resent to: Send to e-mail at: [email or web address]    Will anyone else be joining your video visit? No         I have reviewed and updated the patient's allergies and medication list.    Concerns: No new concerns.   Refills: None needed.     Vitals - Patient Reported  Weight (Patient Reported): 88 kg (194 lb)  Height (Patient Reported): 172.7 cm (5' 8\")  BMI (Based on Pt Reported Ht/Wt): 29.5  Pain Score: No Pain (0)      Karely Forrest CMA      Video-Visit Details    Type of service:  Video Visit    Start: 11/06/2020 10:56 am   Stop: 11/06/2020 11:18 am " "    Originating Location (pt. 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 He continues to do very well. No new medical complaints. Had labs done locally earlier this week at [place] [facility]. He says his labs \"all looked good\" and his plts were coming up. No bleeding on his blood thinner. Energy level improving. Appetite stable. No fevers, chills, infectious symptoms, headaches, dizziness, memory or balance issues.     Review of Systems                                                                                                                           8 point review with pertinent positive and negatives      PHYSICAL EXAM                                                                                                                                     KPS: 90    General: NAD;alert and engaged.    Lungs: Breathing comfortably without accessory muscle use.   Neuro: A&O, non-focal      Labs:  Lab Results   Component Value Date    WBC 3.4 (L) 10/28/2020    ANEU 2.0 10/28/2020    HGB 12.8 (L) 10/28/2020    HCT 40.0 10/28/2020    PLT 58 (L) 10/28/2020     10/28/2020    POTASSIUM 4.5 10/28/2020    CHLORIDE 104 10/28/2020    CO2 25 10/28/2020     (H) 10/28/2020    BUN 11 10/28/2020    CR 0.69 10/28/2020    MAG 1.5 (L) 10/28/2020    INR 1.16 (H) 10/28/2020     Pt had labs drawn on 11/4 at [place] [facility], results not available for this appointment.      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     Day 0: LP with IT dex. PICC line placement, Yescarta Car-t infusion   Day 7: LP under fluoroscopy w/IT " dex.   Day 13 (10/12) LP under fluoroscopy w/IT dex.       1.  BMT:  DLBCL  Axi-cell product with CNS prevention protocol   CNS prophy: simvastatin 40mg tied to IDS drug. Completed at day +30. All previously LPs under fluoroscopy in the future.   - no longer on allopurinol.   - Transitioning back to primary oncologist. Appointment 11/30 with labs.   - PET Scan at day +28 with very good CO vs. CR: Plan to return to BMT for for CT chest with IV contrast at day +60 and up with Dr. Bunch (requested) and again at day +100 for PET scan.      2.  HEME: Keep Hgb>7 and plts>10K. No pre-meds.  - Labs stable as ov 10/28. Labs done 11/4 at [place] [facility] not available at time of this visit. Nguyen Candelaria to request copies of these.   - Remains on lovenox BID for PE noted on PET scan. Instructed pt to discuss with primary oncologist 11/30 about switching to DOAC if plts remain >50k (labs not available today).                             3.  ID: Afebrile and no infectious concerns.     - Prophy  acy, IV pentamidine (10/28). Pt will get IV pentamidine 11/30 with local oncologist. Discuss possibly switching to bactrim at next visit with BMT pending counts.       4.  GI:   - Protonix for GI prophy  - Colace prn constipation     5.  FEN/Renal: Cr stable.   Hypokalemia: 20meq daily. Pending K+ on labs at [place] [facility] can likely stop.        6. Endo: Type 2 DM.  Continue metformin 500mg BID, Med sliding scale and carb counting novolog 1 unit per 3.5 grams carbs; Lantus 10 units every evening.    Complication of DM with 2 left toes amputated 7/2020  Adrenal Insufficiency:  hydrocortisone 20mg/d    7.  Musk:  Hx of osteomyelitis of 2nd & 3rd L toes w/ amputation of distal phalanx of both digits.   - New pain of dorsum of L foot.  MRI 11/5 neg for osteomyelitis. Pain nearly resolved 11/6.       RTC: Follow up with local oncologist 11/30 with labs if plts >50k can transition to oral DOAC.   Pt will inquire about IV pentamidine at  oncologist clinic 11/30. Can likely transition to bactrim at his next BMT visit.   Return to BMT at day +60 for CT chest with IV contrast and follow up with Dr. Bunch (requested follow up today).      Gurdeep Redmond PA-C  [phone]          BMT Advanced Practice Provider

## 2020-11-06 NOTE — PROGRESS NOTES
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      The patient has been notified of following:     \"This video visit will be conducted via a call between you and your physician/provider. We have found that certain health care needs can be provided without the need for an in-person physical exam.  This service lets us provide the care you need with a video conversation.  If a prescription is necessary we can send it directly to your pharmacy.  If lab work is needed we can place an order for that and you can then stop by our lab to have the test done at a later time.    Video visits are billed at different rates depending on your insurance coverage.  Please reach out to your insurance provider with any questions.    If during the course of the call the physician/provider feels a video visit is not appropriate, you will not be charged for this service.\"    Patient has given verbal consent for Video visit? Yes    How would you like to obtain your AVS? Mail a copy    If you are dropped from the video visit, the video invite should be resent to: Send to e-mail at: [email or web address]    Will anyone else be joining your video visit? No         I have reviewed and updated the patient's allergies and medication list.    Concerns: No new concerns.   Refills: None needed.     Vitals - Patient Reported  Weight (Patient Reported): 88 kg (194 lb)  Height (Patient Reported): 172.7 cm (5' 8\")  BMI (Based on Pt Reported Ht/Wt): 29.5  Pain Score: No Pain (0)      Karely Forrets CMA      Video-Visit Details    Type of service:  Video Visit    Start: 11/06/2020 10:56 am   Stop: 11/06/2020 11:18 am     Originating Location (pt. Location): Home    Distant Location (provider location):  [facility] BLOOD [facility]ARR[facility]ANSPL[facility] [place]     Platform used for Video Visit: Rufino Redmond PA-C      BMT Daily Progress Note     Patient ID:  Pastor Garibay is a 70 year " "old man Day+38 of Yescarta CAR-T (with CNS prophy 2019-35)     HPI: Spoke with Pastor and his wife via video visit. He continues to do very well. No new medical complaints. Had labs done locally earlier this week at [place] [facility]. He says his labs \"all looked good\" and his plts were coming up. No bleeding on his blood thinner. Energy level improving. Appetite stable. No fevers, chills, infectious symptoms, headaches, dizziness, memory or balance issues.     Review of Systems                                                                                                                           8 point review with pertinent positive and negatives      PHYSICAL EXAM                                                                                                                                     KPS: 90    General: NAD;alert and engaged.    Lungs: Breathing comfortably without accessory muscle use.   Neuro: A&O, non-focal      Labs:  Lab Results   Component Value Date    WBC 3.4 (L) 10/28/2020    ANEU 2.0 10/28/2020    HGB 12.8 (L) 10/28/2020    HCT 40.0 10/28/2020    PLT 58 (L) 10/28/2020     10/28/2020    POTASSIUM 4.5 10/28/2020    CHLORIDE 104 10/28/2020    CO2 25 10/28/2020     (H) 10/28/2020    BUN 11 10/28/2020    CR 0.69 10/28/2020    MAG 1.5 (L) 10/28/2020    INR 1.16 (H) 10/28/2020     Pt had labs drawn on 11/4 at [place] [facility], results not available for this appointment.      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     Day 0: LP with IT dex. PICC line placement, Yescarta Car-t infusion   Day 7: LP under fluoroscopy w/IT dex.   Day 13 (10/12) LP under fluoroscopy w/IT dex.       1.  BMT:  DLBCL  Axi-cell product with CNS prevention protocol   CNS prophy: simvastatin 40mg tied to IDS drug. Completed at day +30. All previously LPs under fluoroscopy in the future.   - no longer on allopurinol.   - Transitioning back " to primary oncologist. Appointment 11/30 with labs.   - PET Scan at day +28 with very good MS vs. CR: Plan to return to BMT for for CT chest with IV contrast at day +60 and up with Dr. Bunch (requested) and again at day +100 for PET scan.      2.  HEME: Keep Hgb>7 and plts>10K. No pre-meds.  - Labs stable as ov 10/28. Labs done 11/4 at [facility] not available at time of this visit. Nguyen Candelaria to request copies of these.   - Remains on lovenox BID for PE noted on PET scan. Instructed pt to discuss with primary oncologist 11/30 about switching to DOAC if plts remain >50k (labs not available today).                             3.  ID: Afebrile and no infectious concerns.     - Prophy  acy, IV pentamidine (10/28). Pt will get IV pentamidine 11/30 with local oncologist. Discuss possibly switching to bactrim at next visit with BMT pending counts.       4.  GI:   - Protonix for GI prophy  - Colace prn constipation     5.  FEN/Renal: Cr stable.   Hypokalemia: 20meq daily. Pending K+ on labs at [facility] can likely stop.        6. Endo: Type 2 DM.  Continue metformin 500mg BID, Med sliding scale and carb counting novolog 1 unit per 3.5 grams carbs; Lantus 10 units every evening.    Complication of DM with 2 left toes amputated 7/2020  Adrenal Insufficiency:  hydrocortisone 20mg/d    7.  Musk:  Hx of osteomyelitis of 2nd & 3rd L toes w/ amputation of distal phalanx of both digits.   - New pain of dorsum of L foot.  MRI 11/5 neg for osteomyelitis. Pain nearly resolved 11/6.       RTC: Follow up with local oncologist 11/30 with labs if plts >50k can transition to oral DOAC.   Pt will inquire about IV pentamidine at oncologist clinic 11/30. Can likely transition to bactrim at his next BMT visit.   Return to BMT at day +60 for CT chest with IV contrast and follow up with Dr. Bunch (requested follow up today).      Gurdeep Redmond PA-C  [phone]

## 2020-11-09 DIAGNOSIS — I26.99 ACUTE PULMONARY EMBOLISM WITHOUT ACUTE COR PULMONALE, UNSPECIFIED PULMONARY EMBOLISM TYPE (H): ICD-10-CM

## 2020-11-09 DIAGNOSIS — C83.30 DIFFUSE LARGE B-CELL LYMPHOMA, UNSPECIFIED BODY REGION (H): ICD-10-CM

## 2020-11-09 DIAGNOSIS — C83.398 DIFFUSE LARGE B-CELL LYMPHOMA OF SOLID ORGAN EXCLUDING SPLEEN: ICD-10-CM

## 2020-11-09 RX ORDER — PANTOPRAZOLE SODIUM 40 MG/1
40 TABLET, DELAYED RELEASE ORAL
Qty: 30 TABLET | Refills: 0 | Status: SHIPPED | OUTPATIENT
Start: 2020-11-09 | End: 2020-11-25

## 2020-11-09 RX ORDER — ACYCLOVIR 800 MG/1
400 TABLET ORAL 2 TIMES DAILY
Qty: 60 TABLET | Refills: 3 | Status: SHIPPED | OUTPATIENT
Start: 2020-11-09 | End: 2021-01-04

## 2020-11-11 NOTE — TELEPHONE ENCOUNTER
Left message for Pastor that he can stop lovenox and restart Xarelto as last platelet count was 73k.  Instructed him to call if he has questions.

## 2020-11-12 DIAGNOSIS — I26.99 ACUTE PULMONARY EMBOLISM WITHOUT ACUTE COR PULMONALE, UNSPECIFIED PULMONARY EMBOLISM TYPE (H): Primary | ICD-10-CM

## 2020-11-25 ENCOUNTER — APPOINTMENT (OUTPATIENT)
Dept: LAB | Facility: CLINIC | Age: 70
End: 2020-11-25
Payer: COMMERCIAL

## 2020-11-25 ENCOUNTER — ANCILLARY PROCEDURE (OUTPATIENT)
Dept: CT IMAGING | Facility: CLINIC | Age: 70
End: 2020-11-25
Attending: PHYSICIAN ASSISTANT
Payer: COMMERCIAL

## 2020-11-25 ENCOUNTER — VIRTUAL VISIT (OUTPATIENT)
Dept: TRANSPLANT | Facility: CLINIC | Age: 70
End: 2020-11-25
Payer: COMMERCIAL

## 2020-11-25 VITALS
RESPIRATION RATE: 16 BRPM | WEIGHT: 201.2 LBS | BODY MASS INDEX: 30.59 KG/M2 | SYSTOLIC BLOOD PRESSURE: 105 MMHG | TEMPERATURE: 97.5 F | DIASTOLIC BLOOD PRESSURE: 71 MMHG | OXYGEN SATURATION: 97 % | HEART RATE: 98 BPM

## 2020-11-25 DIAGNOSIS — C83.3A DIFFUSE LARGE CELL LYMPHOMA IN REMISSION: ICD-10-CM

## 2020-11-25 DIAGNOSIS — C83.30 DIFFUSE LARGE B-CELL LYMPHOMA, UNSPECIFIED BODY REGION (H): ICD-10-CM

## 2020-11-25 LAB
ANION GAP SERPL CALCULATED.3IONS-SCNC: 8 MMOL/L (ref 3–14)
BASOPHILS # BLD AUTO: 0 10E9/L (ref 0–0.2)
BASOPHILS NFR BLD AUTO: 0.5 %
BUN SERPL-MCNC: 13 MG/DL (ref 7–30)
CALCIUM SERPL-MCNC: 9.5 MG/DL (ref 8.5–10.1)
CHLORIDE SERPL-SCNC: 100 MMOL/L (ref 94–109)
CO2 SERPL-SCNC: 25 MMOL/L (ref 20–32)
CREAT SERPL-MCNC: 0.76 MG/DL (ref 0.66–1.25)
DIFFERENTIAL METHOD BLD: ABNORMAL
EOSINOPHIL # BLD AUTO: 0.1 10E9/L (ref 0–0.7)
EOSINOPHIL NFR BLD AUTO: 0.9 %
ERYTHROCYTE [DISTWIDTH] IN BLOOD BY AUTOMATED COUNT: 18.3 % (ref 10–15)
GFR SERPL CREATININE-BSD FRML MDRD: >90 ML/MIN/{1.73_M2}
GLUCOSE SERPL-MCNC: 175 MG/DL (ref 70–99)
HCT VFR BLD AUTO: 41.9 % (ref 40–53)
HGB BLD-MCNC: 13.7 G/DL (ref 13.3–17.7)
IMM GRANULOCYTES # BLD: 0 10E9/L (ref 0–0.4)
IMM GRANULOCYTES NFR BLD: 0.6 %
LYMPHOCYTES # BLD AUTO: 1.6 10E9/L (ref 0.8–5.3)
LYMPHOCYTES NFR BLD AUTO: 25.4 %
MAGNESIUM SERPL-MCNC: 1.8 MG/DL (ref 1.6–2.3)
MCH RBC QN AUTO: 29.4 PG (ref 26.5–33)
MCHC RBC AUTO-ENTMCNC: 32.7 G/DL (ref 31.5–36.5)
MCV RBC AUTO: 90 FL (ref 78–100)
MONOCYTES # BLD AUTO: 0.7 10E9/L (ref 0–1.3)
MONOCYTES NFR BLD AUTO: 11.4 %
NEUTROPHILS # BLD AUTO: 3.9 10E9/L (ref 1.6–8.3)
NEUTROPHILS NFR BLD AUTO: 61.2 %
NRBC # BLD AUTO: 0 10*3/UL
NRBC BLD AUTO-RTO: 0 /100
PLATELET # BLD AUTO: 115 10E9/L (ref 150–450)
POTASSIUM SERPL-SCNC: 4.3 MMOL/L (ref 3.4–5.3)
RBC # BLD AUTO: 4.66 10E12/L (ref 4.4–5.9)
SODIUM SERPL-SCNC: 132 MMOL/L (ref 133–144)
WBC # BLD AUTO: 6.4 10E9/L (ref 4–11)

## 2020-11-25 PROCEDURE — 85025 COMPLETE CBC W/AUTO DIFF WBC: CPT | Mod: TEL | Performed by: PHYSICIAN ASSISTANT

## 2020-11-25 PROCEDURE — 99214 OFFICE O/P EST MOD 30 MIN: CPT | Mod: TEL

## 2020-11-25 PROCEDURE — 250N000011 HC RX IP 250 OP 636: Mod: TEL

## 2020-11-25 PROCEDURE — 83735 ASSAY OF MAGNESIUM: CPT | Mod: TEL | Performed by: PHYSICIAN ASSISTANT

## 2020-11-25 PROCEDURE — 71260 CT THORAX DX C+: CPT | Performed by: RADIOLOGY

## 2020-11-25 PROCEDURE — 36591 DRAW BLOOD OFF VENOUS DEVICE: CPT

## 2020-11-25 PROCEDURE — 80048 BASIC METABOLIC PNL TOTAL CA: CPT | Mod: TEL | Performed by: PHYSICIAN ASSISTANT

## 2020-11-25 RX ORDER — HEPARIN SODIUM (PORCINE) LOCK FLUSH IV SOLN 100 UNIT/ML 100 UNIT/ML
5 SOLUTION INTRAVENOUS ONCE
Status: COMPLETED | OUTPATIENT
Start: 2020-11-25 | End: 2020-11-25

## 2020-11-25 RX ORDER — IOPAMIDOL 755 MG/ML
98 INJECTION, SOLUTION INTRAVASCULAR ONCE
Status: COMPLETED | OUTPATIENT
Start: 2020-11-25 | End: 2020-11-25

## 2020-11-25 RX ORDER — HEPARIN SODIUM (PORCINE) LOCK FLUSH IV SOLN 100 UNIT/ML 100 UNIT/ML
500 SOLUTION INTRAVENOUS ONCE
Status: COMPLETED | OUTPATIENT
Start: 2020-11-25 | End: 2020-11-25

## 2020-11-25 RX ORDER — SULFAMETHOXAZOLE/TRIMETHOPRIM 800-160 MG
1 TABLET ORAL 2 TIMES DAILY
Qty: 16 TABLET | Refills: 11 | Status: SHIPPED | OUTPATIENT
Start: 2020-11-25 | End: 2020-11-27

## 2020-11-25 RX ADMIN — Medication 5 ML: at 13:11

## 2020-11-25 RX ADMIN — HEPARIN SODIUM (PORCINE) LOCK FLUSH IV SOLN 100 UNIT/ML 500 UNITS: 100 SOLUTION at 13:40

## 2020-11-25 RX ADMIN — IOPAMIDOL 98 ML: 755 INJECTION, SOLUTION INTRAVASCULAR at 13:33

## 2020-11-25 NOTE — PROGRESS NOTES
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   No fevers, weight is stable    ROS: Appetite stable. No fevers, chills, infectious symptoms, headaches, dizziness, memory or balance issues.     Review of Systems                                                                                                                           8 point review with pertinent positive and negatives      PHYSICAL EXAM                                                                                                                                     KPS: 90    General: NAD;alert and engaged.    Lungs: Breathing comfortably without accessory muscle use.   Neuro: A&O, non-focal      Labs:  Lab Results   Component Value Date    WBC 6.4 11/25/2020    ANEU 3.9 11/25/2020    HGB 13.7 11/25/2020    HCT 41.9 11/25/2020     (L) 11/25/2020     (L) 11/25/2020    POTASSIUM 4.3 11/25/2020    CHLORIDE 100 11/25/2020    CO2 25 11/25/2020     (H) 11/25/2020    BUN 13 11/25/2020    CR 0.76 11/25/2020    MAG 1.8 11/25/2020    INR 1.16 (H) 10/28/2020    AST 26 10/28/2020    ALT 41 10/28/2020     CT lungs 11/25/2020  IMPRESSION:  1. Decreased left-sided pulmonary embolic burden.  2. Continued consolidation in right upper and lower lung.  3. Slight decrease in size of numerous small right paratracheal lymph  nodes.  4. Coronary artery disease.  5. Right renal cyst.      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      1.  BMT:  DLBCL  Axi-cell product with CNS prevention protocol   CNS prophy: simvastatin 40mg tied to IDS drug. Completed at day +30. All previously LPs under fluoroscopy.    PET Scan at day +28 with very good ID vs. CR:  CT  Day +60 disease status : Complete response with residual lung scar tissue. No new disease or sign of growth.      -Plan for  +100 for PET scan.      2.  HEME: excellent count recovery. No pre-meds.  - New PE dg in 10/2020  - s/p lovenox BID for PE dg on day 28.  -Now on DOAC ; plan for 6 months treatment course (through early 4 /2021)                             3.  ID: Afebrile and no infectious concerns.     - Prophy  acy, IV pentamidine (10/28 and 11/30). At local oncologist.  Ok to switch to Bactrim now for 6 months total.  Rx sent, take 1 po BID Mo, Tue     4.  GI:   - Protonix for GI prophy -ok to stop now.   - Colace prn constipation     5.  FEN/Renal: Cr stable.        6. Endo: Type 2 DM.  Continue metformin 500mg BID, Med sliding scale and carb counting novolog 1 unit per 3.5 grams carbs; Lantus 10 units every evening.    Complication of DM with 2 left toes amputated 7/2020  Adrenal Insufficiency:  hydrocortisone 20mg/d    7.  Musk:  Hx of osteomyelitis of 2nd & 3rd L toes w/ amputation of distal phalanx of both digits.   -   MRI 11/5/ neg for osteomyelitis. Pain nearly resolved 11/6.       RTC:   per protocol  Next restaging at day 100 with PET followed by a visit with me.      Rosey Bunch MD  Professor of Medicine  [phone]

## 2020-11-25 NOTE — LETTER
11/25/2020         RE: Pastor Garibay  [address]  [facility] [address]        Dear Colleague,    Thank you for referring your patient, Pastor Garibay, to the [facility] BLOOD AND MARROW TRANSPLANT PROGRAM [place]. Please see a copy of my visit note below.        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   No fevers, weight is stable    ROS: Appetite stable. No fevers, chills, infectious symptoms, headaches, dizziness, memory or balance issues.     Review of Systems                                                                                                                           8 point review with pertinent positive and negatives      PHYSICAL EXAM                                                                                                                                     KPS: 90    General: NAD;alert and engaged.    Lungs: Breathing comfortably without accessory muscle use.   Neuro: A&O, non-focal      Labs:  Lab Results   Component Value Date    WBC 6.4 11/25/2020    ANEU 3.9 11/25/2020    HGB 13.7 11/25/2020    HCT 41.9 11/25/2020     (L) 11/25/2020     (L) 11/25/2020    POTASSIUM 4.3 11/25/2020    CHLORIDE 100 11/25/2020    CO2 25 11/25/2020     (H) 11/25/2020    BUN 13 11/25/2020    CR 0.76 11/25/2020    MAG 1.8 11/25/2020    INR 1.16 (H) 10/28/2020    AST 26 10/28/2020    ALT 41 10/28/2020     CT lungs 11/25/2020  IMPRESSION:  1. Decreased left-sided pulmonary embolic burden.  2. Continued consolidation in right upper and lower lung.  3. Slight decrease in size of numerous small right paratracheal lymph  nodes.  4. Coronary artery disease.  5. Right renal cyst.      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      1.  BMT:  DLBCL  Axi-cell product with CNS prevention protocol   CNS prophy: simvastatin 40mg tied to IDS drug. Completed at day +30. All previously LPs under fluoroscopy.    PET Scan at day +28 with very good MA vs. CR:  CT Day +60 disease status : Complete response with residual lung scar tissue. No new disease or sign of growth.      -Plan for  +100 for PET scan.      2.  HEME: excellent count recovery. No pre-meds.  - New PE dg in 10/2020  - s/p lovenox BID for PE dg on day 28.  -Now on DOAC ; plan for 6 months treatment course (through early 4 /2021)                             3.  ID: Afebrile and no infectious concerns.     - Prophy  acy, IV pentamidine (10/28 and 11/30). At local oncologist.  Ok to switch to Bactrim now for 6 months total.  Rx sent, take 1 po BID Mo, Tue     4.  GI:   - Protonix for GI prophy -ok to stop now.   - Colace prn constipation     5.  FEN/Renal: Cr stable.        6. Endo: Type 2 DM.  Continue metformin 500mg BID, Med sliding scale and carb counting novolog 1 unit per 3.5 grams carbs; Lantus 10 units every evening.    Complication of DM with 2 left toes amputated 7/2020  Adrenal Insufficiency:  hydrocortisone 20mg/d    7.  Musk:  Hx of osteomyelitis of 2nd & 3rd L toes w/ amputation of distal phalanx of both digits.   -   MRI 11/5/ neg for osteomyelitis. Pain nearly resolved 11/6.       RTC:   per protocol  Next restaging at day 100 with PET followed by a visit with me.      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      The patient has been notified of following:     \"This telephone visit will be conducted via a call between you and your physician/provider. We have found that certain health care needs can be provided without the need for a physical exam.  This service lets us provide the care you need with " "a short phone conversation.  If a prescription is necessary we can send it directly to your pharmacy.  If lab work is needed we can place an order for that and you can then stop by our lab to have the test done at a later time.    Telephone visits are billed at different rates depending on your insurance coverage. During this emergency period, for some insurers they may be billed the same as an in-person visit.  Please reach out to your insurance provider with any questions.    If during the course of the call the physician/provider feels a telephone visit is not appropriate, you will not be charged for this service.\"    Patient has given verbal consent for Telephone visit?  No    What phone number would you like to be contacted at? [phone]    How would you like to obtain your AVS? Mail a copy    /71 (BP Location: Right arm, Patient Position: Sitting, Cuff Size: Adult Large)   Pulse 98   Temp 97.5  F (36.4  C) (Oral)   Resp 16   Wt 91.3 kg (201 lb 3.2 oz)   SpO2 97%   BMI 30.59 kg/m      Demetria Hardy CMA November 25, 2020  2:15 PM     Phone call duration: 22 minutes      Again, thank you for allowing me to participate in the care of your patient.        Sincerely,        Rosey Bunch MD    "

## 2020-11-25 NOTE — PROGRESS NOTES
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      The patient has been notified of following:     \"This telephone visit will be conducted via a call between you and your physician/provider. We have found that certain health care needs can be provided without the need for a physical exam.  This service lets us provide the care you need with a short phone conversation.  If a prescription is necessary we can send it directly to your pharmacy.  If lab work is needed we can place an order for that and you can then stop by our lab to have the test done at a later time.    Telephone visits are billed at different rates depending on your insurance coverage. During this emergency period, for some insurers they may be billed the same as an in-person visit.  Please reach out to your insurance provider with any questions.    If during the course of the call the physician/provider feels a telephone visit is not appropriate, you will not be charged for this service.\"    Patient has given verbal consent for Telephone visit?  No    What phone number would you like to be contacted at? [phone]    How would you like to obtain your AVS? Mail a copy    /71 (BP Location: Right arm, Patient Position: Sitting, Cuff Size: Adult Large)   Pulse 98   Temp 97.5  F (36.4  C) (Oral)   Resp 16   Wt 91.3 kg (201 lb 3.2 oz)   SpO2 97%   BMI 30.59 kg/m      Demetria Hardy, GILMA November 25, 2020  2:15 PM     Phone call duration: 22 minutes          "

## 2020-11-25 NOTE — NURSING NOTE
"Chief Complaint   Patient presents with     Port Draw     port accessed and labs drawn by rn in lab.  vital signs taken.     Port accessed by RN in lab with 20g 3/4\" power needle, labs drawn, port flushed with saline and heparin, vitals checked.    Jessi Euceda RN      "

## 2020-11-27 ENCOUNTER — TELEPHONE (OUTPATIENT)
Dept: TRANSPLANT | Facility: CLINIC | Age: 70
End: 2020-11-27

## 2020-11-27 DIAGNOSIS — C83.3A DIFFUSE LARGE CELL LYMPHOMA IN REMISSION: ICD-10-CM

## 2020-11-27 RX ORDER — SULFAMETHOXAZOLE/TRIMETHOPRIM 800-160 MG
1 TABLET ORAL 2 TIMES DAILY
Qty: 16 TABLET | Refills: 11 | COMMUNITY
Start: 2020-11-27 | End: 2021-04-01

## 2020-11-27 NOTE — TELEPHONE ENCOUNTER
Pastor called to report that he has cracked tooth and will be going to the dentist on 11/30 at 1pm. He also has questions about starting bactrim as he has IV pentamidine appt scheduled with D office on 12/1. Message sent to dr Bunch and she would like him to start bactrim in mid December and get the pentamidine as scheduled. He has the bactrim rx and will start mid December. No abx need for dental appt. Pastor verbalized understanding of plan.

## 2020-12-05 DIAGNOSIS — C83.30 DIFFUSE LARGE B-CELL LYMPHOMA, UNSPECIFIED BODY REGION (H): ICD-10-CM

## 2020-12-05 DIAGNOSIS — I26.99 ACUTE PULMONARY EMBOLISM WITHOUT ACUTE COR PULMONALE, UNSPECIFIED PULMONARY EMBOLISM TYPE (H): ICD-10-CM

## 2020-12-07 RX ORDER — PANTOPRAZOLE SODIUM 40 MG/1
TABLET, DELAYED RELEASE ORAL
Qty: 30 TABLET | Refills: 0 | OUTPATIENT
Start: 2020-12-07

## 2020-12-07 RX ORDER — RIVAROXABAN 20 MG/1
TABLET, FILM COATED ORAL
Qty: 30 TABLET | Refills: 3 | Status: SHIPPED | OUTPATIENT
Start: 2020-12-07 | End: 2021-04-01

## 2020-12-17 ENCOUNTER — TRANSFERRED RECORDS (OUTPATIENT)
Dept: HEALTH INFORMATION MANAGEMENT | Facility: CLINIC | Age: 70
End: 2020-12-17

## 2020-12-29 ENCOUNTER — ANCILLARY PROCEDURE (OUTPATIENT)
Dept: CT IMAGING | Facility: CLINIC | Age: 70
End: 2020-12-29
Attending: STUDENT IN AN ORGANIZED HEALTH CARE EDUCATION/TRAINING PROGRAM
Payer: COMMERCIAL

## 2020-12-29 VITALS
TEMPERATURE: 97.6 F | OXYGEN SATURATION: 100 % | WEIGHT: 206 LBS | DIASTOLIC BLOOD PRESSURE: 63 MMHG | BODY MASS INDEX: 31.32 KG/M2 | SYSTOLIC BLOOD PRESSURE: 93 MMHG | RESPIRATION RATE: 16 BRPM | HEART RATE: 82 BPM

## 2020-12-29 DIAGNOSIS — C85.80 LARGE CELL LYMPHOMA (H): ICD-10-CM

## 2020-12-29 DIAGNOSIS — C83.3A DIFFUSE LARGE CELL LYMPHOMA IN REMISSION: ICD-10-CM

## 2020-12-29 DIAGNOSIS — C83.398 DIFFUSE LARGE B-CELL LYMPHOMA OF SOLID ORGAN EXCLUDING SPLEEN: ICD-10-CM

## 2020-12-29 DIAGNOSIS — C83.30 DIFFUSE LARGE B-CELL LYMPHOMA, UNSPECIFIED BODY REGION (H): Primary | ICD-10-CM

## 2020-12-29 LAB
ALBUMIN SERPL-MCNC: 3.6 G/DL (ref 3.4–5)
ALP SERPL-CCNC: 101 U/L (ref 40–150)
ALT SERPL W P-5'-P-CCNC: 28 U/L (ref 0–70)
ANION GAP SERPL CALCULATED.3IONS-SCNC: 5 MMOL/L (ref 3–14)
AST SERPL W P-5'-P-CCNC: 20 U/L (ref 0–45)
BASOPHILS # BLD AUTO: 0.1 10E9/L (ref 0–0.2)
BASOPHILS NFR BLD AUTO: 1.8 %
BILIRUB SERPL-MCNC: 0.5 MG/DL (ref 0.2–1.3)
BUN SERPL-MCNC: 11 MG/DL (ref 7–30)
CALCIUM SERPL-MCNC: 9.5 MG/DL (ref 8.5–10.1)
CD19 CELLS # BLD: <1 CELLS/UL (ref 107–698)
CD19 CELLS NFR BLD: <1 % (ref 6–27)
CD3 CELLS # BLD: 1232 CELLS/UL (ref 603–2990)
CD3 CELLS NFR BLD: 83 % (ref 49–84)
CD3+CD4+ CELLS # BLD: 218 CELLS/UL (ref 441–2156)
CD3+CD4+ CELLS NFR BLD: 15 % (ref 28–63)
CD3+CD4+ CELLS/CD3+CD8+ CLL BLD: 0.22 % (ref 1.4–2.6)
CD3+CD8+ CELLS # BLD: 1010 CELLS/UL (ref 125–1312)
CD3+CD8+ CELLS NFR BLD: 68 % (ref 10–40)
CD3-CD16+CD56+ CELLS # BLD: 252 CELLS/UL (ref 95–640)
CD3-CD16+CD56+ CELLS NFR BLD: 17 % (ref 4–25)
CHLORIDE SERPL-SCNC: 103 MMOL/L (ref 94–109)
CO2 SERPL-SCNC: 30 MMOL/L (ref 20–32)
CREAT SERPL-MCNC: 0.72 MG/DL (ref 0.66–1.25)
DIFFERENTIAL METHOD BLD: ABNORMAL
EOSINOPHIL # BLD AUTO: 0 10E9/L (ref 0–0.7)
EOSINOPHIL NFR BLD AUTO: 0 %
ERYTHROCYTE [DISTWIDTH] IN BLOOD BY AUTOMATED COUNT: 17 % (ref 10–15)
GFR SERPL CREATININE-BSD FRML MDRD: >90 ML/MIN/{1.73_M2}
GLUCOSE SERPL-MCNC: 65 MG/DL (ref 70–99)
HCT VFR BLD AUTO: 41.9 % (ref 40–53)
HGB BLD-MCNC: 13.8 G/DL (ref 13.3–17.7)
IFC SPECIMEN: ABNORMAL
LDH SERPL L TO P-CCNC: 270 U/L (ref 85–227)
LYMPHOCYTES # BLD AUTO: 0.9 10E9/L (ref 0.8–5.3)
LYMPHOCYTES NFR BLD AUTO: 13.2 %
MAGNESIUM SERPL-MCNC: 2.1 MG/DL (ref 1.6–2.3)
MCH RBC QN AUTO: 29.5 PG (ref 26.5–33)
MCHC RBC AUTO-ENTMCNC: 32.9 G/DL (ref 31.5–36.5)
MCV RBC AUTO: 90 FL (ref 78–100)
MONOCYTES # BLD AUTO: 0.4 10E9/L (ref 0–1.3)
MONOCYTES NFR BLD AUTO: 5.3 %
NEUTROPHILS # BLD AUTO: 5.4 10E9/L (ref 1.6–8.3)
NEUTROPHILS NFR BLD AUTO: 79.7 %
OVALOCYTES BLD QL SMEAR: SLIGHT
PHOSPHATE SERPL-MCNC: 3 MG/DL (ref 2.5–4.5)
PLATELET # BLD AUTO: 142 10E9/L (ref 150–450)
PLATELET # BLD EST: ABNORMAL 10*3/UL
POIKILOCYTOSIS BLD QL SMEAR: SLIGHT
POTASSIUM SERPL-SCNC: 4.1 MMOL/L (ref 3.4–5.3)
PROT SERPL-MCNC: 6.8 G/DL (ref 6.8–8.8)
RBC # BLD AUTO: 4.68 10E12/L (ref 4.4–5.9)
SODIUM SERPL-SCNC: 138 MMOL/L (ref 133–144)
WBC # BLD AUTO: 6.8 10E9/L (ref 4–11)

## 2020-12-29 PROCEDURE — 250N000011 HC RX IP 250 OP 636

## 2020-12-29 PROCEDURE — 80053 COMPREHEN METABOLIC PANEL: CPT | Performed by: PHYSICIAN ASSISTANT

## 2020-12-29 PROCEDURE — 86360 T CELL ABSOLUTE COUNT/RATIO: CPT | Performed by: PHYSICIAN ASSISTANT

## 2020-12-29 PROCEDURE — 36591 DRAW BLOOD OFF VENOUS DEVICE: CPT

## 2020-12-29 PROCEDURE — 86355 B CELLS TOTAL COUNT: CPT | Performed by: PHYSICIAN ASSISTANT

## 2020-12-29 PROCEDURE — 84100 ASSAY OF PHOSPHORUS: CPT | Performed by: PHYSICIAN ASSISTANT

## 2020-12-29 PROCEDURE — 82784 ASSAY IGA/IGD/IGG/IGM EACH: CPT | Performed by: STUDENT IN AN ORGANIZED HEALTH CARE EDUCATION/TRAINING PROGRAM

## 2020-12-29 PROCEDURE — 83615 LACTATE (LD) (LDH) ENZYME: CPT | Performed by: PHYSICIAN ASSISTANT

## 2020-12-29 PROCEDURE — 85025 COMPLETE CBC W/AUTO DIFF WBC: CPT | Performed by: PHYSICIAN ASSISTANT

## 2020-12-29 PROCEDURE — 71260 CT THORAX DX C+: CPT | Performed by: RADIOLOGY

## 2020-12-29 PROCEDURE — 86359 T CELLS TOTAL COUNT: CPT | Performed by: PHYSICIAN ASSISTANT

## 2020-12-29 PROCEDURE — 74177 CT ABD & PELVIS W/CONTRAST: CPT | Performed by: RADIOLOGY

## 2020-12-29 PROCEDURE — 999N000130 HC STATISTIC PORT-A-CATH ACCESS/FLUSHING

## 2020-12-29 PROCEDURE — 83735 ASSAY OF MAGNESIUM: CPT | Performed by: PHYSICIAN ASSISTANT

## 2020-12-29 PROCEDURE — 86357 NK CELLS TOTAL COUNT: CPT | Performed by: PHYSICIAN ASSISTANT

## 2020-12-29 RX ORDER — IOPAMIDOL 755 MG/ML
124 INJECTION, SOLUTION INTRAVASCULAR ONCE
Status: COMPLETED | OUTPATIENT
Start: 2020-12-29 | End: 2020-12-29

## 2020-12-29 RX ORDER — HEPARIN SODIUM (PORCINE) LOCK FLUSH IV SOLN 100 UNIT/ML 100 UNIT/ML
5 SOLUTION INTRAVENOUS ONCE
Status: COMPLETED | OUTPATIENT
Start: 2020-12-29 | End: 2020-12-29

## 2020-12-29 RX ADMIN — IOPAMIDOL 124 ML: 755 INJECTION, SOLUTION INTRAVASCULAR at 09:16

## 2020-12-29 RX ADMIN — SODIUM CHLORIDE, PRESERVATIVE FREE 5 ML: 5 INJECTION INTRAVENOUS at 11:04

## 2020-12-29 ASSESSMENT — PAIN SCALES - GENERAL: PAINLEVEL: NO PAIN (0)

## 2020-12-29 NOTE — NURSING NOTE
Chief Complaint   Patient presents with     Port Draw     Labs drawn from Port by RN in lab. Vitals taken.      Labs drawn from port that was placed in CT. Port flushed with saline and heparin.  Vital signs taken.  Pt checked in to next appointment.    Susan East RN

## 2020-12-30 LAB — IGG SERPL-MCNC: 494 MG/DL (ref 610–1616)

## 2020-12-31 ENCOUNTER — VIRTUAL VISIT (OUTPATIENT)
Dept: TRANSPLANT | Facility: CLINIC | Age: 70
End: 2020-12-31
Payer: COMMERCIAL

## 2020-12-31 DIAGNOSIS — C83.30 DIFFUSE LARGE B-CELL LYMPHOMA, UNSPECIFIED BODY REGION (H): Primary | ICD-10-CM

## 2020-12-31 PROCEDURE — 999N001193 HC VIDEO/TELEPHONE VISIT; NO CHARGE

## 2020-12-31 PROCEDURE — 99214 OFFICE O/P EST MOD 30 MIN: CPT | Mod: 95

## 2020-12-31 NOTE — LETTER
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      The patient has been notified of following:     \"This video visit will be conducted via a call between you and your physician/provider. We have found that certain health care needs can be provided without the need for an in-person physical exam.  This service lets us provide the care you need with a video conversation.  If a prescription is necessary we can send it directly to your pharmacy.  If lab work is needed we can place an order for that and you can then stop by our lab to have the test done at a later time.    Video visits are billed at different rates depending on your insurance coverage.  Please reach out to your insurance provider with any questions.    If during the course of the call the physician/provider feels a video visit is not appropriate, you will not be charged for this service.\"    Patient has given verbal consent for Video visit? Yes  How would you like to obtain your AVS? MyChart  If you are dropped from the video visit, the video invite should be resent to: Text to cell phone: [phone]  Will anyone else be joining your video visit? No        Video-Visit Details    Type of service:  Video Visit    Video Total Time: 10  Min        Originating Location (pt. 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   No fevers, weight is stable. No fevers.     ROS: Appetite stable. No fevers, chills, infectious symptoms, headaches, dizziness, memory or balance issues.     Review of Systems                       "                                                                                                     8 point review with pertinent positive and negatives      PHYSICAL EXAM                                                                                                                                     KPS: 90    General: NAD;alert and engaged.    Lungs: Breathing comfortably without accessory muscle use.   Neuro: A&O, non-focal      Labs:   Lab Results   Component Value Date    WBC 6.8 12/29/2020    ANEU 5.4 12/29/2020    HGB 13.8 12/29/2020    HCT 41.9 12/29/2020     (L) 12/29/2020     12/29/2020    POTASSIUM 4.1 12/29/2020    CHLORIDE 103 12/29/2020    CO2 30 12/29/2020    GLC 65 (L) 12/29/2020    BUN 11 12/29/2020    CR 0.72 12/29/2020    MAG 2.1 12/29/2020    INR 1.16 (H) 10/28/2020    AST 20 12/29/2020    ALT 28 12/29/2020   IgG 494mg/dl    CT CAP:  IMPRESSION: Findings remain most consistent with complete response to  therapy:  1. Stable chronic right perihilar consolidation/scarring/volume loss  with pleural thickening  2. No new or enlarging lymphadenopathy in the chest, abdomen, or  pelvis. Stable prominent, nonenlarged mediastinal, retroperitoneal,  and central mesenteric lymph nodes.   3. Stable mild splenomegaly.  4. No appreciable residual pulmonary embolism.  5. Continued but decreased nonocclusive thrombus in the left common  iliac vein.  6. Stable minimal adrenal nodularity and adjacent fat stranding/soft  tissue thickening without clear evidence of residual disease at this  location.      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      1.  BMT:  DLBCL  Axi-cell product with CNS prevention protocol   CNS prophy: simvastatin 40mg tied to IDS drug. Completed at day +30. All previously LPs under fluoroscopy.    PET Scan at day +28 with very good [place] vs. CR:  CT Day +60 and now  day 100 disease status : Complete response with residual lung scar tissue. No new disease or sign of growth.      -Plan for  +180 for CT scan.      2.  HEME: excellent count recovery. No pre-meds.  - New PE dg in 10/2020  - s/p lovenox BID for PE dg on day 28.  -Now on DOAC ; plan for 6 months treatment course (through early 4 /2021)                             3.  ID: Afebrile and no infectious concerns.     - Prophy  acy for 6 months, then Shingrix vaccine ,was on IV pentamidine (10/28 and 11/30).  cont Bactrim   for 6 months total.  Rx sent, take 1 po BID Mo, Tue     4.  GI:   - Protonix for GI prophy -ok to stop now.   - Colace prn constipation     5.  FEN/Renal: Cr stable.        6. Endo: Type 2 DM.  Continue metformin 500mg BID, Med sliding scale and carb counting novolog 1 unit per 3.5 grams carbs; Lantus 10 units every evening.    Complication of DM with 2 left toes amputated 7/2020  Adrenal Insufficiency:  hydrocortisone 20mg/d  Ok to resume statins    7.  Musk:  Hx of osteomyelitis of 2nd & 3rd L toes w/ amputation of distal phalanx of both digits.   -   MRI 11/5/ neg for osteomyelitis. Pain nearly resolved 11/6.       RTC:   per protocol  Next restaging at day 180 with CT followed by a visit with me.  Pt is now f/u up with    He can resume lipid lowering medications now.           MD Marina Scott CMA on 12/31/2020 at 10:56 AM      Rosey Bunch MD

## 2020-12-31 NOTE — LETTER
December 31, 2020       TO: Pastor Garibay  [address]  [facility] [address]       DearMr.P[place],    We are writing to inform you of your test results.    {[facility] results letter list:[id number]}    No results found from the In Basket message.    ***

## 2020-12-31 NOTE — PROGRESS NOTES
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      The patient has been notified of following:     \"This video visit will be conducted via a call between you and your physician/provider. We have found that certain health care needs can be provided without the need for an in-person physical exam.  This service lets us provide the care you need with a video conversation.  If a prescription is necessary we can send it directly to your pharmacy.  If lab work is needed we can place an order for that and you can then stop by our lab to have the test done at a later time.    Video visits are billed at different rates depending on your insurance coverage.  Please reach out to your insurance provider with any questions.    If during the course of the call the physician/provider feels a video visit is not appropriate, you will not be charged for this service.\"    Patient has given verbal consent for Video visit? Yes  How would you like to obtain your AVS? MyChart  If you are dropped from the video visit, the video invite should be resent to: Text to cell phone: [phone]  Will anyone else be joining your video visit? No        Video-Visit Details    Type of service:  Video Visit    Video Total Time: 10  Min        Originating Location (pt. Location): Home    Distant Location (provider location):  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   No fevers, weight is stable. No fevers.     ROS: Appetite stable. No fevers, chills, infectious symptoms, headaches, dizziness, memory or balance issues.     Review of Systems                                                                                                              "              8 point review with pertinent positive and negatives      PHYSICAL EXAM                                                                                                                                     KPS: 90    General: NAD;alert and engaged.    Lungs: Breathing comfortably without accessory muscle use.   Neuro: A&O, non-focal      Labs:   Lab Results   Component Value Date    WBC 6.8 12/29/2020    ANEU 5.4 12/29/2020    HGB 13.8 12/29/2020    HCT 41.9 12/29/2020     (L) 12/29/2020     12/29/2020    POTASSIUM 4.1 12/29/2020    CHLORIDE 103 12/29/2020    CO2 30 12/29/2020    GLC 65 (L) 12/29/2020    BUN 11 12/29/2020    CR 0.72 12/29/2020    MAG 2.1 12/29/2020    INR 1.16 (H) 10/28/2020    AST 20 12/29/2020    ALT 28 12/29/2020   IgG 494mg/dl    CT CAP:  IMPRESSION: Findings remain most consistent with complete response to  therapy:  1. Stable chronic right perihilar consolidation/scarring/volume loss  with pleural thickening  2. No new or enlarging lymphadenopathy in the chest, abdomen, or  pelvis. Stable prominent, nonenlarged mediastinal, retroperitoneal,  and central mesenteric lymph nodes.   3. Stable mild splenomegaly.  4. No appreciable residual pulmonary embolism.  5. Continued but decreased nonocclusive thrombus in the left common  iliac vein.  6. Stable minimal adrenal nodularity and adjacent fat stranding/soft  tissue thickening without clear evidence of residual disease at this  location.      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      1.  BMT:  DLBCL  Axi-cell product with CNS prevention protocol   CNS prophy: simvastatin 40mg tied to IDS drug. Completed at day +30. All previously LPs under fluoroscopy.    PET Scan at day +28 with very good [place] vs. CR:  CT Day +60 and now day 100 disease status : Complete response with residual lung scar tissue. No new  disease or sign of growth.      -Plan for  +180 for CT scan.      2.  HEME: excellent count recovery. No pre-meds.  - New PE dg in 10/2020  - s/p lovenox BID for PE dg on day 28.  -Now on DOAC ; plan for 6 months treatment course (through early 4 /2021)                             3.  ID: Afebrile and no infectious concerns.     - Prophy  acy for 6 months, then Shingrix vaccine ,was on IV pentamidine (10/28 and 11/30).  cont Bactrim   for 6 months total.  Rx sent, take 1 po BID Mo, Tue     4.  GI:   - Protonix for GI prophy -ok to stop now.   - Colace prn constipation     5.  FEN/Renal: Cr stable.        6. Endo: Type 2 DM.  Continue metformin 500mg BID, Med sliding scale and carb counting novolog 1 unit per 3.5 grams carbs; Lantus 10 units every evening.    Complication of DM with 2 left toes amputated 7/2020  Adrenal Insufficiency:  hydrocortisone 20mg/d  Ok to resume statins    7.  Musk:  Hx of osteomyelitis of 2nd & 3rd L toes w/ amputation of distal phalanx of both digits.   -   MRI 11/5/ neg for osteomyelitis. Pain nearly resolved 11/6.       RTC:   per protocol  Next restaging at day 180 with CT followed by a visit with me.  Pt is now f/u up with    He can resume lipid lowering medications now.           MD Marina Scott, GILMA on 12/31/2020 at 10:56 AM

## 2020-12-31 NOTE — LETTER
"    12/31/2020         RE: Pastor Garibay  [address]  [facility] [address]        Dear Colleague,    Thank you for referring your patient, Pastor Garibay, to the [facility] BLOOD AND MARROW TRANSPLANT PROGRAM [place]. Please see a copy of my visit note below.    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      The patient has been notified of following:     \"This video visit will be conducted via a call between you and your physician/provider. We have found that certain health care needs can be provided without the need for an in-person physical exam.  This service lets us provide the care you need with a video conversation.  If a prescription is necessary we can send it directly to your pharmacy.  If lab work is needed we can place an order for that and you can then stop by our lab to have the test done at a later time.    Video visits are billed at different rates depending on your insurance coverage.  Please reach out to your insurance provider with any questions.    If during the course of the call the physician/provider feels a video visit is not appropriate, you will not be charged for this service.\"    Patient has given verbal consent for Video visit? Yes  How would you like to obtain your AVS? MyChart  If you are dropped from the video visit, the video invite should be resent to: Text to cell phone: [phone]  Will anyone else be joining your video visit? No        Video-Visit Details    Type of service:  Video Visit    Video Total Time: 10  Min        Originating Location (pt. 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   No fevers, weight is stable. No fevers.     ROS: Appetite stable. No fevers, chills, infectious symptoms, headaches, dizziness, memory or balance issues.     Review of Systems                                                                                                                           8 point review with pertinent positive and negatives      PHYSICAL EXAM                                                                                                                                     KPS: 90    General: NAD;alert and engaged.    Lungs: Breathing comfortably without accessory muscle use.   Neuro: A&O, non-focal      Labs:   Lab Results   Component Value Date    WBC 6.8 12/29/2020    ANEU 5.4 12/29/2020    HGB 13.8 12/29/2020    HCT 41.9 12/29/2020     (L) 12/29/2020     12/29/2020    POTASSIUM 4.1 12/29/2020    CHLORIDE 103 12/29/2020    CO2 30 12/29/2020    GLC 65 (L) 12/29/2020    BUN 11 12/29/2020    CR 0.72 12/29/2020    MAG 2.1 12/29/2020    INR 1.16 (H) 10/28/2020    AST 20 12/29/2020    ALT 28 12/29/2020   IgG 494mg/dl    CT CAP:  IMPRESSION: Findings remain most consistent with complete response to  therapy:  1. Stable chronic right perihilar consolidation/scarring/volume loss  with pleural thickening  2. No new or enlarging lymphadenopathy in the chest, abdomen, or  pelvis. Stable prominent, nonenlarged mediastinal, retroperitoneal,  and central mesenteric lymph nodes.   3. Stable mild splenomegaly.  4. No appreciable residual pulmonary embolism.  5. Continued but decreased nonocclusive thrombus in the left common  iliac vein.  6. Stable minimal adrenal nodularity and adjacent fat stranding/soft  tissue thickening without clear evidence of residual disease at this  location.      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      1.  BMT:  DLBCL  Axi-cell product  with CNS prevention protocol   CNS prophy: simvastatin 40mg tied to IDS drug. Completed at day +30. All previously LPs under fluoroscopy.    PET Scan at day +28 with very good [place] vs. CR:  CT Day +60 and now day 100 disease status : Complete response with residual lung scar tissue. No new disease or sign of growth.      -Plan for  +180 for CT scan.      2.  HEME: excellent count recovery. No pre-meds.  - New PE dg in 10/2020  - s/p lovenox BID for PE dg on day 28.  -Now on DOAC ; plan for 6 months treatment course (through early 4 /2021)                             3.  ID: Afebrile and no infectious concerns.     - Prophy  acy for 6 months, then Shingrix vaccine ,was on IV pentamidine (10/28 and 11/30).  cont Bactrim   for 6 months total.  Rx sent, take 1 po BID Mo, Tue     4.  GI:   - Protonix for GI prophy -ok to stop now.   - Colace prn constipation     5.  FEN/Renal: Cr stable.        6. Endo: Type 2 DM.  Continue metformin 500mg BID, Med sliding scale and carb counting novolog 1 unit per 3.5 grams carbs; Lantus 10 units every evening.    Complication of DM with 2 left toes amputated 7/2020  Adrenal Insufficiency:  hydrocortisone 20mg/d  Ok to resume statins    7.  Musk:  Hx of osteomyelitis of 2nd & 3rd L toes w/ amputation of distal phalanx of both digits.   -   MRI 11/5/ neg for osteomyelitis. Pain nearly resolved 11/6.       RTC:   per protocol  Next restaging at day 180 with CT followed by a visit with me.  Pt is now f/u up with    He can resume lipid lowering medications now.       MD Marina Scott CMA on 12/31/2020 at 10:56 AM      Again, thank you for allowing me to participate in the care of your patient.        Sincerely,        Rosey Bunch MD

## 2021-01-04 DIAGNOSIS — C83.30 DIFFUSE LARGE B-CELL LYMPHOMA, UNSPECIFIED BODY REGION (H): ICD-10-CM

## 2021-01-04 RX ORDER — ACYCLOVIR 800 MG/1
TABLET ORAL
Qty: 60 TABLET | Refills: 3 | Status: SHIPPED | OUTPATIENT
Start: 2021-01-04 | End: 2021-07-01

## 2021-01-15 ENCOUNTER — HEALTH MAINTENANCE LETTER (OUTPATIENT)
Age: 71
End: 2021-01-15

## 2021-03-30 ENCOUNTER — ANCILLARY PROCEDURE (OUTPATIENT)
Dept: CT IMAGING | Facility: CLINIC | Age: 71
End: 2021-03-30
Attending: STUDENT IN AN ORGANIZED HEALTH CARE EDUCATION/TRAINING PROGRAM
Payer: COMMERCIAL

## 2021-03-30 VITALS
DIASTOLIC BLOOD PRESSURE: 68 MMHG | TEMPERATURE: 98 F | OXYGEN SATURATION: 95 % | RESPIRATION RATE: 18 BRPM | HEART RATE: 90 BPM | SYSTOLIC BLOOD PRESSURE: 112 MMHG

## 2021-03-30 DIAGNOSIS — C83.398 DIFFUSE LARGE B-CELL LYMPHOMA OF SOLID ORGAN EXCLUDING SPLEEN: ICD-10-CM

## 2021-03-30 DIAGNOSIS — C83.3A DIFFUSE LARGE CELL LYMPHOMA IN REMISSION: ICD-10-CM

## 2021-03-30 DIAGNOSIS — C85.80 LARGE CELL LYMPHOMA (H): Primary | ICD-10-CM

## 2021-03-30 DIAGNOSIS — C85.80 LARGE CELL LYMPHOMA (H): ICD-10-CM

## 2021-03-30 LAB
ALBUMIN SERPL-MCNC: 3.3 G/DL (ref 3.4–5)
ALP SERPL-CCNC: 85 U/L (ref 40–150)
ALT SERPL W P-5'-P-CCNC: 32 U/L (ref 0–70)
ANION GAP SERPL CALCULATED.3IONS-SCNC: 5 MMOL/L (ref 3–14)
AST SERPL W P-5'-P-CCNC: 23 U/L (ref 0–45)
BASOPHILS # BLD AUTO: 0 10E9/L (ref 0–0.2)
BASOPHILS NFR BLD AUTO: 0.6 %
BILIRUB SERPL-MCNC: 0.5 MG/DL (ref 0.2–1.3)
BUN SERPL-MCNC: 14 MG/DL (ref 7–30)
CALCIUM SERPL-MCNC: 8.7 MG/DL (ref 8.5–10.1)
CD19 CELLS # BLD: <1 CELLS/UL (ref 107–698)
CD19 CELLS NFR BLD: <1 % (ref 6–27)
CD3 CELLS # BLD: 569 CELLS/UL (ref 603–2990)
CD3 CELLS NFR BLD: 81 % (ref 49–84)
CD3+CD4+ CELLS # BLD: 116 CELLS/UL (ref 441–2156)
CD3+CD4+ CELLS NFR BLD: 17 % (ref 28–63)
CD3+CD4+ CELLS/CD3+CD8+ CLL BLD: 0.27 % (ref 1.4–2.6)
CD3+CD8+ CELLS # BLD: 450 CELLS/UL (ref 125–1312)
CD3+CD8+ CELLS NFR BLD: 64 % (ref 10–40)
CD3-CD16+CD56+ CELLS # BLD: 119 CELLS/UL (ref 95–640)
CD3-CD16+CD56+ CELLS NFR BLD: 17 % (ref 4–25)
CHLORIDE SERPL-SCNC: 101 MMOL/L (ref 94–109)
CO2 SERPL-SCNC: 27 MMOL/L (ref 20–32)
CREAT BLD-MCNC: 0.8 MG/DL (ref 0.66–1.25)
CREAT SERPL-MCNC: 0.8 MG/DL (ref 0.66–1.25)
DIFFERENTIAL METHOD BLD: ABNORMAL
EOSINOPHIL # BLD AUTO: 0.1 10E9/L (ref 0–0.7)
EOSINOPHIL NFR BLD AUTO: 1.2 %
ERYTHROCYTE [DISTWIDTH] IN BLOOD BY AUTOMATED COUNT: 15.3 % (ref 10–15)
GFR SERPL CREATININE-BSD FRML MDRD: >90 ML/MIN/{1.73_M2}
GFR SERPL CREATININE-BSD FRML MDRD: >90 ML/MIN/{1.73_M2}
GLUCOSE SERPL-MCNC: 198 MG/DL (ref 70–99)
HCT VFR BLD AUTO: 39.8 % (ref 40–53)
HGB BLD-MCNC: 13.3 G/DL (ref 13.3–17.7)
IFC SPECIMEN: ABNORMAL
IGG SERPL-MCNC: 454 MG/DL (ref 610–1616)
IMM GRANULOCYTES # BLD: 0.1 10E9/L (ref 0–0.4)
IMM GRANULOCYTES NFR BLD: 2.5 %
LYMPHOCYTES # BLD AUTO: 0.6 10E9/L (ref 0.8–5.3)
LYMPHOCYTES NFR BLD AUTO: 13.3 %
MAGNESIUM SERPL-MCNC: 1.9 MG/DL (ref 1.6–2.3)
MCH RBC QN AUTO: 31.1 PG (ref 26.5–33)
MCHC RBC AUTO-ENTMCNC: 33.4 G/DL (ref 31.5–36.5)
MCV RBC AUTO: 93 FL (ref 78–100)
MONOCYTES # BLD AUTO: 0.5 10E9/L (ref 0–1.3)
MONOCYTES NFR BLD AUTO: 9.4 %
NEUTROPHILS # BLD AUTO: 3.5 10E9/L (ref 1.6–8.3)
NEUTROPHILS NFR BLD AUTO: 73 %
NRBC # BLD AUTO: 0 10*3/UL
NRBC BLD AUTO-RTO: 0 /100
PHOSPHATE SERPL-MCNC: 2.6 MG/DL (ref 2.5–4.5)
PLATELET # BLD AUTO: 104 10E9/L (ref 150–450)
POTASSIUM SERPL-SCNC: 3.7 MMOL/L (ref 3.4–5.3)
PROT SERPL-MCNC: 6.1 G/DL (ref 6.8–8.8)
RBC # BLD AUTO: 4.28 10E12/L (ref 4.4–5.9)
SODIUM SERPL-SCNC: 133 MMOL/L (ref 133–144)
WBC # BLD AUTO: 4.8 10E9/L (ref 4–11)

## 2021-03-30 PROCEDURE — 74177 CT ABD & PELVIS W/CONTRAST: CPT | Performed by: RADIOLOGY

## 2021-03-30 PROCEDURE — 250N000011 HC RX IP 250 OP 636

## 2021-03-30 PROCEDURE — 86359 T CELLS TOTAL COUNT: CPT | Performed by: STUDENT IN AN ORGANIZED HEALTH CARE EDUCATION/TRAINING PROGRAM

## 2021-03-30 PROCEDURE — 86357 NK CELLS TOTAL COUNT: CPT | Performed by: STUDENT IN AN ORGANIZED HEALTH CARE EDUCATION/TRAINING PROGRAM

## 2021-03-30 PROCEDURE — 85025 COMPLETE CBC W/AUTO DIFF WBC: CPT | Performed by: STUDENT IN AN ORGANIZED HEALTH CARE EDUCATION/TRAINING PROGRAM

## 2021-03-30 PROCEDURE — 82784 ASSAY IGA/IGD/IGG/IGM EACH: CPT | Performed by: STUDENT IN AN ORGANIZED HEALTH CARE EDUCATION/TRAINING PROGRAM

## 2021-03-30 PROCEDURE — 84100 ASSAY OF PHOSPHORUS: CPT | Performed by: STUDENT IN AN ORGANIZED HEALTH CARE EDUCATION/TRAINING PROGRAM

## 2021-03-30 PROCEDURE — 36415 COLL VENOUS BLD VENIPUNCTURE: CPT | Performed by: PATHOLOGY

## 2021-03-30 PROCEDURE — 82565 ASSAY OF CREATININE: CPT | Performed by: PATHOLOGY

## 2021-03-30 PROCEDURE — 80053 COMPREHEN METABOLIC PANEL: CPT | Performed by: STUDENT IN AN ORGANIZED HEALTH CARE EDUCATION/TRAINING PROGRAM

## 2021-03-30 PROCEDURE — 83735 ASSAY OF MAGNESIUM: CPT | Performed by: STUDENT IN AN ORGANIZED HEALTH CARE EDUCATION/TRAINING PROGRAM

## 2021-03-30 PROCEDURE — 71260 CT THORAX DX C+: CPT | Performed by: RADIOLOGY

## 2021-03-30 PROCEDURE — 86360 T CELL ABSOLUTE COUNT/RATIO: CPT | Performed by: STUDENT IN AN ORGANIZED HEALTH CARE EDUCATION/TRAINING PROGRAM

## 2021-03-30 PROCEDURE — 86355 B CELLS TOTAL COUNT: CPT | Performed by: STUDENT IN AN ORGANIZED HEALTH CARE EDUCATION/TRAINING PROGRAM

## 2021-03-30 PROCEDURE — 36591 DRAW BLOOD OFF VENOUS DEVICE: CPT

## 2021-03-30 RX ORDER — IOPAMIDOL 755 MG/ML
126 INJECTION, SOLUTION INTRAVASCULAR ONCE
Status: COMPLETED | OUTPATIENT
Start: 2021-03-30 | End: 2021-03-30

## 2021-03-30 RX ORDER — HEPARIN SODIUM (PORCINE) LOCK FLUSH IV SOLN 100 UNIT/ML 100 UNIT/ML
5 SOLUTION INTRAVENOUS DAILY PRN
Status: DISCONTINUED | OUTPATIENT
Start: 2021-03-30 | End: 2021-04-07 | Stop reason: HOSPADM

## 2021-03-30 RX ORDER — HEPARIN SODIUM (PORCINE) LOCK FLUSH IV SOLN 100 UNIT/ML 100 UNIT/ML
5 SOLUTION INTRAVENOUS ONCE
Status: DISCONTINUED | OUTPATIENT
Start: 2021-03-30 | End: 2021-03-30 | Stop reason: CLARIF

## 2021-03-30 RX ADMIN — IOPAMIDOL 126 ML: 755 INJECTION, SOLUTION INTRAVASCULAR at 09:45

## 2021-03-30 RX ADMIN — Medication 5 ML: at 10:02

## 2021-03-30 ASSESSMENT — PAIN SCALES - GENERAL: PAINLEVEL: NO PAIN (0)

## 2021-03-30 NOTE — NURSING NOTE
Chief Complaint   Patient presents with     Port Draw     Labs drawn via port by RN in lab. VS taken.      Labs drawn via Port. Line flushed and Heparin locked. Vital signs taken.    \Aliya Zapata RN

## 2021-04-01 ENCOUNTER — VIRTUAL VISIT (OUTPATIENT)
Dept: TRANSPLANT | Facility: CLINIC | Age: 71
End: 2021-04-01
Payer: COMMERCIAL

## 2021-04-01 ENCOUNTER — TELEPHONE (OUTPATIENT)
Dept: TRANSPLANT | Facility: CLINIC | Age: 71
End: 2021-04-01

## 2021-04-01 DIAGNOSIS — C83.398 DIFFUSE LARGE B-CELL LYMPHOMA OF SOLID ORGAN EXCLUDING SPLEEN: Primary | ICD-10-CM

## 2021-04-01 PROCEDURE — 99214 OFFICE O/P EST MOD 30 MIN: CPT | Mod: 95

## 2021-04-01 RX ORDER — ROSUVASTATIN CALCIUM 10 MG/1
20 TABLET, COATED ORAL EVERY MORNING
COMMUNITY
Start: 2021-03-30

## 2021-04-01 NOTE — LETTER
4/1/2021         RE: Pastor Garibay  [address]  [facility] [address]        Dear Colleague,    Thank you for referring your patient, Pastor Garibay, to the [facility] BLOOD AND MARROW TRANSPLANT PROGRAM [place]. Please see a copy of my visit note below.    Patient ID:  Pastor Garibay is a 71 year old man Day+184 of Yescarta CAR-T (with CNS prophy 2019-35)     HPI:   Pastor is feeling great, had no new complains, lungs is feeling ok, no SOB or cough.   No fevers, weight is stable. No fevers. Already completed COVID vaccines.   Still on Acyclovir and Bactrim.     ROS: Appetite stable. No fevers, chills, infectious symptoms, headaches, dizziness, memory or balance issues.     Review of Systems                                                                                                                           8 point review with pertinent positive and negatives      PHYSICAL EXAM                                                                                                                                     KPS: 100    General: NAD;alert and engaged.    Lungs: Breathing comfortably without accessory muscle use.   Neuro: A&O, non-focal      Labs:   Lab Results   Component Value Date    WBC 4.8 03/30/2021    ANEU 3.5 03/30/2021    HGB 13.3 03/30/2021    HCT 39.8 (L) 03/30/2021     (L) 03/30/2021     03/30/2021    POTASSIUM 3.7 03/30/2021    CHLORIDE 101 03/30/2021    CO2 27 03/30/2021     (H) 03/30/2021    BUN 14 03/30/2021    CR 0.80 03/30/2021    MAG 1.9 03/30/2021    INR 1.16 (H) 10/28/2020    AST 23 03/30/2021    ALT 32 03/30/2021   IgG 454mg/dl in March - stable     CT CAP 3/30/2021:  IMPRESSION: Findings remain most consistent with complete response to  therapy:  1. No significant change in the chronic right perihilar  consolidation/scarring/volume loss with pleural thickening and air  bronchograms.     2. No new or enlarging lymphadenopathy in the chest,  abdomen, or  pelvis. Stable prominent, nonenlarged mediastinal, retroperitoneal,  central mesenteric lymph nodes and stable central mesenteric haziness.        3. Stable mild splenomegaly.     4. Mild nonocclusive thrombus in the left common iliac vein, less  conspicuous compared to prior exam.     6. Stable minimal adrenal nodularity and adjacent fat stranding/soft  tissue thickening without clear evidence of residual disease at this  location.      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      1.  BMT:  DLBCL  Axi-cell product with CNS prevention protocol   CNS prophy: simvastatin 40mg tied to IDS drug. Completed at day +30. All previously LPs under fluoroscopy.    PET Scan at day +28 with very good [place] vs. CR:  CT Day +60 and now day 100 disease status : Complete response with residual lung scar tissue. No new disease or sign of growth.      -+180 for CT scan - ongoing remission, pt is fully recovered now.      2.  HEME: excellent count recovery. No pre-meds.  - New PE dg in 10/2020  - s/p lovenox BID for PE dg on day 28.  -Now on DOAC ; plan for 6 months treatment course. Mild nonocclusive thrombus in the left common iliac vein on recent CT, better. Plan is to stop Xeralto  Now.                              3.  ID: Afebrile and no infectious concerns.     - Prophy  acy for 12 months, then Shingrix vaccine ,was on IV pentamidine (10/28 and 11/30).  cont Bactrim   for 12 months total.  Rx sent, take 1 po BID Mo, Tue. ALC still fluctuates and tend to be low.     4.  GI:   - Protonix for GI prophy -ok to stop now.   - Colace prn constipation     5.  FEN/Renal: Cr stable.        6. Endo: Type 2 DM.  Continue metformin 500mg BID, Med sliding scale and carb counting novolog 1 unit per 3.5 grams carbs; Lantus 10 units every evening.    Complication of DM with 2 left toes amputated 7/2020  Adrenal  Insufficiency:  hydrocortisone 20mg/d  Ok to resume statins    7.  Musk:  Hx of osteomyelitis of 2nd & 3rd L toes w/ amputation of distal phalanx of both digits.   -   MRI 11/5/ neg for osteomyelitis. Pain nearly resolved 11/6.       RTC:   per protocol  Next restaging at 1 year with CT followed by a visit with me.  Pt is now f/u up with    He can resume lipid lowering medications now.   Ok to stop Xeralto. COnt Bactrim and Acyclovir up to 1 year.     Prescription drug management   30  minutes spent on the date of the encounter doing interpretation of tests    956}    Rosey Bunch MD          Again, thank you for allowing me to participate in the care of your patient.      Sincerely,    Rosey Bunch MD

## 2021-04-01 NOTE — PROGRESS NOTES
"Pastor is a 71 year old who is being evaluated via a billable video visit.      How would you like to obtain your AVS? Mail a copy  If the video visit is dropped, the invitation should be resent by: Send to e-mail at: [email or web address]  Will anyone else be joining your video visit? No    Vitals - Patient Reported  Weight (Patient Reported): 95.3 kg (210 lb)  Height (Patient Reported): 172.7 cm (5' 8\")  BMI (Based on Pt Reported Ht/Wt): 31.93  Pain Score: No Pain (0)    Video-Visit Details    Type of service:  Video Visit    Video Total Time:15 min    Originating Location (pt. 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   No fevers, weight is stable. No fevers. Already completed COVID vaccines.   Still on Acyclovir and Bactrim.     ROS: Appetite stable. No fevers, chills, infectious symptoms, headaches, dizziness, memory or balance issues.     Review of Systems                                                                                                                           8 point review with pertinent positive and negatives      PHYSICAL EXAM                                                                                                                                     KPS: 100    General: NAD;alert and engaged.    Lungs: Breathing comfortably without accessory muscle use.   Neuro: A&O, non-focal      Labs:   Lab Results   Component Value Date    WBC 4.8 03/30/2021    ANEU 3.5 03/30/2021    HGB 13.3 03/30/2021    HCT 39.8 (L) 03/30/2021     (L) 03/30/2021     03/30/2021    POTASSIUM 3.7 03/30/2021    CHLORIDE 101 03/30/2021    CO2 27 03/30/2021 "     (H) 03/30/2021    BUN 14 03/30/2021    CR 0.80 03/30/2021    MAG 1.9 03/30/2021    INR 1.16 (H) 10/28/2020    AST 23 03/30/2021    ALT 32 03/30/2021   IgG 454mg/dl in March - stable     CT CAP 3/30/2021:  IMPRESSION: Findings remain most consistent with complete response to  therapy:  1. No significant change in the chronic right perihilar  consolidation/scarring/volume loss with pleural thickening and air  bronchograms.     2. No new or enlarging lymphadenopathy in the chest, abdomen, or  pelvis. Stable prominent, nonenlarged mediastinal, retroperitoneal,  central mesenteric lymph nodes and stable central mesenteric haziness.        3. Stable mild splenomegaly.     4. Mild nonocclusive thrombus in the left common iliac vein, less  conspicuous compared to prior exam.     6. Stable minimal adrenal nodularity and adjacent fat stranding/soft  tissue thickening without clear evidence of residual disease at this  location.      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      1.  BMT:  DLBCL  Axi-cell product with CNS prevention protocol   CNS prophy: simvastatin 40mg tied to IDS drug. Completed at day +30. All previously LPs under fluoroscopy.    PET Scan at day +28 with very good GA vs. CR:  CT Day +60 and now day 100 disease status : Complete response with residual lung scar tissue. No new disease or sign of growth.      -+180 for CT scan - ongoing remission, pt is fully recovered now.      2.  HEME: excellent count recovery. No pre-meds.  - New PE dg in 10/2020  - s/p lovenox BID for PE dg on day 28.  -Now on DOAC ; plan for 6 months treatment course. Mild nonocclusive thrombus in the left common iliac vein on recent CT, better. Plan is to stop Xeralto  Now.                              3.  ID: Afebrile and no infectious concerns.     - Prophy  acy for 12 months, then Shingrix vaccine ,was on  IV pentamidine (10/28 and 11/30).  cont Bactrim   for 12 months total.  Rx sent, take 1 po BID Mo, Tue. ALC still fluctuates and tend to be low.     4.  GI:   - Protonix for GI prophy -ok to stop now.   - Colace prn constipation     5.  FEN/Renal: Cr stable.        6. Endo: Type 2 DM.  Continue metformin 500mg BID, Med sliding scale and carb counting novolog 1 unit per 3.5 grams carbs; Lantus 10 units every evening.    Complication of DM with 2 left toes amputated 7/2020  Adrenal Insufficiency:  hydrocortisone 20mg/d  Ok to resume statins    7.  Musk:  Hx of osteomyelitis of 2nd & 3rd L toes w/ amputation of distal phalanx of both digits.   -   MRI 11/5/ neg for osteomyelitis. Pain nearly resolved 11/6.       RTC:   per protocol  Next restaging at 1 year with CT followed by a visit with me.  Pt is now f/u up with    He can resume lipid lowering medications now.   Ok to stop Xeralto. COnt Bactrim and Acyclovir up to 1 year.     Prescription drug management   30  minutes spent on the date of the encounter doing interpretation of tests    956}    Rosey Bunch MD

## 2021-04-01 NOTE — TELEPHONE ENCOUNTER
Contacted Pastor to instruct him to stop Xarelto (swapnil Bunch). He verbalized understanding of instructions and has no further questions.

## 2021-05-15 ENCOUNTER — HEALTH MAINTENANCE LETTER (OUTPATIENT)
Age: 71
End: 2021-05-15

## 2021-06-17 ENCOUNTER — TELEPHONE (OUTPATIENT)
Dept: TRANSPLANT | Facility: CLINIC | Age: 71
End: 2021-06-17

## 2021-06-17 NOTE — TELEPHONE ENCOUNTER
"Reached out to patient to obtain verbal confirmation that his intention was to initial the HIPPA form, as he did the consent, approving the following:  \"This research study includes additional optional research activity to be contacted in the future to see whether you are interested in participating in other research studies. If you agree to permit your information to be used for this optional research activity, you must initial ____.\"    I read this verbiage from the HIPPA form to him over the phone, and he agreed that he can be contacted for future research activity participation.   "

## 2021-06-19 ENCOUNTER — APPOINTMENT (OUTPATIENT)
Dept: CT IMAGING | Facility: CLINIC | Age: 71
End: 2021-06-19
Attending: EMERGENCY MEDICINE
Payer: COMMERCIAL

## 2021-06-19 ENCOUNTER — HOSPITAL ENCOUNTER (EMERGENCY)
Facility: CLINIC | Age: 71
Discharge: HOME | End: 2021-06-19
Attending: EMERGENCY MEDICINE | Admitting: EMERGENCY MEDICINE
Payer: COMMERCIAL

## 2021-06-19 VITALS
HEART RATE: 90 BPM | RESPIRATION RATE: 16 BRPM | TEMPERATURE: 95.7 F | BODY MASS INDEX: 33.34 KG/M2 | WEIGHT: 220 LBS | SYSTOLIC BLOOD PRESSURE: 114 MMHG | DIASTOLIC BLOOD PRESSURE: 83 MMHG | HEIGHT: 68 IN | OXYGEN SATURATION: 99 %

## 2021-06-19 DIAGNOSIS — R55 SYNCOPE, UNSPECIFIED SYNCOPE TYPE: ICD-10-CM

## 2021-06-19 LAB
ANION GAP SERPL CALCULATED.3IONS-SCNC: 5 MMOL/L (ref 3–14)
ANISOCYTOSIS BLD QL SMEAR: SLIGHT
BASOPHILS # BLD AUTO: 0.1 10E9/L (ref 0–0.2)
BASOPHILS NFR BLD AUTO: 1 %
BUN SERPL-MCNC: 11 MG/DL (ref 7–30)
CALCIUM SERPL-MCNC: 9.2 MG/DL (ref 8.5–10.1)
CHLORIDE SERPL-SCNC: 106 MMOL/L (ref 94–109)
CO2 SERPL-SCNC: 27 MMOL/L (ref 20–32)
CREAT SERPL-MCNC: 0.65 MG/DL (ref 0.66–1.25)
DIFFERENTIAL METHOD BLD: ABNORMAL
EOSINOPHIL # BLD AUTO: 0 10E9/L (ref 0–0.7)
EOSINOPHIL NFR BLD AUTO: 0 %
ERYTHROCYTE [DISTWIDTH] IN BLOOD BY AUTOMATED COUNT: 15.7 % (ref 10–15)
GFR SERPL CREATININE-BSD FRML MDRD: >90 ML/MIN/{1.73_M2}
GLUCOSE BLDC GLUCOMTR-MCNC: 152 MG/DL (ref 70–99)
GLUCOSE SERPL-MCNC: 177 MG/DL (ref 70–99)
HCT VFR BLD AUTO: 44.6 % (ref 40–53)
HGB BLD-MCNC: 14.5 G/DL (ref 13.3–17.7)
INTERPRETATION ECG - MUSE: NORMAL
LYMPHOCYTES # BLD AUTO: 0.3 10E9/L (ref 0.8–5.3)
LYMPHOCYTES NFR BLD AUTO: 4 %
MCH RBC QN AUTO: 31.1 PG (ref 26.5–33)
MCHC RBC AUTO-ENTMCNC: 32.5 G/DL (ref 31.5–36.5)
MCV RBC AUTO: 96 FL (ref 78–100)
MONOCYTES # BLD AUTO: 0.2 10E9/L (ref 0–1.3)
MONOCYTES NFR BLD AUTO: 3 %
NEUTROPHILS # BLD AUTO: 6.2 10E9/L (ref 1.6–8.3)
NEUTROPHILS NFR BLD AUTO: 92 %
PLATELET # BLD AUTO: 133 10E9/L (ref 150–450)
PLATELET # BLD EST: ABNORMAL 10*3/UL
POTASSIUM SERPL-SCNC: 4 MMOL/L (ref 3.4–5.3)
RBC # BLD AUTO: 4.66 10E12/L (ref 4.4–5.9)
SODIUM SERPL-SCNC: 138 MMOL/L (ref 133–144)
TROPONIN I SERPL-MCNC: <0.015 UG/L (ref 0–0.04)
WBC # BLD AUTO: 6.7 10E9/L (ref 4–11)

## 2021-06-19 PROCEDURE — 93005 ELECTROCARDIOGRAM TRACING: CPT

## 2021-06-19 PROCEDURE — 84484 ASSAY OF TROPONIN QUANT: CPT | Performed by: EMERGENCY MEDICINE

## 2021-06-19 PROCEDURE — 85025 COMPLETE CBC W/AUTO DIFF WBC: CPT | Performed by: EMERGENCY MEDICINE

## 2021-06-19 PROCEDURE — 999N001017 HC STATISTIC GLUCOSE BY METER IP

## 2021-06-19 PROCEDURE — 96374 THER/PROPH/DIAG INJ IV PUSH: CPT | Mod: 59

## 2021-06-19 PROCEDURE — 71275 CT ANGIOGRAPHY CHEST: CPT

## 2021-06-19 PROCEDURE — 80048 BASIC METABOLIC PNL TOTAL CA: CPT | Performed by: EMERGENCY MEDICINE

## 2021-06-19 PROCEDURE — 250N000011 HC RX IP 250 OP 636: Performed by: EMERGENCY MEDICINE

## 2021-06-19 PROCEDURE — 250N000009 HC RX 250: Performed by: EMERGENCY MEDICINE

## 2021-06-19 PROCEDURE — 99285 EMERGENCY DEPT VISIT HI MDM: CPT | Mod: 25

## 2021-06-19 RX ORDER — IOPAMIDOL 755 MG/ML
77 INJECTION, SOLUTION INTRAVASCULAR ONCE
Status: COMPLETED | OUTPATIENT
Start: 2021-06-19 | End: 2021-06-19

## 2021-06-19 RX ORDER — SULFAMETHOXAZOLE/TRIMETHOPRIM 800-160 MG
1 TABLET ORAL
COMMUNITY
End: 2021-09-29

## 2021-06-19 RX ORDER — HEPARIN SODIUM (PORCINE) LOCK FLUSH IV SOLN 100 UNIT/ML 100 UNIT/ML
100 SOLUTION INTRAVENOUS ONCE
Status: COMPLETED | OUTPATIENT
Start: 2021-06-19 | End: 2021-06-19

## 2021-06-19 RX ADMIN — IOPAMIDOL 77 ML: 755 INJECTION, SOLUTION INTRAVENOUS at 20:42

## 2021-06-19 RX ADMIN — SODIUM CHLORIDE 104 ML: 9 INJECTION, SOLUTION INTRAVENOUS at 20:42

## 2021-06-19 RX ADMIN — HEPARIN SODIUM (PORCINE) LOCK FLUSH IV SOLN 100 UNIT/ML 100 UNITS: 100 SOLUTION at 22:16

## 2021-06-19 ASSESSMENT — ENCOUNTER SYMPTOMS
BLOOD IN STOOL: 0
LIGHT-HEADEDNESS: 0
SHORTNESS OF BREATH: 0
ROS GI COMMENTS: INCONTINENT OF BOWELS

## 2021-06-19 ASSESSMENT — MIFFLIN-ST. JEOR: SCORE: 1737.41

## 2021-06-19 NOTE — ED TRIAGE NOTES
"Patient reports he fainted this afternoon. States \"was out for 3 hours.\"    BS was 102 per patient reports.  "

## 2021-06-19 NOTE — ED PROVIDER NOTES
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 Earlier today, the patient was at the [facility] and felt normal besides a little hot from the temperature outside. Once he got home, at 1400, he experienced an episode of syncope and woke up on the floor at 1700. He did not have any symptoms prior to the episode and denies any pain following the episode. He does not remember if he was sitting before the episode. He did become incontinent of his bowel during this episode.  Immediately after this, he tested his blood sugar which was 102, and his blood pressure was 127/85. Earlier in the day his blood sugar was 156. He notes that he has not had any recent illnesses or change in medication. He denies any chest pain, shortness of breath, blood stool or urinary changes. Of note, he finished 3 round of chemotherapy for lymphoma last year, and since then his check up imaging has been normal. He did have a blood clot in his abdominal cavity, and a PE, but he has since been removed from blood thinners. He does not have a cardiac history, a history of seizures or a history of syncope.     Review of Systems   Respiratory: Negative for shortness of breath.    Cardiovascular: Negative for chest pain.   Gastrointestinal: Negative for blood in stool.        Incontinent of bowels   Genitourinary: Negative.    Neurological: Positive for syncope. Negative for light-headedness.   All other systems reviewed and are negative.    Allergies:  The patient has no known allergies.     Medications:  Bactrim  Zovirax  Dulcolax  Colace  Cortef  Metformin  Compazine  Crestor    Past Medical History:    Anemia  CPAP dependence  Diabetes  Skin cancer, basal cell  Hyperlipidemia  Hypertension  Keratoderma  Large cell lymphoma  Liver disease  Non-Hodgkin lymphoma  Pedal edema  Pleural  "effusion  Seborrheic keratosis, inflamed  Sleep apnea  Thrombocytopenia  Thrombosis  Neutropenic fever  Long term current use of anticoagulant therapy  Skin ulcer of toe of left foot with fat layer exposed  Osteomyelitis of third, and second, toe of left foot    Past Surgical History:    Amputate toes, left  Biopsy lymph node cervical  Bronchoscopy flexible  Thoracotomy  Basal cell CA forehead   Remove port vascular access  Insert port vascular access x2  Phacoemulsification clear cornea with standard intraocular lens implant, right  Vitrectomy  Excise node mediastinal  Tonsillectomy  Endovascular placement vascular device  Colonoscopy x2    Social History:  The patient was accompanied to the ED by a .    Physical Exam     Patient Vitals for the past 24 hrs:   BP Temp Temp src Pulse Resp SpO2 Height Weight   06/19/21 2200 114/83 -- -- 90 -- 99 % -- --   06/19/21 2100 111/70 -- -- 85 -- 99 % -- --   06/19/21 1820 120/71 95.7  F (35.4  C) Temporal 91 16 100 % 1.727 m (5' 8\") 99.8 kg (220 lb)       Physical Exam  SKIN:  Warm, dry.  HEMATOLOGIC/IMMUNOLOGIC/LYMPHATIC:  No generalized pallor.  No lower extremity edema.  EYES:  Conjunctivae normal.  CARDIOVASCULAR:  Regular rate and rhythm.  No murmur.  No carotid bruit.  RESPIRATORY:  No respiratory distress, breath sounds equal and normal.  GASTROINTESTINAL:  Soft, nontender abdomen.  MUSCULOSKELETAL: Overweight body habitus.  NEUROLOGIC:  Alert, conversant.  Oriented to self place and time.  No aphasia or dysarthria.  No gross motor deficit.  No gross tactile sensory deficit.  PSYCHIATRIC:  Normal mood.    Emergency Department Course   ECG:  ECG taken at 1845, ECG read at 1847  Normal sinus rhythm  Prolonged QT  Abnormal ECG  No significant change compared to EKG dated 08/20/20  Rate 90 bpm. DE interval 180 ms. QRS duration 90 ms. QT/QTc 394/481 ms. P-R-T axes 45 59 54.    Imaging:  CT Chest Pulmonary Embolism w Contrast  1.  No pulmonary embolism " demonstrated.  2.  No change from comparison.  Reading per radiology    Laboratory:  CBC: WBC 6.7, HGB 14.5,  (L)  BMP: Glucose 177 (H) o/w WNL (Creatinine 0.65 (L))    Troponin (Collected 1932): <0.015    Glucose by meter (Collected 1902): 152 (H)     Emergency Department Course:  Reviewed:  I reviewed nursing notes, vitals, past medical history and care everywhere    Assessments:  1833 I obtained history and examined the patient as noted above.     2136 I rechecked and updated the patient regarding the imaging results, laboratory results and the plan for care.    2158 I rechecked and updated the patient.    Disposition:  The patient was discharged to home.     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 He has improved a fair amount. His blood sugar before the episode, and afterwards, was normal. Workup performed as above was reassuring and given his history of thrombus CT scan was performed to assess for PE. Initially on evaluation the patient felt a bit light-headed and then that faded away, and all of his symptoms completely resolved. He ate a meal and ambulated well and felt completely normal in doing so. He continued to deny chest pain or dyspnea or a sense of palpitations or dizziness at the point of discharge. So I think the patient can be discharged for close primary follow up and he already has an appointment in two days with his PCP. In the meantime I advised return with any concerns or following another episode.     Diagnosis:    ICD-10-CM    1. Syncope, unspecified syncope type  R55      Scribe Disclosure:  I, Ramon Shetty, am serving as a scribe at 6:33 PM on 6/19/2021 to document services personally performed by Curtis Wilkins MD based on my observations and the provider's statements to me.      Curtis Wilkins MD  06/20/21 0256

## 2021-06-20 NOTE — ED NOTES
Gave pt courtesy meal and water. Pt requesting his soda from wife. Ok for patient to have. MD notified CT results are back.

## 2021-06-21 ENCOUNTER — MEDICAL CORRESPONDENCE (OUTPATIENT)
Dept: HEALTH INFORMATION MANAGEMENT | Facility: CLINIC | Age: 71
End: 2021-06-21

## 2021-06-21 DIAGNOSIS — R55 SYNCOPE, UNSPECIFIED SYNCOPE TYPE: Primary | ICD-10-CM

## 2021-07-01 DIAGNOSIS — C83.30 DIFFUSE LARGE B-CELL LYMPHOMA, UNSPECIFIED BODY REGION (H): ICD-10-CM

## 2021-07-01 RX ORDER — ACYCLOVIR 800 MG/1
TABLET ORAL
Qty: 90 TABLET | Refills: 2 | Status: SHIPPED | OUTPATIENT
Start: 2021-07-01 | End: 2022-05-04

## 2021-07-13 ENCOUNTER — HOSPITAL ENCOUNTER (OUTPATIENT)
Dept: CARDIOLOGY | Facility: CLINIC | Age: 71
Discharge: HOME | End: 2021-07-13
Attending: FAMILY MEDICINE | Admitting: FAMILY MEDICINE
Payer: COMMERCIAL

## 2021-07-13 DIAGNOSIS — R55 SYNCOPE, UNSPECIFIED SYNCOPE TYPE: ICD-10-CM

## 2021-07-13 PROCEDURE — 93225 XTRNL ECG REC<48 HRS REC: CPT

## 2021-07-13 PROCEDURE — 93227 XTRNL ECG REC<48 HR R&I: CPT | Performed by: INTERNAL MEDICINE

## 2021-07-15 DIAGNOSIS — C83.3A DIFFUSE LARGE CELL LYMPHOMA IN REMISSION: ICD-10-CM

## 2021-07-15 DIAGNOSIS — C83.398 DIFFUSE LARGE B-CELL LYMPHOMA OF SOLID ORGAN EXCLUDING SPLEEN: ICD-10-CM

## 2021-07-15 DIAGNOSIS — C85.80 LARGE CELL LYMPHOMA (H): Primary | ICD-10-CM

## 2021-07-30 ENCOUNTER — TRANSFERRED RECORDS (OUTPATIENT)
Dept: HEALTH INFORMATION MANAGEMENT | Facility: CLINIC | Age: 71
End: 2021-07-30

## 2021-09-04 ENCOUNTER — HEALTH MAINTENANCE LETTER (OUTPATIENT)
Age: 71
End: 2021-09-04

## 2021-09-29 ENCOUNTER — APPOINTMENT (OUTPATIENT)
Dept: LAB | Facility: CLINIC | Age: 71
End: 2021-09-29
Payer: COMMERCIAL

## 2021-09-29 ENCOUNTER — OFFICE VISIT (OUTPATIENT)
Dept: TRANSPLANT | Facility: CLINIC | Age: 71
End: 2021-09-29
Attending: INTERNAL MEDICINE
Payer: COMMERCIAL

## 2021-09-29 ENCOUNTER — ANCILLARY PROCEDURE (OUTPATIENT)
Dept: CT IMAGING | Facility: CLINIC | Age: 71
End: 2021-09-29
Payer: COMMERCIAL

## 2021-09-29 VITALS
HEIGHT: 68 IN | TEMPERATURE: 98 F | WEIGHT: 230 LBS | RESPIRATION RATE: 18 BRPM | HEART RATE: 91 BPM | BODY MASS INDEX: 34.86 KG/M2 | OXYGEN SATURATION: 96 % | DIASTOLIC BLOOD PRESSURE: 69 MMHG | SYSTOLIC BLOOD PRESSURE: 112 MMHG

## 2021-09-29 DIAGNOSIS — C83.398 DIFFUSE LARGE B-CELL LYMPHOMA OF SOLID ORGAN EXCLUDING SPLEEN: ICD-10-CM

## 2021-09-29 DIAGNOSIS — C85.80 LARGE CELL LYMPHOMA (H): ICD-10-CM

## 2021-09-29 DIAGNOSIS — C83.3A DIFFUSE LARGE CELL LYMPHOMA IN REMISSION: ICD-10-CM

## 2021-09-29 DIAGNOSIS — C85.80 LARGE CELL LYMPHOMA (H): Primary | ICD-10-CM

## 2021-09-29 LAB
ALBUMIN SERPL-MCNC: 3.6 G/DL (ref 3.4–5)
ALP SERPL-CCNC: 94 U/L (ref 40–150)
ALT SERPL W P-5'-P-CCNC: 36 U/L (ref 0–70)
ANION GAP SERPL CALCULATED.3IONS-SCNC: 8 MMOL/L (ref 3–14)
AST SERPL W P-5'-P-CCNC: 28 U/L (ref 0–45)
BASOPHILS # BLD AUTO: 0 10E3/UL (ref 0–0.2)
BASOPHILS NFR BLD AUTO: 1 %
BILIRUB SERPL-MCNC: 0.8 MG/DL (ref 0.2–1.3)
BUN SERPL-MCNC: 10 MG/DL (ref 7–30)
CALCIUM SERPL-MCNC: 8.8 MG/DL (ref 8.5–10.1)
CD19 CELLS # BLD: 47 CELLS/UL (ref 107–698)
CD19 CELLS NFR BLD: 4 % (ref 6–27)
CD3 CELLS # BLD: 1077 CELLS/UL (ref 603–2990)
CD3 CELLS NFR BLD: 82 % (ref 49–84)
CD3+CD4+ CELLS # BLD: 245 CELLS/UL (ref 441–2156)
CD3+CD4+ CELLS NFR BLD: 19 % (ref 28–63)
CD3+CD4+ CELLS/CD3+CD8+ CLL BLD: 0.3 % (ref 1.4–2.6)
CD3+CD8+ CELLS # BLD: 828 CELLS/UL (ref 125–1312)
CD3+CD8+ CELLS NFR BLD: 63 % (ref 10–40)
CD3-CD16+CD56+ CELLS # BLD: 187 CELLS/UL (ref 95–640)
CD3-CD16+CD56+ CELLS NFR BLD: 14 % (ref 4–25)
CHLORIDE BLD-SCNC: 104 MMOL/L (ref 94–109)
CO2 SERPL-SCNC: 26 MMOL/L (ref 20–32)
CREAT BLD-MCNC: 0.9 MG/DL (ref 0.7–1.3)
CREAT SERPL-MCNC: 0.92 MG/DL (ref 0.66–1.25)
EOSINOPHIL # BLD AUTO: 0.1 10E3/UL (ref 0–0.7)
EOSINOPHIL NFR BLD AUTO: 2 %
ERYTHROCYTE [DISTWIDTH] IN BLOOD BY AUTOMATED COUNT: 15.3 % (ref 10–15)
GFR SERPL CREATININE-BSD FRML MDRD: 85 ML/MIN/1.73M2
GFR SERPL CREATININE-BSD FRML MDRD: >60 ML/MIN/1.73M2
GLUCOSE BLD-MCNC: 149 MG/DL (ref 70–99)
HCT VFR BLD AUTO: 41.8 % (ref 40–53)
HGB BLD-MCNC: 14.1 G/DL (ref 13.3–17.7)
IGG SERPL-MCNC: 439 MG/DL (ref 610–1616)
IMM GRANULOCYTES # BLD: 0 10E3/UL
IMM GRANULOCYTES NFR BLD: 1 %
LYMPHOCYTES # BLD AUTO: 1.2 10E3/UL (ref 0.8–5.3)
LYMPHOCYTES NFR BLD AUTO: 20 %
MAGNESIUM SERPL-MCNC: 1.9 MG/DL (ref 1.6–2.3)
MCH RBC QN AUTO: 31.8 PG (ref 26.5–33)
MCHC RBC AUTO-ENTMCNC: 33.7 G/DL (ref 31.5–36.5)
MCV RBC AUTO: 94 FL (ref 78–100)
MONOCYTES # BLD AUTO: 0.6 10E3/UL (ref 0–1.3)
MONOCYTES NFR BLD AUTO: 10 %
NEUTROPHILS # BLD AUTO: 3.9 10E3/UL (ref 1.6–8.3)
NEUTROPHILS NFR BLD AUTO: 66 %
NRBC # BLD AUTO: 0 10E3/UL
NRBC BLD AUTO-RTO: 0 /100
PHOSPHATE SERPL-MCNC: 3.9 MG/DL (ref 2.5–4.5)
PLATELET # BLD AUTO: 114 10E3/UL (ref 150–450)
POTASSIUM BLD-SCNC: 4 MMOL/L (ref 3.4–5.3)
PROT SERPL-MCNC: 6.5 G/DL (ref 6.8–8.8)
RBC # BLD AUTO: 4.44 10E6/UL (ref 4.4–5.9)
SODIUM SERPL-SCNC: 138 MMOL/L (ref 133–144)
T CELL EXTENDED COMMENT: ABNORMAL
WBC # BLD AUTO: 5.9 10E3/UL (ref 4–11)

## 2021-09-29 PROCEDURE — 84100 ASSAY OF PHOSPHORUS: CPT

## 2021-09-29 PROCEDURE — 74177 CT ABD & PELVIS W/CONTRAST: CPT | Performed by: STUDENT IN AN ORGANIZED HEALTH CARE EDUCATION/TRAINING PROGRAM

## 2021-09-29 PROCEDURE — 70491 CT SOFT TISSUE NECK W/DYE: CPT | Performed by: RADIOLOGY

## 2021-09-29 PROCEDURE — G0463 HOSPITAL OUTPT CLINIC VISIT: HCPCS

## 2021-09-29 PROCEDURE — 86357 NK CELLS TOTAL COUNT: CPT

## 2021-09-29 PROCEDURE — 82784 ASSAY IGA/IGD/IGG/IGM EACH: CPT

## 2021-09-29 PROCEDURE — 80053 COMPREHEN METABOLIC PANEL: CPT

## 2021-09-29 PROCEDURE — 99214 OFFICE O/P EST MOD 30 MIN: CPT

## 2021-09-29 PROCEDURE — 250N000011 HC RX IP 250 OP 636: Performed by: INTERNAL MEDICINE

## 2021-09-29 PROCEDURE — 83735 ASSAY OF MAGNESIUM: CPT

## 2021-09-29 PROCEDURE — 71260 CT THORAX DX C+: CPT | Performed by: STUDENT IN AN ORGANIZED HEALTH CARE EDUCATION/TRAINING PROGRAM

## 2021-09-29 PROCEDURE — 85025 COMPLETE CBC W/AUTO DIFF WBC: CPT

## 2021-09-29 PROCEDURE — 36591 DRAW BLOOD OFF VENOUS DEVICE: CPT

## 2021-09-29 RX ORDER — HEPARIN SODIUM (PORCINE) LOCK FLUSH IV SOLN 100 UNIT/ML 100 UNIT/ML
5 SOLUTION INTRAVENOUS EVERY 8 HOURS
Status: DISCONTINUED | OUTPATIENT
Start: 2021-09-29 | End: 2021-09-29 | Stop reason: HOSPADM

## 2021-09-29 RX ORDER — IOPAMIDOL 755 MG/ML
135 INJECTION, SOLUTION INTRAVASCULAR ONCE
Status: COMPLETED | OUTPATIENT
Start: 2021-09-29 | End: 2021-09-29

## 2021-09-29 RX ORDER — HYDROCORTISONE 10 MG/1
10 TABLET ORAL 2 TIMES DAILY
COMMUNITY
Start: 2021-09-29 | End: 2023-11-16

## 2021-09-29 RX ORDER — HEPARIN SODIUM (PORCINE) LOCK FLUSH IV SOLN 100 UNIT/ML 100 UNIT/ML
5 SOLUTION INTRAVENOUS ONCE
Status: COMPLETED | OUTPATIENT
Start: 2021-09-29 | End: 2021-09-29

## 2021-09-29 RX ADMIN — Medication 5 ML: at 07:47

## 2021-09-29 RX ADMIN — HEPARIN SODIUM (PORCINE) LOCK FLUSH IV SOLN 100 UNIT/ML 5 ML: 100 SOLUTION at 08:18

## 2021-09-29 RX ADMIN — IOPAMIDOL 135 ML: 755 INJECTION, SOLUTION INTRAVASCULAR at 08:08

## 2021-09-29 ASSESSMENT — MIFFLIN-ST. JEOR: SCORE: 1782.64

## 2021-09-29 ASSESSMENT — PAIN SCALES - GENERAL: PAINLEVEL: NO PAIN (0)

## 2021-09-29 NOTE — LETTER
"    9/29/2021         RE: Pastor Garibay  [address]  [facility] [id number]      /69   Pulse 91   Temp 98  F (36.7  C)   Resp 18   Ht 1.727 m (5' 7.99\")   Wt 104.3 kg (230 lb)   SpO2 96%   BMI 34.98 kg/m    Wt Readings from Last 4 Encounters:   09/29/21 104.3 kg (230 lb)   06/19/21 99.8 kg (220 lb)   12/29/20 93.4 kg (206 lb)   11/25/20 91.3 kg (201 lb 3.2 oz)     Pastor returns 1 year after car T-cell therapy for his large cell lymphoma. In the last few months he has been feeling well. He has had no infections bleeding or bruising. He has had several episodes of symptomatic hypoglycemia and now has an insulin pump and hoping to get his blood sugar control more regulated. He has no skin rash, headaches, visual change, episodes of confusion or disorientation (other than hypoglycemia) and generally feels well. He has good energy, no change in his chronically mildly loose stools, nocturia x1 without dysuria and no bone discomfort. The rest of his review of systems is unrevealing.    His exam shows him comfortable in no distress. His weight is up 10 pounds from June. He has no cervical axillary or inguinal lymphadenopathy. He has no oral lesions. His lungs are clear but he has decreased breath sounds at the right base but no rales. His abdomen is big and hard to examine but there is no palpable masses or tenderness. He has trace at most peripheral edema.    Laboratory testing showed good blood counts and chemistries. His CT scans have the neck scan shows no progression of disease and the scarring at his right lung base seems unchanged to my review pending formal radiologic interpretation.  The CAP CT shows no sign of progression either.    He appears therefore in continuing remission with no need for further intervention. I told him that at 1 year post T-cell therapy he can discontinue trimethoprim sulfa and can also stop the acyclovir. He has received his third Covid vaccine last week and knows " he should get a flu shot soon. I told him our recommendations are still in flux but there is no consensus that people need revaccinations after CAR T therapy.     Overall he is doing well and we did ask him to have restaging done in 6 months time. He knows to call if additional questions arise    Eduin Martinez MD    Professor of medicine    Results for EZIO PADILLA (MRN [id number]) as of 9/29/2021 17:45   Ref. Range 9/29/2021 07:41 9/29/2021 08:06 9/29/2021 08:19 9/29/2021 08:20   Sodium Latest Ref Range: 133 - 144 mmol/L 138      Potassium Latest Ref Range: 3.4 - 5.3 mmol/L 4.0      Chloride Latest Ref Range: 94 - 109 mmol/L 104      Carbon Dioxide Latest Ref Range: 20 - 32 mmol/L 26      Urea Nitrogen Latest Ref Range: 7 - 30 mg/dL 10      Creatinine Latest Ref Range: 0.66 - 1.25 mg/dL 0.92      Creatinine POCT Latest Ref Range: 0.7 - 1.3 mg/dL  0.9     GFR Estimate Latest Ref Range: >60 mL/min/1.73m2 85      GFR, ESTIMATED POCT Latest Ref Range: >60 mL/min/1.73m2  >60     Calcium Latest Ref Range: 8.5 - 10.1 mg/dL 8.8      Anion Gap Latest Ref Range: 3 - 14 mmol/L 8      Magnesium Latest Ref Range: 1.6 - 2.3 mg/dL 1.9      Phosphorus Latest Ref Range: 2.5 - 4.5 mg/dL 3.9      Albumin Latest Ref Range: 3.4 - 5.0 g/dL 3.6      Protein Total Latest Ref Range: 6.8 - 8.8 g/dL 6.5 (L)      Bilirubin Total Latest Ref Range: 0.2 - 1.3 mg/dL 0.8      Alkaline Phosphatase Latest Ref Range: 40 - 150 U/L 94      ALT Latest Ref Range: 0 - 70 U/L 36      AST Latest Ref Range: 0 - 45 U/L 28      Glucose Latest Ref Range: 70 - 99 mg/dL 149 (H)      WBC Latest Ref Range: 4.0 - 11.0 10e3/uL 5.9      Hemoglobin Latest Ref Range: 13.3 - 17.7 g/dL 14.1      Hematocrit Latest Ref Range: 40.0 - 53.0 % 41.8      Platelet Count Latest Ref Range: 150 - 450 10e3/uL 114 (L)      RBC Count Latest Ref Range: 4.40 - 5.90 10e6/uL 4.44      MCV Latest Ref Range: 78 - 100 fL 94      MCH Latest Ref Range: 26.5 - 33.0 pg 31.8      MCHC  Latest Ref Range: 31.5 - 36.5 g/dL 33.7      RDW Latest Ref Range: 10.0 - 15.0 % 15.3 (H)      % Neutrophils Latest Units: % 66      % Lymphocytes Latest Units: % 20      % Monocytes Latest Units: % 10      % Eosinophils Latest Units: % 2      Absolute Basophils Latest Ref Range: 0.0 - 0.2 10e3/uL 0.0      % Basophils Latest Units: % 1      Absolute Eosinophils Latest Ref Range: 0.0 - 0.7 10e3/uL 0.1      Absolute Immature Granulocytes Latest Ref Range: <=0.0 10e3/uL 0.0      Absolute Lymphocytes Latest Ref Range: 0.8 - 5.3 10e3/uL 1.2      Absolute Monocytes Latest Ref Range: 0.0 - 1.3 10e3/uL 0.6      % Immature Granulocytes Latest Units: % 1      Absolute Neutrophils Latest Ref Range: 1.6 - 8.3 10e3/uL 3.9      Absolute NRBCs Latest Units: 10e3/uL 0.0      NRBCs per 100 WBC Latest Ref Range: <1 /100 0      Absolute CD16+56 Latest Ref Range: 95 - 640 cells/uL 187      Absolute CD19 Latest Ref Range: 107 - 698 cells/uL 47 (L)      Absolute CD3 Latest Ref Range: 603-2,990 cells/uL 1,077      Absolute CD4 Latest Ref Range: 441-2,156 cells/uL 245 (L)      Absolute CD8 Latest Ref Range: 125-1,312 cells/uL 828      CD16 + 56 Natural Killer Cells Latest Ref Range: 4 - 25 % 14      CD19 B Cells Latest Ref Range: 6 - 27 % 4 (L)      CD3 Mature T Latest Ref Range: 49 - 84 % 82      CD4:CD8 Ratio Latest Ref Range: 1.40 - 2.60  0.30 (L)      CD4 Hope T Latest Ref Range: 28 - 63 % 19 (L)      CD8 Suppressor T Latest Ref Range: 10 - 40 % 63 (H)      IGG Latest Ref Range: 610-1,616 mg/dL 439 (L)      CT CHEST/ABDOMEN/PELVIS W CONTRAST Unknown    Rpt   CT SOFT TISSUE NECK W CONTRAST Unknown   Rpt    EXAMINATION: CT CHEST/ABDOMEN/PELVIS W CONTRAST, 9/29/2021 8:20 AM     TECHNIQUE:  Helical CT images from the lung apices through the  symphysis pubis were obtained with contrast.  Coronal and sagittal  reformatted images were generated at a workstation for further  assessment.     CONTRAST:  135 ml Isovue 370.     COMPARISON: CT  6/9/2021     HISTORY: Neoplasm: lymphoma; Large cell lymphoma (H); Diffuse large  cell lymphoma in remission (H); Diffuse large B-cell lymphoma of solid  organ excluding spleen (H)     FINDINGS:     Lines and tubes: Right chest wall Port-A-Cath with tip at the superior  cavoatrial junction.     Chest: Post right thoracotomy changes with persistent chronic right  perihilar consolidation/scarring with associated  groundglass opacity,  volume loss and posterior predominant pleural thickening. No  appreciable pleural effusion. Left lung is clear. No suspicious  pulmonary nodule. Right-sided mediastinal shift. No thyroid nodules.  Central tracheobronchial tree is patent. Heart size is normal. No  pericardial effusion.  Normal thoracic vasculature. No significant  thoracic lymphadenopathy.      Abdomen and pelvis:     Liver: No suspicious liver lesions. Portal veins appear patent.   Gallbladder: No gallstones. No evidence of acute cholecystitis.  Spleen: Mildly enlarged measuring about 14.3 cm, previously measured  13.8 cm, when measured similarly.  Pancreas: Unchanged calcification along the pancreatic head/neck. No  suspicious pancreatic lesions. The pancreatic duct is not dilated.  Adrenal glands: Persistent adrenal nodularity right more than left  with adjacent fatty stranding and ill marginated soft tissue density  extending toward the precaval space, grossly unchanged (series 4,  image 315).  Kidneys: No hydronephrosis or obstructing renal stones.  Renal  hypodensities favored to represent cyst  Bladder / Pelvic organs: Mild prostatomegaly with the prostate  measuring 4.4 cm. Prostatic calcification. Urinary bladder is  decompressed.  Bowel: No bowel wall thickening. The appendix is unremarkable. No  abnormal dilatation or thickening of the lower lobes  Lymph nodes: No retroperitoneal, mesenteric, or pelvic lymphadenopathy  by size criteria.  Peritoneum / Retroperitoneum: No free fluid or air within the  abdomen.  Vessels: No infrarenal aortic aneurysm. Filling defect in the left  common iliac vein seen on prior CT scans is not conspicuous on the  current scan.     Bones and soft tissues: Persistent focal sclerosis in the left  proximal femur. Mild multilevel degenerative changes of the spine. No  acute aggressive osseous lesion.                                                                      IMPRESSION: In this patient with history of large cell lymphoma, the  current scan compared to CT chest, abdomen and pelvis 3/20/2021 shows:  1. Relatively unchanged chronic right perihilar consolidation/scarring  with pleural thickening and air bronchogram.  2. No new or enlarging suspicious lymphadenopathy in the chest,  abdomen or pelvis. Stable prominent  mediastinal, retroperitoneal,  central mesenteric lymph nodes and stable central mesenteric haziness.  3. Persistent mild splenomegaly.  4. Persistent adrenal nodularity and adjacent fat streakiness, right  more than left.     LINDSAY MICHAEL MD      CT SOFT TISSUE NECK W CONTRAST 9/29/2021 8:19 AM     History:  Non-Hodgkin lymphoma, initial workup; Large cell lymphoma  (H); Diffuse large cell lymphoma in remission (H); Diffuse large  B-cell lymphoma of solid organ excluding spleen (H).     Per EPIC: 70-year-old male with a history of follicular lymphoma  diagnosed in 2014 status post multiple therapies including R-CHOP,  bendamustine with rituximab, and radiotherapy to right thorax. More  recently found to have growing right lower lobe mass and adrenal  masses, with biopsy or adrenal mass positive for diffuse large B-cell  lymphoma. Started R-ICE chemotherapy 6/5/2020, however due to side  effects was switched to R-DHAP on 7/8/2020. He is now status post  Yescarta CAR-T infusion on 9/30/2020.                    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 He had melanoma resection on his right ear and sentinel node  biopsy. He needs another  surgical procedure as the excision margin was  not wide enough on the ear lesion.     ICD-10: Large cell lymphoma (H); Diffuse large cell lymphoma in  remission (H); Diffuse large B-cell lymphoma of solid organ excluding  spleen (H)      Comparison: PET/CT from 10/28/2020.      Technique: Following intravenous administration of nonionic iodinated  contrast medium, thin section helical CT images were obtained from the  skull base down to the level of the aortic arch.  Axial, coronal and  sagittal reformations were performed with 2-3 mm slice thickness  reconstruction. Images were reviewed in soft tissue, lung and bone  windows.     Contrast: Isovue 370 135cc     Findings:   No cervical lymphadenopathy by size criteria.      No masslike appearance at right earlobe. Improvement of the thickening  and enhancement of previously mildly FDG avid right earlobe.     Evaluation of the mucosal space demonstrates no evident abnormality in  the nasopharynx, oropharynx, hypopharynx or the glottis. The tongue  base appears normal. The major salivary glands appear unremarkable.  The thyroid gland appears normal.     There is no evident cervical lymphadenopathy. The fascial spaces in  the neck are intact bilaterally. The major vascular structures in the  neck appear unremarkable.     Evaluation of the osseous structures demonstrate no worrisome lytic or  sclerotic lesion. No overt spinal canal or neuroforaminal stenosis.  Posterior ligament calcification at C2 and C3 vertebrae with mild to  moderate spinal canal stenosis at C2-3. The visualized paranasal  sinuses are clear. The mastoid air cells are clear. Bilateral  pseudophakia.     Partially visualized consolidation of the right lung. See dedicated  chest CT dictation for details. Port catheter with the right anterior  chest.                                                                       Impression: In this patient with a history of diffuse large B-cell  lymphoma and right  earlobe melanoma,  1. No cervical lymphadenopathy by size criteria.   2. Improvement of the thickening and enhancement of previously mildly  FDG avid right earlobe. No evidence for recurrent tumor.           I have personally reviewed the examination and initial interpretation  and I agree with the findings.     ABHAY WINTERS MD              [facility] [place]

## 2021-09-29 NOTE — PROGRESS NOTES
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 In the last few months he has been feeling well. He has had no infections bleeding or bruising. He has had several episodes of symptomatic hypoglycemia and now has an insulin pump and hoping to get his blood sugar control more regulated. He has no skin rash, headaches, visual change, episodes of confusion or disorientation (other than hypoglycemia) and generally feels well. He has good energy, no change in his chronically mildly loose stools, nocturia x1 without dysuria and no bone discomfort. The rest of his review of systems is unrevealing.    His exam shows him comfortable in no distress. His weight is up 10 pounds from June. He has no cervical axillary or inguinal lymphadenopathy. He has no oral lesions. His lungs are clear but he has decreased breath sounds at the right base but no rales. His abdomen is big and hard to examine but there is no palpable masses or tenderness. He has trace at most peripheral edema.    Laboratory testing showed good blood counts and chemistries. His CT scans have the neck scan shows no progression of disease and the scarring at his right lung base seems unchanged to my review pending formal radiologic interpretation.  The CAP CT shows no sign of progression either.    He appears therefore in continuing remission with no need for further intervention. I told him that at 1 year post T-cell therapy he can discontinue trimethoprim sulfa and can also stop the acyclovir. He has received his third Covid vaccine last week and knows he should get a flu shot soon. I told him our recommendations are still in flux but there is " no consensus that people need revaccinations after CAR T therapy.     Overall he is doing well and we did ask him to have restaging done in 6 months time. He knows to call if additional questions arise    Eduin Martinez MD    Professor of medicine    Results for EZIO PADILLA (MRN [id number]) as of 9/29/2021 17:45   Ref. Range 9/29/2021 07:41 9/29/2021 08:06 9/29/2021 08:19 9/29/2021 08:20   Sodium Latest Ref Range: 133 - 144 mmol/L 138      Potassium Latest Ref Range: 3.4 - 5.3 mmol/L 4.0      Chloride Latest Ref Range: 94 - 109 mmol/L 104      Carbon Dioxide Latest Ref Range: 20 - 32 mmol/L 26      Urea Nitrogen Latest Ref Range: 7 - 30 mg/dL 10      Creatinine Latest Ref Range: 0.66 - 1.25 mg/dL 0.92      Creatinine POCT Latest Ref Range: 0.7 - 1.3 mg/dL  0.9     GFR Estimate Latest Ref Range: >60 mL/min/1.73m2 85      GFR, ESTIMATED POCT Latest Ref Range: >60 mL/min/1.73m2  >60     Calcium Latest Ref Range: 8.5 - 10.1 mg/dL 8.8      Anion Gap Latest Ref Range: 3 - 14 mmol/L 8      Magnesium Latest Ref Range: 1.6 - 2.3 mg/dL 1.9      Phosphorus Latest Ref Range: 2.5 - 4.5 mg/dL 3.9      Albumin Latest Ref Range: 3.4 - 5.0 g/dL 3.6      Protein Total Latest Ref Range: 6.8 - 8.8 g/dL 6.5 (L)      Bilirubin Total Latest Ref Range: 0.2 - 1.3 mg/dL 0.8      Alkaline Phosphatase Latest Ref Range: 40 - 150 U/L 94      ALT Latest Ref Range: 0 - 70 U/L 36      AST Latest Ref Range: 0 - 45 U/L 28      Glucose Latest Ref Range: 70 - 99 mg/dL 149 (H)      WBC Latest Ref Range: 4.0 - 11.0 10e3/uL 5.9      Hemoglobin Latest Ref Range: 13.3 - 17.7 g/dL 14.1      Hematocrit Latest Ref Range: 40.0 - 53.0 % 41.8      Platelet Count Latest Ref Range: 150 - 450 10e3/uL 114 (L)      RBC Count Latest Ref Range: 4.40 - 5.90 10e6/uL 4.44      MCV Latest Ref Range: 78 - 100 fL 94      MCH Latest Ref Range: 26.5 - 33.0 pg 31.8      MCHC Latest Ref Range: 31.5 - 36.5 g/dL 33.7      RDW Latest Ref Range: 10.0 - 15.0 % 15.3 (H)      %  Neutrophils Latest Units: % 66      % Lymphocytes Latest Units: % 20      % Monocytes Latest Units: % 10      % Eosinophils Latest Units: % 2      Absolute Basophils Latest Ref Range: 0.0 - 0.2 10e3/uL 0.0      % Basophils Latest Units: % 1      Absolute Eosinophils Latest Ref Range: 0.0 - 0.7 10e3/uL 0.1      Absolute Immature Granulocytes Latest Ref Range: <=0.0 10e3/uL 0.0      Absolute Lymphocytes Latest Ref Range: 0.8 - 5.3 10e3/uL 1.2      Absolute Monocytes Latest Ref Range: 0.0 - 1.3 10e3/uL 0.6      % Immature Granulocytes Latest Units: % 1      Absolute Neutrophils Latest Ref Range: 1.6 - 8.3 10e3/uL 3.9      Absolute NRBCs Latest Units: 10e3/uL 0.0      NRBCs per 100 WBC Latest Ref Range: <1 /100 0      Absolute CD16+56 Latest Ref Range: 95 - 640 cells/uL 187      Absolute CD19 Latest Ref Range: 107 - 698 cells/uL 47 (L)      Absolute CD3 Latest Ref Range: 603-2,990 cells/uL 1,077      Absolute CD4 Latest Ref Range: 441-2,156 cells/uL 245 (L)      Absolute CD8 Latest Ref Range: 125-1,312 cells/uL 828      CD16 + 56 Natural Killer Cells Latest Ref Range: 4 - 25 % 14      CD19 B Cells Latest Ref Range: 6 - 27 % 4 (L)      CD3 Mature T Latest Ref Range: 49 - 84 % 82      CD4:CD8 Ratio Latest Ref Range: 1.40 - 2.60  0.30 (L)      CD4 [place] T Latest Ref Range: 28 - 63 % 19 (L)      CD8 Suppressor T Latest Ref Range: 10 - 40 % 63 (H)      IGG Latest Ref Range: 610-1,616 mg/dL 439 (L)      CT CHEST/ABDOMEN/PELVIS W CONTRAST Unknown    Rpt   CT SOFT TISSUE NECK W CONTRAST Unknown   Rpt    EXAMINATION: CT CHEST/ABDOMEN/PELVIS W CONTRAST, 9/29/2021 8:20 AM     TECHNIQUE:  Helical CT images from the lung apices through the  symphysis pubis were obtained with contrast.  Coronal and sagittal  reformatted images were generated at a workstation for further  assessment.     CONTRAST:  135 ml Isovue 370.     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     Chest: Post right thoracotomy changes with persistent chronic right  perihilar consolidation/scarring with associated  groundglass opacity,  volume loss and posterior predominant pleural thickening. No  appreciable pleural effusion. Left lung is clear. No suspicious  pulmonary nodule. Right-sided mediastinal shift. No thyroid nodules.  Central tracheobronchial tree is patent. Heart size is normal. No  pericardial effusion.  Normal thoracic vasculature. No significant  thoracic lymphadenopathy.      Abdomen and pelvis:     Liver: No suspicious liver lesions. Portal veins appear patent.   Gallbladder: No gallstones. No evidence of acute cholecystitis.  Spleen: Mildly enlarged measuring about 14.3 cm, previously measured  13.8 cm, when measured similarly.  Pancreas: Unchanged calcification along the pancreatic head/neck. No  suspicious pancreatic lesions. The pancreatic duct is not dilated.  Adrenal glands: Persistent adrenal nodularity right more than left  with adjacent fatty stranding and ill marginated soft tissue density  extending toward the precaval space, grossly unchanged (series 4,  image 315).  Kidneys: No hydronephrosis or obstructing renal stones.  Renal  hypodensities favored to represent cyst  Bladder / Pelvic organs: Mild prostatomegaly with the prostate  measuring 4.4 cm. Prostatic calcification. Urinary bladder is  decompressed.  Bowel: No bowel wall thickening. The appendix is unremarkable. No  abnormal dilatation or thickening of the lower lobes  Lymph nodes: No retroperitoneal, mesenteric, or pelvic lymphadenopathy  by size criteria.  Peritoneum / Retroperitoneum: No free fluid or air within the abdomen.  Vessels: No infrarenal aortic aneurysm. Filling defect in the left  common iliac vein seen on prior  CT scans is not conspicuous on the  current scan.     Bones and soft tissues: Persistent focal sclerosis in the left  proximal femur. Mild multilevel degenerative changes of the spine. No  acute aggressive osseous lesion.                                                                      IMPRESSION: In this patient with history of large cell lymphoma, the  current scan compared to CT chest, abdomen and pelvis 3/20/2021 shows:  1. Relatively unchanged chronic right perihilar consolidation/scarring  with pleural thickening and air bronchogram.  2. No new or enlarging suspicious lymphadenopathy in the chest,  abdomen or pelvis. Stable prominent  mediastinal, retroperitoneal,  central mesenteric lymph nodes and stable central mesenteric haziness.  3. Persistent mild splenomegaly.  4. Persistent adrenal nodularity and adjacent fat streakiness, right  more than left.     LINDSAY MICHAEL MD      CT SOFT TISSUE NECK W CONTRAST 9/29/2021 8:19 AM     History:  Non-Hodgkin lymphoma, initial workup; Large cell lymphoma  (H); Diffuse large cell lymphoma in remission (H); Diffuse large  B-cell lymphoma of solid organ excluding spleen (H).     Per EPIC: 70-year-old male with a history of follicular lymphoma  diagnosed in 2014 status post multiple therapies including R-CHOP,  bendamustine with rituximab, and radiotherapy to right thorax. More  recently found to have growing right lower lobe mass and adrenal  masses, with biopsy or adrenal mass positive for diffuse large B-cell  lymphoma. Started R-ICE chemotherapy 6/5/2020, however due to side  effects was switched to R-DHAP on 7/8/2020. He is now status post  [facility] CAR-T infusion on 9/30/2020.                    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 He had melanoma resection on his right ear and sentinel node  biopsy. He needs another surgical procedure as the excision margin was  not wide enough on the ear lesion.     ICD-10: Large cell lymphoma  (H); Diffuse large cell lymphoma in  remission (H); Diffuse large B-cell lymphoma of solid organ excluding  spleen (H)      Comparison: PET/CT from 10/28/2020.      Technique: Following intravenous administration of nonionic iodinated  contrast medium, thin section helical CT images were obtained from the  skull base down to the level of the aortic arch.  Axial, coronal and  sagittal reformations were performed with 2-3 mm slice thickness  reconstruction. Images were reviewed in soft tissue, lung and bone  windows.     Contrast: Isovue 370 135cc     Findings:   No cervical lymphadenopathy by size criteria.      No masslike appearance at right earlobe. Improvement of the thickening  and enhancement of previously mildly FDG avid right earlobe.     Evaluation of the mucosal space demonstrates no evident abnormality in  the nasopharynx, oropharynx, hypopharynx or the glottis. The tongue  base appears normal. The major salivary glands appear unremarkable.  The thyroid gland appears normal.     There is no evident cervical lymphadenopathy. The fascial spaces in  the neck are intact bilaterally. The major vascular structures in the  neck appear unremarkable.     Evaluation of the osseous structures demonstrate no worrisome lytic or  sclerotic lesion. No overt spinal canal or neuroforaminal stenosis.  Posterior ligament calcification at C2 and C3 vertebrae with mild to  moderate spinal canal stenosis at C2-3. The visualized paranasal  sinuses are clear. The mastoid air cells are clear. Bilateral  pseudophakia.     Partially visualized consolidation of the right lung. See dedicated  chest CT dictation for details. Port catheter with the right anterior  chest.                                                                       Impression: In this patient with a history of diffuse large B-cell  lymphoma and right earlobe melanoma,  1. No cervical lymphadenopathy by size criteria.   2. Improvement of the thickening and  enhancement of previously mildly  FDG avid right earlobe. No evidence for recurrent tumor.           I have personally reviewed the examination and initial interpretation  and I agree with the findings.     ABHAY WINTERS MD

## 2021-09-29 NOTE — LETTER
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 Please see a copy of my visit note below.    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 In the last few months he has been feeling well. He has had no infections bleeding or bruising. He has had several episodes of symptomatic hypoglycemia and now has an insulin pump and hoping to get his blood sugar control more regulated. He has no skin rash, headaches, visual change, episodes of confusion or disorientation (other than hypoglycemia) and generally feels well. He has good energy, no change in his chronically mildly loose stools, nocturia x1 without dysuria and no bone discomfort. The rest of his review of systems is unrevealing.    His exam shows him comfortable in no distress. His weight is up 10 pounds from June. He has no cervical axillary or inguinal lymphadenopathy. He has no oral lesions. His lungs are clear but he has decreased breath sounds at the right base but no rales. His abdomen is big and hard to examine but there is no palpable masses or tenderness. He has trace at most peripheral edema.    Laboratory testing showed good blood counts and chemistries. His CT scans have the neck scan shows no progression of disease and the scarring at his right lung base seems unchanged to my review pending formal radiologic interpretation.  The CAP CT shows no sign of progression either.    He appears therefore in continuing remission with no need for " further intervention. I told him that at 1 year post T-cell therapy he can discontinue trimethoprim sulfa and can also stop the acyclovir. He has received his third Covid vaccine last week and knows he should get a flu shot soon. I told him our recommendations are still in flux but there is no consensus that people need revaccinations after CAR T therapy.     Overall he is doing well and we did ask him to have restaging done in 6 months time. He knows to call if additional questions arise    Eduin Martinez MD    Professor of medicine    Results for EZIO PADILLA (MRN [id number]) as of 9/29/2021 17:45   Ref. Range 9/29/2021 07:41 9/29/2021 08:06 9/29/2021 08:19 9/29/2021 08:20   Sodium Latest Ref Range: 133 - 144 mmol/L 138      Potassium Latest Ref Range: 3.4 - 5.3 mmol/L 4.0      Chloride Latest Ref Range: 94 - 109 mmol/L 104      Carbon Dioxide Latest Ref Range: 20 - 32 mmol/L 26      Urea Nitrogen Latest Ref Range: 7 - 30 mg/dL 10      Creatinine Latest Ref Range: 0.66 - 1.25 mg/dL 0.92      Creatinine POCT Latest Ref Range: 0.7 - 1.3 mg/dL  0.9     GFR Estimate Latest Ref Range: >60 mL/min/1.73m2 85      GFR, ESTIMATED POCT Latest Ref Range: >60 mL/min/1.73m2  >60     Calcium Latest Ref Range: 8.5 - 10.1 mg/dL 8.8      Anion Gap Latest Ref Range: 3 - 14 mmol/L 8      Magnesium Latest Ref Range: 1.6 - 2.3 mg/dL 1.9      Phosphorus Latest Ref Range: 2.5 - 4.5 mg/dL 3.9      Albumin Latest Ref Range: 3.4 - 5.0 g/dL 3.6      Protein Total Latest Ref Range: 6.8 - 8.8 g/dL 6.5 (L)      Bilirubin Total Latest Ref Range: 0.2 - 1.3 mg/dL 0.8      Alkaline Phosphatase Latest Ref Range: 40 - 150 U/L 94      ALT Latest Ref Range: 0 - 70 U/L 36      AST Latest Ref Range: 0 - 45 U/L 28      Glucose Latest Ref Range: 70 - 99 mg/dL 149 (H)      WBC Latest Ref Range: 4.0 - 11.0 10e3/uL 5.9      Hemoglobin Latest Ref Range: 13.3 - 17.7 g/dL 14.1      Hematocrit Latest Ref Range: 40.0 - 53.0 % 41.8      Platelet Count Latest  Ref Range: 150 - 450 10e3/uL 114 (L)      RBC Count Latest Ref Range: 4.40 - 5.90 10e6/uL 4.44      MCV Latest Ref Range: 78 - 100 fL 94      MCH Latest Ref Range: 26.5 - 33.0 pg 31.8      MCHC Latest Ref Range: 31.5 - 36.5 g/dL 33.7      RDW Latest Ref Range: 10.0 - 15.0 % 15.3 (H)      % Neutrophils Latest Units: % 66      % Lymphocytes Latest Units: % 20      % Monocytes Latest Units: % 10      % Eosinophils Latest Units: % 2      Absolute Basophils Latest Ref Range: 0.0 - 0.2 10e3/uL 0.0      % Basophils Latest Units: % 1      Absolute Eosinophils Latest Ref Range: 0.0 - 0.7 10e3/uL 0.1      Absolute Immature Granulocytes Latest Ref Range: <=0.0 10e3/uL 0.0      Absolute Lymphocytes Latest Ref Range: 0.8 - 5.3 10e3/uL 1.2      Absolute Monocytes Latest Ref Range: 0.0 - 1.3 10e3/uL 0.6      % Immature Granulocytes Latest Units: % 1      Absolute Neutrophils Latest Ref Range: 1.6 - 8.3 10e3/uL 3.9      Absolute NRBCs Latest Units: 10e3/uL 0.0      NRBCs per 100 WBC Latest Ref Range: <1 /100 0      Absolute CD16+56 Latest Ref Range: 95 - 640 cells/uL 187      Absolute CD19 Latest Ref Range: 107 - 698 cells/uL 47 (L)      Absolute CD3 Latest Ref Range: 603-2,990 cells/uL 1,077      Absolute CD4 Latest Ref Range: 441-2,156 cells/uL 245 (L)      Absolute CD8 Latest Ref Range: 125-1,312 cells/uL 828      CD16 + 56 Natural Killer Cells Latest Ref Range: 4 - 25 % 14      CD19 B Cells Latest Ref Range: 6 - 27 % 4 (L)      CD3 Mature T Latest Ref Range: 49 - 84 % 82      CD4:CD8 Ratio Latest Ref Range: 1.40 - 2.60  0.30 (L)      CD4 [place] T Latest Ref Range: 28 - 63 % 19 (L)      CD8 Suppressor T Latest Ref Range: 10 - 40 % 63 (H)      IGG Latest Ref Range: 610-1,616 mg/dL 439 (L)      CT CHEST/ABDOMEN/PELVIS W CONTRAST Unknown    Rpt   CT SOFT TISSUE NECK W CONTRAST Unknown   Rpt    EXAMINATION: CT CHEST/ABDOMEN/PELVIS W CONTRAST, 9/29/2021 8:20 AM     TECHNIQUE:  Helical CT images from the lung apices through  the  symphysis pubis were obtained with contrast.  Coronal and sagittal  reformatted images were generated at a workstation for further  assessment.     CONTRAST:  135 ml Isovue 370.     COMPARISON: CT 6/9/2021     HISTORY: Neoplasm: lymphoma; Large cell lymphoma (H); Diffuse large  cell lymphoma in remission (H); Diffuse large B-cell lymphoma of solid  organ excluding spleen (H)     FINDINGS:     Lines and tubes: Right chest wall Port-A-Cath with tip at the superior  cavoatrial junction.     Chest: Post right thoracotomy changes with persistent chronic right  perihilar consolidation/scarring with associated  groundglass opacity,  volume loss and posterior predominant pleural thickening. No  appreciable pleural effusion. Left lung is clear. No suspicious  pulmonary nodule. Right-sided mediastinal shift. No thyroid nodules.  Central tracheobronchial tree is patent. Heart size is normal. No  pericardial effusion.  Normal thoracic vasculature. No significant  thoracic lymphadenopathy.      Abdomen and pelvis:     Liver: No suspicious liver lesions. Portal veins appear patent.   Gallbladder: No gallstones. No evidence of acute cholecystitis.  Spleen: Mildly enlarged measuring about 14.3 cm, previously measured  13.8 cm, when measured similarly.  Pancreas: Unchanged calcification along the pancreatic head/neck. No  suspicious pancreatic lesions. The pancreatic duct is not dilated.  Adrenal glands: Persistent adrenal nodularity right more than left  with adjacent fatty stranding and ill marginated soft tissue density  extending toward the precaval space, grossly unchanged (series 4,  image 315).  Kidneys: No hydronephrosis or obstructing renal stones.  Renal  hypodensities favored to represent cyst  Bladder / Pelvic organs: Mild prostatomegaly with the prostate  measuring 4.4 cm. Prostatic calcification. Urinary bladder is  decompressed.  Bowel: No bowel wall thickening. The appendix is unremarkable. No  abnormal  dilatation or thickening of the lower lobes  Lymph nodes: No retroperitoneal, mesenteric, or pelvic lymphadenopathy  by size criteria.  Peritoneum / Retroperitoneum: No free fluid or air within the abdomen.  Vessels: No infrarenal aortic aneurysm. Filling defect in the left  common iliac vein seen on prior CT scans is not conspicuous on the  current scan.     Bones and soft tissues: Persistent focal sclerosis in the left  proximal femur. Mild multilevel degenerative changes of the spine. No  acute aggressive osseous lesion.                                                                      IMPRESSION: In this patient with history of large cell lymphoma, the  current scan compared to CT chest, abdomen and pelvis 3/20/2021 shows:  1. Relatively unchanged chronic right perihilar consolidation/scarring  with pleural thickening and air bronchogram.  2. No new or enlarging suspicious lymphadenopathy in the chest,  abdomen or pelvis. Stable prominent  mediastinal, retroperitoneal,  central mesenteric lymph nodes and stable central mesenteric haziness.  3. Persistent mild splenomegaly.  4. Persistent adrenal nodularity and adjacent fat streakiness, right  more than left.     LINDSAY MICHAEL MD      CT SOFT TISSUE NECK W CONTRAST 9/29/2021 8:19 AM     History:  Non-Hodgkin lymphoma, initial workup; Large cell lymphoma  (H); Diffuse large cell lymphoma in remission (H); Diffuse large  B-cell lymphoma of solid organ excluding spleen (H).     Per EPIC: 70-year-old male with a history of follicular lymphoma  diagnosed in 2014 status post multiple therapies including R-CHOP,  bendamustine with rituximab, and radiotherapy to right thorax. More  recently found to have growing right lower lobe mass and adrenal  masses, with biopsy or adrenal mass positive for diffuse large B-cell  lymphoma. Started R-ICE chemotherapy 6/5/2020, however due to side  effects was switched to R-DHAP on 7/8/2020. He is now status post  Yescarta CAR-T  infusion on 9/30/2020.                    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 He had melanoma resection on his right ear and sentinel node  biopsy. He needs another surgical procedure as the excision margin was  not wide enough on the ear lesion.     ICD-10: Large cell lymphoma (H); Diffuse large cell lymphoma in  remission (H); Diffuse large B-cell lymphoma of solid organ excluding  spleen (H)      Comparison: PET/CT from 10/28/2020.      Technique: Following intravenous administration of nonionic iodinated  contrast medium, thin section helical CT images were obtained from the  skull base down to the level of the aortic arch.  Axial, coronal and  sagittal reformations were performed with 2-3 mm slice thickness  reconstruction. Images were reviewed in soft tissue, lung and bone  windows.     Contrast: Isovue 370 135cc     Findings:   No cervical lymphadenopathy by size criteria.      No masslike appearance at right earlobe. Improvement of the thickening  and enhancement of previously mildly FDG avid right earlobe.     Evaluation of the mucosal space demonstrates no evident abnormality in  the nasopharynx, oropharynx, hypopharynx or the glottis. The tongue  base appears normal. The major salivary glands appear unremarkable.  The thyroid gland appears normal.     There is no evident cervical lymphadenopathy. The fascial spaces in  the neck are intact bilaterally. The major vascular structures in the  neck appear unremarkable.     Evaluation of the osseous structures demonstrate no worrisome lytic or  sclerotic lesion. No overt spinal canal or neuroforaminal stenosis.  Posterior ligament calcification at C2 and C3 vertebrae with mild to  moderate spinal canal stenosis at C2-3. The visualized paranasal  sinuses are clear. The mastoid air cells are clear. Bilateral  pseudophakia.     Partially visualized consolidation of the right lung. See dedicated  chest CT dictation for details. Port  catheter with the right anterior  chest.                                                                       Impression: In this patient with a history of diffuse large B-cell  lymphoma and right earlobe melanoma,  1. No cervical lymphadenopathy by size criteria.   2. Improvement of the thickening and enhancement of previously mildly  FDG avid right earlobe. No evidence for recurrent tumor.           I have personally reviewed the examination and initial interpretation  and I agree with the findings.     ABHAY WINTERS MD            Again, thank you for allowing me to participate in the care of your patient.        Sincerely,        BMT DOM

## 2021-09-29 NOTE — NURSING NOTE
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 heaprin locked, vitals checked     Oncology Clinic Visit     Large cell lymphoma      Initial Vitals: /69   Pulse 91   Temp 98  F (36.7  C)   Resp 18   Ht 1.727 m (5' 7.99\")   Wt 104.3 kg (230 lb)   SpO2 96%   BMI 34.98 kg/m   Estimated body mass index is 34.98 kg/m  as calculated from the following:    Height as of this encounter: 1.727 m (5' 7.99\").    Weight as of this encounter: 104.3 kg (230 lb). Body surface area is 2.24 meters squared.  No Pain (0) Comment: Data Unavailable   No LMP for male patient.  Allergies reviewed: Yes  Medications reviewed: Yes    Medications: Medication refills not needed today.  Pharmacy name entered into [employer]:    [id number] [place] [place] - [address] [place] [address] PHARMACY [place], [place] - [address] [facility] [phone]    Clinical concerns: No new concerns       Roberto Lemons MA            "

## 2021-09-29 NOTE — NURSING NOTE
Chief Complaint   Patient presents with     Port Draw     power needle. heaprin locked, vitals checked     Jeanie Parks RN on 9/29/2021 at 7:48 AM

## 2021-10-04 DIAGNOSIS — C83.3A DIFFUSE LARGE CELL LYMPHOMA IN REMISSION: Primary | ICD-10-CM

## 2021-10-04 DIAGNOSIS — C83.38 DIFFUSE LARGE B-CELL LYMPHOMA OF LYMPH NODES OF MULTIPLE REGIONS (H): ICD-10-CM

## 2021-12-25 ENCOUNTER — HEALTH MAINTENANCE LETTER (OUTPATIENT)
Age: 71
End: 2021-12-25

## 2022-02-13 NOTE — PLAN OF CARE
Problem: Patient Care Overview  Goal: Plan of Care/Patient Progress Review  Outcome: Improving  A/Ox4. VSS on RA. Denies pain. Occlusive dsg over CT site cdi. Thoracotomy incision steri strips CASPER. OnQ pump infusing. Instructions included on how to remove OnQ once done infusing.  Discharge instructions reviewed, questions reviewed, and pain medications sent home with patient and wife.       Pt presents to the ED as a self-referral from home  Pt reports a hx of SI's for years w/no plan and complaints of vomitting since yesterday  Pt maintains minimal eye contact  Pt appears depressed and tearful at times during assessment  Pt does not disclose many details but denies active SI's, no plans, NO HI's nor AVH's  Pt denies any hx of IP or OP MH tx  Pt denies any formal dx of MH illness  Pt is receptive to accepting OP referral packet  Pt is not seeking IP tx at this time  Pt does not appear to meet criteria for involuntary commitment at this time  CW provided Dr Cate Lee w/details      TDS, CW

## 2022-02-19 ENCOUNTER — HEALTH MAINTENANCE LETTER (OUTPATIENT)
Age: 72
End: 2022-02-19

## 2022-03-02 ENCOUNTER — TELEPHONE (OUTPATIENT)
Dept: TRANSPLANT | Facility: CLINIC | Age: 72
End: 2022-03-02
Payer: COMMERCIAL

## 2022-03-02 NOTE — TELEPHONE ENCOUNTER
Pastor called to report SOB with exertion that he has had since having COVID in January. Has persistent cough. He is concerned that it may be related to lymphoma and wants his scan that is scheduled at the end of the month moved up. Explained to him that he needs to see his PCP or urgent care eval this week to evaluate his sx.  He marc has appt with his PCP tomorrow and he will discuss with that provider.  He was also instructed to notify his oncologist of the symptoms he is having. Asked him to call back after his PCP appt. He verbalized understanding of plan.

## 2022-03-03 ENCOUNTER — MEDICAL CORRESPONDENCE (OUTPATIENT)
Dept: HEALTH INFORMATION MANAGEMENT | Facility: CLINIC | Age: 72
End: 2022-03-03
Payer: COMMERCIAL

## 2022-03-03 DIAGNOSIS — R06.09 EXERTIONAL DYSPNEA: Primary | ICD-10-CM

## 2022-03-04 ENCOUNTER — TRANSFERRED RECORDS (OUTPATIENT)
Dept: HEALTH INFORMATION MANAGEMENT | Facility: CLINIC | Age: 72
End: 2022-03-04
Payer: COMMERCIAL

## 2022-03-18 ENCOUNTER — TRANSFERRED RECORDS (OUTPATIENT)
Dept: HEALTH INFORMATION MANAGEMENT | Facility: CLINIC | Age: 72
End: 2022-03-18
Payer: COMMERCIAL

## 2022-03-23 ENCOUNTER — ONCOLOGY VISIT (OUTPATIENT)
Dept: TRANSPLANT | Facility: CLINIC | Age: 72
End: 2022-03-23
Payer: COMMERCIAL

## 2022-03-23 ENCOUNTER — APPOINTMENT (OUTPATIENT)
Dept: LAB | Facility: CLINIC | Age: 72
End: 2022-03-23
Payer: COMMERCIAL

## 2022-03-23 VITALS
TEMPERATURE: 98.4 F | RESPIRATION RATE: 16 BRPM | HEART RATE: 105 BPM | DIASTOLIC BLOOD PRESSURE: 70 MMHG | OXYGEN SATURATION: 92 % | SYSTOLIC BLOOD PRESSURE: 116 MMHG

## 2022-03-23 DIAGNOSIS — C83.38 DIFFUSE LARGE B-CELL LYMPHOMA OF LYMPH NODES OF MULTIPLE REGIONS (H): Primary | ICD-10-CM

## 2022-03-23 DIAGNOSIS — C83.3A DIFFUSE LARGE CELL LYMPHOMA IN REMISSION: Primary | ICD-10-CM

## 2022-03-23 LAB
ALBUMIN SERPL-MCNC: 2.9 G/DL (ref 3.4–5)
ALP SERPL-CCNC: 81 U/L (ref 40–150)
ALT SERPL W P-5'-P-CCNC: 23 U/L (ref 0–70)
ANION GAP SERPL CALCULATED.3IONS-SCNC: 9 MMOL/L (ref 3–14)
AST SERPL W P-5'-P-CCNC: 23 U/L (ref 0–45)
BASOPHILS # BLD AUTO: 0 10E3/UL (ref 0–0.2)
BASOPHILS NFR BLD AUTO: 1 %
BILIRUB SERPL-MCNC: 0.8 MG/DL (ref 0.2–1.3)
BUN SERPL-MCNC: 8 MG/DL (ref 7–30)
CALCIUM SERPL-MCNC: 8.9 MG/DL (ref 8.5–10.1)
CHLORIDE BLD-SCNC: 100 MMOL/L (ref 94–109)
CO2 SERPL-SCNC: 28 MMOL/L (ref 20–32)
CREAT SERPL-MCNC: 0.76 MG/DL (ref 0.66–1.25)
EOSINOPHIL # BLD AUTO: 0.3 10E3/UL (ref 0–0.7)
EOSINOPHIL NFR BLD AUTO: 3 %
ERYTHROCYTE [DISTWIDTH] IN BLOOD BY AUTOMATED COUNT: 17.8 % (ref 10–15)
GFR SERPL CREATININE-BSD FRML MDRD: >90 ML/MIN/1.73M2
GLUCOSE BLD-MCNC: 179 MG/DL (ref 70–99)
HCT VFR BLD AUTO: 43.4 % (ref 40–53)
HGB BLD-MCNC: 14 G/DL (ref 13.3–17.7)
HOLD SPECIMEN: NORMAL
IMM GRANULOCYTES # BLD: 0 10E3/UL
IMM GRANULOCYTES NFR BLD: 1 %
LDH SERPL L TO P-CCNC: 203 U/L (ref 85–227)
LYMPHOCYTES # BLD AUTO: 1.1 10E3/UL (ref 0.8–5.3)
LYMPHOCYTES NFR BLD AUTO: 13 %
MAGNESIUM SERPL-MCNC: 1.8 MG/DL (ref 1.6–2.3)
MCH RBC QN AUTO: 29 PG (ref 26.5–33)
MCHC RBC AUTO-ENTMCNC: 32.3 G/DL (ref 31.5–36.5)
MCV RBC AUTO: 90 FL (ref 78–100)
MONOCYTES # BLD AUTO: 0.7 10E3/UL (ref 0–1.3)
MONOCYTES NFR BLD AUTO: 8 %
NEUTROPHILS # BLD AUTO: 6.3 10E3/UL (ref 1.6–8.3)
NEUTROPHILS NFR BLD AUTO: 74 %
NRBC # BLD AUTO: 0 10E3/UL
NRBC BLD AUTO-RTO: 0 /100
PHOSPHATE SERPL-MCNC: 2.5 MG/DL (ref 2.5–4.5)
PLATELET # BLD AUTO: 157 10E3/UL (ref 150–450)
POTASSIUM BLD-SCNC: 4.3 MMOL/L (ref 3.4–5.3)
PROT SERPL-MCNC: 6.8 G/DL (ref 6.8–8.8)
RBC # BLD AUTO: 4.83 10E6/UL (ref 4.4–5.9)
SODIUM SERPL-SCNC: 137 MMOL/L (ref 133–144)
WBC # BLD AUTO: 8.4 10E3/UL (ref 4–11)

## 2022-03-23 PROCEDURE — 36415 COLL VENOUS BLD VENIPUNCTURE: CPT

## 2022-03-23 PROCEDURE — 80053 COMPREHEN METABOLIC PANEL: CPT

## 2022-03-23 PROCEDURE — 83615 LACTATE (LD) (LDH) ENZYME: CPT

## 2022-03-23 PROCEDURE — 86355 B CELLS TOTAL COUNT: CPT

## 2022-03-23 PROCEDURE — G0463 HOSPITAL OUTPT CLINIC VISIT: HCPCS

## 2022-03-23 PROCEDURE — 83735 ASSAY OF MAGNESIUM: CPT

## 2022-03-23 PROCEDURE — 85025 COMPLETE CBC W/AUTO DIFF WBC: CPT

## 2022-03-23 PROCEDURE — 250N000011 HC RX IP 250 OP 636

## 2022-03-23 PROCEDURE — 82784 ASSAY IGA/IGD/IGG/IGM EACH: CPT

## 2022-03-23 PROCEDURE — 99214 OFFICE O/P EST MOD 30 MIN: CPT | Mod: GC

## 2022-03-23 PROCEDURE — 84100 ASSAY OF PHOSPHORUS: CPT

## 2022-03-23 RX ORDER — HEPARIN SODIUM (PORCINE) LOCK FLUSH IV SOLN 100 UNIT/ML 100 UNIT/ML
5 SOLUTION INTRAVENOUS ONCE
Status: COMPLETED | OUTPATIENT
Start: 2022-03-23 | End: 2022-03-23

## 2022-03-23 RX ADMIN — SODIUM CHLORIDE, PRESERVATIVE FREE 5 ML: 5 INJECTION INTRAVENOUS at 15:10

## 2022-03-23 ASSESSMENT — PAIN SCALES - GENERAL: PAINLEVEL: NO PAIN (0)

## 2022-03-23 NOTE — NURSING NOTE
"Chief Complaint   Patient presents with     Port Draw     Labs drawn via port by RN. VS taken.     Port was accessed with 20g 3/4\" gripper needle. No blood return observed. Port was flushed with NS and heparin. Labs were drawn via . VS taken. Port was de-accessed.    Jr Stern RN  "

## 2022-03-23 NOTE — NURSING NOTE
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 VS taken.     Oncology Clinic Visit     Provider visit r/t DLCL     Initial Vitals: /70   Pulse 105   Temp 98.4  F (36.9  C) (Oral)   Resp 16   SpO2 92%  Estimated body mass index is 34.98 kg/m  as calculated from the following:    Height as of 9/29/21: 1.727 m (5' 7.99\").    Weight as of 9/29/21: 104.3 kg (230 lb). There is no height or weight on file to calculate BSA.  No Pain (0) Comment: Data Unavailable   No LMP for male patient.  Allergies reviewed: Yes  Medications reviewed: Yes    Medications: Medication refills not needed today.  Pharmacy name entered into [employer]:    [id number] [place] [facility] [place], [place] - [address] [place] [address] PHARMACY [place], [place] - [address] [facility] [phone]    Clinical concerns: VSS.       Dionne Dunn RN              "

## 2022-03-23 NOTE — PROGRESS NOTES
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  He reports that over the past several months he has had slowly progressive shortness of breath, worse with exertion and not present at rest.  He notes he became short of breath walking from the garage to his appointment today.  He does not have any associated chest pain.  He did have COVID in January and has had a mild persistent cough since that time.  He has seen his PCP about this and is scheduled for a stress test within the next 2 weeks.  He has lost weight over the past several months.  He denies any associated fevers/chills, nausea/vomiting, lumps/bumps, night sweats, or other active issues.    10 point review of systems negative except as noted above.       PHYSICAL EXAM                                                                                                                                     KPS: 80    General: NAD;alert and engaged.  HEENT: Oropharynx unremarkable  Pulm: Clear to auscultation bilaterally; no cough appreciated during course of visit  Card: RRR, normal S1/S2  Abd: Non-tender, non-distended, no palpable hepatosplenomegaly  Ext: No LE edema  Neuro: A&O, non-focal      Labs:   Lab Results   Component Value Date    WBC 8.4 03/23/2022    ANEU 6.2 06/19/2021    HGB 14.0 03/23/2022    HCT 43.4 03/23/2022     03/23/2022     03/23/2022    POTASSIUM 4.3 03/23/2022    CHLORIDE 100 03/23/2022    CO2 28 03/23/2022     (H) 03/23/2022    BUN 8 03/23/2022    CR 0.76 03/23/2022    MAG 1.8 03/23/2022    INR 1.16 (H) 10/28/2020    AST 23 03/23/2022    ALT 23 03/23/2022       Imaging:  Outside CT scan shows ongoing CR from a lymphoma perspective; no new/concerning findings that would explain new SOB.    ASSESSMENT AND PLAN:  Pastor Garibay is a 71 year old gentleman with DLBCL, s/p Yescarta CAR-T therapy with CNS  prophy with DXM and simvastatin (2019-35).     We reviewed his CT scan which shows ongoing CR from a lymphoma perspective; Pastor was very happy to hear this.  There are no overt findings on imaging, labs or exam today to explain his slowly progressive shortness of breath.  We will order a PET to evaluate for pleural lymphoma involvement; we will also order an echo and refer to pulmonary.  Pastor will also keep his scheduled upcoming stress test through his PCP.    aPstor has been tapering his hydrocortisone with his endocrinologist over the past year.  We asked him to temporarily increase his dose from 10 mg PO Qday to BID in case his history of adrenal insufficiency is contributing to his recent SOB/fatigue.  Depending on the results of the above testing he may be able to resume his slow taper with his endocrinologist in several weeks.      1.  BMT:  DLBCL  Axi-cell product with CNS prevention protocol   CNS prophy: simvastatin 40mg tied to IDS drug. Completed at day +30. All previously LPs under fluoroscopy.  PET Scan at day +28 with very good NV vs. CR  CT Day +60 and now day 100 disease status : Complete response with residual lung scar tissue. No new disease or sign of growth.   -+180 for CT scan - ongoing remission, pt is fully recovered now.   -+1.5 yr CT - ongoing remission     2.  HEME: excellent count recovery. No pre-meds.  - New PE dg in 10/2020  - s/p lovenox BID for PE dg on day 28  - Completed total of 6 months of anticoagulation with DOAC in 2021                            3.  ID: Afebrile and no infectious concerns.  Ok to stop acyclovir.     4.  GI:   - No active issues     5.  FEN/Renal: Cr stable.      6. Endo: Type 2 DM.    Complication of DM with 2 left toes amputated 7/2020  Adrenal Insufficiency:  hydrocortisone 10mg PO BID    7.  MSK:  Hx of osteomyelitis of 2nd & 3rd L toes w/ amputation of distal phalanx of both digits.     Plan:  - PET, echo, pulmonary consult for progressive SOB  -  Increase hydrocortisone from 10 mg PO Qday to 10 mg PO BID while SOB evaluation is ongoing  - Continue to follow with primary oncologist  - RTC in 6 months as planned, possibly sooner depending on above workup results    Patient seen and discussed with attending Dr. Bunch.    Eduin Prescott MD  Hematology/Oncology/Transplant Fellow

## 2022-03-23 NOTE — LETTER
3/23/2022         RE: Pastor Garibay  [address]  [facility] [place] [id number]        Dear Colleague,    Thank you for referring your patient, Pastor Garibay, to the [facility]. Please see a copy of my visit note below.    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  He reports that over the past several months he has had slowly progressive shortness of breath, worse with exertion and not present at rest.  He notes he became short of breath walking from the garage to his appointment today.  He does not have any associated chest pain.  He did have COVID in January and has had a mild persistent cough since that time.  He has seen his PCP about this and is scheduled for a stress test within the next 2 weeks.  He has lost weight over the past several months.  He denies any associated fevers/chills, nausea/vomiting, lumps/bumps, night sweats, or other active issues.    10 point review of systems negative except as noted above.       PHYSICAL EXAM                                                                                                                                     KPS: 80    General: NAD;alert and engaged.  HEENT: Oropharynx unremarkable  Pulm: Clear to auscultation bilaterally; no cough appreciated during course of visit  Card: RRR, normal S1/S2  Abd: Non-tender, non-distended, no palpable hepatosplenomegaly  Ext: No LE edema  Neuro: A&O, non-focal      Labs:   Lab Results   Component Value Date    WBC 8.4 03/23/2022    ANEU 6.2 06/19/2021    HGB 14.0 03/23/2022    HCT 43.4 03/23/2022     03/23/2022     03/23/2022    POTASSIUM 4.3 03/23/2022    CHLORIDE 100 03/23/2022    CO2 28 03/23/2022     (H) 03/23/2022    BUN 8 03/23/2022    CR 0.76 03/23/2022    MAG 1.8 03/23/2022    INR 1.16 (H) 10/28/2020    AST 23  03/23/2022    ALT 23 03/23/2022       Imaging:  Outside CT scan shows ongoing CR from a lymphoma perspective; no new/concerning findings that would explain new SOB.    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     We reviewed his CT scan which shows ongoing CR from a lymphoma perspective; Pastor was very happy to hear this.  There are no overt findings on imaging, labs or exam today to explain his slowly progressive shortness of breath.  We will order a PET to evaluate for pleural lymphoma involvement; we will also order an echo and refer to pulmonary.  Pastor will also keep his scheduled upcoming stress test through his PCP.    Pastor has been tapering his hydrocortisone with his endocrinologist over the past year.  We asked him to temporarily increase his dose from 10 mg PO Qday to BID in case his history of adrenal insufficiency is contributing to his recent SOB/fatigue.  Depending on the results of the above testing he may be able to resume his slow taper with his endocrinologist in several weeks.      1.  BMT:  DLBCL  Axi-cell product with CNS prevention protocol   CNS prophy: simvastatin 40mg tied to IDS drug. Completed at day +30. All previously LPs under fluoroscopy.  PET Scan at day +28 with very good TX vs. CR  CT Day +60 and now day 100 disease status : Complete response with residual lung scar tissue. No new disease or sign of growth.   -+180 for CT scan - ongoing remission, pt is fully recovered now.   -+1.5 yr CT - ongoing remission     2.  HEME: excellent count recovery. No pre-meds.  - New PE dg in 10/2020  - s/p lovenox BID for PE dg on day 28  - Completed total of 6 months of anticoagulation with DOAC in 2021                            3.  ID: Afebrile and no infectious concerns.  Ok to stop acyclovir.     4.  GI:   - No active issues     5.  FEN/Renal: Cr stable.      6. Endo: Type  2 DM.    Complication of DM with 2 left toes amputated 7/2020  Adrenal Insufficiency:  hydrocortisone 10mg PO BID    7.  MSK:  Hx of osteomyelitis of 2nd & 3rd L toes w/ amputation of distal phalanx of both digits.     Plan:  - PET, echo, pulmonary consult for progressive SOB  - Increase hydrocortisone from 10 mg PO Qday to 10 mg PO BID while SOB evaluation is ongoing  - Continue to follow with primary oncologist  - RTC in 6 months as planned, possibly sooner depending on above workup results    Patient seen and discussed with attending Dr. Bunch.    Eduin Prescott MD  Hematology/Oncology/Transplant Fellow      Attestation signed by Rosey Bunch MD at 3/27/2022  2:42 PM:  Today, I  saw and examined the patient independently in clinic and discussed the recommendations. I reviewed the case with the fellow and agree with the note as above.     Pastor is 1 year 5 months post Yescarta - now in change in pulmonary scar on CT but new onset cough without other infectious symptoms  is concerning. His lungs showed scar tissue but early relapse maybe difficult to differentiate.    IgG 588 c/w with humoral recovery.   CD4 count 293 which is excellent.    We discussed the next step and recommended PET (no CT) and Echocardiogram and referral to pulmonary specialist to address the cough and consider bronchosccopy.           Again, thank you for allowing me to participate in the care of your patient.      Sincerely,    Rosey Bunch MD

## 2022-03-24 LAB
CD19 CELLS # BLD: 99 CELLS/UL (ref 107–698)
CD19 CELLS NFR BLD: 8 % (ref 6–27)
CD3 CELLS # BLD: 992 CELLS/UL (ref 603–2990)
CD3 CELLS NFR BLD: 83 % (ref 49–84)
CD3+CD4+ CELLS # BLD: 293 CELLS/UL (ref 441–2156)
CD3+CD4+ CELLS NFR BLD: 25 % (ref 28–63)
CD3+CD4+ CELLS/CD3+CD8+ CLL BLD: 0.42 % (ref 1.4–2.6)
CD3+CD8+ CELLS # BLD: 699 CELLS/UL (ref 125–1312)
CD3+CD8+ CELLS NFR BLD: 59 % (ref 10–40)
CD3-CD16+CD56+ CELLS # BLD: 95 CELLS/UL (ref 95–640)
CD3-CD16+CD56+ CELLS NFR BLD: 8 % (ref 4–25)
IGG SERPL-MCNC: 588 MG/DL (ref 610–1616)
T CELL EXTENDED COMMENT: ABNORMAL

## 2022-03-29 DIAGNOSIS — R05.8 RECURRENT COUGH: Primary | ICD-10-CM

## 2022-03-29 NOTE — TELEPHONE ENCOUNTER
RECORDS RECEIVED FROM: internal    DATE RECEIVED: 4.4.22    NOTES STATUS DETAILS   OFFICE NOTE from referring provider internal   Dr. Carpenter   OFFICE NOTE from other specialist internal  BMT services    DISCHARGE SUMMARY from hospital     DISCHARGE REPORT from the ER Internal  6.19.21 Franky   10.8.20, 9.29.20- El jurdi    MEDICATION LIST internal     IMAGING  (NEED IMAGES AND REPORTS)     CT SCAN internal  PET- scheduled 3.31.22, 10.28.20,    3.18.22, 9.29.21, 6.19.21, 3.30.21, 12.29.20, 11.25.20 more in epic    CHEST XRAY (CXR) internal    10.13.20, 10.8.20, 9.29.20, 6.5.20    TESTS     PULMONARY FUNCTION TESTING (PFT) internal  Scheduled 4.4.22

## 2022-03-31 ENCOUNTER — HOSPITAL ENCOUNTER (OUTPATIENT)
Dept: CARDIOLOGY | Facility: CLINIC | Age: 72
Discharge: HOME | End: 2022-03-31
Payer: COMMERCIAL

## 2022-03-31 ENCOUNTER — HOSPITAL ENCOUNTER (OUTPATIENT)
Dept: PET IMAGING | Facility: CLINIC | Age: 72
Discharge: HOME | End: 2022-03-31
Payer: COMMERCIAL

## 2022-03-31 DIAGNOSIS — C83.38 DIFFUSE LARGE B-CELL LYMPHOMA OF LYMPH NODES OF MULTIPLE REGIONS (H): ICD-10-CM

## 2022-03-31 LAB — LVEF ECHO: NORMAL

## 2022-03-31 PROCEDURE — A9552 F18 FDG: HCPCS

## 2022-03-31 PROCEDURE — 78816 PET IMAGE W/CT FULL BODY: CPT | Mod: 26

## 2022-03-31 PROCEDURE — 78816 PET IMAGE W/CT FULL BODY: CPT | Mod: PS

## 2022-03-31 PROCEDURE — 93306 TTE W/DOPPLER COMPLETE: CPT | Mod: 26 | Performed by: INTERNAL MEDICINE

## 2022-03-31 PROCEDURE — 343N000001 HC RX 343

## 2022-03-31 PROCEDURE — 93306 TTE W/DOPPLER COMPLETE: CPT

## 2022-03-31 RX ADMIN — FLUDEOXYGLUCOSE F-18 13.02 MCI.: 500 INJECTION, SOLUTION INTRAVENOUS at 10:50

## 2022-04-01 ENCOUNTER — TELEPHONE (OUTPATIENT)
Dept: TRANSPLANT | Facility: CLINIC | Age: 72
End: 2022-04-01
Payer: COMMERCIAL

## 2022-04-01 DIAGNOSIS — R59.0 LYMPHADENOPATHY, MEDIASTINAL: ICD-10-CM

## 2022-04-01 DIAGNOSIS — C83.3A DIFFUSE LARGE CELL LYMPHOMA IN REMISSION: Primary | ICD-10-CM

## 2022-04-01 DIAGNOSIS — C83.30 DIFFUSE LARGE B-CELL LYMPHOMA, UNSPECIFIED BODY REGION (H): Primary | ICD-10-CM

## 2022-04-01 RX ORDER — ONDANSETRON 8 MG/1
8 TABLET, ORALLY DISINTEGRATING ORAL EVERY 8 HOURS PRN
Qty: 60 TABLET | Refills: 0 | Status: SHIPPED | OUTPATIENT
Start: 2022-04-01 | End: 2023-05-18

## 2022-04-01 ASSESSMENT — ENCOUNTER SYMPTOMS
SNORES LOUDLY: 0
HEARTBURN: 0
SHORTNESS OF BREATH: 1
ABDOMINAL PAIN: 0
CONSTIPATION: 0
VOMITING: 1
BOWEL INCONTINENCE: 0
BLOOD IN STOOL: 0
JAUNDICE: 0
NAUSEA: 1
DIARRHEA: 1
BLOATING: 0
RECTAL PAIN: 0
HEMOPTYSIS: 0
POSTURAL DYSPNEA: 0
COUGH: 1
COUGH DISTURBING SLEEP: 0
SPUTUM PRODUCTION: 0
DYSPNEA ON EXERTION: 1

## 2022-04-01 NOTE — PROGRESS NOTES
04/01/22    I have connected with Pastor and his wife today.  He cont to be quite SOB, nauseated and has cough.    I reviewed PET results - and findings of hypermetabolic mediastinal LN suspicious from recurrent lymphoma  SUV is rather low -could be DLBCL or FL    Plan is to proceed with bronch and EBUS and LN biopsy to evaluate histology and CD19 expression.     Pt is aware and agreeable. We breifly talked about opportunity to enroll to other cell therapy clinical trials.    Rosey Bunch MD

## 2022-04-04 ENCOUNTER — OFFICE VISIT (OUTPATIENT)
Dept: PULMONOLOGY | Facility: CLINIC | Age: 72
End: 2022-04-04
Attending: INTERNAL MEDICINE
Payer: COMMERCIAL

## 2022-04-04 ENCOUNTER — PRE VISIT (OUTPATIENT)
Dept: PULMONOLOGY | Facility: CLINIC | Age: 72
End: 2022-04-04

## 2022-04-04 VITALS
HEART RATE: 95 BPM | SYSTOLIC BLOOD PRESSURE: 107 MMHG | HEIGHT: 69 IN | OXYGEN SATURATION: 91 % | RESPIRATION RATE: 18 BRPM | DIASTOLIC BLOOD PRESSURE: 22 MMHG | WEIGHT: 223 LBS | BODY MASS INDEX: 33.03 KG/M2

## 2022-04-04 DIAGNOSIS — C85.90 LYMPHOMA (H): Primary | ICD-10-CM

## 2022-04-04 DIAGNOSIS — R05.8 RECURRENT COUGH: ICD-10-CM

## 2022-04-04 DIAGNOSIS — C83.38 DIFFUSE LARGE B-CELL LYMPHOMA OF LYMPH NODES OF MULTIPLE REGIONS (H): ICD-10-CM

## 2022-04-04 DIAGNOSIS — R09.02 HYPOXIA: Primary | ICD-10-CM

## 2022-04-04 LAB
6 MIN WALK (FT): 670 FT
6 MIN WALK (M): 204 M

## 2022-04-04 PROCEDURE — 94726 PLETHYSMOGRAPHY LUNG VOLUMES: CPT | Performed by: INTERNAL MEDICINE

## 2022-04-04 PROCEDURE — 94375 RESPIRATORY FLOW VOLUME LOOP: CPT | Performed by: INTERNAL MEDICINE

## 2022-04-04 PROCEDURE — 99204 OFFICE O/P NEW MOD 45 MIN: CPT | Mod: 25 | Performed by: INTERNAL MEDICINE

## 2022-04-04 PROCEDURE — 94729 DIFFUSING CAPACITY: CPT | Performed by: INTERNAL MEDICINE

## 2022-04-04 PROCEDURE — G0463 HOSPITAL OUTPT CLINIC VISIT: HCPCS | Mod: 25

## 2022-04-04 PROCEDURE — 94618 PULMONARY STRESS TESTING: CPT | Performed by: INTERNAL MEDICINE

## 2022-04-04 ASSESSMENT — PAIN SCALES - GENERAL: PAINLEVEL: NO PAIN (0)

## 2022-04-04 NOTE — PROGRESS NOTES
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    Pulmonary HPI  73 YO male with history of DLBCL diagnosed in 2015. Developed respiratory symptoms of SOB and BETANCOURT around the time of diagnosis. Has had RLL mass diagnosed as lymphoma.  Underwent therapy with improvement in symptoms.  Has recurrence of disease in 2020 and underwent CAR-T cell therapy.  Was diagnosed with COVID in January 2022, had mild symptoms of URI symptoms and nasal drainage.  Felt mildly SOB during that time. Symptoms resolved after 10 days; notes some SOB after COVID.  States at the present time his SOB is only with significant exertion of walking in [employer] stores.  Notices some cough but is mostly non-productive except for feeling of gastric secretions after coughing.  CT chest from 3-18 and PET scan from 3-31 was personally reviewed.  RLL mass is unchanged in size, mediastinal and hilar adenopathy.  History of JESSICA, on CPAP.  No tobacco.    The patient was seen and examined by Sanjay Granado MD           Current Outpatient Medications   Medication     acyclovir (ZOVIRAX) 800 MG tablet     bisacodyl (DULCOLAX) 5 MG EC tablet     docusate sodium (COLACE) 100 MG capsule     hydrocortisone (CORTEF) 10 MG tablet     insulin aspart (NOVOLOG FLEXPEN) 100 UNIT/ML pen     insulin glargine (LANTUS PEN) 100 UNIT/ML pen     metFORMIN (GLUCOPHAGE) 500 MG tablet     ondansetron (ZOFRAN-ODT) 8 MG ODT tab     prochlorperazine (COMPAZINE) 5 MG tablet     rosuvastatin (CRESTOR) 10 MG tablet     No current facility-administered medications for this visit.     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  Partial left second toe amputation with osteotomy through proximal phalanx.  2.  Partial left third toe amputation with osteotomy through proximal phalanx.;  Surgeon: Ismael Humphrey DPM;  Location:  OR     BIOPSY LYMPH NODE CERVICAL N/A 12/5/2014    Procedure: BIOPSY LYMPH NODE CERVICAL;  Surgeon: Robbie Linda MD;  Location:  OR     BRONCHOSCOPY FLEXIBLE N/A 11/14/2017    Procedure: BRONCHOSCOPY FLEXIBLE;  FLEXIBLE BRONCHOSCOPY WITH BIOPSIES.;  Surgeon: Robbie Linda MD;  Location:  OR     COLONOSCOPY       COLONOSCOPY N/A 5/9/2018    Procedure: COMBINED COLONOSCOPY, SINGLE OR MULTIPLE BIOPSY/POLYPECTOMY BY BIOPSY;;  Surgeon: Ramos Bates MD;  Location:  GI     ENDOVASCULAR PLACEMENT VASCULAR DEVICE Left 12/5/2017    Procedure: ENDOVASCULAR PLACEMENT VASCULAR DEVICE;;  Surgeon: Robbie Linda MD;  Location: [facility]     ENT SURGERY      tonsillectomy     EXCISE NODE MEDIASTINAL N/A 11/17/2017    Procedure: EXCISE NODE MEDIASTINAL;;  Surgeon: Robbie Linda MD;  Location:  OR     EYE SURGERY       EYE SURGERY Left     vitrectomy     INSERT PORT VASCULAR ACCESS N/A 12/05/2014    Procedure: INSERT PORT VASCULAR ACCESS;  Surgeon: Robbie Linda MD;  Location:  OR     INSERT PORT VASCULAR ACCESS N/A 12/5/2017    Procedure: INSERT PORT VASCULAR ACCESS;  POWER PORT PLACEMENT ATTEMPTED, PLACEMENT OF ANGIO-SEAL VIP VASCULAR CLOSURE DEVICE;  Surgeon: Robbie Linda MD;   Location:  SD     IR CHEST PORT PLACEMENT > 5 YRS OF AGE  6/5/2020     IR PORT REMOVAL RIGHT  12/10/2018     PHACOEMULSIFICATION CLEAR CORNEA WITH STANDARD INTRAOCULAR LENS IMPLANT Right 5/2/2016    Procedure: PHACOEMULSIFICATION CLEAR CORNEA WITH STANDARD INTRAOCULAR LENS IMPLANT;  Surgeon: Rasheed Finney MD;  Location:  EC     REMOVE PORT VASCULAR ACCESS N/A 3/14/2017    Procedure: REMOVE PORT VASCULAR ACCESS;  Surgeon: Robbie Linda MD;  Location:  OR     SOFT TISSUE SURGERY      BASAL CELL CA FOREHEAD     THORACOTOMY Right 11/17/2017    Procedure: THORACOTOMY;  RIGHT EXPLORATORY THORACOTOMY/ EXTENSIVE PNEUMOLYSIS/ BIOPSY OF INTERLOBAR LYMPH NODE;  Surgeon: Robbie Linda MD;  Location:  OR       Social History     Socioeconomic History     Marital status:      Spouse name: Not on file     Number of children: Not on file     Years of education: Not on file     Highest education level: Not on file   Occupational History     Not on file   Tobacco Use     Smoking status: Never Smoker     Smokeless tobacco: Never Used   Substance and Sexual Activity     Alcohol use: No     Drug use: No     Sexual activity: Not on file   Other Topics Concern     Parent/sibling w/ CABG, MI or angioplasty before 65F 55M? Not Asked   Social History Narrative     Not on file     Social Determinants of Health     Financial Resource Strain: Not on file   Food Insecurity: Not on file   Transportation Needs: Not on file   Physical Activity: Not on file   Stress: Not on file   Social Connections: Not on file   Intimate Partner Violence: Not At Risk     Fear of Current or Ex-Partner: No     Emotionally Abused: No     Physically Abused: No     Sexually Abused: No   Housing Stability: Not on file       FH:  Reviewed with patient.  No family history of lung disease  ROS Pulmonary  A complete ROS was otherwise negative except as noted in the HPI.  There were no vitals taken for this visit.  Exam:   GENERAL  APPEARANCE: Well developed, well nourished, alert, and in no apparent distress.  EYES: PERRL, EOMI  HENT: Nasal mucosa with no edema and no hyperemia. No nasal polyps.  EARS: Canals clear, TMs normal  MOUTH: Oral mucosa is moist, without any lesions, no tonsillar enlargement, no oropharyngeal exudate.  NECK: supple, no masses, no thyromegaly.  LYMPHATICS: No significant axillary, cervical, or supraclavicular nodes.  RESP:  Good air flow throughout except for right base with decreased BS.  No crackles. No rhonchi. No wheezes.  CV: Normal S1, S2, regular rhythm, normal rate. No murmur.  No rub. No gallop. No LE edema.   ABDOMEN:  Bowel sounds normal, soft, nontender, no HSM or masses.   MS: extremities normal. No clubbing. No cyanosis.  SKIN: no rash on limited exam  NEURO: Mentation intact, speech normal, normal strength and tone, normal gait and stance  PSYCH: mentation appears normal. and affect normal/bright  Results:  Pulmonary function tests were personally reviewed in clinic  FVC 1.92 (48%), FEV-1 1.39 (45%), FEV-1/FVC 76%  FRC 65%, RV 80%, TLC 60%  cDLCO 46%  No airflow limitation.  Restrictive lung volumes. Decreased diffusion capacity.  Compared to 2017 the FEV and FVC are decreased as well as the lung volumes.  Decreased diffusion capacity.  Recent Results (from the past 168 hour(s))   Echocardiogram Complete    Collection Time: 03/31/22  9:25 AM   Result Value Ref Range    LVEF  55-60%    General PFT Lab (Please always keep checked)    Collection Time: 04/04/22 12:33 PM   Result Value Ref Range    FVC-Pred 3.96 L    FVC-Pre 1.92 L    FVC-%Pred-Pre 48 %    FEV1-Pre 1.39 L    FEV1-%Pred-Pre 45 %    FEV1FVC-Pred 76 %    FEV1FVC-Pre 72 %    FEFMax-Pred 7.91 L/sec    FEFMax-Pre 4.94 L/sec    FEFMax-%Pred-Pre 62 %    FEF2575-Pred 2.33 L/sec    FEF2575-Pre 0.91 L/sec    ITV6750-%Pred-Pre 38 %    ExpTime-Pre 6.37 sec    FIFMax-Pre 2.01 L/sec    VC-Pred 4.41 L    VC-Pre 2.01 L    VC-%Pred-Pre 45 %    IC-Pred 3.83 L     IC-Pre 1.75 L    IC-%Pred-Pre 45 %    ERV-Pred 0.59 L    ERV-Pre 0.27 L    ERV-%Pred-Pre 45 %    FEV1FEV6-Pred 78 %    FEV1FEV6-Pre 72 %    FRCPleth-Pred 3.58 L    FRCPleth-Pre 2.34 L    FRCPleth-%Pred-Pre 65 %    RVPleth-Pred 2.57 L    RVPleth-Pre 2.08 L    RVPleth-%Pred-Pre 80 %    TLCPleth-Pred 6.72 L    TLCPleth-Pre 4.09 L    TLCPleth-%Pred-Pre 60 %    DLCOunc-Pred 24.36 ml/min/mmHg    DLCOunc-Pre 10.53 ml/min/mmHg    DLCOunc-%Pred-Pre 43 %    DLCOcor-Pre 10.72 ml/min/mmHg    DLCOcor-%Pred-Pre 43 %    VA-Pre 2.90 L    VA-%Pred-Pre 48 %    FEV1SVC-Pred 68 %    FEV1SVC-Pre 69 %       Assessment and plan:   73 YO with onset of increased SOB for past few months. Diagnosis of COVID in Jan 2022 with mild symptoms.  No recent weight gain; less active in the Winter.  Review of chest CT does not show any increase in size or character of RLL mass/infiltrate. No pleural effusions. Increase in SOB cannot be explained by an increase in the RLL mass/consolidation. Symptoms of increased SOB could be due to post COVID or pulmonary embolism (does have a significant decrease in diffusion capacity).     1. CT chest under PE protocol  2. Oxygen titration study to assess for desaturation.  Will order oxygen if necessary  3. Planned for bronchoscopy with EBUS to further examined mediastinal and hilar adenopathy.      Results of six minute oxygen titration study was reviewed.  Normal saturation at rest. With exertion there was desaturation to less than 90%; required 4L of supplemental oxygen with exertion to maintain saturation above 88%.  Will prescribe 4L of oxygen with exertion.    I certify that this patient, Pastor Garibay has been under my care (or a nurse practitioner or physican's assistant working with me). This is the face-to-face encounter for oxygen medical necessity.      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 Patient has continued oxygen desaturation due to  right lower lobe infiltrate secondary to lymphoma, hypoxia.    Alternative treatment(s) tried or considered and deemed clinically infective for treatment of lymphoma, hypoxia include- no alternatives available.  If portability is ordered, is the patient mobile within the home? Yes      Answers for HPI/ROS submitted by the patient on 4/1/2022  General Symptoms: No  Skin Symptoms: No  HENT Symptoms: No  EYE SYMPTOMS: No  HEART SYMPTOMS: No  LUNG SYMPTOMS: Yes  INTESTINAL SYMPTOMS: Yes  URINARY SYMPTOMS: No  REPRODUCTIVE SYMPTOMS: No  SKELETAL SYMPTOMS: No  BLOOD SYMPTOMS: No  NERVOUS SYSTEM SYMPTOMS: No  MENTAL HEALTH SYMPTOMS: No  Cough: Yes  Sputum or phlegm: No  Coughing up blood: No  Difficulty breating or shortness of breath: Yes  Snoring: No  Difficulty breathing on exertion: Yes  Nighttime Cough: No  Difficulty breathing when lying flat: No  Heart burn or indigestion: No  Nausea: Yes  Vomiting: Yes  Abdominal pain: No  Bloating: No  Constipation: No  Diarrhea: Yes  Blood in stool: No  Black stools: No  Rectal or Anal pain: No  Fecal incontinence: No  Yellowing of skin or eyes: No  Vomit with blood: No  Change in stools: No

## 2022-04-04 NOTE — LETTER
4/4/2022     RE: Pastor Garibay  [address]  [facility] [address]    Dear Colleague,    Thank you for referring your patient, Pastor Garibay, to the [facility] [place]. Please see a copy of my visit note below.    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    Pulmonary HPI  71 YO male with history of DLBCL diagnosed in 2015. Developed respiratory symptoms of SOB and BETANCOURT around the time of diagnosis. Has had RLL mass diagnosed as lymphoma.  Underwent therapy with improvement in symptoms.  Has recurrence of disease in 2020 and underwent CAR-T cell therapy.  Was diagnosed with COVID in January 2022, had mild symptoms of URI symptoms and nasal drainage.  Felt mildly SOB during that time. Symptoms resolved after 10 days; notes some SOB after COVID.  States at the present time his SOB is only with significant exertion of walking in box stores.  Notices some cough but is mostly non-productive except for feeling of gastric secretions after coughing.  CT chest from 3-18 and PET scan from 3-31 was personally reviewed.  RLL mass is unchanged in size, mediastinal and hilar adenopathy.  History of JESSICA, on CPAP.  No tobacco.    The patient was seen and examined by Sanjay Granado MD           Current Outpatient Medications   Medication     acyclovir (ZOVIRAX) 800 MG tablet     bisacodyl (DULCOLAX) 5 MG EC tablet     docusate sodium (COLACE) 100 MG capsule     hydrocortisone (CORTEF) 10 MG tablet     insulin aspart (NOVOLOG FLEXPEN) 100 UNIT/ML pen     insulin glargine (LANTUS PEN) 100 UNIT/ML pen     metFORMIN (GLUCOPHAGE) 500 MG tablet     ondansetron (ZOFRAN-ODT) 8 MG ODT tab     prochlorperazine (COMPAZINE) 5 MG tablet     rosuvastatin (CRESTOR) 10 MG tablet     No current facility-administered medications for this visit.     No Known Allergies  Past Medical History:   Diagnosis Date      Abnormal liver function test      Anemia      CPAP (continuous positive airway pressure) dependence      Diabetes (H)      Hx of skin cancer, basal cell      Hyperlipidemia      Hypertension      Keratoderma      Large cell lymphoma (H) 7/20/2020     Liver disease      Lymphoma (H)      Non-Hodgkin lymphoma (H)      Pedal edema     CHRONIC     Pleural effusion      Seborrheic keratosis, inflamed      Sleep apnea     Uses a CPAP     Thrombocytopenia (H) 7/20/2020     Thrombosis Felbruary 2018       Past Surgical History:   Procedure Laterality Date     AMPUTATE TOE(S) Left 5/22/2020    Procedure: LEFT SECOND AND THIRD DISTAL TOE AMPUTATIONS;  Surgeon: Ramon Flores MD;  Location:  OR     AMPUTATE TOE(S) Left 7/1/2020    Procedure: 1.  Partial left second toe amputation with osteotomy through proximal phalanx.  2.  Partial left third toe amputation with osteotomy through proximal phalanx.;  Surgeon: Ismael Humphrey DPM;  Location:  OR     BIOPSY LYMPH NODE CERVICAL N/A 12/5/2014    Procedure: BIOPSY LYMPH NODE CERVICAL;  Surgeon: Robbie Linda MD;  Location:  OR     BRONCHOSCOPY FLEXIBLE N/A 11/14/2017    Procedure: BRONCHOSCOPY FLEXIBLE;  FLEXIBLE BRONCHOSCOPY WITH BIOPSIES.;  Surgeon: Robbie Linda MD;  Location:  OR     COLONOSCOPY       COLONOSCOPY N/A 5/9/2018    Procedure: COMBINED COLONOSCOPY, SINGLE OR MULTIPLE BIOPSY/POLYPECTOMY BY BIOPSY;;  Surgeon: Ramos Bates MD;  Location:  GI     ENDOVASCULAR PLACEMENT VASCULAR DEVICE Left 12/5/2017    Procedure: ENDOVASCULAR PLACEMENT VASCULAR DEVICE;;  Surgeon: Robbie Linda MD;  Location: [facility]     ENT SURGERY      tonsillectomy     EXCISE NODE MEDIASTINAL N/A 11/17/2017    Procedure: EXCISE NODE MEDIASTINAL;;  Surgeon: Robbie Linda MD;  Location:  OR     EYE SURGERY       EYE SURGERY Left     vitrectomy     INSERT PORT VASCULAR ACCESS N/A 12/05/2014    Procedure: INSERT PORT VASCULAR  ACCESS;  Surgeon: Robbie Linda MD;  Location:  OR     INSERT PORT VASCULAR ACCESS N/A 12/5/2017    Procedure: INSERT PORT VASCULAR ACCESS;  POWER PORT PLACEMENT ATTEMPTED, PLACEMENT OF ANGIO-SEAL VIP VASCULAR CLOSURE DEVICE;  Surgeon: Robbie Linda MD;  Location:  SD     IR CHEST PORT PLACEMENT > 5 YRS OF AGE  6/5/2020     IR PORT REMOVAL RIGHT  12/10/2018     PHACOEMULSIFICATION CLEAR CORNEA WITH STANDARD INTRAOCULAR LENS IMPLANT Right 5/2/2016    Procedure: PHACOEMULSIFICATION CLEAR CORNEA WITH STANDARD INTRAOCULAR LENS IMPLANT;  Surgeon: Rasheed Finney MD;  Location:  EC     REMOVE PORT VASCULAR ACCESS N/A 3/14/2017    Procedure: REMOVE PORT VASCULAR ACCESS;  Surgeon: Robbie Linda MD;  Location:  OR     SOFT TISSUE SURGERY      BASAL CELL CA FOREHEAD     THORACOTOMY Right 11/17/2017    Procedure: THORACOTOMY;  RIGHT EXPLORATORY THORACOTOMY/ EXTENSIVE PNEUMOLYSIS/ BIOPSY OF INTERLOBAR LYMPH NODE;  Surgeon: Robbie Linda MD;  Location:  OR       Social History     Socioeconomic History     Marital status:      Spouse name: Not on file     Number of children: Not on file     Years of education: Not on file     Highest education level: Not on file   Occupational History     Not on file   Tobacco Use     Smoking status: Never Smoker     Smokeless tobacco: Never Used   Substance and Sexual Activity     Alcohol use: No     Drug use: No     Sexual activity: Not on file   Other Topics Concern     Parent/sibling w/ CABG, MI or angioplasty before 65F 55M? Not Asked   Social History Narrative     Not on file     Social Determinants of Health     Financial Resource Strain: Not on file   Food Insecurity: Not on file   Transportation Needs: Not on file   Physical Activity: Not on file   Stress: Not on file   Social Connections: Not on file   Intimate Partner Violence: Not At Risk     Fear of Current or Ex-Partner: No     Emotionally Abused: No     Physically  Abused: No     Sexually Abused: No   Housing Stability: Not on file       FH:  Reviewed with patient.  No family history of lung disease  ROS Pulmonary  A complete ROS was otherwise negative except as noted in the HPI.  There were no vitals taken for this visit.  Exam:   GENERAL APPEARANCE: Well developed, well nourished, alert, and in no apparent distress.  EYES: PERRL, EOMI  HENT: Nasal mucosa with no edema and no hyperemia. No nasal polyps.  EARS: Canals clear, TMs normal  MOUTH: Oral mucosa is moist, without any lesions, no tonsillar enlargement, no oropharyngeal exudate.  NECK: supple, no masses, no thyromegaly.  LYMPHATICS: No significant axillary, cervical, or supraclavicular nodes.  RESP:  Good air flow throughout except for right base with decreased BS.  No crackles. No rhonchi. No wheezes.  CV: Normal S1, S2, regular rhythm, normal rate. No murmur.  No rub. No gallop. No LE edema.   ABDOMEN:  Bowel sounds normal, soft, nontender, no HSM or masses.   MS: extremities normal. No clubbing. No cyanosis.  SKIN: no rash on limited exam  NEURO: Mentation intact, speech normal, normal strength and tone, normal gait and stance  PSYCH: mentation appears normal. and affect normal/bright  Results:  Pulmonary function tests were personally reviewed in clinic  FVC 1.92 (48%), FEV-1 1.39 (45%), FEV-1/FVC 76%  FRC 65%, RV 80%, TLC 60%  cDLCO 46%  No airflow limitation.  Restrictive lung volumes. Decreased diffusion capacity.  Compared to 2017 the FEV and FVC are decreased as well as the lung volumes.  Decreased diffusion capacity.  Recent Results (from the past 168 hour(s))   Echocardiogram Complete    Collection Time: 03/31/22  9:25 AM   Result Value Ref Range    LVEF  55-60%    General PFT Lab (Please always keep checked)    Collection Time: 04/04/22 12:33 PM   Result Value Ref Range    FVC-Pred 3.96 L    FVC-Pre 1.92 L    FVC-%Pred-Pre 48 %    FEV1-Pre 1.39 L    FEV1-%Pred-Pre 45 %    FEV1FVC-Pred 76 %    FEV1FVC-Pre 72  %    FEFMax-Pred 7.91 L/sec    FEFMax-Pre 4.94 L/sec    FEFMax-%Pred-Pre 62 %    FEF2575-Pred 2.33 L/sec    FEF2575-Pre 0.91 L/sec    KEC4702-%Pred-Pre 38 %    ExpTime-Pre 6.37 sec    FIFMax-Pre 2.01 L/sec    VC-Pred 4.41 L    VC-Pre 2.01 L    VC-%Pred-Pre 45 %    IC-Pred 3.83 L    IC-Pre 1.75 L    IC-%Pred-Pre 45 %    ERV-Pred 0.59 L    ERV-Pre 0.27 L    ERV-%Pred-Pre 45 %    FEV1FEV6-Pred 78 %    FEV1FEV6-Pre 72 %    FRCPleth-Pred 3.58 L    FRCPleth-Pre 2.34 L    FRCPleth-%Pred-Pre 65 %    RVPleth-Pred 2.57 L    RVPleth-Pre 2.08 L    RVPleth-%Pred-Pre 80 %    TLCPleth-Pred 6.72 L    TLCPleth-Pre 4.09 L    TLCPleth-%Pred-Pre 60 %    DLCOunc-Pred 24.36 ml/min/mmHg    DLCOunc-Pre 10.53 ml/min/mmHg    DLCOunc-%Pred-Pre 43 %    DLCOcor-Pre 10.72 ml/min/mmHg    DLCOcor-%Pred-Pre 43 %    VA-Pre 2.90 L    VA-%Pred-Pre 48 %    FEV1SVC-Pred 68 %    FEV1SVC-Pre 69 %     Assessment and plan:   73 YO with onset of increased SOB for past few months. Diagnosis of COVID in Jan 2022 with mild symptoms.  No recent weight gain; less active in the Winter.  Review of chest CT does not show any increase in size or character of RLL mass/infiltrate. No pleural effusions. Increase in SOB cannot be explained by an increase in the RLL mass/consolidation. Symptoms of increased SOB could be due to post COVID or pulmonary embolism (does have a significant decrease in diffusion capacity).     1. CT chest under PE protocol  2. Oxygen titration study to assess for desaturation.  Will order oxygen if necessary  3. Planned for bronchoscopy with EBUS to further examined mediastinal and hilar adenopathy.      Results of six minute oxygen titration study was reviewed.  Normal saturation at rest. With exertion there was desaturation to less than 90%; required 4L of supplemental oxygen with exertion to maintain saturation above 88%.  Will prescribe 4L of oxygen with exertion.    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 This is the face-to-face encounter for oxygen medical necessity.      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 Patient has continued oxygen desaturation due to right lower lobe infiltrate secondary to lymphoma, hypoxia.    Alternative treatment(s) tried or considered and deemed clinically infective for treatment of lymphoma, hypoxia include- no alternatives available.  If portability is ordered, is the patient mobile within the home? Yes    Answers for HPI/ROS submitted by the patient on 4/1/2022  General Symptoms: No  Skin Symptoms: No  HENT Symptoms: No  EYE SYMPTOMS: No  HEART SYMPTOMS: No  LUNG SYMPTOMS: Yes  INTESTINAL SYMPTOMS: Yes  URINARY SYMPTOMS: No  REPRODUCTIVE SYMPTOMS: No  SKELETAL SYMPTOMS: No  BLOOD SYMPTOMS: No  NERVOUS SYSTEM SYMPTOMS: No  MENTAL HEALTH SYMPTOMS: No  Cough: Yes  Sputum or phlegm: No  Coughing up blood: No  Difficulty breating or shortness of breath: Yes  Snoring: No  Difficulty breathing on exertion: Yes  Nighttime Cough: No  Difficulty breathing when lying flat: No  Heart burn or indigestion: No  Nausea: Yes  Vomiting: Yes  Abdominal pain: No  Bloating: No  Constipation: No  Diarrhea: Yes  Blood in stool: No  Black stools: No  Rectal or Anal pain: No  Fecal incontinence: No  Yellowing of skin or eyes: No  Vomit with blood: No  Change in stools: No    Again, thank you for allowing me to participate in the care of your patient.      Sincerely,    Sanjay Granado MD

## 2022-04-05 DIAGNOSIS — R59.0 MEDIASTINAL LYMPHADENOPATHY: Primary | ICD-10-CM

## 2022-04-06 ENCOUNTER — TELEPHONE (OUTPATIENT)
Dept: PULMONOLOGY | Facility: CLINIC | Age: 72
End: 2022-04-06

## 2022-04-06 DIAGNOSIS — Z11.59 ENCOUNTER FOR SCREENING FOR OTHER VIRAL DISEASES: Primary | ICD-10-CM

## 2022-04-06 NOTE — TELEPHONE ENCOUNTER
Fax cover sheet, demographic sheet, home oxygen order, 6MWT results and recent signed office notes faxed to [facility] ([phone]).

## 2022-04-06 NOTE — PROGRESS NOTES
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  He continues to have SOB with exertion but this has not changed significantly since the last time I saw him.  He denies any new symptoms including chest pain, fevers/chills, lumps/bumps, nausea/vomiting or other acute concerns.  His repeat PET (3/31/22) showed evidence of probable lymphoma and an EBUS is scheduled early next week.    10 point review of systems negative except as noted above.        PHYSICAL EXAM                                                                                                                                     KPS: 80    General: NAD; sitting up on exam table  HEENT: Oropharynx unremarkable  Pulm: Clear to auscultation bilaterally  Card: RRR, normal S1/S2  Abd: Non-tender, non-distended, no palpable hepatosplenomegaly  Ext: No LE edema  Neuro: A&O, non-focal, normal gait     Labs:   Lab Results   Component Value Date    WBC 7.6 04/07/2022    ANEU 6.2 06/19/2021    HGB 14.0 04/07/2022    HCT 45.1 04/07/2022     (L) 04/07/2022     04/07/2022    POTASSIUM 4.2 04/07/2022    CHLORIDE 104 04/07/2022    CO2 29 04/07/2022     (H) 04/07/2022    BUN 11 04/07/2022    CR 0.71 04/07/2022    MAG 1.8 03/23/2022    INR 1.16 (H) 10/28/2020    AST 22 04/07/2022    ALT 27 04/07/2022       Imaging:     Combined Report of:    PET and CT on  3/31/2022 12:48 PM :   IMPRESSION:   In this patient with 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 Disease progression by Lugano criteria, with FDG-avid mediastinal  and hilar lymphadenopathy (Deauville 4-5).  2. Similar volume loss and consolidation of the right lung compared to  previous examination.  3. Similar hepatosplenomegaly compared to previous examination.  4. The remainder of the exam is not  significantly changed since  3/18/2022.    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     PET/CT showed evidence of disease progression as outlined above.  The SUV on imaging was relatively low and it is not entirely clear whether this is DLBCL or FL; bronch and EBUS with LN biopsy will be performed early next week.  We reviewed [id number] PBCAR in anticipation of a positive biopsy result next week.  Details of the trial were explained in detail and consent was obtained.  Pastor and his wife are aware that enrollment will depend on biopsy results.      1.  BMT:  DLBCL  Axi-cell product with CNS prevention protocol   CNS prophy: simvastatin 40mg tied to IDS drug. Completed at day +30. All previously LPs under fluoroscopy.  PET Scan at day +28 with very good GA vs. CR  CT Day +60 and now day 100 disease status : Complete response with residual lung scar tissue. No new disease or sign of growth.   -+180 for CT scan - ongoing remission, pt is fully recovered now.   -+1.5 yr CT - ongoing remission  -+1.6 yr PET/CT - disease progression, ?DLBCL vs FL     2.  HEME: excellent count recovery. No pre-meds.  - New PE dg in 10/2020  - s/p lovenox BID for PE dg on day 28  - Completed total of 6 months of anticoagulation with DOAC in 2021                            3.  ID: Afebrile and no infectious concerns.     4.  GI:   - No active issues     5.  FEN/Renal: Cr stable.      6. Endo: Type 2 DM.    Complication of DM with 2 left toes amputated 7/2020  Adrenal Insufficiency:  hydrocortisone 10mg PO BID    7.  MSK:  Hx of osteomyelitis of 2nd & 3rd L toes w/ amputation of distal phalanx of both digits.     Plan:  - Proceed with bronch and EBUS with LN biopsy to evaluate histology and CD19 expression  - Discussed PBCAR in detail today; consents signed in anticipation of likely positive biopsy result  - Will f/u further once  biopsy results are available    Patient seen and discussed with attending Dr. Bunch.    Eduin Prescott MD  Hematology/Oncology/Transplant Fellow

## 2022-04-07 ENCOUNTER — ONCOLOGY VISIT (OUTPATIENT)
Dept: TRANSPLANT | Facility: CLINIC | Age: 72
End: 2022-04-07
Attending: INTERNAL MEDICINE
Payer: COMMERCIAL

## 2022-04-07 ENCOUNTER — TELEPHONE (OUTPATIENT)
Dept: PULMONOLOGY | Facility: CLINIC | Age: 72
End: 2022-04-07
Payer: COMMERCIAL

## 2022-04-07 ENCOUNTER — LAB (OUTPATIENT)
Dept: LAB | Facility: CLINIC | Age: 72
End: 2022-04-07
Attending: INTERNAL MEDICINE
Payer: COMMERCIAL

## 2022-04-07 ENCOUNTER — RESEARCH ENCOUNTER (OUTPATIENT)
Dept: TRANSPLANT | Facility: CLINIC | Age: 72
End: 2022-04-07

## 2022-04-07 VITALS
BODY MASS INDEX: 33.74 KG/M2 | SYSTOLIC BLOOD PRESSURE: 109 MMHG | TEMPERATURE: 97.9 F | OXYGEN SATURATION: 91 % | RESPIRATION RATE: 18 BRPM | DIASTOLIC BLOOD PRESSURE: 71 MMHG | WEIGHT: 225.2 LBS | HEART RATE: 101 BPM

## 2022-04-07 DIAGNOSIS — R06.02 SHORTNESS OF BREATH: Primary | ICD-10-CM

## 2022-04-07 DIAGNOSIS — C83.30 DIFFUSE LARGE B-CELL LYMPHOMA, UNSPECIFIED BODY REGION (H): ICD-10-CM

## 2022-04-07 DIAGNOSIS — R09.02 HYPOXIA: ICD-10-CM

## 2022-04-07 DIAGNOSIS — Z11.59 ENCOUNTER FOR SCREENING FOR OTHER VIRAL DISEASES: ICD-10-CM

## 2022-04-07 LAB
ALBUMIN SERPL-MCNC: 3.3 G/DL (ref 3.4–5)
ALP SERPL-CCNC: 79 U/L (ref 40–150)
ALT SERPL W P-5'-P-CCNC: 27 U/L (ref 0–70)
ANION GAP SERPL CALCULATED.3IONS-SCNC: 6 MMOL/L (ref 3–14)
AST SERPL W P-5'-P-CCNC: 22 U/L (ref 0–45)
BASOPHILS # BLD AUTO: 0 10E3/UL (ref 0–0.2)
BASOPHILS NFR BLD AUTO: 1 %
BILIRUB SERPL-MCNC: 0.6 MG/DL (ref 0.2–1.3)
BUN SERPL-MCNC: 11 MG/DL (ref 7–30)
CALCIUM SERPL-MCNC: 9.1 MG/DL (ref 8.5–10.1)
CHLORIDE BLD-SCNC: 104 MMOL/L (ref 94–109)
CO2 SERPL-SCNC: 29 MMOL/L (ref 20–32)
CREAT SERPL-MCNC: 0.71 MG/DL (ref 0.66–1.25)
CRP SERPL-MCNC: 7.5 MG/L (ref 0–8)
DLCOCOR-%PRED-PRE: 43 %
DLCOCOR-PRE: 10.72 ML/MIN/MMHG
DLCOUNC-%PRED-PRE: 43 %
DLCOUNC-PRE: 10.53 ML/MIN/MMHG
DLCOUNC-PRED: 24.36 ML/MIN/MMHG
EOSINOPHIL # BLD AUTO: 0.2 10E3/UL (ref 0–0.7)
EOSINOPHIL NFR BLD AUTO: 3 %
ERV-%PRED-PRE: 45 %
ERV-PRE: 0.27 L
ERV-PRED: 0.59 L
ERYTHROCYTE [DISTWIDTH] IN BLOOD BY AUTOMATED COUNT: 18.6 % (ref 10–15)
EXPTIME-PRE: 6.37 SEC
FEF2575-%PRED-PRE: 38 %
FEF2575-PRE: 0.91 L/SEC
FEF2575-PRED: 2.33 L/SEC
FEFMAX-%PRED-PRE: 62 %
FEFMAX-PRE: 4.94 L/SEC
FEFMAX-PRED: 7.91 L/SEC
FEV1-%PRED-PRE: 45 %
FEV1-PRE: 1.39 L
FEV1FEV6-PRE: 72 %
FEV1FEV6-PRED: 78 %
FEV1FVC-PRE: 72 %
FEV1FVC-PRED: 76 %
FEV1SVC-PRE: 69 %
FEV1SVC-PRED: 68 %
FIFMAX-PRE: 2.01 L/SEC
FIO2-PRE: 21 %
FRCPLETH-%PRED-PRE: 65 %
FRCPLETH-PRE: 2.34 L
FRCPLETH-PRED: 3.58 L
FVC-%PRED-PRE: 48 %
FVC-PRE: 1.92 L
FVC-PRED: 3.96 L
GFR SERPL CREATININE-BSD FRML MDRD: >90 ML/MIN/1.73M2
GLUCOSE BLD-MCNC: 105 MG/DL (ref 70–99)
HCT VFR BLD AUTO: 45.1 % (ref 40–53)
HGB BLD-MCNC: 14 G/DL (ref 13.3–17.7)
IC-%PRED-PRE: 45 %
IC-PRE: 1.75 L
IC-PRED: 3.83 L
IMM GRANULOCYTES # BLD: 0 10E3/UL
IMM GRANULOCYTES NFR BLD: 0 %
LDH SERPL L TO P-CCNC: 197 U/L (ref 85–227)
LYMPHOCYTES # BLD AUTO: 1.5 10E3/UL (ref 0.8–5.3)
LYMPHOCYTES NFR BLD AUTO: 19 %
MCH RBC QN AUTO: 28.9 PG (ref 26.5–33)
MCHC RBC AUTO-ENTMCNC: 31 G/DL (ref 31.5–36.5)
MCV RBC AUTO: 93 FL (ref 78–100)
MONOCYTES # BLD AUTO: 0.6 10E3/UL (ref 0–1.3)
MONOCYTES NFR BLD AUTO: 8 %
NEUTROPHILS # BLD AUTO: 5.2 10E3/UL (ref 1.6–8.3)
NEUTROPHILS NFR BLD AUTO: 69 %
NRBC # BLD AUTO: 0 10E3/UL
NRBC BLD AUTO-RTO: 0 /100
PLATELET # BLD AUTO: 142 10E3/UL (ref 150–450)
POTASSIUM BLD-SCNC: 4.2 MMOL/L (ref 3.4–5.3)
PROT SERPL-MCNC: 7.2 G/DL (ref 6.8–8.8)
RBC # BLD AUTO: 4.84 10E6/UL (ref 4.4–5.9)
RVPLETH-%PRED-PRE: 80 %
RVPLETH-PRE: 2.08 L
RVPLETH-PRED: 2.57 L
SARS-COV-2 RNA RESP QL NAA+PROBE: NEGATIVE
SODIUM SERPL-SCNC: 139 MMOL/L (ref 133–144)
TLCPLETH-%PRED-PRE: 60 %
TLCPLETH-PRE: 4.09 L
TLCPLETH-PRED: 6.72 L
VA-%PRED-PRE: 48 %
VA-PRE: 2.9 L
VC-%PRED-PRE: 45 %
VC-PRE: 2.01 L
VC-PRED: 4.41 L
WBC # BLD AUTO: 7.6 10E3/UL (ref 4–11)

## 2022-04-07 PROCEDURE — 80053 COMPREHEN METABOLIC PANEL: CPT

## 2022-04-07 PROCEDURE — 99215 OFFICE O/P EST HI 40 MIN: CPT

## 2022-04-07 PROCEDURE — 83615 LACTATE (LD) (LDH) ENZYME: CPT

## 2022-04-07 PROCEDURE — 99000 SPECIMEN HANDLING OFFICE-LAB: CPT | Performed by: PATHOLOGY

## 2022-04-07 PROCEDURE — 85025 COMPLETE CBC W/AUTO DIFF WBC: CPT

## 2022-04-07 PROCEDURE — G0463 HOSPITAL OUTPT CLINIC VISIT: HCPCS

## 2022-04-07 PROCEDURE — U0005 INFEC AGEN DETEC AMPLI PROBE: HCPCS | Mod: 90 | Performed by: PATHOLOGY

## 2022-04-07 PROCEDURE — 86140 C-REACTIVE PROTEIN: CPT

## 2022-04-07 PROCEDURE — 36591 DRAW BLOOD OFF VENOUS DEVICE: CPT

## 2022-04-07 PROCEDURE — U0003 INFECTIOUS AGENT DETECTION BY NUCLEIC ACID (DNA OR RNA); SEVERE ACUTE RESPIRATORY SYNDROME CORONAVIRUS 2 (SARS-COV-2) (CORONAVIRUS DISEASE [COVID-19]), AMPLIFIED PROBE TECHNIQUE, MAKING USE OF HIGH THROUGHPUT TECHNOLOGIES AS DESCRIBED BY CMS-2020-01-R: HCPCS | Mod: 90 | Performed by: PATHOLOGY

## 2022-04-07 PROCEDURE — 250N000011 HC RX IP 250 OP 636

## 2022-04-07 RX ORDER — NITROGLYCERIN 0.4 MG/1
TABLET SUBLINGUAL EVERY 5 MIN PRN
COMMUNITY
Start: 2022-03-03

## 2022-04-07 RX ORDER — PEN NEEDLE, DIABETIC 31 GX5/16"
NEEDLE, DISPOSABLE MISCELLANEOUS
COMMUNITY
Start: 2021-06-17 | End: 2022-05-04

## 2022-04-07 RX ORDER — HEPARIN SODIUM (PORCINE) LOCK FLUSH IV SOLN 100 UNIT/ML 100 UNIT/ML
5 SOLUTION INTRAVENOUS ONCE
Status: COMPLETED | OUTPATIENT
Start: 2022-04-07 | End: 2022-04-07

## 2022-04-07 RX ORDER — BLOOD SUGAR DIAGNOSTIC
STRIP MISCELLANEOUS
COMMUNITY
Start: 2021-12-17 | End: 2022-05-04

## 2022-04-07 RX ADMIN — SODIUM CHLORIDE, PRESERVATIVE FREE 5 ML: 5 INJECTION INTRAVENOUS at 11:23

## 2022-04-07 ASSESSMENT — PAIN SCALES - GENERAL: PAINLEVEL: NO PAIN (0)

## 2022-04-07 NOTE — NURSING NOTE
Chief Complaint   Patient presents with     Oncology Clinic Visit     Diffuse large B-cell Lymphoma      Labs drawn via port by RN. Good blood return. Port was flushed with NS and heparin. De-accessed. Pt tolerated well.     TPA order was cancelled.     Jr Stern RN

## 2022-04-07 NOTE — TELEPHONE ENCOUNTER
Spoke with patient to schedule procedure with Prosper Agiuar    Procedure was scheduled on 04/11 at [facility] OR  Patient will have H&P with BMT clinic    Patient is aware a COVID-19 test is needed before their procedure. The test should be with-in 4 days of their procedure.   Test Details: Date 04/07 Location [facility] [place]    Patient is aware a / is needed day of surgery.   Surgery letter was sent via [employer], patient has my direct contact information for any further questions.

## 2022-04-07 NOTE — NURSING NOTE
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    Weight as of this encounter: 102.2 kg (225 lb 3.2 oz). Body surface area is 2.22 meters squared.  No Pain (0) Comment: Data Unavailable   No LMP for male patient.  Allergies reviewed: Yes  Medications reviewed: Yes    Medications: Medication refills not needed today.  Pharmacy name entered into [employer]:    [id number] [place] [facility] - [place], [place] - [address] [place] [address] PHARMACY [place], [place] - [address] [facility] [phone]    Clinical concerns: Wondering about a physical.        Tamia King LPN            "

## 2022-04-07 NOTE — LETTER
4/7/2022         RE: Pastor Garibay  [address]  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 Please see a copy of my visit note below.    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  He continues to have SOB with exertion but this has not changed significantly since the last time I saw him.  He denies any new symptoms including chest pain, fevers/chills, lumps/bumps, nausea/vomiting or other acute concerns.  His repeat PET (3/31/22) showed evidence of probable lymphoma and an EBUS is scheduled early next week.    10 point review of systems negative except as noted above.        PHYSICAL EXAM                                                                                                                                     KPS: 80    General: NAD; sitting up on exam table  HEENT: Oropharynx unremarkable  Pulm: Clear to auscultation bilaterally  Card: RRR, normal S1/S2  Abd: Non-tender, non-distended, no palpable hepatosplenomegaly  Ext: No LE edema  Neuro: A&O, non-focal, normal gait     Labs:   Lab Results   Component Value Date    WBC 7.6 04/07/2022    ANEU 6.2 06/19/2021    HGB 14.0 04/07/2022    HCT 45.1 04/07/2022     (L) 04/07/2022     04/07/2022    POTASSIUM 4.2 04/07/2022    CHLORIDE 104 04/07/2022    CO2 29 04/07/2022     (H) 04/07/2022    BUN 11 04/07/2022    CR 0.71 04/07/2022    MAG 1.8 03/23/2022    INR 1.16 (H) 10/28/2020    AST 22 04/07/2022    ALT 27 04/07/2022       Imaging:     Combined Report of:    PET and CT on  3/31/2022 12:48 PM :   IMPRESSION:   In this patient with 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 Disease progression  by Lugano criteria, with FDG-avid mediastinal  and hilar lymphadenopathy (Deauville 4-5).  2. Similar volume loss and consolidation of the right lung compared to  previous examination.  3. Similar hepatosplenomegaly compared to previous examination.  4. The remainder of the exam is not significantly changed since  3/18/2022.    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     PET/CT showed evidence of disease progression as outlined above.  The SUV on imaging was relatively low and it is not entirely clear whether this is DLBCL or FL; bronch and EBUS with LN biopsy will be performed early next week.  We reviewed [id number] PBCAR in anticipation of a positive biopsy result next week.  Details of the trial were explained in detail and consent was obtained.  Pastor and his wife are aware that enrollment will depend on biopsy results.      1.  BMT:  DLBCL  Axi-cell product with CNS prevention protocol   CNS prophy: simvastatin 40mg tied to IDS drug. Completed at day +30. All previously LPs under fluoroscopy.  PET Scan at day +28 with very good MA vs. CR  CT Day +60 and now day 100 disease status : Complete response with residual lung scar tissue. No new disease or sign of growth.   -+180 for CT scan - ongoing remission, pt is fully recovered now.   -+1.5 yr CT - ongoing remission  -+1.6 yr PET/CT - disease progression, ?DLBCL vs FL     2.  HEME: excellent count recovery. No pre-meds.  - New PE dg in 10/2020  - s/p lovenox BID for PE dg on day 28  - Completed total of 6 months of anticoagulation with DOAC in 2021                            3.  ID: Afebrile and no infectious concerns.     4.  GI:   - No active issues     5.  FEN/Renal: Cr stable.      6. Endo: Type 2 DM.    Complication of DM with 2 left toes amputated 7/2020  Adrenal Insufficiency:  hydrocortisone 10mg PO BID    7.  MSK:  Hx of osteomyelitis of  2nd & 3rd L toes w/ amputation of distal phalanx of both digits.     Plan:  - Proceed with bronch and EBUS with LN biopsy to evaluate histology and CD19 expression  - Discussed PBCAR in detail today; consents signed in anticipation of likely positive biopsy result  - Will f/u further once biopsy results are available    Patient seen and discussed with attending Dr. Bunch.    Eduin Prescott MD  Hematology/Oncology/Transplant Fellow      Attestation signed by Rosey Bunch MD at 4/10/2022  9:43 AM (Updated):  Today, I  saw and examined the patient independently in clinic and discussed the recommendations. I reviewed the case with the fellow and agree with the note as above.     Pastor has new hypermetabolic LN suspicious from relapse. Plan to obtain EBUS and enroll into PBCAR trial vs other. His remission lasted 1.5 year  (s/p Yescarta), we need to check CD19 expression.     Pastor is agreeable with the plan, he is not a candidate for allogeneic HSCT given co morbidities and lung involvement.     His DLCO is severely impaired - will cont to see  but this may limit his candidacy for studies.          Again, thank you for allowing me to participate in the care of your patient.      Sincerely,    Rosey Bunch MD

## 2022-04-08 NOTE — PROGRESS NOTES
[id number]: Informed Consent Note     The consent form, including purpose, risks and benefits, was reviewed with Pastor Garibay, and all questions were answered before he signed the consent form. The patient understands that the study involves an active treatment phase as well as a post-treatment follow up phase.     Present during the discussion were Pastor, his wife Toshia, this writer, and Dr. Bunch. A copy of the signed form was provided to the patient. No procedures specific to this study were performed prior to the patient signing the consent form.    This writer discussed protocol contraception requirements with the subject and his wife. They endorsed abstinence as their preferred method of contraception.     All study related questions were answered to their satisfaction. Pastor is scheduled for EBUS on 4/11/22. Research samples are requested from this biopsy to be set up by study team. Further work up for PBCAR trial will be dependent on CD19 positivity from flow cytometry of biopsy. Pastor and his wife are in agreement with this plan.      Consent Version Date: Version 7.0, Dated 07SEP2021  Consent obtained by: Dr. Rosey Bunch    Date: 4/7/2022  HIPAA authorization signed?: yes  HIPAA authorization version date: [email or web address]    Dariela Ponce RN    Form 503.03.01 (Version 2)     Effective date: 01AUG2018     Next Review Date: 01AUG2020

## 2022-04-11 ENCOUNTER — ANESTHESIA (OUTPATIENT)
Dept: SURGERY | Facility: CLINIC | Age: 72
End: 2022-04-11
Payer: COMMERCIAL

## 2022-04-11 ENCOUNTER — HOSPITAL ENCOUNTER (OUTPATIENT)
Facility: CLINIC | Age: 72
Discharge: HOME | End: 2022-04-11
Attending: INTERNAL MEDICINE | Admitting: INTERNAL MEDICINE
Payer: COMMERCIAL

## 2022-04-11 ENCOUNTER — ANESTHESIA EVENT (OUTPATIENT)
Dept: SURGERY | Facility: CLINIC | Age: 72
End: 2022-04-11
Payer: COMMERCIAL

## 2022-04-11 VITALS
HEIGHT: 69 IN | TEMPERATURE: 98.3 F | BODY MASS INDEX: 32.91 KG/M2 | SYSTOLIC BLOOD PRESSURE: 108 MMHG | RESPIRATION RATE: 16 BRPM | OXYGEN SATURATION: 91 % | WEIGHT: 222.22 LBS | HEART RATE: 94 BPM | DIASTOLIC BLOOD PRESSURE: 78 MMHG

## 2022-04-11 DIAGNOSIS — C83.3A DIFFUSE LARGE CELL LYMPHOMA IN REMISSION: ICD-10-CM

## 2022-04-11 DIAGNOSIS — C85.80 LARGE CELL LYMPHOMA (H): Primary | ICD-10-CM

## 2022-04-11 LAB
GLUCOSE BLDC GLUCOMTR-MCNC: 148 MG/DL (ref 70–99)
GLUCOSE BLDC GLUCOMTR-MCNC: 163 MG/DL (ref 70–99)

## 2022-04-11 PROCEDURE — 999N000141 HC STATISTIC PRE-PROCEDURE NURSING ASSESSMENT: Performed by: INTERNAL MEDICINE

## 2022-04-11 PROCEDURE — 88305 TISSUE EXAM BY PATHOLOGIST: CPT | Mod: 26 | Performed by: PATHOLOGY

## 2022-04-11 PROCEDURE — 710N000012 HC RECOVERY PHASE 2, PER MINUTE: Performed by: INTERNAL MEDICINE

## 2022-04-11 PROCEDURE — 250N000011 HC RX IP 250 OP 636: Performed by: NURSE ANESTHETIST, CERTIFIED REGISTERED

## 2022-04-11 PROCEDURE — 250N000009 HC RX 250: Performed by: NURSE ANESTHETIST, CERTIFIED REGISTERED

## 2022-04-11 PROCEDURE — 88173 CYTOPATH EVAL FNA REPORT: CPT | Mod: TC | Performed by: INTERNAL MEDICINE

## 2022-04-11 PROCEDURE — 250N000025 HC SEVOFLURANE, PER MIN: Performed by: INTERNAL MEDICINE

## 2022-04-11 PROCEDURE — 360N000076 HC SURGERY LEVEL 3, PER MIN: Performed by: INTERNAL MEDICINE

## 2022-04-11 PROCEDURE — 31652 BRONCH EBUS SAMPLNG 1/2 NODE: CPT | Performed by: INTERNAL MEDICINE

## 2022-04-11 PROCEDURE — 88172 CYTP DX EVAL FNA 1ST EA SITE: CPT | Mod: 26 | Performed by: PATHOLOGY

## 2022-04-11 PROCEDURE — 272N000001 HC OR GENERAL SUPPLY STERILE: Performed by: INTERNAL MEDICINE

## 2022-04-11 PROCEDURE — 88189 FLOWCYTOMETRY/READ 16 & >: CPT | Mod: GC | Performed by: PATHOLOGY

## 2022-04-11 PROCEDURE — 710N000010 HC RECOVERY PHASE 1, LEVEL 2, PER MIN: Performed by: INTERNAL MEDICINE

## 2022-04-11 PROCEDURE — 370N000017 HC ANESTHESIA TECHNICAL FEE, PER MIN: Performed by: INTERNAL MEDICINE

## 2022-04-11 PROCEDURE — 88185 FLOWCYTOMETRY/TC ADD-ON: CPT | Performed by: INTERNAL MEDICINE

## 2022-04-11 PROCEDURE — 88184 FLOWCYTOMETRY/ TC 1 MARKER: CPT | Performed by: PATHOLOGY

## 2022-04-11 PROCEDURE — 258N000003 HC RX IP 258 OP 636: Performed by: ANESTHESIOLOGY

## 2022-04-11 PROCEDURE — 82962 GLUCOSE BLOOD TEST: CPT

## 2022-04-11 PROCEDURE — 88173 CYTOPATH EVAL FNA REPORT: CPT | Mod: 26 | Performed by: PATHOLOGY

## 2022-04-11 RX ORDER — DOXYCYCLINE HYCLATE 100 MG
100 TABLET ORAL 2 TIMES DAILY
Qty: 10 TABLET | Refills: 0 | Status: SHIPPED | OUTPATIENT
Start: 2022-04-11 | End: 2022-05-19

## 2022-04-11 RX ORDER — PROPOFOL 10 MG/ML
INJECTION, EMULSION INTRAVENOUS PRN
Status: DISCONTINUED | OUTPATIENT
Start: 2022-04-11 | End: 2022-04-11

## 2022-04-11 RX ORDER — SODIUM CHLORIDE, SODIUM LACTATE, POTASSIUM CHLORIDE, CALCIUM CHLORIDE 600; 310; 30; 20 MG/100ML; MG/100ML; MG/100ML; MG/100ML
INJECTION, SOLUTION INTRAVENOUS CONTINUOUS
Status: DISCONTINUED | OUTPATIENT
Start: 2022-04-11 | End: 2022-04-11 | Stop reason: HOSPADM

## 2022-04-11 RX ORDER — OXYCODONE HYDROCHLORIDE 5 MG/1
5 TABLET ORAL EVERY 4 HOURS PRN
Status: DISCONTINUED | OUTPATIENT
Start: 2022-04-11 | End: 2022-04-11 | Stop reason: HOSPADM

## 2022-04-11 RX ORDER — ONDANSETRON 2 MG/ML
INJECTION INTRAMUSCULAR; INTRAVENOUS PRN
Status: DISCONTINUED | OUTPATIENT
Start: 2022-04-11 | End: 2022-04-11

## 2022-04-11 RX ORDER — HYDROMORPHONE HCL IN WATER/PF 6 MG/30 ML
0.2 PATIENT CONTROLLED ANALGESIA SYRINGE INTRAVENOUS EVERY 5 MIN PRN
Status: DISCONTINUED | OUTPATIENT
Start: 2022-04-11 | End: 2022-04-11 | Stop reason: HOSPADM

## 2022-04-11 RX ORDER — LIDOCAINE HYDROCHLORIDE 20 MG/ML
INJECTION, SOLUTION INFILTRATION; PERINEURAL PRN
Status: DISCONTINUED | OUTPATIENT
Start: 2022-04-11 | End: 2022-04-11

## 2022-04-11 RX ORDER — ONDANSETRON 2 MG/ML
4 INJECTION INTRAMUSCULAR; INTRAVENOUS EVERY 30 MIN PRN
Status: DISCONTINUED | OUTPATIENT
Start: 2022-04-11 | End: 2022-04-11 | Stop reason: HOSPADM

## 2022-04-11 RX ORDER — FENTANYL CITRATE 50 UG/ML
25 INJECTION, SOLUTION INTRAMUSCULAR; INTRAVENOUS EVERY 5 MIN PRN
Status: DISCONTINUED | OUTPATIENT
Start: 2022-04-11 | End: 2022-04-11 | Stop reason: HOSPADM

## 2022-04-11 RX ORDER — MEPERIDINE HYDROCHLORIDE 25 MG/ML
12.5 INJECTION INTRAMUSCULAR; INTRAVENOUS; SUBCUTANEOUS
Status: DISCONTINUED | OUTPATIENT
Start: 2022-04-11 | End: 2022-04-11 | Stop reason: HOSPADM

## 2022-04-11 RX ORDER — ONDANSETRON 4 MG/1
4 TABLET, ORALLY DISINTEGRATING ORAL EVERY 30 MIN PRN
Status: DISCONTINUED | OUTPATIENT
Start: 2022-04-11 | End: 2022-04-11 | Stop reason: HOSPADM

## 2022-04-11 RX ORDER — LIDOCAINE 40 MG/G
CREAM TOPICAL
Status: DISCONTINUED | OUTPATIENT
Start: 2022-04-11 | End: 2022-04-11 | Stop reason: HOSPADM

## 2022-04-11 RX ORDER — FENTANYL CITRATE 50 UG/ML
INJECTION, SOLUTION INTRAMUSCULAR; INTRAVENOUS PRN
Status: DISCONTINUED | OUTPATIENT
Start: 2022-04-11 | End: 2022-04-11

## 2022-04-11 RX ORDER — HALOPERIDOL 5 MG/ML
INJECTION INTRAMUSCULAR PRN
Status: DISCONTINUED | OUTPATIENT
Start: 2022-04-11 | End: 2022-04-11

## 2022-04-11 RX ORDER — FENTANYL CITRATE 50 UG/ML
25 INJECTION, SOLUTION INTRAMUSCULAR; INTRAVENOUS
Status: CANCELLED | OUTPATIENT
Start: 2022-04-11

## 2022-04-11 RX ADMIN — ONDANSETRON 4 MG: 2 INJECTION INTRAMUSCULAR; INTRAVENOUS at 10:30

## 2022-04-11 RX ADMIN — PROPOFOL 100 MCG/KG/MIN: 10 INJECTION, EMULSION INTRAVENOUS at 10:05

## 2022-04-11 RX ADMIN — SODIUM CHLORIDE, POTASSIUM CHLORIDE, SODIUM LACTATE AND CALCIUM CHLORIDE: 600; 310; 30; 20 INJECTION, SOLUTION INTRAVENOUS at 09:48

## 2022-04-11 RX ADMIN — LIDOCAINE HYDROCHLORIDE 60 MG: 20 INJECTION, SOLUTION INFILTRATION; PERINEURAL at 09:59

## 2022-04-11 RX ADMIN — FENTANYL CITRATE 100 MCG: 50 INJECTION, SOLUTION INTRAMUSCULAR; INTRAVENOUS at 10:12

## 2022-04-11 RX ADMIN — SUGAMMADEX 250 MG: 100 INJECTION, SOLUTION INTRAVENOUS at 10:46

## 2022-04-11 RX ADMIN — ROCURONIUM BROMIDE 50 MG: 50 INJECTION, SOLUTION INTRAVENOUS at 10:01

## 2022-04-11 RX ADMIN — HALOPERIDOL LACTATE 2 MG: 5 INJECTION, SOLUTION INTRAMUSCULAR at 10:06

## 2022-04-11 RX ADMIN — PROPOFOL 100 MG: 10 INJECTION, EMULSION INTRAVENOUS at 10:00

## 2022-04-11 NOTE — ANESTHESIA CARE TRANSFER NOTE
Patient: Pastor Garibay    Procedure: Procedure(s):  BRONCHOSCOPY, FIBEROPTIC, endobronchial ultrasound, transbronchial biopsies, lymphnode forcep biopsies       Diagnosis: Mediastinal lymphadenopathy [R59.0]  Diagnosis Additional Information: No value filed.    Anesthesia Type:   General     Note:    Oropharynx: oropharynx clear of all foreign objects  Level of Consciousness: awake  Oxygen Supplementation: face mask  Level of Supplemental Oxygen (L/min / FiO2): 6  Independent Airway: airway patency satisfactory and stable  Dentition: dentition unchanged  Vital Signs Stable: post-procedure vital signs reviewed and stable  Report to RN Given: handoff report given  Patient transferred to: PACU  Comments: Pt remains stable, monitors on alarms in place, report to PACU RN, no complications  Handoff Report: Identifed the Patient, Identified the Reponsible Provider, Reviewed the pertinent medical history, Discussed the surgical course, Reviewed Intra-OP anesthesia mangement and issues during anesthesia, Set expectations for post-procedure period and Allowed opportunity for questions and acknowledgement of understanding      Vitals:  Vitals Value Taken Time   BP 99/62 04/11/22 1315   Temp 37.2  C (99  F) 04/11/22 1315   Pulse 102 04/11/22 1300   Resp 16 04/11/22 1315   SpO2 89 % 04/11/22 1311   Vitals shown include unvalidated device data.    Electronically Signed By: VERNA Monterroso CRNA  April 11, 2022  2:05 PM

## 2022-04-11 NOTE — DISCHARGE INSTRUCTIONS
Post Bronchoscopy Patient Instructions:    April 11, 2022  Pastor Garibay    Your procedure was completed (bronchoscopy with biopsies) without any immediate complications.    To prevent infection I have send a prescription for doxycycline to your pharmacy.  Take 1 tab twice daily for 5 days.  Take the medication with food on a full stomach and a drink of water after.    You may cough up scant amount of blood for the next 12-24 hours. If you have excessive cough with blood, chest pain, shortness of breath, please report to the closest emergency room.    You may experience low grade (less than 100.5 F) fever next 24 hours, if so, you can take Tylenol. If the fever persists more than 24 hours, please contact to our office or your primary care provider.    Our office (Thoracic/Pulmonary--[phone]) will call you with the results of any samples taken during the procedure. Please note that you may get a result notification through  My Chart  before us calling you as the Laboratories are instructed to release the results as soon as they are available to the patients and providers at the same time. Please allow your provider 24 hours call you to discuss the results.    You may resume your diet as it was prior to procedure.    You may resume your medications after the procedure unless you are instructed to do differently.     Please follow instructions from the nursing staff upon discharge in terms of activity. In general, you should avoid any attention or motor skill requiring activities (e.g., driving or operating any motorized vehicle) for 24 hours as you might be still under the effect of sedation medications. Please make sure an adult to accompany you next 24 hours.     Should you have any question, please do not hesitate to call our office.    Prosper Aguiar MD

## 2022-04-11 NOTE — ANESTHESIA POSTPROCEDURE EVALUATION
Patient: Pastor Garibay    Procedure: Procedure(s):  BRONCHOSCOPY, FIBEROPTIC, endobronchial ultrasound, transbronchial biopsies, lymphnode forcep biopsies       Anesthesia Type:  General    Note:  Disposition: Outpatient   Postop Pain Control: Uneventful            Sign Out: Well controlled pain   PONV: No   Neuro/Psych: Uneventful            Sign Out: Acceptable/Baseline neuro status   Airway/Respiratory: Uneventful            Sign Out: Acceptable/Baseline resp. status   CV/Hemodynamics: Uneventful            Sign Out: Acceptable CV status; No obvious hypovolemia; No obvious fluid overload   Other NRE:    DID A NON-ROUTINE EVENT OCCUR? No           Last vitals:  Vitals Value Taken Time   BP 99/62 04/11/22 1315   Temp 37.2  C (99  F) 04/11/22 1315   Pulse 102 04/11/22 1300   Resp 16 04/11/22 1315   SpO2 89 % 04/11/22 1311   Vitals shown include unvalidated device data.    Electronically Signed By: Eduin Lee MD  April 11, 2022  2:00 PM

## 2022-04-11 NOTE — ANESTHESIA PREPROCEDURE EVALUATION
Anesthesia Pre-Procedure Evaluation    Patient: Pastor Garibay   MRN: [id number] : 1950        Procedure : Procedure(s):  BRONCHOSCOPY, FIBEROPTIC, endobronchial ultrasound, transbronchial biopsies          Past Medical History:   Diagnosis Date     Abnormal liver function test      Anemia      CPAP (continuous positive airway pressure) dependence      Diabetes (H)      Hx of skin cancer, basal cell      Hyperlipidemia      Hypertension      Keratoderma      Large cell lymphoma (H) 2020     Liver disease      Lymphoma (H)      Non-Hodgkin lymphoma (H)      Pedal edema     CHRONIC     Pleural effusion      Seborrheic keratosis, inflamed      Sleep apnea     Uses a CPAP     Thrombocytopenia (H) 2020     Thrombosis Felbruary       Past Surgical History:   Procedure Laterality Date     AMPUTATE TOE(S) Left 2020    Procedure: LEFT SECOND AND THIRD DISTAL TOE AMPUTATIONS;  Surgeon: Ramon Flores MD;  Location:  OR     AMPUTATE TOE(S) Left 2020    Procedure: 1.  Partial left second toe amputation with osteotomy through proximal phalanx.  2.  Partial left third toe amputation with osteotomy through proximal phalanx.;  Surgeon: Ismael Humphrey DPM;  Location:  OR     BIOPSY LYMPH NODE CERVICAL N/A 2014    Procedure: BIOPSY LYMPH NODE CERVICAL;  Surgeon: Robbie Linda MD;  Location:  OR     BRONCHOSCOPY FLEXIBLE N/A 2017    Procedure: BRONCHOSCOPY FLEXIBLE;  FLEXIBLE BRONCHOSCOPY WITH BIOPSIES.;  Surgeon: Robbie Linda MD;  Location:  OR     COLONOSCOPY       COLONOSCOPY N/A 2018    Procedure: COMBINED COLONOSCOPY, SINGLE OR MULTIPLE BIOPSY/POLYPECTOMY BY BIOPSY;;  Surgeon: Ramos Bates MD;  Location:  GI     ENDOVASCULAR PLACEMENT VASCULAR DEVICE Left 2017    Procedure: ENDOVASCULAR PLACEMENT VASCULAR DEVICE;;  Surgeon: Robbie Linda MD;  Location: [facility]     ENT SURGERY      tonsillectomy     EXCISE NODE  MEDIASTINAL N/A 11/17/2017    Procedure: EXCISE NODE MEDIASTINAL;;  Surgeon: Robbie Linda MD;  Location:  OR     EYE SURGERY       EYE SURGERY Left     vitrectomy     INSERT PORT VASCULAR ACCESS N/A 12/05/2014    Procedure: INSERT PORT VASCULAR ACCESS;  Surgeon: Robbie Linda MD;  Location:  OR     INSERT PORT VASCULAR ACCESS N/A 12/5/2017    Procedure: INSERT PORT VASCULAR ACCESS;  POWER PORT PLACEMENT ATTEMPTED, PLACEMENT OF ANGIO-SEAL VIP VASCULAR CLOSURE DEVICE;  Surgeon: Robbie Linda MD;  Location:  SD     IR CHEST PORT PLACEMENT > 5 YRS OF AGE  6/5/2020     IR PORT REMOVAL RIGHT  12/10/2018     PHACOEMULSIFICATION CLEAR CORNEA WITH STANDARD INTRAOCULAR LENS IMPLANT Right 5/2/2016    Procedure: PHACOEMULSIFICATION CLEAR CORNEA WITH STANDARD INTRAOCULAR LENS IMPLANT;  Surgeon: Rasheed Finney MD;  Location:  [facility]     REMOVE PORT VASCULAR ACCESS N/A 3/14/2017    Procedure: REMOVE PORT VASCULAR ACCESS;  Surgeon: Robbie Linda MD;  Location:  OR     SOFT TISSUE SURGERY      BASAL CELL CA FOREHEAD     THORACOTOMY Right 11/17/2017    Procedure: THORACOTOMY;  RIGHT EXPLORATORY THORACOTOMY/ EXTENSIVE PNEUMOLYSIS/ BIOPSY OF INTERLOBAR LYMPH NODE;  Surgeon: Robbie Linda MD;  Location:  OR      No Known Allergies   Social History     Tobacco Use     Smoking status: Never Smoker     Smokeless tobacco: Never Used   Substance Use Topics     Alcohol use: No      Wt Readings from Last 1 Encounters:   04/07/22 102.2 kg (225 lb 3.2 oz)        Anesthesia Evaluation            ROS/MED HX  ENT/Pulmonary:     (+) sleep apnea, moderate, uses CPAP,     Neurologic:       Cardiovascular: Comment: Echo     Interpretation Summary  Technically difficult study due to extremely poor acoustic windows. Limited  information is obtained.  Left ventricular function appears normal. The visually estimated ejection  fraction is 55-60%.  The right ventricle is normal size and  global right ventricular function  appears normal.  No pericardial effusion.  Unable to assess cardiac valves.    (+) Dyslipidemia hypertension-----    METS/Exercise Tolerance:     Hematologic:       Musculoskeletal:       GI/Hepatic:     (+) liver disease,     Renal/Genitourinary:       Endo:     (+) type II DM,     Psychiatric/Substance Use:       Infectious Disease:       Malignancy:   (+) Malignancy, History of Lymphoma/Leukemia.Lymph CA Active status post.        Other:               OUTSIDE LABS:  CBC:   Lab Results   Component Value Date    WBC 7.6 04/07/2022    WBC 8.4 03/23/2022    HGB 14.0 04/07/2022    HGB 14.0 03/23/2022    HCT 45.1 04/07/2022    HCT 43.4 03/23/2022     (L) 04/07/2022     03/23/2022     BMP:   Lab Results   Component Value Date     04/07/2022     03/23/2022    POTASSIUM 4.2 04/07/2022    POTASSIUM 4.3 03/23/2022    CHLORIDE 104 04/07/2022    CHLORIDE 100 03/23/2022    CO2 29 04/07/2022    CO2 28 03/23/2022    BUN 11 04/07/2022    BUN 8 03/23/2022    CR 0.71 04/07/2022    CR 0.76 03/23/2022     (H) 04/07/2022     (H) 03/23/2022     COAGS:   Lab Results   Component Value Date    PTT 36 10/28/2020    INR 1.16 (H) 10/28/2020    FIBR 142 (L) 10/28/2020     POC:   Lab Results   Component Value Date     (H) 06/19/2021     HEPATIC:   Lab Results   Component Value Date    ALBUMIN 3.3 (L) 04/07/2022    PROTTOTAL 7.2 04/07/2022    ALT 27 04/07/2022    AST 22 04/07/2022    ALKPHOS 79 04/07/2022    BILITOTAL 0.6 04/07/2022    ENEIDA 38 06/07/2020     OTHER:   Lab Results   Component Value Date    LACT 1.9 10/08/2020    A1C 7.7 (H) 10/09/2020    JEFF 9.1 04/07/2022    PHOS 2.5 03/23/2022    MAG 1.8 03/23/2022    CRP 7.5 04/07/2022       Anesthesia Plan    ASA Status:  3   NPO Status:  NPO Appropriate    Anesthesia Type: General.     - Airway: ETT   Induction: Intravenous.   Maintenance: Inhalation.   Techniques and Equipment:     - Airway:  Video-Laryngoscope         Consents    Anesthesia Plan(s) and associated risks, benefits, and realistic alternatives discussed. Questions answered and patient/representative(s) expressed understanding.    - Discussed:     - Discussed with:  Patient      - Extended Intubation/Ventilatory Support Discussed: Yes.      - Patient is DNR/DNI Status: No    Use of blood products discussed: Yes.     - Discussed with: Patient.     Postoperative Care    Pain management: IV analgesics.   PONV prophylaxis: Ondansetron (or other 5HT-3)     Comments:    Other Comments: NO Steroids            Christopher J. Behrens, MD

## 2022-04-11 NOTE — OR NURSING
Dr Lee aware of pt's oxygen sats 88-92% on RA while sitting and using IS.  Pt denies SOB.  BIlateral lung sounds clear posteriorly.  Pt ok'd to send to phase 2 recovery.

## 2022-04-11 NOTE — BRIEF OP NOTE
[facility]    Brief Operative Note    Pre-operative diagnosis: Mediastinal lymphadenopathy [R59.0]  Post-operative diagnosis Same as pre-operative diagnosis    Procedure: Procedure(s):  BRONCHOSCOPY, FIBEROPTIC, endobronchial ultrasound, transbronchial biopsies, lymphnode forcep biopsies  Surgeon: Surgeon(s) and Role:     * Prosper Aguiar MD - Primary  Anesthesia: General   Estimated Blood Loss: 5ml    Drains: None  Specimens:   ID Type Source Tests Collected by Time Destination   1 : 11L for bionet testing - 4 passes and 2 forcep biopsies Fine Needle Aspiration Lymph Node(s) RESEARCH SPECIMEN FOR BIONET TESTING Prosper Aguiar MD 4/11/2022 10:17 AM    2 : 11L Fine Needle Aspiration Lymph Node(s) FLOW CYTOMETRY Prosper Aguiar MD 4/11/2022 10:24 AM    3 : 11L Fine Needle Aspiration Lymph Node(s) FINE NEEDLE ASPIRATE Prosper Aguiar MD 4/11/2022 10:39 AM      Findings:   11L identified and  biopsied.  Complications: None.  Implants: * No implants in log *

## 2022-04-11 NOTE — OR NURSING
Phone call made to  [facility] for pt's oxygen tank to be delivered today.  To be delivered to day at 4pm.

## 2022-04-12 ENCOUNTER — CARE COORDINATION (OUTPATIENT)
Dept: TRANSPLANT | Facility: CLINIC | Age: 72
End: 2022-04-12
Payer: COMMERCIAL

## 2022-04-12 LAB

## 2022-04-12 NOTE — PROGRESS NOTES
Spoke to patient's wife (Toshia) to provide update for Chest CT that had been previously ordered.  Toshia is aware that Pastor is scheduled for CT on 4/16/22 at 1100 at [facility], and is agreeable to the date and time that was arranged.  Toshia states that she will update Pastor with appointment information.

## 2022-04-13 NOTE — PROCEDURES
INTERVENTIONAL PULMONOLOGY     Procedure(s):    A flexible bronchoscopy  Airway exam  EBUS-TBNA (1 sites)    Indication:  lymphoma    Attending of Record:  Prosper Aguiar MD    Interventional Pulmonary Fellow   None    Trainees Present:   None     Medications:    General Anesthesia - See anesthesia flowsheet for details    Sedation Time:   Per Anesthesia Care Provider    Time Out:  Performed    The patient's medical record has been reviewed.  The indication for the procedure was reviewed.  The necessary history and physical examination was performed and reviewed.  The risks, benefits and alternatives of the procedure were discussed with the the patient in detail and he had the opportunity to ask questions.  I discussed in particular the potential complications including risks of minor or life-threatening bleeding and/or infection, respiratory failure, vocal cord trauma / paralysis, pneumothorax, and discomfort. Sedation risks were also discussed including abnormal heart rhythms, low blood pressure, and respiratory failure. All questions were answered to the best of my ability.  Verbal and written informed consent was obtained.  The proposed procedure and the patient's identification were verified prior to the procedure by the physician and the surgical team.    The patient was assessed for the adequacy for the procedure and to receive medications.   Mental Status:  Alert and oriented x 3  Airway examination:  Class III (Visualization of only the base of uvula)  Pulmonary:  Decreased breathsound throughout  CV:  RRR, no murmurs or gallops  ASA Grade:  (II)  Mild systemic disease    After clinical evaluation and reviewing the indication, risks, alternatives and benefits of the procedure the patient was deemed to be in satisfactory condition to undergo the procedure.           A Tuberculosis risk assessment was performed:  The patient has no known RISK of Tuberculosis    The procedure was performed in a negative  airflow room: The patient could not be moved to a negative airflow room because of needed OR for the procedure    Maneuvers / Procedure:      A Flexible  bronchoscope was used for the procedure. The patient was orally intubated with a # 8.5 ETT and the flexible scope was passed through.      Airway Examination: A complete airway examination was performed from the distal trachea to the subsegmental level in each lobe of both lungs.  Pertinent findings include no endobronchial lesions.         EBUS-TBNA: The EBUS scope was inserted and biopsies were obtained from Station 11L with 10 passes, 10 samples obtained with CAROLE present, a combination of suction and no suction was used to obtain the samples.    Because of low yield on prelim path, we used core dx forceps to obtain further biopsies.  These were included in the cell block.    Cytology and flow cytometry.      Any disposable equipment was visually inspected and deemed to be intact immediately post procedure.      Relevant Pictures  Core dx forceps in 11L      Recommendations:     -->  Follow up with hematology    Prosper Aguiar MD

## 2022-04-16 ENCOUNTER — HEALTH MAINTENANCE LETTER (OUTPATIENT)
Age: 72
End: 2022-04-16

## 2022-04-16 ENCOUNTER — HOSPITAL ENCOUNTER (OUTPATIENT)
Dept: CT IMAGING | Facility: CLINIC | Age: 72
Discharge: HOME | End: 2022-04-16
Payer: COMMERCIAL

## 2022-04-16 LAB
CREAT BLD-MCNC: 0.7 MG/DL (ref 0.7–1.3)
GFR SERPL CREATININE-BSD FRML MDRD: >60 ML/MIN/1.73M2

## 2022-04-16 PROCEDURE — 250N000011 HC RX IP 250 OP 636

## 2022-04-16 PROCEDURE — 82565 ASSAY OF CREATININE: CPT

## 2022-04-16 PROCEDURE — 71275 CT ANGIOGRAPHY CHEST: CPT

## 2022-04-16 PROCEDURE — 250N000009 HC RX 250

## 2022-04-16 RX ORDER — IOPAMIDOL 755 MG/ML
78 INJECTION, SOLUTION INTRAVASCULAR ONCE
Status: COMPLETED | OUTPATIENT
Start: 2022-04-16 | End: 2022-04-16

## 2022-04-16 RX ADMIN — IOPAMIDOL 78 ML: 755 INJECTION, SOLUTION INTRAVENOUS at 11:14

## 2022-04-16 RX ADMIN — SODIUM CHLORIDE 100 ML: 900 INJECTION INTRAVENOUS at 11:14

## 2022-04-21 ENCOUNTER — ONCOLOGY VISIT (OUTPATIENT)
Dept: TRANSPLANT | Facility: CLINIC | Age: 72
End: 2022-04-21
Payer: COMMERCIAL

## 2022-04-21 ENCOUNTER — APPOINTMENT (OUTPATIENT)
Dept: LAB | Facility: CLINIC | Age: 72
End: 2022-04-21
Payer: COMMERCIAL

## 2022-04-21 VITALS
WEIGHT: 231.1 LBS | HEART RATE: 93 BPM | TEMPERATURE: 97.1 F | BODY MASS INDEX: 34.63 KG/M2 | RESPIRATION RATE: 16 BRPM | SYSTOLIC BLOOD PRESSURE: 105 MMHG | OXYGEN SATURATION: 95 % | DIASTOLIC BLOOD PRESSURE: 63 MMHG

## 2022-04-21 DIAGNOSIS — C83.30 DIFFUSE LARGE B-CELL LYMPHOMA, UNSPECIFIED BODY REGION (H): Primary | ICD-10-CM

## 2022-04-21 DIAGNOSIS — R59.0 MEDIASTINAL ADENOPATHY: Primary | ICD-10-CM

## 2022-04-21 LAB
ALBUMIN SERPL-MCNC: 3.2 G/DL (ref 3.4–5)
ALP SERPL-CCNC: 93 U/L (ref 40–150)
ALT SERPL W P-5'-P-CCNC: 35 U/L (ref 0–70)
ANION GAP SERPL CALCULATED.3IONS-SCNC: 7 MMOL/L (ref 3–14)
AST SERPL W P-5'-P-CCNC: 21 U/L (ref 0–45)
BASOPHILS # BLD AUTO: 0 10E3/UL (ref 0–0.2)
BASOPHILS NFR BLD AUTO: 1 %
BILIRUB SERPL-MCNC: 0.6 MG/DL (ref 0.2–1.3)
BUN SERPL-MCNC: 8 MG/DL (ref 7–30)
CALCIUM SERPL-MCNC: 9.3 MG/DL (ref 8.5–10.1)
CHLORIDE BLD-SCNC: 103 MMOL/L (ref 94–109)
CO2 SERPL-SCNC: 29 MMOL/L (ref 20–32)
CREAT SERPL-MCNC: 0.68 MG/DL (ref 0.66–1.25)
EOSINOPHIL # BLD AUTO: 0.2 10E3/UL (ref 0–0.7)
EOSINOPHIL NFR BLD AUTO: 3 %
ERYTHROCYTE [DISTWIDTH] IN BLOOD BY AUTOMATED COUNT: 17.5 % (ref 10–15)
GFR SERPL CREATININE-BSD FRML MDRD: >90 ML/MIN/1.73M2
GLUCOSE BLD-MCNC: 225 MG/DL (ref 70–99)
HCT VFR BLD AUTO: 44.3 % (ref 40–53)
HGB BLD-MCNC: 14 G/DL (ref 13.3–17.7)
IMM GRANULOCYTES # BLD: 0 10E3/UL
IMM GRANULOCYTES NFR BLD: 0 %
LYMPHOCYTES # BLD AUTO: 1.4 10E3/UL (ref 0.8–5.3)
LYMPHOCYTES NFR BLD AUTO: 22 %
MCH RBC QN AUTO: 28.7 PG (ref 26.5–33)
MCHC RBC AUTO-ENTMCNC: 31.6 G/DL (ref 31.5–36.5)
MCV RBC AUTO: 91 FL (ref 78–100)
MONOCYTES # BLD AUTO: 0.7 10E3/UL (ref 0–1.3)
MONOCYTES NFR BLD AUTO: 10 %
NEUTROPHILS # BLD AUTO: 4.1 10E3/UL (ref 1.6–8.3)
NEUTROPHILS NFR BLD AUTO: 64 %
NRBC # BLD AUTO: 0 10E3/UL
NRBC BLD AUTO-RTO: 0 /100
PLATELET # BLD AUTO: 118 10E3/UL (ref 150–450)
POTASSIUM BLD-SCNC: 3.7 MMOL/L (ref 3.4–5.3)
PROT SERPL-MCNC: 6.7 G/DL (ref 6.8–8.8)
RBC # BLD AUTO: 4.87 10E6/UL (ref 4.4–5.9)
SODIUM SERPL-SCNC: 139 MMOL/L (ref 133–144)
WBC # BLD AUTO: 6.4 10E3/UL (ref 4–11)

## 2022-04-21 PROCEDURE — 85025 COMPLETE CBC W/AUTO DIFF WBC: CPT

## 2022-04-21 PROCEDURE — 80053 COMPREHEN METABOLIC PANEL: CPT

## 2022-04-21 PROCEDURE — G0463 HOSPITAL OUTPT CLINIC VISIT: HCPCS

## 2022-04-21 PROCEDURE — 99215 OFFICE O/P EST HI 40 MIN: CPT

## 2022-04-21 PROCEDURE — 250N000011 HC RX IP 250 OP 636

## 2022-04-21 PROCEDURE — 36591 DRAW BLOOD OFF VENOUS DEVICE: CPT

## 2022-04-21 PROCEDURE — 82040 ASSAY OF SERUM ALBUMIN: CPT

## 2022-04-21 RX ORDER — HEPARIN SODIUM (PORCINE) LOCK FLUSH IV SOLN 100 UNIT/ML 100 UNIT/ML
5 SOLUTION INTRAVENOUS ONCE
Status: COMPLETED | OUTPATIENT
Start: 2022-04-21 | End: 2022-04-21

## 2022-04-21 RX ORDER — ACYCLOVIR 800 MG/1
400 TABLET ORAL 2 TIMES DAILY
Qty: 30 TABLET | Refills: 1 | Status: SHIPPED | OUTPATIENT
Start: 2022-04-21 | End: 2022-05-18

## 2022-04-21 RX ADMIN — Medication 5 ML: at 12:22

## 2022-04-21 ASSESSMENT — PAIN SCALES - GENERAL: PAINLEVEL: NO PAIN (0)

## 2022-04-21 NOTE — LETTER
4/21/2022         RE: Pastor Garibay  [address]  [facility] [id number]        Dear Colleague,    Thank you for referring your patient, Pastor Garibay, to the [facility] [place]. Please see a copy of my visit note below.    BMT Daily Progress Note     Mr. Garibay is pleasant 72 y.o male with relapsed lymphoma post Yescarta CART. Currently undergoing work up to see if eligible for PBCAR.     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   He saw bx report that was inconclusive - getting many questions from his children if he has high grade vs lower grade lymphoma. He and his wife understand given that FNA was inconclusive Dr Bunch and Dariela are working to get him into cardiothoracic surgery for possible excisional bx. We cannot confirm PBCAr eligibility until we know this tumor is CD19 positive. They understand this    Overall he thinks he is feeling better- eating more, energy is stable. No B symptoms. No new lymphadenopathy that he has noticed.   Review of Systems: 10 point ROS negative except as noted above.    Physical Exam:   Blood pressure 105/63, pulse 93, temperature 97.1  F (36.2  C), temperature source Oral, resp. rate 16, weight 104.8 kg (231 lb 1.6 oz), SpO2 95 %.  General: NAD   Eyes: VANGIE, sclera anicteric   Nose/Mouth/Throat: OP clear, buccal mucosa moist, no ulcerations   Lungs: CTA bilaterally  Cardiovascular: RRR, no M/R/G   Abdominal/Rectal: +BS, soft, NT, ND, No HSM   Lymphatics: no edema  Skin: no rashes or petechaie  Neuro: A&O   Labs:  Lab Results   Component Value Date    WBC 7.6 04/07/2022    ANEU 6.2 06/19/2021    HGB 14.0 04/07/2022    HCT 45.1 04/07/2022     (L) 04/07/2022     04/07/2022    POTASSIUM 4.2 04/07/2022    CHLORIDE 104 04/07/2022    CO2 29 04/07/2022     (H) 04/11/2022    BUN 11 04/07/2022    CR 0.7 04/16/2022    MAG  1.8 03/23/2022    INR 1.16 (H) 10/28/2020     Imaging: Reviewed  Microbiology:  Reviewed  Assessment and Plan:   1.  BMT: Relapsed DLBCL s/p yescarta CAR-T   -work up for PBCAR.   4/11 had lymphnode bx that we inconclusive. Plan per Dr Bunch is for pt to be seen by cardiothroasic surgery for possible excisional bx as must be CD19+ for PBCAR.   - Dariela Pollack is working on this and will reach out to patient regarding when this is scheduled with CT surgery.    - no new worsening symptoms.     2. Heme: Counts are stable       Will defer to Dr Bunch and Dariela BELLA (Study Coornidator) to get excisions bx coordinated then will resume work up in BMT clinic for PB CAR.     Encouraged patient to call with fevers, new pain or symptoms concerning for disease progression.     45 minutes spent on the date of the encounter doing chart review, review of test results, interpretation of tests, patient visit, documentation and discussion with other provider(s)     Donna Rios PA-C  [phone]        Again, thank you for allowing me to participate in the care of your patient.      Sincerely,    BMT Advanced Practice Provider

## 2022-04-21 NOTE — PROGRESS NOTES
BMT Daily Progress Note     Mr. Garibay is pleasant 72 y.o male with relapsed lymphoma post Yescarta CART. Currently undergoing work up to see if eligible for PBCAR.     CC: Routine f/u   HPI: emeterio overall feels well. Stable SOb with exertion - using oxygen with activity which helps.   He saw bx report that was inconclusive - getting many questions from his children if he has high grade vs lower grade lymphoma. He and his wife understand given that FNA was inconclusive Dr Bunch and Dariela are working to get him into cardiothoracic surgery for possible excisional bx. We cannot confirm PBCAr eligibility until we know this tumor is CD19 positive. They understand this    Overall he thinks he is feeling better- eating more, energy is stable. No B symptoms. No new lymphadenopathy that he has noticed.   Review of Systems: 10 point ROS negative except as noted above.    Physical Exam:   Blood pressure 105/63, pulse 93, temperature 97.1  F (36.2  C), temperature source Oral, resp. rate 16, weight 104.8 kg (231 lb 1.6 oz), SpO2 95 %.  General: NAD   Eyes: VANGIE, sclera anicteric   Nose/Mouth/Throat: OP clear, buccal mucosa moist, no ulcerations   Lungs: CTA bilaterally  Cardiovascular: RRR, no M/R/G   Abdominal/Rectal: +BS, soft, NT, ND, No HSM   Lymphatics: no edema  Skin: no rashes or petechaie  Neuro: A&O   Labs:  Lab Results   Component Value Date    WBC 7.6 04/07/2022    ANEU 6.2 06/19/2021    HGB 14.0 04/07/2022    HCT 45.1 04/07/2022     (L) 04/07/2022     04/07/2022    POTASSIUM 4.2 04/07/2022    CHLORIDE 104 04/07/2022    CO2 29 04/07/2022     (H) 04/11/2022    BUN 11 04/07/2022    CR 0.7 04/16/2022    MAG 1.8 03/23/2022    INR 1.16 (H) 10/28/2020     Imaging: Reviewed  Microbiology:  Reviewed    Assessment and Plan:   1.  BMT: Relapsed DLBCL s/p yescarta CAR-T   -work up for PBCAR.   4/11 had lymphnode bx that we inconclusive. Plan per Dr Bunch is for pt to be seen by cardiothroasic  surgery for possible excisional bx as must be CD19+ for PBCAR.   - Dariela Pollack is working on this and will reach out to patient regarding when this is scheduled with CT surgery.    - no new worsening symptoms.     2. Heme: Counts are stable       Will defer to Dr Aldo BELLA (Study Coornidator) to get excisions bx coordinated then will resume work up in BMT clinic for PB CAR.     Encouraged patient to call with fevers, new pain or symptoms concerning for disease progression.     45 minutes spent on the date of the encounter doing chart review, review of test results, interpretation of tests, patient visit, documentation and discussion with other provider(s)     Donna Rios PA-C  [phone]

## 2022-04-21 NOTE — NURSING NOTE
Chief Complaint   Patient presents with     Port Draw     Labs drawn via port by rn in lab. VS taken.     Port accessed with 20g 3/4 inch gripper needle by RN, labs collected, line flushed with saline and heparin.  Vitals taken. Pt checked in for appointment(s).    Jamal Diaz, RN

## 2022-04-21 NOTE — NURSING NOTE
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 VS taken.     Oncology Clinic Visit     Lymphoma, non- Hodgkin's     Initial Vitals: /63 (BP Location: Right arm, Patient Position: Sitting, Cuff Size: Adult Regular)   Pulse 93   Temp 97.1  F (36.2  C) (Oral)   Resp 16   Wt 104.8 kg (231 lb 1.6 oz)   SpO2 95%   BMI 34.63 kg/m   Estimated body mass index is 34.63 kg/m  as calculated from the following:    Height as of 4/11/22: 1.74 m (5' 8.5\").    Weight as of this encounter: 104.8 kg (231 lb 1.6 oz). Body surface area is 2.25 meters squared.  No Pain (0) Comment: Data Unavailable   No LMP for male patient.  Allergies reviewed: Yes  Medications reviewed: Yes    Medications: Medication refills not needed today.  Pharmacy name entered into [facility]:    [id number] [place], [place] - [address] [place] [address] PHARMACY [place], [place] - [address] [facility] [address] [facility] [phone]    Clinical concerns: None.      Alfred Whitman CMA            "

## 2022-05-04 ENCOUNTER — ONCOLOGY VISIT (OUTPATIENT)
Dept: TRANSPLANT | Facility: CLINIC | Age: 72
End: 2022-05-04
Payer: COMMERCIAL

## 2022-05-04 ENCOUNTER — ANCILLARY PROCEDURE (OUTPATIENT)
Dept: CT IMAGING | Facility: CLINIC | Age: 72
End: 2022-05-04
Attending: STUDENT IN AN ORGANIZED HEALTH CARE EDUCATION/TRAINING PROGRAM
Payer: COMMERCIAL

## 2022-05-04 ENCOUNTER — PREP FOR PROCEDURE (OUTPATIENT)
Dept: SURGERY | Facility: CLINIC | Age: 72
End: 2022-05-04

## 2022-05-04 ENCOUNTER — TELEPHONE (OUTPATIENT)
Dept: TRANSPLANT | Facility: CLINIC | Age: 72
End: 2022-05-04
Payer: COMMERCIAL

## 2022-05-04 ENCOUNTER — OFFICE VISIT (OUTPATIENT)
Dept: SURGERY | Facility: CLINIC | Age: 72
End: 2022-05-04
Payer: COMMERCIAL

## 2022-05-04 VITALS
DIASTOLIC BLOOD PRESSURE: 68 MMHG | SYSTOLIC BLOOD PRESSURE: 117 MMHG | HEART RATE: 102 BPM | WEIGHT: 225.97 LBS | BODY MASS INDEX: 33.86 KG/M2 | OXYGEN SATURATION: 87 %

## 2022-05-04 VITALS
WEIGHT: 226 LBS | HEART RATE: 102 BPM | OXYGEN SATURATION: 87 % | BODY MASS INDEX: 33.86 KG/M2 | DIASTOLIC BLOOD PRESSURE: 68 MMHG | SYSTOLIC BLOOD PRESSURE: 117 MMHG

## 2022-05-04 DIAGNOSIS — C83.30 DIFFUSE LARGE B-CELL LYMPHOMA, UNSPECIFIED BODY REGION (H): Primary | ICD-10-CM

## 2022-05-04 DIAGNOSIS — C83.30 DIFFUSE LARGE B-CELL LYMPHOMA, UNSPECIFIED BODY REGION (H): ICD-10-CM

## 2022-05-04 DIAGNOSIS — R59.0 MEDIASTINAL LYMPHADENOPATHY: Primary | ICD-10-CM

## 2022-05-04 LAB

## 2022-05-04 PROCEDURE — 87633 RESP VIRUS 12-25 TARGETS: CPT

## 2022-05-04 PROCEDURE — 71250 CT THORAX DX C-: CPT | Mod: GC | Performed by: RADIOLOGY

## 2022-05-04 PROCEDURE — G0463 HOSPITAL OUTPT CLINIC VISIT: HCPCS

## 2022-05-04 PROCEDURE — 99215 OFFICE O/P EST HI 40 MIN: CPT

## 2022-05-04 PROCEDURE — U0005 INFEC AGEN DETEC AMPLI PROBE: HCPCS

## 2022-05-04 PROCEDURE — 99204 OFFICE O/P NEW MOD 45 MIN: CPT | Performed by: THORACIC SURGERY (CARDIOTHORACIC VASCULAR SURGERY)

## 2022-05-04 RX ORDER — CEFAZOLIN SODIUM 2 G/50ML
2 SOLUTION INTRAVENOUS
Status: CANCELLED | OUTPATIENT
Start: 2022-05-04

## 2022-05-04 RX ORDER — CEFAZOLIN SODIUM 2 G/50ML
2 SOLUTION INTRAVENOUS SEE ADMIN INSTRUCTIONS
Status: CANCELLED | OUTPATIENT
Start: 2022-05-04

## 2022-05-04 RX ORDER — PREDNISONE 20 MG/1
60 TABLET ORAL DAILY
Qty: 20 TABLET | Refills: 0 | Status: SHIPPED | OUTPATIENT
Start: 2022-05-04 | End: 2022-05-19

## 2022-05-04 RX ORDER — LEVOFLOXACIN 750 MG/1
750 TABLET, FILM COATED ORAL DAILY
Qty: 14 TABLET | Refills: 0 | Status: SHIPPED | OUTPATIENT
Start: 2022-05-04 | End: 2022-05-19

## 2022-05-04 RX ORDER — CODEINE PHOSPHATE AND GUAIFENESIN 10; 100 MG/5ML; MG/5ML
1-2 SOLUTION ORAL EVERY 4 HOURS PRN
Qty: 180 ML | Refills: 0 | Status: SHIPPED | OUTPATIENT
Start: 2022-05-04 | End: 2023-05-18

## 2022-05-04 ASSESSMENT — PAIN SCALES - GENERAL
PAINLEVEL: NO PAIN (0)
PAINLEVEL: NO PAIN (0)

## 2022-05-04 NOTE — PROGRESS NOTES
THORACIC SURGERY - NEW PATIENT OFFICE VISIT      Dear Dr. Armijo,    I saw Pastor Garibay at Dr. 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 Additionally, he has been struggling recently with wheezing and more shortness of breath. He underwent an EBUS TBNA of the PET positive LEFT hilar LN 1 month ago that only revealed lymphocytes.    Previsit Tests   PET -CT (3/31/2022): PET positive LEFT hilar and 2R LN, mildly enlarged, stable in size for at least 14 months, but previously PERT negative. RIGHT lung scarring. He is oxygen dependent particularly when physically active.    CT chest (5/4/2022): stable LN, no new parenchymal changes  PFT (4/4/2022): FEV1  45%, DLCOcorr 46%  EBUS-TBNA (4/6/2022, Dr. Aguiar): Many samples from 11L revealed only lymphocytes  ECHO (3/2022): Poor quality windows, normal EF and normal RV function, no TR, PA pressure could not be estimated.    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  Partial left second toe amputation with osteotomy through proximal phalanx.  2.  Partial left third toe amputation with osteotomy through proximal phalanx.;  Surgeon: Ismael Humphrey DPM;  Location: [facility]     BIOPSY LYMPH NODE CERVICAL N/A 12/5/2014    Procedure: BIOPSY LYMPH NODE CERVICAL;  Surgeon: Robbie Linda MD;  Location:  OR     BRONCHOSCOPY FLEXIBLE N/A 11/14/2017    Procedure: BRONCHOSCOPY FLEXIBLE;  FLEXIBLE BRONCHOSCOPY WITH BIOPSIES.;  Surgeon: Robbie Linda MD;  Location:  OR     BRONCHOSCOPY RIGID OR FLEXIBLE W/TRANSENDOSCOPIC ENDOBRONCHIAL ULTRASOUND GUIDED N/A 4/11/2022    Procedure: BRONCHOSCOPY, FIBEROPTIC, endobronchial ultrasound, transbronchial biopsies, lymphnode forcep biopsies;  Surgeon: Prosper Aguiar MD;  [facility]: [facility]     COLONOSCOPY       COLONOSCOPY N/A 5/9/2018    Procedure: COMBINED COLONOSCOPY, SINGLE OR MULTIPLE BIOPSY/POLYPECTOMY BY BIOPSY;;  Surgeon: Ramos Bates MD;  Location:  GI     ENDOVASCULAR PLACEMENT VASCULAR DEVICE Left 12/5/2017    Procedure: ENDOVASCULAR PLACEMENT VASCULAR DEVICE;;  Surgeon: Robbie Linda MD;  Location: [facility]     ENT SURGERY      tonsillectomy     EXCISE NODE MEDIASTINAL N/A 11/17/2017    Procedure: EXCISE NODE MEDIASTINAL;;  Surgeon: Robbie Linda MD;  Location:  OR     EYE SURGERY       EYE SURGERY Left     vitrectomy     INSERT PORT VASCULAR ACCESS N/A 12/05/2014    Procedure: INSERT PORT VASCULAR ACCESS;  Surgeon: Robbie Linda MD;  Location:  OR     INSERT PORT VASCULAR ACCESS N/A 12/5/2017    Procedure: INSERT PORT VASCULAR ACCESS;  POWER PORT PLACEMENT ATTEMPTED, PLACEMENT OF ANGIO-SEAL VIP VASCULAR CLOSURE DEVICE;  Surgeon: Robbie Linda MD;  Location: [facility]     IR CHEST PORT PLACEMENT > 5 YRS OF AGE  6/5/2020     IR PORT REMOVAL RIGHT   12/10/2018     PHACOEMULSIFICATION CLEAR CORNEA WITH STANDARD INTRAOCULAR LENS IMPLANT Right 5/2/2016    Procedure: PHACOEMULSIFICATION CLEAR CORNEA WITH STANDARD INTRAOCULAR LENS IMPLANT;  Surgeon: Rasheed Finney MD;  [facility]     REMOVE PORT VASCULAR ACCESS N/A 3/14/2017    Procedure: REMOVE PORT VASCULAR ACCESS;  Surgeon: Robbie Linda MD;  Location:  OR     SOFT TISSUE SURGERY      BASAL CELL CA FOREHEAD     THORACOTOMY Right 11/17/2017    Procedure: THORACOTOMY;  RIGHT EXPLORATORY THORACOTOMY/ EXTENSIVE PNEUMOLYSIS/ BIOPSY OF INTERLOBAR LYMPH NODE;  Surgeon: Robbie Linda MD;  Location:  OR        ETOH neg  TOB neg    Physical examination  /68   Pulse 102   Wt 102.5 kg (225 lb 15.5 oz)   SpO2 (!) 87%   BMI 33.86 kg/m    He appears chronically ill, on O2, but he did not use it while we were talking.      From a personal perspective, he is here with his wife, Toshia. They have 2 ~20 lbs dogs.    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        PLAN  I spent 45 min on the date of the encounter in chart review, patient visit, review of tests, documentation and/or discussion with other providers about the issues documented above. I reviewed the plan as follows:    I had a long discussion with Dr. Bunch and Melissa Phoenix and his wife, Toshia. Since he had such an aggressive lymphoma that was chemotherapy resistant that only responded to CAR-T therapy and he has new respiratory symptoms, we will proceed with a biopsy of the 2R LN.    Procedure planned: TEMLA and bronchoscopy with lavage and cultures.    Necessary Preop Tests & Appointments: PAC    Regional Anesthesia Plan: None    Anticoagulation Plan: Pneumatic compression devices      I appreciate the  opportunity to participate in the care of your patient and will keep you updated.  Sincerely,    Star Narvaez MD

## 2022-05-04 NOTE — NURSING NOTE
Nasal and NP swab collected in clinic, pt tolerated. Specimen sent to lab.    Reyna Terrazas CMA on 5/4/2022 at 1:34 PM

## 2022-05-04 NOTE — NURSING NOTE
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    Weight as of this encounter: 102.5 kg (225 lb 15.5 oz). Body surface area is 2.23 meters squared.  No Pain (0) Comment: Data Unavailable   No LMP for male patient.  Allergies reviewed: Yes  Medications reviewed: Yes    Medications: Medication refills not needed today.  Pharmacy name entered into [employer]:    [id number] [place], [place] [facility] [address] [place] [address] [address] - [address] [facility] [phone]    Clinical concerns: none       Ryena Terrazas CMA            "

## 2022-05-04 NOTE — PROGRESS NOTES
BMT Daily Progress Note     Mr. Garibay is pleasant 72 y.o male with relapsed lymphoma post Yescarta CART. Currently undergoing work up to see if eligible for PBCAR.     CC: cough and runny nose    HPI: cough and runny nose, started before bronchoscopy. Resolved a few weeks ago. Resumed this week. Feels worse to him. Still with runny nose. Hears wheeze or gurgle. Still using supplemental oxygen at home when he walks. Not using oximeter. Not feeling light headed or dizzy. No fevers. No sick contacts. Cough is productive, sees clear sputum at times. None today. Cough worse when he sits or reclines, can be any time. Uses CPAP at night which seems to help.  Will have him present to clinic today for vitals check, oxygen while walking, nasal swab for viral panel, COVID, vs ongoing known disease progression followed by pulm. Sees thoracic later today.    Review of Systems: 10 point ROS negative except as noted above.    Physical Exam:   Blood pressure 117/68, pulse 102, weight 102.5 kg (226 lb), SpO2 (!) 87 %.  General: NAD   Eyes: VANGIE, sclera anicteric   Nose/Mouth/Throat: OP clear, buccal mucosa moist, no ulcerations   Lungs: crackles rales and wheezes+ right>left  Cardiovascular: RRR, no M/R/G   Abdominal/Rectal: +BS, soft, NT, ND, No HSM   Lymphatics: no edema  Skin: no rashes or petechaie  Neuro: A&O     Labs:  Lab Results   Component Value Date    WBC 6.4 04/21/2022    ANEU 6.2 06/19/2021    HGB 14.0 04/21/2022    HCT 44.3 04/21/2022     (L) 04/21/2022     04/21/2022    POTASSIUM 3.7 04/21/2022    CHLORIDE 103 04/21/2022    CO2 29 04/21/2022     (H) 04/21/2022    BUN 8 04/21/2022    CR 0.68 04/21/2022    MAG 1.8 03/23/2022    INR 1.16 (H) 10/28/2020     Imaging:      Combined Report of:    PET and CT on  3/31/2022 12:48 PM :   IMPRESSION:   In this patient with 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 Disease  progression by Lugano criteria, with FDG-avid mediastinal  and hilar lymphadenopathy (Deauville 4-5).  2. Similar volume loss and consolidation of the right lung compared to  previous examination.  3. Similar hepatosplenomegaly compared to previous examination.  4. The remainder of the exam is not significantly changed since  3/18/2022.     CCT 5/4/2022 IMPRESSION:   1. Unchanged right lung consolidation volume loss. No acute findings  on today's exam.  2. Stable mediastinal and hilar lymphadenopathy better characterized  on CT PET 3/31/2022.  3. Stable splenomegaly  .  Assessment and Plan:   1.  BMT: Relapsed DLBCL s/p yescarta CAR-T   -work up for PBCAR.   4/11 had lymphnode bx that we inconclusive. Plan per Dr Bunch is for pt to be seen by cardiothroasic surgery for possible excisional bx as must be CD19+ for PBCAR.     Thoracic okay with seeing today as COVID neg, CCT stable    2. Heme: No labs today    3. Pulm: worsening cough, sputum production, no fever. Dips to 85% oxygen on room air while sitting. Crackles and rales on exam  CCT without progression of lymphoma or infiltrate to indicate infection.  Addendum: Parainfluenza 3 positive.  Will keep oxygen on 100% of the time, currently set to 4L O2.   Pt to take his temperature daily  Antitussives given    RTC tomorrow for follow up, sx check and vitals. Checking labs tomorrow as well    45 minutes spent on the date of the encounter doing chart review, review of test results, interpretation of tests, patient visit, documentation and discussion with other provider(s)       Mary Morales PAC   [phone]

## 2022-05-04 NOTE — LETTER
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 Please see a copy of my visit note below.    BMT Daily Progress Note     Mr. Garibay is pleasant 72 y.o male with relapsed lymphoma post Yescarta CART. Currently undergoing work up to see if eligible for PBCAR.     CC: cough and runny nose    HPI: cough and runny nose, started before bronchoscopy. Resolved a few weeks ago. Resumed this week. Feels worse to him. Still with runny nose. Hears wheeze or gurgle. Still using supplemental oxygen at home when he walks. Not using oximeter. Not feeling light headed or dizzy. No fevers. No sick contacts. Cough is productive, sees clear sputum at times. None today. Cough worse when he sits or reclines, can be any time. Uses CPAP at night which seems to help.  Will have him present to clinic today for vitals check, oxygen while walking, nasal swab for viral panel, COVID, vs ongoing known disease progression followed by pulm. Sees thoracic later today.    Review of Systems: 10 point ROS negative except as noted above.    Physical Exam:   Blood pressure 117/68, pulse 102, weight 102.5 kg (226 lb), SpO2 (!) 87 %.  General: NAD   Eyes: VANGIE, sclera anicteric   Nose/Mouth/Throat: OP clear, buccal mucosa moist, no ulcerations   Lungs: crackles rales and wheezes+ right>left  Cardiovascular: RRR, no M/R/G   Abdominal/Rectal: +BS, soft, NT, ND, No HSM   Lymphatics: no edema  Skin: no rashes or petechaie  Neuro: A&O     Labs:  Lab Results   Component Value Date    WBC 6.4 04/21/2022    ANEU 6.2 06/19/2021    HGB 14.0 04/21/2022    HCT 44.3 04/21/2022     (L) 04/21/2022     04/21/2022    POTASSIUM 3.7 04/21/2022    CHLORIDE 103 04/21/2022    CO2 29 04/21/2022     (H) 04/21/2022    BUN 8 04/21/2022    CR 0.68 04/21/2022    MAG 1.8  03/23/2022    INR 1.16 (H) 10/28/2020     Imaging:      Combined Report of:    PET and CT on  3/31/2022 12:48 PM :   IMPRESSION:   In this patient with 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 Disease progression by Lugano criteria, with FDG-avid mediastinal  and hilar lymphadenopathy (Deauville 4-5).  2. Similar volume loss and consolidation of the right lung compared to  previous examination.  3. Similar hepatosplenomegaly compared to previous examination.  4. The remainder of the exam is not significantly changed since  3/18/2022.     CCT 5/4/2022 IMPRESSION:   1. Unchanged right lung consolidation volume loss. No acute findings  on today's exam.  2. Stable mediastinal and hilar lymphadenopathy better characterized  on CT PET 3/31/2022.  3. Stable splenomegaly  .  Assessment and Plan:   1.  BMT: Relapsed DLBCL s/p yescarta CAR-T   -work up for PBCAR.   4/11 had lymphnode bx that we inconclusive. Plan per Dr Bunch is for pt to be seen by cardiothroasic surgery for possible excisional bx as must be CD19+ for PBCAR.     Thoracic okay with seeing today as COVID neg, CCT stable    2. Heme: No labs today    3. Pulm: worsening cough, sputum production, no fever. Dips to 85% oxygen on room air while sitting. Crackles and rales on exam  CCT without progression of lymphoma or infiltrate to indicate infection.  Addendum: Parainfluenza 3 positive.  Will keep oxygen on 100% of the time, currently set to 4L O2.   Pt to take his temperature daily  Antitussives given    RTC tomorrow for follow up, sx check and vitals. Checking labs tomorrow as well    45 minutes spent on the date of the encounter doing chart review, review of test results, interpretation of tests, patient visit, documentation and discussion with other provider(s)       Mary Morales PAC   [phone]      Again, thank you for allowing me to participate in the care of your  patient.        Sincerely,        BMT Advanced Practice Provider

## 2022-05-04 NOTE — TELEPHONE ENCOUNTER
Called Mr Garibay re cough and runny nose, started before bronchoscopy. Resolved a few weeks ago. Resumed this week. Feels worse to him. Still with runny nose. Hears wheeze or gurgle. Still using supplemental oxygen at home when he walks. Not using oximeter. Not feeling light headed or dizzy. No fevers. No sick contacts. Cough is productive, sees clear sputum at times. None today. Cough worse when he sits or reclines, can be any time. Uses CPAP at night which seems to help.  Will have him present to clinic today for vitals check, oxygen while walking, nasal swab for viral panel, COVID, vs ongoing known disease progression followed by pulm. Sees thoracic later today.  Mary Morales PAC  [phone]

## 2022-05-04 NOTE — LETTER
5/4/2022         RE: Pastor Garibay  [address]  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 Please see a copy of my visit note below.    THORACIC SURGERY - NEW PATIENT OFFICE VISIT      Dear Dr. Armijo,    I saw Pastor Garibay at Dr. 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 Additionally, he has been struggling recently with wheezing and more shortness of breath. He underwent an EBUS TBNA of the PET positive LEFT hilar LN 1 month ago that only revealed lymphocytes.    Previsit Tests   PET -CT (3/31/2022): PET positive LEFT hilar and 2R LN, mildly enlarged, stable in size for at least 14 months, but previously PERT negative. RIGHT lung scarring. He is oxygen dependent particularly when physically active.    CT chest (5/4/2022): stable LN, no new parenchymal changes  PFT (4/4/2022): FEV1  45%, DLCOcorr 46%  EBUS-TBNA (4/6/2022, Dr. Aguiar): Many samples from 11L revealed only lymphocytes  ECHO (3/2022): Poor quality windows, normal EF and normal RV function, no TR, PA pressure could not be estimated.    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  Partial left second toe amputation with osteotomy through proximal phalanx.  2.  Partial left third toe amputation with osteotomy through proximal phalanx.;  Surgeon: Ismael Humphrey DPM;  Location: [facility]     BIOPSY LYMPH NODE CERVICAL N/A 12/5/2014    Procedure: BIOPSY LYMPH NODE CERVICAL;  Surgeon: Robbie Linda MD;  Location:  OR     BRONCHOSCOPY FLEXIBLE N/A 11/14/2017    Procedure: BRONCHOSCOPY FLEXIBLE;  FLEXIBLE BRONCHOSCOPY WITH BIOPSIES.;  Surgeon: Robbie Linda MD;  Location:  OR     BRONCHOSCOPY RIGID OR FLEXIBLE W/TRANSENDOSCOPIC ENDOBRONCHIAL ULTRASOUND GUIDED N/A 4/11/2022    Procedure: BRONCHOSCOPY, FIBEROPTIC, endobronchial ultrasound, transbronchial biopsies, lymphnode forcep biopsies;  Surgeon: Prosper Aguiar MD;  [facility]     COLONOSCOPY       COLONOSCOPY N/A 5/9/2018    Procedure: COMBINED COLONOSCOPY, SINGLE OR MULTIPLE BIOPSY/POLYPECTOMY BY BIOPSY;;  Surgeon: Ramos Bates MD;  Location:  GI     ENDOVASCULAR PLACEMENT VASCULAR DEVICE Left 12/5/2017    Procedure: ENDOVASCULAR PLACEMENT VASCULAR DEVICE;;  Surgeon: Robbie Linda MD;  Location: [facility]     ENT SURGERY      tonsillectomy     EXCISE NODE MEDIASTINAL N/A 11/17/2017    Procedure: EXCISE NODE MEDIASTINAL;;  Surgeon: Robbie Linda MD;  Location:  OR     EYE SURGERY       EYE SURGERY Left     vitrectomy     INSERT PORT VASCULAR ACCESS N/A 12/05/2014    Procedure: INSERT PORT VASCULAR ACCESS;  Surgeon: Robbie Linda MD;  Location:  OR     INSERT PORT VASCULAR ACCESS N/A  12/5/2017    Procedure: INSERT PORT VASCULAR ACCESS;  POWER PORT PLACEMENT ATTEMPTED, PLACEMENT OF ANGIO-SEAL VIP VASCULAR CLOSURE DEVICE;  Surgeon: Robbie Linda MD;  Location: [facility]     IR CHEST PORT PLACEMENT > 5 YRS OF AGE  6/5/2020     IR PORT REMOVAL RIGHT  12/10/2018     PHACOEMULSIFICATION CLEAR CORNEA WITH STANDARD INTRAOCULAR LENS IMPLANT Right 5/2/2016    Procedure: PHACOEMULSIFICATION CLEAR CORNEA WITH STANDARD INTRAOCULAR LENS IMPLANT;  Surgeon: Rasheed Finney MD;  Location:  [facility]     REMOVE PORT VASCULAR ACCESS N/A 3/14/2017    Procedure: REMOVE PORT VASCULAR ACCESS;  Surgeon: Robbie Linda MD;  Location:  OR     SOFT TISSUE SURGERY      BASAL CELL CA FOREHEAD     THORACOTOMY Right 11/17/2017    Procedure: THORACOTOMY;  RIGHT EXPLORATORY THORACOTOMY/ EXTENSIVE PNEUMOLYSIS/ BIOPSY OF INTERLOBAR LYMPH NODE;  Surgeon: Robbie Linda MD;  Location:  OR        ETOH neg  TOB neg    Physical examination  /68   Pulse 102   Wt 102.5 kg (225 lb 15.5 oz)   SpO2 (!) 87%   BMI 33.86 kg/m    He appears chronically ill, on O2, but he did not use it while we were talking.      From a personal perspective, he is here with his wife, Toshia. They have 2 ~20 lbs dogs.    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        PLAN  I spent 45 min on the date of the encounter in chart review, patient visit, review of tests, documentation and/or discussion with other providers about the issues documented above. I reviewed the plan as follows:    I had a long discussion with Dr. Bunch and Melissa Phoenix and his wife, Toshia. Since he had such an aggressive lymphoma that was chemotherapy resistant that only responded to CAR-T therapy and he has new respiratory symptoms, we  will proceed with a biopsy of the 2R LN.    Procedure planned: TEMLA and bronchoscopy with lavage and cultures.    Necessary Preop Tests & Appointments: PAC    Regional Anesthesia Plan: None    Anticoagulation Plan: Pneumatic compression devices      I appreciate the opportunity to participate in the care of your patient and will keep you updated.        Again, thank you for allowing me to participate in the care of your patient.      Sincerely,    Star Narvaez MD

## 2022-05-04 NOTE — NURSING NOTE
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    Initial Vitals: /68   Pulse 102   Wt 102.5 kg (226 lb)   SpO2 (!) 87%   BMI 33.86 kg/m   Estimated body mass index is 33.86 kg/m  as calculated from the following:    Height as of 4/11/22: 1.74 m (5' 8.5\").    Weight as of this encounter: 102.5 kg (226 lb). Body surface area is 2.23 meters squared.  No Pain (0) Comment: Data Unavailable   No LMP for male patient.  Allergies reviewed: Yes  Medications reviewed: Yes    Medications: Medication refills not needed today.  Pharmacy name entered into [employer]:    [id number] [place] [place] [facility] [address] [place] [address] - [address] [facility] [phone]    Clinical concerns: cough x4-5 days, worsening in the last 48 hours. \"noises\" developed in the last 24 hours IE wheezing and rattling. Denies SOB, minus when having fit. Was given oxygen by \"some doctor here\" that did a breathing test within the last month      Reyna Terrazas CMA              "

## 2022-05-05 ENCOUNTER — LAB (OUTPATIENT)
Dept: LAB | Facility: CLINIC | Age: 72
End: 2022-05-05
Attending: STUDENT IN AN ORGANIZED HEALTH CARE EDUCATION/TRAINING PROGRAM
Payer: COMMERCIAL

## 2022-05-05 ENCOUNTER — ONCOLOGY VISIT (OUTPATIENT)
Dept: TRANSPLANT | Facility: CLINIC | Age: 72
End: 2022-05-05
Attending: STUDENT IN AN ORGANIZED HEALTH CARE EDUCATION/TRAINING PROGRAM
Payer: COMMERCIAL

## 2022-05-05 VITALS
OXYGEN SATURATION: 96 % | SYSTOLIC BLOOD PRESSURE: 124 MMHG | RESPIRATION RATE: 18 BRPM | DIASTOLIC BLOOD PRESSURE: 72 MMHG | TEMPERATURE: 98.1 F | HEART RATE: 103 BPM | BODY MASS INDEX: 33.53 KG/M2 | WEIGHT: 223.8 LBS

## 2022-05-05 VITALS
SYSTOLIC BLOOD PRESSURE: 124 MMHG | HEART RATE: 103 BPM | BODY MASS INDEX: 33.53 KG/M2 | TEMPERATURE: 98.1 F | RESPIRATION RATE: 18 BRPM | OXYGEN SATURATION: 96 % | DIASTOLIC BLOOD PRESSURE: 72 MMHG | WEIGHT: 223.8 LBS

## 2022-05-05 DIAGNOSIS — C83.30 DIFFUSE LARGE B-CELL LYMPHOMA, UNSPECIFIED BODY REGION (H): ICD-10-CM

## 2022-05-05 DIAGNOSIS — C83.30 DIFFUSE LARGE B-CELL LYMPHOMA, UNSPECIFIED BODY REGION (H): Primary | ICD-10-CM

## 2022-05-05 LAB
ANION GAP SERPL CALCULATED.3IONS-SCNC: 6 MMOL/L (ref 3–14)
BASOPHILS # BLD AUTO: 0 10E3/UL (ref 0–0.2)
BASOPHILS NFR BLD AUTO: 0 %
BUN SERPL-MCNC: 9 MG/DL (ref 7–30)
CALCIUM SERPL-MCNC: 9 MG/DL (ref 8.5–10.1)
CHLORIDE BLD-SCNC: 98 MMOL/L (ref 94–109)
CO2 SERPL-SCNC: 29 MMOL/L (ref 20–32)
CREAT SERPL-MCNC: 0.79 MG/DL (ref 0.66–1.25)
EOSINOPHIL # BLD AUTO: 0 10E3/UL (ref 0–0.7)
EOSINOPHIL NFR BLD AUTO: 0 %
ERYTHROCYTE [DISTWIDTH] IN BLOOD BY AUTOMATED COUNT: 17.4 % (ref 10–15)
GFR SERPL CREATININE-BSD FRML MDRD: >90 ML/MIN/1.73M2
GLUCOSE BLD-MCNC: 190 MG/DL (ref 70–99)
HCT VFR BLD AUTO: 47 % (ref 40–53)
HGB BLD-MCNC: 15.1 G/DL (ref 13.3–17.7)
IMM GRANULOCYTES # BLD: 0 10E3/UL
IMM GRANULOCYTES NFR BLD: 0 %
LYMPHOCYTES # BLD AUTO: 1.5 10E3/UL (ref 0.8–5.3)
LYMPHOCYTES NFR BLD AUTO: 15 %
MCH RBC QN AUTO: 29.2 PG (ref 26.5–33)
MCHC RBC AUTO-ENTMCNC: 32.1 G/DL (ref 31.5–36.5)
MCV RBC AUTO: 91 FL (ref 78–100)
MONOCYTES # BLD AUTO: 0.9 10E3/UL (ref 0–1.3)
MONOCYTES NFR BLD AUTO: 9 %
NEUTROPHILS # BLD AUTO: 7.4 10E3/UL (ref 1.6–8.3)
NEUTROPHILS NFR BLD AUTO: 76 %
NRBC # BLD AUTO: 0 10E3/UL
NRBC BLD AUTO-RTO: 0 /100
PLATELET # BLD AUTO: 113 10E3/UL (ref 150–450)
POTASSIUM BLD-SCNC: 4.1 MMOL/L (ref 3.4–5.3)
RBC # BLD AUTO: 5.17 10E6/UL (ref 4.4–5.9)
SARS-COV-2 RNA RESP QL NAA+PROBE: NEGATIVE
SODIUM SERPL-SCNC: 133 MMOL/L (ref 133–144)
WBC # BLD AUTO: 9.8 10E3/UL (ref 4–11)

## 2022-05-05 PROCEDURE — 99215 OFFICE O/P EST HI 40 MIN: CPT

## 2022-05-05 PROCEDURE — 36591 DRAW BLOOD OFF VENOUS DEVICE: CPT

## 2022-05-05 PROCEDURE — G0463 HOSPITAL OUTPT CLINIC VISIT: HCPCS

## 2022-05-05 PROCEDURE — 82374 ASSAY BLOOD CARBON DIOXIDE: CPT

## 2022-05-05 PROCEDURE — 85004 AUTOMATED DIFF WBC COUNT: CPT

## 2022-05-05 PROCEDURE — 250N000011 HC RX IP 250 OP 636: Performed by: STUDENT IN AN ORGANIZED HEALTH CARE EDUCATION/TRAINING PROGRAM

## 2022-05-05 RX ORDER — HEPARIN SODIUM (PORCINE) LOCK FLUSH IV SOLN 100 UNIT/ML 100 UNIT/ML
5 SOLUTION INTRAVENOUS ONCE
Status: COMPLETED | OUTPATIENT
Start: 2022-05-05 | End: 2022-05-05

## 2022-05-05 RX ADMIN — Medication 5 ML: at 16:00

## 2022-05-05 ASSESSMENT — PAIN SCALES - GENERAL: PAINLEVEL: NO PAIN (0)

## 2022-05-05 NOTE — LETTER
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 Please see a copy of my visit note below.    BMT Daily Progress Note     Mr. Garibay is pleasant 72 y.o male with relapsed lymphoma post Yescarta CART. Currently undergoing work up to see if eligible for PBCAR.     CC: cough and runny nose    HPI: Parainfluenza 3 positive on viral panel. No new symptoms. No fevers. No worsening SOB. Stable on 4L O2. Sputum dark yellow to brown. No blood seen in sputum. Cough still bothersome, will start using rx antitussives today. No n/v/d. No rashes.    Review of Systems: 10 point ROS negative except as noted above.    Physical Exam:   Blood pressure 124/72, pulse 103, temperature 98.1  F (36.7  C), temperature source Oral, resp. rate 18, weight 101.5 kg (223 lb 12.8 oz), SpO2 96 %.  General: NAD   Eyes: VANGIE, sclera anicteric   Nose/Mouth/Throat: OP clear, buccal mucosa moist, no ulcerations   Lungs: crackles rales and wheezes+ right>left  Cardiovascular: RRR, no M/R/G   Abdominal/Rectal: +BS, soft, NT, ND, No HSM   Lymphatics: no edema  Skin: no rashes or petechaie  Neuro: A&O     Labs:  Lab Results   Component Value Date    WBC 9.8 05/05/2022    ANEU 6.2 06/19/2021    HGB 15.1 05/05/2022    HCT 47.0 05/05/2022     (L) 05/05/2022     05/05/2022    POTASSIUM 4.1 05/05/2022    CHLORIDE 98 05/05/2022    CO2 29 05/05/2022     (H) 05/05/2022    BUN 9 05/05/2022    CR 0.79 05/05/2022    MAG 1.8 03/23/2022    INR 1.16 (H) 10/28/2020     Imaging:      Combined Report of:    PET and CT on  3/31/2022 12:48 PM :   IMPRESSION:   In this patient with 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 Disease progression by Lugano criteria, with FDG-avid  mediastinal  and hilar lymphadenopathy (Deauville 4-5).  2. Similar volume loss and consolidation of the right lung compared to  previous examination.  3. Similar hepatosplenomegaly compared to previous examination.  4. The remainder of the exam is not significantly changed since  3/18/2022.     CCT 5/4/2022 IMPRESSION:   1. Unchanged right lung consolidation volume loss. No acute findings  on today's exam.  2. Stable mediastinal and hilar lymphadenopathy better characterized  on CT PET 3/31/2022.  3. Stable splenomegaly  .  Assessment and Plan:   1.  BMT: Relapsed DLBCL s/p yescarta CAR-T   -work up for PBCAR.   4/11 had lymphnode bx that we inconclusive. Plan per Dr Bunch is for pt to be seen by cardiothroasic surgery for possible excisional bx as must be CD19+ for PBCAR.     Thoracic saw 5/5 to plan lung biopsy.     2. Heme: counts stable, transfusion independent    3. Pulm: worsening cough, sputum production, no fever. Dips to 85% oxygen on room air while sitting. Crackles and rales on exam  CCT without progression of lymphoma or infiltrate to indicate infection.  Addendum: Parainfluenza 3 positive.    Will keep oxygen on 100% of the time, currently set to 4L O2.   Pt to take his temperature daily  Antitussives prn    RTC weekly until further workup by PB car.  Call with fevers, increased need oxygen, worsening cough, bloody sputum or any other new/ worsening symptoms  No appt with thoracic surgery made, messaged Dr Vargas and Alivia to follow up    40 minutes spent on the date of the encounter doing chart review, review of test results, interpretation of tests, patient visit, documentation and discussion with other provider(s)     Mary Morales [facility]   [phone]      Again, thank you for allowing me to participate in the care of your patient.        Sincerely,        BMT Advanced Practice Provider

## 2022-05-05 NOTE — NURSING NOTE
Chief Complaint   Patient presents with     Blood Draw     Port blood draw by lab RN and vitals       Port accessed, blood draw, and flushed with saline and heparin. Vitals taken and next appointment arrived.    Diana Khanna RN

## 2022-05-05 NOTE — NURSING NOTE
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    Weight as of this encounter: 101.5 kg (223 lb 12.8 oz). Body surface area is 2.21 meters squared.  No Pain (0) Comment: Data Unavailable   No LMP for male patient.  Allergies reviewed: Yes  Medications reviewed: Yes    Medications: Medication refills not needed today.  Pharmacy name entered into [employer]:    [id number] [place] [facility] [place], [place] - [address] [place] [address] PHARMACY [place], [place] - [phone]    Clinical concerns: none       Kahlil Scales            "

## 2022-05-05 NOTE — NURSING NOTE
Chief Complaint   Patient presents with     Oncology Clinic Visit     Diffuse large B cell lymphoma, unspecified body region     Blood Draw     Port blood draw, vitals taken, provider visit arrived     Port accessed, blood drawn, flushed with saline and heparin and deaccessed.     Diana Khanna RN

## 2022-05-06 NOTE — PROGRESS NOTES
BMT Daily Progress Note     Mr. Garibay is pleasant 72 y.o male with relapsed lymphoma post Yescarta CART. Currently undergoing work up to see if eligible for PBCAR.     CC: cough and runny nose    HPI: Parainfluenza 3 positive on viral panel. No new symptoms. No fevers. No worsening SOB. Stable on 4L O2. Sputum dark yellow to brown. No blood seen in sputum. Cough still bothersome, will start using rx antitussives today. No n/v/d. No rashes.    Review of Systems: 10 point ROS negative except as noted above.    Physical Exam:   Blood pressure 124/72, pulse 103, temperature 98.1  F (36.7  C), temperature source Oral, resp. rate 18, weight 101.5 kg (223 lb 12.8 oz), SpO2 96 %.  General: NAD   Eyes: VANGIE, sclera anicteric   Nose/Mouth/Throat: OP clear, buccal mucosa moist, no ulcerations   Lungs: crackles rales and wheezes+ right>left  Cardiovascular: RRR, no M/R/G   Abdominal/Rectal: +BS, soft, NT, ND, No HSM   Lymphatics: no edema  Skin: no rashes or petechaie  Neuro: A&O     Labs:  Lab Results   Component Value Date    WBC 9.8 05/05/2022    ANEU 6.2 06/19/2021    HGB 15.1 05/05/2022    HCT 47.0 05/05/2022     (L) 05/05/2022     05/05/2022    POTASSIUM 4.1 05/05/2022    CHLORIDE 98 05/05/2022    CO2 29 05/05/2022     (H) 05/05/2022    BUN 9 05/05/2022    CR 0.79 05/05/2022    MAG 1.8 03/23/2022    INR 1.16 (H) 10/28/2020     Imaging:      Combined Report of:    PET and CT on  3/31/2022 12:48 PM :   IMPRESSION:   In this patient with 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 Disease progression by Lugano criteria, with FDG-avid mediastinal  and hilar lymphadenopathy (Deauville 4-5).  2. Similar volume loss and consolidation of the right lung compared to  previous examination.  3. Similar hepatosplenomegaly compared to previous examination.  4. The remainder of the exam is not significantly changed  since  3/18/2022.     CCT 5/4/2022 IMPRESSION:   1. Unchanged right lung consolidation volume loss. No acute findings  on today's exam.  2. Stable mediastinal and hilar lymphadenopathy better characterized  on CT PET 3/31/2022.  3. Stable splenomegaly  .  Assessment and Plan:   1.  BMT: Relapsed DLBCL s/p yescarta CAR-T   -work up for PBCAR.   4/11 had lymphnode bx that we inconclusive. Plan per Dr Bunch is for pt to be seen by cardiothroasic surgery for possible excisional bx as must be CD19+ for PBCAR.     Thoracic saw 5/5 to plan lung biopsy.     2. Heme: counts stable, transfusion independent    3. Pulm: worsening cough, sputum production, no fever. Dips to 85% oxygen on room air while sitting. Crackles and rales on exam  CCT without progression of lymphoma or infiltrate to indicate infection.  Addendum: Parainfluenza 3 positive.    Will keep oxygen on 100% of the time, currently set to 4L O2.   Pt to take his temperature daily  Antitussives prn    RTC weekly until further workup by PB car.  Call with fevers, increased need oxygen, worsening cough, bloody sputum or any other new/ worsening symptoms  No appt with thoracic surgery made, messaged Dr Vargas and Alivia to follow up    40 minutes spent on the date of the encounter doing chart review, review of test results, interpretation of tests, patient visit, documentation and discussion with other provider(s)     Mary Morales PAC   [phone]

## 2022-05-09 ENCOUNTER — PREP FOR PROCEDURE (OUTPATIENT)
Dept: SURGERY | Facility: CLINIC | Age: 72
End: 2022-05-09
Payer: COMMERCIAL

## 2022-05-12 ENCOUNTER — APPOINTMENT (OUTPATIENT)
Dept: LAB | Facility: CLINIC | Age: 72
End: 2022-05-12
Attending: PHYSICIAN ASSISTANT
Payer: COMMERCIAL

## 2022-05-12 ENCOUNTER — PREP FOR PROCEDURE (OUTPATIENT)
Dept: SURGERY | Facility: CLINIC | Age: 72
End: 2022-05-12

## 2022-05-12 ENCOUNTER — ONCOLOGY VISIT (OUTPATIENT)
Dept: TRANSPLANT | Facility: CLINIC | Age: 72
End: 2022-05-12
Attending: PHYSICIAN ASSISTANT
Payer: COMMERCIAL

## 2022-05-12 VITALS
RESPIRATION RATE: 18 BRPM | TEMPERATURE: 97.9 F | BODY MASS INDEX: 33.44 KG/M2 | HEART RATE: 96 BPM | OXYGEN SATURATION: 98 % | SYSTOLIC BLOOD PRESSURE: 121 MMHG | WEIGHT: 223.2 LBS | DIASTOLIC BLOOD PRESSURE: 76 MMHG

## 2022-05-12 DIAGNOSIS — C83.30 DIFFUSE LARGE B-CELL LYMPHOMA, UNSPECIFIED BODY REGION (H): ICD-10-CM

## 2022-05-12 LAB
ALBUMIN SERPL-MCNC: 3.3 G/DL (ref 3.4–5)
ALP SERPL-CCNC: 78 U/L (ref 40–150)
ALT SERPL W P-5'-P-CCNC: 24 U/L (ref 0–70)
ANION GAP SERPL CALCULATED.3IONS-SCNC: 7 MMOL/L (ref 3–14)
AST SERPL W P-5'-P-CCNC: 17 U/L (ref 0–45)
BASOPHILS # BLD AUTO: 0 10E3/UL (ref 0–0.2)
BASOPHILS NFR BLD AUTO: 0 %
BILIRUB SERPL-MCNC: 0.9 MG/DL (ref 0.2–1.3)
BUN SERPL-MCNC: 12 MG/DL (ref 7–30)
CALCIUM SERPL-MCNC: 9.1 MG/DL (ref 8.5–10.1)
CHLORIDE BLD-SCNC: 100 MMOL/L (ref 94–109)
CO2 SERPL-SCNC: 30 MMOL/L (ref 20–32)
CREAT SERPL-MCNC: 0.7 MG/DL (ref 0.66–1.25)
EOSINOPHIL # BLD AUTO: 0.1 10E3/UL (ref 0–0.7)
EOSINOPHIL NFR BLD AUTO: 1 %
ERYTHROCYTE [DISTWIDTH] IN BLOOD BY AUTOMATED COUNT: 16.3 % (ref 10–15)
GFR SERPL CREATININE-BSD FRML MDRD: >90 ML/MIN/1.73M2
GLUCOSE BLD-MCNC: 161 MG/DL (ref 70–99)
HCT VFR BLD AUTO: 46.4 % (ref 40–53)
HGB BLD-MCNC: 14.7 G/DL (ref 13.3–17.7)
IMM GRANULOCYTES # BLD: 0 10E3/UL
IMM GRANULOCYTES NFR BLD: 0 %
LYMPHOCYTES # BLD AUTO: 1.4 10E3/UL (ref 0.8–5.3)
LYMPHOCYTES NFR BLD AUTO: 19 %
MCH RBC QN AUTO: 28.4 PG (ref 26.5–33)
MCHC RBC AUTO-ENTMCNC: 31.7 G/DL (ref 31.5–36.5)
MCV RBC AUTO: 90 FL (ref 78–100)
MONOCYTES # BLD AUTO: 0.7 10E3/UL (ref 0–1.3)
MONOCYTES NFR BLD AUTO: 10 %
NEUTROPHILS # BLD AUTO: 4.8 10E3/UL (ref 1.6–8.3)
NEUTROPHILS NFR BLD AUTO: 70 %
NRBC # BLD AUTO: 0 10E3/UL
NRBC BLD AUTO-RTO: 0 /100
PLATELET # BLD AUTO: 155 10E3/UL (ref 150–450)
POTASSIUM BLD-SCNC: 4.1 MMOL/L (ref 3.4–5.3)
PROT SERPL-MCNC: 6.9 G/DL (ref 6.8–8.8)
RBC # BLD AUTO: 5.18 10E6/UL (ref 4.4–5.9)
SODIUM SERPL-SCNC: 137 MMOL/L (ref 133–144)
WBC # BLD AUTO: 7.1 10E3/UL (ref 4–11)

## 2022-05-12 PROCEDURE — 250N000011 HC RX IP 250 OP 636: Performed by: PHYSICIAN ASSISTANT

## 2022-05-12 PROCEDURE — 85004 AUTOMATED DIFF WBC COUNT: CPT

## 2022-05-12 PROCEDURE — 80053 COMPREHEN METABOLIC PANEL: CPT

## 2022-05-12 PROCEDURE — 36591 DRAW BLOOD OFF VENOUS DEVICE: CPT

## 2022-05-12 PROCEDURE — 99215 OFFICE O/P EST HI 40 MIN: CPT

## 2022-05-12 PROCEDURE — G0463 HOSPITAL OUTPT CLINIC VISIT: HCPCS

## 2022-05-12 RX ORDER — HEPARIN SODIUM (PORCINE) LOCK FLUSH IV SOLN 100 UNIT/ML 100 UNIT/ML
5 SOLUTION INTRAVENOUS ONCE
Status: COMPLETED | OUTPATIENT
Start: 2022-05-12 | End: 2022-05-12

## 2022-05-12 RX ADMIN — SODIUM CHLORIDE, PRESERVATIVE FREE 5 ML: 5 INJECTION INTRAVENOUS at 14:27

## 2022-05-12 ASSESSMENT — PAIN SCALES - GENERAL: PAINLEVEL: NO PAIN (0)

## 2022-05-12 NOTE — NURSING NOTE
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     Initial Vitals: /76   Pulse 96   Temp 97.9  F (36.6  C)   Resp 18   Wt 101.2 kg (223 lb 3.2 oz)   SpO2 98%   BMI 33.44 kg/m   Estimated body mass index is 33.44 kg/m  as calculated from the following:    Height as of 4/11/22: 1.74 m (5' 8.5\").    Weight as of this encounter: 101.2 kg (223 lb 3.2 oz). Body surface area is 2.21 meters squared.  No Pain (0) Comment: Data Unavailable   No LMP for male patient.  Allergies reviewed: Yes  Medications reviewed: Yes    Medications: Medication refills not needed today.  Pharmacy name entered into [employer]:    [id number] [place] [facility] [place], [place] - [address] [place] [address] PHARMACY [place], [place] - [phone]    Clinical concerns: none       Kahlil Scales            "

## 2022-05-12 NOTE — LETTER
Date:May 26, 2022      Provider requested that no letter be sent. Do not send.       [facility]

## 2022-05-12 NOTE — NURSING NOTE
Chief Complaint   Patient presents with     Oncology Clinic Visit     Diffuse large B cell lymphoma, unspecified body region     Port Draw     Labs drawn by an RN via port, vitals taken.     Port accessed with 20 gauge 3/4 inch gripper needle by RN, labs collected, line flushed with saline and heparin.  Vitals taken. Pt checked in for appointment(s).    Vicki Murphy RN

## 2022-05-12 NOTE — PROGRESS NOTES
BMT Daily Progress Note     Mr. Garibay is pleasant 72 y.o male with relapsed lymphoma post Yescarta CART. Currently undergoing work up to see if eligible for PBCAR awaiting biopsy of the 2R LN TEMLA and bronchoscopy with lavage and cultures. Also recovering from parainfluenza.       HPI: Cough better. Sputum less. On 4L. Able to be off O2 for periods of time and maintains sats 93%. No fevers. No new issues. No palpable nodes/masses    Review of Systems: 10 point ROS negative except as noted above.    Physical Exam:   Blood pressure 121/76, pulse 96, temperature 97.9  F (36.6  C), resp. rate 18, weight 101.2 kg (223 lb 3.2 oz), SpO2 98 %.  General: NAD   Eyes: VANGIE, sclera anicteric   Nose/Mouth/Throat: OP clear, buccal mucosa moist, no ulcerations   Lungs: crackles rales on R. L clear.  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 Disease progression by Lugano criteria, with FDG-avid mediastinal  and hilar lymphadenopathy (Deauville 4-5).  2. Similar volume loss and consolidation of the right lung compared to  previous examination.  3. Similar hepatosplenomegaly compared to previous examination.  4. The remainder of the exam is not significantly changed since  3/18/2022.     CCT 5/4/2022 IMPRESSION:   1. Unchanged right lung consolidation volume loss. No acute  findings  on today's exam.  2. Stable mediastinal and hilar lymphadenopathy better characterized  on CT PET 3/31/2022.  3. Stable splenomegaly    Assessment and Plan:   1.  BMT: Relapsed DLBCL s/p yescarta CAR-T   -work up for PBCAR.   4/11 had lymphnode bx that we inconclusive. Plan per Dr Bunch is for pt to be seen by cardiothroasic surgery for possible excisional bx as must be CD19+ for PBCAR.     Thoracic saw 5/5 to plan lung biopsy. Awaiting scheduling. Will message everyone today to get this scheduled.     2. Heme: counts stable, transfusion independent    3. Pulm:   - Last visit: worsening cough, sputum production, no fever. Dips to 85% oxygen on room air while sitting. Crackles and rales on exam. Parainfluenza 3 positive. CCT without progression of lymphoma or infiltrate to indicate infection. Improved today. Maintained sats during our visit on RA 92-93%.     RTC did not schedule dedicated follow-up with BM as messaging to get bx scheduled and then requesting they make BMT follow-up. He doesn't need to just come in a week to come.     Call with fevers, increased need oxygen, worsening cough, bloody sputum or any other new/ worsening symptoms      45 minutes spent on the date of the encounter doing chart review, review of test results, interpretation of tests, patient visit, documentation and discussion with other provider(s)

## 2022-05-13 ENCOUNTER — TELEPHONE (OUTPATIENT)
Dept: SURGERY | Facility: CLINIC | Age: 72
End: 2022-05-13
Payer: COMMERCIAL

## 2022-05-13 NOTE — TELEPHONE ENCOUNTER
Spoke with patient to schedule procedure with Dr. Star Narvaez    Procedure was scheduled on 05/13 at [facility]  Patient will have H&P with PAC    Patient is aware a COVID-19 test is needed before their procedure. The test should be with-in 4 days of their procedure.   Test Details: Date 05/21  Location [place]    Patient is aware a / is needed day of surgery.   Surgery letter was sent via [employer], patient has my direct contact information for any further questions.

## 2022-05-13 NOTE — TELEPHONE ENCOUNTER
FUTURE VISIT INFORMATION      SURGERY INFORMATION:    Date: 22    Location: [facility] or    Surgeon:  Star Narvaez MD     Anesthesia Type:  general    Procedure: TRANSCERVICAL EXTENDED MEDIASTINAL LYMPHADENECTOMY BRONCHOSCOPY    Consult: ov     RECORDS REQUESTED FROM:        Primary Care Provider: Lili Croft [facility]  - [facility]    Most recent EKG+ Tracin21    Most recent ECHO: 3/31/22    Most recent Cardiac Stress Test: 3/3/22

## 2022-05-14 DIAGNOSIS — Z11.59 ENCOUNTER FOR SCREENING FOR OTHER VIRAL DISEASES: Primary | ICD-10-CM

## 2022-05-18 DIAGNOSIS — C83.30 DIFFUSE LARGE B-CELL LYMPHOMA, UNSPECIFIED BODY REGION (H): ICD-10-CM

## 2022-05-18 RX ORDER — ACYCLOVIR 800 MG/1
400 TABLET ORAL 2 TIMES DAILY
Qty: 30 TABLET | Refills: 3 | Status: SHIPPED | OUTPATIENT
Start: 2022-05-18 | End: 2022-08-15

## 2022-05-19 ENCOUNTER — LAB (OUTPATIENT)
Dept: LAB | Facility: CLINIC | Age: 72
End: 2022-05-19
Payer: COMMERCIAL

## 2022-05-19 ENCOUNTER — ANESTHESIA EVENT (OUTPATIENT)
Dept: SURGERY | Facility: CLINIC | Age: 72
End: 2022-05-19
Payer: COMMERCIAL

## 2022-05-19 ENCOUNTER — PRE VISIT (OUTPATIENT)
Dept: SURGERY | Facility: CLINIC | Age: 72
End: 2022-05-19
Payer: COMMERCIAL

## 2022-05-19 ENCOUNTER — OFFICE VISIT (OUTPATIENT)
Dept: SURGERY | Facility: CLINIC | Age: 72
End: 2022-05-19
Payer: COMMERCIAL

## 2022-05-19 VITALS
RESPIRATION RATE: 24 BRPM | TEMPERATURE: 98 F | OXYGEN SATURATION: 95 % | WEIGHT: 228 LBS | SYSTOLIC BLOOD PRESSURE: 116 MMHG | HEART RATE: 103 BPM | BODY MASS INDEX: 34.56 KG/M2 | DIASTOLIC BLOOD PRESSURE: 72 MMHG | HEIGHT: 68 IN

## 2022-05-19 DIAGNOSIS — Z01.818 PRE-OP EVALUATION: Primary | ICD-10-CM

## 2022-05-19 DIAGNOSIS — Z01.818 PRE-OP EVALUATION: ICD-10-CM

## 2022-05-19 DIAGNOSIS — T88.4XXD HARD TO INTUBATE, SUBSEQUENT ENCOUNTER: Primary | ICD-10-CM

## 2022-05-19 LAB
ABO/RH(D): NORMAL
ANTIBODY SCREEN: NEGATIVE
SPECIMEN EXPIRATION DATE: NORMAL

## 2022-05-19 PROCEDURE — 86850 RBC ANTIBODY SCREEN: CPT | Mod: 90 | Performed by: PATHOLOGY

## 2022-05-19 PROCEDURE — 86900 BLOOD TYPING SEROLOGIC ABO: CPT | Mod: 90 | Performed by: PATHOLOGY

## 2022-05-19 PROCEDURE — 99204 OFFICE O/P NEW MOD 45 MIN: CPT | Performed by: PHYSICIAN ASSISTANT

## 2022-05-19 PROCEDURE — 36415 COLL VENOUS BLD VENIPUNCTURE: CPT | Performed by: PATHOLOGY

## 2022-05-19 PROCEDURE — 99000 SPECIMEN HANDLING OFFICE-LAB: CPT | Performed by: PATHOLOGY

## 2022-05-19 PROCEDURE — 86901 BLOOD TYPING SEROLOGIC RH(D): CPT | Mod: 90 | Performed by: PATHOLOGY

## 2022-05-19 RX ORDER — ACETAMINOPHEN 325 MG/1
325-650 TABLET ORAL EVERY 6 HOURS PRN
COMMUNITY
End: 2023-05-18

## 2022-05-19 ASSESSMENT — LIFESTYLE VARIABLES: TOBACCO_USE: 0

## 2022-05-19 ASSESSMENT — PAIN SCALES - GENERAL: PAINLEVEL: NO PAIN (0)

## 2022-05-19 NOTE — H&P
Pre-Operative H & P     CC:  Preoperative exam to assess for increased cardiopulmonary risk while undergoing surgery and anesthesia.    Date of Encounter: 5/19/2022  Primary Care Physician:  Francisco Armijo     Reason for visit:   Encounter Diagnosis   Name Primary?     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 He was recently found to have PET positive mediastinal lymphadenopathy. He is s/p EBUS TBNA last month that only revealed lymphocytes. He follows with oncology and Dr. Narvaez. He is now scheduled for the above procedure. Of note, he has undergone CAR-T treatment.    History is obtained from the patient and chart review. 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  Partial left second toe amputation with osteotomy through proximal phalanx.  2.  Partial left third toe amputation with osteotomy through proximal phalanx.;  Surgeon: Ismael Humphrey DPM;  Location:  OR     BIOPSY LYMPH NODE CERVICAL N/A 12/5/2014    Procedure: BIOPSY LYMPH NODE CERVICAL;  Surgeon: Robbie Linda MD;  Location:  OR     BRONCHOSCOPY FLEXIBLE N/A 11/14/2017    Procedure: BRONCHOSCOPY FLEXIBLE;  FLEXIBLE BRONCHOSCOPY WITH BIOPSIES.;  Surgeon: Robbie Linda MD;  Location:  OR     BRONCHOSCOPY RIGID OR FLEXIBLE W/TRANSENDOSCOPIC ENDOBRONCHIAL ULTRASOUND GUIDED N/A 4/11/2022    Procedure: BRONCHOSCOPY, FIBEROPTIC, endobronchial ultrasound, transbronchial biopsies, lymphnode forcep biopsies;  Surgeon: Prosper Aguiar MD;  Location: [facility] OR     COLONOSCOPY       COLONOSCOPY N/A 5/9/2018    Procedure: COMBINED COLONOSCOPY, SINGLE OR MULTIPLE BIOPSY/POLYPECTOMY BY BIOPSY;;  Surgeon: Ramos Bates MD;  Location:  GI     ENDOVASCULAR PLACEMENT VASCULAR DEVICE Left 12/5/2017    Procedure: ENDOVASCULAR PLACEMENT VASCULAR DEVICE;;  Surgeon: Robbie Linda MD;  Location: [facility]     ENT SURGERY      tonsillectomy     EXCISE NODE MEDIASTINAL N/A 11/17/2017    Procedure: EXCISE NODE MEDIASTINAL;;  Surgeon: Robbie Linda MD;  Location:  OR     EYE SURGERY       EYE SURGERY Left     vitrectomy     INSERT PORT VASCULAR ACCESS N/A 12/05/2014    Procedure: INSERT PORT VASCULAR ACCESS;  Surgeon: Robbie Linda MD;  Location:  OR     INSERT PORT VASCULAR ACCESS N/A 12/5/2017    Procedure: INSERT PORT VASCULAR ACCESS;   POWER PORT PLACEMENT ATTEMPTED, PLACEMENT OF ANGIO-SEAL VIP VASCULAR CLOSURE DEVICE;  Surgeon: Robbie Linda MD;  Location: [facility]     IR CHEST PORT PLACEMENT > 5 YRS OF AGE  6/5/2020     IR PORT REMOVAL RIGHT  12/10/2018     PHACOEMULSIFICATION CLEAR CORNEA WITH STANDARD INTRAOCULAR LENS IMPLANT Right 5/2/2016    Procedure: PHACOEMULSIFICATION CLEAR CORNEA WITH STANDARD INTRAOCULAR LENS IMPLANT;  Surgeon: Rasheed Finney MD;  [facility]:  [facility]     REMOVE PORT VASCULAR ACCESS N/A 3/14/2017    Procedure: REMOVE PORT VASCULAR ACCESS;  Surgeon: Robbie Linda MD;  Location:  OR     SOFT TISSUE SURGERY      BASAL CELL CA FOREHEAD     THORACOTOMY Right 11/17/2017    Procedure: THORACOTOMY;  RIGHT EXPLORATORY THORACOTOMY/ EXTENSIVE PNEUMOLYSIS/ BIOPSY OF INTERLOBAR LYMPH NODE;  Surgeon: Robbie Linda MD;  Location:  OR       Prior to Admission Medications  Current Outpatient Medications   Medication Sig Dispense Refill     acetaminophen (TYLENOL) 325 MG tablet Take 325-650 mg by mouth every 6 hours as needed for mild pain       acyclovir (ZOVIRAX) 800 MG tablet Take 0.5 tablets (400 mg) by mouth 2 times daily 30 tablet 3     bisacodyl (DULCOLAX) 5 MG EC tablet Take 5 mg by mouth daily as needed for constipation        docusate sodium (COLACE) 100 MG capsule Take 1 capsule (100 mg) by mouth 2 times daily as needed for constipation       guaiFENesin-codeine (ROBITUSSIN AC) 100-10 MG/5ML solution Take 5-10 mLs by mouth every 4 hours as needed for cough 180 mL 0     hydrocortisone (CORTEF) 10 MG tablet Take 10 mg by mouth 2 times daily        insulin aspart (NOVOLOG FLEXPEN) 100 UNIT/ML pen Inject Subcutaneous 3 times daily (with meals) use sliding scale based on Carbs and Blood Glucose. Utilizing Pump approx 9/13/21       metFORMIN (GLUCOPHAGE) 500 MG tablet Take 1 tablet (500 mg) by mouth 2 times daily (with meals)       nitroGLYcerin (NITROSTAT) 0.4 MG sublingual tablet every 5 minutes  as needed       ondansetron (ZOFRAN-ODT) 8 MG ODT tab Take 1 tablet (8 mg) by mouth every 8 hours as needed for nausea 60 tablet 0     rosuvastatin (CRESTOR) 10 MG tablet Take 10 mg by mouth every morning         Allergies  No Known Allergies    Social History  Social History     Socioeconomic History     Marital status:      Spouse name: Not on file     Number of children: Not on file     Years of education: Not on file     Highest education level: Not on file   Occupational History     Not on file   Tobacco Use     Smoking status: Never Smoker     Smokeless tobacco: Never Used   Substance and Sexual Activity     Alcohol use: No     Drug use: No     Sexual activity: Not on file   Other Topics Concern     Parent/sibling w/ CABG, MI or angioplasty before 65F 55M? Not Asked   Social History Narrative     Not on file     Social Determinants of Health     Financial Resource Strain: Not on file   Food Insecurity: Not on file   Transportation Needs: Not on file   Physical Activity: Not on file   Stress: Not on file   Social Connections: Not on file   Intimate Partner Violence: Not At Risk     Fear of Current or Ex-Partner: No     Emotionally Abused: No     Physically Abused: No     Sexually Abused: No   Housing Stability: Not on file       Family History  Family History   Problem Relation Age of Onset     Anesthesia Reaction No family hx of        Review of Systems  The complete review of systems is negative other than noted in the HPI or here.   Anesthesia Evaluation            ROS/MED HX  ENT/Pulmonary:     (+) sleep apnea, uses CPAP, recent URI, unresolved, continues to cough up some phlegm:  (-) tobacco use   Neurologic:     (+) peripheral neuropathy, - left foot, right pinky finger.     Cardiovascular:     (+) Dyslipidemia hypertension-----Previous cardiac testing   Echo: Date: 3/31/22 Results:  Interpretation Summary  Technically difficult study due to extremely poor acoustic windows. Limited  information is  "obtained.  Left ventricular function appears normal. The visually estimated ejection  fraction is 55-60%.  The right ventricle is normal size and global right ventricular function  appears normal.  No pericardial effusion.  Unable to assess cardiac valves.     This study was compared with the study from 8/24/2020. No significant changes  noted.    Stress Test: Date: Results:    ECG Reviewed: Date: 6/2021 Results:  SR, prolonged QT    Cath: Date: Results:   (-) taking anticoagulants/antiplatelets   METS/Exercise Tolerance:     Hematologic: Comments: Lymphoma     (+) History of blood clots, pt is not anticoagulated, history of blood transfusion,     Musculoskeletal:  - neg musculoskeletal ROS     GI/Hepatic:    (-) GERD   Renal/Genitourinary:  - neg Renal ROS     Endo:     (+) type II DM, Using insulin, - using insulin pump. Chronic steroid usage for     Psychiatric/Substance Use:  - neg psychiatric ROS     Infectious Disease:       Malignancy:   (+) Malignancy, History of Lymphoma/Leukemia.Lymph CA status post Radiation, Surgery and Chemo.        Other:            /72 (BP Location: Right arm, Patient Position: Sitting, Cuff Size: Adult Large)   Pulse 103   Temp 98  F (36.7  C) (Oral)   Resp 24   Ht 1.727 m (5' 8\")   Wt 103.4 kg (228 lb)   SpO2 95% RA, O2 97% on 4L  BMI 34.67 kg/m      Physical Exam   Constitutional: Awake, alert, cooperative, no apparent distress, and appears stated age.  Eyes: Pupils equal, round and reactive to light, extra ocular muscles intact, sclera clear, conjunctiva normal.  HENT: Normocephalic, oral pharynx with moist mucus membranes, good dentition.   Respiratory: bilateral wheezing. using 4L O2  Cardiovascular: Regular rate and rhythm, normal S1 and S2, and no murmur noted.  No edema.   GI: Normal bowel sounds, soft, non-distended, non-tender, obese  Genitourinary:  deferred  Skin: Warm and dry.   Musculoskeletal: Full ROM of neck. There is no redness, warmth, or swelling of " "the exposed joints. Gross motor strength is normal.    Neurologic: Awake, alert, oriented to name, place and time. Cranial nerves II-XII are grossly intact.   Neuropsychiatric: Calm, cooperative. Normal affect.     Prior Labs/Diagnostic Studies   All labs and imaging personally reviewed     EKG/ stress test - if available please see in ROS above   Echo result w/o MOPS: Interpretation SummaryTechnically difficult study due to extremely poor acoustic windows. Limitedinformation is obtained.Left ventricular function appears normal. The visually estimated ejectionfraction is 55-60%.The right ventricle is normal size and global right ventricular functionappears normal.No pericardial effusion.Unable to assess cardiac valves. This study was compared with the study from 8/24/2020. No significant changesnoted.      PFT Latest Ref Rng & Units 4/4/2022   FVC L 1.92   FEV1 L 1.39   FVC% % 48   FEV1% % 45         The patient's records and results personally reviewed by this provider.     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    Plan/Recommendations  Pt will be optimized for the proposed procedure.  See below for details on the assessment, risk, and preoperative recommendations    NEUROLOGY  - No history of TIA, CVA or seizure  -Post Op delirium risk factors:  No risk identified    ENT  Last intubation: \"Placement Date: 04/11/22 Placement Time: 1001 Present on Admission: Yes Mask Ventilation: 1 Induction Type: Intravenous Ease of Intubation: -- , slightly anterior  Cuffed: Cuffed ETT Type: Oral;Single Single Lumen Tube Size: 8.5 mm Grade view of cords: 3 Insertion Adjunct: Bougie;Stylet Placed By: CRNA Attempts: 2 Secured at (cm): 23 cm Measured from: Lips Secured By: Silk tape Removal Date: 04/11/22 Removal Time: 1048 Removal " "Reason: End of therapy/procedure\"  Mallampati: I  TM: > 3    CARDIAC  - No history of CAD and Afib   -endorses BETANCOURT. Currently using 4L O2 with ambulation. Mindy CP. He reports when using O2, he can walk multiple blocks without issue.   - METS (Metabolic Equivalents)  Patient CANNOT perform 4 METS exercise without symptoms            Total Score: 1    Functional Capacity: Unable to complete 4 METS      RCRI-Moderate risk: Class 3  6.6% complication rate            Total Score: 2    RCRI: High Risk Surgery    RCRI: Diabetes        PULMONARY  - Obstructive Sleep Apnea  JESSICA with home CPAP.  Patient will be instructed to bring their home CPAP device to the hospital with them.  -recently seen by pulmonology. hypoxia with ambulation.  He was started on 4L O2 about 6 weeks ago  -tested positive for parainfluenzia 3 5/4/22. He reports ongoing cough- states he now has stretches throughout the day without coughing as much, but is still symptomatic and coughing up phlegm.  He denies any fevers. Some wheezing on exam today. I will reach out to pulmonary, oncology and thoracic surgery to alert that he is still symptomatic to discuss timing for the above procedure.   - Tobacco History      History   Smoking Status     Never Smoker   Smokeless Tobacco     Never Used       GI  PONV Medium Risk  Total Score: 2           1 AN PONV: Patient is not a current smoker    1 AN PONV: Intended Post Op Opioids        /RENAL  - Baseline Creatinine  WNL    ENDOCRINE    - BMI: Estimated body mass index is 34.67 kg/m  as calculated from the following:    Height as of this encounter: 1.727 m (5' 8\").    Weight as of this encounter: 103.4 kg (228 lb).  Obesity (BMI >30)  - Diabetes  Diabetes Type 2, insulin dependent. Hold morning oral hypoglycemic medications and short acting insulin DOS. Take 80% of last scheduled dose of long-acting insulin prior to procedure.  Recommend close monitoring of the patient's blood glucose levels throughout the " perioperative.  He reports A1c was 6.5 most recently. He follows with endocrinology at an outside facility.   -on hydrocortisone secondary to previous CART therapy. Of note, alert in chart that states no steroids should be used other than prescribed hydrocortisone unless discussed with BMT team.     HEME  VTE High Risk 3%            Total Score: 11    VTE: Greater than 59 yrs old    VTE: Male    VTE: Pt history of VTE    VTE: Current cancer      -h/o thrombosis, has completed anticoagulation therapy   -will update T&S today    ONC  - lymphoma s/p CAR-T in 2020. Now with relapse. Above procedure planned     Patient was discussed with Dr Qureshi    As patient is still symptomatic from recent parainfluenza infection, I will send message to Dr. Narvaez, Dr. Bunch and Dr. Granado to see if they would like to proceed Tuesday as planned or wait until he is less symptomatic from his recent infection. I will also ask them to comment on appropriate treatment if patient is still wheezing DOS in setting of CAR-T therapy and alert not to use steroids.       Addendum 5/23/22- received a call from Dr. Narvaez. There is a plan to repeat chest CT this afternoon. If there are new parenchymal changes will postpone, if no changes, will proceed with the procedure tomorrow.     AVS with information on surgery time/arrival time, meds and NPO status given by nursing staff. No further diagnostic testing indicated.      On the day of service:     Prep time: 21 minutes  Visit time: 25 minutes  Documentation time: 11 minutes  ------------------------------------------  Total time: 57 minutes      Shawanda Lowry PA-C  Preoperative Assessment Center  [facility]  Phone: [phone]  Fax: [phone]

## 2022-05-21 ENCOUNTER — LAB (OUTPATIENT)
Dept: URGENT CARE | Facility: URGENT CARE | Age: 72
End: 2022-05-21
Payer: COMMERCIAL

## 2022-05-21 DIAGNOSIS — Z11.59 ENCOUNTER FOR SCREENING FOR OTHER VIRAL DISEASES: ICD-10-CM

## 2022-05-21 PROCEDURE — U0003 INFECTIOUS AGENT DETECTION BY NUCLEIC ACID (DNA OR RNA); SEVERE ACUTE RESPIRATORY SYNDROME CORONAVIRUS 2 (SARS-COV-2) (CORONAVIRUS DISEASE [COVID-19]), AMPLIFIED PROBE TECHNIQUE, MAKING USE OF HIGH THROUGHPUT TECHNOLOGIES AS DESCRIBED BY CMS-2020-01-R: HCPCS

## 2022-05-21 PROCEDURE — U0005 INFEC AGEN DETEC AMPLI PROBE: HCPCS

## 2022-05-22 LAB — SARS-COV-2 RNA RESP QL NAA+PROBE: NEGATIVE

## 2022-05-23 ENCOUNTER — ANCILLARY PROCEDURE (OUTPATIENT)
Dept: CT IMAGING | Facility: CLINIC | Age: 72
End: 2022-05-23
Attending: THORACIC SURGERY (CARDIOTHORACIC VASCULAR SURGERY)
Payer: COMMERCIAL

## 2022-05-23 DIAGNOSIS — C85.80 LARGE CELL LYMPHOMA (H): Primary | ICD-10-CM

## 2022-05-23 DIAGNOSIS — R59.0 MEDIASTINAL ADENOPATHY: ICD-10-CM

## 2022-05-23 DIAGNOSIS — C85.80 LARGE CELL LYMPHOMA (H): ICD-10-CM

## 2022-05-23 PROCEDURE — 71250 CT THORAX DX C-: CPT

## 2022-05-23 ASSESSMENT — LIFESTYLE VARIABLES: TOBACCO_USE: 0

## 2022-05-23 NOTE — ANESTHESIA PREPROCEDURE EVALUATION
Anesthesia Pre-Procedure Evaluation    Patient: Pastor Garibay   MRN: [id number] : 1950        Procedure : Procedure(s):  TRANSCERVICAL EXTENDED MEDIASTINAL LYMPHADENECTOMY  BRONCHOSCOPY          Past Medical History:   Diagnosis Date     Abnormal liver function test      Anemia      CPAP (continuous positive airway pressure) dependence      Diabetes (H)      Hx of skin cancer, basal cell      Hyperlipidemia      Hypertension      Keratoderma      Large cell lymphoma (H) 2020     Liver disease      Lymphoma (H)      Non-Hodgkin lymphoma (H)      Pedal edema     CHRONIC     Pleural effusion      Seborrheic keratosis, inflamed      Sleep apnea     Uses a CPAP     Thrombocytopenia (H) 2020     Thrombosis Felbruary 2018      Past Surgical History:   Procedure Laterality Date     AMPUTATE TOE(S) Left 2020    Procedure: LEFT SECOND AND THIRD DISTAL TOE AMPUTATIONS;  Surgeon: Ramon Flores MD;  Location:  OR     AMPUTATE TOE(S) Left 2020    Procedure: 1.  Partial left second toe amputation with osteotomy through proximal phalanx.  2.  Partial left third toe amputation with osteotomy through proximal phalanx.;  Surgeon: Ismael Humphrey DPM;  Location: [facility] OR     BIOPSY LYMPH NODE CERVICAL N/A 2014    Procedure: BIOPSY LYMPH NODE CERVICAL;  Surgeon: Robbie Linda MD;  Location:  OR     BRONCHOSCOPY FLEXIBLE N/A 2017    Procedure: BRONCHOSCOPY FLEXIBLE;  FLEXIBLE BRONCHOSCOPY WITH BIOPSIES.;  Surgeon: Robbie Linda MD;  Location:  OR     BRONCHOSCOPY RIGID OR FLEXIBLE W/TRANSENDOSCOPIC ENDOBRONCHIAL ULTRASOUND GUIDED N/A 2022    Procedure: BRONCHOSCOPY, FIBEROPTIC, endobronchial ultrasound, transbronchial biopsies, lymphnode forcep biopsies;  Surgeon: Prosper Aguiar MD;  Location: [facility] OR     COLONOSCOPY       COLONOSCOPY N/A 2018    Procedure: COMBINED COLONOSCOPY, SINGLE OR MULTIPLE BIOPSY/POLYPECTOMY BY BIOPSY;;  Surgeon: Jana  Ramos TOVAR MD;  Location:  GI     ENDOVASCULAR PLACEMENT VASCULAR DEVICE Left 12/5/2017    Procedure: ENDOVASCULAR PLACEMENT VASCULAR DEVICE;;  Surgeon: Robbie Linda MD;  Location: [facility]     ENT SURGERY      tonsillectomy     EXCISE NODE MEDIASTINAL N/A 11/17/2017    Procedure: EXCISE NODE MEDIASTINAL;;  Surgeon: Robbie Linda MD;  Location:  OR     EYE SURGERY       EYE SURGERY Left     vitrectomy     INSERT PORT VASCULAR ACCESS N/A 12/05/2014    Procedure: INSERT PORT VASCULAR ACCESS;  Surgeon: Robbie Linda MD;  Location:  OR     INSERT PORT VASCULAR ACCESS N/A 12/5/2017    Procedure: INSERT PORT VASCULAR ACCESS;  POWER PORT PLACEMENT ATTEMPTED, PLACEMENT OF ANGIO-SEAL VIP VASCULAR CLOSURE DEVICE;  Surgeon: Robbie Linda MD;  Location: [facility]     IR CHEST PORT PLACEMENT > 5 YRS OF AGE  6/5/2020     IR PORT REMOVAL RIGHT  12/10/2018     PHACOEMULSIFICATION CLEAR CORNEA WITH STANDARD INTRAOCULAR LENS IMPLANT Right 5/2/2016    Procedure: PHACOEMULSIFICATION CLEAR CORNEA WITH STANDARD INTRAOCULAR LENS IMPLANT;  Surgeon: Rasheed Finney MD;  [facility]     REMOVE PORT VASCULAR ACCESS N/A 3/14/2017    Procedure: REMOVE PORT VASCULAR ACCESS;  Surgeon: Robbie Linda MD;  Location:  OR     SOFT TISSUE SURGERY      BASAL CELL CA FOREHEAD     THORACOTOMY Right 11/17/2017    Procedure: THORACOTOMY;  RIGHT EXPLORATORY THORACOTOMY/ EXTENSIVE PNEUMOLYSIS/ BIOPSY OF INTERLOBAR LYMPH NODE;  Surgeon: Robbie Linda MD;  Location:  OR      No Known Allergies   Social History     Tobacco Use     Smoking status: Never Smoker     Smokeless tobacco: Never Used   Substance Use Topics     Alcohol use: No      Wt Readings from Last 1 Encounters:   05/19/22 103.4 kg (228 lb)        Anesthesia Evaluation            ROS/MED HX  ENT/Pulmonary:     (+) sleep apnea, uses CPAP, recent URI, unresolved, continues to cough up some phlegm:  (-) tobacco use   Neurologic:      (+) peripheral neuropathy, - left foot, right pinky finger.     Cardiovascular:     (+) Dyslipidemia hypertension-----Previous cardiac testing   Echo: Date: 3/31/22 Results:  Interpretation Summary  Technically difficult study due to extremely poor acoustic windows. Limited  information is obtained.  Left ventricular function appears normal. The visually estimated ejection  fraction is 55-60%.  The right ventricle is normal size and global right ventricular function  appears normal.  No pericardial effusion.  Unable to assess cardiac valves.     This study was compared with the study from 8/24/2020. No significant changes  noted.    Stress Test: Date: Results:    ECG Reviewed: Date: 6/2021 Results:  SR, prolonged QT    Cath: Date: Results:   (-) taking anticoagulants/antiplatelets   METS/Exercise Tolerance:     Hematologic: Comments: Lymphoma     (+) History of blood clots, pt is not anticoagulated, anemia, history of blood transfusion,     Musculoskeletal:  - neg musculoskeletal ROS     GI/Hepatic:    (-) GERD   Renal/Genitourinary:  - neg Renal ROS     Endo:     (+) type II DM, Using insulin, - using insulin pump. Chronic steroid usage for     Psychiatric/Substance Use:  - neg psychiatric ROS     Infectious Disease:       Malignancy:   (+) Malignancy, History of Lymphoma/Leukemia.Lymph CA status post Radiation, Surgery and Chemo.        Other:               OUTSIDE LABS:  CBC:   Lab Results   Component Value Date    WBC 7.1 05/12/2022    WBC 9.8 05/05/2022    HGB 14.7 05/12/2022    HGB 15.1 05/05/2022    HCT 46.4 05/12/2022    HCT 47.0 05/05/2022     05/12/2022     (L) 05/05/2022     BMP:   Lab Results   Component Value Date     05/12/2022     05/05/2022    POTASSIUM 4.1 05/12/2022    POTASSIUM 4.1 05/05/2022    CHLORIDE 100 05/12/2022    CHLORIDE 98 05/05/2022    CO2 30 05/12/2022    CO2 29 05/05/2022    BUN 12 05/12/2022    BUN 9 05/05/2022    CR 0.70 05/12/2022    CR 0.79 05/05/2022      (H) 05/12/2022     (H) 05/05/2022     COAGS:   Lab Results   Component Value Date    PTT 36 10/28/2020    INR 1.16 (H) 10/28/2020    FIBR 142 (L) 10/28/2020     POC:   Lab Results   Component Value Date     (H) 06/19/2021     HEPATIC:   Lab Results   Component Value Date    ALBUMIN 3.3 (L) 05/12/2022    PROTTOTAL 6.9 05/12/2022    ALT 24 05/12/2022    AST 17 05/12/2022    ALKPHOS 78 05/12/2022    BILITOTAL 0.9 05/12/2022    ENEIDA 38 06/07/2020     OTHER:   Lab Results   Component Value Date    LACT 1.9 10/08/2020    A1C 7.7 (H) 10/09/2020    JEFF 9.1 05/12/2022    PHOS 2.5 03/23/2022    MAG 1.8 03/23/2022    CRP 7.5 04/07/2022       Anesthesia Plan    ASA Status:  3      Anesthesia Type: General.     - Airway: ETT   Induction: Intravenous.   Maintenance: Inhalation.   Techniques and Equipment:     - Airway: Video-Laryngoscope (G3 view on previous intubation, unclear if DL or VL, noted to be anterior)     - Lines/Monitors: 2nd IV     Consents            Postoperative Care    Pain management: IV analgesics.   PONV prophylaxis: Ondansetron (or other 5HT-3), Dexamethasone or Solumedrol     Comments:                James Crowder MD

## 2022-05-23 NOTE — ADDENDUM NOTE
Addendum  created 05/23/22 0573 by Shawanda Lowry PA-C    Actions taken from a [employer] Advisory, Clinical Note Signed

## 2022-05-24 ENCOUNTER — APPOINTMENT (OUTPATIENT)
Dept: GENERAL RADIOLOGY | Facility: CLINIC | Age: 72
End: 2022-05-24
Attending: THORACIC SURGERY (CARDIOTHORACIC VASCULAR SURGERY)
Payer: COMMERCIAL

## 2022-05-24 ENCOUNTER — ANESTHESIA (OUTPATIENT)
Dept: SURGERY | Facility: CLINIC | Age: 72
End: 2022-05-24
Payer: COMMERCIAL

## 2022-05-24 ENCOUNTER — HOSPITAL ENCOUNTER (OUTPATIENT)
Facility: CLINIC | Age: 72
Discharge: HOME | End: 2022-05-24
Attending: THORACIC SURGERY (CARDIOTHORACIC VASCULAR SURGERY) | Admitting: THORACIC SURGERY (CARDIOTHORACIC VASCULAR SURGERY)
Payer: COMMERCIAL

## 2022-05-24 VITALS
OXYGEN SATURATION: 96 % | HEART RATE: 87 BPM | BODY MASS INDEX: 33.53 KG/M2 | SYSTOLIC BLOOD PRESSURE: 111 MMHG | DIASTOLIC BLOOD PRESSURE: 67 MMHG | HEIGHT: 69 IN | TEMPERATURE: 98.2 F | RESPIRATION RATE: 18 BRPM | WEIGHT: 226.41 LBS

## 2022-05-24 DIAGNOSIS — C83.3A DIFFUSE LARGE CELL LYMPHOMA IN REMISSION: Primary | ICD-10-CM

## 2022-05-24 PROBLEM — T88.4XXA HARD TO INTUBATE: Status: ACTIVE | Noted: 2022-05-24

## 2022-05-24 LAB
GLUCOSE BLDC GLUCOMTR-MCNC: 109 MG/DL (ref 70–99)
GLUCOSE BLDC GLUCOMTR-MCNC: 89 MG/DL (ref 70–99)
HOLD SPECIMEN: NORMAL
HOLD SPECIMEN: NORMAL
POTASSIUM BLD-SCNC: 4.2 MMOL/L (ref 3.4–5.3)

## 2022-05-24 PROCEDURE — 258N000003 HC RX IP 258 OP 636: Performed by: STUDENT IN AN ORGANIZED HEALTH CARE EDUCATION/TRAINING PROGRAM

## 2022-05-24 PROCEDURE — 250N000011 HC RX IP 250 OP 636: Performed by: THORACIC SURGERY (CARDIOTHORACIC VASCULAR SURGERY)

## 2022-05-24 PROCEDURE — 88342 IMHCHEM/IMCYTCHM 1ST ANTB: CPT | Mod: 26 | Performed by: PATHOLOGY

## 2022-05-24 PROCEDURE — 88331 PATH CONSLTJ SURG 1 BLK 1SPC: CPT | Mod: TC | Performed by: THORACIC SURGERY (CARDIOTHORACIC VASCULAR SURGERY)

## 2022-05-24 PROCEDURE — 250N000025 HC SEVOFLURANE, PER MIN: Performed by: THORACIC SURGERY (CARDIOTHORACIC VASCULAR SURGERY)

## 2022-05-24 PROCEDURE — 250N000009 HC RX 250: Performed by: STUDENT IN AN ORGANIZED HEALTH CARE EDUCATION/TRAINING PROGRAM

## 2022-05-24 PROCEDURE — 250N000013 HC RX MED GY IP 250 OP 250 PS 637: Performed by: STUDENT IN AN ORGANIZED HEALTH CARE EDUCATION/TRAINING PROGRAM

## 2022-05-24 PROCEDURE — 88312 SPECIAL STAINS GROUP 1: CPT | Mod: 26 | Performed by: PATHOLOGY

## 2022-05-24 PROCEDURE — 88307 TISSUE EXAM BY PATHOLOGIST: CPT | Mod: 26 | Performed by: PATHOLOGY

## 2022-05-24 PROCEDURE — 999N000065 XR CHEST PORT 1 VIEW

## 2022-05-24 PROCEDURE — 88305 TISSUE EXAM BY PATHOLOGIST: CPT | Mod: 26 | Performed by: PATHOLOGY

## 2022-05-24 PROCEDURE — 360N000077 HC SURGERY LEVEL 4, PER MIN: Performed by: THORACIC SURGERY (CARDIOTHORACIC VASCULAR SURGERY)

## 2022-05-24 PROCEDURE — 88189 FLOWCYTOMETRY/READ 16 & >: CPT | Performed by: PATHOLOGY

## 2022-05-24 PROCEDURE — 272N000001 HC OR GENERAL SUPPLY STERILE: Performed by: THORACIC SURGERY (CARDIOTHORACIC VASCULAR SURGERY)

## 2022-05-24 PROCEDURE — 370N000017 HC ANESTHESIA TECHNICAL FEE, PER MIN: Performed by: THORACIC SURGERY (CARDIOTHORACIC VASCULAR SURGERY)

## 2022-05-24 PROCEDURE — 710N000012 HC RECOVERY PHASE 2, PER MINUTE: Performed by: THORACIC SURGERY (CARDIOTHORACIC VASCULAR SURGERY)

## 2022-05-24 PROCEDURE — 88331 PATH CONSLTJ SURG 1 BLK 1SPC: CPT | Mod: 26 | Performed by: PATHOLOGY

## 2022-05-24 PROCEDURE — 84132 ASSAY OF SERUM POTASSIUM: CPT | Performed by: ANESTHESIOLOGY

## 2022-05-24 PROCEDURE — 36415 COLL VENOUS BLD VENIPUNCTURE: CPT | Performed by: ANESTHESIOLOGY

## 2022-05-24 PROCEDURE — 710N000009 HC RECOVERY PHASE 1, LEVEL 1, PER MIN: Performed by: THORACIC SURGERY (CARDIOTHORACIC VASCULAR SURGERY)

## 2022-05-24 PROCEDURE — 999N000141 HC STATISTIC PRE-PROCEDURE NURSING ASSESSMENT: Performed by: THORACIC SURGERY (CARDIOTHORACIC VASCULAR SURGERY)

## 2022-05-24 PROCEDURE — 71045 X-RAY EXAM CHEST 1 VIEW: CPT | Mod: 26 | Performed by: RADIOLOGY

## 2022-05-24 PROCEDURE — 250N000011 HC RX IP 250 OP 636: Performed by: STUDENT IN AN ORGANIZED HEALTH CARE EDUCATION/TRAINING PROGRAM

## 2022-05-24 PROCEDURE — 82962 GLUCOSE BLOOD TEST: CPT

## 2022-05-24 PROCEDURE — 33999 UNLISTED PX CARDIAC SURGERY: CPT | Mod: GC | Performed by: THORACIC SURGERY (CARDIOTHORACIC VASCULAR SURGERY)

## 2022-05-24 PROCEDURE — 88341 IMHCHEM/IMCYTCHM EA ADD ANTB: CPT | Mod: 26 | Performed by: PATHOLOGY

## 2022-05-24 PROCEDURE — 88184 FLOWCYTOMETRY/ TC 1 MARKER: CPT | Performed by: THORACIC SURGERY (CARDIOTHORACIC VASCULAR SURGERY)

## 2022-05-24 PROCEDURE — 250N000009 HC RX 250: Performed by: THORACIC SURGERY (CARDIOTHORACIC VASCULAR SURGERY)

## 2022-05-24 RX ORDER — FENTANYL CITRATE 50 UG/ML
25 INJECTION, SOLUTION INTRAMUSCULAR; INTRAVENOUS
Status: DISCONTINUED | OUTPATIENT
Start: 2022-05-24 | End: 2022-05-25 | Stop reason: HOSPADM

## 2022-05-24 RX ORDER — CEFAZOLIN SODIUM/WATER 2 G/20 ML
2 SYRINGE (ML) INTRAVENOUS
Status: COMPLETED | OUTPATIENT
Start: 2022-05-24 | End: 2022-05-24

## 2022-05-24 RX ORDER — CEFAZOLIN SODIUM/WATER 2 G/20 ML
2 SYRINGE (ML) INTRAVENOUS SEE ADMIN INSTRUCTIONS
Status: DISCONTINUED | OUTPATIENT
Start: 2022-05-24 | End: 2022-05-24 | Stop reason: HOSPADM

## 2022-05-24 RX ORDER — SODIUM CHLORIDE, SODIUM LACTATE, POTASSIUM CHLORIDE, CALCIUM CHLORIDE 600; 310; 30; 20 MG/100ML; MG/100ML; MG/100ML; MG/100ML
INJECTION, SOLUTION INTRAVENOUS CONTINUOUS PRN
Status: DISCONTINUED | OUTPATIENT
Start: 2022-05-24 | End: 2022-05-24

## 2022-05-24 RX ORDER — ONDANSETRON 4 MG/1
4 TABLET, ORALLY DISINTEGRATING ORAL EVERY 8 HOURS PRN
Qty: 4 TABLET | Refills: 0 | Status: SHIPPED | OUTPATIENT
Start: 2022-05-24 | End: 2023-05-18

## 2022-05-24 RX ORDER — BUPIVACAINE HYDROCHLORIDE AND EPINEPHRINE 5; 5 MG/ML; UG/ML
INJECTION, SOLUTION PERINEURAL PRN
Status: DISCONTINUED | OUTPATIENT
Start: 2022-05-24 | End: 2022-05-24 | Stop reason: HOSPADM

## 2022-05-24 RX ORDER — NALOXONE HYDROCHLORIDE 0.4 MG/ML
0.4 INJECTION, SOLUTION INTRAMUSCULAR; INTRAVENOUS; SUBCUTANEOUS
Status: DISCONTINUED | OUTPATIENT
Start: 2022-05-24 | End: 2022-05-24 | Stop reason: HOSPADM

## 2022-05-24 RX ORDER — LIDOCAINE 40 MG/G
CREAM TOPICAL
Status: DISCONTINUED | OUTPATIENT
Start: 2022-05-24 | End: 2022-05-24 | Stop reason: HOSPADM

## 2022-05-24 RX ORDER — NALOXONE HYDROCHLORIDE 0.4 MG/ML
0.2 INJECTION, SOLUTION INTRAMUSCULAR; INTRAVENOUS; SUBCUTANEOUS
Status: DISCONTINUED | OUTPATIENT
Start: 2022-05-24 | End: 2022-05-24 | Stop reason: HOSPADM

## 2022-05-24 RX ORDER — SODIUM CHLORIDE, SODIUM LACTATE, POTASSIUM CHLORIDE, CALCIUM CHLORIDE 600; 310; 30; 20 MG/100ML; MG/100ML; MG/100ML; MG/100ML
INJECTION, SOLUTION INTRAVENOUS CONTINUOUS
Status: DISCONTINUED | OUTPATIENT
Start: 2022-05-24 | End: 2022-05-24 | Stop reason: HOSPADM

## 2022-05-24 RX ORDER — ONDANSETRON 2 MG/ML
4 INJECTION INTRAMUSCULAR; INTRAVENOUS EVERY 30 MIN PRN
Status: DISCONTINUED | OUTPATIENT
Start: 2022-05-24 | End: 2022-05-25 | Stop reason: HOSPADM

## 2022-05-24 RX ORDER — ONDANSETRON 4 MG/1
4 TABLET, ORALLY DISINTEGRATING ORAL EVERY 30 MIN PRN
Status: DISCONTINUED | OUTPATIENT
Start: 2022-05-24 | End: 2022-05-25 | Stop reason: HOSPADM

## 2022-05-24 RX ORDER — ONDANSETRON 2 MG/ML
INJECTION INTRAMUSCULAR; INTRAVENOUS PRN
Status: DISCONTINUED | OUTPATIENT
Start: 2022-05-24 | End: 2022-05-24

## 2022-05-24 RX ORDER — AMOXICILLIN 250 MG
1-2 CAPSULE ORAL 2 TIMES DAILY
Qty: 30 TABLET | Refills: 0 | Status: SHIPPED | OUTPATIENT
Start: 2022-05-24 | End: 2023-05-18

## 2022-05-24 RX ORDER — SODIUM CHLORIDE, SODIUM LACTATE, POTASSIUM CHLORIDE, CALCIUM CHLORIDE 600; 310; 30; 20 MG/100ML; MG/100ML; MG/100ML; MG/100ML
INJECTION, SOLUTION INTRAVENOUS CONTINUOUS
Status: DISCONTINUED | OUTPATIENT
Start: 2022-05-24 | End: 2022-05-25 | Stop reason: HOSPADM

## 2022-05-24 RX ORDER — ACETAMINOPHEN 325 MG/1
650 TABLET ORAL
Status: DISCONTINUED | OUTPATIENT
Start: 2022-05-24 | End: 2022-05-25 | Stop reason: HOSPADM

## 2022-05-24 RX ORDER — FENTANYL CITRATE 50 UG/ML
25 INJECTION, SOLUTION INTRAMUSCULAR; INTRAVENOUS EVERY 5 MIN PRN
Status: DISCONTINUED | OUTPATIENT
Start: 2022-05-24 | End: 2022-05-24 | Stop reason: HOSPADM

## 2022-05-24 RX ORDER — HYDROMORPHONE HCL IN WATER/PF 6 MG/30 ML
0.2 PATIENT CONTROLLED ANALGESIA SYRINGE INTRAVENOUS EVERY 5 MIN PRN
Status: DISCONTINUED | OUTPATIENT
Start: 2022-05-24 | End: 2022-05-24 | Stop reason: HOSPADM

## 2022-05-24 RX ORDER — MEPERIDINE HYDROCHLORIDE 25 MG/ML
12.5 INJECTION INTRAMUSCULAR; INTRAVENOUS; SUBCUTANEOUS
Status: DISCONTINUED | OUTPATIENT
Start: 2022-05-24 | End: 2022-05-25 | Stop reason: HOSPADM

## 2022-05-24 RX ORDER — ACETAMINOPHEN 325 MG/1
1000 TABLET ORAL EVERY 8 HOURS
Qty: 50 TABLET | Refills: 0 | Status: ON HOLD | OUTPATIENT
Start: 2022-05-24 | End: 2022-11-29

## 2022-05-24 RX ORDER — FENTANYL CITRATE 50 UG/ML
INJECTION, SOLUTION INTRAMUSCULAR; INTRAVENOUS PRN
Status: DISCONTINUED | OUTPATIENT
Start: 2022-05-24 | End: 2022-05-24

## 2022-05-24 RX ORDER — PROPOFOL 10 MG/ML
INJECTION, EMULSION INTRAVENOUS PRN
Status: DISCONTINUED | OUTPATIENT
Start: 2022-05-24 | End: 2022-05-24

## 2022-05-24 RX ORDER — OXYCODONE HYDROCHLORIDE 5 MG/1
5 TABLET ORAL EVERY 4 HOURS PRN
Status: DISCONTINUED | OUTPATIENT
Start: 2022-05-24 | End: 2022-05-25 | Stop reason: HOSPADM

## 2022-05-24 RX ORDER — PHENYLEPHRINE HCL IN 0.9% NACL 50MG/250ML
.1-1 PLASTIC BAG, INJECTION (ML) INTRAVENOUS CONTINUOUS
Status: DISCONTINUED | OUTPATIENT
Start: 2022-05-24 | End: 2022-05-24 | Stop reason: HOSPADM

## 2022-05-24 RX ORDER — OXYCODONE HYDROCHLORIDE 5 MG/1
5 TABLET ORAL 4 TIMES DAILY PRN
Qty: 12 TABLET | Refills: 0 | Status: SHIPPED | OUTPATIENT
Start: 2022-05-24 | End: 2022-05-27

## 2022-05-24 RX ORDER — ONDANSETRON 4 MG/1
4 TABLET, ORALLY DISINTEGRATING ORAL
Status: DISCONTINUED | OUTPATIENT
Start: 2022-05-24 | End: 2022-05-25 | Stop reason: HOSPADM

## 2022-05-24 RX ORDER — LIDOCAINE HYDROCHLORIDE 20 MG/ML
INJECTION, SOLUTION INFILTRATION; PERINEURAL PRN
Status: DISCONTINUED | OUTPATIENT
Start: 2022-05-24 | End: 2022-05-24

## 2022-05-24 RX ORDER — OXYCODONE HYDROCHLORIDE 5 MG/1
5 TABLET ORAL
Status: DISCONTINUED | OUTPATIENT
Start: 2022-05-24 | End: 2022-05-25 | Stop reason: HOSPADM

## 2022-05-24 RX ORDER — OXYCODONE HYDROCHLORIDE 5 MG/1
5-10 TABLET ORAL EVERY 4 HOURS PRN
Qty: 10 TABLET | Refills: 0 | Status: CANCELLED | OUTPATIENT
Start: 2022-05-24

## 2022-05-24 RX ORDER — LABETALOL HYDROCHLORIDE 5 MG/ML
10 INJECTION, SOLUTION INTRAVENOUS
Status: DISCONTINUED | OUTPATIENT
Start: 2022-05-24 | End: 2022-05-24 | Stop reason: HOSPADM

## 2022-05-24 RX ADMIN — PHENYLEPHRINE HYDROCHLORIDE 100 MCG: 10 INJECTION INTRAVENOUS at 12:11

## 2022-05-24 RX ADMIN — PHENYLEPHRINE HYDROCHLORIDE 50 MCG: 10 INJECTION INTRAVENOUS at 12:04

## 2022-05-24 RX ADMIN — Medication 2 G: at 11:24

## 2022-05-24 RX ADMIN — PHENYLEPHRINE HYDROCHLORIDE 50 MCG: 10 INJECTION INTRAVENOUS at 09:51

## 2022-05-24 RX ADMIN — LIDOCAINE HYDROCHLORIDE 100 MG: 20 INJECTION, SOLUTION INFILTRATION; PERINEURAL at 10:50

## 2022-05-24 RX ADMIN — Medication 75 MG: at 10:51

## 2022-05-24 RX ADMIN — SUGAMMADEX 200 MG: 100 INJECTION, SOLUTION INTRAVENOUS at 12:49

## 2022-05-24 RX ADMIN — ONDANSETRON 4 MG: 2 INJECTION INTRAMUSCULAR; INTRAVENOUS at 12:33

## 2022-05-24 RX ADMIN — FENTANYL CITRATE 150 MCG: 50 INJECTION, SOLUTION INTRAMUSCULAR; INTRAVENOUS at 10:50

## 2022-05-24 RX ADMIN — SODIUM CHLORIDE, POTASSIUM CHLORIDE, SODIUM LACTATE AND CALCIUM CHLORIDE: 600; 310; 30; 20 INJECTION, SOLUTION INTRAVENOUS at 10:45

## 2022-05-24 RX ADMIN — PHENYLEPHRINE HYDROCHLORIDE 200 MCG: 10 INJECTION INTRAVENOUS at 12:21

## 2022-05-24 RX ADMIN — PHENYLEPHRINE HYDROCHLORIDE 150 MCG: 10 INJECTION INTRAVENOUS at 12:18

## 2022-05-24 RX ADMIN — PHENYLEPHRINE HYDROCHLORIDE 50 MCG: 10 INJECTION INTRAVENOUS at 11:57

## 2022-05-24 RX ADMIN — OXYCODONE HYDROCHLORIDE 5 MG: 5 TABLET ORAL at 15:08

## 2022-05-24 RX ADMIN — PROPOFOL 150 MG: 10 INJECTION, EMULSION INTRAVENOUS at 10:51

## 2022-05-24 RX ADMIN — Medication 5 MG: at 10:49

## 2022-05-24 RX ADMIN — PHENYLEPHRINE HYDROCHLORIDE 50 MCG: 10 INJECTION INTRAVENOUS at 12:01

## 2022-05-24 RX ADMIN — PHENYLEPHRINE HYDROCHLORIDE 100 MCG: 10 INJECTION INTRAVENOUS at 12:14

## 2022-05-24 RX ADMIN — REMIFENTANIL HYDROCHLORIDE 0.05 MCG/KG/MIN: 1 INJECTION, POWDER, LYOPHILIZED, FOR SOLUTION INTRAVENOUS at 11:30

## 2022-05-24 RX ADMIN — SUGAMMADEX 200 MG: 100 INJECTION, SOLUTION INTRAVENOUS at 12:45

## 2022-05-24 RX ADMIN — SODIUM CHLORIDE, SODIUM LACTATE, POTASSIUM CHLORIDE, CALCIUM CHLORIDE: 600; 310; 30; 20 INJECTION, SOLUTION INTRAVENOUS at 11:37

## 2022-05-24 RX ADMIN — PHENYLEPHRINE HYDROCHLORIDE 50 MCG: 10 INJECTION INTRAVENOUS at 11:45

## 2022-05-24 RX ADMIN — PHENYLEPHRINE HYDROCHLORIDE 50 MCG: 10 INJECTION INTRAVENOUS at 11:41

## 2022-05-24 NOTE — ANESTHESIA POSTPROCEDURE EVALUATION
Patient: Pastor Garibay    Procedure: Procedure(s):  TRANSCERVICAL EXTENDED MEDIASTINAL LYMPHADENECTOMY       Anesthesia Type:  General    Note:  Disposition: Outpatient   Postop Pain Control: Uneventful            Sign Out: Well controlled pain   PONV: No   Neuro/Psych: Uneventful            Sign Out: Acceptable/Baseline neuro status   Airway/Respiratory: Uneventful            Sign Out: AIRWAY IN SITU/Resp. Support   CV/Hemodynamics: Uneventful            Sign Out: Acceptable CV status; No obvious hypovolemia; No obvious fluid overload   Other NRE: NONE   DID A NON-ROUTINE EVENT OCCUR? No           Last vitals:  Vitals Value Taken Time   BP 89/48 05/24/22 1400   Temp 36.4  C (97.6  F) 05/24/22 1302   Pulse 86 05/24/22 1401   Resp 20 05/24/22 1401   SpO2 95 % 05/24/22 1401   Vitals shown include unvalidated device data.    Electronically Signed By: Brody Wiley MD  May 24, 2022  2:03 PM

## 2022-05-24 NOTE — LETTER
May 13, 2022       TO: Pastor Garibay  [address] [facility] [address]         Dear Mr. Garibay,    Welcome back to Thoracic Surgery and Lung Nodule Clinic at [facility]. Please read all of the following for important details about your upcoming procedure.     Required appointments before your surgery    May 19, 2022  1:45 PM  (Arrive by 1:30 PM)  PAC EVALUATION with Shawanda Lowry PA-C  [facility] [employer] ([facility] ) [phone]  [address] [facility] [address]  5th Floor      May 21, 2022 11:05 AM  Pre-procedure Covid with BL COVID TESTING HUB 1  [facility] ([facility] ) [phone]  [address] [address]        Your Surgery Day    Surgeon:    Dr. Star Narvaez   Procedure: TRANSCERVICAL EXTENDED MEDIASTINAL LYMPHADENECTOMY  AND BRONCHOSCOPY    Location: [facility]  [address] [facility], 3rd floor- [address] [place] [id number]    [employer] parking is available for anyone with mobility limitations or disabilities.   (Monday- Friday 7 am- 5 pm)  Date:    Tuesday, May 24, 2022  Arrival Time:  9:30am  --this may change, see below--     *Please note that your surgery time may change; If so, you will receive a call from the Pre-Admission nursing department the day before surgery.       A  is REQUIRED if going home the same day.     Please arrange for someone to drive you home after surgery. If you will be having same-day surgery, you may not drive or take public transportation by yourself. You will also need to have someone stay with you 24 hours after discharge. Please plan for you and your ride to be available to the entire day.*    Preparing for your surgery; please read the following reminders carefully:    If you have been ill, had a fever, or have been exposed to any illness in the few days  prior to the scheduled surgery Please send a Filecubed Message to your surgeon or call Shanita Liao at [phone] or Macrina Andres at [phone]. If these problems should arise the night before the surgery, please call the hospital  at [phone] and ask to speak to the Cardiovascular/Thoracic Surgery Fellow on call.     Call your insurance company to see what it will and won't pay for.  Ask if they need to pre-approve the surgery.  You can also call [email or web address] at [phone] and ask for the [facility].     Please contact us if [facility] paper work is needed and Fax any [facility] paperwork to our office at [phone]    Eating and Drinking and Medication Guidelines;     For your safety; unless your surgeon tells you otherwise follow the guidelines bellow.       Eat and drink as usual until 8 hours before surgery, after that not food or Milk.     Drink Clear Liquids until 2 hours before surgery.  These are liquids you can see through like water, Gatorade and Propel Water.  You may also have black coffee and tea (no cream or milk)     Nothing by mouth within 2 hours of surgery, this includes gum, candy and breath mints.     No alcohol for at least 24 hours before surgery or as directed by your surgeon    Follow Medication instructions given by your surgeon and or pre-op physical provider.   ? If applicable, please take your cardiac or blood pressure medication as normally scheduled before your surgery with small sips of water.    ? If applicable, please stop taking Ibuprofen or Motrin 5 days prior to your surgery.  You may take Tylenol if necessary.    ? If you take Warfarin, Coumadin, Plavix, Eliquis, Lovenox, Xarelto or any other blood thinning medication, please contact Macrina ([phone]) or Shanita ([phone]) for further instruction.    ? If you take hypoglycemic agents and/or insulin, please seek instruction from the prescribing physician about taking these medications  on the day of your surgery.      Showering and Bathing Before Surgery    Surgical scrub is available for  at most pharmacies.    It is important to wash with a special antiseptic surgical soap twice before surgery.  This will decrease bacteria on your skin and help reduce your chance of getting an infection after surgery.  However, only use the surgical scrub from the neck down. It can be damaging to the eyes.  Read the directions and precautions on the bottle of antiseptic soap.  Shower or bathe using this special antiseptic soap twice before surgery.    You should wash once the evening before surgery and once the morning of surgery.  If this is not possible, wash twice the morning of surgery- wash, rinse, and then wash again.    You will do two thorough full strength applications of the soap, using half of the bottle for each application.  You will use the entire bottle before coming for surgery.  Follow these instructions:  The evening before surgery, shower or bathe with this surgical scrub.   Pay particular attention to where incisions will be made.   Do NOT use this soap on your genital/perineal area.  Rinse thoroughly.  You may wash your hair with regular shampoo.   Use a clean towel and put clean clothes/pajamas on.      Day of surgery:    Shower again (following instructions above) before coming to the hospital for surgery.  Use the surgical scrub and pay particular attention to the area where incisions will be made.  Use a clean towel to dry off and put on clean clothes.      Please remove all jewelry and body piercings.    No lotions, deodorants or fragrance.    No makeup or fingernail polish.     Bring your ID and insurance card.    If you have any issues the Morning of surgery please call [phone].     Please note that failure to comply with these requirements may delay or cancel your surgery or procedure.    [facility]       Sincerely,    Kristen Zuluaga    Surgery Scheduler/Karo-Op Coordinator   Phone: [phone]  E-mail: [email or web address]  Fax: [phone]      Preparing for Your Procedure During the COVID-19 Pandemic    Thank you for choosing [facility] for your health care needs. This is a very challenging  time for everyone. The World Health Organization and the State of [place] have declared the  COVID-19 virus a pandemic.    Our goal is to keep you and our team here at [facility] safe and healthy. We ve taken  several steps to make this happen. For example:    We screen our staff and care teams for COVID-19.    Everyone at [facility] must wear a mask.    We are limiting hospital and clinic visitors.    Before your surgery or procedure  All patients must get tested for the COVID-19 virus before any surgery or procedure. About a week  before your scheduled treatment, you ll get a phone call to set up a COVID-19 PCR test at a [facility] location. The call may come   from a phone number you don t know.  If you wish to have the test at your local clinic; please schedule it with-in 4 days of your procedure and please make sure it is a PCR Nose swab.  Antigen testing is not allowed.  If you have any issues, contact your surgeon's office.     Your test has to happen 1 to 4 days before your treatment. You ll need to travel to a testing station,  where we ll take a swab of your nose or throat.    After the test, please stay at home and stay out of contact with other people. It will be harder for you  to recover if you get COVID-19 before your surgery.    So, please follow all current safety guidelines, including:    Limit trips outside your home.    Limit the number of people you see.    Always wear a mask outside your home.    Use social distancing. (Stay 6 feet away from  others whenever you can.)    Wash your hands often.    If your test shows you have COVID-19  If your test is positive, we ll let you know. A positive  test means that you have the virus.  It s likely that we ll have to postpone your surgery or procedure. Your doctor will discuss this with you.  After that, we ll let you know what to do and when you can reschedule.  We may have to cancel your surgery or procedure on short notice for other reasons, too.    If your test shows you DON T have COVID-19  Even if your test is negative, you may still get COVID-19. It s rare but, sometimes, the test result  is wrong. You could also catch the virus after taking the test.  There s a very small chance that you could catch COVID-19 in the hospital or surgery center.  [facility] has taken many steps to prevent this from happening.    Day of your surgery or procedure    Please come wearing a mask or other face covering.    When you arrive, we ll ask you some questions to find out if you have any signs or symptoms of        COVID-19.    Please know visitor guidelines can change at any time, you will receive a call reviewing all surgery information and confirming visitor guidelines.     We ll share your after-care instructions with you and anyone else you name.    Please call your care team, hospital or surgery center if you have any questions. We thank you for your  understanding and for choosing [facility] for your care.

## 2022-05-24 NOTE — ANESTHESIA CARE TRANSFER NOTE
Patient: Pastor Garibay    Procedure: Procedure(s):  TRANSCERVICAL EXTENDED MEDIASTINAL LYMPHADENECTOMY       Diagnosis: Mediastinal lymphadenopathy [R59.0]  Diagnosis Additional Information: No value filed.    Anesthesia Type:   General     Note:    Oropharynx: nasal airway in place and spontaneously breathing  Level of Consciousness: awake  Oxygen Supplementation: face mask  Level of Supplemental Oxygen (L/min / FiO2): 4  Independent Airway: airway patency satisfactory and stable  Dentition: dentition unchanged  Vital Signs Stable: post-procedure vital signs reviewed and stable  Report to RN Given: handoff report given  Patient transferred to: PACU    Handoff Report: Identifed the Patient, Identified the Reponsible Provider, Reviewed the pertinent medical history, Discussed the surgical course, Reviewed Intra-OP anesthesia mangement and issues during anesthesia, Set expectations for post-procedure period and Allowed opportunity for questions and acknowledgement of understanding      Vitals:  Vitals Value Taken Time   /83 05/24/22 1302   Temp     Pulse 93 05/24/22 1313   Resp 19 05/24/22 1313   SpO2 96 % 05/24/22 1313   Vitals shown include unvalidated device data.    Electronically Signed By: James Crowder MD  May 24, 2022  1:13 PM

## 2022-05-24 NOTE — ANESTHESIA PROCEDURE NOTES
Airway       Patient location during procedure: OR       Procedure Start/Stop Times: 5/24/2022 10:54 AM  Staff -        Anesthesiologist:  James Schmitz MD       Resident/Fellow: James Crowder MD       Performed By: resident  Consent for Airway        Urgency: elective  Indications and Patient Condition       Indications for airway management: zhao-procedural       Induction type:intravenous       Mask difficulty assessment: 2 - vent by mask + OA or adjuvant +/- NMBA    Final Airway Details       Final airway type: endotracheal airway       Successful airway: ETT - single  Endotracheal Airway Details        ETT size (mm): 8.0       Cuffed: yes       Successful intubation technique: video laryngoscopy       VL Blade Size: MAC D Blade       Grade View of Cords: 1       Adjucts: stylet and bougie       Position: Left       Measured from: lips       Secured at (cm): 24       Bite block used: Soft    Post intubation assessment        Number of attempts at approach: 2       Secured with: cloth tape       Ease of procedure: difficult       Dentition: Intact and Unchanged    Medication(s) Administered   Medication Administration Time: 5/24/2022 10:54 AM    Additional Comments       Grade 1 view with D blade but anterior airway requiring multiple bends in ETT stylet and eventually requiring bougie to intubate

## 2022-05-24 NOTE — DISCHARGE INSTRUCTIONS
[facility] [employer]  Same-Day Surgery   Adult Discharge Orders & Instructions     For 24 hours after surgery    Get plenty of rest.  A responsible adult must stay with you for at least 24 hours after you leave the hospital.   Do not drive or use heavy equipment.  If you have weakness or tingling, don't drive or use heavy equipment until this feeling goes away.  Do not drink alcohol.  Avoid strenuous or risky activities.  Ask for help when climbing stairs.   You may feel lightheaded.  IF so, sit for a few minutes before standing.  Have someone help you get up.   If you have nausea (feel sick to your stomach): Drink only clear liquids such as apple juice, ginger ale, broth or 7-Up.  Rest may also help.  Be sure to drink enough fluids.  Move to a regular diet as you feel able.  You may have a slight fever. Call the doctor if your fever is over 100 F (37.7 C) (taken under the tongue) or lasts longer than 24 hours.  You may have a dry mouth, a sore throat, muscle aches or trouble sleeping.  These should go away after 24 hours.  Do not make important or legal decisions.   Call your doctor for any of the followin.  Signs of infection (fever, growing tenderness at the surgery site, a large amount of drainage or bleeding, severe pain, foul-smelling drainage, redness, swelling).    2. It has been over 8 to 10 hours since surgery and you are still not able to urinate (pass water).    3.  Headache for over 24 hours.    4.  Numbness, tingling or weakness the day after surgery (if you had spinal anesthesia).  To contact a doctor, call ________________________________________ or:    '   [phone] and ask for the resident on call for   ______________________________________________ (answered 24 hours a day)  '   Emergency Department:    [facility]: [phone]       (TTY for hearing impaired: [phone])    [facility]: [phone]       (TTY for hearing impaired:  [phone])

## 2022-05-25 LAB
PATH REPORT.COMMENTS IMP SPEC: NORMAL
PATH REPORT.FINAL DX SPEC: NORMAL
PATH REPORT.MICROSCOPIC SPEC OTHER STN: NORMAL
PATH REPORT.RELEVANT HX SPEC: NORMAL

## 2022-05-25 RX ORDER — NALOXONE HYDROCHLORIDE 0.4 MG/ML
0.4 INJECTION, SOLUTION INTRAMUSCULAR; INTRAVENOUS; SUBCUTANEOUS
Status: DISCONTINUED | OUTPATIENT
Start: 2022-05-25 | End: 2022-05-25 | Stop reason: HOSPADM

## 2022-05-25 RX ORDER — NALOXONE HYDROCHLORIDE 0.4 MG/ML
0.2 INJECTION, SOLUTION INTRAMUSCULAR; INTRAVENOUS; SUBCUTANEOUS
Status: DISCONTINUED | OUTPATIENT
Start: 2022-05-25 | End: 2022-05-25 | Stop reason: HOSPADM

## 2022-05-25 NOTE — OP NOTE
Procedure Date: 05/24/2022    PREOPERATIVE DIAGNOSES:     1.  History of aggressive lymphoma of the right lung.  2.  Mediastinal lymphadenopathy.    POSTOPERATIVE DIAGNOSES:    1.  History of aggressive lymphoma of the right lung.  2.  Mediastinal lymphadenopathy.    PROCEDURE PERFORMED:  Mediastinoscopy.    SURGEON:  Star Narvaez MD (present for the entire procedure).    RESIDENT SURGEONS:  Gary Wray MD and Miguelina Skinner MD    ANESTHESIA:  General.    ESTIMATED BLOOD LOSS:  20 mL.    COMPLICATIONS:  None immediate.    FINDINGS:  We identified some enlarged and edematous lymph nodes in the 2 arteries where we expected to see them based on the CT scan.  These were located just inferior and deep to the innominate artery and slightly deep to the right cartilage angle of the trachea.  We took multiple, multiple samples for lymphoma workup and sent only 1 small sample for frozen section, which revealed only adipose tissue.  However, all the other samples we clearly had grossly identifiable edematous lymph nodes.    DESCRIPTION OF PROCEDURE:  With the patient in supine position under general anesthesia with a single-lumen tube in place, the neck, chest and abdomen were prepared and draped in the conventional fashion.  We made a 3 cm transverse incision overlying the suprasternal notch and entered the pretracheal plane and placed a sternal retractor to elevate it.  This then allowed us to then carefully start dissecting.  The tissue in the area seemed somewhat scarred and edematous, but we were able to carefully evaluate the area, place our scope along the anterior aspect of the trachea into the mediastinum, and then clearly identify the innominate artery, the posterior edge of the right side of the trachea, and then a conglomerate of enlarged and edematous-appearing lymph nodes.  We took multiple samples, probably adding 2 cc of tissue, and we also sent one earlier, a smaller sample that appeared more adipose for  frozen section, which revealed only adipose tissue.  We did not want to send any more tissue for frozen section but have as much tissue as possible for lymphoma workup.  We ensured hemostasis and then closed with absorbable sutures in conventional fashion.    The patient tolerated the procedure well.    Star Narvaez MD        D: 2022   T: 2022   MT: AMY    Name:     EZIO PADILLA  MRN:      [id number]        Account:        [id number]   :      1950           Procedure Date: 2022     Document: [id number]

## 2022-05-26 NOTE — ANESTHESIA PROCEDURE NOTES
Arterial Line Procedure Note    Pre-Procedure   Staff -        Anesthesiologist:  James Schmitz MD       Performed By: anesthesiologist       Pre-Anesthestic Checklist: patient identified, IV checked, risks and benefits discussed, informed consent, monitors and equipment checked, pre-op evaluation and at physician/surgeon's request  Timeout:       Correct Patient: Yes        Correct Procedure: Yes        Correct Site: Yes        Correct Position: Yes   Line Placement:   This line was placed Post Induction starting at 5/24/2022 8:55 AM and ending at 5/24/2022 8:55 AM  Procedure   Procedure: arterial line       Laterality: left       Insertion Site: radial.  Sterile Prep        Standard elements of sterile barrier followed       Skin prep: Chloraprep  Insertion/Injection        Technique: Seldinger Technique        Catheter Type/Size: 20 G, 1.75 in/4.5 cm quick cath (integral wire)  Narrative        Tegaderm dressing used.       Complications: None apparent,        Arterial waveform: Yes        IBP within 10% of NIBP: Yes

## 2022-05-31 LAB
PATH REPORT.ADDENDUM SPEC: NORMAL
PATH REPORT.COMMENTS IMP SPEC: NORMAL
PATH REPORT.FINAL DX SPEC: NORMAL
PATH REPORT.GROSS SPEC: NORMAL
PATH REPORT.INTRAOP OBS SPEC DOC: NORMAL
PATH REPORT.MICROSCOPIC SPEC OTHER STN: NORMAL
PATH REPORT.RELEVANT HX SPEC: NORMAL
PHOTO IMAGE: NORMAL

## 2022-06-05 DIAGNOSIS — C83.30 DIFFUSE LARGE B-CELL LYMPHOMA, UNSPECIFIED BODY REGION (H): Primary | ICD-10-CM

## 2022-06-07 ENCOUNTER — APPOINTMENT (OUTPATIENT)
Dept: LAB | Facility: CLINIC | Age: 72
End: 2022-06-07
Payer: COMMERCIAL

## 2022-06-07 ENCOUNTER — ONCOLOGY VISIT (OUTPATIENT)
Dept: TRANSPLANT | Facility: CLINIC | Age: 72
End: 2022-06-07
Payer: COMMERCIAL

## 2022-06-07 VITALS
BODY MASS INDEX: 33.23 KG/M2 | RESPIRATION RATE: 16 BRPM | DIASTOLIC BLOOD PRESSURE: 70 MMHG | OXYGEN SATURATION: 94 % | WEIGHT: 225 LBS | SYSTOLIC BLOOD PRESSURE: 110 MMHG | HEART RATE: 87 BPM | TEMPERATURE: 98 F

## 2022-06-07 DIAGNOSIS — C83.30 DIFFUSE LARGE B-CELL LYMPHOMA, UNSPECIFIED BODY REGION (H): ICD-10-CM

## 2022-06-07 LAB
ALBUMIN SERPL-MCNC: 3.3 G/DL (ref 3.4–5)
ALP SERPL-CCNC: 84 U/L (ref 40–150)
ALT SERPL W P-5'-P-CCNC: 34 U/L (ref 0–70)
ANION GAP SERPL CALCULATED.3IONS-SCNC: 9 MMOL/L (ref 3–14)
AST SERPL W P-5'-P-CCNC: 22 U/L (ref 0–45)
BASOPHILS # BLD AUTO: 0 10E3/UL (ref 0–0.2)
BASOPHILS NFR BLD AUTO: 0 %
BILIRUB SERPL-MCNC: 0.5 MG/DL (ref 0.2–1.3)
BUN SERPL-MCNC: 8 MG/DL (ref 7–30)
CALCIUM SERPL-MCNC: 9 MG/DL (ref 8.5–10.1)
CHLORIDE BLD-SCNC: 103 MMOL/L (ref 94–109)
CO2 SERPL-SCNC: 28 MMOL/L (ref 20–32)
CREAT SERPL-MCNC: 0.63 MG/DL (ref 0.66–1.25)
EOSINOPHIL # BLD AUTO: 0.2 10E3/UL (ref 0–0.7)
EOSINOPHIL NFR BLD AUTO: 2 %
ERYTHROCYTE [DISTWIDTH] IN BLOOD BY AUTOMATED COUNT: 17 % (ref 10–15)
GFR SERPL CREATININE-BSD FRML MDRD: >90 ML/MIN/1.73M2
GLUCOSE BLD-MCNC: 174 MG/DL (ref 70–99)
HCT VFR BLD AUTO: 44 % (ref 40–53)
HGB BLD-MCNC: 14.2 G/DL (ref 13.3–17.7)
IMM GRANULOCYTES # BLD: 0 10E3/UL
IMM GRANULOCYTES NFR BLD: 1 %
LDH SERPL L TO P-CCNC: 164 U/L (ref 85–227)
LYMPHOCYTES # BLD AUTO: 1.6 10E3/UL (ref 0.8–5.3)
LYMPHOCYTES NFR BLD AUTO: 20 %
MCH RBC QN AUTO: 29.7 PG (ref 26.5–33)
MCHC RBC AUTO-ENTMCNC: 32.3 G/DL (ref 31.5–36.5)
MCV RBC AUTO: 92 FL (ref 78–100)
MONOCYTES # BLD AUTO: 0.7 10E3/UL (ref 0–1.3)
MONOCYTES NFR BLD AUTO: 9 %
NEUTROPHILS # BLD AUTO: 5.1 10E3/UL (ref 1.6–8.3)
NEUTROPHILS NFR BLD AUTO: 68 %
NRBC # BLD AUTO: 0 10E3/UL
NRBC BLD AUTO-RTO: 0 /100
PLATELET # BLD AUTO: 150 10E3/UL (ref 150–450)
POTASSIUM BLD-SCNC: 3.9 MMOL/L (ref 3.4–5.3)
PROT SERPL-MCNC: 6.5 G/DL (ref 6.8–8.8)
RBC # BLD AUTO: 4.78 10E6/UL (ref 4.4–5.9)
SODIUM SERPL-SCNC: 140 MMOL/L (ref 133–144)
WBC # BLD AUTO: 7.6 10E3/UL (ref 4–11)

## 2022-06-07 PROCEDURE — G0463 HOSPITAL OUTPT CLINIC VISIT: HCPCS

## 2022-06-07 PROCEDURE — 250N000011 HC RX IP 250 OP 636

## 2022-06-07 PROCEDURE — 36591 DRAW BLOOD OFF VENOUS DEVICE: CPT

## 2022-06-07 PROCEDURE — 80053 COMPREHEN METABOLIC PANEL: CPT

## 2022-06-07 PROCEDURE — 99214 OFFICE O/P EST MOD 30 MIN: CPT

## 2022-06-07 PROCEDURE — 83615 LACTATE (LD) (LDH) ENZYME: CPT

## 2022-06-07 PROCEDURE — 85025 COMPLETE CBC W/AUTO DIFF WBC: CPT

## 2022-06-07 RX ORDER — HEPARIN SODIUM (PORCINE) LOCK FLUSH IV SOLN 100 UNIT/ML 100 UNIT/ML
500 SOLUTION INTRAVENOUS ONCE
Status: COMPLETED | OUTPATIENT
Start: 2022-06-07 | End: 2022-06-07

## 2022-06-07 RX ADMIN — Medication 500 UNITS: at 13:27

## 2022-06-07 ASSESSMENT — PAIN SCALES - GENERAL: PAINLEVEL: NO PAIN (0)

## 2022-06-07 NOTE — NURSING NOTE
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 Vitals taken.     Initial Vitals: /70 (BP Location: Right arm)   Pulse 87   Temp 98  F (36.7  C) (Oral)   Resp 16   Wt 102.1 kg (225 lb)   SpO2 94%   BMI 33.23 kg/m   Estimated body mass index is 33.23 kg/m  as calculated from the following:    Height as of 5/24/22: 1.753 m (5' 9\").    Weight as of this encounter: 102.1 kg (225 lb). Body surface area is 2.23 meters squared.  No Pain (0) Comment: Data Unavailable   No LMP for male patient.  Allergies reviewed: Yes  Medications reviewed: Yes    Medications: Medication refills not needed today.     Pharmacy name entered into [employer]:    [id number] [place], [place] - [address] [place] [address] PHARMACY [place], [place] - [address] [facility] [phone]    Clinical concerns: none       Kahlil Scales            "

## 2022-06-07 NOTE — PROGRESS NOTES
BMT Daily Progress Note     Mr. Garibay is pleasant 72 y.o male with relapsed lymphoma post Yescarta CART.   S/p mediastinoscopy. Also recovering from parainfluenza.       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 No chest pain or new symptoms.   No fevers. No new issues. No palpable nodes/masses  Mediastinoscopy scar is healing up well.     Review of Systems: 10 point ROS negative except as noted above.    Physical Exam:   Blood pressure 110/70, pulse 87, temperature 98  F (36.7  C), temperature source Oral, resp. rate 16, weight 102.1 kg (225 lb), SpO2 94 %.  General: NAD   Eyes: VANGIE, sclera anicteric   Nose/Mouth/Throat: OP clear, buccal mucosa moist, no ulcerations   Lungs: clear bilaterally   Cardiovascular: RRR, no M/R/G   Lymphatics: no edema  Skin: no rashes or petechaie  Neuro: A&O     Labs:  Lab Results   Component Value Date    WBC 7.6 2022    ANEU 6.2 2021    HGB 14.2 2022    HCT 44.0 2022     2022     2022    POTASSIUM 3.9 2022    CHLORIDE 103 2022    CO2 28 2022     (H) 2022    BUN 8 2022    CR 0.63 (L) 2022    MAG 1.8 2022    INR 1.16 (H) 10/28/2020     Imagin2022  A. Mediastinum, lymph node(s), right level 2, excisional biopsy:  -Benign,  small  lymph node(s) with sinus histiocytosis   -No evidence of lymphoma  -Negative for fungal elements by GMS  -See comment     B. Mediastinum, soft tissue, 2R, excisional biopsy:  -Benign fibroadipose tissue   -No evidence of lymphoma  -See comment        AFB stains negative.    Assessment and Plan:   1.  BMT: Relapsed DLBCL s/p yescarta CAR-T . 1 yr and 8 months post infusion.         had lymphnode bx that we inconclusive.   Mediastinal LN excisional bx - negative for lymphoma. He is in ongoing remission!     All symptoms now resolved. Clincally due to infection.  I shared this news with Pastor. No  evidence of relapse - we will cont observation and restaging CT at 2 years anniversary in Oct. 2022.       2. Heme: counts stable, transfusion independent    3. Pulm: Parainfluenza  - resolved.  Still on low dose 02.  Maintained sats during our visit on RA 92-93%.     4. ID - cont Acyclovir 400mg po BID, completed PCP prophy.  IgG level 588 (March 2022)      30 minutes spent on the date of the encounter doing chart review, review of test results, interpretation of tests, patient visit, documentation and discussion with other provider(s)     Rosey Bunch MD  Professor of Medicine  [phone]

## 2022-06-07 NOTE — LETTER
2022         RE: Pastor Garibay  [address]  [facility] [address]        Dear Colleague,    Thank you for referring your patient, Pastor Garibay, to the [facility] BLOOD AND MARROW TRANSPLANT PROGRAM [place]. Please see a copy of my visit note below.    BMT Daily Progress Note     Mr. Garibay is pleasant 72 y.o male with relapsed lymphoma post Yescarta CART.   S/p mediastinoscopy. Also recovering from parainfluenza.       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 No chest pain or new symptoms.   No fevers. No new issues. No palpable nodes/masses  Mediastinoscopy scar is healing up well.     Review of Systems: 10 point ROS negative except as noted above.    Physical Exam:   Blood pressure 110/70, pulse 87, temperature 98  F (36.7  C), temperature source Oral, resp. rate 16, weight 102.1 kg (225 lb), SpO2 94 %.  General: NAD   Eyes: VANGIE, sclera anicteric   Nose/Mouth/Throat: OP clear, buccal mucosa moist, no ulcerations   Lungs: clear bilaterally   Cardiovascular: RRR, no M/R/G   Lymphatics: no edema  Skin: no rashes or petechaie  Neuro: A&O     Labs:  Lab Results   Component Value Date    WBC 7.6 2022    ANEU 6.2 2021    HGB 14.2 2022    HCT 44.0 2022     2022     2022    POTASSIUM 3.9 2022    CHLORIDE 103 2022    CO2 28 2022     (H) 2022    BUN 8 2022    CR 0.63 (L) 2022    MAG 1.8 2022    INR 1.16 (H) 10/28/2020     Imagin2022  A. Mediastinum, lymph node(s), right level 2, excisional biopsy:  -Benign,  small  lymph node(s) with sinus histiocytosis   -No evidence of lymphoma  -Negative for fungal elements by GMS  -See comment     B. Mediastinum, soft tissue, 2R, excisional biopsy:  -Benign fibroadipose tissue   -No evidence of lymphoma  -See comment        AFB stains negative.    Assessment and Plan:   1.  BMT: Relapsed  DLBCL s/p yescarta CAR-T . 1 yr and 8 months post infusion.        4/11 had lymphnode bx that we inconclusive.   Mediastinal LN excisional bx - negative for lymphoma. He is in ongoing remission!     All symptoms now resolved. Clincally due to infection.  I shared this news with Pastor. No evidence of relapse - we will cont observation and restaging CT at 2 years anniversary in Oct. 2022.       2. Heme: counts stable, transfusion independent    3. Pulm: Parainfluenza  - resolved.  Still on low dose 02.  Maintained sats during our visit on RA 92-93%.     4. ID - cont Acyclovir 400mg po BID, completed PCP prophy.  IgG level 588 (March 2022)      30 minutes spent on the date of the encounter doing chart review, review of test results, interpretation of tests, patient visit, documentation and discussion with other provider(s)       Rosey Bunch MD  Professor of Medicine  [phone]        Again, thank you for allowing me to participate in the care of your patient.      Sincerely,    BMT DOM

## 2022-06-07 NOTE — NURSING NOTE
"Chief Complaint   Patient presents with     Oncology Clinic Visit     Diffuse large B cell lymphoma, unspecified body region     Port Draw     Labs drawn via port by RN. Vitals taken.     Port accessed with 20G 3/4\" flat needle by RN, labs collected, line flushed with saline and heparin.  Vitals taken. Pt checked in for appointment(s).    Pauline Chavez RN    "

## 2022-06-10 ENCOUNTER — CARE COORDINATION (OUTPATIENT)
Dept: TRANSPLANT | Facility: CLINIC | Age: 72
End: 2022-06-10
Payer: COMMERCIAL

## 2022-06-10 NOTE — PROGRESS NOTES
Patient's wife Toshia called me regarding vaccinations and home oxygen use. 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 I told her Dr. Bunch will review 2 year vaccines post CART at his anniversary visit in October, as she previously deferred a second booster shot.    Toshia inquired about Pastor's home oxygen use, she reported he still uses 4L at times and that at rest the pulse oximeter reads down to 82% without oxygen. She stated no follow up has been scheduled with pulmonology. Writer sent message to Dr. Granado who saw him 4/4 regarding scheduling follow up if appropriate.     Patient was also encouraged to reach out to primary oncologist at [facility] in [place] for follow up with them before his anniversary visit with us in October.    Rabia Reid on 6/10/2022 at 2:23 PM  BMT Nurse Coordinator

## 2022-06-13 ENCOUNTER — TELEPHONE (OUTPATIENT)
Dept: PULMONOLOGY | Facility: CLINIC | Age: 72
End: 2022-06-13
Payer: COMMERCIAL

## 2022-07-20 ENCOUNTER — TRANSFERRED RECORDS (OUTPATIENT)
Dept: HEALTH INFORMATION MANAGEMENT | Facility: CLINIC | Age: 72
End: 2022-07-20

## 2022-07-26 ENCOUNTER — OFFICE VISIT (OUTPATIENT)
Dept: PULMONOLOGY | Facility: CLINIC | Age: 72
End: 2022-07-26
Attending: INTERNAL MEDICINE
Payer: COMMERCIAL

## 2022-07-26 VITALS — OXYGEN SATURATION: 94 % | SYSTOLIC BLOOD PRESSURE: 110 MMHG | DIASTOLIC BLOOD PRESSURE: 71 MMHG | HEART RATE: 95 BPM

## 2022-07-26 DIAGNOSIS — R59.0 MEDIASTINAL LYMPHADENOPATHY: Primary | ICD-10-CM

## 2022-07-26 PROCEDURE — 999N000103 HC STATISTIC NO CHARGE FACILITY FEE

## 2022-07-26 PROCEDURE — 99213 OFFICE O/P EST LOW 20 MIN: CPT | Performed by: INTERNAL MEDICINE

## 2022-07-26 ASSESSMENT — PAIN SCALES - GENERAL: PAINLEVEL: NO PAIN (0)

## 2022-07-26 NOTE — LETTER
7/26/2022         RE: Pastor Garibay  [address]  [facility] [address]        Dear Colleague,    Thank you for referring your patient, Pastor Garibay, to the [facility] FOR LUNG SCIENCE AND [facility] CLINIC [place]. Please see a copy of my visit note below.    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 Developed respiratory symptoms of SOB and BETANCOURT around the time of diagnosis. Has had RLL mass diagnosed as lymphoma.  Underwent therapy with improvement in symptoms.  Has recurrence of disease in 2020 and underwent CAR-T cell therapy.  Was diagnosed with COVID in January 2022, had mild symptoms of URI symptoms and nasal drainage.  Felt mildly SOB during that time. Symptoms resolved after 10 days; notes some SOB after COVID.  States at the present time his SOB is only with significant exertion of walking in box stores.  Notices some cough but is mostly non-productive except for feeling of gastric secretions after coughing.  CT chest from 3-18 and PET scan from 3-31 were personally reviewed.  RLL mass is unchanged in size, mediastinal and hilar adenopathy.  History of JESSICA, on CPAP.  No tobacco.    Last seen almost 4 months ago. Since his last visit he underwent mediastenoscopy of his mediastinal and hilar adenopathy- pathology from biopsies returned negative for lymphoma.  Recent diagnosis of parainfluenza infection, repeat chest CT done on 5-23-22 showed resolution of the mild tree in bud opacity in the RLL, adenopathy was unchanged.  Is compliant with use of oxygen when walking longer distances, has pulse oximeter and saturations maintain > 90%.    The patient was seen and examined by Sanjay Granado MD           Current Outpatient Medications   Medication     acetaminophen (TYLENOL) 325 MG tablet     acetaminophen (TYLENOL) 325 MG tablet     acyclovir  (ZOVIRAX) 800 MG tablet     bisacodyl (DULCOLAX) 5 MG EC tablet     docusate sodium (COLACE) 100 MG capsule     guaiFENesin-codeine (ROBITUSSIN AC) 100-10 MG/5ML solution     hydrocortisone (CORTEF) 10 MG tablet     insulin aspart (NOVOLOG FLEXPEN) 100 UNIT/ML pen     metFORMIN (GLUCOPHAGE) 500 MG tablet     nitroGLYcerin (NITROSTAT) 0.4 MG sublingual tablet     ondansetron (ZOFRAN ODT) 4 MG ODT tab     ondansetron (ZOFRAN-ODT) 8 MG ODT tab     rosuvastatin (CRESTOR) 10 MG tablet     senna-docusate (SENOKOT-S/PERICOLACE) 8.6-50 MG tablet     No current facility-administered medications for this visit.     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  Partial left second toe amputation with osteotomy through proximal phalanx.  2.  Partial left third toe amputation with osteotomy through proximal phalanx.;  Surgeon: Ismael Humphrey DPM;  Location:  OR     BIOPSY LYMPH NODE CERVICAL N/A 12/5/2014    Procedure: BIOPSY LYMPH NODE CERVICAL;  Surgeon: Robbie Linda MD;  Location:  OR     BRONCHOSCOPY FLEXIBLE N/A 11/14/2017    Procedure: BRONCHOSCOPY FLEXIBLE;  FLEXIBLE BRONCHOSCOPY WITH BIOPSIES.;  Surgeon:  Robbie Linda MD;  Location:  OR     BRONCHOSCOPY RIGID OR FLEXIBLE W/TRANSENDOSCOPIC ENDOBRONCHIAL ULTRASOUND GUIDED N/A 4/11/2022    Procedure: BRONCHOSCOPY, FIBEROPTIC, endobronchial ultrasound, transbronchial biopsies, lymphnode forcep biopsies;  Surgeon: Prosper Aguiar MD;  [facility]: [facility]     COLONOSCOPY       COLONOSCOPY N/A 5/9/2018    Procedure: COMBINED COLONOSCOPY, SINGLE OR MULTIPLE BIOPSY/POLYPECTOMY BY BIOPSY;;  Surgeon: Ramos Bates MD;  Location:  GI     ENDOVASCULAR PLACEMENT VASCULAR DEVICE Left 12/5/2017    Procedure: ENDOVASCULAR PLACEMENT VASCULAR DEVICE;;  Surgeon: Robbie Linda MD;  Location: [facility]     ENT SURGERY      tonsillectomy     EXCISE NODE MEDIASTINAL N/A 11/17/2017    Procedure: EXCISE NODE MEDIASTINAL;;  Surgeon: Robbie Linda MD;  Location:  OR     EYE SURGERY       EYE SURGERY Left     vitrectomy     INSERT PORT VASCULAR ACCESS N/A 12/05/2014    Procedure: INSERT PORT VASCULAR ACCESS;  Surgeon: Robbie Linda MD;  Location:  OR     INSERT PORT VASCULAR ACCESS N/A 12/5/2017    Procedure: INSERT PORT VASCULAR ACCESS;  POWER PORT PLACEMENT ATTEMPTED, PLACEMENT OF ANGIO-SEAL VIP VASCULAR CLOSURE DEVICE;  Surgeon: Robbie Linda MD;  Location: [facility]     IR CHEST PORT PLACEMENT > 5 YRS OF AGE  6/5/2020     IR PORT REMOVAL RIGHT  12/10/2018     PHACOEMULSIFICATION CLEAR CORNEA WITH STANDARD INTRAOCULAR LENS IMPLANT Right 5/2/2016    Procedure: PHACOEMULSIFICATION CLEAR CORNEA WITH STANDARD INTRAOCULAR LENS IMPLANT;  Surgeon: Rasheed Finney MD;  [facility]     REMOVE PORT VASCULAR ACCESS N/A 3/14/2017    Procedure: REMOVE PORT VASCULAR ACCESS;  Surgeon: Robbie Linda MD;  Location:  OR     SOFT TISSUE SURGERY      BASAL CELL CA FOREHEAD     THORACOTOMY Right 11/17/2017    Procedure: THORACOTOMY;  RIGHT EXPLORATORY THORACOTOMY/ EXTENSIVE PNEUMOLYSIS/ BIOPSY OF INTERLOBAR LYMPH NODE;  Surgeon: Alexei  Robbie Baptiste MD;  Location: [facility] OR     TRANSCERVICAL EXTENDED MEDIASTINAL LYMPHADENECTOMY N/A 5/24/2022    Procedure: TRANSCERVICAL EXTENDED MEDIASTINAL LYMPHADENECTOMY;  Surgeon: Star Narvaez MD;  [facility]: [facility]     TRANSCERVICAL EXTENDED MEDIASTINAL LYMPHADENECTOMY  5/24/2022    Procedure: ;  Surgeon: Star Narvaez MD;  Location: [facility]       Social History     Socioeconomic History     Marital status:      Spouse name: Not on file     Number of children: Not on file     Years of education: Not on file     Highest education level: Not on file   Occupational History     Not on file   Tobacco Use     Smoking status: Never Smoker     Smokeless tobacco: Never Used   Substance and Sexual Activity     Alcohol use: No     Drug use: No     Sexual activity: Not on file   Other Topics Concern     Parent/sibling w/ CABG, MI or angioplasty before 65F 55M? Not Asked   Social History Narrative     Not on file     Social Determinants of Health     Financial Resource Strain: Not on file   Food Insecurity: Not on file   Transportation Needs: Not on file   Physical Activity: Not on file   Stress: Not on file   Social Connections: Not on file   Intimate Partner Violence: Not At Risk     Fear of Current or Ex-Partner: No     Emotionally Abused: No     Physically Abused: No     Sexually Abused: No   Housing Stability: Not on file       ROS Pulmonary  A complete ROS was otherwise negative except as noted in the HPI.  There were no vitals taken for this visit.  Exam:   GENERAL APPEARANCE: Well developed, well nourished, alert, and in no apparent distress.  EYES: PERRL, EOMI  HENT: Nasal mucosa with no edema and no hyperemia. No nasal polyps.  EARS: Canals clear, TMs normal  MOUTH: Oral mucosa is moist, without any lesions, no tonsillar enlargement, no oropharyngeal exudate.  NECK: supple, no masses, no thyromegaly.  LYMPHATICS: No significant axillary, cervical, or supraclavicular nodes.  RESP:  Good  air flow throughout.  No crackles. Few rhonchi in right base. No wheezes.  CV: Normal S1, S2, regular rhythm, normal rate. No murmur.  No rub. No gallop. No LE edema.   ABDOMEN:  Bowel sounds normal, soft, nontender, no HSM or masses.   MS: extremities normal. No clubbing. No cyanosis.  SKIN: no rash on limited exam  NEURO: Mentation intact, speech normal, normal strength and tone, normal gait and stance  PSYCH: mentation appears normal. and affect normal/bright  Results:  No results found for this or any previous visit (from the past 168 hour(s)).    Assessment and plan:   71 YO with onset of increased SOB for past few months. Diagnosis of COVID in Jan 2022 with mild symptoms.  No recent weight gain; less active in the Winter.  Review of chest CT does not show any increase in size or character of RLL mass/infiltrate. No pleural effusions. Increase in SOB cannot be explained by an increase in the RLL mass/consolidation. Symptoms of increased SOB could be due to post COVID or pulmonary embolism (does have a significant decrease in diffusion capacity)- these have resolved.  Adenopathy in chest found to be benign; no change on repeat imaging. Compliant on oxygen with good saturation with walking.     1. Continue supplemental oxygen at 4L/NC with exertion. Can re-check continued need for oxygen in the future.    RTC in 3 months after his 2 yr f/u PET.  Advised him to contact the clinic with any questions or concerns      Answers for HPI/ROS submitted by the patient on 7/19/2022  General Symptoms: No  Skin Symptoms: No  HENT Symptoms: No  EYE SYMPTOMS: No  HEART SYMPTOMS: No  LUNG SYMPTOMS: No  INTESTINAL SYMPTOMS: No  URINARY SYMPTOMS: No  REPRODUCTIVE SYMPTOMS: No  SKELETAL SYMPTOMS: No  BLOOD SYMPTOMS: No  NERVOUS SYSTEM SYMPTOMS: No  MENTAL HEALTH SYMPTOMS: No          Again, thank you for allowing me to participate in the care of your patient.        Sincerely,        Sanjay Granado MD

## 2022-07-26 NOTE — NURSING NOTE
Chief Complaint   Patient presents with     Follow Up     BMT      Vitals were taken and medications were reconciled.   Azul Salgado RMA  3:23 PM

## 2022-07-26 NOTE — PROGRESS NOTES
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 Developed respiratory symptoms of SOB and BETANCOURT around the time of diagnosis. Has had RLL mass diagnosed as lymphoma.  Underwent therapy with improvement in symptoms.  Has recurrence of disease in 2020 and underwent CAR-T cell therapy.  Was diagnosed with COVID in January 2022, had mild symptoms of URI symptoms and nasal drainage.  Felt mildly SOB during that time. Symptoms resolved after 10 days; notes some SOB after COVID.  States at the present time his SOB is only with significant exertion of walking in box stores.  Notices some cough but is mostly non-productive except for feeling of gastric secretions after coughing.  CT chest from 3-18 and PET scan from 3-31 were personally reviewed.  RLL mass is unchanged in size, mediastinal and hilar adenopathy.  History of JESSICA, on CPAP.  No tobacco.    Last seen almost 4 months ago. Since his last visit he underwent mediastenoscopy of his mediastinal and hilar adenopathy- pathology from biopsies returned negative for lymphoma.  Recent diagnosis of parainfluenza infection, repeat chest CT done on 5-23-22 showed resolution of the mild tree in bud opacity in the RLL, adenopathy was unchanged.  Is compliant with use of oxygen when walking longer distances, has pulse oximeter and saturations maintain > 90%.    The patient was seen and examined by Sanjay Granado MD           Current Outpatient Medications   Medication     acetaminophen (TYLENOL) 325 MG tablet     acetaminophen (TYLENOL) 325 MG tablet     acyclovir (ZOVIRAX) 800 MG tablet     bisacodyl (DULCOLAX) 5 MG EC tablet     docusate sodium (COLACE) 100 MG capsule     guaiFENesin-codeine (ROBITUSSIN AC) 100-10 MG/5ML solution     hydrocortisone (CORTEF) 10 MG tablet     insulin aspart (NOVOLOG FLEXPEN) 100 UNIT/ML pen     metFORMIN (GLUCOPHAGE) 500  MG tablet     nitroGLYcerin (NITROSTAT) 0.4 MG sublingual tablet     ondansetron (ZOFRAN ODT) 4 MG ODT tab     ondansetron (ZOFRAN-ODT) 8 MG ODT tab     rosuvastatin (CRESTOR) 10 MG tablet     senna-docusate (SENOKOT-S/PERICOLACE) 8.6-50 MG tablet     No current facility-administered medications for this visit.     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  Partial left second toe amputation with osteotomy through proximal phalanx.  2.  Partial left third toe amputation with osteotomy through proximal phalanx.;  Surgeon: Ismael Humphrey DPM;  Location: [facility]     BIOPSY LYMPH NODE CERVICAL N/A 12/5/2014    Procedure: BIOPSY LYMPH NODE CERVICAL;  Surgeon: Robbie Linda MD;  Location:  OR     BRONCHOSCOPY FLEXIBLE N/A 11/14/2017    Procedure: BRONCHOSCOPY FLEXIBLE;  FLEXIBLE BRONCHOSCOPY WITH BIOPSIES.;  Surgeon: Robbie Linda MD;  Location:  OR     BRONCHOSCOPY RIGID OR FLEXIBLE W/TRANSENDOSCOPIC ENDOBRONCHIAL ULTRASOUND GUIDED N/A 4/11/2022    Procedure: BRONCHOSCOPY, FIBEROPTIC, endobronchial ultrasound, transbronchial biopsies, lymphnode forcep biopsies;  Surgeon: Prosper Aguiar MD;   [facility]: [facility]     COLONOSCOPY       COLONOSCOPY N/A 5/9/2018    Procedure: COMBINED COLONOSCOPY, SINGLE OR MULTIPLE BIOPSY/POLYPECTOMY BY BIOPSY;;  Surgeon: Ramos Bates MD;  Location:  GI     ENDOVASCULAR PLACEMENT VASCULAR DEVICE Left 12/5/2017    Procedure: ENDOVASCULAR PLACEMENT VASCULAR DEVICE;;  Surgeon: Robbie Linda MD;  Location: [facility]     ENT SURGERY      tonsillectomy     EXCISE NODE MEDIASTINAL N/A 11/17/2017    Procedure: EXCISE NODE MEDIASTINAL;;  Surgeon: Robbie Linda MD;  Location:  OR     EYE SURGERY       EYE SURGERY Left     vitrectomy     INSERT PORT VASCULAR ACCESS N/A 12/05/2014    Procedure: INSERT PORT VASCULAR ACCESS;  Surgeon: Robbie Linda MD;  Location:  OR     INSERT PORT VASCULAR ACCESS N/A 12/5/2017    Procedure: INSERT PORT VASCULAR ACCESS;  POWER PORT PLACEMENT ATTEMPTED, PLACEMENT OF ANGIO-SEAL VIP VASCULAR CLOSURE DEVICE;  Surgeon: Robbie Linda MD;  Location: [facility]     IR CHEST PORT PLACEMENT > 5 YRS OF AGE  6/5/2020     IR PORT REMOVAL RIGHT  12/10/2018     PHACOEMULSIFICATION CLEAR CORNEA WITH STANDARD INTRAOCULAR LENS IMPLANT Right 5/2/2016    Procedure: PHACOEMULSIFICATION CLEAR CORNEA WITH STANDARD INTRAOCULAR LENS IMPLANT;  Surgeon: Rasheed Finney MD;  [facility]     REMOVE PORT VASCULAR ACCESS N/A 3/14/2017    Procedure: REMOVE PORT VASCULAR ACCESS;  Surgeon: Robbie Linda MD;  Location:  OR     SOFT TISSUE SURGERY      BASAL CELL CA FOREHEAD     THORACOTOMY Right 11/17/2017    Procedure: THORACOTOMY;  RIGHT EXPLORATORY THORACOTOMY/ EXTENSIVE PNEUMOLYSIS/ BIOPSY OF INTERLOBAR LYMPH NODE;  Surgeon: Robbie Linda MD;  Location:  OR     TRANSCERVICAL EXTENDED MEDIASTINAL LYMPHADENECTOMY N/A 5/24/2022    Procedure: TRANSCERVICAL EXTENDED MEDIASTINAL LYMPHADENECTOMY;  Surgeon: Star Narvaez MD;  [facility]: [facility]     TRANSCERVICAL EXTENDED MEDIASTINAL LYMPHADENECTOMY  5/24/2022     Procedure: ;  Surgeon: Star Narvaez MD;  Location:  OR       Social History     Socioeconomic History     Marital status:      Spouse name: Not on file     Number of children: Not on file     Years of education: Not on file     Highest education level: Not on file   Occupational History     Not on file   Tobacco Use     Smoking status: Never Smoker     Smokeless tobacco: Never Used   Substance and Sexual Activity     Alcohol use: No     Drug use: No     Sexual activity: Not on file   Other Topics Concern     Parent/sibling w/ CABG, MI or angioplasty before 65F 55M? Not Asked   Social History Narrative     Not on file     Social Determinants of Health     Financial Resource Strain: Not on file   Food Insecurity: Not on file   Transportation Needs: Not on file   Physical Activity: Not on file   Stress: Not on file   Social Connections: Not on file   Intimate Partner Violence: Not At Risk     Fear of Current or Ex-Partner: No     Emotionally Abused: No     Physically Abused: No     Sexually Abused: No   Housing Stability: Not on file       ROS Pulmonary  A complete ROS was otherwise negative except as noted in the HPI.  There were no vitals taken for this visit.  Exam:   GENERAL APPEARANCE: Well developed, well nourished, alert, and in no apparent distress.  EYES: PERRL, EOMI  HENT: Nasal mucosa with no edema and no hyperemia. No nasal polyps.  EARS: Canals clear, TMs normal  MOUTH: Oral mucosa is moist, without any lesions, no tonsillar enlargement, no oropharyngeal exudate.  NECK: supple, no masses, no thyromegaly.  LYMPHATICS: No significant axillary, cervical, or supraclavicular nodes.  RESP:  Good air flow throughout.  No crackles. Few rhonchi in right base. No wheezes.  CV: Normal S1, S2, regular rhythm, normal rate. No murmur.  No rub. No gallop. No LE edema.   ABDOMEN:  Bowel sounds normal, soft, nontender, no HSM or masses.   MS: extremities normal. No clubbing. No cyanosis.  SKIN: no  rash on limited exam  NEURO: Mentation intact, speech normal, normal strength and tone, normal gait and stance  PSYCH: mentation appears normal. and affect normal/bright  Results:  No results found for this or any previous visit (from the past 168 hour(s)).    Assessment and plan:   73 YO with onset of increased SOB for past few months. Diagnosis of COVID in Jan 2022 with mild symptoms.  No recent weight gain; less active in the Winter.  Review of chest CT does not show any increase in size or character of RLL mass/infiltrate. No pleural effusions. Increase in SOB cannot be explained by an increase in the RLL mass/consolidation. Symptoms of increased SOB could be due to post COVID or pulmonary embolism (does have a significant decrease in diffusion capacity)- these have resolved.  Adenopathy in chest found to be benign; no change on repeat imaging. Compliant on oxygen with good saturation with walking.     1. Continue supplemental oxygen at 4L/NC with exertion. Can re-check continued need for oxygen in the future.    RTC in 3 months after his 2 yr f/u PET.  Advised him to contact the clinic with any questions or concerns      Answers for HPI/ROS submitted by the patient on 7/19/2022  General Symptoms: No  Skin Symptoms: No  HENT Symptoms: No  EYE SYMPTOMS: No  HEART SYMPTOMS: No  LUNG SYMPTOMS: No  INTESTINAL SYMPTOMS: No  URINARY SYMPTOMS: No  REPRODUCTIVE SYMPTOMS: No  SKELETAL SYMPTOMS: No  BLOOD SYMPTOMS: No  NERVOUS SYSTEM SYMPTOMS: No  MENTAL HEALTH SYMPTOMS: No

## 2022-07-28 ENCOUNTER — TELEPHONE (OUTPATIENT)
Dept: PULMONOLOGY | Facility: CLINIC | Age: 72
End: 2022-07-28

## 2022-08-01 ENCOUNTER — TELEPHONE (OUTPATIENT)
Dept: PULMONOLOGY | Facility: CLINIC | Age: 72
End: 2022-08-01

## 2022-08-01 NOTE — TELEPHONE ENCOUNTER
[facility] Call Center    Phone Message    May a detailed message be left on voicemail: yes     Reason for Call: Other:       Toshia, wife, is calling in asking if Dr Granado can send out some information on things that he should and should not do to help his lungs.   As much detail as possible would be great so she can show it to him and they can both understand.     She asked after his appt last week and he did not know.              Action Taken: Message routed to:  [facility] ([facility]): Pulm    Travel Screening: Not Applicable

## 2022-08-01 NOTE — TELEPHONE ENCOUNTER
Returned patient call. Patient is wondering if there is anything he can do (exercises or things to do or things to avoid) that can decrease his need for oxygen. Currently on 4L when walking around.     Send email to Dr. Granado to advise.    Per Dr. Granado: There is nothing that he can individually do to decrease his need for oxygen.    Writer called patient to relay the message. Patient verbalized understanding.

## 2022-08-03 DIAGNOSIS — C83.398 DIFFUSE LARGE B-CELL LYMPHOMA OF SOLID ORGAN EXCLUDING SPLEEN: ICD-10-CM

## 2022-08-03 DIAGNOSIS — C85.80 LARGE CELL LYMPHOMA (H): Primary | ICD-10-CM

## 2022-08-03 DIAGNOSIS — C83.3A DIFFUSE LARGE CELL LYMPHOMA IN REMISSION: ICD-10-CM

## 2022-08-06 ENCOUNTER — HEALTH MAINTENANCE LETTER (OUTPATIENT)
Age: 72
End: 2022-08-06

## 2022-08-15 DIAGNOSIS — C83.30 DIFFUSE LARGE B-CELL LYMPHOMA, UNSPECIFIED BODY REGION (H): ICD-10-CM

## 2022-08-15 RX ORDER — ACYCLOVIR 400 MG/1
400 TABLET ORAL 2 TIMES DAILY
Qty: 60 TABLET | Refills: 1 | Status: SHIPPED | OUTPATIENT
Start: 2022-08-15 | End: 2022-08-17

## 2022-08-17 RX ORDER — ACYCLOVIR 400 MG/1
400 TABLET ORAL 2 TIMES DAILY
Qty: 60 TABLET | Refills: 1 | Status: SHIPPED | OUTPATIENT
Start: 2022-08-17 | End: 2022-09-12

## 2022-09-12 DIAGNOSIS — C83.30 DIFFUSE LARGE B-CELL LYMPHOMA, UNSPECIFIED BODY REGION (H): ICD-10-CM

## 2022-09-12 RX ORDER — ACYCLOVIR 400 MG/1
400 TABLET ORAL 2 TIMES DAILY
Qty: 60 TABLET | Refills: 1 | Status: SHIPPED | OUTPATIENT
Start: 2022-09-12 | End: 2023-01-04

## 2022-10-05 ENCOUNTER — LAB (OUTPATIENT)
Dept: LAB | Facility: CLINIC | Age: 72
End: 2022-10-05
Payer: COMMERCIAL

## 2022-10-05 ENCOUNTER — ANCILLARY PROCEDURE (OUTPATIENT)
Dept: CT IMAGING | Facility: CLINIC | Age: 72
End: 2022-10-05
Payer: COMMERCIAL

## 2022-10-05 ENCOUNTER — OFFICE VISIT (OUTPATIENT)
Dept: TRANSPLANT | Facility: CLINIC | Age: 72
End: 2022-10-05
Payer: COMMERCIAL

## 2022-10-05 VITALS
OXYGEN SATURATION: 94 % | HEART RATE: 88 BPM | RESPIRATION RATE: 18 BRPM | TEMPERATURE: 98 F | BODY MASS INDEX: 35.43 KG/M2 | SYSTOLIC BLOOD PRESSURE: 125 MMHG | DIASTOLIC BLOOD PRESSURE: 76 MMHG | WEIGHT: 239.9 LBS

## 2022-10-05 DIAGNOSIS — C83.30 DIFFUSE LARGE B-CELL LYMPHOMA, UNSPECIFIED BODY REGION (H): Primary | ICD-10-CM

## 2022-10-05 DIAGNOSIS — C83.398 DIFFUSE LARGE B-CELL LYMPHOMA OF SOLID ORGAN EXCLUDING SPLEEN: ICD-10-CM

## 2022-10-05 DIAGNOSIS — C85.80 LARGE CELL LYMPHOMA (H): Primary | ICD-10-CM

## 2022-10-05 DIAGNOSIS — C85.80 LARGE CELL LYMPHOMA (H): ICD-10-CM

## 2022-10-05 DIAGNOSIS — C83.3A DIFFUSE LARGE CELL LYMPHOMA IN REMISSION: ICD-10-CM

## 2022-10-05 DIAGNOSIS — Z95.828 PORT-A-CATH IN PLACE: Primary | ICD-10-CM

## 2022-10-05 LAB
CREAT BLD-MCNC: 0.8 MG/DL (ref 0.7–1.3)
GFR SERPL CREATININE-BSD FRML MDRD: >60 ML/MIN/1.73M2
HOLD SPECIMEN: NORMAL

## 2022-10-05 PROCEDURE — G0008 ADMIN INFLUENZA VIRUS VAC: HCPCS

## 2022-10-05 PROCEDURE — 90750 HZV VACC RECOMBINANT IM: CPT

## 2022-10-05 PROCEDURE — 91312 HC RX IP 250 OP 636: CPT

## 2022-10-05 PROCEDURE — 90662 IIV NO PRSV INCREASED AG IM: CPT

## 2022-10-05 PROCEDURE — 74177 CT ABD & PELVIS W/CONTRAST: CPT | Performed by: RADIOLOGY

## 2022-10-05 PROCEDURE — 90472 IMMUNIZATION ADMIN EACH ADD: CPT

## 2022-10-05 PROCEDURE — 82565 ASSAY OF CREATININE: CPT | Performed by: PATHOLOGY

## 2022-10-05 PROCEDURE — 90670 PCV13 VACCINE IM: CPT

## 2022-10-05 PROCEDURE — 99215 OFFICE O/P EST HI 40 MIN: CPT

## 2022-10-05 PROCEDURE — G0009 ADMIN PNEUMOCOCCAL VACCINE: HCPCS

## 2022-10-05 PROCEDURE — 36591 DRAW BLOOD OFF VENOUS DEVICE: CPT

## 2022-10-05 PROCEDURE — 250N000021 HC RX MED A9270 GY (STAT IND- M) 250

## 2022-10-05 PROCEDURE — 71260 CT THORAX DX C+: CPT | Performed by: RADIOLOGY

## 2022-10-05 PROCEDURE — G0463 HOSPITAL OUTPT CLINIC VISIT: HCPCS

## 2022-10-05 PROCEDURE — 0124A HC ADMIN COVID VAC PFIZER 12+ BIVAL ADDITIONAL: CPT

## 2022-10-05 PROCEDURE — 85025 COMPLETE CBC W/AUTO DIFF WBC: CPT

## 2022-10-05 PROCEDURE — 90471 IMMUNIZATION ADMIN: CPT

## 2022-10-05 PROCEDURE — 36415 COLL VENOUS BLD VENIPUNCTURE: CPT

## 2022-10-05 PROCEDURE — 250N000011 HC RX IP 250 OP 636

## 2022-10-05 PROCEDURE — 86355 B CELLS TOTAL COUNT: CPT

## 2022-10-05 PROCEDURE — 82784 ASSAY IGA/IGD/IGG/IGM EACH: CPT

## 2022-10-05 RX ORDER — HEPARIN SODIUM (PORCINE) LOCK FLUSH IV SOLN 100 UNIT/ML 100 UNIT/ML
500 SOLUTION INTRAVENOUS ONCE
Status: COMPLETED | OUTPATIENT
Start: 2022-10-05 | End: 2022-10-05

## 2022-10-05 RX ORDER — ALBUTEROL SULFATE 0.83 MG/ML
2.5 SOLUTION RESPIRATORY (INHALATION)
Status: CANCELLED | OUTPATIENT
Start: 2022-10-05

## 2022-10-05 RX ORDER — HEPARIN SODIUM,PORCINE 10 UNIT/ML
5 VIAL (ML) INTRAVENOUS
Status: CANCELLED | OUTPATIENT
Start: 2022-10-05

## 2022-10-05 RX ORDER — ALBUTEROL SULFATE 90 UG/1
1-2 AEROSOL, METERED RESPIRATORY (INHALATION)
Status: CANCELLED
Start: 2022-10-05

## 2022-10-05 RX ORDER — EPINEPHRINE 1 MG/ML
0.3 INJECTION, SOLUTION INTRAMUSCULAR; SUBCUTANEOUS EVERY 5 MIN PRN
Status: CANCELLED | OUTPATIENT
Start: 2022-10-05

## 2022-10-05 RX ORDER — DIPHENHYDRAMINE HYDROCHLORIDE 50 MG/ML
50 INJECTION INTRAMUSCULAR; INTRAVENOUS
Status: CANCELLED
Start: 2022-10-05

## 2022-10-05 RX ORDER — HEPARIN SODIUM (PORCINE) LOCK FLUSH IV SOLN 100 UNIT/ML 100 UNIT/ML
5 SOLUTION INTRAVENOUS
Status: CANCELLED | OUTPATIENT
Start: 2022-10-05

## 2022-10-05 RX ORDER — METHYLPREDNISOLONE SODIUM SUCCINATE 125 MG/2ML
125 INJECTION, POWDER, LYOPHILIZED, FOR SOLUTION INTRAMUSCULAR; INTRAVENOUS
Status: CANCELLED
Start: 2022-10-05

## 2022-10-05 RX ORDER — IOPAMIDOL 755 MG/ML
125 INJECTION, SOLUTION INTRAVASCULAR ONCE
Status: COMPLETED | OUTPATIENT
Start: 2022-10-05 | End: 2022-10-05

## 2022-10-05 RX ORDER — MEPERIDINE HYDROCHLORIDE 25 MG/ML
25 INJECTION INTRAMUSCULAR; INTRAVENOUS; SUBCUTANEOUS EVERY 30 MIN PRN
Status: CANCELLED | OUTPATIENT
Start: 2022-10-05

## 2022-10-05 RX ADMIN — ZOSTER VACCINE RECOMBINANT, ADJUVANTED 0.5 ML: KIT at 15:17

## 2022-10-05 RX ADMIN — IOPAMIDOL 125 ML: 755 INJECTION, SOLUTION INTRAVASCULAR at 11:58

## 2022-10-05 RX ADMIN — HEPARIN SODIUM (PORCINE) LOCK FLUSH IV SOLN 100 UNIT/ML 500 UNITS: 100 SOLUTION at 12:01

## 2022-10-05 RX ADMIN — INFLUENZA A VIRUS A/VICTORIA/2570/2019 IVR-215 (H1N1) ANTIGEN (FORMALDEHYDE INACTIVATED), INFLUENZA A VIRUS A/DARWIN/9/2021 SAN-010 (H3N2) ANTIGEN (FORMALDEHYDE INACTIVATED), INFLUENZA B VIRUS B/PHUKET/3073/2013 ANTIGEN (FORMALDEHYDE INACTIVATED), AND INFLUENZA B VIRUS B/MICHIGAN/01/2021 ANTIGEN (FORMALDEHYDE INACTIVATED) 0.7 ML: 60; 60; 60; 60 INJECTION, SUSPENSION INTRAMUSCULAR at 15:16

## 2022-10-05 RX ADMIN — BNT162B2 ORIGINAL AND OMICRON BA.4/BA.5 30 MCG: .1125; .1125 INJECTION, SUSPENSION INTRAMUSCULAR at 15:14

## 2022-10-05 RX ADMIN — PNEUMOCOCCAL 13-VALENT CONJUGATE VACCINE 0.5 ML: 2.2; 2.2; 2.2; 2.2; 2.2; 4.4; 2.2; 2.2; 2.2; 2.2; 2.2; 2.2; 2.2 INJECTION, SUSPENSION INTRAMUSCULAR at 15:15

## 2022-10-05 ASSESSMENT — PAIN SCALES - GENERAL: PAINLEVEL: NO PAIN (0)

## 2022-10-05 NOTE — PROGRESS NOTES
Unable to obtain blood back from port. States this is the second occurrence. Contacted Rabia Reid,  with information regarding port.

## 2022-10-05 NOTE — PROGRESS NOTES
BMT Daily Progress Note     Mr. Garibay is pleasant 72 y.o male with relapsed lymphoma post Yescarta CART.   S/p mediastinoscopy. Also recovering from parainfluenza.       HPI: Pastor is feeling very well, still using 02 only during exertion or walking, no cough. Some SOB on exertion. on 02 only during activity and feel better every day. No chest pain or new symptoms.   No fevers. No new issues. No palpable nodes/masses  Mediastinoscopy scar is healing up well.     Review of Systems: 10 point ROS negative except as noted above.    Physical Exam:   Blood pressure 125/76, pulse 88, temperature 98  F (36.7  C), temperature source Oral, resp. rate 18, weight 108.8 kg (239 lb 14.4 oz), SpO2 94 %.  General: NAD   Eyes: VANGIE, sclera anicteric   Nose/Mouth/Throat: OP clear, buccal mucosa moist, no ulcerations   Lungs: clear bilaterally   Cardiovascular: RRR, no M/R/G   Lymphatics: no edema  Skin: no rashes or petechaie  Neuro: A&O     Labs:  Lab Results   Component Value Date    WBC 7.6 06/07/2022    ANEU 6.2 06/19/2021    HGB 14.2 06/07/2022    HCT 44.0 06/07/2022     06/07/2022     06/07/2022    POTASSIUM 3.9 06/07/2022    CHLORIDE 103 06/07/2022    CO2 28 06/07/2022     (H) 06/07/2022    BUN 8 06/07/2022    CR 0.8 10/05/2022    MAG 1.8 03/23/2022    INR 1.16 (H) 10/28/2020     Imaging:  10/2022        Lab Results   Component Value Date    WBC 7.6 06/07/2022    ANEU 6.2 06/19/2021    HGB 14.2 06/07/2022    HCT 44.0 06/07/2022     06/07/2022     06/07/2022    POTASSIUM 3.9 06/07/2022    CHLORIDE 103 06/07/2022    CO2 28 06/07/2022     (H) 06/07/2022    BUN 8 06/07/2022    CR 0.8 10/05/2022    MAG 1.8 03/23/2022    INR 1.16 (H) 10/28/2020    AST 22 06/07/2022    ALT 34 06/07/2022     IgG    Assessment and Plan:   1.  BMT: Relapsed DLBCL s/p yescarta CAR-T . 2 years post infusion.        4/11 had lymphnode bx that we inconclusive.   Mediastinal LN excisional bx - negative for  lymphoma. He is in ongoing remission!     CT at 2 years - stable findings, no evidence of recurrence    All symptoms now resolved. Clincally due to infection.  I shared this news with Pastor. No evidence of relapse - we will cont observation and restaging CT at 2 years anniversary in Oct. 2022.       2. Heme: counts stable, transfusion independent    3. Pulm: Parainfluenza  - resolved.  Still on low dose 02.  Maintained sats during our visit on RA 92-93%.     4. ID - cont Acyclovir 400mg po BID, completed PCP prophy.  IgG level 588 (March 2022) pending today  Plan COVID bivanlent booster today  FLuzone vaccine today  Pneumovacc 23 today      40 minutes spent on the date of the encounter doing chart review, review of test results, interpretation of tests, patient visit, documentation and discussion with other provider(s)     Rosey Bunch MD  Professor of Medicine  [phone]

## 2022-10-05 NOTE — NURSING NOTE
Chief Complaint   Patient presents with     Blood Draw     Labs drawn from previously-placed piv.  VS taken.     Good blood return noted from prev-placed piv.  Labs drawn from piv then piv dc'd.  VS taken.  Pt tolerated well.    Louise Medina RN

## 2022-10-05 NOTE — NURSING NOTE
Clinic RN administered the following vaccines per orders from Dr. Bunch: Shingrix, Prevnar-13, Fluzone, and Bivalent Covid booster. Vaccines administered in bilateral deltoid muscles. Vaccine information sheets given to patient. Pt tolerated injections without incident.     Sapphire Aiken RN

## 2022-10-05 NOTE — LETTER
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 [place]. Please see a copy of my visit note below.    BMT Daily Progress Note     Mr. Garibay is pleasant 72 y.o male with relapsed lymphoma post Yescarta CART.   S/p mediastinoscopy. Also recovering from parainfluenza.       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 No chest pain or new symptoms.   No fevers. No new issues. No palpable nodes/masses  Mediastinoscopy scar is healing up well.     Review of Systems: 10 point ROS negative except as noted above.    Physical Exam:   Blood pressure 125/76, pulse 88, temperature 98  F (36.7  C), temperature source Oral, resp. rate 18, weight 108.8 kg (239 lb 14.4 oz), SpO2 94 %.  General: NAD   Eyes: VANGIE, sclera anicteric   Nose/Mouth/Throat: OP clear, buccal mucosa moist, no ulcerations   Lungs: clear bilaterally   Cardiovascular: RRR, no M/R/G   Lymphatics: no edema  Skin: no rashes or petechaie  Neuro: A&O     Labs:  Lab Results   Component Value Date    WBC 7.6 2022    ANEU 6.2 2021    HGB 14.2 2022    HCT 44.0 2022     2022     2022    POTASSIUM 3.9 2022    CHLORIDE 103 2022    CO2 28 2022     (H) 2022    BUN 8 2022    CR 0.8 10/05/2022    MAG 1.8 2022    INR 1.16 (H) 10/28/2020     Imagin2022  A. Mediastinum, lymph node(s), right level 2, excisional biopsy:  -Benign,  small  lymph node(s) with sinus histiocytosis   -No evidence of lymphoma  -Negative for fungal elements by GMS  -See comment     B. Mediastinum, soft tissue, 2R, excisional biopsy:  -Benign fibroadipose tissue   -No evidence of lymphoma  -See comment        AFB stains negative.  Lab Results   Component Value Date    WBC  7.6 06/07/2022    ANEU 6.2 06/19/2021    HGB 14.2 06/07/2022    HCT 44.0 06/07/2022     06/07/2022     06/07/2022    POTASSIUM 3.9 06/07/2022    CHLORIDE 103 06/07/2022    CO2 28 06/07/2022     (H) 06/07/2022    BUN 8 06/07/2022    CR 0.8 10/05/2022    MAG 1.8 03/23/2022    INR 1.16 (H) 10/28/2020    AST 22 06/07/2022    ALT 34 06/07/2022     IgG    Assessment and Plan:   1.  BMT: Relapsed DLBCL s/p yescarta CAR-T . 2 years post infusion.        4/11 had lymphnode bx that we inconclusive.   Mediastinal LN excisional bx - negative for lymphoma. He is in ongoing remission!     CT at 2 years - stable findings, no evidence of recurrence    All symptoms now resolved. Clincally due to infection.  I shared this news with Pastor. No evidence of relapse - we will cont observation and restaging CT at 2 years anniversary in Oct. 2022.       2. Heme: counts stable, transfusion independent    3. Pulm: Parainfluenza  - resolved.  Still on low dose 02.  Maintained sats during our visit on RA 92-93%.     4. ID - cont Acyclovir 400mg po BID, completed PCP prophy.  IgG level 588 (March 2022) pending today  Plan COVID bivanlent booster today  FLuzone vaccine today  Pneumovacc 23 today      40 minutes spent on the date of the encounter doing chart review, review of test results, interpretation of tests, patient visit, documentation and discussion with other provider(s)           Again, thank you for allowing me to participate in the care of your patient.      Sincerely,    Rosey Bunch MD

## 2022-10-05 NOTE — NURSING NOTE
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  VS taken.     Oncology Clinic Visit     Lymphoma     Initial Vitals: /76 (BP Location: Right arm, Patient Position: Chair, Cuff Size: Adult Large)   Pulse 88   Temp 98  F (36.7  C) (Oral)   Resp 18   Wt 108.8 kg (239 lb 14.4 oz)   SpO2 94%   BMI 35.43 kg/m   Estimated body mass index is 35.43 kg/m  as calculated from the following:    Height as of 5/24/22: 1.753 m (5' 9\").    Weight as of this encounter: 108.8 kg (239 lb 14.4 oz). Body surface area is 2.3 meters squared.  No Pain (0) Comment: Data Unavailable   No LMP for male patient.  Allergies reviewed: Yes  Medications reviewed: Yes    Medications: Medication refills not needed today.  Pharmacy name entered into Puzl:    [id number] [place], [place] - [address] [place] [address] [facility]ARMACY [place], [place] - [address] [place] [address] [phone]    Clinical concerns:  Pt would like to discuss decreasing Cortef.      Radha Harrington CMA              "

## 2022-10-05 NOTE — LETTER
10/5/2022         RE: Pastor Garibay  [address]  [facility] [address]      BMT Daily Progress Note     Mr. Garibay is pleasant 72 y.o male with relapsed lymphoma post Yescarta CART.   S/p mediastinoscopy. Also recovering from parainfluenza.       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 No chest pain or new symptoms.   No fevers. No new issues. No palpable nodes/masses  Mediastinoscopy scar is healing up well.     Review of Systems: 10 point ROS negative except as noted above.    Physical Exam:   Blood pressure 125/76, pulse 88, temperature 98  F (36.7  C), temperature source Oral, resp. rate 18, weight 108.8 kg (239 lb 14.4 oz), SpO2 94 %.  General: NAD   Eyes: VANGIE, sclera anicteric   Nose/Mouth/Throat: OP clear, buccal mucosa moist, no ulcerations   Lungs: clear bilaterally   Cardiovascular: RRR, no M/R/G   Lymphatics: no edema  Skin: no rashes or petechaie  Neuro: A&O     Labs:  Lab Results   Component Value Date    WBC 7.6 2022    ANEU 6.2 2021    HGB 14.2 2022    HCT 44.0 2022     2022     2022    POTASSIUM 3.9 2022    CHLORIDE 103 2022    CO2 28 2022     (H) 2022    BUN 8 2022    CR 0.8 10/05/2022    MAG 1.8 2022    INR 1.16 (H) 10/28/2020     Imagin2022  A. Mediastinum, lymph node(s), right level 2, excisional biopsy:  -Benign,  small  lymph node(s) with sinus histiocytosis   -No evidence of lymphoma  -Negative for fungal elements by GMS  -See comment     B. Mediastinum, soft tissue, 2R, excisional biopsy:  -Benign fibroadipose tissue   -No evidence of lymphoma  -See comment        AFB stains negative.  Lab Results   Component Value Date    WBC 7.6 2022    ANEU 6.2 2021    HGB 14.2 2022    HCT 44.0 2022     2022     2022    POTASSIUM 3.9 2022    CHLORIDE 103 2022    CO2 28  06/07/2022     (H) 06/07/2022    BUN 8 06/07/2022    CR 0.8 10/05/2022    MAG 1.8 03/23/2022    INR 1.16 (H) 10/28/2020    AST 22 06/07/2022    ALT 34 06/07/2022     IgG    Assessment and Plan:   1.  BMT: Relapsed DLBCL s/p yescarta CAR-T . 2 years post infusion.        4/11 had lymphnode bx that we inconclusive.   Mediastinal LN excisional bx - negative for lymphoma. He is in ongoing remission!     CT at 2 years - stable findings, no evidence of recurrence    All symptoms now resolved. Clincally due to infection.  I shared this news with Pastor. No evidence of relapse - we will cont observation and restaging CT at 2 years anniversary in Oct. 2022.       2. Heme: counts stable, transfusion independent    3. Pulm: Parainfluenza  - resolved.  Still on low dose 02.  Maintained sats during our visit on RA 92-93%.     4. ID - cont Acyclovir 400mg po BID, completed PCP prophy.  IgG level 588 (March 2022) pending today  Plan COVID bivanlent booster today  FLuzone vaccine today  Pneumovacc 23 today      40 minutes spent on the date of the encounter doing chart review, review of test results, interpretation of tests, patient visit, documentation and discussion with other provider(s)       Rosey Bunch MD

## 2022-10-06 LAB
BASOPHILS # BLD AUTO: 0 10E3/UL (ref 0–0.2)
BASOPHILS NFR BLD AUTO: 1 %
CD19 CELLS # BLD: 141 CELLS/UL (ref 107–698)
CD19 CELLS NFR BLD: 10 % (ref 6–27)
CD3 CELLS # BLD: 1142 CELLS/UL (ref 603–2990)
CD3 CELLS NFR BLD: 79 % (ref 49–84)
CD3+CD4+ CELLS # BLD: 346 CELLS/UL (ref 441–2156)
CD3+CD4+ CELLS NFR BLD: 24 % (ref 28–63)
CD3+CD4+ CELLS/CD3+CD8+ CLL BLD: 0.45 % (ref 1.4–2.6)
CD3+CD8+ CELLS # BLD: 773 CELLS/UL (ref 125–1312)
CD3+CD8+ CELLS NFR BLD: 53 % (ref 10–40)
CD3-CD16+CD56+ CELLS # BLD: 149 CELLS/UL (ref 95–640)
CD3-CD16+CD56+ CELLS NFR BLD: 10 % (ref 4–25)
EOSINOPHIL # BLD AUTO: 0.1 10E3/UL (ref 0–0.7)
EOSINOPHIL NFR BLD AUTO: 2 %
ERYTHROCYTE [DISTWIDTH] IN BLOOD BY AUTOMATED COUNT: 17.1 % (ref 10–15)
HCT VFR BLD AUTO: 51.2 % (ref 40–53)
HGB BLD-MCNC: 16.2 G/DL (ref 13.3–17.7)
IGG SERPL-MCNC: 640 MG/DL (ref 610–1616)
IMM GRANULOCYTES # BLD: 0 10E3/UL
IMM GRANULOCYTES NFR BLD: 0 %
LYMPHOCYTES # BLD AUTO: 1.4 10E3/UL (ref 0.8–5.3)
LYMPHOCYTES NFR BLD AUTO: 20 %
MCH RBC QN AUTO: 30.1 PG (ref 26.5–33)
MCHC RBC AUTO-ENTMCNC: 31.6 G/DL (ref 31.5–36.5)
MCV RBC AUTO: 95 FL (ref 78–100)
MONOCYTES # BLD AUTO: 0.7 10E3/UL (ref 0–1.3)
MONOCYTES NFR BLD AUTO: 10 %
NEUTROPHILS # BLD AUTO: 4.7 10E3/UL (ref 1.6–8.3)
NEUTROPHILS NFR BLD AUTO: 67 %
NRBC # BLD AUTO: 0 10E3/UL
NRBC BLD AUTO-RTO: 0 /100
PLATELET # BLD AUTO: 148 10E3/UL (ref 150–450)
RBC # BLD AUTO: 5.39 10E6/UL (ref 4.4–5.9)
T CELL EXTENDED COMMENT: ABNORMAL
WBC # BLD AUTO: 6.9 10E3/UL (ref 4–11)

## 2022-10-19 ENCOUNTER — TRANSFERRED RECORDS (OUTPATIENT)
Dept: HEALTH INFORMATION MANAGEMENT | Facility: CLINIC | Age: 72
End: 2022-10-19

## 2022-11-10 ENCOUNTER — OFFICE VISIT (OUTPATIENT)
Dept: PULMONOLOGY | Facility: CLINIC | Age: 72
End: 2022-11-10
Attending: INTERNAL MEDICINE
Payer: COMMERCIAL

## 2022-11-10 VITALS — DIASTOLIC BLOOD PRESSURE: 75 MMHG | SYSTOLIC BLOOD PRESSURE: 123 MMHG | HEART RATE: 98 BPM | OXYGEN SATURATION: 97 %

## 2022-11-10 DIAGNOSIS — R06.09 DYSPNEA ON EXERTION: Primary | ICD-10-CM

## 2022-11-10 PROCEDURE — G0463 HOSPITAL OUTPT CLINIC VISIT: HCPCS

## 2022-11-10 PROCEDURE — 99213 OFFICE O/P EST LOW 20 MIN: CPT | Performed by: INTERNAL MEDICINE

## 2022-11-10 ASSESSMENT — PAIN SCALES - GENERAL: PAINLEVEL: NO PAIN (0)

## 2022-11-10 NOTE — NURSING NOTE
Chief Complaint   Patient presents with     Follow Up     BMT Pt      Vitals were taken and medications were reconciled.     Azul Salgado RMA  3:06 PM

## 2022-11-10 NOTE — LETTER
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 Please see a copy of my visit note below.    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 Developed respiratory symptoms of SOB and BETANCOURT around the time of diagnosis. Has had RLL mass diagnosed as lymphoma.  Underwent therapy with improvement in symptoms.  Has recurrence of disease in 2020 and underwent CAR-T cell therapy.  Was diagnosed with COVID in January 2022, had mild symptoms of URI symptoms and nasal drainage.  Felt mildly SOB during that time. Symptoms resolved after 10 days; notes some SOB after COVID.  States at the present time his SOB is only with significant exertion of walking in box stores.  Notices some cough but is mostly non-productive except for feeling of gastric secretions after coughing.  CT chest from 3-18 and PET scan from 3-31 were personally reviewed.  RLL mass is unchanged in size, mediastinal and hilar adenopathy.  History of JESSICA, on CPAP.  No tobacco.    Last seen 3 months ago. Since his last visit he has been overall doing the same.  [place] takes him 20 minutes, occasionally stops [facility], uses oxygen with walking.  Is able to shop in box stores without difficulty.  Does not use oxygen if walking in the house or if goes for short walks in stores.  CT chest from 10-5 was personally reviewed- no acute changes in the lung or mediastinum, overall no acute findings.    The patient was seen and examined by Sanjay Granado MD           Current Outpatient Medications   Medication     acetaminophen (TYLENOL) 325 MG tablet     acetaminophen (TYLENOL) 325 MG tablet     acyclovir  (ZOVIRAX) 400 MG tablet     bisacodyl (DULCOLAX) 5 MG EC tablet     docusate sodium (COLACE) 100 MG capsule     guaiFENesin-codeine (ROBITUSSIN AC) 100-10 MG/5ML solution     hydrocortisone (CORTEF) 10 MG tablet     insulin aspart (NOVOLOG FLEXPEN) 100 UNIT/ML pen     metFORMIN (GLUCOPHAGE) 500 MG tablet     nitroGLYcerin (NITROSTAT) 0.4 MG sublingual tablet     ondansetron (ZOFRAN ODT) 4 MG ODT tab     ondansetron (ZOFRAN-ODT) 8 MG ODT tab     rosuvastatin (CRESTOR) 10 MG tablet     senna-docusate (SENOKOT-S/PERICOLACE) 8.6-50 MG tablet     No current facility-administered medications for this visit.     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  Partial left second toe amputation with osteotomy through proximal phalanx.  2.  Partial left third toe amputation with osteotomy through proximal phalanx.;  Surgeon: Ismael Humphrey DPM;  Location:  OR     BIOPSY LYMPH NODE CERVICAL N/A 12/5/2014    Procedure: BIOPSY LYMPH NODE CERVICAL;  Surgeon: Robbie Linda MD;  Location:  OR     BRONCHOSCOPY FLEXIBLE N/A 11/14/2017    Procedure: BRONCHOSCOPY FLEXIBLE;  FLEXIBLE BRONCHOSCOPY WITH BIOPSIES.;  Surgeon:  Robbie Linda MD;  Location:  OR     BRONCHOSCOPY RIGID OR FLEXIBLE W/TRANSENDOSCOPIC ENDOBRONCHIAL ULTRASOUND GUIDED N/A 4/11/2022    Procedure: BRONCHOSCOPY, FIBEROPTIC, endobronchial ultrasound, transbronchial biopsies, lymphnode forcep biopsies;  Surgeon: Prosper Aguiar MD;  Location: [facility] OR     COLONOSCOPY       COLONOSCOPY N/A 5/9/2018    Procedure: COMBINED COLONOSCOPY, SINGLE OR MULTIPLE BIOPSY/POLYPECTOMY BY BIOPSY;;  Surgeon: Ramos Bates MD;  Location:  GI     ENDOVASCULAR PLACEMENT VASCULAR DEVICE Left 12/5/2017    Procedure: ENDOVASCULAR PLACEMENT VASCULAR DEVICE;;  Surgeon: Robbie Linda MD;  Location: [facility]     ENT SURGERY      tonsillectomy     EXCISE NODE MEDIASTINAL N/A 11/17/2017    Procedure: EXCISE NODE MEDIASTINAL;;  Surgeon: Robbie Linda MD;  Location:  OR     EYE SURGERY       EYE SURGERY Left     vitrectomy     INSERT PORT VASCULAR ACCESS N/A 12/05/2014    Procedure: INSERT PORT VASCULAR ACCESS;  Surgeon: Robbie Linda MD;  Location:  OR     INSERT PORT VASCULAR ACCESS N/A 12/5/2017    Procedure: INSERT PORT VASCULAR ACCESS;  POWER PORT PLACEMENT ATTEMPTED, PLACEMENT OF ANGIO-SEAL VIP VASCULAR CLOSURE DEVICE;  Surgeon: Robbie Linda MD;  Location: [facility]     IR CHEST PORT PLACEMENT > 5 YRS OF AGE  6/5/2020     IR PORT REMOVAL RIGHT  12/10/2018     PHACOEMULSIFICATION CLEAR CORNEA WITH STANDARD INTRAOCULAR LENS IMPLANT Right 5/2/2016    Procedure: PHACOEMULSIFICATION CLEAR CORNEA WITH STANDARD INTRAOCULAR LENS IMPLANT;  Surgeon: Rasheed Finney MD;  Location:  [facility]     REMOVE PORT VASCULAR ACCESS N/A 3/14/2017    Procedure: REMOVE PORT VASCULAR ACCESS;  Surgeon: Robbie Linda MD;  Location:  OR     SOFT TISSUE SURGERY      BASAL CELL CA FOREHEAD     THORACOTOMY Right 11/17/2017    Procedure: THORACOTOMY;  RIGHT EXPLORATORY THORACOTOMY/ EXTENSIVE PNEUMOLYSIS/ BIOPSY OF INTERLOBAR LYMPH NODE;  Surgeon: Alexei  Robbie Baptiste MD;  Location: [facility] OR     TRANSCERVICAL EXTENDED MEDIASTINAL LYMPHADENECTOMY N/A 5/24/2022    Procedure: TRANSCERVICAL EXTENDED MEDIASTINAL LYMPHADENECTOMY;  Surgeon: Star Narvaez MD;  Location: [facility] OR     TRANSCERVICAL EXTENDED MEDIASTINAL LYMPHADENECTOMY  5/24/2022    Procedure: ;  Surgeon: Star Narvaez MD;  Location: [facility] OR       Social History     Socioeconomic History     Marital status:      Spouse name: Not on file     Number of children: Not on file     Years of education: Not on file     Highest education level: Not on file   Occupational History     Not on file   Tobacco Use     Smoking status: Never     Smokeless tobacco: Never   Substance and Sexual Activity     Alcohol use: No     Drug use: No     Sexual activity: Not on file   Other Topics Concern     Parent/sibling w/ CABG, MI or angioplasty before 65F 55M? Not Asked   Social History Narrative     Not on file     Social Determinants of Health     Financial Resource Strain: Not on file   Food Insecurity: Not on file   Transportation Needs: Not on file   Physical Activity: Not on file   Stress: Not on file   Social Connections: Not on file   Intimate Partner Violence: Not At Risk     Fear of Current or Ex-Partner: No     Emotionally Abused: No     Physically Abused: No     Sexually Abused: No   Housing Stability: Not on file       ROS Pulmonary  A complete ROS was otherwise negative except as noted in the HPI.  There were no vitals taken for this visit.  Exam:   GENERAL APPEARANCE: Well developed, well nourished, alert, and in no apparent distress.  EYES: PERRL, EOMI  HENT: Nasal mucosa with no edema and no hyperemia. No nasal polyps.  EARS: Canals clear, TMs normal  MOUTH: Oral mucosa is moist, without any lesions, no tonsillar enlargement, no oropharyngeal exudate.  NECK: supple, no masses, no thyromegaly.  LYMPHATICS: No significant axillary, cervical, or supraclavicular nodes.  RESP:  Good air flow  throughout.  No crackles. No rhonchi. No wheezes.  CV: Normal S1, S2, regular rhythm, normal rate. No murmur.  No rub. No gallop. No LE edema.   ABDOMEN:  Bowel sounds normal, soft, nontender, no HSM or masses.   MS: extremities normal. No clubbing. No cyanosis.  SKIN: no rash on limited exam  NEURO: Mentation intact, speech normal, normal strength and tone, normal gait and stance  PSYCH: mentation appears normal. and affect normal/bright  Results:  No results found for this or any previous visit (from the past 168 hour(s)).    Assessment and plan:   73 YO with onset of increased SOB for past few months. Diagnosis of COVID in Jan 2022 with mild symptoms.  No recent weight gain; less active in the Winter.  Review of chest CT does not show any increase in size or character of RLL mass/infiltrate. No pleural effusions. Increase in SOB cannot be explained by an increase in the RLL mass/consolidation. Symptoms of increased SOB could be due to post COVID or pulmonary embolism (does have a significant decrease in diffusion capacity)- these have resolved.  Adenopathy in chest found to be benign; no change on repeat imaging. Compliant on oxygen with good saturation with walking.  Remains active without limitation.     1. Continue supplemental oxygen at 4L/NC with exertion. Can re-check continued need for oxygen in the future.     RTC in 6 months.  Advised him to contact the clinic with any questions or concerns.          Again, thank you for allowing me to participate in the care of your patient.        Sincerely,        Sanjay Granado MD

## 2022-11-10 NOTE — PROGRESS NOTES
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 Developed respiratory symptoms of SOB and BETANCOURT around the time of diagnosis. Has had RLL mass diagnosed as lymphoma.  Underwent therapy with improvement in symptoms.  Has recurrence of disease in 2020 and underwent CAR-T cell therapy.  Was diagnosed with COVID in January 2022, had mild symptoms of URI symptoms and nasal drainage.  Felt mildly SOB during that time. Symptoms resolved after 10 days; notes some SOB after COVID.  States at the present time his SOB is only with significant exertion of walking in box stores.  Notices some cough but is mostly non-productive except for feeling of gastric secretions after coughing.  CT chest from 3-18 and PET scan from 3-31 were personally reviewed.  RLL mass is unchanged in size, mediastinal and hilar adenopathy.  History of JESSICA, on CPAP.  No tobacco.    Last seen 3 months ago. Since his last visit he has been overall doing the same.  Walks one mile daily in [facility], takes him 20 minutes, occasionally stops senior care, uses oxygen with walking.  Is able to shop in box stores without difficulty.  Does not use oxygen if walking in the house or if goes for short walks in stores.  CT chest from 10-5 was personally reviewed- no acute changes in the lung or mediastinum, overall no acute findings.    The patient was seen and examined by Sanjay Granado MD           Current Outpatient Medications   Medication     acetaminophen (TYLENOL) 325 MG tablet     acetaminophen (TYLENOL) 325 MG tablet     acyclovir (ZOVIRAX) 400 MG tablet     bisacodyl (DULCOLAX) 5 MG EC tablet     docusate sodium (COLACE) 100 MG capsule     guaiFENesin-codeine (ROBITUSSIN AC) 100-10 MG/5ML solution     hydrocortisone (CORTEF) 10 MG tablet     insulin aspart (NOVOLOG FLEXPEN) 100 UNIT/ML pen     metFORMIN (GLUCOPHAGE) 500 MG  tablet     nitroGLYcerin (NITROSTAT) 0.4 MG sublingual tablet     ondansetron (ZOFRAN ODT) 4 MG ODT tab     ondansetron (ZOFRAN-ODT) 8 MG ODT tab     rosuvastatin (CRESTOR) 10 MG tablet     senna-docusate (SENOKOT-S/PERICOLACE) 8.6-50 MG tablet     No current facility-administered medications for this visit.     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  Partial left second toe amputation with osteotomy through proximal phalanx.  2.  Partial left third toe amputation with osteotomy through proximal phalanx.;  Surgeon: Ismael Humphrey DPM;  Location: [facility]     BIOPSY LYMPH NODE CERVICAL N/A 12/5/2014    Procedure: BIOPSY LYMPH NODE CERVICAL;  Surgeon: Robbie Linda MD;  Location:  OR     BRONCHOSCOPY FLEXIBLE N/A 11/14/2017    Procedure: BRONCHOSCOPY FLEXIBLE;  FLEXIBLE BRONCHOSCOPY WITH BIOPSIES.;  Surgeon: Robbie Linda MD;  Location:  OR     BRONCHOSCOPY RIGID OR FLEXIBLE W/TRANSENDOSCOPIC ENDOBRONCHIAL ULTRASOUND GUIDED N/A 4/11/2022    Procedure: BRONCHOSCOPY, FIBEROPTIC, endobronchial ultrasound, transbronchial biopsies, lymphnode forcep biopsies;  Surgeon: Prosper Aguiar MD;  Location:  [facility] OR     COLONOSCOPY       COLONOSCOPY N/A 5/9/2018    Procedure: COMBINED COLONOSCOPY, SINGLE OR MULTIPLE BIOPSY/POLYPECTOMY BY BIOPSY;;  Surgeon: Ramos Bates MD;  Location:  GI     ENDOVASCULAR PLACEMENT VASCULAR DEVICE Left 12/5/2017    Procedure: ENDOVASCULAR PLACEMENT VASCULAR DEVICE;;  Surgeon: Robbie Linda MD;  Location: [facility]     ENT SURGERY      tonsillectomy     EXCISE NODE MEDIASTINAL N/A 11/17/2017    Procedure: EXCISE NODE MEDIASTINAL;;  Surgeon: Robbie Linda MD;  Location:  OR     EYE SURGERY       EYE SURGERY Left     vitrectomy     INSERT PORT VASCULAR ACCESS N/A 12/05/2014    Procedure: INSERT PORT VASCULAR ACCESS;  Surgeon: Robbie Linda MD;  Location:  OR     INSERT PORT VASCULAR ACCESS N/A 12/5/2017    Procedure: INSERT PORT VASCULAR ACCESS;  POWER PORT PLACEMENT ATTEMPTED, PLACEMENT OF ANGIO-SEAL VIP VASCULAR CLOSURE DEVICE;  Surgeon: Robbie Linda MD;  Location: [facility]     IR CHEST PORT PLACEMENT > 5 YRS OF AGE  6/5/2020     IR PORT REMOVAL RIGHT  12/10/2018     PHACOEMULSIFICATION CLEAR CORNEA WITH STANDARD INTRAOCULAR LENS IMPLANT Right 5/2/2016    Procedure: PHACOEMULSIFICATION CLEAR CORNEA WITH STANDARD INTRAOCULAR LENS IMPLANT;  Surgeon: Rasheed Finney MD;  Location:  [facility]     REMOVE PORT VASCULAR ACCESS N/A 3/14/2017    Procedure: REMOVE PORT VASCULAR ACCESS;  Surgeon: Robbie Linda MD;  Location:  OR     SOFT TISSUE SURGERY      BASAL CELL CA FOREHEAD     THORACOTOMY Right 11/17/2017    Procedure: THORACOTOMY;  RIGHT EXPLORATORY THORACOTOMY/ EXTENSIVE PNEUMOLYSIS/ BIOPSY OF INTERLOBAR LYMPH NODE;  Surgeon: Robbie Linda MD;  Location:  OR     TRANSCERVICAL EXTENDED MEDIASTINAL LYMPHADENECTOMY N/A 5/24/2022    Procedure: TRANSCERVICAL EXTENDED MEDIASTINAL LYMPHADENECTOMY;  Surgeon: Star Narvaez MD;  [facility]: [facility]     TRANSCERVICAL EXTENDED MEDIASTINAL LYMPHADENECTOMY  5/24/2022     Procedure: ;  Surgeon: Star Narvaez MD;  Location:  OR       Social History     Socioeconomic History     Marital status:      Spouse name: Not on file     Number of children: Not on file     Years of education: Not on file     Highest education level: Not on file   Occupational History     Not on file   Tobacco Use     Smoking status: Never     Smokeless tobacco: Never   Substance and Sexual Activity     Alcohol use: No     Drug use: No     Sexual activity: Not on file   Other Topics Concern     Parent/sibling w/ CABG, MI or angioplasty before 65F 55M? Not Asked   Social History Narrative     Not on file     Social Determinants of Health     Financial Resource Strain: Not on file   Food Insecurity: Not on file   Transportation Needs: Not on file   Physical Activity: Not on file   Stress: Not on file   Social Connections: Not on file   Intimate Partner Violence: Not At Risk     Fear of Current or Ex-Partner: No     Emotionally Abused: No     Physically Abused: No     Sexually Abused: No   Housing Stability: Not on file       ROS Pulmonary  A complete ROS was otherwise negative except as noted in the HPI.  There were no vitals taken for this visit.  Exam:   GENERAL APPEARANCE: Well developed, well nourished, alert, and in no apparent distress.  EYES: PERRL, EOMI  HENT: Nasal mucosa with no edema and no hyperemia. No nasal polyps.  EARS: Canals clear, TMs normal  MOUTH: Oral mucosa is moist, without any lesions, no tonsillar enlargement, no oropharyngeal exudate.  NECK: supple, no masses, no thyromegaly.  LYMPHATICS: No significant axillary, cervical, or supraclavicular nodes.  RESP:  Good air flow throughout.  No crackles. No rhonchi. No wheezes.  CV: Normal S1, S2, regular rhythm, normal rate. No murmur.  No rub. No gallop. No LE edema.   ABDOMEN:  Bowel sounds normal, soft, nontender, no HSM or masses.   MS: extremities normal. No clubbing. No cyanosis.  SKIN: no rash on limited exam  NEURO:  Mentation intact, speech normal, normal strength and tone, normal gait and stance  PSYCH: mentation appears normal. and affect normal/bright  Results:  No results found for this or any previous visit (from the past 168 hour(s)).    Assessment and plan:   73 YO with onset of increased SOB for past few months. Diagnosis of COVID in Jan 2022 with mild symptoms.  No recent weight gain; less active in the Winter.  Review of chest CT does not show any increase in size or character of RLL mass/infiltrate. No pleural effusions. Increase in SOB cannot be explained by an increase in the RLL mass/consolidation. Symptoms of increased SOB could be due to post COVID or pulmonary embolism (does have a significant decrease in diffusion capacity)- these have resolved.  Adenopathy in chest found to be benign; no change on repeat imaging. Compliant on oxygen with good saturation with walking.  Remains active without limitation.     1. Continue supplemental oxygen at 4L/NC with exertion. Can re-check continued need for oxygen in the future.     RTC in 6 months.  Advised him to contact the clinic with any questions or concerns.

## 2022-11-28 ENCOUNTER — ANESTHESIA EVENT (OUTPATIENT)
Dept: SURGERY | Facility: CLINIC | Age: 72
End: 2022-11-28
Payer: COMMERCIAL

## 2022-11-28 ASSESSMENT — LIFESTYLE VARIABLES: TOBACCO_USE: 0

## 2022-11-29 ENCOUNTER — ANESTHESIA (OUTPATIENT)
Dept: SURGERY | Facility: CLINIC | Age: 72
End: 2022-11-29
Payer: COMMERCIAL

## 2022-11-29 ENCOUNTER — HOSPITAL ENCOUNTER (OUTPATIENT)
Facility: CLINIC | Age: 72
Discharge: HOME | End: 2022-11-29
Attending: THORACIC SURGERY (CARDIOTHORACIC VASCULAR SURGERY) | Admitting: THORACIC SURGERY (CARDIOTHORACIC VASCULAR SURGERY)
Payer: COMMERCIAL

## 2022-11-29 VITALS
BODY MASS INDEX: 36.07 KG/M2 | WEIGHT: 238 LBS | SYSTOLIC BLOOD PRESSURE: 110 MMHG | DIASTOLIC BLOOD PRESSURE: 70 MMHG | TEMPERATURE: 98 F | HEIGHT: 68 IN | HEART RATE: 87 BPM | RESPIRATION RATE: 20 BRPM | OXYGEN SATURATION: 93 %

## 2022-11-29 PROCEDURE — 360N000075 HC SURGERY LEVEL 2, PER MIN: Performed by: THORACIC SURGERY (CARDIOTHORACIC VASCULAR SURGERY)

## 2022-11-29 PROCEDURE — 250N000011 HC RX IP 250 OP 636: Performed by: NURSE ANESTHETIST, CERTIFIED REGISTERED

## 2022-11-29 PROCEDURE — 710N000012 HC RECOVERY PHASE 2, PER MINUTE: Performed by: THORACIC SURGERY (CARDIOTHORACIC VASCULAR SURGERY)

## 2022-11-29 PROCEDURE — 710N000009 HC RECOVERY PHASE 1, LEVEL 1, PER MIN: Performed by: THORACIC SURGERY (CARDIOTHORACIC VASCULAR SURGERY)

## 2022-11-29 PROCEDURE — 250N000009 HC RX 250: Performed by: NURSE ANESTHETIST, CERTIFIED REGISTERED

## 2022-11-29 PROCEDURE — 250N000009 HC RX 250: Performed by: THORACIC SURGERY (CARDIOTHORACIC VASCULAR SURGERY)

## 2022-11-29 PROCEDURE — 272N000001 HC OR GENERAL SUPPLY STERILE: Performed by: THORACIC SURGERY (CARDIOTHORACIC VASCULAR SURGERY)

## 2022-11-29 PROCEDURE — 258N000003 HC RX IP 258 OP 636: Performed by: NURSE ANESTHETIST, CERTIFIED REGISTERED

## 2022-11-29 PROCEDURE — 999N000141 HC STATISTIC PRE-PROCEDURE NURSING ASSESSMENT: Performed by: THORACIC SURGERY (CARDIOTHORACIC VASCULAR SURGERY)

## 2022-11-29 PROCEDURE — 370N000017 HC ANESTHESIA TECHNICAL FEE, PER MIN: Performed by: THORACIC SURGERY (CARDIOTHORACIC VASCULAR SURGERY)

## 2022-11-29 RX ORDER — FENTANYL CITRATE 0.05 MG/ML
25 INJECTION, SOLUTION INTRAMUSCULAR; INTRAVENOUS
Status: DISCONTINUED | OUTPATIENT
Start: 2022-11-29 | End: 2022-11-29 | Stop reason: HOSPADM

## 2022-11-29 RX ORDER — PROPOFOL 10 MG/ML
INJECTION, EMULSION INTRAVENOUS CONTINUOUS PRN
Status: DISCONTINUED | OUTPATIENT
Start: 2022-11-29 | End: 2022-11-29

## 2022-11-29 RX ORDER — FENTANYL CITRATE 0.05 MG/ML
50 INJECTION, SOLUTION INTRAMUSCULAR; INTRAVENOUS
Status: DISCONTINUED | OUTPATIENT
Start: 2022-11-29 | End: 2022-11-29 | Stop reason: HOSPADM

## 2022-11-29 RX ORDER — ACETAMINOPHEN 325 MG/1
650 TABLET ORAL
Status: DISCONTINUED | OUTPATIENT
Start: 2022-11-29 | End: 2022-11-29 | Stop reason: HOSPADM

## 2022-11-29 RX ORDER — ONDANSETRON 4 MG/1
4 TABLET, ORALLY DISINTEGRATING ORAL EVERY 30 MIN PRN
Status: DISCONTINUED | OUTPATIENT
Start: 2022-11-29 | End: 2022-11-29 | Stop reason: HOSPADM

## 2022-11-29 RX ORDER — PHENOL 1.4 %
10 AEROSOL, SPRAY (ML) MUCOUS MEMBRANE
COMMUNITY

## 2022-11-29 RX ORDER — SODIUM CHLORIDE, SODIUM LACTATE, POTASSIUM CHLORIDE, CALCIUM CHLORIDE 600; 310; 30; 20 MG/100ML; MG/100ML; MG/100ML; MG/100ML
INJECTION, SOLUTION INTRAVENOUS CONTINUOUS PRN
Status: DISCONTINUED | OUTPATIENT
Start: 2022-11-29 | End: 2022-11-29

## 2022-11-29 RX ORDER — ONDANSETRON 2 MG/ML
INJECTION INTRAMUSCULAR; INTRAVENOUS PRN
Status: DISCONTINUED | OUTPATIENT
Start: 2022-11-29 | End: 2022-11-29

## 2022-11-29 RX ORDER — HYDROCODONE BITARTRATE AND ACETAMINOPHEN 5; 325 MG/1; MG/1
1 TABLET ORAL
Status: DISCONTINUED | OUTPATIENT
Start: 2022-11-29 | End: 2022-11-29 | Stop reason: HOSPADM

## 2022-11-29 RX ORDER — HYDROMORPHONE HCL IN WATER/PF 6 MG/30 ML
0.2 PATIENT CONTROLLED ANALGESIA SYRINGE INTRAVENOUS EVERY 5 MIN PRN
Status: DISCONTINUED | OUTPATIENT
Start: 2022-11-29 | End: 2022-11-29 | Stop reason: HOSPADM

## 2022-11-29 RX ORDER — SODIUM CHLORIDE, SODIUM LACTATE, POTASSIUM CHLORIDE, CALCIUM CHLORIDE 600; 310; 30; 20 MG/100ML; MG/100ML; MG/100ML; MG/100ML
INJECTION, SOLUTION INTRAVENOUS CONTINUOUS
Status: DISCONTINUED | OUTPATIENT
Start: 2022-11-29 | End: 2022-11-29 | Stop reason: HOSPADM

## 2022-11-29 RX ORDER — FENTANYL CITRATE 0.05 MG/ML
50 INJECTION, SOLUTION INTRAMUSCULAR; INTRAVENOUS EVERY 5 MIN PRN
Status: DISCONTINUED | OUTPATIENT
Start: 2022-11-29 | End: 2022-11-29 | Stop reason: HOSPADM

## 2022-11-29 RX ORDER — HYDROMORPHONE HCL IN WATER/PF 6 MG/30 ML
0.4 PATIENT CONTROLLED ANALGESIA SYRINGE INTRAVENOUS EVERY 5 MIN PRN
Status: DISCONTINUED | OUTPATIENT
Start: 2022-11-29 | End: 2022-11-29 | Stop reason: HOSPADM

## 2022-11-29 RX ORDER — PROPOFOL 10 MG/ML
INJECTION, EMULSION INTRAVENOUS PRN
Status: DISCONTINUED | OUTPATIENT
Start: 2022-11-29 | End: 2022-11-29

## 2022-11-29 RX ORDER — FENTANYL CITRATE 0.05 MG/ML
25 INJECTION, SOLUTION INTRAMUSCULAR; INTRAVENOUS EVERY 5 MIN PRN
Status: DISCONTINUED | OUTPATIENT
Start: 2022-11-29 | End: 2022-11-29 | Stop reason: HOSPADM

## 2022-11-29 RX ORDER — MEPERIDINE HYDROCHLORIDE 25 MG/ML
12.5 INJECTION INTRAMUSCULAR; INTRAVENOUS; SUBCUTANEOUS
Status: DISCONTINUED | OUTPATIENT
Start: 2022-11-29 | End: 2022-11-29 | Stop reason: HOSPADM

## 2022-11-29 RX ORDER — ONDANSETRON 2 MG/ML
4 INJECTION INTRAMUSCULAR; INTRAVENOUS EVERY 30 MIN PRN
Status: DISCONTINUED | OUTPATIENT
Start: 2022-11-29 | End: 2022-11-29 | Stop reason: HOSPADM

## 2022-11-29 RX ORDER — LIDOCAINE HYDROCHLORIDE 20 MG/ML
INJECTION, SOLUTION INFILTRATION; PERINEURAL PRN
Status: DISCONTINUED | OUTPATIENT
Start: 2022-11-29 | End: 2022-11-29

## 2022-11-29 RX ADMIN — PROPOFOL 75 MCG/KG/MIN: 10 INJECTION, EMULSION INTRAVENOUS at 07:20

## 2022-11-29 RX ADMIN — ONDANSETRON 4 MG: 2 INJECTION INTRAMUSCULAR; INTRAVENOUS at 07:23

## 2022-11-29 RX ADMIN — LIDOCAINE HYDROCHLORIDE 60 MG: 20 INJECTION, SOLUTION INFILTRATION; PERINEURAL at 07:20

## 2022-11-29 RX ADMIN — SODIUM CHLORIDE, POTASSIUM CHLORIDE, SODIUM LACTATE AND CALCIUM CHLORIDE: 600; 310; 30; 20 INJECTION, SOLUTION INTRAVENOUS at 07:20

## 2022-11-29 RX ADMIN — PROPOFOL 30 MG: 10 INJECTION, EMULSION INTRAVENOUS at 07:23

## 2022-11-29 ASSESSMENT — COPD QUESTIONNAIRES: COPD: 0

## 2022-11-29 ASSESSMENT — ACTIVITIES OF DAILY LIVING (ADL): ADLS_ACUITY_SCORE: 18

## 2022-11-29 NOTE — OR NURSING
Pt reports oxygen sats are in low to mid 90s at baseline.  Pt does have home oxygen.  Wears oxygen when exercising or shopping.

## 2022-11-29 NOTE — OR NURSING
Patient brought a picture of home covid antigen test on phone as directed.  I personally saw this result and it was time stamped 11/27/22.  Result was negative.

## 2022-11-29 NOTE — DISCHARGE INSTRUCTIONS
Same Day Surgery Discharge Instructions for  Sedation and General Anesthesia     It's not unusual to feel dizzy, light-headed or faint for up to 24 hours after surgery or while taking pain medication.  If you have these symptoms: sit for a few minutes before standing and have someone assist you when you get up to walk or use the bathroom.    You should rest and relax for the next 24 hours. We recommend you make arrangements to have an adult stay with you for at least 24 hours after your discharge.  Avoid hazardous and strenuous activity.    DO NOT DRIVE any vehicle or operate mechanical equipment for 24 hours following the end of your surgery.  Even though you may feel normal, your reactions may be affected by the medication you have received.    Do not drink alcoholic beverages for 24 hours following surgery.     Slowly progress to your regular diet as you feel able. It's not unusual to feel nauseated and/or vomit after receiving anesthesia.  If you develop these symptoms, drink clear liquids (apple juice, ginger ale, broth, 7-up, etc. ) until you feel better.  If your nausea and vomiting persists for 24 hours, please notify your surgeon.      All narcotic pain medications, along with inactivity and anesthesia, can cause constipation. Drinking plenty of liquids and increasing fiber intake will help.    For any questions of a medical nature, call your surgeon.    Do not make important decisions for 24 hours.    If you had general anesthesia, you may have a sore throat for a couple of days related to the breathing tube used during surgery.  You may use Cepacol lozenges to help with this discomfort.  If it worsens or if you develop a fever, contact your surgeon.     If you feel your pain is not well managed with the pain medications prescribed by your surgeon, please contact your surgeon's office to let them know so they can address your concerns.       **If you have questions or concerns about your procedure,  call  Dr. Linda at [phone]**        Discharge Instructions for Port Removal      You may remove your bandage and shower in 24 hours.   You may resume normal activity as tolerated.    You may apply ice to the area for comfort.  Your incision was closed using a liquid adhesive.  Do not scratch, rub or pick at the film over your incision.  You may shower in 24 hours.  Do not soak in a tub for at least one week.  Do not apply lotions or cream to you incision.   Watch incision for signs of infection:  Redness  Swelling  Drainage  Temperature  Call your surgeon for questions and concerns.

## 2022-11-29 NOTE — INTERVAL H&P NOTE
"I have reviewed the surgical (or preoperative) H&P that is linked to this encounter, and examined the patient. There are no significant changes    Clinical Conditions Present on Arrival:  Clinically Significant Risk Factors Present on Admission                    # Obesity: Estimated body mass index is 36.19 kg/m  as calculated from the following:    Height as of this encounter: 1.727 m (5' 8\").    Weight as of this encounter: 108 kg (238 lb).       "

## 2022-11-29 NOTE — ANESTHESIA PREPROCEDURE EVALUATION
Anesthesia Pre-Procedure Evaluation    Patient: Pastor Garibay   MRN: [id number] : 1950        Procedure : Procedure(s):  PORT REMOVAL          Past Medical History:   Diagnosis Date     Abnormal liver function test      Anemia      CPAP (continuous positive airway pressure) dependence      Diabetes (H)      Exertional dyspnea     uses 4L O2 with activity     Hard to intubate 2022     Hx of skin cancer, basal cell      Hyperlipidemia      Hypertension      Keratoderma      Large cell lymphoma (H) 2020     Liver disease      Lymphoma (H)      Non-Hodgkin lymphoma (H)      Pedal edema     CHRONIC     Pleural effusion      Seborrheic keratosis, inflamed      Sleep apnea     Uses a CPAP     Syncope and collapse      Thrombocytopenia (H) 2020     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  Partial left second toe amputation with osteotomy through proximal phalanx.  2.  Partial left third toe amputation with osteotomy through proximal phalanx.;  Surgeon: Ismael Humphrey DPM;  Location: [facility] OR     BIOPSY LYMPH NODE CERVICAL N/A 2014    Procedure: BIOPSY LYMPH NODE CERVICAL;  Surgeon: Robbie Linda MD;  Location:  OR     BRONCHOSCOPY FLEXIBLE N/A 2017    Procedure: BRONCHOSCOPY FLEXIBLE;  FLEXIBLE BRONCHOSCOPY WITH BIOPSIES.;  Surgeon: Robbie Linda MD;  Location:  OR     BRONCHOSCOPY RIGID OR FLEXIBLE W/TRANSENDOSCOPIC ENDOBRONCHIAL ULTRASOUND GUIDED N/A 2022    Procedure: BRONCHOSCOPY, FIBEROPTIC, endobronchial ultrasound, transbronchial biopsies, lymphnode forcep biopsies;  Surgeon: Prosper Aguiar MD;  [facility]: [facility]     COLONOSCOPY       COLONOSCOPY N/A 2018    Procedure: COMBINED  COLONOSCOPY, SINGLE OR MULTIPLE BIOPSY/POLYPECTOMY BY BIOPSY;;  Surgeon: Ramos Bates MD;  Location:  GI     ENDOVASCULAR PLACEMENT VASCULAR DEVICE Left 12/5/2017    Procedure: ENDOVASCULAR PLACEMENT VASCULAR DEVICE;;  Surgeon: Robbie Linda MD;  Location: [facility]     ENT SURGERY      tonsillectomy     EXCISE NODE MEDIASTINAL N/A 11/17/2017    Procedure: EXCISE NODE MEDIASTINAL;;  Surgeon: Robbie Linda MD;  Location:  OR     EYE SURGERY       EYE SURGERY Left     vitrectomy     INSERT PORT VASCULAR ACCESS N/A 12/05/2014    Procedure: INSERT PORT VASCULAR ACCESS;  Surgeon: Robbie Linda MD;  Location:  OR     INSERT PORT VASCULAR ACCESS N/A 12/5/2017    Procedure: INSERT PORT VASCULAR ACCESS;  POWER PORT PLACEMENT ATTEMPTED, PLACEMENT OF ANGIO-SEAL VIP VASCULAR CLOSURE DEVICE;  Surgeon: Robbie Linda MD;  Location: [facility]     IR CHEST PORT PLACEMENT > 5 YRS OF AGE  6/5/2020     IR PORT REMOVAL RIGHT  12/10/2018     PHACOEMULSIFICATION CLEAR CORNEA WITH STANDARD INTRAOCULAR LENS IMPLANT Right 5/2/2016    Procedure: PHACOEMULSIFICATION CLEAR CORNEA WITH STANDARD INTRAOCULAR LENS IMPLANT;  Surgeon: Rasheed Finney MD;  [facility]:  [facility]     REMOVE PORT VASCULAR ACCESS N/A 3/14/2017    Procedure: REMOVE PORT VASCULAR ACCESS;  Surgeon: Robbie Linda MD;  Location:  OR     SOFT TISSUE SURGERY      BASAL CELL CA FOREHEAD     THORACOTOMY Right 11/17/2017    Procedure: THORACOTOMY;  RIGHT EXPLORATORY THORACOTOMY/ EXTENSIVE PNEUMOLYSIS/ BIOPSY OF INTERLOBAR LYMPH NODE;  Surgeon: Robbie Linda MD;  Location:  OR     TRANSCERVICAL EXTENDED MEDIASTINAL LYMPHADENECTOMY N/A 5/24/2022    Procedure: TRANSCERVICAL EXTENDED MEDIASTINAL LYMPHADENECTOMY;  Surgeon: Star Narvaez MD;  [facility]     TRANSCERVICAL EXTENDED MEDIASTINAL LYMPHADENECTOMY  5/24/2022    Procedure: ;  Surgeon: Star Narvaez MD;  [facility]: [facility] OR      No Known  Allergies   Social History     Tobacco Use     Smoking status: Never     Smokeless tobacco: Never   Substance Use Topics     Alcohol use: No      Wt Readings from Last 1 Encounters:   10/05/22 108.8 kg (239 lb 14.4 oz)        Anesthesia Evaluation   Pt has had prior anesthetic. Type: General.    History of anesthetic complications  - difficult airway.      ROS/MED HX  ENT/Pulmonary: Comment: O2 dependent post radiation    (+) sleep apnea, uses CPAP,  (-) tobacco use, asthma and COPD   Neurologic:       Cardiovascular:     (+) Dyslipidemia hypertension-----Previous cardiac testing   Echo: Date:  Results:  [facility],[place]  Echocardiography Laboratory  [address] [place] [address]     Name: EZIO PADILLA  MRN: [id number]  : 1950  Study Date: 2022 08:47 AM  Age: 72 yrs  Gender: Male  Patient Location: [facility]  Reason For Study: Diffuse large B-cell lymphoma of lymph nodes of multiple  regions  Ordering Physician: KIMBERLY FLOWERS  Referring Physician: KIMBERLY FLOWERS  Performed By: Almita Frias     BSA: 2.1 m2  Height: 68 in  Weight: 220 lb  BP: 132/78 mmHg  ______________________________________________________________________________  Procedure  Echocardiogram with two-dimensional, color and spectral Doppler performed.  Poor quality two-dimensional was performed and interpreted. Technically  difficult study.Extremely poor acoustic windows.  ______________________________________________________________________________  Interpretation Summary  Technically difficult study due to extremely poor acoustic windows. Limited  information is obtained.  Left ventricular function appears normal. The visually estimated ejection  fraction is 55-60%.  The right ventricle is normal size and global right ventricular function  appears normal.  No pericardial effusion.  Unable to assess cardiac valves.     This study was compared with the study from  8/24/2020. No significant changes  noted.  ______________________________________________________________________________  Left Ventricle  Left ventricular size is normal. Left ventricular function is normal.The  ejection fraction is 55-60%. No regional wall motion abnormalities are seen,  however, limited and nonstandard views prevent complete evaluation.     Right Ventricle  The right ventricle is normal size. Global right ventricular function is  normal.     Atria  The atria cannot be assessed.     Mitral Valve  The valve leaflets are not well visualized. On Doppler interrogation, there is  no significant stenosis or regurgitation.     Aortic Valve  The aortic valve cannot be assessed.     Tricuspid Valve  The valve leaflets are not well visualized. On Doppler interrogation, there is  no significant stenosis or regurgitation. The peak velocity of the tricuspid  regurgitant jet is not obtainable. Pulmonary artery systolic pressure cannot  be assessed.     Pulmonic Valve  The pulmonic valve cannot be assessed.     Vessels  The aorta root cannot be assessed. The inferior vena cava cannot be assessed.     Pericardium  No pericardial effusion is present.     Compared to Previous Study  This study was compared with the study from 8/24/2020 . No significant changes  noted.  Stress Test: Date: Results:    ECG Reviewed: Date: Results:    Cath: Date: Results:   (-) CAD and CHF   METS/Exercise Tolerance: 4 - Raking leaves, gardening    Hematologic:     (+) History of blood clots, pt is not anticoagulated,     Musculoskeletal:       GI/Hepatic:     (+) liver disease,  (-) GERD   Renal/Genitourinary:    (-) renal disease   Endo:     (+) type II DM, Using insulin, Chronic steroid usage for Obesity,     Psychiatric/Substance Use:       Infectious Disease:       Malignancy:   (+) Malignancy, History of Lymphoma/Leukemia.Lymph CA Remission status post Chemo.        Other:            Physical Exam    Airway        Mallampati: II    TM distance: > 3 FB   Neck ROM: full   Mouth opening: > 3 cm    Respiratory Devices and Support         Dental  no notable dental history         Cardiovascular   cardiovascular exam normal          Pulmonary   pulmonary exam normal                OUTSIDE LABS:  CBC:   Lab Results   Component Value Date    WBC 6.9 10/05/2022    WBC 7.6 06/07/2022    HGB 16.2 10/05/2022    HGB 14.2 06/07/2022    HCT 51.2 10/05/2022    HCT 44.0 06/07/2022     (L) 10/05/2022     06/07/2022     BMP:   Lab Results   Component Value Date     06/07/2022     05/12/2022    POTASSIUM 3.9 06/07/2022    POTASSIUM 4.2 05/24/2022    CHLORIDE 103 06/07/2022    CHLORIDE 100 05/12/2022    CO2 28 06/07/2022    CO2 30 05/12/2022    BUN 8 06/07/2022    BUN 12 05/12/2022    CR 0.8 10/05/2022    CR 0.63 (L) 06/07/2022     (H) 06/07/2022    GLC 89 05/24/2022     COAGS:   Lab Results   Component Value Date    PTT 36 10/28/2020    INR 1.16 (H) 10/28/2020    FIBR 142 (L) 10/28/2020     POC:   Lab Results   Component Value Date     (H) 06/19/2021     HEPATIC:   Lab Results   Component Value Date    ALBUMIN 3.3 (L) 06/07/2022    PROTTOTAL 6.5 (L) 06/07/2022    ALT 34 06/07/2022    AST 22 06/07/2022    ALKPHOS 84 06/07/2022    BILITOTAL 0.5 06/07/2022    ENEIDA 38 06/07/2020     OTHER:   Lab Results   Component Value Date    LACT 1.9 10/08/2020    A1C 7.7 (H) 10/09/2020    JEFF 9.0 06/07/2022    PHOS 2.5 03/23/2022    MAG 1.8 03/23/2022    CRP 7.5 04/07/2022       Anesthesia Plan    ASA Status:  3   NPO Status:  NPO Appropriate    Anesthesia Type: MAC.     - Reason for MAC: chronic cardiopulmonary disease, straight local not clinically adequate              Consents    Anesthesia Plan(s) and associated risks, benefits, and realistic alternatives discussed. Questions answered and patient/representative(s) expressed understanding.    - Discussed:     - Discussed with:  Patient      - Extended Intubation/Ventilatory Support  Discussed: No.      - Patient is DNR/DNI Status: No    Use of blood products discussed: No .     Postoperative Care    Pain management: IV analgesics.   PONV prophylaxis: Ondansetron (or other 5HT-3)     Comments:    Other Comments: Glidescope and bougie in room in case of emergency            Cresencio Bustos MD

## 2022-11-29 NOTE — ANESTHESIA CARE TRANSFER NOTE
Patient: Pastor Garibay    Procedure: Procedure(s):  PORT REMOVAL       Diagnosis: Large cell lymphoma (H) [C85.80]  Diagnosis Additional Information: No value filed.    Anesthesia Type:   MAC     Note:    Oropharynx: oropharynx clear of all foreign objects  Level of Consciousness: awake  Oxygen Supplementation: face mask  Level of Supplemental Oxygen (L/min / FiO2): 8  Independent Airway: airway patency satisfactory and stable  Dentition: dentition unchanged  Vital Signs Stable: post-procedure vital signs reviewed and stable  Report to RN Given: handoff report given  Patient transferred to: PACU    Handoff Report: Identifed the Patient, Identified the Reponsible Provider, Reviewed the pertinent medical history, Discussed the surgical course, Reviewed Intra-OP anesthesia mangement and issues during anesthesia, Set expectations for post-procedure period and Allowed opportunity for questions and acknowledgement of understanding      Vitals:  Vitals Value Taken Time   BP     Temp     Pulse     Resp     SpO2         Electronically Signed By: VERNA Patel CRNA  November 29, 2022  7:44 AM

## 2022-11-29 NOTE — ANESTHESIA POSTPROCEDURE EVALUATION
Patient: Pastro Garibay    Procedure: Procedure(s):  PORT REMOVAL       Anesthesia Type:  MAC    Note:  Disposition: Outpatient   Postop Pain Control: Uneventful            Sign Out: Well controlled pain   PONV: No   Neuro/Psych: Uneventful            Sign Out: Acceptable/Baseline neuro status   Airway/Respiratory: Uneventful            Sign Out: Acceptable/Baseline resp. status   CV/Hemodynamics: Uneventful            Sign Out: Acceptable CV status; No obvious hypovolemia; No obvious fluid overload   Other NRE: NONE   DID A NON-ROUTINE EVENT OCCUR? No           Last vitals:  Vitals Value Taken Time   /69 11/29/22 0749   Temp 36.7  C (98  F) 11/29/22 0745   Pulse 89 11/29/22 0751   Resp 10 11/29/22 0751   SpO2 93 % 11/29/22 0751   Vitals shown include unvalidated device data.    Electronically Signed By: Cresencio Bustos MD  November 29, 2022  12:52 PM

## 2022-12-04 ENCOUNTER — HEALTH MAINTENANCE LETTER (OUTPATIENT)
Age: 72
End: 2022-12-04

## 2022-12-12 NOTE — DISCHARGE INSTRUCTIONS
[address], APRN-CNP  [address]  Phone:  [phone]  Fax:  [phone]  Fredna Barthel is a 17 y.o. male who presents today for his medical conditions/complaints as noted below. Fredna Barthel c/o of Abdominal Pain (Pt presents to walk in with complaints of a stomach ache for the past few days. Pt says the pain is on both sides of his belly button)      HPI:     Abdominal Pain  This is a new problem. The current episode started in the past 7 days (2 days, Saturday, 12/10). Associated symptoms include nausea. Pertinent negatives include no constipation, diarrhea, fever, headaches, myalgias, sore throat or vomiting. Wt Readings from Last 3 Encounters:   22 90 lb (40.8 kg) (22 %, Z= -0.77)*   22 90 lb 12.8 oz (41.2 kg) (24 %, Z= -0.69)*   10/13/22 90 lb 12.8 oz (41.2 kg) (27 %, Z= -0.61)*     * Growth percentiles are based on CDC (Boys, 2-20 Years) data. Temp Readings from Last 3 Encounters:   22 98.1 °F (36.7 °C) (Tympanic)   22 100.8 °F (38.2 °C) (Tympanic)   10/13/22 98.6 °F (37 °C)       BP Readings from Last 3 Encounters:   22 98/70 (36 %, Z = -0.36 /  82 %, Z = 0.92)*   22 102/60 (53 %, Z = 0.08 /  49 %, Z = -0.03)*   10/13/22 112/72 (86 %, Z = 1.08 /  86 %, Z = 1.08)*     *BP percentiles are based on the 2017 AAP Clinical Practice Guideline for boys       Pulse Readings from Last 3 Encounters:   22 79   22 111   10/13/22 88        SpO2 Readings from Last 3 Encounters:   22 98%   22 99%   22 98%             Past Medical History:   Diagnosis Date    Cough 2013    dr Vinny Son  chronic cough no rx normal sleep study     jaundice       History reviewed. No pertinent surgical history.   Family History   Problem Relation Age of Onset    Allergies Mother     Allergies Father         bees    Allergies Brother     Asthma Brother     Hypertension Maternal Autologous HPC/MNC Collection:   In most cases, the cell dose report will be available tomorrow morning.   The Bone Marrow Transplant (BMT) clinic staff looks at your report, and a decision is made if you will need another collection.   Remember it is important to follow a low fat diet during the collection process. Sometimes following the procedure, your blood platelet count may be low.  If you are told your platelet count is low, you need to avoid taking aspirin/aspirin containing products and avoid heavy physical activity and activities that may result in bruising or traumatic injury.  To contact the BMT fellow or attending physician after 5 p.m. call [phone].       Grandmother     Cataracts Maternal Grandmother     Heart Disease Maternal Grandfather         46s    Diabetes Maternal Grandfather         46s    Diabetes Paternal Grandmother     Cancer Other     Cancer Other     Glaucoma Neg Hx      Social History     Tobacco Use    Smoking status: Passive Smoke Exposure - Never Smoker    Smokeless tobacco: Never    Tobacco comments:     father outside   Substance Use Topics    Alcohol use: No     Alcohol/week: 0.0 standard drinks      Current Outpatient Medications   Medication Sig Dispense Refill    ondansetron (ZOFRAN-ODT) 4 MG disintegrating tablet Take 1 tablet by mouth 3 times daily as needed for Nausea or Vomiting 15 tablet 0    polyethylene glycol (GLYCOLAX) 17 GM/SCOOP powder Take 16 g by mouth daily as needed (abdominal pain or constipation) 510 g 0    montelukast (SINGULAIR) 10 MG tablet Take 1 tablet by mouth nightly 30 tablet 5    albuterol sulfate HFA (PROVENTIL HFA) 108 (90 Base) MCG/ACT inhaler Inhale 2 puffs into the lungs every 6 hours as needed for Wheezing (Patient not taking: No sig reported) 1 each 0     Current Facility-Administered Medications   Medication Dose Route Frequency Provider Last Rate Last Admin    ibuprofen (ADVIL;MOTRIN) 100 MG/5ML suspension 400 mg  400 mg Oral Q6H PRN JONNY Arreola NP         Allergies   Allergen Reactions    Peanut-Containing Drug Products      Vomits when eats peanuts but tolerates peanut butter, no respiratory concerns    Seasonal        No results found. Subjective:      Review of Systems   Constitutional:  Negative for chills, diaphoresis, fatigue and fever. HENT:  Positive for rhinorrhea. Negative for congestion and sore throat. Respiratory:  Negative for cough. Gastrointestinal:  Positive for abdominal pain and nausea. Negative for constipation, diarrhea and vomiting. Musculoskeletal:  Negative for myalgias. Neurological:  Negative for headaches.      Objective:     BP 98/70 (Site: Right Upper Arm, Position: Sitting, Cuff Size: Child)   Pulse 79   Temp 98.1 °F (36.7 °C) (Tympanic)   Resp 22   Ht (!) 4' 8\" (1.422 m)   Wt 90 lb (40.8 kg)   SpO2 98%   BMI 20.18 kg/m²     Physical Exam  Constitutional:       General: He is not in acute distress. Appearance: He is well-developed. He is not ill-appearing, toxic-appearing or diaphoretic. HENT:      Head: Normocephalic. Right Ear: Tympanic membrane, ear canal and external ear normal.      Left Ear: Tympanic membrane, ear canal and external ear normal.      Nose: Nose normal. No mucosal edema, congestion or rhinorrhea. Mouth/Throat:      Mouth: Mucous membranes are moist. Mucous membranes are not pale and not dry. Pharynx: Oropharynx is clear. Eyes:      General: Lids are normal. No scleral icterus. Right eye: No discharge. Left eye: No discharge. Extraocular Movements:      Right eye: No nystagmus. Left eye: No nystagmus. Conjunctiva/sclera: Conjunctivae normal.   Neck:      Trachea: Trachea normal.   Cardiovascular:      Rate and Rhythm: Normal rate and regular rhythm. Heart sounds: Normal heart sounds. Pulmonary:      Effort: Pulmonary effort is normal. No accessory muscle usage or respiratory distress. Breath sounds: Normal breath sounds. Abdominal:      General: Abdomen is flat. Bowel sounds are decreased. Palpations: Abdomen is soft. Tenderness: There is abdominal tenderness in the periumbilical area. Musculoskeletal:         General: Normal range of motion. Cervical back: Full passive range of motion without pain and normal range of motion. Skin:     General: Skin is warm and dry. Capillary Refill: Capillary refill takes less than 2 seconds. Coloration: Skin is not pale. Neurological:      Mental Status: He is alert and oriented to person, place, and time.    Psychiatric:         Mood and Affect: Mood normal.         Speech: Speech normal. Behavior: Behavior normal.         Thought Content: Thought content normal.         Judgment: Judgment normal.       Assessment:      Diagnosis Orders   1. Constipation, unspecified constipation type  polyethylene glycol (GLYCOLAX) 17 GM/SCOOP powder      2. Nausea  ondansetron (ZOFRAN-ODT) 4 MG disintegrating tablet        No results found for this visit on 12/12/22. Plan:       Zofran as directed for nausea. Miralax daily for abdominal pain or constipation. Note for school today. Follow up with primary care provider in 1 to 2 days if needed. Patient Instructions   Zofran as directed for nausea. Miralax daily for abdominal pain or constipation. Note for school today. Follow up with primary care provider in 1 to 2 days if needed. Patient/Caregiver instructed on use, benefit, and side effects of prescribed medications. All patient/parent/caregiver questions answered. Patient/parent/caregiver voiced understanding. Reviewed health maintenance. Instructed to continue current medications, diet and exercise. Patient agreed with treatment plan. Follow up as directed.            Electronically signed by JONNY Tierney NP on12/12/2022

## 2023-01-04 DIAGNOSIS — C83.30 DIFFUSE LARGE B-CELL LYMPHOMA, UNSPECIFIED BODY REGION (H): ICD-10-CM

## 2023-01-04 RX ORDER — ACYCLOVIR 400 MG/1
400 TABLET ORAL 2 TIMES DAILY
Qty: 60 TABLET | Refills: 1 | Status: SHIPPED | OUTPATIENT
Start: 2023-01-04 | End: 2023-02-15

## 2023-01-14 ENCOUNTER — APPOINTMENT (OUTPATIENT)
Dept: MRI IMAGING | Facility: CLINIC | Age: 73
End: 2023-01-14
Attending: EMERGENCY MEDICINE
Payer: COMMERCIAL

## 2023-01-14 ENCOUNTER — HOSPITAL ENCOUNTER (EMERGENCY)
Facility: CLINIC | Age: 73
Discharge: HOME | End: 2023-01-14
Attending: EMERGENCY MEDICINE | Admitting: EMERGENCY MEDICINE
Payer: COMMERCIAL

## 2023-01-14 VITALS
OXYGEN SATURATION: 94 % | RESPIRATION RATE: 25 BRPM | DIASTOLIC BLOOD PRESSURE: 68 MMHG | HEART RATE: 96 BPM | TEMPERATURE: 97.5 F | SYSTOLIC BLOOD PRESSURE: 94 MMHG

## 2023-01-14 DIAGNOSIS — E16.2 HYPOGLYCEMIA: ICD-10-CM

## 2023-01-14 DIAGNOSIS — R47.1 DYSARTHRIA: ICD-10-CM

## 2023-01-14 LAB
ALBUMIN SERPL-MCNC: 3.2 G/DL (ref 3.4–5)
ALBUMIN UR-MCNC: 20 MG/DL
ALP SERPL-CCNC: 93 U/L (ref 40–150)
ALT SERPL W P-5'-P-CCNC: 36 U/L (ref 0–70)
ANION GAP SERPL CALCULATED.3IONS-SCNC: 9 MMOL/L (ref 3–14)
APPEARANCE UR: CLEAR
AST SERPL W P-5'-P-CCNC: 26 U/L (ref 0–45)
ATRIAL RATE - MUSE: 98 BPM
BASOPHILS # BLD AUTO: 0 10E3/UL (ref 0–0.2)
BASOPHILS NFR BLD AUTO: 0 %
BILIRUB SERPL-MCNC: 1.1 MG/DL (ref 0.2–1.3)
BILIRUB UR QL STRIP: NEGATIVE
BUN SERPL-MCNC: 18 MG/DL (ref 7–30)
CALCIUM SERPL-MCNC: 8.9 MG/DL (ref 8.5–10.1)
CHLORIDE BLD-SCNC: 96 MMOL/L (ref 94–109)
CO2 SERPL-SCNC: 30 MMOL/L (ref 20–32)
COLOR UR AUTO: YELLOW
CREAT SERPL-MCNC: 0.8 MG/DL (ref 0.66–1.25)
DIASTOLIC BLOOD PRESSURE - MUSE: NORMAL MMHG
EOSINOPHIL # BLD AUTO: 0 10E3/UL (ref 0–0.7)
EOSINOPHIL NFR BLD AUTO: 0 %
ERYTHROCYTE [DISTWIDTH] IN BLOOD BY AUTOMATED COUNT: 16 % (ref 10–15)
FLUAV RNA SPEC QL NAA+PROBE: NEGATIVE
FLUBV RNA RESP QL NAA+PROBE: NEGATIVE
GFR SERPL CREATININE-BSD FRML MDRD: >90 ML/MIN/1.73M2
GLUCOSE BLD-MCNC: 292 MG/DL (ref 70–99)
GLUCOSE BLDC GLUCOMTR-MCNC: 203 MG/DL (ref 70–99)
GLUCOSE BLDC GLUCOMTR-MCNC: 249 MG/DL (ref 70–99)
GLUCOSE BLDC GLUCOMTR-MCNC: 259 MG/DL (ref 70–99)
GLUCOSE UR STRIP-MCNC: 500 MG/DL
GRANULAR CAST: 1 /LPF
HCT VFR BLD AUTO: 55.5 % (ref 40–53)
HGB BLD-MCNC: 17.4 G/DL (ref 13.3–17.7)
HGB UR QL STRIP: NEGATIVE
HYALINE CASTS: 8 /LPF
IMM GRANULOCYTES # BLD: 0.1 10E3/UL
IMM GRANULOCYTES NFR BLD: 1 %
INTERPRETATION ECG - MUSE: NORMAL
KETONES UR STRIP-MCNC: ABNORMAL MG/DL
LEUKOCYTE ESTERASE UR QL STRIP: NEGATIVE
LYMPHOCYTES # BLD AUTO: 0.6 10E3/UL (ref 0.8–5.3)
LYMPHOCYTES NFR BLD AUTO: 7 %
MCH RBC QN AUTO: 29.6 PG (ref 26.5–33)
MCHC RBC AUTO-ENTMCNC: 31.4 G/DL (ref 31.5–36.5)
MCV RBC AUTO: 94 FL (ref 78–100)
MONOCYTES # BLD AUTO: 0.8 10E3/UL (ref 0–1.3)
MONOCYTES NFR BLD AUTO: 9 %
MUCOUS THREADS #/AREA URNS LPF: PRESENT /LPF
NEUTROPHILS # BLD AUTO: 7.7 10E3/UL (ref 1.6–8.3)
NEUTROPHILS NFR BLD AUTO: 83 %
NITRATE UR QL: NEGATIVE
NRBC # BLD AUTO: 0 10E3/UL
NRBC BLD AUTO-RTO: 0 /100
P AXIS - MUSE: 54 DEGREES
PH UR STRIP: 5 [PH] (ref 5–7)
PLATELET # BLD AUTO: 136 10E3/UL (ref 150–450)
POTASSIUM BLD-SCNC: 4.2 MMOL/L (ref 3.4–5.3)
PR INTERVAL - MUSE: 184 MS
PROT SERPL-MCNC: 6.8 G/DL (ref 6.8–8.8)
QRS DURATION - MUSE: 82 MS
QT - MUSE: 388 MS
QTC - MUSE: 495 MS
R AXIS - MUSE: 63 DEGREES
RBC # BLD AUTO: 5.88 10E6/UL (ref 4.4–5.9)
RBC URINE: <1 /HPF
RSV RNA SPEC NAA+PROBE: NEGATIVE
SARS-COV-2 RNA RESP QL NAA+PROBE: NEGATIVE
SODIUM SERPL-SCNC: 135 MMOL/L (ref 133–144)
SP GR UR STRIP: 1.02 (ref 1–1.03)
SYSTOLIC BLOOD PRESSURE - MUSE: NORMAL MMHG
T AXIS - MUSE: 54 DEGREES
TROPONIN I SERPL HS-MCNC: 10 NG/L
UROBILINOGEN UR STRIP-MCNC: NORMAL MG/DL
VENTRICULAR RATE- MUSE: 98 BPM
WBC # BLD AUTO: 9.2 10E3/UL (ref 4–11)
WBC URINE: 4 /HPF

## 2023-01-14 PROCEDURE — 255N000002 HC RX 255 OP 636: Performed by: EMERGENCY MEDICINE

## 2023-01-14 PROCEDURE — 82962 GLUCOSE BLOOD TEST: CPT

## 2023-01-14 PROCEDURE — 36415 COLL VENOUS BLD VENIPUNCTURE: CPT | Performed by: EMERGENCY MEDICINE

## 2023-01-14 PROCEDURE — 70549 MR ANGIOGRAPH NECK W/O&W/DYE: CPT

## 2023-01-14 PROCEDURE — 80053 COMPREHEN METABOLIC PANEL: CPT | Performed by: EMERGENCY MEDICINE

## 2023-01-14 PROCEDURE — 99285 EMERGENCY DEPT VISIT HI MDM: CPT | Mod: 25,CS

## 2023-01-14 PROCEDURE — 70553 MRI BRAIN STEM W/O & W/DYE: CPT

## 2023-01-14 PROCEDURE — 84484 ASSAY OF TROPONIN QUANT: CPT | Performed by: EMERGENCY MEDICINE

## 2023-01-14 PROCEDURE — 81001 URINALYSIS AUTO W/SCOPE: CPT | Performed by: EMERGENCY MEDICINE

## 2023-01-14 PROCEDURE — 87637 SARSCOV2&INF A&B&RSV AMP PRB: CPT | Performed by: EMERGENCY MEDICINE

## 2023-01-14 PROCEDURE — 70544 MR ANGIOGRAPHY HEAD W/O DYE: CPT

## 2023-01-14 PROCEDURE — 85004 AUTOMATED DIFF WBC COUNT: CPT | Performed by: EMERGENCY MEDICINE

## 2023-01-14 PROCEDURE — 93005 ELECTROCARDIOGRAM TRACING: CPT

## 2023-01-14 PROCEDURE — A9585 GADOBUTROL INJECTION: HCPCS | Performed by: EMERGENCY MEDICINE

## 2023-01-14 PROCEDURE — C9803 HOPD COVID-19 SPEC COLLECT: HCPCS

## 2023-01-14 RX ORDER — GADOBUTROL 604.72 MG/ML
10 INJECTION INTRAVENOUS ONCE
Status: COMPLETED | OUTPATIENT
Start: 2023-01-14 | End: 2023-01-14

## 2023-01-14 RX ADMIN — GADOBUTROL 10 ML: 604.72 INJECTION INTRAVENOUS at 10:14

## 2023-01-14 ASSESSMENT — ENCOUNTER SYMPTOMS
CONFUSION: 1
NUMBNESS: 0
SPEECH DIFFICULTY: 1
DIZZINESS: 1
DIAPHORESIS: 1
VOMITING: 0
DIARRHEA: 0
DYSURIA: 0
ABDOMINAL PAIN: 0
CONSTIPATION: 1

## 2023-01-14 ASSESSMENT — ACTIVITIES OF DAILY LIVING (ADL)
ADLS_ACUITY_SCORE: 35
ADLS_ACUITY_SCORE: 35

## 2023-01-14 NOTE — ED PROVIDER NOTES
History     Chief Complaint:  Hypoglycemia       The history is provided by the patient and a relative.      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 The patient states that he ate chow mien last night for dinner, then gave himself extra insulin via the insulin pump on his leg. He then woke up this morning disoriented, diaphoretic and dizzy. He was also thrashing because his CPAP got removed in the night. At that time his continuous meter read that his blood sugar was 40, so he quickly ate a candy bar, part of a sandwich and drank some orange juice. His daughter states that he called her and had very slurred speech, which has remained intermittent since. The patient experienced a hypoglycemic episode similar to this once before, however with that episode his slurred speech immediately resolved when his sugars came up, and with this episode it did not immediately resolve. The patient believes that his slurred speech has mostly resolved now, but he is still somewhat dizzy and complains of tightness in his calves. He denies ever experiencing any numbness, but does have neuropathy in his left leg. He has been somewhat constipated lately. He had a cold over serenity, but has felt better throughout the past week, and he denies chest pain, abdominal pain, vomiting, diarrhea, dysuria, or noticing any rash. He was not given any medications by EMS and did not take any of his mediations this morning. Lastly, he mentions that he had his port removed recently which he had for his history of lymphoma. He has had four episodes of lymphoma, and the most recent was just over two years ago.    Independent Historian: yes, assisted by daughter      Review of External Notes:    ROS:  Review of Systems   Constitutional: Positive for diaphoresis.   Cardiovascular: Negative for chest pain.   Gastrointestinal: Positive for constipation.  Negative for abdominal pain, diarrhea and vomiting.   Genitourinary: Negative for dysuria.   Skin: Negative for rash.   Neurological: Positive for dizziness and speech difficulty (resolved). Negative for numbness.   Psychiatric/Behavioral: Positive for confusion (resolved).   All other systems reviewed and are negative.        Allergies:  No Known Allergies     Medications:    Acyclovir   Dulcolax  Colace  Cortef  Novolog  Melatonin  Metformin  Nitroglycerin  Rosuvastatin  Senna-docusate    Past Medical History:    Anemia  CPAP dependence  Diabetes  Hard to intubate  Basal cell skin cancer  Hyperlipidemia  Hypertension  Keratoderma  Large cell lymphoma  Liver disease  Non-Hodgkin's lymphoma   Pedal edema  Pleural effusion  Seborrheic keratosis, inflamed  Sleep apnea  Syncope and collapse  Thrombocytopenia   Thrombosis  Diabetic retinopathy   Osteomyelitis     Past Surgical History:    Amputate toes x2  Biopsy lymph node cervical   Bronchoscopy flexible   Bronchoscopy with transendoscopic endobronchial US guided   Colonoscopy x2  Endovascular placement vascular device   Tonsillectomy  Excise node mediastinal   Vitrectomy  Insert port vascular access x2  Remove port vascular access x2  Phacoemulsification clear cornea with standard IOL implant  Soft tissue surgery for basal cell carcinoma of forehead   Thoracotomy    Social History:  Presents to the ED with his daughter and son in law  Presents via EMS  PCP: [facility] [address] Family     Physical Exam     Patient Vitals for the past 24 hrs:   BP Temp Temp src Pulse Resp SpO2   01/14/23 1016 -- -- -- 96 25 --   01/14/23 1012 94/68 -- -- 95 24 --   01/14/23 0830 106/73 -- -- 93 18 94 %   01/14/23 0815 111/76 -- -- -- -- --   01/14/23 0758 -- -- -- -- 18 --   01/14/23 0744 112/67 97.5  F (36.4  C) Temporal 102 -- 96 %        Physical Exam  Constitutional: Well appearing.  HEENT: Atraumatic.  PERRL.  EOMI.  Moist mucous membranes.  Neck: Soft.  Supple.   Cardiac:  Regular rate and rhythm.  No murmur or rub.  Respiratory: Clear to auscultation bilaterally.  No respiratory distress.  No wheezing, rhonchi, or rales.  Abdomen: Soft and nontender.  Nondistended.  Musculoskeletal: No edema.  Normal range of motion.  Neurologic: Alert and oriented x3.  Normal tone and bulk.  No facial drooping. Normal speech.  5/5 strength in bilateral upper and lower extremities.  Sensation to light touch intact throughout.  Normal gait.  Skin: No rashes.  No edema.  Psych: Normal affect.  Normal behavior.      Emergency Department Course   ECG:  ECG taken at 0801, ECG read at 0809  Normal sinus rhythm. Prolonged QT. Abnormal ECG.   No significant change as compared to prior, dated 06/19/21  Rate 98 bpm. LA interval 184. QRS duration 82. QT/QTc 388/495. P-R-T axes 54 63 54.    Imaging:  MRA Angiogram Neck w/o & w Contrast   Final Result   CONCLUSION:   HEAD MRI:    1.  No acute/subacute infarction, intracranial hemorrhage, mass effect, hydrocephalus, or abnormal enhancement.   2.  Mild to moderate global brain parenchymal volume loss.   3.  Presumed sequelae of minor chronic small vessel ischemic disease, though within normal limits for patient age.   4.  Mild to moderate diffuse paranasal sinus mucosal thickening.      HEAD MRA:    1.  Normal Head MRA.   2.  No aneurysm, high flow AVM or significant stenosis identified.      NECK MRA:   1.  No significant stenosis in the neck vessels based on NASCET criteria.   2.  No evidence for dissection or pseudoaneurysm.   3.  Grossly stable volume loss and consolidation in the visualized right upper lobe with associated rightward tracheal and mediastinal deviation.      MR Head w/o Contrast Angiogram   Final Result   CONCLUSION:   HEAD MRI:    1.  No acute/subacute infarction, intracranial hemorrhage, mass effect, hydrocephalus, or abnormal enhancement.   2.  Mild to moderate global brain parenchymal volume loss.   3.  Presumed sequelae of minor chronic  small vessel ischemic disease, though within normal limits for patient age.   4.  Mild to moderate diffuse paranasal sinus mucosal thickening.      HEAD MRA:    1.  Normal Head MRA.   2.  No aneurysm, high flow AVM or significant stenosis identified.      NECK MRA:   1.  No significant stenosis in the neck vessels based on NASCET criteria.   2.  No evidence for dissection or pseudoaneurysm.   3.  Grossly stable volume loss and consolidation in the visualized right upper lobe with associated rightward tracheal and mediastinal deviation.      MR Brain w/o & w Contrast   Final Result   CONCLUSION:   HEAD MRI:    1.  No acute/subacute infarction, intracranial hemorrhage, mass effect, hydrocephalus, or abnormal enhancement.   2.  Mild to moderate global brain parenchymal volume loss.   3.  Presumed sequelae of minor chronic small vessel ischemic disease, though within normal limits for patient age.   4.  Mild to moderate diffuse paranasal sinus mucosal thickening.      HEAD MRA:    1.  Normal Head MRA.   2.  No aneurysm, high flow AVM or significant stenosis identified.      NECK MRA:   1.  No significant stenosis in the neck vessels based on NASCET criteria.   2.  No evidence for dissection or pseudoaneurysm.   3.  Grossly stable volume loss and consolidation in the visualized right upper lobe with associated rightward tracheal and mediastinal deviation.         Report per radiology    Laboratory:  Labs Ordered and Resulted from Time of ED Arrival to Time of ED Departure   COMPREHENSIVE METABOLIC PANEL - Abnormal       Result Value    Sodium 135      Potassium 4.2      Chloride 96      Carbon Dioxide (CO2) 30      Anion Gap 9      Urea Nitrogen 18      Creatinine 0.80      Calcium 8.9      Glucose 292 (*)     Alkaline Phosphatase 93      AST 26      ALT 36      Protein Total 6.8      Albumin 3.2 (*)     Bilirubin Total 1.1      GFR Estimate >90     ROUTINE UA WITH MICROSCOPIC REFLEX TO CULTURE - Abnormal    Color Urine  Yellow      Appearance Urine Clear      Glucose Urine 500 (*)     Bilirubin Urine Negative      Ketones Urine Trace (*)     Specific Gravity Urine 1.025      Blood Urine Negative      pH Urine 5.0      Protein Albumin Urine 20 (*)     Urobilinogen Urine Normal      Nitrite Urine Negative      Leukocyte Esterase Urine Negative      Mucus Urine Present (*)     RBC Urine <1      WBC Urine 4      Hyaline Casts Urine 8 (*)     Granular Casts Urine 1 (*)    GLUCOSE BY METER - Abnormal    GLUCOSE BY METER POCT 203 (*)    CBC WITH PLATELETS AND DIFFERENTIAL - Abnormal    WBC Count 9.2      RBC Count 5.88      Hemoglobin 17.4      Hematocrit 55.5 (*)     MCV 94      MCH 29.6      MCHC 31.4 (*)     RDW 16.0 (*)     Platelet Count 136 (*)     % Neutrophils 83      % Lymphocytes 7      % Monocytes 9      % Eosinophils 0      % Basophils 0      % Immature Granulocytes 1      NRBCs per 100 WBC 0      Absolute Neutrophils 7.7      Absolute Lymphocytes 0.6 (*)     Absolute Monocytes 0.8      Absolute Eosinophils 0.0      Absolute Basophils 0.0      Absolute Immature Granulocytes 0.1      Absolute NRBCs 0.0     GLUCOSE BY METER - Abnormal    GLUCOSE BY METER POCT 259 (*)    GLUCOSE BY METER - Abnormal    GLUCOSE BY METER POCT 249 (*)    TROPONIN I - Normal    Troponin I High Sensitivity 10     INFLUENZA A/B & SARS-COV2 PCR MULTIPLEX - Normal    Influenza A PCR Negative      Influenza B PCR Negative      RSV PCR Negative      SARS CoV2 PCR Negative        Emergency Department Course & Assessments:       Independent Interpretation (X-rays, CTs, rhythm strip):       Consultations/Discussion of Management or Tests:  0742 I obtained history and examined the patient as noted above.  1130 Patient rechecked and updated.        Social Determinants of Health affecting care:      Disposition:  The patient was discharged to home.     Impression & Plan      Medical Decision Making:  .Name is a 9-year-old man who is afebrile and hemodynamically  stable.  His sugar is already improved up to the 200s.  His episode was likely explained by hypoglycemia and overcorrection bolus insulin last night.  He is concerned he is never had slurred speech last this long so we did obtain an MRI of the brain to rule out a CVA and thankfully this was unremarkable for acute abnormalities.  Discussed current findings.  Lab work-up as noted as above is grossly unrevealing.  No evidence of infection.  His blood sugar remained stable during his visit required no further IV or oral glucose.  We discussed likely overcorrection he will keep a close eye on his insulin tonight and he feels very comfortable discharging home.  His indication for admission to the hospital.  We discussed the need to follow closely with his primary care physician risk of supportive care and strict return precautions were given.  His questions were answered and he was in no distress at time of discharge.    Diagnosis:    ICD-10-CM    1. Hypoglycemia  E16.2       2. Dysarthria  R47.1          Scribe Disclosure:  Debbie MCCALL, am serving as a scribe at 7:40 AM on 1/14/2023 to document services personally performed by Arvind Olivo MD based on my observations and the provider's statements to me.     1/14/2023   Arvind Olivo MD Salay, Nicholas J, MD  01/19/23 2014

## 2023-01-14 NOTE — ED NOTES
Bed: ED10  Expected date:   Expected time:   Means of arrival:   Comments:  Mandi 512 72 M low  sugar ETA 7020

## 2023-01-14 NOTE — ED TRIAGE NOTES
Pt brought in by EMS pt states his BG was 40 this am c/o dizzy and sweating.. Ate a sandwich. Still felt dizzy and called 911.  for EMS. Family at bedside.      Triage Assessment     Row Name 01/14/23 0803       Triage Assessment (Adult)    Airway WDL WDL       Respiratory WDL    Respiratory WDL WDL       Skin Circulation/Temperature WDL    Skin Circulation/Temperature WDL WDL       Cardiac WDL    Cardiac WDL WDL       Peripheral/Neurovascular WDL    Peripheral Neurovascular WDL WDL       Cognitive/Neuro/Behavioral WDL    Cognitive/Neuro/Behavioral WDL WDL    Row Name 01/14/23 0745       Triage Assessment (Adult)    Airway WDL WDL

## 2023-01-26 ENCOUNTER — ALLIED HEALTH/NURSE VISIT (OUTPATIENT)
Dept: TRANSPLANT | Facility: CLINIC | Age: 73
End: 2023-01-26
Payer: COMMERCIAL

## 2023-01-26 DIAGNOSIS — C83.30 DIFFUSE LARGE B-CELL LYMPHOMA, UNSPECIFIED BODY REGION (H): Primary | ICD-10-CM

## 2023-01-26 PROCEDURE — 999N000104 HC STATISTIC NO CHARGE

## 2023-01-26 PROCEDURE — 90471 IMMUNIZATION ADMIN: CPT

## 2023-01-26 PROCEDURE — 90750 HZV VACC RECOMBINANT IM: CPT

## 2023-01-26 PROCEDURE — 250N000021 HC RX MED A9270 GY (STAT IND- M) 250

## 2023-01-26 RX ORDER — MEPERIDINE HYDROCHLORIDE 25 MG/ML
25 INJECTION INTRAMUSCULAR; INTRAVENOUS; SUBCUTANEOUS EVERY 30 MIN PRN
Status: CANCELLED | OUTPATIENT
Start: 2023-04-28

## 2023-01-26 RX ORDER — METHYLPREDNISOLONE SODIUM SUCCINATE 125 MG/2ML
125 INJECTION, POWDER, LYOPHILIZED, FOR SOLUTION INTRAMUSCULAR; INTRAVENOUS
Status: CANCELLED
Start: 2023-04-28

## 2023-01-26 RX ORDER — ALBUTEROL SULFATE 0.83 MG/ML
2.5 SOLUTION RESPIRATORY (INHALATION)
Status: CANCELLED | OUTPATIENT
Start: 2023-04-28

## 2023-01-26 RX ORDER — HEPARIN SODIUM,PORCINE 10 UNIT/ML
5 VIAL (ML) INTRAVENOUS
Status: CANCELLED | OUTPATIENT
Start: 2023-04-28

## 2023-01-26 RX ORDER — DIPHENHYDRAMINE HYDROCHLORIDE 50 MG/ML
50 INJECTION INTRAMUSCULAR; INTRAVENOUS
Status: CANCELLED
Start: 2023-04-28

## 2023-01-26 RX ORDER — HEPARIN SODIUM (PORCINE) LOCK FLUSH IV SOLN 100 UNIT/ML 100 UNIT/ML
5 SOLUTION INTRAVENOUS
Status: CANCELLED | OUTPATIENT
Start: 2023-04-28

## 2023-01-26 RX ORDER — ALBUTEROL SULFATE 90 UG/1
1-2 AEROSOL, METERED RESPIRATORY (INHALATION)
Status: CANCELLED
Start: 2023-04-28

## 2023-01-26 RX ORDER — EPINEPHRINE 1 MG/ML
0.3 INJECTION, SOLUTION INTRAMUSCULAR; SUBCUTANEOUS EVERY 5 MIN PRN
Status: CANCELLED | OUTPATIENT
Start: 2023-04-28

## 2023-01-26 RX ADMIN — ZOSTER VACCINE RECOMBINANT, ADJUVANTED 0.5 ML: KIT at 10:21

## 2023-01-26 NOTE — NURSING NOTE
Patient presents to the [facility] for Shingrix injection.      Order written by Dr Dr Bunch was completed today.     Name and  verified with patient. See MAR for medication details.    Immunizations Administered     Name Date Dose VIS Date Route    Zoster vaccine recombinant adjuvanted (SHINGRIX) 23 10:21 AM 0.5 mL 10/30/2019, Given Today Intramuscular          Radha Harrington CMA (AAMA)

## 2023-02-09 DIAGNOSIS — C83.30 DIFFUSE LARGE B-CELL LYMPHOMA, UNSPECIFIED BODY REGION (H): ICD-10-CM

## 2023-02-09 NOTE — TELEPHONE ENCOUNTER
"Acyclovir 400mg tablet  Last prescribing provider: Dr. Bunch on 1/4/23    Last clinic visit date: 10/5/22    Recommendations for requested medication (if none, N/A): 10/5/22, \". ID - cont Acyclovir 400mg po BID, completed PCP prophy.\"    Any other pertinent information (if none, N/A): NA    Refilled: Y/N, if NO, why?    Routed to Dr. Bunch.  "

## 2023-02-15 RX ORDER — ACYCLOVIR 400 MG/1
400 TABLET ORAL 2 TIMES DAILY
Qty: 60 TABLET | Refills: 1 | Status: SHIPPED | OUTPATIENT
Start: 2023-02-15 | End: 2023-03-15

## 2023-03-14 DIAGNOSIS — C83.30 DIFFUSE LARGE B-CELL LYMPHOMA, UNSPECIFIED BODY REGION (H): ICD-10-CM

## 2023-03-14 NOTE — TELEPHONE ENCOUNTER
"Acyclovir 400mg tablet  Last prescribing provider: Dr. Bunch     Last clinic visit date: 10/5/22    Recommendations for requested medication (if none, N/A): 10/5/22, \". ID - cont Acyclovir 400mg po BID, completed PCP prophy.\"    Any other pertinent information (if none, N/A): NA    Refilled: Y/N, if NO, why?  "

## 2023-03-15 RX ORDER — ACYCLOVIR 400 MG/1
400 TABLET ORAL 2 TIMES DAILY
Qty: 60 TABLET | Refills: 1 | Status: SHIPPED | OUTPATIENT
Start: 2023-03-15 | End: 2023-05-18

## 2023-04-01 ENCOUNTER — HEALTH MAINTENANCE LETTER (OUTPATIENT)
Age: 73
End: 2023-04-01

## 2023-04-19 ENCOUNTER — TRANSFERRED RECORDS (OUTPATIENT)
Dept: HEALTH INFORMATION MANAGEMENT | Facility: CLINIC | Age: 73
End: 2023-04-19
Payer: COMMERCIAL

## 2023-05-18 ENCOUNTER — OFFICE VISIT (OUTPATIENT)
Dept: PULMONOLOGY | Facility: CLINIC | Age: 73
End: 2023-05-18
Attending: INTERNAL MEDICINE
Payer: COMMERCIAL

## 2023-05-18 VITALS
SYSTOLIC BLOOD PRESSURE: 102 MMHG | OXYGEN SATURATION: 93 % | HEIGHT: 68 IN | WEIGHT: 242 LBS | HEART RATE: 69 BPM | RESPIRATION RATE: 17 BRPM | BODY MASS INDEX: 36.68 KG/M2 | DIASTOLIC BLOOD PRESSURE: 67 MMHG

## 2023-05-18 DIAGNOSIS — R06.09 DYSPNEA ON EXERTION: Primary | ICD-10-CM

## 2023-05-18 DIAGNOSIS — E66.01 MORBID OBESITY (H): ICD-10-CM

## 2023-05-18 PROCEDURE — 99213 OFFICE O/P EST LOW 20 MIN: CPT | Performed by: INTERNAL MEDICINE

## 2023-05-18 RX ORDER — GABAPENTIN 100 MG/1
CAPSULE ORAL
COMMUNITY
Start: 2023-04-06

## 2023-05-18 ASSESSMENT — PAIN SCALES - GENERAL: PAINLEVEL: NO PAIN (0)

## 2023-05-18 NOTE — LETTER
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 Please see a copy of my visit note below.    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 Developed respiratory symptoms of SOB and BETANCOURT around the time of diagnosis. Has had RLL mass diagnosed as lymphoma.  Underwent therapy with improvement in symptoms.  Has recurrence of disease in 2020 and underwent CAR-T cell therapy.  Was diagnosed with COVID in January 2022, had mild symptoms of URI symptoms and nasal drainage.  Felt mildly SOB during that time. Symptoms resolved after 10 days; notes some SOB after COVID.  States at the present time his SOB is only with significant exertion of walking in box stores.  Notices some cough but is mostly non-productive except for feeling of gastric secretions after coughing.  CT chest from 3-18 and PET scan from 3-31 were personally reviewed.  RLL mass is unchanged in size, mediastinal and hilar adenopathy.  History of JESSICA, on CPAP.  No tobacco.    Last seen 6 months ago. Since his last visit he has been doing well, no significant change in any of his symptoms at rest but has noticed some increased SOB with exertion.  No new imaging since his last visit and he has been doing well.  Is out walking again, notices some increased SOB/BETANCOURT compared to one year ago. With longer walks he needs to sit and take a break.  Was less active this Winter.  Is using oxygen 4L with exertion, saturations are at 88-92% with walking.    The patient was seen and examined by Sanjay Granado MD           Current Outpatient Medications   Medication    docusate sodium (COLACE) 100 MG capsule    insulin aspart (NOVOLOG FLEXPEN)  100 UNIT/ML pen    Melatonin 10 MG TABS tablet    metFORMIN (GLUCOPHAGE) 500 MG tablet    rosuvastatin (CRESTOR) 10 MG tablet    gabapentin (NEURONTIN) 100 MG capsule    hydrocortisone (CORTEF) 10 MG tablet    nitroGLYcerin (NITROSTAT) 0.4 MG sublingual tablet     No current facility-administered medications for this visit.     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  Partial left second toe amputation with osteotomy through proximal phalanx.  2.  Partial left third toe amputation with osteotomy through proximal phalanx.;  Surgeon: Ismael Humphrey DPM;  Location: [facility]    BIOPSY LYMPH NODE CERVICAL N/A 12/5/2014    Procedure: BIOPSY LYMPH NODE CERVICAL;  Surgeon: Robbie Linda MD;  Location:  OR    BRONCHOSCOPY FLEXIBLE N/A 11/14/2017    Procedure: BRONCHOSCOPY FLEXIBLE;  FLEXIBLE BRONCHOSCOPY WITH BIOPSIES.;  Surgeon: Robbie Linda MD;  Location:  OR    BRONCHOSCOPY RIGID OR FLEXIBLE W/TRANSENDOSCOPIC ENDOBRONCHIAL ULTRASOUND GUIDED N/A 4/11/2022    Procedure: BRONCHOSCOPY, FIBEROPTIC, endobronchial ultrasound,  transbronchial biopsies, lymphnode forcep biopsies;  Surgeon: Prosper Aguiar MD;  [facility]: [facility]    COLONOSCOPY      COLONOSCOPY N/A 5/9/2018    Procedure: COMBINED COLONOSCOPY, SINGLE OR MULTIPLE BIOPSY/POLYPECTOMY BY BIOPSY;;  Surgeon: Ramos Bates MD;  Location:  GI    ENDOVASCULAR PLACEMENT VASCULAR DEVICE Left 12/5/2017    Procedure: ENDOVASCULAR PLACEMENT VASCULAR DEVICE;;  Surgeon: Robbie Linda MD;  Location: [facility]    ENT SURGERY      tonsillectomy    EXCISE NODE MEDIASTINAL N/A 11/17/2017    Procedure: EXCISE NODE MEDIASTINAL;;  Surgeon: Robbie Linda MD;  Location:  OR    EYE SURGERY      EYE SURGERY Left     vitrectomy    INSERT PORT VASCULAR ACCESS N/A 12/05/2014    Procedure: INSERT PORT VASCULAR ACCESS;  Surgeon: Robbie Linda MD;  Location:  OR    INSERT PORT VASCULAR ACCESS N/A 12/5/2017    Procedure: INSERT PORT VASCULAR ACCESS;  POWER PORT PLACEMENT ATTEMPTED, PLACEMENT OF ANGIO-SEAL VIP VASCULAR CLOSURE DEVICE;  Surgeon: Robbie Linda MD;  Location: [facility]    IR CHEST PORT PLACEMENT > 5 YRS OF AGE  6/5/2020    IR PORT REMOVAL RIGHT  12/10/2018    PHACOEMULSIFICATION CLEAR CORNEA WITH STANDARD INTRAOCULAR LENS IMPLANT Right 5/2/2016    Procedure: PHACOEMULSIFICATION CLEAR CORNEA WITH STANDARD INTRAOCULAR LENS IMPLANT;  Surgeon: Rasheed Finney MD;  [facility]    REMOVE PORT VASCULAR ACCESS N/A 3/14/2017    Procedure: REMOVE PORT VASCULAR ACCESS;  Surgeon: Robbie Linda MD;  Location:  OR    REMOVE PORT VASCULAR ACCESS N/A 11/29/2022    Procedure: PORT REMOVAL;  Surgeon: Robbie Linda MD;  Location:  OR    SOFT TISSUE SURGERY      BASAL CELL CA FOREHEAD    THORACOTOMY Right 11/17/2017    Procedure: THORACOTOMY;  RIGHT EXPLORATORY THORACOTOMY/ EXTENSIVE PNEUMOLYSIS/ BIOPSY OF INTERLOBAR LYMPH NODE;  Surgeon: Robbie Linda MD;  Location:  OR    TRANSCERVICAL EXTENDED MEDIASTINAL LYMPHADENECTOMY N/A  "5/24/2022    Procedure: TRANSCERVICAL EXTENDED MEDIASTINAL LYMPHADENECTOMY;  Surgeon: Star Narvaez MD;  [facility]    TRANSCERVICAL EXTENDED MEDIASTINAL LYMPHADENECTOMY  5/24/2022    Procedure: ;  Surgeon: Star Narvaez MD;  Location:  OR       Social History     Socioeconomic History    Marital status:      Spouse name: Not on file    Number of children: Not on file    Years of education: Not on file    Highest education level: Not on file   Occupational History    Not on file   Tobacco Use    Smoking status: Never    Smokeless tobacco: Never   Vaping Use    Vaping status: Not on file   Substance and Sexual Activity    Alcohol use: No    Drug use: No    Sexual activity: Not on file   Other Topics Concern    Parent/sibling w/ CABG, MI or angioplasty before 65F 55M? Not Asked   Social History Narrative    Not on file     Social Determinants of Health     Financial Resource Strain: Not on file   Food Insecurity: Not on file   Transportation Needs: Not on file   Physical Activity: Not on file   Stress: Not on file   Social Connections: Not on file   Intimate Partner Violence: Not At Risk (9/29/2021)    Humiliation, Afraid, Rape, and Kick questionnaire     Fear of Current or Ex-Partner: No     Emotionally Abused: No     Physically Abused: No     Sexually Abused: No   Housing Stability: Not on file       ROS Pulmonary  A complete ROS was otherwise negative except as noted in the HPI.  /67   Pulse 69   Resp 17   Ht 1.727 m (5' 8\")   Wt 109.8 kg (242 lb)   SpO2 93%   BMI 36.80 kg/m    Exam:   GENERAL APPEARANCE: Well developed, well nourished, alert, and in no apparent distress.  EYES: PERRL, EOMI  HENT: Nasal mucosa with no edema and no hyperemia. No nasal polyps.  EARS: Canals clear, TMs normal  MOUTH: Oral mucosa is moist, without any lesions, no tonsillar enlargement, no oropharyngeal exudate.  NECK: supple, no masses, no thyromegaly.  LYMPHATICS: No significant " axillary, cervical, or supraclavicular nodes.  RESP: Good air flow throughout.  No crackles. No rhonchi. No wheezes.  CV: Normal S1, S2, regular rhythm, normal rate. No murmur.  No rub. No gallop. No LE edema.   ABDOMEN:  Bowel sounds normal, soft, nontender, no HSM or masses.   MS: extremities normal. No clubbing. No cyanosis.  SKIN: no rash on limited exam  NEURO: Mentation intact, speech normal, normal strength and tone, normal gait and stance  PSYCH: mentation appears normal. and affect normal/bright  Results:  No results found for this or any previous visit (from the past 168 hour(s)).    Assessment and plan:   74 YO with onset of increased SOB for past few months. Diagnosis of COVID in Jan 2022 with mild symptoms.  No recent weight gain; less active in the Winter.  Review of chest CT does not show any increase in size or character of RLL mass/infiltrate. No pleural effusions. Increase in SOB cannot be explained by an increase in the RLL mass/consolidation. Symptoms of increased SOB could be due to post COVID or pulmonary embolism (does have a significant decrease in diffusion capacity)- these have resolved.  Adenopathy in chest found to be benign; no change on repeat imaging. Compliant on oxygen with good saturation with walking.  Remains active without limitation.     1. Continue supplemental oxygen at 4L/NC with exertion. Encouraged to continue exercise regimen     RTC in 6 months.  Advised him to contact the clinic with any questions or concerns.          Again, thank you for allowing me to participate in the care of your patient.        Sincerely,        Sanjay Granado MD

## 2023-05-18 NOTE — NURSING NOTE
Chief Complaint   Patient presents with     RECHECK     Return BMT     Medications reviewed and vital signs taken.   Garrett Norman CMA

## 2023-05-18 NOTE — PROGRESS NOTES
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 Developed respiratory symptoms of SOB and BETANCOURT around the time of diagnosis. Has had RLL mass diagnosed as lymphoma.  Underwent therapy with improvement in symptoms.  Has recurrence of disease in 2020 and underwent CAR-T cell therapy.  Was diagnosed with COVID in January 2022, had mild symptoms of URI symptoms and nasal drainage.  Felt mildly SOB during that time. Symptoms resolved after 10 days; notes some SOB after COVID.  States at the present time his SOB is only with significant exertion of walking in box stores.  Notices some cough but is mostly non-productive except for feeling of gastric secretions after coughing.  CT chest from 3-18 and PET scan from 3-31 were personally reviewed.  RLL mass is unchanged in size, mediastinal and hilar adenopathy.  History of JESSICA, on CPAP.  No tobacco.    Last seen 6 months ago. Since his last visit he has been doing well, no significant change in any of his symptoms at rest but has noticed some increased SOB with exertion.  No new imaging since his last visit and he has been doing well.  Is out walking again, notices some increased SOB/BETANCOURT compared to one year ago. With longer walks he needs to sit and take a break.  Was less active this Winter.  Is using oxygen 4L with exertion, saturations are at 88-92% with walking.    The patient was seen and examined by Sanjay Granado MD           Current Outpatient Medications   Medication     docusate sodium (COLACE) 100 MG capsule     insulin aspart (NOVOLOG FLEXPEN) 100 UNIT/ML pen     Melatonin 10 MG TABS tablet     metFORMIN (GLUCOPHAGE) 500 MG tablet     rosuvastatin (CRESTOR) 10 MG tablet     gabapentin (NEURONTIN) 100 MG capsule     hydrocortisone (CORTEF) 10 MG tablet     nitroGLYcerin (NITROSTAT) 0.4 MG sublingual tablet     No current  facility-administered medications for this visit.     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  Partial left second toe amputation with osteotomy through proximal phalanx.  2.  Partial left third toe amputation with osteotomy through proximal phalanx.;  Surgeon: Ismael Humphrey DPM;  Location: [facility]     BIOPSY LYMPH NODE CERVICAL N/A 12/5/2014    Procedure: BIOPSY LYMPH NODE CERVICAL;  Surgeon: Robbie Linda MD;  Location:  OR     BRONCHOSCOPY FLEXIBLE N/A 11/14/2017    Procedure: BRONCHOSCOPY FLEXIBLE;  FLEXIBLE BRONCHOSCOPY WITH BIOPSIES.;  Surgeon: Robbie Linda MD;  Location:  OR     BRONCHOSCOPY RIGID OR FLEXIBLE W/TRANSENDOSCOPIC ENDOBRONCHIAL ULTRASOUND GUIDED N/A 4/11/2022    Procedure: BRONCHOSCOPY, FIBEROPTIC, endobronchial ultrasound, transbronchial biopsies, lymphnode forcep biopsies;  Surgeon: Prosper Aguiar MD;  [facility]: [facility]     COLONOSCOPY       COLONOSCOPY N/A 5/9/2018    Procedure: COMBINED COLONOSCOPY, SINGLE OR MULTIPLE BIOPSY/POLYPECTOMY BY BIOPSY;;  Surgeon:  Ramos Bates MD;  Location:  GI     ENDOVASCULAR PLACEMENT VASCULAR DEVICE Left 12/5/2017    Procedure: ENDOVASCULAR PLACEMENT VASCULAR DEVICE;;  Surgeon: Robbie Linda MD;  Location: [facility]     ENT SURGERY      tonsillectomy     EXCISE NODE MEDIASTINAL N/A 11/17/2017    Procedure: EXCISE NODE MEDIASTINAL;;  Surgeon: Robbie Linda MD;  Location:  OR     EYE SURGERY       EYE SURGERY Left     vitrectomy     INSERT PORT VASCULAR ACCESS N/A 12/05/2014    Procedure: INSERT PORT VASCULAR ACCESS;  Surgeon: Robbie Linda MD;  Location:  OR     INSERT PORT VASCULAR ACCESS N/A 12/5/2017    Procedure: INSERT PORT VASCULAR ACCESS;  POWER PORT PLACEMENT ATTEMPTED, PLACEMENT OF ANGIO-SEAL VIP VASCULAR CLOSURE DEVICE;  Surgeon: Robbie Linda MD;  Location: [facility]     IR CHEST PORT PLACEMENT > 5 YRS OF AGE  6/5/2020     IR PORT REMOVAL RIGHT  12/10/2018     PHACOEMULSIFICATION CLEAR CORNEA WITH STANDARD INTRAOCULAR LENS IMPLANT Right 5/2/2016    Procedure: PHACOEMULSIFICATION CLEAR CORNEA WITH STANDARD INTRAOCULAR LENS IMPLANT;  Surgeon: Rasheed Finney MD;  [facility]     REMOVE PORT VASCULAR ACCESS N/A 3/14/2017    Procedure: REMOVE PORT VASCULAR ACCESS;  Surgeon: Robbie Linda MD;  Location:  OR     REMOVE PORT VASCULAR ACCESS N/A 11/29/2022    Procedure: PORT REMOVAL;  Surgeon: Robbie Linda MD;  Location:  OR     SOFT TISSUE SURGERY      BASAL CELL CA FOREHEAD     THORACOTOMY Right 11/17/2017    Procedure: THORACOTOMY;  RIGHT EXPLORATORY THORACOTOMY/ EXTENSIVE PNEUMOLYSIS/ BIOPSY OF INTERLOBAR LYMPH NODE;  Surgeon: Robbie Linda MD;  Location:  OR     TRANSCERVICAL EXTENDED MEDIASTINAL LYMPHADENECTOMY N/A 5/24/2022    Procedure: TRANSCERVICAL EXTENDED MEDIASTINAL LYMPHADENECTOMY;  Surgeon: Star Narvaez MD;  [facility]     TRANSCERVICAL EXTENDED MEDIASTINAL LYMPHADENECTOMY  5/24/2022    Procedure: ;  Surgeon:  "Star Narvaez MD;  Location:  OR       Social History     Socioeconomic History     Marital status:      Spouse name: Not on file     Number of children: Not on file     Years of education: Not on file     Highest education level: Not on file   Occupational History     Not on file   Tobacco Use     Smoking status: Never     Smokeless tobacco: Never   Vaping Use     Vaping status: Not on file   Substance and Sexual Activity     Alcohol use: No     Drug use: No     Sexual activity: Not on file   Other Topics Concern     Parent/sibling w/ CABG, MI or angioplasty before 65F 55M? Not Asked   Social History Narrative     Not on file     Social Determinants of Health     Financial Resource Strain: Not on file   Food Insecurity: Not on file   Transportation Needs: Not on file   Physical Activity: Not on file   Stress: Not on file   Social Connections: Not on file   Intimate Partner Violence: Not At Risk (9/29/2021)    Humiliation, Afraid, Rape, and Kick questionnaire      Fear of Current or Ex-Partner: No      Emotionally Abused: No      Physically Abused: No      Sexually Abused: No   Housing Stability: Not on file       ROS Pulmonary  A complete ROS was otherwise negative except as noted in the HPI.  /67   Pulse 69   Resp 17   Ht 1.727 m (5' 8\")   Wt 109.8 kg (242 lb)   SpO2 93%   BMI 36.80 kg/m    Exam:   GENERAL APPEARANCE: Well developed, well nourished, alert, and in no apparent distress.  EYES: PERRL, EOMI  HENT: Nasal mucosa with no edema and no hyperemia. No nasal polyps.  EARS: Canals clear, TMs normal  MOUTH: Oral mucosa is moist, without any lesions, no tonsillar enlargement, no oropharyngeal exudate.  NECK: supple, no masses, no thyromegaly.  LYMPHATICS: No significant axillary, cervical, or supraclavicular nodes.  RESP: Good air flow throughout.  No crackles. No rhonchi. No wheezes.  CV: Normal S1, S2, regular rhythm, normal rate. No murmur.  No rub. No gallop. No LE edema. "   ABDOMEN:  Bowel sounds normal, soft, nontender, no HSM or masses.   MS: extremities normal. No clubbing. No cyanosis.  SKIN: no rash on limited exam  NEURO: Mentation intact, speech normal, normal strength and tone, normal gait and stance  PSYCH: mentation appears normal. and affect normal/bright  Results:  No results found for this or any previous visit (from the past 168 hour(s)).    Assessment and plan:   72 YO with onset of increased SOB for past few months. Diagnosis of COVID in Jan 2022 with mild symptoms.  No recent weight gain; less active in the Winter.  Review of chest CT does not show any increase in size or character of RLL mass/infiltrate. No pleural effusions. Increase in SOB cannot be explained by an increase in the RLL mass/consolidation. Symptoms of increased SOB could be due to post COVID or pulmonary embolism (does have a significant decrease in diffusion capacity)- these have resolved.  Adenopathy in chest found to be benign; no change on repeat imaging. Compliant on oxygen with good saturation with walking.  Remains active without limitation.     1. Continue supplemental oxygen at 4L/NC with exertion. Encouraged to continue exercise regimen     RTC in 6 months.  Advised him to contact the clinic with any questions or concerns.        Answers for HPI/ROS submitted by the patient on 5/11/2023  General Symptoms: No  Skin Symptoms: No  HENT Symptoms: No  EYE SYMPTOMS: No  HEART SYMPTOMS: No  LUNG SYMPTOMS: No  INTESTINAL SYMPTOMS: No  URINARY SYMPTOMS: No  REPRODUCTIVE SYMPTOMS: No  SKELETAL SYMPTOMS: No  BLOOD SYMPTOMS: No  NERVOUS SYSTEM SYMPTOMS: No  MENTAL HEALTH SYMPTOMS: No

## 2023-05-23 DIAGNOSIS — C83.30 DIFFUSE LARGE B-CELL LYMPHOMA, UNSPECIFIED BODY REGION (H): ICD-10-CM

## 2023-05-24 PROBLEM — E66.01 MORBID OBESITY (H): Status: ACTIVE | Noted: 2023-05-24

## 2023-05-30 RX ORDER — ACYCLOVIR 400 MG/1
400 TABLET ORAL 2 TIMES DAILY
Qty: 60 TABLET | Refills: 1 | OUTPATIENT
Start: 2023-05-30

## 2023-06-02 ENCOUNTER — TELEPHONE (OUTPATIENT)
Dept: PULMONOLOGY | Facility: CLINIC | Age: 73
End: 2023-06-02
Payer: COMMERCIAL

## 2023-06-02 NOTE — TELEPHONE ENCOUNTER
Patient Contacted    Appointment type: RTN BMT  Provider: FREIDA  Return date: 11/16/23  Specialty phone number: [phone]  Additional appointment(s) needed: NA  Additonal Notes: NA

## 2023-06-30 NOTE — IP AVS SNAPSHOT
MRN:[id number]                      After Visit Summary   12/10/2018    Pastor Garibay    MRN: [id number]           Visit Information        Department      12/10/2018  9:28 AM [facility]          Review of your medicines      UNREVIEWED medicines. Ask your doctor about these medicines       Dose / Directions   ADVIL PM PO      Dose:  2 tablet  Take 2 tablets by mouth At Bedtime  Refills:  0     AMLODIPINE BESYLATE PO      Dose:  5 mg  Take 5 mg by mouth At Bedtime  Refills:  0     COLACE PO      Dose:  200 mg  Take 200 mg by mouth At Bedtime (2 x 100 mg tablet = 200 mg dose)  Refills:  0     CRESTOR PO      Dose:  5 mg  Take 5 mg by mouth daily (0.5 x 10 mg tablet = 5 mg dose)  Refills:  0     enoxaparin 100 MG/ML Soln  Commonly known as:  LOVENOX      Dose:  150 mg  Inject 150 mg Subcutaneous every 12 hours  Refills:  0     insulin glargine 100 UNIT/ML vial  Commonly known as:  LANTUS VIAL      Dose:  52 Units  Inject 52 Units Subcutaneous At Bedtime  Refills:  0     * K-DUR PO      Dose:  40 mEq  Take 40 mEq by mouth daily (2 x 20 mEq = 40 mEq dose)  Refills:  0     * K-DUR PO      Dose:  20 mEq  Take 20 mEq by mouth At Bedtime  Refills:  0     LASIX PO      Dose:  40 mg  Take 40 mg by mouth daily  Refills:  0     LISINOPRIL PO      Dose:  40 mg  Take 40 mg by mouth daily  Refills:  0     * METFORMIN HCL PO      Dose:  1000 mg  Take 1,000 mg by mouth daily (with breakfast) (2 x 500 mg = 1000 mg dose)  Refills:  0     * METFORMIN HCL PO      Dose:  500 mg  Take 500 mg by mouth At Bedtime  Refills:  0     NIACIN ER PO      Dose:  500 mg  Take 500 mg by mouth daily  Refills:  0     NovoLOG FLEXPEN 100 UNIT/ML pen  Generic drug:  insulin aspart      Dose:  0-50 Units  Inject 0-50 Units Subcutaneous 3 times daily (with meals) Patient uses sliding scale based on Carbs and Blood Glucose  Refills:  0     TRICOR PO      Dose:  145 mg  Take 145 mg by mouth daily  Refills:  0          Addended by: BRENDON SCHMITT on: 6/30/2023 03:41 PM     Modules accepted: Orders     * This list has 4 medication(s) that are the same as other medications prescribed for you. Read the directions carefully, and ask your doctor or other care provider to review them with you.                  Protect others around you: Learn how to safely use, store and throw away your medicines at www.disposemymeds.org.       Follow-ups after your visit       Your next 10 appointments already scheduled    Dec 10, 2018 10:00 AM CST  (Arrive by 8:30 AM)  IR PORT REMOVAL RIGHT with SHIR1  [facility] ([facility]) [address] [place] [id number]  [phone]   The day before the exam:   You may eat your regular diet.   You are encouraged to drink at least 8 eight ounce glasses of clear liquids.  Please wear loose clothing, such as a sweat suit or jogging clothes. Avoid snaps, zippers and other metal. We may ask you to undress and put on a hospital gown.  Please bring any scans or X-rays taken at other hospitals, if similar tests were done. Also bring a list of your medicines, including vitamins, minerals and over-the-counter drugs. It is safest to leave personal items at home.  Someone will need to drive you to and from the hospital.  Tell your doctor in advance:   If you have allergies to x-ray contrast or iodine.   If you are or may be pregnant.   If you are taking Coumadin (or any other blood thinners) 5 days prior to the exam for any special instructions.   If you are diabetic to determine if your insulin needs have to be adjusted for the exam.  Your doctor will:   Need to do a history and physical within 30 days before this procedure.   Obtain necessary laboratory tests prior to the exam (Basic Metabolic Panel, CBCP, PTT and INR)   (No labs needed if you are having a tunneled catheter exchange or removal)  If you were given sedation, you cannot drive for 24 hours after the procedure, and an adult must be with you until then.  If you have any questions,  please call the Imaging Department where you will have your exam. Directions, parking instructions, and other information are available on our website, [email or web address].      Care Instructions       Further instructions from your care team         Port Removal Discharge Instructions     After you go home:      Have an adult stay with you for the first 6 hours    You may resume your normal diet       For 24 hours - due to the sedation you received:    Relax and take it easy    Do NOT make any important or legal decisions    Do NOT drive or operate machines at home or at work    Do NOT drink alcohol    Care of Puncture Site:      Keep the dressings on your site clean & dry for 3 days. Change it only if it gets wet or dirty.    You may shower after the dressing comes off in 3 days    Do not remove the small white strips of tape, if present. Allow them to fall off on their own.     You may cover the wound with a bandaid after the dressing is removed if needed for comfort      Activity       Avoid heavy lifting (greater than 10 pounds) or the overuse of your shoulder for 3 days    Bleeding:      If you start bleeding from the incision site in your chest or have swelling in your neck, sit down and press on the site for 5-10 minutes.     If bleeding has not stopped after 10 minutes, call your provider.        Call 911 right away if you have heavy bleeding or bleeding that does not stop.      Medicines:      You may resume all medications    Resume your Warfarin/Coumadin at your regular dose today. Follow up with your provider to have your INR rechecked    Resume your Platelet Inhibitors and Aspirin tomorrow at your regular dose    For minor pain, you may take Acetaminophen (Tylenol) or Ibuprofen (Advil)          Call the provider who ordered this procedure if:      You have swelling in your neck or over your port site    The incision area is red, swollen, hot or tender    You have chills or a fever greater than 101 F  "(38 C)    Any questions or concerns    Call  911 or go to the Emergency Room if you have:      Severe chest pain or trouble breathing    Bleeding that you cannot control    If you have questions call:          [facility] Radiology Dept @ [phone]    The provider who performed your procedure was Dr. Rubin.    Additional Information About Your Visit       MyChart Information    [email or web address] lets you send messages to your doctor, view your test results, renew your prescriptions, schedule appointments and more. To sign up, go to [email or web address] . Click on \"Log in\" on the left side of the screen, which will take you to the Welcome page. Then click on \"Sign up Now\" on the right side of the page.     You will be asked to enter the access code listed below, as well as some personal information. Please follow the directions to create your username and password.     Your access code is: [email or web address]  Expires: 2019  9:44 AM     Your access code will  in 90 days. If you need help or a new code, please call your [facility] clinic or [phone].       Care EveryWhere ID    This is your Care EveryWhere ID. This could be used by other organizations to access your [facility] medical records  [id number]        Primary Care Provider Office Phone # Fax #    Francisco Armijo [phone]      Equal Access to Services    [facility]: Hadii shalom david hadasho Soberniceali, waaxda luqadaha, qaybta kaalmada mick, juana sylvester . So [facility] [phone].    ATENCIÓN: Si habla español, tiene a bradford disposición servicios gratuitos de asistencia lingüística. Llame al [phone].    We comply with applicable federal civil rights laws and [place] laws. We do not discriminate on the basis of race, color, national origin, age, disability, sex, sexual orientation, or gender identity.           Thank you!    Thank you for choosing [facility] for your care. Our goal is always to " provide you with excellent care. Hearing back from our patients is one way we can continue to improve our services. Please take a few minutes to complete the written survey that you may receive in the mail after you visit with us. Thank you!            Medication List: This is a list of all your medications and when to take them. Check marks below indicate your daily home schedule. Keep this list as a reference.      Medications       Morning Afternoon Evening Bedtime As Needed   ADVIL PM PO  Take 2 tablets by mouth At Bedtime                       AMLODIPINE BESYLATE PO  Take 5 mg by mouth At Bedtime                       COLACE PO  Take 200 mg by mouth At Bedtime (2 x 100 mg tablet = 200 mg dose)                       CRESTOR PO  Take 5 mg by mouth daily (0.5 x 10 mg tablet = 5 mg dose)                       enoxaparin 100 MG/ML Soln  Commonly known as:  LOVENOX  Inject 150 mg Subcutaneous every 12 hours                       insulin glargine 100 UNIT/ML vial  Commonly known as:  LANTUS VIAL  Inject 52 Units Subcutaneous At Bedtime                       * K-DUR PO  Take 40 mEq by mouth daily (2 x 20 mEq = 40 mEq dose)                       * K-DUR PO  Take 20 mEq by mouth At Bedtime                       LASIX PO  Take 40 mg by mouth daily                       LISINOPRIL PO  Take 40 mg by mouth daily                       * METFORMIN HCL PO  Take 1,000 mg by mouth daily (with breakfast) (2 x 500 mg = 1000 mg dose)                       * METFORMIN HCL PO  Take 500 mg by mouth At Bedtime                       NIACIN ER PO  Take 500 mg by mouth daily                       NovoLOG FLEXPEN 100 UNIT/ML pen  Inject 0-50 Units Subcutaneous 3 times daily (with meals) Patient uses sliding scale based on Carbs and Blood Glucose  Generic drug:  insulin aspart                       TRICOR PO  Take 145 mg by mouth daily                           * This list has 4 medication(s) that are the same as other medications prescribed  for you. Read the directions carefully, and ask your doctor or other care provider to review them with you.

## 2023-08-27 ENCOUNTER — HEALTH MAINTENANCE LETTER (OUTPATIENT)
Age: 73
End: 2023-08-27

## 2023-08-31 ENCOUNTER — TELEPHONE (OUTPATIENT)
Dept: TRANSPLANT | Facility: CLINIC | Age: 73
End: 2023-08-31
Payer: COMMERCIAL

## 2023-08-31 NOTE — TELEPHONE ENCOUNTER
Writer called Pastor back regarding follow up here in BMT. Confirmed Pastor has been re-established with [facility] and has no more anniversary visits at the [facility].     Patient will reach out to [facility] for follow up/CT scan if indicated.    Rabia Reid RN, BSN  [facility] Nurse Coordinator  [phone]

## 2023-10-31 ENCOUNTER — ANCILLARY PROCEDURE (OUTPATIENT)
Dept: CT IMAGING | Facility: CLINIC | Age: 73
End: 2023-10-31
Attending: INTERNAL MEDICINE
Payer: COMMERCIAL

## 2023-10-31 DIAGNOSIS — C83.38 DIFFUSE LARGE B-CELL LYMPHOMA OF LYMPH NODES OF MULTIPLE SITES (H): ICD-10-CM

## 2023-10-31 LAB
CREAT BLD-MCNC: 0.9 MG/DL (ref 0.7–1.3)
EGFRCR SERPLBLD CKD-EPI 2021: >60 ML/MIN/1.73M2

## 2023-10-31 PROCEDURE — 82565 ASSAY OF CREATININE: CPT

## 2023-10-31 PROCEDURE — 74177 CT ABD & PELVIS W/CONTRAST: CPT

## 2023-10-31 PROCEDURE — 250N000011 HC RX IP 250 OP 636: Performed by: INTERNAL MEDICINE

## 2023-10-31 PROCEDURE — 250N000009 HC RX 250: Performed by: INTERNAL MEDICINE

## 2023-10-31 RX ORDER — IOPAMIDOL 755 MG/ML
100 INJECTION, SOLUTION INTRAVASCULAR ONCE
Status: COMPLETED | OUTPATIENT
Start: 2023-10-31 | End: 2023-10-31

## 2023-10-31 RX ADMIN — IOPAMIDOL 100 ML: 755 INJECTION, SOLUTION INTRAVENOUS at 10:50

## 2023-10-31 RX ADMIN — SODIUM CHLORIDE 40 ML: 9 INJECTION, SOLUTION INTRAVENOUS at 10:50

## 2023-11-02 ENCOUNTER — TRANSFERRED RECORDS (OUTPATIENT)
Dept: HEALTH INFORMATION MANAGEMENT | Facility: CLINIC | Age: 73
End: 2023-11-02
Payer: COMMERCIAL

## 2023-11-10 ENCOUNTER — MEDICAL CORRESPONDENCE (OUTPATIENT)
Dept: HEALTH INFORMATION MANAGEMENT | Facility: CLINIC | Age: 73
End: 2023-11-10
Payer: COMMERCIAL

## 2023-11-10 DIAGNOSIS — E11.9 DIABETES MELLITUS (H): ICD-10-CM

## 2023-11-10 DIAGNOSIS — Z85.72 HISTORY OF NON-HODGKIN'S LYMPHOMA: ICD-10-CM

## 2023-11-10 DIAGNOSIS — E78.5 HYPERLIPEMIA: ICD-10-CM

## 2023-11-10 DIAGNOSIS — R06.09 EXERTIONAL DYSPNEA: ICD-10-CM

## 2023-11-10 DIAGNOSIS — I10 HTN (HYPERTENSION): ICD-10-CM

## 2023-11-10 DIAGNOSIS — I25.10 CORONARY ARTERY DISEASE: Primary | ICD-10-CM

## 2023-11-14 ENCOUNTER — TRANSFERRED RECORDS (OUTPATIENT)
Dept: HEALTH INFORMATION MANAGEMENT | Facility: CLINIC | Age: 73
End: 2023-11-14
Payer: COMMERCIAL

## 2023-11-16 ENCOUNTER — OFFICE VISIT (OUTPATIENT)
Dept: PULMONOLOGY | Facility: CLINIC | Age: 73
End: 2023-11-16
Attending: INTERNAL MEDICINE
Payer: COMMERCIAL

## 2023-11-16 VITALS
WEIGHT: 240 LBS | SYSTOLIC BLOOD PRESSURE: 113 MMHG | HEIGHT: 68 IN | DIASTOLIC BLOOD PRESSURE: 72 MMHG | OXYGEN SATURATION: 93 % | BODY MASS INDEX: 36.37 KG/M2 | RESPIRATION RATE: 17 BRPM | HEART RATE: 97 BPM

## 2023-11-16 DIAGNOSIS — R06.09 DYSPNEA ON EXERTION: Primary | ICD-10-CM

## 2023-11-16 PROCEDURE — 99213 OFFICE O/P EST LOW 20 MIN: CPT | Performed by: INTERNAL MEDICINE

## 2023-11-16 PROCEDURE — 99212 OFFICE O/P EST SF 10 MIN: CPT | Performed by: INTERNAL MEDICINE

## 2023-11-16 ASSESSMENT — PAIN SCALES - GENERAL: PAINLEVEL: NO PAIN (0)

## 2023-11-16 NOTE — LETTER
11/16/2023         RE: Pastor Garibay  [address]  [facility] [place] [address]        Dear Colleague,    Thank you for referring your patient, Pastor Garibay, to the [facility]. Please see a copy of my visit note below.    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    Pulmonary HPI  72 YO male with history of DLBCL diagnosed in 2015. Developed respiratory symptoms of SOB and BETANCOURT around the time of diagnosis. Has had RLL mass diagnosed as lymphoma.  Underwent therapy with improvement in symptoms.  Has recurrence of disease in 2020 and underwent CAR-T cell therapy.  Was diagnosed with COVID in January 2022, had mild symptoms of URI symptoms and nasal drainage.  Felt mildly SOB during that time. Symptoms resolved after 10 days; notes some SOB after COVID.  States at the present time his SOB is only with significant exertion of walking in box stores.  Notices some cough but is mostly non-productive except for feeling of gastric secretions after coughing.  CT chest from 3-18 and PET scan from 3-31 were personally reviewed.  RLL mass is unchanged in size, mediastinal and hilar adenopathy.  History of JESSICA, on CPAP.  No tobacco.    Last seen 6 months ago. Since his last visit he has been doing well.  No increase SOB or BETANCOURT.  Is using oxygen at 4L/min with significant exertion via a pulse system; does not use if he is doing short walks.  Monitors his oxygen saturation and it decreases to 88-90% if ambulating without oxygen. Is interested in a change in oxygen supply to an [employer] system to allow for trips.  Underwent chest CT on 10-31, this was personally reviewed in clinic.  Compared to 2022 there is no significant change.    The patient was seen and examined by Sanjay Granado MD           Current Outpatient Medications   Medication    docusate sodium (COLACE) 100 MG capsule     gabapentin (NEURONTIN) 100 MG capsule    insulin aspart (NOVOLOG FLEXPEN) 100 UNIT/ML pen    Melatonin 10 MG TABS tablet    metFORMIN (GLUCOPHAGE) 500 MG tablet    nitroGLYcerin (NITROSTAT) 0.4 MG sublingual tablet    rosuvastatin (CRESTOR) 10 MG tablet     No current facility-administered medications for this visit.     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  Partial left second toe amputation with osteotomy through proximal phalanx.  2.  Partial left third toe amputation with osteotomy through proximal phalanx.;  Surgeon: Ismael Humphrey DPM;  Location: [facility] OR    BIOPSY LYMPH NODE CERVICAL N/A 12/5/2014    Procedure: BIOPSY LYMPH NODE CERVICAL;  Surgeon: Robbie Linda MD;  Location:  OR    BRONCHOSCOPY FLEXIBLE N/A 11/14/2017    Procedure: BRONCHOSCOPY FLEXIBLE;  FLEXIBLE BRONCHOSCOPY WITH BIOPSIES.;  Surgeon: Robbie Linda MD;  Location:  OR    BRONCHOSCOPY RIGID OR FLEXIBLE W/TRANSENDOSCOPIC ENDOBRONCHIAL ULTRASOUND GUIDED N/A 4/11/2022    Procedure: BRONCHOSCOPY, FIBEROPTIC, endobronchial ultrasound,  transbronchial biopsies, lymphnode forcep biopsies;  Surgeon: Prosper Aguiar MD;  [facility]    COLONOSCOPY      COLONOSCOPY N/A 5/9/2018    Procedure: COMBINED COLONOSCOPY, SINGLE OR MULTIPLE BIOPSY/POLYPECTOMY BY BIOPSY;;  Surgeon: Ramos Bates MD;  Location:  GI    ENDOVASCULAR PLACEMENT VASCULAR DEVICE Left 12/5/2017    Procedure: ENDOVASCULAR PLACEMENT VASCULAR DEVICE;;  Surgeon: Robbie Linda MD;  Location: [facility]    ENT SURGERY      tonsillectomy    EXCISE NODE MEDIASTINAL N/A 11/17/2017    Procedure: EXCISE NODE MEDIASTINAL;;  Surgeon: Robbie Linda MD;  Location:  OR    EYE SURGERY      EYE SURGERY Left     vitrectomy    INSERT PORT VASCULAR ACCESS N/A 12/05/2014    Procedure: INSERT PORT VASCULAR ACCESS;  Surgeon: Robbie Linda MD;  Location:  OR    INSERT PORT VASCULAR ACCESS N/A 12/5/2017    Procedure: INSERT PORT VASCULAR ACCESS;  POWER PORT PLACEMENT ATTEMPTED, PLACEMENT OF ANGIO-SEAL VIP VASCULAR CLOSURE DEVICE;  Surgeon: Robbie Linda MD;  Location: [facility]    IR CHEST PORT PLACEMENT > 5 YRS OF AGE  6/5/2020    IR PORT REMOVAL RIGHT  12/10/2018    PHACOEMULSIFICATION CLEAR CORNEA WITH STANDARD INTRAOCULAR LENS IMPLANT Right 5/2/2016    Procedure: PHACOEMULSIFICATION CLEAR CORNEA WITH STANDARD INTRAOCULAR LENS IMPLANT;  Surgeon: Rasheed Finney MD;  Location:  [facility]    REMOVE PORT VASCULAR ACCESS N/A 3/14/2017    Procedure: REMOVE PORT VASCULAR ACCESS;  Surgeon: Robbie Linda MD;  Location:  OR    REMOVE PORT VASCULAR ACCESS N/A 11/29/2022    Procedure: PORT REMOVAL;  Surgeon: Robbie Linda MD;  Location:  OR    SOFT TISSUE SURGERY      BASAL CELL CA FOREHEAD    THORACOTOMY Right 11/17/2017    Procedure: THORACOTOMY;  RIGHT EXPLORATORY THORACOTOMY/ EXTENSIVE PNEUMOLYSIS/ BIOPSY OF INTERLOBAR LYMPH NODE;  Surgeon: Robbie Linda MD;  Location:  OR    TRANSCERVICAL EXTENDED MEDIASTINAL LYMPHADENECTOMY N/A  "5/24/2022    Procedure: TRANSCERVICAL EXTENDED MEDIASTINAL LYMPHADENECTOMY;  Surgeon: Star Narvaez MD;  [facility]: [facility] OR    TRANSCERVICAL EXTENDED MEDIASTINAL LYMPHADENECTOMY  5/24/2022    Procedure: ;  Surgeon: Star Narvaez MD;  [facility] OR       Social History     Socioeconomic History    Marital status:      Spouse name: Not on file    Number of children: Not on file    Years of education: Not on file    Highest education level: Not on file   Occupational History    Not on file   Tobacco Use    Smoking status: Never    Smokeless tobacco: Never   Substance and Sexual Activity    Alcohol use: No    Drug use: No    Sexual activity: Not on file   Other Topics Concern    Parent/sibling w/ CABG, MI or angioplasty before 65F 55M? Not Asked   Social History Narrative    Not on file     Social Determinants of Health     Financial Resource Strain: Not on file   Food Insecurity: Not on file   Transportation Needs: Not on file   Physical Activity: Not on file   Stress: Not on file   Social Connections: Not on file   Interpersonal Safety: Not At Risk (9/29/2021)    Humiliation, Afraid, Rape, and Kick questionnaire     Fear of Current or Ex-Partner: No     Emotionally Abused: No     Physically Abused: No     Sexually Abused: No   Housing Stability: Not on file       ROS Pulmonary  A complete ROS was otherwise negative except as noted in the HPI.  /72   Pulse 97   Resp 17   Ht 1.727 m (5' 8\")   Wt 108.9 kg (240 lb)   SpO2 93%   BMI 36.49 kg/m    Exam:   GENERAL APPEARANCE: Well developed, well nourished, alert, and in no apparent distress.  EYES: PERRL, EOMI  HENT: Nasal mucosa with no edema and no hyperemia. No nasal polyps.  EARS: Canals clear, TMs normal  MOUTH: Oral mucosa is moist, without any lesions, no tonsillar enlargement, no oropharyngeal exudate.  NECK: supple, no masses, no thyromegaly.  LYMPHATICS: No significant axillary, cervical, or supraclavicular nodes.  RESP: " Mildly decreased air flow throughout.  No crackles. No rhonchi. No wheezes.  CV: Normal S1, S2, regular rhythm, normal rate. No murmur.  No rub. No gallop. No LE edema.   ABDOMEN:  Bowel sounds normal, soft, nontender, no HSM or masses.   MS: extremities normal. No clubbing. No cyanosis.  SKIN: no rash on limited exam  NEURO: Mentation intact, speech normal, normal strength and tone, normal gait and stance  PSYCH: mentation appears normal. and affect normal/bright  Results:  No results found for this or any previous visit (from the past 168 hour(s)).    Assessment and plan:   74 YO with onset of increased SOB for past few months. Diagnosis of COVID in Jan 2022 with mild symptoms.  No recent weight gain; less active in the Winter.  Review of chest CT does not show any increase in size or character of RLL mass/infiltrate. No pleural effusions. Increase in SOB cannot be explained by an increase in the RLL mass/consolidation. Symptoms of increased SOB could be due to post COVID or pulmonary embolism (does have a significant decrease in diffusion capacity)- these have resolved.  Adenopathy in chest found to be benign; no change on repeat imaging. Compliant with oxygen with significant exertion, good saturation with walking.  Remains active without limitation.     1. Continue supplemental oxygen at 4L/NC with exertion. Encouraged to continue exercise regimen.  Will investigate possibility of [employer] system regarding availability and cost     RTC in 6 months.  Advised him to contact the clinic with any questions or concerns.      Sanjay Granado MD

## 2023-11-16 NOTE — PROGRESS NOTES
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    Pulmonary HPI  74 YO male with history of DLBCL diagnosed in 2015. Developed respiratory symptoms of SOB and BETANCOURT around the time of diagnosis. Has had RLL mass diagnosed as lymphoma.  Underwent therapy with improvement in symptoms.  Has recurrence of disease in 2020 and underwent CAR-T cell therapy.  Was diagnosed with COVID in January 2022, had mild symptoms of URI symptoms and nasal drainage.  Felt mildly SOB during that time. Symptoms resolved after 10 days; notes some SOB after COVID.  States at the present time his SOB is only with significant exertion of walking in box stores.  Notices some cough but is mostly non-productive except for feeling of gastric secretions after coughing.  CT chest from 3-18 and PET scan from 3-31 were personally reviewed.  RLL mass is unchanged in size, mediastinal and hilar adenopathy.  History of JESSICA, on CPAP.  No tobacco.    Last seen 6 months ago. Since his last visit he has been doing well.  No increase SOB or BETANCOURT.  Is using oxygen at 4L/min with significant exertion via a pulse system; does not use if he is doing short walks.  Monitors his oxygen saturation and it decreases to 88-90% if ambulating without oxygen. Is interested in a change in oxygen supply to an [employer] system to allow for trips.  Underwent chest CT on 10-31, this was personally reviewed in clinic.  Compared to 2022 there is no significant change.    The patient was seen and examined by Sanjay Granado MD           Current Outpatient Medications   Medication    docusate sodium (COLACE) 100 MG capsule    gabapentin (NEURONTIN) 100 MG capsule    insulin aspart (NOVOLOG FLEXPEN) 100 UNIT/ML pen    Melatonin 10 MG TABS tablet    metFORMIN (GLUCOPHAGE) 500 MG tablet    nitroGLYcerin (NITROSTAT) 0.4 MG sublingual tablet    rosuvastatin (CRESTOR) 10 MG tablet     No current facility-administered  medications for this visit.     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  Partial left second toe amputation with osteotomy through proximal phalanx.  2.  Partial left third toe amputation with osteotomy through proximal phalanx.;  Surgeon: Ismael Humphrey DPM;  Location:  OR    BIOPSY LYMPH NODE CERVICAL N/A 12/5/2014    Procedure: BIOPSY LYMPH NODE CERVICAL;  Surgeon: Robbie Linda MD;  Location:  OR    BRONCHOSCOPY FLEXIBLE N/A 11/14/2017    Procedure: BRONCHOSCOPY FLEXIBLE;  FLEXIBLE BRONCHOSCOPY WITH BIOPSIES.;  Surgeon: Robbie Linda MD;  Location:  OR    BRONCHOSCOPY RIGID OR FLEXIBLE W/TRANSENDOSCOPIC ENDOBRONCHIAL ULTRASOUND GUIDED N/A 4/11/2022    Procedure: BRONCHOSCOPY, FIBEROPTIC, endobronchial ultrasound, transbronchial biopsies, lymphnode forcep biopsies;  Surgeon: Prosper Aguiar MD;  Location: [facility]    COLONOSCOPY      COLONOSCOPY N/A 5/9/2018    Procedure: COMBINED COLONOSCOPY, SINGLE OR MULTIPLE BIOPSY/POLYPECTOMY BY BIOPSY;;  Surgeon: Ramos Bates MD;  Location:  GI    ENDOVASCULAR  PLACEMENT VASCULAR DEVICE Left 12/5/2017    Procedure: ENDOVASCULAR PLACEMENT VASCULAR DEVICE;;  Surgeon: Robbie Linda MD;  Location: [facility]    ENT SURGERY      tonsillectomy    EXCISE NODE MEDIASTINAL N/A 11/17/2017    Procedure: EXCISE NODE MEDIASTINAL;;  Surgeon: Robbie Linda MD;  Location:  OR    EYE SURGERY      EYE SURGERY Left     vitrectomy    INSERT PORT VASCULAR ACCESS N/A 12/05/2014    Procedure: INSERT PORT VASCULAR ACCESS;  Surgeon: Robbie Linda MD;  Location:  OR    INSERT PORT VASCULAR ACCESS N/A 12/5/2017    Procedure: INSERT PORT VASCULAR ACCESS;  POWER PORT PLACEMENT ATTEMPTED, PLACEMENT OF ANGIO-SEAL VIP VASCULAR CLOSURE DEVICE;  Surgeon: Robbie Linda MD;  Location: [facility]    IR CHEST PORT PLACEMENT > 5 YRS OF AGE  6/5/2020    IR PORT REMOVAL RIGHT  12/10/2018    PHACOEMULSIFICATION CLEAR CORNEA WITH STANDARD INTRAOCULAR LENS IMPLANT Right 5/2/2016    Procedure: PHACOEMULSIFICATION CLEAR CORNEA WITH STANDARD INTRAOCULAR LENS IMPLANT;  Surgeon: Rasheed Finney MD;  [facility]    REMOVE PORT VASCULAR ACCESS N/A 3/14/2017    Procedure: REMOVE PORT VASCULAR ACCESS;  Surgeon: Robbie Linda MD;  Location:  OR    REMOVE PORT VASCULAR ACCESS N/A 11/29/2022    Procedure: PORT REMOVAL;  Surgeon: Robbie Linda MD;  Location:  OR    SOFT TISSUE SURGERY      BASAL CELL CA FOREHEAD    THORACOTOMY Right 11/17/2017    Procedure: THORACOTOMY;  RIGHT EXPLORATORY THORACOTOMY/ EXTENSIVE PNEUMOLYSIS/ BIOPSY OF INTERLOBAR LYMPH NODE;  Surgeon: Robbie Linda MD;  Location:  OR    TRANSCERVICAL EXTENDED MEDIASTINAL LYMPHADENECTOMY N/A 5/24/2022    Procedure: TRANSCERVICAL EXTENDED MEDIASTINAL LYMPHADENECTOMY;  Surgeon: Star Narvaez MD;  Location:  OR    TRANSCERVICAL EXTENDED MEDIASTINAL LYMPHADENECTOMY  5/24/2022    Procedure: ;  Surgeon: Star Narvaez MD;  Location:  OR       Social History  "    Socioeconomic History    Marital status:      Spouse name: Not on file    Number of children: Not on file    Years of education: Not on file    Highest education level: Not on file   Occupational History    Not on file   Tobacco Use    Smoking status: Never    Smokeless tobacco: Never   Substance and Sexual Activity    Alcohol use: No    Drug use: No    Sexual activity: Not on file   Other Topics Concern    Parent/sibling w/ CABG, MI or angioplasty before 65F 55M? Not Asked   Social History Narrative    Not on file     Social Determinants of Health     Financial Resource Strain: Not on file   Food Insecurity: Not on file   Transportation Needs: Not on file   Physical Activity: Not on file   Stress: Not on file   Social Connections: Not on file   Interpersonal Safety: Not At Risk (9/29/2021)    Humiliation, Afraid, Rape, and Kick questionnaire     Fear of Current or Ex-Partner: No     Emotionally Abused: No     Physically Abused: No     Sexually Abused: No   Housing Stability: Not on file       ROS Pulmonary  A complete ROS was otherwise negative except as noted in the HPI.  /72   Pulse 97   Resp 17   Ht 1.727 m (5' 8\")   Wt 108.9 kg (240 lb)   SpO2 93%   BMI 36.49 kg/m    Exam:   GENERAL APPEARANCE: Well developed, well nourished, alert, and in no apparent distress.  EYES: PERRL, EOMI  HENT: Nasal mucosa with no edema and no hyperemia. No nasal polyps.  EARS: Canals clear, TMs normal  MOUTH: Oral mucosa is moist, without any lesions, no tonsillar enlargement, no oropharyngeal exudate.  NECK: supple, no masses, no thyromegaly.  LYMPHATICS: No significant axillary, cervical, or supraclavicular nodes.  RESP: Mildly decreased air flow throughout.  No crackles. No rhonchi. No wheezes.  CV: Normal S1, S2, regular rhythm, normal rate. No murmur.  No rub. No gallop. No LE edema.   ABDOMEN:  Bowel sounds normal, soft, nontender, no HSM or masses.   MS: extremities normal. No clubbing. No " cyanosis.  SKIN: no rash on limited exam  NEURO: Mentation intact, speech normal, normal strength and tone, normal gait and stance  PSYCH: mentation appears normal. and affect normal/bright  Results:  No results found for this or any previous visit (from the past 168 hour(s)).    Assessment and plan:   74 YO with onset of increased SOB for past few months. Diagnosis of COVID in Jan 2022 with mild symptoms.  No recent weight gain; less active in the Winter.  Review of chest CT does not show any increase in size or character of RLL mass/infiltrate. No pleural effusions. Increase in SOB cannot be explained by an increase in the RLL mass/consolidation. Symptoms of increased SOB could be due to post COVID or pulmonary embolism (does have a significant decrease in diffusion capacity)- these have resolved.  Adenopathy in chest found to be benign; no change on repeat imaging. Compliant with oxygen with significant exertion, good saturation with walking.  Remains active without limitation.     1. Continue supplemental oxygen at 4L/NC with exertion. Encouraged to continue exercise regimen.  Will investigate possibility of Projection[employer] system regarding availability and cost     RTC in 6 months.  Advised him to contact the clinic with any questions or concerns.

## 2023-11-17 ENCOUNTER — TELEPHONE (OUTPATIENT)
Dept: PULMONOLOGY | Facility: CLINIC | Age: 73
End: 2023-11-17
Payer: COMMERCIAL

## 2023-11-17 NOTE — TELEPHONE ENCOUNTER
Left Voicemail (1st Attempt) for the patient to call back and schedule the following:    Appointment type: IWONA  Provider: FREIDA  Return date: 05/16/2024  Specialty phone number: [phone]  Additional appointment(s) needed: N/A  Additonal Notes: N/A

## 2023-11-28 NOTE — ANESTHESIA CARE TRANSFER NOTE
Patient: Pastor Garibay    Procedure(s):  FLEXIBLE BRONCHOSCOPY WITH BIOPSIES. - Wound Class: II-Clean Contaminated    Diagnosis: LUNG MASS  Diagnosis Additional Information: No value filed.    Anesthesia Type:   General, ETT     Note:  Airway :Face Mask  Patient transferred to:PACU  Comments: Neuromuscular blockade not used after succinylcholine for intubation, spontaneous return of TOF 4/4 with sustained tetany, spontaneous respirations, adequate tidal volumes, followed commands to voice, oropharynx suctioned with soft flexible catheter, extubated atraumatically, extubated with suction, airway patent after extubation.  Oxygen via facemask at 10 liters per minute to PACU. Oxygen tubing connected to wall O2 in PACU, SpO2, NiBP, and EKG monitors and alarms on and functioning, Caitlin Hugger warmer connected to patient gown, report on patient's clinical status given to PACU RN, RN questions answered. Handoff Report: Identifed the Patient, Identified the Reponsible Provider, Reviewed the pertinent medical history, Discussed the surgical course, Reviewed Intra-OP anesthesia mangement and issues during anesthesia, Set expectations for post-procedure period and Allowed opportunity for questions and acknowledgement of understanding      Vitals: (Last set prior to Anesthesia Care Transfer)    CRNA VITALS  11/14/2017 1105 - 11/14/2017 1141      11/14/2017             NIBP: (!)  145/69    NIBP Mean: 115    Resp Rate (set): 10                Electronically Signed By: VERNA Black CRNA  November 14, 2017  11:41 AM   What Type Of Note Output Would You Prefer (Optional)?: Standard Output How Severe Are Your Spot(S)?: moderate Have Your Spot(S) Been Treated In The Past?: has not been treated Hpi Title: Evaluation of Skin Lesions Additional History: Last FBSE 05/2023

## 2023-11-30 ENCOUNTER — TELEPHONE (OUTPATIENT)
Dept: PULMONOLOGY | Facility: CLINIC | Age: 73
End: 2023-11-30
Payer: COMMERCIAL

## 2023-11-30 NOTE — TELEPHONE ENCOUNTER
Phone follow up with patient regarding options to get him a POC (portable oxygen concentrator.)  After we had spoke last, I made a phone call to [facility]. They have a waitlist for POC's, which I had them add his name to. They also upcharge about $70 a month for their POC's.   I relayed this all to Pastor and he is going to wait until his new insurance kicks in in 2024.    Sherman Baldwin, RT

## 2023-12-08 ENCOUNTER — OFFICE VISIT (OUTPATIENT)
Dept: CARDIOLOGY | Facility: CLINIC | Age: 73
End: 2023-12-08
Attending: FAMILY MEDICINE
Payer: COMMERCIAL

## 2023-12-08 VITALS
WEIGHT: 246.8 LBS | HEART RATE: 77 BPM | SYSTOLIC BLOOD PRESSURE: 115 MMHG | BODY MASS INDEX: 37.41 KG/M2 | OXYGEN SATURATION: 95 % | DIASTOLIC BLOOD PRESSURE: 70 MMHG | HEIGHT: 68 IN

## 2023-12-08 DIAGNOSIS — Z85.72 HISTORY OF NON-HODGKIN'S LYMPHOMA: ICD-10-CM

## 2023-12-08 DIAGNOSIS — I25.10 CORONARY ARTERY DISEASE INVOLVING NATIVE CORONARY ARTERY OF NATIVE HEART WITHOUT ANGINA PECTORIS: Primary | ICD-10-CM

## 2023-12-08 DIAGNOSIS — R06.09 EXERTIONAL DYSPNEA: ICD-10-CM

## 2023-12-08 PROCEDURE — 99204 OFFICE O/P NEW MOD 45 MIN: CPT | Performed by: INTERNAL MEDICINE

## 2023-12-08 PROCEDURE — 93000 ELECTROCARDIOGRAM COMPLETE: CPT | Performed by: INTERNAL MEDICINE

## 2023-12-08 RX ORDER — ASPIRIN 81 MG/1
81 TABLET ORAL DAILY
COMMUNITY

## 2023-12-08 RX ORDER — LANOLIN ALCOHOL/MO/W.PET/CERES
1000 CREAM (GRAM) TOPICAL DAILY
COMMUNITY

## 2023-12-08 RX ORDER — SEMAGLUTIDE 0.68 MG/ML
INJECTION, SOLUTION SUBCUTANEOUS
COMMUNITY
Start: 2023-12-05

## 2023-12-08 RX ORDER — BUDESONIDE AND FORMOTEROL FUMARATE DIHYDRATE 160; 4.5 UG/1; UG/1
1 AEROSOL RESPIRATORY (INHALATION) 2 TIMES DAILY
COMMUNITY
Start: 2023-11-20

## 2023-12-08 NOTE — PROGRESS NOTES
HPI and Plan:   Mr. Garibay is a very pleasant 73-year-old gentleman who is here for evaluation of severe coronary calcification noted on CT chest.  This was done as a part of surveillance for history of lymphoma which she had 3 years ago for which she received chemotherapy and radiation and is now in remission.  Patient had echocardiogram last year which was technically difficult study but overall normal LV systolic function without any significant valvular regurgitation or stenosis.  Patient tells me that due to radiation therapy he had lung damage and he uses oxygen on as-needed basis usually with long distance ambulation.  At rest his oxygen saturation is mid 90s and does not use any oxygen.  He denies any exertional chest discomfort although he has some longstanding exertional shortness of breath stable.  He does have history of diabetes and obesity.  He was recently started on aspirin he is on Crestor 20 mg daily LDL is well-controlled at 40.  He does not use any tobacco.  EKG done today personally reviewed, shows sinus rhythm.      Assessment plan  Coronary artery disease on the basis of severe coronary calcification seen on CT chest.  This was evident also on CT scan 2017 onwards.  Fortunately no chest discomfort.  Some dyspnea on exertion.  This may represent anginal equivalent.  Discussed option of coronary angiogram versus stress test.  Risk benefits at this time do not favor invasive strategy with coronary angiogram.  He is agreeable for stress test.  Given his body habitus recommend cardiac MRI stress perfusion.  Educated patient not to consume any caffeine for tolerance before the stress test.  Continue aspirin and high intensity statin  LDL well-controlled on high intensity statin  Obesity  Diabetes on insulin and metformin    Recommendations  Continue aspirin, Crestor  Cardiac MRI stress perfusion  Follow-up subsequent to the stress test.    Today's clinic visit entailed:      The level of medical  decision making during this visit was of moderate complexity.      Orders Placed This Encounter   Procedures    Follow-Up with Cardiology    EKG 12-lead complete w/read - Clinics (performed today)       Orders Placed This Encounter   Medications    SYMBICORT 160-4.5 MCG/ACT Inhaler     Sig: Inhale 1 puff into the lungs 2 times daily    OZEMPIC, 0.25 OR 0.5 MG/DOSE, 2 MG/3ML pen    UNABLE TO FIND     Sig: Oxygen 4LPM PRN    aspirin 81 MG EC tablet     Sig: Take 81 mg by mouth daily    cyanocobalamin (VITAMIN B-12) 1000 MCG tablet     Sig: Take 1,000 mcg by mouth daily       There are no discontinued medications.      Encounter Diagnoses   Name Primary?    Diabetes mellitus (H)     Exertional dyspnea     HTN (hypertension)     Hyperlipemia     History of non-Hodgkin's lymphoma     Coronary artery disease involving native coronary artery of native heart without angina pectoris Yes       CURRENT MEDICATIONS:  Current Outpatient Medications   Medication Sig Dispense Refill    aspirin 81 MG EC tablet Take 81 mg by mouth daily      cyanocobalamin (VITAMIN B-12) 1000 MCG tablet Take 1,000 mcg by mouth daily      docusate sodium (COLACE) 100 MG capsule Take 100 mg by mouth 2 times daily as needed for constipation      gabapentin (NEURONTIN) 100 MG capsule TAKE 2 CAPSULES BY MOUTH AT BEDTIME DAILY. OK TO TAKE 1 EXTRA CAPSULE AS NEEDED FOR FLARE UP      insulin aspart (NOVOLOG FLEXPEN) 100 UNIT/ML pen Inject Subcutaneous 3 times daily (with meals) use sliding scale based on Carbs and Blood Glucose. Utilizing Pump approx 9/13/21      Melatonin 10 MG TABS tablet Take 10 mg by mouth nightly as needed for sleep      metFORMIN (GLUCOPHAGE) 500 MG tablet Take 1 tablet (500 mg) by mouth 2 times daily (with meals)      nitroGLYcerin (NITROSTAT) 0.4 MG sublingual tablet every 5 minutes as needed      OZEMPIC, 0.25 OR 0.5 MG/DOSE, 2 MG/3ML pen       rosuvastatin (CRESTOR) 10 MG tablet Take 20 mg by mouth every morning      SYMBICORT  160-4.5 MCG/ACT Inhaler Inhale 1 puff into the lungs 2 times daily      UNABLE TO FIND Oxygen 4LPM PRN         ALLERGIES   No Known Allergies    PAST MEDICAL HISTORY:  Past Medical History:   Diagnosis Date    Abnormal liver function test     Anemia     CPAP (continuous positive airway pressure) dependence     Diabetes (H)     Exertional dyspnea     uses 4L O2 with activity    Hard to intubate 05/24/2022    Hx of skin cancer, basal cell     Hyperlipidemia     Hypertension     Keratoderma     Large cell lymphoma (H) 07/20/2020    Liver disease     Lymphoma (H)     Non-Hodgkin lymphoma (H)     Pedal edema     CHRONIC    Pleural effusion     Seborrheic keratosis, inflamed     Sleep apnea     Uses a CPAP    Syncope and collapse     Thrombocytopenia (H24) 07/20/2020    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  Partial left second toe amputation with osteotomy through proximal phalanx.  2.  Partial left third toe amputation with osteotomy through proximal phalanx.;  Surgeon: Ismael Humphrey DPM;  Location: [facility]    BIOPSY LYMPH NODE CERVICAL N/A 12/5/2014    Procedure: BIOPSY LYMPH NODE CERVICAL;  Surgeon: Robbie Linda MD;  Location:  OR    BRONCHOSCOPY FLEXIBLE N/A 11/14/2017    Procedure: BRONCHOSCOPY FLEXIBLE;  FLEXIBLE BRONCHOSCOPY WITH BIOPSIES.;  Surgeon: Robbie Linda MD;  Location:  OR    BRONCHOSCOPY RIGID OR FLEXIBLE W/TRANSENDOSCOPIC ENDOBRONCHIAL ULTRASOUND GUIDED N/A 4/11/2022    Procedure: BRONCHOSCOPY, FIBEROPTIC, endobronchial ultrasound, transbronchial biopsies, lymphnode forcep biopsies;  Surgeon: Prosper Aguiar MD;  Location: [facility]    COLONOSCOPY      COLONOSCOPY N/A 5/9/2018     Procedure: COMBINED COLONOSCOPY, SINGLE OR MULTIPLE BIOPSY/POLYPECTOMY BY BIOPSY;;  Surgeon: Ramos Bates MD;  Location:  GI    ENDOVASCULAR PLACEMENT VASCULAR DEVICE Left 12/5/2017    Procedure: ENDOVASCULAR PLACEMENT VASCULAR DEVICE;;  Surgeon: Robbie Linda MD;  Location: [facility]    ENT SURGERY      tonsillectomy    EXCISE NODE MEDIASTINAL N/A 11/17/2017    Procedure: EXCISE NODE MEDIASTINAL;;  Surgeon: Robbie Linda MD;  Location:  OR    EYE SURGERY      EYE SURGERY Left     vitrectomy    INSERT PORT VASCULAR ACCESS N/A 12/05/2014    Procedure: INSERT PORT VASCULAR ACCESS;  Surgeon: Robbie Linda MD;  Location:  OR    INSERT PORT VASCULAR ACCESS N/A 12/5/2017    Procedure: INSERT PORT VASCULAR ACCESS;  POWER PORT PLACEMENT ATTEMPTED, PLACEMENT OF ANGIO-SEAL VIP VASCULAR CLOSURE DEVICE;  Surgeon: Robbie Linda MD;  Location: [facility]    IR CHEST PORT PLACEMENT > 5 YRS OF AGE  6/5/2020    IR PORT REMOVAL RIGHT  12/10/2018    PHACOEMULSIFICATION CLEAR CORNEA WITH STANDARD INTRAOCULAR LENS IMPLANT Right 5/2/2016    Procedure: PHACOEMULSIFICATION CLEAR CORNEA WITH STANDARD INTRAOCULAR LENS IMPLANT;  Surgeon: Rasheed Finney MD;  [facility]    REMOVE PORT VASCULAR ACCESS N/A 3/14/2017    Procedure: REMOVE PORT VASCULAR ACCESS;  Surgeon: Robbie Linda MD;  Location:  OR    REMOVE PORT VASCULAR ACCESS N/A 11/29/2022    Procedure: PORT REMOVAL;  Surgeon: Robbie Linda MD;  Location:  OR    SOFT TISSUE SURGERY      BASAL CELL CA FOREHEAD    THORACOTOMY Right 11/17/2017    Procedure: THORACOTOMY;  RIGHT EXPLORATORY THORACOTOMY/ EXTENSIVE PNEUMOLYSIS/ BIOPSY OF INTERLOBAR LYMPH NODE;  Surgeon: Robbie Linda MD;  Location:  OR    TRANSCERVICAL EXTENDED MEDIASTINAL LYMPHADENECTOMY N/A 5/24/2022    Procedure: TRANSCERVICAL EXTENDED MEDIASTINAL LYMPHADENECTOMY;  Surgeon: Star Narvaez MD;  Location:  OR    TRANSCERVICAL  "EXTENDED MEDIASTINAL LYMPHADENECTOMY  5/24/2022    Procedure: ;  Surgeon: Star Narvaez MD;  Location: [facility]       FAMILY HISTORY:  Family History   Problem Relation Age of Onset    Diabetes Mother     CABG Father     Anesthesia Reaction No family hx of        SOCIAL HISTORY:  Social History     Socioeconomic History    Marital status:      Spouse name: None    Number of children: None    Years of education: None    Highest education level: None   Tobacco Use    Smoking status: Never    Smokeless tobacco: Never   Substance and Sexual Activity    Alcohol use: Yes     Comment: 3-4 beers in a year    Drug use: No   Other Topics Concern    Parent/sibling w/ CABG, MI or angioplasty before 65F 55M? Yes     Social Determinants of Health     Interpersonal Safety: Not At Risk (9/29/2021)    Humiliation, Afraid, Rape, and Kick questionnaire     Fear of Current or Ex-Partner: No     Emotionally Abused: No     Physically Abused: No     Sexually Abused: No       Review of Systems:  Skin:          Eyes:         ENT:         Respiratory:  Positive for   BETANCOURT, walking distance, uses O2-4LPM, PRN   Cardiovascular:  Negative;palpitations;chest pain;dizziness;syncope or near-syncope;cyanosis;edema exercise intolerance;Positive for tires with activity  Gastroenterology:        Genitourinary:         Musculoskeletal:         Neurologic:         Psychiatric:         Heme/Lymph/Imm:         Endocrine:  Positive for diabetes      Physical Exam:  Vitals: /70   Pulse 77   Ht 1.727 m (5' 8\")   Wt 111.9 kg (246 lb 12.8 oz)   SpO2 95%   BMI 37.53 kg/m      General patient appears comfortable  Neck normal JVP  Cardiovascular system S1-S2 normal no murmur rub or gallop  Respiratory system clear to auscultation  Extremities no edema  Neurological alert, oriented      CC  Gris Garcia MD  [address] [place] [address]                "

## 2023-12-08 NOTE — LETTER
12/8/2023    [facility]  [address] [place] [address]    RE: Pastor Garibay       Dear Colleague,     I had the pleasure of seeing Pastor Garibay in the [facility].  HPI and Plan:   Mr. Garibay is a very pleasant 73-year-old gentleman who is here for evaluation of severe coronary calcification noted on CT chest.  This was done as a part of surveillance for history of lymphoma which she had 3 years ago for which she received chemotherapy and radiation and is now in remission.  Patient had echocardiogram last year which was technically difficult study but overall normal LV systolic function without any significant valvular regurgitation or stenosis.  Patient tells me that due to radiation therapy he had lung damage and he uses oxygen on as-needed basis usually with long distance ambulation.  At rest his oxygen saturation is mid 90s and does not use any oxygen.  He denies any exertional chest discomfort although he has some longstanding exertional shortness of breath stable.  He does have history of diabetes and obesity.  He was recently started on aspirin he is on Crestor 20 mg daily LDL is well-controlled at 40.  He does not use any tobacco.  EKG done today personally reviewed, shows sinus rhythm.      Assessment plan  Coronary artery disease on the basis of severe coronary calcification seen on CT chest.  This was evident also on CT scan 2017 onwards.  Fortunately no chest discomfort.  Some dyspnea on exertion.  This may represent anginal equivalent.  Discussed option of coronary angiogram versus stress test.  Risk benefits at this time do not favor invasive strategy with coronary angiogram.  He is agreeable for stress test.  Given his body habitus recommend cardiac MRI stress perfusion.  Educated patient not to consume any caffeine for tolerance before the stress test.  Continue aspirin and high intensity statin  LDL well-controlled on high intensity  statin  Obesity  Diabetes on insulin and metformin    Recommendations  Continue aspirin, Crestor  Cardiac MRI stress perfusion  Follow-up subsequent to the stress test.    Today's clinic visit entailed:      The level of medical decision making during this visit was of moderate complexity.      Orders Placed This Encounter   Procedures    Follow-Up with Cardiology    EKG 12-lead complete w/read - Clinics (performed today)       Orders Placed This Encounter   Medications    SYMBICORT 160-4.5 MCG/ACT Inhaler     Sig: Inhale 1 puff into the lungs 2 times daily    OZEMPIC, 0.25 OR 0.5 MG/DOSE, 2 MG/3ML pen    UNABLE TO FIND     Sig: Oxygen 4LPM PRN    aspirin 81 MG EC tablet     Sig: Take 81 mg by mouth daily    cyanocobalamin (VITAMIN B-12) 1000 MCG tablet     Sig: Take 1,000 mcg by mouth daily       There are no discontinued medications.      Encounter Diagnoses   Name Primary?    Diabetes mellitus (H)     Exertional dyspnea     HTN (hypertension)     Hyperlipemia     History of non-Hodgkin's lymphoma     Coronary artery disease involving native coronary artery of native heart without angina pectoris Yes       CURRENT MEDICATIONS:  Current Outpatient Medications   Medication Sig Dispense Refill    aspirin 81 MG EC tablet Take 81 mg by mouth daily      cyanocobalamin (VITAMIN B-12) 1000 MCG tablet Take 1,000 mcg by mouth daily      docusate sodium (COLACE) 100 MG capsule Take 100 mg by mouth 2 times daily as needed for constipation      gabapentin (NEURONTIN) 100 MG capsule TAKE 2 CAPSULES BY MOUTH AT BEDTIME DAILY. OK TO TAKE 1 EXTRA CAPSULE AS NEEDED FOR FLARE UP      insulin aspart (NOVOLOG FLEXPEN) 100 UNIT/ML pen Inject Subcutaneous 3 times daily (with meals) use sliding scale based on Carbs and Blood Glucose. Utilizing Pump approx 9/13/21      Melatonin 10 MG TABS tablet Take 10 mg by mouth nightly as needed for sleep      metFORMIN (GLUCOPHAGE) 500 MG tablet Take 1 tablet (500 mg) by mouth 2 times daily (with  meals)      nitroGLYcerin (NITROSTAT) 0.4 MG sublingual tablet every 5 minutes as needed      OZEMPIC, 0.25 OR 0.5 MG/DOSE, 2 MG/3ML pen       rosuvastatin (CRESTOR) 10 MG tablet Take 20 mg by mouth every morning      SYMBICORT 160-4.5 MCG/ACT Inhaler Inhale 1 puff into the lungs 2 times daily      UNABLE TO FIND Oxygen 4LPM PRN         ALLERGIES   No Known Allergies    PAST MEDICAL HISTORY:  Past Medical History:   Diagnosis Date    Abnormal liver function test     Anemia     CPAP (continuous positive airway pressure) dependence     Diabetes (H)     Exertional dyspnea     uses 4L O2 with activity    Hard to intubate 05/24/2022    Hx of skin cancer, basal cell     Hyperlipidemia     Hypertension     Keratoderma     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  Partial left second toe amputation with osteotomy through proximal phalanx.  2.  Partial left third toe amputation with osteotomy through proximal phalanx.;  Surgeon: Ismael Humphrey DPM;  Location:  OR    BIOPSY LYMPH NODE CERVICAL N/A 12/5/2014    Procedure: BIOPSY LYMPH NODE CERVICAL;  Surgeon: Robbie Linda MD;  Location:  OR    BRONCHOSCOPY FLEXIBLE N/A 11/14/2017    Procedure: BRONCHOSCOPY FLEXIBLE;  FLEXIBLE BRONCHOSCOPY WITH BIOPSIES.;  Surgeon: Robbie Linda MD;  Location:  OR    BRONCHOSCOPY RIGID OR FLEXIBLE W/TRANSENDOSCOPIC ENDOBRONCHIAL ULTRASOUND GUIDED N/A  4/11/2022    Procedure: BRONCHOSCOPY, FIBEROPTIC, endobronchial ultrasound, transbronchial biopsies, lymphnode forcep biopsies;  Surgeon: Prosper Aguiar MD;  Location: [facility] OR    COLONOSCOPY      COLONOSCOPY N/A 5/9/2018    Procedure: COMBINED COLONOSCOPY, SINGLE OR MULTIPLE BIOPSY/POLYPECTOMY BY BIOPSY;;  Surgeon: Ramos Bates MD;  Location:  GI    ENDOVASCULAR PLACEMENT VASCULAR DEVICE Left 12/5/2017    Procedure: ENDOVASCULAR PLACEMENT VASCULAR DEVICE;;  Surgeon: Robbie Linda MD;  Location: [facility]    ENT SURGERY      tonsillectomy    EXCISE NODE MEDIASTINAL N/A 11/17/2017    Procedure: EXCISE NODE MEDIASTINAL;;  Surgeon: Robbie Linda MD;  Location:  OR    EYE SURGERY      EYE SURGERY Left     vitrectomy    INSERT PORT VASCULAR ACCESS N/A 12/05/2014    Procedure: INSERT PORT VASCULAR ACCESS;  Surgeon: Robbie Linda MD;  Location:  OR    INSERT PORT VASCULAR ACCESS N/A 12/5/2017    Procedure: INSERT PORT VASCULAR ACCESS;  POWER PORT PLACEMENT ATTEMPTED, PLACEMENT OF ANGIO-SEAL VIP VASCULAR CLOSURE DEVICE;  Surgeon: Robbie Linda MD;  Location: [facility]    IR CHEST PORT PLACEMENT > 5 YRS OF AGE  6/5/2020    IR PORT REMOVAL RIGHT  12/10/2018    PHACOEMULSIFICATION CLEAR CORNEA WITH STANDARD INTRAOCULAR LENS IMPLANT Right 5/2/2016    Procedure: PHACOEMULSIFICATION CLEAR CORNEA WITH STANDARD INTRAOCULAR LENS IMPLANT;  Surgeon: Rasheed Finney MD;  Location:  [facility]    REMOVE PORT VASCULAR ACCESS N/A 3/14/2017    Procedure: REMOVE PORT VASCULAR ACCESS;  Surgeon: Robbie Linda MD;  Location:  OR    REMOVE PORT VASCULAR ACCESS N/A 11/29/2022    Procedure: PORT REMOVAL;  Surgeon: Robbie Linda MD;  Location:  OR    SOFT TISSUE SURGERY      BASAL CELL CA FOREHEAD    THORACOTOMY Right 11/17/2017    Procedure: THORACOTOMY;  RIGHT EXPLORATORY THORACOTOMY/ EXTENSIVE PNEUMOLYSIS/ BIOPSY OF INTERLOBAR LYMPH NODE;  Surgeon: Robbie Linda MD;  " Location: [facility] OR    TRANSCERVICAL EXTENDED MEDIASTINAL LYMPHADENECTOMY N/A 5/24/2022    Procedure: TRANSCERVICAL EXTENDED MEDIASTINAL LYMPHADENECTOMY;  Surgeon: Star Narvaez MD;  Location: [facility] OR    TRANSCERVICAL EXTENDED MEDIASTINAL LYMPHADENECTOMY  5/24/2022    Procedure: ;  Surgeon: Star Narvaez MD;  Location: [facility] OR       FAMILY HISTORY:  Family History   Problem Relation Age of Onset    Diabetes Mother     CABG Father     Anesthesia Reaction No family hx of        SOCIAL HISTORY:  Social History     Socioeconomic History    Marital status:      Spouse name: None    Number of children: None    Years of education: None    Highest education level: None   Tobacco Use    Smoking status: Never    Smokeless tobacco: Never   Substance and Sexual Activity    Alcohol use: Yes     Comment: 3-4 beers in a year    Drug use: No   Other Topics Concern    Parent/sibling w/ CABG, MI or angioplasty before 65F 55M? Yes     Social Determinants of Health     Interpersonal Safety: Not At Risk (9/29/2021)    Humiliation, Afraid, Rape, and Kick questionnaire     Fear of Current or Ex-Partner: No     Emotionally Abused: No     Physically Abused: No     Sexually Abused: No       Review of Systems:  Skin:          Eyes:         ENT:         Respiratory:  Positive for   BETANCOURT, walking distance, uses O2-4LPM, PRN   Cardiovascular:  Negative;palpitations;chest pain;dizziness;syncope or near-syncope;cyanosis;edema exercise intolerance;Positive for tires with activity  Gastroenterology:        Genitourinary:         Musculoskeletal:         Neurologic:         Psychiatric:         Heme/Lymph/Imm:         Endocrine:  Positive for diabetes      Physical Exam:  Vitals: /70   Pulse 77   Ht 1.727 m (5' 8\")   Wt 111.9 kg (246 lb 12.8 oz)   SpO2 95%   BMI 37.53 kg/m      General patient appears comfortable  Neck normal JVP  Cardiovascular system S1-S2 normal no murmur rub or gallop  Respiratory system clear to " auscultation  Extremities no edema  Neurological alert, oriented      CC  Gris Garcia MD  [address] [place] [address]                Thank you for allowing me to participate in the care of your patient.      Sincerely,     Kurt Jones MD     [facility]

## 2023-12-28 ENCOUNTER — TELEPHONE (OUTPATIENT)
Dept: CARDIOLOGY | Facility: CLINIC | Age: 73
End: 2023-12-28

## 2023-12-28 ENCOUNTER — HOSPITAL ENCOUNTER (OUTPATIENT)
Dept: CARDIOLOGY | Facility: CLINIC | Age: 73
Discharge: HOME | End: 2023-12-28
Attending: INTERNAL MEDICINE | Admitting: INTERNAL MEDICINE
Payer: COMMERCIAL

## 2023-12-28 VITALS — SYSTOLIC BLOOD PRESSURE: 108 MMHG | HEART RATE: 98 BPM | DIASTOLIC BLOOD PRESSURE: 63 MMHG

## 2023-12-28 DIAGNOSIS — I25.10 CORONARY ARTERY DISEASE INVOLVING NATIVE CORONARY ARTERY OF NATIVE HEART WITHOUT ANGINA PECTORIS: ICD-10-CM

## 2023-12-28 PROCEDURE — 250N000011 HC RX IP 250 OP 636: Mod: JZ | Performed by: INTERNAL MEDICINE

## 2023-12-28 PROCEDURE — 255N000002 HC RX 255 OP 636: Performed by: INTERNAL MEDICINE

## 2023-12-28 PROCEDURE — 75563 CARD MRI W/STRESS IMG & DYE: CPT | Mod: 26 | Performed by: INTERNAL MEDICINE

## 2023-12-28 PROCEDURE — A9585 GADOBUTROL INJECTION: HCPCS | Performed by: INTERNAL MEDICINE

## 2023-12-28 PROCEDURE — 93016 CV STRESS TEST SUPVJ ONLY: CPT | Performed by: INTERNAL MEDICINE

## 2023-12-28 PROCEDURE — 93018 CV STRESS TEST I&R ONLY: CPT | Performed by: INTERNAL MEDICINE

## 2023-12-28 PROCEDURE — 93017 CV STRESS TEST TRACING ONLY: CPT

## 2023-12-28 RX ORDER — ALBUTEROL SULFATE 90 UG/1
2 AEROSOL, METERED RESPIRATORY (INHALATION) EVERY 5 MIN PRN
Status: DISCONTINUED | OUTPATIENT
Start: 2023-12-28 | End: 2023-12-29 | Stop reason: HOSPADM

## 2023-12-28 RX ORDER — METHYLPREDNISOLONE SODIUM SUCCINATE 125 MG/2ML
125 INJECTION, POWDER, LYOPHILIZED, FOR SOLUTION INTRAMUSCULAR; INTRAVENOUS
Status: DISCONTINUED | OUTPATIENT
Start: 2023-12-28 | End: 2023-12-29 | Stop reason: HOSPADM

## 2023-12-28 RX ORDER — ONDANSETRON 2 MG/ML
4 INJECTION INTRAMUSCULAR; INTRAVENOUS
Status: DISCONTINUED | OUTPATIENT
Start: 2023-12-28 | End: 2023-12-29 | Stop reason: HOSPADM

## 2023-12-28 RX ORDER — DIPHENHYDRAMINE HYDROCHLORIDE 50 MG/ML
25-50 INJECTION INTRAMUSCULAR; INTRAVENOUS
Status: DISCONTINUED | OUTPATIENT
Start: 2023-12-28 | End: 2023-12-29 | Stop reason: HOSPADM

## 2023-12-28 RX ORDER — CAFFEINE CITRATE 20 MG/ML
60 SOLUTION INTRAVENOUS
Status: DISCONTINUED | OUTPATIENT
Start: 2023-12-28 | End: 2023-12-29 | Stop reason: HOSPADM

## 2023-12-28 RX ORDER — DIPHENHYDRAMINE HCL 25 MG
25 CAPSULE ORAL
Status: DISCONTINUED | OUTPATIENT
Start: 2023-12-28 | End: 2023-12-29 | Stop reason: HOSPADM

## 2023-12-28 RX ORDER — ACYCLOVIR 200 MG/1
0-1 CAPSULE ORAL
Status: DISCONTINUED | OUTPATIENT
Start: 2023-12-28 | End: 2023-12-29 | Stop reason: HOSPADM

## 2023-12-28 RX ORDER — DIAZEPAM 5 MG
5 TABLET ORAL EVERY 30 MIN PRN
Status: DISCONTINUED | OUTPATIENT
Start: 2023-12-28 | End: 2023-12-29 | Stop reason: HOSPADM

## 2023-12-28 RX ORDER — REGADENOSON 0.08 MG/ML
0.4 INJECTION, SOLUTION INTRAVENOUS ONCE
Status: COMPLETED | OUTPATIENT
Start: 2023-12-28 | End: 2023-12-28

## 2023-12-28 RX ORDER — AMINOPHYLLINE 25 MG/ML
100 INJECTION, SOLUTION INTRAVENOUS ONCE
Status: DISCONTINUED | OUTPATIENT
Start: 2023-12-28 | End: 2023-12-29 | Stop reason: HOSPADM

## 2023-12-28 RX ORDER — GADOBUTROL 604.72 MG/ML
30 INJECTION INTRAVENOUS ONCE
Status: COMPLETED | OUTPATIENT
Start: 2023-12-28 | End: 2023-12-28

## 2023-12-28 RX ADMIN — GADOBUTROL 30 ML: 604.72 INJECTION INTRAVENOUS at 13:04

## 2023-12-28 RX ADMIN — REGADENOSON 0.4 MG: 0.08 INJECTION, SOLUTION INTRAVENOUS at 12:28

## 2023-12-28 NOTE — TELEPHONE ENCOUNTER
I phoned patient to discuss the MRI stress results obtained today.    I pointed out to him that the scans showed good blood flow throughout his heart in spite of the extensive coronary calcium noted on a recent CT scan.    I told him that he should remain on aspirin and continue Crestor as this is controlling his LDL very well.    He has a return visit in April with Dr. Jones which he questions is necessary in view of the MRI stress.    I told him that I will have Dr. Jones review. Its possible that we may have him return in one year unless there is a change in his breathing status or new onset chest discomfort.    He had no questions for me and was very pleased with the results.    I told him I will get back to him after I hear from Dr. Jones.        Clinical history: Severe coronary artery calcification, dyspnea on exertion, assess for ischemia.   Comparison CMR: None     1. The LV is normal in cavity size and wall thickness. The global systolic function is normal. The LVEF is  68%. There are no regional wall motion abnormalities.     2. The RV is normal in cavity size. The global systolic function is normal. The RVEF is 70%.      3. Both atria are normal in size.     4. There is no significant valvular disease.      5.  Regadenoson stress perfusion imaging shows no ischemia.     6. Late gadolinium enhancement imaging shows no MI, fibrosis or infiltrative disease.      CONCLUSIONS:  Normal bi-ventricular size and systolic function. No ischemia on stress perfusion imaging. No  late enhancement to suggest prior infarct, inflammation or infiltration.

## 2023-12-31 NOTE — ANESTHESIA CARE TRANSFER NOTE
Patient: Pastor Garibay    Procedure(s):  POWER PORT PLACEMENT ATTEMPTED, PLACEMENT OF ANGIO-SEAL VIP VASCULAR CLOSURE DEVICE - Wound Class: I-Clean   - Wound Class: I-Clean    Diagnosis: NON-HODGKINS LYMPHOMA   Diagnosis Additional Information: No value filed.    Anesthesia Type:   MAC     Note:  Airway :Face Mask  Patient transferred to:PACU  Handoff Report: Identifed the Patient, Identified the Reponsible Provider, Reviewed the pertinent medical history, Discussed the surgical course, Reviewed Intra-OP anesthesia mangement and issues during anesthesia, Set expectations for post-procedure period and Allowed opportunity for questions and acknowledgement of understanding      Vitals: (Last set prior to Anesthesia Care Transfer)    CRNA VITALS  12/5/2017 1156 - 12/5/2017 1232      12/5/2017             Pulse: 76    SpO2: 99 %    Resp Rate (observed): (!)  1    Resp Rate (set): 10                Electronically Signed By: VERNA Thompson CRNA  December 5, 2017  12:32 PM  
Normal for race

## 2024-01-08 ENCOUNTER — TELEPHONE (OUTPATIENT)
Dept: PULMONOLOGY | Facility: CLINIC | Age: 74
End: 2024-01-08
Payer: COMMERCIAL

## 2024-01-08 NOTE — TELEPHONE ENCOUNTER
Patient Contacted for the patient to call back and schedule the following:    Appointment type: [facility]  Provider: FREIDA  Return date: 05/28/2024  Specialty phone number: [phone]  Additional appointment(s) needed: N/A  Additonal Notes: Resheduled patient into BMT PM clinic

## 2024-01-14 ENCOUNTER — HEALTH MAINTENANCE LETTER (OUTPATIENT)
Age: 74
End: 2024-01-14

## 2024-02-02 ENCOUNTER — TRANSFERRED RECORDS (OUTPATIENT)
Dept: HEALTH INFORMATION MANAGEMENT | Facility: CLINIC | Age: 74
End: 2024-02-02

## 2024-03-21 DIAGNOSIS — I25.10 CORONARY ARTERY DISEASE INVOLVING NATIVE CORONARY ARTERY OF NATIVE HEART WITHOUT ANGINA PECTORIS: Primary | ICD-10-CM

## 2024-03-21 DIAGNOSIS — E78.5 HYPERLIPIDEMIA LDL GOAL <70: ICD-10-CM

## 2024-03-28 ENCOUNTER — TELEPHONE (OUTPATIENT)
Dept: CARDIOLOGY | Facility: CLINIC | Age: 74
End: 2024-03-28
Payer: COMMERCIAL

## 2024-03-28 NOTE — TELEPHONE ENCOUNTER
called and spoke with Pastor.   He is actually due to come in later than his current appointment, December would be a year.     has collaborated with  Roxanne.    We have cancelled the 4\4\24 appointment, and Pastor will schedule for late November or early December.   He will call back to scheduling later on, since December schedules are not open yet.    Kenia Maher RN on 3/28/2024 at 2:04 PM

## 2024-03-28 NOTE — TELEPHONE ENCOUNTER
Health Call Center    Phone Message    May a detailed message be left on voicemail: yes     Reason for Call: Other: Pt is wondering if he even needs to come in this year or if he should wait for his 1 year f/up.  He changed his appt time from April to September.  Please call pt to advise if Dr. Jones wants to see him in Sept or to wait for a year.     Action Taken: Message routed to:  [facility] cardio    Travel Screening: Not Applicable    Thank you!  Specialty Access Center

## 2024-05-02 ENCOUNTER — TRANSFERRED RECORDS (OUTPATIENT)
Dept: HEALTH INFORMATION MANAGEMENT | Facility: CLINIC | Age: 74
End: 2024-05-02
Payer: COMMERCIAL

## 2024-05-22 ENCOUNTER — TRANSFERRED RECORDS (OUTPATIENT)
Dept: HEALTH INFORMATION MANAGEMENT | Facility: CLINIC | Age: 74
End: 2024-05-22
Payer: COMMERCIAL

## 2024-05-28 ENCOUNTER — OFFICE VISIT (OUTPATIENT)
Dept: PULMONOLOGY | Facility: CLINIC | Age: 74
End: 2024-05-28
Attending: INTERNAL MEDICINE
Payer: COMMERCIAL

## 2024-05-28 VITALS — SYSTOLIC BLOOD PRESSURE: 122 MMHG | OXYGEN SATURATION: 90 % | HEART RATE: 104 BPM | DIASTOLIC BLOOD PRESSURE: 74 MMHG

## 2024-05-28 DIAGNOSIS — R06.09 DYSPNEA ON EXERTION: Primary | ICD-10-CM

## 2024-05-28 PROCEDURE — 99213 OFFICE O/P EST LOW 20 MIN: CPT | Performed by: INTERNAL MEDICINE

## 2024-05-28 RX ORDER — PREDNISONE 20 MG/1
1 TABLET ORAL
COMMUNITY
Start: 2024-05-23

## 2024-05-28 ASSESSMENT — PAIN SCALES - GENERAL: PAINLEVEL: NO PAIN (0)

## 2024-05-28 NOTE — NURSING NOTE
Chief Complaint   Patient presents with    Follow Up     Return BMT     Vitals were taken and medications were reconciled.    Azul Salgado RMA  3:20 PM

## 2024-05-28 NOTE — PROGRESS NOTES
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 Developed respiratory symptoms of SOB and BETANCOURT around the time of diagnosis. Has had RLL mass diagnosed as lymphoma.  Underwent therapy with improvement in symptoms.  Has recurrence of disease in 2020 and underwent CAR-T cell therapy.  Was diagnosed with COVID in January 2022, had mild symptoms of URI symptoms and nasal drainage.  Felt mildly SOB during that time. Symptoms resolved after 10 days; notes some SOB after COVID.  States at the present time his SOB is only with significant exertion of walking in box stores.  Notices some cough but is mostly non-productive except for feeling of gastric secretions after coughing.  CT chest from 3-18 and PET scan from 3-31 were personally reviewed.  RLL mass is unchanged in size, mediastinal and hilar adenopathy.  History of JESSICA, on CPAP.  No tobacco.    Last seen in clinic 6 months ago.  Since his last clinic visit he was doing well up until 1.5-2 weeks ago, developed URI associated with nasal drainage, productive cough, and wheezing.  Seen by PCP- started on prednisone for 5 days (last dose is today) and azithromycin.  Feels better (nasal drainage has stopped and cough is no longer productive but still present) but wheezing is still present.  No fevers or chills.  Similar process in January, took awhile to get over it, also required steroids at that time.     The patient was seen and examined by Sanjay Granado MD           Current Outpatient Medications   Medication Sig Dispense Refill    aspirin 81 MG EC tablet Take 81 mg by mouth daily      cyanocobalamin (VITAMIN B-12) 1000 MCG tablet Take 1,000 mcg by mouth daily      docusate sodium (COLACE) 100 MG capsule Take 100 mg by mouth 2 times daily as needed for constipation      gabapentin (NEURONTIN) 100 MG capsule TAKE 2 CAPSULES BY MOUTH AT  BEDTIME DAILY. OK TO TAKE 1 EXTRA CAPSULE AS NEEDED FOR FLARE UP      Melatonin 10 MG TABS tablet Take 10 mg by mouth nightly as needed for sleep      metFORMIN (GLUCOPHAGE) 500 MG tablet Take 1 tablet (500 mg) by mouth 2 times daily (with meals)      nitroGLYcerin (NITROSTAT) 0.4 MG sublingual tablet every 5 minutes as needed      OZEMPIC, 0.25 OR 0.5 MG/DOSE, 2 MG/3ML pen       predniSONE (DELTASONE) 20 MG tablet Take 1 tablet by mouth 2 times daily      rosuvastatin (CRESTOR) 10 MG tablet Take 20 mg by mouth every morning      SYMBICORT 160-4.5 MCG/ACT Inhaler Inhale 1 puff into the lungs 2 times daily      UNABLE TO FIND Oxygen 4LPM PRN      insulin aspart (NOVOLOG FLEXPEN) 100 UNIT/ML pen Inject Subcutaneous 3 times daily (with meals) use sliding scale based on Carbs and Blood Glucose. Utilizing Pump approx 9/13/21       No current facility-administered medications for this visit.     No Known Allergies  Past Medical History:   Diagnosis Date    Abnormal liver function test     Anemia     CPAP (continuous positive airway pressure) dependence     Diabetes (H)     Exertional dyspnea     uses 4L O2 with activity    Hard to intubate 05/24/2022    Hx of skin cancer, basal cell     Hyperlipidemia     Hypertension     Keratoderma     Large cell lymphoma (H) 07/20/2020    Liver disease     Lymphoma (H)     Non-Hodgkin lymphoma (H)     Pedal edema     CHRONIC    Pleural effusion     Seborrheic keratosis, inflamed     Sleep apnea     Uses a CPAP    Syncope and collapse     Thrombocytopenia (H24) 07/20/2020    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  Partial left second toe amputation with osteotomy through proximal phalanx.  2.  Partial left third toe amputation  with osteotomy through proximal phalanx.;  Surgeon: Ismael Humphrey DPM;  Location: [facility]    BIOPSY LYMPH NODE CERVICAL N/A 12/5/2014    Procedure: BIOPSY LYMPH NODE CERVICAL;  Surgeon: Robbie Linda MD;  Location:  OR    BRONCHOSCOPY FLEXIBLE N/A 11/14/2017    Procedure: BRONCHOSCOPY FLEXIBLE;  FLEXIBLE BRONCHOSCOPY WITH BIOPSIES.;  Surgeon: Robbie Linda MD;  Location:  OR    BRONCHOSCOPY RIGID OR FLEXIBLE W/TRANSENDOSCOPIC ENDOBRONCHIAL ULTRASOUND GUIDED N/A 4/11/2022    Procedure: BRONCHOSCOPY, FIBEROPTIC, endobronchial ultrasound, transbronchial biopsies, lymphnode forcep biopsies;  Surgeon: Prosper Aguiar MD;  [facility]: [facility]    COLONOSCOPY      COLONOSCOPY N/A 5/9/2018    Procedure: COMBINED COLONOSCOPY, SINGLE OR MULTIPLE BIOPSY/POLYPECTOMY BY BIOPSY;;  Surgeon: Ramos Bates MD;  Location:  GI    ENDOVASCULAR PLACEMENT VASCULAR DEVICE Left 12/5/2017    Procedure: ENDOVASCULAR PLACEMENT VASCULAR DEVICE;;  Surgeon: Robbie Linda MD;  Location: [facility]    ENT SURGERY      tonsillectomy    EXCISE NODE MEDIASTINAL N/A 11/17/2017    Procedure: EXCISE NODE MEDIASTINAL;;  Surgeon: Robbie Linda MD;  Location:  OR    EYE SURGERY      EYE SURGERY Left     vitrectomy    INSERT PORT VASCULAR ACCESS N/A 12/05/2014    Procedure: INSERT PORT VASCULAR ACCESS;  Surgeon: Robbie Linda MD;  Location:  OR    INSERT PORT VASCULAR ACCESS N/A 12/5/2017    Procedure: INSERT PORT VASCULAR ACCESS;  POWER PORT PLACEMENT ATTEMPTED, PLACEMENT OF ANGIO-SEAL VIP VASCULAR CLOSURE DEVICE;  Surgeon: Robbie Linda MD;  Location: [facility]    IR CHEST PORT PLACEMENT > 5 YRS OF AGE  6/5/2020    IR PORT REMOVAL RIGHT  12/10/2018    PHACOEMULSIFICATION CLEAR CORNEA WITH STANDARD INTRAOCULAR LENS IMPLANT Right 5/2/2016    Procedure: PHACOEMULSIFICATION CLEAR CORNEA WITH STANDARD INTRAOCULAR LENS IMPLANT;  Surgeon: Rasheed Finney MD;  [facility]: [facility]    REMOVE  PORT VASCULAR ACCESS N/A 3/14/2017    Procedure: REMOVE PORT VASCULAR ACCESS;  Surgeon: Robbie Linda MD;  Location:  OR    REMOVE PORT VASCULAR ACCESS N/A 11/29/2022    Procedure: PORT REMOVAL;  Surgeon: Robbie Linda MD;  [facility]    SOFT TISSUE SURGERY      BASAL CELL CA FOREHEAD    THORACOTOMY Right 11/17/2017    Procedure: THORACOTOMY;  RIGHT EXPLORATORY THORACOTOMY/ EXTENSIVE PNEUMOLYSIS/ BIOPSY OF INTERLOBAR LYMPH NODE;  Surgeon: Robbie Linda MD;  [facility]    TRANSCERVICAL EXTENDED MEDIASTINAL LYMPHADENECTOMY N/A 5/24/2022    Procedure: TRANSCERVICAL EXTENDED MEDIASTINAL LYMPHADENECTOMY;  Surgeon: Star Narvaez MD;  [facility]    TRANSCERVICAL EXTENDED MEDIASTINAL LYMPHADENECTOMY  5/24/2022    Procedure: ;  Surgeon: Star Narvaez MD;  [facility]: [facility]       Social History     Socioeconomic History    Marital status:      Spouse name: Not on file    Number of children: Not on file    Years of education: Not on file    Highest education level: Not on file   Occupational History    Not on file   Tobacco Use    Smoking status: Never    Smokeless tobacco: Never   Substance and Sexual Activity    Alcohol use: Yes     Comment: 3-4 beers in a year    Drug use: No    Sexual activity: Not on file   Other Topics Concern    Parent/sibling w/ CABG, MI or angioplasty before 65F 55M? Yes   Social History Narrative    Not on file     Social Determinants of Health     Financial Resource Strain: Not on file   Food Insecurity: Not on file   Transportation Needs: Not on file   Physical Activity: Not on file   Stress: Not on file   Social Connections: Not on file   Interpersonal Safety: Not At Risk (9/29/2021)    Humiliation, Afraid, Rape, and Kick questionnaire     Fear of Current or Ex-Partner: No     Emotionally Abused: No     Physically Abused: No     Sexually Abused: No   Housing Stability: Not on file       ROS Pulmonary  A complete ROS was  otherwise negative except as noted in the HPI.  /74 (BP Location: Right arm, Patient Position: Sitting, Cuff Size: Adult Regular)   Pulse 104   SpO2 90%   Exam:   GENERAL APPEARANCE: Well developed, well nourished, alert, and in no apparent distress.  EYES: PERRL, EOMI  HENT: Nasal mucosa with no edema and no hyperemia. No nasal polyps.  EARS: Canals clear, TMs normal  MOUTH: Oral mucosa is moist, without any lesions, no tonsillar enlargement, no oropharyngeal exudate.  NECK: supple, no masses, no thyromegaly.  LYMPHATICS: No significant axillary, cervical, or supraclavicular nodes.  RESP: Decreased air flow throughout.  No crackles. No rhonchi. Mid to late expiratory wheezes.  CV: Normal S1, S2, regular rhythm, normal rate. No murmur.  No rub. No gallop. No LE edema.   ABDOMEN:  Bowel sounds normal, soft, nontender, no HSM or masses.   MS: extremities normal. No clubbing. No cyanosis.  SKIN: no rash on limited exam  NEURO: Mentation intact, speech normal, normal strength and tone, normal gait and stance  PSYCH: mentation appears normal. and affect normal/bright  Results:  No results found for this or any previous visit (from the past 168 hour(s)).    Assessment and plan:   75 YO with onset of increased SOB for past few months prior to initial visit. Diagnosis of COVID in Jan 2022 with mild symptoms.  No recent weight gain; less active in the Winter.  Review of chest CT does not show any increase in size or character of RLL mass/infiltrate. No pleural effusions. Increase in SOB cannot be explained by an increase in the RLL mass/consolidation. Symptoms of increased SOB could be due to post COVID or pulmonary embolism (does have a significant decrease in diffusion capacity)- these have resolved.  Adenopathy in chest found to be benign; no change on repeat imaging. Compliant with oxygen with significant exertion, good saturation with walking.  Was doing well up until recent URI; persistent cough and wheezing.   Remains active without limitation- recently purchased a treadmill.     1. Continue supplemental oxygen at 4L/NC with exertion. Encouraged to continue exercise regimen.  Will investigate possibility of [employer] system regarding availability and cost  2. With continued wheezing will need a longer steroid course- recommended 20 mg for 5 days and then 10 mg per day for 5 days then off; may need longer taper based on symptoms.  He is planning on seeing his PCP in follow-up tomorrow to discuss this plan.  Advised him to contact the clinic or have his PCP contact me with any questions.     RTC in 6 months.  Advised him to contact the clinic with any questions or concerns.

## 2024-05-28 NOTE — LETTER
5/28/2024         RE: Pastor Garibay  [address]  [facility] [address]        Dear Colleague,    Thank you for referring your patient, Pastor Garibay, to the [facility] AND [facility] [place]. Please see a copy of my visit note below.    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 Developed respiratory symptoms of SOB and BETANCOURT around the time of diagnosis. Has had RLL mass diagnosed as lymphoma.  Underwent therapy with improvement in symptoms.  Has recurrence of disease in 2020 and underwent CAR-T cell therapy.  Was diagnosed with COVID in January 2022, had mild symptoms of URI symptoms and nasal drainage.  Felt mildly SOB during that time. Symptoms resolved after 10 days; notes some SOB after COVID.  States at the present time his SOB is only with significant exertion of walking in box stores.  Notices some cough but is mostly non-productive except for feeling of gastric secretions after coughing.  CT chest from 3-18 and PET scan from 3-31 were personally reviewed.  RLL mass is unchanged in size, mediastinal and hilar adenopathy.  History of JESSICA, on CPAP.  No tobacco.    Last seen in clinic 6 months ago.  Since his last clinic visit he was doing well up until 1.5-2 weeks ago, developed URI associated with nasal drainage, productive cough, and wheezing.  Seen by PCP- started on prednisone for 5 days (last dose is today) and azithromycin.  Feels better (nasal drainage has stopped and cough is no longer productive but still present) but wheezing is still present.  No fevers or chills.  Similar process in January, took awhile to get over it, also required steroids at that time.     The patient was seen and examined by Sanjay Granado MD           Current Outpatient Medications   Medication Sig Dispense Refill     aspirin 81 MG  EC tablet Take 81 mg by mouth daily       cyanocobalamin (VITAMIN B-12) 1000 MCG tablet Take 1,000 mcg by mouth daily       docusate sodium (COLACE) 100 MG capsule Take 100 mg by mouth 2 times daily as needed for constipation       gabapentin (NEURONTIN) 100 MG capsule TAKE 2 CAPSULES BY MOUTH AT BEDTIME DAILY. OK TO TAKE 1 EXTRA CAPSULE AS NEEDED FOR FLARE UP       Melatonin 10 MG TABS tablet Take 10 mg by mouth nightly as needed for sleep       metFORMIN (GLUCOPHAGE) 500 MG tablet Take 1 tablet (500 mg) by mouth 2 times daily (with meals)       nitroGLYcerin (NITROSTAT) 0.4 MG sublingual tablet every 5 minutes as needed       OZEMPIC, 0.25 OR 0.5 MG/DOSE, 2 MG/3ML pen        predniSONE (DELTASONE) 20 MG tablet Take 1 tablet by mouth 2 times daily       rosuvastatin (CRESTOR) 10 MG tablet Take 20 mg by mouth every morning       SYMBICORT 160-4.5 MCG/ACT Inhaler Inhale 1 puff into the lungs 2 times daily       UNABLE TO FIND Oxygen 4LPM PRN       insulin aspart (NOVOLOG FLEXPEN) 100 UNIT/ML pen Inject Subcutaneous 3 times daily (with meals) use sliding scale based on Carbs and Blood Glucose. Utilizing Pump approx 9/13/21       No current facility-administered medications for this visit.     No Known Allergies  Past Medical History:   Diagnosis Date     Abnormal liver function test      Anemia      CPAP (continuous positive airway pressure) dependence      Diabetes (H)      Exertional dyspnea     uses 4L O2 with activity     Hard to intubate 05/24/2022     Hx of skin cancer, basal cell      Hyperlipidemia      Hypertension      Keratoderma      Large cell lymphoma (H) 07/20/2020     Liver disease      Lymphoma (H)      Non-Hodgkin lymphoma (H)      Pedal edema     CHRONIC     Pleural effusion      Seborrheic keratosis, inflamed      Sleep apnea     Uses a CPAP     Syncope and collapse      Thrombocytopenia (H24) 07/20/2020     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  Partial left second toe amputation with osteotomy through proximal phalanx.  2.  Partial left third toe amputation with osteotomy through proximal phalanx.;  Surgeon: Ismael Humphrey DPM;  Location: [facility]     BIOPSY LYMPH NODE CERVICAL N/A 12/5/2014    Procedure: BIOPSY LYMPH NODE CERVICAL;  Surgeon: Robbie Linda MD;  Location:  OR     BRONCHOSCOPY FLEXIBLE N/A 11/14/2017    Procedure: BRONCHOSCOPY FLEXIBLE;  FLEXIBLE BRONCHOSCOPY WITH BIOPSIES.;  Surgeon: Robbie Linda MD;  Location:  OR     BRONCHOSCOPY RIGID OR FLEXIBLE W/TRANSENDOSCOPIC ENDOBRONCHIAL ULTRASOUND GUIDED N/A 4/11/2022    Procedure: BRONCHOSCOPY, FIBEROPTIC, endobronchial ultrasound, transbronchial biopsies, lymphnode forcep biopsies;  Surgeon: Prosper Aguiar MD;  [facility]     COLONOSCOPY       COLONOSCOPY N/A 5/9/2018    Procedure: COMBINED COLONOSCOPY, SINGLE OR MULTIPLE BIOPSY/POLYPECTOMY BY BIOPSY;;  Surgeon: Ramos Bates MD;  Location:  GI     ENDOVASCULAR PLACEMENT VASCULAR DEVICE Left 12/5/2017    Procedure: ENDOVASCULAR PLACEMENT VASCULAR DEVICE;;  Surgeon: Robbie Linda MD;  Location: [facility]     ENT SURGERY      tonsillectomy     EXCISE NODE MEDIASTINAL N/A 11/17/2017    Procedure: EXCISE NODE MEDIASTINAL;;  Surgeon: Robbie Linda MD;  Location:  OR     EYE SURGERY       EYE SURGERY Left     vitrectomy     INSERT PORT VASCULAR ACCESS N/A 12/05/2014    Procedure: INSERT PORT VASCULAR ACCESS;  Surgeon: Robbie Linda MD;  Location:  OR     INSERT PORT VASCULAR ACCESS N/A 12/5/2017    Procedure: INSERT PORT VASCULAR ACCESS;  POWER PORT PLACEMENT ATTEMPTED, PLACEMENT OF ANGIO-SEAL VIP VASCULAR CLOSURE DEVICE;  Surgeon: Robbie Linda MD;   [facility]     IR CHEST PORT PLACEMENT > 5 YRS OF AGE  6/5/2020     IR PORT REMOVAL RIGHT  12/10/2018     PHACOEMULSIFICATION CLEAR CORNEA WITH STANDARD INTRAOCULAR LENS IMPLANT Right 5/2/2016    Procedure: PHACOEMULSIFICATION CLEAR CORNEA WITH STANDARD INTRAOCULAR LENS IMPLANT;  Surgeon: Rasheed Finney MD;  [facility]     REMOVE PORT VASCULAR ACCESS N/A 3/14/2017    Procedure: REMOVE PORT VASCULAR ACCESS;  Surgeon: Robbie Linda MD;  Location:  OR     REMOVE PORT VASCULAR ACCESS N/A 11/29/2022    Procedure: PORT REMOVAL;  Surgeon: Robbie Linda MD;  Location:  OR     SOFT TISSUE SURGERY      BASAL CELL CA FOREHEAD     THORACOTOMY Right 11/17/2017    Procedure: THORACOTOMY;  RIGHT EXPLORATORY THORACOTOMY/ EXTENSIVE PNEUMOLYSIS/ BIOPSY OF INTERLOBAR LYMPH NODE;  Surgeon: Robbie Linda MD;  Location:  OR     TRANSCERVICAL EXTENDED MEDIASTINAL LYMPHADENECTOMY N/A 5/24/2022    Procedure: TRANSCERVICAL EXTENDED MEDIASTINAL LYMPHADENECTOMY;  Surgeon: Star Narvaez MD;  [facility]     TRANSCERVICAL EXTENDED MEDIASTINAL LYMPHADENECTOMY  5/24/2022    Procedure: ;  Surgeon: Star Narvaez MD;  [facility]: [facility]       Social History     Socioeconomic History     Marital status:      Spouse name: Not on file     Number of children: Not on file     Years of education: Not on file     Highest education level: Not on file   Occupational History     Not on file   Tobacco Use     Smoking status: Never     Smokeless tobacco: Never   Substance and Sexual Activity     Alcohol use: Yes     Comment: 3-4 beers in a year     Drug use: No     Sexual activity: Not on file   Other Topics Concern     Parent/sibling w/ CABG, MI or angioplasty before 65F 55M? Yes   Social History Narrative     Not on file     Social Determinants of Health     Financial Resource Strain: Not on file   Food Insecurity: Not on file   Transportation Needs: Not on file   Physical  Activity: Not on file   Stress: Not on file   Social Connections: Not on file   Interpersonal Safety: Not At Risk (9/29/2021)    Humiliation, Afraid, Rape, and Kick questionnaire      Fear of Current or Ex-Partner: No      Emotionally Abused: No      Physically Abused: No      Sexually Abused: No   Housing Stability: Not on file       ROS Pulmonary  A complete ROS was otherwise negative except as noted in the HPI.  /74 (BP Location: Right arm, Patient Position: Sitting, Cuff Size: Adult Regular)   Pulse 104   SpO2 90%   Exam:   GENERAL APPEARANCE: Well developed, well nourished, alert, and in no apparent distress.  EYES: PERRL, EOMI  HENT: Nasal mucosa with no edema and no hyperemia. No nasal polyps.  EARS: Canals clear, TMs normal  MOUTH: Oral mucosa is moist, without any lesions, no tonsillar enlargement, no oropharyngeal exudate.  NECK: supple, no masses, no thyromegaly.  LYMPHATICS: No significant axillary, cervical, or supraclavicular nodes.  RESP: Decreased air flow throughout.  No crackles. No rhonchi. Mid to late expiratory wheezes.  CV: Normal S1, S2, regular rhythm, normal rate. No murmur.  No rub. No gallop. No LE edema.   ABDOMEN:  Bowel sounds normal, soft, nontender, no HSM or masses.   MS: extremities normal. No clubbing. No cyanosis.  SKIN: no rash on limited exam  NEURO: Mentation intact, speech normal, normal strength and tone, normal gait and stance  PSYCH: mentation appears normal. and affect normal/bright  Results:  No results found for this or any previous visit (from the past 168 hour(s)).    Assessment and plan:   75 YO with onset of increased SOB for past few months prior to initial visit. Diagnosis of COVID in Jan 2022 with mild symptoms.  No recent weight gain; less active in the Winter.  Review of chest CT does not show any increase in size or character of RLL mass/infiltrate. No pleural effusions. Increase in SOB cannot be explained by an increase in the RLL mass/consolidation.  Symptoms of increased SOB could be due to post COVID or pulmonary embolism (does have a significant decrease in diffusion capacity)- these have resolved.  Adenopathy in chest found to be benign; no change on repeat imaging. Compliant with oxygen with significant exertion, good saturation with walking.  Was doing well up until recent URI; persistent cough and wheezing.  Remains active without limitation- recently purchased a treadmill.     1. Continue supplemental oxygen at 4L/NC with exertion. Encouraged to continue exercise regimen.  Will investigate possibility of [employer] system regarding availability and cost  2. With continued wheezing will need a longer steroid course- recommended 20 mg for 5 days and then 10 mg per day for 5 days then off; may need longer taper based on symptoms.  He is planning on seeing his PCP in follow-up tomorrow to discuss this plan.  Advised him to contact the clinic or have his PCP contact me with any questions.     RTC in 6 months.  Advised him to contact the clinic with any questions or concerns.            Again, thank you for allowing me to participate in the care of your patient.        Sincerely,        Sanjay Granado MD

## 2024-06-02 ENCOUNTER — HEALTH MAINTENANCE LETTER (OUTPATIENT)
Age: 74
End: 2024-06-02

## 2024-06-06 ENCOUNTER — TRANSFERRED RECORDS (OUTPATIENT)
Dept: HEALTH INFORMATION MANAGEMENT | Facility: CLINIC | Age: 74
End: 2024-06-06
Payer: COMMERCIAL

## 2024-10-20 ENCOUNTER — HEALTH MAINTENANCE LETTER (OUTPATIENT)
Age: 74
End: 2024-10-20

## 2024-11-01 ENCOUNTER — TRANSFERRED RECORDS (OUTPATIENT)
Dept: HEALTH INFORMATION MANAGEMENT | Facility: CLINIC | Age: 74
End: 2024-11-01
Payer: COMMERCIAL

## 2024-12-22 ENCOUNTER — HEALTH MAINTENANCE LETTER (OUTPATIENT)
Age: 74
End: 2024-12-22

## 2024-12-23 ENCOUNTER — TRANSFERRED RECORDS (OUTPATIENT)
Dept: HEALTH INFORMATION MANAGEMENT | Facility: CLINIC | Age: 74
End: 2024-12-23
Payer: COMMERCIAL

## 2024-12-30 ENCOUNTER — OFFICE VISIT (OUTPATIENT)
Dept: CARDIOLOGY | Facility: CLINIC | Age: 74
End: 2024-12-30
Payer: COMMERCIAL

## 2024-12-30 VITALS
WEIGHT: 225.8 LBS | BODY MASS INDEX: 34.22 KG/M2 | SYSTOLIC BLOOD PRESSURE: 108 MMHG | HEART RATE: 93 BPM | OXYGEN SATURATION: 94 % | DIASTOLIC BLOOD PRESSURE: 52 MMHG | HEIGHT: 68 IN

## 2024-12-30 DIAGNOSIS — I25.10 CORONARY ARTERY DISEASE INVOLVING NATIVE CORONARY ARTERY OF NATIVE HEART WITHOUT ANGINA PECTORIS: Primary | ICD-10-CM

## 2024-12-30 DIAGNOSIS — R09.02 HYPOXIA: Primary | ICD-10-CM

## 2024-12-30 DIAGNOSIS — E78.5 HYPERLIPIDEMIA LDL GOAL <70: ICD-10-CM

## 2024-12-30 RX ORDER — GABAPENTIN 400 MG/1
400 CAPSULE ORAL AT BEDTIME
COMMUNITY
Start: 2024-10-30

## 2024-12-30 RX ORDER — ROSUVASTATIN CALCIUM 20 MG/1
1 TABLET, COATED ORAL
COMMUNITY
Start: 2024-11-22

## 2024-12-30 NOTE — PROGRESS NOTES
HPI and Plan:   Mr. Garibay is a very pleasant 74-year-old gentleman with history of coronary disease on the basis of severe coronary calcification on CT chest which was done as part of surveillance for history of lymphoma for which he received chemotherapy and radiation in the past and is now in remission.  I saw the patient last year in May patient underwent cardiac MRI stress perfusion without any evidence of inducible ischemia or abnormal delayed enhancement and normal LV and RV systolic functions.  Clinically does not have any typical anginal symptoms like any exertional chest discomfort.  He is on baby aspirin and Crestor 20 mg daily with LDL well-controlled.  He does not use any tobacco.  He does use oxygen with ambulation due to lung damage after radiation therapy.  Patient also has been diagnosed with intermittent polycythemia and being followed by hematology oncology.  Today is coming for routine follow-up.  Patient tells me overall health wise he feels well.  He is now doing treadmill exercise on a regular basis without any exertional chest discomfort.  He does not use any tobacco.  LDL earlier this year was 34.  He is on aspirin 81 mg daily, Crestor 20 mg daily.  He also has diabetes on metformin and Ozempic.  He has lost about 20 pounds of weight since our meeting last year.    Assessment and plan  Coronary artery disease on the basis of coronary calcification.  No typical anginal symptoms.  Normal LV systolic function.  Cardiac MRI stress perfusion without any inducible ischemia in December 2023.  On aspirin, high intensity statin.  LDL well-controlled.  Does not use any tobacco  History of lymphoma in remission    Recommendations  Overall cardiac status wise he appears stable.  Continue aspirin, Crestor  Discussed warning symptoms and signs with patient  Follow-up in a year, sooner if he notes any change in clinical status.    Orders Placed This Encounter   Procedures    Follow-Up with Cardiology        Orders Placed This Encounter   Medications    gabapentin (NEURONTIN) 400 MG capsule     Sig: Take 400 mg by mouth at bedtime.    rosuvastatin (CRESTOR) 20 MG tablet     Sig: Take 1 tablet by mouth daily at 2 pm.       Medications Discontinued During This Encounter   Medication Reason    gabapentin (NEURONTIN) 100 MG capsule Discontinued by another Health Care Provider (No AVS)    rosuvastatin (CRESTOR) 10 MG tablet Discontinued by another Health Care Provider (No AVS)         Encounter Diagnoses   Name Primary?    Coronary artery disease involving native coronary artery of native heart without angina pectoris Yes    Hyperlipidemia LDL goal <70        CURRENT MEDICATIONS:  Current Outpatient Medications   Medication Sig Dispense Refill    aspirin 81 MG EC tablet Take 81 mg by mouth daily      cyanocobalamin (VITAMIN B-12) 1000 MCG tablet Take 1,000 mcg by mouth daily      docusate sodium (COLACE) 100 MG capsule Take 100 mg by mouth 2 times daily as needed for constipation      gabapentin (NEURONTIN) 400 MG capsule Take 400 mg by mouth at bedtime.      Insulin Disposable Pump (OMNIPOD 5 PODS, GEN 5,) MISC       Melatonin 10 MG TABS tablet Take 10 mg by mouth nightly as needed for sleep      metFORMIN (GLUCOPHAGE) 500 MG tablet Take 1 tablet (500 mg) by mouth 2 times daily (with meals)      OZEMPIC, 0.25 OR 0.5 MG/DOSE, 2 MG/3ML pen       rosuvastatin (CRESTOR) 20 MG tablet Take 1 tablet by mouth daily at 2 pm.      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  Partial left second toe amputation with osteotomy through proximal phalanx.  2.  Partial left third toe amputation with osteotomy through proximal phalanx.;  Surgeon: Ismael Humphrey DPM;  Location:  OR    BIOPSY LYMPH NODE CERVICAL N/A 12/5/2014    Procedure: BIOPSY LYMPH NODE CERVICAL;  Surgeon: Robbie Linda MD;  Location:  OR    BRONCHOSCOPY FLEXIBLE N/A 11/14/2017    Procedure: BRONCHOSCOPY FLEXIBLE;  FLEXIBLE BRONCHOSCOPY WITH BIOPSIES.;  Surgeon: Robbie Linda MD;  Location:  OR    BRONCHOSCOPY RIGID OR FLEXIBLE W/TRANSENDOSCOPIC ENDOBRONCHIAL ULTRASOUND GUIDED N/A 4/11/2022    Procedure: BRONCHOSCOPY, FIBEROPTIC, endobronchial ultrasound, transbronchial biopsies, lymphnode forcep biopsies;  Surgeon: Prosper Aguiar MD;  [facility]    COLONOSCOPY      COLONOSCOPY N/A 5/9/2018    Procedure: COMBINED COLONOSCOPY, SINGLE OR MULTIPLE BIOPSY/POLYPECTOMY BY BIOPSY;;  Surgeon: Ramos Bates MD;  Location:  GI    ENDOVASCULAR PLACEMENT VASCULAR DEVICE Left 12/5/2017    Procedure: ENDOVASCULAR PLACEMENT VASCULAR DEVICE;;  Surgeon: Robbie Linda MD;  Location: [facility]    ENT SURGERY      tonsillectomy    EXCISE NODE MEDIASTINAL N/A 11/17/2017    Procedure: EXCISE NODE MEDIASTINAL;;  Surgeon: Robbie Linda MD;  Location:  OR    EYE SURGERY      EYE SURGERY Left     vitrectomy    INSERT PORT VASCULAR ACCESS N/A 12/05/2014     Procedure: INSERT PORT VASCULAR ACCESS;  Surgeon: Robbie Linda MD;  Location:  OR    INSERT PORT VASCULAR ACCESS N/A 12/5/2017    Procedure: INSERT PORT VASCULAR ACCESS;  POWER PORT PLACEMENT ATTEMPTED, PLACEMENT OF ANGIO-SEAL VIP VASCULAR CLOSURE DEVICE;  Surgeon: Robbie Linda MD;  Location:  [facility]    IR CHEST PORT PLACEMENT > 5 YRS OF AGE  6/5/2020    IR PORT REMOVAL RIGHT  12/10/2018    PHACOEMULSIFICATION CLEAR CORNEA WITH STANDARD INTRAOCULAR LENS IMPLANT Right 5/2/2016    Procedure: PHACOEMULSIFICATION CLEAR CORNEA WITH STANDARD INTRAOCULAR LENS IMPLANT;  Surgeon: Rasheed Finney MD;  Location:  [facility]    REMOVE PORT VASCULAR ACCESS N/A 3/14/2017    Procedure: REMOVE PORT VASCULAR ACCESS;  Surgeon: Robbie Linda MD;  Location:  OR    REMOVE PORT VASCULAR ACCESS N/A 11/29/2022    Procedure: PORT REMOVAL;  Surgeon: Robbie Linda MD;  Location:  OR    SOFT TISSUE SURGERY      BASAL CELL CA FOREHEAD    THORACOTOMY Right 11/17/2017    Procedure: THORACOTOMY;  RIGHT EXPLORATORY THORACOTOMY/ EXTENSIVE PNEUMOLYSIS/ BIOPSY OF INTERLOBAR LYMPH NODE;  Surgeon: Robbie Linda MD;  Location:  OR    TRANSCERVICAL EXTENDED MEDIASTINAL LYMPHADENECTOMY N/A 5/24/2022    Procedure: TRANSCERVICAL EXTENDED MEDIASTINAL LYMPHADENECTOMY;  Surgeon: Star Narvaez MD;  [facility]    TRANSCERVICAL EXTENDED MEDIASTINAL LYMPHADENECTOMY  5/24/2022    Procedure: ;  Surgeon: Star Narvaez MD;  [facility]       FAMILY HISTORY:  Family History   Problem Relation Age of Onset    Diabetes Mother     CABG Father     Anesthesia Reaction No family hx of        SOCIAL HISTORY:  Social History     Socioeconomic History    Marital status:      Spouse name: None    Number of children: None    Years of education: None    Highest education level: None   Tobacco Use    Smoking status: Never    Smokeless tobacco: Never   Substance and Sexual Activity     "Alcohol use: Yes     Comment: 3-4 beers in a year    Drug use: No   Other Topics Concern    Parent/sibling w/ CABG, MI or angioplasty before 65F 55M? Yes     Social Drivers of Health     Interpersonal Safety: Not At Risk (9/29/2021)    Humiliation, Afraid, Rape, and Kick questionnaire     Fear of Current or Ex-Partner: No     Emotionally Abused: No     Physically Abused: No     Sexually Abused: No       Review of Systems:  Skin:          Eyes:         ENT:         Respiratory:  Positive for sleep apnea, CPAP     Cardiovascular:         Gastroenterology:        Genitourinary:         Musculoskeletal:         Neurologic:         Psychiatric:         Heme/Lymph/Imm:         Endocrine:           Physical Exam:  Vitals: /52   Pulse 93   Ht 1.727 m (5' 8\")   Wt 102.4 kg (225 lb 12.8 oz)   SpO2 94%   BMI 34.33 kg/m      General Patient appears comfortable  Neck normal JVP  Cardiovascular system S1-S2 normal no murmur rub or gallop  Respiratory system clear to auscultation  Extremities no edema        CC  Kurtai Jones MD  [address]  [place] [address]                    "

## 2024-12-30 NOTE — LETTER
12/30/2024    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.  HPI and Plan:   Mr. Garibay is a very pleasant 74-year-old gentleman with history of coronary disease on the basis of severe coronary calcification on CT chest which was done as part of surveillance for history of lymphoma for which he received chemotherapy and radiation in the past and is now in remission.  I saw the patient last year in May patient underwent cardiac MRI stress perfusion without any evidence of inducible ischemia or abnormal delayed enhancement and normal LV and RV systolic functions.  Clinically does not have any typical anginal symptoms like any exertional chest discomfort.  He is on baby aspirin and Crestor 20 mg daily with LDL well-controlled.  He does not use any tobacco.  He does use oxygen with ambulation due to lung damage after radiation therapy.  Patient also has been diagnosed with intermittent polycythemia and being followed by hematology oncology.  Today is coming for routine follow-up.  Patient tells me overall health wise he feels well.  He is now doing treadmill exercise on a regular basis without any exertional chest discomfort.  He does not use any tobacco.  LDL earlier this year was 34.  He is on aspirin 81 mg daily, Crestor 20 mg daily.  He also has diabetes on metformin and Ozempic.  He has lost about 20 pounds of weight since our meeting last year.    Assessment and plan  Coronary artery disease on the basis of coronary calcification.  No typical anginal symptoms.  Normal LV systolic function.  Cardiac MRI stress perfusion without any inducible ischemia in December 2023.  On aspirin, high intensity statin.  LDL well-controlled.  Does not use any tobacco  History of lymphoma in remission    Recommendations  Overall cardiac status wise he appears stable.  Continue  aspirin, Crestor  Discussed warning symptoms and signs with patient  Follow-up in a year, sooner if he notes any change in clinical status.    Orders Placed This Encounter   Procedures     Follow-Up with Cardiology       Orders Placed This Encounter   Medications     gabapentin (NEURONTIN) 400 MG capsule     Sig: Take 400 mg by mouth at bedtime.     rosuvastatin (CRESTOR) 20 MG tablet     Sig: Take 1 tablet by mouth daily at 2 pm.       Medications Discontinued During This Encounter   Medication Reason     gabapentin (NEURONTIN) 100 MG capsule Discontinued by another Health Care Provider (No AVS)     rosuvastatin (CRESTOR) 10 MG tablet Discontinued by another Health Care Provider (No AVS)         Encounter Diagnoses   Name Primary?     Coronary artery disease involving native coronary artery of native heart without angina pectoris Yes     Hyperlipidemia LDL goal <70        CURRENT MEDICATIONS:  Current Outpatient Medications   Medication Sig Dispense Refill     aspirin 81 MG EC tablet Take 81 mg by mouth daily       cyanocobalamin (VITAMIN B-12) 1000 MCG tablet Take 1,000 mcg by mouth daily       docusate sodium (COLACE) 100 MG capsule Take 100 mg by mouth 2 times daily as needed for constipation       gabapentin (NEURONTIN) 400 MG capsule Take 400 mg by mouth at bedtime.       Insulin Disposable Pump (OMNIPOD 5 PODS, GEN 5,) MISC        Melatonin 10 MG TABS tablet Take 10 mg by mouth nightly as needed for sleep       metFORMIN (GLUCOPHAGE) 500 MG tablet Take 1 tablet (500 mg) by mouth 2 times daily (with meals)       OZEMPIC, 0.25 OR 0.5 MG/DOSE, 2 MG/3ML pen        rosuvastatin (CRESTOR) 20 MG tablet Take 1 tablet by mouth daily at 2 pm.       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  Partial left second toe amputation with osteotomy through proximal phalanx.  2.  Partial left third toe amputation with osteotomy through proximal phalanx.;  Surgeon: Ismael Humphrey DPM;  Location: [facility]     BIOPSY LYMPH NODE CERVICAL N/A 12/5/2014    Procedure: BIOPSY LYMPH NODE CERVICAL;  Surgeon: Robbie Linda MD;  Location:  OR     BRONCHOSCOPY FLEXIBLE N/A 11/14/2017    Procedure: BRONCHOSCOPY FLEXIBLE;  FLEXIBLE BRONCHOSCOPY WITH BIOPSIES.;  Surgeon: Robbie Linda MD;  Location:  OR     BRONCHOSCOPY RIGID OR FLEXIBLE W/TRANSENDOSCOPIC ENDOBRONCHIAL ULTRASOUND GUIDED N/A 4/11/2022    Procedure: BRONCHOSCOPY, FIBEROPTIC, endobronchial ultrasound, transbronchial biopsies, lymphnode forcep biopsies;  Surgeon: Prosper Aguiar MD;  [facility]     COLONOSCOPY       COLONOSCOPY N/A 5/9/2018    Procedure: COMBINED COLONOSCOPY, SINGLE OR MULTIPLE BIOPSY/POLYPECTOMY BY BIOPSY;;  Surgeon: Ramos Bates MD;  Location:  GI     ENDOVASCULAR PLACEMENT VASCULAR DEVICE Left 12/5/2017    Procedure: ENDOVASCULAR PLACEMENT VASCULAR DEVICE;;  Surgeon: Robbie Linda MD;  Location: [facility]     ENT SURGERY       tonsillectomy     EXCISE NODE MEDIASTINAL N/A 11/17/2017    Procedure: EXCISE NODE MEDIASTINAL;;  Surgeon: Robbie Linda MD;  Location:  OR     EYE SURGERY       EYE SURGERY Left     vitrectomy     INSERT PORT VASCULAR ACCESS N/A 12/05/2014    Procedure: INSERT PORT VASCULAR ACCESS;  Surgeon: Robbie Linda MD;  Location:  OR     INSERT PORT VASCULAR ACCESS N/A 12/5/2017    Procedure: INSERT PORT VASCULAR ACCESS;  POWER PORT PLACEMENT ATTEMPTED, PLACEMENT OF ANGIO-SEAL VIP VASCULAR CLOSURE DEVICE;  Surgeon: Robbie Linda MD;  Location:  [facility]     IR CHEST PORT PLACEMENT > 5 YRS OF AGE  6/5/2020     IR PORT REMOVAL RIGHT  12/10/2018     PHACOEMULSIFICATION CLEAR CORNEA WITH STANDARD INTRAOCULAR LENS IMPLANT Right 5/2/2016    Procedure: PHACOEMULSIFICATION CLEAR CORNEA WITH STANDARD INTRAOCULAR LENS IMPLANT;  Surgeon: Rasheed Finney MD;  Location:  [facility]     REMOVE PORT VASCULAR ACCESS N/A 3/14/2017    Procedure: REMOVE PORT VASCULAR ACCESS;  Surgeon: Robbie Linda MD;  Location:  OR     REMOVE PORT VASCULAR ACCESS N/A 11/29/2022    Procedure: PORT REMOVAL;  Surgeon: Robbie Linda MD;  Location:  OR     SOFT TISSUE SURGERY      BASAL CELL CA FOREHEAD     THORACOTOMY Right 11/17/2017    Procedure: THORACOTOMY;  RIGHT EXPLORATORY THORACOTOMY/ EXTENSIVE PNEUMOLYSIS/ BIOPSY OF INTERLOBAR LYMPH NODE;  Surgeon: Robbie Linda MD;  Location:  OR     TRANSCERVICAL EXTENDED MEDIASTINAL LYMPHADENECTOMY N/A 5/24/2022    Procedure: TRANSCERVICAL EXTENDED MEDIASTINAL LYMPHADENECTOMY;  Surgeon: Star Narvaez MD;  Location:  OR     TRANSCERVICAL EXTENDED MEDIASTINAL LYMPHADENECTOMY  5/24/2022    Procedure: ;  Surgeon: Star Narvaez MD;  Location:  OR       FAMILY HISTORY:  Family History   Problem Relation Age of Onset     Diabetes Mother      CABG Father      Anesthesia Reaction No family hx of        SOCIAL HISTORY:  Social  "History     Socioeconomic History     Marital status:      Spouse name: None     Number of children: None     Years of education: None     Highest education level: None   Tobacco Use     Smoking status: Never     Smokeless tobacco: Never   Substance and Sexual Activity     Alcohol use: Yes     Comment: 3-4 beers in a year     Drug use: No   Other Topics Concern     Parent/sibling w/ CABG, MI or angioplasty before 65F 55M? Yes     Social Drivers of Health     Interpersonal Safety: Not At Risk (9/29/2021)    Humiliation, Afraid, Rape, and Kick questionnaire      Fear of Current or Ex-Partner: No      Emotionally Abused: No      Physically Abused: No      Sexually Abused: No       Review of Systems:  Skin:          Eyes:         ENT:         Respiratory:  Positive for sleep apnea, CPAP     Cardiovascular:         Gastroenterology:        Genitourinary:         Musculoskeletal:         Neurologic:         Psychiatric:         Heme/Lymph/Imm:         Endocrine:           Physical Exam:  Vitals: /52   Pulse 93   Ht 1.727 m (5' 8\")   Wt 102.4 kg (225 lb 12.8 oz)   SpO2 94%   BMI 34.33 kg/m      General Patient appears comfortable  Neck normal JVP  Cardiovascular system S1-S2 normal no murmur rub or gallop  Respiratory system clear to auscultation  Extremities no edema        CC  Kurt Jones MD  [address]  [place] [address]                      Thank you for allowing me to participate in the care of your patient.      Sincerely,     Kurt Jones MD     [facility]  cc:   Kurt Jones MD  [address]  [place] [address]      "

## 2024-12-31 ENCOUNTER — TELEPHONE (OUTPATIENT)
Dept: PULMONOLOGY | Facility: CLINIC | Age: 74
End: 2024-12-31
Payer: COMMERCIAL

## 2024-12-31 NOTE — TELEPHONE ENCOUNTER
Per Dr. Granado:  I reviewed the study.  He should start to use 2L of oxygen at night with his CPAP (he already uses oxygen during the day with ambulation).  I addended my note.    Writer called patient to notify him. Patient verbalized understanding. Stated he gets his oxygen through [facility].      Writer FAXed order, updated notes, KE[facility] to [facility].

## 2025-01-26 ENCOUNTER — HEALTH MAINTENANCE LETTER (OUTPATIENT)
Age: 75
End: 2025-01-26

## 2025-03-06 ENCOUNTER — APPOINTMENT (OUTPATIENT)
Dept: GENERAL RADIOLOGY | Facility: CLINIC | Age: 75
DRG: 189 | End: 2025-03-06
Attending: EMERGENCY MEDICINE
Payer: COMMERCIAL

## 2025-03-06 ENCOUNTER — HOSPITAL ENCOUNTER (INPATIENT)
Facility: CLINIC | Age: 75
DRG: 189 | End: 2025-03-06
Attending: EMERGENCY MEDICINE | Admitting: STUDENT IN AN ORGANIZED HEALTH CARE EDUCATION/TRAINING PROGRAM
Payer: COMMERCIAL

## 2025-03-06 VITALS
DIASTOLIC BLOOD PRESSURE: 81 MMHG | TEMPERATURE: 101.2 F | HEART RATE: 112 BPM | WEIGHT: 220.9 LBS | RESPIRATION RATE: 22 BRPM | OXYGEN SATURATION: 95 % | SYSTOLIC BLOOD PRESSURE: 142 MMHG | BODY MASS INDEX: 33.59 KG/M2

## 2025-03-06 DIAGNOSIS — J96.01 ACUTE RESPIRATORY FAILURE WITH HYPOXIA (H): ICD-10-CM

## 2025-03-06 DIAGNOSIS — R79.89 ELEVATED TROPONIN: ICD-10-CM

## 2025-03-06 DIAGNOSIS — I26.99 ACUTE PULMONARY EMBOLISM, UNSPECIFIED PULMONARY EMBOLISM TYPE, UNSPECIFIED WHETHER ACUTE COR PULMONALE PRESENT (H): Primary | ICD-10-CM

## 2025-03-06 DIAGNOSIS — J21.0 RSV BRONCHIOLITIS: ICD-10-CM

## 2025-03-06 DIAGNOSIS — J18.9 PNEUMONIA DUE TO INFECTIOUS ORGANISM, UNSPECIFIED LATERALITY, UNSPECIFIED PART OF LUNG: ICD-10-CM

## 2025-03-06 DIAGNOSIS — E87.70 HYPERVOLEMIA, UNSPECIFIED HYPERVOLEMIA TYPE: ICD-10-CM

## 2025-03-06 LAB
ALBUMIN SERPL BCG-MCNC: 3.8 G/DL (ref 3.5–5.2)
ALP SERPL-CCNC: 61 U/L (ref 40–150)
ALT SERPL W P-5'-P-CCNC: 27 U/L (ref 0–70)
ANION GAP SERPL CALCULATED.3IONS-SCNC: 13 MMOL/L (ref 7–15)
AST SERPL W P-5'-P-CCNC: 27 U/L (ref 0–45)
ATRIAL RATE - MUSE: 113 BPM
BASE EXCESS BLDV CALC-SCNC: 7 MMOL/L (ref -3–3)
BASOPHILS # BLD AUTO: 0 10E3/UL (ref 0–0.2)
BASOPHILS NFR BLD AUTO: 0 %
BILIRUB SERPL-MCNC: 0.9 MG/DL
BUN SERPL-MCNC: 12.3 MG/DL (ref 8–23)
CALCIUM SERPL-MCNC: 9.1 MG/DL (ref 8.8–10.4)
CHLORIDE SERPL-SCNC: 95 MMOL/L (ref 98–107)
CREAT SERPL-MCNC: 0.81 MG/DL (ref 0.67–1.17)
DIASTOLIC BLOOD PRESSURE - MUSE: NORMAL MMHG
EGFRCR SERPLBLD CKD-EPI 2021: >90 ML/MIN/1.73M2
EOSINOPHIL # BLD AUTO: 0 10E3/UL (ref 0–0.7)
EOSINOPHIL NFR BLD AUTO: 0 %
ERYTHROCYTE [DISTWIDTH] IN BLOOD BY AUTOMATED COUNT: 14.7 % (ref 10–15)
EST. AVERAGE GLUCOSE BLD GHB EST-MCNC: 137 MG/DL
GLUCOSE SERPL-MCNC: 131 MG/DL (ref 70–99)
HBA1C MFR BLD: 6.4 %
HCO3 BLDV-SCNC: 33 MMOL/L (ref 21–28)
HCO3 SERPL-SCNC: 29 MMOL/L (ref 22–29)
HCT VFR BLD AUTO: 47.5 % (ref 40–53)
HGB BLD-MCNC: 15.6 G/DL (ref 13.3–17.7)
HOLD SPECIMEN: NORMAL
IMM GRANULOCYTES # BLD: 0 10E3/UL
IMM GRANULOCYTES NFR BLD: 0 %
INTERPRETATION ECG - MUSE: NORMAL
LACTATE BLD-SCNC: 2.5 MMOL/L
LACTATE SERPL-SCNC: 1.4 MMOL/L (ref 0.7–2)
LYMPHOCYTES # BLD AUTO: 0.8 10E3/UL (ref 0.8–5.3)
LYMPHOCYTES NFR BLD AUTO: 13 %
MCH RBC QN AUTO: 29.7 PG (ref 26.5–33)
MCHC RBC AUTO-ENTMCNC: 32.8 G/DL (ref 31.5–36.5)
MCV RBC AUTO: 91 FL (ref 78–100)
MONOCYTES # BLD AUTO: 0.6 10E3/UL (ref 0–1.3)
MONOCYTES NFR BLD AUTO: 8 %
NEUTROPHILS # BLD AUTO: 5.3 10E3/UL (ref 1.6–8.3)
NEUTROPHILS NFR BLD AUTO: 79 %
NRBC # BLD AUTO: 0 10E3/UL
NRBC BLD AUTO-RTO: 0 /100
P AXIS - MUSE: 50 DEGREES
PCO2 BLDV: 50 MM HG (ref 40–50)
PH BLDV: 7.42 [PH] (ref 7.32–7.43)
PLATELET # BLD AUTO: 103 10E3/UL (ref 150–450)
PO2 BLDV: 25 MM HG (ref 25–47)
POTASSIUM SERPL-SCNC: 3.6 MMOL/L (ref 3.4–5.3)
PR INTERVAL - MUSE: 184 MS
PROT SERPL-MCNC: 6.5 G/DL (ref 6.4–8.3)
QRS DURATION - MUSE: 90 MS
QT - MUSE: 324 MS
QTC - MUSE: 444 MS
R AXIS - MUSE: 63 DEGREES
RBC # BLD AUTO: 5.25 10E6/UL (ref 4.4–5.9)
SAO2 % BLDV: 46 % (ref 70–75)
SODIUM SERPL-SCNC: 137 MMOL/L (ref 135–145)
SYSTOLIC BLOOD PRESSURE - MUSE: NORMAL MMHG
T AXIS - MUSE: 52 DEGREES
TROPONIN T SERPL HS-MCNC: 26 NG/L
TROPONIN T SERPL HS-MCNC: 27 NG/L
VENTRICULAR RATE- MUSE: 113 BPM
WBC # BLD AUTO: 6.7 10E3/UL (ref 4–11)

## 2025-03-06 PROCEDURE — 84484 ASSAY OF TROPONIN QUANT: CPT | Performed by: EMERGENCY MEDICINE

## 2025-03-06 PROCEDURE — 99222 1ST HOSP IP/OBS MODERATE 55: CPT | Performed by: PHYSICIAN ASSISTANT

## 2025-03-06 PROCEDURE — 93005 ELECTROCARDIOGRAM TRACING: CPT

## 2025-03-06 PROCEDURE — 36415 COLL VENOUS BLD VENIPUNCTURE: CPT | Performed by: EMERGENCY MEDICINE

## 2025-03-06 PROCEDURE — 83605 ASSAY OF LACTIC ACID: CPT

## 2025-03-06 PROCEDURE — 82803 BLOOD GASES ANY COMBINATION: CPT

## 2025-03-06 PROCEDURE — 258N000003 HC RX IP 258 OP 636: Performed by: EMERGENCY MEDICINE

## 2025-03-06 PROCEDURE — 83036 HEMOGLOBIN GLYCOSYLATED A1C: CPT | Performed by: PHYSICIAN ASSISTANT

## 2025-03-06 PROCEDURE — 96360 HYDRATION IV INFUSION INIT: CPT

## 2025-03-06 PROCEDURE — 87040 BLOOD CULTURE FOR BACTERIA: CPT | Performed by: EMERGENCY MEDICINE

## 2025-03-06 PROCEDURE — 250N000009 HC RX 250: Performed by: EMERGENCY MEDICINE

## 2025-03-06 PROCEDURE — 120N000001 HC R&B MED SURG/OB

## 2025-03-06 PROCEDURE — 71046 X-RAY EXAM CHEST 2 VIEWS: CPT

## 2025-03-06 PROCEDURE — 99285 EMERGENCY DEPT VISIT HI MDM: CPT | Mod: 25

## 2025-03-06 PROCEDURE — 250N000013 HC RX MED GY IP 250 OP 250 PS 637: Performed by: PHYSICIAN ASSISTANT

## 2025-03-06 PROCEDURE — 36415 COLL VENOUS BLD VENIPUNCTURE: CPT

## 2025-03-06 PROCEDURE — 82435 ASSAY OF BLOOD CHLORIDE: CPT | Performed by: EMERGENCY MEDICINE

## 2025-03-06 PROCEDURE — 82040 ASSAY OF SERUM ALBUMIN: CPT | Performed by: EMERGENCY MEDICINE

## 2025-03-06 PROCEDURE — 94640 AIRWAY INHALATION TREATMENT: CPT

## 2025-03-06 PROCEDURE — 85025 COMPLETE CBC W/AUTO DIFF WBC: CPT | Performed by: EMERGENCY MEDICINE

## 2025-03-06 PROCEDURE — 250N000011 HC RX IP 250 OP 636: Performed by: PHYSICIAN ASSISTANT

## 2025-03-06 RX ORDER — BUDESONIDE AND FORMOTEROL FUMARATE DIHYDRATE 160; 4.5 UG/1; UG/1
1 AEROSOL RESPIRATORY (INHALATION) 2 TIMES DAILY
COMMUNITY

## 2025-03-06 RX ORDER — BISACODYL 5 MG/1
15 TABLET, DELAYED RELEASE ORAL DAILY PRN
COMMUNITY

## 2025-03-06 RX ORDER — NICOTINE POLACRILEX 4 MG
15-30 LOZENGE BUCCAL
Status: DISCONTINUED | OUTPATIENT
Start: 2025-03-06 | End: 2025-03-12 | Stop reason: HOSPADM

## 2025-03-06 RX ORDER — ROSUVASTATIN CALCIUM 20 MG/1
20 TABLET, COATED ORAL DAILY
Status: DISCONTINUED | OUTPATIENT
Start: 2025-03-07 | End: 2025-03-12 | Stop reason: HOSPADM

## 2025-03-06 RX ORDER — LIDOCAINE 40 MG/G
CREAM TOPICAL
Status: DISCONTINUED | OUTPATIENT
Start: 2025-03-06 | End: 2025-03-12 | Stop reason: HOSPADM

## 2025-03-06 RX ORDER — DEXTROSE MONOHYDRATE 25 G/50ML
25-50 INJECTION, SOLUTION INTRAVENOUS
Status: DISCONTINUED | OUTPATIENT
Start: 2025-03-06 | End: 2025-03-12 | Stop reason: HOSPADM

## 2025-03-06 RX ORDER — IPRATROPIUM BROMIDE AND ALBUTEROL SULFATE 2.5; .5 MG/3ML; MG/3ML
1 SOLUTION RESPIRATORY (INHALATION) EVERY 6 HOURS PRN
COMMUNITY

## 2025-03-06 RX ORDER — DOCUSATE SODIUM 100 MG/1
100 CAPSULE, LIQUID FILLED ORAL 2 TIMES DAILY PRN
Status: DISCONTINUED | OUTPATIENT
Start: 2025-03-06 | End: 2025-03-12 | Stop reason: HOSPADM

## 2025-03-06 RX ORDER — LEVALBUTEROL INHALATION SOLUTION 1.25 MG/3ML
1.25 SOLUTION RESPIRATORY (INHALATION)
Status: DISCONTINUED | OUTPATIENT
Start: 2025-03-07 | End: 2025-03-07

## 2025-03-06 RX ORDER — CALCIUM CARBONATE 500 MG/1
1000 TABLET, CHEWABLE ORAL 4 TIMES DAILY PRN
Status: DISCONTINUED | OUTPATIENT
Start: 2025-03-06 | End: 2025-03-12 | Stop reason: HOSPADM

## 2025-03-06 RX ORDER — HYDRALAZINE HYDROCHLORIDE 20 MG/ML
10 INJECTION INTRAMUSCULAR; INTRAVENOUS EVERY 4 HOURS PRN
Status: DISCONTINUED | OUTPATIENT
Start: 2025-03-06 | End: 2025-03-12 | Stop reason: HOSPADM

## 2025-03-06 RX ORDER — ASPIRIN 81 MG/1
81 TABLET ORAL DAILY
Status: DISCONTINUED | OUTPATIENT
Start: 2025-03-07 | End: 2025-03-12 | Stop reason: HOSPADM

## 2025-03-06 RX ORDER — HYDRALAZINE HYDROCHLORIDE 10 MG/1
10 TABLET, FILM COATED ORAL EVERY 4 HOURS PRN
Status: DISCONTINUED | OUTPATIENT
Start: 2025-03-06 | End: 2025-03-12 | Stop reason: HOSPADM

## 2025-03-06 RX ORDER — IPRATROPIUM BROMIDE AND ALBUTEROL SULFATE 2.5; .5 MG/3ML; MG/3ML
3 SOLUTION RESPIRATORY (INHALATION) ONCE
Status: COMPLETED | OUTPATIENT
Start: 2025-03-06 | End: 2025-03-06

## 2025-03-06 RX ORDER — ONDANSETRON 2 MG/ML
4 INJECTION INTRAMUSCULAR; INTRAVENOUS EVERY 6 HOURS PRN
Status: DISCONTINUED | OUTPATIENT
Start: 2025-03-06 | End: 2025-03-12 | Stop reason: HOSPADM

## 2025-03-06 RX ORDER — ACETAMINOPHEN 325 MG/1
650 TABLET ORAL EVERY 4 HOURS PRN
Status: DISCONTINUED | OUTPATIENT
Start: 2025-03-06 | End: 2025-03-12 | Stop reason: HOSPADM

## 2025-03-06 RX ORDER — FLUTICASONE FUROATE AND VILANTEROL 200; 25 UG/1; UG/1
1 POWDER RESPIRATORY (INHALATION) DAILY
Status: DISCONTINUED | OUTPATIENT
Start: 2025-03-07 | End: 2025-03-12 | Stop reason: HOSPADM

## 2025-03-06 RX ORDER — AMOXICILLIN 250 MG
2 CAPSULE ORAL 2 TIMES DAILY PRN
Status: DISCONTINUED | OUTPATIENT
Start: 2025-03-06 | End: 2025-03-12 | Stop reason: HOSPADM

## 2025-03-06 RX ORDER — BENZONATATE 100 MG/1
100 CAPSULE ORAL 3 TIMES DAILY PRN
Status: DISCONTINUED | OUTPATIENT
Start: 2025-03-06 | End: 2025-03-07

## 2025-03-06 RX ORDER — ENOXAPARIN SODIUM 100 MG/ML
40 INJECTION SUBCUTANEOUS EVERY 24 HOURS
Status: DISCONTINUED | OUTPATIENT
Start: 2025-03-06 | End: 2025-03-07

## 2025-03-06 RX ORDER — LEVALBUTEROL INHALATION SOLUTION 1.25 MG/3ML
1.25 SOLUTION RESPIRATORY (INHALATION)
Status: DISCONTINUED | OUTPATIENT
Start: 2025-03-06 | End: 2025-03-11

## 2025-03-06 RX ORDER — GUAIFENESIN 600 MG/1
600 TABLET, EXTENDED RELEASE ORAL 2 TIMES DAILY
Status: DISCONTINUED | OUTPATIENT
Start: 2025-03-06 | End: 2025-03-12 | Stop reason: HOSPADM

## 2025-03-06 RX ORDER — INSULIN ASPART 100 [IU]/ML
INJECTION, SOLUTION INTRAVENOUS; SUBCUTANEOUS SEE ADMIN INSTRUCTIONS
COMMUNITY

## 2025-03-06 RX ORDER — ACETAMINOPHEN 650 MG/1
650 SUPPOSITORY RECTAL EVERY 4 HOURS PRN
Status: DISCONTINUED | OUTPATIENT
Start: 2025-03-06 | End: 2025-03-12 | Stop reason: HOSPADM

## 2025-03-06 RX ORDER — AMOXICILLIN 250 MG
1 CAPSULE ORAL 2 TIMES DAILY PRN
Status: DISCONTINUED | OUTPATIENT
Start: 2025-03-06 | End: 2025-03-12 | Stop reason: HOSPADM

## 2025-03-06 RX ORDER — ONDANSETRON 4 MG/1
4 TABLET, ORALLY DISINTEGRATING ORAL EVERY 6 HOURS PRN
Status: DISCONTINUED | OUTPATIENT
Start: 2025-03-06 | End: 2025-03-12 | Stop reason: HOSPADM

## 2025-03-06 RX ADMIN — IPRATROPIUM BROMIDE AND ALBUTEROL SULFATE 3 ML: .5; 3 SOLUTION RESPIRATORY (INHALATION) at 20:07

## 2025-03-06 RX ADMIN — ENOXAPARIN SODIUM 40 MG: 40 INJECTION SUBCUTANEOUS at 23:11

## 2025-03-06 RX ADMIN — SODIUM CHLORIDE 1000 ML: 0.9 INJECTION, SOLUTION INTRAVENOUS at 19:23

## 2025-03-06 RX ADMIN — ACETAMINOPHEN 650 MG: 325 TABLET, FILM COATED ORAL at 23:10

## 2025-03-06 ASSESSMENT — ACTIVITIES OF DAILY LIVING (ADL)
ADLS_ACUITY_SCORE: 52

## 2025-03-07 ENCOUNTER — APPOINTMENT (OUTPATIENT)
Dept: CT IMAGING | Facility: CLINIC | Age: 75
DRG: 189 | End: 2025-03-07
Attending: HOSPITALIST
Payer: COMMERCIAL

## 2025-03-07 ENCOUNTER — APPOINTMENT (OUTPATIENT)
Dept: ULTRASOUND IMAGING | Facility: CLINIC | Age: 75
DRG: 189 | End: 2025-03-07
Attending: INTERNAL MEDICINE
Payer: COMMERCIAL

## 2025-03-07 LAB
ANION GAP SERPL CALCULATED.3IONS-SCNC: 11 MMOL/L (ref 7–15)
ATRIAL RATE - MUSE: 113 BPM
BASOPHILS # BLD AUTO: 0 10E3/UL (ref 0–0.2)
BASOPHILS NFR BLD AUTO: 0 %
BUN SERPL-MCNC: 9.1 MG/DL (ref 8–23)
CALCIUM SERPL-MCNC: 8.7 MG/DL (ref 8.8–10.4)
CHLORIDE SERPL-SCNC: 101 MMOL/L (ref 98–107)
CREAT SERPL-MCNC: 0.73 MG/DL (ref 0.67–1.17)
DIASTOLIC BLOOD PRESSURE - MUSE: NORMAL MMHG
EGFRCR SERPLBLD CKD-EPI 2021: >90 ML/MIN/1.73M2
EOSINOPHIL # BLD AUTO: 0 10E3/UL (ref 0–0.7)
EOSINOPHIL NFR BLD AUTO: 0 %
ERYTHROCYTE [DISTWIDTH] IN BLOOD BY AUTOMATED COUNT: 14.8 % (ref 10–15)
ERYTHROCYTE [DISTWIDTH] IN BLOOD BY AUTOMATED COUNT: 15 % (ref 10–15)
GLUCOSE BLDC GLUCOMTR-MCNC: 105 MG/DL (ref 70–99)
GLUCOSE BLDC GLUCOMTR-MCNC: 122 MG/DL (ref 70–99)
GLUCOSE BLDC GLUCOMTR-MCNC: 132 MG/DL (ref 70–99)
GLUCOSE BLDC GLUCOMTR-MCNC: 156 MG/DL (ref 70–99)
GLUCOSE BLDC GLUCOMTR-MCNC: 175 MG/DL (ref 70–99)
GLUCOSE BLDC GLUCOMTR-MCNC: 98 MG/DL (ref 70–99)
GLUCOSE SERPL-MCNC: 88 MG/DL (ref 70–99)
HCO3 SERPL-SCNC: 28 MMOL/L (ref 22–29)
HCT VFR BLD AUTO: 44.5 % (ref 40–53)
HCT VFR BLD AUTO: 45.4 % (ref 40–53)
HGB BLD-MCNC: 14.9 G/DL (ref 13.3–17.7)
HGB BLD-MCNC: 15 G/DL (ref 13.3–17.7)
IMM GRANULOCYTES # BLD: 0 10E3/UL
IMM GRANULOCYTES NFR BLD: 0 %
INTERPRETATION ECG - MUSE: NORMAL
LYMPHOCYTES # BLD AUTO: 1 10E3/UL (ref 0.8–5.3)
LYMPHOCYTES NFR BLD AUTO: 21 %
MCH RBC QN AUTO: 30 PG (ref 26.5–33)
MCH RBC QN AUTO: 30.2 PG (ref 26.5–33)
MCHC RBC AUTO-ENTMCNC: 32.8 G/DL (ref 31.5–36.5)
MCHC RBC AUTO-ENTMCNC: 33.7 G/DL (ref 31.5–36.5)
MCV RBC AUTO: 90 FL (ref 78–100)
MCV RBC AUTO: 92 FL (ref 78–100)
MONOCYTES # BLD AUTO: 0.6 10E3/UL (ref 0–1.3)
MONOCYTES NFR BLD AUTO: 13 %
NEUTROPHILS # BLD AUTO: 3.1 10E3/UL (ref 1.6–8.3)
NEUTROPHILS NFR BLD AUTO: 65 %
NRBC # BLD AUTO: 0 10E3/UL
NRBC BLD AUTO-RTO: 0 /100
NT-PROBNP SERPL-MCNC: 1139 PG/ML (ref 0–900)
P AXIS - MUSE: 50 DEGREES
PLATELET # BLD AUTO: 93 10E3/UL (ref 150–450)
PLATELET # BLD AUTO: 96 10E3/UL (ref 150–450)
POTASSIUM SERPL-SCNC: 3.4 MMOL/L (ref 3.4–5.3)
PR INTERVAL - MUSE: 184 MS
QRS DURATION - MUSE: 90 MS
QT - MUSE: 324 MS
QTC - MUSE: 444 MS
R AXIS - MUSE: 63 DEGREES
RBC # BLD AUTO: 4.96 10E6/UL (ref 4.4–5.9)
RBC # BLD AUTO: 4.97 10E6/UL (ref 4.4–5.9)
SODIUM SERPL-SCNC: 140 MMOL/L (ref 135–145)
SYSTOLIC BLOOD PRESSURE - MUSE: NORMAL MMHG
T AXIS - MUSE: 52 DEGREES
VENTRICULAR RATE- MUSE: 113 BPM
WBC # BLD AUTO: 3.9 10E3/UL (ref 4–11)
WBC # BLD AUTO: 4.8 10E3/UL (ref 4–11)

## 2025-03-07 PROCEDURE — 36415 COLL VENOUS BLD VENIPUNCTURE: CPT | Performed by: INTERNAL MEDICINE

## 2025-03-07 PROCEDURE — 93970 EXTREMITY STUDY: CPT

## 2025-03-07 PROCEDURE — 71275 CT ANGIOGRAPHY CHEST: CPT

## 2025-03-07 PROCEDURE — 250N000013 HC RX MED GY IP 250 OP 250 PS 637: Performed by: PHYSICIAN ASSISTANT

## 2025-03-07 PROCEDURE — 99232 SBSQ HOSP IP/OBS MODERATE 35: CPT | Performed by: HOSPITALIST

## 2025-03-07 PROCEDURE — 250N000011 HC RX IP 250 OP 636: Performed by: HOSPITALIST

## 2025-03-07 PROCEDURE — 94640 AIRWAY INHALATION TREATMENT: CPT

## 2025-03-07 PROCEDURE — 83880 ASSAY OF NATRIURETIC PEPTIDE: CPT | Performed by: INTERNAL MEDICINE

## 2025-03-07 PROCEDURE — 250N000009 HC RX 250: Performed by: HOSPITALIST

## 2025-03-07 PROCEDURE — 250N000011 HC RX IP 250 OP 636: Performed by: INTERNAL MEDICINE

## 2025-03-07 PROCEDURE — 80048 BASIC METABOLIC PNL TOTAL CA: CPT | Performed by: PHYSICIAN ASSISTANT

## 2025-03-07 PROCEDURE — 250N000012 HC RX MED GY IP 250 OP 636 PS 637: Performed by: HOSPITALIST

## 2025-03-07 PROCEDURE — 85018 HEMOGLOBIN: CPT | Performed by: INTERNAL MEDICINE

## 2025-03-07 PROCEDURE — 36415 COLL VENOUS BLD VENIPUNCTURE: CPT | Performed by: PHYSICIAN ASSISTANT

## 2025-03-07 PROCEDURE — 85025 COMPLETE CBC W/AUTO DIFF WBC: CPT | Performed by: PHYSICIAN ASSISTANT

## 2025-03-07 PROCEDURE — 999N000157 HC STATISTIC RCP TIME EA 10 MIN

## 2025-03-07 PROCEDURE — 250N000013 HC RX MED GY IP 250 OP 250 PS 637: Performed by: INTERNAL MEDICINE

## 2025-03-07 PROCEDURE — 250N000013 HC RX MED GY IP 250 OP 250 PS 637: Performed by: HOSPITALIST

## 2025-03-07 PROCEDURE — 94640 AIRWAY INHALATION TREATMENT: CPT | Mod: 76

## 2025-03-07 PROCEDURE — 120N000001 HC R&B MED SURG/OB

## 2025-03-07 RX ORDER — IOPAMIDOL 755 MG/ML
77 INJECTION, SOLUTION INTRAVASCULAR ONCE
Status: COMPLETED | OUTPATIENT
Start: 2025-03-07 | End: 2025-03-07

## 2025-03-07 RX ORDER — IPRATROPIUM BROMIDE AND ALBUTEROL SULFATE 2.5; .5 MG/3ML; MG/3ML
3 SOLUTION RESPIRATORY (INHALATION) 4 TIMES DAILY
Status: DISCONTINUED | OUTPATIENT
Start: 2025-03-07 | End: 2025-03-09

## 2025-03-07 RX ORDER — PREDNISONE 20 MG/1
40 TABLET ORAL DAILY
Status: DISCONTINUED | OUTPATIENT
Start: 2025-03-07 | End: 2025-03-08

## 2025-03-07 RX ORDER — BENZONATATE 100 MG/1
200 CAPSULE ORAL 3 TIMES DAILY PRN
Status: DISCONTINUED | OUTPATIENT
Start: 2025-03-07 | End: 2025-03-12 | Stop reason: HOSPADM

## 2025-03-07 RX ORDER — HEPARIN SODIUM 10000 [USP'U]/100ML
0-5000 INJECTION, SOLUTION INTRAVENOUS CONTINUOUS
Status: DISCONTINUED | OUTPATIENT
Start: 2025-03-07 | End: 2025-03-08

## 2025-03-07 RX ADMIN — BENZONATATE 200 MG: 100 CAPSULE ORAL at 17:51

## 2025-03-07 RX ADMIN — Medication 10 MG: at 01:31

## 2025-03-07 RX ADMIN — BENZONATATE 100 MG: 100 CAPSULE ORAL at 00:06

## 2025-03-07 RX ADMIN — ACETAMINOPHEN 650 MG: 325 TABLET, FILM COATED ORAL at 22:43

## 2025-03-07 RX ADMIN — HEPARIN SODIUM 1800 UNITS/HR: 10000 INJECTION, SOLUTION INTRAVENOUS at 20:39

## 2025-03-07 RX ADMIN — GUAIFENESIN 600 MG: 600 TABLET, EXTENDED RELEASE ORAL at 08:19

## 2025-03-07 RX ADMIN — PREDNISONE 40 MG: 20 TABLET ORAL at 10:56

## 2025-03-07 RX ADMIN — FLUTICASONE FUROATE AND VILANTEROL TRIFENATATE 1 PUFF: 200; 25 POWDER RESPIRATORY (INHALATION) at 08:19

## 2025-03-07 RX ADMIN — IPRATROPIUM BROMIDE AND ALBUTEROL SULFATE 3 ML: .5; 3 SOLUTION RESPIRATORY (INHALATION) at 21:21

## 2025-03-07 RX ADMIN — SODIUM CHLORIDE 100 ML: 9 INJECTION, SOLUTION INTRAVENOUS at 17:27

## 2025-03-07 RX ADMIN — IPRATROPIUM BROMIDE AND ALBUTEROL SULFATE 3 ML: .5; 3 SOLUTION RESPIRATORY (INHALATION) at 11:44

## 2025-03-07 RX ADMIN — ASPIRIN 81 MG: 81 TABLET, COATED ORAL at 20:41

## 2025-03-07 RX ADMIN — IPRATROPIUM BROMIDE AND ALBUTEROL SULFATE 3 ML: .5; 3 SOLUTION RESPIRATORY (INHALATION) at 16:06

## 2025-03-07 RX ADMIN — Medication 10 MG: at 22:41

## 2025-03-07 RX ADMIN — GUAIFENESIN 600 MG: 600 TABLET, EXTENDED RELEASE ORAL at 20:41

## 2025-03-07 RX ADMIN — GABAPENTIN 400 MG: 300 CAPSULE ORAL at 00:06

## 2025-03-07 RX ADMIN — ROSUVASTATIN CALCIUM 20 MG: 20 TABLET, FILM COATED ORAL at 08:19

## 2025-03-07 RX ADMIN — GUAIFENESIN 600 MG: 600 TABLET, EXTENDED RELEASE ORAL at 00:06

## 2025-03-07 RX ADMIN — GABAPENTIN 400 MG: 300 CAPSULE ORAL at 22:41

## 2025-03-07 RX ADMIN — IOPAMIDOL 77 ML: 755 INJECTION, SOLUTION INTRAVENOUS at 17:27

## 2025-03-07 ASSESSMENT — ACTIVITIES OF DAILY LIVING (ADL)
ADLS_ACUITY_SCORE: 26
ADLS_ACUITY_SCORE: 29
ADLS_ACUITY_SCORE: 26
ADLS_ACUITY_SCORE: 29
ADLS_ACUITY_SCORE: 26
ADLS_ACUITY_SCORE: 27
ADLS_ACUITY_SCORE: 26
ADLS_ACUITY_SCORE: 27
ADLS_ACUITY_SCORE: 26
ADLS_ACUITY_SCORE: 27
ADLS_ACUITY_SCORE: 26
ADLS_ACUITY_SCORE: 26
ADLS_ACUITY_SCORE: 27
ADLS_ACUITY_SCORE: 27
ADLS_ACUITY_SCORE: 29
ADLS_ACUITY_SCORE: 27
ADLS_ACUITY_SCORE: 29
ADLS_ACUITY_SCORE: 26
ADLS_ACUITY_SCORE: 29
ADLS_ACUITY_SCORE: 27
ADLS_ACUITY_SCORE: 29

## 2025-03-07 NOTE — PROGRESS NOTES
.RECEIVING UNIT ED HANDOFF REVIEW    ED Nurse Handoff Report was reviewed by: Renato Zhang RN on March 6, 2025 at 10:13 PM     Notes read.   Orders reviewed.  Room 619 set up for droplet/contact.     Please message floor when ready for transfer.

## 2025-03-07 NOTE — ED NOTES
Pt in bed, resting. Oxygen saturations while at rest on room air dropped to 82%. Per RN request, writer placed patient on nasal cannula at 5 LPM. Oxygen saturations improved to 98%. Nasal cannula remains on patient.

## 2025-03-07 NOTE — ED NOTES
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  He started getting sick on Sunday and then Monday started getting worse bringing up some occasional creamy colored phlegm.  Saw his doctor 1 urgent care doctor on Tuesday was placed on amoxicillin had a positive RSV test and on home nebs.  Is been doing those for about a day does not know if they really help.  Today he was feeling more short of breath and noting his oxygen level at home was dropping into the 70s and 80s.  He was told to come to the ER.  No chest pain, no abdominal pain or vomiting.  No other associated signs or symptoms.  Patient does have large B-cell lymphoma.  He does have cardiac history as well and follows with Dr. Jones.     Focused Assessment:    A/Ox4. Dyspenic on exertion, tachypenic. 4-5L NC. (Home o2 4L during day 2L at night). RSV + Normotensive. -110's. Denies pain.     Labs Ordered and Resulted from Time of ED Arrival to Time of ED Departure   COMPREHENSIVE METABOLIC PANEL - Abnormal       Result Value    Sodium 137      Potassium 3.6      Carbon Dioxide (CO2) 29      Anion Gap 13      Urea Nitrogen 12.3      Creatinine 0.81      GFR Estimate >90      Calcium 9.1      Chloride 95 (*)     Glucose 131 (*)     Alkaline Phosphatase 61      AST 27      ALT 27      Protein Total 6.5      Albumin 3.8      Bilirubin Total 0.9     CBC WITH PLATELETS AND DIFFERENTIAL - Abnormal    WBC Count 6.7      RBC Count 5.25      Hemoglobin 15.6      Hematocrit 47.5      MCV 91      MCH 29.7      MCHC 32.8      RDW 14.7      Platelet Count 103 (*)     % Neutrophils 79      %  Lymphocytes 13      % Monocytes 8      % Eosinophils 0      % Basophils 0      % Immature Granulocytes 0      NRBCs per 100 WBC 0      Absolute Neutrophils 5.3      Absolute Lymphocytes 0.8      Absolute Monocytes 0.6      Absolute Eosinophils 0.0      Absolute Basophils 0.0      Absolute Immature Granulocytes 0.0      Absolute NRBCs 0.0     TROPONIN T, HIGH SENSITIVITY - Abnormal    Troponin T, High Sensitivity 27 (*)    ISTAT GASES LACTATE VENOUS POCT - Abnormal    Lactic Acid POCT 2.5 (*)     Bicarbonate Venous POCT 33 (*)     O2 Sat, Venous POCT 46 (*)     pCO2 Venous POCT 50      pH Venous POCT 7.42      pO2 Venous POCT 25      Base Excess/Deficit (+/-) POCT 7.0 (*)    TROPONIN T, HIGH SENSITIVITY   LACTIC ACID WHOLE BLOOD   BLOOD CULTURE   BLOOD CULTURE       XR Chest 2 Views   Final Result   IMPRESSION: Stable size of cardiomediastinal silhouette. Stable postsurgical changes and volume loss in the right hemithorax without definite new airspace consolidation, pleural effusion or pneumothorax. Bones are unchanged.            Treatments and/or interventions provided:    Medications   sodium chloride 0.9% BOLUS 1,000 mL (1,000 mLs Intravenous $New Bag 3/6/25 1923)   ipratropium - albuterol 0.5 mg/2.5 mg/3 mL (DUONEB) neb solution 3 mL (3 mLs Nebulization $Given 3/6/25 2007)       Patient's response to treatments and/or interventions:  Remains stable    To be done/followed up on inpatient unit:   See any in-patient orders    Does this patient have any cognitive concerns?:  n/a    Activity level - Baseline/Home:    Independent    Activity Level - Current:    Independent    Patient's Preferred language: English     Needed?: No    Isolation: None  Infection: Not Applicable  Patient tested for COVID 19 prior to admission: YES    Bariatric?: no    Vital Signs:   Vitals:    03/06/25 1855 03/06/25 1900 03/06/25 1955 03/06/25 1959   BP:  120/68     Pulse: 111 109 113    Resp: 16 26 26    Temp:       TempSrc:        SpO2: 97% 97% (!) 85% 98%       Cardiac Rhythm:     Was the PSS-3 completed:   Yes  What interventions are required if any?       Family Comments: at bedside    For the majority of the shift this patient's behavior was Green.  Behavioral interventions performed were calm environment.    ED NURSE PHONE NUMBER: *[phone]

## 2025-03-07 NOTE — PLAN OF CARE
Goal Outcome Evaluation:  DATE & TIME: 3/7/25 0700- 1530    Cognitive Concerns/ Orientation : A/Ox4   BEHAVIOR & AGGRESSION TOOL COLOR: green  ABNL VS/O2: VSS on 4L. CPAP at HS  MOBILITY: independent in room  PAIN MANAGMENT: Denies  DIET: MOD CHO  BOWEL/BLADDER: Cont  ABNL LAB/BG: , 122  DRAIN/DEVICES: 2 PIV SL  TELEMETRY RHYTHM: NSR  SKIN: WDL, flushed face  TESTS/PROCEDURES: CT chest pending  D/C DAY/GOALS/PLACE: TBD  OTHER IMPORTANT INFO: SOB with activity, LS coarse, Exp. Wheezing, on scheduled nebs, started on prednisone,encouraged IS and Flutter valve, has ambulatory insulin pump, patient takes care of it.

## 2025-03-07 NOTE — PROGRESS NOTES
[facility]    Hospitalist Progress Note    Interval History   Patient awake and alert.  Currently on 4 L nasal cannula at rest.  Continues to have cough with sputum production but improved since yesterday.  Continues to have wheezing.  Very fatigued.    -Data reviewed today: I reviewed all new labs and imaging results over the last 24 hours. I personally reviewed the chest x-ray image(s) showing stable cardiomediastinal silhouette.  Postop changes present.  Volume loss in the right hemithorax with no new airspace consolidation pleural effusion or pneumothorax. .    Physical Exam   Temp: 98.2  F (36.8  C) Temp src: Oral BP: 122/65 Pulse: 94   Resp: 22 SpO2: 95 % O2 Device: Nasal cannula Oxygen Delivery: 4 LPM  Vitals:    03/06/25 2241   Weight: 100.2 kg (220 lb 14.4 oz)     Vital Signs with Ranges  Temp:  [98.2  F (36.8  C)-101.2  F (38.4  C)] 98.2  F (36.8  C)  Pulse:  [] 94  Resp:  [16-26] 22  BP: (118-142)/(62-81) 122/65  SpO2:  [85 %-98 %] 95 %  I/O last 3 completed shifts:  In: 450 [P.O.:450]  Out: -     Physical Exam  Constitutional:       Appearance: Normal appearance.   Cardiovascular:      Rate and Rhythm: Regular rhythm. Tachycardia present.      Pulses: Normal pulses.      Heart sounds: Normal heart sounds.   Pulmonary:      Effort: Respiratory distress present.      Breath sounds: Normal breath sounds.      Comments: Diffuse coarse breath sounds with bilateral expiratory wheezing present  Abdominal:      General: Abdomen is flat. Bowel sounds are normal. There is no distension.      Tenderness: There is no abdominal tenderness. There is no guarding.   Skin:     General: Skin is warm and dry.   Neurological:      General: No focal deficit present.           Medications   Current Facility-Administered Medications   Medication Dose Route Frequency Provider Last Rate Last Admin    insulin basal rate from AMBULATORY PUMP   Subcutaneous Continuous Tana Marley PA-C          Current Facility-Administered Medications   Medication Dose Route Frequency Provider Last Rate Last Admin    aspirin EC tablet 81 mg  81 mg Oral Daily Tana Marley PA-C        enoxaparin ANTICOAGULANT (LOVENOX) injection 40 mg  40 mg Subcutaneous Q24H Tana Marley PA-C   40 mg at 03/06/25 2311    fluticasone-vilanterol (BREO ELLIPTA) 200-25 MCG/ACT inhaler 1 puff  1 puff Inhalation Daily Tana Marley PA-C   1 puff at 03/07/25 0819    gabapentin (NEURONTIN) capsule 400 mg  400 mg Oral At Bedtime Tana Marley PA-C   400 mg at 03/07/25 0006    guaiFENesin (MUCINEX) 12 hr tablet 600 mg  600 mg Oral BID Tana Marley PA-C   600 mg at 03/07/25 0819    insulin aspart (NovoLOG/FIASP) 100 UNIT/ML VIAL FOR FILLING PUMP RESERVOIR   Device See Admin Instructions Tana Marley PA-C        insulin bolus from AMBULATORY PUMP   Subcutaneous 4x Daily AC & HS Tana Marley PA-C        ipratropium - albuterol 0.5 mg/2.5 mg/3 mL (DUONEB) neb solution 3 mL  3 mL Nebulization 4x Daily Apryl Cooper MD        predniSONE (DELTASONE) tablet 40 mg  40 mg Oral Daily Apryl Cooper MD   40 mg at 03/07/25 1056    rosuvastatin (CRESTOR) tablet 20 mg  20 mg Oral Daily Tana Marley PA-C   20 mg at 03/07/25 0819    sodium chloride (PF) 0.9% PF flush 3 mL  3 mL Intracatheter Q8H Tana Marley PA-C   3 mL at 03/07/25 0819       Data   Recent Labs   Lab 03/07/25  0753 03/07/25  0716 03/07/25  0053 03/06/25  1850   WBC  --  4.8  --  6.7   HGB  --  14.9  --  15.6   MCV  --  92  --  91   PLT  --  93*  --  103*   NA  --  140  --  137   POTASSIUM  --  3.4  --  3.6   CHLORIDE  --  101  --  95*   CO2  --  28  --  29   BUN  --  9.1  --  12.3   CR  --  0.73  --  0.81   ANIONGAP  --  11  --  13   JEFF  --  8.7*  --  9.1   * 88 98 131*   ALBUMIN  --   --   --  3.8   PROTTOTAL  --   --   --  6.5   BILITOTAL  --   --   --  0.9   ALKPHOS  --   --   --  61   ALT  --   --   --  27    AST  --   --   --  27       Recent Results (from the past 24 hours)   XR Chest 2 Views    Narrative    EXAM: XR CHEST 2 VIEWS  LOCATION: [facility]  DATE: 3/6/2025    INDICATION: RSV.  Now hypoxic.  COMPARISON: CT 10/31/2023, radiograph 5/24/2022      Impression    IMPRESSION: Stable size of cardiomediastinal silhouette. Stable postsurgical changes and volume loss in the right hemithorax without definite new airspace consolidation, pleural effusion or pneumothorax. Bones are unchanged.         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     Acute on chronic hypoxic respiratory failure due to RSV  Lactic acidosis  Sinus tachycardia  Severe JESSICA  *Presents with worsening shortness of breath, cough, rhinorrhea which started over the weekend. Diagnosed with RSV two days prior via PCP.  *In the ED, afebrile with HR 110s and /68 and requiring 5 L O2. CXR NAD. Lactic acid 2.5 (felt to be due to hypoxia and some dehydration), s/p 1 L IVF. No leukocytosis. Troponin 27, repeat 26, felt to be demand ischemia.  *Follows with pulmonology. History of severe JESSICA. Has had RLL mass diagnosed as lymphoma, underwent therapy with improvement of symptoms. Recurrence of disease in 2020 and underwent CAR-T cell therapy. Then had COVID in 2022 with residual exertional dyspnea. CT chest 10/2023 stable right lower and upper lobe volume loss, consolidation and architectural distortion consistent with postradiation change/scarring. He uses 4 L with exertion and 2 L via CPAP at HS. at baseline patient is on room air at rest  - Repeat lactic acid normalized at 1.4  - Hold PTA Augmentin given (+) RSV with no infiltrate on CXR, afebrile, no leukocytosis doubt bacterial co-infection  - I DuoNebs 4 times daily  - Continue PTA Breo Ellipta  -  Encourage IS  - Flutter valve  - Mucinex 600 mg BID  - Supplemental oxygen as needed, wean as able  - Continue CPAP at HS  -Started on oral prednisone 40 mg daily for diffuse bilateral wheezing .  Will complete 5-day course.  -CT chest for PE ordered due to persistent tachycardia.     DM II, insulin dependent with peripheral neuropathy  - Recheck A1c 6.4  - Hold PTA Metformin, Semaglutide  - Continue insulin pump per home settings.  At risk for hypoglycemia in the setting of steroids.  - Continue PTA Gabapentin      CAD   *Based on CT chest imaging showing severe coronary calcifications. Cardiac MRI stress perfusion showed EF 68% w/o WMA, no significant valvular disease, no ischemia. Denies chest pain.  - Continue PTA Aspirin, statin     Diffuse large B-cell lymphoma in remission  Polycythemia vera  *Established with Dr. Smith. S/p CAR-T cell therapy, currently in remission of his lymphoma.   *Hgb stable 14-15     Chronic thrombocytopenia  *Platelets 103, baseline 127-148  -Continue to trend platelets.  At 93 on 3/7/2025     Clinically Significant Risk Factors Present on Admission          # Hypochloremia: Lowest Cl = 95 mmol/L in last 2 days, will monitor as appropriate        # Drug Induced Platelet Defect: home medication list includes an antiplatelet medication       DVT Prophylaxis: Pneumatic Compression Devices  Code Status: No CPR- Pre-arrest intubation OK  Medically Ready for Discharge: Anticipated Tomorrow      Please see A&P for additional details of medical decision making.  35 MINUTES SPENT BY ME on the date of service doing chart review, history, exam, documentation & further activities per the note.    Apryl Cooper MD, MD  [phone](p)

## 2025-03-07 NOTE — PROGRESS NOTES
Cross coverage note: Paged by RN because patient request his PTA melatonin 10 mg nightly-ordered nightly as needed.    Rhianna Barron, Hospitalist

## 2025-03-07 NOTE — H&P
[facility]  History and Physical - Hospitalist Service       Date of Admission:  3/6/2025  PRIMARY CARE PROVIDER:    Physicians, Jeannie Garcia. 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    Acute on chronic hypoxic respiratory failure due to RSV  Lactic acidosis  Sinus tachycardia  Severe JESSICA  *Presents with worsening shortness of breath, cough, rhinorrhea which started over the weekend. Diagnosed with RSV two days prior via PCP.  *In the ED, afebrile with HR 110s and /68 and requiring 5 L O2. CXR NAD. Lactic acid 2.5 (felt to be due to hypoxia and some dehydration), s/p 1 L IVF. No leukocytosis. Troponin 27, repeat 26, felt to be demand ischemia.  *Follows with pulmonology. History of severe JESSICA. Has had RLL mass diagnosed as lymphoma, underwent therapy with improvement of symptoms. Recurrence of disease in 2020 and underwent CAR-T cell therapy. Then had COVID in 2022 with residual exertional dyspnea. CT chest 10/2023 stable right lower and upper lobe volume loss, consolidation and architectural distortion consistent with postradiation change/scarring. He uses 4 L with exertion and 2 L via CPAP at HS.  - Repeat lactic acid  - Hold PTA Augmentin given (+) RSV with no infiltrate on CXR, afebrile, no leukocytosis doubt bacterial co-infection  - Ipratropium + Levalbuterol nebs QID  - Continue PTA Breo Ellipta  - Encourage IS  - Flutter valve  - Mucinex 600 mg BID  - Supplemental oxygen as needed, wean as able  - Continue CPAP at HS    DM II, insulin dependent with peripheral neuropathy  - Recheck A1c  - Hold PTA Metformin, Semaglutide  - Continue insulin pump per home settings  - Continue PTA Gabapentin     CAD   *Based on CT chest imaging showing severe coronary calcifications. Cardiac MRI stress perfusion  showed EF 68% w/o WMA, no significant valvular disease, no ischemia. Denies chest pain.  - Continue PTA Aspirin, statin    Diffuse large B-cell lymphoma in remission  Polycythemia vera  *Established with Dr. Smith. S/p CAR-T cell therapy, currently in remission of his lymphoma.   *Hgb stable 15.6  - Repeat CBC tomorrow AM    Chronic thrombocytopenia  *Platelets 103, baseline 127-148  - Repeat CBC tomorrow AM    Clinically Significant Risk Factors Present on Admission          # Hypochloremia: Lowest Cl = 95 mmol/L in last 2 days, will monitor as appropriate        # Drug Induced Platelet Defect: home medication list includes an antiplatelet medication                             Diet:  moderate consistent carb  DVT Prophylaxis: Enoxaparin (Lovenox) SQ  Champion Catheter: Not present  Lines: None     Cardiac Monitoring: None  Code Status:  DNR, pre-arrest intubation ok         Disposition Plan      Expected Discharge Date: 03/08/2025             Entered: Tana Marley PA-C 03/06/2025, 8:41 PM     Medically Ready for Discharge: Anticipated in 2-4 Days       The patient's care was discussed with the Attending Physician, Dr. Bellaym and Patient.    Tana Marley PA-C  [facility]  Securely message with the [employer] Web Console (learn more here)      ______________________________________________________________________    Chief Complaint   Shortness of breath    History is obtained from the patient and EMR.      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    Patient presented to the ED with shortness of breath which started over the weekend along with cough and rhinorrhea. He was diagnosed with RSV on 3/4 via his PCP. He reports his oxygen saturations were in the 80s today. He does have home oxygen, he uses  4 L with activity and 2 L at HS. Typically on room air during the day. Denies fevers, chills, chest pain, LE edema, calf pain.    In the ED, afebrile with HR 110s and /70, saturating 82% on RA and subsequently placed on 5 L now up to 98%. CBC and CMP largely unremarkable. Lactic acid 2.5. VBG pH 7.42, pCO2 50, bicarb 33. Troponin 27. EKG sinus tachycardia with PACs. CXR NAD. He was given IV NS x 1 L and DuoNeb in the ED.    Past Medical History    I have reviewed this patient's medical history and updated it with pertinent information if needed.   Past Medical History:   Diagnosis Date    Abnormal liver function test     Anemia     CPAP (continuous positive airway pressure) dependence     Diabetes (H)     Exertional dyspnea     uses 4L O2 with activity    Hard to intubate 05/24/2022    Hx of skin cancer, basal cell     Hyperlipidemia     Hypertension     Keratoderma     Large cell lymphoma (H) 07/20/2020    Liver disease     Lymphoma (H)     Non-Hodgkin lymphoma (H)     Pedal edema     CHRONIC    Pleural effusion     Seborrheic keratosis, inflamed     Sleep apnea     Uses a CPAP    Syncope and collapse     Thrombocytopenia 07/20/2020    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 Taking?   Insulin Disposable Pump (OMNIPOD 5 PODS, GEN 5,) MISC   Yes No   Melatonin 10 MG TABS tablet   Yes No   Sig: Take 10 mg by mouth nightly as needed for sleep   OZEMPIC, 0.25 OR 0.5 MG/DOSE, 2 MG/3ML pen   Yes No   UNABLE TO FIND   Yes No   Sig: Oxygen 4LPM PRN   aspirin 81 MG EC tablet   Yes No   Sig: Take 81 mg by mouth daily   cyanocobalamin (VITAMIN B-12) 1000 MCG tablet   Yes No   Sig: Take 1,000 mcg by mouth daily   docusate sodium (COLACE) 100 MG capsule   Yes No   Sig: Take 100 mg by mouth 2 times daily as needed for constipation   gabapentin (NEURONTIN) 400 MG capsule   Yes No   Sig: Take 400  mg by mouth at bedtime.   metFORMIN (GLUCOPHAGE) 500 MG tablet   Yes No   Sig: Take 1 tablet (500 mg) by mouth 2 times daily (with meals)   rosuvastatin (CRESTOR) 20 MG tablet   Yes No   Sig: Take 1 tablet by mouth daily at 2 pm.      Facility-Administered Medications: None     Allergies   No Known Allergies    Physical Exam   Vital Signs: Temp: 98.6  F (37  C) Temp src: Temporal BP: 120/68 Pulse: 113   Resp: 26 SpO2: 98 % O2 Device: Nasal cannula Oxygen Delivery: 5 LPM  Weight: 0 lbs 0 oz    Constitutional: Awake, alert, cooperative, no apparent distress.    ENT: Normocephalic, without obvious abnormality, atraumatic. Normal sclera.    Neck: Supple, symmetrical, trachea midline  Pulmonary: No increased work of breathing, rales RLL, faint expiratory wheezes, (+) cough  Cardiovascular: Regular rate and rhythm  GI: Normal bowel sounds, soft, non-distended, non-tender.   Skin: Visualized skin appeared clear.  Neuro: Oriented x 3. Moves all extremities spontaneously. No focal neuro deficits.  Psych:  Normal affect and mood  Extremities: Normal muscle tone. No gross deformities noted. Calves non-tender b/l. No pitting edema b/l LE    Medical Decision Making       60 MINUTES SPENT BY ME on the date of service doing chart review, history, exam, documentation & further activities per the note.         Data   Data reviewed today: I reviewed all medications, new labs and imaging results over the last 24 hours. I personally reviewed no images or EKG's today.      I have personally reviewed the following data over the past 24 hrs:    6.7  \   15.6   / 103 (L)     137 95 (L) 12.3 /  131 (H)   3.6 29 0.81 \     ALT: 27 AST: 27 AP: 61 TBILI: 0.9   ALB: 3.8 TOT PROTEIN: 6.5 LIPASE: N/A     Trop: 27 (H) BNP: N/A     Procal: N/A CRP: N/A Lactic Acid: 2.5 (H)         Imaging results reviewed over the past 24 hrs:   Recent Results (from the past 24 hours)   XR Chest 2 Views    Narrative    EXAM: XR CHEST 2 VIEWS  LOCATION: [facility]  DATE: 3/6/2025    INDICATION: RSV.  Now hypoxic.  COMPARISON: CT 10/31/2023, radiograph 5/24/2022      Impression    IMPRESSION: Stable size of cardiomediastinal silhouette. Stable postsurgical changes and volume loss in the right hemithorax without definite new airspace consolidation, pleural effusion or pneumothorax. Bones are unchanged.

## 2025-03-07 NOTE — PHARMACY-ADMISSION MEDICATION HISTORY
Pharmacist Admission Medication History    Admission medication history is complete. The information provided in this note is only as accurate as the sources available at the time of the update.    Information Source(s): Patient and CareEverywhere/SureScripts via in-person    Pertinent Information: verified insulin pump settings to best of ability - bolus calculator and basal rate are solely based on Dexcom BG readings. Carb ratio in insulin pump does not appear to reflect 1 unit of insulin per 1-4 gram of carbohydrate - appears to be some form of multiplier only.     Changes made to PTA medication list:  Added: dulcolax, metamucil, Augmentin, Symbicort, Duoneb  Deleted: oxygen entry   Changed: clarified insulin pump dosing, Ozempic dose/directions, melatonin PRN -> nightly    Allergies reviewed with patient and updates made in EHR: yes    Medication History Completed By: Dilia Benjamin [facility] 3/6/2025 9:14 PM    PTA Med List   Medication Sig Note Last Dose/Taking    amoxicillin-clavulanate (AUGMENTIN) 875-125 MG tablet Take 1 tablet by mouth 2 times daily. For 10 days starting 3/4/25 3/6/2025: First dose PM 3/4/25, but vomited up - tolerated 3/5 doses and 3/6 dose 3/6/2025 Morning    aspirin 81 MG EC tablet Take 81 mg by mouth daily  3/6/2025 Evening    bisacodyl (DULCOLAX) 5 MG EC tablet Take 15 mg by mouth daily as needed for constipation.  3/5/2025    budesonide-formoterol (SYMBICORT/BREYNA) 160-4.5 MCG/ACT Inhaler Inhale 1 puff into the lungs 2 times daily.  3/6/2025 Evening    cyanocobalamin (VITAMIN B-12) 1000 MCG tablet Take 1,000 mcg by mouth daily  3/6/2025 Morning    docusate sodium (COLACE) 100 MG capsule Take 100 mg by mouth 2 times daily as needed for constipation 3/6/2025: Generally takes in evening 3/5/2025 Evening    gabapentin (NEURONTIN) 400 MG capsule Take 400 mg by mouth at bedtime.  3/5/2025 Bedtime    insulin aspart (NOVOLOG VIAL) 100 UNITS/ML vial See Admin Instructions. To use with Omnipod   Taking    INSULIN PUMP - OUTPATIENT Inject subcutaneously continuously. Date Last Updated: 3/6/25  Omnipod  BASAL RATES: MAX BASAL 5 units/hour (dose calculated based on Dexcom readings)  CARB RATIO: settings per bolus calculator state carb ratio between 1.1-1.4 g carbs   Corection Factor (Sensitivity): 15 mg/dL  BLOOD GLUCOSE TARGET: 110  Active Insulin Time: 4 hours    *insulin pump doses are calculated from Dexcom readings, maximum basal 5 units/hr and maximum bolus 30 units 3/6/2025: Pump connected/running in ED, settings indicate due for replacement pod 3/9/25 3/6/2025    ipratropium - albuterol 0.5 mg/2.5 mg/3 mL (DUONEB) 0.5-2.5 (3) MG/3ML neb solution Take 1 vial by nebulization every 6 hours as needed for shortness of breath, wheezing or cough. 3/6/2025: No current home supply, filled 3/4/25 Taking As Needed    Melatonin 10 MG TABS tablet Take 10 mg by mouth at bedtime.  3/5/2025 Bedtime    metFORMIN (GLUCOPHAGE) 500 MG tablet Take 1 tablet (500 mg) by mouth 2 times daily (with meals)  3/6/2025 Morning    psyllium (METAMUCIL/KONSYL) 58.6 % powder Take by mouth daily as needed for constipation. Dose not specified  3/6/2025    rosuvastatin (CRESTOR) 20 MG tablet Take 1 tablet by mouth daily.  3/6/2025 Morning    Semaglutide, 1 MG/DOSE, (OZEMPIC) 4 MG/3ML pen Inject 1 mg subcutaneously every 7 days. Mondays  3/3/2025

## 2025-03-07 NOTE — PROGRESS NOTES
MD Notification    Notified Person: MD    Notified Person Name: Cayla Villegas    Notification Date/Time: 3/7 0023    Notification Interaction: Pt asking for 10mg of melatonin (pt takes this at home) whatever dose your comfortable with though!    Purpose of Notification:    Orders Received:    Comments:

## 2025-03-07 NOTE — PLAN OF CARE
Cognitive Concerns/ Orientation : A/Ox4   BEHAVIOR & AGGRESSION TOOL COLOR: green  ABNL VS/O2: VSS on 4L. Has his own CPAP  MOBILITY: [employer]  PAIN MANAGMENT: Denies  DIET: MOD CHO  BOWEL/BLADDER: Cont  ABNL LAB/BG: BG 98  DRAIN/DEVICES: 2 PIV SL  TELEMETRY RHYTHM: 2nd deg AV block  SKIN: WDL  TESTS/PROCEDURES: CBC  D/C DAY/GOALS/PLACE: D  OTHER IMPORTANT INFO: PRN Tessalon  for frequent productive cough, IS, Flutter valve, nebs

## 2025-03-07 NOTE — ED PROVIDER NOTES
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  He started getting sick on Sunday and then Monday started getting worse bringing up some occasional creamy colored phlegm.  Saw his doctor 1 urgent care doctor on Tuesday was placed on amoxicillin had a positive RSV test and on home nebs.  Is been doing those for about a day does not know if they really help.  Today he was feeling more short of breath and noting his oxygen level at home was dropping into the 70s and 80s.  He was told to come to the ER.  No chest pain, no abdominal pain or vomiting.  No other associated signs or symptoms.  Patient does have large B-cell lymphoma.  He does have cardiac history as well and follows with Dr. Jones.    Independent Historian   None    Review of External Notes   I reviewed his pulmonary note from 12/12/2024 and his cardiology note from 12/30/2024    Past Medical History     Medical History and Problem List   Past Medical History:   Diagnosis Date    Abnormal liver function test     Anemia     CPAP (continuous positive airway pressure) dependence     Diabetes (H)     Exertional dyspnea     Hard to intubate 05/24/2022    Hx of skin cancer, basal cell     Hyperlipidemia     Hypertension     Keratoderma     Large cell lymphoma (H) 07/20/2020    Liver disease     Lymphoma (H)     Non-Hodgkin lymphoma (H)     Pedal edema     Pleural effusion     Seborrheic keratosis, inflamed     Sleep apnea     Syncope and collapse     Thrombocytopenia 07/20/2020    Thrombosis Felbruary 2018       Medications   amoxicillin-clavulanate (AUGMENTIN) 875-125 MG tablet  aspirin 81 MG EC tablet  bisacodyl (DULCOLAX) 5 MG EC tablet  budesonide-formoterol (SYMBICORT/BREYNA) 160-4.5 MCG/ACT Inhaler  cyanocobalamin (VITAMIN B-12) 1000 MCG tablet  docusate sodium (COLACE) 100 MG  capsule  gabapentin (NEURONTIN) 400 MG capsule  insulin aspart (NOVOLOG VIAL) 100 UNITS/ML vial  INSULIN PUMP - OUTPATIENT  ipratropium - albuterol 0.5 mg/2.5 mg/3 mL (DUONEB) 0.5-2.5 (3) MG/3ML neb solution  Melatonin 10 MG TABS tablet  metFORMIN (GLUCOPHAGE) 500 MG tablet  psyllium (METAMUCIL/KONSYL) 58.6 % powder  rosuvastatin (CRESTOR) 20 MG tablet  Semaglutide, 1 MG/DOSE, (OZEMPIC) 4 MG/3ML pen        Surgical History   Past Surgical History:   Procedure Laterality Date    AMPUTATE TOE(S) Left 5/22/2020    Procedure: LEFT SECOND AND THIRD DISTAL TOE AMPUTATIONS;  Surgeon: Ramon Flores MD;  Location:  OR    AMPUTATE TOE(S) Left 7/1/2020    Procedure: 1.  Partial left second toe amputation with osteotomy through proximal phalanx.  2.  Partial left third toe amputation with osteotomy through proximal phalanx.;  Surgeon: Ismael Humphrey DPM;  Location:  OR    BIOPSY LYMPH NODE CERVICAL N/A 12/5/2014    Procedure: BIOPSY LYMPH NODE CERVICAL;  Surgeon: Robbie Linda MD;  Location:  OR    BRONCHOSCOPY FLEXIBLE N/A 11/14/2017    Procedure: BRONCHOSCOPY FLEXIBLE;  FLEXIBLE BRONCHOSCOPY WITH BIOPSIES.;  Surgeon: Robbie Linda MD;  Location:  OR    BRONCHOSCOPY RIGID OR FLEXIBLE W/TRANSENDOSCOPIC ENDOBRONCHIAL ULTRASOUND GUIDED N/A 4/11/2022    Procedure: BRONCHOSCOPY, FIBEROPTIC, endobronchial ultrasound, transbronchial biopsies, lymphnode forcep biopsies;  Surgeon: Prosper Aguiar MD;  Location: [facility] OR    COLONOSCOPY      COLONOSCOPY N/A 5/9/2018    Procedure: COMBINED COLONOSCOPY, SINGLE OR MULTIPLE BIOPSY/POLYPECTOMY BY BIOPSY;;  Surgeon: Ramos Bates MD;  Location:  GI    ENDOVASCULAR PLACEMENT VASCULAR DEVICE Left 12/5/2017    Procedure: ENDOVASCULAR PLACEMENT VASCULAR DEVICE;;  Surgeon: Robbie Linda MD;  Location: [facility]    ENT SURGERY      tonsillectomy    EXCISE NODE MEDIASTINAL N/A 11/17/2017    Procedure: EXCISE NODE MEDIASTINAL;;  Surgeon:  Robbie Linda MD;  Location:  OR    EYE SURGERY      EYE SURGERY Left     vitrectomy    INSERT PORT VASCULAR ACCESS N/A 12/05/2014    Procedure: INSERT PORT VASCULAR ACCESS;  Surgeon: Robbie Linda MD;  Location:  OR    INSERT PORT VASCULAR ACCESS N/A 12/5/2017    Procedure: INSERT PORT VASCULAR ACCESS;  POWER PORT PLACEMENT ATTEMPTED, PLACEMENT OF ANGIO-SEAL VIP VASCULAR CLOSURE DEVICE;  Surgeon: Robbie Linda MD;  Location:  [facility]    IR CHEST PORT PLACEMENT > 5 YRS OF AGE  6/5/2020    IR PORT REMOVAL RIGHT  12/10/2018    PHACOEMULSIFICATION CLEAR CORNEA WITH STANDARD INTRAOCULAR LENS IMPLANT Right 5/2/2016    Procedure: PHACOEMULSIFICATION CLEAR CORNEA WITH STANDARD INTRAOCULAR LENS IMPLANT;  Surgeon: Rasheed Finney MD;  Location:  [facility]    REMOVE PORT VASCULAR ACCESS N/A 3/14/2017    Procedure: REMOVE PORT VASCULAR ACCESS;  Surgeon: Robbie Linda MD;  Location:  OR    REMOVE PORT VASCULAR ACCESS N/A 11/29/2022    Procedure: PORT REMOVAL;  Surgeon: Robbie Linda MD;  Location:  OR    SOFT TISSUE SURGERY      BASAL CELL CA FOREHEAD    THORACOTOMY Right 11/17/2017    Procedure: THORACOTOMY;  RIGHT EXPLORATORY THORACOTOMY/ EXTENSIVE PNEUMOLYSIS/ BIOPSY OF INTERLOBAR LYMPH NODE;  Surgeon: Robbie Linda MD;  Location:  OR    TRANSCERVICAL EXTENDED MEDIASTINAL LYMPHADENECTOMY N/A 5/24/2022    Procedure: TRANSCERVICAL EXTENDED MEDIASTINAL LYMPHADENECTOMY;  Surgeon: Star Narvaez MD;  Location: [facility] OR    TRANSCERVICAL EXTENDED MEDIASTINAL LYMPHADENECTOMY  5/24/2022    Procedure: ;  Surgeon: Star Narvaez MD;  Location: [facility] OR       Physical Exam     Patient Vitals for the past 24 hrs:   BP Temp Temp src Pulse Resp SpO2   03/06/25 2200 118/63 -- -- 105 -- --   03/06/25 2100 126/62 -- -- 113 17 94 %   03/06/25 1959 -- -- -- -- -- 98 %   03/06/25 1955 -- -- -- 113 26 (!) 85 %   03/06/25 1900 120/68 -- -- 109 26 97 %   03/06/25  1855 -- -- -- 111 16 97 %   03/06/25 1833 118/70 98.6  F (37  C) Temporal 114 24 93 %     Physical Exam  Nursing note and vitals reviewed.    Constitutional:  Appears comfortable.    HENT:                Nose normal.  No discharge.      Oral mucosa is moist.  Eyes:    Conjunctivae are normal without injection.  Pupils are equal.  Cardiovascular:  Tachycardic, regular rhythm with normal S1 and S2.      Normal heart sounds and peripheral pulses 2+ and equal.    Pulmonary:  Effort normal but he coughs with any deep breathing and has an story end expiratory wheezes bilaterally.  GI:    Soft. No distension and no mass. No tenderness.   Musculoskeletal:  Normal range of motion. No extremity deformity.     No edema and no tenderness.    Neurological:   Alert and oriented. No focal weakness.  Skin:    Skin is warm and dry. No rash noted.   Psychiatric:   Behavior is normal. Appropriate mood and affect.     Judgment and thought content normal.       Diagnostics     Lab Results   Labs Ordered and Resulted from Time of ED Arrival to Time of ED Departure   COMPREHENSIVE METABOLIC PANEL - Abnormal       Result Value    Sodium 137      Potassium 3.6      Carbon Dioxide (CO2) 29      Anion Gap 13      Urea Nitrogen 12.3      Creatinine 0.81      GFR Estimate >90      Calcium 9.1      Chloride 95 (*)     Glucose 131 (*)     Alkaline Phosphatase 61      AST 27      ALT 27      Protein Total 6.5      Albumin 3.8      Bilirubin Total 0.9     CBC WITH PLATELETS AND DIFFERENTIAL - Abnormal    WBC Count 6.7      RBC Count 5.25      Hemoglobin 15.6      Hematocrit 47.5      MCV 91      MCH 29.7      MCHC 32.8      RDW 14.7      Platelet Count 103 (*)     % Neutrophils 79      % Lymphocytes 13      % Monocytes 8      % Eosinophils 0      % Basophils 0      % Immature Granulocytes 0      NRBCs per 100 WBC 0      Absolute Neutrophils 5.3      Absolute Lymphocytes 0.8      Absolute Monocytes 0.6      Absolute Eosinophils 0.0      Absolute  Basophils 0.0      Absolute Immature Granulocytes 0.0      Absolute NRBCs 0.0     TROPONIN T, HIGH SENSITIVITY - Abnormal    Troponin T, High Sensitivity 27 (*)    ISTAT GASES LACTATE VENOUS POCT - Abnormal    Lactic Acid POCT 2.5 (*)     Bicarbonate Venous POCT 33 (*)     O2 Sat, Venous POCT 46 (*)     pCO2 Venous POCT 50      pH Venous POCT 7.42      pO2 Venous POCT 25      Base Excess/Deficit (+/-) POCT 7.0 (*)    TROPONIN T, HIGH SENSITIVITY - Abnormal    Troponin T, High Sensitivity 26 (*)    LACTIC ACID WHOLE BLOOD - Normal    Lactic Acid 1.4     HEMOGLOBIN A1C   BLOOD CULTURE   BLOOD CULTURE       Imaging   XR Chest 2 Views   Final Result   IMPRESSION: Stable size of cardiomediastinal silhouette. Stable postsurgical changes and volume loss in the right hemithorax without definite new airspace consolidation, pleural effusion or pneumothorax. Bones are unchanged.          EKG   ECG results from 03/06/25   EKG 12 lead     Value    Systolic Blood Pressure     Diastolic Blood Pressure     Ventricular Rate 113    Atrial Rate 113    RI Interval 184    QRS Duration 90        QTc 444    P Axis 50    R AXIS 63    T Axis 52    Interpretation ECG      Sinus tachycardia with Premature atrial complexes  Otherwise normal ECG  When compared with ECG of 28-Dec-2023 13:10, (unconfirmed)  Premature atrial complexes are now Present     I reviewed his EKG and it is sinus tachycardia with a few PACs.      Independent Interpretation   I reviewed the chest x-ray and there is no acute infiltrates.    ED Course      Medications Administered   Medications   sodium chloride 0.9% BOLUS 1,000 mL (0 mLs Intravenous Stopped 3/6/25 2030)   ipratropium - albuterol 0.5 mg/2.5 mg/3 mL (DUONEB) neb solution 3 mL (3 mLs Nebulization $Given 3/6/25 2007)       Procedures   Procedures     Discussion of Management   Admitting Hospitalist, Dr Isaac    ED Course        Additional Documentation  None    Medical Decision Making / Diagnosis      CMS Diagnoses: IV Antibiotics given and/or elevated Lactate of 2.5 and no sepsis note found - Delete this reminder and enter the sepsis note or '.edcms' before signing chart.>>>Lactic acid elevated due to RSV and hypoxia with dehydration. 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  He does not have chest pain and tried the nebulizer at home does not think it helped much.  Blood work was obtained and comprehensive panel is normal, CBC shows a normal white count and hemoglobin.  I did get a lactic acid when he came in a pH was 7.42 and lactic acid was elevated 2.5.  He has known RSV and will drop his sats down into the low 80s if he tries any activity and I do not believe that he has sepsis that requires IV antibiotics.  I believe the elevated lactate is from the RSV infection with hypoxia and maybe some dehydration.  He was given a liter of fluids and the lactate will be rechecked.  His troponin is elevated at 27 and with his known coronary disease even though he has no chest pain and an unremarkable EKG, a second troponin will be drawn as well to make sure there were no delta change.  The troponin is unchanged.  Repeat lactate is normal now consistent with some dehydration and not severe sepsis.  Patient is admitted to Dr. Bellamy and I talked with Apoorva.  Will continue nebs for now but hold on steroids.    Disposition   The patient was admitted to the hospital.     Diagnosis     ICD-10-CM    1. RSV bronchiolitis  J21.0       2. Acute respiratory failure with hypoxia (H)  J96.01       3. Elevated troponin  R79.89            Discharge Medications   New Prescriptions    No medications on file         MD Pacheco Reyes Tracy Alan, MD  03/06/25 1043

## 2025-03-08 ENCOUNTER — APPOINTMENT (OUTPATIENT)
Dept: PHYSICAL THERAPY | Facility: CLINIC | Age: 75
DRG: 189 | End: 2025-03-08
Attending: PHYSICIAN ASSISTANT
Payer: COMMERCIAL

## 2025-03-08 LAB
GLUCOSE BLDC GLUCOMTR-MCNC: 103 MG/DL (ref 70–99)
GLUCOSE BLDC GLUCOMTR-MCNC: 107 MG/DL (ref 70–99)
GLUCOSE BLDC GLUCOMTR-MCNC: 134 MG/DL (ref 70–99)
GLUCOSE BLDC GLUCOMTR-MCNC: 162 MG/DL (ref 70–99)
GLUCOSE BLDC GLUCOMTR-MCNC: 194 MG/DL (ref 70–99)
GLUCOSE BLDC GLUCOMTR-MCNC: 284 MG/DL (ref 70–99)
PLATELET # BLD AUTO: 101 10E3/UL (ref 150–450)
UFH PPP CHRO-ACNC: 0.13 IU/ML
UFH PPP CHRO-ACNC: 0.63 IU/ML

## 2025-03-08 PROCEDURE — 36415 COLL VENOUS BLD VENIPUNCTURE: CPT | Performed by: STUDENT IN AN ORGANIZED HEALTH CARE EDUCATION/TRAINING PROGRAM

## 2025-03-08 PROCEDURE — 97530 THERAPEUTIC ACTIVITIES: CPT | Mod: GP

## 2025-03-08 PROCEDURE — 120N000001 HC R&B MED SURG/OB

## 2025-03-08 PROCEDURE — 250N000013 HC RX MED GY IP 250 OP 250 PS 637: Performed by: INTERNAL MEDICINE

## 2025-03-08 PROCEDURE — 94640 AIRWAY INHALATION TREATMENT: CPT | Mod: 76

## 2025-03-08 PROCEDURE — 250N000013 HC RX MED GY IP 250 OP 250 PS 637: Performed by: PHYSICIAN ASSISTANT

## 2025-03-08 PROCEDURE — 250N000011 HC RX IP 250 OP 636: Performed by: INTERNAL MEDICINE

## 2025-03-08 PROCEDURE — 999N000157 HC STATISTIC RCP TIME EA 10 MIN

## 2025-03-08 PROCEDURE — 85520 HEPARIN ASSAY: CPT | Performed by: INTERNAL MEDICINE

## 2025-03-08 PROCEDURE — 97161 PT EVAL LOW COMPLEX 20 MIN: CPT | Mod: GP

## 2025-03-08 PROCEDURE — 85520 HEPARIN ASSAY: CPT | Performed by: STUDENT IN AN ORGANIZED HEALTH CARE EDUCATION/TRAINING PROGRAM

## 2025-03-08 PROCEDURE — 36415 COLL VENOUS BLD VENIPUNCTURE: CPT | Performed by: INTERNAL MEDICINE

## 2025-03-08 PROCEDURE — 250N000012 HC RX MED GY IP 250 OP 636 PS 637: Performed by: HOSPITALIST

## 2025-03-08 PROCEDURE — 85049 AUTOMATED PLATELET COUNT: CPT | Performed by: STUDENT IN AN ORGANIZED HEALTH CARE EDUCATION/TRAINING PROGRAM

## 2025-03-08 PROCEDURE — 87040 BLOOD CULTURE FOR BACTERIA: CPT | Performed by: INTERNAL MEDICINE

## 2025-03-08 PROCEDURE — 5A09357 ASSISTANCE WITH RESPIRATORY VENTILATION, LESS THAN 24 CONSECUTIVE HOURS, CONTINUOUS POSITIVE AIRWAY PRESSURE: ICD-10-PCS | Performed by: INTERNAL MEDICINE

## 2025-03-08 PROCEDURE — 82784 ASSAY IGA/IGD/IGG/IGM EACH: CPT | Performed by: INTERNAL MEDICINE

## 2025-03-08 PROCEDURE — 250N000013 HC RX MED GY IP 250 OP 250 PS 637: Performed by: HOSPITALIST

## 2025-03-08 PROCEDURE — 250N000009 HC RX 250: Performed by: HOSPITALIST

## 2025-03-08 PROCEDURE — 99233 SBSQ HOSP IP/OBS HIGH 50: CPT | Performed by: INTERNAL MEDICINE

## 2025-03-08 PROCEDURE — 94640 AIRWAY INHALATION TREATMENT: CPT

## 2025-03-08 RX ORDER — LEVALBUTEROL INHALATION SOLUTION 1.25 MG/3ML
1.25 SOLUTION RESPIRATORY (INHALATION) 4 TIMES DAILY
Status: DISCONTINUED | OUTPATIENT
Start: 2025-03-08 | End: 2025-03-11

## 2025-03-08 RX ORDER — METHYLPREDNISOLONE SODIUM SUCCINATE 125 MG/2ML
60 INJECTION INTRAMUSCULAR; INTRAVENOUS EVERY 8 HOURS
Status: DISCONTINUED | OUTPATIENT
Start: 2025-03-08 | End: 2025-03-10

## 2025-03-08 RX ORDER — BUDESONIDE 0.5 MG/2ML
0.5 INHALANT ORAL 2 TIMES DAILY
Status: DISCONTINUED | OUTPATIENT
Start: 2025-03-08 | End: 2025-03-12 | Stop reason: HOSPADM

## 2025-03-08 RX ORDER — PIPERACILLIN SODIUM, TAZOBACTAM SODIUM 4; .5 G/20ML; G/20ML
4.5 INJECTION, POWDER, LYOPHILIZED, FOR SOLUTION INTRAVENOUS EVERY 6 HOURS
Status: DISCONTINUED | OUTPATIENT
Start: 2025-03-08 | End: 2025-03-09

## 2025-03-08 RX ADMIN — APIXABAN 10 MG: 5 TABLET, FILM COATED ORAL at 08:17

## 2025-03-08 RX ADMIN — ACETAMINOPHEN 650 MG: 325 TABLET, FILM COATED ORAL at 12:23

## 2025-03-08 RX ADMIN — IPRATROPIUM BROMIDE AND ALBUTEROL SULFATE 3 ML: .5; 3 SOLUTION RESPIRATORY (INHALATION) at 10:31

## 2025-03-08 RX ADMIN — GUAIFENESIN 600 MG: 600 TABLET, EXTENDED RELEASE ORAL at 20:21

## 2025-03-08 RX ADMIN — ROSUVASTATIN CALCIUM 20 MG: 20 TABLET, FILM COATED ORAL at 08:17

## 2025-03-08 RX ADMIN — GABAPENTIN 400 MG: 300 CAPSULE ORAL at 21:09

## 2025-03-08 RX ADMIN — METHYLPREDNISOLONE SODIUM SUCCINATE 62.5 MG: 125 INJECTION, POWDER, FOR SOLUTION INTRAMUSCULAR; INTRAVENOUS at 17:21

## 2025-03-08 RX ADMIN — HEPARIN SODIUM 1800 UNITS/HR: 10000 INJECTION, SOLUTION INTRAVENOUS at 06:28

## 2025-03-08 RX ADMIN — FLUTICASONE FUROATE AND VILANTEROL TRIFENATATE 1 PUFF: 200; 25 POWDER RESPIRATORY (INHALATION) at 08:17

## 2025-03-08 RX ADMIN — IPRATROPIUM BROMIDE AND ALBUTEROL SULFATE 3 ML: .5; 3 SOLUTION RESPIRATORY (INHALATION) at 20:02

## 2025-03-08 RX ADMIN — METHYLPREDNISOLONE SODIUM SUCCINATE 62.5 MG: 125 INJECTION, POWDER, FOR SOLUTION INTRAMUSCULAR; INTRAVENOUS at 09:40

## 2025-03-08 RX ADMIN — GUAIFENESIN 600 MG: 600 TABLET, EXTENDED RELEASE ORAL at 08:17

## 2025-03-08 RX ADMIN — BENZONATATE 200 MG: 100 CAPSULE ORAL at 12:23

## 2025-03-08 RX ADMIN — IPRATROPIUM BROMIDE AND ALBUTEROL SULFATE 3 ML: .5; 3 SOLUTION RESPIRATORY (INHALATION) at 06:52

## 2025-03-08 RX ADMIN — PREDNISONE 40 MG: 20 TABLET ORAL at 08:17

## 2025-03-08 RX ADMIN — APIXABAN 10 MG: 5 TABLET, FILM COATED ORAL at 20:22

## 2025-03-08 RX ADMIN — IPRATROPIUM BROMIDE AND ALBUTEROL SULFATE 3 ML: .5; 3 SOLUTION RESPIRATORY (INHALATION) at 14:41

## 2025-03-08 RX ADMIN — Medication 10 MG: at 22:02

## 2025-03-08 RX ADMIN — PIPERACILLIN AND TAZOBACTAM 4.5 G: 4; .5 INJECTION, POWDER, FOR SOLUTION INTRAVENOUS at 20:21

## 2025-03-08 RX ADMIN — BENZONATATE 200 MG: 100 CAPSULE ORAL at 22:08

## 2025-03-08 ASSESSMENT — ACTIVITIES OF DAILY LIVING (ADL)
ADLS_ACUITY_SCORE: 29
ADLS_ACUITY_SCORE: 28
ADLS_ACUITY_SCORE: 29
ADLS_ACUITY_SCORE: 28
ADLS_ACUITY_SCORE: 29
ADLS_ACUITY_SCORE: 28
ADLS_ACUITY_SCORE: 28
ADLS_ACUITY_SCORE: 29
ADLS_ACUITY_SCORE: 28
ADLS_ACUITY_SCORE: 29

## 2025-03-08 NOTE — PLAN OF CARE
Goal Outcome Evaluation:DATE & TIME: 3/8/-4577   Cognitive Concerns/ Orientation : A&Ox4, Calm and cooperative. Calls as needed.   BEHAVIOR & AGGRESSION TOOL COLOR: Green  ABNL VS/O2: VSS on 4L at rest. CPAP at HS. 5L with activity. LS wheezy at times. Freq strong cough.  On scheduled Mucinex,PRN Tessalon x1  MOBILITY: Independent in room with O2. SBA outside the room.  PAIN MANAGMENT:Tylenol x 1 for HA  DIET: MOD CHO, good appetite.   BOWEL/BLADDER: Continent.    ABNL LAB/BG: , 194. Plat 101,CT of chest found small PE in RUL. US-ve for DVT.  DRAIN/DEVICES: Raven CISNEROS   Pt has personal ambulatory insulin pump, RN to assess bolus amount and document in MAR.   TELEMETRY RHYTHM: NSR  SKIN: WDL  TESTS/PROCEDURES: none this shift  D/C DAY/GOALS/PLACE: Pending improvements.  OTHER IMPORTANT INFO: , BETANCOURT,LS coarse, Exp. Wheezing at times, on scheduled nebs, Po prednisone changed to IV solumedrol,encouraged IS and Flutter valve. Hem-Onc consulted.

## 2025-03-08 NOTE — PLAN OF CARE
Summary: (+) RSV 3/4, increased SOB and cough with increased O2 needs.   DATE & TIME: 3/7/25-3/8/25 [id number]    Cognitive Concerns/ Orientation : A&Ox4, Calm and cooperative. Calls as needed.   BEHAVIOR & AGGRESSION TOOL COLOR: Green  ABNL VS/O2: VSS on 4L at rest. CPAP overnight. 5L with activity. LS wheezy at times. Freq strong cough. PRN Tessalon Pearls if needed.   MOBILITY: Independent in room with O2.   PAIN MANAGMENT: Denies  DIET: MOD CHO, good appetite.   BOWEL/BLADDER: Continent. Using bathroom. Reported constipation.   ABNL LAB/BG: ,107. Plat 93, BC pending, CT of chest found small PE in RUL.   DRAIN/DEVICES: L PIV SL. R PIV SL. Heparin stopped at 0653. Eliquis to be given with in 2 hr per pharm Pt has personal ambulatory insulin pump, RN to assess bolus amount and document in MAR.   TELEMETRY RHYTHM: NSR  SKIN: WDL  TESTS/PROCEDURES: CT chest completed 3/7. (+) for small embolus in RUL. US of LE negative for DVT.   D/C DAY/GOALS/PLACE: Pending improvements.  OTHER IMPORTANT INFO: LS coarse, Exp. Wheezing at times, on scheduled nebs, started on prednisone,encouraged IS and Flutter valve .

## 2025-03-08 NOTE — CONSULTS
Consultation    Pastor Garibay MRN# [id number]   YOB: 1950 Age: 74 year old   Date of Admission: 3/6/2025  Requesting physician: Dr Villalba  Reason for consult: PE           Assessment and Plan:   Mr. 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  Prior chemo and eventually CAR-T therapy September 2020 ( Dr Bunch at [facility]) and in remission since then.  Currently followed by   History of VTE x2 (IVC thrombus and PE), off of Xarelto since April 2021  Admitted with worsening shortness of breath due to RSV  Small subsegmental right upper lobe pulmonary embolism, no DVT  History of sleep apnea on CPAP  Thrombocytopenia-platelets 103 on admission, 101 today.  Baseline platelets around 127-136,000.  WBC also slightly lower at admission, 3.9.  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  On admission hematocrit 53%.  JAK2 negative  Also prior history of melanoma    PLAN  1.  Lengthy discussion with the patient, reviewed inpatient and outpatient chart extensively  2.  Prior history of lymphoma, in remission post CAR-T in September 2020.  High risk for hypogammaglobulinemia.  Will order IgG.  If level 400 or less, will need IVIG  3.  PE most likely secondary to acute infection.  He has had PE in the past.  Multiple risk factors for PE-obesity, recent limited mobility  acute infection, etc.  He does also have a high hemoglobin and hematocrit, most likely secondary, which may also be predisposing him to hypercoagulopathy.  Continue anticoagulation for at least 3 to 6 months, may need long-term anticoagulation or at least switch to prophylactic anticoagulation.  Final decision deferred to his primary oncologist, Dr. Smith  4.  If respiratory symptoms do not improve, would involve ID service, as patient high risk for immunosuppression from his  prior therapy for lymphoma.  5.  May need phlebotomy, but will hold off for now.  6.  Monitor CBC daily.  Platelets slightly lower than baseline as well as a white count.  Most likely due to acute infection.  Unlikely to be HIT, platelets were already low prior to IV heparin.  If continue to drop, will need to send dyspnea.  7.  All other management per hospitalist service.    Patient was given multiple opportunities to ask questions, answered to the best of his satisfaction.  Will continue to follow the patient along with you.  Please call with any further questions or concerns.  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  Diagnosed with RSV 2 days prior to admission.  Labs on admission-CMP unremarkable.  WBC 6.7, hemoglobin 15.6, platelets 103, normal differential count.  Troponin T elevated 27, lactic acid initially slightly elevated then normalized with hydration.  .  Chest x-ray-no acute findings.   No change in troponin after hydration.  No new EKG changes, known CAD.  Continued on nebulizer.  Started on Lovenox 40 Mg subcu for prophylaxis    CT angio chest ordered due to persistent tachycardia revealed nonocclusive segmental PE RUL without RV strain.  Blood pressure/vitals otherwise normal.  US venous Dopplers bilateral lower extremity did not reveal DVT..  Prophylactic Lovenox discontinued, started on high-dose IV  heparin with bolus, discontinued this morning and switched to Eliquis  Patient also started on steroids for his respiratory status    Brief hematology-oncology history per Dr Smith's note dated 11/1/2024 is as follows:    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 (with the pleural effusion but negative cytology) follicular lymphoma.  --s/p RCHOP  He responded well to the chemotherapy with a near complete remission after six cycles. Maintenance rituximab was started in June 2015 and completed in February 2017.   --On 11/17/17 the patient underwent a thoracotomy for an enlarging right infrahilar mass. 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 The tumor was handled as a carcinoma but the pathology is most consistent with recurrent grade 2-3 follicular lymphoma.   --On 12/14/17 he began bendamustine with rituximab chemotherapy.   --He completed 6 cycles of BR and on 6/4/18 a CT revealed stable mild lymphadenopathy with mild splenomegaly and a slight increase in the IVC thrombus.   --A CT 6/3/19 revealed an increase in the right hilar and pretracheal adenopathy with stable nodular scarring of the right lung base, without other new disease. A PET/CT 6/13/19 confirmed the hypermetabolic right infrahilar mass and revealed moderate metabolic activity within a few small nodules just deep to tire pleura in the posterior aspect of tire mid right  hemithorax, suspicious for neoplasm, without other disease.   --As all disease could be encompassed in one radiation field, he completed 3000 cGy of radiotherapy to the right thoracic region 6/27/19 - 7/18/19.  --CT C/A/P 5/14/20 revealed marked increase in the right lower lobe pulmonary mass with new subcarinal lymphadenopathy, bilateral new adrenal masses, and soft tissue tumor deposits in the abdomen. CT guided adrenal biopsy 5/27/20 revealed transformation to a diffuse large B cell lymphoma,  germinal center type, 100% Ki-67 positive,  with positive immunohistochemical staining for BCL-2, BCL-6, and C-MYC suggestive of a possible triple-hit lymphoma  --RICE chemotherapy 6/5/2020 but complications of ifosphamide neurotoxicity discontinued 6/7  --began cycle 1 of R-DHAP 7/8/20  --repeat CT 7/20 shows significant reduction in the rounded pulmonary consolidation occupying the superior right lower lobe. Associated consolidation extending into the right perihilar right upper lobe is also noted. There is a significant decrease in the irregular nodularity involving the adrenal glands. There are a few mildly prominent but stable lymph nodes in several locations. No new adenopathy is identified. Splenomegaly has increased.  --9/30/2020 received Yescarta CAR-T infusion with CNS prophylaxis  --CT chest abdomen pelvis 3/30/2021 stable without new or enlarging adenopathy and with stable mild splenomegaly  --4/28/21 LDH slightly elevated 299, monitor  --CT neck, chest, abdomen, and pelvis 9/29/2021 with no or enlarging suspicious adenopathy and stable prominent mediastinal retroperitoneal and mesenteric nodes.  Unchanged mild splenomegaly.  Unchanged chronic right perihilar consolidation and scarring with pleural thickening.  --CT chest, abdomen, pelvis with contrast on 3/18/2022 showed stable mediastinal adenopathy and minimal increase in splenomegaly when compared to CT from 7/20/2020.  --PET/CT on 3/31/2022 showed FDG avid mediastinal and hilar lymphadenopathy (Deauville 4-5), similar hepatosplenomegaly and otherwise unchanged exam since 3/18/2022.  --Bronchoscopy on 4/11/2022 showed no endobronchial lesions.  FNA of station 11 L lymph node showed no malignancy.  Scant polymorphous lymphoid elements were present.  - Underwent mediastinoscopy 5/24/2022 under the direction of Dr. aNrvaez at the [facility].  Excisional biopsy of right level 2 lymph nodes showed benign small lymph nodes with sinus histiocytosis.  There  was no evidence of lymphoma.  Negative for Fungal elements by GMS.  Excisional biopsy of 2R mediastinal soft tissue showed benign fibroadipose tissue with no evidence of lymphoma.  --Seen by Dr. Bunch on follow-up on 6/7/2022 and he was felt to be in remission.  The plan was to see him back in October 2022 with repeat imaging studies.  He was recommended to continue acyclovir 400 mg p.o. twice daily for HSV prophylaxis.  -- CT chest, abdomen, pelvis with contrast on 10/5/2022 showed no findings concerning for recurrence of his lymphoma.  The study demonstrated minimal liver splenomegaly.  --Followed up with Dr. Bunch at the [facility] on 10/5/2022 and was noted to be in continued ongoing remission.   --CT Chest, abdomen, pelvis with contrast from 10/31/2023 showed no findings concerning for recurrence of his lymphoma  --Continue with observation alone  --  Invasive Melanoma  --right earlobe  7/2020 s/p shave biopsy with peripheral and deep margin involved. Has seen Dr. Levi Reza.   --Enrique IV, Breslow at least 0.9 mm, no ulceration  --wide excision with sentinel node biopsy 7/28 showed melanoma in situ at the superior margin (0.5mm), but node negative.  --eventually resected to negative surgical margins   --emphasized need for continued follow up with dermatologist for skin cancer surveillance     Polycythemia  --on and off polycythemia since April 2023  --CBC on 4/17/2023 showed hemoglobin high normal at 16.1 g/dL, hematocrit elevated 49.6, WBC count of 7400 and platelet count of 124,000  --CBC on 10/31/2023 showed hemoglobin again high normal at 16.6 g/dL, hematocrit elevated to 49.5, WBC count of 6100 and platelet count 129,000  --CBC on 4/30/2024 showed hemoglobin elevated to 17.5 g/dL, hematocrit elevated at 53, WBC count 7600 and platelet count of 126,000.  RBC count elevated to 5.6 M/microL  --CBC on 5/8/2024 showed hemoglobin elevated at 17.8, hematocrit elevated to 53.2, MCV 93, WBC count of 8000  and platelet count diminished to 127,000, RBC count normal at 5.74 million/mm   - CBC on 5/22/2024 showed hemoglobin elevated to 17.6, hematocrit elevated at 53.4, RBC count elevated to 5,72 million/mm , WBC count normal at 5600 and platelet count normal at 115,000.  --Peripheral smear on 5/22/2024 showed mild polycythemia and thrombocytopenia.  There were no definitive findings concerning for a myeloproliferative disorder.  -JAK2 V617F mutation testing as well as JAK2 exon 12 testing on 5/22/2024 was negative  - Serum erythropoietin level on 5/22/2024 was elevated to 43.7 mIU/ml consistent with a secondary polycythemia  - CT from 10/31/2023 showed no obvious liver/kidney masses.  No findings concerning for EPO secreting tumors.  -CBC on 10/29/2024 showed hematocrit of 52.3%.   - Peripheral smear on 10/29/2024 showed no findings concerning for a myeloproliferative neoplasm.   --reports h/o chronic sleep apnea and has been using CPAP.  No longer snoring.  Reports sleep apnea under good control.  Recommended that he follow up with his pulmonologist (Dr Granado) for consideration for repeat sleep study and to ensure his sleep apnea is being optimally controlled.   -- Non-smoker.   - reviewed that polycythemia can increase his risk for thrombotic events and I suggested that he take aspirin 81 mg p.o. daily for thromboprophylaxis.    -Will arrange for phlebotomy as needed to keep hematocrit under 53%  - He tells me he has his blood work checked every 3 months under the guidance of his primary care provider (for management of dyslipidemia and diabetes).  Suggest getting his CBCs checked during these lab draws.  If hematocrit 53% or more should be referred for phlebotomy.      Hypercoagulability/PE  --On 2/1/18 he was diagnosed with an IVC thrombus, 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 He is tolerating anticoagulation with Lovenox well. We discussed alternative anticoagulants such as Coumadin (less effective) or DOAC and as he has  completed chemotherapy with a good response of his malignancy in 2018 he was switched to Xarelto.   --A vascular surgery consultation for the persistent IVC thrombus concurred with anticoagulation for 6-12 months or longer without other intervention.  --10/28/2020 developed pulmonary emboli treated with enoxaparin  --Converted to Xarelto 20 mg daily  --CT C/A/P 20 revealed resolution of pulmonary embolus   --Completed anticoagulation with Xarelto for 6 month course through 2021  --Continue with observation    3/8/25: Pt seen this morning.  Overall doing well.  Continues to be very short of breath.  Patient was doing well up until about a week ago.  Started with some respiratory symptoms, progressively worsened very quickly, diagnosed with RSV 2 days prior to admission.  Has not had RSV vaccine.  Does have obstructive sleep apnea, compliant with the CPAP machine.  As noted above, prior history of PE, off of Xarelto at least since 2021.  No family history of VTE.  No recent travel.  He had been less mobile at least for a week since his RSV symptoms.           Physical Exam:   Vitals were reviewed  Blood pressure 133/67, pulse 87, temperature 97.3  F (36.3  C), temperature source Oral, resp. rate 18, weight 100.2 kg (220 lb 14.4 oz), SpO2 95%.  Temperatures:  Current - Temp: 97.3  F (36.3  C); Max - Temp  Av.6  F (37  C)  Min: 97.3  F (36.3  C)  Max: 100.3  F (37.9  C)  Respiration range: Resp  Av.5  Min: 18  Max: 20  Pulse range: Pulse  Av  Min: 84  Max: 93  Blood pressure range: Systolic (24hrs), Av , Min:115 , Max:139   ; Diastolic (24hrs), Av, Min:64, Max:72    Pulse oximetry range: SpO2  Av %  Min: 88 %  Max: 98 %    Intake/Output Summary (Last 24 hours) at 3/8/2025 0910  Last data filed at 3/7/2025 1344  Gross per 24 hour   Intake 360 ml   Output --   Net 360 ml       GENERAL: No acute distress.  SKIN: No rashes or jaundice.  HEENT: Normocephalic, atraumatic. Eyes  anicteric. Oropharynx is clear.  LYMPH: No palpable lymphadenopathy in the cervical, supraclavicular, axillary, or inguinal regions.  HEART: Regular rate and rhythm with no murmurs.  LUNGS: Very dyspneic on conversation, oxygen via nasal cannula.  Coughing  ABDOMEN: Soft, nontender, nondistended with no palpable hepatosplenomegaly.  EXTREMITIES: No clubbing, cyanosis, or edema.  MUSCULOSKELETAL: No pain to percussion over entire spine.  MENTAL: Alert and oriented to person, place, and time.  NEURO: Cranial nerves II through XII grossly intact with no focal motor or sensory deficits.              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    Liver disease     Lymphoma (H)     Non-Hodgkin lymphoma (H)     Pedal edema     CHRONIC    Pleural effusion     Seborrheic keratosis, inflamed     Sleep apnea     Uses a CPAP    Syncope and collapse     Thrombocytopenia 07/20/2020    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  Partial left second toe amputation with osteotomy through proximal phalanx.  2.  Partial left third toe amputation with osteotomy through proximal phalanx.;  Surgeon: Ismael Humphrey DPM;  Location: [facility] OR    BIOPSY LYMPH NODE CERVICAL N/A 12/5/2014    Procedure: BIOPSY  LYMPH NODE CERVICAL;  Surgeon: Robbie Linda MD;  Location:  OR    BRONCHOSCOPY FLEXIBLE N/A 11/14/2017    Procedure: BRONCHOSCOPY FLEXIBLE;  FLEXIBLE BRONCHOSCOPY WITH BIOPSIES.;  Surgeon: Robbie Linda MD;  Location:  OR    BRONCHOSCOPY RIGID OR FLEXIBLE W/TRANSENDOSCOPIC ENDOBRONCHIAL ULTRASOUND GUIDED N/A 4/11/2022    Procedure: BRONCHOSCOPY, FIBEROPTIC, endobronchial ultrasound, transbronchial biopsies, lymphnode forcep biopsies;  Surgeon: Prosper Aguiar MD;  Location: [facility] OR    COLONOSCOPY      COLONOSCOPY N/A 5/9/2018    Procedure: COMBINED COLONOSCOPY, SINGLE OR MULTIPLE BIOPSY/POLYPECTOMY BY BIOPSY;;  Surgeon: Ramos Bates MD;  Location:  GI    ENDOVASCULAR PLACEMENT VASCULAR DEVICE Left 12/5/2017    Procedure: ENDOVASCULAR PLACEMENT VASCULAR DEVICE;;  Surgeon: Robbie Linda MD;  Location: [facility]    ENT SURGERY      tonsillectomy    EXCISE NODE MEDIASTINAL N/A 11/17/2017    Procedure: EXCISE NODE MEDIASTINAL;;  Surgeon: Robbie Linda MD;  Location:  OR    EYE SURGERY      EYE SURGERY Left     vitrectomy    INSERT PORT VASCULAR ACCESS N/A 12/05/2014    Procedure: INSERT PORT VASCULAR ACCESS;  Surgeon: Robbie Linda MD;  Location:  OR    INSERT PORT VASCULAR ACCESS N/A 12/5/2017    Procedure: INSERT PORT VASCULAR ACCESS;  POWER PORT PLACEMENT ATTEMPTED, PLACEMENT OF ANGIO-SEAL VIP VASCULAR CLOSURE DEVICE;  Surgeon: Robbie Linda MD;  Location: [facility]    IR CHEST PORT PLACEMENT > 5 YRS OF AGE  6/5/2020    IR PORT REMOVAL RIGHT  12/10/2018    PHACOEMULSIFICATION CLEAR CORNEA WITH STANDARD INTRAOCULAR LENS IMPLANT Right 5/2/2016    Procedure: PHACOEMULSIFICATION CLEAR CORNEA WITH STANDARD INTRAOCULAR LENS IMPLANT;  Surgeon: Rasheed Finney MD;  Location:  [facility]    REMOVE PORT VASCULAR ACCESS N/A 3/14/2017    Procedure: REMOVE PORT VASCULAR ACCESS;  Surgeon: Robbie Linda MD;  Location:  OR    REMOVE PORT VASCULAR  ACCESS N/A 11/29/2022    Procedure: PORT REMOVAL;  Surgeon: Robbie Linda MD;  Location: [facility] OR    SOFT TISSUE SURGERY      BASAL CELL CA FOREHEAD    THORACOTOMY Right 11/17/2017    Procedure: THORACOTOMY;  RIGHT EXPLORATORY THORACOTOMY/ EXTENSIVE PNEUMOLYSIS/ BIOPSY OF INTERLOBAR LYMPH NODE;  Surgeon: Robbie Linda MD;  Location: [facility]    TRANSCERVICAL EXTENDED MEDIASTINAL LYMPHADENECTOMY N/A 5/24/2022    Procedure: TRANSCERVICAL EXTENDED MEDIASTINAL LYMPHADENECTOMY;  Surgeon: Star Narvaez MD;  Location: [facility] OR    TRANSCERVICAL EXTENDED MEDIASTINAL LYMPHADENECTOMY  5/24/2022    Procedure: ;  Surgeon: Star Narvaez MD;  Location: [facility] OR               Social History:   I have reviewed this patient's social history  Social History     Tobacco Use    Smoking status: Never    Smokeless tobacco: Never   Substance Use Topics    Alcohol use: Yes     Comment: 3-4 beers in a year             Family History:   I have reviewed this patient's family history  Family History   Problem Relation Age of Onset    Diabetes Mother     CABG Father     Anesthesia Reaction No family hx of              Allergies:   No Known Allergies          Medications:   I have reviewed this patient's current medications  Medications Prior to Admission   Medication Sig Dispense Refill Last Dose/Taking    amoxicillin-clavulanate (AUGMENTIN) 875-125 MG tablet Take 1 tablet by mouth 2 times daily. For 10 days starting 3/4/25   3/6/2025 Morning    aspirin 81 MG EC tablet Take 81 mg by mouth daily   3/6/2025 Evening    bisacodyl (DULCOLAX) 5 MG EC tablet Take 15 mg by mouth daily as needed for constipation.   3/5/2025    budesonide-formoterol (SYMBICORT/BREYNA) 160-4.5 MCG/ACT Inhaler Inhale 1 puff into the lungs 2 times daily.   3/6/2025 Evening    cyanocobalamin (VITAMIN B-12) 1000 MCG tablet Take 1,000 mcg by mouth daily   3/6/2025 Morning    docusate sodium (COLACE) 100 MG capsule Take 100 mg by mouth 2 times  daily as needed for constipation   3/5/2025 Evening    gabapentin (NEURONTIN) 400 MG capsule Take 400 mg by mouth at bedtime.   3/5/2025 Bedtime    insulin aspart (NOVOLOG VIAL) 100 UNITS/ML vial See Admin Instructions. To use with Omnipod   Taking    INSULIN PUMP - OUTPATIENT Inject subcutaneously continuously. Date Last Updated: 3/6/25  Omnipod  BASAL RATES: MAX BASAL 5 units/hour (dose calculated based on Dexcom readings)  CARB RATIO: settings per bolus calculator state carb ratio between 1.1-1.4 g carbs   Corection Factor (Sensitivity): 15 mg/dL  BLOOD GLUCOSE TARGET: 110  Active Insulin Time: 4 hours    *insulin pump doses are calculated from Dexcom readings, maximum basal 5 units/hr and maximum bolus 30 units   3/6/2025    ipratropium - albuterol 0.5 mg/2.5 mg/3 mL (DUONEB) 0.5-2.5 (3) MG/3ML neb solution Take 1 vial by nebulization every 6 hours as needed for shortness of breath, wheezing or cough.   Taking As Needed    Melatonin 10 MG TABS tablet Take 10 mg by mouth at bedtime.   3/5/2025 Bedtime    metFORMIN (GLUCOPHAGE) 500 MG tablet Take 1 tablet (500 mg) by mouth 2 times daily (with meals)   3/6/2025 Morning    psyllium (METAMUCIL/KONSYL) 58.6 % powder Take by mouth daily as needed for constipation. Dose not specified   3/6/2025    rosuvastatin (CRESTOR) 20 MG tablet Take 1 tablet by mouth daily.   3/6/2025 Morning    Semaglutide, 1 MG/DOSE, (OZEMPIC) 4 MG/3ML pen Inject 1 mg subcutaneously every 7 days. Mondays   3/3/2025     Current Facility-Administered Medications   Medication Dose Route Frequency Provider Last Rate Last Admin    acetaminophen (TYLENOL) tablet 650 mg  650 mg Oral Q4H PRN Tana Marley PA-C   650 mg at 03/07/25 2243    Or    acetaminophen (TYLENOL) Suppository 650 mg  650 mg Rectal Q4H PRN Tana Marley PA-C        apixaban ANTICOAGULANT (ELIQUIS) tablet 10 mg  10 mg Oral BID Toshia Hinton DO   10 mg at 03/08/25 0817    Followed by    [START ON 3/15/2025]  apixaban ANTICOAGULANT (ELIQUIS) tablet 5 mg  5 mg Oral BID Toshia Hinton DO        [Held by provider] aspirin EC tablet 81 mg  81 mg Oral Daily Tana Marley PA-C   81 mg at 03/07/25 2041    benzonatate (TESSALON) capsule 200 mg  200 mg Oral TID PRN Apryl Cooper MD   200 mg at 03/07/25 1751    calcium carbonate (TUMS) chewable tablet 1,000 mg  1,000 mg Oral 4x Daily PRN Tana Marley PA-C        glucose gel 15-30 g  15-30 g Oral Q15 Min PRN Tana Marley PA-C        Or    dextrose 50 % injection 25-50 mL  25-50 mL Intravenous Q15 Min PRN Tana Marley PA-C        Or    glucagon injection 1 mg  1 mg Subcutaneous Q15 Min PRN Tana Marley PA-C        docusate sodium (COLACE) capsule 100 mg  100 mg Oral BID PRN Tana Marley PA-C        fluticasone-vilanterol (BREO ELLIPTA) 200-25 MCG/ACT inhaler 1 puff  1 puff Inhalation Daily Tana Marley PA-C   1 puff at 03/08/25 0817    gabapentin (NEURONTIN) capsule 400 mg  400 mg Oral At Bedtime Tana Marley PA-C   400 mg at 03/07/25 2241    guaiFENesin (MUCINEX) 12 hr tablet 600 mg  600 mg Oral BID Tana Marley PA-C   600 mg at 03/08/25 0817    hydrALAZINE (APRESOLINE) tablet 10 mg  10 mg Oral Q4H PRN Tana Marley PA-C        Or    hydrALAZINE (APRESOLINE) injection 10 mg  10 mg Intravenous Q4H PRN Tana Marley PA-C        insulin aspart (NovoLOG/FIASP) 100 UNIT/ML VIAL FOR FILLING PUMP RESERVOIR   Device See Admin Instructions Tana Marley PA-C        insulin basal rate from AMBULATORY PUMP   Subcutaneous Continuous Tana Marley PA-C        insulin bolus from AMBULATORY PUMP   Subcutaneous 4x Daily AC & HS Tana Marley PA-C   Given at 03/08/25 0818    ipratropium - albuterol 0.5 mg/2.5 mg/3 mL (DUONEB) neb solution 3 mL  3 mL Nebulization 4x Daily Apryl Cooper MD   3 mL at 03/08/25 0652    levalbuterol (XOPENEX) neb solution 1.25 mg  1.25 mg  Nebulization Q2H PRN Tana Marley PA-C        lidocaine (LMX4) cream   Topical Q1H PRN Tana Marley PA-C        lidocaine 1 % 0.1-1 mL  0.1-1 mL Other Q1H PRN Tana Marley PA-C        melatonin tablet 10 mg  10 mg Oral At Bedtime PRN Cayla Barron MD   10 mg at 03/07/25 2241    methylPREDNISolone Na Suc (solu-MEDROL) injection 62.5 mg  62.5 mg Intravenous Q8H Toshia Hinton DO   62.5 mg at 03/08/25 0940    ondansetron (ZOFRAN ODT) ODT tab 4 mg  4 mg Oral Q6H PRN Tana Marley PA-C        Or    ondansetron (ZOFRAN) injection 4 mg  4 mg Intravenous Q6H PRN Tana Marley PA-C        rosuvastatin (CRESTOR) tablet 20 mg  20 mg Oral Daily Tana Marley PA-C   20 mg at 03/08/25 0817    senna-docusate (SENOKOT-S/PERICOLACE) 8.6-50 MG per tablet 1 tablet  1 tablet Oral BID PRN Tana Marley PA-C        Or    senna-docusate (SENOKOT-S/PERICOLACE) 8.6-50 MG per tablet 2 tablet  2 tablet Oral BID PRN Tana Marley PA-C        sodium chloride (PF) 0.9% PF flush 3 mL  3 mL Intracatheter Q8H Tana Marley PA-C   3 mL at 03/08/25 0941    sodium chloride (PF) 0.9% PF flush 3 mL  3 mL Intracatheter q1 min prn Tana Marley PA-C                 Review of Systems:     The 14 point Review of Systems is negative other than noted in the HPI.            Data:   Data   Results for orders placed or performed during the hospital encounter of 03/06/25 (from the past 24 hours)   Glucose by meter   Result Value Ref Range    GLUCOSE BY METER POCT 122 (H) 70 - 99 mg/dL   Glucose by meter   Result Value Ref Range    GLUCOSE BY METER POCT 156 (H) 70 - 99 mg/dL   Glucose by meter   Result Value Ref Range    GLUCOSE BY METER POCT 175 (H) 70 - 99 mg/dL   CT Chest Pulmonary Embolism w Contrast    Narrative    EXAM: CT CHEST PULMONARY EMBOLISM W CONTRAST  LOCATION: [facility]  DATE: 3/7/2025    INDICATION: pulm emb? Cough, right-sided  volume loss, respiratory distress and tachycardia  COMPARISON: 10/31/2023  TECHNIQUE: CT chest pulmonary angiogram during arterial phase injection of IV contrast. Multiplanar reformats and MIP reconstructions were performed. Dose reduction techniques were used.   CONTRAST: 77mL Isovue 370    FINDINGS:  ANGIOGRAM CHEST: Small volume of right upper lobe segmental pulmonary embolus, nonocclusive, image 103 series 3. No central/saddle embolus. Thoracic aorta is negative for dissection. No CT evidence of right heart strain.    LUNGS AND PLEURA: Significant right upper and lower lobe volume loss with associated confluent pulmonary parenchymal consolidation or architectural distortion again noted. Interval increase in groundglass opacities within the remaining aerated lung.   Chronic bibasilar bronchial wall thickening. No pneumothorax.    MEDIASTINUM/AXILLAE: Mild cardiomegaly. Chronic mediastinal shift to the right. Stranding in the superior mediastinum, and prominent mediastinal nodes measuring up to 1.2 cm, unchanged.    CORONARY ARTERY CALCIFICATION: Moderate.    UPPER ABDOMEN: Splenomegaly. Right adrenal calcifications redemonstrated.    MUSCULOSKELETAL: No acute bony abnormalities.      Impression    IMPRESSION:  1.  Small volume of right upper lobe pulmonary embolus without right heart strain.  2.  Chronic right-sided volume loss and postradiation consolidative change.  3.  Increasing right-sided groundglass opacity may represent worsening atelectasis, or superimposed pneumonitis.    Critical Result: VTE (PE/DVT)    Finding was identified on 3/7/2025 7:14 PM CST.    ADIEL Gross was contacted by me on 3/7/2025 7:14 PM CST and verbalized understanding of the critical result.      CBC with platelets   Result Value Ref Range    WBC Count 3.9 (L) 4.0 - 11.0 10e3/uL    RBC Count 4.97 4.40 - 5.90 10e6/uL    Hemoglobin 15.0 13.3 - 17.7 g/dL    Hematocrit 44.5 40.0 - 53.0 %    MCV 90 78 - 100 fL    MCH 30.2 26.5 - 33.0  pg    MCHC 33.7 31.5 - 36.5 g/dL    RDW 14.8 10.0 - 15.0 %    Platelet Count 96 (L) 150 - 450 10e3/uL   US Lower Extremity Venous Duplex Bilateral    Narrative    EXAM: US LOWER EXTREMITY VENOUS DUPLEX BILATERAL  LOCATION: [facility]  DATE: 3/7/2025    INDICATION: Pt with new pulmonary embolism, lower extremity pain/swelling  COMPARISON: None.  TECHNIQUE: Venous Duplex ultrasound of bilateral lower extremities with and without compression, augmentation and duplex. Color flow and spectral Doppler with waveform analysis performed.    FINDINGS: Exam includes the common femoral, femoral, popliteal veins as well as segmentally visualized deep calf veins and greater saphenous vein.     RIGHT: No deep vein thrombosis. No superficial thrombophlebitis. No popliteal cyst.    LEFT: No deep vein thrombosis. No superficial thrombophlebitis. Baker's cyst measuring 4.7 x 2.9 x 0.7 cm.      Impression    IMPRESSION:  1.  No deep venous thrombosis in the bilateral lower extremities.   Glucose by meter   Result Value Ref Range    GLUCOSE BY METER POCT 132 (H) 70 - 99 mg/dL   Heparin Unfractionated Anti Xa Level   Result Value Ref Range    Anti Xa Unfractionated Heparin 0.63 For Reference Range, See Comment IU/mL    Narrative    Therapeutic Range: UFH: 0.25-0.50 IU/mL for low intensity dosing,  0.30-0.70 IU/mL for high intensity dosing DVT and PE.  This test is not validated for other direct factor X inhibitors (e.g. rivaroxaban, apixaban, edoxaban, betrixaban, fondaparinux) and should not be used for monitoring of other medications.   Glucose by meter   Result Value Ref Range    GLUCOSE BY METER POCT 134 (H) 70 - 99 mg/dL   Glucose by meter   Result Value Ref Range    GLUCOSE BY METER POCT 107 (H) 70 - 99 mg/dL   Glucose by meter   Result Value Ref Range    GLUCOSE BY METER POCT 103 (H) 70 - 99 mg/dL   Platelet count   Result Value Ref Range    Platelet Count 101 (L) 150 - 450 10e3/uL   Heparin  Unfractionated Anti Xa Level   Result Value Ref Range    Anti Xa Unfractionated Heparin 0.13 For Reference Range, See Comment IU/mL    Narrative    Therapeutic Range: UFH: 0.25-0.50 IU/mL for low intensity dosing,  0.30-0.70 IU/mL for high intensity dosing DVT and PE.  This test is not validated for other direct factor X inhibitors (e.g. rivaroxaban, apixaban, edoxaban, betrixaban, fondaparinux) and should not be used for monitoring of other medications.     TT 60 mins , > 50 % face-to-face direct patient care, additional time chart prep, placing orders, documentation, care coordination

## 2025-03-08 NOTE — PROGRESS NOTES
03/08/25 1636   Appointment Info   Signing Clinician's Name / Credentials (PT) Rickey Beach, PT, DPT   Rehab Comments (PT) 3LPM via NC at rest, 5LPM via NC with activity   Living Environment   People in Home spouse   Current Living Arrangements house   Home Accessibility stairs to enter home;stairs within home   Number of Stairs, Main Entrance 4   Stair Railings, Main Entrance none   Number of Stairs, Within Home, Primary greater than 10 stairs   Stair Railings, Within Home, Primary railing on right side (ascending)   Transportation Anticipated family or friend will provide   Living Environment Comments Patient lives with spouse in house, has 4 steps to enter, and 12 steps to bedrooms.   Self-Care   Usual Activity Tolerance good   Current Activity Tolerance good   Equipment Currently Used at Home none   Fall history within last six months no   Activity/Exercise/Self-Care Comment Patient reported being IND with mobility and cares at baseline with no assistive device. Uses 4L supplemental O2 via NC with activity at baseline.   General Information   Onset of Illness/Injury or Date of Surgery 03/06/25   Referring Physician Tana Marley PA-C   Patient/Family Therapy Goals Statement (PT) patient would like to go home   Pertinent History of Current Problem (include personal factors and/or comorbidities that impact the POC) 74 year old male with PMH of DM II, diffuse large B-cell lymphoma in remission, CAD based on CT calcium score, admitted on 3/6/25 with acute on chronic respiratory failure.   Existing Precautions/Restrictions fall;oxygen therapy device and L/min  (3LPM via NC at rest)   Cognition   Affect/Mental Status (Cognition) WFL   Orientation Status (Cognition) oriented x 4   Follows Commands (Cognition) WFL   Pain Assessment   Patient Currently in Pain No   Strength (Manual Muscle Testing)   Strength (Manual Muscle Testing) strength is WFL   Bed Mobility   Comment, (Bed Mobility) seated EOB at beginning  of session, IND with bed mobility   Transfers   Comment, (Transfers) IND sit<>stands   Gait/Stairs (Locomotion)   Comment, (Gait/Stairs) ambulation with initial supervision and no assistive device   Balance   Balance no deficits were identified   Sensory Examination   Sensory Perception patient reports no sensory changes   Clinical Impression   Criteria for Skilled Therapeutic Intervention Yes, treatment indicated   PT Diagnosis (PT) impaired mobility   Influenced by the following impairments increased oxygen needs, reduced activity tolerance   Functional limitations due to impairments impaired functional mobility, impaired IADL's   Clinical Presentation (PT Evaluation Complexity) stable   Clinical Presentation Rationale clinical judgment   Clinical Decision Making (Complexity) low complexity   Planned Therapy Interventions (PT) balance training;bed mobility training;gait training;neuromuscular re-education;patient/family education;stair training;strengthening;transfer training;progressive activity/exercise   Risk & Benefits of therapy have been explained evaluation/treatment results reviewed;care plan/treatment goals reviewed;risks/benefits reviewed;current/potential barriers reviewed;participants included;participants voiced agreement with care plan;patient   PT Total Evaluation Time   PT Eval, Low Complexity Minutes (04584) 5   Physical Therapy Goals   PT Frequency One time eval and treatment only   PT Predicted Duration/Target Date for Goal Attainment 03/09/25   PT Goals Bed Mobility;Transfers;Gait;Stairs   PT: Bed Mobility Independent;Supine to/from sit;Goal Met   PT: Transfers Independent;Sit to/from stand;Goal Met   PT: Gait Independent;Greater than 200 feet;Goal Met   PT: Stairs Modified independent;10 stairs;Rail on right   Interventions   Interventions Quick Adds Gait Training;Therapeutic Activity   Therapeutic Activity   Therapeutic Activities: dynamic activities to improve functional performance Minutes  (46405) 11   Symptoms Noted During/After Treatment Fatigue   Treatment Detail/Skilled Intervention Patient seated at EOB at beginning of session, agreeable to participate in PT. On 3L supplemental O2 via NC at rest with SpO2 >90%. Increased to 5LPM via NC with activity, remained 90% and greater when checked following ambulation. PAtient performing sit<>stands independently during session. Patient able to reach down and pick item up from ground with supervision and no UE support. Discussed using grabber or unilateral UE support when reaching for items at home. Patient completed bout of ambulation for ~250' with no assistive device and initial supervision, progressed to IND. Patient performing bout of stair navigation x10 steps with unilateral handrail and mod I. Reciprocal pattern throughout. Seated at EOB at end of session, VSS when monitored. Discussed OP PT, patient not needing at this time. Discussed continued ambulation with nursing staff while in hospital, patient agreeable. All needs within reach at end of session.   Gait Training   Distance in Feet 250', 10 stairs (see therapeutic activity section)   PT Discharge Planning   PT Plan discharge from IP PT   PT Discharge Recommendation (DC Rec) home with assist   PT Rationale for DC Rec Patient near baseline functional mobility. Presents with reduced activity tolerance and has increased oxygen needs, mobilizing independently during session. Anticipate discharge home with assist for IADL's as needed from spouse when medically cleared.   PT Brief overview of current status Goals of therapy will be to address safe mobility and make recs for d/c to next level of care. Pt and RN will continue to follow all falls risk precautions as documented by RN staff while hospitalized.   PT Total Distance Amb During Session (feet) 250   Physical Therapy Time and Intention   Timed Code Treatment Minutes 11   Total Session Time (sum of timed and untimed services) 16

## 2025-03-08 NOTE — PLAN OF CARE
Summary: (+) RSV 3/4, increased SOB and cough with increased O2 needs.   DATE & TIME: 3/7/25 Evenings     Cognitive Concerns/ Orientation : A&Ox4, Calm and cooperative. Calls as needed. Spouse at bedside visiting pt this evening.   BEHAVIOR & AGGRESSION TOOL COLOR: Green  ABNL VS/O2: VSS on 4L at rest. 88% on RA. 5L with activity. LS wheezy and coarse. Freq strong cough. PRN Tessalon Pearls given.   MOBILITY: Independent in room with O2. Showered this shift.   PAIN MANAGMENT: Denies  DIET: MOD CHO, good appetite.   BOWEL/BLADDER: Continent. Using bathroom. Reported constipation.   ABNL LAB/BG: , 195. Plat 93, BC pending, CT of chest for PE completed this evening.   DRAIN/DEVICES: 2 PIV SL. Heparin started this shift at 1800u/hr with 8,000u bolus in R AC. Enoxaparin discontinued. First Hep 10a scheduled for 0130. Pt has personal ambulatory insulin pump.   TELEMETRY RHYTHM: NSR  SKIN: WDL. Face flushed in color. Pt reports this baseline.   TESTS/PROCEDURES: CT chest completed. (+) for small embolus in RUL. US of LE negative for DVT. AM labs and Q6h Hep 10a   D/C DAY/GOALS/PLACE: Pending improvements. New PE diagnosed this shift.   OTHER IMPORTANT INFO: LS coarse, Exp. Wheezing, on scheduled nebs, started on prednisone,encouraged IS and Flutter valve .

## 2025-03-08 NOTE — PLAN OF CARE
Physical Therapy Discharge Summary    Reason for therapy discharge:    All goals and outcomes met, no further needs identified.    Progress towards therapy goal(s). See goals on Care Plan in [facility] electronic health record for goal details.  Goals met    Therapy recommendation(s):    Continue home exercise program. Continue ambulating with nursing staff while in hospital.

## 2025-03-08 NOTE — PROVIDER NOTIFICATION
On call Hospialist Parth contacted after radiology contacted writer about visualizing a small embolus in the R upper lobe during a PE study.     US of LE ordered. Pending reading.     Will start a Heparin drip with bolus.   No signs of bleeding, chest pain, or swelling.     Enoxaparin subcutaneous injections discontinued.   Platelets 93 this AM. Will continue to monitor.

## 2025-03-08 NOTE — PROGRESS NOTES
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     Acute on chronic hypoxic respiratory failure   Acute RSV Infection  Severe JESSICA and chronic oxygen use    *Presents with worsening shortness of breath, cough, rhinorrhea which started over the weekend. Diagnosed with RSV two days prior via PCP.    *Follows with pulmonology. History of severe JESSICA. Has had RLL mass diagnosed as lymphoma, underwent therapy with improvement of symptoms. Recurrence of disease in 2020 and underwent CAR-T cell therapy. Then had COVID in 2022 with residual exertional dyspnea.     3/8/25 - still having significant wheezing on exam today                Started on po prednisone 3/7/25               Will trial IV solumedrol q8h today  to treat his significant reactive airways               Continue with prn xopenex  d/t tachycardia    Acute right-sided PE    CT chest 10/2023 stable right lower and upper lobe volume loss, consolidation and architectural distortion consistent with postradiation change/scarring. He uses 4 L with exertion and 2 L via CPAP at HS. at baseline patient is on room air at rest    Started on IV heparin last evening  Will change to DOAC this gonzales m  Tells me that he was on DOAC for several months for a clot in  his abdomen    Leg US 3/7/25 - neg for DVT    Heme/oncology consult requested - follows with [facility]    DM II, insulin dependent with peripheral neuropathy  - Recheck A1c 6.4  - Hold PTA Metformin, Semaglutide  - Continue insulin pump per home settings.      Discussed with pt and wife again today that he is at risk for hypoglycemia in the setting of IV steroids - will continue to monitor blood glucose trends closely  - Continue  PTA Gabapentin      CAD   *Based on CT chest imaging showing severe coronary calcifications. Cardiac MRI stress perfusion showed EF 68% w/o WMA, no significant valvular disease, no ischemia. Denies chest pain.  - Continue PTA Aspirin, statin     Diffuse large B-cell lymphoma in remission  Polycythemia vera  *Established with Dr. Smith. S/p CAR-T cell therapy, currently in remission of his lymphoma.   *Hgb stable 14-15    As above - Heme/onc consult requested and is pending     Chronic thrombocytopenia  *Platelets 103, baseline 127-148  -Continue to trend platelets.   D/w him at length today - plts improved today 101 (96)    Deconditioning    PT to evaluate and treat  He hopes to be able to start moving more today     PPE Used:  Mask, gloves, face shield          Diet: Moderate Consistent Carb (60 g CHO per Meal) Diet    DVT Prophylaxis: DOAC  Champion Catheter: Not present  Lines: None     Cardiac Monitoring: ACTIVE order. Indication: Tachyarrhythmias, acute (48 hours)  Code Status: No CPR- Pre-arrest intubation OK      Clinically Significant Risk Factors          # Hypochloremia: Lowest Cl = 95 mmol/L in last 2 days, will monitor as appropriate        # Thrombocytopenia: Lowest platelets = 93 in last 2 days, will monitor for bleeding                         Disposition Plan     Medically Ready for Discharge: Anticipated in 2-4 Days             Toshia Hinton DO  Hospitalist Service  [facility]  Securely message with [email or web address] (more info)  Text page via [facility] Paging/Directory   ______________________________________________________________________    Interval History     Feeling slightly better but still short of breath easily.  No chest pain.   No HA, lightheadedness or dizziness when up out of bed.  Would like to try to ambulate more.  Appetite ok - no N/V.  No HA, F/C or other new complaints    Wife present on facetime call during my rounds this am.     Physical Exam   Vital Signs:  Temp: 97.5  F (36.4  C) Temp src: Oral BP: 115/64 Pulse: 84   Resp: 18 SpO2: 98 % O2 Device: BiPAP/CPAP Oxygen Delivery: 4 LPM  Weight: 220 lbs 14.41 oz    GEN:  Alert, oriented x 3, appears fatigued, coughing when sitting up and attempting to take  deeper breaths for my exam.  HEENT:  Normocephalic/atraumatic, no scleral icterus, no nasal discharge,  CV: somewhat distant but regular rate and rhythm, no loud murmur ausc this am.  S1 + S2 noted, no S3 or S4.  LUNGS:  bibasilar crackles on post exam up to mid-lung.  Scattered end-expiratory  wheezes throughout ant/post lung fields.  No clear rhonchi.    Symmetric chest rise on inhalation noted.  ABD:  Active bowel sounds, soft, non-tender to light palpation throughout.   Mildly distended.  No rebound/guarding/rigidity.  EXT:  No significant pretibial edema bilaterally.  No cyanosis.    SKIN:  Dry to touch, no new exanthems noted in the visualized areas.    Medical Decision Making       53 MINUTES SPENT BY ME on the date of service doing chart review, history, exam, documentation & further activities per the note.      Data   Medications   Current Facility-Administered Medications   Medication Dose Route Frequency Provider Last Rate Last Admin    heparin 25,000 units in 0.45% NaCl 250 mL ANTICOAGULANT infusion  0-5,000 Units/hr Intravenous Continuous Partha Alba, DO 18 mL/hr at 03/08/25 0628 1,800 Units/hr at 03/08/25 0628    insulin basal rate from AMBULATORY PUMP   Subcutaneous Continuous Tana Marley PA-C         Current Facility-Administered Medications   Medication Dose Route Frequency Provider Last Rate Last Admin    aspirin EC tablet 81 mg  81 mg Oral Daily Tana Marley PA-C   81 mg at 03/07/25 2041    fluticasone-vilanterol (BREO ELLIPTA) 200-25 MCG/ACT inhaler 1 puff  1 puff Inhalation Daily Tana Marley PA-C   1 puff at 03/07/25 0819    gabapentin (NEURONTIN) capsule 400 mg  400 mg Oral At Bedtime Tana Marley PA-C   400  mg at 03/07/25 2241    guaiFENesin (MUCINEX) 12 hr tablet 600 mg  600 mg Oral BID Tana Marley PA-C   600 mg at 03/07/25 2041    insulin aspart (NovoLOG/FIASP) 100 UNIT/ML VIAL FOR FILLING PUMP RESERVOIR   Device See Admin Instructions Tana Marley PA-C        insulin bolus from AMBULATORY PUMP   Subcutaneous 4x Daily AC & HS Tana Marley PA-C   9 Units at 03/07/25 2240    ipratropium - albuterol 0.5 mg/2.5 mg/3 mL (DUONEB) neb solution 3 mL  3 mL Nebulization 4x Daily Apryl Cooper MD   3 mL at 03/07/25 2121    predniSONE (DELTASONE) tablet 40 mg  40 mg Oral Daily Apryl Cooper MD   40 mg at 03/07/25 1056    rosuvastatin (CRESTOR) tablet 20 mg  20 mg Oral Daily Tana Marley PA-C   20 mg at 03/07/25 0819    sodium chloride (PF) 0.9% PF flush 3 mL  3 mL Intracatheter Q8H Tana Marley PA-C   3 mL at 03/07/25 0819     Labs and Imaging results below reviewed today.  Recent Labs   Lab 03/08/25  0759 03/07/25  1939 03/07/25  0716 03/06/25  1850   WBC  --  3.9* 4.8 6.7   HGB  --  15.0 14.9 15.6   HCT  --  44.5 45.4 47.5   MCV  --  90 92 91   * 96* 93* 103*     Recent Labs   Lab 03/08/25  1117 03/08/25  0739 03/08/25  0503 03/07/25  0753 03/07/25  0716 03/07/25  0053 03/06/25  1850   NA  --   --   --   --  140  --  137   POTASSIUM  --   --   --   --  3.4  --  3.6   CHLORIDE  --   --   --   --  101  --  95*   CO2  --   --   --   --  28  --  29   ANIONGAP  --   --   --   --  11  --  13   * 103* 107*   < > 88   < > 131*   BUN  --   --   --   --  9.1  --  12.3   CR  --   --   --   --  0.73  --  0.81   GFRESTIMATED  --   --   --   --  >90  --  >90   JEFF  --   --   --   --  8.7*  --  9.1    < > = values in this interval not displayed.     7-Day Micro Results       Collected Updated Procedure Result Status      03/06/2025 1930 03/07/2025 2246 Blood Culture Peripheral Blood [[id number]]   Peripheral Blood    Preliminary result Component Value   Culture No growth  "after 1 day  [P]                03/06/2025 1850 03/07/2025 2246 Blood Culture Peripheral Blood [[id number]]   Peripheral Blood    Preliminary result Component Value   Culture No growth after 1 day  [P]                      Recent Labs   Lab 03/08/25  1117 03/08/25  0739 03/08/25  0503 03/07/25  0753 03/07/25  0716 03/07/25  0053 03/06/25  1850   NA  --   --   --   --  140  --  137   POTASSIUM  --   --   --   --  3.4  --  3.6   CHLORIDE  --   --   --   --  101  --  95*   CO2  --   --   --   --  28  --  29   ANIONGAP  --   --   --   --  11  --  13   * 103* 107*   < > 88   < > 131*   BUN  --   --   --   --  9.1  --  12.3   CR  --   --   --   --  0.73  --  0.81   GFRESTIMATED  --   --   --   --  >90  --  >90   JEFF  --   --   --   --  8.7*  --  9.1   PROTTOTAL  --   --   --   --   --   --  6.5   ALBUMIN  --   --   --   --   --   --  3.8   BILITOTAL  --   --   --   --   --   --  0.9   ALKPHOS  --   --   --   --   --   --  61   AST  --   --   --   --   --   --  27   ALT  --   --   --   --   --   --  27    < > = values in this interval not displayed.     No results for input(s): \"INR\" in the last 168 hours.  No results for input(s): \"COLOR\", \"APPEARANCE\", \"URINEGLC\", \"URINEBILI\", \"URINEKETONE\", \"SG\", \"UBLD\", \"URINEPH\", \"PROTEIN\", \"UROBILINOGEN\", \"NITRITE\", \"LEUKEST\", \"RBCU\", \"WBCU\" in the last 168 hours.    Recent Results (from the past 24 hours)   CT Chest Pulmonary Embolism w Contrast    Narrative    EXAM: CT CHEST PULMONARY EMBOLISM W CONTRAST  LOCATION: [facility]  DATE: 3/7/2025    INDICATION: pulm emb? Cough, right-sided volume loss, respiratory distress and tachycardia  COMPARISON: 10/31/2023  TECHNIQUE: CT chest pulmonary angiogram during arterial phase injection of IV contrast. Multiplanar reformats and MIP reconstructions were performed. Dose reduction techniques were used.   CONTRAST: 77mL Isovue 370    FINDINGS:  ANGIOGRAM CHEST: Small volume of right upper lobe segmental " pulmonary embolus, nonocclusive, image 103 series 3. No central/saddle embolus. Thoracic aorta is negative for dissection. No CT evidence of right heart strain.    LUNGS AND PLEURA: Significant right upper and lower lobe volume loss with associated confluent pulmonary parenchymal consolidation or architectural distortion again noted. Interval increase in groundglass opacities within the remaining aerated lung.   Chronic bibasilar bronchial wall thickening. No pneumothorax.    MEDIASTINUM/AXILLAE: Mild cardiomegaly. Chronic mediastinal shift to the right. Stranding in the superior mediastinum, and prominent mediastinal nodes measuring up to 1.2 cm, unchanged.    CORONARY ARTERY CALCIFICATION: Moderate.    UPPER ABDOMEN: Splenomegaly. Right adrenal calcifications redemonstrated.    MUSCULOSKELETAL: No acute bony abnormalities.      Impression    IMPRESSION:  1.  Small volume of right upper lobe pulmonary embolus without right heart strain.  2.  Chronic right-sided volume loss and postradiation consolidative change.  3.  Increasing right-sided groundglass opacity may represent worsening atelectasis, or superimposed pneumonitis.    Critical Result: VTE (PE/DVT)    Finding was identified on 3/7/2025 7:14 PM CST.    ADIEL Gross was contacted by me on 3/7/2025 7:14 PM CST and verbalized understanding of the critical result.      US Lower Extremity Venous Duplex Bilateral    Narrative    EXAM: US LOWER EXTREMITY VENOUS DUPLEX BILATERAL  LOCATION: [facility]  DATE: 3/7/2025    INDICATION: Pt with new pulmonary embolism, lower extremity pain/swelling  COMPARISON: None.  TECHNIQUE: Venous Duplex ultrasound of bilateral lower extremities with and without compression, augmentation and duplex. Color flow and spectral Doppler with waveform analysis performed.    FINDINGS: Exam includes the common femoral, femoral, popliteal veins as well as segmentally visualized deep calf veins and greater saphenous  vein.     RIGHT: No deep vein thrombosis. No superficial thrombophlebitis. No popliteal cyst.    LEFT: No deep vein thrombosis. No superficial thrombophlebitis. Baker's cyst measuring 4.7 x 2.9 x 0.7 cm.      Impression    IMPRESSION:  1.  No deep venous thrombosis in the bilateral lower extremities.

## 2025-03-08 NOTE — SIGNIFICANT EVENT
At 1924, was notified by bedside RN (Renato Zhang) of CT PE showing a non-occlusive segmental RUL PE without any RV strain. Normotensive without any worsening O2 requirement (currently using 4L compared to 5L on admission). No CP noted per RN report. Troponin elevated    Intermediate low risk segmental RUL PE  - BLE venous duplex pending  - Discontinue prophylactic enoxaparin 40mg daily subcutaneous and replace with high dose heparin gtt with bolus    At 2030, reviewed BLE US results which was negative for DVTs    Partha Alba DO  [facility]

## 2025-03-09 LAB
ANION GAP SERPL CALCULATED.3IONS-SCNC: 13 MMOL/L (ref 7–15)
BUN SERPL-MCNC: 11.6 MG/DL (ref 8–23)
CALCIUM SERPL-MCNC: 9.1 MG/DL (ref 8.8–10.4)
CHLORIDE SERPL-SCNC: 97 MMOL/L (ref 98–107)
CREAT SERPL-MCNC: 0.71 MG/DL (ref 0.67–1.17)
EGFRCR SERPLBLD CKD-EPI 2021: >90 ML/MIN/1.73M2
ERYTHROCYTE [DISTWIDTH] IN BLOOD BY AUTOMATED COUNT: 14.4 % (ref 10–15)
GLUCOSE BLDC GLUCOMTR-MCNC: 139 MG/DL (ref 70–99)
GLUCOSE BLDC GLUCOMTR-MCNC: 170 MG/DL (ref 70–99)
GLUCOSE BLDC GLUCOMTR-MCNC: 178 MG/DL (ref 70–99)
GLUCOSE BLDC GLUCOMTR-MCNC: 187 MG/DL (ref 70–99)
GLUCOSE BLDC GLUCOMTR-MCNC: 200 MG/DL (ref 70–99)
GLUCOSE BLDC GLUCOMTR-MCNC: 246 MG/DL (ref 70–99)
GLUCOSE SERPL-MCNC: 181 MG/DL (ref 70–99)
HCO3 SERPL-SCNC: 29 MMOL/L (ref 22–29)
HCT VFR BLD AUTO: 45.3 % (ref 40–53)
HGB BLD-MCNC: 15.4 G/DL (ref 13.3–17.7)
MCH RBC QN AUTO: 30.2 PG (ref 26.5–33)
MCHC RBC AUTO-ENTMCNC: 34 G/DL (ref 31.5–36.5)
MCV RBC AUTO: 89 FL (ref 78–100)
PLATELET # BLD AUTO: 121 10E3/UL (ref 150–450)
POTASSIUM SERPL-SCNC: 3.9 MMOL/L (ref 3.4–5.3)
PROCALCITONIN SERPL IA-MCNC: 0.24 NG/ML
RBC # BLD AUTO: 5.1 10E6/UL (ref 4.4–5.9)
SODIUM SERPL-SCNC: 139 MMOL/L (ref 135–145)
WBC # BLD AUTO: 4.7 10E3/UL (ref 4–11)

## 2025-03-09 PROCEDURE — 85014 HEMATOCRIT: CPT | Performed by: INTERNAL MEDICINE

## 2025-03-09 PROCEDURE — 250N000013 HC RX MED GY IP 250 OP 250 PS 637: Performed by: INTERNAL MEDICINE

## 2025-03-09 PROCEDURE — 120N000001 HC R&B MED SURG/OB

## 2025-03-09 PROCEDURE — 85048 AUTOMATED LEUKOCYTE COUNT: CPT | Performed by: INTERNAL MEDICINE

## 2025-03-09 PROCEDURE — 82310 ASSAY OF CALCIUM: CPT | Performed by: INTERNAL MEDICINE

## 2025-03-09 PROCEDURE — 250N000009 HC RX 250: Performed by: INTERNAL MEDICINE

## 2025-03-09 PROCEDURE — 94640 AIRWAY INHALATION TREATMENT: CPT

## 2025-03-09 PROCEDURE — 84295 ASSAY OF SERUM SODIUM: CPT | Performed by: INTERNAL MEDICINE

## 2025-03-09 PROCEDURE — 84145 PROCALCITONIN (PCT): CPT | Performed by: INTERNAL MEDICINE

## 2025-03-09 PROCEDURE — 36415 COLL VENOUS BLD VENIPUNCTURE: CPT | Performed by: INTERNAL MEDICINE

## 2025-03-09 PROCEDURE — 99233 SBSQ HOSP IP/OBS HIGH 50: CPT | Performed by: INTERNAL MEDICINE

## 2025-03-09 PROCEDURE — 250N000011 HC RX IP 250 OP 636: Performed by: INTERNAL MEDICINE

## 2025-03-09 PROCEDURE — 999N000157 HC STATISTIC RCP TIME EA 10 MIN

## 2025-03-09 PROCEDURE — 80048 BASIC METABOLIC PNL TOTAL CA: CPT | Performed by: INTERNAL MEDICINE

## 2025-03-09 PROCEDURE — 250N000013 HC RX MED GY IP 250 OP 250 PS 637: Performed by: PHYSICIAN ASSISTANT

## 2025-03-09 PROCEDURE — 94640 AIRWAY INHALATION TREATMENT: CPT | Mod: 76

## 2025-03-09 PROCEDURE — 250N000013 HC RX MED GY IP 250 OP 250 PS 637: Performed by: HOSPITALIST

## 2025-03-09 PROCEDURE — 99222 1ST HOSP IP/OBS MODERATE 55: CPT | Performed by: INTERNAL MEDICINE

## 2025-03-09 RX ORDER — PANTOPRAZOLE SODIUM 40 MG/1
40 TABLET, DELAYED RELEASE ORAL
Status: DISCONTINUED | OUTPATIENT
Start: 2025-03-09 | End: 2025-03-12 | Stop reason: HOSPADM

## 2025-03-09 RX ORDER — DOXYCYCLINE 100 MG/1
100 CAPSULE ORAL EVERY 12 HOURS SCHEDULED
Status: DISCONTINUED | OUTPATIENT
Start: 2025-03-09 | End: 2025-03-12 | Stop reason: HOSPADM

## 2025-03-09 RX ORDER — CEFUROXIME AXETIL 500 MG/1
500 TABLET ORAL EVERY 12 HOURS SCHEDULED
Status: DISCONTINUED | OUTPATIENT
Start: 2025-03-09 | End: 2025-03-12 | Stop reason: HOSPADM

## 2025-03-09 RX ADMIN — METHYLPREDNISOLONE SODIUM SUCCINATE 62.5 MG: 125 INJECTION, POWDER, FOR SOLUTION INTRAMUSCULAR; INTRAVENOUS at 03:01

## 2025-03-09 RX ADMIN — APIXABAN 10 MG: 5 TABLET, FILM COATED ORAL at 21:30

## 2025-03-09 RX ADMIN — LEVALBUTEROL HYDROCHLORIDE 1.25 MG: 1.25 SOLUTION RESPIRATORY (INHALATION) at 08:16

## 2025-03-09 RX ADMIN — METHYLPREDNISOLONE SODIUM SUCCINATE 62.5 MG: 125 INJECTION, POWDER, FOR SOLUTION INTRAMUSCULAR; INTRAVENOUS at 08:59

## 2025-03-09 RX ADMIN — BUDESONIDE 0.5 MG: 0.5 INHALANT RESPIRATORY (INHALATION) at 20:47

## 2025-03-09 RX ADMIN — ROSUVASTATIN CALCIUM 20 MG: 20 TABLET, FILM COATED ORAL at 08:06

## 2025-03-09 RX ADMIN — PIPERACILLIN AND TAZOBACTAM 4.5 G: 4; .5 INJECTION, POWDER, FOR SOLUTION INTRAVENOUS at 03:02

## 2025-03-09 RX ADMIN — FLUTICASONE FUROATE AND VILANTEROL TRIFENATATE 1 PUFF: 200; 25 POWDER RESPIRATORY (INHALATION) at 08:05

## 2025-03-09 RX ADMIN — GUAIFENESIN 600 MG: 600 TABLET, EXTENDED RELEASE ORAL at 08:06

## 2025-03-09 RX ADMIN — CEFUROXIME AXETIL 500 MG: 500 TABLET ORAL at 21:30

## 2025-03-09 RX ADMIN — METHYLPREDNISOLONE SODIUM SUCCINATE 62.5 MG: 125 INJECTION, POWDER, FOR SOLUTION INTRAMUSCULAR; INTRAVENOUS at 16:43

## 2025-03-09 RX ADMIN — LEVALBUTEROL HYDROCHLORIDE 1.25 MG: 1.25 SOLUTION RESPIRATORY (INHALATION) at 20:55

## 2025-03-09 RX ADMIN — PANTOPRAZOLE SODIUM 40 MG: 40 TABLET, DELAYED RELEASE ORAL at 16:43

## 2025-03-09 RX ADMIN — DOXYCYCLINE HYCLATE 100 MG: 100 CAPSULE ORAL at 21:30

## 2025-03-09 RX ADMIN — BUDESONIDE 0.5 MG: 0.5 INHALANT RESPIRATORY (INHALATION) at 08:16

## 2025-03-09 RX ADMIN — GUAIFENESIN 600 MG: 600 TABLET, EXTENDED RELEASE ORAL at 21:29

## 2025-03-09 RX ADMIN — LEVALBUTEROL HYDROCHLORIDE 1.25 MG: 1.25 SOLUTION RESPIRATORY (INHALATION) at 10:58

## 2025-03-09 RX ADMIN — GABAPENTIN 400 MG: 300 CAPSULE ORAL at 21:29

## 2025-03-09 RX ADMIN — DOXYCYCLINE HYCLATE 100 MG: 100 CAPSULE ORAL at 12:23

## 2025-03-09 RX ADMIN — APIXABAN 10 MG: 5 TABLET, FILM COATED ORAL at 08:06

## 2025-03-09 RX ADMIN — CEFUROXIME AXETIL 500 MG: 500 TABLET ORAL at 12:22

## 2025-03-09 RX ADMIN — BENZONATATE 200 MG: 100 CAPSULE ORAL at 21:36

## 2025-03-09 RX ADMIN — PIPERACILLIN AND TAZOBACTAM 4.5 G: 4; .5 INJECTION, POWDER, FOR SOLUTION INTRAVENOUS at 08:06

## 2025-03-09 RX ADMIN — LEVALBUTEROL HYDROCHLORIDE 1.25 MG: 1.25 SOLUTION RESPIRATORY (INHALATION) at 15:24

## 2025-03-09 ASSESSMENT — ACTIVITIES OF DAILY LIVING (ADL)
ADLS_ACUITY_SCORE: 29
ADLS_ACUITY_SCORE: 28
ADLS_ACUITY_SCORE: 29
ADLS_ACUITY_SCORE: 28
ADLS_ACUITY_SCORE: 29
ADLS_ACUITY_SCORE: 28
ADLS_ACUITY_SCORE: 29
ADLS_ACUITY_SCORE: 28
ADLS_ACUITY_SCORE: 29
ADLS_ACUITY_SCORE: 28
ADLS_ACUITY_SCORE: 28

## 2025-03-09 NOTE — PLAN OF CARE
Goal Outcome Evaluation:DATE & TIME:3/9/25 [id number]  Cognitive Concerns/ Orientation : A&Ox4, Calm and cooperative. Calls as needed.   BEHAVIOR & AGGRESSION TOOL COLOR: Green  ABNL VS/O2: VSS on 4L at rest. CPAP at HS. 5- 6L with activity. LS wheezy at times. Freq strong cough.  On scheduled Mucinex,PRN Tessalon.  MOBILITY: Independent in room with O2. SBA outside the room  PAIN MANAGMENT: Danies  pain  DIET: MOD CHO, good appetite.   BOWEL/BLADDER: Continent.    ABNL LAB/BG: /246 Plat 121, Blood culture pending.   DRAIN/DEVICES: L and R PIV SL   Pt has personal ambulatory insulin pump, RN to assess bolus amount and document in MAR.   TELEMETRY RHYTHM: NSR  SKIN: WDL  TESTS/PROCEDURES:none  D/C DAY/GOALS/PLACE: Pending improvements.  OTHER IMPORTANT INFO: LS coarse, Exp. Wheezing at times, on scheduled nebs, IV  solumedrol,encouraged IS and Flutter valve. Hem-Onc  and ID consulted.IV abx changed to PO.

## 2025-03-09 NOTE — PLAN OF CARE
Summary: (+) RSV 3/4, increased SOB and cough with increased O2 needs.     DATE & TIME: 3/8/25-3/9/25 [id number]  Cognitive Concerns/ Orientation : A&Ox4, Calm and cooperative. Calls as needed.   BEHAVIOR & AGGRESSION TOOL COLOR: Green  ABNL VS/O2: VSS on 3L at rest. CPAP at HS. 6L with activity. LS wheezy at times. Freq strong cough.  On scheduled Mucinex  MOBILITY: Independent in room with O2. SBA outside the room  PAIN MANAGMENT: Danies  pain  DIET: MOD CHO  BOWEL/BLADDER: Continent.    ABNL LAB/BG: ,187. Plat 101, Blood culture pending.   DRAIN/DEVICES: L and R PIV SL   Pt has personal ambulatory insulin pump, RN to assess bolus amount and document in MAR.   TELEMETRY RHYTHM: NSR  SKIN: WDL  TESTS/PROCEDURES: Am labs. Platelet,creatinine,BMP,CBC and proclination  D/C DAY/GOALS/PLACE: Pending improvements.  OTHER IMPORTANT INFO: LS coarse, Exp. Wheezing at times, on scheduled nebs, IV prednisone and solumedrol,encouraged IS and Flutter valve. Hem-Onc consulted.

## 2025-03-09 NOTE — PROGRESS NOTES
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     Acute on chronic hypoxic respiratory failure   Community-acquired pneumonia  Acute RSV bronchitis  Reactive airway disease secondary to above  Severe JESSICA and chronic oxygen use    Presents with worsening shortness of breath, cough, rhinorrhea which started over the weekend. Diagnosed with RSV two days prior via PCP.  *In the ED, afebrile with HR 110s and /68 and requiring 5 L O2. CXR NAD. Lactic acid 2.5 (felt to be due to hypoxia and some dehydration), s/p 1 L IVF. No leukocytosis. Troponin 27, repeat 26, felt to be demand ischemia.  *Follows with pulmonology. History of severe JESSICA. Has had RLL mass diagnosed as lymphoma, underwent therapy with improvement of symptoms. Recurrence of disease in 2020 and underwent CAR-T cell therapy. Then had COVID in 2022 with residual exertional dyspnea. CT chest 10/2023 stable right lower and upper lobe volume loss, consolidation and architectural distortion consistent with postradiation change/scarring. He uses 4 L with exertion and 2 L via CPAP at HS. at baseline patient is on room air at rest  - Repeat lactic acid normalized at 1.4    *Presents with worsening shortness of breath, cough, rhinorrhea which started over the weekend. Diagnosed with RSV two days prior via PCP.    *Follows with pulmonology. History of severe JESSICA. Has had RLL mass diagnosed as lymphoma, underwent therapy with improvement of symptoms. Recurrence of disease in 2020 and underwent CAR-T cell therapy. Then had COVID in 2022 with residual exertional dyspnea.     Patient started on IV Zosyn.  Now switched to oral Ceftin and doxycycline for total of 1 week course of  antibiotics  Was on DuoNebs 4 times daily but due to tachycardia switch to Xopenex nebs 4 times daily  Continue Breo Ellipta inhaler  Started on p.o. prednisone and now switched to IV Solu-Medrol starting 3/8/2025  Patient still wheezing on 3/9/2025 continue IV Solu-Medrol and nebs  Encourage incentive spirometry and Acapella as tolerated  Wean oxygen as tolerated  Continue CPAP at at bedtime        Acute right-sided PE    CT chest 10/2023 stable right lower and upper lobe volume loss, consolidation and architectural distortion consistent with postradiation change/scarring. He uses 4 L with exertion and 2 L via CPAP at HS. at baseline patient is on room air at rest    Started on IV heparin last evening  Changed to oral Eliquis now  Tells me that he was on DOAC for several months for a clot in  his abdomen    Leg US 3/7/25 - neg for DVT    Heme/oncology consult requested -and are following      DM II, insulin dependent with peripheral neuropathy  - Recheck A1c 6.4  - Hold PTA Metformin, Semaglutide  - Continue insulin pump per home settings.      Discussed with pt and wife again today that he is at risk for hypoglycemia in the setting of IV steroids - will continue to monitor blood glucose trends closely  - Continue PTA Gabapentin      CAD   *Based on CT chest imaging showing severe coronary calcifications. Cardiac MRI stress perfusion showed EF 68% w/o WMA, no significant valvular disease, no ischemia. Denies chest pain.  - Continue PTA Aspirin, statin     Diffuse large B-cell lymphoma in remission  Polycythemia vera  *Established with Dr. Smith. S/p CAR-T cell therapy, currently in remission of his lymphoma.   *Hgb stable 14-15    As above - Heme/onc consult requested and is pending     Chronic thrombocytopenia  *Platelets 103, baseline 127-148  -Continue to trend platelets.   D/w him at length today - plts improved today 101 (96)    Deconditioning    PT to evaluate and treat  He hopes to be able to start moving  more today     PPE Used:  Mask, gloves, face shield, gown          Diet: Moderate Consistent Carb (60 g CHO per Meal) Diet    DVT Prophylaxis: DOAC  Champion Catheter: Not present  Lines: None     Cardiac Monitoring: ACTIVE order. Indication: Tachyarrhythmias, acute (48 hours)  Code Status: No CPR- Pre-arrest intubation OK      Clinically Significant Risk Factors          # Hypochloremia: Lowest Cl = 97 mmol/L in last 2 days, will monitor as appropriate        # Thrombocytopenia: Lowest platelets = 96 in last 2 days, will monitor for bleeding                         Disposition Plan         Medically Ready for Discharge: Anticipated in 2-4 Days    Once respiratory status improves and is back to 2 L of oxygen by nasal cannula     PT is recommending discharge to home with assist    Discussed with bedside RN patient and his wife over the phone on 3/9/2025    Cris Ward MD  Hospitalist Service  [facility]  Securely message with [email or web address] (more info)  Text page via [employer] Paging/Directory   ______________________________________________________________________    Interval History     Chart reviewed.  Discussed with bedside RN and his wife over the phone during my visit    Sitting comfortably in bed.  Still wheezing.  Intermittent dry cough.  Encourage incentive spirometry use.  Switched IV antibiotics to oral Ceftin and doxycycline.  Switch DuoNebs to Xopenex nebs due to tachycardia.  No other acute issues    Physical Exam   Vital Signs: Temp: 97.9  F (36.6  C) Temp src: Oral BP: 135/80 Pulse: 88   Resp: 16 SpO2: 94 % O2 Device: Nasal cannula Oxygen Delivery: 4 LPM  Weight: 220 lbs 14.41 oz    GEN:  Alert, oriented x 3, appears fatigued, distress  HEENT:  Normocephalic/atraumatic, no scleral icterus, no nasal discharge,  CV: somewhat distant but regular rate and rhythm, no loud murmur ausc this am.  S1 + S2 noted, no S3 or S4.  LUNGS; bilateral wheezing heard on auscultation, diminished air entry,  no crackles heard, no tachypnea or respiratory distress noted    Symmetric chest rise on inhalation noted.  ABD:  Active bowel sounds, soft, non-tender to light palpation throughout.   Mildly distended.  No rebound/guarding/rigidity.  EXT:  No significant pretibial edema bilaterally.  No cyanosis.    SKIN:  Dry to touch, no new exanthems noted in the visualized areas.    Medical Decision Making       55 MINUTES SPENT BY ME on the date of service doing chart review, history, exam, documentation & further activities per the note.      Data   Medications   Current Facility-Administered Medications   Medication Dose Route Frequency Provider Last Rate Last Admin    insulin basal rate from AMBULATORY PUMP   Subcutaneous Continuous Tana Marley PA-C         Current Facility-Administered Medications   Medication Dose Route Frequency Provider Last Rate Last Admin    apixaban ANTICOAGULANT (ELIQUIS) tablet 10 mg  10 mg Oral BID Toshia Hinton DO   10 mg at 03/09/25 0806    Followed by    [START ON 3/15/2025] apixaban ANTICOAGULANT (ELIQUIS) tablet 5 mg  5 mg Oral BID Toshia Hinton DO        [Held by provider] aspirin EC tablet 81 mg  81 mg Oral Daily Tana Marley PA-C   81 mg at 03/07/25 2041    budesonide (PULMICORT) neb solution 0.5 mg  0.5 mg Nebulization BID Cris Ward MD   0.5 mg at 03/09/25 0816    cefuroxime (CEFTIN) tablet 500 mg  500 mg Oral Q12H [facility] (08/20) Cris Ward MD   500 mg at 03/09/25 1222    doxycycline hyclate (VIBRAMYCIN) capsule 100 mg  100 mg Oral Q12H [facility] (08/20) Cris Wadr MD   100 mg at 03/09/25 1223    fluticasone-vilanterol (BREO ELLIPTA) 200-25 MCG/ACT inhaler 1 puff  1 puff Inhalation Daily Tana Marley PA-C   1 puff at 03/09/25 0805    gabapentin (NEURONTIN) capsule 400 mg  400 mg Oral At Bedtime Tana Marley PA-C   400 mg at 03/08/25 2109    guaiFENesin (MUCINEX) 12 hr tablet 600 mg  600 mg Oral BID Tana Marley PA-C    600 mg at 03/09/25 0806    insulin aspart (NovoLOG/FIASP) 100 UNIT/ML VIAL FOR FILLING PUMP RESERVOIR   Device See Admin Instructions Tana Marley PA-C        insulin bolus from AMBULATORY PUMP   Subcutaneous 4x Daily AC & HS Tana Marley PA-C   Given at 03/09/25 1223    levalbuterol (XOPENEX) neb solution 1.25 mg  1.25 mg Nebulization 4x Daily Cris Ward MD   1.25 mg at 03/09/25 1058    methylPREDNISolone Na Suc (solu-MEDROL) injection 62.5 mg  62.5 mg Intravenous Q8H Toshia Hinton DO   62.5 mg at 03/09/25 0859    rosuvastatin (CRESTOR) tablet 20 mg  20 mg Oral Daily Tana Marley PA-C   20 mg at 03/09/25 0806    sodium chloride (PF) 0.9% PF flush 3 mL  3 mL Intracatheter Q8H Tana aMrley PA-C   3 mL at 03/09/25 0900     Labs and Imaging results below reviewed today.  Recent Labs   Lab 03/09/25  0541 03/08/25  0759 03/07/25  1939 03/07/25  0716   WBC 4.7  --  3.9* 4.8   HGB 15.4  --  15.0 14.9   HCT 45.3  --  44.5 45.4   MCV 89  --  90 92   * 101* 96* 93*     Recent Labs   Lab 03/09/25  0746 03/09/25  0541 03/09/25  0519 03/07/25  0753 03/07/25  0716 03/07/25  0053 03/06/25  1850   NA  --  139  --   --  140  --  137   POTASSIUM  --  3.9  --   --  3.4  --  3.6   CHLORIDE  --  97*  --   --  101  --  95*   CO2  --  29  --   --  28  --  29   ANIONGAP  --  13  --   --  11  --  13   * 181* 187*   < > 88   < > 131*   BUN  --  11.6  --   --  9.1  --  12.3   CR  --  0.71  --   --  0.73  --  0.81   GFRESTIMATED  --  >90  --   --  >90  --  >90   JEFF  --  9.1  --   --  8.7*  --  9.1    < > = values in this interval not displayed.     7-Day Micro Results       Collected Updated Procedure Result Status      03/08/2025 1853 03/09/2025 1016 Blood Culture Hand, Left [[id number]]   Blood from Hand, Left    Preliminary result Component Value   Culture No growth after 12 hours  [P]                03/08/2025 1847 03/09/2025 1016 Blood Culture Hand, Right [[id number]]   "  Blood from Hand, Right    Preliminary result Component Value   Culture No growth after 12 hours  [P]                03/06/2025 1930 03/08/2025 2246 Blood Culture Peripheral Blood [[id number]]   Peripheral Blood    Preliminary result Component Value   Culture No growth after 2 days  [P]                03/06/2025 1850 03/08/2025 2246 Blood Culture Peripheral Blood [[id number]]   Peripheral Blood    Preliminary result Component Value   Culture No growth after 2 days  [P]                      Recent Labs   Lab 03/09/25  0746 03/09/25  0541 03/09/25  0519 03/07/25  0753 03/07/25  0716 03/07/25  0053 03/06/25  1850   NA  --  139  --   --  140  --  137   POTASSIUM  --  3.9  --   --  3.4  --  3.6   CHLORIDE  --  97*  --   --  101  --  95*   CO2  --  29  --   --  28  --  29   ANIONGAP  --  13  --   --  11  --  13   * 181* 187*   < > 88   < > 131*   BUN  --  11.6  --   --  9.1  --  12.3   CR  --  0.71  --   --  0.73  --  0.81   GFRESTIMATED  --  >90  --   --  >90  --  >90   JEFF  --  9.1  --   --  8.7*  --  9.1   PROTTOTAL  --   --   --   --   --   --  6.5   ALBUMIN  --   --   --   --   --   --  3.8   BILITOTAL  --   --   --   --   --   --  0.9   ALKPHOS  --   --   --   --   --   --  61   AST  --   --   --   --   --   --  27   ALT  --   --   --   --   --   --  27    < > = values in this interval not displayed.     No results for input(s): \"INR\" in the last 168 hours.  No results for input(s): \"COLOR\", \"APPEARANCE\", \"URINEGLC\", \"URINEBILI\", \"URINEKETONE\", \"SG\", \"UBLD\", \"URINEPH\", \"PROTEIN\", \"UROBILINOGEN\", \"NITRITE\", \"LEUKEST\", \"RBCU\", \"WBCU\" in the last 168 hours.    No results found for this or any previous visit (from the past 24 hours).      "

## 2025-03-09 NOTE — PLAN OF CARE
Summary: (+) RSV 3/4, increased SOB and cough with increased O2 needs.     DATE & TIME: 3/8/-3803  Cognitive Concerns/ Orientation : A&Ox4, Calm and cooperative. Calls as needed.   BEHAVIOR & AGGRESSION TOOL COLOR: Green  ABNL VS/O2: VSS on 3L at rest. CPAP at HS. 6L with activity. LS wheezy at times. Freq strong cough.  On scheduled Mucinex,PRN Tessalon x1  MOBILITY: Independent in room with O2. SBA outside the room  PAIN MANAGMENT: Danies  pain  DIET: MOD CHO, good appetite.   BOWEL/BLADDER: Continent.    ABNL LAB/BG: , 284. Plat 101, Blood culture pending.   DRAIN/DEVICES: Raven CISNEROS   Pt has personal ambulatory insulin pump, RN to assess bolus amount and document in MAR.   TELEMETRY RHYTHM: NSR  SKIN: WDL  TESTS/PROCEDURES: am labs. Platelet,creatinine,BMP,CBC and proclination    D/C DAY/GOALS/PLACE: Pending improvements.  OTHER IMPORTANT INFO: LS coarse, Exp. Wheezing at times, on scheduled nebs, Po prednisone changed to IV solumedrol,encouraged IS and Flutter valve. Hem-Onc consulted.

## 2025-03-09 NOTE — CONSULTS
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     Impression: 1 74-year-old male with acute upper respiratory symptoms, low-grade fever, significant hypoxia and infiltrates, RSV positive at [facility] (test result not available in Care Everywhere), presumably that is the primary diagnosis  2 infiltrates, low-grade fever and hypoxia, unlikely secondary bacterial infection no new positive microbiology  3 pulmonary embolism  4 prior lymphoma, not on active chemo, no prior major immunosuppressant infections or known immunoglobulin deficiency  5 VRE colonized  6 diabetes mellitus    REC 1 looks like switch to oral cefuroxime and Doxy probably reasonable to do a short course agrees discontinue Zosyn unlikely major bacterial infection  2 some immunosuppression but no indication to treat RSV  3 treat PE, ongoing supportive care        Kishan Carcamo MD    Reason for Consult   Reason for consult: I was asked to evaluate this patient for pneumonia.    Primary Care Physician   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  Seen at [facility] physicians and apparently RSV positive.  Had some progressive worsening and now admitted with some degree of hypoxia.  Imaging shows some infiltrate and also small pulmonary embolism.  He is having mild hypoxia but not really having much in the way of systemic symptoms mild cough otherwise not feeling that ill    Past Medical History   I have reviewed this patient's medical history and updated it with pertinent information if  needed.   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  Past Surgical History:   Procedure Laterality Date    AMPUTATE TOE(S) Left 5/22/2020    Procedure: LEFT SECOND AND THIRD DISTAL TOE AMPUTATIONS;  Surgeon: Ramon Flores MD;  Location:  OR    AMPUTATE TOE(S) Left 7/1/2020    Procedure: 1.  Partial left second toe amputation with osteotomy through proximal phalanx.  2.  Partial left third toe amputation with osteotomy through proximal phalanx.;  Surgeon: Ismael Humphrey DPM;  Location: [facility] OR    BIOPSY LYMPH NODE CERVICAL N/A 12/5/2014    Procedure: BIOPSY LYMPH NODE CERVICAL;  Surgeon: Robbie Linda MD;  Location:  OR    BRONCHOSCOPY FLEXIBLE N/A 11/14/2017    Procedure: BRONCHOSCOPY FLEXIBLE;  FLEXIBLE BRONCHOSCOPY WITH BIOPSIES.;  Surgeon: Robbie Linda MD;  Location:  OR    BRONCHOSCOPY RIGID OR FLEXIBLE W/TRANSENDOSCOPIC ENDOBRONCHIAL ULTRASOUND GUIDED N/A 4/11/2022    Procedure: BRONCHOSCOPY, FIBEROPTIC, endobronchial ultrasound, transbronchial biopsies, lymphnode forcep biopsies;  Surgeon: Prosper Aguiar MD;  Location: [facility] OR    COLONOSCOPY      COLONOSCOPY N/A 5/9/2018    Procedure: COMBINED COLONOSCOPY, SINGLE OR MULTIPLE BIOPSY/POLYPECTOMY  BY BIOPSY;;  Surgeon: Ramos Bates MD;  Location:  GI    ENDOVASCULAR PLACEMENT VASCULAR DEVICE Left 12/5/2017    Procedure: ENDOVASCULAR PLACEMENT VASCULAR DEVICE;;  Surgeon: Robbie Linda MD;  Location: [facility]    ENT SURGERY      tonsillectomy    EXCISE NODE MEDIASTINAL N/A 11/17/2017    Procedure: EXCISE NODE MEDIASTINAL;;  Surgeon: Robbie Linda MD;  Location:  OR    EYE SURGERY      EYE SURGERY Left     vitrectomy    INSERT PORT VASCULAR ACCESS N/A 12/05/2014    Procedure: INSERT PORT VASCULAR ACCESS;  Surgeon: Robbie Linda MD;  Location:  OR    INSERT PORT VASCULAR ACCESS N/A 12/5/2017    Procedure: INSERT PORT VASCULAR ACCESS;  POWER PORT PLACEMENT ATTEMPTED, PLACEMENT OF ANGIO-SEAL VIP VASCULAR CLOSURE DEVICE;  Surgeon: Robbie Linda MD;  Location: [facility]    IR CHEST PORT PLACEMENT > 5 YRS OF AGE  6/5/2020    IR PORT REMOVAL RIGHT  12/10/2018    PHACOEMULSIFICATION CLEAR CORNEA WITH STANDARD INTRAOCULAR LENS IMPLANT Right 5/2/2016    Procedure: PHACOEMULSIFICATION CLEAR CORNEA WITH STANDARD INTRAOCULAR LENS IMPLANT;  Surgeon: Rasheed Finney MD;  Location:  [facility]    REMOVE PORT VASCULAR ACCESS N/A 3/14/2017    Procedure: REMOVE PORT VASCULAR ACCESS;  Surgeon: Robbie Linda MD;  Location:  OR    REMOVE PORT VASCULAR ACCESS N/A 11/29/2022    Procedure: PORT REMOVAL;  Surgeon: Robbie Linda MD;  Location:  OR    SOFT TISSUE SURGERY      BASAL CELL CA FOREHEAD    THORACOTOMY Right 11/17/2017    Procedure: THORACOTOMY;  RIGHT EXPLORATORY THORACOTOMY/ EXTENSIVE PNEUMOLYSIS/ BIOPSY OF INTERLOBAR LYMPH NODE;  Surgeon: Robbie Linda MD;  Location:  OR    TRANSCERVICAL EXTENDED MEDIASTINAL LYMPHADENECTOMY N/A 5/24/2022    Procedure: TRANSCERVICAL EXTENDED MEDIASTINAL LYMPHADENECTOMY;  Surgeon: Star Narvaez MD;  Location: [facility] OR    TRANSCERVICAL EXTENDED MEDIASTINAL LYMPHADENECTOMY  5/24/2022    Procedure: ;   Surgeon: Star Narvaez MD;  Location: [facility] OR       Prior to Admission Medications   Prior to Admission Medications   Prescriptions Last Dose Informant Patient Reported? Taking?   INSULIN PUMP - OUTPATIENT 3/6/2025 Self Yes Yes   Sig: Inject subcutaneously continuously. Date Last Updated: 3/6/25  Omnipod  BASAL RATES: MAX BASAL 5 units/hour (dose calculated based on Dexcom readings)  CARB RATIO: settings per bolus calculator state carb ratio between 1.1-1.4 g carbs   Corection Factor (Sensitivity): 15 mg/dL  BLOOD GLUCOSE TARGET: 110  Active Insulin Time: 4 hours    *insulin pump doses are calculated from Dexcom readings, maximum basal 5 units/hr and maximum bolus 30 units   Melatonin 10 MG TABS tablet 3/5/2025 Bedtime Self Yes Yes   Sig: Take 10 mg by mouth at bedtime.   Semaglutide, 1 MG/DOSE, (OZEMPIC) 4 MG/3ML pen 3/3/2025 Self Yes Yes   Sig: Inject 1 mg subcutaneously every 7 days. Mondays   amoxicillin-clavulanate (AUGMENTIN) 875-125 MG tablet 3/6/2025 Morning Self Yes Yes   Sig: Take 1 tablet by mouth 2 times daily. For 10 days starting 3/4/25   aspirin 81 MG EC tablet 3/6/2025 Evening Self Yes Yes   Sig: Take 81 mg by mouth daily   bisacodyl (DULCOLAX) 5 MG EC tablet 3/5/2025 Self Yes Yes   Sig: Take 15 mg by mouth daily as needed for constipation.   budesonide-formoterol (SYMBICORT/BREYNA) 160-4.5 MCG/ACT Inhaler 3/6/2025 Evening Self Yes Yes   Sig: Inhale 1 puff into the lungs 2 times daily.   cyanocobalamin (VITAMIN B-12) 1000 MCG tablet 3/6/2025 Morning Self Yes Yes   Sig: Take 1,000 mcg by mouth daily   docusate sodium (COLACE) 100 MG capsule 3/5/2025 Evening Self Yes Yes   Sig: Take 100 mg by mouth 2 times daily as needed for constipation   gabapentin (NEURONTIN) 400 MG capsule 3/5/2025 Bedtime Self Yes Yes   Sig: Take 400 mg by mouth at bedtime.   insulin aspart (NOVOLOG VIAL) 100 UNITS/ML vial  Self Yes Yes   Sig: See Admin Instructions. To use with Omnipod   ipratropium - albuterol 0.5  mg/2.5 mg/3 mL (DUONEB) 0.5-2.5 (3) MG/3ML neb solution  Self Yes Yes   Sig: Take 1 vial by nebulization every 6 hours as needed for shortness of breath, wheezing or cough.   metFORMIN (GLUCOPHAGE) 500 MG tablet 3/6/2025 Morning Self Yes Yes   Sig: Take 1 tablet (500 mg) by mouth 2 times daily (with meals)   psyllium (METAMUCIL/KONSYL) 58.6 % powder 3/6/2025 Self Yes Yes   Sig: Take by mouth daily as needed for constipation. Dose not specified   rosuvastatin (CRESTOR) 20 MG tablet 3/6/2025 Morning Self Yes Yes   Sig: Take 1 tablet by mouth daily.      Facility-Administered Medications: None     Allergies   No Known Allergies    Immunization History   Immunization History   Administered Date(s) Administered    COVID-19 12+ (Pfizer) 11/10/2023, 10/30/2024    COVID-19 Bivalent 12+ (Pfizer) 10/05/2022    COVID-19 MONOVALENT 12+ (Pfizer) 03/09/2021, 03/30/2021, 09/24/2021    Influenza Vaccine 65+ (Fluzone HD) 10/05/2022    Pneumo Conj 13-V (2010&after) 10/05/2022    Zoster recombinant adjuvanted (SHINGRIX) 10/05/2022, 01/26/2023       Social History   I have reviewed this patient's social history and updated it with pertinent information if needed. 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 He has never used smokeless tobacco. He reports current alcohol use. He reports that he does not use drugs.    Family History   I have reviewed this patient's family history and updated it with pertinent information if needed.   Family History   Problem Relation Age of Onset    Diabetes Mother     CABG Father     Anesthesia Reaction No family hx of        Review of Systems   The 10 point Review of Systems is negative    Physical Exam   Temp: 97.9  F (36.6  C) Temp src: Oral BP: 135/80 Pulse: 88   Resp: 16 SpO2: 94 % O2 Device: Nasal cannula Oxygen Delivery: 4 LPM  Vital Signs with Ranges  Temp:  [97.9  F (36.6  C)-98.7  F (37.1  C)] 97.9  F (36.6  C)  Pulse:  [] 88  Resp:  [16-18] 16  BP: (118-140)/(67-80)  "135/80  SpO2:  [91 %-96 %] 94 %  220 lbs 14.41 oz  Body mass index is 33.59 kg/m .    GENERAL APPEARANCE:  awake and looks well  EYES: Eyes grossly normal to inspection  NECK: no adenopathy  RESP: lungs congestion  CV: regular rates and rhythm  LYMPHATICS: normal ant/post cervical and supraclavicular nodes  ABDOMEN: soft, nontender  MS: extremities normal  SKIN: no suspicious lesions or rashes        Data   All laboratory and imaging data in the past 24 hours reviewed  No results for input(s): \"CULT\" in the last 168 hours.  Recent Labs   Lab Test 10/20/20  1000 10/13/20  0813 10/12/20  1430 10/11/20  0420 10/09/20  1030 10/09/20  0324 10/09/20  0026 10/08/20  2308 10/06/20  0830   CULT No growth Enterococcus faecium (VRE)  isolated  * No growth No growth No VRE isolated No growth No growth Cultured on the 2nd day of incubation:  Staphylococcus hominis  *  Critical Value/Significant Value, preliminary result only, called to and read back by  Aliya Eid RN at 4045 10.10.20 KZ    (Note)  POSITIVE for Staphylococci other than S.aureus, S.epidermidis and  S.lugdunensis, by Verigene multiplex nucleic acid test.  Coagulase-negative staphylococci are the most common venipuncture or  collection associated skin CONTAMINANTS grown in blood cultures.  Final identification and antimicrobial susceptibility testing will be  verified by standard methods.    Specimen tested with Verigene multiplex, gram-positive blood culture  nucleic acid test for the following targets: Staph aureus, Staph  epidermidis, Staph lugdunensis, other Staph species, Enterococcus  faecalis, Enterococcus faecium, Streptococcus species, S. agalactiae,  S. anginosus grp., S. pneumoniae, S. pyogenes, Listeria sp., mecA  (methicillin resistance) and Clayton/B (vancomycin resistance).    Critical Value/Significant Value called to and read back by Aliya Eid RN. @1545. 10.10.20. BS.    No VRE isolated          All cultures:  Recent Labs   Lab 03/08/25  1853 " 03/08/25  1847 03/06/25  1930 03/06/25  1850   CULTURE No growth after 12 hours No growth after 12 hours No growth after 2 days No growth after 2 days      Blood culture:  Results for orders placed or performed during the hospital encounter of 03/06/25   Blood Culture Hand, Left    Collection Time: 03/08/25  6:53 PM    Specimen: Hand, Left; Blood   Result Value Ref Range    Culture No growth after 12 hours    Blood Culture Hand, Right    Collection Time: 03/08/25  6:47 PM    Specimen: Hand, Right; Blood   Result Value Ref Range    Culture No growth after 12 hours    Blood Culture Peripheral Blood    Collection Time: 03/06/25  7:30 PM    Specimen: Peripheral Blood   Result Value Ref Range    Culture No growth after 2 days    Blood Culture Peripheral Blood    Collection Time: 03/06/25  6:50 PM    Specimen: Peripheral Blood   Result Value Ref Range    Culture No growth after 2 days    Results for orders placed or performed in visit on 10/20/20   Blood culture - NOW    Collection Time: 10/20/20 10:00 AM    Specimen: Blood    Port   Result Value Ref Range    Specimen Description Blood Port     Culture Micro No growth    Results for orders placed or performed during the hospital encounter of 10/08/20   Blood culture    Collection Time: 10/12/20  2:30 PM    Specimen: Blood, Venous    Red port   Result Value Ref Range    Specimen Description Blood Red port     Special Requests Received in aerobic bottle only     Culture Micro No growth    Blood culture    Collection Time: 10/11/20  4:20 AM    Specimen: Blood    Red port   Result Value Ref Range    Specimen Description Blood Red port     Culture Micro No growth    Blood culture    Collection Time: 10/09/20 12:26 AM    Specimen: Hand, Right; Blood    Right Hand   Result Value Ref Range    Specimen Description Blood Right Hand     Culture Micro No growth    Blood culture    Collection Time: 10/08/20 11:08 PM    Specimen: Arm, Left; Blood    Left Arm   Result Value Ref Range     Specimen Description Blood Left Arm     Culture Micro (A)      Cultured on the 2nd day of incubation:  Staphylococcus hominis      Culture Micro       Critical Value/Significant Value, preliminary result only, called to and read back by  Aliya Eid RN at 0203 10.10.20 K      Culture Micro       (Note)  POSITIVE for Staphylococci other than S.aureus, S.epidermidis and  S.lugdunensis, by Brightblueigene multiplex nucleic acid test.  Coagulase-negative staphylococci are the most common venipuncture or  collection associated skin CONTAMINANTS grown in blood cultures.  Final identification and antimicrobial susceptibility testing will be  verified by standard methods.    Specimen tested with Verigene multiplex, gram-positive blood culture  nucleic acid test for the following targets: Staph aureus, Staph  epidermidis, Staph lugdunensis, other Staph species, Enterococcus  faecalis, Enterococcus faecium, Streptococcus species, S. agalactiae,  S. anginosus grp., S. pneumoniae, S. pyogenes, Listeria sp., mecA  (methicillin resistance) and Clayton/B (vancomycin resistance).    Critical Value/Significant Value called to and read back by Aliya Eid RN. @1545. 10.10.20. BS.          Susceptibility    Staphylococcus hominis - CHANTELL     Ciprofloxacin <=0.5 Susceptible ug/mL     Clindamycin <=0.25 Susceptible ug/mL     Erythromycin <=0.25 Susceptible ug/mL     Gentamicin <=0.5 Susceptible ug/mL     Levofloxacin 1 Susceptible ug/mL     Oxacillin >=4 Resistant ug/mL     Tetracycline 2 Susceptible ug/mL     Vancomycin 1 Susceptible ug/mL   Results for orders placed or performed during the hospital encounter of 06/17/20   Blood culture    Collection Time: 06/17/20 11:34 AM    Specimen: Blood    Right Arm   Result Value Ref Range    Specimen Description Blood Right Arm     Culture Micro No growth    Blood culture    Collection Time: 06/17/20  9:50 AM    Specimen: Blood    Right Arm   Result Value Ref Range    Specimen Description Blood Right  Arm     Culture Micro No growth       Urine culture:  Results for orders placed or performed during the hospital encounter of 10/08/20   Urine Culture    Collection Time: 10/09/20  3:24 AM    Specimen: Urine Midstream; Midstream Urine   Result Value Ref Range    Specimen Description Midstream Urine     Special Requests Specimen received in preservative     Culture Micro No growth

## 2025-03-09 NOTE — PROGRESS NOTES
Progress Note     Primary Oncologist/Hematologist:            Assessment and Plan:     Consult Orders   Hematology & Oncology IP Consult: h/o lymphoma, new PE; Consultant may enter orders: Yes; Requested Clinic/Group: [facility]; Requesting provider? Hospitalist (if different from attending physician) [[id number]] ordered by Toshia Hinton DO at 03/08/25 0909          Expand All Collapse All      Consultation     Pastor Garibay MRN# [id number]   YOB: 1950 Age: 74 year old   Date of Admission:      3/6/2025  Requesting physician: Dr Villalba  Reason for consult: PE           Assessment and Plan:   Mr. 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  Prior chemo and eventually CAR-T therapy September 2020 ( Dr Bunch at [facility]) and in remission since then.  Currently followed by   History of VTE x2 (IVC thrombus and PE), off of Xarelto since April 2021  Admitted with worsening shortness of breath due to RSV  Small subsegmental right upper lobe pulmonary embolism, no DVT  History of sleep apnea on CPAP  Thrombocytopenia-platelets 103 on admission, almost back to baseline today 121.  Baseline platelets around 127-136,000.  WBC also slightly lower at admission, 3.9.  Normal today 4.7.  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  On admission hematocrit 53%.  Hematocrit 45.3 today.  JAK2 negative  Also prior history of melanoma     PLAN  1.  Respiratory symptoms improving.  He is on methylprednisolone 60 Mg IV.  Also nebulizers, antibiotics.  2.  Prior history of lymphoma, in remission post CAR-T in September 2020.  High risk for hypogammaglobulinemia.  IgG level ordered 3/8, pending.  If level 400 or less, will need IVIG  3.  PE most likely secondary to acute infection.  He has had PE in the past.  Multiple risk factors  for PE-obesity, recent limited mobility  acute infection, etc.  He does also have a high hemoglobin and hematocrit, most likely secondary, which may also be predisposing him to hypercoagulopathy.  Continue anticoagulation for at least 3 to 6 months, may need long-term anticoagulation or at least switch to prophylactic anticoagulation.  Final decision deferred to his primary oncologist, Dr. Smith  4.  If respiratory symptoms do not improve, would involve ID service, as patient high risk for immunosuppression from his prior therapy for lymphoma.  5.  No indication for phlebotomy.  6.  Monitor CBC daily.  Platelets back to baseline  7.  All other management per hospitalist service.     Patient was given multiple opportunities to ask questions, answered to the best of his satisfaction.  Will continue to follow the patient along with you.  Please call with any further questions or concerns.                                  Janell Ames MD  [facility]  [phone] (office),         Interval History:   Feeling about the same.  Slight improvement in breathing, especially coughing less.  Still requiring about 3 to 4 L of oxygen.  No new symptoms.                Review of Systems:     The 14 point Review of Systems is negative other than noted in the HPI             Medications:   Scheduled Medications  Current Facility-Administered Medications   Medication Dose Route Frequency Provider Last Rate Last Admin    apixaban ANTICOAGULANT (ELIQUIS) tablet 10 mg  10 mg Oral BID Toshia Hinton, DO   10 mg at 03/09/25 0806    Followed by    [START ON 3/15/2025] apixaban ANTICOAGULANT (ELIQUIS) tablet 5 mg  5 mg Oral BID Toshia Hinton DO        [Held by provider] aspirin EC tablet 81 mg  81 mg Oral Daily Tana Marley PA-C   81 mg at 03/07/25 2041    budesonide (PULMICORT) neb solution 0.5 mg  0.5 mg Nebulization BID Cris Ward MD   0.5 mg at 03/09/25 0816    fluticasone-vilanterol (BREO  ELLIPTA) 200-25 MCG/ACT inhaler 1 puff  1 puff Inhalation Daily Tana Marley PA-C   1 puff at 03/09/25 0805    gabapentin (NEURONTIN) capsule 400 mg  400 mg Oral At Bedtime Tana Marley PA-C   400 mg at 03/08/25 2109    guaiFENesin (MUCINEX) 12 hr tablet 600 mg  600 mg Oral BID Tana Marley PA-C   600 mg at 03/09/25 0806    insulin aspart (NovoLOG/FIASP) 100 UNIT/ML VIAL FOR FILLING PUMP RESERVOIR   Device See Admin Instructions Tana Marley PA-C        insulin bolus from AMBULATORY PUMP   Subcutaneous 4x Daily AC & HS Tana Marley PA-C   Given at 03/09/25 0859    levalbuterol (XOPENEX) neb solution 1.25 mg  1.25 mg Nebulization 4x Daily Cris Ward MD   1.25 mg at 03/09/25 0816    methylPREDNISolone Na Suc (solu-MEDROL) injection 62.5 mg  62.5 mg Intravenous Q8H Toshia Hinton, DO   62.5 mg at 03/09/25 0859    piperacillin-tazobactam (ZOSYN) 4.5 g vial to attach to  mL bag  4.5 g Intravenous Q6H Toshia Hinton, DO   4.5 g at 03/09/25 0806    rosuvastatin (CRESTOR) tablet 20 mg  20 mg Oral Daily Tana Marley PA-C   20 mg at 03/09/25 0806    sodium chloride (PF) 0.9% PF flush 3 mL  3 mL Intracatheter Q8H Tana Marley PA-C   3 mL at 03/09/25 0900     PRN Medications  Current Facility-Administered Medications   Medication Dose Route Frequency Provider Last Rate Last Admin    acetaminophen (TYLENOL) tablet 650 mg  650 mg Oral Q4H PRN Tana Marley PA-C   650 mg at 03/08/25 1223    Or    acetaminophen (TYLENOL) Suppository 650 mg  650 mg Rectal Q4H PRN Tana Marley PA-C        benzonatate (TESSALON) capsule 200 mg  200 mg Oral TID PRN Apryl Cooper MD   200 mg at 03/08/25 2208    calcium carbonate (TUMS) chewable tablet 1,000 mg  1,000 mg Oral 4x Daily PRN Tana Marley PA-C        glucose gel 15-30 g  15-30 g Oral Q15 Min PRN Tana Marley PA-C        Or    dextrose 50 % injection 25-50 mL  25-50 mL  Intravenous Q15 Min PRN Tana Marley PA-C        Or    glucagon injection 1 mg  1 mg Subcutaneous Q15 Min PRN Tana Marley PA-C        docusate sodium (COLACE) capsule 100 mg  100 mg Oral BID PRN Tana Marley PA-C        hydrALAZINE (APRESOLINE) tablet 10 mg  10 mg Oral Q4H PRN Tana Marley PA-C        Or    hydrALAZINE (APRESOLINE) injection 10 mg  10 mg Intravenous Q4H PRN Tana Marley PA-C        levalbuterol (XOPENEX) neb solution 1.25 mg  1.25 mg Nebulization Q2H PRN Tana Marley PA-C        lidocaine (LMX4) cream   Topical Q1H PRN Tana Marley PA-C        lidocaine 1 % 0.1-1 mL  0.1-1 mL Other Q1H PRN Tana Marley PA-C        melatonin tablet 10 mg  10 mg Oral At Bedtime PRN Cayla Barron MD   10 mg at 03/08/25 2202    ondansetron (ZOFRAN ODT) ODT tab 4 mg  4 mg Oral Q6H PRN Tana Marley PA-C        Or    ondansetron (ZOFRAN) injection 4 mg  4 mg Intravenous Q6H PRN Tana Marley PA-C        senna-docusate (SENOKOT-S/PERICOLACE) 8.6-50 MG per tablet 1 tablet  1 tablet Oral BID PRN Tana Marley PA-C        Or    senna-docusate (SENOKOT-S/PERICOLACE) 8.6-50 MG per tablet 2 tablet  2 tablet Oral BID PRN Tana Marley PA-C        sodium chloride (PF) 0.9% PF flush 3 mL  3 mL Intracatheter q1 min prn Tana Marley PA-C                      Physical Exam:   Vitals were reviewed  Blood pressure 135/80, pulse 88, temperature 97.9  F (36.6  C), temperature source Oral, resp. rate 16, weight 100.2 kg (220 lb 14.4 oz), SpO2 95%.  Wt Readings from Last 4 Encounters:   03/06/25 100.2 kg (220 lb 14.4 oz)   12/30/24 102.4 kg (225 lb 12.8 oz)   12/12/24 103.9 kg (229 lb)   12/08/23 111.9 kg (246 lb 12.8 oz)       I/O last 3 completed shifts:  In: 800 [P.O.:800]  Out: -       Constitutional: Awake, alert, cooperative, no apparent distress     HEENT AT/NC, YUNIOR, no oral lesions      Neck      Lungs: Mild shortness of  breath with conversation.  Oxygen via nasal cannula   Cardiovascular:        Skin: No rashes, no cyanosis, no edema   Neuro No focal deficits              Data:   All laboratory data and imaging studies reviewed.    CMP  Recent Labs   Lab 03/09/25  0746 03/09/25  0541 03/09/25  0519 03/09/25  0147 03/07/25  0753 03/07/25  0716 03/07/25  0053 03/06/25  1850   NA  --  139  --   --   --  140  --  137   POTASSIUM  --  3.9  --   --   --  3.4  --  3.6   CHLORIDE  --  97*  --   --   --  101  --  95*   CO2  --  29  --   --   --  28  --  29   ANIONGAP  --  13  --   --   --  11  --  13   * 181* 187* 200*   < > 88   < > 131*   BUN  --  11.6  --   --   --  9.1  --  12.3   CR  --  0.71  --   --   --  0.73  --  0.81   GFRESTIMATED  --  >90  --   --   --  >90  --  >90   JEFF  --  9.1  --   --   --  8.7*  --  9.1   PROTTOTAL  --   --   --   --   --   --   --  6.5   ALBUMIN  --   --   --   --   --   --   --  3.8   BILITOTAL  --   --   --   --   --   --   --  0.9   ALKPHOS  --   --   --   --   --   --   --  61   AST  --   --   --   --   --   --   --  27   ALT  --   --   --   --   --   --   --  27    < > = values in this interval not displayed.     CBC  Recent Labs   Lab 03/09/25  0541 03/08/25  0759 03/07/25  1939 03/07/25  0716 03/06/25  1850   WBC 4.7  --  3.9* 4.8 6.7   RBC 5.10  --  4.97 4.96 5.25   HGB 15.4  --  15.0 14.9 15.6   HCT 45.3  --  44.5 45.4 47.5   MCV 89  --  90 92 91   MCH 30.2  --  30.2 30.0 29.7   MCHC 34.0  --  33.7 32.8 32.8   RDW 14.4  --  14.8 15.0 14.7   * 101* 96* 93* 103*     INRNo lab results found in last 7 days.  Data   Results for orders placed or performed during the hospital encounter of 03/06/25 (from the past 24 hours)   Glucose by meter   Result Value Ref Range    GLUCOSE BY METER POCT 194 (H) 70 - 99 mg/dL   Glucose by meter   Result Value Ref Range    GLUCOSE BY METER POCT 162 (H) 70 - 99 mg/dL   Glucose by meter   Result Value Ref Range    GLUCOSE BY METER POCT 284 (H) 70 - 99 mg/dL    Glucose by meter   Result Value Ref Range    GLUCOSE BY METER POCT 200 (H) 70 - 99 mg/dL   Glucose by meter   Result Value Ref Range    GLUCOSE BY METER POCT 187 (H) 70 - 99 mg/dL   Basic metabolic panel   Result Value Ref Range    Sodium 139 135 - 145 mmol/L    Potassium 3.9 3.4 - 5.3 mmol/L    Chloride 97 (L) 98 - 107 mmol/L    Carbon Dioxide (CO2) 29 22 - 29 mmol/L    Anion Gap 13 7 - 15 mmol/L    Urea Nitrogen 11.6 8.0 - 23.0 mg/dL    Creatinine 0.71 0.67 - 1.17 mg/dL    GFR Estimate >90 >60 mL/min/1.73m2    Calcium 9.1 8.8 - 10.4 mg/dL    Glucose 181 (H) 70 - 99 mg/dL   CBC with platelets   Result Value Ref Range    WBC Count 4.7 4.0 - 11.0 10e3/uL    RBC Count 5.10 4.40 - 5.90 10e6/uL    Hemoglobin 15.4 13.3 - 17.7 g/dL    Hematocrit 45.3 40.0 - 53.0 %    MCV 89 78 - 100 fL    MCH 30.2 26.5 - 33.0 pg    MCHC 34.0 31.5 - 36.5 g/dL    RDW 14.4 10.0 - 15.0 %    Platelet Count 121 (L) 150 - 450 10e3/uL   Procalcitonin   Result Value Ref Range    Procalcitonin 0.24 <0.50 ng/mL   Glucose by meter   Result Value Ref Range    GLUCOSE BY METER POCT 178 (H) 70 - 99 mg/dL         Total time 30 minutes, > 50 % face-to-face with the patient.  Additional time chart review, documentation, care coordination

## 2025-03-10 LAB
GLUCOSE BLDC GLUCOMTR-MCNC: 144 MG/DL (ref 70–99)
GLUCOSE BLDC GLUCOMTR-MCNC: 147 MG/DL (ref 70–99)
GLUCOSE BLDC GLUCOMTR-MCNC: 77 MG/DL (ref 70–99)
GLUCOSE BLDC GLUCOMTR-MCNC: 89 MG/DL (ref 70–99)
IGG SERPL-MCNC: 558 MG/DL (ref 610–1616)
PLATELET # BLD AUTO: 138 10E3/UL (ref 150–450)

## 2025-03-10 PROCEDURE — 250N000009 HC RX 250: Performed by: INTERNAL MEDICINE

## 2025-03-10 PROCEDURE — 250N000013 HC RX MED GY IP 250 OP 250 PS 637: Performed by: INTERNAL MEDICINE

## 2025-03-10 PROCEDURE — 250N000011 HC RX IP 250 OP 636: Performed by: INTERNAL MEDICINE

## 2025-03-10 PROCEDURE — 250N000013 HC RX MED GY IP 250 OP 250 PS 637: Performed by: PHYSICIAN ASSISTANT

## 2025-03-10 PROCEDURE — 99232 SBSQ HOSP IP/OBS MODERATE 35: CPT | Performed by: PHYSICIAN ASSISTANT

## 2025-03-10 PROCEDURE — 250N000012 HC RX MED GY IP 250 OP 636 PS 637: Performed by: INTERNAL MEDICINE

## 2025-03-10 PROCEDURE — 94640 AIRWAY INHALATION TREATMENT: CPT

## 2025-03-10 PROCEDURE — 85049 AUTOMATED PLATELET COUNT: CPT | Performed by: STUDENT IN AN ORGANIZED HEALTH CARE EDUCATION/TRAINING PROGRAM

## 2025-03-10 PROCEDURE — 94640 AIRWAY INHALATION TREATMENT: CPT | Mod: 76

## 2025-03-10 PROCEDURE — 999N000157 HC STATISTIC RCP TIME EA 10 MIN

## 2025-03-10 PROCEDURE — 36415 COLL VENOUS BLD VENIPUNCTURE: CPT | Performed by: STUDENT IN AN ORGANIZED HEALTH CARE EDUCATION/TRAINING PROGRAM

## 2025-03-10 PROCEDURE — 99233 SBSQ HOSP IP/OBS HIGH 50: CPT | Performed by: INTERNAL MEDICINE

## 2025-03-10 PROCEDURE — 120N000001 HC R&B MED SURG/OB

## 2025-03-10 RX ORDER — PREDNISONE 20 MG/1
40 TABLET ORAL DAILY
Status: DISCONTINUED | OUTPATIENT
Start: 2025-03-10 | End: 2025-03-12 | Stop reason: HOSPADM

## 2025-03-10 RX ORDER — FUROSEMIDE 10 MG/ML
INJECTION INTRAMUSCULAR; INTRAVENOUS
Status: DISPENSED
Start: 2025-03-10 | End: 2025-03-11

## 2025-03-10 RX ORDER — POLYETHYLENE GLYCOL 3350 17 G/17G
17 POWDER, FOR SOLUTION ORAL DAILY
Status: DISCONTINUED | OUTPATIENT
Start: 2025-03-10 | End: 2025-03-12 | Stop reason: HOSPADM

## 2025-03-10 RX ORDER — FUROSEMIDE 10 MG/ML
40 INJECTION INTRAMUSCULAR; INTRAVENOUS ONCE
Status: COMPLETED | OUTPATIENT
Start: 2025-03-10 | End: 2025-03-10

## 2025-03-10 RX ADMIN — FLUTICASONE FUROATE AND VILANTEROL TRIFENATATE 1 PUFF: 200; 25 POWDER RESPIRATORY (INHALATION) at 08:10

## 2025-03-10 RX ADMIN — APIXABAN 10 MG: 5 TABLET, FILM COATED ORAL at 21:00

## 2025-03-10 RX ADMIN — ROSUVASTATIN CALCIUM 20 MG: 20 TABLET, FILM COATED ORAL at 08:08

## 2025-03-10 RX ADMIN — DOXYCYCLINE HYCLATE 100 MG: 100 CAPSULE ORAL at 08:08

## 2025-03-10 RX ADMIN — POLYETHYLENE GLYCOL 3350 17 G: 17 POWDER, FOR SOLUTION ORAL at 15:54

## 2025-03-10 RX ADMIN — Medication 10 MG: at 20:59

## 2025-03-10 RX ADMIN — BUDESONIDE 0.5 MG: 0.5 INHALANT RESPIRATORY (INHALATION) at 08:59

## 2025-03-10 RX ADMIN — METHYLPREDNISOLONE SODIUM SUCCINATE 62.5 MG: 125 INJECTION, POWDER, FOR SOLUTION INTRAMUSCULAR; INTRAVENOUS at 01:43

## 2025-03-10 RX ADMIN — APIXABAN 10 MG: 5 TABLET, FILM COATED ORAL at 08:08

## 2025-03-10 RX ADMIN — CEFUROXIME AXETIL 500 MG: 500 TABLET ORAL at 20:59

## 2025-03-10 RX ADMIN — BUDESONIDE 0.5 MG: 0.5 INHALANT RESPIRATORY (INHALATION) at 20:09

## 2025-03-10 RX ADMIN — LEVALBUTEROL HYDROCHLORIDE 1.25 MG: 1.25 SOLUTION RESPIRATORY (INHALATION) at 12:14

## 2025-03-10 RX ADMIN — GUAIFENESIN 600 MG: 600 TABLET, EXTENDED RELEASE ORAL at 21:00

## 2025-03-10 RX ADMIN — GUAIFENESIN 600 MG: 600 TABLET, EXTENDED RELEASE ORAL at 08:08

## 2025-03-10 RX ADMIN — LEVALBUTEROL HYDROCHLORIDE 1.25 MG: 1.25 SOLUTION RESPIRATORY (INHALATION) at 20:09

## 2025-03-10 RX ADMIN — CEFUROXIME AXETIL 500 MG: 500 TABLET ORAL at 08:08

## 2025-03-10 RX ADMIN — PREDNISONE 40 MG: 20 TABLET ORAL at 15:54

## 2025-03-10 RX ADMIN — METHYLPREDNISOLONE SODIUM SUCCINATE 62.5 MG: 125 INJECTION, POWDER, FOR SOLUTION INTRAMUSCULAR; INTRAVENOUS at 09:00

## 2025-03-10 RX ADMIN — LEVALBUTEROL HYDROCHLORIDE 1.25 MG: 1.25 SOLUTION RESPIRATORY (INHALATION) at 08:59

## 2025-03-10 RX ADMIN — PANTOPRAZOLE SODIUM 40 MG: 40 TABLET, DELAYED RELEASE ORAL at 06:49

## 2025-03-10 RX ADMIN — GABAPENTIN 400 MG: 300 CAPSULE ORAL at 20:59

## 2025-03-10 RX ADMIN — DOXYCYCLINE HYCLATE 100 MG: 100 CAPSULE ORAL at 21:00

## 2025-03-10 RX ADMIN — FUROSEMIDE 40 MG: 10 INJECTION, SOLUTION INTRAVENOUS at 12:40

## 2025-03-10 ASSESSMENT — ACTIVITIES OF DAILY LIVING (ADL)
ADLS_ACUITY_SCORE: 28

## 2025-03-10 NOTE — PROGRESS NOTES
[facility]ology Progress Note     Primary Oncologist/Hematologist:            Assessment and Plan:     Mr. 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  Prior chemo and eventually CAR-T therapy September 2020 ( Dr Bunch at [facility]) and in remission since then.  Currently followed by   History of VTE x2 (IVC thrombus and PE), off of Xarelto since April 2021  Admitted with worsening shortness of breath due to RSV  Small subsegmental right upper lobe pulmonary embolism, no DVT  History of sleep apnea on CPAP  Thrombocytopenia-platelets 103 on admission, almost back to baseline today 121.  Baseline platelets around 127-136,000.  WBC also slightly lower at admission, 3.9, now normal.  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  On admission hematocrit 53%.  Hematocrit 45.3 on 3/9.   JAK2 negative  Also prior history of melanoma     PLAN  1.  Respiratory symptoms improving.  He is on methylprednisolone 60 Mg IV.  Also nebulizers, antibiotics.  2.  Prior history of lymphoma, in remission post CAR-T in September 2020.  High risk for hypogammaglobulinemia.  IgG level  3/8/25 OK at 558. No indication for IVIG at this point.   3.  PE most likely secondary to acute infection.  He has had PE in the past.  Multiple risk factors for PE-obesity, recent limited mobility  acute infection, etc.  He does also have a high hemoglobin and hematocrit, most likely secondary, which may also be predisposing him to hypercoagulopathy.   Treated with heparin and now converted to apixaban.  Continue anticoagulation for at least 3 to 6 months, may need long-term anticoagulation or at least switch to prophylactic anticoagulation.  Final decision deferred to his primary oncologist, Dr. Smith  4.  ID following  5.  No indication for phlebotomy.  6.  Monitor CBC daily.   Platelets back to baseline  7.  All other management per hospitalist service.      VERNA Lopez, AOCNP  Nurse Practitioner  [facility]  [phone]          Interval History:     Breathing comfort improving. Ambulating in [place] with O2.              Review of Systems:     The 5 point Review of Systems is negative other than noted in the HPI                Medications:   Scheduled Medications  Current Facility-Administered Medications   Medication Dose Route Frequency Provider Last Rate Last Admin    apixaban ANTICOAGULANT (ELIQUIS) tablet 10 mg  10 mg Oral BID Toshia Hinton DO   10 mg at 03/10/25 0808    Followed by    [START ON 3/15/2025] apixaban ANTICOAGULANT (ELIQUIS) tablet 5 mg  5 mg Oral BID Toshia Hinton DO        [Held by provider] aspirin EC tablet 81 mg  81 mg Oral Daily Tana Marley PA-C   81 mg at 03/07/25 2041    budesonide (PULMICORT) neb solution 0.5 mg  0.5 mg Nebulization BID Cris Ward MD   0.5 mg at 03/10/25 0859    cefuroxime (CEFTIN) tablet 500 mg  500 mg Oral Q12H [facility] (08/20) Cris Ward MD   500 mg at 03/10/25 0808    doxycycline hyclate (VIBRAMYCIN) capsule 100 mg  100 mg Oral Q12H [facility] (08/20) Cris Ward MD   100 mg at 03/10/25 0808    fluticasone-vilanterol (BREO ELLIPTA) 200-25 MCG/ACT inhaler 1 puff  1 puff Inhalation Daily Tana Marley PA-C   1 puff at 03/10/25 0810    gabapentin (NEURONTIN) capsule 400 mg  400 mg Oral At Bedtime Tana Marley PA-C   400 mg at 03/09/25 2129    guaiFENesin (MUCINEX) 12 hr tablet 600 mg  600 mg Oral BID Tana Marley PA-C   600 mg at 03/10/25 0808    insulin aspart (NovoLOG/FIASP) 100 UNIT/ML VIAL FOR FILLING PUMP RESERVOIR   Device See Admin Instructions Tana Marley PA-C        insulin bolus from AMBULATORY PUMP   Subcutaneous 4x Daily AC & HS Tana Marley PA-C   10 Units at 03/10/25 1000    levalbuterol (XOPENEX) neb solution 1.25 mg  1.25 mg Nebulization  4x Daily Cris Ward MD   1.25 mg at 03/10/25 0859    methylPREDNISolone Na Suc (solu-MEDROL) injection 62.5 mg  62.5 mg Intravenous Q8H Toshia Hinton DO   62.5 mg at 03/10/25 0900    pantoprazole (PROTONIX) EC tablet 40 mg  40 mg Oral QAM AC Cris Ward MD   40 mg at 03/10/25 0649    rosuvastatin (CRESTOR) tablet 20 mg  20 mg Oral Daily Tana Marley PA-C   20 mg at 03/10/25 0808    sodium chloride (PF) 0.9% PF flush 3 mL  3 mL Intracatheter Q8H Tana Marley PA-C   3 mL at 03/09/25 1643     PRN Medications  Current Facility-Administered Medications   Medication Dose Route Frequency Provider Last Rate Last Admin    acetaminophen (TYLENOL) tablet 650 mg  650 mg Oral Q4H PRN Tana Marley PA-C   650 mg at 03/08/25 1223    Or    acetaminophen (TYLENOL) Suppository 650 mg  650 mg Rectal Q4H PRN Tana Marley PA-C        benzonatate (TESSALON) capsule 200 mg  200 mg Oral TID PRN Apryl Cooper MD   200 mg at 03/09/25 2136    calcium carbonate (TUMS) chewable tablet 1,000 mg  1,000 mg Oral 4x Daily PRN Tana Marley PA-C        glucose gel 15-30 g  15-30 g Oral Q15 Min PRN Tana Marley PA-C        Or    dextrose 50 % injection 25-50 mL  25-50 mL Intravenous Q15 Min PRN Tana Marley PA-C        Or    glucagon injection 1 mg  1 mg Subcutaneous Q15 Min PRN Tana Marley PA-C        docusate sodium (COLACE) capsule 100 mg  100 mg Oral BID PRN Tana Marley PA-C        hydrALAZINE (APRESOLINE) tablet 10 mg  10 mg Oral Q4H PRN Tana Marley PA-C        Or    hydrALAZINE (APRESOLINE) injection 10 mg  10 mg Intravenous Q4H PRN Tana Marley PA-C        levalbuterol (XOPENEX) neb solution 1.25 mg  1.25 mg Nebulization Q2H PRN Tana Marley PA-C        lidocaine (LMX4) cream   Topical Q1H PRN Tana Marley PA-C        lidocaine 1 % 0.1-1 mL  0.1-1 mL Other Q1H PRN Tana Marley PA-C        melatonin tablet 10  mg  10 mg Oral At Bedtime PRN Cayla Barron MD   10 mg at 03/08/25 2202    ondansetron (ZOFRAN ODT) ODT tab 4 mg  4 mg Oral Q6H PRN Tana Marley PA-C        Or    ondansetron (ZOFRAN) injection 4 mg  4 mg Intravenous Q6H PRN Tana Marley PA-LA        senna-docusate (SENOKOT-S/PERICOLACE) 8.6-50 MG per tablet 1 tablet  1 tablet Oral BID PRN Tana Marley PA-LA        Or    senna-docusate (SENOKOT-S/PERICOLACE) 8.6-50 MG per tablet 2 tablet  2 tablet Oral BID PRN Tana Marley PA-C        sodium chloride (PF) 0.9% PF flush 3 mL  3 mL Intracatheter q1 min prn Tana Marley PA-C   3 mL at 03/10/25 0143                  Physical Exam:   Vitals were reviewed  Blood pressure 124/69, pulse 101, temperature 97.3  F (36.3  C), temperature source Oral, resp. rate 18, weight 100.2 kg (220 lb 14.4 oz), SpO2 94%.  Wt Readings from Last 4 Encounters:   03/06/25 100.2 kg (220 lb 14.4 oz)   12/30/24 102.4 kg (225 lb 12.8 oz)   12/12/24 103.9 kg (229 lb)   12/08/23 111.9 kg (246 lb 12.8 oz)       I/O last 3 completed shifts:  In: 840 [P.O.:840]  Out: -         Constitutional: Awake, alert, cooperative, no apparent distress   Lungs: Clear to auscultation bilaterally, no crackles or wheezing   Cardiovascular: Regular rate and rhythm, normal S1 and S2, and no murmur noted   Abdomen: Normal bowel sounds, soft, non-distended, non-tender   Skin: No rashes, no cyanosis, no edema   Other:               Data:   All laboratory data and imaging studies reviewed.    CMP  Recent Labs   Lab 03/10/25  0744 03/10/25  0603 03/10/25  0241 03/10/25  0158 03/09/25  0746 03/09/25  0541 03/07/25  0753 03/07/25  0716 03/07/25  0053 03/06/25  1850   NA  --   --   --   --   --  139  --  140  --  137   POTASSIUM  --   --   --   --   --  3.9  --  3.4  --  3.6   CHLORIDE  --   --   --   --   --  97*  --  101  --  95*   CO2  --   --   --   --   --  29  --  28  --  29   ANIONGAP  --   --   --   --   --  13  --  11  --   13   * 147* 77 89   < > 181*   < > 88   < > 131*   BUN  --   --   --   --   --  11.6  --  9.1  --  12.3   CR  --   --   --   --   --  0.71  --  0.73  --  0.81   GFRESTIMATED  --   --   --   --   --  >90  --  >90  --  >90   JEFF  --   --   --   --   --  9.1  --  8.7*  --  9.1   PROTTOTAL  --   --   --   --   --   --   --   --   --  6.5   ALBUMIN  --   --   --   --   --   --   --   --   --  3.8   BILITOTAL  --   --   --   --   --   --   --   --   --  0.9   ALKPHOS  --   --   --   --   --   --   --   --   --  61   AST  --   --   --   --   --   --   --   --   --  27   ALT  --   --   --   --   --   --   --   --   --  27    < > = values in this interval not displayed.     CBC  Recent Labs   Lab 03/09/25  0541 03/08/25  0759 03/07/25  1939 03/07/25  0716 03/06/25  1850   WBC 4.7  --  3.9* 4.8 6.7   RBC 5.10  --  4.97 4.96 5.25   HGB 15.4  --  15.0 14.9 15.6   HCT 45.3  --  44.5 45.4 47.5   MCV 89  --  90 92 91   MCH 30.2  --  30.2 30.0 29.7   MCHC 34.0  --  33.7 32.8 32.8   RDW 14.4  --  14.8 15.0 14.7   * 101* 96* 93* 103*     INRNo lab results found in last 7 days.        Antwon GILLESPIE, CELESTINOP  Nurse Practitioner  [facility]  [phone]

## 2025-03-10 NOTE — PLAN OF CARE
SUMMARY: (+) RSV 3/4, increased SOB and cough with increased O2 needs.     DATE & TIME: 3/9/25-3/10/25 1281-7482      Cognitive Concerns/ Orientation: A & O x4   BEHAVIOR & AGGRESSION TOOL COLOR: Green   ABNL VS/O2: VSS on 4L NC, CPAP at bedtime with 4l bleed in. Baseline 2L.  MOBILITY: IND in room; SBA in hallways   PAIN MANAGMENT: Denied pain this shift   DIET: Mod Cho   BOWEL/BLADDER: Continent of bowel and bladder, No BM this shift   ABNL LAB/BG: BG 89,77,147  DRAIN/DEVICES: R & L PIV SL.  Pt has personal ambulatory insulin pump, RN to assess bolus amount and document in MAR.   TELEMETRY RHYTHM: NSR   SKIN: Intact   TESTS/PROCEDURES: None this shift   D/C DATE: Pending     OTHER IMPORTANT INFO: LS coarse, Exp. Wheezing at times, on scheduled nebs, encouraged IS and Flutter valve. Hem-Onc consulted. IV abx changed to PO Ceftin and doxycycline

## 2025-03-10 NOTE — PROGRESS NOTES
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     Acute on chronic hypoxic respiratory failure   Community-acquired pneumonia  Acute RSV bronchitis  Reactive airway disease secondary to above  Severe JESSICA and chronic oxygen use    Presents with worsening shortness of breath, cough, rhinorrhea which started over the weekend. Diagnosed with RSV two days prior via PCP.  *In the ED, afebrile with HR 110s and /68 and requiring 5 L O2. CXR NAD. Lactic acid 2.5 (felt to be due to hypoxia and some dehydration), s/p 1 L IVF. No leukocytosis. Troponin 27, repeat 26, felt to be demand ischemia.  *Follows with pulmonology. History of severe JESSICA. Has had RLL mass diagnosed as lymphoma, underwent therapy with improvement of symptoms. Recurrence of disease in 2020 and underwent CAR-T cell therapy. Then had COVID in 2022 with residual exertional dyspnea. CT chest 10/2023 stable right lower and upper lobe volume loss, consolidation and architectural distortion consistent with postradiation change/scarring. He uses 4 L with exertion and 2 L via CPAP at HS. at baseline patient is on room air at rest  - Repeat lactic acid normalized at 1.4    *Presents with worsening shortness of breath, cough, rhinorrhea which started over the weekend. Diagnosed with RSV two days prior via PCP.      Patient started on IV Zosyn.  Now switched to oral Ceftin and doxycycline for total of 1 week course of antibiotics  Was on DuoNebs 4 times daily but due to tachycardia switch to Xopenex nebs 4 times daily  Continue Breo Ellipta inhaler  Started on p.o. prednisone and now switched to IV Solu-Medrol starting 3/8/2025  Patient still wheezing on 3/9/2025 continue IV  Solu-Medrol and nebs  Encourage incentive spirometry and Acapella as tolerated  Wean oxygen as tolerated  Continue CPAP at at bedtime  Wheezing improving  Switched IV Solu-Medrol to oral prednisone 40 mg daily  Will trial Lasix 40 mg IV one-time dose for gentle diuresis on 3/10/2025        Acute right-sided PE    CT chest 10/2023 stable right lower and upper lobe volume loss, consolidation and architectural distortion consistent with postradiation change/scarring. He uses 4 L with exertion and 2 L via CPAP at HS. at baseline patient is on room air at rest    Started on IV heparin last evening  Changed to oral Eliquis now  Tells me that he was on DOAC for several months for a clot in  his abdomen    Leg US 3/7/25 - neg for DVT    Heme/oncology consult requested -and are following      DM II, insulin dependent with peripheral neuropathy  - Recheck A1c 6.4  - Hold PTA Metformin, Semaglutide  - Continue insulin pump per home settings.      Discussed with pt and wife again today that he is at risk for hyperglycemia in the setting of IV steroids - will continue to monitor blood glucose trends closely  - Continue PTA Gabapentin      CAD   *Based on CT chest imaging showing severe coronary calcifications. Cardiac MRI stress perfusion showed EF 68% w/o WMA, no significant valvular disease, no ischemia. Denies chest pain.  - Continue PTA Aspirin, statin     Diffuse large B-cell lymphoma in remission  Polycythemia vera  *Established with Dr. Smith. S/p CAR-T cell therapy, currently in remission of his lymphoma.   *Hgb stable 14-15    As above - Heme/onc consult requested    and are following  Chronic thrombocytopenia  *Platelets 103, baseline 127-148  -Continue to trend platelets.   D/w him at length today - plts improved today 101-121     Constipation;  Started on MiraLAX daily    Deconditioning    PT is recommending discharge to home with assist from wife      PPE Used:  Mask, gloves, face shield, gown          Diet:  Moderate Consistent Carb (60 g CHO per Meal) Diet    DVT Prophylaxis: DOAC  Champion Catheter: Not present  Lines: None     Cardiac Monitoring: ACTIVE order. Indication: Tachyarrhythmias, acute (48 hours)  Code Status: No CPR- Pre-arrest intubation OK      Clinically Significant Risk Factors          # Hypochloremia: Lowest Cl = 97 mmol/L in last 2 days, will monitor as appropriate        # Thrombocytopenia: Lowest platelets = 121 in last 2 days, will monitor for bleeding                         Disposition Plan         Medically Ready for Discharge: Anticipated in 2-4 Days    Once respiratory status improves and is back to 2 L of oxygen by nasal cannula     PT is recommending discharge to home with assist    Discussed with bedside RN patient and his wife over the phone on 3/9/2025    Cris Ward MD  Hospitalist Service  [facility]  Securely message with [employer] (more info)  Text page via [employer] Paging/Directory   ______________________________________________________________________    Interval History     Chart reviewed.  Discussed with bedside RN and his wife over the phone during my visit    Sitting comfortably in bed.  Still wheezing.  Intermittent dry cough.  Encourage incentive spirometry use.  Switched IV antibiotics to oral Ceftin and doxycycline.  Switch DuoNebs to Xopenex nebs due to tachycardia.  No other acute issues    Physical Exam   Vital Signs: Temp: 97.3  F (36.3  C) Temp src: Oral BP: 124/69 Pulse: 101   Resp: 18 SpO2: 94 % O2 Device: Nasal cannula Oxygen Delivery: 4 LPM  Weight: 220 lbs 14.41 oz    GEN:  Alert, oriented x 3, appears fatigued, distress  HEENT:  Normocephalic/atraumatic, no scleral icterus, no nasal discharge,  CV: somewhat distant but regular rate and rhythm, no loud murmur ausc this am.  S1 + S2 noted, no S3 or S4.  LUNGS; bilateral occasional wheezing heard on auscultation, diminished air entry, no crackles heard, no tachypnea or respiratory distress  noted    Symmetric chest rise on inhalation noted.  ABD:  Active bowel sounds, soft, non-tender to light palpation throughout.   Mildly distended.  No rebound/guarding/rigidity.  EXT:  No significant pretibial edema bilaterally.  No cyanosis.    SKIN:  Dry to touch, no new exanthems noted in the visualized areas.    Medical Decision Making       55 MINUTES SPENT BY ME on the date of service doing chart review, history, exam, documentation & further activities per the note.      Data   Medications   Current Facility-Administered Medications   Medication Dose Route Frequency Provider Last Rate Last Admin    insulin basal rate from AMBULATORY PUMP   Subcutaneous Continuous Tana Marley PA-C         Current Facility-Administered Medications   Medication Dose Route Frequency Provider Last Rate Last Admin    apixaban ANTICOAGULANT (ELIQUIS) tablet 10 mg  10 mg Oral BID Toshia Hinton DO   10 mg at 03/10/25 0808    Followed by    [START ON 3/15/2025] apixaban ANTICOAGULANT (ELIQUIS) tablet 5 mg  5 mg Oral BID Toshia Hinton DO        [Held by provider] aspirin EC tablet 81 mg  81 mg Oral Daily Tana Marley PA-C   81 mg at 03/07/25 2041    budesonide (PULMICORT) neb solution 0.5 mg  0.5 mg Nebulization BID Cris Ward MD   0.5 mg at 03/10/25 0859    cefuroxime (CEFTIN) tablet 500 mg  500 mg Oral Q12H [facility] (08/20) Cris Ward MD   500 mg at 03/10/25 0808    doxycycline hyclate (VIBRAMYCIN) capsule 100 mg  100 mg Oral Q12H [facility] (08/20) Cris Ward MD   100 mg at 03/10/25 0808    fluticasone-vilanterol (BREO ELLIPTA) 200-25 MCG/ACT inhaler 1 puff  1 puff Inhalation Daily Tana Marley PA-C   1 puff at 03/10/25 0810    furosemide (LASIX) 10 MG/ML injection             gabapentin (NEURONTIN) capsule 400 mg  400 mg Oral At Bedtime Tana Marley PA-C   400 mg at 03/09/25 2129    guaiFENesin (MUCINEX) 12 hr tablet 600 mg  600 mg Oral BID Tana Marley PA-C   600 mg  at 03/10/25 0808    insulin aspart (NovoLOG/FIASP) 100 UNIT/ML VIAL FOR FILLING PUMP RESERVOIR   Device See Admin Instructions Tana Marley PA-C        insulin bolus from AMBULATORY PUMP   Subcutaneous 4x Daily AC & HS Tana Marley PA-C   15 Units at 03/10/25 1232    levalbuterol (XOPENEX) neb solution 1.25 mg  1.25 mg Nebulization 4x Daily Cris Ward MD   1.25 mg at 03/10/25 1214    pantoprazole (PROTONIX) EC tablet 40 mg  40 mg Oral QAM AC Cris Ward MD   40 mg at 03/10/25 0649    polyethylene glycol (MIRALAX) Packet 17 g  17 g Oral Daily Cris Ward MD        predniSONE (DELTASONE) tablet 40 mg  40 mg Oral Daily Cris Ward MD        rosuvastatin (CRESTOR) tablet 20 mg  20 mg Oral Daily Tana Marley PA-C   20 mg at 03/10/25 0808    sodium chloride (PF) 0.9% PF flush 3 mL  3 mL Intracatheter Q8H Tana Marley PA-C   3 mL at 03/10/25 1241     Labs and Imaging results below reviewed today.  Recent Labs   Lab 03/09/25  0541 03/08/25  0759 03/07/25  1939 03/07/25  0716   WBC 4.7  --  3.9* 4.8   HGB 15.4  --  15.0 14.9   HCT 45.3  --  44.5 45.4   MCV 89  --  90 92   * 101* 96* 93*     Recent Labs   Lab 03/10/25  0744 03/10/25  0603 03/10/25  0241 03/09/25  0746 03/09/25  0541 03/07/25  0753 03/07/25  0716 03/07/25  0053 03/06/25  1850   NA  --   --   --   --  139  --  140  --  137   POTASSIUM  --   --   --   --  3.9  --  3.4  --  3.6   CHLORIDE  --   --   --   --  97*  --  101  --  95*   CO2  --   --   --   --  29  --  28  --  29   ANIONGAP  --   --   --   --  13  --  11  --  13   * 147* 77   < > 181*   < > 88   < > 131*   BUN  --   --   --   --  11.6  --  9.1  --  12.3   CR  --   --   --   --  0.71  --  0.73  --  0.81   GFRESTIMATED  --   --   --   --  >90  --  >90  --  >90   JEFF  --   --   --   --  9.1  --  8.7*  --  9.1    < > = values in this interval not displayed.     7-Day Micro Results       Collected Updated Procedure Result Status      03/08/2025 3618  "03/09/2025 2216 Blood Culture Hand, Left [[id number]]   Blood from Hand, Left    Preliminary result Component Value   Culture No growth after 1 day  [P]                03/08/2025 1847 03/09/2025 2216 Blood Culture Hand, Right [[id number]]    Blood from Hand, Right    Preliminary result Component Value   Culture No growth after 1 day  [P]                03/06/2025 1930 03/09/2025 2346 Blood Culture Peripheral Blood [[id number]]   Peripheral Blood    Preliminary result Component Value   Culture No growth after 3 days  [P]                03/06/2025 1850 03/09/2025 2346 Blood Culture Peripheral Blood [[id number]]   Peripheral Blood    Preliminary result Component Value   Culture No growth after 3 days  [P]                      Recent [facility]   Lab 03/10/25  0744 03/10/25  0603 03/10/25  0241 03/09/25  0746 03/09/25  0541 03/07/25  0753 03/07/25  0716 03/07/25  0053 03/06/25  1850   NA  --   --   --   --  139  --  140  --  137   POTASSIUM  --   --   --   --  3.9  --  3.4  --  3.6   CHLORIDE  --   --   --   --  97*  --  101  --  95*   CO2  --   --   --   --  29  --  28  --  29   ANIONGAP  --   --   --   --  13  --  11  --  13   * 147* 77   < > 181*   < > 88   < > 131*   BUN  --   --   --   --  11.6  --  9.1  --  12.3   CR  --   --   --   --  0.71  --  0.73  --  0.81   GFRESTIMATED  --   --   --   --  >90  --  >90  --  >90   JEFF  --   --   --   --  9.1  --  8.7*  --  9.1   PROTTOTAL  --   --   --   --   --   --   --   --  6.5   ALBUMIN  --   --   --   --   --   --   --   --  3.8   BILITOTAL  --   --   --   --   --   --   --   --  0.9   ALKPHOS  --   --   --   --   --   --   --   --  61   AST  --   --   --   --   --   --   --   --  27   ALT  --   --   --   --   --   --   --   --  27    < > = values in this interval not displayed.     No results for input(s): \"INR\" in the last 168 hours.  No results for input(s): \"COLOR\", \"APPEARANCE\", \"URINEGLC\", \"URINEBILI\", \"URINEKETONE\", \"SG\", \"UBLD\", \"URINEPH\", \"PROTEIN\", " "\"UROBILINOGEN\", \"NITRITE\", \"LEUKEST\", \"RBCU\", \"WBCU\" in the last 168 hours.    No results found for this or any previous visit (from the past 24 hours).      "

## 2025-03-10 NOTE — PROGRESS NOTES
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     Impression:     74-year-old male with history of DM, diffuse large B cell lymphoma (in remission), and CAD diagnosed 2 days prior to presentation with RSV at PCP who presented with acute upper respiratory symptoms, low-grade fever, significant hypoxia and infiltrates    -RSV positive at [facility] physician (test result not available in Care Everywhere), presumably that is the primary diagnosis  -Infiltrates, low-grade fever and hypoxia - unlikely secondary bacterial infection, no new positive microbiology  -Pulmonary embolism  -Prior lymphoma, not on active chemo, no prior major immunosuppressant infections or known immunoglobulin deficiency  -VRE colonized  -Diabetes mellitus, well controlled with A1C 6.4%  -On Cefuroxime and Doxycycline.      Recommendations:  Continue short course of oral cefuroxime and Doxycycline - recommend completing 5 days total and then stopping.    Some immunosuppression but no indication to treat RSV  Treat PE, ongoing supportive care  Wean off oxygen as able.   ID will sign off. Please call us back with questions.       Patient and plan discussed with Dr. Whitman.     Maria L Gallego PA-C    Interval History   Tolerating antibiotics ok   No new rashes or issues with antibiotics   Labs reviewed   No changes to past medical, social or family history   Feeling maybe slightly better. Fever down. More productive cough. Still requiring 4 L oxygen at rest (at home only used oxygen with exertion).       Physical Exam   Temp: 97.1  F (36.2  C) Temp src: Oral BP: 114/60 Pulse: 92   Resp: 18 SpO2: 93 % O2 Device: Nasal cannula Oxygen Delivery: 4 LPM  Vitals:    03/06/25 2241   Weight: 100.2 kg (220 lb 14.4 oz)     Vital Signs with Ranges  Temp:  [97.1  F (36.2  C)-98.2  F  (36.8  C)] 97.1  F (36.2  C)  Pulse:  [89-98] 92  Resp:  [16-20] 18  BP: (112-121)/(49-60) 114/60  SpO2:  [93 %-97 %] 93 %    Constitutional: Awake, alert, cooperative, no apparent distress  Lungs: Coarse BS, mild wheeze.   Cardiovascular: Regular rate and rhythm, normal S1 and S2, and no murmur noted  Abdomen: Normal bowel sounds, soft, non-distended, non-tender  Skin: No rashes, no cyanosis, no edema  Other:    Medications   Current Facility-Administered Medications   Medication Dose Route Frequency Provider Last Rate Last Admin    insulin basal rate from AMBULATORY PUMP   Subcutaneous Continuous Tana Marley PA-C         Current Facility-Administered Medications   Medication Dose Route Frequency Provider Last Rate Last Admin    apixaban ANTICOAGULANT (ELIQUIS) tablet 10 mg  10 mg Oral BID Toshia Hinton DO   10 mg at 03/10/25 0808    Followed by    [START ON 3/15/2025] apixaban ANTICOAGULANT (ELIQUIS) tablet 5 mg  5 mg Oral BID Toshia Hinton DO        [Held by provider] aspirin EC tablet 81 mg  81 mg Oral Daily Tana Marley PA-C   81 mg at 03/07/25 2041    budesonide (PULMICORT) neb solution 0.5 mg  0.5 mg Nebulization BID Cris Ward MD   0.5 mg at 03/10/25 0859    cefuroxime (CEFTIN) tablet 500 mg  500 mg Oral Q12H [facility] (08/20) Cris Ward MD   500 mg at 03/10/25 0808    doxycycline hyclate (VIBRAMYCIN) capsule 100 mg  100 mg Oral Q12H [facility] (08/20) Cris Ward MD   100 mg at 03/10/25 0808    fluticasone-vilanterol (BREO ELLIPTA) 200-25 MCG/ACT inhaler 1 puff  1 puff Inhalation Daily Tana Marley PA-C   1 puff at 03/10/25 0810    gabapentin (NEURONTIN) capsule 400 mg  400 mg Oral At Bedtime Tana Marley PA-C   400 mg at 03/09/25 2129    guaiFENesin (MUCINEX) 12 hr tablet 600 mg  600 mg Oral BID Tana Marley PA-C   600 mg at 03/10/25 0808    insulin aspart (NovoLOG/FIASP) 100 UNIT/ML VIAL FOR FILLING PUMP RESERVOIR   Device See Admin  Instructions Tana Marley PA-C        insulin bolus from AMBULATORY PUMP   Subcutaneous 4x Daily AC & HS Tana Marley PA-C   10 Units at 03/10/25 1000    levalbuterol (XOPENEX) neb solution 1.25 mg  1.25 mg Nebulization 4x Daily Cris Ward MD   1.25 mg at 03/10/25 0859    methylPREDNISolone Na Suc (solu-MEDROL) injection 62.5 mg  62.5 mg Intravenous Q8H Toshia Hinton DO   62.5 mg at 03/10/25 0900    pantoprazole (PROTONIX) EC tablet 40 mg  40 mg Oral QAM AC Cris Ward MD   40 mg at 03/10/25 0649    rosuvastatin (CRESTOR) tablet 20 mg  20 mg Oral Daily Tana Marley PA-C   20 mg at 03/10/25 0808    sodium chloride (PF) 0.9% PF flush 3 mL  3 mL Intracatheter Q8H Tana Marley PA-C   3 mL at 03/09/25 1643       Data   All microbiology laboratory data reviewed.  Recent Labs   Lab Test 03/09/25  0541 03/08/25  0759 03/07/25  1939 03/07/25  0716   WBC 4.7  --  3.9* 4.8   HGB 15.4  --  15.0 14.9   HCT 45.3  --  44.5 45.4   MCV 89  --  90 92   * 101* 96* 93*     Recent Labs   Lab Test 03/09/25  0541 03/07/25  0716 03/06/25  1850   CR 0.71 0.73 0.81     03/08/2025 1853 03/09/2025 2216 Blood Culture Hand, Left [[id number]]   Blood from Hand, Left    Preliminary result Component Value   Culture No growth after 1 day P             03/08/2025 1847 03/09/2025 2216 Blood Culture Hand, Right [[id number]]    Blood from Hand, Right    Preliminary result Component Value   Culture No growth after 1 day P             03/06/2025 1930 03/09/2025 2346 Blood Culture Peripheral Blood [[id number]]   Peripheral Blood    Preliminary result Component Value   Culture No growth after 3 days P             03/06/2025 1850 03/09/2025 2346 Blood Culture Peripheral Blood [[id number]]   Peripheral Blood    Preliminary result Component Value   Culture No growth after 3 days P          EXAM: CT CHEST PULMONARY EMBOLISM W CONTRAST  LOCATION: [facility]  DATE:  3/7/2025    INDICATION: pulm emb? Cough, right-sided volume loss, respiratory distress and tachycardia  COMPARISON: 10/31/2023  TECHNIQUE: CT chest pulmonary angiogram during arterial phase injection of IV contrast. Multiplanar reformats and MIP reconstructions were performed. Dose reduction techniques were used.  CONTRAST: 77mL Isovue 370    FINDINGS:  ANGIOGRAM CHEST: Small volume of right upper lobe segmental pulmonary embolus, nonocclusive, image 103 series 3. No central/saddle embolus. Thoracic aorta is negative for dissection. No CT evidence of right heart strain.    LUNGS AND PLEURA: Significant right upper and lower lobe volume loss with associated confluent pulmonary parenchymal consolidation or architectural distortion again noted. Interval increase in groundglass opacities within the remaining aerated lung.  Chronic bibasilar bronchial wall thickening. No pneumothorax.    MEDIASTINUM/AXILLAE: Mild cardiomegaly. Chronic mediastinal shift to the right. Stranding in the superior mediastinum, and prominent mediastinal nodes measuring up to 1.2 cm, unchanged.    CORONARY ARTERY CALCIFICATION: Moderate.    UPPER ABDOMEN: Splenomegaly. Right adrenal calcifications redemonstrated.    MUSCULOSKELETAL: No acute bony abnormalities.   Impression:     IMPRESSION:  1.  Small volume of right upper lobe pulmonary embolus without right heart strain.  2.  Chronic right-sided volume loss and postradiation consolidative change.  3.  Increasing right-sided groundglass opacity may represent worsening atelectasis, or superimposed pneumonitis.

## 2025-03-10 NOTE — PLAN OF CARE
Goal Outcome Evaluation:      Plan of Care Reviewed With: patient    Overall Patient Progress: improvingOverall Patient Progress: improving     SUMMARY: (+) RSV 3/4, increased SOB and cough with increased O2 needs.     DATE & TIME: 03/09/2025 [id number]      Cognitive Concerns/ Orientation: A & O x4   BEHAVIOR & AGGRESSION TOOL COLOR: Green   ABNL VS/O2: VSS on 4L NC, CPAP at bedtime   MOBILITY: IND in room; SBA in hallways   PAIN MANAGMENT: Denied pain this shift   DIET: Mod Cho   BOWEL/BLADDER: Continent of bowel and bladder, No BM this shift   ABNL LAB/BG:   DRAIN/DEVICES: R & L PIV SL.  Pt has personal ambulatory insulin pump, RN to assess bolus amount and document in MAR.   TELEMETRY RHYTHM: NSR   SKIN: Intact   TESTS/PROCEDURES: None this shift   D/C DATE: Pending     OTHER IMPORTANT INFO: LS coarse, Exp. Wheezing at times, on scheduled nebs, encouraged IS and Flutter valve. Hem-Onc consulted. IV abx changed to PO Ceftin and doxycycline   PRN tessalon given x1 this shift

## 2025-03-10 NOTE — PLAN OF CARE
Goal Outcome Evaluation:                      SUMMARY: (+) RSV 3/4, increased SOB and cough with increased O2 needs.     DATE & TIME: 3/10/25 1500  Cognitive Concerns/ Orientation: A & O x4   BEHAVIOR & AGGRESSION TOOL COLOR: Green   ABNL VS/O2: VSS on 4L NC, pt increases to 6L with ambulating in halls. Uses CPAP at bedtime with 4L bled in. Baseline 2L. This afternoon O2 decreased to 3L and sat's remain stable. Pt will try his next walk on 4L.  MOBILITY: IND in room and walks in hallways Ind with port O2.  PAIN MANAGMENT: Denied pain this shift   DIET: Mod Cho, appetite good.   BOWEL/BLADDER: Continent of bowel and bladder, small BM today, miralax given.  ABNL LAB/BG: , at 1030 BG was 365 for which pt covered with 30U, 1230 BG was 314, pt covered with 15U. At 1345 pt meter read 276, for which he took 15U. At 1630 BG was 127.  DRAIN/DEVICES: R & L PIV SL.  Pt has personal ambulatory insulin pump. Pt changed out and reloaded the external pump today. RN to assess bolus amount and document in MAR.   TELEMETRY RHYTHM: NSR   SKIN: Intact   TESTS/PROCEDURES: None this shift   D/C DATE: Pending   OTHER IMPORTANT INFO: Crackles in bases, scattered Exp wheezes on scheduled nebs, and received Lasix 40mg IV x1. Encouraged IS and Flutter valve. Hem-Onc consulted. IV abx changed to PO Ceftin and doxycycline. Switched to oral prednisone.

## 2025-03-11 LAB
ANION GAP SERPL CALCULATED.3IONS-SCNC: 10 MMOL/L (ref 7–15)
BACTERIA BLD CULT: NO GROWTH
BACTERIA BLD CULT: NO GROWTH
BUN SERPL-MCNC: 17 MG/DL (ref 8–23)
CALCIUM SERPL-MCNC: 9.3 MG/DL (ref 8.8–10.4)
CHLORIDE SERPL-SCNC: 100 MMOL/L (ref 98–107)
CREAT SERPL-MCNC: 0.78 MG/DL (ref 0.67–1.17)
EGFRCR SERPLBLD CKD-EPI 2021: >90 ML/MIN/1.73M2
GLUCOSE BLDC GLUCOMTR-MCNC: 134 MG/DL (ref 70–99)
GLUCOSE BLDC GLUCOMTR-MCNC: 165 MG/DL (ref 70–99)
GLUCOSE BLDC GLUCOMTR-MCNC: 92 MG/DL (ref 70–99)
GLUCOSE SERPL-MCNC: 94 MG/DL (ref 70–99)
HCO3 SERPL-SCNC: 32 MMOL/L (ref 22–29)
PLATELET # BLD AUTO: 137 10E3/UL (ref 150–450)
POTASSIUM SERPL-SCNC: 3.4 MMOL/L (ref 3.4–5.3)
SODIUM SERPL-SCNC: 142 MMOL/L (ref 135–145)

## 2025-03-11 PROCEDURE — 94640 AIRWAY INHALATION TREATMENT: CPT

## 2025-03-11 PROCEDURE — 250N000013 HC RX MED GY IP 250 OP 250 PS 637: Performed by: PHYSICIAN ASSISTANT

## 2025-03-11 PROCEDURE — 36415 COLL VENOUS BLD VENIPUNCTURE: CPT | Performed by: INTERNAL MEDICINE

## 2025-03-11 PROCEDURE — 250N000009 HC RX 250: Performed by: INTERNAL MEDICINE

## 2025-03-11 PROCEDURE — 250N000013 HC RX MED GY IP 250 OP 250 PS 637: Performed by: INTERNAL MEDICINE

## 2025-03-11 PROCEDURE — 94640 AIRWAY INHALATION TREATMENT: CPT | Mod: 76

## 2025-03-11 PROCEDURE — 250N000013 HC RX MED GY IP 250 OP 250 PS 637: Performed by: HOSPITALIST

## 2025-03-11 PROCEDURE — 120N000001 HC R&B MED SURG/OB

## 2025-03-11 PROCEDURE — 80048 BASIC METABOLIC PNL TOTAL CA: CPT | Performed by: INTERNAL MEDICINE

## 2025-03-11 PROCEDURE — 999N000157 HC STATISTIC RCP TIME EA 10 MIN

## 2025-03-11 PROCEDURE — 99233 SBSQ HOSP IP/OBS HIGH 50: CPT | Performed by: INTERNAL MEDICINE

## 2025-03-11 PROCEDURE — 250N000012 HC RX MED GY IP 250 OP 636 PS 637: Performed by: INTERNAL MEDICINE

## 2025-03-11 PROCEDURE — 85049 AUTOMATED PLATELET COUNT: CPT | Performed by: STUDENT IN AN ORGANIZED HEALTH CARE EDUCATION/TRAINING PROGRAM

## 2025-03-11 PROCEDURE — 999N000156 HC STATISTIC RCP CONSULT EA 30 MIN

## 2025-03-11 PROCEDURE — 82374 ASSAY BLOOD CARBON DIOXIDE: CPT | Performed by: INTERNAL MEDICINE

## 2025-03-11 RX ORDER — FUROSEMIDE 40 MG/1
40 TABLET ORAL ONCE
Status: COMPLETED | OUTPATIENT
Start: 2025-03-11 | End: 2025-03-11

## 2025-03-11 RX ORDER — LEVALBUTEROL INHALATION SOLUTION 1.25 MG/3ML
1.25 SOLUTION RESPIRATORY (INHALATION) EVERY 6 HOURS PRN
Status: DISCONTINUED | OUTPATIENT
Start: 2025-03-11 | End: 2025-03-12 | Stop reason: HOSPADM

## 2025-03-11 RX ADMIN — CEFUROXIME AXETIL 500 MG: 500 TABLET ORAL at 21:33

## 2025-03-11 RX ADMIN — GUAIFENESIN 600 MG: 600 TABLET, EXTENDED RELEASE ORAL at 21:34

## 2025-03-11 RX ADMIN — CEFUROXIME AXETIL 500 MG: 500 TABLET ORAL at 09:01

## 2025-03-11 RX ADMIN — Medication 10 MG: at 21:33

## 2025-03-11 RX ADMIN — FUROSEMIDE 40 MG: 40 TABLET ORAL at 11:20

## 2025-03-11 RX ADMIN — LEVALBUTEROL HYDROCHLORIDE 1.25 MG: 1.25 SOLUTION RESPIRATORY (INHALATION) at 08:16

## 2025-03-11 RX ADMIN — BUDESONIDE 0.5 MG: 0.5 INHALANT RESPIRATORY (INHALATION) at 20:29

## 2025-03-11 RX ADMIN — GABAPENTIN 400 MG: 300 CAPSULE ORAL at 21:34

## 2025-03-11 RX ADMIN — BUDESONIDE 0.5 MG: 0.5 INHALANT RESPIRATORY (INHALATION) at 08:16

## 2025-03-11 RX ADMIN — DOXYCYCLINE HYCLATE 100 MG: 100 CAPSULE ORAL at 09:01

## 2025-03-11 RX ADMIN — POLYETHYLENE GLYCOL 3350 17 G: 17 POWDER, FOR SOLUTION ORAL at 09:00

## 2025-03-11 RX ADMIN — PREDNISONE 40 MG: 20 TABLET ORAL at 09:00

## 2025-03-11 RX ADMIN — FLUTICASONE FUROATE AND VILANTEROL TRIFENATATE 1 PUFF: 200; 25 POWDER RESPIRATORY (INHALATION) at 09:01

## 2025-03-11 RX ADMIN — APIXABAN 10 MG: 5 TABLET, FILM COATED ORAL at 09:00

## 2025-03-11 RX ADMIN — ROSUVASTATIN CALCIUM 20 MG: 20 TABLET, FILM COATED ORAL at 09:00

## 2025-03-11 RX ADMIN — BENZONATATE 200 MG: 100 CAPSULE ORAL at 21:40

## 2025-03-11 RX ADMIN — GUAIFENESIN 600 MG: 600 TABLET, EXTENDED RELEASE ORAL at 09:00

## 2025-03-11 RX ADMIN — DOXYCYCLINE HYCLATE 100 MG: 100 CAPSULE ORAL at 21:33

## 2025-03-11 RX ADMIN — PANTOPRAZOLE SODIUM 40 MG: 40 TABLET, DELAYED RELEASE ORAL at 09:00

## 2025-03-11 RX ADMIN — APIXABAN 10 MG: 5 TABLET, FILM COATED ORAL at 21:33

## 2025-03-11 ASSESSMENT — ACTIVITIES OF DAILY LIVING (ADL)
ADLS_ACUITY_SCORE: 28

## 2025-03-11 NOTE — PLAN OF CARE
Goal Outcome Evaluation:                      SUMMARY: (+) RSV 3/4, increased SOB and cough with increased O2 needs.     DATE & TIME: 3/11/25 1400  Cognitive Concerns/ Orientation: A&O x4   BEHAVIOR & AGGRESSION TOOL COLOR: Green   ABNL VS/O2: Tachy at times on 2L NC, O2  4L when ambulating in halls. Uses CPAP at bedtime with 3L bled in (baseline 2L).  MOBILITY: IND in room and walks in hallways Ind with portable O2.  PAIN MANAGMENT: Denies  DIET: Mod Cho  BOWEL/BLADDER: Continent of bowel and bladder  ABNL LAB/BG:  & 134, Pt self administers his coverage from his insulin pump.  DRAIN/DEVICES: R & L PIV SL.  Pt has personal ambulatory insulin pump. RN to assess bolus amount and document in MAR.   TELEMETRY RHYTHM: NSR   SKIN: Intact   Labs/PROCEDURES: Platelets 137  D/C DATE: Pending   OTHER IMPORTANT INFO: Crackles in bases, otherwise lungs sound clear. Continues  on scheduled nebs. Infrequent productive cough. Encouraged IS and Flutter valve. Hem-Onc consulted. Continues on PO Ceftin, PO doxycycline and PO prednisone. Lasix 40mg po given today. Droplet/ contact precautions maintained.

## 2025-03-11 NOTE — PLAN OF CARE
DATE & TIME: 3/10/25 1900-3/11/25 0730  Cognitive Concerns/ Orientation: A&O x4   BEHAVIOR & AGGRESSION TOOL COLOR: Green   ABNL VS/O2: Tachy at times on 3L NC, O2 increased to 4-6L when ambulating in halls. Uses CPAP at bedtime with 3L bled in (baseline 2L).  MOBILITY: IND in room and walks in hallways Ind with portable O2.  PAIN MANAGMENT: Denies  DIET: Mod Cho  BOWEL/BLADDER: Continent of bowel and bladder  ABNL LAB/BG:  (pt covered with 11 units) 129, and 106.  DRAIN/DEVICES: R & L PIV SL.  Pt has personal ambulatory insulin pump. RN to assess bolus amount and document in MAR.   TELEMETRY RHYTHM: NSR   SKIN: Intact   TESTS/PROCEDURES: None this shift   D/C DATE: Pending   OTHER IMPORTANT INFO: Crackles in bases with expiratory wheezes, continues  on scheduled nebs. Infrequent productive cough. Encouraged IS and Flutter valve. Hem-Onc consulted. Continues on PO Ceftin, PO doxycycline and PO prednisone. Droplet/ contact precautions maintained.

## 2025-03-11 NOTE — PROGRESS NOTES
[facility] Progress Note     Primary Oncologist/Hematologist:            Assessment and Plan:       Mr. 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  Prior chemo and eventually CAR-T therapy September 2020 ( Dr Bunch at [facility]) and in remission since then.  Currently followed by   History of VTE x2 (IVC thrombus and PE), off of Xarelto since April 2021  Admitted with worsening shortness of breath due to RSV  Small subsegmental right upper lobe pulmonary embolism, no DVT  History of sleep apnea on CPAP  Thrombocytopenia-platelets 103 on admission, almost back to baseline at this point.  Baseline platelets around 127-136,000.  WBC also slightly lower at admission, 3.9, now normal.  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  On admission hematocrit 53%.  Hematocrit 45.3 on 3/9.   JAK2 negative  Also prior history of melanoma     PLAN  1.  Respiratory symptoms improving.  He is on methylprednisolone 60 Mg IV.  Also nebulizers, antibiotics.  2.  Prior history of lymphoma, in remission post CAR-T in September 2020.  High risk for hypogammaglobulinemia.  IgG level  3/8/25 OK at 558. No indication for IVIG at this point.   3.  PE most likely secondary to acute infection.  He has had PE in the past.  Multiple risk factors for PE-obesity, recent limited mobility  acute infection, etc.  He does also have a high hemoglobin and hematocrit, most likely secondary, which may also be predisposing him to hypercoagulopathy.   Treated with heparin and now converted to apixaban.  Dr. Smith anticipates lifelong anticoagulation.   4.  ID following  5.  No indication for phlebotomy.  6.  Platelets back to baseline  7.  All other management per hospitalist service.    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     VERNA Lopez, AOJASBIRP  Nurse Practitioner  [facility]  [phone]          Interval History:     Respiratory status continues to improve. O2 now down to 2L at rest. Needs to bump up a bit with ambulation.  Uses O2 at home with his CPAP machine.               Review of Systems:     The 5 point Review of Systems is negative other than noted in the HPI                Medications:   Scheduled Medications  Current Facility-Administered Medications   Medication Dose Route Frequency Provider Last Rate Last Admin    apixaban ANTICOAGULANT (ELIQUIS) tablet 10 mg  10 mg Oral BID Toshia Hinton DO   10 mg at 03/11/25 0900    Followed by    [START ON 3/15/2025] apixaban ANTICOAGULANT (ELIQUIS) tablet 5 mg  5 mg Oral BID Toshia Hinton DO        [Held by provider] aspirin EC tablet 81 mg  81 mg Oral Daily Tana Marley PA-C   81 mg at 03/07/25 2041    budesonide (PULMICORT) neb solution 0.5 mg  0.5 mg Nebulization BID Cris Ward MD   0.5 mg at 03/11/25 0816    cefuroxime (CEFTIN) tablet 500 mg  500 mg Oral Q12H [facility] Cris Ward MD   500 mg at 03/11/25 0901    doxycycline hyclate (VIBRAMYCIN) capsule 100 mg  100 mg Oral Q12H [facility] Cris Ward MD   100 mg at 03/11/25 0901    fluticasone-vilanterol (BREO ELLIPTA) 200-25 MCG/ACT inhaler 1 puff  1 puff Inhalation Daily Tana Marley PA-C   1 puff at 03/11/25 0901    gabapentin (NEURONTIN) capsule 400 mg  400 mg Oral At Bedtime Tana Marley PA-C   400 mg at 03/10/25 2059    guaiFENesin (MUCINEX) 12 hr tablet 600 mg  600 mg Oral BID Tana Marley PA-C   600 mg at 03/11/25 0900    insulin aspart (NovoLOG/FIASP) 100 UNIT/ML VIAL FOR FILLING PUMP RESERVOIR   Device See Admin Instructions Tana Marley PA-C        insulin bolus from AMBULATORY PUMP   Subcutaneous 4x Daily AC & HS Tana Marley PA-C   4 Units at 03/11/25 0910    pantoprazole (PROTONIX) EC tablet 40 mg   40 mg Oral QAM AC Cris Ward MD   40 mg at 03/11/25 0900    polyethylene glycol (MIRALAX) Packet 17 g  17 g Oral Daily Cris Ward MD   17 g at 03/11/25 0900    predniSONE (DELTASONE) tablet 40 mg  40 mg Oral Daily Cris Ward MD   40 mg at 03/11/25 0900    rosuvastatin (CRESTOR) tablet 20 mg  20 mg Oral Daily Tana Marley PA-C   20 mg at 03/11/25 0900    sodium chloride (PF) 0.9% PF flush 3 mL  3 mL Intracatheter Q8H Tana Marley PA-C   3 mL at 03/10/25 2126     PRN Medications  Current Facility-Administered Medications   Medication Dose Route Frequency Provider Last Rate Last Admin    acetaminophen (TYLENOL) tablet 650 mg  650 mg Oral Q4H PRN Tana Marley PA-C   650 mg at 03/08/25 1223    Or    acetaminophen (TYLENOL) Suppository 650 mg  650 mg Rectal Q4H PRN Tana Marley PA-C        benzonatate (TESSALON) capsule 200 mg  200 mg Oral TID PRN Apryl Cooper MD   200 mg at 03/09/25 2136    calcium carbonate (TUMS) chewable tablet 1,000 mg  1,000 mg Oral 4x Daily PRN Tana Marley PA-C        glucose gel 15-30 g  15-30 g Oral Q15 Min PRN Tana Marley PA-C        Or    dextrose 50 % injection 25-50 mL  25-50 mL Intravenous Q15 Min PRN Tana Marley PA-C        Or    glucagon injection 1 mg  1 mg Subcutaneous Q15 Min PRN Tana Marley PA-C        docusate sodium (COLACE) capsule 100 mg  100 mg Oral BID PRN Tana Marley PA-C        hydrALAZINE (APRESOLINE) tablet 10 mg  10 mg Oral Q4H PRN Tana Marley PA-C        Or    hydrALAZINE (APRESOLINE) injection 10 mg  10 mg Intravenous Q4H PRN Tana Marley PA-C        levalbuterol (XOPENEX) neb solution 1.25 mg  1.25 mg Nebulization Q6H PRN Josesito, Ayana, MD        lidocaine (LMX4) cream   Topical Q1H PRN Tana Marley PA-C        lidocaine 1 % 0.1-1 mL  0.1-1 mL Other Q1H PRN Tana Marley PA-C        melatonin tablet 10 mg  10 mg Oral At Bedtime PRN Beatrice,  Cayla Moctezuma MD   10 mg at 03/10/25 2059    ondansetron (ZOFRAN ODT) ODT tab 4 mg  4 mg Oral Q6H PRN Tana Marley PA-C        Or    ondansetron (ZOFRAN) injection 4 mg  4 mg Intravenous Q6H PRN Tana Marley PA-C        senna-docusate (SENOKOT-S/PERICOLACE) 8.6-50 MG per tablet 1 tablet  1 tablet Oral BID PRN Tana Marley PA-C        Or    senna-docusate (SENOKOT-S/PERICOLACE) 8.6-50 MG per tablet 2 tablet  2 tablet Oral BID PRN Tana Marley PA-C        sodium chloride (PF) 0.9% PF flush 3 mL  3 mL Intracatheter q1 min prn Tana Marley PA-C   3 mL at 03/10/25 2100                  Physical Exam:   Vitals were reviewed  Blood pressure 126/70, pulse 90, temperature 97.8  F (36.6  C), temperature source Oral, resp. rate 16, weight 100.2 kg (220 lb 14.4 oz), SpO2 95%.  Wt Readings from Last 4 Encounters:   03/06/25 100.2 kg (220 lb 14.4 oz)   12/30/24 102.4 kg (225 lb 12.8 oz)   12/12/24 103.9 kg (229 lb)   12/08/23 111.9 kg (246 lb 12.8 oz)       I/O last 3 completed shifts:  In: 750 [P.O.:750]  Out: -                Data:   All laboratory data and imaging studies reviewed.    CMP  Recent Labs   Lab 03/11/25  0801 03/11/25  0706 03/10/25  0744 03/10/25  0603 03/09/25  0746 03/09/25  0541 03/07/25  0753 03/07/25  0716 03/07/25  0053 03/06/25  1850   NA  --  142  --   --   --  139  --  140  --  137   POTASSIUM  --  3.4  --   --   --  3.9  --  3.4  --  3.6   CHLORIDE  --  100  --   --   --  97*  --  101  --  95*   CO2  --  32*  --   --   --  29  --  28  --  29   ANIONGAP  --  10  --   --   --  13  --  11  --  13   GLC 92 94 144* 147*   < > 181*   < > 88   < > 131*   BUN  --  17.0  --   --   --  11.6  --  9.1  --  12.3   CR  --  0.78  --   --   --  0.71  --  0.73  --  0.81   GFRESTIMATED  --  >90  --   --   --  >90  --  >90  --  >90   JEFF  --  9.3  --   --   --  9.1  --  8.7*  --  9.1   PROTTOTAL  --   --   --   --   --   --   --   --   --  6.5   ALBUMIN  --   --   --   --   --   --    --   --   --  3.8   BILITOTAL  --   --   --   --   --   --   --   --   --  0.9   ALKPHOS  --   --   --   --   --   --   --   --   --  61   AST  --   --   --   --   --   --   --   --   --  27   ALT  --   --   --   --   --   --   --   --   --  27    < > = values in this interval not displayed.     CBC  Recent Labs   Lab 03/11/25  0706 03/10/25  0751 03/09/25  0541 03/08/25  0759 03/07/25  1939 03/07/25  0716 03/06/25  1850   WBC  --   --  4.7  --  3.9* 4.8 6.7   RBC  --   --  5.10  --  4.97 4.96 5.25   HGB  --   --  15.4  --  15.0 14.9 15.6   HCT  --   --  45.3  --  44.5 45.4 47.5   MCV  --   --  89  --  90 92 91   MCH  --   --  30.2  --  30.2 30.0 29.7   MCHC  --   --  34.0  --  33.7 32.8 32.8   RDW  --   --  14.4  --  14.8 15.0 14.7   * 138* 121* 101* 96* 93* 103*     INRNo lab results found in last 7 days.        JAVIER Valiente  Nurse Practitioner  [facility]  [phone]

## 2025-03-11 NOTE — PROGRESS NOTES
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     Acute on chronic hypoxic respiratory failure   Community-acquired pneumonia  Acute RSV bronchitis  Reactive airway disease secondary to above  Fluid overload with acute diastolic congestive heart failure exacerbation  Severe JESSICA and chronic oxygen use    Presents with worsening shortness of breath, cough, rhinorrhea which started over the weekend. Diagnosed with RSV two days prior via PCP.  *In the ED, afebrile with HR 110s and /68 and requiring 5 L O2. CXR NAD. Lactic acid 2.5 (felt to be due to hypoxia and some dehydration), s/p 1 L IVF. No leukocytosis. Troponin 27, repeat 26, felt to be demand ischemia.  *Follows with pulmonology. History of severe JESSICA. Has had RLL mass diagnosed as lymphoma, underwent therapy with improvement of symptoms. Recurrence of disease in 2020 and underwent CAR-T cell therapy. Then had COVID in 2022 with residual exertional dyspnea. CT chest 10/2023 stable right lower and upper lobe volume loss, consolidation and architectural distortion consistent with postradiation change/scarring. He uses 4 L with exertion and 2 L via CPAP at HS. at baseline patient is on room air at rest  - Repeat lactic acid normalized at 1.4    *Presents with worsening shortness of breath, cough, rhinorrhea which started over the weekend. Diagnosed with RSV two days prior via PCP.      Patient started on IV Zosyn.  Now switched to oral Ceftin and doxycycline for total of 1 week course of antibiotics  Was on DuoNebs 4 times daily but due to tachycardia switch to Xopenex nebs 4 times daily  Continue Breo Ellipta inhaler  Started on p.o. prednisone and now switched to IV  Solu-Medrol starting 3/8/2025  Patient still wheezing on 3/9/2025 continue IV Solu-Medrol and nebs  Encourage incentive spirometry and Acapella as tolerated  Wean oxygen as tolerated  Continue CPAP at at bedtime  Switched IV Solu-Medrol to oral prednisone 40 mg daily  Lasix 40 mg IV one-time dose given on 3/10/2025.  Patient with good diuresis.  Wheezing improved  Lasix 40 mg p.o. given on 3/11/2025  Echo ordered        Acute right-sided PE    CT chest 10/2023 stable right lower and upper lobe volume loss, consolidation and architectural distortion consistent with postradiation change/scarring. He uses 4 L with exertion and 2 L via CPAP at HS. at baseline patient is on room air at rest    Started on IV heparin last evening  Changed to oral Eliquis now  Tells me that he was on DOAC for several months for a clot in  his abdomen    Leg US 3/7/25 - neg for DVT    Heme/oncology consult requested -and are following  Echo ordered as noted above      DM II, insulin dependent with peripheral neuropathy  - Recheck A1c 6.4  - Hold PTA Metformin, Semaglutide  - Continue insulin pump per home settings.      Discussed with pt and wife again today that he is at risk for hyperglycemia in the setting of IV steroids - will continue to monitor blood glucose trends closely  - Continue PTA Gabapentin      CAD   *Based on CT chest imaging showing severe coronary calcifications. Cardiac MRI stress perfusion showed EF 68% w/o WMA, no significant valvular disease, no ischemia. Denies chest pain.  - Continue PTA Aspirin, statin     Diffuse large B-cell lymphoma in remission  Polycythemia vera  *Established with Dr. Smith. S/p CAR-T cell therapy, currently in remission of his lymphoma.   *Hgb stable 14-15    As above - Heme/onc consult requested    and are following  Chronic thrombocytopenia  *Platelets 103, baseline 127-148  -Continue to trend platelets.   D/w him at length today - plts improved today 101-121     Constipation;  Started on  MiraLAX daily    Deconditioning    PT is recommending discharge to home with assist from wife      PPE Used:  Mask, gloves, face shield, gown          Diet: Moderate Consistent Carb (60 g CHO per Meal) Diet    DVT Prophylaxis: DOAC  Champion Catheter: Not present  Lines: None     Cardiac Monitoring: ACTIVE order. Indication: Tachyarrhythmias, acute (48 hours)  Code Status: No CPR- Pre-arrest intubation OK      Clinically Significant Risk Factors                                         Disposition Plan         Medically Ready for Discharge: Anticipated Tomorrow      Once respiratory status improves and is back to 2 L of oxygen by nasal cannula     PT is recommending discharge to home with assist  Echo to be done    Discussed with bedside RN patient and his wife over the phone on 3/9/2025    Cris Ward MD  Hospitalist Service  [facility]  Securely message with [employer] (more info)  Text page via [employer] Paging/Directory   ______________________________________________________________________    Interval History     Chart reviewed.  Discussed with bedside RN   Sitting comfortably in chair.  Feels like breathing much improved since 3/10/2025.  Patient oxygen requirements down to 2 L which is his baseline.  Wheezing also much improved.  Good diuresis with IV Lasix.  No other acute issues    Physical Exam   Vital Signs: Temp: 97.9  F (36.6  C) Temp src: Oral BP: 100/60 Pulse: 84   Resp: 16 SpO2: 94 % O2 Device: Nasal cannula Oxygen Delivery: 2 LPM  Weight: 220 lbs 14.41 oz    GEN:  Alert, oriented x 3, not in acute distress  HEENT:  Normocephalic/atraumatic, no scleral icterus, no nasal discharge,  CV: somewhat distant but regular rate and rhythm, no loud murmur ausc this am.  S1 + S2 noted, no S3 or S4.  LUNGS; bibasilar crackles heard, no wheezing currently.  No tachypnea or respiratory distress noted    ABD:  Active bowel sounds, soft, non-tender to light palpation throughout.   Mildly distended.   No rebound/guarding/rigidity.  EXT: Trace edema of the bilateral lower extremities noted  SKIN:  Dry to touch, no new exanthems noted in the visualized areas.    Medical Decision Making       55 MINUTES SPENT BY ME on the date of service doing chart review, history, exam, documentation & further activities per the note.      Data   Medications   Current Facility-Administered Medications   Medication Dose Route Frequency Provider Last Rate Last Admin    insulin basal rate from AMBULATORY PUMP   Subcutaneous Continuous Tana Marley PA-C 0.2 mL/hr at 03/10/25 1345 15 Units/hr at 03/10/25 1345     Current Facility-Administered Medications   Medication Dose Route Frequency Provider Last Rate Last Admin    apixaban ANTICOAGULANT (ELIQUIS) tablet 10 mg  10 mg Oral BID Toshia Hinton DO   10 mg at 03/11/25 0900    Followed by    [START ON 3/15/2025] apixaban ANTICOAGULANT (ELIQUIS) tablet 5 mg  5 mg Oral BID Toshia Hinton DO        [Held by provider] aspirin EC tablet 81 mg  81 mg Oral Daily Tana Marley PA-C   81 mg at 03/07/25 2041    budesonide (PULMICORT) neb solution 0.5 mg  0.5 mg Nebulization BID Cris Ward MD   0.5 mg at 03/11/25 0816    cefuroxime (CEFTIN) tablet 500 mg  500 mg Oral Q12H [facility] Cris Ward MD   500 mg at 03/11/25 0901    doxycycline hyclate (VIBRAMYCIN) capsule 100 mg  100 mg Oral Q12H [facility] Cris Ward MD   100 mg at 03/11/25 0901    fluticasone-vilanterol (BREO ELLIPTA) 200-25 MCG/ACT inhaler 1 puff  1 puff Inhalation Daily Tana Marley PA-C   1 puff at 03/11/25 0901    gabapentin (NEURONTIN) capsule 400 mg  400 mg Oral At Bedtime Tana Marley PA-C   400 mg at 03/10/25 2059    guaiFENesin (MUCINEX) 12 hr tablet 600 mg  600 mg Oral BID Tana Marley PA-C   600 mg at 03/11/25 0900    insulin aspart (NovoLOG/FIASP) 100 UNIT/ML VIAL FOR FILLING PUMP RESERVOIR   Device See Admin Instructions Tana Marley PA-C         insulin bolus from AMBULATORY PUMP   Subcutaneous 4x Daily AC & HS Tana Marley PA-C   5 Units at 03/11/25 1100    pantoprazole (PROTONIX) EC tablet 40 mg  40 mg Oral QAM AC Cris Ward MD   40 mg at 03/11/25 0900    polyethylene glycol (MIRALAX) Packet 17 g  17 g Oral Daily Cris Ward MD   17 g at 03/11/25 0900    predniSONE (DELTASONE) tablet 40 mg  40 mg Oral Daily Cris Ward MD   40 mg at 03/11/25 0900    rosuvastatin (CRESTOR) tablet 20 mg  20 mg Oral Daily Tana Marley PA-C   20 mg at 03/11/25 0900    sodium chloride (PF) 0.9% PF flush 3 mL  3 mL Intracatheter Q8H Tana Marley PA-C   3 mL at 03/10/25 2126     Labs and Imaging results below reviewed today.  Recent Labs   Lab 03/11/25  0706 03/10/25  0751 03/09/25  0541 03/08/25  0759 03/07/25  1939 03/07/25  0716   WBC  --   --  4.7  --  3.9* 4.8   HGB  --   --  15.4  --  15.0 14.9   HCT  --   --  45.3  --  44.5 45.4   MCV  --   --  89  --  90 92   * 138* 121*   < > 96* 93*    < > = values in this interval not displayed.     Recent Labs   Lab 03/11/25  1317 03/11/25  0801 03/11/25  0706 03/09/25  0746 03/09/25  0541 03/07/25  0753 03/07/25  0716   NA  --   --  142  --  139  --  140   POTASSIUM  --   --  3.4  --  3.9  --  3.4   CHLORIDE  --   --  100  --  97*  --  101   CO2  --   --  32*  --  29  --  28   ANIONGAP  --   --  10  --  13  --  11   * 92 94   < > 181*   < > 88   BUN  --   --  17.0  --  11.6  --  9.1   CR  --   --  0.78  --  0.71  --  0.73   GFRESTIMATED  --   --  >90  --  >90  --  >90   JEFF  --   --  9.3  --  9.1  --  8.7*    < > = values in this interval not displayed.     7-Day Micro Results       Collected Updated Procedure Result Status      03/08/2025 1853 03/10/2025 2216 Blood Culture Hand, Left [[id number]]   Blood from Hand, Left    Preliminary result Component Value   Culture No growth after 2 days  [P]                03/08/2025 1847 03/10/2025 2216 Blood Culture Hand, Right  "[[id number]]    Blood from Hand, Right    Preliminary result Component Value   Culture No growth after 2 days  [P]                03/06/2025 1930 03/10/2025 2346 Blood Culture Peripheral Blood [[id number]]   Peripheral Blood    Preliminary result Component Value   Culture No growth after 4 days  [P]                03/06/2025 1850 03/10/2025 2346 Blood Culture Peripheral Blood [[id number]]   Peripheral Blood    Preliminary result Component Value   Culture No growth after 4 days  [P]                      Recent Labs   Lab 03/11/25  1317 03/11/25  0801 03/11/25  0706 03/09/25  0746 03/09/25  0541 03/07/25  0753 03/07/25  0716 03/07/25  0053 03/06/25  1850   NA  --   --  142  --  139  --  140  --  137   POTASSIUM  --   --  3.4  --  3.9  --  3.4  --  3.6   CHLORIDE  --   --  100  --  97*  --  101  --  95*   CO2  --   --  32*  --  29  --  28  --  29   ANIONGAP  --   --  10  --  13  --  11  --  13   * 92 94   < > 181*   < > 88   < > 131*   BUN  --   --  17.0  --  11.6  --  9.1  --  12.3   CR  --   --  0.78  --  0.71  --  0.73  --  0.81   GFRESTIMATED  --   --  >90  --  >90  --  >90  --  >90   JEFF  --   --  9.3  --  9.1  --  8.7*  --  9.1   PROTTOTAL  --   --   --   --   --   --   --   --  6.5   ALBUMIN  --   --   --   --   --   --   --   --  3.8   BILITOTAL  --   --   --   --   --   --   --   --  0.9   ALKPHOS  --   --   --   --   --   --   --   --  61   AST  --   --   --   --   --   --   --   --  27   ALT  --   --   --   --   --   --   --   --  27    < > = values in this interval not displayed.     No results for input(s): \"INR\" in the last 168 hours.  No results for input(s): \"COLOR\", \"APPEARANCE\", \"URINEGLC\", \"URINEBILI\", \"URINEKETONE\", \"SG\", \"UBLD\", \"URINEPH\", \"PROTEIN\", \"UROBILINOGEN\", \"NITRITE\", \"LEUKEST\", \"RBCU\", \"WBCU\" in the last 168 hours.    No results found for this or any previous visit (from the past 24 hours).      "

## 2025-03-12 ENCOUNTER — APPOINTMENT (OUTPATIENT)
Dept: CARDIOLOGY | Facility: CLINIC | Age: 75
DRG: 189 | End: 2025-03-12
Attending: INTERNAL MEDICINE
Payer: COMMERCIAL

## 2025-03-12 VITALS
TEMPERATURE: 97 F | DIASTOLIC BLOOD PRESSURE: 61 MMHG | WEIGHT: 220.9 LBS | BODY MASS INDEX: 33.59 KG/M2 | RESPIRATION RATE: 18 BRPM | SYSTOLIC BLOOD PRESSURE: 106 MMHG | HEART RATE: 86 BPM | OXYGEN SATURATION: 92 %

## 2025-03-12 LAB
ANION GAP SERPL CALCULATED.3IONS-SCNC: 8 MMOL/L (ref 7–15)
BUN SERPL-MCNC: 15 MG/DL (ref 8–23)
CALCIUM SERPL-MCNC: 9 MG/DL (ref 8.8–10.4)
CHLORIDE SERPL-SCNC: 98 MMOL/L (ref 98–107)
CREAT SERPL-MCNC: 0.71 MG/DL (ref 0.67–1.17)
EGFRCR SERPLBLD CKD-EPI 2021: >90 ML/MIN/1.73M2
GLUCOSE SERPL-MCNC: 100 MG/DL (ref 70–99)
HCO3 SERPL-SCNC: 30 MMOL/L (ref 22–29)
LVEF ECHO: NORMAL
PLATELET # BLD AUTO: 141 10E3/UL (ref 150–450)
POTASSIUM SERPL-SCNC: 4 MMOL/L (ref 3.4–5.3)
SODIUM SERPL-SCNC: 136 MMOL/L (ref 135–145)

## 2025-03-12 PROCEDURE — 250N000012 HC RX MED GY IP 250 OP 636 PS 637: Performed by: INTERNAL MEDICINE

## 2025-03-12 PROCEDURE — 94640 AIRWAY INHALATION TREATMENT: CPT

## 2025-03-12 PROCEDURE — 250N000013 HC RX MED GY IP 250 OP 250 PS 637: Performed by: INTERNAL MEDICINE

## 2025-03-12 PROCEDURE — 80048 BASIC METABOLIC PNL TOTAL CA: CPT | Performed by: INTERNAL MEDICINE

## 2025-03-12 PROCEDURE — 99239 HOSP IP/OBS DSCHRG MGMT >30: CPT | Performed by: INTERNAL MEDICINE

## 2025-03-12 PROCEDURE — 999N000208 ECHOCARDIOGRAM COMPLETE

## 2025-03-12 PROCEDURE — C8929 TTE W OR WO FOL WCON,DOPPLER: HCPCS

## 2025-03-12 PROCEDURE — 85049 AUTOMATED PLATELET COUNT: CPT | Performed by: STUDENT IN AN ORGANIZED HEALTH CARE EDUCATION/TRAINING PROGRAM

## 2025-03-12 PROCEDURE — 36415 COLL VENOUS BLD VENIPUNCTURE: CPT | Performed by: STUDENT IN AN ORGANIZED HEALTH CARE EDUCATION/TRAINING PROGRAM

## 2025-03-12 PROCEDURE — 93306 TTE W/DOPPLER COMPLETE: CPT | Mod: 26 | Performed by: INTERNAL MEDICINE

## 2025-03-12 PROCEDURE — 250N000009 HC RX 250: Performed by: INTERNAL MEDICINE

## 2025-03-12 PROCEDURE — 255N000002 HC RX 255 OP 636: Performed by: INTERNAL MEDICINE

## 2025-03-12 PROCEDURE — 250N000013 HC RX MED GY IP 250 OP 250 PS 637: Performed by: PHYSICIAN ASSISTANT

## 2025-03-12 PROCEDURE — 999N000157 HC STATISTIC RCP TIME EA 10 MIN

## 2025-03-12 RX ORDER — TRAMADOL HYDROCHLORIDE 50 MG/1
50 TABLET ORAL ONCE
Status: DISCONTINUED | OUTPATIENT
Start: 2025-03-12 | End: 2025-03-12

## 2025-03-12 RX ORDER — FUROSEMIDE 20 MG/1
20 TABLET ORAL DAILY PRN
Qty: 30 TABLET | Refills: 0 | Status: SHIPPED | OUTPATIENT
Start: 2025-03-12

## 2025-03-12 RX ORDER — PREDNISONE 10 MG/1
TABLET ORAL
Qty: 20 TABLET | Refills: 0 | Status: SHIPPED | OUTPATIENT
Start: 2025-03-12

## 2025-03-12 RX ORDER — BENZONATATE 200 MG/1
200 CAPSULE ORAL 3 TIMES DAILY PRN
Qty: 50 CAPSULE | Refills: 0 | Status: SHIPPED | OUTPATIENT
Start: 2025-03-12

## 2025-03-12 RX ORDER — CEFUROXIME AXETIL 500 MG/1
500 TABLET ORAL EVERY 12 HOURS
Qty: 4 TABLET | Refills: 0 | Status: SHIPPED | OUTPATIENT
Start: 2025-03-12 | End: 2025-03-14

## 2025-03-12 RX ORDER — DOXYCYCLINE 100 MG/1
100 CAPSULE ORAL EVERY 12 HOURS
Qty: 4 CAPSULE | Refills: 0 | Status: SHIPPED | OUTPATIENT
Start: 2025-03-12 | End: 2025-03-14

## 2025-03-12 RX ADMIN — PERFLUTREN 2 ML: 6.52 INJECTION, SUSPENSION INTRAVENOUS at 15:13

## 2025-03-12 RX ADMIN — FLUTICASONE FUROATE AND VILANTEROL TRIFENATATE 1 PUFF: 200; 25 POWDER RESPIRATORY (INHALATION) at 08:20

## 2025-03-12 RX ADMIN — BUDESONIDE 0.5 MG: 0.5 INHALANT RESPIRATORY (INHALATION) at 07:17

## 2025-03-12 RX ADMIN — APIXABAN 10 MG: 5 TABLET, FILM COATED ORAL at 08:15

## 2025-03-12 RX ADMIN — GUAIFENESIN 600 MG: 600 TABLET, EXTENDED RELEASE ORAL at 08:15

## 2025-03-12 RX ADMIN — PREDNISONE 40 MG: 20 TABLET ORAL at 08:16

## 2025-03-12 RX ADMIN — DOXYCYCLINE HYCLATE 100 MG: 100 CAPSULE ORAL at 08:15

## 2025-03-12 RX ADMIN — PANTOPRAZOLE SODIUM 40 MG: 40 TABLET, DELAYED RELEASE ORAL at 08:15

## 2025-03-12 RX ADMIN — CEFUROXIME AXETIL 500 MG: 500 TABLET ORAL at 08:15

## 2025-03-12 RX ADMIN — ROSUVASTATIN CALCIUM 20 MG: 20 TABLET, FILM COATED ORAL at 08:16

## 2025-03-12 RX ADMIN — POLYETHYLENE GLYCOL 3350 17 G: 17 POWDER, FOR SOLUTION ORAL at 08:15

## 2025-03-12 ASSESSMENT — ACTIVITIES OF DAILY LIVING (ADL)
ADLS_ACUITY_SCORE: 28

## 2025-03-12 NOTE — PLAN OF CARE
DATE & TIME: 3/11/25 1900-3/12/25 0730  Cognitive Concerns/ Orientation: A&O x4   BEHAVIOR & AGGRESSION TOOL COLOR: Green   ABNL VS/O2: Tachy at times on 2L NC, O2  4L when ambulating in halls. Uses CPAP at bedtime with 3L bled in (baseline 2L).  MOBILITY: IND in room and walks in hallways Ind with portable O2.  PAIN MANAGMENT: Denies  DIET: Mod Cho  BOWEL/BLADDER: Continent of bowel and bladder  ABNL LAB/BG: ,126, and 110.  Pt self administers his coverage from his insulin pump.  DRAIN/DEVICES: PIV R arm saline locked.  Pt has personal ambulatory insulin pump. RN to assess bolus amount and document in MAR.   TELEMETRY RHYTHM: NSR   SKIN: Intact   TESTS/PROCEDURES: ECHO  D/C DATE: Anticipated discharge home today.  OTHER IMPORTANT INFO: Crackles in bases, otherwise lungs sound clear. Continues on scheduled nebs. Infrequent productive cough, PRN Tessalon pearls given x1.  Encouraged IS and Flutter valve. Hem-Onc following.  Continues on PO Ceftin, PO doxycycline and PO prednisone. Droplet/ contact precautions maintained.

## 2025-03-12 NOTE — DISCHARGE SUMMARY
[facility]  Hospitalist Discharge Summary      Date of Admission:  3/6/2025  Date of Discharge:  3/12/2025  Discharging Provider: Cris Ward MD  Discharge Service: Hospitalist Service    Discharge Diagnoses   Acute on chronic hypoxic respiratory failure  Community-acquired pneumonia  Acute RSV bronchitis  Acute right-sided PE  Reactive airway disease secondary to above  Fluid overload with acute diastolic congestive heart failure exacerbation  Severe obstructive sleep apnea and chronic oxygen use  History of radiation induced lung injury needing oxygen at baseline at 2 L at night and 4 L with exertion  History of diffuse large B-cell lymphoma in remission  History of polycythemia vera  Chronic thrombocytopenia  Constipation      Clinically Significant Risk Factors          Follow-ups Needed After Discharge   Follow-up Appointments       Hospital Follow-up with Existing Primary Care Provider (PCP)      Please see details below         Schedule Primary Care visit within: 30 Days               Unresulted Labs Ordered in the Past 30 Days of this Admission       Date and Time Order Name Status Description    3/8/2025  5:59 PM Blood Culture Hand, Right Preliminary     3/8/2025  5:59 PM Blood Culture Hand, Left Preliminary             Discharge Disposition   Discharged to home  Condition at discharge: Stable    Hospital Course     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     Acute on chronic hypoxic respiratory failure   Community-acquired pneumonia  Acute RSV bronchitis  Reactive airway disease secondary to above  Fluid overload with acute diastolic congestive heart failure exacerbation  Severe JESSICA and chronic oxygen use     Presents with worsening shortness of breath, cough, rhinorrhea which started over the weekend. Diagnosed with RSV two  days prior via PCP.  *In the ED, afebrile with HR 110s and /68 and requiring 5 L O2. CXR NAD. Lactic acid 2.5 (felt to be due to hypoxia and some dehydration), s/p 1 L IVF. No leukocytosis. Troponin 27, repeat 26, felt to be demand ischemia.  *Follows with pulmonology. History of severe JESSICA. Has had RLL mass diagnosed as lymphoma, underwent therapy with improvement of symptoms. Recurrence of disease in 2020 and underwent CAR-T cell therapy. Then had COVID in 2022 with residual exertional dyspnea. CT chest 10/2023 stable right lower and upper lobe volume loss, consolidation and architectural distortion consistent with postradiation change/scarring. He uses 4 L with exertion and 2 L via CPAP at HS. at baseline patient is on room air at rest  - Repeat lactic acid normalized at 1.4     *Presents with worsening shortness of breath, cough, rhinorrhea which started over the weekend. Diagnosed with RSV two days prior via PCP.        Patient started on IV Zosyn.  Now switched to oral Ceftin and doxycycline for total of 1 week course of antibiotics  Was on DuoNebs 4 times daily but due to tachycardia switch to Xopenex nebs 4 times daily  Continue Breo Ellipta inhaler  Started on p.o. prednisone and now switched to IV Solu-Medrol starting 3/8/2025  Patient still wheezing on 3/9/2025 continue IV Solu-Medrol and nebs  Encourage incentive spirometry and Acapella as tolerated  Wean oxygen as tolerated  Continue CPAP at at bedtime  Switched IV Solu-Medrol to oral prednisone 40 mg daily will discharge with prednisone taper  Lasix 40 mg IV one-time dose given on 3/10/2025.  Patient with good diuresis.  Wheezing improved  Lasix 40 mg p.o. given on 3/11/2025  Echo ordered and results are pending   Respite status much improved.  Feels like he is back to baseline  Will discharge with oral Ceftin and doxycycline for total of 1 week course of antibiotics  Continue PTA DuoNebs nebs as needed for shortness of breath or wheezing         Acute right-sided PE     CT chest 10/2023 stable right lower and upper lobe volume loss, consolidation and architectural distortion consistent with postradiation change/scarring. He uses 4 L with exertion and 2 L via CPAP at HS. at baseline patient is on room air at rest     Started on IV heparin  Changed to oral Eliquis now  With starting Eliquis his PTA aspirin was discontinued  Tells me that he was on DOAC for several months for a clot in  his abdomen     Leg US 3/7/25 - neg for DVT     Heme/oncology consult requested -and are following  Duration of anticoagulation as per primary oncologist  Echo ordered as noted above        DM II, insulin dependent with peripheral neuropathy  - Recheck A1c 6.4  - Hold PTA Metformin, Semaglutide  - Continue insulin pump per home settings.      Discussed with pt and wife again today that he is at risk for hyperglycemia in the setting of IV steroids - will continue to monitor blood glucose trends closely  - Continue PTA Gabapentin      CAD   *Based on CT chest imaging showing severe coronary calcifications. Cardiac MRI stress perfusion showed EF 68% w/o WMA, no significant valvular disease, no ischemia. Denies chest pain.  - Continue PTA Aspirin, statin     Diffuse large B-cell lymphoma in remission  Polycythemia vera  *Established with Dr. Smith. S/p CAR-T cell therapy, currently in remission of his lymphoma.   *Hgb stable 14-15     As above - Heme/onc consult requested    and are following      Chronic thrombocytopenia  *Platelets 103, baseline 127-148  -Continue to trend platelets.   D/w him at length today - plts improved today 101-121      Constipation;  Started on MiraLAX daily     Deconditioning     PT is recommending discharge to home with assist from wife        PPE Used:  Mask, gloves, face shield, gown           Diet: Moderate Consistent Carb (60 g CHO per Meal) Diet    DVT Prophylaxis: DOAC  Champion Catheter: Not present  Lines: None     Cardiac Monitoring: ACTIVE  order. Indication: Tachyarrhythmias, acute (48 hours)  Code Status: No CPR- Pre-arrest intubation OK          Clinically Significant Risk Factors                                                 Disposition Plan   Medically Ready for Discharge: Ready Now      Consultations This Hospital Stay   PHYSICAL THERAPY ADULT IP CONSULT  PHARMACY IP CONSULT  PHARMACY IP CONSULT  HEMATOLOGY & ONCOLOGY IP CONSULT  INFECTIOUS DISEASES IP CONSULT    Code Status   No CPR- Pre-arrest intubation OK    Time Spent on this Encounter   I, Cris Ward MD, personally saw the patient today and spent greater than 30 minutes discharging this patient.       Cris Ward MD  [facility] [address] [facility]  [address]  [place] [address]  Phone: [phone]  ______________________________________________________________________    Physical Exam   Vital Signs: Temp: 97  F (36.1  C) Temp src: Oral BP: 106/61 Pulse: 86   Resp: 18 SpO2: 92 % O2 Device: Nasal cannula Oxygen Delivery: 2 LPM  Weight: 220 lbs 14.41 oz  GEN:  Alert, oriented x 3, not in acute distress  HEENT:  Normocephalic/atraumatic, no scleral icterus, no nasal discharge,  CV: somewhat distant but regular rate and rhythm, no loud murmur ausc this am.  S1 + S2 noted, no S3 or S4.  LUNGS; bibasilar crackles heard, no wheezing currently.  No tachypnea or respiratory distress noted    ABD:  Active bowel sounds, soft, non-tender to light palpation throughout.   Mildly distended.  No rebound/guarding/rigidity.  EXT: Trace edema of the bilateral lower extremities noted  SKIN:  Dry to touch, no new exanthems noted in the visualized areas.          Primary Care Physician   [facility]    Discharge Orders      Reason for your hospital stay    Pneumonia, RSV bronchitis, Acute Right sided PE and mild fluid overload     Activity    Your activity upon discharge: activity as tolerated     Diet    Follow this diet upon discharge: Current  Diet:Orders Placed This Encounter      Moderate Consistent Carb (60 g CHO per Meal) Diet     Hospital Follow-up with Existing Primary Care Provider (PCP)    Please see details below            Significant Results and Procedures   Most Recent 3 CBC's:  Recent Labs   Lab Test 03/12/25  0642 03/11/25  0706 03/10/25  0751 03/09/25  0541 03/08/25  0759 03/07/25  1939 03/07/25  0716   WBC  --   --   --  4.7  --  3.9* 4.8   HGB  --   --   --  15.4  --  15.0 14.9   MCV  --   --   --  89  --  90 92   * 137* 138* 121*   < > 96* 93*    < > = values in this interval not displayed.     Most Recent 3 BMP's:  Recent Labs   Lab Test 03/12/25  0642 03/11/25  1834 03/11/25  1317 03/11/25  0801 03/11/25  0706 03/09/25  0746 03/09/25  0541     --   --   --  142  --  139   POTASSIUM 4.0  --   --   --  3.4  --  3.9   CHLORIDE 98  --   --   --  100  --  97*   CO2 30*  --   --   --  32*  --  29   BUN 15.0  --   --   --  17.0  --  11.6   CR 0.71  --   --   --  0.78  --  0.71   ANIONGAP 8  --   --   --  10  --  13   JEFF 9.0  --   --   --  9.3  --  9.1   * 165* 134*   < > 94   < > 181*    < > = values in this interval not displayed.     Most Recent 2 LFT's:  Recent Labs   Lab Test 03/06/25  1850 01/14/23  0812   AST 27 26   ALT 27 36   ALKPHOS 61 93   BILITOTAL 0.9 1.1     Most Recent 3 INR's:  Recent Labs   Lab Test 10/28/20  1143 10/13/20  0340 10/12/20  0324   INR 1.16* 1.08 1.15*     Most Recent INR's and Anticoagulation Dosing History:  Anticoagulation Dose History  More data exists         Latest Ref Rng & Units 10/5/2020 10/6/2020 10/9/2020 10/10/2020 10/12/2020 10/13/2020 10/28/2020   Recent Dosing and Labs   INR 0.86 - 1.14 1.14  1.06  1.15  1.15  1.15  1.08  1.16      Most Recent 3 Creatinines:  Recent Labs   Lab Test 03/12/25  0642 03/11/25  0706 03/09/25  0541   CR 0.71 0.78 0.71     Most Recent 3 Hemoglobins:  Recent Labs   Lab Test 03/09/25  0541 03/07/25  1939 03/07/25  0716   HGB 15.4 15.0 14.9     Most  Recent 3 Troponin's:  Recent Labs   Lab Test 06/19/21  1932   TROPI <0.015     Most Recent 3 BNP's:  Recent Labs   Lab Test 03/07/25  0716   NTBNPI 1,139*     Most Recent D-dimer:  Recent Labs   Lab Test 10/28/20  1143   DD 1.4*     Most Recent Cholesterol Panel:  Recent Labs   Lab Test 05/08/24  0000   TRIG 178*     7-Day Micro Results       Collected Updated Procedure Result Status      03/08/2025 1853 03/11/2025 2216 Blood Culture Hand, Left [[id number]]   Blood from Hand, Left    Preliminary result Component Value   Culture No growth after 3 days  [P]                03/08/2025 1847 03/11/2025 2216 Blood Culture Hand, Right [[id number]]    Blood from Hand, Right    Preliminary result Component Value   Culture No growth after 3 days  [P]                03/06/2025 1930 03/11/2025 2346 Blood Culture Peripheral Blood [[id number]]   Peripheral Blood    Final result Component Value   Culture No Growth               03/06/2025 1850 03/11/2025 2346 Blood Culture Peripheral Blood [[id number]]   Peripheral Blood    Final result Component Value   Culture No Growth                     Most Recent TSH and T4:No lab results found.  Most Recent Hemoglobin A1c:  Recent Labs   Lab Test 03/06/25  1850   A1C 6.4*     Most Recent 6 glucoses:  Recent Labs   Lab Test 03/12/25  0642 03/11/25  1834 03/11/25  1317 03/11/25  0801 03/11/25  0706 03/10/25  0744   * 165* 134* 92 94 144*     Most Recent Urinalysis:  Recent Labs   Lab Test 01/14/23  0841   COLOR Yellow   APPEARANCE Clear   URINEGLC 500*   URINEBILI Negative   URINEKETONE Trace*   SG 1.025   UBLD Negative   URINEPH 5.0   PROTEIN 20*   NITRITE Negative   LEUKEST Negative   RBCU <1   WBCU 4     Most Recent ABG:No lab results found.  Most Recent ESR & CRP:  Recent Labs   Lab Test 04/07/22  1124   CRP 7.5     Most Recent Anemia Panel:  Recent Labs   Lab Test 03/12/25  0642 03/10/25  0751 03/09/25  0541 11/25/20  1320 10/28/20  1143   WBC  --   --  4.7   < > 3.4*    HGB  --   --  15.4   < > 12.8*   HCT  --   --  45.3   < > 40.0   MCV  --   --  89   < > 92   *   < > 121*   < > 58*   SUSAN  --   --   --   --  65    < > = values in this interval not displayed.     Most Recent CPK:  Recent Labs   Lab Test 10/28/20  1143 10/12/20  0329 10/05/20  0427   CKT 38 16* 35   ,   Results for orders placed or performed during the hospital encounter of 03/06/25   XR Chest 2 Views    Narrative    EXAM: XR CHEST 2 VIEWS  LOCATION: [facility]  DATE: 3/6/2025    INDICATION: RSV.  Now hypoxic.  COMPARISON: CT 10/31/2023, radiograph 5/24/2022      Impression    IMPRESSION: Stable size of cardiomediastinal silhouette. Stable postsurgical changes and volume loss in the right hemithorax without definite new airspace consolidation, pleural effusion or pneumothorax. Bones are unchanged.   CT Chest Pulmonary Embolism w Contrast    Narrative    EXAM: CT CHEST PULMONARY EMBOLISM W CONTRAST  LOCATION: [facility]  DATE: 3/7/2025    INDICATION: pulm emb? Cough, right-sided volume loss, respiratory distress and tachycardia  COMPARISON: 10/31/2023  TECHNIQUE: CT chest pulmonary angiogram during arterial phase injection of IV contrast. Multiplanar reformats and MIP reconstructions were performed. Dose reduction techniques were used.   CONTRAST: 77mL Isovue 370    FINDINGS:  ANGIOGRAM CHEST: Small volume of right upper lobe segmental pulmonary embolus, nonocclusive, image 103 series 3. No central/saddle embolus. Thoracic aorta is negative for dissection. No CT evidence of right heart strain.    LUNGS AND PLEURA: Significant right upper and lower lobe volume loss with associated confluent pulmonary parenchymal consolidation or architectural distortion again noted. Interval increase in groundglass opacities within the remaining aerated lung.   Chronic bibasilar bronchial wall thickening. No pneumothorax.    MEDIASTINUM/AXILLAE: Mild cardiomegaly. Chronic  mediastinal shift to the right. Stranding in the superior mediastinum, and prominent mediastinal nodes measuring up to 1.2 cm, unchanged.    CORONARY ARTERY CALCIFICATION: Moderate.    UPPER ABDOMEN: Splenomegaly. Right adrenal calcifications redemonstrated.    MUSCULOSKELETAL: No acute bony abnormalities.      Impression    IMPRESSION:  1.  Small volume of right upper lobe pulmonary embolus without right heart strain.  2.  Chronic right-sided volume loss and postradiation consolidative change.  3.  Increasing right-sided groundglass opacity may represent worsening atelectasis, or superimposed pneumonitis.    Critical Result: VTE (PE/DVT)    Finding was identified on 3/7/2025 7:14 PM CST.    ADIEL Gross was contacted by me on 3/7/2025 7:14 PM CST and verbalized understanding of the critical result.      US Lower Extremity Venous Duplex Bilateral    Narrative    EXAM: US LOWER EXTREMITY VENOUS DUPLEX BILATERAL  LOCATION: [facility]  DATE: 3/7/2025    INDICATION: Pt with new pulmonary embolism, lower extremity pain/swelling  COMPARISON: None.  TECHNIQUE: Venous Duplex ultrasound of bilateral lower extremities with and without compression, augmentation and duplex. Color flow and spectral Doppler with waveform analysis performed.    FINDINGS: Exam includes the common femoral, femoral, popliteal veins as well as segmentally visualized deep calf veins and greater saphenous vein.     RIGHT: No deep vein thrombosis. No superficial thrombophlebitis. No popliteal cyst.    LEFT: No deep vein thrombosis. No superficial thrombophlebitis. Baker's cyst measuring 4.7 x 2.9 x 0.7 cm.      Impression    IMPRESSION:  1.  No deep venous thrombosis in the bilateral lower extremities.       Discharge Medications   Current Discharge Medication List        START taking these medications    Details   apixaban ANTICOAGULANT (ELIQUIS) 5 MG tablet Take 2 tablets (10 mg) by mouth 2 times daily for 3 days, THEN 1  tablet (5 mg) 2 times daily for 27 days.  Qty: 66 tablet, Refills: 0    Associated Diagnoses: Acute pulmonary embolism, unspecified pulmonary embolism type, unspecified whether acute cor pulmonale present (H)      benzonatate (TESSALON) 200 MG capsule Take 1 capsule (200 mg) by mouth 3 times daily as needed for cough.  Qty: 50 capsule, Refills: 0    Associated Diagnoses: RSV bronchiolitis      cefuroxime (CEFTIN) 500 MG tablet Take 1 tablet (500 mg) by mouth every 12 hours for 2 days.  Qty: 4 tablet, Refills: 0    Associated Diagnoses: Pneumonia due to infectious organism, unspecified laterality, unspecified part of lung      doxycycline hyclate (VIBRAMYCIN) 100 MG capsule Take 1 capsule (100 mg) by mouth every 12 hours for 2 days.  Qty: 4 capsule, Refills: 0    Associated Diagnoses: Pneumonia due to infectious organism, unspecified laterality, unspecified part of lung      furosemide (LASIX) 20 MG tablet Take 1 tablet (20 mg) by mouth daily as needed. For worsening shortness of breath or Lower extremity edema  Qty: 30 tablet, Refills: 0    Associated Diagnoses: Hypervolemia, unspecified hypervolemia type      predniSONE (DELTASONE) 10 MG tablet 4 tabs daily for 2 days, then 3 tabs daily for 2 days, then 2 tabs daily for 2 days, then 1 tab daily for 2 days, then stop  Qty: 20 tablet, Refills: 0    Associated Diagnoses: RSV bronchiolitis; Acute respiratory failure with hypoxia (H)           CONTINUE these medications which have NOT CHANGED    Details   bisacodyl (DULCOLAX) 5 MG EC tablet Take 15 mg by mouth daily as needed for constipation.      budesonide-formoterol (SYMBICORT/BREYNA) 160-4.5 MCG/ACT Inhaler Inhale 1 puff into the lungs 2 times daily.      cyanocobalamin (VITAMIN B-12) 1000 MCG tablet Take 1,000 mcg by mouth daily      docusate sodium (COLACE) 100 MG capsule Take 100 mg by mouth 2 times daily as needed for constipation  Qty:        gabapentin (NEURONTIN) 400 MG capsule Take 400 mg by mouth at  bedtime.      insulin aspart (NOVOLOG VIAL) 100 UNITS/ML vial See Admin Instructions. To use with Omnipod      INSULIN PUMP - OUTPATIENT Inject subcutaneously continuously. Date Last Updated: 3/6/25  Omnipod  BASAL RATES: MAX BASAL 5 units/hour (dose calculated based on Dexcom readings)  CARB RATIO: settings per bolus calculator state carb ratio between 1.1-1.4 g carbs   Corection Factor (Sensitivity): 15 mg/dL  BLOOD GLUCOSE TARGET: 110  Active Insulin Time: 4 hours    *insulin pump doses are calculated from Dexcom readings, maximum basal 5 units/hr and maximum bolus 30 units      ipratropium - albuterol 0.5 mg/2.5 mg/3 mL (DUONEB) 0.5-2.5 (3) MG/3ML neb solution Take 1 vial by nebulization every 6 hours as needed for shortness of breath, wheezing or cough.      Melatonin 10 MG TABS tablet Take 10 mg by mouth at bedtime.      metFORMIN (GLUCOPHAGE) 500 MG tablet Take 1 tablet (500 mg) by mouth 2 times daily (with meals)  Qty:        psyllium (METAMUCIL/KONSYL) 58.6 % powder Take by mouth daily as needed for constipation. Dose not specified      rosuvastatin (CRESTOR) 20 MG tablet Take 1 tablet by mouth daily.      Semaglutide, 1 MG/DOSE, (OZEMPIC) 4 MG/3ML pen Inject 1 mg subcutaneously every 7 days. Mondays           STOP taking these medications       amoxicillin-clavulanate (AUGMENTIN) 875-125 MG tablet Comments:   Reason for Stopping:         aspirin 81 MG EC tablet Comments:   Reason for Stopping:             Allergies   No Known Allergies

## 2025-03-12 NOTE — PLAN OF CARE
Goal Outcome Evaluation:     Pt needs an echo and then will be getting discharged. Echo will occur around 1500 per echo team. On 2L o2, verified with Dr Ward that this is okay for patient to get discharged on. Pt uses oxygen at home at night and while doing exertional activities, which he has been doing here. Discharge paperwork has not been printed or gone over yet. Discharge meds are up in locked cabinet on unit. Discharge order to be put in after echo completed.

## 2025-03-12 NOTE — PLAN OF CARE
Goal Outcome Evaluation:      Discharge Note    Patient discharged to home via private vehicle  accompanied by significant other .  IV: Discontinued  Prescriptions filled and given to patient/family.   Belongings reviewed and sent with patient and family.   Home medications returned to patient: NA  Equipment sent with: Patient and family.   patient and family verbalizes understanding of discharge instructions. AVS given to patient and family.

## 2025-03-12 NOTE — PROGRESS NOTES
[facility] Progress Note     Primary Oncologist/Hematologist:            Assessment and Plan:       Mr. 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  Prior chemo and eventually CAR-T therapy September 2020 ( Dr Bunch at [facility]) and in remission since then.  Currently followed by   History of VTE x2 (IVC thrombus and PE), off of Xarelto since April 2021  Admitted with worsening shortness of breath due to RSV  Small subsegmental right upper lobe pulmonary embolism, no DVT  History of sleep apnea on CPAP  Thrombocytopenia-platelets 103 on admission, almost back to baseline at this point.  Baseline platelets around 127-136,000.  WBC also slightly lower at admission, 3.9, now normal.  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  On admission hematocrit 53%.  Hematocrit 45.3 on 3/9.   JAK2 negative  Also prior history of melanoma     PLAN  1.  Respiratory symptoms improving.  He is on methylprednisolone 60 Mg IV.  Also nebulizers, antibiotics.  2.  Prior history of lymphoma, in remission post CAR-T in September 2020.  High risk for hypogammaglobulinemia.  IgG level  3/8/25 OK at 558. No indication for IVIG at this point.   3.  PE most likely secondary to acute infection.  He has had PE in the past.  Multiple risk factors for PE-obesity, recent limited mobility  acute infection, etc.  He does also have a high hemoglobin and hematocrit, most likely secondary, which may also be predisposing him to hypercoagulopathy.   Treated with heparin and now converted to apixaban.  Anticipate needing lifelong anticoagulation if bleeding risk does not preclude this.   4.  ID following  5.  No indication for phlebotomy.  6.  Platelets back to baseline  7.  All other management per hospitalist service.    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     Lucas Smith MD  [facility]  [phone]          Interval History:     Respiratory status continues to improve. O2 now down to 2L at rest. Needs to bump up a bit with ambulation.  Uses O2 at home with his CPAP machine.               Review of Systems:     The 5 point Review of Systems is negative other than noted in the HPI                Medications:   Scheduled Medications  Current Facility-Administered Medications   Medication Dose Route Frequency Provider Last Rate Last Admin    apixaban ANTICOAGULANT (ELIQUIS) tablet 10 mg  10 mg Oral BID Toshia Hinton DO   10 mg at 03/12/25 0815    Followed by    [START ON 3/15/2025] apixaban ANTICOAGULANT (ELIQUIS) tablet 5 mg  5 mg Oral BID Toshia Hinton DO        [Held by provider] aspirin EC tablet 81 mg  81 mg Oral Daily Tana Marley PA-C   81 mg at 03/07/25 2041    budesonide (PULMICORT) neb solution 0.5 mg  0.5 mg Nebulization BID Cris Ward MD   0.5 mg at 03/12/25 0717    cefuroxime (CEFTIN) tablet 500 mg  500 mg Oral Q12H [facility] (08/20) Cris Ward MD   500 mg at 03/12/25 0815    doxycycline hyclate (VIBRAMYCIN) capsule 100 mg  100 mg Oral Q12H [facility] (08/20) Cris Ward MD   100 mg at 03/12/25 0815    fluticasone-vilanterol (BREO ELLIPTA) 200-25 MCG/ACT inhaler 1 puff  1 puff Inhalation Daily Tana Marley PA-C   1 puff at 03/12/25 0820    gabapentin (NEURONTIN) capsule 400 mg  400 mg Oral At Bedtime Tana Marley PA-C   400 mg at 03/11/25 2134    guaiFENesin (MUCINEX) 12 hr tablet 600 mg  600 mg Oral BID Tana Marley PA-C   600 mg at 03/12/25 0815    insulin aspart (NovoLOG/FIASP) 100 UNIT/ML VIAL FOR FILLING PUMP RESERVOIR   Device See Admin Instructions Tana Marley PA-C        insulin bolus from AMBULATORY PUMP   Subcutaneous 4x Daily AC & HS Tana aMrley PA-C   18.5 Units at 03/12/25 1444    pantoprazole (PROTONIX) EC tablet 40 mg   40 mg Oral QAM AC Cris Ward MD   40 mg at 03/12/25 0815    polyethylene glycol (MIRALAX) Packet 17 g  17 g Oral Daily Cris Ward MD   17 g at 03/12/25 0815    predniSONE (DELTASONE) tablet 40 mg  40 mg Oral Daily Cris Ward MD   40 mg at 03/12/25 0816    rosuvastatin (CRESTOR) tablet 20 mg  20 mg Oral Daily Tana Marley PA-C   20 mg at 03/12/25 0816    sodium chloride (PF) 0.9% PF flush 3 mL  3 mL Intracatheter Q8H Tana Marley PA-C   3 mL at 03/11/25 2134     PRN Medications  Current Facility-Administered Medications   Medication Dose Route Frequency Provider Last Rate Last Admin    acetaminophen (TYLENOL) tablet 650 mg  650 mg Oral Q4H PRN Tana Marley PA-C   650 mg at 03/08/25 1223    Or    acetaminophen (TYLENOL) Suppository 650 mg  650 mg Rectal Q4H PRN Tana Marley PA-C        benzonatate (TESSALON) capsule 200 mg  200 mg Oral TID PRN Apryl Cooper MD   200 mg at 03/11/25 2140    calcium carbonate (TUMS) chewable tablet 1,000 mg  1,000 mg Oral 4x Daily PRN Tana Marley PA-C        glucose gel 15-30 g  15-30 g Oral Q15 Min PRN Tana Marley PA-C        Or    dextrose 50 % injection 25-50 mL  25-50 mL Intravenous Q15 Min PRN Tana Marley PA-C        Or    glucagon injection 1 mg  1 mg Subcutaneous Q15 Min PRN Tana Marley PA-C        docusate sodium (COLACE) capsule 100 mg  100 mg Oral BID PRN Tana Marley PA-C        hydrALAZINE (APRESOLINE) tablet 10 mg  10 mg Oral Q4H PRN Tana Marley PA-C        Or    hydrALAZINE (APRESOLINE) injection 10 mg  10 mg Intravenous Q4H PRN Tana Marley PA-C        levalbuterol (XOPENEX) neb solution 1.25 mg  1.25 mg Nebulization Q6H PRN Josesito, Ayana, MD        lidocaine (LMX4) cream   Topical Q1H PRN Tana Marley PA-C        lidocaine 1 % 0.1-1 mL  0.1-1 mL Other Q1H PRN Tana Marley PA-C        melatonin tablet 10 mg  10 mg Oral At Bedtime PRN Beatrice,  Cayla Moctezuma MD   10 mg at 03/11/25 2133    ondansetron (ZOFRAN ODT) ODT tab 4 mg  4 mg Oral Q6H PRN Tana Marley PA-C        Or    ondansetron (ZOFRAN) injection 4 mg  4 mg Intravenous Q6H PRN Tana Marley PA-C        senna-docusate (SENOKOT-S/PERICOLACE) 8.6-50 MG per tablet 1 tablet  1 tablet Oral BID PRN Tana Marley PA-C        Or    senna-docusate (SENOKOT-S/PERICOLACE) 8.6-50 MG per tablet 2 tablet  2 tablet Oral BID PRN Tana Marley PA-C        sodium chloride (PF) 0.9% PF flush 3 mL  3 mL Intracatheter q1 min prn Tana Marley PA-C   3 mL at 03/10/25 2100                  Physical Exam:   Vitals were reviewed  Blood pressure (P) 105/64, pulse (P) 87, temperature (P) 98.4  F (36.9  C), temperature source (P) Oral, resp. rate (P) 18, weight 100.2 kg (220 lb 14.4 oz), SpO2 (P) 92%.  Wt Readings from Last 4 Encounters:   03/06/25 100.2 kg (220 lb 14.4 oz)   12/30/24 102.4 kg (225 lb 12.8 oz)   12/12/24 103.9 kg (229 lb)   12/08/23 111.9 kg (246 lb 12.8 oz)       I/O last 3 completed shifts:  In: 300 [P.O.:300]  Out: -                Data:   All laboratory data and imaging studies reviewed.    CMP  Recent Labs   Lab 03/12/25  0642 03/11/25  1834 03/11/25  1317 03/11/25  0801 03/11/25  0706 03/09/25  0746 03/09/25  0541 03/07/25  0753 03/07/25  0716 03/07/25  0053 03/06/25  1850     --   --   --  142  --  139  --  140  --  137   POTASSIUM 4.0  --   --   --  3.4  --  3.9  --  3.4  --  3.6   CHLORIDE 98  --   --   --  100  --  97*  --  101  --  95*   CO2 30*  --   --   --  32*  --  29  --  28  --  29   ANIONGAP 8  --   --   --  10  --  13  --  11  --  13   * 165* 134* 92 94   < > 181*   < > 88   < > 131*   BUN 15.0  --   --   --  17.0  --  11.6  --  9.1  --  12.3   CR 0.71  --   --   --  0.78  --  0.71  --  0.73  --  0.81   GFRESTIMATED >90  --   --   --  >90  --  >90  --  >90  --  >90   JEFF 9.0  --   --   --  9.3  --  9.1  --  8.7*  --  9.1   PROTTOTAL  --   --    --   --   --   --   --   --   --   --  6.5   ALBUMIN  --   --   --   --   --   --   --   --   --   --  3.8   BILITOTAL  --   --   --   --   --   --   --   --   --   --  0.9   ALKPHOS  --   --   --   --   --   --   --   --   --   --  61   AST  --   --   --   --   --   --   --   --   --   --  27   ALT  --   --   --   --   --   --   --   --   --   --  27    < > = values in this interval not displayed.     CBC  Recent Labs   Lab 03/12/25  0642 03/11/25  0706 03/10/25  0751 03/09/25  0541 03/08/25  0759 03/07/25  1939 03/07/25  0716 03/06/25  1850   WBC  --   --   --  4.7  --  3.9* 4.8 6.7   RBC  --   --   --  5.10  --  4.97 4.96 5.25   HGB  --   --   --  15.4  --  15.0 14.9 15.6   HCT  --   --   --  45.3  --  44.5 45.4 47.5   MCV  --   --   --  89  --  90 92 91   MCH  --   --   --  30.2  --  30.2 30.0 29.7   MCHC  --   --   --  34.0  --  33.7 32.8 32.8   RDW  --   --   --  14.4  --  14.8 15.0 14.7   * 137* 138* 121*   < > 96* 93* 103*    < > = values in this interval not displayed.     INRNo lab results found in last 7 days.        Antwon GILLESPIE, JAVIER  Nurse Practitioner  [facility]  [phone]

## 2025-03-13 ENCOUNTER — PATIENT OUTREACH (OUTPATIENT)
Dept: CARE COORDINATION | Facility: CLINIC | Age: 75
End: 2025-03-13
Payer: COMMERCIAL

## 2025-03-13 LAB
BACTERIA BLD CULT: NO GROWTH
BACTERIA BLD CULT: NO GROWTH

## 2025-03-13 NOTE — PROGRESS NOTES
Clinic Care Coordination Contact  Transitions of Care Outreach  Chief Complaint   Patient presents with    Clinic Care Coordination - Post Hospital       Most Recent Admission Date: 3/6/2025   Most Recent Admission Diagnosis: Elevated troponin - R79.89  RSV bronchiolitis - J21.0  Acute respiratory failure with hypoxia (H) - J96.01     Most Recent Discharge Date: 3/12/2025   Most Recent Discharge Diagnosis: RSV bronchiolitis - J21.0  Acute respiratory failure with hypoxia (H) - J96.01  Elevated troponin - R79.89  Acute pulmonary embolism, unspecified pulmonary embolism type, unspecified whether acute cor pulmonale present (H) - I26.99  Pneumonia due to infectious organism, unspecified laterality, unspecified part of lung - J18.9  Hypervolemia, unspecified hypervolemia type - E87.70     Transitions of Care Assessment    Discharge Assessment  How are you doing now that you are home?: good  How are your symptoms? (Red Flag symptoms escalate to triage hotline per guidelines): Improved  Do you know how to contact your clinic care team if you have future questions or changes to your health status? : Yes  Does the patient have their discharge instructions? : Yes  Does the patient have questions regarding their discharge instructions? : No  Were you started on any new medications or were there changes to any of your previous medications? : Yes  Does the patient have all of their medications?: Yes  Do you have questions regarding any of your medications? : No  Do you have all of your needed medical supplies or equipment (DME)?  (i.e. oxygen tank, CPAP, cane, etc.): Yes                  Follow up Plan     Discharge Follow-Up  Discharge follow up appointment scheduled in alignment with recommended follow up timeframe or Transitions of Risk Category? (Low = within 30 days; Moderate= within 14 days; High= within 7 days): No    Future Appointments   Date Time Provider Department Center   6/5/2025  3:00 PM Sanjay Granado MD  [facility]       Outpatient Plan as outlined on AVS reviewed with patient.    For any urgent concerns, please contact our 24 hour nurse triage line: [phone] ([phone])       ARABELLA Young

## 2025-04-11 ENCOUNTER — ANCILLARY PROCEDURE (OUTPATIENT)
Dept: GENERAL RADIOLOGY | Facility: CLINIC | Age: 75
End: 2025-04-11
Payer: COMMERCIAL

## 2025-04-11 DIAGNOSIS — J90 PLEURAL EFFUSION: ICD-10-CM

## 2025-04-11 PROCEDURE — 71046 X-RAY EXAM CHEST 2 VIEWS: CPT | Mod: TC | Performed by: RADIOLOGY

## 2025-05-01 ENCOUNTER — OFFICE VISIT (OUTPATIENT)
Dept: PULMONOLOGY | Facility: CLINIC | Age: 75
End: 2025-05-01
Attending: INTERNAL MEDICINE
Payer: COMMERCIAL

## 2025-05-01 VITALS — OXYGEN SATURATION: 94 % | HEART RATE: 93 BPM | SYSTOLIC BLOOD PRESSURE: 100 MMHG | DIASTOLIC BLOOD PRESSURE: 67 MMHG

## 2025-05-01 DIAGNOSIS — R06.02 SHORTNESS OF BREATH: Primary | ICD-10-CM

## 2025-05-01 PROCEDURE — 99214 OFFICE O/P EST MOD 30 MIN: CPT | Performed by: INTERNAL MEDICINE

## 2025-05-01 PROCEDURE — 3078F DIAST BP <80 MM HG: CPT | Performed by: INTERNAL MEDICINE

## 2025-05-01 PROCEDURE — 3074F SYST BP LT 130 MM HG: CPT | Performed by: INTERNAL MEDICINE

## 2025-05-01 PROCEDURE — G0463 HOSPITAL OUTPT CLINIC VISIT: HCPCS | Performed by: INTERNAL MEDICINE

## 2025-05-01 PROCEDURE — 1126F AMNT PAIN NOTED NONE PRSNT: CPT | Performed by: INTERNAL MEDICINE

## 2025-05-01 ASSESSMENT — PAIN SCALES - GENERAL: PAINLEVEL_OUTOF10: NO PAIN (0)

## 2025-05-01 NOTE — LETTER
5/1/2025      Pastor Garibay  [address]  [facility] [address]      Dear Colleague,    Thank you for referring your patient, Pastor Garibay, to the [facility]. Please see a copy of my visit note below.    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    Pulmonary HPI  76 YO male with history of DLBCL diagnosed in 2015. Developed respiratory symptoms of SOB and BETANCOURT around the time of diagnosis. Has had RLL mass diagnosed as lymphoma.  Underwent therapy with improvement in symptoms.  Has recurrence of disease in 2020 and underwent CAR-T cell therapy.  Was diagnosed with COVID in January 2022, had mild symptoms of URI symptoms and nasal drainage.  Felt mildly SOB during that time. Symptoms resolved after 10 days; notes some SOB after COVID.  States at the present time his SOB is only with significant exertion of walking in [employer] stores.  Notices some cough but is mostly non-productive except for feeling of gastric secretions after coughing.  CT chest from 3-18 and PET scan from 3-31 were personally reviewed.  RLL mass is unchanged in size, mediastinal and hilar adenopathy.  History of JESSICA, on CPAP.  No tobacco.     Last seen 4 months ago.  Since his last clinic visit he was hospitalized in March for RSV infection, pneumonia, and a pulmonary embolism.  Required increased oxygen during that hospitalization.  Was treated with ceftin and doxycycline for a full week, placed on ceftin.  Was placed on Xaralto.  Was weaned down to his usual oxygen requirement at the time of discharge.  States had residual cough after discharge that did not resolve until one week ago.  Was initially productive but has since resolved. No triggers for cough.  Is back walking and feeling well.  Walked a mile in [employer] the other day, noticed desaturation to high 89's after walking for 1/2 mile that quickly  resolved.  Did not notice much fatigue during his illness.  Is planning a trip to [place], we discussed setting up portable oxygen for use with exertion and also at [facility] while there.      The patient was seen and examined by Sanjay Granado MD           Current Outpatient Medications   Medication Sig Dispense Refill     benzonatate (TESSALON) 200 MG capsule Take 1 capsule (200 mg) by mouth 3 times daily as needed for cough. 50 capsule 0     bisacodyl (DULCOLAX) 5 MG EC tablet Take 15 mg by mouth daily as needed for constipation.       budesonide-formoterol (SYMBICORT/BREYNA) 160-4.5 MCG/ACT Inhaler Inhale 1 puff into the lungs 2 times daily.       cyanocobalamin (VITAMIN B-12) 1000 MCG tablet Take 1,000 mcg by mouth daily       docusate sodium (COLACE) 100 MG capsule Take 100 mg by mouth 2 times daily as needed for constipation       furosemide (LASIX) 20 MG tablet Take 1 tablet (20 mg) by mouth daily as needed. For worsening shortness of breath or Lower extremity edema 30 tablet 0     gabapentin (NEURONTIN) 400 MG capsule Take 400 mg by mouth at bedtime.       insulin aspart (NOVOLOG VIAL) 100 UNITS/ML vial See Admin Instructions. To use with Omnipod       INSULIN PUMP - OUTPATIENT Inject subcutaneously continuously. Date Last Updated: 3/6/25  Omnipod  BASAL RATES: MAX BASAL 5 units/hour (dose calculated based on Dexcom readings)  CARB RATIO: settings per bolus calculator state carb ratio between 1.1-1.4 g carbs   Corection Factor (Sensitivity): 15 mg/dL  BLOOD GLUCOSE TARGET: 110  Active Insulin Time: 4 hours    *insulin pump doses are calculated from Dexcom readings, maximum basal 5 units/hr and maximum bolus 30 units       ipratropium - albuterol 0.5 mg/2.5 mg/3 mL (DUONEB) 0.5-2.5 (3) MG/3ML neb solution Take 1 vial by nebulization every 6 hours as needed for shortness of breath, wheezing or cough.       Melatonin 10 MG TABS tablet Take 10 mg by mouth at bedtime.       metFORMIN (GLUCOPHAGE) 500 MG tablet  Take 1 tablet (500 mg) by mouth 2 times daily (with meals)       predniSONE (DELTASONE) 10 MG tablet 4 tabs daily for 2 days, then 3 tabs daily for 2 days, then 2 tabs daily for 2 days, then 1 tab daily for 2 days, then stop 20 tablet 0     psyllium (METAMUCIL/KONSYL) 58.6 % powder Take by mouth daily as needed for constipation. Dose not specified       rosuvastatin (CRESTOR) 20 MG tablet Take 1 tablet by mouth daily.       Semaglutide, 1 MG/DOSE, (OZEMPIC) 4 MG/3ML pen Inject 1 mg subcutaneously every 7 days. Mondays       No current facility-administered medications for this visit.     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  Partial left second toe amputation with osteotomy through proximal phalanx.  2.  Partial left third toe amputation with osteotomy through proximal phalanx.;  Surgeon: Ismael Humphrey DPM;  Location:  OR     BIOPSY LYMPH NODE CERVICAL N/A 12/5/2014    Procedure: BIOPSY LYMPH NODE CERVICAL;  Surgeon: Robbie Linda MD;  Location:  OR     BRONCHOSCOPY FLEXIBLE N/A 11/14/2017     Procedure: BRONCHOSCOPY FLEXIBLE;  FLEXIBLE BRONCHOSCOPY WITH BIOPSIES.;  Surgeon: Robbie Linda MD;  Location:  OR     BRONCHOSCOPY RIGID OR FLEXIBLE W/TRANSENDOSCOPIC ENDOBRONCHIAL ULTRASOUND GUIDED N/A 4/11/2022    Procedure: BRONCHOSCOPY, FIBEROPTIC, endobronchial ultrasound, transbronchial biopsies, lymphnode forcep biopsies;  Surgeon: Prosper Aguiar MD;  Location: [facility] OR     COLONOSCOPY       COLONOSCOPY N/A 5/9/2018    Procedure: COMBINED COLONOSCOPY, SINGLE OR MULTIPLE BIOPSY/POLYPECTOMY BY BIOPSY;;  Surgeon: Ramos Bates MD;  Location:  GI     ENDOVASCULAR PLACEMENT VASCULAR DEVICE Left 12/5/2017    Procedure: ENDOVASCULAR PLACEMENT VASCULAR DEVICE;;  Surgeon: Robbie Linda MD;  Location: [facility]     ENT SURGERY      tonsillectomy     EXCISE NODE MEDIASTINAL N/A 11/17/2017    Procedure: EXCISE NODE MEDIASTINAL;;  Surgeon: Robbie Linda MD;  Location:  OR     EYE SURGERY       EYE SURGERY Left     vitrectomy     INSERT PORT VASCULAR ACCESS N/A 12/05/2014    Procedure: INSERT PORT VASCULAR ACCESS;  Surgeon: Robbie Linda MD;  Location:  OR     INSERT PORT VASCULAR ACCESS N/A 12/5/2017    Procedure: INSERT PORT VASCULAR ACCESS;  POWER PORT PLACEMENT ATTEMPTED, PLACEMENT OF ANGIO-SEAL VIP VASCULAR CLOSURE DEVICE;  Surgeon: Robbie Linda MD;  Location: [facility]     IR CHEST PORT PLACEMENT > 5 YRS OF AGE  6/5/2020     IR PORT REMOVAL RIGHT  12/10/2018     PHACOEMULSIFICATION CLEAR CORNEA WITH STANDARD INTRAOCULAR LENS IMPLANT Right 5/2/2016    Procedure: PHACOEMULSIFICATION CLEAR CORNEA WITH STANDARD INTRAOCULAR LENS IMPLANT;  Surgeon: Rasheed Finney MD;  [facility]     REMOVE PORT VASCULAR ACCESS N/A 3/14/2017    Procedure: REMOVE PORT VASCULAR ACCESS;  Surgeon: Robbie Linda MD;  Location:  OR     REMOVE PORT VASCULAR ACCESS N/A 11/29/2022    Procedure: PORT REMOVAL;  Surgeon: Robbie Linda MD;  Location:  OR      SOFT TISSUE SURGERY      BASAL CELL CA FOREHEAD     THORACOTOMY Right 11/17/2017    Procedure: THORACOTOMY;  RIGHT EXPLORATORY THORACOTOMY/ EXTENSIVE PNEUMOLYSIS/ BIOPSY OF INTERLOBAR LYMPH NODE;  Surgeon: Robbie Linda MD;  Location: [facility] OR     TRANSCERVICAL EXTENDED MEDIASTINAL LYMPHADENECTOMY N/A 5/24/2022    Procedure: TRANSCERVICAL EXTENDED MEDIASTINAL LYMPHADENECTOMY;  Surgeon: Star Narvaez MD;  Location: [facility] OR     TRANSCERVICAL EXTENDED MEDIASTINAL LYMPHADENECTOMY  5/24/2022    Procedure: ;  Surgeon: Star Narvaez MD;  Location: [facility] OR       Social History     Socioeconomic History     Marital status:      Spouse name: Not on file     Number of children: Not on file     Years of education: Not on file     Highest education level: Not on file   Occupational History     Not on file   Tobacco Use     Smoking status: Never     Smokeless tobacco: Never   Substance and Sexual Activity     Alcohol use: Yes     Comment: 3-4 beers in a year     Drug use: No     Sexual activity: Not on file   Other Topics Concern     Parent/sibling w/ CABG, MI or angioplasty before 65F 55M? Yes   Social History Narrative     Not on file     Social Drivers of Health     Financial Resource Strain: Low Risk  (3/9/2025)    Financial Resource Strain      Within the past 12 months, have you or your family members you live with been unable to get utilities (heat, electricity) when it was really needed?: No   Food Insecurity: Low Risk  (3/9/2025)    Food Insecurity      Within the past 12 months, did you worry that your food would run out before you got money to buy more?: No      Within the past 12 months, did the food you bought just not last and you didn t have money to get more?: No   Transportation Needs: Low Risk  (3/9/2025)    Transportation Needs      Within the past 12 months, has lack of transportation kept you from medical appointments, getting your medicines, non-medical meetings or  appointments, work, or from getting things that you need?: No   Physical Activity: Not on file   Stress: Not on file   Social Connections: Not on file   Interpersonal Safety: Low Risk  (3/6/2025)    Interpersonal Safety      Do you feel physically and emotionally safe where you currently live?: Yes      Within the past 12 months, have you been hit, slapped, kicked or otherwise physically hurt by someone?: No      Within the past 12 months, have you been humiliated or emotionally abused in other ways by your partner or ex-partner?: No   Housing Stability: Low Risk  (3/9/2025)    Housing Stability      Do you have housing? : Yes      Are you worried about losing your housing?: No       ROS Pulmonary  A complete ROS was otherwise negative except as noted in the HPI.  There were no vitals taken for this visit.  Exam:   GENERAL APPEARANCE: Well developed, well nourished, alert, and in no apparent distress.  EYES: PERRL, EOMI  HENT: Nasal mucosa with no edema and no hyperemia. No nasal polyps.  EARS: Canals clear, TMs normal  MOUTH: Oral mucosa is moist, without any lesions, no tonsillar enlargement, no oropharyngeal exudate.  NECK: supple, no masses, no thyromegaly.  LYMPHATICS: No significant axillary, cervical, or supraclavicular nodes.  RESP: Good air flow throughout.  No crackles. No rhonchi. No wheezes.  CV: Normal S1, S2, regular rhythm, normal rate. No murmur.  No rub. No gallop. No LE edema.   ABDOMEN:  Bowel sounds normal, soft, nontender, no HSM or masses.   MS: extremities normal. No clubbing. No cyanosis.  SKIN: no rash on limited exam  NEURO: Mentation intact, speech normal, normal strength and tone, normal gait and stance  PSYCH: mentation appears normal. and affect normal/bright  Results:  No results found for this or any previous visit (from the past week).    Assessment and plan:   75 YO with onset of increased SOB for past few months prior to initial visit. Diagnosis of COVID in Jan 2022 with mild  symptoms.  No recent weight gain; less active in the Winter.  Review of chest CT does not show any increase in size or character of RLL mass/infiltrate. No pleural effusions. Increase in SOB cannot be explained by an increase in the RLL mass/consolidation. Symptoms of increased SOB could be due to post COVID or pulmonary embolism (does have a significant decrease in diffusion capacity)- these have resolved.  Adenopathy in chest found to be benign; no change on repeat imaging. Compliant with oxygen with significant exertion, good saturation with walking.  Overall has been doing well.  We discussed more consistent oxygen use during exercise.  Will examine for nocturnal desaturation as a cause for the increasing hemoglobin     1. Continue supplemental oxygen at 4L/NC with exertion. Encouraged to continue exercise regimen.  Discussed using oxygen during treadmill use  2. Continue 2L oxygen at night with CPAP  3. Discussed he contact his oxygen supply company to assist in setting up portable oxygen for his trip to [place].  Advised him that clinic nurses can help if questions arise.     RTC in 6 months.  Advised him to contact the clinic with any questions or concerns.            Again, thank you for allowing me to participate in the care of your patient.        Sincerely,        Sanjay Granado MD    Electronically signed

## 2025-05-01 NOTE — PROGRESS NOTES
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    Pulmonary HPI  76 YO male with history of DLBCL diagnosed in 2015. Developed respiratory symptoms of SOB and BETANCOURT around the time of diagnosis. Has had RLL mass diagnosed as lymphoma.  Underwent therapy with improvement in symptoms.  Has recurrence of disease in 2020 and underwent CAR-T cell therapy.  Was diagnosed with COVID in January 2022, had mild symptoms of URI symptoms and nasal drainage.  Felt mildly SOB during that time. Symptoms resolved after 10 days; notes some SOB after COVID.  States at the present time his SOB is only with significant exertion of walking in [employer].  Notices some cough but is mostly non-productive except for feeling of gastric secretions after coughing.  CT chest from 3-18 and PET scan from 3-31 were personally reviewed.  RLL mass is unchanged in size, mediastinal and hilar adenopathy.  History of JESSICA, on CPAP.  No tobacco.     Last seen 4 months ago.  Since his last clinic visit he was hospitalized in March for RSV infection, pneumonia, and a pulmonary embolism.  Required increased oxygen during that hospitalization.  Was treated with ceftin and doxycycline for a full week, placed on ceftin.  Was placed on Xaralto.  Was weaned down to his usual oxygen requirement at the time of discharge.  States had residual cough after discharge that did not resolve until one week ago.  Was initially productive but has since resolved. No triggers for cough.  Is back walking and feeling well.  Walked a mile in [employer] the other day, noticed desaturation to high 89's after walking for 1/2 mile that quickly resolved.  Did not notice much fatigue during his illness.  Is planning a trip to [place], we discussed setting up portable oxygen for use with exertion and also at [facility] while there.      The patient was seen and examined by Sanjay Granado MD           Current Outpatient  Medications   Medication Sig Dispense Refill    benzonatate (TESSALON) 200 MG capsule Take 1 capsule (200 mg) by mouth 3 times daily as needed for cough. 50 capsule 0    bisacodyl (DULCOLAX) 5 MG EC tablet Take 15 mg by mouth daily as needed for constipation.      budesonide-formoterol (SYMBICORT/BREYNA) 160-4.5 MCG/ACT Inhaler Inhale 1 puff into the lungs 2 times daily.      cyanocobalamin (VITAMIN B-12) 1000 MCG tablet Take 1,000 mcg by mouth daily      docusate sodium (COLACE) 100 MG capsule Take 100 mg by mouth 2 times daily as needed for constipation      furosemide (LASIX) 20 MG tablet Take 1 tablet (20 mg) by mouth daily as needed. For worsening shortness of breath or Lower extremity edema 30 tablet 0    gabapentin (NEURONTIN) 400 MG capsule Take 400 mg by mouth at bedtime.      insulin aspart (NOVOLOG VIAL) 100 UNITS/ML vial See Admin Instructions. To use with Omnipod      INSULIN PUMP - OUTPATIENT Inject subcutaneously continuously. Date Last Updated: 3/6/25  Omnipod  BASAL RATES: MAX BASAL 5 units/hour (dose calculated based on Dexcom readings)  CARB RATIO: settings per bolus calculator state carb ratio between 1.1-1.4 g carbs   Corection Factor (Sensitivity): 15 mg/dL  BLOOD GLUCOSE TARGET: 110  Active Insulin Time: 4 hours    *insulin pump doses are calculated from Dexcom readings, maximum basal 5 units/hr and maximum bolus 30 units      ipratropium - albuterol 0.5 mg/2.5 mg/3 mL (DUONEB) 0.5-2.5 (3) MG/3ML neb solution Take 1 vial by nebulization every 6 hours as needed for shortness of breath, wheezing or cough.      Melatonin 10 MG TABS tablet Take 10 mg by mouth at bedtime.      metFORMIN (GLUCOPHAGE) 500 MG tablet Take 1 tablet (500 mg) by mouth 2 times daily (with meals)      predniSONE (DELTASONE) 10 MG tablet 4 tabs daily for 2 days, then 3 tabs daily for 2 days, then 2 tabs daily for 2 days, then 1 tab daily for 2 days, then stop 20 tablet 0    psyllium (METAMUCIL/KONSYL) 58.6 % powder Take by  mouth daily as needed for constipation. Dose not specified      rosuvastatin (CRESTOR) 20 MG tablet Take 1 tablet by mouth daily.      Semaglutide, 1 MG/DOSE, (OZEMPIC) 4 MG/3ML pen Inject 1 mg subcutaneously every 7 days. Mondays       No current facility-administered medications for this visit.     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  Partial left second toe amputation with osteotomy through proximal phalanx.  2.  Partial left third toe amputation with osteotomy through proximal phalanx.;  Surgeon: Ismael Humphrey DPM;  Location:  OR    BIOPSY LYMPH NODE CERVICAL N/A 12/5/2014    Procedure: BIOPSY LYMPH NODE CERVICAL;  Surgeon: Robbie Linda MD;  Location:  OR    BRONCHOSCOPY FLEXIBLE N/A 11/14/2017    Procedure: BRONCHOSCOPY FLEXIBLE;  FLEXIBLE BRONCHOSCOPY WITH BIOPSIES.;  Surgeon: Robbie Linda MD;  Location:  OR    BRONCHOSCOPY RIGID OR FLEXIBLE W/TRANSENDOSCOPIC ENDOBRONCHIAL ULTRASOUND GUIDED N/A 4/11/2022    Procedure: BRONCHOSCOPY, FIBEROPTIC, endobronchial ultrasound, transbronchial biopsies,  lymphnode forcep biopsies;  Surgeon: Prosper Aguiar MD;  [facility]: [facility]    COLONOSCOPY      COLONOSCOPY N/A 5/9/2018    Procedure: COMBINED COLONOSCOPY, SINGLE OR MULTIPLE BIOPSY/POLYPECTOMY BY BIOPSY;;  Surgeon: Ramos Bates MD;  Location:  GI    ENDOVASCULAR PLACEMENT VASCULAR DEVICE Left 12/5/2017    Procedure: ENDOVASCULAR PLACEMENT VASCULAR DEVICE;;  Surgeon: Robbie Lnida MD;  Location: [facility]    ENT SURGERY      tonsillectomy    EXCISE NODE MEDIASTINAL N/A 11/17/2017    Procedure: EXCISE NODE MEDIASTINAL;;  Surgeon: Robbie Linda MD;  Location:  OR    EYE SURGERY      EYE SURGERY Left     vitrectomy    INSERT PORT VASCULAR ACCESS N/A 12/05/2014    Procedure: INSERT PORT VASCULAR ACCESS;  Surgeon: Robbie Linda MD;  Location:  OR    INSERT PORT VASCULAR ACCESS N/A 12/5/2017    Procedure: INSERT PORT VASCULAR ACCESS;  POWER PORT PLACEMENT ATTEMPTED, PLACEMENT OF ANGIO-SEAL VIP VASCULAR CLOSURE DEVICE;  Surgeon: Robbie Linda MD;  Location: [facility]    IR CHEST PORT PLACEMENT > 5 YRS OF AGE  6/5/2020    IR PORT REMOVAL RIGHT  12/10/2018    PHACOEMULSIFICATION CLEAR CORNEA WITH STANDARD INTRAOCULAR LENS IMPLANT Right 5/2/2016    Procedure: PHACOEMULSIFICATION CLEAR CORNEA WITH STANDARD INTRAOCULAR LENS IMPLANT;  Surgeon: Rasheed Finney MD;  Location:  [facility]    REMOVE PORT VASCULAR ACCESS N/A 3/14/2017    Procedure: REMOVE PORT VASCULAR ACCESS;  Surgeon: Robbie Linda MD;  Location:  OR    REMOVE PORT VASCULAR ACCESS N/A 11/29/2022    Procedure: PORT REMOVAL;  Surgeon: Robbie Linda MD;  Location:  OR    SOFT TISSUE SURGERY      BASAL CELL CA FOREHEAD    THORACOTOMY Right 11/17/2017    Procedure: THORACOTOMY;  RIGHT EXPLORATORY THORACOTOMY/ EXTENSIVE PNEUMOLYSIS/ BIOPSY OF INTERLOBAR LYMPH NODE;  Surgeon: Robbie Linda MD;  Location:  OR    TRANSCERVICAL EXTENDED MEDIASTINAL LYMPHADENECTOMY N/A 5/24/2022    Procedure:  TRANSCERVICAL EXTENDED MEDIASTINAL LYMPHADENECTOMY;  Surgeon: Star Narvaez MD;  [facility]    TRANSCERVICAL EXTENDED MEDIASTINAL LYMPHADENECTOMY  5/24/2022    Procedure: ;  Surgeon: Star Narvaez MD;  Location:  OR       Social History     Socioeconomic History    Marital status:      Spouse name: Not on file    Number of children: Not on file    Years of education: Not on file    Highest education level: Not on file   Occupational History    Not on file   Tobacco Use    Smoking status: Never    Smokeless tobacco: Never   Substance and Sexual Activity    Alcohol use: Yes     Comment: 3-4 beers in a year    Drug use: No    Sexual activity: Not on file   Other Topics Concern    Parent/sibling w/ CABG, MI or angioplasty before 65F 55M? Yes   Social History Narrative    Not on file     Social Drivers of Health     Financial Resource Strain: Low Risk  (3/9/2025)    Financial Resource Strain     Within the past 12 months, have you or your family members you live with been unable to get utilities (heat, electricity) when it was really needed?: No   Food Insecurity: Low Risk  (3/9/2025)    Food Insecurity     Within the past 12 months, did you worry that your food would run out before you got money to buy more?: No     Within the past 12 months, did the food you bought just not last and you didn t have money to get more?: No   Transportation Needs: Low Risk  (3/9/2025)    Transportation Needs     Within the past 12 months, has lack of transportation kept you from medical appointments, getting your medicines, non-medical meetings or appointments, work, or from getting things that you need?: No   Physical Activity: Not on file   Stress: Not on file   Social Connections: Not on file   Interpersonal Safety: Low Risk  (3/6/2025)    Interpersonal Safety     Do you feel physically and emotionally safe where you currently live?: Yes     Within the past 12 months, have you been hit, slapped,  kicked or otherwise physically hurt by someone?: No     Within the past 12 months, have you been humiliated or emotionally abused in other ways by your partner or ex-partner?: No   Housing Stability: Low Risk  (3/9/2025)    Housing Stability     Do you have housing? : Yes     Are you worried about losing your housing?: No       ROS Pulmonary  A complete ROS was otherwise negative except as noted in the HPI.  There were no vitals taken for this visit.  Exam:   GENERAL APPEARANCE: Well developed, well nourished, alert, and in no apparent distress.  EYES: PERRL, EOMI  HENT: Nasal mucosa with no edema and no hyperemia. No nasal polyps.  EARS: Canals clear, TMs normal  MOUTH: Oral mucosa is moist, without any lesions, no tonsillar enlargement, no oropharyngeal exudate.  NECK: supple, no masses, no thyromegaly.  LYMPHATICS: No significant axillary, cervical, or supraclavicular nodes.  RESP: Good air flow throughout.  No crackles. No rhonchi. No wheezes.  CV: Normal S1, S2, regular rhythm, normal rate. No murmur.  No rub. No gallop. No LE edema.   ABDOMEN:  Bowel sounds normal, soft, nontender, no HSM or masses.   MS: extremities normal. No clubbing. No cyanosis.  SKIN: no rash on limited exam  NEURO: Mentation intact, speech normal, normal strength and tone, normal gait and stance  PSYCH: mentation appears normal. and affect normal/bright  Results:  No results found for this or any previous visit (from the past week).    Assessment and plan:   73 YO with onset of increased SOB for past few months prior to initial visit. Diagnosis of COVID in Jan 2022 with mild symptoms.  No recent weight gain; less active in the Winter.  Review of chest CT does not show any increase in size or character of RLL mass/infiltrate. No pleural effusions. Increase in SOB cannot be explained by an increase in the RLL mass/consolidation. Symptoms of increased SOB could be due to post COVID or pulmonary embolism (does have a significant decrease in  diffusion capacity)- these have resolved.  Adenopathy in chest found to be benign; no change on repeat imaging. Compliant with oxygen with significant exertion, good saturation with walking.  Overall has been doing well.  We discussed more consistent oxygen use during exercise.  Will examine for nocturnal desaturation as a cause for the increasing hemoglobin     1. Continue supplemental oxygen at 4L/NC with exertion. Encouraged to continue exercise regimen.  Discussed using oxygen during treadmill use  2. Continue 2L oxygen at night with CPAP  3. Discussed he contact his oxygen supply company to assist in setting up portable oxygen for his trip to [place].  Advised him that clinic nurses can help if questions arise.     RTC in 6 months.  Advised him to contact the clinic with any questions or concerns.

## 2025-05-01 NOTE — NURSING NOTE
Chief Complaint   Patient presents with    Return Pulm BMT     Medications reviewed and vital signs taken.   Prosper Owens, EMT

## 2025-05-05 ENCOUNTER — TELEPHONE (OUTPATIENT)
Dept: PULMONOLOGY | Facility: CLINIC | Age: 75
End: 2025-05-05
Payer: COMMERCIAL

## 2025-05-05 NOTE — TELEPHONE ENCOUNTER
Left Voicemail (1st Attempt) and Sent Mychart (1st Attempt) for the patient to call back and schedule the following:    Appointment type: Return Pulm BMT  Provider: Vannesa  Return date: 11/1/2025  Specialty phone number: [phone]  Additional appointment(s) needed: NA  Additonal Notes: NA

## 2025-05-09 ENCOUNTER — TRANSFERRED RECORDS (OUTPATIENT)
Dept: HEALTH INFORMATION MANAGEMENT | Facility: CLINIC | Age: 75
End: 2025-05-09
Payer: COMMERCIAL

## 2025-06-14 ENCOUNTER — HEALTH MAINTENANCE LETTER (OUTPATIENT)
Age: 75
End: 2025-06-14

## 2025-06-18 ENCOUNTER — ANCILLARY PROCEDURE (OUTPATIENT)
Dept: CT IMAGING | Facility: CLINIC | Age: 75
End: 2025-06-18
Attending: INTERNAL MEDICINE
Payer: COMMERCIAL

## 2025-06-18 DIAGNOSIS — C83.38 DIFFUSE LARGE B-CELL LYMPHOMA OF LYMPH NODES OF MULTIPLE SITES (H): ICD-10-CM

## 2025-06-18 LAB
CREAT BLD-MCNC: 1 MG/DL (ref 0.7–1.2)
EGFRCR SERPLBLD CKD-EPI 2021: >60 ML/MIN/1.73M2

## 2025-06-18 PROCEDURE — 71275 CT ANGIOGRAPHY CHEST: CPT

## 2025-06-18 PROCEDURE — 250N000011 HC RX IP 250 OP 636: Performed by: INTERNAL MEDICINE

## 2025-06-18 PROCEDURE — 82565 ASSAY OF CREATININE: CPT

## 2025-06-18 PROCEDURE — 250N000009 HC RX 250: Performed by: INTERNAL MEDICINE

## 2025-06-18 RX ORDER — IOPAMIDOL 755 MG/ML
66 INJECTION, SOLUTION INTRAVASCULAR ONCE
Status: COMPLETED | OUTPATIENT
Start: 2025-06-18 | End: 2025-06-18

## 2025-06-18 RX ADMIN — IOPAMIDOL 95 ML: 755 INJECTION, SOLUTION INTRAVENOUS at 09:59

## 2025-06-18 RX ADMIN — SODIUM CHLORIDE 70 ML: 9 INJECTION, SOLUTION INTRAVENOUS at 10:00

## 2025-06-20 ENCOUNTER — TRANSFERRED RECORDS (OUTPATIENT)
Dept: HEALTH INFORMATION MANAGEMENT | Facility: CLINIC | Age: 75
End: 2025-06-20
Payer: COMMERCIAL

## 2025-06-23 ENCOUNTER — MEDICAL CORRESPONDENCE (OUTPATIENT)
Dept: TRANSPLANT | Facility: CLINIC | Age: 75
End: 2025-06-23
Payer: COMMERCIAL

## (undated) DEVICE — ESU GROUND PAD UNIVERSAL W/O CORD

## (undated) DEVICE — GLOVE PROTEXIS W/NEU-THERA 7.5  2D73TE75

## (undated) DEVICE — GLOVE BIOGEL PI MICRO SZ 6.5 48565

## (undated) DEVICE — LUBRICANT INST KIT ENDO-LUBE 220-90

## (undated) DEVICE — LINEN TOWEL PACK X5 5464

## (undated) DEVICE — DRAPE U SPLIT 74X120" 29440

## (undated) DEVICE — SOL NACL 0.9% IRRIG 1000ML BOTTLE 07138-09

## (undated) DEVICE — GLOVE PROTEXIS POWDER FREE 7.5 ORTHOPEDIC 2D73ET75

## (undated) DEVICE — ANTIFOG SOLUTION W/FOAM PAD 31142527

## (undated) DEVICE — PREP SKIN SCRUB TRAY 4461A

## (undated) DEVICE — SUCTION CANISTER MEDIVAC LINER 3000ML W/LID 65651-530

## (undated) DEVICE — SU NDL CUT REV MED 3/8 209014

## (undated) DEVICE — CATH PORT POWERPORT CLEARVUE ISP 8FR 5608062: Type: IMPLANTABLE DEVICE | Status: NON-FUNCTIONAL

## (undated) DEVICE — PACK MINOR SBA15MIFSE

## (undated) DEVICE — SOL NACL 0.9% IRRIG 1000ML BOTTLE 2F7124

## (undated) DEVICE — ENDO FORCEPS BRONCH 100506

## (undated) DEVICE — ESU ELEC BLADE 6" COATED/INSULATED E1455-6

## (undated) DEVICE — SU VICRYL 3-0 SH 27" J316H

## (undated) DEVICE — DRAPE LAP TRANSVERSE 29421

## (undated) DEVICE — SOL NACL 0.9% INJ 250ML BAG 2B1322Q

## (undated) DEVICE — DRAPE SHEET REV FOLD 3/4 9349

## (undated) DEVICE — DRAPE C-ARM 60X42" 1013

## (undated) DEVICE — DRAIN PENROSE 0.75"X18" LATEX FREE GR205

## (undated) DEVICE — DRAPE MAYO STAND 23X54 8337

## (undated) DEVICE — SU VICRYL 1 CT 36" J959H

## (undated) DEVICE — CATH ON-Q PAIN SILVER SKR 2.5" PM010-A

## (undated) DEVICE — SU PROLENE 3-0 V-7DA 36" 8976H

## (undated) DEVICE — TUBING PRESSURE M/F CONNECTOR 12" 50P112

## (undated) DEVICE — LABEL MEDICATION SYSTEM 3303-P

## (undated) DEVICE — KIT ENDO FIRST STEP DISINFECTANT 200ML W/POUCH EP-4

## (undated) DEVICE — SYR 10ML SLIP TIP W/O NDL

## (undated) DEVICE — SU SILK 2-0 TIE 24" SA75H

## (undated) DEVICE — GLOVE BIOGEL PI MICRO SZ 7.5 48575

## (undated) DEVICE — NDL 25GA 1.5" 305127

## (undated) DEVICE — SUCTION MANIFOLD NEPTUNE 2 SYS 4 PORT 0702-020-000

## (undated) DEVICE — DRSG TELFA 3X8" 1238

## (undated) DEVICE — PROTECTOR ARM ONE-STEP TRENDELENBURG 40418

## (undated) DEVICE — GOWN IMPERVIOUS ZONED LG

## (undated) DEVICE — PREP CHLORAPREP W/ORANGE TINT 10.5ML 930715

## (undated) DEVICE — BNDG KLING 4" 2236

## (undated) DEVICE — TOURNIQUET CUFF 18" REPRO RED 60-7070-103

## (undated) DEVICE — ESU ELEC BLADE 2.75" COATED/INSULATED E1455

## (undated) DEVICE — DRSG XEROFORM 1X8"

## (undated) DEVICE — SYR 10ML LL W/O NDL 302995

## (undated) DEVICE — LINEN GOWN OVERSIZE 5408

## (undated) DEVICE — SYR BULB IRRIG 50ML LATEX FREE 0035280

## (undated) DEVICE — SPONGE PACK VAGINAL 2"X9

## (undated) DEVICE — SU CHROMIC 5-0 P-3 18" 687G

## (undated) DEVICE — ENDO VALVE SUCTION ULTRASOUND BRONCH MAJ-1414

## (undated) DEVICE — SU VICRYL 3-0 SH 27" UND J416H

## (undated) DEVICE — SU PROLENE 4-0 V-7DA 36" 8975H

## (undated) DEVICE — SOL WATER IRRIG 1000ML BOTTLE 2F7114

## (undated) DEVICE — BASIN SET SINGLE STERILE 13752-624

## (undated) DEVICE — SU VICRYL 1 CTX CR 8X18" J765D

## (undated) DEVICE — GLOVE PROTEXIS W/NEU-THERA 6.5  2D73TE65

## (undated) DEVICE — PREP CHLORAPREP 26ML TINTED ORANGE  260815

## (undated) DEVICE — PREP CHLORAPREP W/ORANGE TINT 10.5ML 260715

## (undated) DEVICE — ENDO VALVE SUCTION BRONCH EVIS MAJ-209

## (undated) DEVICE — BNDG KLING 3" 2232

## (undated) DEVICE — DRSG KERLIX FLUFFS X5

## (undated) DEVICE — ESU PENCIL SMOKE EVAC W/ROCKER SWITCH 0703-047-000

## (undated) DEVICE — BLADE SAW STERNAL 20X30MM KM-32

## (undated) DEVICE — SOL ADH LIQUID BENZOIN SWAB 0.6ML C1544

## (undated) DEVICE — TAPE DRSG UNIVERSAL CLOTH 3" WHITE LATEX 881-3

## (undated) DEVICE — GLOVE PROTEXIS BLUE W/NEU-THERA 8.5  2D73EB85

## (undated) DEVICE — ADH SKIN CLOSURE PREMIERPRO EXOFIN 1.0ML 3470

## (undated) DEVICE — NDL 22GA 1.5"

## (undated) DEVICE — DRSG GAUZE 4X4" 3033

## (undated) DEVICE — DRSG TEGADERM 4X4 3/4" 1626

## (undated) DEVICE — DECANTER BAG 2002S

## (undated) DEVICE — BLADE KNIFE SURG 10 371110

## (undated) DEVICE — STPL SKIN SUBCUTICULAR INSORB  2030

## (undated) DEVICE — SYR 30ML LL W/O NDL 302832

## (undated) DEVICE — DRSG STERI STRIP 1/2X4" R1547

## (undated) DEVICE — STPL ENDO ARTICULATING 45MM EC45A

## (undated) DEVICE — ESU GROUND PAD ADULT W/CORD E7507

## (undated) DEVICE — SU VICRYL 4-0 PS-2 18" UND J496H

## (undated) DEVICE — SU ETHILON 3-0 FS-1 18" 669H

## (undated) DEVICE — ENDO VALVE SYR NDL KIT ULTRASOUND BRONCH NA-201SX-4022-A

## (undated) DEVICE — ENDO ADPT BRONCH SWIVEL Y A1002

## (undated) DEVICE — LINEN TOWEL PACK X10 5473

## (undated) DEVICE — SURGICEL HEMOSTAT 3X4" NUKNIT 1943

## (undated) DEVICE — DRAPE IOBAN INCISE 23X17" 6650EZ

## (undated) DEVICE — SUCTION DRY CHEST DRAIN OASIS 3600-100

## (undated) DEVICE — SU SILK 2 REEL 60" SA8H

## (undated) DEVICE — ESU PENCIL W/HOLSTER E2350H

## (undated) DEVICE — PITCHER STERILE 1000ML  SSK9004A

## (undated) DEVICE — SU DERMABOND MINI DHVM12

## (undated) DEVICE — GLOVE PROTEXIS MICRO 8.0  2D73PM80

## (undated) DEVICE — DRAPE BREAST/CHEST 29420

## (undated) DEVICE — ENDO VALVE BX EVIS MAJ-210

## (undated) DEVICE — SPECIMEN CONTAINER 3OZ

## (undated) DEVICE — SU MONOCRYL 4-0 PS-2 27" UND Y426H

## (undated) DEVICE — SYR 30ML SLIP TIP W/O NDL 302833

## (undated) DEVICE — BAG CLEAR TRASH 1.3M 39X33" P4040C

## (undated) DEVICE — DRAPE POUCH INSTRUMENT 1018

## (undated) DEVICE — PACK LOWER EXTREMITY RIDGES

## (undated) DEVICE — SU VICRYL 2-0 CT-1 27" J339H

## (undated) DEVICE — DRAIN CHEST TUBE 28FR STR 8028

## (undated) DEVICE — TUBING SUCTION 10'X3/16" N510

## (undated) DEVICE — DRSG KERLIX 4 1/2"X4YDS ROLL 6715

## (undated) DEVICE — CAST PADDING 4" STERILE 9044S

## (undated) DEVICE — BLADE KNIFE SURG 15 371115

## (undated) DEVICE — PREP POVIDONE IODINE SOLUTION 10% 4OZ BOTTLE 29906-004

## (undated) DEVICE — GOWN IMPERVIOUS SPECIALTY XLG/XLONG 32474

## (undated) DEVICE — SU PROLENE 4-0 RB-1DA 36" 8557H

## (undated) DEVICE — FCP BIOPSY PULMONARY 1.2MMX100CML M00515220

## (undated) DEVICE — SPONGE LAP 18X18" X8435

## (undated) DEVICE — PACK POST OP HEART

## (undated) DEVICE — LINEN HALF SHEET 5512

## (undated) DEVICE — LINEN FULL SHEET 5511

## (undated) DEVICE — NDL 19GA 1.5"

## (undated) DEVICE — STPL RELOAD REG/THK TISSUE ECHELON 45 X 3.8MM GOLD GST45D

## (undated) DEVICE — PREP POVIDONE IODINE SCRUB 7.5% 4OZ APL82212

## (undated) DEVICE — SU ETHILON 4-0 PS-2 18" BLACK 1667H

## (undated) DEVICE — PREP CHLORAPREP 26ML TINTED HI-LITE ORANGE 930815

## (undated) DEVICE — CLIP APPLIER 11.5" PREMIUM II 134053

## (undated) DEVICE — SPONGE TONSIL W/STRING MED 23275-680

## (undated) DEVICE — DRAPE EXTREMITY W/ARMBOARD 29405

## (undated) DEVICE — CAST PADDING 4" UNSTERILE 9044

## (undated) DEVICE — TUBING SUCTION MEDI-VAC SOFT 3/16"X20' N520A

## (undated) RX ORDER — CEFAZOLIN SODIUM 1 G/3ML
INJECTION, POWDER, FOR SOLUTION INTRAMUSCULAR; INTRAVENOUS
Status: DISPENSED
Start: 2017-11-17

## (undated) RX ORDER — CEFAZOLIN SODIUM/WATER 2 G/20 ML
SYRINGE (ML) INTRAVENOUS
Status: DISPENSED
Start: 2022-05-24

## (undated) RX ORDER — ONDANSETRON 2 MG/ML
INJECTION INTRAMUSCULAR; INTRAVENOUS
Status: DISPENSED
Start: 2017-12-05

## (undated) RX ORDER — HEPARIN SODIUM (PORCINE) LOCK FLUSH IV SOLN 100 UNIT/ML 100 UNIT/ML
SOLUTION INTRAVENOUS
Status: DISPENSED
Start: 2022-10-05

## (undated) RX ORDER — FLUMAZENIL 0.1 MG/ML
INJECTION, SOLUTION INTRAVENOUS
Status: DISPENSED
Start: 2017-10-26

## (undated) RX ORDER — PROPOFOL 10 MG/ML
INJECTION, EMULSION INTRAVENOUS
Status: DISPENSED
Start: 2020-07-01

## (undated) RX ORDER — HEPARIN SODIUM (PORCINE) LOCK FLUSH IV SOLN 100 UNIT/ML 100 UNIT/ML
SOLUTION INTRAVENOUS
Status: DISPENSED
Start: 2020-11-25

## (undated) RX ORDER — LIDOCAINE HYDROCHLORIDE 10 MG/ML
INJECTION, SOLUTION INFILTRATION; PERINEURAL
Status: DISPENSED
Start: 2017-03-14

## (undated) RX ORDER — BUPIVACAINE HYDROCHLORIDE 5 MG/ML
INJECTION, SOLUTION EPIDURAL; INTRACAUDAL
Status: DISPENSED
Start: 2017-11-14

## (undated) RX ORDER — FENTANYL CITRATE 50 UG/ML
INJECTION, SOLUTION INTRAMUSCULAR; INTRAVENOUS
Status: DISPENSED
Start: 2017-03-14

## (undated) RX ORDER — CEFAZOLIN SODIUM 2 G/100ML
INJECTION, SOLUTION INTRAVENOUS
Status: DISPENSED
Start: 2020-05-22

## (undated) RX ORDER — CEFAZOLIN SODIUM 2 G/100ML
INJECTION, SOLUTION INTRAVENOUS
Status: DISPENSED
Start: 2017-12-05

## (undated) RX ORDER — SODIUM CHLORIDE, SODIUM LACTATE, POTASSIUM CHLORIDE, CALCIUM CHLORIDE 600; 310; 30; 20 MG/100ML; MG/100ML; MG/100ML; MG/100ML
INJECTION, SOLUTION INTRAVENOUS
Status: DISPENSED
Start: 2022-04-11

## (undated) RX ORDER — NALOXONE HYDROCHLORIDE 0.4 MG/ML
INJECTION, SOLUTION INTRAMUSCULAR; INTRAVENOUS; SUBCUTANEOUS
Status: DISPENSED
Start: 2020-05-27

## (undated) RX ORDER — NALOXONE HYDROCHLORIDE 0.4 MG/ML
INJECTION, SOLUTION INTRAMUSCULAR; INTRAVENOUS; SUBCUTANEOUS
Status: DISPENSED
Start: 2017-10-26

## (undated) RX ORDER — PROPOFOL 10 MG/ML
INJECTION, EMULSION INTRAVENOUS
Status: DISPENSED
Start: 2017-03-14

## (undated) RX ORDER — ONDANSETRON 2 MG/ML
INJECTION INTRAMUSCULAR; INTRAVENOUS
Status: DISPENSED
Start: 2017-03-14

## (undated) RX ORDER — REGADENOSON 0.08 MG/ML
INJECTION, SOLUTION INTRAVENOUS
Status: DISPENSED
Start: 2023-12-28

## (undated) RX ORDER — OXYCODONE HYDROCHLORIDE 5 MG/1
TABLET ORAL
Status: DISPENSED
Start: 2022-05-24

## (undated) RX ORDER — FENTANYL CITRATE 50 UG/ML
INJECTION, SOLUTION INTRAMUSCULAR; INTRAVENOUS
Status: DISPENSED
Start: 2022-05-24

## (undated) RX ORDER — LIDOCAINE HYDROCHLORIDE 10 MG/ML
INJECTION, SOLUTION EPIDURAL; INFILTRATION; INTRACAUDAL; PERINEURAL
Status: DISPENSED
Start: 2020-09-29

## (undated) RX ORDER — FENTANYL CITRATE 50 UG/ML
INJECTION, SOLUTION INTRAMUSCULAR; INTRAVENOUS
Status: DISPENSED
Start: 2020-05-22

## (undated) RX ORDER — FLUMAZENIL 0.1 MG/ML
INJECTION, SOLUTION INTRAVENOUS
Status: DISPENSED
Start: 2020-05-27

## (undated) RX ORDER — FENTANYL CITRATE 50 UG/ML
INJECTION, SOLUTION INTRAMUSCULAR; INTRAVENOUS
Status: DISPENSED
Start: 2020-06-05

## (undated) RX ORDER — BUPIVACAINE HYDROCHLORIDE 2.5 MG/ML
INJECTION, SOLUTION EPIDURAL; INFILTRATION; INTRACAUDAL
Status: DISPENSED
Start: 2020-07-01

## (undated) RX ORDER — CALCIUM GLUCONATE 94 MG/ML
INJECTION, SOLUTION INTRAVENOUS
Status: DISPENSED
Start: 2020-08-31

## (undated) RX ORDER — FENTANYL CITRATE 50 UG/ML
INJECTION, SOLUTION INTRAMUSCULAR; INTRAVENOUS
Status: DISPENSED
Start: 2017-11-14

## (undated) RX ORDER — BUPIVACAINE HYDROCHLORIDE AND EPINEPHRINE 5; 5 MG/ML; UG/ML
INJECTION, SOLUTION PERINEURAL
Status: DISPENSED
Start: 2022-05-24

## (undated) RX ORDER — FENTANYL CITRATE 50 UG/ML
INJECTION, SOLUTION INTRAMUSCULAR; INTRAVENOUS
Status: DISPENSED
Start: 2020-05-27

## (undated) RX ORDER — DEXAMETHASONE SODIUM PHOSPHATE 4 MG/ML
INJECTION, SOLUTION INTRA-ARTICULAR; INTRALESIONAL; INTRAMUSCULAR; INTRAVENOUS; SOFT TISSUE
Status: DISPENSED
Start: 2017-11-17

## (undated) RX ORDER — LIDOCAINE HYDROCHLORIDE 20 MG/ML
INJECTION, SOLUTION EPIDURAL; INFILTRATION; INTRACAUDAL; PERINEURAL
Status: DISPENSED
Start: 2022-05-24

## (undated) RX ORDER — FENTANYL CITRATE 50 UG/ML
INJECTION, SOLUTION INTRAMUSCULAR; INTRAVENOUS
Status: DISPENSED
Start: 2017-11-17

## (undated) RX ORDER — DEXAMETHASONE SODIUM PHOSPHATE 4 MG/ML
INJECTION, SOLUTION INTRA-ARTICULAR; INTRALESIONAL; INTRAMUSCULAR; INTRAVENOUS; SOFT TISSUE
Status: DISPENSED
Start: 2017-12-05

## (undated) RX ORDER — FENTANYL CITRATE 50 UG/ML
INJECTION, SOLUTION INTRAMUSCULAR; INTRAVENOUS
Status: DISPENSED
Start: 2018-05-09

## (undated) RX ORDER — ONDANSETRON 2 MG/ML
INJECTION INTRAMUSCULAR; INTRAVENOUS
Status: DISPENSED
Start: 2020-07-01

## (undated) RX ORDER — CEFAZOLIN SODIUM 2 G/100ML
INJECTION, SOLUTION INTRAVENOUS
Status: DISPENSED
Start: 2020-06-05

## (undated) RX ORDER — ONDANSETRON 2 MG/ML
INJECTION INTRAMUSCULAR; INTRAVENOUS
Status: DISPENSED
Start: 2022-11-29

## (undated) RX ORDER — FENTANYL CITRATE 50 UG/ML
INJECTION, SOLUTION INTRAMUSCULAR; INTRAVENOUS
Status: DISPENSED
Start: 2022-04-11

## (undated) RX ORDER — LIDOCAINE HYDROCHLORIDE 10 MG/ML
INJECTION, SOLUTION EPIDURAL; INFILTRATION; INTRACAUDAL; PERINEURAL
Status: DISPENSED
Start: 2020-07-01

## (undated) RX ORDER — ONDANSETRON 2 MG/ML
INJECTION INTRAMUSCULAR; INTRAVENOUS
Status: DISPENSED
Start: 2017-11-14

## (undated) RX ORDER — FENTANYL CITRATE 50 UG/ML
INJECTION, SOLUTION INTRAMUSCULAR; INTRAVENOUS
Status: DISPENSED
Start: 2017-12-05

## (undated) RX ORDER — LIDOCAINE HYDROCHLORIDE 10 MG/ML
INJECTION, SOLUTION INFILTRATION; PERINEURAL
Status: DISPENSED
Start: 2020-06-05

## (undated) RX ORDER — FENTANYL CITRATE 50 UG/ML
INJECTION, SOLUTION INTRAMUSCULAR; INTRAVENOUS
Status: DISPENSED
Start: 2017-10-26

## (undated) RX ORDER — PROPOFOL 10 MG/ML
INJECTION, EMULSION INTRAVENOUS
Status: DISPENSED
Start: 2017-12-05

## (undated) RX ORDER — ONDANSETRON 2 MG/ML
INJECTION INTRAMUSCULAR; INTRAVENOUS
Status: DISPENSED
Start: 2022-05-24

## (undated) RX ORDER — CEFAZOLIN SODIUM/WATER 2 G/20 ML
SYRINGE (ML) INTRAVENOUS
Status: DISPENSED
Start: 2022-11-29

## (undated) RX ORDER — HEPARIN SODIUM (PORCINE) LOCK FLUSH IV SOLN 100 UNIT/ML 100 UNIT/ML
SOLUTION INTRAVENOUS
Status: DISPENSED
Start: 2018-05-09

## (undated) RX ORDER — HEPARIN SODIUM (PORCINE) LOCK FLUSH IV SOLN 100 UNIT/ML 100 UNIT/ML
SOLUTION INTRAVENOUS
Status: DISPENSED
Start: 2021-09-29

## (undated) RX ORDER — DEXAMETHASONE SODIUM PHOSPHATE 4 MG/ML
INJECTION, SOLUTION INTRA-ARTICULAR; INTRALESIONAL; INTRAMUSCULAR; INTRAVENOUS; SOFT TISSUE
Status: DISPENSED
Start: 2017-11-14

## (undated) RX ORDER — LIDOCAINE HYDROCHLORIDE 10 MG/ML
INJECTION, SOLUTION EPIDURAL; INFILTRATION; INTRACAUDAL; PERINEURAL
Status: DISPENSED
Start: 2020-10-12

## (undated) RX ORDER — CEFAZOLIN SODIUM 2 G/100ML
INJECTION, SOLUTION INTRAVENOUS
Status: DISPENSED
Start: 2020-07-01

## (undated) RX ORDER — ONDANSETRON 2 MG/ML
INJECTION INTRAMUSCULAR; INTRAVENOUS
Status: DISPENSED
Start: 2020-05-22

## (undated) RX ORDER — PROPOFOL 10 MG/ML
INJECTION, EMULSION INTRAVENOUS
Status: DISPENSED
Start: 2022-05-24

## (undated) RX ORDER — HEPARIN SODIUM (PORCINE) LOCK FLUSH IV SOLN 100 UNIT/ML 100 UNIT/ML
SOLUTION INTRAVENOUS
Status: DISPENSED
Start: 2020-07-01

## (undated) RX ORDER — CEFAZOLIN SODIUM 2 G/100ML
INJECTION, SOLUTION INTRAVENOUS
Status: DISPENSED
Start: 2017-11-17

## (undated) RX ORDER — HYDROMORPHONE HYDROCHLORIDE 1 MG/ML
INJECTION, SOLUTION INTRAMUSCULAR; INTRAVENOUS; SUBCUTANEOUS
Status: DISPENSED
Start: 2017-11-17

## (undated) RX ORDER — PROPOFOL 10 MG/ML
INJECTION, EMULSION INTRAVENOUS
Status: DISPENSED
Start: 2017-11-14

## (undated) RX ORDER — FENTANYL CITRATE-0.9 % NACL/PF 10 MCG/ML
PLASTIC BAG, INJECTION (ML) INTRAVENOUS
Status: DISPENSED
Start: 2022-05-24

## (undated) RX ORDER — FENTANYL CITRATE 0.05 MG/ML
INJECTION, SOLUTION INTRAMUSCULAR; INTRAVENOUS
Status: DISPENSED
Start: 2020-05-22

## (undated) RX ORDER — HEPARIN SODIUM (PORCINE) LOCK FLUSH IV SOLN 100 UNIT/ML 100 UNIT/ML
SOLUTION INTRAVENOUS
Status: DISPENSED
Start: 2020-06-05

## (undated) RX ORDER — LIDOCAINE HYDROCHLORIDE 10 MG/ML
INJECTION, SOLUTION EPIDURAL; INFILTRATION; INTRACAUDAL; PERINEURAL
Status: DISPENSED
Start: 2020-10-06

## (undated) RX ORDER — CEFAZOLIN SODIUM 2 G/100ML
INJECTION, SOLUTION INTRAVENOUS
Status: DISPENSED
Start: 2018-12-10

## (undated) RX ORDER — LIDOCAINE HYDROCHLORIDE 20 MG/ML
INJECTION, SOLUTION EPIDURAL; INFILTRATION; INTRACAUDAL; PERINEURAL
Status: DISPENSED
Start: 2017-11-14

## (undated) RX ORDER — LIDOCAINE HYDROCHLORIDE 10 MG/ML
INJECTION, SOLUTION INFILTRATION; PERINEURAL
Status: DISPENSED
Start: 2022-11-29

## (undated) RX ORDER — PROPOFOL 10 MG/ML
INJECTION, EMULSION INTRAVENOUS
Status: DISPENSED
Start: 2020-05-22

## (undated) RX ORDER — FENTANYL CITRATE 50 UG/ML
INJECTION, SOLUTION INTRAMUSCULAR; INTRAVENOUS
Status: DISPENSED
Start: 2020-07-01

## (undated) RX ORDER — EPINEPHRINE 1 MG/ML
INJECTION, SOLUTION INTRAMUSCULAR; SUBCUTANEOUS
Status: DISPENSED
Start: 2017-11-14

## (undated) RX ORDER — PROPOFOL 10 MG/ML
INJECTION, EMULSION INTRAVENOUS
Status: DISPENSED
Start: 2022-11-29

## (undated) RX ORDER — LIDOCAINE HYDROCHLORIDE 20 MG/ML
INJECTION, SOLUTION EPIDURAL; INFILTRATION; INTRACAUDAL; PERINEURAL
Status: DISPENSED
Start: 2017-03-14